# Patient Record
Sex: FEMALE | Race: WHITE | NOT HISPANIC OR LATINO | Employment: OTHER | URBAN - METROPOLITAN AREA
[De-identification: names, ages, dates, MRNs, and addresses within clinical notes are randomized per-mention and may not be internally consistent; named-entity substitution may affect disease eponyms.]

---

## 2017-01-30 DIAGNOSIS — E78.5 HYPERLIPIDEMIA LDL GOAL <130: Primary | ICD-10-CM

## 2017-01-30 RX ORDER — SIMVASTATIN 20 MG
10 TABLET ORAL AT BEDTIME
Qty: 15 TABLET | Refills: 0 | Status: SHIPPED | OUTPATIENT
Start: 2017-01-30 | End: 2017-03-06

## 2017-01-30 NOTE — TELEPHONE ENCOUNTER
Patient calling states she was told that they had sent in request x2 last week but hasn't heard anything. She is out.   Patient hasn't had lipid panel in > 1 yr. Order is in chart. Scheduled lab only appointment for patient.  Medication is being filled for 1 time refill only due to:  Patient needs labs fasting lipid.

## 2017-02-09 DIAGNOSIS — E78.5 HYPERLIPIDEMIA LDL GOAL <130: ICD-10-CM

## 2017-02-09 LAB
CHOLEST SERPL-MCNC: 178 MG/DL
HDLC SERPL-MCNC: 68 MG/DL
LDLC SERPL CALC-MCNC: 97 MG/DL
NONHDLC SERPL-MCNC: 110 MG/DL
TRIGL SERPL-MCNC: 66 MG/DL

## 2017-02-09 PROCEDURE — 80061 LIPID PANEL: CPT | Performed by: INTERNAL MEDICINE

## 2017-02-09 PROCEDURE — 36415 COLL VENOUS BLD VENIPUNCTURE: CPT | Performed by: INTERNAL MEDICINE

## 2017-03-02 ENCOUNTER — APPOINTMENT (OUTPATIENT)
Dept: CT IMAGING | Facility: CLINIC | Age: 82
DRG: 287 | End: 2017-03-02
Attending: INTERNAL MEDICINE
Payer: COMMERCIAL

## 2017-03-02 ENCOUNTER — APPOINTMENT (OUTPATIENT)
Dept: CARDIOLOGY | Facility: CLINIC | Age: 82
DRG: 287 | End: 2017-03-02
Attending: EMERGENCY MEDICINE
Payer: COMMERCIAL

## 2017-03-02 ENCOUNTER — APPOINTMENT (OUTPATIENT)
Dept: GENERAL RADIOLOGY | Facility: CLINIC | Age: 82
End: 2017-03-02
Attending: EMERGENCY MEDICINE
Payer: COMMERCIAL

## 2017-03-02 ENCOUNTER — HOSPITAL ENCOUNTER (INPATIENT)
Facility: CLINIC | Age: 82
LOS: 1 days | Discharge: HOME | DRG: 287 | End: 2017-03-03
Attending: INTERNAL MEDICINE | Admitting: INTERNAL MEDICINE
Payer: COMMERCIAL

## 2017-03-02 ENCOUNTER — APPOINTMENT (OUTPATIENT)
Dept: CARDIOLOGY | Facility: CLINIC | Age: 82
DRG: 287 | End: 2017-03-02
Attending: INTERNAL MEDICINE
Payer: COMMERCIAL

## 2017-03-02 ENCOUNTER — HOSPITAL ENCOUNTER (EMERGENCY)
Facility: CLINIC | Age: 82
Discharge: ANOTHER HOSPITAL | End: 2017-03-02
Attending: EMERGENCY MEDICINE | Admitting: EMERGENCY MEDICINE
Payer: COMMERCIAL

## 2017-03-02 VITALS
DIASTOLIC BLOOD PRESSURE: 68 MMHG | SYSTOLIC BLOOD PRESSURE: 154 MMHG | HEART RATE: 99 BPM | RESPIRATION RATE: 18 BRPM | WEIGHT: 138 LBS | BODY MASS INDEX: 24.45 KG/M2 | HEIGHT: 63 IN | TEMPERATURE: 97.5 F | OXYGEN SATURATION: 96 %

## 2017-03-02 DIAGNOSIS — I21.3 ST ELEVATION MYOCARDIAL INFARCTION (STEMI), UNSPECIFIED ARTERY (H): ICD-10-CM

## 2017-03-02 DIAGNOSIS — I10 ESSENTIAL HYPERTENSION WITH GOAL BLOOD PRESSURE LESS THAN 140/90: Primary | ICD-10-CM

## 2017-03-02 LAB
ALBUMIN SERPL-MCNC: 4.2 G/DL (ref 3.4–5)
ALP SERPL-CCNC: 100 U/L (ref 40–150)
ALT SERPL W P-5'-P-CCNC: 39 U/L (ref 0–50)
ANION GAP SERPL CALCULATED.3IONS-SCNC: 9 MMOL/L (ref 3–14)
AST SERPL W P-5'-P-CCNC: 26 U/L (ref 0–45)
BASOPHILS # BLD AUTO: 0.1 10E9/L (ref 0–0.2)
BASOPHILS NFR BLD AUTO: 0.6 %
BILIRUB SERPL-MCNC: 0.5 MG/DL (ref 0.2–1.3)
BUN SERPL-MCNC: 16 MG/DL (ref 7–30)
CALCIUM SERPL-MCNC: 9.6 MG/DL (ref 8.5–10.1)
CHLORIDE SERPL-SCNC: 99 MMOL/L (ref 94–109)
CHOLEST SERPL-MCNC: 149 MG/DL
CO2 SERPL-SCNC: 29 MMOL/L (ref 20–32)
CREAT SERPL-MCNC: 0.72 MG/DL (ref 0.52–1.04)
DIFFERENTIAL METHOD BLD: NORMAL
EOSINOPHIL # BLD AUTO: 0.1 10E9/L (ref 0–0.7)
EOSINOPHIL NFR BLD AUTO: 1 %
ERYTHROCYTE [DISTWIDTH] IN BLOOD BY AUTOMATED COUNT: 13.4 % (ref 10–15)
GFR SERPL CREATININE-BSD FRML MDRD: 78 ML/MIN/1.7M2
GLUCOSE SERPL-MCNC: 115 MG/DL (ref 70–99)
HCT VFR BLD AUTO: 41.5 % (ref 35–47)
HDLC SERPL-MCNC: 78 MG/DL
HGB BLD-MCNC: 13.5 G/DL (ref 11.7–15.7)
IMM GRANULOCYTES # BLD: 0 10E9/L (ref 0–0.4)
IMM GRANULOCYTES NFR BLD: 0.3 %
INTERPRETATION ECG - MUSE: NORMAL
LDLC SERPL CALC-MCNC: 59 MG/DL
LYMPHOCYTES # BLD AUTO: 2.1 10E9/L (ref 0.8–5.3)
LYMPHOCYTES NFR BLD AUTO: 21.1 %
MAGNESIUM SERPL-MCNC: 2.1 MG/DL (ref 1.6–2.3)
MCH RBC QN AUTO: 29.6 PG (ref 26.5–33)
MCHC RBC AUTO-ENTMCNC: 32.5 G/DL (ref 31.5–36.5)
MCV RBC AUTO: 91 FL (ref 78–100)
MONOCYTES # BLD AUTO: 0.9 10E9/L (ref 0–1.3)
MONOCYTES NFR BLD AUTO: 8.8 %
NEUTROPHILS # BLD AUTO: 6.7 10E9/L (ref 1.6–8.3)
NEUTROPHILS NFR BLD AUTO: 68.2 %
NONHDLC SERPL-MCNC: 71 MG/DL
NRBC # BLD AUTO: 0 10*3/UL
NRBC BLD AUTO-RTO: 0 /100
PLATELET # BLD AUTO: 289 10E9/L (ref 150–450)
POTASSIUM SERPL-SCNC: 3.3 MMOL/L (ref 3.4–5.3)
POTASSIUM SERPL-SCNC: 3.6 MMOL/L (ref 3.4–5.3)
POTASSIUM SERPL-SCNC: 3.8 MMOL/L (ref 3.4–5.3)
PROT SERPL-MCNC: 7.9 G/DL (ref 6.8–8.8)
RBC # BLD AUTO: 4.56 10E12/L (ref 3.8–5.2)
SODIUM SERPL-SCNC: 137 MMOL/L (ref 133–144)
TRIGL SERPL-MCNC: 62 MG/DL
TROPONIN I SERPL-MCNC: 0.29 UG/L (ref 0–0.04)
TROPONIN I SERPL-MCNC: 0.4 UG/L (ref 0–0.04)
TROPONIN I SERPL-MCNC: 0.56 UG/L (ref 0–0.04)
TROPONIN I SERPL-MCNC: NORMAL UG/L (ref 0–0.04)
WBC # BLD AUTO: 9.9 10E9/L (ref 4–11)

## 2017-03-02 PROCEDURE — 84484 ASSAY OF TROPONIN QUANT: CPT | Performed by: INTERNAL MEDICINE

## 2017-03-02 PROCEDURE — 25000132 ZZH RX MED GY IP 250 OP 250 PS 637: Performed by: INTERNAL MEDICINE

## 2017-03-02 PROCEDURE — 25000128 H RX IP 250 OP 636: Performed by: INTERNAL MEDICINE

## 2017-03-02 PROCEDURE — 93454 CORONARY ARTERY ANGIO S&I: CPT | Mod: 26 | Performed by: INTERNAL MEDICINE

## 2017-03-02 PROCEDURE — 25000132 ZZH RX MED GY IP 250 OP 250 PS 637: Performed by: EMERGENCY MEDICINE

## 2017-03-02 PROCEDURE — 25000125 ZZHC RX 250: Performed by: INTERNAL MEDICINE

## 2017-03-02 PROCEDURE — 27211089 ZZH KIT ACIST INJECTOR CR3

## 2017-03-02 PROCEDURE — 99152 MOD SED SAME PHYS/QHP 5/>YRS: CPT | Mod: GC | Performed by: INTERNAL MEDICINE

## 2017-03-02 PROCEDURE — 93005 ELECTROCARDIOGRAM TRACING: CPT

## 2017-03-02 PROCEDURE — 93306 TTE W/DOPPLER COMPLETE: CPT | Mod: 26 | Performed by: INTERNAL MEDICINE

## 2017-03-02 PROCEDURE — 27210856 ZZH ACCESS HEART CATH CR2

## 2017-03-02 PROCEDURE — 71260 CT THORAX DX C+: CPT

## 2017-03-02 PROCEDURE — 85025 COMPLETE CBC W/AUTO DIFF WBC: CPT | Performed by: EMERGENCY MEDICINE

## 2017-03-02 PROCEDURE — 84484 ASSAY OF TROPONIN QUANT: CPT | Performed by: EMERGENCY MEDICINE

## 2017-03-02 PROCEDURE — 83735 ASSAY OF MAGNESIUM: CPT | Performed by: INTERNAL MEDICINE

## 2017-03-02 PROCEDURE — B2111ZZ FLUOROSCOPY OF MULTIPLE CORONARY ARTERIES USING LOW OSMOLAR CONTRAST: ICD-10-PCS | Performed by: INTERNAL MEDICINE

## 2017-03-02 PROCEDURE — 74177 CT ABD & PELVIS W/CONTRAST: CPT

## 2017-03-02 PROCEDURE — 99153 MOD SED SAME PHYS/QHP EA: CPT

## 2017-03-02 PROCEDURE — 99207 ZZC CDG-MDM COMPONENT: MEETS MODERATE - UP CODED: CPT | Performed by: INTERNAL MEDICINE

## 2017-03-02 PROCEDURE — 71010 XR CHEST PORT 1 VW: CPT

## 2017-03-02 PROCEDURE — 96374 THER/PROPH/DIAG INJ IV PUSH: CPT

## 2017-03-02 PROCEDURE — 99152 MOD SED SAME PHYS/QHP 5/>YRS: CPT

## 2017-03-02 PROCEDURE — 99222 1ST HOSP IP/OBS MODERATE 55: CPT | Performed by: INTERNAL MEDICINE

## 2017-03-02 PROCEDURE — 27210787 ZZH MANIFOLD CR2

## 2017-03-02 PROCEDURE — 36415 COLL VENOUS BLD VENIPUNCTURE: CPT | Performed by: INTERNAL MEDICINE

## 2017-03-02 PROCEDURE — 84132 ASSAY OF SERUM POTASSIUM: CPT | Performed by: INTERNAL MEDICINE

## 2017-03-02 PROCEDURE — 99207 ZZC CDG-DX NOT SELECTED BY PROVIDER: CPT | Performed by: INTERNAL MEDICINE

## 2017-03-02 PROCEDURE — 99291 CRITICAL CARE FIRST HOUR: CPT | Mod: 25 | Performed by: INTERNAL MEDICINE

## 2017-03-02 PROCEDURE — 27210759 ZZH DEVICE INFLATION CR6

## 2017-03-02 PROCEDURE — C1769 GUIDE WIRE: HCPCS

## 2017-03-02 PROCEDURE — 99232 SBSQ HOSP IP/OBS MODERATE 35: CPT | Mod: 25 | Performed by: INTERNAL MEDICINE

## 2017-03-02 PROCEDURE — 40000264 ECHO COMPLETE WITH LUMASON

## 2017-03-02 PROCEDURE — 25500064 ZZH RX 255 OP 636: Performed by: INTERNAL MEDICINE

## 2017-03-02 PROCEDURE — 99153 MOD SED SAME PHYS/QHP EA: CPT | Mod: GC | Performed by: INTERNAL MEDICINE

## 2017-03-02 PROCEDURE — 80053 COMPREHEN METABOLIC PANEL: CPT | Performed by: EMERGENCY MEDICINE

## 2017-03-02 PROCEDURE — 27210795 ZZH PAD DEFIB QUICK CR4

## 2017-03-02 PROCEDURE — 80061 LIPID PANEL: CPT | Performed by: INTERNAL MEDICINE

## 2017-03-02 PROCEDURE — 27210946 ZZH KIT HC TOTES DISP CR8

## 2017-03-02 PROCEDURE — 99285 EMERGENCY DEPT VISIT HI MDM: CPT | Mod: 25

## 2017-03-02 PROCEDURE — 93010 ELECTROCARDIOGRAM REPORT: CPT | Performed by: INTERNAL MEDICINE

## 2017-03-02 PROCEDURE — 27210914 ZZH SHEATH CR8

## 2017-03-02 PROCEDURE — 25000128 H RX IP 250 OP 636: Performed by: EMERGENCY MEDICINE

## 2017-03-02 PROCEDURE — 93454 CORONARY ARTERY ANGIO S&I: CPT

## 2017-03-02 PROCEDURE — 21000000 ZZH R&B IMCU HEART CARE

## 2017-03-02 RX ORDER — DOBUTAMINE HYDROCHLORIDE 200 MG/100ML
2-20 INJECTION INTRAVENOUS CONTINUOUS PRN
Status: DISCONTINUED | OUTPATIENT
Start: 2017-03-02 | End: 2017-03-02 | Stop reason: HOSPADM

## 2017-03-02 RX ORDER — NITROGLYCERIN 5 MG/ML
100-500 VIAL (ML) INTRAVENOUS
Status: COMPLETED | OUTPATIENT
Start: 2017-03-02 | End: 2017-03-02

## 2017-03-02 RX ORDER — SODIUM CHLORIDE 9 MG/ML
INJECTION, SOLUTION INTRAVENOUS CONTINUOUS
Status: DISCONTINUED | OUTPATIENT
Start: 2017-03-02 | End: 2017-03-03 | Stop reason: HOSPADM

## 2017-03-02 RX ORDER — FENTANYL CITRATE 50 UG/ML
25-50 INJECTION, SOLUTION INTRAMUSCULAR; INTRAVENOUS
Status: ACTIVE | OUTPATIENT
Start: 2017-03-02 | End: 2017-03-02

## 2017-03-02 RX ORDER — LIDOCAINE HYDROCHLORIDE 10 MG/ML
1-10 INJECTION, SOLUTION EPIDURAL; INFILTRATION; INTRACAUDAL; PERINEURAL
Status: DISCONTINUED | OUTPATIENT
Start: 2017-03-02 | End: 2017-03-02 | Stop reason: HOSPADM

## 2017-03-02 RX ORDER — FUROSEMIDE 10 MG/ML
20-100 INJECTION INTRAMUSCULAR; INTRAVENOUS
Status: DISCONTINUED | OUTPATIENT
Start: 2017-03-02 | End: 2017-03-02 | Stop reason: HOSPADM

## 2017-03-02 RX ORDER — ALBUTEROL SULFATE 90 UG/1
2 AEROSOL, METERED RESPIRATORY (INHALATION) EVERY 6 HOURS PRN
Status: DISCONTINUED | OUTPATIENT
Start: 2017-03-02 | End: 2017-03-03 | Stop reason: HOSPADM

## 2017-03-02 RX ORDER — PRASUGREL 10 MG/1
10-60 TABLET, FILM COATED ORAL
Status: DISCONTINUED | OUTPATIENT
Start: 2017-03-02 | End: 2017-03-02 | Stop reason: HOSPADM

## 2017-03-02 RX ORDER — IOPAMIDOL 755 MG/ML
75 INJECTION, SOLUTION INTRAVASCULAR ONCE
Status: COMPLETED | OUTPATIENT
Start: 2017-03-02 | End: 2017-03-02

## 2017-03-02 RX ORDER — POTASSIUM CHLORIDE 7.45 MG/ML
10 INJECTION INTRAVENOUS
Status: DISCONTINUED | OUTPATIENT
Start: 2017-03-02 | End: 2017-03-03 | Stop reason: HOSPADM

## 2017-03-02 RX ORDER — IOPAMIDOL 755 MG/ML
100 INJECTION, SOLUTION INTRAVASCULAR ONCE
Status: COMPLETED | OUTPATIENT
Start: 2017-03-02 | End: 2017-03-02

## 2017-03-02 RX ORDER — CLOPIDOGREL BISULFATE 75 MG/1
300-600 TABLET ORAL
Status: DISCONTINUED | OUTPATIENT
Start: 2017-03-02 | End: 2017-03-02 | Stop reason: HOSPADM

## 2017-03-02 RX ORDER — ASPIRIN 81 MG/1
81-324 TABLET, CHEWABLE ORAL
Status: DISCONTINUED | OUTPATIENT
Start: 2017-03-02 | End: 2017-03-02 | Stop reason: HOSPADM

## 2017-03-02 RX ORDER — PROTAMINE SULFATE 10 MG/ML
25-100 INJECTION, SOLUTION INTRAVENOUS EVERY 5 MIN PRN
Status: DISCONTINUED | OUTPATIENT
Start: 2017-03-02 | End: 2017-03-02 | Stop reason: HOSPADM

## 2017-03-02 RX ORDER — METHYLPREDNISOLONE SODIUM SUCCINATE 125 MG/2ML
125 INJECTION, POWDER, LYOPHILIZED, FOR SOLUTION INTRAMUSCULAR; INTRAVENOUS
Status: DISCONTINUED | OUTPATIENT
Start: 2017-03-02 | End: 2017-03-02 | Stop reason: HOSPADM

## 2017-03-02 RX ORDER — FLUMAZENIL 0.1 MG/ML
0.2 INJECTION, SOLUTION INTRAVENOUS
Status: ACTIVE | OUTPATIENT
Start: 2017-03-02 | End: 2017-03-02

## 2017-03-02 RX ORDER — EPTIFIBATIDE 2 MG/ML
2 INJECTION, SOLUTION INTRAVENOUS CONTINUOUS PRN
Status: DISCONTINUED | OUTPATIENT
Start: 2017-03-02 | End: 2017-03-02 | Stop reason: HOSPADM

## 2017-03-02 RX ORDER — POTASSIUM CHLORIDE 1.5 G/1.58G
20-40 POWDER, FOR SOLUTION ORAL
Status: DISCONTINUED | OUTPATIENT
Start: 2017-03-02 | End: 2017-03-03 | Stop reason: HOSPADM

## 2017-03-02 RX ORDER — SODIUM NITROPRUSSIDE 25 MG/ML
100-200 INJECTION INTRAVENOUS
Status: DISCONTINUED | OUTPATIENT
Start: 2017-03-02 | End: 2017-03-02 | Stop reason: HOSPADM

## 2017-03-02 RX ORDER — ASPIRIN 325 MG
325 TABLET ORAL
Status: DISCONTINUED | OUTPATIENT
Start: 2017-03-02 | End: 2017-03-02 | Stop reason: HOSPADM

## 2017-03-02 RX ORDER — HEPARIN SODIUM 1000 [USP'U]/ML
1000-10000 INJECTION, SOLUTION INTRAVENOUS; SUBCUTANEOUS EVERY 5 MIN PRN
Status: DISCONTINUED | OUTPATIENT
Start: 2017-03-02 | End: 2017-03-02 | Stop reason: HOSPADM

## 2017-03-02 RX ORDER — ENALAPRILAT 1.25 MG/ML
1.25-2.5 INJECTION INTRAVENOUS
Status: DISCONTINUED | OUTPATIENT
Start: 2017-03-02 | End: 2017-03-02 | Stop reason: HOSPADM

## 2017-03-02 RX ORDER — HYDRALAZINE HYDROCHLORIDE 20 MG/ML
10-20 INJECTION INTRAMUSCULAR; INTRAVENOUS
Status: DISCONTINUED | OUTPATIENT
Start: 2017-03-02 | End: 2017-03-02 | Stop reason: HOSPADM

## 2017-03-02 RX ORDER — NICARDIPINE HYDROCHLORIDE 2.5 MG/ML
100 INJECTION INTRAVENOUS
Status: DISCONTINUED | OUTPATIENT
Start: 2017-03-02 | End: 2017-03-02 | Stop reason: HOSPADM

## 2017-03-02 RX ORDER — NALOXONE HYDROCHLORIDE 0.4 MG/ML
.2-.4 INJECTION, SOLUTION INTRAMUSCULAR; INTRAVENOUS; SUBCUTANEOUS
Status: ACTIVE | OUTPATIENT
Start: 2017-03-02 | End: 2017-03-02

## 2017-03-02 RX ORDER — VERAPAMIL HYDROCHLORIDE 2.5 MG/ML
1-2.5 INJECTION, SOLUTION INTRAVENOUS
Status: COMPLETED | OUTPATIENT
Start: 2017-03-02 | End: 2017-03-02

## 2017-03-02 RX ORDER — POTASSIUM CHLORIDE 29.8 MG/ML
20 INJECTION INTRAVENOUS
Status: DISCONTINUED | OUTPATIENT
Start: 2017-03-02 | End: 2017-03-02 | Stop reason: HOSPADM

## 2017-03-02 RX ORDER — NALOXONE HYDROCHLORIDE 0.4 MG/ML
.1-.4 INJECTION, SOLUTION INTRAMUSCULAR; INTRAVENOUS; SUBCUTANEOUS
Status: DISCONTINUED | OUTPATIENT
Start: 2017-03-02 | End: 2017-03-03 | Stop reason: HOSPADM

## 2017-03-02 RX ORDER — MORPHINE SULFATE 2 MG/ML
1-2 INJECTION, SOLUTION INTRAMUSCULAR; INTRAVENOUS EVERY 5 MIN PRN
Status: DISCONTINUED | OUTPATIENT
Start: 2017-03-02 | End: 2017-03-02 | Stop reason: HOSPADM

## 2017-03-02 RX ORDER — SIMVASTATIN 10 MG
10 TABLET ORAL AT BEDTIME
Status: DISCONTINUED | OUTPATIENT
Start: 2017-03-02 | End: 2017-03-03 | Stop reason: HOSPADM

## 2017-03-02 RX ORDER — DIPHENHYDRAMINE HYDROCHLORIDE 50 MG/ML
25-50 INJECTION INTRAMUSCULAR; INTRAVENOUS
Status: DISCONTINUED | OUTPATIENT
Start: 2017-03-02 | End: 2017-03-02 | Stop reason: HOSPADM

## 2017-03-02 RX ORDER — NIFEDIPINE 10 MG/1
10 CAPSULE ORAL
Status: DISCONTINUED | OUTPATIENT
Start: 2017-03-02 | End: 2017-03-02 | Stop reason: HOSPADM

## 2017-03-02 RX ORDER — ASPIRIN 81 MG/1
324 TABLET, CHEWABLE ORAL ONCE
Status: COMPLETED | OUTPATIENT
Start: 2017-03-02 | End: 2017-03-02

## 2017-03-02 RX ORDER — DEXTROSE MONOHYDRATE 25 G/50ML
12.5-5 INJECTION, SOLUTION INTRAVENOUS EVERY 30 MIN PRN
Status: DISCONTINUED | OUTPATIENT
Start: 2017-03-02 | End: 2017-03-02 | Stop reason: HOSPADM

## 2017-03-02 RX ORDER — NITROGLYCERIN 0.4 MG/1
0.4 TABLET SUBLINGUAL EVERY 5 MIN PRN
Status: DISCONTINUED | OUTPATIENT
Start: 2017-03-02 | End: 2017-03-02 | Stop reason: HOSPADM

## 2017-03-02 RX ORDER — FLUMAZENIL 0.1 MG/ML
0.2 INJECTION, SOLUTION INTRAVENOUS
Status: DISCONTINUED | OUTPATIENT
Start: 2017-03-02 | End: 2017-03-02 | Stop reason: HOSPADM

## 2017-03-02 RX ORDER — ASPIRIN 81 MG/1
81 TABLET ORAL DAILY
Status: DISCONTINUED | OUTPATIENT
Start: 2017-03-02 | End: 2017-03-03 | Stop reason: HOSPADM

## 2017-03-02 RX ORDER — AZELASTINE 1 MG/ML
1-2 SPRAY, METERED NASAL 2 TIMES DAILY
Status: DISCONTINUED | OUTPATIENT
Start: 2017-03-02 | End: 2017-03-03 | Stop reason: HOSPADM

## 2017-03-02 RX ORDER — LIDOCAINE 40 MG/G
CREAM TOPICAL
Status: DISCONTINUED | OUTPATIENT
Start: 2017-03-02 | End: 2017-03-03 | Stop reason: HOSPADM

## 2017-03-02 RX ORDER — NITROGLYCERIN 20 MG/100ML
.07-2 INJECTION INTRAVENOUS CONTINUOUS PRN
Status: DISCONTINUED | OUTPATIENT
Start: 2017-03-02 | End: 2017-03-02 | Stop reason: HOSPADM

## 2017-03-02 RX ORDER — MAGNESIUM SULFATE HEPTAHYDRATE 40 MG/ML
4 INJECTION, SOLUTION INTRAVENOUS EVERY 4 HOURS PRN
Status: DISCONTINUED | OUTPATIENT
Start: 2017-03-02 | End: 2017-03-03 | Stop reason: HOSPADM

## 2017-03-02 RX ORDER — POTASSIUM CHLORIDE 7.45 MG/ML
10 INJECTION INTRAVENOUS
Status: DISCONTINUED | OUTPATIENT
Start: 2017-03-02 | End: 2017-03-02 | Stop reason: HOSPADM

## 2017-03-02 RX ORDER — NALOXONE HYDROCHLORIDE 0.4 MG/ML
0.4 INJECTION, SOLUTION INTRAMUSCULAR; INTRAVENOUS; SUBCUTANEOUS EVERY 5 MIN PRN
Status: DISCONTINUED | OUTPATIENT
Start: 2017-03-02 | End: 2017-03-02 | Stop reason: HOSPADM

## 2017-03-02 RX ORDER — ACETAMINOPHEN 500 MG
500-1000 TABLET ORAL EVERY 8 HOURS PRN
Status: DISCONTINUED | OUTPATIENT
Start: 2017-03-02 | End: 2017-03-03 | Stop reason: HOSPADM

## 2017-03-02 RX ORDER — BUPIVACAINE HYDROCHLORIDE 2.5 MG/ML
1-10 INJECTION, SOLUTION EPIDURAL; INFILTRATION; INTRACAUDAL
Status: DISCONTINUED | OUTPATIENT
Start: 2017-03-02 | End: 2017-03-02 | Stop reason: HOSPADM

## 2017-03-02 RX ORDER — CLOPIDOGREL BISULFATE 75 MG/1
75 TABLET ORAL
Status: DISCONTINUED | OUTPATIENT
Start: 2017-03-02 | End: 2017-03-02 | Stop reason: HOSPADM

## 2017-03-02 RX ORDER — ASPIRIN 81 MG/1
81 TABLET ORAL DAILY
Status: DISCONTINUED | OUTPATIENT
Start: 2017-03-02 | End: 2017-03-02

## 2017-03-02 RX ORDER — INDAPAMIDE 2.5 MG/1
2.5 TABLET ORAL DAILY
Status: DISCONTINUED | OUTPATIENT
Start: 2017-03-02 | End: 2017-03-03

## 2017-03-02 RX ORDER — PROTAMINE SULFATE 10 MG/ML
1-5 INJECTION, SOLUTION INTRAVENOUS
Status: DISCONTINUED | OUTPATIENT
Start: 2017-03-02 | End: 2017-03-02 | Stop reason: HOSPADM

## 2017-03-02 RX ORDER — ADENOSINE 3 MG/ML
12-12000 INJECTION, SOLUTION INTRAVENOUS
Status: DISCONTINUED | OUTPATIENT
Start: 2017-03-02 | End: 2017-03-02 | Stop reason: HOSPADM

## 2017-03-02 RX ORDER — PROMETHAZINE HYDROCHLORIDE 25 MG/ML
6.25-25 INJECTION, SOLUTION INTRAMUSCULAR; INTRAVENOUS EVERY 4 HOURS PRN
Status: DISCONTINUED | OUTPATIENT
Start: 2017-03-02 | End: 2017-03-02 | Stop reason: HOSPADM

## 2017-03-02 RX ORDER — ONDANSETRON 2 MG/ML
4 INJECTION INTRAMUSCULAR; INTRAVENOUS EVERY 4 HOURS PRN
Status: DISCONTINUED | OUTPATIENT
Start: 2017-03-02 | End: 2017-03-02 | Stop reason: HOSPADM

## 2017-03-02 RX ORDER — HYDROCHLOROTHIAZIDE 12.5 MG/1
12.5 CAPSULE ORAL DAILY
Status: DISCONTINUED | OUTPATIENT
Start: 2017-03-02 | End: 2017-03-02

## 2017-03-02 RX ORDER — POTASSIUM CHLORIDE 29.8 MG/ML
20 INJECTION INTRAVENOUS
Status: DISCONTINUED | OUTPATIENT
Start: 2017-03-02 | End: 2017-03-03 | Stop reason: HOSPADM

## 2017-03-02 RX ORDER — EPTIFIBATIDE 2 MG/ML
180 INJECTION, SOLUTION INTRAVENOUS EVERY 10 MIN PRN
Status: DISCONTINUED | OUTPATIENT
Start: 2017-03-02 | End: 2017-03-02 | Stop reason: HOSPADM

## 2017-03-02 RX ORDER — ACETAMINOPHEN 325 MG/1
325-650 TABLET ORAL EVERY 4 HOURS PRN
Status: DISCONTINUED | OUTPATIENT
Start: 2017-03-02 | End: 2017-03-03 | Stop reason: HOSPADM

## 2017-03-02 RX ORDER — LORAZEPAM 2 MG/ML
.5-2 INJECTION INTRAMUSCULAR EVERY 4 HOURS PRN
Status: DISCONTINUED | OUTPATIENT
Start: 2017-03-02 | End: 2017-03-02 | Stop reason: HOSPADM

## 2017-03-02 RX ORDER — NITROGLYCERIN 5 MG/ML
100-200 VIAL (ML) INTRAVENOUS
Status: DISCONTINUED | OUTPATIENT
Start: 2017-03-02 | End: 2017-03-02 | Stop reason: HOSPADM

## 2017-03-02 RX ORDER — DILTIAZEM HYDROCHLORIDE 180 MG/1
180 CAPSULE, EXTENDED RELEASE ORAL DAILY
Status: DISCONTINUED | OUTPATIENT
Start: 2017-03-02 | End: 2017-03-03 | Stop reason: HOSPADM

## 2017-03-02 RX ORDER — LIDOCAINE HYDROCHLORIDE 10 MG/ML
30 INJECTION, SOLUTION EPIDURAL; INFILTRATION; INTRACAUDAL; PERINEURAL
Status: DISCONTINUED | OUTPATIENT
Start: 2017-03-02 | End: 2017-03-02 | Stop reason: HOSPADM

## 2017-03-02 RX ORDER — HYDROCODONE BITARTRATE AND ACETAMINOPHEN 5; 325 MG/1; MG/1
1-2 TABLET ORAL EVERY 4 HOURS PRN
Status: DISCONTINUED | OUTPATIENT
Start: 2017-03-02 | End: 2017-03-03 | Stop reason: HOSPADM

## 2017-03-02 RX ORDER — POTASSIUM CHLORIDE 1500 MG/1
20-40 TABLET, EXTENDED RELEASE ORAL
Status: DISCONTINUED | OUTPATIENT
Start: 2017-03-02 | End: 2017-03-03 | Stop reason: HOSPADM

## 2017-03-02 RX ORDER — FENTANYL CITRATE 50 UG/ML
25-50 INJECTION, SOLUTION INTRAMUSCULAR; INTRAVENOUS
Status: DISCONTINUED | OUTPATIENT
Start: 2017-03-02 | End: 2017-03-02 | Stop reason: HOSPADM

## 2017-03-02 RX ORDER — LOSARTAN POTASSIUM 100 MG/1
100 TABLET ORAL AT BEDTIME
Status: DISCONTINUED | OUTPATIENT
Start: 2017-03-02 | End: 2017-03-03 | Stop reason: HOSPADM

## 2017-03-02 RX ORDER — DOPAMINE HYDROCHLORIDE 160 MG/100ML
2-20 INJECTION, SOLUTION INTRAVENOUS CONTINUOUS PRN
Status: DISCONTINUED | OUTPATIENT
Start: 2017-03-02 | End: 2017-03-02 | Stop reason: HOSPADM

## 2017-03-02 RX ORDER — PHENYLEPHRINE HCL IN 0.9% NACL 1 MG/10 ML
20-100 SYRINGE (ML) INTRAVENOUS
Status: DISCONTINUED | OUTPATIENT
Start: 2017-03-02 | End: 2017-03-02 | Stop reason: HOSPADM

## 2017-03-02 RX ADMIN — LOSARTAN POTASSIUM 100 MG: 100 TABLET, FILM COATED ORAL at 20:27

## 2017-03-02 RX ADMIN — SIMVASTATIN 10 MG: 10 TABLET, FILM COATED ORAL at 20:27

## 2017-03-02 RX ADMIN — POTASSIUM CHLORIDE 20 MEQ: 1500 TABLET, EXTENDED RELEASE ORAL at 15:55

## 2017-03-02 RX ADMIN — IOPAMIDOL 100 ML: 755 INJECTION, SOLUTION INTRAVASCULAR at 05:00

## 2017-03-02 RX ADMIN — MIDAZOLAM HYDROCHLORIDE 1 MG: 1 INJECTION, SOLUTION INTRAMUSCULAR; INTRAVENOUS at 04:41

## 2017-03-02 RX ADMIN — NITROGLYCERIN 200 MCG: 5 INJECTION, SOLUTION INTRAVENOUS at 04:05

## 2017-03-02 RX ADMIN — VERAPAMIL HYDROCHLORIDE 2.5 MG: 2.5 INJECTION, SOLUTION INTRAVENOUS at 04:49

## 2017-03-02 RX ADMIN — FENTANYL CITRATE 50 MCG: 50 INJECTION, SOLUTION INTRAMUSCULAR; INTRAVENOUS at 04:16

## 2017-03-02 RX ADMIN — FENTANYL CITRATE 50 MCG: 50 INJECTION, SOLUTION INTRAMUSCULAR; INTRAVENOUS at 04:01

## 2017-03-02 RX ADMIN — MIDAZOLAM HYDROCHLORIDE 1 MG: 1 INJECTION, SOLUTION INTRAMUSCULAR; INTRAVENOUS at 04:16

## 2017-03-02 RX ADMIN — POTASSIUM CHLORIDE 40 MEQ: 1500 TABLET, EXTENDED RELEASE ORAL at 08:37

## 2017-03-02 RX ADMIN — VERAPAMIL HYDROCHLORIDE 2.5 MG: 2.5 INJECTION, SOLUTION INTRAVENOUS at 04:05

## 2017-03-02 RX ADMIN — TICAGRELOR 180 MG: 90 TABLET ORAL at 03:07

## 2017-03-02 RX ADMIN — VERAPAMIL HYDROCHLORIDE 2.5 MG: 2.5 INJECTION, SOLUTION INTRAVENOUS at 04:43

## 2017-03-02 RX ADMIN — AZELASTINE HYDROCHLORIDE 2 SPRAY: 137 SPRAY, METERED NASAL at 20:25

## 2017-03-02 RX ADMIN — IOPAMIDOL 75 ML: 755 INJECTION, SOLUTION INTRAVENOUS at 05:00

## 2017-03-02 RX ADMIN — OMEPRAZOLE 40 MG: 20 CAPSULE, DELAYED RELEASE ORAL at 08:37

## 2017-03-02 RX ADMIN — NITROGLYCERIN 0.4 MG: 0.4 TABLET SUBLINGUAL at 03:05

## 2017-03-02 RX ADMIN — SULFUR HEXAFLUORIDE 5 ML: KIT at 10:53

## 2017-03-02 RX ADMIN — SODIUM CHLORIDE: 9 INJECTION, SOLUTION INTRAVENOUS at 05:56

## 2017-03-02 RX ADMIN — MIDAZOLAM HYDROCHLORIDE 1 MG: 1 INJECTION, SOLUTION INTRAMUSCULAR; INTRAVENOUS at 04:46

## 2017-03-02 RX ADMIN — Medication 2 TABLET: at 08:37

## 2017-03-02 RX ADMIN — ASPIRIN 81 MG 324 MG: 81 TABLET ORAL at 03:04

## 2017-03-02 RX ADMIN — FLUTICASONE FUROATE AND VILANTEROL TRIFENATATE 1 PUFF: 100; 25 POWDER RESPIRATORY (INHALATION) at 20:25

## 2017-03-02 RX ADMIN — ASPIRIN 81 MG: 81 TABLET, COATED ORAL at 08:38

## 2017-03-02 RX ADMIN — AZELASTINE HYDROCHLORIDE 2 SPRAY: 137 SPRAY, METERED NASAL at 08:39

## 2017-03-02 RX ADMIN — NITROGLYCERIN 200 MCG: 5 INJECTION, SOLUTION INTRAVENOUS at 04:34

## 2017-03-02 RX ADMIN — DILTIAZEM HYDROCHLORIDE 180 MG: 180 CAPSULE, EXTENDED RELEASE ORAL at 08:37

## 2017-03-02 RX ADMIN — MIDAZOLAM HYDROCHLORIDE 1 MG: 1 INJECTION, SOLUTION INTRAMUSCULAR; INTRAVENOUS at 04:02

## 2017-03-02 RX ADMIN — HEPARIN SODIUM 4500 UNITS: 1000 INJECTION, SOLUTION INTRAVENOUS; SUBCUTANEOUS at 03:12

## 2017-03-02 RX ADMIN — NITROGLYCERIN 400 MCG: 5 INJECTION, SOLUTION INTRAVENOUS at 04:43

## 2017-03-02 RX ADMIN — FLUTICASONE FUROATE AND VILANTEROL TRIFENATATE 1 PUFF: 100; 25 POWDER RESPIRATORY (INHALATION) at 08:38

## 2017-03-02 RX ADMIN — POTASSIUM CHLORIDE 20 MEQ: 1500 TABLET, EXTENDED RELEASE ORAL at 10:34

## 2017-03-02 RX ADMIN — Medication 15 MG: at 09:13

## 2017-03-02 RX ADMIN — NITROGLYCERIN 400 MCG: 5 INJECTION, SOLUTION INTRAVENOUS at 04:48

## 2017-03-02 ASSESSMENT — ENCOUNTER SYMPTOMS: SHORTNESS OF BREATH: 1

## 2017-03-02 NOTE — PHARMACY-ADMISSION MEDICATION HISTORY
Admission medication history interview status for the 3/2/2017  admission is complete. See EPIC admission navigator for prior to admission medications     Medication history source reliability:Good    Actions taken by pharmacist (provider contacted, etc):Direct patient interview and calling [facility] in [place] for confirmation of omeprazole dose.     Additional medication history information not noted on PTA med list :None    Medication reconciliation/reorder completed by provider prior to medication history? No    Time spent in this activity: 15 minutes    Prior to Admission medications    Medication Sig Last Dose Taking? Auth Provider   simvastatin (ZOCOR) 20 MG tablet Take 0.5 tablets (10 mg) by mouth At Bedtime 3/1/2017 at PM Yes Layo Sevilla MD   fluticasone-salmeterol (ADVAIR-HFA) 230-21 MCG/ACT inhaler Inhale 2 puffs into the lungs 2 times daily 3/1/2017 at PM Yes Layo Sevilla MD   azelastine (ASTELIN) 0.1 % nasal spray Spray 1-2 sprays into both nostrils 2 times daily 3/1/2017 at PM Yes Layo Sevilla MD   losartan (COZAAR) 100 MG tablet Take 1 tablet (100 mg) by mouth At Bedtime 2/27/2017 at PM Yes Layo Sevilla MD   hydrochlorothiazide 12.5 MG TABS Take 1 tablet (12.5 mg) by mouth daily 3/1/2017 at 0800 Yes Layo Sevilla MD   acetaminophen (TYLENOL) 500 MG tablet Take 500-1,000 mg by mouth every 8 hours as needed for mild pain Past Month at PRN Yes Reported, Patient   albuterol (PROAIR HFA, PROVENTIL HFA, VENTOLIN HFA) 108 (90 BASE) MCG/ACT inhaler Inhale 2 puffs into the lungs every 6 hours as needed  3/1/2017 Yes Reported, Patient   aspirin 81 MG EC tablet Take 1 tablet (81 mg) by mouth daily 2/27/2017 at 0800 Yes Layo Sevilla MD   calcium citrate-vitamin D (CALCIUM CITRATE +) 315-200 MG-UNIT TABS Take 2 tablets by mouth daily. 3/1/2017 at PM Yes Reported, Patient   omeprazole (PRILOSEC) 20 MG capsule Take 20 mg by mouth daily  3/1/2017 at 0800  Layo Sevilla MD   order for DME Equipment being  ordered: compression stockings, 30 mm Hg, 2 pairs.   Layo Sevilla MD

## 2017-03-02 NOTE — IP AVS SNAPSHOT
MRN:[id number]                      After Visit Summary   3/2/2017    Genie Persaud    MRN: [id number]           Thank you!     Thank you for choosing [facility] for your care. Our goal is always to provide you with excellent care. Hearing back from our patients is one way we can continue to improve our services. Please take a few minutes to complete the written survey that you may receive in the mail after you visit with us. Thank you!        Patient Information     Date Of Birth          1935        About your hospital stay     You were admitted on:  March 2, 2017 You last received care in the:  [facility]    You were discharged on:  March 3, 2017        Reason for your hospital stay       Admitted with elevated blood pressures                  Who to Call     For medical emergencies, please call 911.  For non-urgent questions about your medical care, please call your primary care provider or clinic, [phone]          Attending Provider     Provider Specialty    Barak Hernandez MD Cardiology       Primary Care Provider Office Phone # Fax #    Layo Sevilla [phone]       [facility] [address] [place] [address]        Follow-up Appointments     Follow-up and recommended labs and tests        Follow-up with primary care provider in 1-2 weeks  for blood pressure follow-up, also check fasting glucose (arrive at this point with nothing to eat or drink for 12 hours prior to appointment i.e. 8 PM the night before) at that time as was mildly elevated during the hospitalization.  Primary care physician also to follow-up on lung nodule seen on chest CT.  Follow-up with nurse practitioner in cardiology [place] [facility]  In 1-2 weeks.  Diltiazem may cause increased side effects with simvastatin, please call doctor if develop muscle pain or cramps.  Recommend taking imdur in the morning and cozaar in the evening to spread  out medications which could cause hypotension.                  Your next 10 appointments already scheduled     Mar 10, 2017  8:20 AM CST   Office Visit with Layo Sevilla MD   [facility]    [address]  [address]  [facility] [address] [phone]           Bring a current list of meds and any records pertaining to this visit.  For Physicals, please bring immunization records and any forms needing to be filled out.  Please arrive 10 minutes early to complete paperwork.            Mar 17, 2017  3:10 PM CDT   Return Discharge with TOREY Sierra CNP   [facility]    [address]  [facility] [address] [phone]              Additional Services     Follow-Up with Cardiac Advanced Practice Provider                 Further instructions from your care team         Discharge Instructions for Cardiac Catheterization  Cardiac catheterization is a procedure to look for blocked areas in the blood vessels that send blood to the heart. A thin, flexible tube (catheter) is put in a blood vessel in your groin or arm. Contrast fluid is injected into your blood, which then flows to your heart. Finally, X-rays pictures are taken of your heart. Your doctor will review the results with you. Be sure to ask any questions you have before you leave. This sheet will help you take care of yourself at home.  Home care    Only do light and easy activities for the next 2 to 3 days. Ask for help with chores and errands while you recover. Have someone drive you to your appointments.    Avoid heavy lifting for a while. Your doctor will provide a specific time frame for you.    Ask your doctor when you can expect to return to work. Unless your job involves lifting, you may be able to return to your normal activities within a couple of days.    Take your medicines as directed. Do not skip  doses.    Drink 6 to 8 glasses of water a day. This is to help flush the contrast dye out of your body.    Take your temperature each day for 7 days.    Check your incisions daily for signs of infection. These include redness, swelling, and drainage. It is normal to have a small bruise or bump where the catheter was inserted. A bruise that is getting larger is not normal and should be reported to your doctor.    Eat a healthy diet. Make sure it is low in fat, salt, and cholesterol. Ask your doctor for diet information.    Stop smoking. Enroll in a stop-smoking program or ask your doctor for help.    Exercise as advised by your doctor. Depending on your situation, your doctor may recommend you start a cardiac rehabilitation program.    Do not swim or take baths until the doctor says it s OK. You can shower the day after the procedure.    Follow-up care  Make a follow-up appointment as advised by our staff.  When to seek medical care  Call your doctor immediately if you have any of the following:    Chest pain    Constant or increasing pain or numbness in your leg    Fever of 100.4 F (38.0 C) or higher    Symptoms of infection (redness, swelling, drainage, or warmth at the incision site)    Shortness of breath    A leg that feels cold or appears blue    Bleeding, bruising, or a lot of swelling where the catheter was inserted    Blood in your urine    Black or tarry stools    Any unusual bleeding     4539-8822 [employer]. [address] [place] [address]. All rights reserved. This information is not intended as a substitute for professional medical care. Always follow your healthcare professional's instructions.          Pending Results     Date and Time Order Name Status Description    3/2/2017 1030 EKG 12-lead, tracing only Preliminary             Statement of Approval     Ordered          03/03/17 1301  I have reviewed and agree with all the recommendations and orders detailed in this  "document.  EFFECTIVE NOW     Approved and electronically signed by:  Meghana Laura MD             Admission Information     Date & Time Provider Department Dept. Phone    3/2/2017 Barak Hernandez MD [facility] [phone]      Your Vitals Were     Blood Pressure Temperature Respirations Height Weight Pulse Oximetry    115/49 97.8  F (36.6  C) (Oral) 20 1.6 m (5' 3\") 62.4 kg (137 lb 9.1 oz) 97%    BMI (Body Mass Index)                   24.37 kg/m2           MyChart Information     [employer] lets you send messages to your doctor, view your test results, renew your prescriptions, schedule appointments and more. To sign up, go to [email or web address] . Click on \"Log in\" on the left side of the screen, which will take you to the Welcome page. Then click on \"Sign up Now\" on the right side of the page.     You will be asked to enter the access code listed below, as well as some personal information. Please follow the directions to create your username and password.     Your access code is: [email or web address]  Expires: 2017 10:32 AM     Your access code will  in 90 days. If you need help or a new code, please call your [facility] clinic or [phone].        Care EveryWhere ID     This is your Care EveryWhere ID. This could be used by other organizations to access your [facility] medical records  [id number]           Review of your medicines      START taking        Dose / Directions    diltiazem 180 MG 24 hr capsule   Commonly known as:  DILACOR XR   Used for:  Essential hypertension with goal blood pressure less than 140/90        Dose:  180 mg   Take 1 capsule (180 mg) by mouth daily   Quantity:  30 capsule   Refills:  1       indapamide 1.25 MG tablet   Commonly known as:  LOZOL   Used for:  Essential hypertension with goal blood pressure less than 140/90        Dose:  1.25 mg   Take 1 tablet (1.25 mg) by mouth daily   Quantity:  30 tablet   Refills:  1       isosorbide " mononitrate 30 MG 24 hr tablet   Commonly known as:  IMDUR   Used for:  Essential hypertension with goal blood pressure less than 140/90        Dose:  15 mg   Take 0.5 tablets (15 mg) by mouth daily   Quantity:  30 tablet   Refills:  1         CONTINUE these medicines which have NOT CHANGED        Dose / Directions    acetaminophen 500 MG tablet   Commonly known as:  TYLENOL        Dose:  500-1000 mg   Take 500-1,000 mg by mouth every 8 hours as needed for mild pain   Refills:  0       albuterol 108 (90 BASE) MCG/ACT Inhaler   Commonly known as:  PROAIR HFA/PROVENTIL HFA/VENTOLIN HFA        Dose:  2 puff   Inhale 2 puffs into the lungs every 6 hours as needed   Refills:  0       aspirin 81 MG EC tablet   Used for:  Stroke (H)        Dose:  81 mg   Take 1 tablet (81 mg) by mouth daily   Quantity:  90 tablet   Refills:  3       azelastine 0.1 % spray   Commonly known as:  ASTELIN   Used for:  Allergic rhinitis due to pollen        Dose:  1-2 spray   Spray 1-2 sprays into both nostrils 2 times daily   Quantity:  1 Bottle   Refills:  3       CALCIUM CITRATE + 315-200 MG-UNIT Tabs per tablet   Generic drug:  calcium citrate-vitamin D        Dose:  2 tablet   Take 2 tablets by mouth daily.   Refills:  0       fluticasone-salmeterol 230-21 MCG/ACT inhaler   Commonly known as:  ADVAIR-HFA        Dose:  2 puff   Inhale 2 puffs into the lungs 2 times daily   Quantity:  12 g   Refills:  1       losartan 100 MG tablet   Commonly known as:  COZAAR   Used for:  Essential hypertension with goal blood pressure less than 140/90        Dose:  100 mg   Take 1 tablet (100 mg) by mouth At Bedtime   Quantity:  90 tablet   Refills:  1       omeprazole 20 MG CR capsule   Commonly known as:  priLOSEC   Used for:  Candidal esophagitis (H)        Dose:  20 mg   Take 20 mg by mouth daily   Quantity:  90 capsule   Refills:  2       order for DME   Used for:  Venous stasis        Equipment being ordered: compression stockings, 30 mm Hg, 2 pairs.    Quantity:  2 each   Refills:  1       simvastatin 20 MG tablet   Commonly known as:  ZOCOR   Used for:  Hyperlipidemia LDL goal <130        Dose:  10 mg   Take 0.5 tablets (10 mg) by mouth At Bedtime   Quantity:  15 tablet   Refills:  0         STOP taking     hydrochlorothiazide 12.5 MG Tabs tablet                Where to get your medicines      These medications were sent to [id number] - [place], [place] - [address]  [address], [place] [address]     Phone:  [phone]     diltiazem 180 MG 24 hr capsule    indapamide 1.25 MG tablet    isosorbide mononitrate 30 MG 24 hr tablet                Protect others around you: Learn how to safely use, store and throw away your medicines at www.disposemymeds.org.             Medication List: This is a list of all your medications and when to take them. Check marks below indicate your daily home schedule. Keep this list as a reference.      Medications           Morning Afternoon Evening Bedtime As Needed    acetaminophen 500 MG tablet   Commonly known as:  TYLENOL   Take 500-1,000 mg by mouth every 8 hours as needed for mild pain                                   albuterol 108 (90 BASE) MCG/ACT Inhaler   Commonly known as:  PROAIR HFA/PROVENTIL HFA/VENTOLIN HFA   Inhale 2 puffs into the lungs every 6 hours as needed                                   aspirin 81 MG EC tablet   Take 1 tablet (81 mg) by mouth daily   Last time this was given:  81 mg on 3/3/2017  8:39 AM   Next Dose Due:  3-4-2017                                   azelastine 0.1 % spray   Commonly known as:  ASTELIN   Spray 1-2 sprays into both nostrils 2 times daily   Last time this was given:  2 sprays on 3/3/2017  8:38 AM   Next Dose Due:  3-3-2017                                      CALCIUM CITRATE + 315-200 MG-UNIT Tabs per tablet   Take 2 tablets by mouth daily.   Generic drug:  calcium citrate-vitamin D   Next Dose Due:  3-4-2017                                   diltiazem 180 MG 24  hr capsule   Commonly known as:  DILACOR XR   Take 1 capsule (180 mg) by mouth daily   Last time this was given:  180 mg on 3/3/2017  8:39 AM   Next Dose Due:  3-4-2017                                   fluticasone-salmeterol 230-21 MCG/ACT inhaler   Commonly known as:  ADVAIR-HFA   Inhale 2 puffs into the lungs 2 times daily   Next Dose Due:  3-3-2017                                      indapamide 1.25 MG tablet   Commonly known as:  LOZOL   Take 1 tablet (1.25 mg) by mouth daily   Last time this was given:  1.25 mg on 3/3/2017 10:44 AM   Next Dose Due:  3-4-2017                                   isosorbide mononitrate 30 MG 24 hr tablet   Commonly known as:  IMDUR   Take 0.5 tablets (15 mg) by mouth daily   Last time this was given:  15 mg on 3/3/2017  8:39 AM   Next Dose Due:  3-4-2017                                   losartan 100 MG tablet   Commonly known as:  COZAAR   Take 1 tablet (100 mg) by mouth At Bedtime   Last time this was given:  100 mg on 3/2/2017  8:27 PM   Next Dose Due:  3-3-2017                                   omeprazole 20 MG CR capsule   Commonly known as:  priLOSEC   Take 20 mg by mouth daily   Last time this was given:  20 mg on 3/3/2017  6:33 AM   Next Dose Due:  3-4-2017                                   order for DME   Equipment being ordered: compression stockings, 30 mm Hg, 2 pairs.                                simvastatin 20 MG tablet   Commonly known as:  ZOCOR   Take 0.5 tablets (10 mg) by mouth At Bedtime   Last time this was given:  10 mg on 3/2/2017  8:27 PM   Next Dose Due:  3-3-2017

## 2017-03-02 NOTE — IP AVS SNAPSHOT
[facility]    [address]    [facility] [address]    Phone:  [phone]                                       After Visit Summary   3/2/2017    Genie Persaud    MRN: [id number]           After Visit Summary Signature Page     I have received my discharge instructions, and my questions have been answered. I have discussed any challenges I see with this plan with the nurse or doctor.    ..........................................................................................................................................  Patient/Patient Representative Signature      ..........................................................................................................................................  Patient Representative Print Name and Relationship to Patient    ..................................................               ................................................  Date                                            Time    ..........................................................................................................................................  Reviewed by Signature/Title    ...................................................              ..............................................  Date                                                            Time

## 2017-03-02 NOTE — ED NOTES
"Pt complains of midsternal chest pain that started 1 hour ago. ABC intact, appears slightly short of breath, she reports \"my asthma just keeps getting worse\"  "

## 2017-03-02 NOTE — ED PROVIDER NOTES
History     Chief Complaint:  Chest Pain      HPI   Genie Persaud is a 81 year old female with history of asthma, HTN, HLD, and known thoracic aortic aneurysm who presents with waxing and waning chest pain/pressure since waking up around 2 AM this morning.  The patient yesterday (3/1) she felt otherwise normal but woke up from sleep this morning at 2 AM with central to left sided chest pressure which radiates underneath her left armpit and therefore presents to the ED.  She notes Monday night (2/27) she experienced a similar sensation which resolved shortly after onset and she attributed it to her asthma, as she gets a sensation of chest pressure with asthma exacerbations.  The patient states she does not have any pain, just pressure, and reports it has been waxing and waning since onset. She denies feeling any more short of breath than usual.  The patient denies history of MI.  She states nothing exacerbates her pain. She has been taking her blood pressure medications irregularly.    Cardiac Risk Factors:  CAD:    -  Hypertension:   +  Hyperlipidemia:  +  Diabetes:   -  Tobacco use:   -  Gender:   F  Age:    81  Familial Hx of CAD:  + (mother CAD)      Allergies:  Fosamax [Alendronic Acid] - GI disturbance  Augmentin [Amoxicillin-Pot Clavulanate] - Diarrhea, rash  Evista [Raloxifene] - GI upset  Flu Virus Vaccine     Medications:    Zocor  Advair  Prilosec  Astelin  Cozaar  Tylenol  Proair  Baby Aspirin   Calcium Citrate     Past Medical History:    Asthma  Osteopenia  HLD  HTN  Pulmonary Nodule  Hand arthropathy  Vasculitis   Swelling/mass in head/neck  Stroke Retinal Artery  Aneurysm of Thoracic Aorta  Candidal Esophagitis  Anemia   Basal Cell Carcinoma - back     Past Surgical History:    Appendectomy  D&C  Ligate Fallopian Tube  Breast Biopsy Stereotactic  Cholecystectomy, laparoscopic  Upper Endoscopy     Family History:  Mother: HTN, CAD, Osteoporosis   Father: Scleroderma,   Grandfather: Cerebrovascular  "Disease    Social History:  Relationship status: Single  The patient denies smoking.   The patient reports weekly alcohol use.   The patient presents alone.     Review of Systems   Respiratory: Positive for shortness of breath.    Cardiovascular: Positive for chest pain.   All other systems reviewed and are negative.      Physical Exam     Patient Vitals for the past 24 hrs:   BP Temp Temp src Heart Rate SpO2 Height Weight   03/02/17 0305 - - - - - - 62.6 kg (138 lb)   03/02/17 0246 199/73 - - 95 95 % - -   03/02/17 0244 - 97.5  F (36.4  C) Oral - - - -   03/02/17 0243 - - - 99 95 % 1.588 m (5' 2.52\") 63.5 kg (140 lb)       Physical Exam  Gen: Pleasant, appears stated age.    Eye:   Pupils are equal, round, and reactive.     Sclera non-injected.    ENT:   Moist mucus membranes.     Normal tongue.    Oropharynx without lesions.    Cardiac:     Normal rate and regular rhythm.    No murmurs, gallops, or rubs.   2+ radial pulses    Pulmonary:     Slightly decreased breath sounds bilaterally   No wheezes, rales, or rhonchi.    Abdomen:     Normal active bowel sounds.     Abdomen is soft and non-distended, without focal tenderness.    Musculoskeletal:     Normal movement of all extremities without evidence for deficit.    Extremities:    No edema.  Calves non tender    Skin:   Warm and dry.    Neurologic:    Non-focal exam without asymmetric weakness or numbness.    Normal tone    Psychiatric:     Normal affect with appropriate interaction with examiner.      Emergency Department Course     ECG @ 0239  Indication: Chest Pain  Rate 99 bpm.   VA interval 160 ms.   QRS duration 94 ms.   QT/QTc 340/436 ms.   P-R-T axes 22.  Notes: Sinus rhythm with sinus arrhythmia with occasional premature ventricular complexes.  Possible left atrial enlargement.  Nonspecific ST and T wave abnormality.  New lateral ST depression compared to ECG dated 6/13/16.  Time read 0310    ECG @ 0250  Indication: Chest pain  Rate 122 bpm.   VA interval " 180 ms.   QRS duration 96 ms.   QT/QTc 316/450 ms.   P-R-T axes 25.  Notes: Sinus tachycardia.  Possible left atrial enlargement.  ST & T wave abnormality, consider inferolateral ischemia.   Time read 0310    ECG @ 0251  Indication: Chest pain  Rate 122 bpm.   IN interval 184 ms.   QRS duration 86 ms.   QT/QTc 334/475 ms.   P-R-T axes 23.  Notes: Sinus tachycardia.  Possible left atrial enlargement.  Marked ST abnormality, possible inferior subendocardial injury.  Marked ST abnormality, possible anterior subendocardial injury.  STEMI.  Time read 0311    Imaging:  Radiographic findings were communicated with the patient who voiced understanding of the findings.    Portable Chest XR per radiology:   IMPRESSION: Heart size is near the upper limits of normal. No focal  air-space consolidation or other significant change. No pneumothorax.    Laboratory:  CBC: WBC 9.9 (WNL) HGB 13.5 (WNL)  (WNL)   CMP: Cr 0.72 (WNL) Glucose 115 (H) Rest WNL  0241: Troponin I: <0.015 (WNL)     Interventions:  0304 Aspirin, 324 mg, PO  0305 Nitrostat, 0.4 mg, Sublingual  0307 Brilinta, 180 mg, PO  0312 Heparin Loading Dose, 4500 Units, IV injection    ED Course:  0254 ECGs from triage obtained by Dr. Mast who reviewed them with me.    0254 STEMI called, cath lab activated.   0255 Portable chest XR paged  055 Nursing notes and past medical history reviewed.  I performed a physical examination of the patient as documented above.  I explained the plan with the patient who consents to this.   0259 Portable chest XR arrives   0304 Aspirin, 324 mg, PO  0305 Nitrostat, 0.4 mg, Sublingual  0307 Discussed the patient with Dr. Peguero from Interventional Cardiology.  0310 Patient reports her pressure and discomfort have resolved following Nitro.  Blood pressure improved.  I personally reviewed the laboratory and imaging results with the Patient and answered all related questions prior to transfer.  0325 Findings and plan explained to the  Patient. Patient will be transferred to [facility] via EMS. Discussed the case with Dr. Peguero, and the patient will be evaluated by the Cath Lab team upon her arrival.     Impression & Plan      CMS Diagnoses: The patient has a STEMI     The patient was evaluated at 0254   Patient was transferred  to the cath lab which was activated due to ECG changes.   ASA given in the ER  Medical Decision Making:  Genie Persaud is a 81 year old female with history of HTN and HLD who presents to the emergency department today with about an hour of substernal chest pressure radiating to the left axilla.  ECGs demonstrated evolving changes concerning for anterior STEMI.   She also is markedly hypertensive. Her chest radiograph demonstrates a known thoracic aortic aneurysm, but clinically and on exam, there is no evidence of aortic dissection.  Her chest was relieved with a single dose of sublingual nitro.  She was given the medications as above.  Cath lab was activated upon review of the second ECG.  She was transferred emergently to [facility] for cardiac catherization.  The cath lab team will meet her upon arrival.  She was hemodynamically stable prior to transfer.  She is in agreement with this plan.     Diagnosis:    ICD-10-CM   1. ST elevation myocardial infarction (STEMI), unspecified artery (H) I21.3       Disposition:   The patient was transferred to [facility] cath lab.       Dilshad KINSEY, am serving as a scribe on 3/2/2017 at 2:54 AM to personally document services performed by Lali Martinez MD, based on my observations and the provider's statements to me.       Lali Martinez MD  03/02/17 0607

## 2017-03-02 NOTE — CONSULTS
HOSPITALIST CONSULTATION      REQUESTING PHYSICIAN:  None stated.      PRIMARY CARE PHYSICIAN:  None stated.      CHIEF COMPLAINT:  Chest pain.      HISTORY OF PRESENT ILLNESS:  Genie Persaud is a pleasant 81-year-old female with past medical history notable for vasculitis of the thoracic aorta, moderate persistent asthma, hypertension, known thoracic aortic aneurysm who presented to [facility] last night with chest pressure and tightness radiating into her left arm.  The patient notes having similar symptoms several days ago.  There was no association with these symptoms on exertion versus rest.  She does note missing several doses of her evening antihypertensive medication which is losartan as she ran out of medications and had not refilled the prescription yet.  She has been taking her hydrochlorothiazide as prescribed.  Due to the persistent nature of her symptoms last night, she came to [facility] where she was found to have ST depressions and then mild ST elevations in V1 and AVR plus a code STEMI was called and the patient was transferred emergently to [facility] for angiogram.  Upon my examination in CICU after her angiogram and CT of the chest, the patient states feeling 100% better and is near baseline.  She initially thought her symptoms were due to an asthma exacerbation and she has been having worsening symptoms lately.  She denies any palpitations, dizziness, lightheadedness, loss of consciousness, chest pain at this time.  No shortness of breath.  She does admit to having slightly increased leg swelling over the past several weeks.  She does use compression stockings at home.  She denies any difficulty eating or chewing or swallowing.  She denies any fevers, chills nor any significant weight changes.  She denies any nausea, vomiting, diarrhea or abdominal pain.      PAST MEDICAL HISTORY:  Notable for hypertension, vasculitis, anemia, osteopenia, thoracic aortic aneurysm,  remote history of a stroke and moderate persistent asthma.      MEDICATIONS:  Notable for simvastatin, Advair, omeprazole, Astelin, Cozaar, hydrochlorothiazide, Tylenol, albuterol, aspirin, and vitamin D supplement.      ALLERGIES:  Fosamax, Augmentation, duloxetine and the flu vaccine.      FAMILY HISTORY:  Notable for coronary artery disease in her mother and stroke in her paternal grandmother and scleroderma in her father.      SOCIAL HISTORY:  The patient denies tobacco use ever.  Occasional consumption of wine.  She lives independently in her own home with 2 cats.  She has a son nearby who checks in occasionally.      REVIEW OF SYSTEMS:  A 10-point review of systems was performed and negative except as per HPI.  Additionally, the patient states no significant limitations at baseline with her activity level.  She is able to bike stationary equipment for 30 minutes 3 times a week and she actually did this yesterday afternoon without difficulties.      PHYSICAL EXAMINATION:   VITAL SIGNS:  Afebrile, heart rate 70s to 80s, blood pressure 140s to 160s systolic over 50s to 60s diastolic, respirations 10-20, satting 96% on room air.  Weight is 54.5 kilograms.   GENERAL:  No acute distress, seen lying flat on stretcher.  Left wrist pressure dressing intact.   CARDIOVASCULAR:  Regular rate and rhythm.  No additional heart sounds.   LUNGS:  Clear to auscultation bilaterally.  No wheezes, rhonchi or rales.   GASTROINTESTINAL:  Bowel sounds positive.  Soft, nondistended, nontender.   EXTREMITIES:  Trace pretibial edema noted.   NEUROLOGIC:  The patient is alert, awake and oriented x3.  Grossly, no focal neurologic deficits.      LABORATORY RESULTS:  BMP is within normal limits.  Initial troponin was negative.  Subsequently checked at 6:00 a.m., it is 0.295.  Blood glucose 115.  CBC also within normal limits.        The patient had a chest x-ray showing borderline cardiomegaly, otherwise no acute changes.      CT chest,  abdomen and pelvis shows aneurysmal dilation of the ascending aorta without dissection, questionable edema in both lungs, 0.5 cm nodule in the right upper lung, and colonic diverticulosis.       Angiogram was performed showing 2-vessel coronary disease with 50% stenosis in the mid-LAD, D3 stenosis and proximal left circumflex and mid left circumflex.  There is also mention of intermittent SVT during the angiogram.        EKG is reviewed as per HPI above.      ASSESSMENT:  Genie Persaud is a pleasant 81-year-old female with past medical history notable for known thoracic aortic aneurysm, moderate persistent asthma, vasculitis, hypertension who presents with chest pain to [facility], subsequently transferred to [facility] as ST-elevation myocardial infarction protocol was initiated.  She underwent CT chest and angiogram and is now in cardiac intensive care unit for continued monitoring.      PLAN:   1.  Chest pain syndrome, likely due to hypertensive emergency.  The patient was admitted under Cardiology by Dr. Hernandez.  Angiogram revealed coronary artery disease not suspected to be cause of the patient's chest pain.  CT chest did not show any dissection of the known thoracic aortic aneurysm.  With improvement in patient's blood pressure, her symptoms have resolved and she feels near baseline at this point.  Lending credence to this suspicion is that the patient did not take her Cozaar over the past several days as she ran out of medication.   - Appreciate cardiology input.  Per angiogram results, the patient will be maintained on aspirin 81 mg daily.   - Echocardiogram is pending.   - Serial troponins and lipid profile per Cardiology.   - PTA simvastatin and losartan have been ordered.      2.  History of supraventricular tachycardia around time of her angiogram.  This may be due to the angiogram itself versus heparin.   - The patient has been started on diltiazem per Cardiology given her history of asthma.   -  Continue telemetry monitoring in CICU for today.      3.  History of moderate persistent asthma.  We will resume [id number] Advair and albuterol inhaler.   4.  History of hypertension.  Resume [id number] losartan and hydrochlorothiazide.  Diltiazem has been added as well as outlined above.   5.  Deep venous thrombosis prophylaxis with SCDs.   6.  The patient is a full code.     JONO YOST MD      D: 2017 06:48   T: 2017 07:15   MT: [email or web address]      Name:     TERESA HARTMAN   MRN:      [id number]        Account:       [id number]   :      1935           Consult Date:  2017      Document: [id number]

## 2017-03-02 NOTE — PROGRESS NOTES
"[facility]  Cardiology Progress Note         Assessment and Plan:   HTN urgency--pt ran out of her bp med 2 days PTA  Dilt added for bp, any svt or any spasm, i will change hctz to lozol and watch bp  Add imdur,  Not on BB due to ?spasm and asthma  Elevated Tp and ECG c/w subendo ischemia diffuse--likely from HTN crisis\--will tx bp as above and get echo to exclude takotsubo etc.  I reviewed cath: mild RCA, mod Lad, Lcx up to 70% prox--rec med tx and possibly outpt gxt later  SVT- Dr murrell noted runs of brief svt during cath, pt wasn't aware. He added dilt for this  Vasculitis  thoracic aortic aneurysm, CT this admit 44mm and no dissection  remote history of a stroke   moderate persistent asthma. Not on bb for this reason, no wheeze now             Interval History:   States minor cp subxiphoid but no arm pain, no sob  NOTE-rash or scrape on back, she wasn't aware and doesn't know how it got there when i asked              Medications:   Scheduled Meds:     calcium citrate-vitamin D  2 tablet Oral Daily     azelastine  1-2 spray Both Nostrils BID     losartan  100 mg Oral At Bedtime     hydrochlorothiazide  12.5 mg Oral Daily     fluticasone-vilanterol  1 puff Inhalation Daily     omeprazole  40 mg Oral Daily     simvastatin  10 mg Oral At Bedtime     sodium chloride (PF)  3 mL Intracatheter Q8H     aspirin EC  81 mg Oral Daily     diltiazem  180 mg Oral Daily     PRN Meds: albuterol, acetaminophen, lidocaine, lidocaine 4%, sodium chloride (PF), sodium chloride 0.9%, lidocaine, atropine, fentaNYL, midazolam, naloxone, flumazenil, HOLD MEDICATION, acetaminophen, HYDROcodone-acetaminophen, naloxone, traZODone, potassium chloride, potassium chloride, potassium chloride, potassium chloride with lidocaine, potassium chloride, magnesium sulfate, magnesium sulfate         Physical Exam:   Blood pressure 169/61, resp. rate 21, height 1.6 m (5' 3\"), weight 64.5 kg (142 lb 3.2 oz), SpO2 96 %, not currently " breastfeeding.  Vitals:    17 0500   Weight: 64.5 kg (142 lb 3.2 oz)     No intake or output data in the 24 hours ending 17 0806        Vital Sign Ranges  Temperature Temp  Av.5  F (36.4  C)  Min: 97.5  F (36.4  C)  Max: 97.5  F (36.4  C)   Blood pressure Systolic (24hrs), Av , Min:144 , Max:202        Diastolic (24hrs), Av, Min:58, Max:73      Pulse Pulse  Av  Min: 99  Max: 99   Respirations Resp  Av.5  Min: 15  Max: 21   Pulse oximetry SpO2  Av.7 %  Min: 93 %  Max: 96 %             Constitutional: Awake, alert, cooperative, no apparent distress       Skin: Scrape or rash R back, now in a dermatome (not zoster)       Neck: No thyromegaly or adenopathy       Lungs: Few scattered crackles no wheeze       Cardiovasc: rrr       Abdomen: Normal bowel sounds, soft, non-distended, non-tender, no hepatomegaly       Neuro: Alert and oriented x3, non focal exam       Extremities: Mild ankle edema       Other:             Data:     Recent Labs   Lab Test  17   0241  10/14/16   1152   WBC  9.9  8.8   HGB  13.5  13.1   MCV  91  92   PLT  289  269      Recent Labs   Lab Test  17   0241  16   1008   NA  137  135   POTASSIUM  3.6  3.8   CHLORIDE  99  101   BUN  16  13   CR  0.72  0.70       Recent Labs  Lab 17  0241   *     Recent Labs   Lab Test  17   0241  16   1008   ALT  39  28   AST  26  20     No components found for: TROPONINIES    Lab Results   Component Value Date    CHOL 149 2017     Lab Results   Component Value Date    HDL 78 2017     Lab Results   Component Value Date    LDL 59 2017     Lab Results   Component Value Date    TRIG 62 2017     Lab Results   Component Value Date    CHOLHDLRATIO 2.2 2015          TSH   Date Value Ref Range Status   2015 1.29 0.40 - 4.00 mU/L Final     40 min visit including time to discuss pt care with dr murrell and change orders and discuss with pt results and plans

## 2017-03-02 NOTE — PROGRESS NOTES
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 Overall, pt is feeling better and is hoping to go home tomorrow.  She has supportive children and many friends.      Vivian and Mosque work is very important to pt.  She is very active at [facility] [employer] especially in service to others.    Pt has 2 cats at home that are wonderful companions for her.      Pt appears to be doing very well with good support.       listened to pt's story and provided emotional/spiritual support and prayer.    No plans to follow.                                                                                                                                                    Shyanne Hernandez M.Div., [facility]  Staff    Pager [phone]

## 2017-03-02 NOTE — H&P
HISTORY OF PRESENT ILLNESS:  Ms. Genie Persaud is a very nice 81-year-old woman with past medical history significant for severe hypertension, Hypercholesterolemia, and known ascending aortic aneurysm.  [facility] a [facility] of [facility] pressure/chest tightness radiating down into her left arm.  Symptoms started about an hour before she came in to the emergency room at about 2AM. She denies nausea, vomiting, SOB, or diaphoresis. Initial EKG demonstrated a normal sinus rhythm with occasional PVCs and hyperacute T waves.  There was ST segment depression in leads II, III, aVF, and V4 through V6.  There was also mild depression in I.  The patient had continued chest pain.  Second EKG  demonstrated mild ST segment elevation in lead V1 and aVR with continued depressions.  STEMI protocol was activated.  Troponin is less than 0.015.      Upon arrival at [facility], the patient states she is completely pain-free.  She had similar symptoms on Monday of this week.  She has had no previous cardiac history.  She does relate that cardiac problems run on both sides of her family, with her mother and father having heart problems in their late 70s and early 80s.  She does not have diabetes mellitus. She had a cholesterol done earlier this month, with total cholesterol of 178, HDL 68, LDL 97, and triglycerides of 66, on Simvastatin 10mgs.      PAST MEDICAL HISTORY:   1.  Hypertension.   2.  Osteopenia.   3.  Anemia.   4.  Vasculitis.   5.  Known thoracic aortic aneurysm.   6.  Chart mentions a stroke; however, patient states that she had a stroke noted by her ophthalmologist when looking at her eyes.  I do not know the details.   7.  Asthma.   8. Hypercholesterolemia     HOME MEDICATIONS:   1.  Simvastatin 20 mg, one-half tablet daily.   2.  Albuterol inhaler 2 puffs q. 6 hours.   3.  Omeprazole 20 mg 2 tablets daily.   4.  Astelin nose spray, 1-2 sprays both nostrils  b.i.d.   5.  Losartan 100 mg at bedtime.   6.  Hydrochlorothiazide 12.5 mg daily.   7.  Tylenol on a p.r.n. basis.   8.  Aspirin 81 mg daily.   9.  Calcium/vitamin D combination, 2 tablets daily.      ALLERGIES:  She has allergies or drug intolerances to Fosamax, Augmentin, Evista, flu shot.      HABITS:  She does not smoke cigarettes and has never smoked.  She drinks one glass of wine per week.      FAMILY HISTORY:  As outlined above.      COMPLETE REVIEW OF SYSTEMS:  Negative.      PHYSICAL EXAM:   GENERAL:  Demonstrates a spry 81-year-old woman in no acute distress.   HEENT:  Pupils are equal and round.  Sclerae anicteric.  Conjunctivas not injected.  Oral mucosa is pink and moist without lesion or cyanosis.   NECK:  Supple.  There is no jugular venous distention or carotid bruit.   CHEST:  Clear to auscultation.   CARDIAC:  Regular rate and rhythm.  I hear no murmur, rub or gallop.   ABDOMEN:  Soft, nontender, with normoactive bowel sounds.   EXTREMITIES:  Without edema.  She has 2+ pulses.   SKIN:  Warm and dry without clubbing, cyanosis or rash.   NEUROLOGIC:  Exam is nonfocal.      LABORATORY EXAM:  EKG is as described above.      Electrolytes are within normal limits with a creatinine of 0.72.  Fasting lipid profile, as outlined above.  Troponins are less than 0.015.  Hemoglobin is 13.5 with a platelet count of 289,000.  Glucose is mildly elevated at 115.      ASSESSMENT AND PLAN:  A 81-year-old woman with chest pain syndrome, suspicious for acute coronary syndrome.  EKG shows nonspecific changes, possibly indicating ischemia versus strain from her hypertension which is quite high when she hit the emergency room with a systolic blood pressure of over 200 and diastolic over 100.  This patient is now pain-free.  She has received aspirin, Brilinta, and a bolus of heparin.  I quickly discussed risks, benefits and alternatives with the patient.  She appears to understand and desires to proceed.  We will proceed  with coronary angiography and intervention as appropriate.      Further evaluation and treatment will depend upon the above results.      CRITICAL CARE TIME:  Thirty minutes of critical care time was spent in coordinating care with the [facility] Emergency Room, transferring the patient to the [facility] Cath Lab.         FILI ROOT MD, [facility]             D: 2017 04:04   T: 2017 05:37   MT: [id number]      Name:     TERESA HARTMAN   MRN:      [id number]        Account:      [id number]   :      1935           Admitted:     [id number]      Document: [id number]       cc: Layo Sevilla MD

## 2017-03-02 NOTE — PLAN OF CARE
Problem: Goal Outcome Summary  Goal: Goal Outcome Summary  Outcome: Improving  Pt arrived from Cath lab with TR Band on Left radial with 15 cc air. No invention with Heart CAth and they were unable to do a LV gram to check pressures.

## 2017-03-03 ENCOUNTER — APPOINTMENT (OUTPATIENT)
Dept: OCCUPATIONAL THERAPY | Facility: CLINIC | Age: 82
DRG: 287 | End: 2017-03-03
Attending: INTERNAL MEDICINE
Payer: COMMERCIAL

## 2017-03-03 VITALS
HEIGHT: 63 IN | WEIGHT: 137.57 LBS | TEMPERATURE: 97.8 F | BODY MASS INDEX: 24.38 KG/M2 | OXYGEN SATURATION: 97 % | DIASTOLIC BLOOD PRESSURE: 49 MMHG | RESPIRATION RATE: 20 BRPM | SYSTOLIC BLOOD PRESSURE: 115 MMHG

## 2017-03-03 LAB
ANION GAP SERPL CALCULATED.3IONS-SCNC: 6 MMOL/L (ref 3–14)
BUN SERPL-MCNC: 17 MG/DL (ref 7–30)
CALCIUM SERPL-MCNC: 8.8 MG/DL (ref 8.5–10.1)
CHLORIDE SERPL-SCNC: 106 MMOL/L (ref 94–109)
CO2 SERPL-SCNC: 28 MMOL/L (ref 20–32)
CREAT SERPL-MCNC: 0.84 MG/DL (ref 0.52–1.04)
GFR SERPL CREATININE-BSD FRML MDRD: 65 ML/MIN/1.7M2
GLUCOSE SERPL-MCNC: 93 MG/DL (ref 70–99)
POTASSIUM SERPL-SCNC: 4.5 MMOL/L (ref 3.4–5.3)
SODIUM SERPL-SCNC: 140 MMOL/L (ref 133–144)

## 2017-03-03 PROCEDURE — 25000132 ZZH RX MED GY IP 250 OP 250 PS 637: Performed by: INTERNAL MEDICINE

## 2017-03-03 PROCEDURE — 97110 THERAPEUTIC EXERCISES: CPT | Mod: GO | Performed by: OCCUPATIONAL THERAPIST

## 2017-03-03 PROCEDURE — 99231 SBSQ HOSP IP/OBS SF/LOW 25: CPT | Performed by: INTERNAL MEDICINE

## 2017-03-03 PROCEDURE — 97535 SELF CARE MNGMENT TRAINING: CPT | Mod: GO | Performed by: OCCUPATIONAL THERAPIST

## 2017-03-03 PROCEDURE — 99239 HOSP IP/OBS DSCHRG MGMT >30: CPT | Performed by: INTERNAL MEDICINE

## 2017-03-03 PROCEDURE — 36415 COLL VENOUS BLD VENIPUNCTURE: CPT | Performed by: INTERNAL MEDICINE

## 2017-03-03 PROCEDURE — 97165 OT EVAL LOW COMPLEX 30 MIN: CPT | Mod: GO | Performed by: OCCUPATIONAL THERAPIST

## 2017-03-03 PROCEDURE — 99207 ZZC CDG-DX INCORRECT: CPT | Performed by: INTERNAL MEDICINE

## 2017-03-03 PROCEDURE — 40000133 ZZH STATISTIC OT WARD VISIT: Performed by: OCCUPATIONAL THERAPIST

## 2017-03-03 PROCEDURE — 80048 BASIC METABOLIC PNL TOTAL CA: CPT | Performed by: INTERNAL MEDICINE

## 2017-03-03 RX ORDER — DILTIAZEM HYDROCHLORIDE 180 MG/1
180 CAPSULE, EXTENDED RELEASE ORAL DAILY
Qty: 30 CAPSULE | Refills: 1 | Status: SHIPPED | OUTPATIENT
Start: 2017-03-03 | End: 2017-03-09

## 2017-03-03 RX ORDER — INDAPAMIDE 1.25 MG/1
1.25 TABLET ORAL DAILY
Qty: 30 TABLET | Refills: 1 | Status: SHIPPED | OUTPATIENT
Start: 2017-03-03 | End: 2017-05-08

## 2017-03-03 RX ORDER — ISOSORBIDE MONONITRATE 30 MG/1
15 TABLET, EXTENDED RELEASE ORAL DAILY
Qty: 30 TABLET | Refills: 1 | Status: SHIPPED | OUTPATIENT
Start: 2017-03-03 | End: 2017-03-17

## 2017-03-03 RX ORDER — INDAPAMIDE 1.25 MG/1
1.25 TABLET ORAL DAILY
Status: DISCONTINUED | OUTPATIENT
Start: 2017-03-03 | End: 2017-03-03 | Stop reason: HOSPADM

## 2017-03-03 RX ADMIN — ASPIRIN 81 MG: 81 TABLET, COATED ORAL at 08:39

## 2017-03-03 RX ADMIN — OMEPRAZOLE 20 MG: 20 CAPSULE, DELAYED RELEASE ORAL at 06:33

## 2017-03-03 RX ADMIN — INDAPAMIDE 1.25 MG: 1.25 TABLET, FILM COATED ORAL at 10:44

## 2017-03-03 RX ADMIN — Medication 15 MG: at 08:39

## 2017-03-03 RX ADMIN — Medication 2 TABLET: at 08:39

## 2017-03-03 RX ADMIN — FLUTICASONE FUROATE AND VILANTEROL TRIFENATATE 1 PUFF: 100; 25 POWDER RESPIRATORY (INHALATION) at 08:40

## 2017-03-03 RX ADMIN — DILTIAZEM HYDROCHLORIDE 180 MG: 180 CAPSULE, EXTENDED RELEASE ORAL at 08:39

## 2017-03-03 RX ADMIN — AZELASTINE HYDROCHLORIDE 2 SPRAY: 137 SPRAY, METERED NASAL at 08:38

## 2017-03-03 NOTE — DISCHARGE SUMMARY
DISCHARGE SUMMARY      DATE OF ADMISSION:  03/02/2017.      DATE OF DISCHARGE:  03/03/2017.      DISCHARGE DIAGNOSES:   1.  Hypertensive urgency.   2.  Elevated troponin secondary to subendocardial ischemia diffuse likely from hypertension crisis.   3.  SVT.  Brief runs during catheterization.   4.  Vasculitis.   5.  Thoracic aortic aneurysm with a CT this admission of 44 mm and no dissection.   6.  Remote history of stroke.   7.  Moderate persistent asthma.   8.  History of hypokalemia.   9.  Hyperglycemia, mild.  Follow up with PCP in 1 week after discharge.      DISCHARGE MEDICATIONS:   NEW MEDICATIONS:   1.  Diltiazem 180 mg p.o. daily.   2.  Lozol 1.25 mg daily.   3.  Isosorbide mononitrate or Imdur 15 mg or half of a 30 mg tablet daily.      Medications which have not changed:   1.  Acetaminophen 500-1000 mg every 8 hours as needed for mild pain.   2.  Albuterol 2 puffs q.6 h. p.r.n.   3.  Aspirin 81 mg p.o. daily.   4.  Astelin 1-2 sprays both nostrils b.i.d.   5.  Calcium citrate 2 tablets p.o. daily.   6.  Advair or fluticasone, salmeterol, 230/21 two puffs in the lungs b.i.d.   7.  Losartan or Cozaar 100 mg p.o. at bedtime.   8.  Omeprazole 20 mg daily.   9.  Simvastatin 10 mg daily.  Genie Persaud takes half of a 20 mg tablet.        DISCHARGE PLAN:  The patient is to stop taking hydrochlorothiazide.  Patient will follow up with primary care provider for blood pressure followup and also check fasting glucose at that time as it was mildly elevated during hospitalization.  Follow up with nurse practitioner in Cardiology Clinic in 1-2 weeks.  Patient was counseled that diltiazem may cause increased side effect of simvastatin.  Please call doctor if develops muscle pain or cramps recommend taking Imdur in the morning, Cozaar in the evening to spread out medications which could cause hypotension.  The patient to follow up with primary care doctor about new nodule also seen in the right upper lung on abdominal  chest CT.        PENDING TEST RESULTS:  None.      PERTINENT LABS AND IMAGING STUIDES DURING THIS HOSPITALIZATION:  On the day of discharge, sodium 140, potassium 4.5, chloride 106, bicarbonate 28, BUN 17, creatinine 0.84, calcium 8.8, glucose 93.  The patient's troponins during this hospitalization less than 0.015, 0.295, 0.561 and 0.395.  CT of chest, abdomen and pelvis 3/2/2017 showed aneurysmal dilatation of the ascending aorta, normal caliber and abdominal aortic.  No aortic dissection, cardiomegaly and coronary artery calcifications, patchy ground-glass attenuation in both lungs with some areas of interlobular septal thickening which may be due to edema.  New 0.5 cm nodule in the right lung, indeterminate.  Colonic diverticulosis.  Echocardiogram 03/02/2017 showed the following, inferior and lateral hypokinesis.  LV systolic function was mildly reduced, visual EF estimated 45-50%, aortic valve trileaflet with aortic valve sclerosis, moderate to moderately severe 2-3+ aortic regurgitation.  No hemodynamically significant valvular aortic stenosis, ascending aorta was mildly dilated.  The AI is increased since previous echos, wall motion abnormalities mild but new.  Cardiac cath 03/02/2017.  This showed the following:  No evidence to suggest coronary artery disease as the etiology of her acute chest discomfort 2-vessel coronary artery disease with a mid-LAD 50% stenosis, D3 50% stenosis,  circumflex 50% stenosis and mid circ 50% stenosis and intermittent SVT with heart rate of around 130 beats per minute noted before catheters were placed.  Plan per Cardiology that did a catheterization:  Aspirin lifelong, started on diltiazem for the SVT echocardiogram which was done with results as above.  CT of the chest to evaluate for dissection with known thoracic aneurysm.  Again, this was done and did not show any dissection.      CONSULTATIONS DURING THIS HOSPITALIZATION:  Cardiology.      HOSPITAL COURSE:  Please see  dictated history and physical for patient's initial presentation.  Genie Persaud is a very nice 81-year-old female with a past medical history significant for severe hypertension, hyperlipidemia and known ascending aortic aneurysm.  She presented to [facility] Emergency Room on 03/02/2017 with of chest pressure, chest tightness radiating down her left arm.  Her symptoms started about an hour before she came to the emergency room.  She denied nausea, vomiting, shortness of breath or diaphoresis.  Her initial EKG demonstrated normal sinus rhythm with occasional PVCs and hyperacute T-waves.  There was ST segment depression in leads II, III, aVF and V4-V6, also mild depression in one.  The patient had continued chest pain.  A second EKG demonstrated a mild ST elevation in leads V1 and AVR with continued depressions.  A STEMI protocol was activated.  Her troponin was less than 0.015.  The patient was transferred to [facility] at which time she was pain-free.  She had had similar symptoms earlier in the week but no previous cardiac history.  The patient was seen by Cardiology.  Cardiology thought that her cardiac symptoms were due to hypertensive urgency as the patient had run out of her blood pressure meds 2 days prior to admission.   1.  Hypertensive urgency due to medication noncompliance and running out of her medications.  Diltiazem was added for blood pressure.  She had her hydrochlorothiazide changed to Lozol.  Also Imdur was added.  The patient is not on a beta blocker due to spasm and asthma.     2.  Elevated troponin.  An EKG consistent with subendocardial ischemia that was diffuse.  This was thought by Cardiology likely due to hypertensive crisis.  The patient also had a catheterization that showed mild RCA, moderate LAD, left circumflex up to 70% proximal.  Recommended medical treatment, possible outpatient stress test later.  EKG 03/02/2017 was much better and her troponin with a mild peak  and now was down.   3.  Supraventricular tachycardia.  Dr. Hernandez noted some brief runs SVT during her catheterization and the patient was unaware and diltiazem was added for this.   4.  History of vasculitis.   5.  History of thoracic aortic aneurysm.  CT this admission was 44 mm and no dissection.   6.  Remote history of stroke.   7.  Moderate persistent asthma.   8.  At the time of discharge, the patient's blood pressure was well controlled on her new regimen.  She will follow up with her primary care physician in 1-2 weeks and the nurse practitioner from Cardiology at the [place] office in 1-2 weeks.  I would like to have her primary care physician check a fasting glucose and would also like the primary care physician to follow up with the new nodule that was seen on CT of her chest, abdomen and pelvis.      Greater than 30 minutes was spent during this time with discussion with the patient and planning discharge.         KARRIE PATTEN MD             D: 2017 13:23   T: 2017 14:59   MT:       Name:     TERESA HARTMAN   MRN:      [id number]        Account:        [id number]   :      1935           Admit Date:                                       Discharge Date: 2017      Document: [id number]

## 2017-03-03 NOTE — DISCHARGE INSTRUCTIONS
Discharge Instructions for Cardiac Catheterization  Cardiac catheterization is a procedure to look for blocked areas in the blood vessels that send blood to the heart. A thin, flexible tube (catheter) is put in a blood vessel in your groin or arm. Contrast fluid is injected into your blood, which then flows to your heart. Finally, X-rays pictures are taken of your heart. Your doctor will review the results with you. Be sure to ask any questions you have before you leave. This sheet will help you take care of yourself at home.  Home care    Only do light and easy activities for the next 2 to 3 days. Ask for help with chores and errands while you recover. Have someone drive you to your appointments.    Avoid heavy lifting for a while. Your doctor will provide a specific time frame for you.    Ask your doctor when you can expect to return to work. Unless your job involves lifting, you may be able to return to your normal activities within a couple of days.    Take your medicines as directed. Do not skip doses.    Drink 6 to 8 glasses of water a day. This is to help flush the contrast dye out of your body.    Take your temperature each day for 7 days.    Check your incisions daily for signs of infection. These include redness, swelling, and drainage. It is normal to have a small bruise or bump where the catheter was inserted. A bruise that is getting larger is not normal and should be reported to your doctor.    Eat a healthy diet. Make sure it is low in fat, salt, and cholesterol. Ask your doctor for diet information.    Stop smoking. Enroll in a stop-smoking program or ask your doctor for help.    Exercise as advised by your doctor. Depending on your situation, your doctor may recommend you start a cardiac rehabilitation program.    Do not swim or take baths until the doctor says it s OK. You can shower the day after the procedure.    Follow-up care  Make a follow-up appointment as advised by our staff.  When to  seek medical care  Call your doctor immediately if you have any of the following:    Chest pain    Constant or increasing pain or numbness in your leg    Fever of 100.4 F (38.0 C) or higher    Symptoms of infection (redness, swelling, drainage, or warmth at the incision site)    Shortness of breath    A leg that feels cold or appears blue    Bleeding, bruising, or a lot of swelling where the catheter was inserted    Blood in your urine    Black or tarry stools    Any unusual bleeding     [id number] [employer]. [address] [place], [place] [id number]. All rights reserved. This information is not intended as a substitute for professional medical care. Always follow your healthcare professional's instructions.

## 2017-03-03 NOTE — PROGRESS NOTES
[facility]  Cardiology Progress Note         Assessment and Plan:   HTN urgency--pt ran out of her bp med 2 days PTA  Dilt added for bp, any svt or any spasm, i  changed hctz to lozol and watch bp  Added imdur,  Not on BB due to ?spasm and asthma  Elevated Tp and ECG c/w subendo ischemia diffuse--likely from HTN crisis\--will tx bp as above and get echo to exclude takotsubo etc.  I reviewed cath: mild RCA, mod Lad, Lcx up to 70% prox--rec med tx and possibly outpt gxt later, ECG 3/2pm MUCH better, troponin mild peak and now down  SVT- Dr murrell noted runs of brief svt during cath, pt wasn't aware. He added dilt for this  Vasculitis  thoracic aortic aneurysm, CT this admit 44mm and no dissection  remote history of a stroke   moderate persistent asthma. Not on bb for this reason, no wheeze now    OK TO GO HOME IF OK WITH IM, SEE MEDS LIST, WE WILL SEE HER AT [facility] IN 1-2 WEEK AND SHE SHOULD SEE DR JONES SOON.  I INSTRUCTED HER IF BP TOO LOW TO HOLD HER BP MED LIKELY IMDUR (AM) OR COZAAR(PM)              Interval History:   Pt says she feels great and wants to go home--her last 5 bp readings here are normal              Medications:   Scheduled Meds:     indapamide  1.25 mg Oral Daily     calcium citrate-vitamin D  2 tablet Oral Daily     azelastine  1-2 spray Both Nostrils BID     losartan  100 mg Oral At Bedtime     fluticasone-vilanterol  1 puff Inhalation Daily     simvastatin  10 mg Oral At Bedtime     sodium chloride (PF)  3 mL Intracatheter Q8H     aspirin EC  81 mg Oral Daily     diltiazem  180 mg Oral Daily     isosorbide mononitrate  15 mg Oral Daily     omeprazole  20 mg Oral Daily     PRN Meds: albuterol, acetaminophen, lidocaine, lidocaine 4%, sodium chloride (PF), HOLD MEDICATION, acetaminophen, HYDROcodone-acetaminophen, naloxone, traZODone, potassium chloride, potassium chloride, potassium chloride, potassium chloride with lidocaine, potassium chloride, magnesium sulfate, magnesium  "sulfate         Physical Exam:   Blood pressure 112/59, temperature 98.5  F (36.9  C), temperature source Oral, resp. rate 28, height 1.6 m (5' 3\"), weight 62.4 kg (137 lb 9.1 oz), SpO2 95 %, not currently breastfeeding.  Vitals:    17 0500 17 0630   Weight: 64.5 kg (142 lb 3.2 oz) 62.4 kg (137 lb 9.1 oz)     No intake or output data in the 24 hours ending 17 0806        Vital Sign Ranges  Temperature Temp  Av.5  F (36.4  C)  Min: 97.5  F (36.4  C)  Max: 97.5  F (36.4  C)   Blood pressure Systolic (24hrs), Av , Min:144 , Max:202        Diastolic (24hrs), Av, Min:58, Max:73      Pulse Pulse  Av  Min: 99  Max: 99   Respirations Resp  Av.5  Min: 15  Max: 21   Pulse oximetry SpO2  Av.7 %  Min: 93 %  Max: 96 %             Constitutional: Awake, alert, cooperative, no apparent distress       Skin: Scrape or rash R back, now in a dermatome (not zoster)-showing evolving changes, no idea where it came from-looks like she hit something       Neck: No thyromegaly or adenopathy       Lungs: Few scattered crackles no wheeze       Cardiovasc: rrr       Abdomen: Normal bowel sounds, soft, non-distended, non-tender, no hepatomegaly       Neuro: Alert and oriented x3, non focal exam       Extremities: No more edema       Other:             Data:     Recent Labs   Lab Test  17   0241  10/14/16   1152   WBC  9.9  8.8   HGB  13.5  13.1   MCV  91  92   PLT  289  269      Recent Labs   Lab Test  17   0539  17   1233   17   0241   NA  140   --    --   137   POTASSIUM  4.5  3.8   < >  3.6   CHLORIDE  106   --    --   99   BUN  17   --    --   16   CR  0.84   --    --   0.72    < > = values in this interval not displayed.       Recent Labs  Lab 17  0539 17  0241   GLC 93 115*     Recent Labs   Lab Test  17   0241  16   1008   ALT  39  28   AST  26  20     No components found for: TROPONINIES    Lab Results   Component Value Date    CHOL 149 " 03/02/2017     Lab Results   Component Value Date    HDL 78 03/02/2017     Lab Results   Component Value Date    LDL 59 03/02/2017     Lab Results   Component Value Date    TRIG 62 03/02/2017     Lab Results   Component Value Date    CHOLHDLRATIO 2.2 08/13/2015          TSH   Date Value Ref Range Status   08/18/2015 1.29 0.40 - 4.00 mU/L Final     40 min visit including time to discuss pt care with dr murrell and change orders and discuss with pt results and plans

## 2017-03-03 NOTE — PROGRESS NOTES
[facility]    Hospitalist Progress Note    Assessment & Plan   Genie Persaud is a 81 year old female who was admitted on 3/2/2017.     Genie Persaud is a pleasant 81-year-old woman with past medical history of thoracic aortic aneurysm, moderate persistent asthma, vasculitis, and hypertension - who presented with chest pain to [facility] early this a.m., was transferred to [facility] as ST-elevation myocardial infarction protocol was initiated, underwent CT chest and angiogram and is now in cardiac intensive care unit for continued monitoring. Current problems include:      1. Chest pain syndrome, likely due to hypertensive emergency; Sx have resolved. The patient was admitted under Cardiology by Dr. Hernandez. Angiogram revealed finding described below in procedure report; have reviewed follow up by Dr. Britton from this morning. CT chest did not show any dissection of the known thoracic aortic aneurysm. Genie may have stopped taking 1 or more of her blood pressure meds recently because she was having orthostatic symptoms.   - Cardiology has ok'd discharge today  -started on imdur  -HCTZ changed to lozol  Follow-up in [facility] in one to 2 weeks  - continue aspirin 81 mg daily.   - continue PTA simvastatin and losartan.   -troponins trending down     2. Supraventricular tachycardia during angiogram earlier this a.m.; no recurrence.  - continue diltiazem (note previous history of asthma; avoiding BB's).       3. Moderate persistent asthma; Sx improved, stable.  - continue PTA Breo Ellipta and PRN albuterol inhaler  - f/u w/ PCP 1 week      4. Hypertension, suboptimally controlled. HCTZ stopped today.  - continue losartan, Diltiazem, Indapamide (Lozol) and IMDUR begun 3/2; tolerating so far.  - increase walking around the Unit; check Ortho. VS 3/3 a.m.     5. Hypokalemia; corrected after replacement  - recheck BMP 3/3     6. Hyperglycemia; mild.  - further f/u at PCP appt. 1 wk after  d/c     7. Deep venous thrombosis prophylaxis: SCDs.     Code Status: Full Code     Disposition: home today        Meghana Laura MD    Interval History   No new sx  -Data reviewed today: I reviewed all new labs and imaging results over the last 24 hours. I personally reviewed no images or EKG's today.    Physical Exam   Temp: 98.5  F (36.9  C) Temp src: Oral BP: 143/52   Heart Rate: 84 Resp: 28 SpO2: 96 % O2 Device: None (Room air)    Vitals:    03/02/17 0500 03/03/17 0630   Weight: 64.5 kg (142 lb 3.2 oz) 62.4 kg (137 lb 9.1 oz)     Vital Signs with Ranges  Temp:  [97.1  F (36.2  C)-98.7  F (37.1  C)] 98.5  F (36.9  C)  Heart Rate:  [65-86] 84  Resp:  [14-39] 28  BP: ()/(42-96) 143/52  SpO2:  [93 %-98 %] 96 %  I/O last 3 completed shifts:  In: 1785.5 [P.O.:1290; I.V.:495.5]  Out: 500 [Urine:500]    Constitutional: alert   Respiratory: clear to auscultation, no wheezing or rhonchi   Cardiovascular: regular rate and rhythm without murmer or rub   GI:positive bowel sounds, non-tender   Skin/Integumen: no rashes   Other:        Medications     NaCl Stopped (03/02/17 1230)       indapamide  1.25 mg Oral Daily     calcium citrate-vitamin D  2 tablet Oral Daily     azelastine  1-2 spray Both Nostrils BID     losartan  100 mg Oral At Bedtime     fluticasone-vilanterol  1 puff Inhalation Daily     simvastatin  10 mg Oral At Bedtime     sodium chloride (PF)  3 mL Intracatheter Q8H     aspirin EC  81 mg Oral Daily     diltiazem  180 mg Oral Daily     isosorbide mononitrate  15 mg Oral Daily     omeprazole  20 mg Oral Daily       Data     Recent Labs  Lab 03/03/17  0539 03/02/17  2148 03/02/17  1356 03/02/17  1233 03/02/17  0600 03/02/17  0241   WBC  --   --   --   --   --  9.9   HGB  --   --   --   --   --  13.5   MCV  --   --   --   --   --  91   PLT  --   --   --   --   --  289     --   --   --   --  137   POTASSIUM 4.5  --   --  3.8 3.3* 3.6   CHLORIDE 106  --   --   --   --  99   CO2 28  --   --   --    --  29   BUN 17  --   --   --   --  16   CR 0.84  --   --   --   --  0.72   ANIONGAP 6  --   --   --   --  9   ELROY 8.8  --   --   --   --  9.6   GLC 93  --   --   --   --  115*   ALBUMIN  --   --   --   --   --  4.2   PROTTOTAL  --   --   --   --   --  7.9   BILITOTAL  --   --   --   --   --  0.5   ALKPHOS  --   --   --   --   --  100   ALT  --   --   --   --   --  39   AST  --   --   --   --   --  26   TROPI  --  0.395* 0.561*  --  0.295* <0.015The 99th percentile for upper reference range is 0.045 ug/L.  Troponin values in the range of 0.045 - 0.120 ug/L may be associated with risks of adverse clinical events.       No results found for this or any previous visit (from the past 24 hour(s)).

## 2017-03-03 NOTE — PLAN OF CARE
Problem: Goal Outcome Summary  Goal: Goal Outcome Summary  Outcome: Improving  VSS. Tele shows SR. Pt denies any chest pain or SOB. Left wrist remains ecchymotic. Potassium replaced today, recheck due tomorrow morning. Plan is for continued BP control and possible D/C home when stable. Bed alarm on. Will continue to monitor pt.

## 2017-03-03 NOTE — PLAN OF CARE
Problem: Goal Outcome Summary  Goal: Goal Outcome Summary  Outcome: Completed Date Met:  03/03/17  OT/CR: Initial evaluation complete, treatment started.  Pt admitted for a hypertension emergency.  Lives alone in a rambler with basement, has been independent in ADLS and active in the community.  Pt has baseline asthma, talked about some adaptations to her activity while managing asthma.  Pt ambulated 550 feet in bass, with mild SOB and coughing.  Initial /44, HR 81.  Ending /57, HR 84.  Plan is to DC pt home later today agree with plan.  Will  DC from IP CR/OT     Occupational Therapy Discharge Summary     Reason for therapy discharge:    Discharged to home.     Progress towards therapy goal(s). See goals on Care Plan in [facility] electronic health record for goal details.  Goals met     Therapy recommendation(s):    No further therapy is recommended.

## 2017-03-03 NOTE — PROGRESS NOTES
[facility]    Hospitalist Progress Note    Date of Service (when I saw the patient): 03/02/2017    Assessment & Plan     Genie Persaud is a pleasant 81-year-old woman with past medical history of thoracic aortic aneurysm, moderate persistent asthma, vasculitis, and hypertension - who presented with chest pain to [facility] early this a.m., was transferred to [facility] as ST-elevation myocardial infarction protocol was initiated, underwent CT chest and angiogram and is now in cardiac intensive care unit for continued monitoring. Current problems include:      1. Chest pain syndrome, likely due to hypertensive emergency; Sx have resolved. The patient was admitted under Cardiology by Dr. Hernandez. Angiogram revealed finding described below in procedure report; have reviewed follow up by Dr. Britton from this morning. CT chest did not show any dissection of the known thoracic aortic aneurysm.  Genie may have stopped taking 1 or more of her blood pressure meds recently because she was having orthostatic symptoms.   - anticipate Cardiology f/u tomorrow; continue aspirin 81 mg daily.   - continue PTA simvastatin and losartan.       2. Supraventricular tachycardia during angiogram earlier this a.m.; no recurrence.  - continue diltiazem (note previous history of asthma; avoiding BB's).   - Continue telemetry       3. Moderate persistent asthma; Sx improved, stable.  - continue PTA Breo Ellipta and PRN albuterol inhaler  - f/u w/ PCP 1 week     4. Hypertension, suboptimally controlled. HCTZ stopped today.  - continue losartan, Diltiazem, Indapamide (Lozol) and IMDUR begun today; tolerating so far.  - increase walking around the Unit; check Ortho. VS 3/3 a.m.    5. Hypokalemia; corrected after replacement  - recheck BMP 3/3    6. Hyperglycemia; mild.  - further f/u at PCP appt. 1 wk after d/c    7. Deep venous thrombosis prophylaxis: SCDs.    Code Status: Full Code    Disposition: likely to home 3/3 after  Cardiology f/u      PEEWEE. Brenden Babcock MD, FACP  Hospitalist       Interval History   Fatigued, but no further chest pain or pressure.  No F/C or cough since angiography today.  Appetite fair.  Up walking in room without difficulty.    -Data reviewed today: I reviewed all new labs and imaging results over the last 24 hours. I personally reviewed reports of all studies done today.    Physical Exam   Temp: 97.1  F (36.2  C) Temp src: Oral BP: 129/46   Heart Rate: 78 Resp: 14 SpO2: 98 % O2 Device: None (Room air)    Vitals:    03/02/17 0500   Weight: 64.5 kg (142 lb 3.2 oz)     Vital Signs with Ranges  Temp:  [97.1  F (36.2  C)-97.6  F (36.4  C)] 97.1  F (36.2  C)  Pulse:  [99] 99  Heart Rate:  [72-99] 78  Resp:  [14-22] 14  BP: (104-202)/() 129/46  SpO2:  [93 %-98 %] 98 %  I/O last 3 completed shifts:  In: 892.5 [P.O.:400; I.V.:492.5]  Out: 500 [Urine:500]    Constitutional: no acute distress, sitting up in bed  Respiratory: Good air entry bilaterally, no wheezing noted; faint inspir. Rhonchi at bases.  Cardiovascular: RRR; S1, S2 noted; no m/r/g  GI: + bowel sounds, soft, non-tender, non-distended  Skin/Integumen: erythematous markings/abrasion R. Upper back (roughly in shape of Defib. Paddle - placed earlier today for transport from [facility])  Neurologic: awake, conversant/talkative; no focal deficits      Medications     NaCl Stopped (03/02/17 1230)       calcium citrate-vitamin D  2 tablet Oral Daily     azelastine  1-2 spray Both Nostrils BID     losartan  100 mg Oral At Bedtime     fluticasone-vilanterol  1 puff Inhalation Daily     omeprazole  40 mg Oral Daily     simvastatin  10 mg Oral At Bedtime     sodium chloride (PF)  3 mL Intracatheter Q8H     aspirin EC  81 mg Oral Daily     diltiazem  180 mg Oral Daily     indapamide  2.5 mg Oral Daily     isosorbide mononitrate  15 mg Oral Daily       Data     Recent Labs  Lab 03/02/17  1356 03/02/17  1233 03/02/17  0600 03/02/17  0241   WBC  --   --   --  9.9    HGB  --   --   --  13.5   MCV  --   --   --  91   PLT  --   --   --  289   NA  --   --   --  137   POTASSIUM  --  3.8 3.3* 3.6   CHLORIDE  --   --   --  99   CO2  --   --   --  29   BUN  --   --   --  16   CR  --   --   --  0.72   ANIONGAP  --   --   --  9   ELROY  --   --   --  9.6   GLC  --   --   --  115*   ALBUMIN  --   --   --  4.2   PROTTOTAL  --   --   --  7.9   BILITOTAL  --   --   --  0.5   ALKPHOS  --   --   --  100   ALT  --   --   --  39   AST  --   --   --  26   TROPI 0.561*  --  0.295* <0.015The 99th percentile for upper reference range is 0.045 ug/L.  Troponin values in the range of 0.045 - 0.120 ug/L may be associated with risks of adverse clinical events.       Recent Results (from the past 24 hour(s))   Chest  XR, 1 view PORTABLE    Narrative    XR CHEST PORT 1 VW  3/2/2017 3:09 AM     INDICATION: STEMI.    COMPARISON: 4/10/2013.      Impression    IMPRESSION: Heart size is near the upper limits of normal. No focal  air-space consolidation or other significant change. No pneumothorax.    SAJI FRAUSTO MD   CT Chest/Abdomen/Pelvis w Contrast    Narrative    CT CHEST/ABDOMEN/PELVIS W CONTRAST  3/2/2017 5:34 AM     HISTORY: Evaluate for aortic dissection. Chest pain. Known aortic  aneurysm.    TECHNIQUE: Volumetric acquisition through chest, abdomen and pelvis  with IV contrast. 75 mL Isovue-370. Radiation dose for this scan was  reduced using automated exposure control, adjustment of the mA and/or  kV according to patient size, or iterative reconstruction technique.    COMPARISON: 9/1/2008.    FINDINGS:     Chest: Atheromatous changes of the thoracic aorta with aneurysmal  dilatation of the ascending aorta measuring 4.4 cm in diameter,  minimally more prominent than on the previous exam. No thoracic aortic  dissection. Mild cardiomegaly. Coronary artery calcifications. There  is a new 0.5 cm nodule in the right upper lobe (series 5, image 25). A  1 cm right lower lung nodule is stable and  considered benign. Mild  patchy ground-glass opacities in both lungs. Slight interlobular  septal thickening which may be due to interstitial edema. No  consolidative infiltrates, pleural or pericardial effusions.    Abdomen and pelvis: Gallbladder is absent. Liver, spleen, pancreas,  adrenal glands and kidneys demonstrate no worrisome findings. No  hydronephrosis. Mild thickening left adrenal gland is stable. Mild  atheromatous changes of the abdominal aorta without aneurysm or  dissection.    Uterus is present. No suspicious pelvic masses. Sigmoid  diverticulosis. No bowel obstruction, ascites or other acute findings.  Mild degenerative and hypertrophic changes in the visualized spine.  Degenerative disc disease lumbar spine.      Impression    IMPRESSION:  1. Aneurysmal dilatation of the ascending aorta. Normal caliber  abdominal aorta. No aortic dissection.  2. Cardiomegaly and coronary artery calcifications. Patchy  ground-glass attenuation in both lungs and some areas of interlobular  septal thickening which may be due to edema. Correlate clinically for  findings of CHF.  3. New 0.5 cm nodule in the right upper lung, indeterminate. See  below.  4. Colonic diverticulosis.    For an indeterminate lung nodule  >4-6 mm:  Low risk patients: Follow-up CT at 12 months; if unchanged, no further  follow-up.  High risk patients: Initial follow-up CT at 6-12 months then at 18-24  months if no change.    - Low Risk: Minimal or absent history of smoking and of other known  risk factors.  - Nonsolid (ground-glass) or partly solid nodules may require longer  follow-up to exclude indolent adenocarcinoma.  - Fleischner Society Recommendations, Radiology 2005.    SAJI FRAUSTO MD     Left Heart Cath. Done at 04:54 this a.m.:    CORONARY ANGIOGRAM:   1. Both coronary arteries arise from their respective cusps.  2. Right dominant.  3. LM is short and has mild luminal irregularities.   4. LAD is moderately calcified, supplies  the entire apex (type 3) and  gives rise to septal perforators, D1, D2 and D3.  The pLAD has a 30%  stenosis, mLAD has a 50% stenosis, D3 has a 50% stenosis and the  remainder of the vessel has mild luminal irregularities.    5. LCX is moderately calcified and gives rise to OM1 and OM2 vessels.   The pLCx has a 50-70% stenosis, mLCx has a 50% stenosis and the  remainder of the vessel has mild luminal irregularities.    6. RCA gives rise to PL branches and supplies PDA. The pRCA has a 30%  stenosis, mRCA has a 40% stenosis, dRCA has a 25% stenosis and RPDA  and RPLA have mild luminal irregularities.

## 2017-03-03 NOTE — PLAN OF CARE
Problem: Individualization  Goal: Patient Preferences  Outcome: Adequate for Discharge Date Met:  03/03/17  Pt. Discharged to home with son. Discharge instructions reviewed with pt. Understanding Artery Book given to pt.

## 2017-03-03 NOTE — PROGRESS NOTES
03/03/17 1158   Quick Adds   Type of Visit Initial Occupational Therapy Evaluation   Living Environment   Lives With alone   Living Arrangements house  (rambler with basement)   Home Accessibility grab bars present (bathtub);grab bars present (toilet);tub/shower is not walk in   Number of Stairs Within Home 12   Stair Railings at Home inside, present on left side   Transportation Available car   Living Environment Comment pt has been independent at home   Self-Care   Dominant Hand right   Usual Activity Tolerance good   Current Activity Tolerance moderate   Regular Exercise no   Equipment Currently Used at Home grab bar   Activity/Exercise/Self-Care Comment pt very active in [employer], volunteers at [facility]   Functional Level Prior   Ambulation 0-->independent   Transferring 0-->independent   Toileting 0-->independent   Bathing 0-->independent   Dressing 0-->independent   Eating 0-->independent   Communication 0-->understands/communicates without difficulty   Swallowing 0-->swallows foods/liquids without difficulty   Cognition 0 - no cognition issues reported   Fall history within last six months no   Which of the above functional risks had a recent onset or change? none   General Information   Onset of Illness/Injury or Date of Surgery - Date 03/02/17   Referring Physician Dre Britton   Patient/Family Goals Statement to return home   Additional Occupational Profile Info/Pertinent History of Current Problem Pt admitted an HTN emergency, elevated Tp, thoracic aortic aneurysm   Precautions/Limitations no known precautions/limitations   General Observations Pt sitting EOB, willing to participate   Cognitive Status Examination   Orientation orientation to person, place and time   Level of Consciousness alert   Able to Follow Commands WNL/WFL   Personal Safety (Cognitive) WNL/WFL   Memory intact   Attention No deficits were identified   Visual Perception   Visual Perception Wears glasses   Pain Assessment   Patient  "Currently in Pain No   Range of Motion (ROM)   ROM Quick Adds No deficits were identified   Strength   Manual Muscle Testing Quick Adds No deficits were identified   Coordination   Upper Extremity Coordination No deficits were identified   Transfer Skill: Bed to Chair/Chair to Bed   Level of [place]: Bed to Chair independent   Transfer Skill: Sit to Stand   Level of [place]: Sit/Stand independent   Balance   Balance Quick Add No deficits identified   Upper Body Dressing   Level of [place]: Dress Upper Body independent   Lower Body Dressing   Level of [place]: Dress Lower Body independent   Eating/Self Feeding   Level of [place]: Eating independent   Clinical Impression   Criteria for Skilled Therapeutic Interventions Met yes, treatment indicated   OT Diagnosis decreased endurance in ADLS secondary to cardiac condition   Influenced by the following impairments decreased activity endurance   Assessment of Occupational Performance 1-3 Performance Deficits   Identified Performance Deficits decreased endurance for ADLs and IADLs   Clinical Decision Making (Complexity) Low complexity   Therapy Frequency other (see comments)  (eval and one session)   Predicted Duration of Therapy Intervention (days/wks) 2 days   Anticipated Discharge Disposition Home   Risks and Benefits of Treatment have been explained. Yes   Patient, Family & other staff in agreement with plan of care Yes   [facility] [employer] TM \"6 Clicks\"   2016, Trustees of [facility], under license to [employer].  All rights reserved.   6 Clicks Short Forms Daily Activity Inpatient Short Form   [facility] AM-PAC  \"6 Clicks\" Daily Activity Inpatient Short Form   1. Putting on and taking off regular lower body clothing? 4 - None   2. Bathing (including washing, rinsing, drying)? 4 - None   3. Toileting, which includes using toilet, bedpan or urinal? 4 - None   4. Putting on and taking off regular upper body clothing? 4 - " None   5. Taking care of personal grooming such as brushing teeth? 4 - None   6. Eating meals? 4 - None   Daily Activity Raw Score (Score out of 24.Lower scores equate to lower levels of function) 24   Total Evaluation Time   Total Evaluation Time (Minutes) 16

## 2017-03-03 NOTE — PROVIDER NOTIFICATION
MD Notification    Notified Person:  MD    Notified Persons Name:    Notification Date/Time:/-[phone]    Notification Interaction:  Talked with Physician    Purpose of Notification:BP 94/42. Lozol held.     Orders Received:No new orders received.    Comments:

## 2017-03-03 NOTE — PLAN OF CARE
Problem: Goal Outcome Summary  Goal: Goal Outcome Summary  Outcome: Improving  VSS with improved BP control. No CP. Left radial PCI ecchymotic but intact. Reports baseline loose cough from asthma. Independent to ambulate.

## 2017-03-06 ENCOUNTER — CARE COORDINATION (OUTPATIENT)
Dept: CASE MANAGEMENT | Facility: CLINIC | Age: 82
End: 2017-03-06

## 2017-03-06 ENCOUNTER — CARE COORDINATION (OUTPATIENT)
Dept: CARDIOLOGY | Facility: CLINIC | Age: 82
End: 2017-03-06

## 2017-03-06 DIAGNOSIS — J30.1 ALLERGIC RHINITIS DUE TO POLLEN: ICD-10-CM

## 2017-03-06 DIAGNOSIS — E78.5 HYPERLIPIDEMIA LDL GOAL <130: ICD-10-CM

## 2017-03-06 DIAGNOSIS — I10 ESSENTIAL HYPERTENSION WITH GOAL BLOOD PRESSURE LESS THAN 140/90: ICD-10-CM

## 2017-03-06 LAB — INTERPRETATION ECG - MUSE: NORMAL

## 2017-03-06 RX ORDER — AZELASTINE 1 MG/ML
1-2 SPRAY, METERED NASAL 2 TIMES DAILY
Qty: 1 BOTTLE | Refills: 3 | Status: CANCELLED | OUTPATIENT
Start: 2017-03-06

## 2017-03-06 RX ORDER — LOSARTAN POTASSIUM 100 MG/1
100 TABLET ORAL AT BEDTIME
Qty: 90 TABLET | Refills: 1 | Status: SHIPPED | OUTPATIENT
Start: 2017-03-06 | End: 2017-09-07

## 2017-03-06 RX ORDER — AZELASTINE 1 MG/ML
1-2 SPRAY, METERED NASAL 2 TIMES DAILY
Qty: 1 BOTTLE | Refills: 3 | Status: SHIPPED | OUTPATIENT
Start: 2017-03-06 | End: 2017-08-23

## 2017-03-06 NOTE — PROGRESS NOTES
Called patient to discuss any post hospital d/c questions she may have, review medication changes, and confirm f/u appts. Patient denied any questions regarding new medications or changes to some of her current medications that she was taking prior to admission. Patient denied any SOB, chest pain, or light headedness. RN confirmed with patient that she has an apt scheduled on 3/17/17 with LAYA Autumn Underwood. Patient advised to call clinic with any cardiac related questions or concerns prior to her apt on 3/17/17. Patient verbalized understanding and agreed with plan.

## 2017-03-06 NOTE — TELEPHONE ENCOUNTER
Losartan- Prescription approved per [facility] Refill Protocol.    Rita Gonzales, RN, BSN, PHN

## 2017-03-06 NOTE — PROGRESS NOTES
Clinic Care Coordination Contact  OUTREACH    Referral Information:  Referral Source: IP/TCU Report  Reason for Contact: hospital discharge  Care Conference: No     Universal Utilization:   ED Visits in last year: 2  Hospital visits in last year: 1  Last PCP appointment: 10/14/16  Missed Appointments:  (none known)  Concerns: no utilization concerns  Multiple Providers or Specialists: cardiology, [facility]    Clinical Concerns:  Current Medical Concerns: Contacted patient and introduced self.  Patient reported that she has had no continued chest pain or concerns since discharge from hospital.  Patient checks blood pressure at home which was 139/59 this morning.  Patient's dtr from [place] has come to assist patient since discharge from hospital.  Patient has made medication changes and no questions or concerns regarding medications.       Current Behavioral Concerns: No acute concerns    Education Provided to patient: Discussed importance of hydration and nutrition and low salt diet.  Discussed S/S of HTN and any chest pain to report to provider.     Clinical Pathway Name: None  Clinical Pathway: None    Medication Management:  Patient is taking medications as prescribed and medication list is accurate per patient.      Functional Status:  Mobility Status: Independent  Equipment Currently Used at Home: grab bar  Transportation: Drives           Psychosocial:  Current living arrangement:: I live alone, I live in a private home  Financial/Insurance: FS  Son who lives near-by, dtr who lives in [place]     Resources and Interventions:  Current Resources: Other (Comments) (none); Other (see comment) (none)        Advanced Care Plans/Directives on file:: Yes  Referrals Placed: Other  (none at this time)       Patient/Caregiver understanding: Patient has good understanding of condition and has appropriate follow-up scheduled.    Frequency of Care Coordination: as needed  Upcoming appointment:  03/10/17     Plan:   1. PCP with fasting labs on 3/10/17 with follow-up regarding nodule seen on lung which patient reported has been present for 5-6 years with no changes.   2. Cardiology follow-up on 3/17/17.   3. Will send emergency care plan and encouraged patient to call with any questions or concerns.     Faby Conner RN   [facility]   [phone]  March 6, 2017

## 2017-03-06 NOTE — TELEPHONE ENCOUNTER
SIMVASTATIN 20MG     Last Written Prescription Date: 1/30/2017  Last Fill Quantity: 15, # refills: 0  Last Office Visit with [facility], [facility] or  Health prescribing provider: 10/14/2016  Next 5 appointments (look out 90 days)     Mar 10, 2017  8:20 AM CST   Office Visit with Layo Sevilla MD   [facility])    [address] [place] [address]  [address]  [place] [address]   [phone]                   Lab Results   Component Value Date    CHOL 149 03/02/2017     Lab Results   Component Value Date    HDL 78 03/02/2017     Lab Results   Component Value Date    LDL 59 03/02/2017     Lab Results   Component Value Date    TRIG 62 03/02/2017     Lab Results   Component Value Date    CHOLHDLRATIO 2.2 08/13/2015       LOSARTAN 100MG      Last Written Prescription Date: 8/15/2016  Last Fill Quantity: 90, # refills: 1  Last Office Visit with [facility], [facility] or  Health prescribing provider: 10/14/2016  Next 5 appointments (look out 90 days)     Mar 10, 2017  8:20 AM CST   Office Visit with Layo Sevilla MD   [facility] ([facility])    [address] [place] [address]  [address]  [place] [address] [phone]                   Potassium   Date Value Ref Range Status   03/03/2017 4.5 3.4 - 5.3 mmol/L Final     Creatinine   Date Value Ref Range Status   03/03/2017 0.84 0.52 - 1.04 mg/dL Final     BP Readings from Last 3 Encounters:   03/03/17 115/49   03/02/17 154/68   10/14/16 116/60

## 2017-03-06 NOTE — LETTER
Health Care Home - Access Care Plan    About Me  Patient Name:  Genie Persaud    YOB: 1935  Age:                            81 year old   Mio MRN:         [id number] Telephone Information:     Home Phone [phone]   Mobile [phone]       Address:    [address] [place] [address] Email address:  No e-mail address on record      Emergency Contact(s)  Name Relationship Lgl Grd Work Phone Home Phone Mobile Phone   1. DENISHA PERSAUD Son  [phone]   2. NO SECONDARY C*    none              Health Maintenance: Routine Health maintenance Reviewed: Due/Overdue Please discuss with PCP     My Access Plan  Medical Emergency 911   Questions or concerns during clinic hours Primary Clinic Line, I will call the clinic directly: Primary Clinic: [facility] [phone]   24 Hour Appointment Line [phone] or  [phone])  (toll free)   24 Hour Nurse Line [phone] (toll free)   Questions or concerns outside clinic hours 24 Hour Appointment Line, I will call the after-hours on-call line:   [facility] [phone] or [phone]) (toll-free)   Preferred Urgent Care Preferred Urgent Care: [facility] [place], [phone]   Preferred Hospital Preferred Hospital: [facility]  [phone]   Preferred Pharmacy [id number] - [place], [place] - [address] Behavioral Health Crisis Line [facility], [phone] or [phone]     My Care Team Members  Patient Care Team       Relationship Specialty Notifications Start End    Layo Sevilla MD PCP - General   10/10/02     Phone: [phone] Fax: [phone]         [facility] [address] [place] [address] [address] [place] [id number]    Faby Conner, RN Case Manager   3/6/17     Comment:  [facility] Care Coordinator    Phone: [phone] Fax: [phone]            My Medical and Care Information  Problem List    Patient Active Problem List   Diagnosis     Swelling, mass, or lump in head and neck     Pulmonary nodule     Anemia     Vasculitis (H)     HYPERLIPIDEMIA LDL GOAL <130     Impaired fasting glucose     Candidal esophagitis (H)     Aneurysm of thoracic aorta (H)     Health Care Home     Advanced directives, counseling/discussion     Stroke (H)     Moderate persistent asthma without complication     Essential hypertension with goal blood pressure less than 140/90     Hypertension      Current Medications and Allergies:  See printed Medication Report

## 2017-03-07 RX ORDER — SIMVASTATIN 20 MG
10 TABLET ORAL AT BEDTIME
Qty: 45 TABLET | Refills: 3 | Status: SHIPPED | OUTPATIENT
Start: 2017-03-07 | End: 2018-03-01

## 2017-03-07 NOTE — TELEPHONE ENCOUNTER
Azelastine      Last Written Prescription Date: 8/15/2016  Last Fill Quantity: 1 bottle,  # refills: 3   Last Office Visit with [facility] or [facility] prescribing provider: 10/14/2016                                         Next 5 appointments (look out 90 days)     Mar 10, 2017  8:20 AM CST   Office Visit with Layo Sevilla MD   [facility]    [address] [place] [address]   [phone]                  Prescription approved per [facility] Refill Protocol.    Rita Gonzales, RN, BSN, PHN

## 2017-03-07 NOTE — TELEPHONE ENCOUNTER
Zocor    Routing refill request to provider for review/approval because:  Drug interaction warning. Please approve of refill.    Rita Gonzales RN, BSN, PHN

## 2017-03-08 ENCOUNTER — TELEPHONE (OUTPATIENT)
Dept: PEDIATRICS | Facility: CLINIC | Age: 82
End: 2017-03-08

## 2017-03-08 NOTE — TELEPHONE ENCOUNTER
Reason for Call:  Other prescription    Detailed comments: Patient recently in hospital for a heart attack. She was started on Diltiazem 180mg, Imdur 15mg and Lozol 1.25mg. She does not like the way they make her feel and would like to discuss.    Phone Number Patient can be reached at: Home number on file [phone] (home)    Best Time: anytime    Can we leave a detailed message on this number? YES    Shanita Alicea,   [facility]

## 2017-03-08 NOTE — TELEPHONE ENCOUNTER
Call to patient-advised she needs to be seen.    Patient states that she used to always feel dizzy on diuretics.  It was d/c and now she started feeling lightheaded here and there after the hospital visit.  Denies chest pain or SOB and states that she is feeling fine.  Denies HA, weakness, tingling.  Check BP at home: 119/59, HR not checked.  Normal UO, no edema.  Patient concerned about feeling dizzy and does not want to wait until Friday to be seen and does not want to see another provider.  I fit patient in for tomorrow.    Appointment advised and scheduled:  Next 5 appointments (look out 90 days)     Mar 09, 2017  9:40 AM CST   [facility]   [facility]    [address] [place] [address] [phone]                  Aurora Wolf, RAFAEL  Message handled by Nurse Triage.

## 2017-03-09 ENCOUNTER — OFFICE VISIT (OUTPATIENT)
Dept: PEDIATRICS | Facility: CLINIC | Age: 82
End: 2017-03-09
Payer: COMMERCIAL

## 2017-03-09 VITALS
HEART RATE: 86 BPM | SYSTOLIC BLOOD PRESSURE: 148 MMHG | BODY MASS INDEX: 24.93 KG/M2 | TEMPERATURE: 98 F | WEIGHT: 140.7 LBS | HEIGHT: 63 IN | DIASTOLIC BLOOD PRESSURE: 48 MMHG | OXYGEN SATURATION: 97 %

## 2017-03-09 DIAGNOSIS — I71.20 THORACIC AORTIC ANEURYSM WITHOUT RUPTURE (H): ICD-10-CM

## 2017-03-09 DIAGNOSIS — R91.1 PULMONARY NODULE: ICD-10-CM

## 2017-03-09 DIAGNOSIS — I10 ESSENTIAL HYPERTENSION WITH GOAL BLOOD PRESSURE LESS THAN 140/90: Primary | ICD-10-CM

## 2017-03-09 PROBLEM — I25.10 CORONARY ARTERY DISEASE INVOLVING NATIVE CORONARY ARTERY OF NATIVE HEART WITHOUT ANGINA PECTORIS: Status: ACTIVE | Noted: 2017-03-09

## 2017-03-09 PROCEDURE — 80053 COMPREHEN METABOLIC PANEL: CPT | Performed by: INTERNAL MEDICINE

## 2017-03-09 PROCEDURE — 99495 TRANSJ CARE MGMT MOD F2F 14D: CPT | Performed by: INTERNAL MEDICINE

## 2017-03-09 PROCEDURE — 36415 COLL VENOUS BLD VENIPUNCTURE: CPT | Performed by: INTERNAL MEDICINE

## 2017-03-09 RX ORDER — DILTIAZEM HYDROCHLORIDE 120 MG/1
120 CAPSULE, EXTENDED RELEASE ORAL DAILY
Qty: 30 CAPSULE | Refills: 1 | Status: SHIPPED | OUTPATIENT
Start: 2017-03-09 | End: 2017-05-05

## 2017-03-09 NOTE — PROGRESS NOTES
SUBJECTIVE:                                                    Genie Persaud is a 81 year old female who presents to clinic today for the following health issues:          Hospital Follow-up Visit:    Hospital/Nursing Home/IP Rehab Facility: [facility]  Date of Admission: 3/02/17  Date of Discharge: 3/03/17  Reason(s) for Admission: HTN            Problems taking medications regularly:  None       Medication changes since discharge: None       Problems adhering to non-medication therapy:  None    Summary of hospitalization:  [facility] discharge summary reviewed  Diagnostic Tests/Treatments reviewed.  Follow up needed: none  Other Healthcare Providers Involved in Patient s Care:         None  Update since discharge: improved.     Post Discharge Medication Reconciliation: discharge medications reconciled, continue medications without change.  Plan of care communicated with patient     Coding guidelines for this visit:  Type o/f Medical   Decision Making Face-to-Face Visit       within 7 Days of discharge Face-to-Face Visit        within 14 days of discharge   Moderate Complexity [id number]   High Complexity [id number]          Woke up last week with pressure in chest; had pain down left arm, and blood pressure was found to be 202/109.  (Had missed losartan evening dose 2 days in a row). Taken to emergency room by a friend.      Hospital summary:    She presented to [facility] Emergency Room on 03/02/2017 with of chest pressure, chest tightness radiating down her left arm. Her symptoms started about an hour before she came to the emergency room. She denied nausea, vomiting, shortness of breath or diaphoresis. Her initial EKG demonstrated normal sinus rhythm with occasional PVCs and hyperacute T-waves. There was ST segment depression in leads II, III, aVF and V4-V6, also mild depression in one. The patient had continued chest pain. A second EKG demonstrated a mild ST  elevation in leads V1 and AVR with continued depressions. A STEMI protocol was activated. Her troponin was less than 0.015. The patient was transferred to [facility] at which time she was pain-free. She had had similar symptoms earlier in the week but no previous cardiac history. The patient was seen by Cardiology. Cardiology thought that her cardiac symptoms were due to hypertensive urgency as the patient had run out of her blood pressure meds 2 days prior to admission.   1. Hypertensive urgency due to medication noncompliance and running out of her medications. Diltiazem was added for blood pressure. She had her hydrochlorothiazide changed to Lozol. Also Imdur was added. The patient is not on a beta blocker due to spasm and asthma.   2. Elevated troponin. An EKG consistent with subendocardial ischemia that was diffuse. This was thought by Cardiology likely due to hypertensive crisis. The patient had a cardiac echo which did not show ___. The patient also had a catheterization that showed mild RCA, moderate LAD, left circumflex up to 70% proximal. Recommended medical treatment, possible outpatient stress test later. EKG 03/02/2017 was much better and her troponin with a mild peak and now was down.   3. Supraventricular tachycardia. Dr. Hernandez noted some brief runs SVT during her catheterization and the patient was unaware and diltiazem was added for this.   4. History of vasculitis.   5. History of thoracic aortic aneurysm. CT this admission was 44 mm and no dissection.   6. Remote history of stroke.   7. Moderate persistent asthma.   8. At the time of discharge, the patient's blood pressure was well controlled on her new regimen. She will follow up with her primary care physician in 1-2 weeks and the nurse practitioner from Cardiology at the [place] office in 1-2 weeks. I would like to have her primary care physician check a fasting glucose and would also like the primary care physician to  follow up with the new nodule that was seen on CT of her chest, abdomen and pelvis.       Greater than 30 minutes was spent during this time with discussion with the patient and planning discharge.     Echocardiogram:     Interpretation Summary     Inferolateral and lateral hypokinesis.  Left ventricular systolic function is mildly reduced.  The visual ejection fraction is estimated at 45-50%.  The aortic valve is trileaflet with aortic valve sclerosis.  There is moderate to mod-severe (2-3+) aortic regurgitation.  No hemodynamically significant valvular aortic stenosis.  The ascending aorta is Mildly dilated.      Cath:       SUMMARY:   1. No evidence to suggest coronary artery disease as the etiology of  her acute chest discomfort..  2. Two vessel coronary artery disease with mLAD 50% stenosis, D3 50%  stenosis, pLCx 50% stenosis and mLCx 50% stenosis.   3. Intermittent SVT with heart rate around 130 bpm, noted before  catheters were placed  4. Known thoracic aortic aneurysm.  5. Hypertension.    PLAN:   1. Aspirin 81 mg po daily lifelong.  2. Will start diltiazem for SVT (avoiding beta blocker secondary to  history of asthma).  3. Bedrest per protocol.  4. CTA Chest to evaluate for dissection given known thoracic aneurysm.  5. Echocardiogram.  6. Serial troponin levels and lipid profile.  7. Admit to the primary inpatient team for further evaluation and  management.  8. Continued medical management and lifestyle modification for  cardiovascular risk factor optimization.     Reports dizziness again after beginning her 2 new blood pressure mds and her imdur.  Notes dizziness walking down the hallway, mostly when gets up and walks.      blood pressure at home has been running 134/60s.        Problem list and histories reviewed & adjusted, as indicated.  Additional history: as documented    Patient Active Problem List   Diagnosis     Swelling, mass, or lump in head and neck     Pulmonary nodule     Anemia      Vasculitis (H)     HYPERLIPIDEMIA LDL GOAL <130     Impaired fasting glucose     Candidal esophagitis (H)     Aneurysm of thoracic aorta (H)     Health Care Home     Advanced directives, counseling/discussion     Stroke (H)     Moderate persistent asthma without complication     Essential hypertension with goal blood pressure less than 140/90     Hypertension     Past Surgical History   Procedure Laterality Date     C appendectomy  1957     Hc dilation/curettage diag/ther non ob  1967,1971     Hc remove tonsils/adenoids,<11 y/o       C ligate fallopian tube  1971     C stereotactic breast biopsy  2001     Cholecystectomy, laporoscopic  2008     Cholecystectomy, Laparoscopic     Esophagoscopy, gastroscopy, duodenoscopy (egd), combined  5/17/2013     Procedure: COMBINED ESOPHAGOSCOPY, GASTROSCOPY, DUODENOSCOPY (EGD), BIOPSY SINGLE OR MULTIPLE;  ESOPHAGOSCOPY, GASTROSCOPY, DUODENOSCOPY (EGD)  with bx;  Surgeon: Jeffery Yanez MD;  Location:  GI       Social History   Substance Use Topics     Smoking status: Never Smoker     Smokeless tobacco: Never Used     Alcohol use 0.6 - 1.2 oz/week     1 - 2 Standard drinks or equivalent per week     Family History   Problem Relation Age of Onset     Hypertension Mother      OSTEOPOROSIS Mother      C.A.D. Mother      Connective Tissue Disorder Father      scleraderma     CEREBROVASCULAR DISEASE Paternal Grandmother      Prostate Cancer Other      9/05 passed away due to complications     Breast Cancer Child      youngest dtr         Current Outpatient Prescriptions   Medication Sig Dispense Refill     diltiazem (DILACOR XR) 120 MG 24 hr capsule Take 1 capsule (120 mg) by mouth daily 30 capsule 1     simvastatin (ZOCOR) 20 MG tablet Take 0.5 tablets (10 mg) by mouth At Bedtime 45 tablet 3     losartan (COZAAR) 100 MG tablet Take 1 tablet (100 mg) by mouth At Bedtime 90 tablet 1     azelastine (ASTELIN) 0.1 % spray Spray 1-2 sprays into both nostrils 2 times daily 1 Bottle 3      isosorbide mononitrate (IMDUR) 30 MG 24 hr tablet Take 0.5 tablets (15 mg) by mouth daily 30 tablet 1     indapamide (LOZOL) 1.25 MG tablet Take 1 tablet (1.25 mg) by mouth daily 30 tablet 1     fluticasone-salmeterol (ADVAIR-HFA) 230-21 MCG/ACT inhaler Inhale 2 puffs into the lungs 2 times daily 12 g 1     omeprazole (PRILOSEC) 20 MG capsule Take 20 mg by mouth daily  90 capsule 2     order for DME Equipment being ordered: compression stockings, 30 mm Hg, 2 pairs. 2 each 1     acetaminophen (TYLENOL) 500 MG tablet Take 500-1,000 mg by mouth every 8 hours as needed for mild pain       albuterol (PROAIR HFA, PROVENTIL HFA, VENTOLIN HFA) 108 (90 BASE) MCG/ACT inhaler Inhale 2 puffs into the lungs every 6 hours as needed        aspirin 81 MG EC tablet Take 1 tablet (81 mg) by mouth daily 90 tablet 3     calcium citrate-vitamin D (CALCIUM CITRATE +) 315-200 MG-UNIT TABS Take 2 tablets by mouth daily.       [DISCONTINUED] diltiazem (DILACOR XR) 180 MG 24 hr capsule Take 1 capsule (180 mg) by mouth daily 30 capsule 1     Allergies   Allergen Reactions     Fosamax [Alendronic Acid] GI Disturbance     Augmentin [Amoxicillin-Pot Clavulanate] Diarrhea     Diarrhea and rash     Evista [Raloxifene]      abd symptoms.      Flu Virus Vaccine      swollen and red at inj site     BP Readings from Last 3 Encounters:   03/09/17 148/48   03/03/17 115/49   03/02/17 154/68    Wt Readings from Last 3 Encounters:   03/09/17 140 lb 11.2 oz (63.8 kg)   03/03/17 137 lb 9.1 oz (62.4 kg)   03/02/17 138 lb (62.6 kg)                  Labs reviewed in EPIC    Reviewed and updated as needed this visit by clinical staff  Tobacco  Allergies  Meds  Med Hx  Surg Hx  Fam Hx  Soc Hx      Reviewed and updated as needed this visit by Provider         ROS:  C: NEGATIVE for fever, chills, change in weight  E/M: NEGATIVE for ear, mouth and throat problems  R: NEGATIVE for significant cough or SOB  CV: NEGATIVE for chest pain, palpitations or  "peripheral edema    OBJECTIVE:                                                    /48  Pulse 86  Temp 98  F (36.7  C) (Tympanic)  Ht 5' 3\" (1.6 m)  Wt 140 lb 11.2 oz (63.8 kg)  SpO2 97%  BMI 24.92 kg/m2  Body mass index is 24.92 kg/(m^2).   GENERAL: healthy, alert, well nourished, well hydrated, no distress  HENT: ear canals- normal; TMs- normal; Nose- normal; Mouth- no ulcers, no lesions  NECK: no tenderness, no adenopathy, no asymmetry, no masses, no stiffness; thyroid- normal to palpation  RESP: lungs clear to auscultation - no rales, no rhonchi, no wheezes  CV: regular rates and rhythm, normal S1 S2, no S3 or S4 and no murmur, no click or rub -  ABDOMEN: soft, no tenderness, no  hepatosplenomegaly, no masses, normal bowel sounds    Diagnostic test results:  Diagnostic Test Results:  none      ASSESSMENT/PLAN:                                                    1. Essential hypertension with goal blood pressure less than 140/90  blood pressure has been sensitive to meds, and she always develops dizzienss.  Her blood pressure is not dangerously low today, but patient requesting to cut down on dose of \"something\".  Will try diltiazem 120 instead of 180 and have patient follow up with cardiology next week.   - diltiazem (DILACOR XR) 120 MG 24 hr capsule; Take 1 capsule (120 mg) by mouth daily  Dispense: 30 capsule; Refill: 1  - Comprehensive metabolic panel (BMP + Alb, Alk Phos, ALT, AST, Total. Bili, TP)    2. Pulmonary nodule  Follow up in 12 months for recheck CT.    3.  coronary artery disease:  Secondary risk factor modification.  Medical management.     4.  Thoracic aneurysm:  No progression . Stable.  Secondary risk factor modification     See Patient Instructions    Layo Sevilla MD  [facility]    "

## 2017-03-09 NOTE — MR AVS SNAPSHOT
"              After Visit Summary   3/9/2017    Genie Persaud    MRN: [id number]           Patient Information     Date Of Birth          1935        Visit Information        Provider Department      3/9/2017 9:40 AM Layo Sevilla MD [facility]        Today's Diagnoses     Essential hypertension with goal blood pressure less than 140/90    -  1    Pulmonary nodule          Care Instructions    Stop the diltiazem 180 mg.    Begin diltiazem 120 mg.    Follow up with cardiology in 1 week.     Layo Sevilla MD  Internal Medicine and Pediatrics             Follow-ups after your visit        Your next 10 appointments already scheduled     Mar 17, 2017  3:10 PM CDT   Return Discharge with TOREY Sierra CNP   [facility] [place] [facility])    [address]  [place] [address] [phone]              Who to contact     If you have questions or need follow up information about today's clinic visit or your schedule please contact [facility] directly at [phone].  Normal or non-critical lab and imaging results will be communicated to you by S[facility]is[employer], letter or phone within 4 business days after the clinic has received the results. If you do not hear from us within 7 days, please contact the clinic through [employer] or phone. If you have a critical or abnormal lab result, we will notify you by phone as soon as possible.  Submit refill requests through [facility] or call your pharmacy and they will forward the refill request to us. Please allow 3 business days for your refill to be completed.          Additional Information About Your Visit        Sorbisensehart Information     [facility] lets you send messages to your doctor, view your test results, renew your prescriptions, schedule appointments and more. To sign up, go to [email or web address] . Click on \"Log in\" on the left side of the screen, which will take you to the " "Welcome page. Then click on \"Sign up Now\" on the right side of the page.     You will be asked to enter the access code listed below, as well as some personal information. Please follow the directions to create your username and password.     Your access code is: [email or web address]  Expires: 2017 10:32 AM     Your access code will  in 90 days. If you need help or a new code, please call your [place] clinic or [phone].        Care EveryWhere ID     This is your Care EveryWhere ID. This could be used by other organizations to access your [place] medical records  [id number]        Your Vitals Were     Pulse Temperature Height Pulse Oximetry BMI (Body Mass Index)       86 98  F (36.7  C) (Tympanic) 5' 3\" (1.6 m) 97% 24.92 kg/m2        Blood Pressure from Last 3 Encounters:   17 148/48   17 115/49   17 154/68    Weight from Last 3 Encounters:   17 140 lb 11.2 oz (63.8 kg)   17 137 lb 9.1 oz (62.4 kg)   17 138 lb (62.6 kg)              Today, you had the following     No orders found for display         Today's Medication Changes          These changes are accurate as of: 3/9/17 10:16 AM.  If you have any questions, ask your nurse or doctor.               These medicines have changed or have updated prescriptions.        Dose/Directions    diltiazem 120 MG 24 hr capsule   Commonly known as:  DILACOR XR   This may have changed:    - medication strength  - how much to take   Used for:  Essential hypertension with goal blood pressure less than 140/90   Changed by:  Layo Sevilla MD        Dose:  120 mg   Take 1 capsule (120 mg) by mouth daily   Quantity:  30 capsule   Refills:  1            Where to get your medicines      These medications were sent to [id number] - [place], [place] - [address] [facility]  [address] [place] [address]     Phone:  [phone]     diltiazem 120 MG 24 hr capsule                Primary Care Provider Office Phone # Fax #    Layo Sevilla, " [phone]       [facility] [address] [place] [address] [address] [place] [address]        Thank you!     Thank you for choosing [facility]  for your care. Our goal is always to provide you with excellent care. Hearing back from our patients is one way we can continue to improve our services. Please take a few minutes to complete the written survey that you may receive in the mail after your visit with us. Thank you!             Your Updated Medication List - Protect others around you: Learn how to safely use, store and throw away your medicines at www.disposemymeds.org.          This list is accurate as of: 3/9/17 10:16 AM.  Always use your most recent med list.                   Brand Name Dispense Instructions for use    acetaminophen 500 MG tablet    TYLENOL     Take 500-1,000 mg by mouth every 8 hours as needed for mild pain       albuterol 108 (90 BASE) MCG/ACT Inhaler    PROAIR HFA/PROVENTIL HFA/VENTOLIN HFA     Inhale 2 puffs into the lungs every 6 hours as needed       aspirin 81 MG EC tablet     90 tablet    Take 1 tablet (81 mg) by mouth daily       azelastine 0.1 % spray    ASTELIN    1 Bottle    Spray 1-2 sprays into both nostrils 2 times daily       CALCIUM CITRATE + 315-200 MG-UNIT Tabs per tablet   Generic drug:  calcium citrate-vitamin D      Take 2 tablets by mouth daily.       diltiazem 120 MG 24 hr capsule    DILACOR XR    30 capsule    Take 1 capsule (120 mg) by mouth daily       fluticasone-salmeterol 230-21 MCG/ACT inhaler    ADVAIR-HFA    12 g    Inhale 2 puffs into the lungs 2 times daily       indapamide 1.25 MG tablet    LOZOL    30 tablet    Take 1 tablet (1.25 mg) by mouth daily       isosorbide mononitrate 30 MG 24 hr tablet    IMDUR    30 tablet    Take 0.5 tablets (15 mg) by mouth daily       losartan 100 MG tablet    COZAAR    90 tablet    Take 1 tablet (100 mg) by mouth At Bedtime       omeprazole 20 MG CR capsule    priLOSEC    90 capsule    Take 20 mg  by mouth daily       order for DME     2 each    Equipment being ordered: compression stockings, 30 mm Hg, 2 pairs.       simvastatin 20 MG tablet    ZOCOR    45 tablet    Take 0.5 tablets (10 mg) by mouth At Bedtime

## 2017-03-09 NOTE — LETTER
[facility]  [address]  [place] [address]                  [phone]   March 10, 2017    Genie Persaud  [address]   [place] [address]      Dear Genie,    Here is a summary of your recent test results:    All of your blood tests thus far have come back normal.  Your electrolytes, liver, and kidney panels looked great.  Please let me know if you have any further concerns, otherwise we will plan on seeing you back at your next scheduled appointment.    Your test results are enclosed.      Please contact me if you have any questions.           Thank you very much for choosing [facility]    Best regards,    Layo Sevilla MD        Results for orders placed or performed in visit on 03/09/17   Comprehensive metabolic panel (BMP + Alb, Alk Phos, ALT, AST, Total. Bili, TP)   Result Value Ref Range    Sodium 137 133 - 144 mmol/L    Potassium 3.7 3.4 - 5.3 mmol/L    Chloride 100 94 - 109 mmol/L    Carbon Dioxide 31 20 - 32 mmol/L    Anion Gap 6 3 - 14 mmol/L    Glucose 90 70 - 99 mg/dL    Urea Nitrogen 22 7 - 30 mg/dL    Creatinine 0.81 0.52 - 1.04 mg/dL    GFR Estimate 68 >60 mL/min/1.7m2    GFR Estimate If Black 82 >60 mL/min/1.7m2    Calcium 9.6 8.5 - 10.1 mg/dL    Bilirubin Total 0.5 0.2 - 1.3 mg/dL    Albumin 4.0 3.4 - 5.0 g/dL    Protein Total 7.5 6.8 - 8.8 g/dL    Alkaline Phosphatase 79 40 - 150 U/L    ALT 36 0 - 50 U/L    AST 15 0 - 45 U/L

## 2017-03-09 NOTE — PATIENT INSTRUCTIONS
Stop the diltiazem 180 mg.    Begin diltiazem 120 mg.    Follow up with cardiology in 1 week.     Layo Sevilla MD  Internal Medicine and Pediatrics

## 2017-03-09 NOTE — NURSING NOTE
"Chief Complaint   Patient presents with     Hospital F/U       Initial /60 (BP Location: Right arm, Patient Position: Chair, Cuff Size: Adult Regular)  Pulse 86  Temp 98  F (36.7  C) (Tympanic)  Ht 5' 3\" (1.6 m)  Wt 140 lb 11.2 oz (63.8 kg)  SpO2 97%  BMI 24.92 kg/m2 Estimated body mass index is 24.92 kg/(m^2) as calculated from the following:    Height as of this encounter: 5' 3\" (1.6 m).    Weight as of this encounter: 140 lb 11.2 oz (63.8 kg).  Medication Reconciliation: complete   Melissa Gandara LPN      "

## 2017-03-09 NOTE — LETTER
[facility]  [address]  [place] [address]                  [phone]   March 10, 2017    Genie Persaud  [address]   [place] [address]      Dear Genie,    Here is a summary of your recent test results:    All of your blood tests thus far have come back normal.  Your electrolytes, liver, and kidney panels looked great.  Please let me know if you have any further concerns, otherwise we will plan on seeing you back at your next scheduled appointment.     Your test results are enclosed.      Please contact me if you have any questions.           Thank you very much for choosing [facility]    Best regards,    Layo Sevilla MD        Results for orders placed or performed in visit on 03/09/17   Comprehensive metabolic panel (BMP + Alb, Alk Phos, ALT, AST, Total. Bili, TP)   Result Value Ref Range    Sodium 137 133 - 144 mmol/L    Potassium 3.7 3.4 - 5.3 mmol/L    Chloride 100 94 - 109 mmol/L    Carbon Dioxide 31 20 - 32 mmol/L    Anion Gap 6 3 - 14 mmol/L    Glucose 90 70 - 99 mg/dL    Urea Nitrogen 22 7 - 30 mg/dL    Creatinine 0.81 0.52 - 1.04 mg/dL    GFR Estimate 68 >60 mL/min/1.7m2    GFR Estimate If Black 82 >60 mL/min/1.7m2    Calcium 9.6 8.5 - 10.1 mg/dL    Bilirubin Total 0.5 0.2 - 1.3 mg/dL    Albumin 4.0 3.4 - 5.0 g/dL    Protein Total 7.5 6.8 - 8.8 g/dL    Alkaline Phosphatase 79 40 - 150 U/L    ALT 36 0 - 50 U/L    AST 15 0 - 45 U/L

## 2017-03-10 LAB
ALBUMIN SERPL-MCNC: 4 G/DL (ref 3.4–5)
ALP SERPL-CCNC: 79 U/L (ref 40–150)
ALT SERPL W P-5'-P-CCNC: 36 U/L (ref 0–50)
ANION GAP SERPL CALCULATED.3IONS-SCNC: 6 MMOL/L (ref 3–14)
AST SERPL W P-5'-P-CCNC: 15 U/L (ref 0–45)
BILIRUB SERPL-MCNC: 0.5 MG/DL (ref 0.2–1.3)
BUN SERPL-MCNC: 22 MG/DL (ref 7–30)
CALCIUM SERPL-MCNC: 9.6 MG/DL (ref 8.5–10.1)
CHLORIDE SERPL-SCNC: 100 MMOL/L (ref 94–109)
CO2 SERPL-SCNC: 31 MMOL/L (ref 20–32)
CREAT SERPL-MCNC: 0.81 MG/DL (ref 0.52–1.04)
GFR SERPL CREATININE-BSD FRML MDRD: 68 ML/MIN/1.7M2
GLUCOSE SERPL-MCNC: 90 MG/DL (ref 70–99)
POTASSIUM SERPL-SCNC: 3.7 MMOL/L (ref 3.4–5.3)
PROT SERPL-MCNC: 7.5 G/DL (ref 6.8–8.8)
SODIUM SERPL-SCNC: 137 MMOL/L (ref 133–144)

## 2017-03-17 ENCOUNTER — OFFICE VISIT (OUTPATIENT)
Dept: CARDIOLOGY | Facility: CLINIC | Age: 82
End: 2017-03-17
Attending: INTERNAL MEDICINE
Payer: COMMERCIAL

## 2017-03-17 VITALS
SYSTOLIC BLOOD PRESSURE: 160 MMHG | HEART RATE: 84 BPM | DIASTOLIC BLOOD PRESSURE: 54 MMHG | HEIGHT: 63 IN | BODY MASS INDEX: 25.34 KG/M2 | WEIGHT: 143 LBS

## 2017-03-17 DIAGNOSIS — I10 ESSENTIAL HYPERTENSION WITH GOAL BLOOD PRESSURE LESS THAN 140/90: ICD-10-CM

## 2017-03-17 PROCEDURE — 99214 OFFICE O/P EST MOD 30 MIN: CPT | Performed by: NURSE PRACTITIONER

## 2017-03-17 NOTE — LETTER
3/17/2017    Layo Sevilla MD  [facility]   [address] [place] [address]    RE: Genie Persaud       Dear Colleague,    I had the pleasure of meeting Genie Persaud in Cardiology Clinic following her recent hospitalization.  She was admitted recently for chest pressure and tightness radiating down into her arm.  She was subsequently taken to the Cath Lab, where they found no significant flow-limiting lesions.  She did have a 30%-50% stenosis in her LAD, her proximal circ had a 60%-70% stenosis, and she had a less than 40% stenosis in her RCA.  She was found to be severely hypertensive with an initial blood pressure of over 200 systolically.  In addition to this, she was noted in the Cath Lab to have SVT, and she was therefore started on diltiazem.  It was recommended that she not be on a beta blocker due to her history of asthma.  She was started on 3 new blood pressure medications during her hospital stay.  Her hydrochlorothiazide was stopped.  She was started on Lozol 1.25 mg, diltiazem 180 mg and Imdur 15 mg daily and continued on Zocor.      Today Genie comes in for followup.  She has been to see her Primary Care doctor, who decreased her diltiazem to 120 mg after she complained of lightheadedness.  Today she tells me she continues to have lightheadedness.  Initially, after she got home from the hospital, she did well, but about 3-4 days later she started feeling lightheadedness.  She did not have any symptoms of syncope or presyncope.  Her blood pressures at home have generally been in the 130s systolically, though she did have one this morning, a systolic blood pressure of about 90.  She notices that she gets dizzy sometime after eating breakfast, and then by the evening it subsides somewhat.  She takes her diltiazem and Imdur in the evening and her losartan in the morning.  She also has a history of allergies and describes the dizziness as somewhat of a sinus pressure radiating into her  ears as well.  She says the room is not spinning; it is just a sense of dizziness.      PHYSICAL EXAMINATION:     GENERAL:  Essentially negative.   VITAL SIGNS:  Blood pressure 160/54, heart rate 84, weight 143 pounds.  Her BMI is 25.   LUNGS:  Clear throughout to auscultation.   CARDIAC:  Rate is regular.  Normal S1 and S2.   EXTREMITIES:  No lower extremity edema.      Outpatient Encounter Prescriptions as of 3/17/2017   Medication Sig Dispense Refill     diltiazem (DILACOR XR) 120 MG 24 hr capsule Take 1 capsule (120 mg) by mouth daily 30 capsule 1     simvastatin (ZOCOR) 20 MG tablet Take 0.5 tablets (10 mg) by mouth At Bedtime 45 tablet 3     losartan (COZAAR) 100 MG tablet Take 1 tablet (100 mg) by mouth At Bedtime 90 tablet 1     azelastine (ASTELIN) 0.1 % spray Spray 1-2 sprays into both nostrils 2 times daily 1 Bottle 3     indapamide (LOZOL) 1.25 MG tablet Take 1 tablet (1.25 mg) by mouth daily 30 tablet 1     fluticasone-salmeterol (ADVAIR-HFA) 230-21 MCG/ACT inhaler Inhale 2 puffs into the lungs 2 times daily 12 g 1     omeprazole (PRILOSEC) 20 MG capsule Take 20 mg by mouth daily  90 capsule 2     order for DME Equipment being ordered: compression stockings, 30 mm Hg, 2 pairs. 2 each 1     acetaminophen (TYLENOL) 500 MG tablet Take 500-1,000 mg by mouth every 8 hours as needed for mild pain       albuterol (PROAIR HFA, PROVENTIL HFA, VENTOLIN HFA) 108 (90 BASE) MCG/ACT inhaler Inhale 2 puffs into the lungs every 6 hours as needed        aspirin 81 MG EC tablet Take 1 tablet (81 mg) by mouth daily 90 tablet 3     calcium citrate-vitamin D (CALCIUM CITRATE +) 315-200 MG-UNIT TABS Take 2 tablets by mouth daily.       [DISCONTINUED] isosorbide mononitrate (IMDUR) 30 MG 24 hr tablet Take 0.5 tablets (15 mg) by mouth daily 30 tablet 1     No facility-administered encounter medications on file as of 3/17/2017.      ASSESSMENT AND PLAN:   1.  Coronary artery disease.  She is on Zocor and low-dose aspirin.  I  continued her on these medications.  Her LDL was 59.   2.  Hypertension.  Her blood pressures since being discharged from the hospital at home have been in the 130s.  It is a little bit elevated here today, but she tells me that this morning it was systolically in the 90s.  She is having problems with dizziness, but I do not think this is related to hypotension.  I think there are 3 possible causes of her dizziness.  One could be medications, one could be allergies, and the third could be arrhythmia since she had the SVT noted.  I recommended that she stop the Imdur since she is just on 15 mg, and this really is not going to do much for her blood pressure.  It was probably started because she had the chest pressure and arm pain.  I recommended that she monitor her blood pressure between now and when she is seen in followup.  I have also ordered a 24 hour Holter monitor to see if there are any funny rhythms that might be causing her symptoms.  Finally, she has an appointment with her allergist next Thursday.  I would like them to see if there is any cause for her symptoms, since she did not have any symptoms initially on these medications, and they started about a week later.  Finally, unfortunately, I am not able to follow up with her since I do not have any openings until the end of April, and I think she should be seen sooner.  She cannot drive to [place] because she has some vision problems, so she will see one of my colleagues at that time, and hopefully we can have more information for her.            Thank you for allowing me to see Genie today.     Sincerely,    TOREY [facility]

## 2017-03-17 NOTE — PROGRESS NOTES
HPI and Plan:   See dictation    Orders Placed This Encounter   Procedures     Follow-Up with Cardiac Advanced Practice Provider     Holter Monitor 24 hour - Adult       No orders of the defined types were placed in this encounter.      Medications Discontinued During This Encounter   Medication Reason     isosorbide mononitrate (IMDUR) 30 MG 24 hr tablet          Encounter Diagnosis   Name Primary?     Essential hypertension with goal blood pressure less than 140/90        CURRENT MEDICATIONS:  Current Outpatient Prescriptions   Medication Sig Dispense Refill     diltiazem (DILACOR XR) 120 MG 24 hr capsule Take 1 capsule (120 mg) by mouth daily 30 capsule 1     simvastatin (ZOCOR) 20 MG tablet Take 0.5 tablets (10 mg) by mouth At Bedtime 45 tablet 3     losartan (COZAAR) 100 MG tablet Take 1 tablet (100 mg) by mouth At Bedtime 90 tablet 1     azelastine (ASTELIN) 0.1 % spray Spray 1-2 sprays into both nostrils 2 times daily 1 Bottle 3     indapamide (LOZOL) 1.25 MG tablet Take 1 tablet (1.25 mg) by mouth daily 30 tablet 1     fluticasone-salmeterol (ADVAIR-HFA) 230-21 MCG/ACT inhaler Inhale 2 puffs into the lungs 2 times daily 12 g 1     omeprazole (PRILOSEC) 20 MG capsule Take 20 mg by mouth daily  90 capsule 2     order for DME Equipment being ordered: compression stockings, 30 mm Hg, 2 pairs. 2 each 1     acetaminophen (TYLENOL) 500 MG tablet Take 500-1,000 mg by mouth every 8 hours as needed for mild pain       albuterol (PROAIR HFA, PROVENTIL HFA, VENTOLIN HFA) 108 (90 BASE) MCG/ACT inhaler Inhale 2 puffs into the lungs every 6 hours as needed        aspirin 81 MG EC tablet Take 1 tablet (81 mg) by mouth daily 90 tablet 3     calcium citrate-vitamin D (CALCIUM CITRATE +) 315-200 MG-UNIT TABS Take 2 tablets by mouth daily.         ALLERGIES     Allergies   Allergen Reactions     Fosamax [Alendronic Acid] GI Disturbance     Augmentin [Amoxicillin-Pot Clavulanate] Diarrhea     Diarrhea and rash     Evista  [Raloxifene]      abd symptoms.      Flu Virus Vaccine      swollen and red at inj site       PAST MEDICAL HISTORY:  Past Medical History   Diagnosis Date     Basal Cell Carcinoma--back      Impaired fasting glucose 8/26/2010     Moderate persistent asthma      osteopenia      Other and unspecified hyperlipidemia      previous diagnosis hypertension      Pulmonary nodule 3/3/2009     PET scan reportedly normal; biopsy not advised.     Swelling, mass, or lump in head and neck      benign nodule thyroid     Unspecified arthropathy, hand      Vasculitis (H) 4/20/2009     Possible increased activity of thoracic aorta, on PET scan w/u for pulmonary nodule.        PAST SURGICAL HISTORY:  Past Surgical History   Procedure Laterality Date     C appendectomy  1957     Hc dilation/curettage diag/ther non ob  1967,1971     Hc remove tonsils/adenoids,<15 y/o       C ligate fallopian tube  1971     C stereotactic breast biopsy  2001     Cholecystectomy, laporoscopic  2008     Cholecystectomy, Laparoscopic     Esophagoscopy, gastroscopy, duodenoscopy (egd), combined  5/17/2013     Procedure: COMBINED ESOPHAGOSCOPY, GASTROSCOPY, DUODENOSCOPY (EGD), BIOPSY SINGLE OR MULTIPLE;  ESOPHAGOSCOPY, GASTROSCOPY, DUODENOSCOPY (EGD)  with bx;  Surgeon: Jeffery Yanez MD;  Location:  GI       FAMILY HISTORY:  Family History   Problem Relation Age of Onset     Hypertension Mother      OSTEOPOROSIS Mother      C.A.D. Mother      Connective Tissue Disorder Father      scleraderma     CEREBROVASCULAR DISEASE Paternal Grandmother      Prostate Cancer Other      9/05 passed away due to complications     Breast Cancer Child      youngest dtr       SOCIAL HISTORY:  Social History     Social History     Marital status: Single     Spouse name: N/A     Number of children: 3     Years of education: 15     Occupational History     semi-retired      Social History Main Topics     Smoking status: Never Smoker     Smokeless tobacco: Never Used      "Alcohol use 0.6 - 1.2 oz/week     1 - 2 Standard drinks or equivalent per week     Drug use: No     Sexual activity: No     Other Topics Concern     None     Social History Narrative       Review of Systems:  Skin:  Negative for       Eyes:  Positive for glasses    ENT:  Negative for      Respiratory:  Positive for shortness of breath ashma   Cardiovascular:    dizziness;Positive for    Gastroenterology: Negative for      Genitourinary:  not assessed      Musculoskeletal:  Positive for arthritis    Neurologic:  Negative      Psychiatric:  Negative for      Heme/Lymph/Imm:  Positive for allergies    Endocrine:  Positive for   prediabetes    Physical Exam:  Vitals: /54  Pulse 84  Ht 1.6 m (5' 3\")  Wt 64.9 kg (143 lb)  BMI 25.33 kg/m2    Constitutional:  cooperative;alert and oriented        Skin:  warm and dry to the touch        Head:  normocephalic        Eyes:  pupils equal and round, conjunctivae and lids unremarkable, sclera white, no xanthalasma, EOMS intact, no nystagmus        ENT:  no pallor or cyanosis        Neck:           Chest:  normal breath sounds, clear to auscultation, normal A-P diameter, normal symmetry, normal respiratory excursion, no use of accessory muscles          Cardiac: regular rhythm                  Abdomen:           Vascular: pulses full and equal                                        Extremities and Back:  no edema              Neurological:  affect appropriate, oriented to time, person and place              CC  Dre Britton MD   [facility]  [address]  [place] [id number]              "

## 2017-03-17 NOTE — MR AVS SNAPSHOT
After Visit Summary   3/17/2017    Genie Persaud    MRN: [id number]           Patient Information     Date Of Birth          1935        Visit Information        Provider Department      3/17/2017 3:10 PM Alie Underwood APRN CNP [facility]        Today's Diagnoses     Essential hypertension with goal blood pressure less than 140/90          Care Instructions    Stop Imdur (isosorbide mononitrate)    Wear a 24 hour monitor to see if there is a funny rhythm causing your symptoms.    Bring in your blood pressure cuff, and blood reading with time/date on them.     Follow up in two weeks, or after you see your allergist.        Follow-ups after your visit        Additional Services     Follow-Up with Cardiac Advanced Practice Provider                 Future tests that were ordered for you today     Open Future Orders        Priority Expected Expires Ordered    Follow-Up with Cardiac Advanced Practice Provider Routine 3/24/2017 3/30/2018 3/17/2017    Holter Monitor 24 hour - Adult Routine 3/21/2017 3/17/2018 3/17/2017            Who to contact     If you have questions or need follow up information about today's clinic visit or your schedule please contact [facility] directly at [phone].  Normal or non-critical lab and imaging results will be communicated to you by MyChart, letter or phone within 4 business days after the clinic has received the results. If you do not hear from us within 7 days, please contact the clinic through MyChart or phone. If you have a critical or abnormal lab result, we will notify you by phone as soon as possible.  Submit refill requests through [employer] or call your pharmacy and they will forward the refill request to us. Please allow 3 business days for your refill to be completed.          Additional Information About Your Visit        MyChart Information     [employer] lets  "you send messages to your doctor, view your test results, renew your prescriptions, schedule appointments and more. To sign up, go to [email or web address] . Click on \"Log in\" on the left side of the screen, which will take you to the Welcome page. Then click on \"Sign up Now\" on the right side of the page.     You will be asked to enter the access code listed below, as well as some personal information. Please follow the directions to create your username and password.     Your access code is: [email or web address]  Expires: 2017 11:32 AM     Your access code will  in 90 days. If you need help or a new code, please call your [facility] clinic or [phone].        Care EveryWhere ID     This is your Care EveryWhere ID. This could be used by other organizations to access your [facility] medical records  [id number]        Your Vitals Were     Pulse Height BMI (Body Mass Index)             84 1.6 m (5' 3\") 25.33 kg/m2          Blood Pressure from Last 3 Encounters:   17 160/54   17 148/48   17 115/49    Weight from Last 3 Encounters:   17 64.9 kg (143 lb)   17 63.8 kg (140 lb 11.2 oz)   17 62.4 kg (137 lb 9.1 oz)              We Performed the Following     Follow-Up with Cardiac Advanced Practice Provider          Today's Medication Changes          These changes are accurate as of: 3/17/17  4:10 PM.  If you have any questions, ask your nurse or doctor.               Stop taking these medicines if you haven't already. Please contact your care team if you have questions.     isosorbide mononitrate 30 MG 24 hr tablet   Commonly known as:  IMDUR   Stopped by:  Alie Underwood APRN CNP                    Primary Care Provider Office Phone # Fax #    Layo Sevilla [phone]       [address] [place] [address]        Thank you!     Thank you for choosing [facility] [place]  for your " care. Our goal is always to provide you with excellent care. Hearing back from our patients is one way we can continue to improve our services. Please take a few minutes to complete the written survey that you may receive in the mail after your visit with us. Thank you!             Your Updated Medication List - Protect others around you: Learn how to safely use, store and throw away your medicines at www.disposemymeds.org.          This list is accurate as of: 3/17/17  4:10 PM.  Always use your most recent med list.                   Brand Name Dispense Instructions for use    acetaminophen 500 MG tablet    TYLENOL     Take 500-1,000 mg by mouth every 8 hours as needed for mild pain       albuterol 108 (90 BASE) MCG/ACT Inhaler    PROAIR HFA/PROVENTIL HFA/VENTOLIN HFA     Inhale 2 puffs into the lungs every 6 hours as needed       aspirin 81 MG EC tablet     90 tablet    Take 1 tablet (81 mg) by mouth daily       azelastine 0.1 % spray    ASTELIN    1 Bottle    Spray 1-2 sprays into both nostrils 2 times daily       CALCIUM CITRATE + 315-200 MG-UNIT Tabs per tablet   Generic drug:  calcium citrate-vitamin D      Take 2 tablets by mouth daily.       diltiazem 120 MG 24 hr capsule    DILACOR XR    30 capsule    Take 1 capsule (120 mg) by mouth daily       fluticasone-salmeterol 230-21 MCG/ACT inhaler    ADVAIR-HFA    12 g    Inhale 2 puffs into the lungs 2 times daily       indapamide 1.25 MG tablet    LOZOL    30 tablet    Take 1 tablet (1.25 mg) by mouth daily       losartan 100 MG tablet    COZAAR    90 tablet    Take 1 tablet (100 mg) by mouth At Bedtime       omeprazole 20 MG CR capsule    priLOSEC    90 capsule    Take 20 mg by mouth daily       order for DME     2 each    Equipment being ordered: compression stockings, 30 mm Hg, 2 pairs.       simvastatin 20 MG tablet    ZOCOR    45 tablet    Take 0.5 tablets (10 mg) by mouth At Bedtime

## 2017-03-17 NOTE — PATIENT INSTRUCTIONS
Stop Imdur (isosorbide mononitrate)    Wear a 24 hour monitor to see if there is a funny rhythm causing your symptoms.    Bring in your blood pressure cuff, and blood reading with time/date on them.     Follow up in two weeks, or after you see your allergist.

## 2017-03-19 NOTE — PROGRESS NOTES
HISTORY OF PRESENT ILLNESS:  I had the pleasure of meeting Genie Persaud in Cardiology Clinic following her recent hospitalization.  She was admitted recently for chest pressure and tightness radiating down into her arm.  She was subsequently taken to the Cath Lab, where they found no significant flow-limiting lesions.  She did have a 30%-50% stenosis in her LAD, her proximal circ had a 60%-70% stenosis, and she had a less than 40% stenosis in her RCA.  She was found to be severely hypertensive with an initial blood pressure of over 200 systolically.  In addition to this, she was noted in the Cath Lab to have SVT, and she was therefore started on diltiazem.  It was recommended that she not be on a beta blocker due to her history of asthma.  She was started on 3 new blood pressure medications during her hospital stay.  Her hydrochlorothiazide was stopped.  She was started on Lozol 1.25 mg, diltiazem 180 mg and Imdur 15 mg daily and continued on Zocor.      Today Genie comes in for followup.  She has been to see her Primary Care doctor, who decreased her diltiazem to 120 mg after she complained of lightheadedness.  Today she tells me she continues to have lightheadedness.  Initially, after she got home from the hospital, she did well, but about 3-4 days later she started feeling lightheadedness.  She did not have any symptoms of syncope or presyncope.  Her blood pressures at home have generally been in the 130s systolically, though she did have one this morning, a systolic blood pressure of about 90.  She notices that she gets dizzy sometime after eating breakfast, and then by the evening it subsides somewhat.  She takes her diltiazem and Imdur in the evening and her losartan in the morning.  She also has a history of allergies and describes the dizziness as somewhat of a sinus pressure radiating into her ears as well.  She says the room is not spinning; it is just a sense of dizziness.      PHYSICAL EXAMINATION:      GENERAL:  Essentially negative.   VITAL SIGNS:  Blood pressure 160/54, heart rate 84, weight 143 pounds.  Her BMI is 25.   LUNGS:  Clear throughout to auscultation.   CARDIAC:  Rate is regular.  Normal S1 and S2.   EXTREMITIES:  No lower extremity edema.      ASSESSMENT AND PLAN:   1.  Coronary artery disease.  She is on Zocor and low-dose aspirin.  I continued her on these medications.  Her LDL was 59.   2.  Hypertension.  Her blood pressures since being discharged from the hospital at home have been in the 130s.  It is a little bit elevated here today, but she tells me that this morning it was systolically in the 90s.  She is having problems with dizziness, but I do not think this is related to hypotension.  I think there are 3 possible causes of her dizziness.  One could be medications, one could be allergies, and the third could be arrhythmia since she had the SVT noted.  I recommended that she stop the Imdur since she is just on 15 mg, and this really is not going to do much for her blood pressure.  It was probably started because she had the chest pressure and arm pain.  I recommended that she monitor her blood pressure between now and when she is seen in followup.  I have also ordered a 24 hour Holter monitor to see if there are any funny rhythms that might be causing her symptoms.  Finally, she has an appointment with her allergist next Thursday.  I would like them to see if there is any cause for her symptoms, since she did not have any symptoms initially on these medications, and they started about a week later.  Finally, unfortunately, I am not able to follow up with her since I do not have any openings until the end of April, and I think she should be seen sooner.  She cannot drive to [place] because she has some vision problems, so she will see one of my colleagues at that time, and hopefully we can have more information for her.      Thank you for allowing me to see Genie today.         MARINA GARRIDO  TOREY WASHINGTON, CNP             D: 2017 16:19   T: 2017 10:15   MT: jl      Name:     TERESA HARTMAN   MRN:      [id number]        Account:      [id number]   :      1935           Service Date: 2017      Document: [id number]

## 2017-03-20 ENCOUNTER — HOSPITAL ENCOUNTER (OUTPATIENT)
Dept: CARDIOLOGY | Facility: CLINIC | Age: 82
Discharge: HOME | End: 2017-03-20
Attending: NURSE PRACTITIONER | Admitting: NURSE PRACTITIONER
Payer: COMMERCIAL

## 2017-03-20 DIAGNOSIS — I10 ESSENTIAL HYPERTENSION WITH GOAL BLOOD PRESSURE LESS THAN 140/90: ICD-10-CM

## 2017-03-20 PROCEDURE — 93227 XTRNL ECG REC<48 HR R&I: CPT | Performed by: INTERNAL MEDICINE

## 2017-03-20 PROCEDURE — 93225 XTRNL ECG REC<48 HRS REC: CPT

## 2017-03-20 NOTE — LETTER
"               [facility]  [address]  [place] [address]                  [phone]   2017    Genie Persaud  [address]   [place] [address]      Dear Genie,    Here is a summary of your recent test results:    It looks like you had no dangerous rhythm disturbances on your holter monitor (heart rhythm event monitor).  There were some benign things called \"PVCs\" which do not turn into dangerous rhythms.      Your test results are enclosed.      Please contact me if you have any questions.           Thank you very much for choosing [facility]    Best regards,    Layo Sevilla MD        Results for orders placed or performed during the hospital encounter of 17   Holter Monitor 24 hour - [facility]  [address]  [place] [address]  [phone]  3/20/2017      Patient:  Genie Persaud  Chart: [id number]  :  1935  Age:  81 year old  Sex:  female       Procedure:  Holter Monitor Placed: please see scanned document for result once interpretation is completed.        Technician performing hook-up:  Nupur Presley       "

## 2017-03-23 ENCOUNTER — TRANSFERRED RECORDS (OUTPATIENT)
Dept: HEALTH INFORMATION MANAGEMENT | Facility: CLINIC | Age: 82
End: 2017-03-23

## 2017-03-28 PROBLEM — I47.10 SVT (SUPRAVENTRICULAR TACHYCARDIA) (H): Status: ACTIVE | Noted: 2017-03-28

## 2017-03-29 ENCOUNTER — OFFICE VISIT (OUTPATIENT)
Dept: CARDIOLOGY | Facility: CLINIC | Age: 82
End: 2017-03-29
Attending: NURSE PRACTITIONER
Payer: COMMERCIAL

## 2017-03-29 VITALS
BODY MASS INDEX: 25.45 KG/M2 | SYSTOLIC BLOOD PRESSURE: 164 MMHG | WEIGHT: 143.6 LBS | HEIGHT: 63 IN | DIASTOLIC BLOOD PRESSURE: 48 MMHG | HEART RATE: 90 BPM

## 2017-03-29 DIAGNOSIS — I10 ESSENTIAL HYPERTENSION WITH GOAL BLOOD PRESSURE LESS THAN 140/90: ICD-10-CM

## 2017-03-29 PROCEDURE — 99214 OFFICE O/P EST MOD 30 MIN: CPT | Performed by: NURSE PRACTITIONER

## 2017-03-29 NOTE — LETTER
3/29/2017    Layo Sevilla MD  [address] [place] [address]    RE: Genie Persaud       Dear Colleague,    I had the pleasure of seeing Genie Persaud in the [facility].    Genie is a delightful 81-year-old patient who I am meeting for the first time in clinic today.  She was hospitalized recently with chest pressure and hypertension.  Her troponins were mildly elevated and she had some nonspecific changes on her EKG.  STEMI protocol was initiated and she was taken urgently to the Cath Lab by Dr. Hernandez where she was found to have no flow-limiting disease but mild to moderate coronary disease.  She was started on medication regimen to treat her blood pressure.  Additionally, in the Cath Lab she had some runs of SVT and diltiazem was started.  She was eventually discharged on Lozol, diltiazem and Imdur.  She had been on simvastatin prior to admission, which was continued.      When she came in to see Autumn Underwood in followup she was complaining of some lightheadedness.  Dr. Sevilla had already decreased her diltiazem.  Autumn stopped her isosorbide and ordered a Holter monitor.      Holter monitor showed no episodes of SVT with some rare PACs and PVCs.  She was asymptomatic during the monitoring period.      In clinic today, Genie is here telling me that she feels mildly better on the reduction of medicines.  She has longstanding history of asthma and allergies for which she is going to see her allergist.  She is having significant nasal congestion which seems to accompany her lightheadedness.  She was started on a new nasal inhaler.  She is also recording her home numbers of her blood pressure.  These have a wide range with systolics in the low 100s up into the 140s and diastolics dominantly in the 50s.  In regards to her lightheadedness, she tells me it is all day long.  She actually feels better when she is up walking around.  She otherwise has  no symptoms.      In clinic today, her blood pressures was initially 164/48 and upon my retake was 152/50.  Heart rate was 80 and regular and weight was 143 pounds.      PHYSICAL EXAMINATION:   GENERAL:  Reveals a well-appearing elderly female in no acute distress.   HEENT:  Normocephalic, atraumatic.   NECK:  Supple without jugular venous distention or carotid bruits.   LUNGS:  Clear to auscultation bilaterally.   HEART:  Reveals S1 and S2 with a regular rate and rhythm.   ABDOMEN:  Soft, nontender, nondistended.   EXTREMITIES:  Lower extremities are free of any edema.     Outpatient Encounter Prescriptions as of 3/29/2017   Medication Sig Dispense Refill     [DISCONTINUED] diltiazem (DILACOR XR) 120 MG 24 hr capsule Take 1 capsule (120 mg) by mouth daily 30 capsule 1     simvastatin (ZOCOR) 20 MG tablet Take 0.5 tablets (10 mg) by mouth At Bedtime 45 tablet 3     losartan (COZAAR) 100 MG tablet Take 1 tablet (100 mg) by mouth At Bedtime 90 tablet 1     azelastine (ASTELIN) 0.1 % spray Spray 1-2 sprays into both nostrils 2 times daily 1 Bottle 3     [DISCONTINUED] indapamide (LOZOL) 1.25 MG tablet Take 1 tablet (1.25 mg) by mouth daily 30 tablet 1     fluticasone-salmeterol (ADVAIR-HFA) 230-21 MCG/ACT inhaler Inhale 2 puffs into the lungs 2 times daily 12 g 1     omeprazole (PRILOSEC) 20 MG capsule Take 20 mg by mouth daily  90 capsule 2     acetaminophen (TYLENOL) 500 MG tablet Take 500-1,000 mg by mouth every 8 hours as needed for mild pain       albuterol (PROAIR HFA, PROVENTIL HFA, VENTOLIN HFA) 108 (90 BASE) MCG/ACT inhaler Inhale 2 puffs into the lungs every 6 hours as needed        aspirin 81 MG EC tablet Take 1 tablet (81 mg) by mouth daily 90 tablet 3     calcium citrate-vitamin D (CALCIUM CITRATE +) 315-200 MG-UNIT TABS Take 2 tablets by mouth daily.       order for DME Equipment being ordered: compression stockings, 30 mm Hg, 2 pairs. (Patient not taking: Reported on 3/29/2017) 2 each 1     No  facility-administered encounter medications on file as of 3/29/2017.       IMPRESSION AND PLAN:   1.  Mild to moderate coronary disease.  Appropriately on statin and aspirin.  Coronary angiogram done while hospitalized recently showed no significant flow-limiting disease.  Her symptoms were thought to be due to poorly controlled hypertension.   2.  Hypertension.  Her blood pressure on her home cuff has been generally well controlled.  She does have quite a wide pulse pressure.  She is concerned about her diastolic numbers getting too low.  We did confirm that her home cuff is accurate by comparing it to our readings here today and indeed the reading in clinic is higher than she typically runs.  Overall, she is feeling better on the lower dose of medicines.  She does have some lightheadedness, but this appears to be related to her allergies and sinus congestion as it gets better when she is up and around.   3.  SVT.  Noted during her heart catheterization.  She is tolerating a small dose of diltiazem with no SVT and a recent Holter monitor and no symptoms to suggest she has had further SVT.      Overall, Genie appears to be stable from a cardiac standpoint today.  She is going to continue monitoring her blood pressures on her home cuff.  I have asked her to contact me should she get readings greater than 140 on the top.  We will otherwise have her followup with one of the cardiologists she met in the hospital.  It may be that she can go back to Primary Care for the long haul.     Again, thank you for allowing me to participate in the care of your patient.      Sincerely,    Windy Orozco, NATHALY, APRN CNP     [facility]

## 2017-03-29 NOTE — MR AVS SNAPSHOT
After Visit Summary   3/29/2017    Genie Persaud    MRN: [id number]           Patient Information     Date Of Birth          1935        Visit Information        Provider Department      3/29/2017 3:50 PM Windy Orozco APRN CNP [facility] AT [place]        Today's Diagnoses     Essential hypertension with goal blood pressure less than 140/90           Follow-ups after your visit        Additional Services     Follow-Up with Cardiologist                 Your next 10 appointments already scheduled     Jun 29, 2017  8:30 AM CDT   Return Visit with Barak Hernandez MD   [facility])    [address]  [facility] [address] [phone]              Future tests that were ordered for you today     Open Future Orders        Priority Expected Expires Ordered    Follow-Up with Cardiologist Routine 6/29/2017 3/29/2019 3/29/2017            Who to contact     If you have questions or need follow up information about today's clinic visit or your schedule please contact [facility] directly at [phone].  Normal or non-critical lab and imaging results will be communicated to you by POPSUGARhart, letter or phone within 4 business days after the clinic has received the results. If you do not hear from us within 7 days, please contact the clinic through POPSUGARhart or phone. If you have a critical or abnormal lab result, we will notify you by phone as soon as possible.  Submit refill requests through Bulbstorm or call your pharmacy and they will forward the refill request to us. Please allow 3 business days for your refill to be completed.          Additional Information About Your Visit        POPSUGARhart Information     Bulbstorm lets you send messages to your doctor, view your test results, renew your prescriptions, schedule appointments and more. To sign up,  "go to [email or web address] . Click on \"Log in\" on the left side of the screen, which will take you to the Welcome page. Then click on \"Sign up Now\" on the right side of the page.     You will be asked to enter the access code listed below, as well as some personal information. Please follow the directions to create your username and password.     Your access code is: [email or web address]  Expires: 2017 11:32 AM     Your access code will  in 90 days. If you need help or a new code, please call your [place] [facility] clinic or [phone].        Care EveryWhere ID     This is your Care EveryWhere ID. This could be used by other organizations to access your [facility] medical records  [id number]        Your Vitals Were     Pulse Height BMI (Body Mass Index)             90 1.6 m (5' 3\") 25.44 kg/m2          Blood Pressure from Last 3 Encounters:   17 164/48   17 160/54   17 148/48    Weight from Last 3 Encounters:   17 65.1 kg (143 lb 9.6 oz)   17 64.9 kg (143 lb)   17 63.8 kg (140 lb 11.2 oz)              We Performed the Following     Follow-Up with Cardiac Advanced Practice Provider        Primary Care Provider Office Phone # Fax #    Layo Sevilla [phone]       [address] [place] [address]        Thank you!     Thank you for choosing [facility] [place]  for your care. Our goal is always to provide you with excellent care. Hearing back from our patients is one way we can continue to improve our services. Please take a few minutes to complete the written survey that you may receive in the mail after your visit with us. Thank you!             Your Updated Medication List - Protect others around you: Learn how to safely use, store and throw away your medicines at www.disposemymeds.org.          This list is accurate as of: 3/29/17  4:27 PM.  Always use your most recent med list.                "    Brand Name Dispense Instructions for use    acetaminophen 500 MG tablet    TYLENOL     Take 500-1,000 mg by mouth every 8 hours as needed for mild pain       albuterol 108 (90 BASE) MCG/ACT Inhaler    PROAIR HFA/PROVENTIL HFA/VENTOLIN HFA     Inhale 2 puffs into the lungs every 6 hours as needed       aspirin 81 MG EC tablet     90 tablet    Take 1 tablet (81 mg) by mouth daily       azelastine 0.1 % spray    ASTELIN    1 Bottle    Spray 1-2 sprays into both nostrils 2 times daily       CALCIUM CITRATE + 315-200 MG-UNIT Tabs per tablet   Generic drug:  calcium citrate-vitamin D      Take 2 tablets by mouth daily.       diltiazem 120 MG 24 hr capsule    DILACOR XR    30 capsule    Take 1 capsule (120 mg) by mouth daily       fluticasone-salmeterol 230-21 MCG/ACT inhaler    ADVAIR-HFA    12 g    Inhale 2 puffs into the lungs 2 times daily       indapamide 1.25 MG tablet    LOZOL    30 tablet    Take 1 tablet (1.25 mg) by mouth daily       losartan 100 MG tablet    COZAAR    90 tablet    Take 1 tablet (100 mg) by mouth At Bedtime       omeprazole 20 MG CR capsule    priLOSEC    90 capsule    Take 20 mg by mouth daily       order for DME     2 each    Equipment being ordered: compression stockings, 30 mm Hg, 2 pairs.       simvastatin 20 MG tablet    ZOCOR    45 tablet    Take 0.5 tablets (10 mg) by mouth At Bedtime

## 2017-03-29 NOTE — LETTER
[facility] AT [place]  [address]  [facility] [address]  [phone]          March 29, 2017    RE:  Geniemaria esther Persaud                                                                                                                                                             To whom it may concern:    Genie Persaud is under my professional care.  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      Sincerely,        Windy Orozco, CNP

## 2017-03-29 NOTE — PROGRESS NOTES
HPI and Plan:   See dictation    Orders Placed This Encounter   Procedures     Follow-Up with Cardiologist       No orders of the defined types were placed in this encounter.      There are no discontinued medications.      Encounter Diagnosis   Name Primary?     Essential hypertension with goal blood pressure less than 140/90        CURRENT MEDICATIONS:  Current Outpatient Prescriptions   Medication Sig Dispense Refill     diltiazem (DILACOR XR) 120 MG 24 hr capsule Take 1 capsule (120 mg) by mouth daily 30 capsule 1     simvastatin (ZOCOR) 20 MG tablet Take 0.5 tablets (10 mg) by mouth At Bedtime 45 tablet 3     losartan (COZAAR) 100 MG tablet Take 1 tablet (100 mg) by mouth At Bedtime 90 tablet 1     azelastine (ASTELIN) 0.1 % spray Spray 1-2 sprays into both nostrils 2 times daily 1 Bottle 3     indapamide (LOZOL) 1.25 MG tablet Take 1 tablet (1.25 mg) by mouth daily 30 tablet 1     fluticasone-salmeterol (ADVAIR-HFA) 230-21 MCG/ACT inhaler Inhale 2 puffs into the lungs 2 times daily 12 g 1     omeprazole (PRILOSEC) 20 MG capsule Take 20 mg by mouth daily  90 capsule 2     acetaminophen (TYLENOL) 500 MG tablet Take 500-1,000 mg by mouth every 8 hours as needed for mild pain       albuterol (PROAIR HFA, PROVENTIL HFA, VENTOLIN HFA) 108 (90 BASE) MCG/ACT inhaler Inhale 2 puffs into the lungs every 6 hours as needed        aspirin 81 MG EC tablet Take 1 tablet (81 mg) by mouth daily 90 tablet 3     calcium citrate-vitamin D (CALCIUM CITRATE +) 315-200 MG-UNIT TABS Take 2 tablets by mouth daily.       order for DME Equipment being ordered: compression stockings, 30 mm Hg, 2 pairs. (Patient not taking: Reported on 3/29/2017) 2 each 1       ALLERGIES     Allergies   Allergen Reactions     Fosamax [Alendronic Acid] GI Disturbance     Augmentin [Amoxicillin-Pot Clavulanate] Diarrhea     Diarrhea and rash     Evista [Raloxifene]      abd symptoms.      Flu Virus Vaccine      swollen and red at inj site       PAST MEDICAL  HISTORY:  Past Medical History:   Diagnosis Date     Basal Cell Carcinoma--back      Impaired fasting glucose 8/26/2010     Moderate persistent asthma      osteopenia      Other and unspecified hyperlipidemia      previous diagnosis hypertension      Pulmonary nodule 3/3/2009    PET scan reportedly normal; biopsy not advised.     SVT -noted during Cath procedure 3/28/2017     Swelling, mass, or lump in head and neck     benign nodule thyroid     Unspecified arthropathy, hand      Vasculitis (H) 4/20/2009    Possible increased activity of thoracic aorta, on PET scan w/u for pulmonary nodule.        PAST SURGICAL HISTORY:  Past Surgical History:   Procedure Laterality Date     C APPENDECTOMY  1957     C LIGATE FALLOPIAN TUBE  1971     C STEREOTACTIC BREAST BIOPSY  2001     CHOLECYSTECTOMY, LAPOROSCOPIC  2008    Cholecystectomy, Laparoscopic     ESOPHAGOSCOPY, GASTROSCOPY, DUODENOSCOPY (EGD), COMBINED  5/17/2013    Procedure: COMBINED ESOPHAGOSCOPY, GASTROSCOPY, DUODENOSCOPY (EGD), BIOPSY SINGLE OR MULTIPLE;  ESOPHAGOSCOPY, GASTROSCOPY, DUODENOSCOPY (EGD)  with bx;  Surgeon: Jeffery Yanez MD;  Location: [facility]     HC DILATION/CURETTAGE DIAG/THER NON OB  1967,1971     HC REMOVE TONSILS/ADENOIDS,<11 Y/O         FAMILY HISTORY:  Family History   Problem Relation Age of Onset     Hypertension Mother      OSTEOPOROSIS Mother      C.A.D. Mother      Connective Tissue Disorder Father      scleraderma     CEREBROVASCULAR DISEASE Paternal Grandmother      Prostate Cancer Other      9/05 passed away due to complications     Breast Cancer Child      youngest dtr       SOCIAL HISTORY:  Social History     Social History     Marital status: Single     Spouse name: N/A     Number of children: 3     Years of education: 15     Occupational History     semi-retired      Social History Main Topics     Smoking status: Never Smoker     Smokeless tobacco: Never Used     Alcohol use 0.6 - 1.2 oz/week     1 - 2 Standard drinks or  "equivalent per week     Drug use: No     Sexual activity: No     Other Topics Concern     None     Social History Narrative       Review of Systems:  Skin:  Negative       Eyes:  Positive for glasses    ENT:  Positive for sinus trouble environmental sinus issues -  pressure around nose - ears   Respiratory:  Positive for dyspnea on exertion;cough asthma,  both dry and productive cough - always has    Cardiovascular:    Positive for;lightheadedness today increased  lightheadedness   Gastroenterology: Positive for reflux reflux under control with Omeprazole   Genitourinary:  Negative      Musculoskeletal:  Positive for arthritis;joint pain;joint stiffness    Neurologic:  Negative      Psychiatric:  Negative      Heme/Lymph/Imm:  Positive for allergies RX allergies ,  Allergic to almost  everything- per pt  -  environmental allergies , cats - certain trees -  no food allergies   Endocrine:  Negative   prediabetes,     Physical Exam:  Vitals: /48 (BP Location: Right arm, Patient Position: Chair, Cuff Size: Adult Regular)  Pulse 90  Ht 1.6 m (5' 3\")  Wt 65.1 kg (143 lb 9.6 oz)  BMI 25.44 kg/m2    Constitutional:           Skin:           Head:           Eyes:           ENT:           Neck:           Chest:             Cardiac:                    Abdomen:           Vascular:                                          Extremities and Back:                 Neurological:                 GLORIA RoqueOaklawn Psychiatric Centernano, APRN CNP  [facility]  [address]     [place] [address]              "

## 2017-03-30 NOTE — PROGRESS NOTES
HISTORY OF PRESENT ILLNESS:  Genie is a delightful 81-year-old patient who I am meeting for the first time in clinic today.  She was hospitalized recently with chest pressure and hypertension.  Her troponins were mildly elevated and she had some nonspecific changes on her EKG.  STEMI protocol was initiated and she was taken urgently to the Cath Lab by Dr. Hernandez where she was found to have no flow-limiting disease but mild to moderate coronary disease.  She was started on medication regimen to treat her blood pressure.  Additionally, in the Cath Lab she had some runs of SVT and diltiazem was started.  She was eventually discharged on Lozol, diltiazem and Imdur.  She had been on simvastatin prior to admission, which was continued.      When she came in to see Autumn Underwood in followup she was complaining of some lightheadedness.  Dr. Sevilla had already decreased her diltiazem.  Autumn stopped her isosorbide and ordered a Holter monitor.      Holter monitor showed no episodes of SVT with some rare PACs and PVCs.  She was asymptomatic during the monitoring period.      In clinic today, Genie is here telling me that she feels mildly better on the reduction of medicines.  She has longstanding history of asthma and allergies for which she is going to see her allergist.  She is having significant nasal congestion which seems to accompany her lightheadedness.  She was started on a new nasal inhaler.  She is also recording her home numbers of her blood pressure.  These have a wide range with systolics in the low 100s up into the 140s and diastolics dominantly in the 50s.  In regards to her lightheadedness, she tells me it is all day long.  She actually feels better when she is up walking around.  She otherwise has no symptoms.      In clinic today, her blood pressures was initially 164/48 and upon my retake was 152/50.  Heart rate was 80 and regular and weight was 143 pounds.      PHYSICAL EXAMINATION:   GENERAL:  Reveals a  well-appearing elderly female in no acute distress.   HEENT:  Normocephalic, atraumatic.   NECK:  Supple without jugular venous distention or carotid bruits.   LUNGS:  Clear to auscultation bilaterally.   HEART:  Reveals S1 and S2 with a regular rate and rhythm.   ABDOMEN:  Soft, nontender, nondistended.   EXTREMITIES:  Lower extremities are free of any edema.      IMPRESSION AND PLAN:   1.  Mild to moderate coronary disease.  Appropriately on statin and aspirin.  Coronary angiogram done while hospitalized recently showed no significant flow-limiting disease.  Her symptoms were thought to be due to poorly controlled hypertension.   2.  Hypertension.  Her blood pressure on her home cuff has been generally well controlled.  She does have quite a wide pulse pressure.  She is concerned about her diastolic numbers getting too low.  We did confirm that her home cuff is accurate by comparing it to our readings here today and indeed the reading in clinic is higher than she typically runs.  Overall, she is feeling better on the lower dose of medicines.  She does have some lightheadedness, but this appears to be related to her allergies and sinus congestion as it gets better when she is up and around.   3.  SVT.  Noted during her heart catheterization.  She is tolerating a small dose of diltiazem with no SVT and a recent Holter monitor and no symptoms to suggest she has had further SVT.      Overall, Genie appears to be stable from a cardiac standpoint today.  She is going to continue monitoring her blood pressures on her home cuff.  I have asked her to contact me should she get readings greater than 140 on the top.  We will otherwise have her followup with one of the cardiologists she met in the hospital.  It may be that she can go back to Primary Care for the long haul.         TOREY HOWE, TERRELL             D: 03/29/2017 17:10   T: 03/30/2017 11:40   MT: NEERAJ      Name:     GENIE HARTMAN   MRN:      [id number]         Account:      [id number]   :      1935           Service Date: 2017      Document: [id number]

## 2017-04-26 ENCOUNTER — TRANSFERRED RECORDS (OUTPATIENT)
Dept: HEALTH INFORMATION MANAGEMENT | Facility: CLINIC | Age: 82
End: 2017-04-26

## 2017-05-08 DIAGNOSIS — I10 ESSENTIAL HYPERTENSION WITH GOAL BLOOD PRESSURE LESS THAN 140/90: ICD-10-CM

## 2017-05-08 RX ORDER — INDAPAMIDE 1.25 MG/1
1.25 TABLET ORAL DAILY
Qty: 30 TABLET | Refills: 5 | Status: SHIPPED | OUTPATIENT
Start: 2017-05-08 | End: 2017-08-03

## 2017-05-12 ENCOUNTER — ALLIED HEALTH/NURSE VISIT (OUTPATIENT)
Dept: NURSING | Facility: CLINIC | Age: 82
End: 2017-05-12
Payer: COMMERCIAL

## 2017-05-12 VITALS — SYSTOLIC BLOOD PRESSURE: 142 MMHG | DIASTOLIC BLOOD PRESSURE: 48 MMHG

## 2017-05-12 DIAGNOSIS — I10 ESSENTIAL HYPERTENSION WITH GOAL BLOOD PRESSURE LESS THAN 140/90: Primary | ICD-10-CM

## 2017-05-12 PROCEDURE — 99207 ZZC NO CHARGE LOS: CPT

## 2017-05-12 NOTE — MR AVS SNAPSHOT
"              After Visit Summary   5/12/2017    Genie Persaud    MRN: [id number]           Patient Information     Date Of Birth          1935        Visit Information        Provider Department      5/12/2017 11:30 AM ANTONIET[place] NURSE AB [facility]        Today's Diagnoses     Essential hypertension with goal blood pressure less than 140/90    -  1       Follow-ups after your visit        Your next 10 appointments already scheduled     May 12, 2017 11:30 AM CDT   Nurse Only with EA NURSE AB   [facility] ([facility])    [address]  [address]  [place] [address] [phone]            Jun 29, 2017  8:30 AM CDT   Return Visit with Barak Hernandez MD   [facility] [place] ([facility])    [address]  [place] [address] [phone]              Who to contact     If you have questions or need follow up information about today's clinic visit or your schedule please contact [facility] directly at [phone].  Normal or non-critical lab and imaging results will be communicated to you by MyChart, letter or phone within 4 business days after the clinic has received the results. If you do not hear from us within 7 days, please contact the clinic through [employer] or phone. If you have a critical or abnormal lab result, we will notify you by phone as soon as possible.  Submit refill requests through [employer] or call your pharmacy and they will forward the refill request to us. Please allow 3 business days for your refill to be completed.          Additional Information About Your Visit        MyChart Information     [employer] lets you send messages to your doctor, view your test results, renew your prescriptions, schedule appointments and more. To sign up, go to [email or web address] . Click on \"Log in\" on the left side of the screen, which will take you to the Welcome page. Then click on " "\"Sign up Now\" on the right side of the page.     You will be asked to enter the access code listed below, as well as some personal information. Please follow the directions to create your username and password.     Your access code is: [email or web address]  Expires: 2017 11:32 AM     Your access code will  in 90 days. If you need help or a new code, please call your [facility] clinic or [phone].        Care EveryWhere ID     This is your Care EveryWhere ID. This could be used by other organizations to access your [facility] medical records  [id number]         Blood Pressure from Last 3 Encounters:   17 142/48   17 164/48   17 160/54    Weight from Last 3 Encounters:   17 143 lb 9.6 oz (65.1 kg)   17 143 lb (64.9 kg)   17 140 lb 11.2 oz (63.8 kg)              Today, you had the following     No orders found for display       Primary Care Provider Office Phone # Fax #    Layo Sevilla [phone]       [address] [place] [address]        Thank you!     Thank you for choosing [facility]  for your care. Our goal is always to provide you with excellent care. Hearing back from our patients is one way we can continue to improve our services. Please take a few minutes to complete the written survey that you may receive in the mail after your visit with us. Thank you!             Your Updated Medication List - Protect others around you: Learn how to safely use, store and throw away your medicines at www.disposemymeds.org.          This list is accurate as of: 17 11:29 AM.  Always use your most recent med list.                   Brand Name Dispense Instructions for use    acetaminophen 500 MG tablet    TYLENOL     Take 500-1,000 mg by mouth every 8 hours as needed for mild pain       albuterol 108 (90 BASE) MCG/ACT Inhaler    PROAIR HFA/PROVENTIL HFA/VENTOLIN HFA     Inhale 2 puffs into the lungs every 6 hours as " needed       aspirin 81 MG EC tablet     90 tablet    Take 1 tablet (81 mg) by mouth daily       azelastine 0.1 % spray    ASTELIN    1 Bottle    Spray 1-2 sprays into both nostrils 2 times daily       CALCIUM CITRATE + 315-200 MG-UNIT Tabs per tablet   Generic drug:  calcium citrate-vitamin D      Take 2 tablets by mouth daily.       diltiazem 120 MG 24 hr capsule    DILACOR XR    30 capsule    Take 1 capsule (120 mg) by mouth daily       fluticasone-salmeterol 230-21 MCG/ACT inhaler    ADVAIR-HFA    12 g    Inhale 2 puffs into the lungs 2 times daily       indapamide 1.25 MG tablet    LOZOL    30 tablet    Take 1 tablet (1.25 mg) by mouth daily       losartan 100 MG tablet    COZAAR    90 tablet    Take 1 tablet (100 mg) by mouth At Bedtime       omeprazole 20 MG CR capsule    priLOSEC    90 capsule    Take 20 mg by mouth daily       order for DME     2 each    Equipment being ordered: compression stockings, 30 mm Hg, 2 pairs.       simvastatin 20 MG tablet    ZOCOR    45 tablet    Take 0.5 tablets (10 mg) by mouth At Bedtime

## 2017-06-21 DIAGNOSIS — B37.81 CANDIDAL ESOPHAGITIS (H): ICD-10-CM

## 2017-06-21 NOTE — TELEPHONE ENCOUNTER
omeprazole (PRILOSEC) 20 MG capsule      Last Written Prescription Date: 10/14/2016  Last Fill Quantity: 90,  # refills: 2   Last Office Visit with [facility], [facility] or [facility] prescribing provider: 3/9/2017                                         Next 5 appointments (look out 90 days)     Jun 29, 2017  8:30 AM CDT   Return Visit with Barak Hernandez MD   [facility] [place] ([facility])    [address] [address] [address] [phone]

## 2017-06-23 NOTE — TELEPHONE ENCOUNTER
Prescription approved per [facility] Refill Protocol.  Sharyn Ledbetter, RN  Triage Nurse

## 2017-06-27 ENCOUNTER — PRE VISIT (OUTPATIENT)
Dept: CARDIOLOGY | Facility: CLINIC | Age: 82
End: 2017-06-27

## 2017-06-27 ENCOUNTER — TELEPHONE (OUTPATIENT)
Dept: PEDIATRICS | Facility: CLINIC | Age: 82
End: 2017-06-27

## 2017-06-27 NOTE — TELEPHONE ENCOUNTER
Pharmacy calling to follow-up on fax they sent on 06/20 regarding Simvastatin.  Concerns of an interaction between Simvastatin and Diltiazem.  Pharmacist thought patient was taking 20 mg dose.  Verified that RX is for 1/2 tablet or 10 mg dose.  Pharmacist indicated there should be no interaction issues at this dosing.  CAITIE [facility] RN

## 2017-06-29 ENCOUNTER — OFFICE VISIT (OUTPATIENT)
Dept: CARDIOLOGY | Facility: CLINIC | Age: 82
End: 2017-06-29
Attending: NURSE PRACTITIONER
Payer: COMMERCIAL

## 2017-06-29 VITALS
HEIGHT: 63 IN | BODY MASS INDEX: 25.16 KG/M2 | OXYGEN SATURATION: 96 % | DIASTOLIC BLOOD PRESSURE: 50 MMHG | SYSTOLIC BLOOD PRESSURE: 140 MMHG | HEART RATE: 85 BPM | WEIGHT: 142 LBS

## 2017-06-29 DIAGNOSIS — I71.20 THORACIC AORTIC ANEURYSM WITHOUT RUPTURE (H): Primary | ICD-10-CM

## 2017-06-29 DIAGNOSIS — I35.1 NONRHEUMATIC AORTIC VALVE INSUFFICIENCY: Chronic | ICD-10-CM

## 2017-06-29 DIAGNOSIS — E78.5 HYPERLIPIDEMIA LDL GOAL <130: ICD-10-CM

## 2017-06-29 DIAGNOSIS — I25.10 CORONARY ARTERY DISEASE INVOLVING NATIVE CORONARY ARTERY OF NATIVE HEART WITHOUT ANGINA PECTORIS: ICD-10-CM

## 2017-06-29 DIAGNOSIS — I47.10 SVT (SUPRAVENTRICULAR TACHYCARDIA) (H): ICD-10-CM

## 2017-06-29 DIAGNOSIS — I10 ESSENTIAL HYPERTENSION WITH GOAL BLOOD PRESSURE LESS THAN 140/90: ICD-10-CM

## 2017-06-29 PROCEDURE — 99214 OFFICE O/P EST MOD 30 MIN: CPT | Performed by: INTERNAL MEDICINE

## 2017-06-29 RX ORDER — METOPROLOL SUCCINATE 25 MG/1
25 TABLET, EXTENDED RELEASE ORAL DAILY
Qty: 30 TABLET | Refills: 11 | Status: SHIPPED | OUTPATIENT
Start: 2017-06-29 | End: 2017-08-07

## 2017-06-29 NOTE — LETTER
6/29/2017    Layo Sevilla MD  [facility]   [address] [place] [address]    RE: Genie Persaud       Dear Colleague,    I had the pleasure of seeing Genie Persaud in the [facility].    HPI and Plan:   Ms. Genie Persaud is a very nice 81-year-old woman with past medical history significant for severe hypertension, hypercholesterolemia,  known ascending aortic aneurysm, mild to moderate coronary artery disease based on angiogram from March 2017, moderate to moderately severe aortic insufficiency based on echocardiogram from March 2017. Same echocardiogram demonstrated a nonischemic cardiomyopathy with ejection fraction of 45-50%. In the Cath Lab she was also noted to have short bursts of nonsustained  supraventricular tachycardia rates of 130.    Patient returns at this time stating she's doing quite well from a cardiac standpoint. She has no chest R McGeorge shoulder discomfort. She does have dizziness that is present even when sitting down is not orthostatic in nature. In the past she stated she tolerated hydrochlorothiazide 12.5 but cannot tolerate higher doses. She had an ambulatory blood pressure monitor in July 2016 that showed fairly good control. She asks why her diastolic pressures are so low. She checks her blood pressures twice a day at home and states that usually in the 130 range. She also complains of increased bruisability especially on her forearms and lower legs. She does wear compression stockings periodically for venous insufficiency. She occasionally has left ankle edema. She is concerned that one of her two new medicines is causing problems with dry red eyes. She seen her allergist and he suspects it could either be seasonal allergies or one of her two medications that were just started including indapamide or diltiazem.    Assessment and plan. Genie has no symptoms at this time to suggest ischemia. Her congestive heart failure is very well  compensated.She is unaware of any arrhythmias.    Due to her increased bruisability which most likely has sun damage to her forearms and contributed to by her aspirin. I will decrease her baby dose aspirin to Monday, Wednesday and Friday.    Given her ascending aortic aneurysm and aortic insufficiency we need to get good control of her blood pressure. I've explained that the low diastolic pressures due to her aortic insufficiency. As she appears to be having side effects  from her current medical regimen I will change her diltiazem to metoprolol succinate 25 mg once a day. I'll have her follow-up with my AP P in two weeks to see if her symptoms are any better and how good her blood pressure control is. The metoprolol should also help with her SVT.    Her ascending aortic aneurysm was evaluated by CT scan in the emergency room at the [facility] in March 2017. If anything this is better than a CT scan 2015 with aneurysm was measured at 4.6 now 4.4. I will recheck a CT scan next year. Pursue aggressive control of blood pressure.    She has a prominent aortic insufficiency murmur. Her echo in March of this year thought this was worse than previous. Again continue with aggressive control of blood pressure will repeat the echocardiogram in six months.    If she continues to have side effects when she returns in two weeks I will consider changing indapamide two hydrochlorothiazide 12.5 mg daily which she tolerated in the past.    Fasting lipid profile was outstanding in March will repeat this in six months.    She has no peripheral edema at this time. I recommended continue with a low salt diet. Wear compression stockings as necessary. Elevate her legs as necessary.    Orders Placed This Encounter   Procedures     Basic metabolic panel     Lipid Profile     Follow-Up with Cardiologist     Follow-Up with Cardiac Advanced Practice Provider     Echocardiogram       Orders Placed This Encounter   Medications      metoprolol (TOPROL-XL) 25 MG 24 hr tablet     Sig: Take 1 tablet (25 mg) by mouth daily     Dispense:  30 tablet     Refill:  11       Medications Discontinued During This Encounter   Medication Reason     omeprazole (PRILOSEC) 20 MG capsule Medication Reconciliation Clean Up     order for DME Medication Reconciliation Clean Up     diltiazem (DILACOR XR) 120 MG 24 hr capsule          Encounter Diagnoses   Name Primary?     Essential hypertension with goal blood pressure less than 140/90      Thoracic aortic aneurysm without rupture (H) Yes     Hyperlipidemia LDL goal <130      Coronary artery disease involving native coronary artery of native heart without angina pectoris      SVT -noted during Cath procedure      Nonrheumatic aortic valve insufficiency        CURRENT MEDICATIONS:  Current Outpatient Prescriptions   Medication Sig Dispense Refill     metoprolol (TOPROL-XL) 25 MG 24 hr tablet Take 1 tablet (25 mg) by mouth daily 30 tablet 11     omeprazole (PRILOSEC) 20 MG CR capsule TAKE ONE CAPSULE BY MOUTH ONE TIME DAILY  90 capsule 2     indapamide (LOZOL) 1.25 MG tablet Take 1 tablet (1.25 mg) by mouth daily 30 tablet 5     simvastatin (ZOCOR) 20 MG tablet Take 0.5 tablets (10 mg) by mouth At Bedtime 45 tablet 3     losartan (COZAAR) 100 MG tablet Take 1 tablet (100 mg) by mouth At Bedtime 90 tablet 1     azelastine (ASTELIN) 0.1 % spray Spray 1-2 sprays into both nostrils 2 times daily 1 Bottle 3     fluticasone-salmeterol (ADVAIR-HFA) 230-21 MCG/ACT inhaler Inhale 2 puffs into the lungs 2 times daily 12 g 1     acetaminophen (TYLENOL) 500 MG tablet Take 500-1,000 mg by mouth every 8 hours as needed for mild pain       albuterol (PROAIR HFA, PROVENTIL HFA, VENTOLIN HFA) 108 (90 BASE) MCG/ACT inhaler Inhale 2 puffs into the lungs every 6 hours as needed        aspirin 81 MG EC tablet Take 1 tablet (81 mg) by mouth daily 90 tablet 3     calcium citrate-vitamin D (CALCIUM CITRATE +) 315-200 MG-UNIT TABS Take 2  tablets by mouth daily.         ALLERGIES     Allergies   Allergen Reactions     Fosamax [Alendronic Acid] GI Disturbance     Augmentin [Amoxicillin-Pot Clavulanate] Diarrhea     Diarrhea and rash     Evista [Raloxifene]      abd symptoms.      Flu Virus Vaccine      swollen and red at inj site       PAST MEDICAL HISTORY:  Past Medical History:   Diagnosis Date     Basal Cell Carcinoma--back      Impaired fasting glucose 8/26/2010     Moderate persistent asthma      osteopenia      Other and unspecified hyperlipidemia      previous diagnosis hypertension      Pulmonary nodule 3/3/2009    PET scan reportedly normal; biopsy not advised.     SVT -noted during Cath procedure 3/28/2017     Swelling, mass, or lump in head and neck     benign nodule thyroid     Unspecified arthropathy, hand      Vasculitis (H) 4/20/2009    Possible increased activity of thoracic aorta, on PET scan w/u for pulmonary nodule.        PAST SURGICAL HISTORY:  Past Surgical History:   Procedure Laterality Date     C APPENDECTOMY  1957     C LIGATE FALLOPIAN TUBE  1971     C STEREOTACTIC BREAST BIOPSY  2001     CHOLECYSTECTOMY, LAPOROSCOPIC  2008    Cholecystectomy, Laparoscopic     ESOPHAGOSCOPY, GASTROSCOPY, DUODENOSCOPY (EGD), COMBINED  5/17/2013    Procedure: COMBINED ESOPHAGOSCOPY, GASTROSCOPY, DUODENOSCOPY (EGD), BIOPSY SINGLE OR MULTIPLE;  ESOPHAGOSCOPY, GASTROSCOPY, DUODENOSCOPY (EGD)  with bx;  Surgeon: Jeffery Yanez MD;  Location: [facility]     HC DILATION/CURETTAGE DIAG/THER NON OB  1967,1971     HC REMOVE TONSILS/ADENOIDS,<13 Y/O         FAMILY HISTORY:  Family History   Problem Relation Age of Onset     Hypertension Mother      OSTEOPOROSIS Mother      C.A.D. Mother      Connective Tissue Disorder Father      scleraderma     CEREBROVASCULAR DISEASE Paternal Grandmother      Prostate Cancer Other      9/05 passed away due to complications     Breast Cancer Child      youngest dtr       SOCIAL HISTORY:  Social History     Social  "History     Marital status: Single     Spouse name: N/A     Number of children: 3     Years of education: 15     Occupational History     semi-retired      Social History Main Topics     Smoking status: Never Smoker     Smokeless tobacco: Never Used     Alcohol use 0.6 - 1.2 oz/week     1 - 2 Standard drinks or equivalent per week     Drug use: No     Sexual activity: No     Other Topics Concern     None     Social History Narrative       Review of Systems:  Skin:  Positive for bruising     Eyes:  Positive for glasses;redness redness since starting meds   ENT:  Negative      Respiratory:  Positive for shortness of breath;cough;wheezing asthma,  both dry and productive cough - always has    Cardiovascular:    lightheadedness;dizziness;Positive for;edema swelling in left leg/foot   Gastroenterology: Positive for heartburn;reflux reflux under control with Omeprazole   Genitourinary:  Negative      Musculoskeletal:  Positive for arthritis;joint pain shoulder left   Neurologic:  Negative      Psychiatric:  Negative      Heme/Lymph/Imm:  Positive for easy bruising    Endocrine:  Negative        Physical Exam:  Vitals: /50 (BP Location: Right arm, Patient Position: Sitting, Cuff Size: Adult Regular)  Pulse 85  Ht 1.6 m (5' 3\")  Wt 64.4 kg (142 lb)  SpO2 96%  BMI 25.15 kg/m2    Constitutional:  cooperative, alert and oriented, well developed, well nourished, in no acute distress        Skin:  warm and dry to the touch, no apparent skin lesions or masses noted   ecchymosis on forearms    Head:  normocephalic, no masses or lesions        Eyes:  pupils equal and round, conjunctivae and lids unremarkable, sclera white, no xanthalasma, EOMS intact, no nystagmus        ENT:  no pallor or cyanosis, dentition good        Neck:  carotid pulses are full and equal bilaterally;no carotid bruit        Chest:  normal breath sounds, clear to auscultation, normal A-P diameter, normal symmetry, normal respiratory excursion, no " use of accessory muscles          Cardiac: regular rhythm           diastolic murmur;decrescendo;RUSB;grade 2      Abdomen:           Vascular: pulses full and equal                                        Extremities and Back:  no edema;no spinal abnormalities noted;normal muscle strength and tone              Neurological:  affect appropriate, oriented to time, person and place;no gross motor deficits        Thank you for allowing me to participate in the care of your patient.    Sincerely,     Barak Hernandez MD     [facility]

## 2017-06-29 NOTE — MR AVS SNAPSHOT
After Visit Summary   6/29/2017    Genie Persaud    MRN: [id number]           Patient Information     Date Of Birth          1935        Visit Information        Provider Department      6/29/2017 8:30 AM Barak Hernandez MD [facility]        Today's Diagnoses     Thoracic aortic aneurysm without rupture (H)    -  1    Essential hypertension with goal blood pressure less than 140/90        Hyperlipidemia LDL goal <130        Coronary artery disease involving native coronary artery of native heart without angina pectoris        SVT -noted during Cath procedure        Nonrheumatic aortic valve insufficiency           Follow-ups after your visit        Additional Services     Follow-Up with Cardiac Advanced Practice Provider           Follow-Up with Cardiologist                 Your next 10 appointments already scheduled     Jul 19, 2017  3:50 PM CDT   Return Visit with TOREY Motley CNP   [facility] ([facility])    [address] [place] [id number] [phone]              Future tests that were ordered for you today     Open Future Orders        Priority Expected Expires Ordered    Lipid Profile Routine 12/26/2017 6/29/2018 6/29/2017    Echocardiogram Routine 12/26/2017 6/29/2018 6/29/2017    Follow-Up with Cardiologist Routine 12/26/2017 6/29/2018 6/29/2017    Follow-Up with Cardiac Advanced Practice Provider Routine 7/13/2017 6/29/2018 6/29/2017    Basic metabolic panel Routine 12/26/2017 6/29/2018 6/29/2017            Who to contact     If you have questions or need follow up information about today's clinic visit or your schedule please contact [facility] directly at [phone].  Normal or non-critical lab and imaging results will be communicated to you by MyChart, letter or phone within 4 business days after the  "clinic has received the results. If you do not hear from us within 7 days, please contact the clinic through [facility] or phone. If you have a critical or abnormal lab result, we will notify you by phone as soon as possible.  Submit refill requests through [facility] or call your pharmacy and they will forward the refill request to us. Please allow 3 business days for your refill to be completed.          Additional Information About Your Visit        [employer] Information     [facility] lets you send messages to your doctor, view your test results, renew your prescriptions, schedule appointments and more. To sign up, go to [email or web address] . Click on \"Log in\" on the left side of the screen, which will take you to the Welcome page. Then click on \"Sign up Now\" on the right side of the page.     You will be asked to enter the access code listed below, as well as some personal information. Please follow the directions to create your username and password.     Your access code is: [email or web address]  Expires: 2017  9:21 AM     Your access code will  in 90 days. If you need help or a new code, please call your [facility] clinic or [phone].        Care EveryWhere ID     This is your Care EveryWhere ID. This could be used by other organizations to access your [facility] medical records  [id number]        Your Vitals Were     Pulse Height Pulse Oximetry BMI (Body Mass Index)          85 1.6 m (5' 3\") 96% 25.15 kg/m2         Blood Pressure from Last 3 Encounters:   17 140/50   17 142/48   17 164/48    Weight from Last 3 Encounters:   17 64.4 kg (142 lb)   17 65.1 kg (143 lb 9.6 oz)   17 64.9 kg (143 lb)              We Performed the Following     Follow-Up with Cardiologist          Today's Medication Changes          These changes are accurate as of: 17  9:21 AM.  If you have any questions, ask your nurse or doctor.               Start taking these medicines.        " Dose/Directions    metoprolol 25 MG 24 hr tablet   Commonly known as:  TOPROL-XL   Used for:  Essential hypertension with goal blood pressure less than 140/90   Started by:  Barak Hernandez MD        Dose:  25 mg   Take 1 tablet (25 mg) by mouth daily   Quantity:  30 tablet   Refills:  11         Stop taking these medicines if you haven't already. Please contact your care team if you have questions.     diltiazem 120 MG 24 hr capsule   Commonly known as:  DILACOR XR   Stopped by:  Barak Hernandez MD                Where to get your medicines      These medications were sent to [id number] - [place], [place] - [address]  [address] [address]     Phone:  [phone]     metoprolol 25 MG 24 hr tablet                Primary Care Provider Office Phone # Fax #    Layo Sevilla [phone]       [address] [place] [address]        Equal Access to Services     [facility] AH: Hadii william ku hadasho Soomaali, waaxda luqadaha, qaybta kaalmada adeegyada, waxay idiin hayadánn mayco meyer . So [facility] [phone].    ATENCIÓN: Si habla español, tiene a patel disposición servicios gratuitos de asistencia lingüística. Llame al [phone].    We comply with applicable federal civil rights laws and [place] laws. We do not discriminate on the basis of race, color, national origin, age, disability sex, sexual orientation or gender identity.            Thank you!     Thank you for choosing [facility] [place]  for your care. Our goal is always to provide you with excellent care. Hearing back from our patients is one way we can continue to improve our services. Please take a few minutes to complete the written survey that you may receive in the mail after your visit with us. Thank you!             Your Updated Medication List - Protect others around you: Learn how to safely use, store and throw away your  medicines at www.disposemymeds.org.          This list is accurate as of: 6/29/17  9:21 AM.  Always use your most recent med list.                   Brand Name Dispense Instructions for use Diagnosis    acetaminophen 500 MG tablet    TYLENOL     Take 500-1,000 mg by mouth every 8 hours as needed for mild pain        albuterol 108 (90 BASE) MCG/ACT Inhaler    PROAIR HFA/PROVENTIL HFA/VENTOLIN HFA     Inhale 2 puffs into the lungs every 6 hours as needed        aspirin 81 MG EC tablet     90 tablet    Take 1 tablet (81 mg) by mouth daily    Stroke (H)       azelastine 0.1 % spray    ASTELIN    1 Bottle    Spray 1-2 sprays into both nostrils 2 times daily    Allergic rhinitis due to pollen       CALCIUM CITRATE + 315-200 MG-UNIT Tabs per tablet   Generic drug:  calcium citrate-vitamin D      Take 2 tablets by mouth daily.        fluticasone-salmeterol 230-21 MCG/ACT inhaler    ADVAIR-HFA    12 g    Inhale 2 puffs into the lungs 2 times daily        indapamide 1.25 MG tablet    LOZOL    30 tablet    Take 1 tablet (1.25 mg) by mouth daily    Essential hypertension with goal blood pressure less than 140/90       losartan 100 MG tablet    COZAAR    90 tablet    Take 1 tablet (100 mg) by mouth At Bedtime    Essential hypertension with goal blood pressure less than 140/90       metoprolol 25 MG 24 hr tablet    TOPROL-XL    30 tablet    Take 1 tablet (25 mg) by mouth daily    Essential hypertension with goal blood pressure less than 140/90       omeprazole 20 MG CR capsule    priLOSEC    90 capsule    TAKE ONE CAPSULE BY MOUTH ONE TIME DAILY    Candidal esophagitis (H)       simvastatin 20 MG tablet    ZOCOR    45 tablet    Take 0.5 tablets (10 mg) by mouth At Bedtime    Hyperlipidemia LDL goal <130

## 2017-06-29 NOTE — PROGRESS NOTES
HPI and Plan:   Ms. Genie Persaud is a very nice 81-year-old woman with past medical history significant for severe hypertension, hypercholesterolemia,  known ascending aortic aneurysm, mild to moderate coronary artery disease based on angiogram from March 2017, moderate to moderately severe aortic insufficiency based on echocardiogram from March 2017. Same echocardiogram demonstrated a nonischemic cardiomyopathy with ejection fraction of 45-50%. In the Cath Lab she was also noted to have short bursts of nonsustained  supraventricular tachycardia rates of 130.    Patient returns at this time stating she's doing quite well from a cardiac standpoint. She has no chest R McGeorge shoulder discomfort. She does have dizziness that is present even when sitting down is not orthostatic in nature. In the past she stated she tolerated hydrochlorothiazide 12.5 but cannot tolerate higher doses. She had an ambulatory blood pressure monitor in July 2016 that showed fairly good control. She asks why her diastolic pressures are so low. She checks her blood pressures twice a day at home and states that usually in the 130 range. She also complains of increased bruisability especially on her forearms and lower legs. She does wear compression stockings periodically for venous insufficiency. She occasionally has left ankle edema. She is concerned that one of her two new medicines is causing problems with dry red eyes. She seen her allergist and he suspects it could either be seasonal allergies or one of her two medications that were just started including indapamide or diltiazem.    Assessment and plan. Genie has no symptoms at this time to suggest ischemia. Her congestive heart failure is very well compensated.She is unaware of any arrhythmias.    Due to her increased bruisability which most likely has sun damage to her forearms and contributed to by her aspirin. I will decrease her baby dose aspirin to Monday, Wednesday and  Friday.    Given her ascending aortic aneurysm and aortic insufficiency we need to get good control of her blood pressure. I've explained that the low diastolic pressures due to her aortic insufficiency. As she appears to be having side effects  from her current medical regimen I will change her diltiazem to metoprolol succinate 25 mg once a day. I'll have her follow-up with my AP P in two weeks to see if her symptoms are any better and how good her blood pressure control is. The metoprolol should also help with her SVT.    Her ascending aortic aneurysm was evaluated by CT scan in the emergency room at the [facility] in March 2017. If anything this is better than a CT scan 2015 with aneurysm was measured at 4.6 now 4.4. I will recheck a CT scan next year. Pursue aggressive control of blood pressure.    She has a prominent aortic insufficiency murmur. Her echo in March of this year thought this was worse than previous. Again continue with aggressive control of blood pressure will repeat the echocardiogram in six months.    If she continues to have side effects when she returns in two weeks I will consider changing indapamide two hydrochlorothiazide 12.5 mg daily which she tolerated in the past.    Fasting lipid profile was outstanding in March will repeat this in six months.    She has no peripheral edema at this time. I recommended continue with a low salt diet. Wear compression stockings as necessary. Elevate her legs as necessary.    Orders Placed This Encounter   Procedures     Basic metabolic panel     Lipid Profile     Follow-Up with Cardiologist     Follow-Up with Cardiac Advanced Practice Provider     Echocardiogram       Orders Placed This Encounter   Medications     metoprolol (TOPROL-XL) 25 MG 24 hr tablet     Sig: Take 1 tablet (25 mg) by mouth daily     Dispense:  30 tablet     Refill:  11       Medications Discontinued During This Encounter   Medication Reason     omeprazole (PRILOSEC) 20 MG  capsule Medication Reconciliation Clean Up     order for DME Medication Reconciliation Clean Up     diltiazem (DILACOR XR) 120 MG 24 hr capsule          Encounter Diagnoses   Name Primary?     Essential hypertension with goal blood pressure less than 140/90      Thoracic aortic aneurysm without rupture (H) Yes     Hyperlipidemia LDL goal <130      Coronary artery disease involving native coronary artery of native heart without angina pectoris      SVT -noted during Cath procedure      Nonrheumatic aortic valve insufficiency        CURRENT MEDICATIONS:  Current Outpatient Prescriptions   Medication Sig Dispense Refill     metoprolol (TOPROL-XL) 25 MG 24 hr tablet Take 1 tablet (25 mg) by mouth daily 30 tablet 11     omeprazole (PRILOSEC) 20 MG CR capsule TAKE ONE CAPSULE BY MOUTH ONE TIME DAILY  90 capsule 2     indapamide (LOZOL) 1.25 MG tablet Take 1 tablet (1.25 mg) by mouth daily 30 tablet 5     simvastatin (ZOCOR) 20 MG tablet Take 0.5 tablets (10 mg) by mouth At Bedtime 45 tablet 3     losartan (COZAAR) 100 MG tablet Take 1 tablet (100 mg) by mouth At Bedtime 90 tablet 1     azelastine (ASTELIN) 0.1 % spray Spray 1-2 sprays into both nostrils 2 times daily 1 Bottle 3     fluticasone-salmeterol (ADVAIR-HFA) 230-21 MCG/ACT inhaler Inhale 2 puffs into the lungs 2 times daily 12 g 1     acetaminophen (TYLENOL) 500 MG tablet Take 500-1,000 mg by mouth every 8 hours as needed for mild pain       albuterol (PROAIR HFA, PROVENTIL HFA, VENTOLIN HFA) 108 (90 BASE) MCG/ACT inhaler Inhale 2 puffs into the lungs every 6 hours as needed        aspirin 81 MG EC tablet Take 1 tablet (81 mg) by mouth daily 90 tablet 3     calcium citrate-vitamin D (CALCIUM CITRATE +) 315-200 MG-UNIT TABS Take 2 tablets by mouth daily.         ALLERGIES     Allergies   Allergen Reactions     Fosamax [Alendronic Acid] GI Disturbance     Augmentin [Amoxicillin-Pot Clavulanate] Diarrhea     Diarrhea and rash     Evista [Raloxifene]      abd  symptoms.      Flu Virus Vaccine      swollen and red at inj site       PAST MEDICAL HISTORY:  Past Medical History:   Diagnosis Date     Basal Cell Carcinoma--back      Impaired fasting glucose 8/26/2010     Moderate persistent asthma      osteopenia      Other and unspecified hyperlipidemia      previous diagnosis hypertension      Pulmonary nodule 3/3/2009    PET scan reportedly normal; biopsy not advised.     SVT -noted during Cath procedure 3/28/2017     Swelling, mass, or lump in head and neck     benign nodule thyroid     Unspecified arthropathy, hand      Vasculitis (H) 4/20/2009    Possible increased activity of thoracic aorta, on PET scan w/u for pulmonary nodule.        PAST SURGICAL HISTORY:  Past Surgical History:   Procedure Laterality Date     C APPENDECTOMY  1957     C LIGATE FALLOPIAN TUBE  1971     C STEREOTACTIC BREAST BIOPSY  2001     CHOLECYSTECTOMY, LAPOROSCOPIC  2008    Cholecystectomy, Laparoscopic     ESOPHAGOSCOPY, GASTROSCOPY, DUODENOSCOPY (EGD), COMBINED  5/17/2013    Procedure: COMBINED ESOPHAGOSCOPY, GASTROSCOPY, DUODENOSCOPY (EGD), BIOPSY SINGLE OR MULTIPLE;  ESOPHAGOSCOPY, GASTROSCOPY, DUODENOSCOPY (EGD)  with bx;  Surgeon: Jeffery Yanez MD;  Location: [facility]     HC DILATION/CURETTAGE DIAG/THER NON OB  1967,1971     HC REMOVE TONSILS/ADENOIDS,<13 Y/O         FAMILY HISTORY:  Family History   Problem Relation Age of Onset     Hypertension Mother      OSTEOPOROSIS Mother      C.A.D. Mother      Connective Tissue Disorder Father      scleraderma     CEREBROVASCULAR DISEASE Paternal Grandmother      Prostate Cancer Other      9/05 passed away due to complications     Breast Cancer Child      youngest dtr       SOCIAL HISTORY:  Social History     Social History     Marital status: Single     Spouse name: N/A     Number of children: 3     Years of education: 15     Occupational History     semi-retired      Social History Main Topics     Smoking status: Never Smoker     Smokeless  "tobacco: Never Used     Alcohol use 0.6 - 1.2 oz/week     1 - 2 Standard drinks or equivalent per week     Drug use: No     Sexual activity: No     Other Topics Concern     None     Social History Narrative       Review of Systems:  Skin:  Positive for bruising     Eyes:  Positive for glasses;redness redness since starting meds   ENT:  Negative      Respiratory:  Positive for shortness of breath;cough;wheezing asthma,  both dry and productive cough - always has    Cardiovascular:    lightheadedness;dizziness;Positive for;edema swelling in left leg/foot   Gastroenterology: Positive for heartburn;reflux reflux under control with Omeprazole   Genitourinary:  Negative      Musculoskeletal:  Positive for arthritis;joint pain shoulder left   Neurologic:  Negative      Psychiatric:  Negative      Heme/Lymph/Imm:  Positive for easy bruising    Endocrine:  Negative        Physical Exam:  Vitals: /50 (BP Location: Right arm, Patient Position: Sitting, Cuff Size: Adult Regular)  Pulse 85  Ht 1.6 m (5' 3\")  Wt 64.4 kg (142 lb)  SpO2 96%  BMI 25.15 kg/m2    Constitutional:  cooperative, alert and oriented, well developed, well nourished, in no acute distress        Skin:  warm and dry to the touch, no apparent skin lesions or masses noted   ecchymosis on forearms    Head:  normocephalic, no masses or lesions        Eyes:  pupils equal and round, conjunctivae and lids unremarkable, sclera white, no xanthalasma, EOMS intact, no nystagmus        ENT:  no pallor or cyanosis, dentition good        Neck:  carotid pulses are full and equal bilaterally;no carotid bruit        Chest:  normal breath sounds, clear to auscultation, normal A-P diameter, normal symmetry, normal respiratory excursion, no use of accessory muscles          Cardiac: regular rhythm           diastolic murmur;decrescendo;RUSB;grade 2      Abdomen:           Vascular: pulses full and equal                                        Extremities and Back:  no " edema;no spinal abnormalities noted;normal muscle strength and tone              Neurological:  affect appropriate, oriented to time, person and place;no gross motor deficits              CC  Windy Orozco, APRN CNP   [facility]  [address]  [place] [id number]

## 2017-07-13 ENCOUNTER — TRANSFERRED RECORDS (OUTPATIENT)
Dept: HEALTH INFORMATION MANAGEMENT | Facility: CLINIC | Age: 82
End: 2017-07-13

## 2017-07-15 DIAGNOSIS — Z01.818 PREOP GENERAL PHYSICAL EXAM: Primary | ICD-10-CM

## 2017-07-15 DIAGNOSIS — I10 ESSENTIAL HYPERTENSION WITH GOAL BLOOD PRESSURE LESS THAN 140/90: ICD-10-CM

## 2017-07-15 LAB — POTASSIUM SERPL-SCNC: 3.5 MMOL/L (ref 3.4–5.3)

## 2017-07-15 PROCEDURE — 84132 ASSAY OF SERUM POTASSIUM: CPT | Performed by: INTERNAL MEDICINE

## 2017-07-15 PROCEDURE — 36415 COLL VENOUS BLD VENIPUNCTURE: CPT | Performed by: INTERNAL MEDICINE

## 2017-07-17 ENCOUNTER — PRE VISIT (OUTPATIENT)
Dept: CARDIOLOGY | Facility: CLINIC | Age: 82
End: 2017-07-17

## 2017-07-28 ENCOUNTER — TELEPHONE (OUTPATIENT)
Dept: PEDIATRICS | Facility: CLINIC | Age: 82
End: 2017-07-28

## 2017-07-28 DIAGNOSIS — I10 ESSENTIAL HYPERTENSION WITH GOAL BLOOD PRESSURE LESS THAN 140/90: ICD-10-CM

## 2017-07-28 RX ORDER — DILTIAZEM HYDROCHLORIDE 120 MG/1
CAPSULE, EXTENDED RELEASE ORAL
Qty: 30 CAPSULE | Refills: 0 | OUTPATIENT
Start: 2017-07-28

## 2017-07-28 NOTE — TELEPHONE ENCOUNTER
Chart review shows Dr. Hernandez discontinued the Diltiazem at office visit of 6/29/17. Sent back to pharmacy as denied.    Routing to [facility] pool to call patient and confirm she made medication change as instructed by Dr. Hernandez.    Taryn Solorzano, MSN, RN-BC  Care Coordinator

## 2017-07-28 NOTE — TELEPHONE ENCOUNTER
Dilt          Last Written Prescription Date: 5/9/17  Last Fill Quantity: 30, # refills: 1    Last Office Visit with [facility], [facility], [facility] or [facility] prescribing provider:  6/29/17 with cardiology   Future Office Visit:      BP Readings from Last 3 Encounters:   06/29/17 140/50   05/12/17 142/48   03/29/17 164/48     Lab Results   Component Value Date    ALT 36 03/09/2017     Lab Results   Component Value Date    CHOL 149 03/02/2017     Lab Results   Component Value Date    HDL 78 03/02/2017     Lab Results   Component Value Date    LDL 59 03/02/2017     Lab Results   Component Value Date    TRIG 62 03/02/2017     Lab Results   Component Value Date    CHOLHDLRATIO 2.2 08/13/2015

## 2017-07-31 ENCOUNTER — TELEPHONE (OUTPATIENT)
Dept: PEDIATRICS | Facility: CLINIC | Age: 82
End: 2017-07-31

## 2017-07-31 NOTE — TELEPHONE ENCOUNTER
Patient calling that she would like a refill on the prednisone. She is having very light symptoms. Requesting that medication be sent in without an appointment.  [phone]

## 2017-07-31 NOTE — TELEPHONE ENCOUNTER
Patient states that she was unaware that she should discontinue. States she is done with her prescription and will just stop now.   Blanca Aponte RN

## 2017-08-03 ENCOUNTER — OFFICE VISIT (OUTPATIENT)
Dept: PEDIATRICS | Facility: CLINIC | Age: 82
End: 2017-08-03
Payer: COMMERCIAL

## 2017-08-03 VITALS
WEIGHT: 140 LBS | TEMPERATURE: 97.7 F | SYSTOLIC BLOOD PRESSURE: 152 MMHG | DIASTOLIC BLOOD PRESSURE: 58 MMHG | BODY MASS INDEX: 24.8 KG/M2 | OXYGEN SATURATION: 94 % | HEART RATE: 89 BPM | HEIGHT: 63 IN

## 2017-08-03 DIAGNOSIS — R07.9 CHEST PAIN, UNSPECIFIED TYPE: ICD-10-CM

## 2017-08-03 DIAGNOSIS — I10 BENIGN ESSENTIAL HYPERTENSION: Primary | ICD-10-CM

## 2017-08-03 DIAGNOSIS — J45.901 ASTHMA EXACERBATION: ICD-10-CM

## 2017-08-03 PROCEDURE — 93000 ELECTROCARDIOGRAM COMPLETE: CPT | Performed by: NURSE PRACTITIONER

## 2017-08-03 PROCEDURE — 99214 OFFICE O/P EST MOD 30 MIN: CPT | Performed by: NURSE PRACTITIONER

## 2017-08-03 RX ORDER — HYDROCHLOROTHIAZIDE 12.5 MG/1
12.5 TABLET ORAL DAILY
Qty: 90 TABLET | Refills: 0 | COMMUNITY
Start: 2017-08-03 | End: 2017-10-06

## 2017-08-03 RX ORDER — PREDNISONE 20 MG/1
40 TABLET ORAL DAILY
Qty: 10 TABLET | Refills: 0 | Status: SHIPPED | OUTPATIENT
Start: 2017-08-03 | End: 2017-08-08

## 2017-08-03 NOTE — PATIENT INSTRUCTIONS
-Stop the LOZOL and start the HYDROCHLOROTHIAZIDE the next day  -Continue METOPROLOL  -Start PREDNISONE 2 tabs per day for 5 days  -Please follow up Monday.

## 2017-08-03 NOTE — LETTER
My Asthma Action Plan  Name: Genie Persaud   YOB: 1935  Date: 8/3/2017   My doctor: TOREY Chavira CNP   My clinic: [facility]        My Control Medicine: Fluticasone + salmeterol (Advair) -  /21 mcg .  My Rescue Medicine: Albuterol (Proair/Ventolin/Proventil) inhaler .   My Asthma Severity: moderate persistent  Avoid your asthma triggers:                GREEN ZONE   Good Control    I feel good    No cough or wheeze    Can work, sleep and play without asthma symptoms       Take your asthma control medicine every day.     1. If exercise triggers your asthma, take your rescue medication    15 minutes before exercise or sports, and    During exercise if you have asthma symptoms  2. Spacer to use with inhaler: If you have a spacer, make sure to use it with your inhaler             YELLOW ZONE Getting Worse  I have ANY of these:    I do not feel good    Cough or wheeze    Chest feels tight    Wake up at night   1. Keep taking your Green Zone medications  2. Start taking your rescue medicine:    every 20 minutes for up to 1 hour. Then every 4 hours for 24-48 hours.  3. If you stay in the Yellow Zone for more than 12-24 hours, contact your doctor.  4. If you do not return to the Green Zone in 12-24 hours or you get worse, start taking your oral steroid medicine if prescribed by your provider.           RED ZONE Medical Alert - Get Help  I have ANY of these:    I feel awful    Medicine is not helping    Breathing getting harder    Trouble walking or talking    Nose opens wide to breathe       1. Take your rescue medicine NOW  2. If your provider has prescribed an oral steroid medicine, start taking it NOW  3. Call your doctor NOW  4. If you are still in the Red Zone after 20 minutes and you have not reached your doctor:    Take your rescue medicine again and    Call 911 or go to the emergency room right away    See your regular doctor within 2 weeks of an Emergency Room or  Urgent Care visit for follow-up treatment.        Electronically signed by: Madeline Hutchinson, August 3, 2017    Annual Reminders:  Meet with Asthma Educator,  Flu Shot in the Fall, consider Pneumonia Vaccination for patients with asthma (aged 19 and older).    Pharmacy: [id number] - [place], [place]  [address]                    Asthma Triggers  How To Control Things That Make Your Asthma Worse    Triggers are things that make your asthma worse.  Look at the list below to help you find your triggers and what you can do about them.  You can help prevent asthma flare-ups by staying away from your triggers.      Trigger                                                          What you can do   Cigarette Smoke  Tobacco smoke can make asthma worse. Do not allow smoking in your home, car or around you.  Be sure no one smokes at a child s day care or school.  If you smoke, ask your health care provider for ways to help you quit.  Ask family members to quit too.  Ask your health care provider for a referral to Quit Plan to help you quit smoking, or call 3-027-221-PLAN.     Colds, Flu, Bronchitis  These are common triggers of asthma. Wash your hands often.  Don t touch your eyes, nose or mouth.  Get a flu shot every year.     Dust Mites  These are tiny bugs that live in cloth or carpet. They are too small to see. Wash sheets and blankets in hot water every week.   Encase pillows and mattress in dust mite proof covers.  Avoid having carpet if you can. If you have carpet, vacuum weekly.   Use a dust mask and HEPA vacuum.   Pollen and Outdoor Mold  Some people are allergic to trees, grass, or weed pollen, or molds. Try to keep your windows closed.  Limit time out doors when pollen count is high.   Ask you health care provider about taking medicine during allergy season.     Animal Dander  Some people are allergic to skin flakes, urine or saliva from pets with fur or feathers. Keep pets with fur or feathers out of your  home.    If you can t keep the pet outdoors, then keep the pet out of your bedroom.  Keep the bedroom door closed.  Keep pets off cloth furniture and away from stuffed toys.     Mice, Rats, and Cockroaches  Some people are allergic to the waste from these pests.   Cover food and garbage.  Clean up spills and food crumbs.  Store grease in the refrigerator.   Keep food out of the bedroom.   Indoor Mold  This can be a trigger if your home has high moisture. Fix leaking faucets, pipes, or other sources of water.   Clean moldy surfaces.  Dehumidify basement if it is damp and smelly.   Smoke, Strong Odors, and Sprays  These can reduce air quality. Stay away from strong odors and sprays, such as perfume, powder, hair spray, paints, smoke incense, paint, cleaning products, candles and new carpet.   Exercise or Sports  Some people with asthma have this trigger. Be active!  Ask your doctor about taking medicine before sports or exercise to prevent symptoms.    Warm up for 5-10 minutes before and after sports or exercise.     Other Triggers of Asthma  Cold air:  Cover your nose and mouth with a scarf.  Sometimes laughing or crying can be a trigger.  Some medicines and food can trigger asthma.

## 2017-08-03 NOTE — PROGRESS NOTES
"  SUBJECTIVE:                                                    Genie Persaud is a 81 year old female who presents to clinic today for the following health issues:    Patient has concern about BP.  Patient states BP has been varied - states it has been high last few days - takes with cuff at home - 149/59 at home before coming here. States she is having dizziness - intermittent, has been having pain at night under left armpit. Has been on new med since March 2017, also just started metoprolol. Also has asthma & allergies. Had heart attack March 2, 2017.:    Patient with previous history of MI 5/2017. Two vessel coronary artery disease with mLAD 50% stenosis, D3 50% stenosis, pLCx 50% stenosis and mLCx 50% stenosis. She also has aortic regurgitation and an ascending aortic aneurism. She has had intermittent bouts of SVT. Patient frequently has intermittent bouts of chest pain and shortness of breath, which is often not cardiac related.     She was recently switched from diltiazem to Metoprolol due to diltiazem side effects. She didn't make the switch until recently. Stopped the diltiazem 3 days ago and started the Metoprolol this morning. Has noted her BP has been high in the 150s for the past few days.     She also has a history of asthma, requiring prednisone. Typically she takes less than what's prescribed. She usually takes 20mg for 2 days and her asthma settles down. Asthma sx typically include shortness of breath and dizziness.     Last night had some dizziness and mild shortness of wheezing. She had some intermittent pain under the left armpit for the last few nights, which consistently goes away with albuterol after a few minutes.     No new exertional shortness of breath or chest pain.     ROS: const/cv/resp otherwise negative     OBJECTIVE:  /58  Pulse 89  Temp 97.7  F (36.5  C) (Tympanic)  Ht 5' 3\" (1.6 m)  Wt 140 lb (63.5 kg)  SpO2 94%  BMI 24.8 kg/m2  CONSTITUTIONAL: Alert, well-nourished, " well-groomed, NAD  RESP: Lungs with diminished airflow. No audible wheezing, rhonchi, or rales.  CV: HRRR S1 S2 No MRG. No peripheral edema      ASSESSMENT/PLAN:  (I10) Benign essential hypertension  (primary encounter diagnosis)  Comment: Patient wishing to switch from Lozol to HCTZ, which cardiology previously mentioned was ok if need be. Patient having side effects from Lozol (dizziness.)  Plan: hydrochlorothiazide 12.5 MG TABS tablet           (R07.9) Chest pain, unspecified type  Comment: Patient with pain under the left axilla intermittently for the last few days, which has resolved consistently with a dose of her albuterol inhaler. She also notes that, over the past few days she has been wheezing and slightly dizzy, which are common symptoms for her of asthma. Of note, she stopped diltiazem 3 days ago due to side effects and was told to switch to Metoprolol, but didn't start until this morning. Her BP has been slightly elevated in the 140s-150s. She denies the pain getting worse with exertion. No shortness of breath, diaphoresis, nausea, palpitations, etc. Her EKG is unremarkable today.   Plan: predniSONE (DELTASONE) 20 MG tablet        Will start by treating her for possible asthma exacerbation. Prednisone and scheduled albuterol. If sx do not improve within 24 hours I'd like her to go to the ED to be further evaluated for cardiac etiology of the pain. I am reassured today by her normal EKG, although this certainly could be normal in the setting of intermittent angina. However, her pain consistently responds to albuterol, and she has stated she has had chest pain in the past with her asthma exacerbation.         F/U with provider Monday. Discussed reasons to seek care urgently.       (J45.901) Asthma exacerbation        Birdie Davis, SUSI-DUNIA.

## 2017-08-03 NOTE — NURSING NOTE
"Chief Complaint   Patient presents with     Hypertension     blood pressure concern       Initial /58  Pulse 89  Temp 97.7  F (36.5  C) (Tympanic)  Ht 5' 3\" (1.6 m)  Wt 140 lb (63.5 kg)  SpO2 94%  BMI 24.8 kg/m2 Estimated body mass index is 24.8 kg/(m^2) as calculated from the following:    Height as of this encounter: 5' 3\" (1.6 m).    Weight as of this encounter: 140 lb (63.5 kg).  Medication Reconciliation: complete   Madeline Hutchinson CMA    "

## 2017-08-03 NOTE — MR AVS SNAPSHOT
"              After Visit Summary   8/3/2017    Genie Persaud    MRN: [id number]           Patient Information     Date Of Birth          1935        Visit Information        Provider Department      8/3/2017 2:20 PM Birdie Davis [facility]N [facility]        Today's Diagnoses     Benign essential hypertension    -  1    Chest pain, unspecified type          Care Instructions    -Stop the LOZOL and start the HYDROCHLOROTHIAZIDE the next day  -Continue METOPROLOL  -Start PREDNISONE 2 tabs per day for 5 days  -Please follow up Monday.           Follow-ups after your visit        Your next 10 appointments already scheduled     Aug 25, 2017  2:30 PM CDT   Return Visit with TOREY Sierra CNP   [facility]    [address] [address] [phone]              Who to contact     If you have questions or need follow up information about today's clinic visit or your schedule please contact [facility] directly at [phone].  Normal or non-critical lab and imaging results will be communicated to you by MyChart, letter or phone within 4 business days after the clinic has received the results. If you do not hear from us within 7 days, please contact the clinic through [employer] or phone. If you have a critical or abnormal lab result, we will notify you by phone as soon as possible.  Submit refill requests through [employer] or call your pharmacy and they will forward the refill request to us. Please allow 3 business days for your refill to be completed.          Additional Information About Your Visit        MyChart Information     [employer] lets you send messages to your doctor, view your test results, renew your prescriptions, schedule appointments and more. To sign up, go to [email or web address] . Click on \"Log in\" on the left side of the screen, which will take you to the " "Welcome page. Then click on \"Sign up Now\" on the right side of the page.     You will be asked to enter the access code listed below, as well as some personal information. Please follow the directions to create your username and password.     Your access code is: [email or web address]  Expires: 2017  9:21 AM     Your access code will  in 90 days. If you need help or a new code, please call your [facility] clinic or [phone].        Care EveryWhere ID     This is your Care EveryWhere ID. This could be used by other organizations to access your [facility] medical records  [id number]        Your Vitals Were     Pulse Temperature Height Pulse Oximetry BMI (Body Mass Index)       89 97.7  F (36.5  C) (Tympanic) 5' 3\" (1.6 m) 94% 24.8 kg/m2        Blood Pressure from Last 3 Encounters:   17 152/58   17 140/50   17 142/48    Weight from Last 3 Encounters:   17 140 lb (63.5 kg)   17 142 lb (64.4 kg)   17 143 lb 9.6 oz (65.1 kg)              We Performed the Following     Asthma Action Plan (AAP)     EKG 12-lead complete w/read - Clinics          Today's Medication Changes          These changes are accurate as of: 8/3/17  3:09 PM.  If you have any questions, ask your nurse or doctor.               Start taking these medicines.        Dose/Directions    predniSONE 20 MG tablet   Commonly known as:  DELTASONE   Used for:  Chest pain, unspecified type   Started by:  Birdie Davis APRN CNP        Dose:  40 mg   Take 2 tablets (40 mg) by mouth daily for 5 days   Quantity:  10 tablet   Refills:  0         These medicines have changed or have updated prescriptions.        Dose/Directions    aspirin 81 MG EC tablet   This may have changed:  additional instructions   Used for:  Stroke (H)        Dose:  81 mg   Take 1 tablet (81 mg) by mouth daily   Quantity:  90 tablet   Refills:  3         Stop taking these medicines if you haven't already. Please contact your care team if you have " questions.     indapamide 1.25 MG tablet   Commonly known as:  LOZOL   Stopped by:  Birdie Davis APRN CNP                Where to get your medicines      These medications were sent to [id number] - [place], [place] - [address]  [address] [place] [address]     Phone:  [phone]     predniSONE 20 MG tablet                Primary Care Provider Office Phone # Fax #    Layo Sevilla [phone]       [facility] [address] [place] [address]        Equal Access to Services     [facility]: Hadii aad ku hadasho Soomaali, waaxda luqadaha, qaybta kaalmada adeegyada, ora meyer . [facility] [phone].    ATENCIÓN: Si habla español, tiene a patel disposición servicios gratuitos de asistencia lingüística. [facility] [phone].    We comply with applicable federal civil rights laws and [place] laws. We do not discriminate on the basis of race, color, national origin, age, disability sex, sexual orientation or gender identity.            Thank you!     Thank you for choosing [facility]  for your care. Our goal is always to provide you with excellent care. Hearing back from our patients is one way we can continue to improve our services. Please take a few minutes to complete the written survey that you may receive in the mail after your visit with us. Thank you!             Your Updated Medication List - Protect others around you: Learn how to safely use, store and throw away your medicines at www.disposemymeds.org.          This list is accurate as of: 8/3/17  3:09 PM.  Always use your most recent med list.                   Brand Name Dispense Instructions for use Diagnosis    acetaminophen 500 MG tablet    TYLENOL     Take 500-1,000 mg by mouth every 8 hours as needed for mild pain        albuterol 108 (90 BASE) MCG/ACT Inhaler    PROAIR HFA/PROVENTIL HFA/VENTOLIN HFA     Inhale 2 puffs into the lungs every  6 hours as needed        aspirin 81 MG EC tablet     90 tablet    Take 1 tablet (81 mg) by mouth daily    Stroke (H)       azelastine 0.1 % spray    ASTELIN    1 Bottle    Spray 1-2 sprays into both nostrils 2 times daily    Allergic rhinitis due to pollen       CALCIUM CITRATE + 315-200 MG-UNIT Tabs per tablet   Generic drug:  calcium citrate-vitamin D      Take 2 tablets by mouth daily.        fluticasone-salmeterol 230-21 MCG/ACT inhaler    ADVAIR-HFA    12 g    Inhale 2 puffs into the lungs 2 times daily        hydrochlorothiazide 12.5 MG Tabs tablet     90 tablet    Take 1 tablet (12.5 mg) by mouth daily    Benign essential hypertension       losartan 100 MG tablet    COZAAR    90 tablet    Take 1 tablet (100 mg) by mouth At Bedtime    Essential hypertension with goal blood pressure less than 140/90       metoprolol 25 MG 24 hr tablet    TOPROL-XL    30 tablet    Take 1 tablet (25 mg) by mouth daily    Essential hypertension with goal blood pressure less than 140/90       omeprazole 20 MG CR capsule    priLOSEC    90 capsule    TAKE ONE CAPSULE BY MOUTH ONE TIME DAILY    Candidal esophagitis (H)       predniSONE 20 MG tablet    DELTASONE    10 tablet    Take 2 tablets (40 mg) by mouth daily for 5 days    Chest pain, unspecified type       simvastatin 20 MG tablet    ZOCOR    45 tablet    Take 0.5 tablets (10 mg) by mouth At Bedtime    Hyperlipidemia LDL goal <130

## 2017-08-04 ASSESSMENT — ASTHMA QUESTIONNAIRES: ACT_TOTALSCORE: 12

## 2017-08-07 ENCOUNTER — OFFICE VISIT (OUTPATIENT)
Dept: PEDIATRICS | Facility: CLINIC | Age: 82
End: 2017-08-07
Payer: COMMERCIAL

## 2017-08-07 VITALS
HEART RATE: 86 BPM | OXYGEN SATURATION: 95 % | TEMPERATURE: 97.7 F | SYSTOLIC BLOOD PRESSURE: 170 MMHG | BODY MASS INDEX: 24.98 KG/M2 | HEIGHT: 63 IN | DIASTOLIC BLOOD PRESSURE: 48 MMHG | WEIGHT: 141 LBS

## 2017-08-07 DIAGNOSIS — I63.9 CEREBROVASCULAR ACCIDENT (CVA), UNSPECIFIED MECHANISM (H): ICD-10-CM

## 2017-08-07 DIAGNOSIS — I10 ESSENTIAL HYPERTENSION WITH GOAL BLOOD PRESSURE LESS THAN 140/90: ICD-10-CM

## 2017-08-07 DIAGNOSIS — J45.40 MODERATE PERSISTENT ASTHMA WITHOUT COMPLICATION: ICD-10-CM

## 2017-08-07 DIAGNOSIS — I35.1 NONRHEUMATIC AORTIC VALVE INSUFFICIENCY: Primary | Chronic | ICD-10-CM

## 2017-08-07 PROCEDURE — 99214 OFFICE O/P EST MOD 30 MIN: CPT | Performed by: INTERNAL MEDICINE

## 2017-08-07 RX ORDER — METOPROLOL SUCCINATE 25 MG/1
50 TABLET, EXTENDED RELEASE ORAL DAILY
Qty: 60 TABLET | Refills: 11 | Status: SHIPPED | OUTPATIENT
Start: 2017-08-07 | End: 2018-08-01

## 2017-08-07 NOTE — PROGRESS NOTES
SUBJECTIVE:                                                    Genie Persaud is a 81 year old female who presents to clinic today for the following health issues:      Hypertension Follow-up      Outpatient blood pressures are being checked at home.  Results are 120-130's systolic and diastolic 50-60's.    Still with dizziness, but only intermittently    MI in March 2017      Seen here last week for hypertension and for waking up in the night with pain over chest.  Felt to her like asthma; seen here and had electrocardiogram without acute changes.  Felt to be asthma related, due to borderline sats and some wheezing.  Given prednisone and albuterol.  Feels fine now.  Waking up less with symptoms; can sleep until 5AM now.      No significant cough or wheeze currently.Joseph has improved some.  blood pressure sas improved since has now been on the metoprolol more consistently (stopped the diltiazem.) but today is higher.     Problem list and histories reviewed & adjusted, as indicated.  Additional history: as documented    Patient Active Problem List   Diagnosis     Swelling, mass, or lump in head and neck     Pulmonary nodule     Anemia     Vasculitis (H)     HYPERLIPIDEMIA LDL GOAL <130     Impaired fasting glucose     Candidal esophagitis (H)     Thoracic aortic aneurysm without rupture (H)     Health Care Home     Advanced directives, counseling/discussion     Stroke (H)     Moderate persistent asthma without complication     Essential hypertension with goal blood pressure less than 140/90     Hypertension     Coronary artery disease involving native coronary artery of native heart without angina pectoris     SVT -noted during Cath procedure     Nonrheumatic aortic valve insufficiency     Past Surgical History:   Procedure Laterality Date     C APPENDECTOMY  1957     C LIGATE FALLOPIAN TUBE  1971     C STEREOTACTIC BREAST BIOPSY  2001     CHOLECYSTECTOMY, LAPOROSCOPIC  2008    Cholecystectomy, Laparoscopic      ESOPHAGOSCOPY, GASTROSCOPY, DUODENOSCOPY (EGD), COMBINED  5/17/2013    Procedure: COMBINED ESOPHAGOSCOPY, GASTROSCOPY, DUODENOSCOPY (EGD), BIOPSY SINGLE OR MULTIPLE;  ESOPHAGOSCOPY, GASTROSCOPY, DUODENOSCOPY (EGD)  with bx;  Surgeon: Jeffery Yanez MD;  Location: [facility]     HC DILATION/CURETTAGE DIAG/THER NON OB  1967,1971     HC REMOVE TONSILS/ADENOIDS,<11 Y/O         Social History   Substance Use Topics     Smoking status: Never Smoker     Smokeless tobacco: Never Used     Alcohol use 0.6 - 1.2 oz/week     1 - 2 Standard drinks or equivalent per week     Family History   Problem Relation Age of Onset     Hypertension Mother      OSTEOPOROSIS Mother      C.A.D. Mother      Connective Tissue Disorder Father      scleraderma     CEREBROVASCULAR DISEASE Paternal Grandmother      Prostate Cancer Other      9/05 passed away due to complications     Breast Cancer Child      youngest dtr         Current Outpatient Prescriptions   Medication Sig Dispense Refill     metoprolol (TOPROL-XL) 25 MG 24 hr tablet Take 2 tablets (50 mg) by mouth daily 60 tablet 11     hydrochlorothiazide 12.5 MG TABS tablet Take 1 tablet (12.5 mg) by mouth daily 90 tablet 0     predniSONE (DELTASONE) 20 MG tablet Take 2 tablets (40 mg) by mouth daily for 5 days 10 tablet 0     omeprazole (PRILOSEC) 20 MG CR capsule TAKE ONE CAPSULE BY MOUTH ONE TIME DAILY  90 capsule 2     simvastatin (ZOCOR) 20 MG tablet Take 0.5 tablets (10 mg) by mouth At Bedtime 45 tablet 3     losartan (COZAAR) 100 MG tablet Take 1 tablet (100 mg) by mouth At Bedtime 90 tablet 1     azelastine (ASTELIN) 0.1 % spray Spray 1-2 sprays into both nostrils 2 times daily 1 Bottle 3     fluticasone-salmeterol (ADVAIR-HFA) 230-21 MCG/ACT inhaler Inhale 2 puffs into the lungs 2 times daily 12 g 1     acetaminophen (TYLENOL) 500 MG tablet Take 500-1,000 mg by mouth every 8 hours as needed for mild pain       albuterol (PROAIR HFA, PROVENTIL HFA, VENTOLIN HFA) 108 (90 BASE) MCG/ACT  "inhaler Inhale 2 puffs into the lungs every 6 hours as needed        aspirin 81 MG EC tablet Take 1 tablet (81 mg) by mouth daily (Patient taking differently: Take 81 mg by mouth daily Taking 3 times per week) 90 tablet 3     calcium citrate-vitamin D (CALCIUM CITRATE +) 315-200 MG-UNIT TABS Take 2 tablets by mouth daily.       [DISCONTINUED] metoprolol (TOPROL-XL) 25 MG 24 hr tablet Take 1 tablet (25 mg) by mouth daily 30 tablet 11     Allergies   Allergen Reactions     Fosamax [Alendronic Acid] GI Disturbance     Augmentin [Amoxicillin-Pot Clavulanate] Diarrhea     Diarrhea and rash     Evista [Raloxifene]      abd symptoms.      Flu Virus Vaccine      swollen and red at inj site     BP Readings from Last 3 Encounters:   08/07/17 170/48   08/03/17 152/58   06/29/17 140/50    Wt Readings from Last 3 Encounters:   08/07/17 141 lb (64 kg)   08/03/17 140 lb (63.5 kg)   06/29/17 142 lb (64.4 kg)                  Labs reviewed in EPIC        Reviewed and updated as needed this visit by clinical staffTobacco  Allergies  Meds  Med Hx  Surg Hx  Fam Hx  Soc Hx      Reviewed and updated as needed this visit by Provider         ROS:  C: NEGATIVE for fever, chills, change in weight  E/M: NEGATIVE for ear, mouth and throat problems  R: NEGATIVE for significant cough or SOB  CV: NEGATIVE for chest pain, palpitations or peripheral edema    OBJECTIVE:                                                    /48  Pulse 86  Temp 97.7  F (36.5  C) (Oral)  Ht 5' 3\" (1.6 m)  Wt 141 lb (64 kg)  SpO2 95%  BMI 24.98 kg/m2  Body mass index is 24.98 kg/(m^2).   GENERAL: healthy, alert, well nourished, well hydrated, no distress  HENT: ear canals- normal; TMs- normal; Nose- normal; Mouth- no ulcers, no lesions  NECK: no tenderness, no adenopathy, no asymmetry, no masses, no stiffness; thyroid- normal to palpation  RESP: lungs clear to auscultation - no rales, no rhonchi, no wheezes  CV: regular rates and rhythm, normal S1 S2, no " S3 or S4 and no murmur, no click or rub -  ABDOMEN: soft, no tenderness, no  hepatosplenomegaly, no masses, normal bowel sounds    Diagnostic test results:  Diagnostic Test Results:  none        ASSESSMENT/PLAN:                                                    1. Essential hypertension with goal blood pressure less than 140/90  Poor control.  Increase beta blocker and follow up 1 month.   - metoprolol (TOPROL-XL) 25 MG 24 hr tablet; Take 2 tablets (50 mg) by mouth daily  Dispense: 60 tablet; Refill: 11    2.  Dizziness:  May be related her relatively high pulse pressure; however, she needs improved blood pressure control, so will increase her beta blocker as above and follow up with both cardiology this month and with me next month.    3.  shortness of breath and chest discomfort; improved with prednsione and albuterol; follow up for any worsening.   Has multiple possible causes of this (CAD, aneurysm, asthma, aortic insufficiency), but is currently improving.  Close follow up after uptitration of beta blocker.     CVA history:  Secondary risk factor modification. No signs of recurrence.    E4: Spent 25 minutes face to face.     More than 50% of the time was spent in counseling and coordination of care of Dizziness and shortness of breath.      See Patient Instructions    Layo Sevilla MD  [facility]

## 2017-08-07 NOTE — PATIENT INSTRUCTIONS
Increase your metoprolol to 2 tabs (50 mg total) every AM.    Follow up with cardiology later this month.    Follow up with me in 1 month.    Finish your prednisone and continue albuterol inhaler.    Layo Sevilla MD  Internal Medicine and Pediatrics

## 2017-08-07 NOTE — MR AVS SNAPSHOT
After Visit Summary   8/7/2017    Genie Persaud    MRN: [id number]           Patient Information     Date Of Birth          1935        Visit Information        Provider Department      8/7/2017 2:20 PM Layo Sevilla MD [facility]        Today's Diagnoses     Essential hypertension with goal blood pressure less than 140/90          Care Instructions    Increase your metoprolol to 2 tabs (50 mg total) every AM.    Follow up with cardiology later this month.    Follow up with me in 1 month.    Finish your prednisone and continue albuterol inhaler.    Layo Sevilla MD  Internal Medicine and Pediatrics             Follow-ups after your visit        Your next 10 appointments already scheduled     Aug 25, 2017  2:30 PM CDT   Return Visit with TOREY Sierra [facility] [place] ([facility])    [address]  [facility] [address] [phone]              Who to contact     If you have questions or need follow up information about today's clinic visit or your schedule please contact [facility] directly at [phone].  Normal or non-critical lab and imaging results will be communicated to you by Co[employer], letter or phone within 4 business days after the clinic has received the results. If you do not hear from us within 7 days, please contact the clinic through [employer] or phone. If you have a critical or abnormal lab result, we will notify you by phone as soon as possible.  Submit refill requests through [employer] or call your pharmacy and they will forward the refill request to us. Please allow 3 business days for your refill to be completed.          Additional Information About Your Visit        MyChart Information     [employer] lets you send messages to your doctor, view your test results, renew your prescriptions, schedule appointments and more. To sign up, go to [email or web address] . Click on  "\"Log in\" on the left side of the screen, which will take you to the Welcome page. Then click on \"Sign up Now\" on the right side of the page.     You will be asked to enter the access code listed below, as well as some personal information. Please follow the directions to create your username and password.     Your access code is: [email or web address]  Expires: 2017  9:21 AM     Your access code will  in 90 days. If you need help or a new code, please call your [place] clinic or [phone].        Care EveryWhere ID     This is your Care EveryWhere ID. This could be used by other organizations to access your [place] medical records  [id number]        Your Vitals Were     Pulse Temperature Height Pulse Oximetry BMI (Body Mass Index)       86 97.7  F (36.5  C) (Oral) 5' 3\" (1.6 m) 95% 24.98 kg/m2        Blood Pressure from Last 3 Encounters:   17 144/46   17 152/58   17 140/50    Weight from Last 3 Encounters:   17 141 lb (64 kg)   17 140 lb (63.5 kg)   17 142 lb (64.4 kg)              Today, you had the following     No orders found for display         Today's Medication Changes          These changes are accurate as of: 17  2:37 PM.  If you have any questions, ask your nurse or doctor.               These medicines have changed or have updated prescriptions.        Dose/Directions    aspirin 81 MG EC tablet   This may have changed:  additional instructions   Used for:  Stroke (H)        Dose:  81 mg   Take 1 tablet (81 mg) by mouth daily   Quantity:  90 tablet   Refills:  3       metoprolol 25 MG 24 hr tablet   Commonly known as:  TOPROL-XL   This may have changed:  how much to take   Used for:  Essential hypertension with goal blood pressure less than 140/90   Changed by:  Layo Sevilla MD        Dose:  50 mg   Take 2 tablets (50 mg) by mouth daily   Quantity:  60 tablet   Refills:  11            Where to get your medicines      These medications were sent to [id number] - [place], [place] - [address]  [address], [place] [address]     Phone:  [phone]     metoprolol 25 MG 24 hr tablet                Primary Care Provider Office Phone # Fax #    Layo Sevilla [phone]       [facility] [address] [place] [address]        Equal Access to Services     [facility]: Hadii aad ku hadasho Soomaali, waaxda luqadaha, qaybta kaalmada adeegyada, waxay idiin hayaan adeeg kharash la'aan ah. So [facility] [phone].    ATENCIÓN: Si habla espclarissa, tiene a patel disposición servicios gratuitos de asistencia lingüística. [facility] [phone].    We comply with applicable federal civil rights laws and [place] laws. We do not discriminate on the basis of race, color, national origin, age, disability sex, sexual orientation or gender identity.            Thank you!     Thank you for choosing [facility]  for your care. Our goal is always to provide you with excellent care. Hearing back from our patients is one way we can continue to improve our services. Please take a few minutes to complete the written survey that you may receive in the mail after your visit with us. Thank you!             Your Updated Medication List - Protect others around you: Learn how to safely use, store and throw away your medicines at www.disposemymeds.org.          This list is accurate as of: 8/7/17  2:37 PM.  Always use your most recent med list.                   Brand Name Dispense Instructions for use Diagnosis    acetaminophen 500 MG tablet    TYLENOL     Take 500-1,000 mg by mouth every 8 hours as needed for mild pain        albuterol 108 (90 BASE) MCG/ACT Inhaler    PROAIR HFA/PROVENTIL HFA/VENTOLIN HFA     Inhale 2 puffs into the lungs every 6 hours as needed        aspirin 81 MG EC tablet     90 tablet    Take 1 tablet (81 mg) by mouth daily    Stroke (H)       azelastine 0.1 % spray    ASTELIN    1 Bottle    Spray 1-2 sprays into  both nostrils 2 times daily    Allergic rhinitis due to pollen       CALCIUM CITRATE + 315-200 MG-UNIT Tabs per tablet   Generic drug:  calcium citrate-vitamin D      Take 2 tablets by mouth daily.        fluticasone-salmeterol 230-21 MCG/ACT inhaler    ADVAIR-HFA    12 g    Inhale 2 puffs into the lungs 2 times daily        hydrochlorothiazide 12.5 MG Tabs tablet     90 tablet    Take 1 tablet (12.5 mg) by mouth daily    Benign essential hypertension       losartan 100 MG tablet    COZAAR    90 tablet    Take 1 tablet (100 mg) by mouth At Bedtime    Essential hypertension with goal blood pressure less than 140/90       metoprolol 25 MG 24 hr tablet    TOPROL-XL    60 tablet    Take 2 tablets (50 mg) by mouth daily    Essential hypertension with goal blood pressure less than 140/90       omeprazole 20 MG CR capsule    priLOSEC    90 capsule    TAKE ONE CAPSULE BY MOUTH ONE TIME DAILY    Candidal esophagitis (H)       predniSONE 20 MG tablet    DELTASONE    10 tablet    Take 2 tablets (40 mg) by mouth daily for 5 days    Chest pain, unspecified type       simvastatin 20 MG tablet    ZOCOR    45 tablet    Take 0.5 tablets (10 mg) by mouth At Bedtime    Hyperlipidemia LDL goal <130

## 2017-08-07 NOTE — NURSING NOTE
"Chief Complaint   Patient presents with     RECHECK     HTN and Asthma       Initial /46 (BP Location: Right arm, Cuff Size: Adult Regular)  Pulse 86  Temp 97.7  F (36.5  C) (Oral)  Ht 5' 3\" (1.6 m)  Wt 141 lb (64 kg)  SpO2 95%  BMI 24.98 kg/m2 Estimated body mass index is 24.98 kg/(m^2) as calculated from the following:    Height as of this encounter: 5' 3\" (1.6 m).    Weight as of this encounter: 141 lb (64 kg).  Medication Reconciliation: complete     Janina Szymanski MA   August 7, 2017,  2:21 PM    "

## 2017-08-11 ENCOUNTER — TELEPHONE (OUTPATIENT)
Dept: PEDIATRICS | Facility: CLINIC | Age: 82
End: 2017-08-11

## 2017-08-11 NOTE — TELEPHONE ENCOUNTER
predniSONE (DELTASONE) 20 MG tablet      Last Written Prescription Date:  8/3/17  Last Fill Quantity: 10,   # refills: 0  Last Office Visit with [facility], [facility] or [facility] prescribing provider: 8/7/17  Future Office visit:    Next 5 appointments (look out 90 days)     Aug 25, 2017  2:30 PM CDT   Return Visit with TOREY Sierra CNP   [facility] [place] ([facility])    [address]  [facility] [id number] [phone]            Sep 07, 2017 11:20 AM CDT   SHORT with Layo Sevilla MD   [facility]    [address]  [address]  [facility] [address] [phone]                   Routing refill request to provider for review/approval because:  Drug not on the [facility], [facility] or [facility] refill protocol or controlled substance

## 2017-08-11 NOTE — TELEPHONE ENCOUNTER
Routing refill request to provider for review/approval because:  Drug not on the [facility] refill protocol, given for asthma exacerbation.  Sharyn Ledbetter, RN  Triage Nurse

## 2017-08-14 NOTE — TELEPHONE ENCOUNTER
I would have her set up a follow up if her respiratory symptoms are continuing.      Layo Sevilla MD  Internal Medicine and Pediatrics

## 2017-08-15 NOTE — TELEPHONE ENCOUNTER
OK to see someone else for this, as long as they are comfortable assessing her respiratory status and need for prednisone.    Layo Sevilla MD  Internal Medicine and Pediatrics

## 2017-08-23 DIAGNOSIS — J30.1 ALLERGIC RHINITIS DUE TO POLLEN: ICD-10-CM

## 2017-08-23 NOTE — TELEPHONE ENCOUNTER
azelastine (ASTELIN) 0.1 % spray       Last Written Prescription Date: 3/6/2017  Last Fill Quantity: 1, # refills: 3    Last Office Visit with [facility], [facility] or [facility] prescribing provider:  8/7/2017   Future Office Visit:    Next 5 appointments (look out 90 days)     Aug 25, 2017  2:30 PM CDT   Return Visit with TOREY Sierra CNP   [facility] AT [place] ([facility])    [address]  [facility] [address]   [phone]            Aug 30, 2017 10:00 AM CDT   SHORT with Kathie Forde [facility] ([facility])    [address]  [address]  [facility] [id number] [phone]            Sep 07, 2017 11:20 AM CDT   SHORT with Layo Sevilla MD   [facility] ([facility])    [address]  [address]  [facility] [address] [phone]                   Date of Last Asthma Action Plan Letter:   Asthma Action Plan Q1 Year    Topic Date Due     Asthma Action Plan - yearly  08/03/2018      Asthma Control Test:   ACT Total Scores 8/3/2017   ACT TOTAL SCORE -   ASTHMA ER VISITS -   ASTHMA HOSPITALIZATIONS -   ACT TOTAL SCORE (Goal Greater than or Equal to 20) 12   In the past 12 months, how many times did you visit the emergency room for your asthma without being admitted to the hospital? 0   In the past 12 months, how many times were you hospitalized overnight because of your asthma? 0       Date of Last Spirometry Test:   No results found for this or any previous visit.

## 2017-08-24 RX ORDER — AZELASTINE 1 MG/ML
SPRAY, METERED NASAL
Qty: 30 ML | Refills: 5 | Status: SHIPPED | OUTPATIENT
Start: 2017-08-24 | End: 2017-08-29

## 2017-08-25 ENCOUNTER — OFFICE VISIT (OUTPATIENT)
Dept: CARDIOLOGY | Facility: CLINIC | Age: 82
End: 2017-08-25
Attending: INTERNAL MEDICINE
Payer: COMMERCIAL

## 2017-08-25 VITALS
HEIGHT: 63 IN | HEART RATE: 62 BPM | DIASTOLIC BLOOD PRESSURE: 60 MMHG | BODY MASS INDEX: 25.16 KG/M2 | WEIGHT: 142 LBS | SYSTOLIC BLOOD PRESSURE: 169 MMHG

## 2017-08-25 DIAGNOSIS — I10 ESSENTIAL HYPERTENSION WITH GOAL BLOOD PRESSURE LESS THAN 140/90: ICD-10-CM

## 2017-08-25 PROCEDURE — 99214 OFFICE O/P EST MOD 30 MIN: CPT | Performed by: NURSE PRACTITIONER

## 2017-08-25 RX ORDER — AMLODIPINE BESYLATE 5 MG/1
5 TABLET ORAL DAILY
Qty: 30 TABLET | Refills: 1 | Status: SHIPPED | OUTPATIENT
Start: 2017-08-25 | End: 2017-09-07

## 2017-08-25 NOTE — PATIENT INSTRUCTIONS
Please try amlodipine/norvasc 5 mg daily.    See me in a month for follow up.    [place] [phone]

## 2017-08-25 NOTE — PROGRESS NOTES
HPI and Plan:   Ms. Genie Persaud is a plesant 81-year-old woman with past medical history significant for severe hypertension, hypercholesterolemia,  ascending aortic aneurysm, mild to moderate coronary artery disease based on angiogram from March 2017, moderate to moderately severe aortic insufficiency based on echocardiogram from March 2017. Same echocardiogram demonstrated a nonischemic cardiomyopathy with ejection fraction of 45-50%, there was moderate to moderately severe 2-3+ aortic regurgitation without significant valvular aortic stenosis. In the Cath Lab she was also noted to have short bursts of nonsustained  supraventricular tachycardia rates of 130.  Her last CT in March 2017 in the ER showed her Anyursm some measure 4.4, Dr. Peguero plans to check this in a year.    At this time Dr. Peguero in June and was overall doing well but was having symptoms of dizziness that was present even when sitting down and not orthostatic.  She also complained of easy bruisability and Dr. Peguero decrease her aspirin to three times a week.  He was concerned that her blood pressure was not well controlled given her ascending aortic aneurysm and aortic insufficiency.  For that reason he changed her diltiazem to metoprolol succinate 25 mg daily, it was then increased to twice a day by her primary care doctor.  She does have some medication intolerances, she gets quite dizzy on 25 mg of hydrochlorothiazide but is able to tolerate 12-1/2 mg.    Today Genie is still quite hypertensive, her blood pressure is 169/60.  She's currently on Toprol-XL 25 mg twice daily, hydrochlorothiazide 12-1/2 mg daily, losartan 100 mg daily.  Her blood pressure really hasn't improved that much with the change in medications.    Physical exam  General.  81-year-old woman, appears comfortable  Vital signs.  Blood pressure 169/60, heart rate 62, weight 142 pounds, BMI 25  Lungs.  Bilaterally clear to auscultation, no wheezing  Cardiac.  They've  regular normal S1 and S2  Extremities.  She is wearing compression stockings, she has minimal edema.    Assessment and plan  1. Hypertension.  This needs to be better controlled, specially in the setting of the ascending aortic aneurysm.  I continued her on Toprol-XL 25 mg twice daily and hydrochlorothiazide 12.5 mg daily, she does not want him to decrease the hydrochlorothiazide as it has caused quite a lot of dizziness in the past.  She continues on valsartan 100 as well.  I have gone ahead and added amlodipine 5 mg daily to her medications.  I would like her to continue to monitor her blood pressures, she tells me her home machine has been correlated with her clinic machine.  I will see her back in a few weeks.  We are unable to control her blood pressure at that time I would recommend a renal ultrasound to look for renal artery stenosis as a cause of her hypertension.  2. Coronary artery disease.  This is mildly coronary angiogram in March.  She is on statin and low-dose aspirin, she is not having any ischemic symptoms.  3. Aortic insufficiency, 2-3+ aortic regurgitation.  Dr. Hernandez plans to repeat her echocardiogram a year from now.  She currently does not seem to be in heart failure.  She is a good medication management.    TOREY Baumann, CNP          Orders Placed This Encounter   Procedures     Follow-Up with Cardiac Advanced Practice Provider       Orders Placed This Encounter   Medications     PREDNISONE PO     Sig: Take 20 mg by mouth 20 mg two tabs daily     MONTELUKAST SODIUM PO     Sig: Take 10 mg by mouth     amLODIPine (NORVASC) 5 MG tablet     Sig: Take 1 tablet (5 mg) by mouth daily     Dispense:  30 tablet     Refill:  1       There are no discontinued medications.      Encounter Diagnosis   Name Primary?     Essential hypertension with goal blood pressure less than 140/90        CURRENT MEDICATIONS:  Current Outpatient Prescriptions   Medication Sig Dispense Refill     PREDNISONE PO  Take 20 mg by mouth 20 mg two tabs daily       MONTELUKAST SODIUM PO Take 10 mg by mouth       amLODIPine (NORVASC) 5 MG tablet Take 1 tablet (5 mg) by mouth daily 30 tablet 1     azelastine (ASTELIN) 0.1 % spray SPRAY 1-2 SPRAYS INTO BOTH NOSTRILS 2 TIMES DAILY 30 mL 5     metoprolol (TOPROL-XL) 25 MG 24 hr tablet Take 2 tablets (50 mg) by mouth daily 60 tablet 11     hydrochlorothiazide 12.5 MG TABS tablet Take 1 tablet (12.5 mg) by mouth daily 90 tablet 0     omeprazole (PRILOSEC) 20 MG CR capsule TAKE ONE CAPSULE BY MOUTH ONE TIME DAILY  90 capsule 2     simvastatin (ZOCOR) 20 MG tablet Take 0.5 tablets (10 mg) by mouth At Bedtime 45 tablet 3     losartan (COZAAR) 100 MG tablet Take 1 tablet (100 mg) by mouth At Bedtime 90 tablet 1     fluticasone-salmeterol (ADVAIR-HFA) 230-21 MCG/ACT inhaler Inhale 2 puffs into the lungs 2 times daily 12 g 1     acetaminophen (TYLENOL) 500 MG tablet Take 500-1,000 mg by mouth every 8 hours as needed for mild pain       albuterol (PROAIR HFA, PROVENTIL HFA, VENTOLIN HFA) 108 (90 BASE) MCG/ACT inhaler Inhale 2 puffs into the lungs every 6 hours as needed        aspirin 81 MG EC tablet Take 1 tablet (81 mg) by mouth daily (Patient taking differently: Take 81 mg by mouth daily Taking 3 times per week) 90 tablet 3     calcium citrate-vitamin D (CALCIUM CITRATE +) 315-200 MG-UNIT TABS Take 2 tablets by mouth daily.         ALLERGIES     Allergies   Allergen Reactions     Fosamax [Alendronic Acid] GI Disturbance     Augmentin [Amoxicillin-Pot Clavulanate] Diarrhea     Diarrhea and rash     Evista [Raloxifene]      abd symptoms.      Flu Virus Vaccine      swollen and red at inj site       PAST MEDICAL HISTORY:  Past Medical History:   Diagnosis Date     Basal Cell Carcinoma--back      Coronary artery disease involving native coronary artery of native heart without angina pectoris 3/9/2017    3/2/2017 - Two vessel coronary artery disease with mLAD 50% stenosis, D3 50% stenosis, pLCx  50% stenosis and mLCx 50% stenosis     Essential hypertension with goal blood pressure less than 140/90 9/1/2016    White coat hypertension;  24 hour ambulatory monitoring normal. Do not titrate up further.       Hyperlipidemia LDL goal <130 2/10/2010     Impaired fasting glucose 8/26/2010     Moderate persistent asthma      Nonrheumatic aortic valve insufficiency 6/29/2017     osteopenia      Other and unspecified hyperlipidemia      Pulmonary nodule 3/3/2009    PET scan reportedly normal; biopsy not advised.     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  Problem list name updated by automated process. Provider to review     Unspecified arthropathy, hand      Vasculitis (H) 4/20/2009    Possible increased activity of thoracic aorta, on PET scan w/u for pulmonary nodule.        PAST SURGICAL HISTORY:  Past Surgical History:   Procedure Laterality Date     C APPENDECTOMY  1957     C LIGATE FALLOPIAN TUBE  1971     C STEREOTACTIC BREAST BIOPSY  2001     CHOLECYSTECTOMY, LAPOROSCOPIC  2008    Cholecystectomy, Laparoscopic     ESOPHAGOSCOPY, GASTROSCOPY, DUODENOSCOPY (EGD), COMBINED  5/17/2013    Procedure: COMBINED ESOPHAGOSCOPY, GASTROSCOPY, DUODENOSCOPY (EGD), BIOPSY SINGLE OR MULTIPLE;  ESOPHAGOSCOPY, GASTROSCOPY, DUODENOSCOPY (EGD)  with bx;  Surgeon: Jeffery Yanez MD;  Location:  GI     HC DILATION/CURETTAGE DIAG/THER NON OB  1967,1971     HC REMOVE TONSILS/ADENOIDS,<13 Y/O         FAMILY HISTORY:  Family History   Problem Relation Age of Onset     Hypertension Mother      OSTEOPOROSIS Mother      C.A.D. Mother      Connective Tissue Disorder Father      scleraderma     CEREBROVASCULAR DISEASE Paternal Grandmother      Prostate Cancer Other   "    9/05 passed away due to complications     Breast Cancer Child      youngest dtr       SOCIAL HISTORY:  Social History     Social History     Marital status: Single     Spouse name: N/A     Number of children: 3     Years of education: 15     Occupational History     semi-retired      Social History Main Topics     Smoking status: Never Smoker     Smokeless tobacco: Never Used     Alcohol use 0.6 - 1.2 oz/week     1 - 2 Standard drinks or equivalent per week     Drug use: No     Sexual activity: No     Other Topics Concern     Parent/Sibling W/ [place], Mi Or Angioplasty Before 65f 55m? No     Social History Narrative       Review of Systems:  Skin:  Negative       Eyes:  Positive for glasses    ENT:  Negative for      Respiratory:  Positive for shortness of breath ashma   Cardiovascular:  Negative      Gastroenterology: Negative for      Genitourinary:  not assessed      Musculoskeletal:  Positive for arthritis    Neurologic:  Negative for      Psychiatric:         Heme/Lymph/Imm:  Positive for allergies    Endocrine:  Positive for   prediabetes,     Physical Exam:  Vitals: /60  Pulse 62  Ht 1.6 m (5' 3\")  Wt 64.4 kg (142 lb)  BMI 25.15 kg/m2    Constitutional:  cooperative, alert and oriented, well developed, well nourished, in no acute distress        Skin:  warm and dry to the touch, no apparent skin lesions or masses noted   ecchymosis on forearms    Head:  normocephalic, no masses or lesions        Eyes:  pupils equal and round, conjunctivae and lids unremarkable, sclera white, no xanthalasma, EOMS intact, no nystagmus        ENT:  no pallor or cyanosis, dentition good        Neck:  JVP normal        Chest:  normal breath sounds, clear to auscultation, normal A-P diameter, normal symmetry, normal respiratory excursion, no use of accessory muscles          Cardiac: regular rhythm           diastolic murmur;decrescendo;RUSB;grade 2      Abdomen:           Vascular: pulses full and equal                 "                        Extremities and Back:  no edema;no spinal abnormalities noted;normal muscle strength and tone         compression stockings    Neurological:  affect appropriate, oriented to time, person and place;no gross motor deficits              CC  Barak Hernandez MD  [address]  [place] [id number]

## 2017-08-25 NOTE — MR AVS SNAPSHOT
After Visit Summary   8/25/2017    Genie Persaud    MRN: [id number]           Patient Information     Date Of Birth          1935        Visit Information        Provider Department      8/25/2017 2:30 PM Alie Underwood APRN CNP [facility] [place]        Today's Diagnoses     Essential hypertension with goal blood pressure less than 140/90          Care Instructions    Please try amlodipine/norvasc 5 mg daily.    See me in a month for follow up.    [place] [phone]          Follow-ups after your visit        Additional Services     Follow-Up with Cardiac Advanced Practice Provider                 Your next 10 appointments already scheduled     Aug 30, 2017 10:00 AM CDT   SHORT with Kathie Forde TANISHA   [facility] [place] ([facility])    [address]  [address]  [place] [address] [phone]            Sep 07, 2017 11:20 AM CDT   SHORT with Layo Sevilla MD   [facility]    [address]  [address]  [place] [address] [phone]            Sep 15, 2017  1:30 PM CDT   Return Visit with TOREY Sierra CNP   [facility] ([facility])    [address]  [place] [id number] [phone]              Future tests that were ordered for you today     Open Future Orders        Priority Expected Expires Ordered    Follow-Up with Cardiac Advanced Practice Provider Routine 9/15/2017 8/25/2018 8/25/2017            Who to contact     If you have questions or need follow up information about today's clinic visit or your schedule please contact [facility] [place] directly at [phone].  Normal or non-critical lab and imaging results will be communicated to you by MyChart, letter or phone within 4 business days after the clinic has  "received the results. If you do not hear from us within 7 days, please contact the clinic through iogyn or phone. If you have a critical or abnormal lab result, we will notify you by phone as soon as possible.  Submit refill requests through iogyn or call your pharmacy and they will forward the refill request to us. Please allow 3 business days for your refill to be completed.          Additional Information About Your Visit        iogyn Information     iogyn lets you send messages to your doctor, view your test results, renew your prescriptions, schedule appointments and more. To sign up, go to [email or web address] . Click on \"Log in\" on the left side of the screen, which will take you to the Welcome page. Then click on \"Sign up Now\" on the right side of the page.     You will be asked to enter the access code listed below, as well as some personal information. Please follow the directions to create your username and password.     Your access code is: [email or web address]  Expires: 2017  9:21 AM     Your access code will  in 90 days. If you need help or a new code, please call your [place] clinic or [phone].        Care EveryWhere ID     This is your Care EveryWhere ID. This could be used by other organizations to access your [place] medical records  [id number]        Your Vitals Were     Pulse Height BMI (Body Mass Index)             62 1.6 m (5' 3\") 25.15 kg/m2          Blood Pressure from Last 3 Encounters:   17 169/60   17 170/48   17 152/58    Weight from Last 3 Encounters:   17 64.4 kg (142 lb)   17 64 kg (141 lb)   17 63.5 kg (140 lb)              We Performed the Following     Follow-Up with Cardiac Advanced Practice Provider          Today's Medication Changes          These changes are accurate as of: 17  3:18 PM.  If you have any questions, ask your nurse or doctor.               Start taking these medicines.        Dose/Directions    " amLODIPine 5 MG tablet   Commonly known as:  NORVASC   Used for:  Essential hypertension with goal blood pressure less than 140/90   Started by:  Alie Underwood APRN CNP        Dose:  5 mg   Take 1 tablet (5 mg) by mouth daily   Quantity:  30 tablet   Refills:  1         These medicines have changed or have updated prescriptions.        Dose/Directions    aspirin 81 MG EC tablet   This may have changed:  additional instructions   Used for:  Stroke (H)        Dose:  81 mg   Take 1 tablet (81 mg) by mouth daily   Quantity:  90 tablet   Refills:  3            Where to get your medicines      These medications were sent to [id number] - [place], [place] - [address]  [address], [place] [address]     Phone:  [phone]     amLODIPine 5 MG tablet                Primary Care Provider Office Phone # Fax #    Layo Sevilla [phone] [address] [place] [address]        Equal Access to Services     [facility]: Hadii william frausto hadasho Soomaali, waaxda luqadaha, qaybta kaalmada adeegyada, waxfabrice cevallosin haymeli meyer . So [facility] [phone].    ATENCIÓN: Si habla español, tiene a patel disposición servicios gratuitos de asistencia lingüística. [facility] [phone].    We comply with applicable federal civil rights laws and [place] laws. We do not discriminate on the basis of race, color, national origin, age, disability sex, sexual orientation or gender identity.            Thank you!     Thank you for choosing [facility] [place]  for your care. Our goal is always to provide you with excellent care. Hearing back from our patients is one way we can continue to improve our services. Please take a few minutes to complete the written survey that you may receive in the mail after your visit with us. Thank you!             Your Updated Medication List - Protect others around you: Learn how to safely use, store and  throw away your medicines at www.disposemymeds.org.          This list is accurate as of: 8/25/17  3:18 PM.  Always use your most recent med list.                   Brand Name Dispense Instructions for use Diagnosis    acetaminophen 500 MG tablet    TYLENOL     Take 500-1,000 mg by mouth every 8 hours as needed for mild pain        albuterol 108 (90 BASE) MCG/ACT Inhaler    PROAIR HFA/PROVENTIL HFA/VENTOLIN HFA     Inhale 2 puffs into the lungs every 6 hours as needed        amLODIPine 5 MG tablet    NORVASC    30 tablet    Take 1 tablet (5 mg) by mouth daily    Essential hypertension with goal blood pressure less than 140/90       aspirin 81 MG EC tablet     90 tablet    Take 1 tablet (81 mg) by mouth daily    Stroke (H)       azelastine 0.1 % spray    ASTELIN    30 mL    SPRAY 1-2 SPRAYS INTO BOTH NOSTRILS 2 TIMES DAILY    Allergic rhinitis due to pollen       CALCIUM CITRATE + 315-200 MG-UNIT Tabs per tablet   Generic drug:  calcium citrate-vitamin D      Take 2 tablets by mouth daily.        fluticasone-salmeterol 230-21 MCG/ACT inhaler    ADVAIR-HFA    12 g    Inhale 2 puffs into the lungs 2 times daily        hydrochlorothiazide 12.5 MG Tabs tablet     90 tablet    Take 1 tablet (12.5 mg) by mouth daily    Benign essential hypertension       losartan 100 MG tablet    COZAAR    90 tablet    Take 1 tablet (100 mg) by mouth At Bedtime    Essential hypertension with goal blood pressure less than 140/90       metoprolol 25 MG 24 hr tablet    TOPROL-XL    60 tablet    Take 2 tablets (50 mg) by mouth daily    Essential hypertension with goal blood pressure less than 140/90       MONTELUKAST SODIUM PO      Take 10 mg by mouth        omeprazole 20 MG CR capsule    priLOSEC    90 capsule    TAKE ONE CAPSULE BY MOUTH ONE TIME DAILY    Candidal esophagitis (H)       PREDNISONE PO      Take 20 mg by mouth 20 mg two tabs daily        simvastatin 20 MG tablet    ZOCOR    45 tablet    Take 0.5 tablets (10 mg) by mouth At  Bedtime    Hyperlipidemia LDL goal <130

## 2017-08-25 NOTE — LETTER
8/25/2017    Layo Sevilla MD  [address] [place] [address]    RE: Genie Persaud       Dear Colleague,    I had the pleasure of seeing Genie Persaud in the [facility].    HPI and Plan:   Ms. Genie Persaud is a plesant 81-year-old woman with past medical history significant for severe hypertension, hypercholesterolemia,  ascending aortic aneurysm, mild to moderate coronary artery disease based on angiogram from March 2017, moderate to moderately severe aortic insufficiency based on echocardiogram from March 2017. Same echocardiogram demonstrated a nonischemic cardiomyopathy with ejection fraction of 45-50%, there was moderate to moderately severe 2-3+ aortic regurgitation without significant valvular aortic stenosis. In the Cath Lab she was also noted to have short bursts of nonsustained  supraventricular tachycardia rates of 130.  Her last CT in March 2017 in the ER showed her Anyursm some measure 4.4, Dr. Peguero plans to check this in a year.    At this time Dr. Peguero in June and was overall doing well but was having symptoms of dizziness that was present even when sitting down and not orthostatic.  She also complained of easy bruisability and Dr. Peguero decrease her aspirin to three times a week.  He was concerned that her blood pressure was not well controlled given her ascending aortic aneurysm and aortic insufficiency.  For that reason he changed her diltiazem to metoprolol succinate 25 mg daily, it was then increased to twice a day by her primary care doctor.  She does have some medication intolerances, she gets quite dizzy on 25 mg of hydrochlorothiazide but is able to tolerate 12-1/2 mg.    Today Genie is still quite hypertensive, her blood pressure is 169/60.  She's currently on Toprol-XL 25 mg twice daily, hydrochlorothiazide 12-1/2 mg daily, losartan 100 mg daily.  Her blood pressure really hasn't improved that much with the change in  medications.    Physical exam  General.  81-year-old woman, appears comfortable  Vital signs.  Blood pressure 169/60, heart rate 62, weight 142 pounds, BMI 25  Lungs.  Bilaterally clear to auscultation, no wheezing  Cardiac.  They've regular normal S1 and S2  Extremities.  She is wearing compression stockings, she has minimal edema.    Assessment and plan  1. Hypertension.  This needs to be better controlled, specially in the setting of the ascending aortic aneurysm.  I continued her on Toprol-XL 25 mg twice daily and hydrochlorothiazide 12.5 mg daily, she does not want him to decrease the hydrochlorothiazide as it has caused quite a lot of dizziness in the past.  She continues on valsartan 100 as well.  I have gone ahead and added amlodipine 5 mg daily to her medications.  I would like her to continue to monitor her blood pressures, she tells me her home machine has been correlated with her clinic machine.  I will see her back in a few weeks.  We are unable to control her blood pressure at that time I would recommend a renal ultrasound to look for renal artery stenosis as a cause of her hypertension.  2. Coronary artery disease.  This is mildly coronary angiogram in March.  She is on statin and low-dose aspirin, she is not having any ischemic symptoms.  3. Aortic insufficiency, 2-3+ aortic regurgitation.  Dr. Hernandez plans to repeat her echocardiogram a year from now.  She currently does not seem to be in heart failure.  She is a good medication management.    TOREY Baumann, CNP          Orders Placed This Encounter   Procedures     Follow-Up with Cardiac Advanced Practice Provider       Orders Placed This Encounter   Medications     PREDNISONE PO     Sig: Take 20 mg by mouth 20 mg two tabs daily     MONTELUKAST SODIUM PO     Sig: Take 10 mg by mouth     amLODIPine (NORVASC) 5 MG tablet     Sig: Take 1 tablet (5 mg) by mouth daily     Dispense:  30 tablet     Refill:  1       There are no discontinued  medications.      Encounter Diagnosis   Name Primary?     Essential hypertension with goal blood pressure less than 140/90        CURRENT MEDICATIONS:  Current Outpatient Prescriptions   Medication Sig Dispense Refill     PREDNISONE PO Take 20 mg by mouth 20 mg two tabs daily       MONTELUKAST SODIUM PO Take 10 mg by mouth       amLODIPine (NORVASC) 5 MG tablet Take 1 tablet (5 mg) by mouth daily 30 tablet 1     azelastine (ASTELIN) 0.1 % spray SPRAY 1-2 SPRAYS INTO BOTH NOSTRILS 2 TIMES DAILY 30 mL 5     metoprolol (TOPROL-XL) 25 MG 24 hr tablet Take 2 tablets (50 mg) by mouth daily 60 tablet 11     hydrochlorothiazide 12.5 MG TABS tablet Take 1 tablet (12.5 mg) by mouth daily 90 tablet 0     omeprazole (PRILOSEC) 20 MG CR capsule TAKE ONE CAPSULE BY MOUTH ONE TIME DAILY  90 capsule 2     simvastatin (ZOCOR) 20 MG tablet Take 0.5 tablets (10 mg) by mouth At Bedtime 45 tablet 3     losartan (COZAAR) 100 MG tablet Take 1 tablet (100 mg) by mouth At Bedtime 90 tablet 1     fluticasone-salmeterol (ADVAIR-HFA) 230-21 MCG/ACT inhaler Inhale 2 puffs into the lungs 2 times daily 12 g 1     acetaminophen (TYLENOL) 500 MG tablet Take 500-1,000 mg by mouth every 8 hours as needed for mild pain       albuterol (PROAIR HFA, PROVENTIL HFA, VENTOLIN HFA) 108 (90 BASE) MCG/ACT inhaler Inhale 2 puffs into the lungs every 6 hours as needed        aspirin 81 MG EC tablet Take 1 tablet (81 mg) by mouth daily (Patient taking differently: Take 81 mg by mouth daily Taking 3 times per week) 90 tablet 3     calcium citrate-vitamin D (CALCIUM CITRATE +) 315-200 MG-UNIT TABS Take 2 tablets by mouth daily.         ALLERGIES     Allergies   Allergen Reactions     Fosamax [Alendronic Acid] GI Disturbance     Augmentin [Amoxicillin-Pot Clavulanate] Diarrhea     Diarrhea and rash     Evista [Raloxifene]      abd symptoms.      Flu Virus Vaccine      swollen and red at inj site       PAST MEDICAL HISTORY:  Past Medical History:   Diagnosis Date      Basal Cell Carcinoma--back      Coronary artery disease involving native coronary artery of native heart without angina pectoris 3/9/2017    3/2/2017 - Two vessel coronary artery disease with mLAD 50% stenosis, D3 50% stenosis, pLCx 50% stenosis and mLCx 50% stenosis     Essential hypertension with goal blood pressure less than 140/90 9/1/2016    White coat hypertension;  24 hour ambulatory monitoring normal. Do not titrate up further.       Hyperlipidemia LDL goal <130 2/10/2010     Impaired fasting glucose 8/26/2010     Moderate persistent asthma      Nonrheumatic aortic valve insufficiency 6/29/2017     osteopenia      Other and unspecified hyperlipidemia      Pulmonary nodule 3/3/2009    PET scan reportedly normal; biopsy not advised.     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  Problem list name updated by automated process. Provider to review     Unspecified arthropathy, hand      Vasculitis (H) 4/20/2009    Possible increased activity of thoracic aorta, on PET scan w/u for pulmonary nodule.        PAST SURGICAL HISTORY:  Past Surgical History:   Procedure Laterality Date     C APPENDECTOMY  1957     C LIGATE FALLOPIAN TUBE  1971     C STEREOTACTIC BREAST BIOPSY  2001     CHOLECYSTECTOMY, LAPOROSCOPIC  2008    Cholecystectomy, Laparoscopic     ESOPHAGOSCOPY, GASTROSCOPY, DUODENOSCOPY (EGD), COMBINED  5/17/2013    Procedure: COMBINED ESOPHAGOSCOPY, GASTROSCOPY, DUODENOSCOPY (EGD), BIOPSY SINGLE OR MULTIPLE;  ESOPHAGOSCOPY, GASTROSCOPY, DUODENOSCOPY (EGD)  with bx;  Surgeon: Jeffery Yanez MD;  Location:  GI     HC DILATION/CURETTAGE DIAG/THER NON OB  1967,1971     HC REMOVE TONSILS/ADENOIDS,<11 Y/O         FAMILY HISTORY:  Family  "History   Problem Relation Age of Onset     Hypertension Mother      OSTEOPOROSIS Mother      C.A.D. Mother      Connective Tissue Disorder Father      scleraderma     CEREBROVASCULAR DISEASE Paternal Grandmother      Prostate Cancer Other      9/05 passed away due to complications     Breast Cancer Child      youngest dtr       SOCIAL HISTORY:  Social History     Social History     Marital status: Single     Spouse name: N/A     Number of children: 3     Years of education: 15     Occupational History     semi-retired      Social History Main Topics     Smoking status: Never Smoker     Smokeless tobacco: Never Used     Alcohol use 0.6 - 1.2 oz/week     1 - 2 Standard drinks or equivalent per week     Drug use: No     Sexual activity: No     Other Topics Concern     Parent/Sibling W/ [place], Mi Or Angioplasty Before 65f 55m? No     Social History Narrative       Review of Systems:  Skin:  Negative       Eyes:  Positive for glasses    ENT:  Negative for      Respiratory:  Positive for shortness of breath ashma   Cardiovascular:  Negative      Gastroenterology: Negative for      Genitourinary:  not assessed      Musculoskeletal:  Positive for arthritis    Neurologic:  Negative for      Psychiatric:         Heme/Lymph/Imm:  Positive for allergies    Endocrine:  Positive for   prediabetes,     Physical Exam:  Vitals: /60  Pulse 62  Ht 1.6 m (5' 3\")  Wt 64.4 kg (142 lb)  BMI 25.15 kg/m2    Constitutional:  cooperative, alert and oriented, well developed, well nourished, in no acute distress        Skin:  warm and dry to the touch, no apparent skin lesions or masses noted   ecchymosis on forearms    Head:  normocephalic, no masses or lesions        Eyes:  pupils equal and round, conjunctivae and lids unremarkable, sclera white, no xanthalasma, EOMS intact, no nystagmus        ENT:  no pallor or cyanosis, dentition good        Neck:  JVP normal        Chest:  normal breath sounds, clear to auscultation, normal A-P " diameter, normal symmetry, normal respiratory excursion, no use of accessory muscles          Cardiac: regular rhythm           diastolic murmur;decrescendo;RUSB;grade 2      Abdomen:           Vascular: pulses full and equal                                        Extremities and Back:  no edema;no spinal abnormalities noted;normal muscle strength and tone         compression stockings    Neurological:  affect appropriate, oriented to time, person and place;no gross motor deficits        Thank you for allowing me to participate in the care of your patient.    Sincerely,     TOREY Mcqueen [facility]

## 2017-08-29 RX ORDER — AZELASTINE 1 MG/ML
SPRAY, METERED NASAL
Qty: 30 ML | Refills: 5 | Status: SHIPPED | OUTPATIENT
Start: 2017-08-29 | End: 2017-11-15

## 2017-09-07 ENCOUNTER — OFFICE VISIT (OUTPATIENT)
Dept: PEDIATRICS | Facility: CLINIC | Age: 82
End: 2017-09-07
Payer: COMMERCIAL

## 2017-09-07 VITALS
HEART RATE: 73 BPM | HEIGHT: 63 IN | OXYGEN SATURATION: 95 % | TEMPERATURE: 97.9 F | WEIGHT: 141.5 LBS | SYSTOLIC BLOOD PRESSURE: 170 MMHG | BODY MASS INDEX: 25.07 KG/M2 | DIASTOLIC BLOOD PRESSURE: 58 MMHG

## 2017-09-07 DIAGNOSIS — I10 ESSENTIAL HYPERTENSION WITH GOAL BLOOD PRESSURE LESS THAN 140/90: ICD-10-CM

## 2017-09-07 PROCEDURE — 99214 OFFICE O/P EST MOD 30 MIN: CPT | Performed by: INTERNAL MEDICINE

## 2017-09-07 RX ORDER — SPIRONOLACTONE 25 MG/1
12.5 TABLET ORAL DAILY
Qty: 15 TABLET | Refills: 0 | Status: SHIPPED | OUTPATIENT
Start: 2017-09-07 | End: 2017-10-06

## 2017-09-07 RX ORDER — LOSARTAN POTASSIUM 100 MG/1
100 TABLET ORAL AT BEDTIME
Qty: 90 TABLET | Refills: 1 | Status: SHIPPED | OUTPATIENT
Start: 2017-09-07 | End: 2018-03-19

## 2017-09-07 NOTE — PATIENT INSTRUCTIONS
Keep all blood pressure the same.      Add spironolactone 12.5 mg (half of a 25 mg tab) every AM.    Follow up in 1 month for blood work.    Layo Sevilla MD  Internal Medicine and Pediatrics

## 2017-09-07 NOTE — PROGRESS NOTES
SUBJECTIVE:   Genie Persaud is a 81 year old female who presents to clinic today for the following health issues:      Hypertension Follow-up      Outpatient blood pressures are being checked at home.  Results are 176/68.    Low Salt Diet: no added salt        Amount of exercise or physical activity: None    Problems taking medications regularly: No    Medication side effects: none  Diet: regular (no restrictions)      Took a calcium channel blocker for 3 days, (amlodipine) but got very dizzy, so stopped after 3 days.  Currently feels dizzy.  Says home blood pressure was 124-134 / 60s.     Feels like asthma is bad.  Given prednisone recently and felt better.  Thinks allergies are making it worse.      Problem list and histories reviewed & adjusted, as indicated.  Additional history: as documented    Patient Active Problem List   Diagnosis     Swelling, mass, or lump in head and neck     Pulmonary nodule     Anemia     Vasculitis (H)     HYPERLIPIDEMIA LDL GOAL <130     Impaired fasting glucose     Candidal esophagitis (H)     Thoracic aortic aneurysm without rupture (H)     Health Care Home     Advanced directives, counseling/discussion     Stroke (H)     Moderate persistent asthma without complication     Essential hypertension with goal blood pressure less than 140/90     Coronary artery disease involving native coronary artery of native heart without angina pectoris     SVT -noted during Cath procedure     Nonrheumatic aortic valve insufficiency     Past Surgical History:   Procedure Laterality Date     C APPENDECTOMY  1957     C LIGATE FALLOPIAN TUBE  1971     C STEREOTACTIC BREAST BIOPSY  2001     CHOLECYSTECTOMY, LAPOROSCOPIC  2008    Cholecystectomy, Laparoscopic     ESOPHAGOSCOPY, GASTROSCOPY, DUODENOSCOPY (EGD), COMBINED  5/17/2013    Procedure: COMBINED ESOPHAGOSCOPY, GASTROSCOPY, DUODENOSCOPY (EGD), BIOPSY SINGLE OR MULTIPLE;  ESOPHAGOSCOPY, GASTROSCOPY, DUODENOSCOPY (EGD)  with bx;  Surgeon: Beau  Jeffery UBRT MD;  Location:  GI     HC DILATION/CURETTAGE DIAG/THER NON OB  1967,1971     HC REMOVE TONSILS/ADENOIDS,<11 Y/O         Social History   Substance Use Topics     Smoking status: Never Smoker     Smokeless tobacco: Never Used     Alcohol use 0.6 - 1.2 oz/week     1 - 2 Standard drinks or equivalent per week     Family History   Problem Relation Age of Onset     Hypertension Mother      OSTEOPOROSIS Mother      C.A.D. Mother      Connective Tissue Disorder Father      scleraderma     CEREBROVASCULAR DISEASE Paternal Grandmother      Prostate Cancer Other      9/05 passed away due to complications     Breast Cancer Child      youngest dtr         Current Outpatient Prescriptions   Medication Sig Dispense Refill     losartan (COZAAR) 100 MG tablet Take 1 tablet (100 mg) by mouth At Bedtime 90 tablet 1     spironolactone (ALDACTONE) 25 MG tablet Take 0.5 tablets (12.5 mg) by mouth daily 15 tablet 0     azelastine (ASTELIN) 0.1 % spray SPRAY 1-2 SPRAYS INTO BOTH NOSTRILS 2 TIMES DAILY 30 mL 5     PREDNISONE PO Take 20 mg by mouth 20 mg two tabs daily       metoprolol (TOPROL-XL) 25 MG 24 hr tablet Take 2 tablets (50 mg) by mouth daily 60 tablet 11     hydrochlorothiazide 12.5 MG TABS tablet Take 1 tablet (12.5 mg) by mouth daily 90 tablet 0     omeprazole (PRILOSEC) 20 MG CR capsule TAKE ONE CAPSULE BY MOUTH ONE TIME DAILY  90 capsule 2     simvastatin (ZOCOR) 20 MG tablet Take 0.5 tablets (10 mg) by mouth At Bedtime 45 tablet 3     acetaminophen (TYLENOL) 500 MG tablet Take 500-1,000 mg by mouth every 8 hours as needed for mild pain       albuterol (PROAIR HFA, PROVENTIL HFA, VENTOLIN HFA) 108 (90 BASE) MCG/ACT inhaler Inhale 2 puffs into the lungs every 6 hours as needed        aspirin 81 MG EC tablet Take 1 tablet (81 mg) by mouth daily (Patient taking differently: Take 81 mg by mouth daily Taking 3 times per week) 90 tablet 3     calcium citrate-vitamin D (CALCIUM CITRATE +) 315-200 MG-UNIT TABS Take 2  "tablets by mouth daily.       MONTELUKAST SODIUM PO Take 10 mg by mouth       [DISCONTINUED] losartan (COZAAR) 100 MG tablet Take 1 tablet (100 mg) by mouth At Bedtime 90 tablet 1     fluticasone-salmeterol (ADVAIR-HFA) 230-21 MCG/ACT inhaler Inhale 2 puffs into the lungs 2 times daily 12 g 1     Allergies   Allergen Reactions     Fosamax [Alendronic Acid] GI Disturbance     Augmentin [Amoxicillin-Pot Clavulanate] Diarrhea     Diarrhea and rash     Evista [Raloxifene]      abd symptoms.      Flu Virus Vaccine      swollen and red at inj site     BP Readings from Last 3 Encounters:   09/07/17 170/58   08/25/17 169/60   08/07/17 170/48    Wt Readings from Last 3 Encounters:   09/07/17 141 lb 8 oz (64.2 kg)   08/25/17 142 lb (64.4 kg)   08/07/17 141 lb (64 kg)                  Labs reviewed in EPIC        Reviewed and updated as needed this visit by clinical staff     Reviewed and updated as needed this visit by Provider         ROS:  C: NEGATIVE for fever, chills, change in weight  E/M: NEGATIVE for ear, mouth and throat problems  R: NEGATIVE for significant cough or SOB  CV: NEGATIVE for chest pain, palpitations or peripheral edema    OBJECTIVE:                                                    /58  Pulse 73  Temp 97.9  F (36.6  C) (Oral)  Ht 5' 3\" (1.6 m)  Wt 141 lb 8 oz (64.2 kg)  SpO2 95%  BMI 25.07 kg/m2  Body mass index is 25.07 kg/(m^2).   GENERAL: healthy, alert, well nourished, well hydrated, no distress  HENT: ear canals- normal; TMs- normal; Nose- normal; Mouth- no ulcers, no lesions  NECK: no tenderness, no adenopathy, no asymmetry, no masses, no stiffness; thyroid- normal to palpation  RESP: lungs clear to auscultation - no rales, no rhonchi, no wheezes  CV: regular rates and rhythm, normal S1 S2, no S3 or S4 and no murmur, no click or rub -  ABDOMEN: soft, no tenderness, no  hepatosplenomegaly, no masses, normal bowel sounds    Diagnostic test results:  Diagnostic Test Results:  none    "     ASSESSMENT/PLAN:                                                    1. Essential hypertension with goal blood pressure less than 140/90  hypertension with wide pulse pressure and aortic insufficiency; advised to add low dose aldactone, and follow up 1 month for labs.  Did not tolerate amlodipine 5 mg. May need to go even slower (2.5 mg?) at next follow up.   - losartan (COZAAR) 100 MG tablet; Take 1 tablet (100 mg) by mouth At Bedtime  Dispense: 90 tablet; Refill: 1  - spironolactone (ALDACTONE) 25 MG tablet; Take 0.5 tablets (12.5 mg) by mouth daily  Dispense: 15 tablet; Refill: 0    2.  Asthma:  Flaring due to recent allergies; advised to continue current meds, s/p recent prednisone.     See Patient Instructions    Layo Sevilla MD  [facility]

## 2017-09-07 NOTE — NURSING NOTE
"Chief Complaint   Patient presents with     Medicare Visit     Hypertension       Initial /58  Pulse 73  Temp 97.9  F (36.6  C) (Oral)  Ht 5' 3\" (1.6 m)  Wt 141 lb 8 oz (64.2 kg)  SpO2 95%  BMI 25.07 kg/m2 Estimated body mass index is 25.07 kg/(m^2) as calculated from the following:    Height as of this encounter: 5' 3\" (1.6 m).    Weight as of this encounter: 141 lb 8 oz (64.2 kg).  Medication Reconciliation: complete   Pema Lay MA// September 7, 2017 11:33 AM          "

## 2017-09-07 NOTE — MR AVS SNAPSHOT
"              After Visit Summary   9/7/2017    Genie Persaud    MRN: [id number]           Patient Information     Date Of Birth          1935        Visit Information        Provider Department      9/7/2017 11:20 AM Layo Sevilla MD [facility]        Today's Diagnoses     Essential hypertension with goal blood pressure less than 140/90          Care Instructions    Keep all blood pressure the same.      Add spironolactone 12.5 mg (half of a 25 mg tab) every AM.    Follow up in 1 month for blood work.    Layo Sevilla MD  Internal Medicine and Pediatrics           Follow-ups after your visit        Your next 10 appointments already scheduled     Sep 15, 2017  1:30 PM CDT   Return Visit with TOREY Sierra CNP   [facility] [place] ([facility])    [address] [address] [address] [phone]              Who to contact     If you have questions or need follow up information about today's clinic visit or your schedule please contact [facility] directly at [phone].  Normal or non-critical lab and imaging results will be communicated to you by Petcubehart, letter or phone within 4 business days after the clinic has received the results. If you do not hear from us within 7 days, please contact the clinic through StartupMojot or phone. If you have a critical or abnormal lab result, we will notify you by phone as soon as possible.  Submit refill requests through SuVolta or call your pharmacy and they will forward the refill request to us. Please allow 3 business days for your refill to be completed.          Additional Information About Your Visit        MyChart Information     SuVolta lets you send messages to your doctor, view your test results, renew your prescriptions, schedule appointments and more. To sign up, go to [email or web address] . Click on \"Log in\" on the left side of the screen, which will take " "you to the Welcome page. Then click on \"Sign up Now\" on the right side of the page.     You will be asked to enter the access code listed below, as well as some personal information. Please follow the directions to create your username and password.     Your access code is: [email or web address]  Expires: 2017  9:21 AM     Your access code will  in 90 days. If you need help or a new code, please call your [place] clinic or [phone].        Care EveryWhere ID     This is your Care EveryWhere ID. This could be used by other organizations to access your [place] medical records  [id number]        Your Vitals Were     Pulse Temperature Height Pulse Oximetry BMI (Body Mass Index)       73 97.9  F (36.6  C) (Oral) 5' 3\" (1.6 m) 95% 25.07 kg/m2        Blood Pressure from Last 3 Encounters:   17 170/58   17 169/60   17 170/48    Weight from Last 3 Encounters:   17 141 lb 8 oz (64.2 kg)   17 142 lb (64.4 kg)   17 141 lb (64 kg)              Today, you had the following     No orders found for display         Today's Medication Changes          These changes are accurate as of: 17 12:05 PM.  If you have any questions, ask your nurse or doctor.               Start taking these medicines.        Dose/Directions    spironolactone 25 MG tablet   Commonly known as:  ALDACTONE   Used for:  Essential hypertension with goal blood pressure less than 140/90   Started by:  Layo Sevilla MD        Dose:  12.5 mg   Take 0.5 tablets (12.5 mg) by mouth daily   Quantity:  15 tablet   Refills:  0         These medicines have changed or have updated prescriptions.        Dose/Directions    aspirin 81 MG EC tablet   This may have changed:  additional instructions   Used for:  Stroke (H)        Dose:  81 mg   Take 1 tablet (81 mg) by mouth daily   Quantity:  90 tablet   Refills:  3         Stop taking these medicines if you haven't already. Please contact your care team if you have questions.     " amLODIPine 5 MG tablet   Commonly known as:  NORVASC   Stopped by:  Layo Sevilla MD                Where to get your medicines      These medications were sent to [id number] - [place], [place] - [address]  [address] [address]     Phone:  [phone]     losartan 100 MG tablet    spironolactone 25 MG tablet                Primary Care Provider Office Phone # Fax #    Layo Sevilla [phone]       [facility] [address] [place] [address]        Equal Access to Services     [facility]: Hadii aad ku hadasho Soomaali, waaxda luqadaha, qaybta kaalmada adeegyada, waxay idiin hayaan mayco meyer . So [facility] [phone].    ATENCIÓN: Si habla español, tiene a patel disposición servicios gratuitos de asistencia lingüística. [facility] [phone].    We comply with applicable federal civil rights laws and [place] laws. We do not discriminate on the basis of race, color, national origin, age, disability sex, sexual orientation or gender identity.            Thank you!     Thank you for choosing [facility]  for your care. Our goal is always to provide you with excellent care. Hearing back from our patients is one way we can continue to improve our services. Please take a few minutes to complete the written survey that you may receive in the mail after your visit with us. Thank you!             Your Updated Medication List - Protect others around you: Learn how to safely use, store and throw away your medicines at www.disposemymeds.org.          This list is accurate as of: 9/7/17 12:05 PM.  Always use your most recent med list.                   Brand Name Dispense Instructions for use Diagnosis    acetaminophen 500 MG tablet    TYLENOL     Take 500-1,000 mg by mouth every 8 hours as needed for mild pain        albuterol 108 (90 BASE) MCG/ACT Inhaler    PROAIR HFA/PROVENTIL HFA/VENTOLIN HFA     Inhale 2 puffs into the lungs every 6 hours as needed         aspirin 81 MG EC tablet     90 tablet    Take 1 tablet (81 mg) by mouth daily    Stroke (H)       azelastine 0.1 % spray    ASTELIN    30 mL    SPRAY 1-2 SPRAYS INTO BOTH NOSTRILS 2 TIMES DAILY    Allergic rhinitis due to pollen       CALCIUM CITRATE + 315-200 MG-UNIT Tabs per tablet   Generic drug:  calcium citrate-vitamin D      Take 2 tablets by mouth daily.        fluticasone-salmeterol 230-21 MCG/ACT inhaler    ADVAIR-HFA    12 g    Inhale 2 puffs into the lungs 2 times daily        hydrochlorothiazide 12.5 MG Tabs tablet     90 tablet    Take 1 tablet (12.5 mg) by mouth daily    Benign essential hypertension       losartan 100 MG tablet    COZAAR    90 tablet    Take 1 tablet (100 mg) by mouth At Bedtime    Essential hypertension with goal blood pressure less than 140/90       metoprolol 25 MG 24 hr tablet    TOPROL-XL    60 tablet    Take 2 tablets (50 mg) by mouth daily    Essential hypertension with goal blood pressure less than 140/90       MONTELUKAST SODIUM PO      Take 10 mg by mouth        omeprazole 20 MG CR capsule    priLOSEC    90 capsule    TAKE ONE CAPSULE BY MOUTH ONE TIME DAILY    Candidal esophagitis (H)       PREDNISONE PO      Take 20 mg by mouth 20 mg two tabs daily        simvastatin 20 MG tablet    ZOCOR    45 tablet    Take 0.5 tablets (10 mg) by mouth At Bedtime    Hyperlipidemia LDL goal <130       spironolactone 25 MG tablet    ALDACTONE    15 tablet    Take 0.5 tablets (12.5 mg) by mouth daily    Essential hypertension with goal blood pressure less than 140/90

## 2017-09-15 ENCOUNTER — OFFICE VISIT (OUTPATIENT)
Dept: CARDIOLOGY | Facility: CLINIC | Age: 82
End: 2017-09-15
Attending: NURSE PRACTITIONER
Payer: COMMERCIAL

## 2017-09-15 VITALS
SYSTOLIC BLOOD PRESSURE: 149 MMHG | HEIGHT: 63 IN | WEIGHT: 140.3 LBS | HEART RATE: 70 BPM | BODY MASS INDEX: 24.86 KG/M2 | DIASTOLIC BLOOD PRESSURE: 54 MMHG

## 2017-09-15 DIAGNOSIS — I10 ESSENTIAL HYPERTENSION WITH GOAL BLOOD PRESSURE LESS THAN 140/90: ICD-10-CM

## 2017-09-15 PROCEDURE — 99214 OFFICE O/P EST MOD 30 MIN: CPT | Performed by: NURSE PRACTITIONER

## 2017-09-15 NOTE — LETTER
"9/15/2017    Layo Sevilla MD  [address] [place] [address]    RE: Genie Persaud       Dear Colleague,    I had the pleasure of seeing Genie Persaud in the [facility].    HPI and Plan:   I had the pleasure of seeing Ms. Genie Persaud in cardiology clinic follow-up for hypertension today.  She is a very pleasant almost 82-year-old patient of Dr. Hernandez.  He has a past medical history of severe hypertension, hypercholesterolemia, ascending aortic aneurysm, mild to moderate coronary artery disease based on angiogram from March 2017, she was noted to have nonsustained SVT during this angiogram..  She also has moderate to moderately severe aortic insufficiency seen on her echo from March 2017, she also had a nonischemic cardiomyopathy with EF of 45-50%.  She has aortic insufficiency, moderate to severe 2-3+ aortic regurgitation without significant aortic stenosis.  She had a CT in March 2017 showing her aneurysm measuring 4.4 cm, Dr. Peguero plans to recheck this in a year.  She struggles with medication intolerances, in the past she's got very dizzy and hydrochlorothiazide 25 mg, but she is able to tolerate 12.5 mg.    I saw Ms. Persaud in August after her diltiazem had been switched to metoprolol and subsequently increased.  In our visit her blood pressure was 169/60, I subsequently added amlodipine 5 mg daily and asked her to see me for follow-up.  In the interim she is met with her primary care doctor, she reports that she took amlodipine for three days but felt very lightheaded and couldn't tolerate it and stopped it.  For that reason her primary initiated her on spironolactone 12.5 mg.  She has a follow-up appointment with him with labs in a couple of weeks.    Today Ms. Persaud tells me that overall her blood pressure is \"a lot better\" on this new medication of spironolactone.  She is not having any symptoms of dizziness or lightheadedness.  Her main concern today is her " allergies are bad.  By my check her blood pressure today is 149/54, so it is better than 169/60.  She is not having any chest pain, shortness of breath, PND or orthopnea.    Labs reviewed.  Cholesterol 149, H 78, LDL 59, triglycerides 62, potassium 3.5.    Physical exam  General.  81-year-old woman, appears comfortable on room air.  Vital signs.  Blood pressure 149/54 heart rate 58 weight 140 pounds, BMI 25  Lungs.  Bilaterally clear to auscultation, prolonged expiration  Cardiac.  Rate regular, soft systolic murmur  Extremities.  No lower extremity edema, wearing compression stockings.    Assessment and plan  1. Resistant hypertension.  She did not do well on amlodipine 5 mg, perhaps if we use this medication in the future we should start at a lower dose.  Fortunately she is doing well on spironolactone 12.5 mg.  I considered increasing her dose today as her blood pressure is still borderline high.  However we opted to give it a little bit more time, she is having a birthday party for herself this weekend with all of her children and she is afraid of getting lightheaded or dizzy during the festivities.  She has close follow-up with her primary, including labs.  2. Ascending aortic aneurysm.  CT in March 2017 measured 4.4 cm, in 2015 and measured 4.6 cm.  I recommend continued aggressive blood pressure control, Dr. Peguero plans to repeat the CT in 2018.  3. Aortic insufficiency.  She does have a systolic murmur, her echo from March of this year showed 2-3+ aortic insufficiency, Dr. Peguero plans to repeat the echo and see her in December this year.  Clinically she does not appear to be in heart failure.        No orders of the defined types were placed in this encounter.      No orders of the defined types were placed in this encounter.      There are no discontinued medications.      Encounter Diagnosis   Name Primary?     Essential hypertension with goal blood pressure less than 140/90        CURRENT  MEDICATIONS:  Current Outpatient Prescriptions   Medication Sig Dispense Refill     losartan (COZAAR) 100 MG tablet Take 1 tablet (100 mg) by mouth At Bedtime 90 tablet 1     spironolactone (ALDACTONE) 25 MG tablet Take 0.5 tablets (12.5 mg) by mouth daily 15 tablet 0     azelastine (ASTELIN) 0.1 % spray SPRAY 1-2 SPRAYS INTO BOTH NOSTRILS 2 TIMES DAILY 30 mL 5     PREDNISONE PO Take 20 mg by mouth 20 mg two tabs daily       MONTELUKAST SODIUM PO Take 10 mg by mouth       metoprolol (TOPROL-XL) 25 MG 24 hr tablet Take 2 tablets (50 mg) by mouth daily 60 tablet 11     hydrochlorothiazide 12.5 MG TABS tablet Take 1 tablet (12.5 mg) by mouth daily 90 tablet 0     omeprazole (PRILOSEC) 20 MG CR capsule TAKE ONE CAPSULE BY MOUTH ONE TIME DAILY  90 capsule 2     simvastatin (ZOCOR) 20 MG tablet Take 0.5 tablets (10 mg) by mouth At Bedtime 45 tablet 3     fluticasone-salmeterol (ADVAIR-HFA) 230-21 MCG/ACT inhaler Inhale 2 puffs into the lungs 2 times daily 12 g 1     acetaminophen (TYLENOL) 500 MG tablet Take 500-1,000 mg by mouth every 8 hours as needed for mild pain       albuterol (PROAIR HFA, PROVENTIL HFA, VENTOLIN HFA) 108 (90 BASE) MCG/ACT inhaler Inhale 2 puffs into the lungs every 6 hours as needed        aspirin 81 MG EC tablet Take 1 tablet (81 mg) by mouth daily (Patient taking differently: Take 81 mg by mouth daily Taking 3 times per week) 90 tablet 3     calcium citrate-vitamin D (CALCIUM CITRATE +) 315-200 MG-UNIT TABS Take 2 tablets by mouth daily.         ALLERGIES     Allergies   Allergen Reactions     Fosamax [Alendronic Acid] GI Disturbance     Augmentin [Amoxicillin-Pot Clavulanate] Diarrhea     Diarrhea and rash     Evista [Raloxifene]      abd symptoms.      Flu Virus Vaccine      swollen and red at inj site       PAST MEDICAL HISTORY:  Past Medical History:   Diagnosis Date     Basal Cell Carcinoma--back      Coronary artery disease involving native coronary artery of native heart without angina  pectoris 3/9/2017    3/2/2017 - Two vessel coronary artery disease with mLAD 50% stenosis, D3 50% stenosis, pLCx 50% stenosis and mLCx 50% stenosis     Essential hypertension with goal blood pressure less than 140/90 9/1/2016    White coat hypertension;  24 hour ambulatory monitoring normal. Do not titrate up further.       Hyperlipidemia LDL goal <130 2/10/2010     Impaired fasting glucose 8/26/2010     Moderate persistent asthma      Nonrheumatic aortic valve insufficiency 6/29/2017     osteopenia      Other and unspecified hyperlipidemia      Pulmonary nodule 3/3/2009    PET scan reportedly normal; biopsy not advised.     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  Problem list name updated by automated process. Provider to review     Unspecified arthropathy, hand      Vasculitis (H) 4/20/2009    Possible increased activity of thoracic aorta, on PET scan w/u for pulmonary nodule.        PAST SURGICAL HISTORY:  Past Surgical History:   Procedure Laterality Date     C APPENDECTOMY  1957     C LIGATE FALLOPIAN TUBE  1971     C STEREOTACTIC BREAST BIOPSY  2001     CHOLECYSTECTOMY, LAPOROSCOPIC  2008    Cholecystectomy, Laparoscopic     ESOPHAGOSCOPY, GASTROSCOPY, DUODENOSCOPY (EGD), COMBINED  5/17/2013    Procedure: COMBINED ESOPHAGOSCOPY, GASTROSCOPY, DUODENOSCOPY (EGD), BIOPSY SINGLE OR MULTIPLE;  ESOPHAGOSCOPY, GASTROSCOPY, DUODENOSCOPY (EGD)  with bx;  Surgeon: Jeffery Yanez MD;  Location:  GI     HC DILATION/CURETTAGE DIAG/THER NON OB  1967,1971     HC REMOVE TONSILS/ADENOIDS,<11 Y/O         FAMILY HISTORY:  Family History   Problem Relation Age of Onset     Hypertension Mother      OSTEOPOROSIS Mother      C.A.D. Mother      Connective  "Tissue Disorder Father      scleraderma     CEREBROVASCULAR DISEASE Paternal Grandmother      Prostate Cancer Other      9/05 passed away due to complications     Breast Cancer Child      youngest dtr       SOCIAL HISTORY:  Social History     Social History     Marital status: Single     Spouse name: N/A     Number of children: 3     Years of education: 15     Occupational History     semi-retired      Social History Main Topics     Smoking status: Never Smoker     Smokeless tobacco: Never Used     Alcohol use 0.6 - 1.2 oz/week     1 - 2 Standard drinks or equivalent per week     Drug use: No     Sexual activity: No     Other Topics Concern     Parent/Sibling W/ Cabg, Mi Or Angioplasty Before 65f 55m? No     Social History Narrative       Review of Systems:  Skin:  Negative       Eyes:  Positive for glasses    ENT:  Positive for postnasal drainage;sinus trouble environmental sinus issues -  pressure around nose - ears - allergies per pt  Respiratory:  Positive for shortness of breath asthma   Cardiovascular:    Positive for;lightheadedness;dizziness;edema swelling in left leg/foot - wears compression stocking occ  Gastroenterology: Negative      Genitourinary:  Negative      Musculoskeletal:  Positive for arthritis;joint pain shoulder left, right knee  Neurologic:  Negative      Psychiatric:  Negative      Heme/Lymph/Imm:  Positive for allergies RX allergies ,  Allergic to almost  everything- per pt  -  environmental allergies , cats - certain trees -  no food allergies   Endocrine:  Positive for   prediabetes    Physical Exam:  Vitals: /54 (BP Location: Right arm, Patient Position: Chair, Cuff Size: Adult Regular)  Pulse 70  Ht 1.6 m (5' 3\")  Wt 63.6 kg (140 lb 4.8 oz)  BMI 24.85 kg/m2    Constitutional:  cooperative, alert and oriented, well developed, well nourished, in no acute distress        Skin:  warm and dry to the touch, no apparent skin lesions or masses noted   ecchymosis on forearms    Head:  " normocephalic, no masses or lesions        Eyes:  pupils equal and round, conjunctivae and lids unremarkable, sclera white, no xanthalasma, EOMS intact, no nystagmus        ENT:  no pallor or cyanosis, dentition good        Neck:  JVP normal        Chest:  normal breath sounds, clear to auscultation, normal A-P diameter, normal symmetry, normal respiratory excursion, no use of accessory muscles          Cardiac: regular rhythm           diastolic murmur;decrescendo;RUSB;grade 2      Abdomen:           Vascular: pulses full and equal                                        Extremities and Back:  no edema;no spinal abnormalities noted;normal muscle strength and tone         compression stockings    Neurological:  affect appropriate, oriented to time, person and place;no gross motor deficits        Thank you for allowing me to participate in the care of your patient.    Sincerely,     TOREY Mcqueen [facility]

## 2017-09-15 NOTE — PROGRESS NOTES
"HPI and Plan:   I had the pleasure of seeing Ms. Genie Pesraud in cardiology clinic follow-up for hypertension today.  She is a very pleasant almost 82-year-old patient of Dr. Hernandez.  He has a past medical history of severe hypertension, hypercholesterolemia, ascending aortic aneurysm, mild to moderate coronary artery disease based on angiogram from March 2017, she was noted to have nonsustained SVT during this angiogram..  She also has moderate to moderately severe aortic insufficiency seen on her echo from March 2017, she also had a nonischemic cardiomyopathy with EF of 45-50%.  She has aortic insufficiency, moderate to severe 2-3+ aortic regurgitation without significant aortic stenosis.  She had a CT in March 2017 showing her aneurysm measuring 4.4 cm, Dr. Peguero plans to recheck this in a year.  She struggles with medication intolerances, in the past she's got very dizzy and hydrochlorothiazide 25 mg, but she is able to tolerate 12.5 mg.    I saw Ms. Persaud in August after her diltiazem had been switched to metoprolol and subsequently increased.  In our visit her blood pressure was 169/60, I subsequently added amlodipine 5 mg daily and asked her to see me for follow-up.  In the interim she is met with her primary care doctor, she reports that she took amlodipine for three days but felt very lightheaded and couldn't tolerate it and stopped it.  For that reason her primary initiated her on spironolactone 12.5 mg.  She has a follow-up appointment with him with labs in a couple of weeks.    Today Ms. Persaud tells me that overall her blood pressure is \"a lot better\" on this new medication of spironolactone.  She is not having any symptoms of dizziness or lightheadedness.  Her main concern today is her allergies are bad.  By my check her blood pressure today is 149/54, so it is better than 169/60.  She is not having any chest pain, shortness of breath, PND or orthopnea.    Labs reviewed.  Cholesterol 149, H 78, LDL " 59, triglycerides 62, potassium 3.5.    Physical exam  General.  81-year-old woman, appears comfortable on room air.  Vital signs.  Blood pressure 149/54 heart rate 58 weight 140 pounds, BMI 25  Lungs.  Bilaterally clear to auscultation, prolonged expiration  Cardiac.  Rate regular, soft systolic murmur  Extremities.  No lower extremity edema, wearing compression stockings.    Assessment and plan  1. Resistant hypertension.  She did not do well on amlodipine 5 mg, perhaps if we use this medication in the future we should start at a lower dose.  Fortunately she is doing well on spironolactone 12.5 mg.  I considered increasing her dose today as her blood pressure is still borderline high.  However we opted to give it a little bit more time, she is having a birthday party for herself this weekend with all of her children and she is afraid of getting lightheaded or dizzy during the festivities.  She has close follow-up with her primary, including labs.  2. Ascending aortic aneurysm.  CT in March 2017 measured 4.4 cm, in 2015 and measured 4.6 cm.  I recommend continued aggressive blood pressure control, Dr. Peguero plans to repeat the CT in 2018.  3. Aortic insufficiency.  She does have a systolic murmur, her echo from March of this year showed 2-3+ aortic insufficiency, Dr. Peguero plans to repeat the echo and see her in December this year.  Clinically she does not appear to be in heart failure.        No orders of the defined types were placed in this encounter.      No orders of the defined types were placed in this encounter.      There are no discontinued medications.      Encounter Diagnosis   Name Primary?     Essential hypertension with goal blood pressure less than 140/90        CURRENT MEDICATIONS:  Current Outpatient Prescriptions   Medication Sig Dispense Refill     losartan (COZAAR) 100 MG tablet Take 1 tablet (100 mg) by mouth At Bedtime 90 tablet 1     spironolactone (ALDACTONE) 25 MG tablet Take 0.5  tablets (12.5 mg) by mouth daily 15 tablet 0     azelastine (ASTELIN) 0.1 % spray SPRAY 1-2 SPRAYS INTO BOTH NOSTRILS 2 TIMES DAILY 30 mL 5     PREDNISONE PO Take 20 mg by mouth 20 mg two tabs daily       MONTELUKAST SODIUM PO Take 10 mg by mouth       metoprolol (TOPROL-XL) 25 MG 24 hr tablet Take 2 tablets (50 mg) by mouth daily 60 tablet 11     hydrochlorothiazide 12.5 MG TABS tablet Take 1 tablet (12.5 mg) by mouth daily 90 tablet 0     omeprazole (PRILOSEC) 20 MG CR capsule TAKE ONE CAPSULE BY MOUTH ONE TIME DAILY  90 capsule 2     simvastatin (ZOCOR) 20 MG tablet Take 0.5 tablets (10 mg) by mouth At Bedtime 45 tablet 3     fluticasone-salmeterol (ADVAIR-HFA) 230-21 MCG/ACT inhaler Inhale 2 puffs into the lungs 2 times daily 12 g 1     acetaminophen (TYLENOL) 500 MG tablet Take 500-1,000 mg by mouth every 8 hours as needed for mild pain       albuterol (PROAIR HFA, PROVENTIL HFA, VENTOLIN HFA) 108 (90 BASE) MCG/ACT inhaler Inhale 2 puffs into the lungs every 6 hours as needed        aspirin 81 MG EC tablet Take 1 tablet (81 mg) by mouth daily (Patient taking differently: Take 81 mg by mouth daily Taking 3 times per week) 90 tablet 3     calcium citrate-vitamin D (CALCIUM CITRATE +) 315-200 MG-UNIT TABS Take 2 tablets by mouth daily.         ALLERGIES     Allergies   Allergen Reactions     Fosamax [Alendronic Acid] GI Disturbance     Augmentin [Amoxicillin-Pot Clavulanate] Diarrhea     Diarrhea and rash     Evista [Raloxifene]      abd symptoms.      Flu Virus Vaccine      swollen and red at inj site       PAST MEDICAL HISTORY:  Past Medical History:   Diagnosis Date     Basal Cell Carcinoma--back      Coronary artery disease involving native coronary artery of native heart without angina pectoris 3/9/2017    3/2/2017 - Two vessel coronary artery disease with mLAD 50% stenosis, D3 50% stenosis, pLCx 50% stenosis and mLCx 50% stenosis     Essential hypertension with goal blood pressure less than 140/90 9/1/2016     White coat hypertension;  24 hour ambulatory monitoring normal. Do not titrate up further.       Hyperlipidemia LDL goal <130 2/10/2010     Impaired fasting glucose 8/26/2010     Moderate persistent asthma      Nonrheumatic aortic valve insufficiency 6/29/2017     osteopenia      Other and unspecified hyperlipidemia      Pulmonary nodule 3/3/2009    PET scan reportedly normal; biopsy not advised.     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  Problem list name updated by automated process. Provider to review     Unspecified arthropathy, hand      Vasculitis (H) 4/20/2009    Possible increased activity of thoracic aorta, on PET scan w/u for pulmonary nodule.        PAST SURGICAL HISTORY:  Past Surgical History:   Procedure Laterality Date     C APPENDECTOMY  1957     C LIGATE FALLOPIAN TUBE  1971     C STEREOTACTIC BREAST BIOPSY  2001     CHOLECYSTECTOMY, LAPOROSCOPIC  2008    Cholecystectomy, Laparoscopic     ESOPHAGOSCOPY, GASTROSCOPY, DUODENOSCOPY (EGD), COMBINED  5/17/2013    Procedure: COMBINED ESOPHAGOSCOPY, GASTROSCOPY, DUODENOSCOPY (EGD), BIOPSY SINGLE OR MULTIPLE;  ESOPHAGOSCOPY, GASTROSCOPY, DUODENOSCOPY (EGD)  with bx;  Surgeon: Jeffery Yanez MD;  Location: [facility]     HC DILATION/CURETTAGE DIAG/THER NON OB  1967,1971     HC REMOVE TONSILS/ADENOIDS,<11 Y/O         FAMILY HISTORY:  Family History   Problem Relation Age of Onset     Hypertension Mother      OSTEOPOROSIS Mother      C.A.D. Mother      Connective Tissue Disorder Father      scleraderma     CEREBROVASCULAR DISEASE Paternal Grandmother      Prostate Cancer Other      9/05 passed away due to complications     Breast Cancer Child      youngest dtr       SOCIAL HISTORY:  Social  "History     Social History     Marital status: Single     Spouse name: N/A     Number of children: 3     Years of education: 15     Occupational History     semi-retired      Social History Main Topics     Smoking status: Never Smoker     Smokeless tobacco: Never Used     Alcohol use 0.6 - 1.2 oz/week     1 - 2 Standard drinks or equivalent per week     Drug use: No     Sexual activity: No     Other Topics Concern     Parent/Sibling W/ Cabg, Mi Or Angioplasty Before 65f 55m? No     Social History Narrative       Review of Systems:  Skin:  Negative       Eyes:  Positive for glasses    ENT:  Positive for postnasal drainage;sinus trouble environmental sinus issues -  pressure around nose - ears - allergies per pt  Respiratory:  Positive for shortness of breath asthma   Cardiovascular:    Positive for;lightheadedness;dizziness;edema swelling in left leg/foot - wears compression stocking occ  Gastroenterology: Negative      Genitourinary:  Negative      Musculoskeletal:  Positive for arthritis;joint pain shoulder left, right knee  Neurologic:  Negative      Psychiatric:  Negative      Heme/Lymph/Imm:  Positive for allergies RX allergies ,  Allergic to almost  everything- per pt  -  environmental allergies , cats - certain trees -  no food allergies   Endocrine:  Positive for   prediabetes    Physical Exam:  Vitals: /54 (BP Location: Right arm, Patient Position: Chair, Cuff Size: Adult Regular)  Pulse 70  Ht 1.6 m (5' 3\")  Wt 63.6 kg (140 lb 4.8 oz)  BMI 24.85 kg/m2    Constitutional:  cooperative, alert and oriented, well developed, well nourished, in no acute distress        Skin:  warm and dry to the touch, no apparent skin lesions or masses noted   ecchymosis on forearms    Head:  normocephalic, no masses or lesions        Eyes:  pupils equal and round, conjunctivae and lids unremarkable, sclera white, no xanthalasma, EOMS intact, no nystagmus        ENT:  no pallor or cyanosis, dentition good        Neck:  " JVP normal        Chest:  normal breath sounds, clear to auscultation, normal A-P diameter, normal symmetry, normal respiratory excursion, no use of accessory muscles          Cardiac: regular rhythm           diastolic murmur;decrescendo;RUSB;grade 2      Abdomen:           Vascular: pulses full and equal                                        Extremities and Back:  no edema;no spinal abnormalities noted;normal muscle strength and tone         compression stockings    Neurological:  affect appropriate, oriented to time, person and place;no gross motor deficits                TOREY Sierra CNP  [address]  [place] [address]

## 2017-09-15 NOTE — MR AVS SNAPSHOT
"              After Visit Summary   9/15/2017    Genie Persaud    MRN: [id number]           Patient Information     Date Of Birth          1935        Visit Information        Provider Department      9/15/2017 1:30 PM Alie Underwood APRN CNP [facility]ART [facility]        Today's Diagnoses     Essential hypertension with goal blood pressure less than 140/90           Follow-ups after your visit        Your next 10 appointments already scheduled     Oct 06, 2017 12:40 PM CDT   SHORT with Layo Sevilla MD   [facility] ([facility])    [address] [place] [id number] [phone]              Who to contact     If you have questions or need follow up information about today's clinic visit or your schedule please contact [facility] directly at [phone].  Normal or non-critical lab and imaging results will be communicated to you by MyChart, letter or phone within 4 business days after the clinic has received the results. If you do not hear from us within 7 days, please contact the clinic through MyChart or phone. If you have a critical or abnormal lab result, we will notify you by phone as soon as possible.  Submit refill requests through BandApp or call your pharmacy and they will forward the refill request to us. Please allow 3 business days for your refill to be completed.          Additional Information About Your Visit        MyChart Information     BandApp lets you send messages to your doctor, view your test results, renew your prescriptions, schedule appointments and more. To sign up, go to [email or web address] . Click on \"Log in\" on the left side of the screen, which will take you to the Welcome page. Then click on \"Sign up Now\" on the right side of the page.     You will be asked to enter the access code listed below, as well as some personal information. " "Please follow the directions to create your username and password.     Your access code is: [email or web address]  Expires: 2017  9:21 AM     Your access code will  in 90 days. If you need help or a new code, please call your [place] clinic or [phone].        Care EveryWhere ID     This is your Care EveryWhere ID. This could be used by other organizations to access your [place] medical records  [id number]        Your Vitals Were     Pulse Height BMI (Body Mass Index)             70 1.6 m (5' 3\") 24.85 kg/m2          Blood Pressure from Last 3 Encounters:   09/15/17 149/54   17 170/58   17 169/60    Weight from Last 3 Encounters:   09/15/17 63.6 kg (140 lb 4.8 oz)   17 64.2 kg (141 lb 8 oz)   17 64.4 kg (142 lb)              We Performed the Following     Follow-Up with Cardiac Advanced Practice Provider          Today's Medication Changes          These changes are accurate as of: 9/15/17  2:36 PM.  If you have any questions, ask your nurse or doctor.               These medicines have changed or have updated prescriptions.        Dose/Directions    aspirin 81 MG EC tablet   This may have changed:  additional instructions   Used for:  Stroke (H)        Dose:  81 mg   Take 1 tablet (81 mg) by mouth daily   Quantity:  90 tablet   Refills:  3                Primary Care Provider Office Phone # Fax #    Layo Sevilla [phone]       [facility] [facility] [address] [address] [facility] [address] [place] [address]        Equal Access to Services     [facility]: Hadii william frausto hadasho Sochaseali, waaxda luqadaha, qaybta kaalmada wolfgang, waxay wendy lange. So [facility] [phone].    ATENCIÓN: Si habla español, tiene a patel disposición servicios gratuitos de asistencia lingüística. Llame al [phone].    We comply with applicable federal civil rights laws and [place] laws. We do not discriminate on the basis of race, color, national origin, age, disability sex, sexual orientation " or gender identity.            Thank you!     Thank you for choosing [facility] [place]  for your care. Our goal is always to provide you with excellent care. Hearing back from our patients is one way we can continue to improve our services. Please take a few minutes to complete the written survey that you may receive in the mail after your visit with us. Thank you!             Your Updated Medication List - Protect others around you: Learn how to safely use, store and throw away your medicines at www.disposemymeds.org.          This list is accurate as of: 9/15/17  2:36 PM.  Always use your most recent med list.                   Brand Name Dispense Instructions for use Diagnosis    acetaminophen 500 MG tablet    TYLENOL     Take 500-1,000 mg by mouth every 8 hours as needed for mild pain        albuterol 108 (90 BASE) MCG/ACT Inhaler    PROAIR HFA/PROVENTIL HFA/VENTOLIN HFA     Inhale 2 puffs into the lungs every 6 hours as needed        aspirin 81 MG EC tablet     90 tablet    Take 1 tablet (81 mg) by mouth daily    Stroke (H)       azelastine 0.1 % spray    ASTELIN    30 mL    SPRAY 1-2 SPRAYS INTO BOTH NOSTRILS 2 TIMES DAILY    Allergic rhinitis due to pollen       CALCIUM CITRATE + 315-200 MG-UNIT Tabs per tablet   Generic drug:  calcium citrate-vitamin D      Take 2 tablets by mouth daily.        fluticasone-salmeterol 230-21 MCG/ACT inhaler    ADVAIR-HFA    12 g    Inhale 2 puffs into the lungs 2 times daily        hydrochlorothiazide 12.5 MG Tabs tablet     90 tablet    Take 1 tablet (12.5 mg) by mouth daily    Benign essential hypertension       losartan 100 MG tablet    COZAAR    90 tablet    Take 1 tablet (100 mg) by mouth At Bedtime    Essential hypertension with goal blood pressure less than 140/90       metoprolol 25 MG 24 hr tablet    TOPROL-XL    60 tablet    Take 2 tablets (50 mg) by mouth daily    Essential hypertension with goal blood pressure less than 140/90        MONTELUKAST SODIUM PO      Take 10 mg by mouth        omeprazole 20 MG CR capsule    priLOSEC    90 capsule    TAKE ONE CAPSULE BY MOUTH ONE TIME DAILY    Candidal esophagitis (H)       PREDNISONE PO      Take 20 mg by mouth 20 mg two tabs daily        simvastatin 20 MG tablet    ZOCOR    45 tablet    Take 0.5 tablets (10 mg) by mouth At Bedtime    Hyperlipidemia LDL goal <130       spironolactone 25 MG tablet    ALDACTONE    15 tablet    Take 0.5 tablets (12.5 mg) by mouth daily    Essential hypertension with goal blood pressure less than 140/90

## 2017-09-20 ENCOUNTER — TELEPHONE (OUTPATIENT)
Dept: PHARMACY | Facility: CLINIC | Age: 82
End: 2017-09-20

## 2017-09-20 NOTE — TELEPHONE ENCOUNTER
This patient is due for MT follow-up. I called the patient to schedule an appointment.     Left message.    Kathie Forde, PharmD [facility]  Medication Therapy Management Practitioner   #[phone]

## 2017-10-02 ENCOUNTER — OFFICE VISIT (OUTPATIENT)
Dept: PHARMACY | Facility: CLINIC | Age: 82
End: 2017-10-02
Payer: COMMERCIAL

## 2017-10-02 VITALS — DIASTOLIC BLOOD PRESSURE: 45 MMHG | HEART RATE: 79 BPM | SYSTOLIC BLOOD PRESSURE: 126 MMHG

## 2017-10-02 DIAGNOSIS — Z71.85 VACCINE COUNSELING: ICD-10-CM

## 2017-10-02 DIAGNOSIS — J30.1 CHRONIC ALLERGIC RHINITIS DUE TO POLLEN, UNSPECIFIED SEASONALITY: ICD-10-CM

## 2017-10-02 DIAGNOSIS — J45.40 MODERATE PERSISTENT ASTHMA WITHOUT COMPLICATION: ICD-10-CM

## 2017-10-02 DIAGNOSIS — E78.5 HYPERLIPIDEMIA LDL GOAL <130: ICD-10-CM

## 2017-10-02 DIAGNOSIS — I10 ESSENTIAL HYPERTENSION WITH GOAL BLOOD PRESSURE LESS THAN 140/90: ICD-10-CM

## 2017-10-02 DIAGNOSIS — M85.80 OSTEOPENIA, UNSPECIFIED LOCATION: ICD-10-CM

## 2017-10-02 DIAGNOSIS — I47.10 SVT (SUPRAVENTRICULAR TACHYCARDIA) (H): ICD-10-CM

## 2017-10-02 DIAGNOSIS — I10 ESSENTIAL HYPERTENSION WITH GOAL BLOOD PRESSURE LESS THAN 140/90: Primary | ICD-10-CM

## 2017-10-02 PROCEDURE — 99607 MTMS BY PHARM ADDL 15 MIN: CPT | Performed by: PHARMACIST

## 2017-10-02 PROCEDURE — 99605 MTMS BY PHARM NP 15 MIN: CPT | Performed by: PHARMACIST

## 2017-10-02 PROCEDURE — 80048 BASIC METABOLIC PNL TOTAL CA: CPT | Performed by: INTERNAL MEDICINE

## 2017-10-02 PROCEDURE — 36415 COLL VENOUS BLD VENIPUNCTURE: CPT | Performed by: INTERNAL MEDICINE

## 2017-10-02 RX ORDER — IPRATROPIUM BROMIDE 21 UG/1
2 SPRAY, METERED NASAL DAILY PRN
COMMUNITY
Start: 2017-08-04 | End: 2022-03-21

## 2017-10-02 NOTE — PROGRESS NOTES
SUBJECTIVE/OBJECTIVE:                           Genie Persaud is a 82 year old female coming in for an initial visit for Medication Therapy Management for 2017.  She was referred to me from Dr. Sevilla.     Chief Complaint: Blood pressure, asthma, allergies.    Allergies/ADRs: Reviewed in Epic  Tobacco: No tobacco use  Alcohol: Less than 1 beverages / week (drinks wine)  Caffeine: no caffeine  Activity: Volunteers at [employer] and at [employer] [employer] [facility]. Has a stationary bike - rides for 30 minutes occassionally.  PMH: Per patient had mole surgery and plastic surgery    Medication Adherence: issues found and discussed below    Hypertension: 148/61 mmHg this morning. Monitors BP at home.    Allergic rhinitis: Current medications include azelastine 0.1% spray 2 sprays twice daily and ipratropium 2 sprays each nostril BID. She is not sure she is taking montelukast Primary triggers are dust mites, pollens, animal dander and mold. Pt feels that current therapy is somewhat effective.  Have allergy symptoms from ragweed.  See Dr. Monzon, Allergy specialist.    Asthma:  Current medications Fluticasone+Salmeterol (Advair) 2 puffs twice a day (has generic inhaler but does not know the name) and albuterol PRN (albuterol 1-2 times a day), not currently taking prednisone but has been taking prednisone 5mg once daily as needed.  Improved symptoms with prednisone. Felt good last week and not feeling good today.  Saw Dr. Pacehco 4/26/2017 and August 22nd. Dr. Metz [place] [facility] in April who recommended nasal rinses, continue Advair, consider Spiriva.  No note in EMR for 8/22, pt reports she was to be on montelukast but she does no know is she is taking this currently.   Rinses mouth, brushes teeth then uses mouthwash after Advair.    AAP on file: YES  ACT Total Scores 4/22/2016 10/14/2016 8/3/2017   ACT TOTAL SCORE - - -   ASTHMA ER VISITS - - -   ASTHMA HOSPITALIZATIONS - - -   ACT TOTAL SCORE (Goal Greater than or Equal to 20) 20 18 12    In the past 12 months, how many times did you visit the emergency room for your asthma without being admitted to the hospital? 0 0 0   In the past 12 months, how many times were you hospitalized overnight because of your asthma? 0 0 0     History MI:  Currently taking aspirin 81mg three times a week, metolprolol,simvastatin.  Less bruising with decrease in aspirin  Saw cardiology 9/15/17.    Hypertension: Current medications include spironolactone 12.5mg QD, hydrochlorothiazide 12.5mg QD, metoprolol 100mg (2 of the tablets) ER after dinner, and losartan 100mg HS. . Heart attack in March.  Home BP monitoring : 130-155/40 Side effects:  Dizziness, she is not sure this is related to medications as the symptom comes and goes    Bone Health:  Pt has been diagnosed with osteopenia. Pt is current taking calcium + D (Citrucal) 2 tablets per day.  Last vitamin D level:   Component      Latest Ref Rng 8/26/2011   25 OH Vit D2       <5   25 OH Vit D3       35   25 OH Vit D total      30 - 75 ug/L <40 . . .     Last DEXA: 9/2/2015 Osteopenia    Hyperlipidemia: Current medications Simvastatin 10mg at HS . Patient reports no side effects.     Current labs include:BP Readings from Last 3 Encounters:   09/15/17 149/54   09/07/17 170/58   08/25/17 169/60     Today's Vitals: /45  Pulse 79  No results found for: A1C.  Lab Results   Component Value Date    CHOL 149 03/02/2017     Lab Results   Component Value Date    TRIG 62 03/02/2017     Lab Results   Component Value Date    HDL 78 03/02/2017     Lab Results   Component Value Date    LDL 59 03/02/2017       Liver Function Studies -   Recent Labs   Lab Test  03/09/17   1048   PROTTOTAL  7.5   ALBUMIN  4.0   BILITOTAL  0.5   ALKPHOS  79   AST  15   ALT  36     Last Basic Metabolic Panel:  Lab Results   Component Value Date     03/09/2017      Lab Results   Component Value Date    POTASSIUM 3.5 07/15/2017     Lab Results   Component Value Date    CHLORIDE 100 03/09/2017      Lab Results   Component Value Date    BUN 22 03/09/2017     Lab Results   Component Value Date    CR 0.81 03/09/2017     GFR Estimate   Date Value Ref Range Status   03/09/2017 68 >60 mL/min/1.7m2 Final     Comment:     Non  GFR Calc   03/03/2017 65 >60 mL/min/1.7m2 Final     Comment:     Non  GFR Calc   03/02/2017 78 >60 mL/min/1.7m2 Final     Comment:     Non  GFR Calc     GFR Estimate If Black   Date Value Ref Range Status   03/09/2017 82 >60 mL/min/1.7m2 Final     Comment:      GFR Calc   03/03/2017 79 >60 mL/min/1.7m2 Final     Comment:      GFR Calc   03/02/2017 >90   GFR Calc   >60 mL/min/1.7m2 Final     TSH   Date Value Ref Range Status   08/18/2015 1.29 0.40 - 4.00 mU/L Final   ]    Most Recent Immunizations   Administered Date(s) Administered     Mantoux 02/27/2009     Pneumococcal 23 valent 10/20/2004     TDAP Vaccine (Adacel) 11/08/2006       ASSESSMENT:                             Current medications were reviewed today.     Medication Adherence: needs improvement - see below    Allergic rhinitis/Allergies: Needs improvement.  Pt would benefit from restarting montelukast if she stopped taking this.  She also may benefit from starting Spiriva as Dr. Metz recommended.  She would benefit from getting influenza and pneumonia vaccines in clinic with her history of side effects.    History MI:  Stable.  Cardiology decreased aspirin dosing due to bruising    Hypertension: Needs Improvement. Patient is meeting BP goal of < 140/90mmHg.   Pt would benefit from the following changes - checking BMP after starting spironolactone 1 month ago    Bone Health:  stable    Hyperlipidemia: stable    Vaccine:  Needs improvement.  Pt would benefit from getting influenza and Prenvar-13 vaccinations.  She is resistant to doing this due to history of side effects    PLAN:                            Asthma/Allergies:  Pt to check  to see if she taking montelukast 10mg once daily     Asthma:  Pt to call Dr. Metz about starting the Spiriva Respimat 2 puffs once daily.    High Blood Pressure:  Check potassium and kidney function today.     Pt to ask Dr. Sevilla about getting vaccinations:  Flu vaccine and pneumonia vaccines-Prevnar-13--can get in clinic when see Dr. Sevilla    Next MTM/pharmacist visit: 6 months    I spent 50 minutes with this patient today. All changes were made via collaborative practice agreement with Layo Sevilla. A copy of the visit note was provided to the patient's primary care provider.    The patient was given a summary of these recommendations as an after visit summary.     Kathie Forde, PharmD [facility]  Medication Therapy Management Practitioner  VM #[phone]

## 2017-10-02 NOTE — PATIENT INSTRUCTIONS
Recommendations from today's MTM visit:                                                      Asthma:  Call Dr. Metz about starting the Spiriva Respimat 2 puffs once daily.    Asthma/Allergies:  Check to see if you picked up montelukast 10mg once daily are taking this one.    High Blood Pressure:  Check potassium and kidney function today.     Ask Dr. Sevilla about getting vaccinations:  Flu vaccine and pneumonia vaccines-Prevnar-13--can get in clinic when you see Dr. Sevilla    Next MTM/pharmacist visit: 6 months    To schedule another MTM appointment, please call the clinic directly or you may call the MTM scheduling line at [phone] or toll-free at [phone].     My Clinical Pharmacist's contact information:                                                      It was a pleasure seeing you today!  Please feel free to contact me with any questions or concerns you have.      Kathie Forde, PharmD [facility]  Medication Therapy Management Practitioner   #[phone]    You may receive a survey about the MTM services you received.  I would appreciate your feedback to help me serve you better in the future. Please fill it out and return it when you can. Your comments will be anonymous.      My healthcare goals:

## 2017-10-02 NOTE — MR AVS SNAPSHOT
After Visit Summary   10/2/2017    Genie Persaud    MRN: [id number]           Patient Information     Date Of Birth          1935        Visit Information        Provider Department      10/2/2017 2:30 PM Kathie Forde, [facility]        Today's Diagnoses     Essential hypertension with goal blood pressure less than 140/90    -  1      Care Instructions    Recommendations from today's MTM visit:                                                      Asthma:  Call Dr. Metz about starting the Spiriva Respimat 2 puffs once daily.    Asthma/Allergies:  Check to see if you picked up montelukast 10mg once daily are taking this one.    High Blood Pressure:  Check potassium and kidney function today.     Ask Dr. Sevilla about getting vaccinations:  Flu vaccine and pneumonia vaccines-Prevnar-13--can get in clinic when you see Dr. Sevilla    Next MTM/pharmacist visit: 6 months    To schedule another MTM appointment, please call the clinic directly or you may call the MTM scheduling line at [phone] or toll-free at [phone].     My Clinical Pharmacist's contact information:                                                      It was a pleasure seeing you today!  Please feel free to contact me with any questions or concerns you have.      Kathie Forde, PharmD [facility]  Medication Therapy Management Practitioner   #[phone]    You may receive a survey about the MTM services you received.  I would appreciate your feedback to help me serve you better in the future. Please fill it out and return it when you can. Your comments will be anonymous.      My healthcare goals:                                                                      Follow-ups after your visit        Your next 10 appointments already scheduled     Oct 06, 2017 12:40 PM CDT   SHORT with Layo Sevilla MD   [facility]    [address] [place] [address]  "[place] [id number] [phone]              Who to contact     If you have questions or need follow up information about today's clinic visit or your schedule please contact [facility] [place] directly at [phone].  Normal or non-critical lab and imaging results will be communicated to you by MyChart, letter or phone within 4 business days after the clinic has received the results. If you do not hear from us within 7 days, please contact the clinic through Outsparkhart or phone. If you have a critical or abnormal lab result, we will notify you by phone as soon as possible.  Submit refill requests through Kudo or call your pharmacy and they will forward the refill request to us. Please allow 3 business days for your refill to be completed.          Additional Information About Your Visit        OutsparkharYushino Information     Kudo lets you send messages to your doctor, view your test results, renew your prescriptions, schedule appointments and more. To sign up, go to [email or web address] . Click on \"Log in\" on the left side of the screen, which will take you to the Welcome page. Then click on \"Sign up Now\" on the right side of the page.     You will be asked to enter the access code listed below, as well as some personal information. Please follow the directions to create your username and password.     Your access code is: [email or web address]  Expires: 2017  3:32 PM     Your access code will  in 90 days. If you need help or a new code, please call your [place] clinic or [phone].        Care EveryWhere ID     This is your Care EveryWhere ID. This could be used by other organizations to access your [place] medical records  [id number]        Your Vitals Were     Pulse                   79            Blood Pressure from Last 3 Encounters:   10/02/17 126/45   09/15/17 149/54   17 170/58    Weight from Last 3 Encounters:   09/15/17 140 lb 4.8 oz (63.6 kg)   17 141 lb 8 oz (64.2 kg) "   08/25/17 142 lb (64.4 kg)                 Today's Medication Changes          These changes are accurate as of: 10/2/17  3:32 PM.  If you have any questions, ask your nurse or doctor.               These medicines have changed or have updated prescriptions.        Dose/Directions    aspirin 81 MG EC tablet   This may have changed:  additional instructions   Used for:  Stroke (H)        Dose:  81 mg   Take 1 tablet (81 mg) by mouth daily   Quantity:  90 tablet   Refills:  3                Primary Care Provider Office Phone # Fax #    Layo Sevilla [phone] [address] [place] [address]        Equal Access to Services     [facility]: Hadii aad jett hadasho Soomaali, waaxda luqadaha, qaybta kaalmada wolfgang, ora meyer . So [facility] [phone].    ATENCIÓN: Si habla español, tiene a patel disposición servicios gratuitos de asistencia lingüística. Llame al [phone].    We comply with applicable federal civil rights laws and [place] laws. We do not discriminate on the basis of race, color, national origin, age, disability, sex, sexual orientation, or gender identity.            Thank you!     [facility]  for your care. Our goal is always to provide you with excellent care. Hearing back from our patients is one way we can continue to improve our services. Please take a few minutes to complete the written survey that you may receive in the mail after your visit with us. Thank you!             Your Updated Medication List - Protect others around you: Learn how to safely use, store and throw away your medicines at www.disposemymeds.org.          This list is accurate as of: 10/2/17  3:32 PM.  Always use your most recent med list.                   Brand Name Dispense Instructions for use Diagnosis    acetaminophen 500 MG tablet    TYLENOL     Take 500-1,000 mg by mouth every 8 hours as needed for mild pain         albuterol 108 (90 BASE) MCG/ACT Inhaler    PROAIR HFA/PROVENTIL HFA/VENTOLIN HFA     Inhale 2 puffs into the lungs every 6 hours as needed        aspirin 81 MG EC tablet     90 tablet    Take 1 tablet (81 mg) by mouth daily    Stroke (H)       azelastine 0.1 % spray    ASTELIN    30 mL    SPRAY 1-2 SPRAYS INTO BOTH NOSTRILS 2 TIMES DAILY    Allergic rhinitis due to pollen       CALCIUM CITRATE + 315-200 MG-UNIT Tabs per tablet   Generic drug:  calcium citrate-vitamin D      Take 2 tablets by mouth daily.        fluticasone-salmeterol 230-21 MCG/ACT inhaler    ADVAIR-HFA    12 g    Inhale 2 puffs into the lungs 2 times daily        hydrochlorothiazide 12.5 MG Tabs tablet     90 tablet    Take 1 tablet (12.5 mg) by mouth daily    Benign essential hypertension       ipratropium 0.03 % spray    ATROVENT     Spray 2 sprays in nostril 2 times daily        losartan 100 MG tablet    COZAAR    90 tablet    Take 1 tablet (100 mg) by mouth At Bedtime    Essential hypertension with goal blood pressure less than 140/90       metoprolol 25 MG 24 hr tablet    TOPROL-XL    60 tablet    Take 2 tablets (50 mg) by mouth daily    Essential hypertension with goal blood pressure less than 140/90       MONTELUKAST SODIUM PO      Take 10 mg by mouth        omeprazole 20 MG CR capsule    priLOSEC    90 capsule    TAKE ONE CAPSULE BY MOUTH ONE TIME DAILY    Candidal esophagitis (H)       PREDNISONE PO      Take 10 mg by mouth daily as needed Per Dr. Metz/[place] [facility]        simvastatin 20 MG tablet    ZOCOR    45 tablet    Take 0.5 tablets (10 mg) by mouth At Bedtime    Hyperlipidemia LDL goal <130       spironolactone 25 MG tablet    ALDACTONE    15 tablet    Take 0.5 tablets (12.5 mg) by mouth daily    Essential hypertension with goal blood pressure less than 140/90

## 2017-10-03 LAB
ANION GAP SERPL CALCULATED.3IONS-SCNC: 10 MMOL/L (ref 3–14)
BUN SERPL-MCNC: 16 MG/DL (ref 7–30)
CALCIUM SERPL-MCNC: 9.3 MG/DL (ref 8.5–10.1)
CHLORIDE SERPL-SCNC: 100 MMOL/L (ref 94–109)
CO2 SERPL-SCNC: 24 MMOL/L (ref 20–32)
CREAT SERPL-MCNC: 0.84 MG/DL (ref 0.52–1.04)
GFR SERPL CREATININE-BSD FRML MDRD: 65 ML/MIN/1.7M2
GLUCOSE SERPL-MCNC: 112 MG/DL (ref 70–99)
POTASSIUM SERPL-SCNC: 4.3 MMOL/L (ref 3.4–5.3)
SODIUM SERPL-SCNC: 134 MMOL/L (ref 133–144)

## 2017-10-06 ENCOUNTER — OFFICE VISIT (OUTPATIENT)
Dept: PEDIATRICS | Facility: CLINIC | Age: 82
End: 2017-10-06
Payer: COMMERCIAL

## 2017-10-06 VITALS
SYSTOLIC BLOOD PRESSURE: 138 MMHG | HEIGHT: 63 IN | OXYGEN SATURATION: 95 % | HEART RATE: 82 BPM | WEIGHT: 141 LBS | BODY MASS INDEX: 24.98 KG/M2 | TEMPERATURE: 98.7 F | DIASTOLIC BLOOD PRESSURE: 82 MMHG

## 2017-10-06 DIAGNOSIS — Z23 NEED FOR STREPTOCOCCUS PNEUMONIAE VACCINATION: Primary | ICD-10-CM

## 2017-10-06 DIAGNOSIS — I10 ESSENTIAL HYPERTENSION WITH GOAL BLOOD PRESSURE LESS THAN 140/90: ICD-10-CM

## 2017-10-06 DIAGNOSIS — Z23 NEED FOR TETANUS BOOSTER: ICD-10-CM

## 2017-10-06 DIAGNOSIS — I25.10 CORONARY ARTERY DISEASE INVOLVING NATIVE CORONARY ARTERY OF NATIVE HEART WITHOUT ANGINA PECTORIS: ICD-10-CM

## 2017-10-06 DIAGNOSIS — J45.40 MODERATE PERSISTENT ASTHMA WITHOUT COMPLICATION: ICD-10-CM

## 2017-10-06 DIAGNOSIS — I10 BENIGN ESSENTIAL HYPERTENSION: ICD-10-CM

## 2017-10-06 PROCEDURE — 90715 TDAP VACCINE 7 YRS/> IM: CPT | Performed by: INTERNAL MEDICINE

## 2017-10-06 PROCEDURE — 99214 OFFICE O/P EST MOD 30 MIN: CPT | Mod: 25 | Performed by: INTERNAL MEDICINE

## 2017-10-06 PROCEDURE — 90471 IMMUNIZATION ADMIN: CPT | Performed by: INTERNAL MEDICINE

## 2017-10-06 PROCEDURE — 90670 PCV13 VACCINE IM: CPT | Performed by: INTERNAL MEDICINE

## 2017-10-06 PROCEDURE — G0009 ADMIN PNEUMOCOCCAL VACCINE: HCPCS | Mod: 59 | Performed by: INTERNAL MEDICINE

## 2017-10-06 RX ORDER — SPIRONOLACTONE 25 MG/1
12.5 TABLET ORAL DAILY
Qty: 45 TABLET | Refills: 3 | Status: ON HOLD | OUTPATIENT
Start: 2017-10-06 | End: 2017-11-25

## 2017-10-06 RX ORDER — HYDROCHLOROTHIAZIDE 12.5 MG/1
12.5 TABLET ORAL DAILY
Qty: 90 TABLET | Refills: 3 | Status: ON HOLD | OUTPATIENT
Start: 2017-10-06 | End: 2017-11-25

## 2017-10-06 NOTE — MR AVS SNAPSHOT
"              After Visit Summary   10/6/2017    Genie Persaud    MRN: [id number]           Patient Information     Date Of Birth          1935        Visit Information        Provider Department      10/6/2017 12:40 PM Layo Sevilla MD [facility]        Today's Diagnoses     Need for Streptococcus pneumoniae vaccination    -  1    Essential hypertension with goal blood pressure less than 140/90        Benign essential hypertension        Need for tetanus booster          Care Instructions    No change in blood pressure meds.    Prevnar and tetanus shot today.    Follow up in 6 months.      Layo Sevilla MD  Internal Medicine and Pediatrics           Follow-ups after your visit        Who to contact     If you have questions or need follow up information about today's clinic visit or your schedule please contact [facility] directly at [phone].  Normal or non-critical lab and imaging results will be communicated to you by MyChart, letter or phone within 4 business days after the clinic has received the results. If you do not hear from us within 7 days, please contact the clinic through Goodie Goodie Apphart or phone. If you have a critical or abnormal lab result, we will notify you by phone as soon as possible.  Submit refill requests through Newser or call your pharmacy and they will forward the refill request to us. Please allow 3 business days for your refill to be completed.          Additional Information About Your Visit        Goodie Goodie AppharIncreaseCard Information     Newser lets you send messages to your doctor, view your test results, renew your prescriptions, schedule appointments and more. To sign up, go to [email or web address] . Click on \"Log in\" on the left side of the screen, which will take you to the Welcome page. Then click on \"Sign up Now\" on the right side of the page.     You will be asked to enter the access code listed below, as well as some personal information. Please follow the " "directions to create your username and password.     Your access code is: [email or web address]  Expires: 2017  3:32 PM     Your access code will  in 90 days. If you need help or a new code, please call your [place] clinic or [phone].        Care EveryWhere ID     This is your Care EveryWhere ID. This could be used by other organizations to access your [place] medical records  [id number]        Your Vitals Were     Pulse Temperature Height Pulse Oximetry BMI (Body Mass Index)       82 98.7  F (37.1  C) (Tympanic) 5' 3\" (1.6 m) 95% 24.98 kg/m2        Blood Pressure from Last 3 Encounters:   10/06/17 138/82   10/02/17 126/45   09/15/17 149/54    Weight from Last 3 Encounters:   10/06/17 141 lb (64 kg)   09/15/17 140 lb 4.8 oz (63.6 kg)   17 141 lb 8 oz (64.2 kg)              We Performed the Following     ADMIN: Vaccine, Initial (37781)     Pneumococcal vaccine 13 valent PCV13 IM (Prevnar) [74142]     TDAP VACCINE (ADACEL)          Today's Medication Changes          These changes are accurate as of: 10/6/17  1:02 PM.  If you have any questions, ask your nurse or doctor.               These medicines have changed or have updated prescriptions.        Dose/Directions    aspirin 81 MG EC tablet   This may have changed:  additional instructions   Used for:  Stroke (H)        Dose:  81 mg   Take 1 tablet (81 mg) by mouth daily   Quantity:  90 tablet   Refills:  3            Where to get your medicines      These medications were sent to [id number] - [place], [place] - [address]  [address] [address]     Phone:  [phone]     hydrochlorothiazide 12.5 MG Tabs tablet    spironolactone 25 MG tablet                Primary Care Provider Office Phone # Fax #    Layo Sevilla [phone] [address] [place] [address]        Equal Access to Services     [facility] AH: Hadii william Mcgill, coty lyon, qaybta payton goss, " ora nguyen mireya kulkarni'aan ah. So [facility] [phone].    ATENCIÓN: Si teddyla andre, tiene a patel disposición servicios gratuitos de asistencia lingüística. Sylvie aguilar [phone].    We comply with applicable federal civil rights laws and [place] laws. We do not discriminate on the basis of race, color, national origin, age, disability, sex, sexual orientation, or gender identity.            Thank you!     Thank you for choosing [facility]  for your care. Our goal is always to provide you with excellent care. Hearing back from our patients is one way we can continue to improve our services. Please take a few minutes to complete the written survey that you may receive in the mail after your visit with us. Thank you!             Your Updated Medication List - Protect others around you: Learn how to safely use, store and throw away your medicines at www.disposemymeds.org.          This list is accurate as of: 10/6/17  1:02 PM.  Always use your most recent med list.                   Brand Name Dispense Instructions for use Diagnosis    acetaminophen 500 MG tablet    TYLENOL     Take 500-1,000 mg by mouth every 8 hours as needed for mild pain        albuterol 108 (90 BASE) MCG/ACT Inhaler    PROAIR HFA/PROVENTIL HFA/VENTOLIN HFA     Inhale 2 puffs into the lungs every 6 hours as needed        aspirin 81 MG EC tablet     90 tablet    Take 1 tablet (81 mg) by mouth daily    Stroke (H)       azelastine 0.1 % spray    ASTELIN    30 mL    SPRAY 1-2 SPRAYS INTO BOTH NOSTRILS 2 TIMES DAILY    Allergic rhinitis due to pollen       CALCIUM CITRATE + 315-200 MG-UNIT Tabs per tablet   Generic drug:  calcium citrate-vitamin D      Take 2 tablets by mouth daily.        fluticasone-salmeterol 230-21 MCG/ACT inhaler    ADVAIR-HFA    12 g    Inhale 2 puffs into the lungs 2 times daily        hydrochlorothiazide 12.5 MG Tabs tablet     90 tablet    Take 1 tablet (12.5 mg) by mouth daily    Benign essential  hypertension       ipratropium 0.03 % spray    ATROVENT     Spray 2 sprays in nostril 2 times daily        losartan 100 MG tablet    COZAAR    90 tablet    Take 1 tablet (100 mg) by mouth At Bedtime    Essential hypertension with goal blood pressure less than 140/90       metoprolol 25 MG 24 hr tablet    TOPROL-XL    60 tablet    Take 2 tablets (50 mg) by mouth daily    Essential hypertension with goal blood pressure less than 140/90       MONTELUKAST SODIUM PO      Take 10 mg by mouth        omeprazole 20 MG CR capsule    priLOSEC    90 capsule    TAKE ONE CAPSULE BY MOUTH ONE TIME DAILY    Candidal esophagitis (H)       PREDNISONE PO      Take 10 mg by mouth daily as needed Per Dr. Metz/[place] [facility]        simvastatin 20 MG tablet    ZOCOR    45 tablet    Take 0.5 tablets (10 mg) by mouth At Bedtime    Hyperlipidemia LDL goal <130       spironolactone 25 MG tablet    ALDACTONE    45 tablet    Take 0.5 tablets (12.5 mg) by mouth daily    Essential hypertension with goal blood pressure less than 140/90

## 2017-10-06 NOTE — NURSING NOTE
"Chief Complaint   Patient presents with     Hypertension       Initial /82 (BP Location: Right arm, Patient Position: Right side, Cuff Size: Adult Regular)  Pulse 82  Temp 98.7  F (37.1  C) (Tympanic)  Ht 5' 3\" (1.6 m)  Wt 141 lb (64 kg)  SpO2 95%  BMI 24.98 kg/m2 Estimated body mass index is 24.98 kg/(m^2) as calculated from the following:    Height as of this encounter: 5' 3\" (1.6 m).    Weight as of this encounter: 141 lb (64 kg).  Medication Reconciliation: complete  "

## 2017-10-06 NOTE — PATIENT INSTRUCTIONS
No change in blood pressure meds.    Prevnar and tetanus shot today.    Follow up in 6 months.      Layo Sevilla MD  Internal Medicine and Pediatrics

## 2017-10-06 NOTE — PROGRESS NOTES
"  SUBJECTIVE:   Genie Persaud is a 82 year old female who presents to clinic today for the following health issues:      Hypertension Follow-up      Outpatient blood pressures are being checked at home.  Results are 140/80\"s.    Low Salt Diet: low salt          Problems taking medications regularly: No    Medication side effects: none  Diet: low salt    Seen here last month for a blood pressure recheck; added aldactone last visit; still on hydrochlorothiazide and losartan.    Breathing is okay; needed a steroid burst yesterday, for 3 days.  Usually needs this every month or so.        Problem list and histories reviewed & adjusted, as indicated.  Additional history: as documented    Patient Active Problem List   Diagnosis     Swelling, mass, or lump in head and neck     Pulmonary nodule     Anemia     Vasculitis (H)     HYPERLIPIDEMIA LDL GOAL <130     Impaired fasting glucose     Candidal esophagitis (H)     Thoracic aortic aneurysm without rupture (H)     Health Care Home     Advanced directives, counseling/discussion     Stroke (H)     Moderate persistent asthma without complication     Essential hypertension with goal blood pressure less than 140/90     Coronary artery disease involving native coronary artery of native heart without angina pectoris     SVT -noted during Cath procedure     Nonrheumatic aortic valve insufficiency     Past Surgical History:   Procedure Laterality Date     C APPENDECTOMY  1957     C LIGATE FALLOPIAN TUBE  1971     C STEREOTACTIC BREAST BIOPSY  2001     CHOLECYSTECTOMY, LAPOROSCOPIC  2008    Cholecystectomy, Laparoscopic     ESOPHAGOSCOPY, GASTROSCOPY, DUODENOSCOPY (EGD), COMBINED  5/17/2013    Procedure: COMBINED ESOPHAGOSCOPY, GASTROSCOPY, DUODENOSCOPY (EGD), BIOPSY SINGLE OR MULTIPLE;  ESOPHAGOSCOPY, GASTROSCOPY, DUODENOSCOPY (EGD)  with bx;  Surgeon: Jeffery Yanez MD;  Location:  GI     HC DILATION/CURETTAGE DIAG/THER NON OB  1967,1971     HC REMOVE TONSILS/ADENOIDS,<12 " Y/O         Social History   Substance Use Topics     Smoking status: Never Smoker     Smokeless tobacco: Never Used     Alcohol use 0.6 - 1.2 oz/week     1 - 2 Standard drinks or equivalent per week     Family History   Problem Relation Age of Onset     Hypertension Mother      OSTEOPOROSIS Mother      C.A.D. Mother      Connective Tissue Disorder Father      scleraderma     CEREBROVASCULAR DISEASE Paternal Grandmother      Prostate Cancer Other      9/05 passed away due to complications     Breast Cancer Child      youngest dtr         Current Outpatient Prescriptions   Medication Sig Dispense Refill     spironolactone (ALDACTONE) 25 MG tablet Take 0.5 tablets (12.5 mg) by mouth daily 45 tablet 3     hydrochlorothiazide 12.5 MG TABS tablet Take 1 tablet (12.5 mg) by mouth daily 90 tablet 3     ipratropium (ATROVENT) 0.03 % spray Spray 2 sprays in nostril 2 times daily       losartan (COZAAR) 100 MG tablet Take 1 tablet (100 mg) by mouth At Bedtime 90 tablet 1     azelastine (ASTELIN) 0.1 % spray SPRAY 1-2 SPRAYS INTO BOTH NOSTRILS 2 TIMES DAILY 30 mL 5     PREDNISONE PO Take 10 mg by mouth daily as needed Per Dr. Metz/[place] [facility]       MONTELUKAST SODIUM PO Take 10 mg by mouth       metoprolol (TOPROL-XL) 25 MG 24 hr tablet Take 2 tablets (50 mg) by mouth daily 60 tablet 11     omeprazole (PRILOSEC) 20 MG CR capsule TAKE ONE CAPSULE BY MOUTH ONE TIME DAILY  90 capsule 2     simvastatin (ZOCOR) 20 MG tablet Take 0.5 tablets (10 mg) by mouth At Bedtime 45 tablet 3     fluticasone-salmeterol (ADVAIR-HFA) 230-21 MCG/ACT inhaler Inhale 2 puffs into the lungs 2 times daily 12 g 1     acetaminophen (TYLENOL) 500 MG tablet Take 500-1,000 mg by mouth every 8 hours as needed for mild pain       albuterol (PROAIR HFA, PROVENTIL HFA, VENTOLIN HFA) 108 (90 BASE) MCG/ACT inhaler Inhale 2 puffs into the lungs every 6 hours as needed        aspirin 81 MG EC tablet Take 1 tablet (81 mg) by mouth daily (Patient taking differently:  "Take 81 mg by mouth daily Taking 3 times per week) 90 tablet 3     calcium citrate-vitamin D (CALCIUM CITRATE +) 315-200 MG-UNIT TABS Take 2 tablets by mouth daily.       [DISCONTINUED] spironolactone (ALDACTONE) 25 MG tablet Take 0.5 tablets (12.5 mg) by mouth daily 15 tablet 0     [DISCONTINUED] hydrochlorothiazide 12.5 MG TABS tablet Take 1 tablet (12.5 mg) by mouth daily 90 tablet 0     Allergies   Allergen Reactions     Fosamax [Alendronic Acid] GI Disturbance     Augmentin [Amoxicillin-Pot Clavulanate] Diarrhea     Diarrhea and rash     Evista [Raloxifene]      abd symptoms.      Flu Virus Vaccine      swollen and red at inj site     BP Readings from Last 3 Encounters:   10/06/17 138/82   10/02/17 126/45   09/15/17 149/54    Wt Readings from Last 3 Encounters:   10/06/17 141 lb (64 kg)   09/15/17 140 lb 4.8 oz (63.6 kg)   09/07/17 141 lb 8 oz (64.2 kg)                  Labs reviewed in EPIC        Reviewed and updated as needed this visit by clinical staffTobacco  Allergies  Med Hx  Surg Hx  Fam Hx  Soc Hx      Reviewed and updated as needed this visit by Provider         ROS:  C: NEGATIVE for fever, chills, change in weight  E/M: NEGATIVE for ear, mouth and throat problems  R: NEGATIVE for significant cough or SOB  CV: NEGATIVE for chest pain, palpitations or peripheral edema    OBJECTIVE:                                                    /82 (BP Location: Right arm, Patient Position: Right side, Cuff Size: Adult Regular)  Pulse 82  Temp 98.7  F (37.1  C) (Tympanic)  Ht 5' 3\" (1.6 m)  Wt 141 lb (64 kg)  SpO2 95%  BMI 24.98 kg/m2  Body mass index is 24.98 kg/(m^2).   GENERAL: healthy, alert, well nourished, well hydrated, no distress  HENT: ear canals- normal; TMs- normal; Nose- normal; Mouth- no ulcers, no lesions  NECK: no tenderness, no adenopathy, no asymmetry, no masses, no stiffness; thyroid- normal to palpation  RESP: lungs clear to auscultation - no rales, no rhonchi, no wheezes  CV: " regular rates and rhythm, normal S1 S2, no S3 or S4 and no murmur, no click or rub -  ABDOMEN: soft, no tenderness, no  hepatosplenomegaly, no masses, normal bowel sounds    Diagnostic test results:  Diagnostic Test Results:  none        ASSESSMENT/PLAN:                                                    1. Essential hypertension with goal blood pressure less than 140/90  Finally well controlled, with no significant dizziness.  No change in meds.    - spironolactone (ALDACTONE) 25 MG tablet; Take 0.5 tablets (12.5 mg) by mouth daily  Dispense: 45 tablet; Refill: 3    2. Benign essential hypertension  Follow up in 6 months.    - hydrochlorothiazide 12.5 MG TABS tablet; Take 1 tablet (12.5 mg) by mouth daily  Dispense: 90 tablet; Refill: 3    3. Need for Streptococcus pneumoniae vaccination    - Pneumococcal vaccine 13 valent PCV13 IM (Prevnar) [41038]  - ADMIN: Vaccine, Initial (54700)    4. Need for tetanus booster    - TDAP VACCINE (ADACEL)    5.  asthma:  Currently began prednisone burst.  Follow up for worsening symptoms.   Continue daily inhalers.    6 . coronary artery disease; secondary risk factor modification.  Has echocardiogram through cardiology in December.     See Patient Instructions    Layo Sevilla MD  [facility]

## 2017-10-20 ENCOUNTER — ALLIED HEALTH/NURSE VISIT (OUTPATIENT)
Dept: NURSING | Facility: CLINIC | Age: 82
End: 2017-10-20
Payer: COMMERCIAL

## 2017-10-20 DIAGNOSIS — H61.23 BILATERAL IMPACTED CERUMEN: Primary | ICD-10-CM

## 2017-10-20 PROCEDURE — 99207 ZZC NO CHARGE NURSE ONLY: CPT

## 2017-10-20 NOTE — MR AVS SNAPSHOT
"              After Visit Summary   10/20/2017    Genie Persaud    MRN: [id number]           Patient Information     Date Of Birth          1935        Visit Information        Provider Department      10/20/2017 2:00 PM ANTONIETTA NURSE AB [facility]        Today's Diagnoses     Bilateral impacted cerumen    -  1       Follow-ups after your visit        Who to contact     If you have questions or need follow up information about today's clinic visit or your schedule please [facility] directly at [phone].  Normal or non-critical lab and imaging results will be communicated to you by HeadCase Humanufacturinghart, letter or phone within 4 business days after the clinic has received the results. If you do not hear from us within 7 days, please contact the clinic through Head[employer] Humanufacturinghart or phone. If you have a critical or abnormal lab result, we will notify you by phone as soon as possible.  Submit refill requests through SRE [place] - 2 or call your pharmacy and they will forward the refill request to us. Please allow 3 business days for your refill to be completed.          Additional Information About Your Visit        MyChart Information     S[employer] [place] - 2 lets you send messages to your doctor, view your test results, renew your prescriptions, schedule appointments and more. To sign up, go to [email or web address] - 2 . Click on \"Log in\" on the left side of the screen, which will take you to the Welcome page. Then click on \"Sign up Now\" on the right side of the page.     You will be asked to enter the access code listed below, as well as some personal information. Please follow the directions to create your username and password.     Your access code is: [email or web address]  Expires: 2017  3:32 PM     Your access code will  in 90 days. If you need help or a new code, please call your [facility] or [phone].        Care EveryWhere ID     This is your Care EveryWhere ID. This could be used by other organizations to " access your [place] medical records  [id number]         Blood Pressure from Last 3 Encounters:   10/06/17 138/82   10/02/17 126/45   09/15/17 149/54    Weight from Last 3 Encounters:   10/06/17 141 lb (64 kg)   09/15/17 140 lb 4.8 oz (63.6 kg)   09/07/17 141 lb 8 oz (64.2 kg)              We Performed the Following     REMOVE IMPACTED CERUMEN          Today's Medication Changes          These changes are accurate as of: 10/20/17  4:55 PM.  If you have any questions, ask your nurse or doctor.               These medicines have changed or have updated prescriptions.        Dose/Directions    aspirin 81 MG EC tablet   This may have changed:  additional instructions   Used for:  Stroke (H)        Dose:  81 mg   Take 1 tablet (81 mg) by mouth daily   Quantity:  90 tablet   Refills:  3                Primary Care Provider Office Phone # Fax #    Layo Sevilla [phone] [address] [place] [address]        Equal Access to Services     : Hadii william ku hadasho Soomaali, waaxda luqadaha, qaybta kaalmada adeegyada, waxay wendy meyer . So [facility] [phone].    ATENCIÓN: Si habla español, tiene a patel disposición servicios gratuitos de asistencia lingüística. [facility] [phone].    We comply with applicable federal civil rights laws and [place] laws. We do not discriminate on the basis of race, color, national origin, age, disability, sex, sexual orientation, or gender identity.            Thank you!     Thank you for choosing [facility]  for your care. Our goal is always to provide you with excellent care. Hearing back from our patients is one way we can continue to improve our services. Please take a few minutes to complete the written survey that you may receive in the mail after your visit with us. Thank you!             Your Updated Medication List - Protect others around you: Learn how to safely use, store and throw away your  medicines at www.disposemymeds.org.          This list is accurate as of: 10/20/17  4:55 PM.  Always use your most recent med list.                   Brand Name Dispense Instructions for use Diagnosis    acetaminophen 500 MG tablet    TYLENOL     Take 500-1,000 mg by mouth every 8 hours as needed for mild pain        albuterol 108 (90 BASE) MCG/ACT Inhaler    PROAIR HFA/PROVENTIL HFA/VENTOLIN HFA     Inhale 2 puffs into the lungs every 6 hours as needed        aspirin 81 MG EC tablet     90 tablet    Take 1 tablet (81 mg) by mouth daily    Stroke (H)       azelastine 0.1 % spray    ASTELIN    30 mL    SPRAY 1-2 SPRAYS INTO BOTH NOSTRILS 2 TIMES DAILY    Allergic rhinitis due to pollen       CALCIUM CITRATE + 315-200 MG-UNIT Tabs per tablet   Generic drug:  calcium citrate-vitamin D      Take 2 tablets by mouth daily.        fluticasone-salmeterol 230-21 MCG/ACT inhaler    ADVAIR-HFA    12 g    Inhale 2 puffs into the lungs 2 times daily        hydrochlorothiazide 12.5 MG Tabs tablet     90 tablet    Take 1 tablet (12.5 mg) by mouth daily    Benign essential hypertension       ipratropium 0.03 % spray    ATROVENT     Spray 2 sprays in nostril 2 times daily        losartan 100 MG tablet    COZAAR    90 tablet    Take 1 tablet (100 mg) by mouth At Bedtime    Essential hypertension with goal blood pressure less than 140/90       metoprolol 25 MG 24 hr tablet    TOPROL-XL    60 tablet    Take 2 tablets (50 mg) by mouth daily    Essential hypertension with goal blood pressure less than 140/90       MONTELUKAST SODIUM PO      Take 10 mg by mouth        omeprazole 20 MG CR capsule    priLOSEC    90 capsule    TAKE ONE CAPSULE BY MOUTH ONE TIME DAILY    Candidal esophagitis (H)       PREDNISONE PO      Take 10 mg by mouth daily as needed Per Dr. Metz/[place] [facility]        simvastatin 20 MG tablet    ZOCOR    45 tablet    Take 0.5 tablets (10 mg) by mouth At Bedtime    Hyperlipidemia LDL goal <130       spironolactone 25 MG tablet     ALDACTONE    45 tablet    Take 0.5 tablets (12.5 mg) by mouth daily    Essential hypertension with goal blood pressure less than 140/90

## 2017-11-14 ENCOUNTER — TELEPHONE (OUTPATIENT)
Dept: PEDIATRICS | Facility: CLINIC | Age: 82
End: 2017-11-14

## 2017-11-14 NOTE — TELEPHONE ENCOUNTER
Genie Persaud is a 82 year old female  who calls with abdominal pain.    NURSING ASSESSMENT:  The pain began months ago.    Pain scale (0-10): 1/10    The pain is described as dull and cramping and is located generalized, which is without radiation.  Symptom associated with the abdominal pain: bloating.  Usually after meals.  Patient has not had previous abdominal surgery, including n/a.  Pain is aggravated by eating, and relieved by rest.  Allergies:   Allergies   Allergen Reactions     Fosamax [Alendronic Acid] GI Disturbance     Augmentin [Amoxicillin-Pot Clavulanate] Diarrhea     Diarrhea and rash     Evista [Raloxifene]      abd symptoms.      Flu Virus Vaccine      swollen and red at inj site       MEDICATIONS:     NURSING PLAN: Nursing advice to patient appt advised and scheduled    RECOMMENDED DISPOSITION:  See within 2 weeks - appointment scheduled:  Next 5 appointments (look out 90 days)     Nov 17, 2017  9:20 AM CST   SHORT with Layo Sevilla MD   [facility]    [address]  [address]  [place] [address] [phone]            Jan 18, 2018 10:00 AM CST   Return Visit with Barak Hernandez MD   [facility]    [address]  [place] [address] [phone]                  Will comply with recommendation: Yes  If further questions/concerns or if symptoms do not improve, worsen or new symptoms develop, call your PCP or [facility] as soon as possible.    Guideline used:  Nursing experience    Aurora Wolf RN

## 2017-11-15 ENCOUNTER — APPOINTMENT (OUTPATIENT)
Dept: CARDIOLOGY | Facility: CLINIC | Age: 82
DRG: 871 | End: 2017-11-15
Attending: INTERNAL MEDICINE
Payer: COMMERCIAL

## 2017-11-15 ENCOUNTER — HOSPITAL ENCOUNTER (INPATIENT)
Facility: CLINIC | Age: 82
LOS: 10 days | Discharge: HOME HEALTH | DRG: 871 | End: 2017-11-25
Attending: EMERGENCY MEDICINE | Admitting: INTERNAL MEDICINE
Payer: COMMERCIAL

## 2017-11-15 ENCOUNTER — APPOINTMENT (OUTPATIENT)
Dept: GENERAL RADIOLOGY | Facility: CLINIC | Age: 82
DRG: 871 | End: 2017-11-15
Attending: EMERGENCY MEDICINE
Payer: COMMERCIAL

## 2017-11-15 DIAGNOSIS — I48.91 ATRIAL FIBRILLATION, UNSPECIFIED TYPE (H): Primary | ICD-10-CM

## 2017-11-15 DIAGNOSIS — A41.9 SEPSIS, DUE TO UNSPECIFIED ORGANISM: ICD-10-CM

## 2017-11-15 DIAGNOSIS — J45.901 MODERATE ASTHMA WITH EXACERBATION, UNSPECIFIED WHETHER PERSISTENT: ICD-10-CM

## 2017-11-15 DIAGNOSIS — R60.9 EDEMA, UNSPECIFIED TYPE: ICD-10-CM

## 2017-11-15 DIAGNOSIS — J18.9 PNEUMONIA OF RIGHT LOWER LOBE DUE TO INFECTIOUS ORGANISM: ICD-10-CM

## 2017-11-15 PROBLEM — R65.20 SEVERE SEPSIS (H): Status: ACTIVE | Noted: 2017-11-15

## 2017-11-15 PROBLEM — J96.01 ACUTE RESPIRATORY FAILURE WITH HYPOXIA (H): Status: ACTIVE | Noted: 2017-11-15

## 2017-11-15 LAB
ALBUMIN SERPL-MCNC: 3.4 G/DL (ref 3.4–5)
ALP SERPL-CCNC: 121 U/L (ref 40–150)
ALT SERPL W P-5'-P-CCNC: 55 U/L (ref 0–50)
ANION GAP SERPL CALCULATED.3IONS-SCNC: 7 MMOL/L (ref 3–14)
AST SERPL W P-5'-P-CCNC: 67 U/L (ref 0–45)
BASE DEFICIT BLDA-SCNC: 2.8 MMOL/L
BASE EXCESS BLDV CALC-SCNC: 0.3 MMOL/L
BASOPHILS # BLD AUTO: 0 10E9/L (ref 0–0.2)
BASOPHILS NFR BLD AUTO: 0.1 %
BILIRUB SERPL-MCNC: 0.6 MG/DL (ref 0.2–1.3)
BUN SERPL-MCNC: 29 MG/DL (ref 7–30)
CALCIUM SERPL-MCNC: 8.9 MG/DL (ref 8.5–10.1)
CHLORIDE SERPL-SCNC: 100 MMOL/L (ref 94–109)
CO2 SERPL-SCNC: 28 MMOL/L (ref 20–32)
CREAT SERPL-MCNC: 0.84 MG/DL (ref 0.52–1.04)
DIFFERENTIAL METHOD BLD: ABNORMAL
EOSINOPHIL # BLD AUTO: 0 10E9/L (ref 0–0.7)
EOSINOPHIL NFR BLD AUTO: 0.2 %
ERYTHROCYTE [DISTWIDTH] IN BLOOD BY AUTOMATED COUNT: 13.7 % (ref 10–15)
FLUAV+FLUBV AG SPEC QL: NEGATIVE
FLUAV+FLUBV AG SPEC QL: NEGATIVE
GFR SERPL CREATININE-BSD FRML MDRD: 65 ML/MIN/1.7M2
GLUCOSE BLDC GLUCOMTR-MCNC: 165 MG/DL (ref 70–99)
GLUCOSE BLDC GLUCOMTR-MCNC: 173 MG/DL (ref 70–99)
GLUCOSE BLDC GLUCOMTR-MCNC: 176 MG/DL (ref 70–99)
GLUCOSE SERPL-MCNC: 185 MG/DL (ref 70–99)
HCO3 BLD-SCNC: 23 MMOL/L (ref 21–28)
HCO3 BLDV-SCNC: 29 MMOL/L (ref 21–28)
HCT VFR BLD AUTO: 42.2 % (ref 35–47)
HGB BLD-MCNC: 14.1 G/DL (ref 11.7–15.7)
IMM GRANULOCYTES # BLD: 0.1 10E9/L (ref 0–0.4)
IMM GRANULOCYTES NFR BLD: 0.4 %
INTERPRETATION ECG - MUSE: NORMAL
LACTATE BLD-SCNC: 2.2 MMOL/L (ref 0.7–2)
LACTATE BLD-SCNC: 2.4 MMOL/L (ref 0.7–2)
LACTATE BLD-SCNC: 2.4 MMOL/L (ref 0.7–2)
LYMPHOCYTES # BLD AUTO: 2.4 10E9/L (ref 0.8–5.3)
LYMPHOCYTES NFR BLD AUTO: 12.3 %
MAGNESIUM SERPL-MCNC: 1.8 MG/DL (ref 1.6–2.3)
MCH RBC QN AUTO: 30.5 PG (ref 26.5–33)
MCHC RBC AUTO-ENTMCNC: 33.4 G/DL (ref 31.5–36.5)
MCV RBC AUTO: 91 FL (ref 78–100)
MONOCYTES # BLD AUTO: 0.3 10E9/L (ref 0–1.3)
MONOCYTES NFR BLD AUTO: 1.5 %
NEUTROPHILS # BLD AUTO: 17 10E9/L (ref 1.6–8.3)
NEUTROPHILS NFR BLD AUTO: 85.5 %
NRBC # BLD AUTO: 0 10*3/UL
NRBC BLD AUTO-RTO: 0 /100
OXYHGB MFR BLD: 95 % (ref 92–100)
OXYHGB MFR BLDV: 55 %
PCO2 BLD: 40 MM HG (ref 35–45)
PCO2 BLDV: 61 MM HG (ref 40–50)
PH BLD: 7.36 PH (ref 7.35–7.45)
PH BLDV: 7.28 PH (ref 7.32–7.43)
PLATELET # BLD AUTO: 306 10E9/L (ref 150–450)
PO2 BLD: 80 MM HG (ref 80–105)
PO2 BLDV: 34 MM HG (ref 25–47)
POTASSIUM SERPL-SCNC: 3.9 MMOL/L (ref 3.4–5.3)
PROT SERPL-MCNC: 7.1 G/DL (ref 6.8–8.8)
RBC # BLD AUTO: 4.63 10E12/L (ref 3.8–5.2)
SODIUM SERPL-SCNC: 135 MMOL/L (ref 133–144)
SPECIMEN SOURCE: NORMAL
TROPONIN I SERPL-MCNC: 0.26 UG/L (ref 0–0.04)
TROPONIN I SERPL-MCNC: 3.77 UG/L (ref 0–0.04)
TROPONIN I SERPL-MCNC: 4.14 UG/L (ref 0–0.04)
WBC # BLD AUTO: 19.8 10E9/L (ref 4–11)

## 2017-11-15 PROCEDURE — 25000125 ZZHC RX 250: Performed by: INTERNAL MEDICINE

## 2017-11-15 PROCEDURE — 40000275 ZZH STATISTIC RCP TIME EA 10 MIN

## 2017-11-15 PROCEDURE — 71010 XR CHEST PORT 1 VW: CPT

## 2017-11-15 PROCEDURE — 83735 ASSAY OF MAGNESIUM: CPT | Performed by: EMERGENCY MEDICINE

## 2017-11-15 PROCEDURE — 84484 ASSAY OF TROPONIN QUANT: CPT | Performed by: EMERGENCY MEDICINE

## 2017-11-15 PROCEDURE — 99223 1ST HOSP IP/OBS HIGH 75: CPT | Mod: 25 | Performed by: INTERNAL MEDICINE

## 2017-11-15 PROCEDURE — 99223 1ST HOSP IP/OBS HIGH 75: CPT | Mod: AI | Performed by: INTERNAL MEDICINE

## 2017-11-15 PROCEDURE — 96367 TX/PROPH/DG ADDL SEQ IV INF: CPT

## 2017-11-15 PROCEDURE — 25000128 H RX IP 250 OP 636: Performed by: EMERGENCY MEDICINE

## 2017-11-15 PROCEDURE — 25000125 ZZHC RX 250

## 2017-11-15 PROCEDURE — 94640 AIRWAY INHALATION TREATMENT: CPT

## 2017-11-15 PROCEDURE — 25000132 ZZH RX MED GY IP 250 OP 250 PS 637: Performed by: INTERNAL MEDICINE

## 2017-11-15 PROCEDURE — 94640 AIRWAY INHALATION TREATMENT: CPT | Mod: 76

## 2017-11-15 PROCEDURE — 80053 COMPREHEN METABOLIC PANEL: CPT | Performed by: EMERGENCY MEDICINE

## 2017-11-15 PROCEDURE — 25000131 ZZH RX MED GY IP 250 OP 636 PS 637: Performed by: INTERNAL MEDICINE

## 2017-11-15 PROCEDURE — 84484 ASSAY OF TROPONIN QUANT: CPT | Performed by: INTERNAL MEDICINE

## 2017-11-15 PROCEDURE — 25000125 ZZHC RX 250: Performed by: EMERGENCY MEDICINE

## 2017-11-15 PROCEDURE — 94660 CPAP INITIATION&MGMT: CPT

## 2017-11-15 PROCEDURE — 96368 THER/DIAG CONCURRENT INF: CPT

## 2017-11-15 PROCEDURE — 99285 EMERGENCY DEPT VISIT HI MDM: CPT | Mod: 25

## 2017-11-15 PROCEDURE — 87804 INFLUENZA ASSAY W/OPTIC: CPT | Performed by: EMERGENCY MEDICINE

## 2017-11-15 PROCEDURE — 40000264 ECHO COMPLETE WITH LUMASON

## 2017-11-15 PROCEDURE — 83605 ASSAY OF LACTIC ACID: CPT | Performed by: INTERNAL MEDICINE

## 2017-11-15 PROCEDURE — 21400002 ZZH R&B CCU CICU CRITICAL

## 2017-11-15 PROCEDURE — 40000885 ZZH STATISTIC STEP DOWN HRS EVENING

## 2017-11-15 PROCEDURE — 82805 BLOOD GASES W/O2 SATURATION: CPT | Performed by: INTERNAL MEDICINE

## 2017-11-15 PROCEDURE — 93306 TTE W/DOPPLER COMPLETE: CPT | Mod: 26 | Performed by: INTERNAL MEDICINE

## 2017-11-15 PROCEDURE — 85025 COMPLETE CBC W/AUTO DIFF WBC: CPT | Performed by: EMERGENCY MEDICINE

## 2017-11-15 PROCEDURE — 25000128 H RX IP 250 OP 636: Performed by: INTERNAL MEDICINE

## 2017-11-15 PROCEDURE — 00000146 ZZHCL STATISTIC GLUCOSE BY METER IP

## 2017-11-15 PROCEDURE — 36415 COLL VENOUS BLD VENIPUNCTURE: CPT | Performed by: INTERNAL MEDICINE

## 2017-11-15 PROCEDURE — 36600 WITHDRAWAL OF ARTERIAL BLOOD: CPT

## 2017-11-15 PROCEDURE — 83605 ASSAY OF LACTIC ACID: CPT | Performed by: EMERGENCY MEDICINE

## 2017-11-15 PROCEDURE — 25500064 ZZH RX 255 OP 636: Performed by: INTERNAL MEDICINE

## 2017-11-15 PROCEDURE — 87040 BLOOD CULTURE FOR BACTERIA: CPT | Performed by: EMERGENCY MEDICINE

## 2017-11-15 PROCEDURE — 93005 ELECTROCARDIOGRAM TRACING: CPT

## 2017-11-15 PROCEDURE — 82805 BLOOD GASES W/O2 SATURATION: CPT | Performed by: EMERGENCY MEDICINE

## 2017-11-15 PROCEDURE — 96365 THER/PROPH/DIAG IV INF INIT: CPT

## 2017-11-15 RX ORDER — METOPROLOL TARTRATE 1 MG/ML
2.5 INJECTION, SOLUTION INTRAVENOUS EVERY 4 HOURS PRN
Status: DISCONTINUED | OUTPATIENT
Start: 2017-11-15 | End: 2017-11-15

## 2017-11-15 RX ORDER — ALBUTEROL SULFATE 0.83 MG/ML
SOLUTION RESPIRATORY (INHALATION)
Status: COMPLETED
Start: 2017-11-15 | End: 2017-11-15

## 2017-11-15 RX ORDER — IPRATROPIUM BROMIDE AND ALBUTEROL SULFATE 2.5; .5 MG/3ML; MG/3ML
3 SOLUTION RESPIRATORY (INHALATION) 4 TIMES DAILY
Status: DISCONTINUED | OUTPATIENT
Start: 2017-11-15 | End: 2017-11-25 | Stop reason: HOSPADM

## 2017-11-15 RX ORDER — NALOXONE HYDROCHLORIDE 0.4 MG/ML
.1-.4 INJECTION, SOLUTION INTRAMUSCULAR; INTRAVENOUS; SUBCUTANEOUS
Status: DISCONTINUED | OUTPATIENT
Start: 2017-11-15 | End: 2017-11-25 | Stop reason: HOSPADM

## 2017-11-15 RX ORDER — IPRATROPIUM BROMIDE AND ALBUTEROL SULFATE 2.5; .5 MG/3ML; MG/3ML
SOLUTION RESPIRATORY (INHALATION)
Status: COMPLETED
Start: 2017-11-15 | End: 2017-11-15

## 2017-11-15 RX ORDER — POLYETHYLENE GLYCOL 3350 17 G/17G
17 POWDER, FOR SOLUTION ORAL DAILY PRN
Status: DISCONTINUED | OUTPATIENT
Start: 2017-11-15 | End: 2017-11-25 | Stop reason: HOSPADM

## 2017-11-15 RX ORDER — ONDANSETRON 2 MG/ML
4 INJECTION INTRAMUSCULAR; INTRAVENOUS EVERY 6 HOURS PRN
Status: DISCONTINUED | OUTPATIENT
Start: 2017-11-15 | End: 2017-11-25 | Stop reason: HOSPADM

## 2017-11-15 RX ORDER — NITROGLYCERIN 0.4 MG/1
0.4 TABLET SUBLINGUAL EVERY 5 MIN PRN
Status: DISCONTINUED | OUTPATIENT
Start: 2017-11-15 | End: 2017-11-25 | Stop reason: HOSPADM

## 2017-11-15 RX ORDER — LIDOCAINE 40 MG/G
CREAM TOPICAL
Status: DISCONTINUED | OUTPATIENT
Start: 2017-11-15 | End: 2017-11-25 | Stop reason: HOSPADM

## 2017-11-15 RX ORDER — DOCUSATE SODIUM 100 MG/1
100 CAPSULE, LIQUID FILLED ORAL 2 TIMES DAILY
Status: DISCONTINUED | OUTPATIENT
Start: 2017-11-15 | End: 2017-11-25 | Stop reason: HOSPADM

## 2017-11-15 RX ORDER — METHYLPREDNISOLONE SODIUM SUCCINATE 40 MG/ML
40 INJECTION, POWDER, LYOPHILIZED, FOR SOLUTION INTRAMUSCULAR; INTRAVENOUS EVERY 8 HOURS
Status: DISCONTINUED | OUTPATIENT
Start: 2017-11-15 | End: 2017-11-18

## 2017-11-15 RX ORDER — ALBUTEROL SULFATE 0.83 MG/ML
3 SOLUTION RESPIRATORY (INHALATION)
Status: DISCONTINUED | OUTPATIENT
Start: 2017-11-15 | End: 2017-11-25 | Stop reason: HOSPADM

## 2017-11-15 RX ORDER — AMOXICILLIN 250 MG
2 CAPSULE ORAL 2 TIMES DAILY PRN
Status: DISCONTINUED | OUTPATIENT
Start: 2017-11-15 | End: 2017-11-25 | Stop reason: HOSPADM

## 2017-11-15 RX ORDER — AZELASTINE 1 MG/ML
1 SPRAY, METERED NASAL 2 TIMES DAILY
Status: DISCONTINUED | OUTPATIENT
Start: 2017-11-15 | End: 2017-11-25 | Stop reason: HOSPADM

## 2017-11-15 RX ORDER — MAGNESIUM SULFATE HEPTAHYDRATE 40 MG/ML
4 INJECTION, SOLUTION INTRAVENOUS EVERY 4 HOURS PRN
Status: DISCONTINUED | OUTPATIENT
Start: 2017-11-15 | End: 2017-11-25 | Stop reason: HOSPADM

## 2017-11-15 RX ORDER — AZELASTINE 1 MG/ML
2 SPRAY, METERED NASAL 2 TIMES DAILY
COMMUNITY
End: 2019-05-21

## 2017-11-15 RX ORDER — BISACODYL 10 MG
10 SUPPOSITORY, RECTAL RECTAL DAILY PRN
Status: DISCONTINUED | OUTPATIENT
Start: 2017-11-15 | End: 2017-11-25 | Stop reason: HOSPADM

## 2017-11-15 RX ORDER — MONTELUKAST SODIUM 10 MG/1
10 TABLET ORAL AT BEDTIME
Status: DISCONTINUED | OUTPATIENT
Start: 2017-11-15 | End: 2017-11-25 | Stop reason: HOSPADM

## 2017-11-15 RX ORDER — NICOTINE POLACRILEX 4 MG
15-30 LOZENGE BUCCAL
Status: DISCONTINUED | OUTPATIENT
Start: 2017-11-15 | End: 2017-11-25 | Stop reason: HOSPADM

## 2017-11-15 RX ORDER — ASPIRIN 81 MG/1
81 TABLET ORAL DAILY
Status: DISCONTINUED | OUTPATIENT
Start: 2017-11-15 | End: 2017-11-19

## 2017-11-15 RX ORDER — CEFTRIAXONE 2 G/1
2 INJECTION, POWDER, FOR SOLUTION INTRAMUSCULAR; INTRAVENOUS EVERY 24 HOURS
Status: DISCONTINUED | OUTPATIENT
Start: 2017-11-15 | End: 2017-11-16

## 2017-11-15 RX ORDER — SODIUM CHLORIDE 9 MG/ML
INJECTION, SOLUTION INTRAVENOUS CONTINUOUS
Status: DISCONTINUED | OUTPATIENT
Start: 2017-11-15 | End: 2017-11-16

## 2017-11-15 RX ORDER — DEXTROSE MONOHYDRATE 25 G/50ML
25-50 INJECTION, SOLUTION INTRAVENOUS
Status: DISCONTINUED | OUTPATIENT
Start: 2017-11-15 | End: 2017-11-25 | Stop reason: HOSPADM

## 2017-11-15 RX ORDER — METOPROLOL TARTRATE 1 MG/ML
2.5 INJECTION, SOLUTION INTRAVENOUS EVERY 4 HOURS PRN
Status: DISCONTINUED | OUTPATIENT
Start: 2017-11-15 | End: 2017-11-18

## 2017-11-15 RX ORDER — OXYCODONE HYDROCHLORIDE 5 MG/1
5 TABLET ORAL EVERY 4 HOURS PRN
Status: DISCONTINUED | OUTPATIENT
Start: 2017-11-15 | End: 2017-11-25 | Stop reason: HOSPADM

## 2017-11-15 RX ORDER — ACETAMINOPHEN 500 MG
500-1000 TABLET ORAL EVERY 8 HOURS PRN
Status: DISCONTINUED | OUTPATIENT
Start: 2017-11-15 | End: 2017-11-25 | Stop reason: HOSPADM

## 2017-11-15 RX ORDER — ONDANSETRON 4 MG/1
4 TABLET, ORALLY DISINTEGRATING ORAL EVERY 6 HOURS PRN
Status: DISCONTINUED | OUTPATIENT
Start: 2017-11-15 | End: 2017-11-25 | Stop reason: HOSPADM

## 2017-11-15 RX ORDER — AMOXICILLIN 250 MG
1 CAPSULE ORAL 2 TIMES DAILY PRN
Status: DISCONTINUED | OUTPATIENT
Start: 2017-11-15 | End: 2017-11-25 | Stop reason: HOSPADM

## 2017-11-15 RX ORDER — SIMVASTATIN 10 MG
10 TABLET ORAL AT BEDTIME
Status: DISCONTINUED | OUTPATIENT
Start: 2017-11-15 | End: 2017-11-25 | Stop reason: HOSPADM

## 2017-11-15 RX ORDER — VANCOMYCIN HYDROCHLORIDE 1 G/200ML
1000 INJECTION, SOLUTION INTRAVENOUS ONCE
Status: COMPLETED | OUTPATIENT
Start: 2017-11-15 | End: 2017-11-15

## 2017-11-15 RX ADMIN — AZITHROMYCIN MONOHYDRATE 500 MG: 500 INJECTION, POWDER, LYOPHILIZED, FOR SOLUTION INTRAVENOUS at 12:09

## 2017-11-15 RX ADMIN — DOCUSATE SODIUM 100 MG: 100 CAPSULE, LIQUID FILLED ORAL at 21:14

## 2017-11-15 RX ADMIN — IPRATROPIUM BROMIDE AND ALBUTEROL SULFATE 3 ML: .5; 3 SOLUTION RESPIRATORY (INHALATION) at 19:57

## 2017-11-15 RX ADMIN — IPRATROPIUM BROMIDE AND ALBUTEROL SULFATE 3 ML: .5; 3 SOLUTION RESPIRATORY (INHALATION) at 11:27

## 2017-11-15 RX ADMIN — AZELASTINE HYDROCHLORIDE 1 SPRAY: 137 SPRAY, METERED NASAL at 21:00

## 2017-11-15 RX ADMIN — INSULIN ASPART 1 UNITS: 100 INJECTION, SOLUTION INTRAVENOUS; SUBCUTANEOUS at 12:01

## 2017-11-15 RX ADMIN — MONTELUKAST SODIUM 10 MG: 10 TABLET, FILM COATED ORAL at 21:14

## 2017-11-15 RX ADMIN — SODIUM CHLORIDE 1000 ML: 9 INJECTION, SOLUTION INTRAVENOUS at 07:23

## 2017-11-15 RX ADMIN — TAZOBACTAM SODIUM AND PIPERACILLIN SODIUM 3.38 G: 375; 3 INJECTION, SOLUTION INTRAVENOUS at 07:23

## 2017-11-15 RX ADMIN — SODIUM CHLORIDE 1000 ML: 9 INJECTION, SOLUTION INTRAVENOUS at 07:04

## 2017-11-15 RX ADMIN — OMEPRAZOLE 20 MG: 20 CAPSULE, DELAYED RELEASE ORAL at 11:49

## 2017-11-15 RX ADMIN — METHYLPREDNISOLONE SODIUM SUCCINATE 40 MG: 40 INJECTION, POWDER, FOR SOLUTION INTRAMUSCULAR; INTRAVENOUS at 11:50

## 2017-11-15 RX ADMIN — IPRATROPIUM BROMIDE AND ALBUTEROL SULFATE: .5; 3 SOLUTION RESPIRATORY (INHALATION) at 07:26

## 2017-11-15 RX ADMIN — METHYLPREDNISOLONE SODIUM SUCCINATE 40 MG: 40 INJECTION, POWDER, FOR SOLUTION INTRAMUSCULAR; INTRAVENOUS at 21:14

## 2017-11-15 RX ADMIN — ALBUTEROL SULFATE: 2.5 SOLUTION RESPIRATORY (INHALATION) at 07:25

## 2017-11-15 RX ADMIN — ALBUTEROL SULFATE: 2.5 SOLUTION RESPIRATORY (INHALATION) at 07:03

## 2017-11-15 RX ADMIN — IPRATROPIUM BROMIDE AND ALBUTEROL SULFATE: .5; 3 SOLUTION RESPIRATORY (INHALATION) at 07:02

## 2017-11-15 RX ADMIN — FLUTICASONE FUROATE AND VILANTEROL TRIFENATATE 1 PUFF: 100; 25 POWDER RESPIRATORY (INHALATION) at 11:58

## 2017-11-15 RX ADMIN — VANCOMYCIN HYDROCHLORIDE 1000 MG: 1 INJECTION, SOLUTION INTRAVENOUS at 07:35

## 2017-11-15 RX ADMIN — SODIUM CHLORIDE 1000 ML: 9 INJECTION, SOLUTION INTRAVENOUS at 09:49

## 2017-11-15 RX ADMIN — Medication 2 G: at 06:49

## 2017-11-15 RX ADMIN — ASPIRIN 81 MG: 81 TABLET, COATED ORAL at 11:49

## 2017-11-15 RX ADMIN — Medication 12.5 MG: at 18:14

## 2017-11-15 RX ADMIN — IPRATROPIUM BROMIDE AND ALBUTEROL SULFATE: .5; 3 SOLUTION RESPIRATORY (INHALATION) at 08:43

## 2017-11-15 RX ADMIN — INSULIN ASPART 1 UNITS: 100 INJECTION, SOLUTION INTRAVENOUS; SUBCUTANEOUS at 18:14

## 2017-11-15 RX ADMIN — AZELASTINE HYDROCHLORIDE 1 SPRAY: 137 SPRAY, METERED NASAL at 11:58

## 2017-11-15 RX ADMIN — ALBUTEROL SULFATE: 2.5 SOLUTION RESPIRATORY (INHALATION) at 07:51

## 2017-11-15 RX ADMIN — SIMVASTATIN 10 MG: 10 TABLET, FILM COATED ORAL at 21:14

## 2017-11-15 RX ADMIN — SODIUM CHLORIDE: 9 INJECTION, SOLUTION INTRAVENOUS at 20:33

## 2017-11-15 RX ADMIN — ENOXAPARIN SODIUM 40 MG: 40 INJECTION SUBCUTANEOUS at 11:51

## 2017-11-15 RX ADMIN — CEFTRIAXONE SODIUM 2 G: 2 INJECTION, POWDER, FOR SOLUTION INTRAMUSCULAR; INTRAVENOUS at 11:47

## 2017-11-15 RX ADMIN — IPRATROPIUM BROMIDE AND ALBUTEROL SULFATE: .5; 3 SOLUTION RESPIRATORY (INHALATION) at 07:50

## 2017-11-15 RX ADMIN — SULFUR HEXAFLUORIDE 5 ML: KIT at 15:45

## 2017-11-15 RX ADMIN — IPRATROPIUM BROMIDE AND ALBUTEROL SULFATE 3 ML: .5; 3 SOLUTION RESPIRATORY (INHALATION) at 15:43

## 2017-11-15 ASSESSMENT — ENCOUNTER SYMPTOMS
SHORTNESS OF BREATH: 1
CHILLS: 1
NAUSEA: 1
FEVER: 0

## 2017-11-15 NOTE — H&P
[facility]    History and Physical  Hospitalist       Date of Admission:  11/15/2017    Cumulative Summary:  Genie Persaud is a 82 year old female with past medical history significant for moderate asthma, essential hypertension and coronary artery disease who presented with severe dyspnea at rest  And was found to be in acute hypoxic respiratory failure with right lower lobe consolidative pneumonia.  Patient was started on BiPAP due to being in respiratory distress, respiratory acidosis and decrease air entry and movement in bilateral lower lobes.  Patient was also noticed to be in severe sepsis with hypotension and was given fluid bolus, patient responded wellto the fluid resuscitation and was admitted to intermediate care for further evaluation and management.    Assessment & Plan :    Principal Problem:    Acute respiratory failure with hypoxia (H) (11/15/2017)    Right lower lobe pneumonia (H) (11/15/2017) due to infectious organism    Moderate asthma with exacerbation (11/15/2017)    Severe sepsis (H) (11/15/2017) with hypotension:     -- Admit patient with telemetry in IMC for close monitoring   -- DIET and Activity orders.  -- Oxygen to keep sats above 92 %  -- Aerosol treatments in the form of Duo neb Q 4 hours. And q 2 hours as needed  -- ABGs PRN   -- CPAP/BIPAP assessment, will benefit from treatments with BIPAP  -- Continuous pulse Oximetry    -- 12 lead EKG.  --  A she does not have any history of recent hospitalization, lives at home and at this time can be started on ceftriaxone and azithromycin, she has received broad spectrum antibiotics including Zosyn and vancomycin in the emergency room as per sepsis protocol as patient is improving,will start patient on ceftriaxone and azithromycin.  -- start patient on ISS while she is on IV steroids   -- repeat chest x-ray in 48 hours  -- Blood cultures re drawn, will follow-up on the results  -- IV Solumedrol 40 mg IV every 8 hours for acute  hypoxic resp failure.    -- continue on IV fluids  -- hold all home antihypertensives except metoprolol, she takes 50 mg of Toprol-XL at home, but only ordered 25 mg at this time due to patient being hypotensive initially.      Essential hypertension with goal blood pressure less than 140/90 (9/1/2016)    Coronary artery disease involving native coronary artery of native heart without angina pectoris (3/9/2017)    Nonrheumatic aortic valve insufficiency (6/29/2017)   elevated troponin: most likely secondary to acute hypoxic respiratory failure with sepsis, patient does not have any chest pain or discomfort  -- hold home antihypertensive other than his starting patient on a small dose of beta blocker to help with her sinus tachycardia which might be exacerbated at this point secondary to DuoNeb treatment.  -- we'll check serial cardiac enzymes  -- last echocardiogram results were reviewed which was in March 2017 showed ejection fraction around 45-50% with moderate to severe aortic regurgitation.  We'll repeat 2-D echo       DVT Prophylaxis: Enoxaparin (Lovenox) SQ  Code Status: DNR , discussed with patient.    Disposition: Expected discharge in next 2-3 days as patient starts to make clinical improvement    Ida Gray MD,FACP    Primary Care Physician   Layo Sevilla    Chief Complaint   shortness of breath with wheezing and cough    History is obtained from the patient    Patient Active Problem List   Diagnosis     Swelling, mass, or lump in head and neck     Pulmonary nodule     Anemia     Vasculitis (H)     HYPERLIPIDEMIA LDL GOAL <130     Impaired fasting glucose     Candidal esophagitis (H)     Thoracic aortic aneurysm without rupture (H)     Health Care Home     Advanced directives, counseling/discussion     Stroke (H)     Moderate persistent asthma without complication     Essential hypertension with goal blood pressure less than 140/90     Coronary artery disease involving native coronary artery of native  heart without angina pectoris     SVT -noted during Cath procedure     Nonrheumatic aortic valve insufficiency     Acute respiratory failure with hypoxia (H)     Right lower lobe pneumonia (H)     Moderate asthma with exacerbation     Severe sepsis (H)     Pneumonia of right lower lobe due to infectious organism (H)     Sepsis, due to unspecified organism (H)       History of Present Illness   Genie Persaud is a 82 year old female with past medical history significant for coronary artery disease, essential hypertension, moderate persistent asthma, last hospitalization from asthma was 12 years ago, never required intubation, hyperlipidemia, pulmonary nodule, history of stroke and thoracic aortic aneurysm without rupture with history of vasculitis who was admitted from the emergency room this morning when she presented there significantly short of breath and dyspneic.    According to patient she has not been feeling well for the past few days, she has productive cough bringing up thick greenish phlegm,  She is also feeling tired, fatigued and significant chest tightness along with wheezing.  She continued to take her routine Advair inhaler along with her rescue inhaler which he has been using 4-6 times a day as compared to none  To few times a day when she is not sick.  She is denying any chest discomfort but was feeling  Palpitations this morning.  She has not been in contact with somebody sick.  She is denying any recent travel.  She is denying any orthopnea, swelling in lower extremities or weight gain.  She told me that she does not have history of congestive heart failure she is nonsmoker and lives alone and usually independent at baseline.    On evaluation in the emergency room patient was severely dyspneic and hypoxic, was hardly able to talk in 2-3 words, was using accessory muscles for breathing, she was started on BiPAP was given DuoNeb and magnesium and started to feel slightly better she was also noticed  to be hypotensive with blood pressure of 76/52, respiratory rate of 35 heart rate of 123 with fever off 100 degree Fahrenheit and was started on fluid resuscitation for severe sepsis associated with pneumonia.  She responded well to fluid bolus  And her systolic blood pressure was 92/40 on admission, initially she also had lactic acidosis  And her repeat lactic acid levels are in process.she also had leukocytosis of 19.8.  Her pH was 7.28 on initial VBG.  She also have troponin elevation of 0.262 which was felt to be secondary to sepsis.on evaluation of the EKG, patient did not have any ischemic changes but was in sinus tachycardia with nonspecific T-wave changes.  Patient is denying any chest pain or chest discomfort at this point.    Past Medical History    I have reviewed this patient's medical history and updated it with pertinent information if needed.   Past Medical History:   Diagnosis Date     Basal Cell Carcinoma--back      Coronary artery disease involving native coronary artery of native heart without angina pectoris 3/9/2017    3/2/2017 - Two vessel coronary artery disease with mLAD 50% stenosis, D3 50% stenosis, pLCx 50% stenosis and mLCx 50% stenosis     Essential hypertension with goal blood pressure less than 140/90 9/1/2016    White coat hypertension;  24 hour ambulatory monitoring normal. Do not titrate up further.       Hyperlipidemia LDL goal <130 2/10/2010     Impaired fasting glucose 8/26/2010     Moderate persistent asthma      Nonrheumatic aortic valve insufficiency 6/29/2017     osteopenia      Other and unspecified hyperlipidemia      Pulmonary nodule 3/3/2009    PET scan reportedly normal; biopsy not advised.     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  Problem list name updated by automated process. Provider to review     Unspecified arthropathy, hand      Vasculitis (H) 4/20/2009    Possible increased activity of thoracic aorta, on PET scan w/u for pulmonary nodule.        Past Surgical History   I have reviewed this patient's surgical history and updated it with pertinent information if needed.  Past Surgical History:   Procedure Laterality Date     C APPENDECTOMY  1957     C LIGATE FALLOPIAN TUBE  1971     C STEREOTACTIC BREAST BIOPSY  2001     CHOLECYSTECTOMY, LAPOROSCOPIC  2008    Cholecystectomy, Laparoscopic     ESOPHAGOSCOPY, GASTROSCOPY, DUODENOSCOPY (EGD), COMBINED  5/17/2013    Procedure: COMBINED ESOPHAGOSCOPY, GASTROSCOPY, DUODENOSCOPY (EGD), BIOPSY SINGLE OR MULTIPLE;  ESOPHAGOSCOPY, GASTROSCOPY, DUODENOSCOPY (EGD)  with bx;  Surgeon: Jeffery Yanez MD;  Location:  GI     HC DILATION/CURETTAGE DIAG/THER NON OB  1967,1971     HC REMOVE TONSILS/ADENOIDS,<13 Y/O         Prior to Admission Medications   Prior to Admission Medications   Prescriptions Last Dose Informant Patient Reported? Taking?   MONTELUKAST SODIUM PO   Yes No   Sig: Take 10 mg by mouth   PREDNISONE PO   Yes No   Sig: Take 10 mg by mouth daily as needed Per Dr. Metz/[facility]   acetaminophen (TYLENOL) 500 MG tablet  Self Yes No   Sig: Take 500-1,000 mg by mouth every 8 hours as needed for mild pain   albuterol (PROAIR HFA, PROVENTIL HFA, VENTOLIN HFA) 108 (90 BASE) MCG/ACT inhaler  Self Yes No   Sig: Inhale 2 puffs into the lungs every 6 hours as needed    aspirin 81 MG EC tablet  Self No No   Sig: Take 1 tablet (81 mg) by mouth daily   Patient taking differently: Take 81 mg by mouth daily Taking 3 times per week   azelastine (ASTELIN) 0.1 % spray   No No   Sig: SPRAY 1-2 SPRAYS INTO BOTH NOSTRILS 2 TIMES DAILY   calcium citrate-vitamin D (CALCIUM CITRATE +) 315-200 MG-UNIT TABS  Self Yes No   Sig: Take 2 tablets by mouth daily.    fluticasone-salmeterol (ADVAIR-HFA) 230-21 MCG/ACT inhaler  Self Yes No   Sig: Inhale 2 puffs into the lungs 2 times daily   hydrochlorothiazide 12.5 MG TABS tablet   No No   Sig: Take 1 tablet (12.5 mg) by mouth daily   ipratropium (ATROVENT) 0.03 % spray   Yes No   Sig: Spray 2 sprays in nostril 2 times daily   losartan (COZAAR) 100 MG tablet   No No   Sig: Take 1 tablet (100 mg) by mouth At Bedtime   metoprolol (TOPROL-XL) 25 MG 24 hr tablet   No No   Sig: Take 2 tablets (50 mg) by mouth daily   omeprazole (PRILOSEC) 20 MG CR capsule   No No   Sig: TAKE ONE CAPSULE BY MOUTH ONE TIME DAILY    simvastatin (ZOCOR) 20 MG tablet   No No   Sig: Take 0.5 tablets (10 mg) by mouth At Bedtime   spironolactone (ALDACTONE) 25 MG tablet   No No   Sig: Take 0.5 tablets (12.5 mg) by mouth daily      Facility-Administered Medications: None     Allergies   Allergies   Allergen Reactions     Fosamax [Alendronic Acid] GI Disturbance     Augmentin [Amoxicillin-Pot Clavulanate] Diarrhea     Diarrhea and rash     Evista [Raloxifene]      abd symptoms.      Flu Virus Vaccine      swollen and red at inj site       Social History   I have reviewed this patient's social history and updated it with pertinent information if needed. Genie Persaud  reports that she has never smoked. She has never used smokeless tobacco. She reports that she drinks about 0.6 - 1.2 oz of alcohol per week  She reports that she does not use illicit drugs.    Family History   I have reviewed this patient's family history and updated it with pertinent information if needed.   Family History   Problem Relation Age of Onset     Hypertension Mother      OSTEOPOROSIS Mother      C.A.D. Mother      Connective Tissue Disorder Father      scleraderma     CEREBROVASCULAR DISEASE Paternal Grandmother      Prostate Cancer Other      9/05 passed away due to complications     Breast Cancer Child      youngest dtr       Review of Systems     CONSTITUTIONAL:  positive for   fatigue and generalized weakness.  EYES:  negative for blurred vision and visual disturbance  HEENT:  negative for hoarseness and voice change  RESPIRATORY: positive for cough shortness of breath, negative for any chest pain or pressure, she did feel some palpitations this morning as above.  CARDIOVASCULAR:  negative for  chest pain, palpitations, orthopnea, exertional chest pressure/discomfort  GASTROINTESTINAL: Negative for abd pain, nausea , vomiting ,constipation and abdominal pain  GENITOURINARY: Negative for burning /urgency and frequency.  HEMATOLOGIC/LYMPHATIC:  negative  ALLERGIC/IMMUNOLOGIC:  negative for drug reactions  ENDOCRINE:  negative for diabetic symptoms including polyuria, polydipsia and weight loss  MUSCULOSKELETAL:  positive for arthralgias  NEUROLOGICAL:  negative  BEHAVIOR/PSYCH: negative    Physical Exam   Temp: 100  F (37.8  C) Temp src: Temporal BP: 104/40 Pulse: 138 Heart Rate: 110 Resp: 27 SpO2: 100 % O2 Device: Aerosol mask Oxygen Delivery: 10 LPM  Vital Signs with Ranges  Temp:  [100  F (37.8  C)] 100  F (37.8  C)  Pulse:  [138] 138  Heart Rate:  [107-137] 110  Resp:  [22-44] 27  BP: ()/(39-90) 104/40  SpO2:  [91 %-100 %] 100 %  0 lbs 0 oz    Constitutional: Awake, alert,oriented to time, place and person , cooperative, in mild distress ,using the accessory muscles , Pleasent and cooperative , Rt is changing her to nasal cannula from BIPAP  Eyes: Conjunctiva and pupils examined and normal.  HEENT: Moist mucous membranes, normal dentition.  Respiratory: decreased air entry in the bases , insp and exp wheezing , no crackles .  Cardiovascular: tachy but reg rhythm, normal S1 and S2, and no murmur noted.  GI: Soft, non-distended, non-tender, normal bowel sounds.  Lymph/Hematologic: No anterior cervical or supraclavicular adenopathy.  Skin: No rashes, no cyanosis, no edema.  Musculoskeletal: No joint swelling, erythema or tenderness.  Neurologic: Cranial nerves 2-12 intact, normal  strength and sensation.  Psychiatric: Alert, oriented to person, place and time, no obvious anxiety or depression.    Data   Data reviewed today:  I personally reviewed the EKG tracing showing sinus tachycardia with non specific T wave changes in the lateral leads..    Recent Labs  Lab 11/15/17  0645   WBC 19.8*   HGB 14.1   MCV 91         POTASSIUM 3.9   CHLORIDE 100   CO2 28   BUN 29   CR 0.84   ANIONGAP 7   ELROY 8.9   *   ALBUMIN 3.4   PROTTOTAL 7.1   BILITOTAL 0.6   ALKPHOS 121   ALT 55*   AST 67*   TROPI 0.262*       Imaging:  Recent Results (from the past 24 hour(s))   XR Chest Port 1 View    Narrative    CHEST, ONE VIEW 11/15/2017 7:15 AM    HISTORY: Short of breath.    COMPARISON: 3/2/2017    FINDINGS: Dense consolidation in the right lower lung. Left lung is  relatively clear. No pneumothorax. Stable heart size.      Impression    IMPRESSION: Dense consolidation in the right lower lung suggests  pneumonia.    ANGELINE CRUZ MD

## 2017-11-15 NOTE — PROVIDER NOTIFICATION
Dr. Gray paged re: Trop 3.072. Awaiting call back.   Spoke w/ Dr. Gray, orders received. Will recheck Trop at 1800 and PRN IV metoprolol ordered.

## 2017-11-15 NOTE — PHARMACY-ADMISSION MEDICATION HISTORY
Admission medication history interview status for the 11/15/2017  admission is complete. See EPIC admission navigator for prior to admission medications     Medication history source reliability:Good    Actions taken by pharmacist (provider contacted, etc):Verified all Rx meds with patient's retail pharmacy. [facility]     Additional medication history information not noted on PTA med list :None    Medication reconciliation/reorder completed by provider prior to medication history? Yes    Time spent in this activity: 30 minutes    Prior to Admission medications    Medication Sig Last Dose Taking? Auth Provider   azelastine (ASTELIN) 0.1 % spray Spray 2 sprays into both nostrils 2 times daily 11/14/2017 at Unknown time Yes Unknown, Entered By History   spironolactone (ALDACTONE) 25 MG tablet Take 0.5 tablets (12.5 mg) by mouth daily 11/14/2017 at am Yes Layo Sevilla MD   hydrochlorothiazide 12.5 MG TABS tablet Take 1 tablet (12.5 mg) by mouth daily 11/14/2017 at am Yes Layo Sevilla MD   ipratropium (ATROVENT) 0.03 % spray Spray 2 sprays in nostril 2 times daily 11/14/2017 at Unknown time Yes Reported, Patient   losartan (COZAAR) 100 MG tablet Take 1 tablet (100 mg) by mouth At Bedtime 11/14/2017 at pm Yes Layo Sevilla MD   PREDNISONE PO Take 10 mg by mouth daily as needed Per Dr. Metz/[facility] Past Month at Unknown time Yes Reported, Patient   MONTELUKAST SODIUM PO Take 10 mg by mouth every evening  11/14/2017 at pm Yes Reported, Patient   metoprolol (TOPROL-XL) 25 MG 24 hr tablet Take 2 tablets (50 mg) by mouth daily 11/14/2017 at am Yes Layo Sevilla MD   omeprazole (PRILOSEC) 20 MG CR capsule TAKE ONE CAPSULE BY MOUTH ONE TIME DAILY  11/14/2017 at am Yes Layo Sevilla MD   simvastatin (ZOCOR) 20 MG tablet Take 0.5 tablets (10 mg) by mouth At Bedtime 11/14/2017 at pm Yes Layo Sevilla MD   fluticasone-salmeterol (ADVAIR-HFA) 230-21 MCG/ACT inhaler Inhale 2 puffs into the lungs 2 times daily 11/14/2017 at  Unknown time Yes Layo Sevilla MD   acetaminophen (TYLENOL) 500 MG tablet Take 500-1,000 mg by mouth every 8 hours as needed for mild pain prn med Yes Reported, Patient   albuterol (PROAIR HFA, PROVENTIL HFA, VENTOLIN HFA) 108 (90 BASE) MCG/ACT inhaler Inhale 2 puffs into the lungs every 6 hours as needed  prn med Yes Reported, Patient   aspirin 81 MG EC tablet Take 1 tablet (81 mg) by mouth daily  Patient taking differently: Take 81 mg by mouth three times a week Mon, Wed, Fri Past Week at Unknown time Yes Layo Sevilla MD   calcium citrate-vitamin D (CALCIUM CITRATE +) 315-200 MG-UNIT TABS Take 2 tablets by mouth every evening  11/14/2017 at pm Yes Reported, Patient

## 2017-11-15 NOTE — CONSULTS
[facility]    Inpatient Cardiology Consultation   Inpatient cardiology consultation note has been dictated. Dictation number [id number].    Della Tobin MD        REVIEW OF SYSTEMS:  A comprehensive 10 point review of systems was completed and the pertinent positives are documented in history of present illness.    MEDICATIONS:  Prior to Admission Medications   Prescriptions Last Dose Informant Patient Reported? Taking?   MONTELUKAST SODIUM PO 11/14/2017 at pm  Yes Yes   Sig: Take 10 mg by mouth every evening    PREDNISONE PO Past Month at Unknown time  Yes Yes   Sig: Take 10 mg by mouth daily as needed Per Dr. Metz/[facility]   acetaminophen (TYLENOL) 500 MG tablet prn med Self Yes Yes   Sig: Take 500-1,000 mg by mouth every 8 hours as needed for mild pain   albuterol (PROAIR HFA, PROVENTIL HFA, VENTOLIN HFA) 108 (90 BASE) MCG/ACT inhaler prn med Self Yes Yes   Sig: Inhale 2 puffs into the lungs every 6 hours as needed    aspirin 81 MG EC tablet Past Week at Unknown time Self No Yes   Sig: Take 1 tablet (81 mg) by mouth daily   Patient taking differently: Take 81 mg by mouth three times a week Mon, Wed, Fri   azelastine (ASTELIN) 0.1 % spray 11/14/2017 at Unknown time  Yes Yes   Sig: Spray 2 sprays into both nostrils 2 times daily   calcium citrate-vitamin D (CALCIUM CITRATE +) 315-200 MG-UNIT TABS 11/14/2017 at pm Self Yes Yes   Sig: Take 2 tablets by mouth every evening    fluticasone-salmeterol (ADVAIR-HFA) 230-21 MCG/ACT inhaler 11/14/2017 at Unknown time Self Yes Yes   Sig: Inhale 2 puffs into the lungs 2 times daily   hydrochlorothiazide 12.5 MG TABS tablet 11/14/2017 at am  No Yes   Sig: Take 1 tablet (12.5 mg) by mouth daily   ipratropium (ATROVENT) 0.03 % spray 11/14/2017 at Unknown time  Yes Yes   Sig: Spray 2 sprays in nostril 2 times daily   losartan (COZAAR) 100 MG tablet 11/14/2017 at pm  No Yes   Sig: Take 1 tablet (100 mg) by mouth At Bedtime   metoprolol (TOPROL-XL) 25 MG 24 hr  tablet 11/14/2017 at am  No Yes   Sig: Take 2 tablets (50 mg) by mouth daily   omeprazole (PRILOSEC) 20 MG CR capsule 11/14/2017 at am  No Yes   Sig: TAKE ONE CAPSULE BY MOUTH ONE TIME DAILY    simvastatin (ZOCOR) 20 MG tablet 11/14/2017 at pm  No Yes   Sig: Take 0.5 tablets (10 mg) by mouth At Bedtime   spironolactone (ALDACTONE) 25 MG tablet 11/14/2017 at am  No Yes   Sig: Take 0.5 tablets (12.5 mg) by mouth daily      Facility-Administered Medications: None       ALLERGIES:  Allergies   Allergen Reactions     Fosamax [Alendronic Acid] GI Disturbance     Augmentin [Amoxicillin-Pot Clavulanate] Diarrhea     Diarrhea and rash     Evista [Raloxifene]      abd symptoms.      Flu Virus Vaccine      swollen and red at inj site       PAST MEDICAL HISTORY:  Past Medical History:   Diagnosis Date     Basal Cell Carcinoma--back      Coronary artery disease involving native coronary artery of native heart without angina pectoris 3/9/2017    3/2/2017 - Two vessel coronary artery disease with mLAD 50% stenosis, D3 50% stenosis, pLCx 50% stenosis and mLCx 50% stenosis     Essential hypertension with goal blood pressure less than 140/90 9/1/2016    White coat hypertension;  24 hour ambulatory monitoring normal. Do not titrate up further.       Hyperlipidemia LDL goal <130 2/10/2010     Impaired fasting glucose 8/26/2010     Moderate persistent asthma      Nonrheumatic aortic valve insufficiency 6/29/2017     osteopenia      Other and unspecified hyperlipidemia      Pulmonary nodule 3/3/2009    PET scan reportedly normal; biopsy not advised.     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  Problem list name updated by automated process. Provider to review      Unspecified arthropathy, hand      Vasculitis (H) 4/20/2009    Possible increased activity of thoracic aorta, on PET scan w/u for pulmonary nodule.        PAST SURGICAL HISTORY:  Past Surgical History:   Procedure Laterality Date     C APPENDECTOMY  1957     C LIGATE FALLOPIAN TUBE  1971     C STEREOTACTIC BREAST BIOPSY  2001     CHOLECYSTECTOMY, LAPOROSCOPIC  2008    Cholecystectomy, Laparoscopic     ESOPHAGOSCOPY, GASTROSCOPY, DUODENOSCOPY (EGD), COMBINED  5/17/2013    Procedure: COMBINED ESOPHAGOSCOPY, GASTROSCOPY, DUODENOSCOPY (EGD), BIOPSY SINGLE OR MULTIPLE;  ESOPHAGOSCOPY, GASTROSCOPY, DUODENOSCOPY (EGD)  with bx;  Surgeon: Jeffery Yanez MD;  Location: [facility]     HC DILATION/CURETTAGE DIAG/THER NON OB  1967,1971     HC REMOVE TONSILS/ADENOIDS,<15 Y/O         SOCIAL HISTORY:   Genie Persaud  reports that she has never smoked. She has never used smokeless tobacco. She reports that she drinks about 0.6 - 1.2 oz of alcohol per week  She reports that she does not use illicit drugs.    FAMILY HISTORY:  Family History   Problem Relation Age of Onset     Hypertension Mother      OSTEOPOROSIS Mother      C.A.D. Mother      Connective Tissue Disorder Father      scleraderma     CEREBROVASCULAR DISEASE Paternal Grandmother      Prostate Cancer Other      9/05 passed away due to complications     Breast Cancer Child      youngest dtr

## 2017-11-15 NOTE — PLAN OF CARE
Problem: Patient Care Overview  Goal: Plan of Care/Patient Progress Review  PT: PT order received, chart reviewed, eval initiated. Evaluation was interrupted by arrival of ECHO.

## 2017-11-15 NOTE — IP AVS SNAPSHOT
[address], [address]    [facility] [address]    Phone:  [phone]                                       After Visit Summary   11/15/2017    Genie Persaud    MRN: [id number]           After Visit Summary Signature Page     I have received my discharge instructions, and my questions have been answered. I have discussed any challenges I see with this plan with the nurse or doctor.    ..........................................................................................................................................  Patient/Patient Representative Signature      ..........................................................................................................................................  Patient Representative Print Name and Relationship to Patient    ..................................................               ................................................  Date                                            Time    ..........................................................................................................................................  Reviewed by Signature/Title    ...................................................              ..............................................  Date                                                            Time

## 2017-11-15 NOTE — IP AVS SNAPSHOT
MRN:[id number]                      After Visit Summary   11/15/2017    Genie Persaud    MRN: [id number]           Thank you!     Thank you for choosing [facility] for your care. Our goal is always to provide you with excellent care. Hearing back from our patients is one way we can continue to improve our services. Please take a few minutes to complete the written survey that you may receive in the mail after you visit with us. Thank you!        Patient Information     Date Of Birth          1935        Designated Caregiver       Most Recent Value    Caregiver    Will someone help with your care after discharge? yes    Name of designated caregiver Brandon Persaud    Phone number of caregiver [phone]    Caregiver address [place]      About your hospital stay     You were admitted on:  November 15, 2017 You last received care in the:  [address]    You were discharged on:  November 25, 2017        Reason for your hospital stay       Severe pneumonia with sepsis  Atrial fibrillation  Lower extremity edema                  Who to Call     For medical emergencies, please call 911.  For non-urgent questions about your medical care, please call your primary care provider or clinic, [phone]          Attending Provider     Provider Specialty    Jason Costa MD Emergency Medicine    [facility]       Primary Care Provider Office Phone # Fax #    Layo Sevilla [phone]       When to contact your care team       Call your primary doctor if you have any of the following: temperature greater than 101,  increased shortness of breath, increased drainage, increased swelling or increased pain.                  After Care Instructions     Activity       Your activity upon discharge: activity as tolerated.  Elevate legs as able.            Diet       Follow this diet upon discharge: Orders Placed This Encounter      Snacks/Supplements  Adult: Ensure Plus (Adult); Between Meals      Advance Diet as Tolerated: Regular Diet Adult, low sodium            Wound care and dressings       Instructions to care for your wound at home: daily dressing changes for lower extremities.                  Follow-up Appointments     Follow-up and recommended labs and tests        Follow up with primary care provider, Layo Sevilla, within one week, to evaluate medication change, to evaluate treatment change, for hospital follow- up and regarding new diagnosis. The following labs/tests are recommended: BMP.                  Your next 10 appointments already scheduled     Nov 28, 2017 11:00 AM CST   [facility]    [address]  [address]  [facility] [address] [phone]           Bring a current list of meds and any records pertaining to this visit. For Physicals, please bring immunization records and any forms needing to be filled out. Please arrive 10 minutes early to complete paperwork.            Jan 12, 2018 10:00 AM CST   Ech Complete with [address] ([facility])    [address]  [facility] [id number] [phone]           1. Please bring or wear a comfortable two-piece outfit. 2. You may eat, drink and take your normal medicines. 3. For any questions that cannot be answered, please contact the ordering physician ***Please check-in at the [place] Registration Office located in [address] in the [facility] building. When you are finished registering, please go to [address] and have a seat. The technician will call your name for the test.            Jan 18, 2018 10:00 AM CST   Return Visit with Barak Hernandez MD   [facility] ([facility])    [address]  [facility] [id number] [phone]               Additional Services     Home Care OT Referral for Hospital Discharge       OT to eval and treat    Your provider has ordered home care - occupational therapy. If you have not been contacted within 2 days of your discharge please call the department phone number listed on the top of this document.            Home Care PT Referral for Hospital Discharge       PT to eval and treat    Your provider has ordered home care - physical therapy. If you have not been contacted within 2 days of your discharge please call the department phone number listed on the top of this document.            Home care nursing referral       RN skilled nursing visit. RN to assess vital signs and weight, respiratory and cardiac status and pain level and activity tolerance.  RN to teach wound care/lymphedema wraps.    Your provider has ordered home care nursing services. If you have not been contacted within 2 days of your discharge please call the inpatient department phone number at [phone] .                  Future tests that were ordered for you     Cardiac event monitor                 Further instructions from your care team       A follow-up appointment was scheduled for you [see above].  It is very important to go to it--bring these papers and your insurance card with you.  At the visit, talk about your hospital stay.  Tell your doctor how you feel.  Show your new list of medications.  Your doctor will update records, make sure you are still doing OK, and decide if any tests or medication changes are needed.      Your doctor has ordered home care to help you after your hospital stay.  They will contact you regarding your first visit.  This service will be provided by [facility].  If you have not received a call within 48 hours of discharge, please call them at [phone].      Plan for bilateral feet:    Frequency: daily when wraps are off, until no longer weepin.  Cleanse gently with mild soap and water or wound  "cleanser, pat dry.   2.  If any areas of open moist tissue, cover these with pieces of Adaptic.  3.  Cover dorsal (top of) feet, posterior (back of) right ankle, and any other weepy areas with gauze or ABD pads, and secure with Kerlix.  Label with time/date/initials.  4.  Compression per MD or Lymphedema.   5.  Encourage elevation of legs, and float heels when in bed.      Pending Results     No orders found from 2017 to 2017.            Statement of Approval     Ordered          17 1201  I have reviewed and agree with all the recommendations and orders detailed in this document.  EFFECTIVE NOW     Approved and electronically signed by:  Phillip Orozco MD             Admission Information     Date & Time Provider Department Dept. Phone    11/15/2017 Ida Gray MD [place] [address] Oncology [phone]      Your Vitals Were     Blood Pressure Pulse Temperature Respirations Height Weight    123/41 (BP Location: Left arm) 73 95.5  F (35.3  C) (Oral) 16 1.6 m (5' 3\") 68.8 kg (151 lb 9.6 oz)    Pulse Oximetry BMI (Body Mass Index)                95% 26.85 kg/m2          MyChart Information     [employer] lets you send messages to your doctor, view your test results, renew your prescriptions, schedule appointments and more. To sign up, go to [email or web address] . Click on \"Log in\" on the left side of the screen, which will take you to the Welcome page. Then click on \"Sign up Now\" on the right side of the page.     You will be asked to enter the access code listed below, as well as some personal information. Please follow the directions to create your username and password.     Your access code is: [email or web address]  Expires: 2017  2:32 PM     Your access code will  in 90 days. If you need help or a new code, please call your [facility] clinic or [phone].        Care EveryWhere ID     This is your Care EveryWhere ID. This could be used by other organizations to access your [facility] " medical records  [id number]        Equal Access to Services     [facility] JUSTICE : Hadii william frausto riotgloria Valdoali, waaxda luqadaha, qaybta suryperezramos goss, ora brownjoneljuliana lange. So [facility] [phone].    ATENCIÓN: Si habla español, tiene a patel disposición servicios gratuitos de asistencia lingüística. Llame al [phone].    We comply with applicable federal civil rights laws and [place] laws. We do not discriminate on the basis of race, color, national origin, age, disability, sex, sexual orientation, or gender identity.               Review of your medicines      START taking        Dose / Directions    amiodarone 100 MG Tabs tablet   Commonly known as:  PACERONE/CODARONE   Used for:  Atrial fibrillation, unspecified type (H)        Dose:  200 mg   Take 2 tablets (200 mg) by mouth 2 times daily   Quantity:  60 tablet   Refills:  0       apixaban ANTICOAGULANT 2.5 MG tablet   Commonly known as:  ELIQUIS   Used for:  Atrial fibrillation, unspecified type (H)        Dose:  2.5 mg   Take 1 tablet (2.5 mg) by mouth 2 times daily   Quantity:  60 tablet   Refills:  0       cefdinir 300 MG capsule   Commonly known as:  OMNICEF   Indication:  Community Acquired Pneumonia        Dose:  300 mg   Take 1 capsule (300 mg) by mouth 2 times daily   Quantity:  6 capsule   Refills:  0       furosemide 40 MG tablet   Commonly known as:  LASIX   Used for:  Edema, unspecified type        Dose:  40 mg   Take 1 tablet (40 mg) by mouth daily   Quantity:  30 tablet   Refills:  0       order for DME        Equipment being ordered: Walker Wheels () and Walker () Treatment Diagnosis: Impaired gait   Quantity:  1 each   Refills:  0         CONTINUE these medicines which may have CHANGED, or have new prescriptions. If we are uncertain of the size of tablets/capsules you have at home, strength may be listed as something that might have changed.        Dose / Directions    aspirin 81 MG EC tablet   This may have  changed:    - when to take this  - additional instructions   Used for:  Stroke (H)        Dose:  81 mg   Take 1 tablet (81 mg) by mouth daily   Quantity:  90 tablet   Refills:  3       * PREDNISONE PO   This may have changed:  Another medication with the same name was added. Make sure you understand how and when to take each.        Dose:  10 mg   Take 10 mg by mouth daily as needed Per Dr. Metz/[facility]   Refills:  0       * predniSONE 5 MG tablet   Commonly known as:  DELTASONE   This may have changed:  You were already taking a medication with the same name, and this prescription was added. Make sure you understand how and when to take each.        Dose:  5 mg   Take 1 tablet (5 mg) by mouth See Admin Instructions Take 15 mg for three days, then 10 mg for 3 days, then stop   Quantity:  21 tablet   Refills:  0       * Notice:  This list has 2 medication(s) that are the same as other medications prescribed for you. Read the directions carefully, and ask your doctor or other care provider to review them with you.      CONTINUE these medicines which have NOT CHANGED        Dose / Directions    acetaminophen 500 MG tablet   Commonly known as:  TYLENOL        Dose:  500-1000 mg   Take 500-1,000 mg by mouth every 8 hours as needed for mild pain   Refills:  0       albuterol 108 (90 BASE) MCG/ACT Inhaler   Commonly known as:  PROAIR HFA/PROVENTIL HFA/VENTOLIN HFA        Dose:  2 puff   Inhale 2 puffs into the lungs every 6 hours as needed   Refills:  0       azelastine 0.1 % spray   Commonly known as:  ASTELIN        Dose:  2 spray   Spray 2 sprays into both nostrils 2 times daily   Refills:  0       CALCIUM CITRATE + 315-200 MG-UNIT Tabs per tablet   Generic drug:  calcium citrate-vitamin D        Dose:  2 tablet   Take 2 tablets by mouth every evening   Refills:  0       fluticasone-salmeterol 230-21 MCG/ACT inhaler   Commonly known as:  ADVAIR-HFA        Dose:  2 puff   Inhale 2 puffs into the lungs 2 times daily    Quantity:  12 g   Refills:  1       ipratropium 0.03 % spray   Commonly known as:  ATROVENT        Dose:  2 spray   Spray 2 sprays in nostril 2 times daily   Refills:  0       losartan 100 MG tablet   Commonly known as:  COZAAR   Used for:  Essential hypertension with goal blood pressure less than 140/90        Dose:  100 mg   Take 1 tablet (100 mg) by mouth At Bedtime   Quantity:  90 tablet   Refills:  1       metoprolol 25 MG 24 hr tablet   Commonly known as:  TOPROL-XL   Used for:  Essential hypertension with goal blood pressure less than 140/90        Dose:  50 mg   Take 2 tablets (50 mg) by mouth daily   Quantity:  60 tablet   Refills:  11       MONTELUKAST SODIUM PO        Dose:  10 mg   Take 10 mg by mouth every evening   Refills:  0       omeprazole 20 MG CR capsule   Commonly known as:  priLOSEC   Used for:  Candidal esophagitis (H)        TAKE ONE CAPSULE BY MOUTH ONE TIME DAILY   Quantity:  90 capsule   Refills:  2       simvastatin 20 MG tablet   Commonly known as:  ZOCOR   Used for:  Hyperlipidemia LDL goal <130        Dose:  10 mg   Take 0.5 tablets (10 mg) by mouth At Bedtime   Quantity:  45 tablet   Refills:  3         STOP taking     hydrochlorothiazide 12.5 MG Tabs tablet           spironolactone 25 MG tablet   Commonly known as:  ALDACTONE                Where to get your medicines      These medications were sent to [facility] - [address]  [address], [place] [address]     Phone:  [phone]     amiodarone 100 MG Tabs tablet    apixaban ANTICOAGULANT 2.5 MG tablet    cefdinir 300 MG capsule    furosemide 40 MG tablet    predniSONE 5 MG tablet         Some of these will need a paper prescription and others can be bought over the counter. Ask your nurse if you have questions.     Bring a paper prescription for each of these medications     order for DME               ANTIBIOTIC INSTRUCTION     You've Been Prescribed an Antibiotic - Now  What?  Your healthcare team thinks that you or your loved one might have an infection. Some infections can be treated with antibiotics, which are powerful, life-saving drugs. Like all medications, antibiotics have side effects and should only be used when necessary. There are some important things you should know about your antibiotic treatment.      Your healthcare team may run tests before you start taking an antibiotic.    Your team may take samples (e.g., from your blood, urine or other areas) to run tests to look for bacteria. These test can be important to determine if you need an antibiotic at all and, if you do, which antibiotic will work best.      Within a few days, your healthcare team might change or even stop your antibiotic.    Your team may start you on an antibiotic while they are working to find out what is making you sick.    Your team might change your antibiotic because test results show that a different antibiotic would be better to treat your infection.    In some cases, once your team has more information, they learn that you do not need an antibiotic at all. They may find out that you don't have an infection, or that the antibiotic you're taking won't work against your infection. For example, an infection caused by a virus can't be treated with antibiotics. Staying on an antibiotic when you don't need it is more likely to be harmful than helpful.      You may experience side effects from your antibiotic.    Like all medications, antibiotics have side effects. Some of these can be serious.    Let you healthcare team know if you have any known allergies when you are admitted to the hospital.    One significant side effect of nearly all antibiotics is the risk of severe and sometimes deadly diarrhea caused by Clostridium difficile (C. Difficile). This occurs when a person takes antibiotics because some good germs are destroyed. Antibiotic use allows C. diificile to take over, putting patients at  high risk for this serious infection.    As a patient or caregiver, it is important to understand your or your loved one's antibiotic treatment. It is especially important for caregivers to speak up when patients can't speak for themselves. Here are some important questions to ask your healthcare team.    What infection is this antibiotic treating and how do you know I have that infection?    What side effects might occur from this antibiotic?    How long will I need to take this antibiotic?    Is it safe to take this antibiotic with other medications or supplements (e.g., vitamins) that I am taking?     Are there any special directions I need to know about taking this antibiotic? For example, should I take it with food?    How will I be monitored to know whether my infection is responding to the antibiotic?    What tests may help to make sure the right antibiotic is prescribed for me?      Information provided by:  www.cdc.gov/getsmart  U.S. Department of Health and Human Services  Centers for disease Control and Prevention  National Center for Emerging and Zoonotic Infectious Diseases  Division of Healthcare Quality Promotion         Protect others around you: Learn how to safely use, store and throw away your medicines at www.disposemymeds.org.             Medication List: This is a list of all your medications and when to take them. Check marks below indicate your daily home schedule. Keep this list as a reference.      Medications           Morning Afternoon Evening Bedtime As Needed    acetaminophen 500 MG tablet   Commonly known as:  TYLENOL   Take 500-1,000 mg by mouth every 8 hours as needed for mild pain   Last time this was given:  1,000 mg on 11/17/2017  4:49 PM   Next Dose Due:  Available once every 8 hours if needed for pain                            Available once every 8 hours if needed for pain       albuterol 108 (90 BASE) MCG/ACT Inhaler   Commonly known as:  PROAIR HFA/PROVENTIL HFA/VENTOLIN HFA    Inhale 2 puffs into the lungs every 6 hours as needed   Last time this was given:  Not given in hospital   Next Dose Due:  Available once every 6 hours if needed for shortness of breath                            Available once every 6 hours if needed for shortness of breath       amiodarone 100 MG Tabs tablet   Commonly known as:  PACERONE/CODARONE   Take 2 tablets (200 mg) by mouth 2 times daily   Last time this was given:  100 mg on 11/25/2017  8:07 AM   Next Dose Due:  11/25/17 9:00PM                    11/25/17 9:00PM               apixaban ANTICOAGULANT 2.5 MG tablet   Commonly known as:  ELIQUIS   Take 1 tablet (2.5 mg) by mouth 2 times daily   Last time this was given:  2.5 mg on 11/25/2017  8:07 AM   Next Dose Due:  11/25/17 9:00PM                    11/25/17 9:00PM               aspirin 81 MG EC tablet   Take 1 tablet (81 mg) by mouth daily   Last time this was given:  81 mg on 11/18/2017  9:44 AM   Next Dose Due:  11/26/17 9:00AM            11/26/17 9:00AM                       azelastine 0.1 % spray   Commonly known as:  ASTELIN   Spray 2 sprays into both nostrils 2 times daily   Last time this was given:  1 spray on 11/25/2017  8:08 AM   Next Dose Due:  11/25/27 9:00PM                    11/25/27 9:00PM               CALCIUM CITRATE + 315-200 MG-UNIT Tabs per tablet   Take 2 tablets by mouth every evening   Generic drug:  calcium citrate-vitamin D   Last time this was given:  Not given in hospital   Next Dose Due:  Resume prior regimen                                cefdinir 300 MG capsule   Commonly known as:  OMNICEF   Take 1 capsule (300 mg) by mouth 2 times daily   Last time this was given:  300 mg on 11/25/2017  8:07 AM   Next Dose Due:  11/25/17 9:00PM                    11/25/17 9:00PM               fluticasone-salmeterol 230-21 MCG/ACT inhaler   Commonly known as:  ADVAIR-HFA   Inhale 2 puffs into the lungs 2 times daily   Last time this was given:  Not given in hospital   Next Dose Due:   Resume prior regimen                                furosemide 40 MG tablet   Commonly known as:  LASIX   Take 1 tablet (40 mg) by mouth daily   Last time this was given:  11/25/17 8:00AM   Next Dose Due:  11/26/17 8:00AM            11/26/17 8:00AM                       ipratropium 0.03 % spray   Commonly known as:  ATROVENT   Spray 2 sprays in nostril 2 times daily   Last time this was given:  Not given in hospital   Next Dose Due:  Resume prior regimen                                losartan 100 MG tablet   Commonly known as:  COZAAR   Take 1 tablet (100 mg) by mouth At Bedtime   Last time this was given:  Not given in hospital     Next Dose Due:  11/25/17 Bedtime                        11/25/17 Bedtime           metoprolol 25 MG 24 hr tablet   Commonly known as:  TOPROL-XL   Take 2 tablets (50 mg) by mouth daily   Last time this was given:  75 mg on 11/24/2017 10:01 AM   Next Dose Due:  11/26/17 8:00AM            11/26/17 8:00AM                       MONTELUKAST SODIUM PO   Take 10 mg by mouth every evening   Last time this was given:  10 mg on 11/24/2017  9:44 PM   Next Dose Due:  11/25/17 9:45PM                    11/25/17 9:45PM               omeprazole 20 MG CR capsule   Commonly known as:  priLOSEC   TAKE ONE CAPSULE BY MOUTH ONE TIME DAILY   Last time this was given:  20 mg on 11/25/2017  8:07 AM   Next Dose Due:  11/26/17 8:00AM            11/26/17 8:00AM                       order for DME   Equipment being ordered: Walker Wheels () and Walker () Treatment Diagnosis: Impaired gait                                * PREDNISONE PO   Take 10 mg by mouth daily as needed Per Dr. Metz/[facility]   Last time this was given:  20 mg on 11/25/2017  8:07 AM   Next Dose Due:  Take per instructions if needed                            Take per instructions if needed       * predniSONE 5 MG tablet   Commonly known as:  DELTASONE   Take 1 tablet (5 mg) by mouth See Admin Instructions Take 15 mg for three days,  then 10 mg for 3 days, then stop   Last time this was given:  20 mg on 11/25/2017  8:07 AM   Next Dose Due:  11/26/17 9:00 AM (see above for taper schedule)            11/26/17 9:00 AM; follow taper instructions                       simvastatin 20 MG tablet   Commonly known as:  ZOCOR   Take 0.5 tablets (10 mg) by mouth At Bedtime   Last time this was given:  10 mg on 11/24/2017  9:45 PM   Next Dose Due:  11/25/17 9:00PM                    11/25/17 9:00PM               * Notice:  This list has 2 medication(s) that are the same as other medications prescribed for you. Read the directions carefully, and ask your doctor or other care provider to review them with you.              More Information        Pneumonia (Adult)  Pneumonia is an infection deep within the lungs. It is in the small air sacs (alveoli). Pneumonia may be caused by a virus or bacteria. Pneumonia caused by bacteria is usually treated with an antibiotic. Severe cases may need to be treated in the hospital. Milder cases can be treated at home. Symptoms usually start to get better during the first 2 days of treatment.    Home care  Follow these guidelines when caring for yourself at home:    Rest at home for the first 2 to 3 days, or until you feel stronger. Don t let yourself get overly tired when you go back to your activities.    Stay away from cigarette smoke - yours or other people s.    You may use acetaminophen or ibuprofen to control fever or pain, unless another medicine was prescribed. If you have chronic liver or kidney disease, talk with your healthcare provider before using these medicines. Also talk with your provider if you ve had a stomach ulcer or gastrointestinal bleeding. Don t give aspirin to anyone younger than 18 years of age who is ill with a fever. It may cause severe liver damage.    Your appetite may be poor, so a light diet is fine.    Drink 6 to 8 glasses of fluids every day to make sure you are getting enough fluids.  Beverages can include water, sport drinks, sodas without caffeine, juices, tea, or soup. Fluids will help loosen secretions in the lung. This will make it easier for you to cough up the phlegm (sputum). If you also have heart or kidney disease, check with your healthcare provider before you drink extra fluids.    Take antibiotic medicine prescribed until it is all gone, even if you are feeling better after a few days.  Follow-up care  Follow up with your healthcare provider in the next 2 to 3 days, or as advised. This is to be sure the medicine is helping you get better.  If you are 65 or older, you should get a pneumococcal vaccine and a yearly flu (influenza) shot. You should also get these vaccines if you have chronic lung disease like asthma, emphysema, or COPD. Recently, a second type of pneumonia vaccine has become available for everyone over 65 years old. This is in addition to the previous vaccine. Ask your provider about this.  When to seek medical advice  Call your healthcare provider right away if any of these occur:    You don t get better within the first 48 hours of treatment    Shortness of breath gets worse    Rapid breathing (more than 25 breaths per minute)    Coughing up blood    Chest pain gets worse with breathing    Fever of 100.4 F (38 C) or higher that doesn t get better with fever medicine    Weakness, dizziness, or fainting that gets worse    Thirst or dry mouth that gets worse    Sinus pain, headache, or a stiff neck    Chest pain not caused by coughing  Date Last Reviewed: 1/1/2017 2000-2017 [employer]. [address] [place] [address]. All rights reserved. This information is not intended as a substitute for professional medical care. Always follow your healthcare professional's instructions.

## 2017-11-15 NOTE — ED NOTES
[facility]  ED Nurse Handoff Report    ED Chief complaint: Shortness of Breath (pt. BIBA for SOB with hypertension.  EMS reports that the pt was 86% on RA on arrival.  )      ED Diagnosis:   Final diagnoses:   None       Code Status: Full Code    Allergies:   Allergies   Allergen Reactions     Fosamax [Alendronic Acid] GI Disturbance     Augmentin [Amoxicillin-Pot Clavulanate] Diarrhea     Diarrhea and rash     Evista [Raloxifene]      abd symptoms.      Flu Virus Vaccine      swollen and red at inj site       Activity level - Baseline/Home:  Independent    Activity Level - Current:   Independent     Needed?: No    Isolation: No  Infection: Not Applicable    Bariatric?: No    Vital Signs:   Vitals:    11/15/17 0720 11/15/17 0730 11/15/17 0740 11/15/17 0744   BP: (!) 80/62 (!) 87/45 (!) 82/40 (!) 85/41   Pulse:       Resp: (!) 36 29 22 23   Temp:       TempSrc:       SpO2: 96% 96% 96% 97%       Cardiac Rhythm: ,        Pain level:      Is this patient confused?: No    Patient Report: Initial Complaint: sob  Focused Assessment: pt at home, called ems for sob, blood pressure initially high for EMS, sbp 70-80s here, on 2nd liter NS, septic, pneumonia. On bipap 70% 14/5. RR25-30. Pt states she feels better from when coming in. Tolerating bipap well. Trop elevated, no c/o CP. Some swelling to Le's, fine crackles to rt lobes ant/post.on continuous nebs. HR MT330v. sbp has been in the 100s since fluid resuscitation.  Tests Performed: labs, CXRAY  Abnormal Results:   Results for orders placed or performed during the hospital encounter of 11/15/17   CBC with platelets differential   Result Value Ref Range    WBC 19.8 (H) 4.0 - 11.0 10e9/L    RBC Count 4.63 3.8 - 5.2 10e12/L    Hemoglobin 14.1 11.7 - 15.7 g/dL    Hematocrit 42.2 35.0 - 47.0 %    MCV 91 78 - 100 fl    MCH 30.5 26.5 - 33.0 pg    MCHC 33.4 31.5 - 36.5 g/dL    RDW 13.7 10.0 - 15.0 %    Platelet Count 306 150 - 450 10e9/L    Diff Method  Automated Method     % Neutrophils 85.5 %    % Lymphocytes 12.3 %    % Monocytes 1.5 %    % Eosinophils 0.2 %    % Basophils 0.1 %    % Immature Granulocytes 0.4 %    Nucleated RBCs 0 0 /100    Absolute Neutrophil 17.0 (H) 1.6 - 8.3 10e9/L    Absolute Lymphocytes 2.4 0.8 - 5.3 10e9/L    Absolute Monocytes 0.3 0.0 - 1.3 10e9/L    Absolute Eosinophils 0.0 0.0 - 0.7 10e9/L    Absolute Basophils 0.0 0.0 - 0.2 10e9/L    Abs Immature Granulocytes 0.1 0 - 0.4 10e9/L    Absolute Nucleated RBC 0.0    Comprehensive metabolic panel   Result Value Ref Range    Sodium 135 133 - 144 mmol/L    Potassium 3.9 3.4 - 5.3 mmol/L    Chloride 100 94 - 109 mmol/L    Carbon Dioxide 28 20 - 32 mmol/L    Anion Gap 7 3 - 14 mmol/L    Glucose 185 (H) 70 - 99 mg/dL    Urea Nitrogen 29 7 - 30 mg/dL    Creatinine 0.84 0.52 - 1.04 mg/dL    GFR Estimate 65 >60 mL/min/1.7m2    GFR Estimate If Black 79 >60 mL/min/1.7m2    Calcium 8.9 8.5 - 10.1 mg/dL    Bilirubin Total 0.6 0.2 - 1.3 mg/dL    Albumin 3.4 3.4 - 5.0 g/dL    Protein Total 7.1 6.8 - 8.8 g/dL    Alkaline Phosphatase 121 40 - 150 U/L    ALT 55 (H) 0 - 50 U/L    AST 67 (H) 0 - 45 U/L   Blood gas venous and oxyhgb   Result Value Ref Range    Ph Venous 7.28 (L) 7.32 - 7.43 pH    PCO2 Venous 61 (H) 40 - 50 mm Hg    PO2 Venous 34 25 - 47 mm Hg    Bicarbonate Venous 29 (H) 21 - 28 mmol/L    Oxyhemoglobin Venous 55 %    Base Excess Venous 0.3 mmol/L   Troponin I (now)   Result Value Ref Range    Troponin I ES 0.262 (HH) 0.000 - 0.045 ug/L   Lactic acid whole blood   Result Value Ref Range    Lactic Acid 2.2 (H) 0.7 - 2.0 mmol/L   EKG 12 lead   Result Value Ref Range    Interpretation ECG Click View Image link to view waveform and result    Influenza A/B antigen   Result Value Ref Range    Influenza A/B Agn Specimen Nasal     Influenza A Negative NEG^Negative    Influenza B Negative NEG^Negative     Treatments provided: nebs, fluids and meds, solumedrol per EMS    Family Comments:lives alone, none  here    OBS brochure/video discussed/provided to patient: N/A    ED Medications:   Medications   vancomycin (VANCOCIN) 1000 mg in dextrose 5% 200 mL PREMIX (1,000 mg Intravenous New Bag 11/15/17 0735)   ipratropium - albuterol 0.5 mg/2.5 mg/3 mL (DUONEB) 0.5-2.5 (3) MG/3ML neb solution (not administered)   albuterol (2.5 MG/3ML) 0.083% neb solution (not administered)   magnesium sulfate 2 g in NS intermittent infusion (PharMEDium or FV Cmpd) (0 g Intravenous Stopped 11/15/17 0728)   ipratropium - albuterol 0.5 mg/2.5 mg/3 mL (DUONEB) 0.5-2.5 (3) MG/3ML neb solution (  Given 11/15/17 0726)   albuterol (2.5 MG/3ML) 0.083% neb solution (  Given 11/15/17 0725)   piperacillin-tazobactam (ZOSYN) infusion 3.375 g (0 g Intravenous Stopped 11/15/17 0749)   0.9% sodium chloride BOLUS (0 mLs Intravenous Stopped 11/15/17 0719)   0.9% sodium chloride BOLUS (1,000 mLs Intravenous New Bag 11/15/17 0723)       Drips infusing?:  Yes      ED NURSE PHONE NUMBER: [phone]

## 2017-11-15 NOTE — ED PROVIDER NOTES
History     Chief Complaint:  Shortness of Breath     HPI   Genie Persaud is a 82 year old female with a complex history, including asthma, hypertension, and CAD, who presents to the emergency department today by EMS for evaluation of shortness of breath.  For the past couple of days, the patient has been experiencing abdominal pain and loose stools. EMS was called to the patient's home this morning secondary to shortness of breath and chills. Upon EMS arrival, the patient was found to be at 86% on RA. She was subsequently transported to the emergency department. While in the emergency department, the patient states that she feels better with medical intervention, but endorses nausea. She has been feeling ill for the past few days, but denies fever. She states that her symptoms feel similar to asthma exacerbation. She does not have a history of emphysema or blood clots. She has been prescribed Prednisone, but only takes it as needed. She did not take any Prednisone this morning. She lives alone at home. She was last seen in the emergency department in March for MI.     Allergies:  Fosamax [Alendronic Acid]  Augmentin [Amoxicillin-Pot Clavulanate]  Evista [Raloxifene]  Flu Virus Vaccine    Medications:    spironolactone (ALDACTONE) 25 MG tablet  hydrochlorothiazide 12.5 MG TABS tablet  ipratropium (ATROVENT) 0.03 % spray  losartan (COZAAR) 100 MG tablet  azelastine (ASTELIN) 0.1 % spray  PREDNISONE PO  MONTELUKAST SODIUM PO  metoprolol (TOPROL-XL) 25 MG 24 hr tablet  omeprazole (PRILOSEC) 20 MG CR capsule  simvastatin (ZOCOR) 20 MG tablet  fluticasone-salmeterol (ADVAIR-HFA) 230-21 MCG/ACT inhaler  acetaminophen (TYLENOL) 500 MG tablet  albuterol (PROAIR HFA, PROVENTIL HFA, VENTOLIN HFA) 108 (90 BASE) MCG/ACT inhaler  aspirin 81 MG EC tablet    Past Medical History:    Basal Cell Carcinoma--back   Coronary artery disease involving native coronary artery of native heart without angina pectoris   Essential  hypertension with goal blood pressure less than 140/90   Hyperlipidemia LDL goal <130   Impaired fasting glucose   Moderate persistent asthma   Nonrheumatic aortic valve insufficiency   osteopenia   Other and unspecified hyperlipidemia   Pulmonary nodule   Stroke (H)   SVT -noted during Cath procedure   Swelling, mass, or lump in head and neck   Thoracic aortic aneurysm without rupture (H)   Unspecified arthropathy, hand   Vasculitis (H)      Past Surgical History:    C APPENDECTOMY   C LIGATE FALLOPIAN TUBE   C STEREOTACTIC BREAST BIOPSY   CHOLECYSTECTOMY, LAPOROSCOPIC   ESOPHAGOSCOPY, GASTROSCOPY, DUODENOSCOPY (EGD), COMBINED   HC DILATION/CURETTAGE DIAG/THER NON OB   HC REMOVE TONSILS/ADENOIDS,<11 Y/O    Family History:    Hypertension    Mother   OSTEOPOROSIS   Mother   C.A.D.     Mother   Scleraderma    Father   CEREBROVASCULAR DISEASE Paternal Grandmother   Prostate Cancer   Other   Breast Cancer    Daughter     Social History:  The patient was accompanied to the ED by EMS.  Smoking Status: Never Smoker  Smokeless Tobacco: Never Used  Alcohol Use: Positive; 1-2 standard drinks or equivalent per week  Marital Status:  Single     Review of Systems   Constitutional: Positive for chills. Negative for fever.   Respiratory: Positive for shortness of breath.    Gastrointestinal: Positive for nausea.   All other systems reviewed and are negative.    Physical Exam     Patient Vitals for the past 24 hrs:   BP Temp Temp src Pulse Heart Rate Resp SpO2   11/15/17 0945 92/40 - - 108 - 26 95 %   11/15/17 0930 (!) 86/42 - - - 120 23 94 %   11/15/17 0915 97/47 - - - 122 11 94 %   11/15/17 0850 104/85 - - - 116 25 98 %   11/15/17 0840 118/52 - - - 115 (!) 38 96 %   11/15/17 0830 (!) 73/61 - - - 110 26 100 %   11/15/17 0820 104/40 - - - 110 27 100 %   11/15/17 0810 101/43 - - - 110 26 99 %   11/15/17 0800 (!) 89/39 - - - 107 25 98 %   11/15/17 0750 92/48 - - - 111 26 97 %   11/15/17 0744 (!) 85/41 - - - 116 23 97 %   11/15/17  0740 (!) 82/40 - - - 110 22 96 %   11/15/17 0730 (!) 87/45 - - - 114 29 96 %   11/15/17 0720 (!) 80/62 - - - 115 (!) 36 96 %   11/15/17 0715 (!) 84/45 - - - 115 (!) 38 96 %   11/15/17 0710 (!) 83/55 - - - 115 30 97 %   11/15/17 0705 (!) 78/45 - - - 117 30 96 %   11/15/17 0700 (!) 76/52 - - - 123 (!) 35 91 %   11/15/17 0647 - 100  F (37.8  C) Temporal - - - -   11/15/17 0645 - - - - 137 (!) 41 93 %   11/15/17 0640 126/90 - - 138 - (!) 44 92 %     Physical Exam  Constitutional: Elderly white female sitting. Tachypnic. Able to speak only in 2-3 word sentences.  HENT: No signs of trauma.   Eyes: EOM are normal. Pupils are equal, round, and reactive to light.   Neck: Normal range of motion. No JVD present. No cervical adenopathy.  Cardiovascular: Regular rhythm.  Exam reveals no gallop and no friction rub.    No murmur heard.  Pulmonary/Chest: No wheezes, rhonchi or rales.  Markedly diminished breath sounds bilaterally.  Abdominal: Soft. No tenderness. No rebound or guarding. 2+ femoral pulses bilaterally.  Musculoskeletal: No edema. No tenderness.   Lymphadenopathy: No lymphadenopathy.   Neurological: Alert and oriented to person, place, and time. Normal strength. Coordination normal.   Skin: Skin is warm and dry. No rash noted. No erythema.     Emergency Department Course     ECG:  Sinus tachycardia  Possible left atrial enlargement  Rate 137 bpm. ME interval 134 ms. QRS duration 88 ms. QT/QTc 290/437 ms. P-R-T axes 64 15 55.    Imaging:  Radiology findings were communicated with the patient who voiced understanding of the findings.    XR Chest Port 1 View  IMPRESSION: Dense consolidation in the right lower lung suggests  Pneumonia.  Reading per radiology     Laboratory:  Laboratory findings were communicated with the patient who voiced understanding of the findings.    Influenza A/B antigen: Negative   Blood culture: Pending   CBC: WBC 19.8, HGB 14.1,   CMP: Glucose (Collected 0645) 185, ALT 55, AST 67 o/w WNL  (Creatinine 0.84)  Troponin I (Collected 0645) 0.262  Blood gas venous and oxyhbg: Ph 7.28, PCO2 61, Bicarbonate 29   Lactic acid whole blood (Collected 0640): 2.2  Lactic acid (Collected 0850): 2.4    Interventions:  0649 PharMEDium 2 g IV  0702 Duoneb 0.5-2.5 (3) mg/3mL neb solution  0703 Albuterol 0.083% 2.5 mg/3 mL neb solution  0704 NS 1,000 mLs IV  0723 Zosyn 3.375 g IV  0723 NS 1000 mLs IV   0725 Albuterol 0.083% 2.5 mg/3 mL neb solution  0726 Duoneb 0.5-2.5 (3) mg/3mL neb solution  0735 Vancocin 1,000 mg IV   0750 Duoneb 0.5-2.5 (3) mg/3mL neb solution  0751 Albuterol 0.083% 2.5 mg/3 mL neb solution  0843 Duoneb 0.5-2.5 (3) mg/3mL neb solution  0949 NS bolus 1000 mLs IV     Emergency Department Course:    Nursing notes and vitals reviewed.    0644 I performed an exam of the patient as documented above.     IV was inserted and blood was drawn for laboratory testing, results above.     The patient was sent for a chest x-ray while in the emergency department, results above.      0820 I discussed the patient's case with Dr. Gray, hospitalist, who agreed to admit the patient.     I personally reviewed the lab, imaging, and EKG results with the patient and answered all related questions prior to admission.    I discussed the treatment plan with the patient. They expressed understanding of this plan and consented to admission. I discussed the patient with Dr. Gray, who will admit the patient to a monitored bed for further evaluation and treatment.     Impression & Plan      Medical Decision Making:  Genie Persaud is a 82 year old female who presents to the emergency department today by ambulance in acute respiratory distress. Genie has a history of asthma. She admits that over the last couple of days, her breathing has been worse. Today, it was particularly bad and she called paramedics who noticed hypoxia and diminished breath sounds. She was started on nebulization, she could not a cpap and she was given Zofran  and brought here. On initial exam, she is quite tachypnic. She could only speak in 2 or 3 words. She has markedly diminished breath sounds with some wheezing. Patient was placed in the stabilization room. She was placed on continuous nebs and BiPAP. Because of the wheezing, she was given IV magnesium as well as IV fluids. Blood cultures were obtained. IV antibiotics were started. Her chest x-ray done portably did show right lower lobe pneumonia fairly significant. Her sepsis panel was positive with an elevated lactate. Patient did drop pressures. Once all our treatments were begun, she was started on IV fluids and between 1500 and 2000 mLs of fluids, her blood pressure is normalized. Her blood pressure systolic is now over 100. Patient is feeling much better. She is breathing more. She can talk in more expanded sentences. Patient has been discussed with hospitalist, Dr. Gray. We will admit her to [facility] for further evaluation and treatment.     Diagnosis:    ICD-10-CM    1. Pneumonia of right lower lobe due to infectious organism (H) J18.1 Blood culture     Lactic acid     CANCELED: Lactic acid   2. Sepsis, due to unspecified organism (H) A41.9      Disposition:   The patient is admitted into the care of Dr. Gray.     Critical Care time was 40  minutes for this patient excluding procedures.     CMS Diagnoses:   The patient has signs of Severe Sepsis as evidenced by:    1. 2 SIRS criteria, AND  2. Suspected infection, AND   3. Organ dysfunction: Lactic Acid >2    Time severe sepsis diagnosis confirmed = 0659 as this was the time when Lactate resulted, and the level was >2      3 Hour Severe Sepsis Bundle Completion:  1. Initial Lactic Acid Result:   Recent Labs   Lab Test  11/15/17   0850  11/15/17   0640   LACT  2.4*  2.2*     2. Blood Cultures before Antibiotics: Yes  3. Broad Spectrum Antibiotics Administered: Yes     Anti-infectives     None        4. 3000 ml of IV fluids.    the patient was transferred out of the  ED prior to the 6 hour david.       Scribe Disclosure:  I, Marie Ortiz, am serving as a scribe at 7:05 AM on 11/15/2017 to document services personally performed by Jason Costa MD, based on my observations and the provider's statements to me.       EMERGENCY DEPARTMENT       Jason Costa MD  11/15/17 1117

## 2017-11-15 NOTE — PLAN OF CARE
Problem: Patient Care Overview  Goal: Plan of Care/Patient Progress Review  Outcome: Improving  Arrived to floor approx 1030. A&O. RR in 20s, HR up to 110s, SBP , asymptomatic. O2 sats stable, O2 weaned to 2 L n/c. Tele ST. Frequent, productive cough. LS w/ coarse crackles on R. Tolerating clear liquids. Incontinent of urine. IVFs, IV abx, IV steroids, scheduled nebs.

## 2017-11-16 ENCOUNTER — APPOINTMENT (OUTPATIENT)
Dept: PHYSICAL THERAPY | Facility: CLINIC | Age: 82
DRG: 871 | End: 2017-11-16
Payer: COMMERCIAL

## 2017-11-16 PROBLEM — I51.81 STRESS-INDUCED CARDIOMYOPATHY: Status: ACTIVE | Noted: 2017-11-16

## 2017-11-16 LAB
ALBUMIN SERPL-MCNC: 3 G/DL (ref 3.4–5)
ALP SERPL-CCNC: 72 U/L (ref 40–150)
ALT SERPL W P-5'-P-CCNC: 106 U/L (ref 0–50)
ANION GAP SERPL CALCULATED.3IONS-SCNC: 10 MMOL/L (ref 3–14)
AST SERPL W P-5'-P-CCNC: 92 U/L (ref 0–45)
BILIRUB SERPL-MCNC: 0.6 MG/DL (ref 0.2–1.3)
BUN SERPL-MCNC: 26 MG/DL (ref 7–30)
CALCIUM SERPL-MCNC: 8 MG/DL (ref 8.5–10.1)
CHLORIDE SERPL-SCNC: 104 MMOL/L (ref 94–109)
CO2 SERPL-SCNC: 23 MMOL/L (ref 20–32)
CREAT SERPL-MCNC: 0.86 MG/DL (ref 0.52–1.04)
ERYTHROCYTE [DISTWIDTH] IN BLOOD BY AUTOMATED COUNT: 13.8 % (ref 10–15)
GFR SERPL CREATININE-BSD FRML MDRD: 63 ML/MIN/1.7M2
GLUCOSE BLDC GLUCOMTR-MCNC: 119 MG/DL (ref 70–99)
GLUCOSE BLDC GLUCOMTR-MCNC: 122 MG/DL (ref 70–99)
GLUCOSE BLDC GLUCOMTR-MCNC: 132 MG/DL (ref 70–99)
GLUCOSE SERPL-MCNC: 148 MG/DL (ref 70–99)
HCT VFR BLD AUTO: 34.3 % (ref 35–47)
HGB BLD-MCNC: 11.4 G/DL (ref 11.7–15.7)
LACTATE BLD-SCNC: 1.7 MMOL/L (ref 0.7–2)
MAGNESIUM SERPL-MCNC: 2.4 MG/DL (ref 1.6–2.3)
MCH RBC QN AUTO: 30.6 PG (ref 26.5–33)
MCHC RBC AUTO-ENTMCNC: 33.2 G/DL (ref 31.5–36.5)
MCV RBC AUTO: 92 FL (ref 78–100)
PLATELET # BLD AUTO: 211 10E9/L (ref 150–450)
POTASSIUM SERPL-SCNC: 4.2 MMOL/L (ref 3.4–5.3)
PROT SERPL-MCNC: 6.4 G/DL (ref 6.8–8.8)
RBC # BLD AUTO: 3.73 10E12/L (ref 3.8–5.2)
SODIUM SERPL-SCNC: 137 MMOL/L (ref 133–144)
WBC # BLD AUTO: 22.8 10E9/L (ref 4–11)

## 2017-11-16 PROCEDURE — 25000125 ZZHC RX 250: Performed by: INTERNAL MEDICINE

## 2017-11-16 PROCEDURE — 27210995 ZZH RX 272: Performed by: INTERNAL MEDICINE

## 2017-11-16 PROCEDURE — 40000193 ZZH STATISTIC PT WARD VISIT

## 2017-11-16 PROCEDURE — 12000000 ZZH R&B MED SURG/OB

## 2017-11-16 PROCEDURE — 99232 SBSQ HOSP IP/OBS MODERATE 35: CPT | Performed by: INTERNAL MEDICINE

## 2017-11-16 PROCEDURE — 94640 AIRWAY INHALATION TREATMENT: CPT | Mod: 76

## 2017-11-16 PROCEDURE — 00000146 ZZHCL STATISTIC GLUCOSE BY METER IP

## 2017-11-16 PROCEDURE — 36415 COLL VENOUS BLD VENIPUNCTURE: CPT | Performed by: INTERNAL MEDICINE

## 2017-11-16 PROCEDURE — 94640 AIRWAY INHALATION TREATMENT: CPT

## 2017-11-16 PROCEDURE — 97161 PT EVAL LOW COMPLEX 20 MIN: CPT | Mod: GP

## 2017-11-16 PROCEDURE — 25000132 ZZH RX MED GY IP 250 OP 250 PS 637: Performed by: INTERNAL MEDICINE

## 2017-11-16 PROCEDURE — 85027 COMPLETE CBC AUTOMATED: CPT | Performed by: INTERNAL MEDICINE

## 2017-11-16 PROCEDURE — 99233 SBSQ HOSP IP/OBS HIGH 50: CPT | Performed by: INTERNAL MEDICINE

## 2017-11-16 PROCEDURE — 93005 ELECTROCARDIOGRAM TRACING: CPT

## 2017-11-16 PROCEDURE — 80053 COMPREHEN METABOLIC PANEL: CPT | Performed by: INTERNAL MEDICINE

## 2017-11-16 PROCEDURE — 93010 ELECTROCARDIOGRAM REPORT: CPT | Performed by: INTERNAL MEDICINE

## 2017-11-16 PROCEDURE — 83735 ASSAY OF MAGNESIUM: CPT | Performed by: INTERNAL MEDICINE

## 2017-11-16 PROCEDURE — 83605 ASSAY OF LACTIC ACID: CPT | Performed by: INTERNAL MEDICINE

## 2017-11-16 PROCEDURE — 25000128 H RX IP 250 OP 636: Performed by: INTERNAL MEDICINE

## 2017-11-16 PROCEDURE — 40000884 ZZH STATISTIC STEP DOWN HRS NIGHT

## 2017-11-16 PROCEDURE — 40000275 ZZH STATISTIC RCP TIME EA 10 MIN

## 2017-11-16 RX ORDER — AZELASTINE 1 MG/ML
2 SPRAY, METERED NASAL 2 TIMES DAILY
Status: DISCONTINUED | OUTPATIENT
Start: 2017-11-16 | End: 2017-11-16

## 2017-11-16 RX ADMIN — FLUTICASONE FUROATE AND VILANTEROL TRIFENATATE 1 PUFF: 100; 25 POWDER RESPIRATORY (INHALATION) at 10:53

## 2017-11-16 RX ADMIN — OMEPRAZOLE 20 MG: 20 CAPSULE, DELAYED RELEASE ORAL at 10:39

## 2017-11-16 RX ADMIN — ALBUTEROL SULFATE 2.5 MG: 2.5 SOLUTION RESPIRATORY (INHALATION) at 04:28

## 2017-11-16 RX ADMIN — AZELASTINE HYDROCHLORIDE 1 SPRAY: 137 SPRAY, METERED NASAL at 10:53

## 2017-11-16 RX ADMIN — IPRATROPIUM BROMIDE AND ALBUTEROL SULFATE 3 ML: .5; 3 SOLUTION RESPIRATORY (INHALATION) at 07:10

## 2017-11-16 RX ADMIN — ACETAMINOPHEN 1000 MG: 500 TABLET, FILM COATED ORAL at 14:58

## 2017-11-16 RX ADMIN — DOCUSATE SODIUM 100 MG: 100 CAPSULE, LIQUID FILLED ORAL at 21:41

## 2017-11-16 RX ADMIN — ASPIRIN 81 MG: 81 TABLET, COATED ORAL at 10:39

## 2017-11-16 RX ADMIN — ALBUTEROL SULFATE 2.5 MG: 2.5 SOLUTION RESPIRATORY (INHALATION) at 14:50

## 2017-11-16 RX ADMIN — IPRATROPIUM BROMIDE AND ALBUTEROL SULFATE 3 ML: .5; 3 SOLUTION RESPIRATORY (INHALATION) at 11:42

## 2017-11-16 RX ADMIN — METHYLPREDNISOLONE SODIUM SUCCINATE 40 MG: 40 INJECTION, POWDER, FOR SOLUTION INTRAMUSCULAR; INTRAVENOUS at 13:58

## 2017-11-16 RX ADMIN — ACETAMINOPHEN 1000 MG: 500 TABLET, FILM COATED ORAL at 01:42

## 2017-11-16 RX ADMIN — SODIUM CHLORIDE: 9 INJECTION, SOLUTION INTRAVENOUS at 05:12

## 2017-11-16 RX ADMIN — IPRATROPIUM BROMIDE AND ALBUTEROL SULFATE 3 ML: .5; 3 SOLUTION RESPIRATORY (INHALATION) at 19:34

## 2017-11-16 RX ADMIN — METHYLPREDNISOLONE SODIUM SUCCINATE 40 MG: 40 INJECTION, POWDER, FOR SOLUTION INTRAMUSCULAR; INTRAVENOUS at 21:39

## 2017-11-16 RX ADMIN — AZITHROMYCIN MONOHYDRATE 250 MG: 500 INJECTION, POWDER, LYOPHILIZED, FOR SOLUTION INTRAVENOUS at 10:49

## 2017-11-16 RX ADMIN — CEFTRIAXONE SODIUM 2 G: 10 INJECTION, POWDER, FOR SOLUTION INTRAVENOUS at 14:50

## 2017-11-16 RX ADMIN — Medication 12.5 MG: at 21:41

## 2017-11-16 RX ADMIN — SIMVASTATIN 10 MG: 10 TABLET, FILM COATED ORAL at 21:40

## 2017-11-16 RX ADMIN — MONTELUKAST SODIUM 10 MG: 10 TABLET, FILM COATED ORAL at 21:40

## 2017-11-16 RX ADMIN — ENOXAPARIN SODIUM 40 MG: 40 INJECTION SUBCUTANEOUS at 14:00

## 2017-11-16 RX ADMIN — Medication 12.5 MG: at 16:42

## 2017-11-16 RX ADMIN — METHYLPREDNISOLONE SODIUM SUCCINATE 40 MG: 40 INJECTION, POWDER, FOR SOLUTION INTRAMUSCULAR; INTRAVENOUS at 05:05

## 2017-11-16 NOTE — PROGRESS NOTES
A&O. 2L NC. Exp wheezes with exertion, relieved with nebs. Tele SR. Pain managed with tylenol. Off IMC at 1100. Incontinent at times. Up with Ax1. Report given to 88 at 1430, pt transferred to  at 1500.

## 2017-11-16 NOTE — PLAN OF CARE
Problem: Patient Care Overview  Goal: Plan of Care/Patient Progress Review  PT: PT attempted. Pt getting ready to be moved to 8th floor.

## 2017-11-16 NOTE — CONSULTS
CARDIOLOGY CONSULTATION      DATE OF SERVICE:  11/15/2017      LOCATION:  [facility] inpatient cardiology consultation      DATE OF ADMISSION:  11/15/2017      REQUESTING PHYSICIAN: Ida Gray MD, Hospitalist Service      REASON FOR CONSULTATION:    Elevated troponin and reduced left ventricular systolic function in an 82-year-old lady admitted with sepsis and acute pneumonia.      HISTORY OF PRESENT ILLNESS:  Genie Persaud is known to Cardiology.  She is an established patient of my cardiologist associate, Dr. Barak Hernandez, who sees her at [facility].  She was hospitalized earlier today for significant dyspnea and found to be in acute hypoxic respiratory failure with right lower lobe consolidative pneumonia.  She initially required BiPAP therapy, but when I went to see her, she has been de-escalated to nasal oxygen.  She was alert, cooperative and did not appear in acute distress.  The patient was also found to be tachycardic on admission and ECG showed sinus tachycardia.  Serum troponin I was elevated at 0.262 and subsequently 3.771.  Transthoracic echocardiogram showed a new drop in an LVEF from 45% to 50% in 03/2017 to 35% with preserved basal and mid ventricular function and hypokinetic apical function in a noncoronary artery distribution.  Note, the right ventricle was normal in size and function.  The aortic valve was sclerotic and had at least moderate regurgitation, but it was not well assessed on echocardiogram.      The patient's cardiac history is significant for hypertension, moderate to severe aortic regurgitation (trileaflet aortic valve) and hospitalization with chest pain and hypertension earlier this year in 03/2017, when she underwent a coronary angiogram and was found to have a normal right dominant system with a short left main without significant disease, 50% stenosis of the LAD, 50% disease in the circumflex, and 30% to 40% disease in the RCA.  She was thought to  have non-severe 2-vessel coronary artery disease and it was treated medically.  During the cardiac cath, she was also noted to have a brief supraventricular tachycardia of 130 BPM.  Her blood pressure management has been challenging due to the presence of wide pulse pressure and low diastolic BP from her aortic regurgitation and due to the side effect of dizziness with many antihypertensive agents.  She is typically used to a blood pressure of 120-140 over 40-60 mmHg.  At home she was established on spironolactone 25 mg, hydrochlorothiazide 12.5 mg, metoprolol XL 50 mg daily.      When I talked to her, she denied any chest pain, palpitations.  Her primary symptom is that of dyspnea, no lower extremity edema.  Her blood pressure is 105/54 and she was tachycardic at 120 BPM today.      Please see my attached note in Epic for current medications, allergies, past medical, surgical, social, and family history.      PHYSICAL EXAMINATION:   VITAL SIGNS:  Temperature 97.8 Fahrenheit, pulse is 124 BPM, regular, respiratory rate 19 per minute, saturations 94% on 2 liters nasal oxygen, weight 64 kg, height 1.6 meters, BMI 25 kg/m2.   CONSTITUTIONAL:  Slim body habitus, lying comfortably in bed, has mild conversational dyspnea but otherwise comfortable with nasal oxygen.  Alert, oriented, cooperative.   EYES:  No pallor, icterus, xanthelasma.   ENT:  No cyanosis or pallor.   NECK:  No thyromegaly.   RESPIRATORY:  Normal respiratory effort.  No use of accessory muscles.  She has coarse crackles in the right mid to lower base.  No rales or wheeze.   CARDIOVASCULAR:  Her jugular venous pressure is not elevated.  Normal apical impulse.  No parasternal heave or thrill.  Heart sounds are regular and tachycardic at 120 BPM.  I could not hear the murmur of aortic regurgitation.  No S3 or S4.    GASTROINTESTINAL:  Soft, nontender.  No hepatosplenomegaly or masses.  Active bowel sounds.   SKIN:  No rash, erythema or ecchymosis.    MUSCULOSKELETAL:  Normal muscle tone and strength.  Gait not assessed.   NEUROPSYCHIATRIC:  Oriented to time, place and person.  Normal affect.  No gross motor deficits.   EXTREMITIES:  No edema, clubbing, or deformities.      DIAGNOSTIC DATA:     Labs:  Sodium 135, potassium 3.9, BUN 29, creatinine 0.8 with an estimated GFR of 65.   Troponin I 0.262 to 3.771.  Hemoglobin 14.1, leukocytosis of 19.8, platelets 306.      ECG:  Sinus tachycardia of 137 BPM with nonspecific ST-T changes.      Transthoracic echocardiogram:  As documented in HPI.      Coronary angiogram 03/2017:  As documented in HPI.      DIAGNOSES:   1.  Acute hypoxic respiratory failure.   2.  Acute right lower lobe pneumonia.   3.  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   4.  Known diagnosis of moderate to severe aortic regurgitation.   5.  Moderate 2-vessel coronary artery disease on coronary angiogram 03/2017, medically managed.   6.  Asthma.   7.  Hypertension.      ASSESSMENT:  This is an 82-year-old elderly lady who was admitted with acute pneumonia and hypoxemia with sepsis and hypotension.  Her troponin is significantly elevated at 3.7.  No chest pain or ischemic ECG changes.  Echocardiogram has the classic appearance of stress-induced or Takotsubo cardiomyopathy with preserved basal (and in her case also mid) ventricular function and hypokinetic apex.  Her right ventricle has normal systolic function.  She had non-severe coronary artery disease on a recent coronary angiogram a few months ago.      RECOMMENDATIONS:   1.  I do not think a repeat coronary angiogram is necessary.  Typically Takotsubo cardiomyopathy has a favorable outcome if the precipitating illness is adequately managed.   2.  Her hypotension can be multifactorial and a combination of the sepsis, aortic regurgitation and the cardiomyopathy.  Also, since the basal function is preserved, these patients can have dynamic  outflow obstruction which is made worse by increasing cardiac inotrope (such as dobutamine), hypovolemia, or tachycardia.   3.  Therefore, although she is slightly hypotensive, I would introduce a very low dose of metoprolol tartrate 12.5 mg b.i.d. and up titrate as needed to avoid the risk of dynamic obstruction causing low cardiac output and hypotension.   4.  If she does need inotropic support, please avoid epinephrine or dobutamine.  Phenylephrine is preferred because it has purely alpha adrenergic agonist activity and therefore with minimal cardiac inotrope or chronotrope.   5.  Cautious IV fluids to balance hypovolemia and cardiac failure.   6.  Cardiology will follow.  She does not require the typical treatment for acute coronary syndrome, such as dual antiplatelet therapy and IV heparin, as the etiology is not typically coronary.      It was my pleasure to be involved in the care of this patient.  Thank you for consulting us.         RADHA MARTINEZ MD             D: 11/15/2017 17:28   T: 11/15/2017 19:05   MT: LETY      Name:     TERESA HARTMAN   MRN:      [id number]        Account:       [id number]   :      1935           Consult Date:  11/15/2017      Document: [id number]       cc: Ida Gray MD

## 2017-11-16 NOTE — PROGRESS NOTES
Patient is on a 2L NC with SpO2 in the low to mid 90's. BS clear and diminished. All nebs were given as ordered.  Will cont to follow.  11/15/2017  Nupur Bajwa RRT

## 2017-11-16 NOTE — PLAN OF CARE
Problem: Pneumonia (Adult)  Goal: Signs and Symptoms of Listed Potential Problems Will be Absent, Minimized or Managed (Pneumonia)  Signs and symptoms of listed potential problems will be absent, minimized or managed by discharge/transition of care (reference Pneumonia (Adult) CPG).   Outcome: Improving  A&O. Tele ST in 100's. Soft BP's. 2L NC. Tolerating full liquids. Denies pain. On-call hospitlalist paged regarding troponin of 4.139. Incontinent of urine.

## 2017-11-16 NOTE — PLAN OF CARE
Problem: Patient Care Overview  Goal: Plan of Care/Patient Progress Review  PT: PT orders received, chart reviewed, evaluation complete. Pt admit 11/15 after presenting to ED with severe dyspnea. Found to be in acute hypoxic respiratory failure with R lower lobe consolidation pneumonioa. Also found to have severe sepisis and hypotension. Prior to admit pt was living independently without use of AD or supplemental oxygen. Pt does not demonstrate need for continued skilled therapy. She is appropriate to continue with walking program with nursing and do not anticipate any issues returning home once medically appropriate.     Discharge Planner PT   Patient plan for discharge: Home.  Current status: Pt amb 50 ft with FWW, continued another 100 ft without AD, SBA, on 2L O2. She was able to manage 3 stairs without issue. She was limited by SOB secondary to pneumonia.   Barriers to return to prior living situation: None.  Recommendations for discharge: Home.   Rationale for recommendations: Pt is strong, has very good awareness for personal safety, has good support system. Do not anticipate any safety issues once medically appropriate for discharge.       Entered by: Jaqueline La 11/16/2017 4:50 PM

## 2017-11-16 NOTE — PROGRESS NOTES
[facility]    Sepsis Evaluation Progress Note    Date of Service: 11/15/2017    I was called to see Genie Persaud due to abnormal vital signs triggering the Sepsis SIRS screening alert. She is known to have an infection.     Physical Exam    Vital Signs:  Temp: 97.8  F (36.6  C) Temp src: Oral BP: 104/65 Pulse: 108 Heart Rate: 109 Resp: 20 SpO2: 94 % O2 Device: Nasal cannula Oxygen Delivery: 2 LPM    Lab:  Lactic Acid   Date Value Ref Range Status   11/15/2017 2.4 (H) 0.7 - 2.0 mmol/L Final       The patient is at baseline mental status.      Assessment and Plan    The SIRS and exam findings are likely due to severe  sepsis.     ID: The patient is currently on the following antibiotics:  Anti-infectives (Future)    Start     Dose/Rate Route Frequency Ordered Stop    11/16/17 1100  azithromycin (ZITHROMAX) 250 mg in NaCl 0.9 % 250 mL intermittent infusion      250 mg  over 1 Hours Intravenous EVERY 24 HOURS 11/15/17 1026 11/20/17 1059    11/15/17 1030  cefTRIAXone (ROCEPHIN) 2 g vial to attach to  ml bag for ADULTS or NS 50 ml bag for PEDS      2 g  over 30 Minutes Intravenous EVERY 24 HOURS 11/15/17 1026          Current antibiotic coverage is appropriate for source of infection.    Fluid: Fluid bolus not indicated due to acute stress cardiomyopathy and CHF exacerbation.    Lab: Repeat lactic acid is not indicated.      Disposition: The patient will remain on the current unit. We will continue to monitor this patient closely.  Beau Argueta MD

## 2017-11-16 NOTE — PLAN OF CARE
Problem: Pneumonia (Adult)  Goal: Signs and Symptoms of Listed Potential Problems Will be Absent, Minimized or Managed (Pneumonia)  Signs and symptoms of listed potential problems will be absent, minimized or managed by discharge/transition of care (reference Pneumonia (Adult) CPG).   Outcome: No Change  Patient is alert, up with assist of one with walker, vital signs stable, utilizing 2 L of oxygen via nasal cannula with SpO2 in the 90's, lung sound with expiratory wheezing, received scheduled nebulization, pain managed with Tylenol.

## 2017-11-16 NOTE — PROGRESS NOTES
[place] Progress Note     Josue Ku MD  11/16/2017         Interval History:      Notes breathing is much better today compared to yesterday, EKG from this am reviewed. Patient denies any chest discomfort       Assessment and Plan:      82 year old female with known moderate CAD on the basis of coronary angiogram earlier this yr admitted with right lower lobe pneumonia, acute hypoxic respiratory failure and sepsis. Cardiology consulted due to decline in LVEF and echo suggestive of stress CM. Clinically patient is feeling better.    1. Acute systolic CHF- by RWMA and clinically appear likely stress CM, no chest pain, EKG changes - timeline as well as features can be seen in stress CM.  2. Known moderate CAD on coronary angiogram a few months ago- LAD/LCX  3. Moderate AR.  4. Admitted with right lower lobe pneumonia, acute hypoxic respiratory failure and sepsis    Recommendations  -low dose BB as BP allows  - subsequently can add ace-I if BP allows- possible in a day or so  - repeat limited echo tomorrow to re assess any interval change  - continue asa, statin    Cardiology will continue to follow       Physical Exam:      Heart Rate: 89, Blood pressure 110/50, pulse 108, temperature 97.6  F (36.4  C), temperature source Oral, resp. rate 18, weight 66.8 kg (147 lb 4.3 oz), SpO2 95 %, not currently breastfeeding.  Vitals:    11/16/17 0423   Weight: 66.8 kg (147 lb 4.3 oz)     Vital Signs with Ranges  Temp:  [97  F (36.1  C)-98.6  F (37  C)] 97.6  F (36.4  C)  Heart Rate:  [] 89  Resp:  [14-29] 18  BP: ()/(50-74) 110/50  SpO2:  [93 %-97 %] 95 %  I/O's Last 24 hours  I/O last 3 completed shifts:  In: 4899 [P.O.:540; I.V.:2359; IV Piggyback:2000]  Out: 100 [Urine:100]  Gen-- appears comfortable  Neck- JVP appears elevated 12 cm  CVS- s1s2 normal, no m/r/g  Resp- decreased a/e b/l at bases, no crackles/rhonchi  Abd- s/nt  Ext- no pitting pedal edema  Neuro- alert, oriented             Medications:           cefTRIAXone  2 g Intravenous Q24H     metoprolol  12.5 mg Oral TID     aspirin  81 mg Oral Daily     azelastine  1 spray Both Nostrils BID     fluticasone-vilanterol  1 puff Inhalation Daily     montelukast (SINGULAIR) tablet 10 mg  10 mg Oral At Bedtime     omeprazole  20 mg Oral Daily     simvastatin  10 mg Oral At Bedtime     azithromycin  250 mg Intravenous Q24H     ipratropium - albuterol 0.5 mg/2.5 mg/3 mL  3 mL Nebulization 4x Daily     insulin aspart  1-3 Units Subcutaneous TID AC     insulin aspart  1-3 Units Subcutaneous At Bedtime     enoxaparin  40 mg Subcutaneous Q24H     docusate sodium  100 mg Oral BID     methylPREDNISolone  40 mg Intravenous Q8H     sodium chloride (PF)  3 mL Intracatheter Q8H     sodium chloride (PF)  10 mL Intravenous Once     PRN Meds: acetaminophen, glucose **OR** dextrose **OR** glucagon, naloxone, - MEDICATION INSTRUCTIONS -, hypromellose-dextran, albuterol, guaiFENesin, magnesium sulfate, magnesium sulfate, oxyCODONE IR, melatonin, senna-docusate **OR** senna-docusate, polyethylene glycol, bisacodyl, ondansetron **OR** ondansetron, - MEDICATION INSTRUCTIONS -, lidocaine (buffered or not buffered), lidocaine 4%, sodium chloride (PF), nitroGLYcerin, metoprolol         Data:      All new lab and imaging data was reviewed.   Recent Labs   Lab Test  11/16/17   0455  11/15/17   0645  03/02/17   0241   WBC  22.8*  19.8*  9.9   HGB  11.4*  14.1  13.5   MCV  92  91  91   PLT  211  306  289      Recent Labs   Lab Test  11/16/17   0455  11/15/17   0645  10/02/17   1528   NA  137  135  134   POTASSIUM  4.2  3.9  4.3   CHLORIDE  104  100  100   CO2  23  28  24   BUN  26  29  16   CR  0.86  0.84  0.84   ANIONGAP  10  7  10   ELROY  8.0*  8.9  9.3   GLC  148*  185*  112*     Recent Labs   Lab Test  11/15/17   1800  11/15/17   1420  11/15/17   0645   04/10/13   1250   TROPI  4.139*  3.771*  0.262*   < >   --    TROPONIN   --    --    --    --   0.00    < > = values in this interval not  displayed.        Josue Ku MD  11/16/2017  Pager:  [phone]

## 2017-11-16 NOTE — PROGRESS NOTES
[facility]    Hospitalist Progress Note :     Cumulative Summary: Genie Persaud is a 82 year old female with past medical history significant for moderate asthma, essential hypertension and coronary artery disease who presented with severe dyspnea at rest  And was found to be in acute hypoxic respiratory failure with right lower lobe consolidative pneumonia.  Patient was started on BiPAP due to being in respiratory distress, respiratory acidosis and decrease air entry and movement in bilateral lower lobes.  Patient was also noticed to be in severe sepsis with hypotension and was given fluid bolus, patient responded well to the fluid resuscitation and was admitted to intermediate care for further evaluation and management.      Assessment & Plan     Acute respiratory failure with hypoxia (H) (11/15/2017)    Right lower lobe pneumonia (H) (11/15/2017) due to infectious organism    Moderate asthma with exacerbation (11/15/2017)    Severe sepsis (H) (11/15/2017) with hypotension:      --continue to monitor patient with telemetry, will move her out of the [facility] status    -- DIET and Activity orders.  -- Oxygen to keep sats above 92 %  -- Aerosol treatments in the form of Duo neb Q 4 hours. And q 2 hours as needed  -- ABGs PRN   -- CPAP/BIPAP assessment, will benefit from treatments with BIPAP  -- Continuous pulse Oximetry    -- 12 lead EKG.  -- continue on Ceftriaxone and Azithromycin, she has received broad spectrum antibiotics including Zosyn and Vancomycin in the emergency room as per sepsis protocol as patient is improving,will start patient on Ceftriaxone and Azithromycin.  -- on ISS while she is on IV steroids   -- repeat chest x-ray tomorrow morning   -- Blood cultures are drawn, will follow-up on the results, so far no growth  -- IV Solumedrol 40 mg IV every 8 hours for acute hypoxic resp failure.    -- will discontinue IV fluids  -- was Holding all home antihypertensives except metoprolol, she  takes 50 mg of Toprol-XL at home, but only ordered 25 mg at this time due to patient being hypotensive initially.    Stress Cardiomyopathy: She follows up with Dr.Stephen Hernandez as an out patient.Serum troponin I was elevated at 0.262 and subsequently 3.771.  Transthoracic echocardiogram showed a new drop in an LVEF from 45% to 50% in 03/2017 to 35% with preserved basal and mid ventricular function and hypokinetic apical function in a noncoronary artery distribution, cardiology was consulted yesterday and were in agreement that patient's symptoms are combination of hypotension, sepsis and moderate to severe aortic regurgitation and her symptoms are most likely persistent with Takotsubo cardiomyopathy, and patient does not need coronary angiogram at this point.    Essential hypertension with goal blood pressure less than 140/90 (9/1/2016)    Coronary artery disease involving native coronary artery of native heart without angina pectoris (3/9/2017)    Nonrheumatic aortic valve insufficiency (6/29/2017)   elevated troponin: most likely secondary to acute hypoxic respiratory failure with sepsis, and stress cardiomyopathy.    -- Patient was started on metoprolol 12.5 mg p.o. b.i.d., she takes Toprol XL 50 mg p.o. at home, we'll go ahead and increase metoprolol to 12.5 mg p.o. three times a day.  -- As patient blood pressure is improving, will stop IV fluids and if she continues to maintain her blood pressure will probably start her back on lisinopril from tomorrow morning, patient was taking 100 mg of losartan before admission, probably can be started on a small dose tomorrow.  -- Appreciate cardiology help, plan for limited echocardiogram tomorrow morning  -- At this time will continue to hold her hydrochlorothiazide and Aldactone once patient is started back on small dose of Losartan then will start patient on 12.5 mg of Aldactone which is her home dose.        DVT Prophylaxis: Enoxaparin (Lovenox) SQ  Code Status:  DNR , discussed with patient.    Disposition: Patient will be here today and tomorrow, tentative discharge over the weekend if she continues to make clinical improvement.  We'll transfer patient out of intermediate care to regular medical floor with telemetry.    Ida Gray MD, FACP  Text Page (7am - 6pm)      Interval History   Patient was seen and examined this morning, sitting in the chair, looks significantly clinically improved as compared to yesterday at admission.  She is able to talk in longer sentences, she is wearing nasal cannula on 2 L of oxygen.  She is denying any chest discomfort or palpitations.    -Data reviewed today: I reviewed all new labs and imaging results over the last 24 hours.    I personally reviewed the EKG tracing showing sinus tachycardia .    Physical Exam   Temp: 97.6  F (36.4  C) Temp src: Oral BP: 110/50 Pulse: 108 Heart Rate: 89 Resp: 18 SpO2: 95 % O2 Device: Nasal cannula Oxygen Delivery: 2 LPM  Vitals:    11/16/17 0423   Weight: 66.8 kg (147 lb 4.3 oz)     Vital Signs with Ranges  Temp:  [97  F (36.1  C)-98.6  F (37  C)] 97.6  F (36.4  C)  Pulse:  [108] 108  Heart Rate:  [] 89  Resp:  [14-33] 18  BP: ()/(40-74) 110/50  SpO2:  [93 %-97 %] 95 %  I/O last 3 completed shifts:  In: 4899 [P.O.:540; I.V.:2359; IV Piggyback:2000]  Out: 100 [Urine:100]    GENERAL: Alert , awake and oriented. NAD. Conversational, appropriate. Wearing the nasal cannula , sitting in chair , looks clinically better  HEENT: Normocephalic. EOMI. No icterus or injection. Nares normal.   LUNGS: decreased air entry in bases , exp wheezing ,no crackles   HEART: Regular rate. Extremities perfused.   ABDOMEN: Soft, nontender, and nondistended. Positive bowel sounds.   EXTREMITIES: No LE edema noted.   NEUROLOGIC: Moves extremities x4 on command. No acute focal neurologic abnormalities noted.     Medications     - MEDICATION INSTRUCTIONS -       - MEDICATION INSTRUCTIONS -         cefTRIAXone  2 g  Intravenous Q24H     metoprolol  12.5 mg Oral TID     aspirin  81 mg Oral Daily     azelastine  1 spray Both Nostrils BID     fluticasone-vilanterol  1 puff Inhalation Daily     montelukast (SINGULAIR) tablet 10 mg  10 mg Oral At Bedtime     omeprazole  20 mg Oral Daily     simvastatin  10 mg Oral At Bedtime     azithromycin  250 mg Intravenous Q24H     ipratropium - albuterol 0.5 mg/2.5 mg/3 mL  3 mL Nebulization 4x Daily     insulin aspart  1-3 Units Subcutaneous TID AC     insulin aspart  1-3 Units Subcutaneous At Bedtime     enoxaparin  40 mg Subcutaneous Q24H     docusate sodium  100 mg Oral BID     methylPREDNISolone  40 mg Intravenous Q8H     sodium chloride (PF)  3 mL Intracatheter Q8H     sodium chloride (PF)  10 mL Intravenous Once       Data     Recent Labs  Lab 11/16/17  0455 11/15/17  1800 11/15/17  1420 11/15/17  0645   WBC 22.8*  --   --  19.8*   HGB 11.4*  --   --  14.1   MCV 92  --   --  91     --   --  306     --   --  135   POTASSIUM 4.2  --   --  3.9   CHLORIDE 104  --   --  100   CO2 23  --   --  28   BUN 26  --   --  29   CR 0.86  --   --  0.84   ANIONGAP 10  --   --  7   ELROY 8.0*  --   --  8.9   *  --   --  185*   ALBUMIN 3.0*  --   --  3.4   PROTTOTAL 6.4*  --   --  7.1   BILITOTAL 0.6  --   --  0.6   ALKPHOS 72  --   --  121   *  --   --  55*   AST 92*  --   --  67*   TROPI  --  4.139* 3.771* 0.262*       Imaging:   No results found for this or any previous visit (from the past 24 hour(s)).

## 2017-11-16 NOTE — PLAN OF CARE
Problem: Revascularization/PAD, Lower Extremity (Adult)  Goal: Signs and Symptoms of Listed Potential Problems Will be Absent, Minimized or Managed (Revascularization/PAD, Lower Extremity)  Signs and symptoms of listed potential problems will be absent, minimized or managed by discharge/transition of care (reference Revascularization/PAD, Lower Extremity (Adult) CPG).  Outcome: No Change  A&O. VSS. Pulses present with doppler. 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 Incontinent of urine but can call at times with urge to void. Denied pain throughout shift

## 2017-11-16 NOTE — PROGRESS NOTES
SPIRITUAL HEALTH SERVICES  Spiritual Assessment Progress Note  [facility] 33  Pt was on a long distance phone call with her daughter when I arrived.  She topped briefly to inform me that she was doing fine and being visited by her Pep .  No additional needs.      No plans to follow.                                                                                                                                             Shyanne Hernandez M.Div., [facility]  Staff    Pager [phone]

## 2017-11-16 NOTE — PROGRESS NOTES
11/15/17 1400   Quick Adds   Quick Adds Student Supervision-Eval Only   Type of Visit Initial PT Evaluation   Student Supervision-Eval Only    Student Supervision On-site supervision provided  (by Meghana Cline DPT)   Living Environment   Lives With alone   Living Arrangements house   Home Accessibility stairs to enter home;stairs within home   Number of Stairs to Enter Home 2   Number of Stairs Within Home 10   Stair Railings at Home inside, present on right side;outside, present on right side   Living Environment Comment Must use stairs - laundry and cat care necessities in basement'   Self-Care   Usual Activity Tolerance good   Current Activity Tolerance moderate   Regular Exercise no   Activity/Exercise/Self-Care Comment 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 Enjoys going on outings/walking on weekends.   Functional Level Prior   Ambulation 0-->independent   Transferring 0-->independent   Fall history within last six months no   General Information   Onset of Illness/Injury or Date of Surgery - Date 11/15/17   Referring Physician Ida Gray MD   Patient/Family Goals Statement Return home.   Pertinent History of Current Problem (include personal factors and/or comorbidities that impact the POC) Pt admit 11/15 after presenting to ED with severe dyspnea. Found to be in acute hypoxic respiratory failure with R lower lobe consolidation pneumonioa. Also found to have severe sepisis and hypotension. PMH includes basal cell carcinoma, CAD, essential HTN, asthma, osteopenia   Precautions/Limitations fall precautions   Weight-Bearing Status - LLE full weight-bearing   Weight-Bearing Status - RLE full weight-bearing   Cognitive Status Examination   Orientation orientation to person, place and time   Level of Consciousness alert   Follows Commands and Answers Questions 100% of the time;able to follow multistep instructions   Personal Safety and Judgment intact   Memory intact   Pain  "Assessment   Patient Currently in Pain No   Integumentary/Edema   Integumentary/Edema no deficits were identifed   Posture    Posture Forward head position;Protracted shoulders   Range of Motion (ROM)   ROM Comment B LE ROM WFL   Strength   Strength Comments BLE grossly 5/5   Bed Mobility   Bed Mobility Comments NT - pt in chair on arrival.   Transfer Skills   Transfer Comments Sit<>stand SBA   Gait   Gait Comments Pt amb 50 ft with FWW, SBA, on 2L O2. Does not use walker at baseline. Continued another 100 ft with no AD, SBA. Walks with fluid gait pattern at appropriate pace. She was able to manage 3 stairs using railing on R without issue. She was limited by SOB secondary to asthma and pneumonia. She will be approriate to continue walking program with nursing.   Balance   Balance Comments Steady walking without AD. Reports feeling stable throughout gait.   General Therapy Interventions   Intervention Comments none needed   Clinical Impression   Criteria for Skilled Therapeutic Intervention evaluation only   Clinical Presentation Stable/Uncomplicated   Clinical Presentation Rationale Stable   Clinical Decision Making (Complexity) Low complexity   Predicted Duration of Therapy Intervention (days/wks) eval only   Anticipated Discharge Disposition Home   Risk & Benefits of therapy have been explained Yes   Patient, Family & other staff in agreement with plan of care Yes   [facility] AM-PAC  \"6 Clicks\" V.2 Basic Mobility Inpatient Short Form   1. Turning from your back to your side while in a flat bed without using bedrails? 4 - None   2. Moving from lying on your back to sitting on the side of a flat bed without using bedrails? 4 - None   3. Moving to and from a bed to a chair (including a wheelchair)? 4 - None   4. Standing up from a chair using your arms (e.g., wheelchair, or bedside chair)? 4 - None   5. To walk in hospital room? 4 - None   6. Climbing 3-5 steps with a railing? 4 - None   Basic Mobility Raw " Score (Score out of 24.Lower scores equate to lower levels of function) 24   Total Evaluation Time   Total Evaluation Time (Minutes) 15        11/15/17 1400   Quick Adds   Quick Adds Student Supervision-Eval Only   Type of Visit Initial PT Evaluation   Student Supervision-Eval Only    Student Supervision On-site supervision provided  (by Meghana Cline DPT)   Living Environment   Lives With alone   Living Arrangements house   Home Accessibility stairs to enter home;stairs within home   Number of Stairs to Enter Home 2   Number of Stairs Within Home 10   Stair Railings at Home inside, present on right side;outside, present on right side   Living Environment Comment Must use stairs - laundry and cat care necessities in basement'   Self-Care   Usual Activity Tolerance good   Current Activity Tolerance moderate   Regular Exercise no   Activity/Exercise/Self-Care Comment 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 Enjoys going on outings/walking on weekends.   Functional Level Prior   Ambulation 0-->independent   Transferring 0-->independent   Fall history within last six months no   General Information   Onset of Illness/Injury or Date of Surgery - Date 11/15/17   Referring Physician Ida Gray MD   Patient/Family Goals Statement Return home.   Pertinent History of Current Problem (include personal factors and/or comorbidities that impact the POC) Pt admit 11/15 after presenting to ED with severe dyspnea. Found to be in acute hypoxic respiratory failure with R lower lobe consolidation pneumonioa. Also found to have severe sepisis and hypotension. PMH includes basal cell carcinoma, CAD, essential HTN, asthma, osteopenia   Precautions/Limitations fall precautions   Weight-Bearing Status - LLE full weight-bearing   Weight-Bearing Status - RLE full weight-bearing   Cognitive Status Examination   Orientation orientation to person, place and time   Level of Consciousness alert   Follows Commands  "and Answers Questions 100% of the time;able to follow multistep instructions   Personal Safety and Judgment intact   Memory intact   Pain Assessment   Patient Currently in Pain No   Integumentary/Edema   Integumentary/Edema no deficits were identifed   Posture    Posture Forward head position;Protracted shoulders   Range of Motion (ROM)   ROM Comment B LE ROM WFL   Strength   Strength Comments BLE grossly 5/5   Bed Mobility   Bed Mobility Comments NT - pt in chair on arrival.   Transfer Skills   Transfer Comments Sit<>stand SBA   Gait   Gait Comments Pt amb 50 ft with FWW, SBA, on 2L O2. Does not use walker at baseline. Continued another 100 ft with no AD, SBA. Walks with fluid gait pattern at appropriate pace. She was able to manage 3 stairs using railing on R without issue. She was limited by SOB secondary to asthma and pneumonia. She will be approriate to continue walking program with nursing.   Balance   Balance Comments Steady walking without AD. Reports feeling stable throughout gait.   General Therapy Interventions   Intervention Comments none needed   Clinical Impression   Criteria for Skilled Therapeutic Intervention evaluation only   Clinical Presentation Stable/Uncomplicated   Clinical Presentation Rationale Stable   Clinical Decision Making (Complexity) Low complexity   Predicted Duration of Therapy Intervention (days/wks) eval only   Anticipated Discharge Disposition Home   Risk & Benefits of therapy have been explained Yes   Patient, Family & other staff in agreement with plan of care Yes   [facility] AM-PAC  \"6 Clicks\" V.2 Basic Mobility Inpatient Short Form   1. Turning from your back to your side while in a flat bed without using bedrails? 4 - None   2. Moving from lying on your back to sitting on the side of a flat bed without using bedrails? 4 - None   3. Moving to and from a bed to a chair (including a wheelchair)? 4 - None   4. Standing up from a chair using your arms (e.g., wheelchair, " or bedside chair)? 4 - None   5. To walk in hospital room? 4 - None   6. Climbing 3-5 steps with a railing? 4 - None   Basic Mobility Raw Score (Score out of 24.Lower scores equate to lower levels of function) 24   Total Evaluation Time   Total Evaluation Time (Minutes) 15

## 2017-11-17 ENCOUNTER — APPOINTMENT (OUTPATIENT)
Dept: CARDIOLOGY | Facility: CLINIC | Age: 82
DRG: 871 | End: 2017-11-17
Attending: INTERNAL MEDICINE
Payer: COMMERCIAL

## 2017-11-17 ENCOUNTER — APPOINTMENT (OUTPATIENT)
Dept: GENERAL RADIOLOGY | Facility: CLINIC | Age: 82
DRG: 871 | End: 2017-11-17
Attending: INTERNAL MEDICINE
Payer: COMMERCIAL

## 2017-11-17 LAB
ALBUMIN SERPL-MCNC: 3 G/DL (ref 3.4–5)
ALP SERPL-CCNC: 70 U/L (ref 40–150)
ALT SERPL W P-5'-P-CCNC: 111 U/L (ref 0–50)
ANION GAP SERPL CALCULATED.3IONS-SCNC: 6 MMOL/L (ref 3–14)
AST SERPL W P-5'-P-CCNC: 68 U/L (ref 0–45)
BASOPHILS # BLD AUTO: 0 10E9/L (ref 0–0.2)
BASOPHILS NFR BLD AUTO: 0 %
BILIRUB SERPL-MCNC: 0.3 MG/DL (ref 0.2–1.3)
BUN SERPL-MCNC: 43 MG/DL (ref 7–30)
CALCIUM SERPL-MCNC: 8.6 MG/DL (ref 8.5–10.1)
CHLORIDE SERPL-SCNC: 105 MMOL/L (ref 94–109)
CO2 SERPL-SCNC: 22 MMOL/L (ref 20–32)
CREAT SERPL-MCNC: 0.98 MG/DL (ref 0.52–1.04)
DIFFERENTIAL METHOD BLD: ABNORMAL
EOSINOPHIL # BLD AUTO: 0 10E9/L (ref 0–0.7)
EOSINOPHIL NFR BLD AUTO: 0 %
ERYTHROCYTE [DISTWIDTH] IN BLOOD BY AUTOMATED COUNT: 14 % (ref 10–15)
GFR SERPL CREATININE-BSD FRML MDRD: 54 ML/MIN/1.7M2
GLUCOSE BLDC GLUCOMTR-MCNC: 148 MG/DL (ref 70–99)
GLUCOSE BLDC GLUCOMTR-MCNC: 164 MG/DL (ref 70–99)
GLUCOSE BLDC GLUCOMTR-MCNC: 166 MG/DL (ref 70–99)
GLUCOSE BLDC GLUCOMTR-MCNC: 202 MG/DL (ref 70–99)
GLUCOSE SERPL-MCNC: 138 MG/DL (ref 70–99)
HCT VFR BLD AUTO: 35.3 % (ref 35–47)
HGB BLD-MCNC: 12 G/DL (ref 11.7–15.7)
IMM GRANULOCYTES # BLD: 0.1 10E9/L (ref 0–0.4)
IMM GRANULOCYTES NFR BLD: 0.5 %
INTERPRETATION ECG - MUSE: NORMAL
LACTATE BLD-SCNC: 1.3 MMOL/L (ref 0.7–2)
LYMPHOCYTES # BLD AUTO: 0.5 10E9/L (ref 0.8–5.3)
LYMPHOCYTES NFR BLD AUTO: 1.9 %
MCH RBC QN AUTO: 31.1 PG (ref 26.5–33)
MCHC RBC AUTO-ENTMCNC: 34 G/DL (ref 31.5–36.5)
MCV RBC AUTO: 92 FL (ref 78–100)
MONOCYTES # BLD AUTO: 1.5 10E9/L (ref 0–1.3)
MONOCYTES NFR BLD AUTO: 6.5 %
NEUTROPHILS # BLD AUTO: 21.6 10E9/L (ref 1.6–8.3)
NEUTROPHILS NFR BLD AUTO: 91.1 %
NRBC # BLD AUTO: 0 10*3/UL
NRBC BLD AUTO-RTO: 0 /100
PLATELET # BLD AUTO: 256 10E9/L (ref 150–450)
POTASSIUM SERPL-SCNC: 4.9 MMOL/L (ref 3.4–5.3)
PROT SERPL-MCNC: 6.9 G/DL (ref 6.8–8.8)
RBC # BLD AUTO: 3.86 10E12/L (ref 3.8–5.2)
SODIUM SERPL-SCNC: 133 MMOL/L (ref 133–144)
WBC # BLD AUTO: 23.8 10E9/L (ref 4–11)

## 2017-11-17 PROCEDURE — 85025 COMPLETE CBC W/AUTO DIFF WBC: CPT | Performed by: INTERNAL MEDICINE

## 2017-11-17 PROCEDURE — 12000000 ZZH R&B MED SURG/OB

## 2017-11-17 PROCEDURE — 99233 SBSQ HOSP IP/OBS HIGH 50: CPT | Performed by: INTERNAL MEDICINE

## 2017-11-17 PROCEDURE — 25000125 ZZHC RX 250: Performed by: INTERNAL MEDICINE

## 2017-11-17 PROCEDURE — 93321 DOPPLER ECHO F-UP/LMTD STD: CPT | Mod: 26 | Performed by: INTERNAL MEDICINE

## 2017-11-17 PROCEDURE — 80053 COMPREHEN METABOLIC PANEL: CPT | Performed by: INTERNAL MEDICINE

## 2017-11-17 PROCEDURE — 71010 XR CHEST PORT 1 VW: CPT

## 2017-11-17 PROCEDURE — 36415 COLL VENOUS BLD VENIPUNCTURE: CPT | Performed by: INTERNAL MEDICINE

## 2017-11-17 PROCEDURE — 83605 ASSAY OF LACTIC ACID: CPT | Performed by: INTERNAL MEDICINE

## 2017-11-17 PROCEDURE — 94640 AIRWAY INHALATION TREATMENT: CPT

## 2017-11-17 PROCEDURE — 93325 DOPPLER ECHO COLOR FLOW MAPG: CPT | Mod: 26 | Performed by: INTERNAL MEDICINE

## 2017-11-17 PROCEDURE — 94640 AIRWAY INHALATION TREATMENT: CPT | Mod: 76

## 2017-11-17 PROCEDURE — 99233 SBSQ HOSP IP/OBS HIGH 50: CPT | Mod: 25 | Performed by: INTERNAL MEDICINE

## 2017-11-17 PROCEDURE — 25000128 H RX IP 250 OP 636: Performed by: INTERNAL MEDICINE

## 2017-11-17 PROCEDURE — 25000132 ZZH RX MED GY IP 250 OP 250 PS 637: Performed by: INTERNAL MEDICINE

## 2017-11-17 PROCEDURE — 93005 ELECTROCARDIOGRAM TRACING: CPT

## 2017-11-17 PROCEDURE — 27210995 ZZH RX 272: Performed by: INTERNAL MEDICINE

## 2017-11-17 PROCEDURE — 93010 ELECTROCARDIOGRAM REPORT: CPT | Performed by: INTERNAL MEDICINE

## 2017-11-17 PROCEDURE — 00000146 ZZHCL STATISTIC GLUCOSE BY METER IP

## 2017-11-17 PROCEDURE — 40000275 ZZH STATISTIC RCP TIME EA 10 MIN

## 2017-11-17 PROCEDURE — 93308 TTE F-UP OR LMTD: CPT

## 2017-11-17 PROCEDURE — 93308 TTE F-UP OR LMTD: CPT | Mod: 26 | Performed by: INTERNAL MEDICINE

## 2017-11-17 RX ORDER — METOPROLOL TARTRATE 25 MG/1
25 TABLET, FILM COATED ORAL 2 TIMES DAILY
Status: DISCONTINUED | OUTPATIENT
Start: 2017-11-17 | End: 2017-11-18

## 2017-11-17 RX ORDER — ONDANSETRON 4 MG/1
4 TABLET, ORALLY DISINTEGRATING ORAL EVERY 6 HOURS PRN
Status: DISCONTINUED | OUTPATIENT
Start: 2017-11-17 | End: 2017-11-17

## 2017-11-17 RX ORDER — METOPROLOL TARTRATE 1 MG/ML
5 INJECTION, SOLUTION INTRAVENOUS ONCE
Status: COMPLETED | OUTPATIENT
Start: 2017-11-17 | End: 2017-11-17

## 2017-11-17 RX ORDER — METOPROLOL TARTRATE 25 MG/1
25 TABLET, FILM COATED ORAL 3 TIMES DAILY
Status: DISCONTINUED | OUTPATIENT
Start: 2017-11-17 | End: 2017-11-17

## 2017-11-17 RX ORDER — FUROSEMIDE 10 MG/ML
20 INJECTION INTRAMUSCULAR; INTRAVENOUS ONCE
Status: COMPLETED | OUTPATIENT
Start: 2017-11-17 | End: 2017-11-17

## 2017-11-17 RX ORDER — ONDANSETRON 2 MG/ML
4 INJECTION INTRAMUSCULAR; INTRAVENOUS EVERY 6 HOURS PRN
Status: DISCONTINUED | OUTPATIENT
Start: 2017-11-17 | End: 2017-11-17

## 2017-11-17 RX ADMIN — ONDANSETRON 4 MG: 4 TABLET, ORALLY DISINTEGRATING ORAL at 01:51

## 2017-11-17 RX ADMIN — FLUTICASONE FUROATE AND VILANTEROL TRIFENATATE 1 PUFF: 100; 25 POWDER RESPIRATORY (INHALATION) at 11:22

## 2017-11-17 RX ADMIN — ALBUTEROL SULFATE 2.5 MG: 2.5 SOLUTION RESPIRATORY (INHALATION) at 00:58

## 2017-11-17 RX ADMIN — OMEPRAZOLE 20 MG: 20 CAPSULE, DELAYED RELEASE ORAL at 09:28

## 2017-11-17 RX ADMIN — CEFTRIAXONE SODIUM 2 G: 10 INJECTION, POWDER, FOR SOLUTION INTRAVENOUS at 13:08

## 2017-11-17 RX ADMIN — FUROSEMIDE 20 MG: 10 INJECTION, SOLUTION INTRAVENOUS at 09:23

## 2017-11-17 RX ADMIN — ASPIRIN 81 MG: 81 TABLET, COATED ORAL at 09:28

## 2017-11-17 RX ADMIN — IPRATROPIUM BROMIDE AND ALBUTEROL SULFATE 3 ML: .5; 3 SOLUTION RESPIRATORY (INHALATION) at 19:27

## 2017-11-17 RX ADMIN — AZELASTINE HYDROCHLORIDE 1 SPRAY: 137 SPRAY, METERED NASAL at 11:21

## 2017-11-17 RX ADMIN — IPRATROPIUM BROMIDE AND ALBUTEROL SULFATE 3 ML: .5; 3 SOLUTION RESPIRATORY (INHALATION) at 14:25

## 2017-11-17 RX ADMIN — AZITHROMYCIN MONOHYDRATE 250 MG: 500 INJECTION, POWDER, LYOPHILIZED, FOR SOLUTION INTRAVENOUS at 11:21

## 2017-11-17 RX ADMIN — LIDOCAINE: 40 CREAM TOPICAL at 11:46

## 2017-11-17 RX ADMIN — AZELASTINE HYDROCHLORIDE 1 SPRAY: 137 SPRAY, METERED NASAL at 20:52

## 2017-11-17 RX ADMIN — ALBUTEROL SULFATE 2.5 MG: 2.5 SOLUTION RESPIRATORY (INHALATION) at 05:25

## 2017-11-17 RX ADMIN — ACETAMINOPHEN 1000 MG: 500 TABLET, FILM COATED ORAL at 00:08

## 2017-11-17 RX ADMIN — METHYLPREDNISOLONE SODIUM SUCCINATE 40 MG: 40 INJECTION, POWDER, FOR SOLUTION INTRAMUSCULAR; INTRAVENOUS at 12:49

## 2017-11-17 RX ADMIN — MONTELUKAST SODIUM 10 MG: 10 TABLET, FILM COATED ORAL at 20:53

## 2017-11-17 RX ADMIN — METHYLPREDNISOLONE SODIUM SUCCINATE 40 MG: 40 INJECTION, POWDER, FOR SOLUTION INTRAMUSCULAR; INTRAVENOUS at 20:54

## 2017-11-17 RX ADMIN — ONDANSETRON 4 MG: 2 INJECTION INTRAMUSCULAR; INTRAVENOUS at 22:42

## 2017-11-17 RX ADMIN — IPRATROPIUM BROMIDE AND ALBUTEROL SULFATE 3 ML: .5; 3 SOLUTION RESPIRATORY (INHALATION) at 07:52

## 2017-11-17 RX ADMIN — METOPROLOL TARTRATE 25 MG: 25 TABLET ORAL at 20:54

## 2017-11-17 RX ADMIN — Medication 1 MG: at 03:03

## 2017-11-17 RX ADMIN — ACETAMINOPHEN 1000 MG: 500 TABLET, FILM COATED ORAL at 16:49

## 2017-11-17 RX ADMIN — IPRATROPIUM BROMIDE AND ALBUTEROL SULFATE 3 ML: .5; 3 SOLUTION RESPIRATORY (INHALATION) at 11:29

## 2017-11-17 RX ADMIN — Medication 12.5 MG: at 09:28

## 2017-11-17 RX ADMIN — METOROPROLOL TARTRATE 5 MG: 5 INJECTION, SOLUTION INTRAVENOUS at 17:44

## 2017-11-17 RX ADMIN — SIMVASTATIN 10 MG: 10 TABLET, FILM COATED ORAL at 20:54

## 2017-11-17 RX ADMIN — ONDANSETRON 4 MG: 2 INJECTION INTRAMUSCULAR; INTRAVENOUS at 13:21

## 2017-11-17 RX ADMIN — METOROPROLOL TARTRATE 2.5 MG: 5 INJECTION, SOLUTION INTRAVENOUS at 16:49

## 2017-11-17 RX ADMIN — METOPROLOL TARTRATE 25 MG: 25 TABLET ORAL at 17:14

## 2017-11-17 RX ADMIN — ENOXAPARIN SODIUM 40 MG: 40 INJECTION SUBCUTANEOUS at 11:24

## 2017-11-17 NOTE — PROGRESS NOTES
RT was called for PRN neb treatment on assessment pt was working hard to breath and BBS expiratory wheezes. Convinced pt to wear the BiPAP and given PRN neb treatment. RT will continue to follow.  11/17/2017  Pete Beckford

## 2017-11-17 NOTE — PLAN OF CARE
Problem: Patient Care Overview  Goal: Plan of Care/Patient Progress Review  Outcome: No Change  A&Ox4. VSS on 2L NC. Up A1 +GB and walker. Regular diet. Frequent, Nonproductive cough. Pt had increasing SOB after getting up to the bathroom. Pt refused Bipap earlier in the evening. C/o nausea this shift so Bipap could not be started at this time. RT gave PRN albuterol neb x2, effective. O2 saturation stable in the mid 90's. Pt sitting up in recliner d/t SOB. RT came back around 0500 this morning for second PRN neb and started pt back on Bipap. Small BM this shift. BG checks. C/o pain on underside of L arm this shift d/t arthritis, PRN tylenol given. Plans for discharge pending, will continue to monitor.

## 2017-11-17 NOTE — PLAN OF CARE
Problem: Patient Care Overview  Goal: Plan of Care/Patient Progress Review  Outcome: Improving  A/Ox4. VSS on 2LPM via NC. Tele: sinus tachycardia. BARONE. Frequent, nonproductive cough. Tolerating regular diet. Incontinent at times. No BM this shift. BG monitored d/t steroids (WNL). Up w/assist x1 + walker. No other complaints. Will continue to monitor.

## 2017-11-17 NOTE — PROGRESS NOTES
[facility]  Cardiology Progress Note    Outpatient cardiologist: Dr. Hernandez    Date of Service (when I saw the patient): 11/17/2017    Summary: Genie Persaud is a 82 year old female with history of asthma, HTN, CAD, aortic regurgitation who was admitted on 11/15/2017 with acute hypoxic respiratory failure, hypotension, RLL pneumonia and found to have a new stress induced cardiomyopathy with a drop in her LVEF from 50% to 30%.    Interval History   Unfortunately, Genie reports she is feeling worse today. She was feeling pretty good yesterday but is feeling more short of breath today. She has had to move to the chair this afternoon instead of being in bed due to her dyspnea. She slept very poorly last night and feels quite fatigued and weak. She denies any chest pain. She continues to have a nonproductive cough.  Assessment & Plan   RLL pneumonia with severe sepsis. Initially treated with zosyn/vancomycin. Now on ceftriaxone and azithromycin. WBC continues to rise today.  -Continue antibiotics, steroids, nebulizers per hospitalist.  -Repeat CXR today shows increasing bilateral infiltrates  -Continue BiPAP prn.    Suspected stress induced cardiomyopathy. Echo on admission showed LVEF of 35% which has dropped from 45 - 50% in 3/2017. WMA on echocardiogram suggests stress cardiomyopathy. Trop elevated up to 4. No indication for repeat coronary angiogram.  -Continue metoprolol 12.5 TID as BP allows. Would eventually switch back to Toprolol-XL.  -Consider addition of ACE if BP allows.  -Received 20 mg IV lasix 11/17. Will reassess needed for lasix daily.  -Daily EKG to assess QTc  -Monitor I&Os. Cuatious with IVF if necessary.     Hx CAD. Cath in March showed two vessel coronary disease with with mLAD 50% stenosis, D3 50% stenosis, pLCx 50% stenosis and mLCx 50% stenosis.   -Continue aspirin, statin.    Moderate to severe AR. Noted on previous echocardiogram in 3/2017. Reported as mild on echo on  11/15.    HTN. HCTZ, losartan, and aldactone on hold.     Hx asthma.     Roro CAbby Patiño PA-C      Patient Active Problem List   Diagnosis     Swelling, mass, or lump in head and neck     Pulmonary nodule     Anemia     Vasculitis (H)     HYPERLIPIDEMIA LDL GOAL <130     Impaired fasting glucose     Candidal esophagitis (H)     Thoracic aortic aneurysm without rupture (H)     Health Care Home     Advanced directives, counseling/discussion     Stroke (H)     Moderate persistent asthma without complication     Essential hypertension with goal blood pressure less than 140/90     Coronary artery disease involving native coronary artery of native heart without angina pectoris     SVT -noted during Cath procedure     Nonrheumatic aortic valve insufficiency     Acute respiratory failure with hypoxia (H)     Right lower lobe pneumonia (H)     Moderate asthma with exacerbation     Severe sepsis (H)     Pneumonia of right lower lobe due to infectious organism (H)     Sepsis, due to unspecified organism (H)     Pneumonia     Stress-induced cardiomyopathy       Physical Exam   Temp: 96  F (35.6  C) Temp src: Oral BP: 118/44 Pulse: 91 Heart Rate: 95 Resp: 19 SpO2: 96 % O2 Device: Nasal cannula Oxygen Delivery: 2 LPM  Vitals:    11/16/17 0423 11/17/17 0658   Weight: 66.8 kg (147 lb 4.3 oz) 68.9 kg (152 lb)     Vital Signs with Ranges  Temp:  [95.3  F (35.2  C)-96  F (35.6  C)] 96  F (35.6  C)  Pulse:  [] 91  Heart Rate:  [] 95  Resp:  [16-19] 19  BP: (118-176)/(44-66) 118/44  SpO2:  [91 %-97 %] 96 %  I/O last 3 completed shifts:  In: 480 [P.O.:480]  Out: 100 [Urine:100]    Constitutional: sitting up in the chair. On bipap.   Respiratory: breath sounds decreased throughout. Few crackles at base. No wheezes.   Cardiovascular: RRR, s1s2, no murmur  GI: soft, BS+  Skin: warm, no rashes  Musculoskeletal: Moving all extremities. No significant LE edema.   Neurologic: Alert, oriented x 3.   Neuropsychiatric: Normal affect      Data     Recent Labs  Lab 11/17/17  0730 11/16/17  0455 11/15/17  1800 11/15/17  1420 11/15/17  0645   WBC 23.8* 22.8*  --   --  19.8*   HGB 12.0 11.4*  --   --  14.1   MCV 92 92  --   --  91    211  --   --  306    137  --   --  135   POTASSIUM 4.9 4.2  --   --  3.9   CHLORIDE 105 104  --   --  100   CO2 22 23  --   --  28   BUN 43* 26  --   --  29   CR 0.98 0.86  --   --  0.84   ANIONGAP 6 10  --   --  7   ELROY 8.6 8.0*  --   --  8.9   * 148*  --   --  185*   ALBUMIN 3.0* 3.0*  --   --  3.4   PROTTOTAL 6.9 6.4*  --   --  7.1   BILITOTAL 0.3 0.6  --   --  0.6   ALKPHOS 70 72  --   --  121   * 106*  --   --  55*   AST 68* 92*  --   --  67*   TROPI  --   --  4.139* 3.771* 0.262*     Echocardiogram 11/17:  Interpretation Summary  Very poor image quality. Left ventricular systolic function is severely reduced. Severe hypokinesis of the distal 2/3rds of LV with good contractility at the base. Pattern suggests Takotsubo CMP Thrombus can not be excluded. Right ventricular systolic pressure is elevated, consistent with moderate pulmonary hypertension.    Echocardiogram 11/15:  Interpretation Summary  1. The left ventricle is normal in size. The visual ejection fraction is estimated at 35%. Distal anteroseptal, apical, and distal inferolateral severe hypokinesis. Base contracts best. This WMA pattern could respresent stress cardiomyopathy, but could be coronary territories also.  2. The right ventricle is normal in structure, function and size.  3. There is mild (1+) aortic regurgitation.  4. The IVC is dilated and fails to change with respiration, suggesting elevated central venous pressure.    Tele SR, ST    CXR 11/17:  pending    CXR 11/15:  IMPRESSION: Dense consolidation in the right lower lung suggests pneumonia.    Medications     - MEDICATION INSTRUCTIONS -         cefTRIAXone  2 g Intravenous Q24H     metoprolol  12.5 mg Oral TID     aspirin  81 mg Oral Daily     azelastine  1 spray  Both Nostrils BID     fluticasone-vilanterol  1 puff Inhalation Daily     montelukast (SINGULAIR) tablet 10 mg  10 mg Oral At Bedtime     omeprazole  20 mg Oral Daily     simvastatin  10 mg Oral At Bedtime     azithromycin  250 mg Intravenous Q24H     ipratropium - albuterol 0.5 mg/2.5 mg/3 mL  3 mL Nebulization 4x Daily     insulin aspart  1-3 Units Subcutaneous TID AC     insulin aspart  1-3 Units Subcutaneous At Bedtime     enoxaparin  40 mg Subcutaneous Q24H     docusate sodium  100 mg Oral BID     methylPREDNISolone  40 mg Intravenous Q8H     sodium chloride (PF)  3 mL Intracatheter Q8H     sodium chloride (PF)  10 mL Intravenous Once

## 2017-11-17 NOTE — PROGRESS NOTES
[facility]    Hospitalist Progress Note :     Cumulative Summary: Genie Persaud is a 82 year old female with past medical history significant for moderate asthma, essential hypertension and coronary artery disease who presented with severe dyspnea at rest  And was found to be in acute hypoxic respiratory failure with right lower lobe consolidative pneumonia.  Patient was started on BiPAP due to being in respiratory distress, respiratory acidosis and decrease air entry and movement in bilateral lower lobes.  Patient was also noticed to be in severe sepsis with hypotension and was given fluid bolus, patient responded well to the fluid resuscitation and was admitted to intermediate care for further evaluation and management.      Assessment & Plan     Acute respiratory failure with hypoxia (H) (11/15/2017)    Right lower lobe pneumonia (H) (11/15/2017) due to infectious organism    Moderate asthma with exacerbation (11/15/2017)    Severe sepsis (H) (11/15/2017) with hypotension:      --continue to monitor patient with telemetry  -- Patient refused her last couple of dosages of Solu-Medrol and seems to be more in respiratory distress this afternoon, I discussed about continuing on IV Solu-Medrol at this time along with using BiPAP, patient is refusing Solu-Medrol as it was keeping her awake we discussed about decreasing the dose as soon as possible but at this point it seems like patient is not ready to go down on the dose today.  She understood and is in agreement to take the IV Solu-Medrol.  --Discussed with bedside RN regarding using BiPAP on and off and probably using at the nighttime  -- DIET and Activity orders.  -- Oxygen to keep sats above 92 %  -- Aerosol treatments in the form of Duo neb Q 4 hours. And q 2 hours as needed  -- ABGs PRN   -- CPAP/BIPAP assessment, will benefit from treatments with BIPAP  -- Continuous pulse Oximetry    -- 12 lead EKG.  -- continue on Ceftriaxone and  Azithromycin, she has received broad spectrum antibiotics including Zosyn and Vancomycin in the emergency room as per sepsis protocol as patient is improving,will start patient on Ceftriaxone and Azithromycin.  -- on ISS while she is on IV steroids   -- repeat chest x-ray tomorrow morning   -- Blood cultures are drawn, will follow-up on the results, so far no growth  -- IV Solumedrol 40 mg IV every 8 hours for acute hypoxic resp failure.    -- off IV fluids  -- was Holding all home antihypertensives except metoprolol, she takes 50 mg of Toprol-XL at home, but only ordered 25 mg at this time due to patient being hypotensive initially.  -- will order sonata for night time to try     Stress Cardiomyopathy: She follows up with Dr.Stephen Hernandez as an out patient.Serum troponin I was elevated at 0.262 and subsequently 3.771.  Transthoracic echocardiogram showed a new drop in an LVEF from 45% to 50% in 03/2017 to 35% with preserved basal and mid ventricular function and hypokinetic apical function in a noncoronary artery distribution, cardiology was consulted yesterday and were in agreement that patient's symptoms are combination of hypotension, sepsis and moderate to severe aortic regurgitation and her symptoms are most likely persistent with Takotsubo cardiomyopathy, and patient does not need coronary angiogram at this point.    Essential hypertension with goal blood pressure less than 140/90 (9/1/2016)    Coronary artery disease involving native coronary artery of native heart without angina pectoris (3/9/2017)    Nonrheumatic aortic valve insufficiency (6/29/2017)   elevated troponin: most likely secondary to acute hypoxic respiratory failure with sepsis, and stress cardiomyopathy.    -- Patient was started on metoprolol 12.5 mg p.o. b.i.d., she takes Toprol XL 50 mg p.o. at home, we'll go ahead and increase metoprolol to 12.5 mg p.o. three times a day.  -- As patient blood pressure is improving, will stop IV fluids  and if she continues to maintain her blood pressure will probably start her back on lisinopril from tomorrow morning, patient was taking 100 mg of losartan before admission, probably can be started on a small dose tomorrow.  -- Appreciate cardiology help, plan for limited echocardiogram tomorrow morning  -- At this time will continue to hold her hydrochlorothiazide and Aldactone once patient is started back on small dose of Losartan then will start patient on 12.5 mg of Aldactone which is her home dose.      DVT Prophylaxis: Enoxaparin (Lovenox) SQ  Code Status: DNR , discussed with patient.    Disposition: Patient will be here over the weekend , will ask PT to evaluate if she needs TCU before going home , although I doubt if she will agree to it .    Ida Gray MD, FACP  Text Page (7am - 6pm)      Interval History   Patient was seen and examined this morning, sitting in the chair, patient is breathing harder, looks more short of breath and told me that she is feeling worse today told me that she refused Solu-Medrol last night and this morning as she was not able to sleep with steroids.  We discussed about decreasing her steroid dose as soon as possible but at this time it seems like that she cannot be tapered off of it very quickly as she significantly seems worse as compared to yesterday.  Patient is in agreement to take IV Solu-Medrol and we discussed about trying Sonata this night to help her with the sleep are also encouraged her to use BiPAP during the daytime and then also using it nocturnally tonight.  She is in agreement with the plan.  I reviewed the plan of care with the RN also     -Data reviewed today: I reviewed all new labs and imaging results over the last 24 hours.    I personally reviewed the EKG tracing showing sinus tachycardia .    Physical Exam   Temp: 95.6  F (35.3  C) Temp src: Oral BP: 176/66 Pulse: 91 Heart Rate: 87 Resp: 19 SpO2: 94 % O2 Device: Nasal cannula with humidification Oxygen  Delivery: 2 LPM  Vitals:    11/16/17 0423 11/17/17 0658   Weight: 66.8 kg (147 lb 4.3 oz) 68.9 kg (152 lb)     Vital Signs with Ranges  Temp:  [95.3  F (35.2  C)-95.9  F (35.5  C)] 95.6  F (35.3  C)  Pulse:  [] 91  Heart Rate:  [] 87  Resp:  [16-19] 19  BP: (127-176)/(50-66) 176/66  SpO2:  [91 %-97 %] 94 %  I/O last 3 completed shifts:  In: 480 [P.O.:480]  Out: 100 [Urine:100]    GENERAL: Alert , awake and oriented. NAD. Conversational, appropriate. Wearing the nasal cannula , sitting in chair , looks more SOB today and using accessory muscles   HEENT: Normocephalic. EOMI. No icterus or injection. Nares normal.   LUNGS: decreased air entry in bases , exp wheezing ,no crackles , no improvement since yesterday   HEART: Regular rate. Extremities perfused.   ABDOMEN: Soft, nontender, and nondistended. Positive bowel sounds.   EXTREMITIES: No LE edema noted.   NEUROLOGIC: Moves extremities x4 on command. No acute focal neurologic abnormalities noted.     Medications     - MEDICATION INSTRUCTIONS -         cefTRIAXone  2 g Intravenous Q24H     metoprolol  12.5 mg Oral TID     aspirin  81 mg Oral Daily     azelastine  1 spray Both Nostrils BID     fluticasone-vilanterol  1 puff Inhalation Daily     montelukast (SINGULAIR) tablet 10 mg  10 mg Oral At Bedtime     omeprazole  20 mg Oral Daily     simvastatin  10 mg Oral At Bedtime     azithromycin  250 mg Intravenous Q24H     ipratropium - albuterol 0.5 mg/2.5 mg/3 mL  3 mL Nebulization 4x Daily     insulin aspart  1-3 Units Subcutaneous TID AC     insulin aspart  1-3 Units Subcutaneous At Bedtime     enoxaparin  40 mg Subcutaneous Q24H     docusate sodium  100 mg Oral BID     methylPREDNISolone  40 mg Intravenous Q8H     sodium chloride (PF)  3 mL Intracatheter Q8H     sodium chloride (PF)  10 mL Intravenous Once       Data     Recent Labs  Lab 11/17/17  0730 11/16/17  0455 11/15/17  1800 11/15/17  1420 11/15/17  0645   WBC 23.8* 22.8*  --   --  19.8*   HGB 12.0  11.4*  --   --  14.1   MCV 92 92  --   --  91    211  --   --  306    137  --   --  135   POTASSIUM 4.9 4.2  --   --  3.9   CHLORIDE 105 104  --   --  100   CO2 22 23  --   --  28   BUN 43* 26  --   --  29   CR 0.98 0.86  --   --  0.84   ANIONGAP 6 10  --   --  7   ELROY 8.6 8.0*  --   --  8.9   * 148*  --   --  185*   ALBUMIN 3.0* 3.0*  --   --  3.4   PROTTOTAL 6.9 6.4*  --   --  7.1   BILITOTAL 0.3 0.6  --   --  0.6   ALKPHOS 70 72  --   --  121   * 106*  --   --  55*   AST 68* 92*  --   --  67*   TROPI  --   --  4.139* 3.771* 0.262*       Imaging:   No results found for this or any previous visit (from the past 24 hour(s)).

## 2017-11-17 NOTE — PLAN OF CARE
Problem: Patient Care Overview  Goal: Plan of Care/Patient Progress Review  Outcome: No Change  A&O, calls appropriately. Up with A1. Hypertensive and tachycardic at times. Other VSS. Denies pain. C/o nausea, gave zofran x 1 with moderate relief. Poor appetite. More SOB this shift, MD aware. Encouraged to use BiPap PRN (have to wait until nausea subsides). Increased O2 from 2L to 4L due to SOB. Respiratory following. Plan pending improvement. Continue to monitor.

## 2017-11-17 NOTE — PROGRESS NOTES
RT was called to put pt on BiPAP for SOB, pt stated that she is feeling nausea we will hold  BiPAP for risk of aspiration. PRN neb treatment given BBS diminished, SpO2 mid 90's on 2LNC. RT will continue to follow.  11/17/2017  Pete Beckford

## 2017-11-17 NOTE — PROVIDER NOTIFICATION
MD Notification    Notified Person:  MD    Notified Persons Name: Dr Gray    Notification Date/Time: November 17, 2017 4:56 PM     Notification Interaction:  Talked with Physician    Purpose of Notification: Pt has new onset a-fib and tachy in 140s    Orders Received: give PRN lopressor now, STAT EKG, and MD will change PO metorpolol dose.    Comments: Repaged physician at 5:30, EKG showed A fib RVR, pt is feeling symptomatic and was placed back on bipap without relief. HR still in 150s. TORB 5mg IV lopressor once now, and tx to higher level of care on cardizem drip- MD will place orders.    6:05pm- spoke with hospitalist, pt is now in NSR and HR is 84. Hospitalist stated to keep pt on station 88, and to give patient scheduled metoprolol this evening as long as she meets the parameters.

## 2017-11-18 ENCOUNTER — APPOINTMENT (OUTPATIENT)
Dept: GENERAL RADIOLOGY | Facility: CLINIC | Age: 82
DRG: 871 | End: 2017-11-18
Attending: INTERNAL MEDICINE
Payer: COMMERCIAL

## 2017-11-18 LAB
ANION GAP SERPL CALCULATED.3IONS-SCNC: 10 MMOL/L (ref 3–14)
BACTERIA SPEC CULT: ABNORMAL
BUN SERPL-MCNC: 65 MG/DL (ref 7–30)
CALCIUM SERPL-MCNC: 8.3 MG/DL (ref 8.5–10.1)
CHLORIDE SERPL-SCNC: 103 MMOL/L (ref 94–109)
CO2 SERPL-SCNC: 22 MMOL/L (ref 20–32)
CREAT SERPL-MCNC: 1.24 MG/DL (ref 0.52–1.04)
CREAT SERPL-MCNC: 1.37 MG/DL (ref 0.52–1.04)
ERYTHROCYTE [DISTWIDTH] IN BLOOD BY AUTOMATED COUNT: 13.7 % (ref 10–15)
GFR SERPL CREATININE-BSD FRML MDRD: 37 ML/MIN/1.7M2
GFR SERPL CREATININE-BSD FRML MDRD: 42 ML/MIN/1.7M2
GLUCOSE BLDC GLUCOMTR-MCNC: 110 MG/DL (ref 70–99)
GLUCOSE BLDC GLUCOMTR-MCNC: 120 MG/DL (ref 70–99)
GLUCOSE BLDC GLUCOMTR-MCNC: 133 MG/DL (ref 70–99)
GLUCOSE BLDC GLUCOMTR-MCNC: 134 MG/DL (ref 70–99)
GLUCOSE BLDC GLUCOMTR-MCNC: 162 MG/DL (ref 70–99)
GLUCOSE BLDC GLUCOMTR-MCNC: 262 MG/DL (ref 70–99)
GLUCOSE SERPL-MCNC: 124 MG/DL (ref 70–99)
GRAM STN SPEC: ABNORMAL
HCT VFR BLD AUTO: 34 % (ref 35–47)
HGB BLD-MCNC: 11.3 G/DL (ref 11.7–15.7)
LACTATE BLD-SCNC: 2.9 MMOL/L (ref 0.7–2)
Lab: ABNORMAL
MCH RBC QN AUTO: 30 PG (ref 26.5–33)
MCHC RBC AUTO-ENTMCNC: 33.2 G/DL (ref 31.5–36.5)
MCV RBC AUTO: 90 FL (ref 78–100)
PLATELET # BLD AUTO: 229 10E9/L (ref 150–450)
PLATELET # BLD AUTO: 258 10E9/L (ref 150–450)
POTASSIUM SERPL-SCNC: 5 MMOL/L (ref 3.4–5.3)
RBC # BLD AUTO: 3.77 10E12/L (ref 3.8–5.2)
SODIUM SERPL-SCNC: 135 MMOL/L (ref 133–144)
SPECIMEN SOURCE: ABNORMAL
SPECIMEN SOURCE: ABNORMAL
WBC # BLD AUTO: 17.6 10E9/L (ref 4–11)

## 2017-11-18 PROCEDURE — 85027 COMPLETE CBC AUTOMATED: CPT | Performed by: INTERNAL MEDICINE

## 2017-11-18 PROCEDURE — 94660 CPAP INITIATION&MGMT: CPT

## 2017-11-18 PROCEDURE — 12000000 ZZH R&B MED SURG/OB

## 2017-11-18 PROCEDURE — 27210995 ZZH RX 272: Performed by: INTERNAL MEDICINE

## 2017-11-18 PROCEDURE — 80048 BASIC METABOLIC PNL TOTAL CA: CPT | Performed by: INTERNAL MEDICINE

## 2017-11-18 PROCEDURE — 25000132 ZZH RX MED GY IP 250 OP 250 PS 637: Performed by: INTERNAL MEDICINE

## 2017-11-18 PROCEDURE — 40000275 ZZH STATISTIC RCP TIME EA 10 MIN

## 2017-11-18 PROCEDURE — 82565 ASSAY OF CREATININE: CPT | Performed by: INTERNAL MEDICINE

## 2017-11-18 PROCEDURE — 94640 AIRWAY INHALATION TREATMENT: CPT | Mod: 76

## 2017-11-18 PROCEDURE — 87205 SMEAR GRAM STAIN: CPT | Performed by: INTERNAL MEDICINE

## 2017-11-18 PROCEDURE — 71010 XR CHEST PORT 1 VW: CPT

## 2017-11-18 PROCEDURE — 36416 COLLJ CAPILLARY BLOOD SPEC: CPT | Performed by: INTERNAL MEDICINE

## 2017-11-18 PROCEDURE — 36415 COLL VENOUS BLD VENIPUNCTURE: CPT | Performed by: INTERNAL MEDICINE

## 2017-11-18 PROCEDURE — 25000125 ZZHC RX 250: Performed by: INTERNAL MEDICINE

## 2017-11-18 PROCEDURE — 00000146 ZZHCL STATISTIC GLUCOSE BY METER IP

## 2017-11-18 PROCEDURE — 85049 AUTOMATED PLATELET COUNT: CPT | Performed by: INTERNAL MEDICINE

## 2017-11-18 PROCEDURE — 94640 AIRWAY INHALATION TREATMENT: CPT

## 2017-11-18 PROCEDURE — 99233 SBSQ HOSP IP/OBS HIGH 50: CPT | Performed by: INTERNAL MEDICINE

## 2017-11-18 PROCEDURE — 93005 ELECTROCARDIOGRAM TRACING: CPT

## 2017-11-18 PROCEDURE — 83605 ASSAY OF LACTIC ACID: CPT | Performed by: INTERNAL MEDICINE

## 2017-11-18 PROCEDURE — 25000128 H RX IP 250 OP 636: Performed by: INTERNAL MEDICINE

## 2017-11-18 PROCEDURE — 93010 ELECTROCARDIOGRAM REPORT: CPT | Mod: 76 | Performed by: INTERNAL MEDICINE

## 2017-11-18 RX ORDER — METHYLPREDNISOLONE SODIUM SUCCINATE 40 MG/ML
40 INJECTION, POWDER, LYOPHILIZED, FOR SOLUTION INTRAMUSCULAR; INTRAVENOUS EVERY 12 HOURS
Status: DISCONTINUED | OUTPATIENT
Start: 2017-11-18 | End: 2017-11-19

## 2017-11-18 RX ORDER — METOPROLOL TARTRATE 1 MG/ML
5 INJECTION, SOLUTION INTRAVENOUS EVERY 4 HOURS PRN
Status: DISCONTINUED | OUTPATIENT
Start: 2017-11-18 | End: 2017-11-25 | Stop reason: HOSPADM

## 2017-11-18 RX ORDER — FUROSEMIDE 10 MG/ML
40 INJECTION INTRAMUSCULAR; INTRAVENOUS ONCE
Status: COMPLETED | OUTPATIENT
Start: 2017-11-18 | End: 2017-11-18

## 2017-11-18 RX ORDER — FUROSEMIDE 10 MG/ML
40 INJECTION INTRAMUSCULAR; INTRAVENOUS
Status: DISCONTINUED | OUTPATIENT
Start: 2017-11-18 | End: 2017-11-18

## 2017-11-18 RX ORDER — METOPROLOL TARTRATE 25 MG/1
25 TABLET, FILM COATED ORAL 3 TIMES DAILY
Status: DISCONTINUED | OUTPATIENT
Start: 2017-11-18 | End: 2017-11-23

## 2017-11-18 RX ORDER — METOPROLOL TARTRATE 1 MG/ML
5 INJECTION, SOLUTION INTRAVENOUS ONCE
Status: COMPLETED | OUTPATIENT
Start: 2017-11-18 | End: 2017-11-18

## 2017-11-18 RX ADMIN — Medication 100 MG: at 20:44

## 2017-11-18 RX ADMIN — IPRATROPIUM BROMIDE AND ALBUTEROL SULFATE 3 ML: .5; 3 SOLUTION RESPIRATORY (INHALATION) at 15:31

## 2017-11-18 RX ADMIN — METOPROLOL TARTRATE 25 MG: 25 TABLET, FILM COATED ORAL at 16:29

## 2017-11-18 RX ADMIN — FLUTICASONE FUROATE AND VILANTEROL TRIFENATATE 1 PUFF: 100; 25 POWDER RESPIRATORY (INHALATION) at 13:43

## 2017-11-18 RX ADMIN — NITROGLYCERIN 0.4 MG: 0.4 TABLET SUBLINGUAL at 12:36

## 2017-11-18 RX ADMIN — IPRATROPIUM BROMIDE AND ALBUTEROL SULFATE 3 ML: .5; 3 SOLUTION RESPIRATORY (INHALATION) at 19:48

## 2017-11-18 RX ADMIN — CEFTRIAXONE SODIUM 2 G: 10 INJECTION, POWDER, FOR SOLUTION INTRAVENOUS at 14:25

## 2017-11-18 RX ADMIN — METOROPROLOL TARTRATE 2.5 MG: 5 INJECTION, SOLUTION INTRAVENOUS at 10:31

## 2017-11-18 RX ADMIN — ENOXAPARIN SODIUM 40 MG: 40 INJECTION SUBCUTANEOUS at 13:46

## 2017-11-18 RX ADMIN — METHYLPREDNISOLONE SODIUM SUCCINATE 40 MG: 40 INJECTION, POWDER, FOR SOLUTION INTRAMUSCULAR; INTRAVENOUS at 05:29

## 2017-11-18 RX ADMIN — ASPIRIN 81 MG: 81 TABLET, COATED ORAL at 09:44

## 2017-11-18 RX ADMIN — METOROPROLOL TARTRATE 5 MG: 5 INJECTION, SOLUTION INTRAVENOUS at 12:41

## 2017-11-18 RX ADMIN — MONTELUKAST SODIUM 10 MG: 10 TABLET, FILM COATED ORAL at 20:43

## 2017-11-18 RX ADMIN — METOPROLOL TARTRATE 25 MG: 25 TABLET ORAL at 09:44

## 2017-11-18 RX ADMIN — IPRATROPIUM BROMIDE AND ALBUTEROL SULFATE 3 ML: .5; 3 SOLUTION RESPIRATORY (INHALATION) at 11:31

## 2017-11-18 RX ADMIN — IPRATROPIUM BROMIDE AND ALBUTEROL SULFATE 3 ML: .5; 3 SOLUTION RESPIRATORY (INHALATION) at 08:03

## 2017-11-18 RX ADMIN — METOROPROLOL TARTRATE 5 MG: 5 INJECTION, SOLUTION INTRAVENOUS at 11:34

## 2017-11-18 RX ADMIN — AZELASTINE HYDROCHLORIDE 1 SPRAY: 137 SPRAY, METERED NASAL at 13:45

## 2017-11-18 RX ADMIN — METOPROLOL TARTRATE 25 MG: 25 TABLET, FILM COATED ORAL at 20:44

## 2017-11-18 RX ADMIN — Medication 2.5 MG: at 00:45

## 2017-11-18 RX ADMIN — AZELASTINE HYDROCHLORIDE 1 SPRAY: 137 SPRAY, METERED NASAL at 20:42

## 2017-11-18 RX ADMIN — DOCUSATE SODIUM 100 MG: 100 CAPSULE, LIQUID FILLED ORAL at 09:44

## 2017-11-18 RX ADMIN — AZITHROMYCIN MONOHYDRATE 250 MG: 500 INJECTION, POWDER, LYOPHILIZED, FOR SOLUTION INTRAVENOUS at 11:32

## 2017-11-18 RX ADMIN — Medication 2.5 MG: at 22:58

## 2017-11-18 RX ADMIN — METHYLPREDNISOLONE SODIUM SUCCINATE 40 MG: 40 INJECTION, POWDER, FOR SOLUTION INTRAMUSCULAR; INTRAVENOUS at 16:31

## 2017-11-18 RX ADMIN — SIMVASTATIN 10 MG: 10 TABLET, FILM COATED ORAL at 20:44

## 2017-11-18 RX ADMIN — OMEPRAZOLE 20 MG: 20 CAPSULE, DELAYED RELEASE ORAL at 09:47

## 2017-11-18 RX ADMIN — INSULIN ASPART 2 UNITS: 100 INJECTION, SOLUTION INTRAVENOUS; SUBCUTANEOUS at 14:24

## 2017-11-18 RX ADMIN — DOCUSATE SODIUM 100 MG: 100 CAPSULE, LIQUID FILLED ORAL at 20:44

## 2017-11-18 RX ADMIN — FUROSEMIDE 40 MG: 10 INJECTION, SOLUTION INTRAVENOUS at 08:52

## 2017-11-18 NOTE — PROGRESS NOTES
"Cardiology Progress Note   Date of service: 17       Assessment and Plan:     1. Demand ischemia with known moderate CAD on cath 3/2017    Start amiodarone 100mg po bid cautiously given her QTC 490ms and antibiotics.    PAF RVR is contributing to demand ischemia.    Continue to follow.      2. PAF    May need to upgrade to full anticoagulation                 Interval History:   Patient symptomatic with her PAF RVR. QTC improving. Pneumonia being treated              Review of Systems:   As per subjective, otherwise 5 systems reviewed and negative.           Physical Exam:     Vitals were reviewed  Blood pressure 100/45, pulse 122, temperature 97.7  F (36.5  C), temperature source Oral, resp. rate 20, height 1.6 m (5' 3\"), weight 68.9 kg (152 lb), SpO2 94 %, not currently breastfeeding.  Temperatures:  Current - Temp: 97.7  F (36.5  C); Max - Temp  Av.5  F (35.8  C)  Min: 95.8  F (35.4  C)  Max: 97.7  F (36.5  C)  Respiration range: Resp  Av  Min: 19  Max: 24  Pulse range: Pulse  Av.8  Min: 83  Max: 160  Blood pressure range: Systolic (24hrs), Av , Min:100 , Max:141   ; Diastolic (24hrs), Av, Min:44, Max:66    Pulse oximetry range: SpO2  Av.3 %  Min: 94 %  Max: 100 %    Intake/Output Summary (Last 24 hours) at 17 1343  Last data filed at 17 0930   Gross per 24 hour   Intake              240 ml   Output              600 ml   Net             -360 ml       Constitutional:   awake, alert, cooperative, no apparent distress, and appears stated age     Eyes:   Lids and lashes normal, pupils equal, round and reactive to light, extra ocular muscles intact, sclera clear, conjunctiva normal     Neck:   supple, symmetrical, trachea midline     Back:   symmetric     Lungs:   Coarse breath sounds. Dull R base     Cardiovascular:   Regular, distant heart sounds     Abdomen:   benign     Musculoskeletal:   motor strength is 5 out of 5 all extremities bilaterally     Neurologic:   " Grossly nonfocal     Skin:   normal skin color, texture, turgor     Additional findings:   Edema 1+, ACE wraps bilat              Medications:     Current Facility-Administered Medications Ordered in Epic   Medication Dose Route Frequency Last Rate Last Dose     methylPREDNISolone sodium succinate (solu-MEDROL) injection 40 mg  40 mg Intravenous Q12H         metoprolol (LOPRESSOR) injection 5 mg  5 mg Intravenous Q4H PRN   5 mg at 11/18/17 1134     metoprolol (LOPRESSOR) tablet 25 mg  25 mg Oral TID         amiodarone (PACERONE/CODARONE) half-tab 100 mg  100 mg Oral BID         zolpidem (AMBIEN) half-tab 2.5 mg  2.5 mg Oral At Bedtime PRN   2.5 mg at 11/18/17 0045     cefTRIAXone (ROCEPHIN) 2 g in 20 mL SWFI Premix Syringe  2 g Intravenous Q24H   2 g at 11/17/17 1308     acetaminophen (TYLENOL) tablet 500-1,000 mg  500-1,000 mg Oral Q8H PRN   1,000 mg at 11/17/17 1649     aspirin EC EC tablet 81 mg  81 mg Oral Daily   81 mg at 11/18/17 0944     azelastine (ASTELIN) 0.1 % spray 1 spray  1 spray Both Nostrils BID   1 spray at 11/17/17 2052     fluticasone-vilanterol (BREO ELLIPTA) 100-25 MCG/INH oral inhaler 1 puff  1 puff Inhalation Daily   1 puff at 11/17/17 1122     montelukast (SINGULAIR) tablet 10 mg  10 mg Oral At Bedtime   10 mg at 11/17/17 2053     omeprazole (priLOSEC) CR capsule 20 mg  20 mg Oral Daily   20 mg at 11/18/17 0947     simvastatin (ZOCOR) tablet 10 mg  10 mg Oral At Bedtime   10 mg at 11/17/17 2054     glucose 40 % gel 15-30 g  15-30 g Oral Q15 Min PRN        Or     dextrose 50 % injection 25-50 mL  25-50 mL Intravenous Q15 Min PRN        Or     glucagon injection 1 mg  1 mg Subcutaneous Q15 Min PRN         naloxone (NARCAN) injection 0.1-0.4 mg  0.1-0.4 mg Intravenous Q2 Min PRN         hypromellose-dextran (ARTIFICAL TEARS) ophthalmic solution 1 drop  1 drop Both Eyes Q1H PRN         azithromycin (ZITHROMAX) 250 mg in NaCl 0.9 % 250 mL intermittent infusion  250 mg Intravenous Q24H 250 mL/hr at  11/16/17 1049 250 mg at 11/18/17 1132     albuterol neb solution 2.5 mg  3 mL Nebulization Q2H PRN   2.5 mg at 11/17/17 0525     ipratropium - albuterol 0.5 mg/2.5 mg/3 mL (DUONEB) neb solution 3 mL  3 mL Nebulization 4x Daily   3 mL at 11/18/17 1131     guaiFENesin (ROBITUSSIN) 20 mg/mL solution 10 mL  10 mL Oral Q4H PRN         insulin aspart (NovoLOG) inj (RAPID ACTING)  1-3 Units Subcutaneous TID AC   1 Units at 11/15/17 1814     insulin aspart (NovoLOG) inj (RAPID ACTING)  1-3 Units Subcutaneous At Bedtime         enoxaparin (LOVENOX) injection 40 mg  40 mg Subcutaneous Q24H   40 mg at 11/17/17 1124     magnesium sulfate 2 g in NS intermittent infusion (PharMEDium or [facility] Cmpd)  2 g Intravenous Daily PRN         magnesium sulfate 4 g in 100 mL sterile water (premade)  4 g Intravenous Q4H PRN         oxyCODONE IR (ROXICODONE) tablet 5 mg  5 mg Oral Q4H PRN         melatonin tablet 1 mg  1 mg Oral At Bedtime PRN   1 mg at 11/17/17 0303     docusate sodium (COLACE) capsule 100 mg  100 mg Oral BID   100 mg at 11/18/17 0944     senna-docusate (SENOKOT-S;PERICOLACE) 8.6-50 MG per tablet 1 tablet  1 tablet Oral BID PRN        Or     senna-docusate (SENOKOT-S;PERICOLACE) 8.6-50 MG per tablet 2 tablet  2 tablet Oral BID PRN         polyethylene glycol (MIRALAX/GLYCOLAX) Packet 17 g  17 g Oral Daily PRN         bisacodyl (DULCOLAX) Suppository 10 mg  10 mg Rectal Daily PRN         ondansetron (ZOFRAN-ODT) ODT tab 4 mg  4 mg Oral Q6H PRN   4 mg at 11/17/17 0151    Or     ondansetron (ZOFRAN) injection 4 mg  4 mg Intravenous Q6H PRN   4 mg at 11/17/17 2242     Patient may continue current oral medications   Does not apply Continuous PRN         lidocaine 1 % 1 mL  1 mL Other Q1H PRN         lidocaine (LMX4) cream   Topical Q1H PRN         sodium chloride (PF) 0.9% PF flush 3 mL  3 mL Intracatheter Q1H PRN         sodium chloride (PF) 0.9% PF flush 3 mL  3 mL Intracatheter Q8H   3 mL at 11/18/17 1036     nitroGLYcerin  (NITROSTAT) sublingual tablet 0.4 mg  0.4 mg Sublingual Q5 Min PRN   0.4 mg at 11/18/17 1236     sodium chloride (PF) 0.9% PF flush 10 mL  10 mL Intravenous Once         No current [facility]-ordered outpatient prescriptions on file.                Data:   All laboratory data reviewed  Results for orders placed or performed during the hospital encounter of 11/15/17 (from the past 24 hour(s))   EKG 12-lead, tracing only   Result Value Ref Range    Interpretation ECG Click View Image link to view waveform and result    Glucose by meter   Result Value Ref Range    Glucose 162 (H) 70 - 99 mg/dL   Glucose by meter   Result Value Ref Range    Glucose 134 (H) 70 - 99 mg/dL   Glucose by meter   Result Value Ref Range    Glucose 120 (H) 70 - 99 mg/dL   Creatinine   Result Value Ref Range    Creatinine 1.24 (H) 0.52 - 1.04 mg/dL    GFR Estimate 42 (L) >60 mL/min/1.7m2    GFR Estimate If Black 50 (L) >60 mL/min/1.7m2   Platelet count   Result Value Ref Range    Platelet Count 229 150 - 450 10e9/L   Glucose by meter   Result Value Ref Range    Glucose 133 (H) 70 - 99 mg/dL   EKG 12-lead, tracing only   Result Value Ref Range    Interpretation ECG Click View Image link to view waveform and result    Gram stain   Result Value Ref Range    Specimen Description Sputum     Special Requests Screen     Gram Stain PENDING    Lactic acid level STAT   Result Value Ref Range    Lactic Acid 2.9 (H) 0.7 - 2.0 mmol/L       All laboratory data reviewed  Lab Results   Component Value Date    CHOL 149 03/02/2017     Lab Results   Component Value Date    HDL 78 03/02/2017     Lab Results   Component Value Date    LDL 59 03/02/2017     Lab Results   Component Value Date    TRIG 62 03/02/2017     Lab Results   Component Value Date    CHOLHDLRATIO 2.2 08/13/2015     TSH   Date Value Ref Range Status   08/18/2015 1.29 0.40 - 4.00 mU/L Final     Last Basic Metabolic Panel:  Lab Results   Component Value Date     11/17/2017      Lab Results    Component Value Date    POTASSIUM 4.9 11/17/2017     Lab Results   Component Value Date    CHLORIDE 105 11/17/2017     Lab Results   Component Value Date    ELROY 8.6 11/17/2017     Lab Results   Component Value Date    CO2 22 11/17/2017     Lab Results   Component Value Date    BUN 43 11/17/2017     Lab Results   Component Value Date    CR 1.24 11/18/2017     Lab Results   Component Value Date     11/17/2017     Lab Results   Component Value Date    WBC 23.8 11/17/2017     Lab Results   Component Value Date    RBC 3.86 11/17/2017     Lab Results   Component Value Date    HGB 12.0 11/17/2017     Lab Results   Component Value Date    HCT 35.3 11/17/2017     Lab Results   Component Value Date    MCV 92 11/17/2017     Lab Results   Component Value Date    MCH 31.1 11/17/2017     Lab Results   Component Value Date    MCHC 34.0 11/17/2017     Lab Results   Component Value Date    RDW 14.0 11/17/2017     Lab Results   Component Value Date     11/18/2017

## 2017-11-18 NOTE — PLAN OF CARE
Problem: Patient Care Overview  Goal: Plan of Care/Patient Progress Review  Outcome: No Change  A&O. VSS on 2L O2 via NC. Denied pain. Tele- SR. Denied nausea and pain. Complained of SOB and BARONE. Frequent, nonproductive cough. LS coarse to bases. CPAP/BiPAP overnight.Occasional incontinence. Up with 1A, walker, and GB. . Will continue to monitor.

## 2017-11-18 NOTE — PROGRESS NOTES
[facility]    Sepsis Evaluation Progress Note    Date of Service: 11/18/2017    I was called to see Genie Persaud due to abnormal vital signs triggering the Sepsis SIRS screening alert. She is known to have an infection.     Physical Exam    Vital Signs:  Temp: 95.8  F (35.4  C) Temp src: Oral BP: 113/44 Pulse: 128 Heart Rate: 154 Resp: 20 SpO2: 99 % O2 Device: BiPAP/CPAP Oxygen Delivery: 2 LPM    Lab:  Lactic Acid   Date Value Ref Range Status   11/18/2017 2.9 (H) 0.7 - 2.0 mmol/L Final       The patient is at baseline mental status.    The rest of their physical exam is significant for tachycardia and tachypnea, she is currently on BIPAP.    Assessment and Plan    The SIRS and exam findings are likely due to   sepsis.     ID: The patient is currently on the following antibiotics:  Anti-infectives (Future)    Start     Dose/Rate Route Frequency Ordered Stop    11/16/17 1200  cefTRIAXone (ROCEPHIN) 2 g in 20 mL SWFI Premix Syringe      2 g  over 3 Minutes Intravenous EVERY 24 HOURS 11/16/17 0933      11/16/17 1100  azithromycin (ZITHROMAX) 250 mg in NaCl 0.9 % 250 mL intermittent infusion      250 mg  over 1 Hours Intravenous EVERY 24 HOURS 11/15/17 1026 11/20/17 1059        Current antibiotic coverage is appropriate for source of infection.    Fluid: Fluid bolus not indicated due to CHF and atrial fibrillation, lactic acidosis i sec to combination of Afib with RVR rather than infection.also discussing the case with cardiology regarding Afib, discussed with bedside RN and will give her IV Lopressor     Lab: Repeat lactic acid is not indicated.      Disposition: The patient will remain on the current unit. We will continue to monitor this patient closely.  Ida Gray MD

## 2017-11-18 NOTE — PLAN OF CARE
Problem: Patient Care Overview  Goal: Plan of Care/Patient Progress Review  Outcome: No Change  A&O. Pt intermittently on BiPap due to SOB and increased work of breathing. Breathing improved with BiPap. Pt went from NSR to new onset A-fib with RVR, see MD note. Tele now NSR, HR in 80s. Did not give insulin this evening- pt not eating this evening. . Family at bedside. Will continue to monitor.

## 2017-11-18 NOTE — PROVIDER NOTIFICATION
MD Notification    Notified Person:  MD    Notified Persons Name: Dr. Gray    Notification Date/Time: 11/18/17    Notification Interaction:  Talked with Physician    Purpose of Notification: Pt SOB, HR sustaining in 150's. Rhythm a-fib vs SVT.     Orders Received: Give PRN nitro, One time dose of IV PRN 5mg metoprolol    Comments:

## 2017-11-18 NOTE — PLAN OF CARE
Problem: Patient Care Overview  Goal: Plan of Care/Patient Progress Review  Outcome: No Change    Shift Update: Patient is A&Ox4. Tele: A-fib earlier tonight. Other VSS. Can be hypertensive and tachycardic at times. No complaints of nausea or pain this shift. Poor appetite. More SOB on day shift, MD aware. Encouraged to use BiPap PRN (have to wait until nausea subsides). Increased O2 from 2L to 4L due to SOB. Respiratory following. Tolerating regular diet. Incontinent at times. R PIV x 2 SL. Up w/assist x 1 + walker. Plan pending improvement.

## 2017-11-18 NOTE — PROGRESS NOTES
Patient is on a 2L NC with SpO2 in the mid 90's. BS clear and diminished. Pt was on BiPAP 14/5 30% for a few hrs throughout the day due to pt feeling SOB. All nebs were given as ordered.  Will cont to follow.  11/18/2017  Nupur Bajwa RRT

## 2017-11-18 NOTE — PLAN OF CARE
Pt alert and oriented x4. Pt vital signs were unstable after using the bathroom this morning, she reported SOB and acid reflux kind of pain . Metoprolol administered for BP and nitroglycerin for chest pain. Pt reported relief. BP last taken was  100/45 and HR 90  . Pt denies pain. Pt ate 75 %  breakfast this morning, she said to hold on lunch for some time until she feels better. Pt is currently resting, continue to monitor.

## 2017-11-18 NOTE — PLAN OF CARE
Problem: Patient Care Overview  Goal: Plan of Care/Patient Progress Review  Outcome: No Change  A/O. Denied pain. Pt went into rhythm of a-fib with RVR, with pt HR in the 150's. Gave prn Iv metoprolol 2.5m-pt sustained in the 150's. MD notified. Order for 5mg IV metoprolol given. Pt later c/o of SOB/nausea/BL shoulder pain. MD notified and they ordered one time additional dose of IV metoprolol 5mg. Nitro given x1, effective. Pt stabilized to NSR with HR in the 80's. Pt verbalized that nausea and shoulder pain went away and breathing was easier. Pt stable on 2L O2-sating at 97%. Cardiology consulted-view note. LS dim with BLL fine crackles. Ambulated to bathroom with AO1 with walker, BARONE. Regular diet-encourage adequate PO intake. Educated on IS use and pt demonstrated understanding. Sputum culture sent down-results pending. LA on day shift 2.9-MD aware. Currently on IV abx. Continue to monitor.

## 2017-11-18 NOTE — PROGRESS NOTES
[facility]    Hospitalist Progress Note :     Cumulative Summary: Genie Persaud is a 82 year old female with past medical history significant for moderate asthma, essential hypertension and coronary artery disease who presented with severe dyspnea at rest  And was found to be in acute hypoxic respiratory failure with right lower lobe consolidative pneumonia.  Patient was started on BiPAP due to being in respiratory distress, respiratory acidosis and decrease air entry and movement in bilateral lower lobes.  Patient was also noticed to be in severe sepsis with hypotension and was given fluid bolus, patient responded well to the fluid resuscitation and was admitted to intermediate care for further evaluation and management.patient responded well to antibiotics, fluids and IV solumedrol but was noticed to be in and out of Afib with RVR ( gets symptomatic with it ) . She was also noticed to have up ferrer trend in troponin and cardiology was consulted who recommended her symptoms are mostly from stress induced cardiomyopathy .Patient is [facility] the [facility] with telemetry but still dealing with resp distress especially exacerbated by Afib with RVR episodes.      Assessment & Plan     Acute respiratory failure with hypoxia (H) (11/15/2017): most likely sec to combination of asthma exacerbation with RLL consolidative PNA, Afib with RVR is making her symptoms worse    Right lower lobe pneumonia (H) (11/15/2017) due to infectious organism: most likely bacterial   Moderate asthma with exacerbation (11/15/2017)   Severe sepsis (H) (11/15/2017) with hypotension:    AFIB WITH RVR: Patient was noticed to be in atrial fibrillation going into RPR on multiple episodes now during this hospitalization, she had an episode last night when she was given oral metoprolol and IV Lopressor twice and converted back to normal sinus rhythm, this late morning she again went into atrial fibrillation with RVR with  heart rate mostly in the 140s to 150s, patient is started to feel short of breath, dizzy and was placed on BiPAP and was administered again almost 2 dosages of IV Lopressor, her metoprolol was increased to 25 mg three times a day and as I was planning to transfer patient to cardiac floor for possible Cardizem infusion, patient converted back to normal sinus rhythm.  She also complained of chest discomfort while she was in rapid A. fib and required a dose of nitroglycerin but as she converted back to normal sinus rhythm her chest pain resolved.    -- Continue to monitor patient closely with telemetry  -- Increased her metoprolol to 25 mg p.o. three times a day  -- Continue IV Lopressor 5 mg every four hours as needed for heart rate more than 120  -- Chest x-ray from yesterday was reviewed, which showed increased congestion, gave her one dose of Lasix 40 mg this morning, avoiding aggressive diuresis considering her creatinine is up slightly today which might be secondary to hypoperfusion in the setting of A. fib with RVR.  -- Continue to monitor strict intake and output  -- Nitroglycerin for chest pain, chest pain is resolved now, also discussed the case with cardiology, appreciate their help cardiology has started patient on amiodarone  --Agree with restarting patient on full anticoagulation, I also discussed the anticoagulation plan with patient and her daughter , We'll change her Lovenox to 1 mg per KG subcutaneous b.i.d. till the second out further plan for anticoagulation starting her on warfarin versus NOAC agents.  -- ABG stat  -- In the meantime continue patient on IV Solu-Medrol, IV antibiotics and duonebs  -- Sepsis BP was fired, patient was evaluated again at this time her symptoms are mostly secondary to A. fib with RVR, patient is already on appropriate antibiotics and considering her congestive heart failure symptoms on the chest x-ray she does not need more fluids.  -- We will repeat lactic acid  --  Sliding scale insulin while patient is on IV Solu-Medrol  -- Chest x-ray repeated known to rule out/pulmonary edema  -- We'll follow-up on the results of blood cultures so far are negative    Stress Cardiomyopathy: She follows up with Dr.Stephen Hernandez as an out patient.Serum troponin I was elevated at 0.262 and subsequently 3.771.  Transthoracic echocardiogram showed a new drop in an LVEF from 45% to 50% in 03/2017 to 35% with preserved basal and mid ventricular function and hypokinetic apical function in a noncoronary artery distribution, cardiology was consulted yesterday and were in agreement that patient's symptoms are combination of hypotension, sepsis and moderate to severe aortic regurgitation and her symptoms are most likely persistent with Takotsubo cardiomyopathy, and patient does not need coronary angiogram at this point.    Essential hypertension with goal blood pressure less than 140/90 (9/1/2016)    Coronary artery disease involving native coronary artery of native heart without angina pectoris (3/9/2017)    Nonrheumatic aortic valve insufficiency (6/29/2017)   elevated troponin: most likely secondary to acute hypoxic respiratory failure with sepsis, and stress cardiomyopathy and atrial fibrillation with RVR  AFIB WITH RVR: Patient was noticed to be in atrial fibrillation going into RPR on multiple episodes now during this hospitalization, she had an episode last night when she was given oral metoprolol and IV Lopressor twice and converted back to normal sinus rhythm, this late morning she again went into atrial fibrillation with RVR with heart rate mostly in the 140s to 150s, patient is started to feel short of breath, dizzy and was placed on BiPAP and was administered again almost 2 dosages of IV Lopressor, her metoprolol was increased to 25 mg three times a day and as I was planning to transfer patient to cardiac floor for possible Cardizem infusion, patient converted back to normal sinus rhythm.   She also complained of chest discomfort while she was in rapid A. fib and required a dose of nitroglycerin but as she converted back to normal sinus rhythm her chest pain resolved.    --Plan as above, increase her beta blocker, IV Lopressor as needed, patient is started on amiodarone, we'll change her Lovenox to therapeutic dose at this point.    --Patient was given one dose of Lasix 40 mg in the morning  --  Check BMP in the morning appreciate cardiology help  -- At this time continuing to hold her hydrochlorothiazide and Aldactone her home losartan is also on hold.      DVT Prophylaxis: Enoxaparin (Lovenox) SQ    Code Status: DNR , discussed with patient.    Disposition: Patient will be here over the weekend , will ask PT to evaluate if she needs TCU before going home , although I doubt if she will agree to it .    Ida Gray MD, FACP  Text Page (7am - 6pm)      Interval History   Patient was seen and examined this morning twice,  Daughter was also present in the room.  Patient was initially feeling better as compared to yesterday    next time when she was seen she was back in A. fib with RVR she was wearing the BiPAP mask and was looking anxious and was using accessory muscles of breathing.  Patient breathing was improved initially but when seen the next time she was complaining of shortness of breath and chest pain going towards both shoulders.  We also discussed about possibly needing transitional care unit before she can go home as she lives alone.  Patient and daughter, patient is in agreement with the plan if she needs the transitional care unit.    -Data reviewed today: I reviewed all new labs and imaging results over the last 24 hours.    I personally reviewed the EKG tracing showing sinus tachycardia .    Physical Exam   Temp: 96.1  F (35.6  C) Temp src: Oral BP: 119/46 Pulse: 83 Heart Rate: 93 Resp: 22 SpO2: 96 % O2 Device: Nasal cannula Oxygen Delivery: 2 LPM  Vitals:    11/16/17 0423 11/17/17 0658    Weight: 66.8 kg (147 lb 4.3 oz) 68.9 kg (152 lb)     Vital Signs with Ranges  Temp:  [95.8  F (35.4  C)-97.3  F (36.3  C)] 96.1  F (35.6  C)  Pulse:  [] 83  Heart Rate:  [93-95] 93  Resp:  [19-22] 22  BP: (114-141)/(44-66) 119/46  FiO2 (%):  [50 %] 50 %  SpO2:  [94 %-100 %] 96 %  I/O last 3 completed shifts:  In: 480 [P.O.:480]  Out: 550 [Urine:550]    GENERAL: alert, awake, oriented, sitting in the chair, wearing the BiPAP last the second time when seen, daughter present in the room, looks slightly anxious using accessory muscles.  HEENT: Normocephalic. EOMI. No icterus or injection. Nares normal.   LUNGS: decreased air entry in bases , exp wheezing ,no crackles , no improvement since yesterday   HEART: Regular rate. Extremities perfused.   ABDOMEN: Soft, nontender, and nondistended. Positive bowel sounds.   EXTREMITIES: No LE edema noted.   NEUROLOGIC: Moves extremities x4 on command. No acute focal neurologic abnormalities noted.     Medications     - MEDICATION INSTRUCTIONS -         methylPREDNISolone  40 mg Intravenous Q12H     furosemide  40 mg Intravenous Once     metoprolol  25 mg Oral BID     cefTRIAXone  2 g Intravenous Q24H     aspirin  81 mg Oral Daily     azelastine  1 spray Both Nostrils BID     fluticasone-vilanterol  1 puff Inhalation Daily     montelukast (SINGULAIR) tablet 10 mg  10 mg Oral At Bedtime     omeprazole  20 mg Oral Daily     simvastatin  10 mg Oral At Bedtime     azithromycin  250 mg Intravenous Q24H     ipratropium - albuterol 0.5 mg/2.5 mg/3 mL  3 mL Nebulization 4x Daily     insulin aspart  1-3 Units Subcutaneous TID AC     insulin aspart  1-3 Units Subcutaneous At Bedtime     enoxaparin  40 mg Subcutaneous Q24H     docusate sodium  100 mg Oral BID     sodium chloride (PF)  3 mL Intracatheter Q8H     sodium chloride (PF)  10 mL Intravenous Once       Data     Recent Labs  Lab 11/18/17  0652 11/17/17  0730 11/16/17  0455 11/15/17  1800 11/15/17  1420 11/15/17  0645   WBC  --   23.8* 22.8*  --   --  19.8*   HGB  --  12.0 11.4*  --   --  14.1   MCV  --  92 92  --   --  91    256 211  --   --  306   NA  --  133 137  --   --  135   POTASSIUM  --  4.9 4.2  --   --  3.9   CHLORIDE  --  105 104  --   --  100   CO2  --  22 23  --   --  28   BUN  --  43* 26  --   --  29   CR 1.24* 0.98 0.86  --   --  0.84   ANIONGAP  --  6 10  --   --  7   ELROY  --  8.6 8.0*  --   --  8.9   GLC  --  138* 148*  --   --  185*   ALBUMIN  --  3.0* 3.0*  --   --  3.4   PROTTOTAL  --  6.9 6.4*  --   --  7.1   BILITOTAL  --  0.3 0.6  --   --  0.6   ALKPHOS  --  70 72  --   --  121   ALT  --  111* 106*  --   --  55*   AST  --  68* 92*  --   --  67*   TROPI  --   --   --  4.139* 3.771* 0.262*       Imaging:   Recent Results (from the past 24 hour(s))   XR Chest Port 1 View    Narrative    CHEST ONE VIEW PORTABLE   11/17/2017 8:46 AM     HISTORY:  Follow up on pneumonia.     COMPARISON: 11/15/2017.      Impression    IMPRESSION: Increase in bilateral pulmonary alveolar infiltrates.  Increase in cardiomegaly. Question right pleural effusion.    ANT WHITTAKER MD

## 2017-11-18 NOTE — PROVIDER NOTIFICATION
MD Notification    Notified Person:  MD    Notified Persons Name: Dr. Gray    Notification Date/Time: 11/18/17 11:00am    Notification Interaction:  Web-paged     Purpose of Notification: Tele rhythm a-fib with RVR    Orders Received: PRN IV metoprolol 5mg    Comments:

## 2017-11-19 ENCOUNTER — APPOINTMENT (OUTPATIENT)
Dept: PHYSICAL THERAPY | Facility: CLINIC | Age: 82
DRG: 871 | End: 2017-11-19
Attending: INTERNAL MEDICINE
Payer: COMMERCIAL

## 2017-11-19 ENCOUNTER — APPOINTMENT (OUTPATIENT)
Dept: OCCUPATIONAL THERAPY | Facility: CLINIC | Age: 82
DRG: 871 | End: 2017-11-19
Attending: INTERNAL MEDICINE
Payer: COMMERCIAL

## 2017-11-19 LAB
ANION GAP SERPL CALCULATED.3IONS-SCNC: 11 MMOL/L (ref 3–14)
BUN SERPL-MCNC: 75 MG/DL (ref 7–30)
CALCIUM SERPL-MCNC: 8.9 MG/DL (ref 8.5–10.1)
CHLORIDE SERPL-SCNC: 100 MMOL/L (ref 94–109)
CO2 SERPL-SCNC: 22 MMOL/L (ref 20–32)
CREAT SERPL-MCNC: 1.4 MG/DL (ref 0.52–1.04)
GFR SERPL CREATININE-BSD FRML MDRD: 36 ML/MIN/1.7M2
GLUCOSE BLDC GLUCOMTR-MCNC: 121 MG/DL (ref 70–99)
GLUCOSE BLDC GLUCOMTR-MCNC: 121 MG/DL (ref 70–99)
GLUCOSE BLDC GLUCOMTR-MCNC: 133 MG/DL (ref 70–99)
GLUCOSE BLDC GLUCOMTR-MCNC: 152 MG/DL (ref 70–99)
GLUCOSE BLDC GLUCOMTR-MCNC: 213 MG/DL (ref 70–99)
GLUCOSE SERPL-MCNC: 129 MG/DL (ref 70–99)
LACTATE BLD-SCNC: 2.5 MMOL/L (ref 0.7–2)
POTASSIUM SERPL-SCNC: 4.9 MMOL/L (ref 3.4–5.3)
SODIUM SERPL-SCNC: 133 MMOL/L (ref 133–144)

## 2017-11-19 PROCEDURE — 94660 CPAP INITIATION&MGMT: CPT

## 2017-11-19 PROCEDURE — 25000128 H RX IP 250 OP 636: Performed by: INTERNAL MEDICINE

## 2017-11-19 PROCEDURE — 97110 THERAPEUTIC EXERCISES: CPT | Mod: GP

## 2017-11-19 PROCEDURE — 25000132 ZZH RX MED GY IP 250 OP 250 PS 637: Performed by: INTERNAL MEDICINE

## 2017-11-19 PROCEDURE — 97116 GAIT TRAINING THERAPY: CPT | Mod: GP

## 2017-11-19 PROCEDURE — 83605 ASSAY OF LACTIC ACID: CPT | Performed by: INTERNAL MEDICINE

## 2017-11-19 PROCEDURE — 40000133 ZZH STATISTIC OT WARD VISIT: Performed by: OCCUPATIONAL THERAPIST

## 2017-11-19 PROCEDURE — 12000000 ZZH R&B MED SURG/OB

## 2017-11-19 PROCEDURE — 27210995 ZZH RX 272: Performed by: INTERNAL MEDICINE

## 2017-11-19 PROCEDURE — 80048 BASIC METABOLIC PNL TOTAL CA: CPT | Performed by: INTERNAL MEDICINE

## 2017-11-19 PROCEDURE — 93010 ELECTROCARDIOGRAM REPORT: CPT | Performed by: INTERNAL MEDICINE

## 2017-11-19 PROCEDURE — 94640 AIRWAY INHALATION TREATMENT: CPT

## 2017-11-19 PROCEDURE — 99233 SBSQ HOSP IP/OBS HIGH 50: CPT | Performed by: INTERNAL MEDICINE

## 2017-11-19 PROCEDURE — 00000146 ZZHCL STATISTIC GLUCOSE BY METER IP

## 2017-11-19 PROCEDURE — 97535 SELF CARE MNGMENT TRAINING: CPT | Mod: GO | Performed by: OCCUPATIONAL THERAPIST

## 2017-11-19 PROCEDURE — 93005 ELECTROCARDIOGRAM TRACING: CPT

## 2017-11-19 PROCEDURE — 97161 PT EVAL LOW COMPLEX 20 MIN: CPT | Mod: GP

## 2017-11-19 PROCEDURE — 40000193 ZZH STATISTIC PT WARD VISIT

## 2017-11-19 PROCEDURE — 97166 OT EVAL MOD COMPLEX 45 MIN: CPT | Mod: GO | Performed by: OCCUPATIONAL THERAPIST

## 2017-11-19 PROCEDURE — 36415 COLL VENOUS BLD VENIPUNCTURE: CPT | Performed by: INTERNAL MEDICINE

## 2017-11-19 PROCEDURE — 40000275 ZZH STATISTIC RCP TIME EA 10 MIN

## 2017-11-19 PROCEDURE — 25000125 ZZHC RX 250: Performed by: INTERNAL MEDICINE

## 2017-11-19 PROCEDURE — 94640 AIRWAY INHALATION TREATMENT: CPT | Mod: 76

## 2017-11-19 RX ORDER — PREDNISONE 20 MG/1
40 TABLET ORAL DAILY
Status: COMPLETED | OUTPATIENT
Start: 2017-11-20 | End: 2017-11-22

## 2017-11-19 RX ADMIN — IPRATROPIUM BROMIDE AND ALBUTEROL SULFATE 3 ML: .5; 3 SOLUTION RESPIRATORY (INHALATION) at 15:02

## 2017-11-19 RX ADMIN — Medication 2.5 MG: at 22:40

## 2017-11-19 RX ADMIN — AZELASTINE HYDROCHLORIDE 1 SPRAY: 137 SPRAY, METERED NASAL at 09:02

## 2017-11-19 RX ADMIN — Medication 100 MG: at 21:17

## 2017-11-19 RX ADMIN — AZELASTINE HYDROCHLORIDE 1 SPRAY: 137 SPRAY, METERED NASAL at 21:21

## 2017-11-19 RX ADMIN — IPRATROPIUM BROMIDE AND ALBUTEROL SULFATE 3 ML: .5; 3 SOLUTION RESPIRATORY (INHALATION) at 11:16

## 2017-11-19 RX ADMIN — METOPROLOL TARTRATE 25 MG: 25 TABLET, FILM COATED ORAL at 17:10

## 2017-11-19 RX ADMIN — INSULIN ASPART 1 UNITS: 100 INJECTION, SOLUTION INTRAVENOUS; SUBCUTANEOUS at 13:07

## 2017-11-19 RX ADMIN — SIMVASTATIN 10 MG: 10 TABLET, FILM COATED ORAL at 21:17

## 2017-11-19 RX ADMIN — METOPROLOL TARTRATE 25 MG: 25 TABLET, FILM COATED ORAL at 09:01

## 2017-11-19 RX ADMIN — DOCUSATE SODIUM 100 MG: 100 CAPSULE, LIQUID FILLED ORAL at 21:17

## 2017-11-19 RX ADMIN — MONTELUKAST SODIUM 10 MG: 10 TABLET, FILM COATED ORAL at 21:17

## 2017-11-19 RX ADMIN — AZITHROMYCIN MONOHYDRATE 250 MG: 500 INJECTION, POWDER, LYOPHILIZED, FOR SOLUTION INTRAVENOUS at 10:37

## 2017-11-19 RX ADMIN — Medication 100 MG: at 09:01

## 2017-11-19 RX ADMIN — APIXABAN 2.5 MG: 2.5 TABLET, FILM COATED ORAL at 21:17

## 2017-11-19 RX ADMIN — FLUTICASONE FUROATE AND VILANTEROL TRIFENATATE 1 PUFF: 100; 25 POWDER RESPIRATORY (INHALATION) at 09:02

## 2017-11-19 RX ADMIN — OMEPRAZOLE 20 MG: 20 CAPSULE, DELAYED RELEASE ORAL at 09:01

## 2017-11-19 RX ADMIN — IPRATROPIUM BROMIDE AND ALBUTEROL SULFATE 3 ML: .5; 3 SOLUTION RESPIRATORY (INHALATION) at 19:42

## 2017-11-19 RX ADMIN — CEFTRIAXONE SODIUM 2 G: 10 INJECTION, POWDER, FOR SOLUTION INTRAVENOUS at 11:57

## 2017-11-19 RX ADMIN — METOPROLOL TARTRATE 25 MG: 25 TABLET, FILM COATED ORAL at 21:17

## 2017-11-19 RX ADMIN — ONDANSETRON 4 MG: 4 TABLET, ORALLY DISINTEGRATING ORAL at 12:02

## 2017-11-19 RX ADMIN — APIXABAN 2.5 MG: 2.5 TABLET, FILM COATED ORAL at 10:37

## 2017-11-19 RX ADMIN — METHYLPREDNISOLONE SODIUM SUCCINATE 40 MG: 40 INJECTION, POWDER, FOR SOLUTION INTRAMUSCULAR; INTRAVENOUS at 05:51

## 2017-11-19 RX ADMIN — IPRATROPIUM BROMIDE AND ALBUTEROL SULFATE 3 ML: .5; 3 SOLUTION RESPIRATORY (INHALATION) at 07:56

## 2017-11-19 NOTE — PLAN OF CARE
Problem: Patient Care Overview  Goal: Plan of Care/Patient Progress Review  Outcome: No Change  A&Ox4. VSS on cpap overnight. No complaints of pain or nausea. Tele: NSR. No issues with increased heart rate on this shift. Frequent, nonproductive cough. Need sputum culture. Tolerating regular diet. Incontinent at times. R PIV SL. Up w/assist x 1 + walker.  @ 0200. Plan to continue with current antibiotics and steroids.

## 2017-11-19 NOTE — PROGRESS NOTES
Patient is on a 1L NC with SpO2 96%. BS clear and diminished. All nebs were given as ordered.  Will cont to follow.  11/19/2017  Nupur Bajwa RRT

## 2017-11-19 NOTE — PLAN OF CARE
Pt is alert and oriented x4. VSS , no increase in HR this shift.O2 sats 95% on 1LPM. Pt reported nausea in the afternoon, Zofran administered and pt verbalized relief. Pt denies pain, denies SOB. Dyspnea on exertion .Pt ambulated in hallway with PT . The  discontinued Lovenox and ordered apixaban anticoagulant. Continues on IV antibiotics. Pt is on regular diet. Pt is currently visiting with family, will continue to monitor.

## 2017-11-19 NOTE — PLAN OF CARE
Problem: Patient Care Overview  Goal: Plan of Care/Patient Progress Review  Outcome: Therapy, progress towards functional goals is fair  OT: Pt is an 89 year old female admitted with dyspnea, acute hypoxic respiratory failure, right LL pneumonia, and sepsis.  PMH includes asthma, essential HTN, CAD.  Lives alone in a rambler home with basement.  Has family in town, has been independent in ADLs.       Discharge Planner OT   Patient plan for discharge: Home with assist of family  Current status: Initial evaluation complete, treatment started.  Pt up in chair, on 2L O2.  Willing to participate.  Pt able to demonstrate bending to get to feet for dressing but became SOB quickly.  Educated pt on EC technques, left handouts.  Pt receptive but was slightly resistive, likes to do things her own way.  Is very concerned about being able to do things outside the house with her friends and volunteering, not as concerned a  bout taking care of the house.  Had some ideas about managing things but generally said she was still able to do things.  Would benefit from skilled OT to reinforce EC techniques and increasing endurance for ADLS and self cares.    Barriers to return to prior living situation: Pt lives alone, medical condition.  Recommendations for discharge: Home with assist of family if continues to progress.  Rationale for recommendations: Pt has a plan for support at home starting on Thanksgiving.  Will have family in home 24/7 at that time.  Has neighbor and family in town that can look in on her between times.  May need to consider TCU if progress is slow with her medical issues.       Entered by: Pantera Malagon 11/19/2017 2:24 PM

## 2017-11-19 NOTE — PLAN OF CARE
Problem: Patient Care Overview  Goal: Plan of Care/Patient Progress Review  PT: PT orders received, initial eval completed and treatment initiated. Patient lives alone in a house with 2 stairs to enter and basement laundry, litter box. Pt was previously independent with all ADLs and mobility without a device, very active in the community, drives. Pt admitted with RLL pneumonia, acute respiratory failure with hypoxia and sepsis with hypotension.    Discharge Planner PT   Patient plan for discharge: home with family assist  Current status: Pt is independent with bed mobility; requires SBA for transfers and gait 75' x 2 with a FWW> Resting O2 sats 97% on 2L O2 NC. During gait, O2 sats did drop to 84% on 3L O2 NC but recovered to 91% with 10 seconds rest and cues for PLB. Pt does fatigue quickly.  Barriers to return to prior living situation: needs supplemental O2, decreased activity tolerance  Recommendations for discharge: home with family assist and OPPT  Rationale for recommendations: Pt reports that her son can assist with all household tasks and that she will have family staying with her as long as needed starting Thanksgiving day. Pt would benefit from OPPT to progress back to community mobility/activity tolerance. Will determine need for/benefit of FWW at hospital disch.       Entered by: Merari Mosqueda 11/19/2017 9:25 AM

## 2017-11-19 NOTE — PLAN OF CARE
Problem: Patient Care Overview  Goal: Plan of Care/Patient Progress Review  Outcome: No Change    Shift Update: Patient is A&Ox4. VSS on 2L O2 via NC. No complaints of pain or nausea. Tele: A-alysa on day shift today and received IV Metoprolol. No issues with increased heart rate on this shift. Frequent, nonproductive cough. Need sputum culture. Tolerating regular diet. Incontinent at times. R PIV SL. Up w/assist x 1 + walker. CPAP/BiPAP overnight. Blood sugars 110 and 121. Discharge pending.

## 2017-11-19 NOTE — PROGRESS NOTES
[facility]    Hospitalist Progress Note :     Cumulative Summary: Genie Persaud is a 82 year old female with past medical history significant for moderate asthma, essential hypertension and coronary artery disease who presented with severe dyspnea at rest  And was found to be in acute hypoxic respiratory failure with right lower lobe consolidative pneumonia.  Patient was started on BiPAP due to being in respiratory distress, respiratory acidosis and decrease air entry and movement in bilateral lower lobes.  Patient was also noticed to be in severe sepsis with hypotension and was given fluid bolus, patient responded well to the fluid resuscitation and was admitted to intermediate care for further evaluation and management.patient responded well to antibiotics, fluids and IV solumedrol but was noticed to be in and out of Afib with RVR ( gets symptomatic with it ) . She was also noticed to have up ferrer trend in troponin and cardiology was consulted who recommended her symptoms are mostly from stress induced cardiomyopathy .Patient is transferred [facility] of the [facility] to [facility] with telemetry but had multiple episodes of atrial fibrillation with RVR.  She was also evaluated by cardiology, she is started on amiodarone and at this time patient is in agreement to be started on anticoagulation.  Her steroids are slowly getting weaned down.  Will    Assessment & Plan     Acute respiratory failure with hypoxia (H) (11/15/2017): most likely sec to combination of asthma exacerbation with RLL consolidative PNA, Afib with RVR makes her symptoms worse, she usually requires BiPAP when she is in rapid A. Fib.  Pneumonia (H) (11/15/2017) due to infectious organism: most likely bacterial: Chest x-ray was repeated yesterday morning which is still showing a right lower lung opacity with pneumonia with small right pleural effusion.  There is also opacity in the mid left lung which may represent atelectasis or pneumonia.      Moderate asthma with exacerbation (11/15/2017): On presentation, the patient was not moving any air in bilateral lower lungs but her breathing is significantly improved.     Severe sepsis (H) (11/15/2017) with hypotension:  on presentation, patient was given fluid resuscitation and responded well to it, patient did not require any IV pressors.      -- Continue to monitor patient closely with telemetry  -- The patient is receiving currently IV Solu-Medrol, was receiving every eight hours and was weaned down to every 12 hours yesterday, will change her to oral p.o. prednisone now and will monitor  -- ABG as needed  -- BiPAP nocturnally which significantly helps patient with her respiratory distress.  --Slowly weaned down from oxygen, she might need some oxygen on discharge if she is still getting weaned off.  -- Continue patient on current IV antibiotics  -- She does have elevated lactic acid, at this time patient does not have signs and symptoms of severe sepsis lactic acid is mainly elevated secondary to episodes of atrial fibrillation with RVR which results in 20 perfusion overall her leukocytosis and respiratory status is improving  -- We will repeat lactic acid in am, she does not need any fluid resuscitation at this time as she is also dealing with slight fluid overload secondary to A. fib, she has not received any IV fluids for the last few days  -- She has received two dosages of diuretics in the past two days secondary to chest x-ray concerning for congestive heart failure symptoms, as her creatinine is going up I will not give her further diuretic and will just watch her hopefully her creatinine will start to improve as her blood pressure and heart rate improves.  -- Sliding scale insulin while patient is on steroid, as she is getting changed to oral prednisone if blood sugars are staying in normal range probably sliding scale insulin can be discontinued tomorrow  -- We'll follow-up on the results of blood  cultures so far are negative    Stress Cardiomyopathy: She follows up with Dr.Stephen Hernandez as an out patient.Serum troponin I was elevated at 0.262 and subsequently 3.771.  Transthoracic echocardiogram showed a new drop in an LVEF from 45% to 50% in 03/2017 to 35% with preserved basal and mid ventricular function and hypokinetic apical function in a noncoronary artery distribution, cardiology was consulted yesterday and were in agreement that patient's symptoms are combination of hypotension, sepsis and moderate to severe aortic regurgitation and her symptoms are most likely persistent with Takotsubo cardiomyopathy, and patient does not need coronary angiogram at this point.    Essential hypertension with goal blood pressure less than 140/90 (9/1/2016)    Coronary artery disease involving native coronary artery of native heart without angina pectoris (3/9/2017)   Nonrheumatic aortic valve insufficiency (6/29/2017)   elevated troponin: most likely secondary to acute hypoxic respiratory failure with sepsis, and stress cardiomyopathy and atrial fibrillation with RVR  AFIB WITH RVR: Patient was noticed to be in atrial fibrillation going into RPR on multiple episodes now during this hospitalization, she usually requires multiple dosages of IV Lopressor and her oral metoprolol has been in chest and she is up to 25 mg p.o. t.i.d. she is on 50 mg of Toprol-XL at home.   She usually develops significant short of breath, hypotension and chest pain when she is in rapid A. fib.  Patient was evaluated by cardiology and the started patient on amiodarone, patient did not have further episode of atrial fibrillation since yesterday afternoon.  We also discussed about anticoagulation,her [id number] score is 6 , which places her at 9.8% yearly risk for stroke.  After long discussion with patient, at this time patient is interested in NOAC agents rather than warfarin.     -- Continue to monitor patient on telemetry  -- Continue  metoprolol 25 mg p.o. three times a day, she should be changed to Toprol XL 75 mg p.o. from tomorrow considering her cardiomyopathy history.  --After discussion with patient Will start patient on Apixaban 2.5 mg p.o. b.i.d. considering her renal functions and creatinine.  -- discontinue ASA   --Lopressor 5 mg IV every four hours as needed for heart rate more than 120  -- Continue patient on amiodarone as per cardiology recommendation, patient does not have any episode of A. fib with RVR since yesterday.  Continue to monitor her creatinine, at this time will not diurese her she has got two dosages of diuresis in the last two days, I think her increased creatinine is more secondary to hypotension and hypoperfusion from her rapid A. fib rather than fluid status as her chest x-ray was more concerning for congestive heart failure rather than hypovolemia.  -- Strict intake and output  -- Appreciate cardiology help  -- We'll check BMP in the morning  -- We'll continue to hold her home hydrochlorothiazide, Aldactone and losartan at this point.        DVT Prophylaxis: Enoxaparin (Lovenox) SQ    Code Status: DNR , discussed with patient.    Disposition: Patient will be here over the weekend , physical and occupational therapy is posted, patient will probably benefit from going to TCU before home, she lives alone, and I did review this plan with her daughter yesterday also patient is in agreement probably might be ready on Tuesday if she continues to make clinical improvement    Ida Gray MD, FACP  Text Page (7am - 6pm)      Interval History   Patient was seen and examined this morning, sitting in chair, wearing nasal cannula, used BiPAP last night and was able to Sleep.   We discussed about decreasing her prednisone and watching that she continues to do well from her respiratory point a few.  We also discussed about anticoagulation considering her high risk for stroke from atrial fibrillation, patient would like to see how  she does with Apixaban.  She told me that she feels better that she did not have any further A. fib episodes last night, she is denying any chest pain, she is breathing comfortably on the current oxygen.    -Data reviewed today: I reviewed all new labs and imaging results over the last 24 hours.    I personally reviewed the EKG tracing showing sinus tachycardia .    Physical Exam   Temp: 97.7  F (36.5  C) Temp src: Oral BP: 148/45 Pulse: 95 Heart Rate: 86 Resp: 23 SpO2: (!) 84 % (on 3L O2 NC w/ extertion, recovered to 91% in 10 sec) O2 Device: Nasal cannula Oxygen Delivery: 2 LPM  Vitals:    11/16/17 0423 11/17/17 0658   Weight: 66.8 kg (147 lb 4.3 oz) 68.9 kg (152 lb)     Vital Signs with Ranges  Temp:  [96.3  F (35.7  C)-97.7  F (36.5  C)] 97.7  F (36.5  C)  Pulse:  [] 95  Heart Rate:  [] 86  Resp:  [16-24] 23  BP: (100-148)/(38-62) 148/45  FiO2 (%):  [30 %] 30 %  SpO2:  [84 %-98 %] 84 %  I/O last 3 completed shifts:  In: -   Out: 150 [Urine:150]    GENERAL: alert, awake, oriented, sitting in the chair, wearing nasal cannula  HEENT: Normocephalic. EOMI. No icterus or injection. Nares normal.   LUNGS: good air entry in bases today, no significant wheezing , few rhonchi no crackles , improvemed since yesterday   HEART: Regular rate. Extremities perfused.   ABDOMEN: Soft, nontender, and nondistended. Positive bowel sounds.   EXTREMITIES: No LE edema noted.   NEUROLOGIC: Moves extremities x4 on command. No acute focal neurologic abnormalities noted.     Medications     - MEDICATION INSTRUCTIONS -         apixaban ANTICOAGULANT  2.5 mg Oral BID     [START ON 11/20/2017] predniSONE  40 mg Oral Daily     metoprolol  25 mg Oral TID     amiodarone  100 mg Oral BID     cefTRIAXone  2 g Intravenous Q24H     azelastine  1 spray Both Nostrils BID     fluticasone-vilanterol  1 puff Inhalation Daily     montelukast (SINGULAIR) tablet 10 mg  10 mg Oral At Bedtime     omeprazole  20 mg Oral Daily     simvastatin  10 mg  Oral At Bedtime     azithromycin  250 mg Intravenous Q24H     ipratropium - albuterol 0.5 mg/2.5 mg/3 mL  3 mL Nebulization 4x Daily     insulin aspart  1-3 Units Subcutaneous TID AC     insulin aspart  1-3 Units Subcutaneous At Bedtime     docusate sodium  100 mg Oral BID     sodium chloride (PF)  3 mL Intracatheter Q8H     sodium chloride (PF)  10 mL Intravenous Once       Data     Recent Labs  Lab 11/19/17  0944 11/18/17  1605 11/18/17  0652 11/17/17  0730 11/16/17  0455 11/15/17  1800 11/15/17  1420 11/15/17  0645   WBC  --  17.6*  --  23.8* 22.8*  --   --  19.8*   HGB  --  11.3*  --  12.0 11.4*  --   --  14.1   MCV  --  90  --  92 92  --   --  91   PLT  --  258 229 256 211  --   --  306    135  --  133 137  --   --  135   POTASSIUM 4.9 5.0  --  4.9 4.2  --   --  3.9   CHLORIDE 100 103  --  105 104  --   --  100   CO2 22 22  --  22 23  --   --  28   BUN 75* 65*  --  43* 26  --   --  29   CR 1.40* 1.37* 1.24* 0.98 0.86  --   --  0.84   ANIONGAP 11 10  --  6 10  --   --  7   ELROY 8.9 8.3*  --  8.6 8.0*  --   --  8.9   * 124*  --  138* 148*  --   --  185*   ALBUMIN  --   --   --  3.0* 3.0*  --   --  3.4   PROTTOTAL  --   --   --  6.9 6.4*  --   --  7.1   BILITOTAL  --   --   --  0.3 0.6  --   --  0.6   ALKPHOS  --   --   --  70 72  --   --  121   ALT  --   --   --  111* 106*  --   --  55*   AST  --   --   --  68* 92*  --   --  67*   TROPI  --   --   --   --   --  4.139* 3.771* 0.262*       Imaging:   Recent Results (from the past 24 hour(s))   XR Chest Port 1 View    Narrative    CHEST PORTABLE ONE VIEW 11/18/2017 2:04 PM     COMPARISON: Frontal chest x-ray 11/17/2017.    HISTORY: Follow up on pneumonia and congestive heart failure.       Impression    IMPRESSION: There is patchy airspace opacity in the right lower lung  that may represent pneumonia. There is also a probable small right  pleural effusion. There is minimal patchy airspace opacity in the mid  left lung that may represent atelectasis or  pneumonia. The left lung  is otherwise clear. There is no pleural effusion on the left. There is  no pneumothorax on either side. Heart size appears within normal  limits.    KEV BAEZA MD

## 2017-11-19 NOTE — PROGRESS NOTES
" 11/19/17 0800   Quick Adds   Type of Visit Initial PT Evaluation   Living Environment   Lives With alone   Living Arrangements house   Home Accessibility stairs to enter home;stairs within home   Number of Stairs to Enter Home 2   Number of Stairs Within Home 10   Stair Railings at Home outside, present on right side;inside, present on right side   Transportation Available car;family or friend will provide   Living Environment Comment main floor living, basement laundry and litter box   Self-Care   Usual Activity Tolerance good   Current Activity Tolerance poor   Regular Exercise yes   Activity/Exercise Type walking   Exercise Amount/Frequency daily   Activity/Exercise/Self-Care Comment Pt is very active with volunteering and day trips with the [facility]   Functional Level Prior   Ambulation 0-->independent   Transferring 0-->independent   Toileting 0-->independent   Bathing 0-->independent   Dressing 0-->independent   Eating 0-->independent   Communication 0-->understands/communicates without difficulty   Swallowing 0-->swallows foods/liquids without difficulty   Cognition 0 - no cognition issues reported   Fall history within last six months no   Prior Functional Level Comment Pt does not use home O2   General Information   Onset of Illness/Injury or Date of Surgery - Date 11/15/17   Referring Physician Ida Gray MD   Patient/Family Goals Statement \"Go home\"   Pertinent History of Current Problem (include personal factors and/or comorbidities that impact the POC) 82 YOF presented with wheezing and productive cough. Dx'd with RLL pneumonia, acute respiratory failure with hypoxia, severe sepsis with hypotension. Also developed a-fib with RVR. PMH: HTN, CAD, asthma   Precautions/Limitations fall precautions;oxygen therapy device and L/min   Weight-Bearing Status - LUE full weight-bearing   Weight-Bearing Status - RUE full weight-bearing   Weight-Bearing Status - LLE full weight-bearing   Weight-Bearing " Status - RLE full weight-bearing   General Observations Pleasant and cooperative   General Info Comments Activity: up ad tab   Cognitive Status Examination   Orientation orientation to person, place and time   Level of Consciousness alert   Follows Commands and Answers Questions 100% of the time;able to follow multistep instructions   Personal Safety and Judgment intact   Memory intact   Cognitive Comment no concerns   Pain Assessment   Patient Currently in Pain No   Posture    Posture Forward head position;Protracted shoulders   Range of Motion (ROM)   ROM Comment BLEs WFL   Strength   Strength Comments BLEs grossly 4+/5 throughout   Bed Mobility   Bed Mobility Comments Supine<>sit independent   Transfer Skills   Transfer Comments Sit<>stand from bed to FWW SBA   Gait   Gait Comments Gait with FWW x 10' with SBA, reciprocal pattern, good foot clearance B   Balance   Balance Comments Good sitting and static standing, requires UE support for dynamic standing tasks   Sensory Examination   Sensory Perception no deficits were identified   Coordination   Coordination no deficits were identified   General Therapy Interventions   Planned Therapy Interventions balance training;bed mobility training;gait training;strengthening;transfer training   Clinical Impression   Criteria for Skilled Therapeutic Intervention yes, treatment indicated   PT Diagnosis Impaired gait   Influenced by the following impairments fatigue, SOB, weakness   Functional limitations due to impairments bed mobility, transfers, gait   Clinical Presentation Stable/Uncomplicated   Clinical Presentation Rationale see above   Clinical Decision Making (Complexity) Low complexity   Therapy Frequency` daily   Predicted Duration of Therapy Intervention (days/wks) 3 days   Anticipated Equipment Needs at Discharge walker   Anticipated Discharge Disposition Home with Assist;Home with Outpatient Therapy   Risk & Benefits of therapy have been explained Yes   Patient,  "Family & other staff in agreement with plan of care Yes   Clinical Impression Comments Pt has family that is local to assist with household tasks and then family will begin staying with her as long as needed beginning on Thanksgiving.   [facility] AM-PAC  \"6 Clicks\" V.2 Basic Mobility Inpatient Short Form   1. Turning from your back to your side while in a flat bed without using bedrails? 4 - None   2. Moving from lying on your back to sitting on the side of a flat bed without using bedrails? 4 - None   3. Moving to and from a bed to a chair (including a wheelchair)? 3 - A Little   4. Standing up from a chair using your arms (e.g., wheelchair, or bedside chair)? 3 - A Little   5. To walk in hospital room? 3 - A Little   6. Climbing 3-5 steps with a railing? 3 - A Little   Basic Mobility Raw Score (Score out of 24.Lower scores equate to lower levels of function) 20   Total Evaluation Time   Total Evaluation Time (Minutes) 15     "

## 2017-11-19 NOTE — PROGRESS NOTES
11/19/17 1407   Quick Adds   Type of Visit Initial Occupational Therapy Evaluation   Living Environment   Lives With alone   Living Arrangements house  (rambler with basement)   Home Accessibility grab bars present (bathtub);stairs to enter home;stairs within home   Number of Stairs to Enter Home 3   Number of Stairs Within Home 12  (to basement)   Stair Railings at Home inside, present on right side   Transportation Available car;family or friend will provide   Living Environment Comment pt has been independent in ADLs prior to admission   Self-Care   Dominant Hand right   Usual Activity Tolerance moderate   Current Activity Tolerance poor   Regular Exercise no   Equipment Currently Used at Home grab bar   Activity/Exercise/Self-Care Comment pt states she is very active socially   Functional Level Prior   Ambulation 0-->independent   Transferring 0-->independent   Toileting 0-->independent   Bathing 0-->independent   Dressing 0-->independent   Eating 0-->independent   Communication 0-->understands/communicates without difficulty   Swallowing 0-->swallows foods/liquids without difficulty   Cognition 0 - no cognition issues reported   Fall history within last six months no   Which of the above functional risks had a recent onset or change? ambulation;transferring;bathing;dressing   Prior Functional Level Comment pt is limited by her asthma   General Information   Onset of Illness/Injury or Date of Surgery - Date 11/18/17   Referring Physician Selene Brumfield   Patient/Family Goals Statement return home   Additional Occupational Profile Info/Pertinent History of Current Problem Pt admitted wt dyspnea, acute hypoxic respiratory failure, right LL pneumonia, and sepsis.  PMH includes asthma, essential HTN, CAD   Precautions/Limitations oxygen therapy device and L/min  (2L O2)   General Observations pt up in chair, willing to participate   Cognitive Status Examination   Orientation orientation to person, place and time    Level of Consciousness alert   Able to Follow Commands WNL/WFL   Personal Safety (Cognitive) WNL/WFL  (pt likes to do things her way, may be resistant to change)   Memory intact   Attention No deficits were identified   Visual Perception   Visual Perception Wears glasses   Pain Assessment   Patient Currently in Pain No   Range of Motion (ROM)   ROM Quick Adds No deficits were identified   ROM Comment WFL ROM in B UEs   Strength   Manual Muscle Testing Quick Adds No deficits were identified   Strength Comments pt has some mild deconditioning   Hand Strength   Hand Strength Comments gross grasp fair and equal   Coordination   Upper Extremity Coordination No deficits were identified   Transfer Skill: Sit to Stand   Level of [place]: Sit/Stand stand-by assist   Physical Assist/Nonphysical Assist: Sit/Stand 1 person assist   Transfer Skill: Sit to Stand full weight-bearing   Lower Body Dressing   Level of [place]: Dress Lower Body stand-by assist   Physical Assist/Nonphysical Assist: Dress Lower Body supervision;1 person assist   Activities of Daily Living Analysis   Impairments Contributing to Impaired Activities of Daily Living strength decreased   ADL Comments decreased endurance for ADLS   General Therapy Interventions   Planned Therapy Interventions ADL retraining;transfer training;home program guidelines;progressive activity/exercise   Clinical Impression   Criteria for Skilled Therapeutic Interventions Met yes, treatment indicated   OT Diagnosis decreased strength and endurance for ADLS   Influenced by the following impairments weakness, SOB, fatigue   Assessment of Occupational Performance 3-5 Performance Deficits   Identified Performance Deficits decreased endurance for dresing, bathing, functional mobility and IADLs   Clinical Decision Making (Complexity) Moderate complexity   Therapy Frequency daily   Predicted Duration of Therapy Intervention (days/wks) 5 days   Anticipated Discharge Disposition  "Home with Assist;Home with Home Therapy;Transitional Care Facility   Risks and Benefits of Treatment have been explained. Yes   Patient, Family & other staff in agreement with plan of care Yes   [facility] TM \"6 Clicks\"   2016, Trustees of [facility], under license to [employer].  All rights reserved.   6 Clicks Short Forms Daily Activity Inpatient Short Form   [facility]-PAC  \"6 Clicks\" Daily Activity Inpatient Short Form   1. Putting on and taking off regular lower body clothing? 3 - A Little   2. Bathing (including washing, rinsing, drying)? 3 - A Little   3. Toileting, which includes using toilet, bedpan or urinal? 3 - A Little   4. Putting on and taking off regular upper body clothing? 4 - None   5. Taking care of personal grooming such as brushing teeth? 4 - None   6. Eating meals? 4 - None   Daily Activity Raw Score (Score out of 24.Lower scores equate to lower levels of function) 21   Total Evaluation Time   Total Evaluation Time (Minutes) 15     "

## 2017-11-19 NOTE — PROGRESS NOTES
"Cardiology Progress Note   Date of service: 17       Assessment and Plan:     1. Chest pressure and stress cardiomyopathy vs. demand ischemia and stunning from PAF RVR    If continued chest pressure and discomfort, may consider repeating coronary angiography in case the LAD lesion has progressed from last angio 3/2017.      2. PAF    Controlled on amiodarone 100mg po bid.    Agree that anticoagulation is reasonable.                   Interval History:   No more PAF since adding amiodarone. Feels very poorly currently with chest pressure and nausea. Had a good morning, though. Agreed to start anticoagulation.              Review of Systems:   As per subjective, otherwise 5 systems reviewed and negative.           Physical Exam:     Vitals were reviewed  Blood pressure 148/45, pulse 95, temperature 97.7  F (36.5  C), temperature source Oral, resp. rate 23, height 1.6 m (5' 3\"), weight 68.9 kg (152 lb), SpO2 98 %, not currently breastfeeding.  Temperatures:  Current - Temp: 97.7  F (36.5  C); Max - Temp  Av  F (36.1  C)  Min: 96.3  F (35.7  C)  Max: 97.7  F (36.5  C)  Respiration range: Resp  Av.3  Min: 16  Max: 23  Pulse range: Pulse  Av.6  Min: 76  Max: 95  Blood pressure range: Systolic (24hrs), Av , Min:100 , Max:148   ; Diastolic (24hrs), Av, Min:38, Max:45    Pulse oximetry range: SpO2  Av.3 %  Min: 84 %  Max: 98 %    Intake/Output Summary (Last 24 hours) at 17 1214  Last data filed at 17 1021   Gross per 24 hour   Intake              240 ml   Output                0 ml   Net              240 ml       Constitutional:   awake, alert, cooperative, no apparent distress, and appears stated age     Eyes:   Lids and lashes normal, pupils equal, round and reactive to light, extra ocular muscles intact, sclera clear, conjunctiva normal     Neck:   supple, symmetrical, trachea midline     Back:   symmetric     Lungs:   Coarse breath sounds and decreased air movement "     Cardiovascular:   Regular     Abdomen:   benign     Musculoskeletal:   motor strength is 5 out of 5 all extremities bilaterally     Neurologic:   Grossly nonfocal     Skin:   normal skin color, texture, turgor                Medications:     Current Facility-Administered Medications Ordered in Epic   Medication Dose Route Frequency Last Rate Last Dose     apixaban ANTICOAGULANT (ELIQUIS) tablet 2.5 mg  2.5 mg Oral BID   2.5 mg at 11/19/17 1037     [START ON 11/20/2017] predniSONE (DELTASONE) tablet 40 mg  40 mg Oral Daily         metoprolol (LOPRESSOR) injection 5 mg  5 mg Intravenous Q4H PRN   5 mg at 11/18/17 1134     metoprolol (LOPRESSOR) tablet 25 mg  25 mg Oral TID   25 mg at 11/19/17 0901     amiodarone (PACERONE/CODARONE) half-tab 100 mg  100 mg Oral BID   100 mg at 11/19/17 0901     zolpidem (AMBIEN) half-tab 2.5 mg  2.5 mg Oral At Bedtime PRN   2.5 mg at 11/18/17 2258     cefTRIAXone (ROCEPHIN) 2 g in 20 mL SWFI Premix Syringe  2 g Intravenous Q24H   2 g at 11/19/17 1157     acetaminophen (TYLENOL) tablet 500-1,000 mg  500-1,000 mg Oral Q8H PRN   1,000 mg at 11/17/17 1649     azelastine (ASTELIN) 0.1 % spray 1 spray  1 spray Both Nostrils BID   1 spray at 11/19/17 0902     fluticasone-vilanterol (BREO ELLIPTA) 100-25 MCG/INH oral inhaler 1 puff  1 puff Inhalation Daily   1 puff at 11/19/17 0902     montelukast (SINGULAIR) tablet 10 mg  10 mg Oral At Bedtime   10 mg at 11/18/17 2043     omeprazole (priLOSEC) CR capsule 20 mg  20 mg Oral Daily   20 mg at 11/19/17 0901     simvastatin (ZOCOR) tablet 10 mg  10 mg Oral At Bedtime   10 mg at 11/18/17 2044     glucose 40 % gel 15-30 g  15-30 g Oral Q15 Min PRN        Or     dextrose 50 % injection 25-50 mL  25-50 mL Intravenous Q15 Min PRN        Or     glucagon injection 1 mg  1 mg Subcutaneous Q15 Min PRN         naloxone (NARCAN) injection 0.1-0.4 mg  0.1-0.4 mg Intravenous Q2 Min PRN         hypromellose-dextran (ARTIFICAL TEARS) ophthalmic solution 1  drop  1 drop Both Eyes Q1H PRN         albuterol neb solution 2.5 mg  3 mL Nebulization Q2H PRN   2.5 mg at 11/17/17 0525     ipratropium - albuterol 0.5 mg/2.5 mg/3 mL (DUONEB) neb solution 3 mL  3 mL Nebulization 4x Daily   3 mL at 11/19/17 1116     guaiFENesin (ROBITUSSIN) 20 mg/mL solution 10 mL  10 mL Oral Q4H PRN         insulin aspart (NovoLOG) inj (RAPID ACTING)  1-3 Units Subcutaneous TID AC   2 Units at 11/18/17 1424     insulin aspart (NovoLOG) inj (RAPID ACTING)  1-3 Units Subcutaneous At Bedtime         magnesium sulfate 2 g in NS intermittent infusion (PharMEDium or [facility] Cmpd)  2 g Intravenous Daily PRN         magnesium sulfate 4 g in 100 mL sterile water (premade)  4 g Intravenous Q4H PRN         oxyCODONE IR (ROXICODONE) tablet 5 mg  5 mg Oral Q4H PRN         melatonin tablet 1 mg  1 mg Oral At Bedtime PRN   1 mg at 11/17/17 0303     docusate sodium (COLACE) capsule 100 mg  100 mg Oral BID   100 mg at 11/18/17 2044     senna-docusate (SENOKOT-S;PERICOLACE) 8.6-50 MG per tablet 1 tablet  1 tablet Oral BID PRN        Or     senna-docusate (SENOKOT-S;PERICOLACE) 8.6-50 MG per tablet 2 tablet  2 tablet Oral BID PRN         polyethylene glycol (MIRALAX/GLYCOLAX) Packet 17 g  17 g Oral Daily PRN         bisacodyl (DULCOLAX) Suppository 10 mg  10 mg Rectal Daily PRN         ondansetron (ZOFRAN-ODT) ODT tab 4 mg  4 mg Oral Q6H PRN   4 mg at 11/19/17 1202    Or     ondansetron (ZOFRAN) injection 4 mg  4 mg Intravenous Q6H PRN   4 mg at 11/17/17 2242     Patient may continue current oral medications   Does not apply Continuous PRN         lidocaine 1 % 1 mL  1 mL Other Q1H PRN         lidocaine (LMX4) cream   Topical Q1H PRN         sodium chloride (PF) 0.9% PF flush 3 mL  3 mL Intracatheter Q1H PRN         sodium chloride (PF) 0.9% PF flush 3 mL  3 mL Intracatheter Q8H   3 mL at 11/19/17 1038     nitroGLYcerin (NITROSTAT) sublingual tablet 0.4 mg  0.4 mg Sublingual Q5 Min PRN   0.4 mg at 11/18/17 2406      sodium chloride (PF) 0.9% PF flush 10 mL  10 mL Intravenous Once         No current [facility]-ordered outpatient prescriptions on file.                Data:   All laboratory data reviewed  Results for orders placed or performed during the hospital encounter of 11/15/17 (from the past 24 hour(s))   Glucose by meter   Result Value Ref Range    Glucose 262 (H) 70 - 99 mg/dL   XR Chest Port 1 View    Narrative    CHEST PORTABLE ONE VIEW 11/18/2017 2:04 PM     COMPARISON: Frontal chest x-ray 11/17/2017.    HISTORY: Follow up on pneumonia and congestive heart failure.       Impression    IMPRESSION: There is patchy airspace opacity in the right lower lung  that may represent pneumonia. There is also a probable small right  pleural effusion. There is minimal patchy airspace opacity in the mid  left lung that may represent atelectasis or pneumonia. The left lung  is otherwise clear. There is no pleural effusion on the left. There is  no pneumothorax on either side. Heart size appears within normal  limits.    KEV BAEZA MD   EKG 12-lead, tracing only   Result Value Ref Range    Interpretation ECG Click View Image link to view waveform and result    Basic metabolic panel   Result Value Ref Range    Sodium 135 133 - 144 mmol/L    Potassium 5.0 3.4 - 5.3 mmol/L    Chloride 103 94 - 109 mmol/L    Carbon Dioxide 22 20 - 32 mmol/L    Anion Gap 10 3 - 14 mmol/L    Glucose 124 (H) 70 - 99 mg/dL    Urea Nitrogen 65 (H) 7 - 30 mg/dL    Creatinine 1.37 (H) 0.52 - 1.04 mg/dL    GFR Estimate 37 (L) >60 mL/min/1.7m2    GFR Estimate If Black 45 (L) >60 mL/min/1.7m2    Calcium 8.3 (L) 8.5 - 10.1 mg/dL   CBC with platelets   Result Value Ref Range    WBC 17.6 (H) 4.0 - 11.0 10e9/L    RBC Count 3.77 (L) 3.8 - 5.2 10e12/L    Hemoglobin 11.3 (L) 11.7 - 15.7 g/dL    Hematocrit 34.0 (L) 35.0 - 47.0 %    MCV 90 78 - 100 fl    MCH 30.0 26.5 - 33.0 pg    MCHC 33.2 31.5 - 36.5 g/dL    RDW 13.7 10.0 - 15.0 %    Platelet Count 258 150 - 450 10e9/L    Glucose by meter   Result Value Ref Range    Glucose 110 (H) 70 - 99 mg/dL   Glucose by meter   Result Value Ref Range    Glucose 121 (H) 70 - 99 mg/dL   Glucose by meter   Result Value Ref Range    Glucose 121 (H) 70 - 99 mg/dL   EKG 12-lead, tracing only   Result Value Ref Range    Interpretation ECG Click View Image link to view waveform and result    Basic metabolic panel   Result Value Ref Range    Sodium 133 133 - 144 mmol/L    Potassium 4.9 3.4 - 5.3 mmol/L    Chloride 100 94 - 109 mmol/L    Carbon Dioxide 22 20 - 32 mmol/L    Anion Gap 11 3 - 14 mmol/L    Glucose 129 (H) 70 - 99 mg/dL    Urea Nitrogen 75 (H) 7 - 30 mg/dL    Creatinine 1.40 (H) 0.52 - 1.04 mg/dL    GFR Estimate 36 (L) >60 mL/min/1.7m2    GFR Estimate If Black 44 (L) >60 mL/min/1.7m2    Calcium 8.9 8.5 - 10.1 mg/dL   Lactic acid whole blood   Result Value Ref Range    Lactic Acid 2.5 (H) 0.7 - 2.0 mmol/L       All laboratory data reviewed  Lab Results   Component Value Date    CHOL 149 03/02/2017     Lab Results   Component Value Date    HDL 78 03/02/2017     Lab Results   Component Value Date    LDL 59 03/02/2017     Lab Results   Component Value Date    TRIG 62 03/02/2017     Lab Results   Component Value Date    CHOLHDLRATIO 2.2 08/13/2015     TSH   Date Value Ref Range Status   08/18/2015 1.29 0.40 - 4.00 mU/L Final     Last Basic Metabolic Panel:  Lab Results   Component Value Date     11/19/2017      Lab Results   Component Value Date    POTASSIUM 4.9 11/19/2017     Lab Results   Component Value Date    CHLORIDE 100 11/19/2017     Lab Results   Component Value Date    ELROY 8.9 11/19/2017     Lab Results   Component Value Date    CO2 22 11/19/2017     Lab Results   Component Value Date    BUN 75 11/19/2017     Lab Results   Component Value Date    CR 1.40 11/19/2017     Lab Results   Component Value Date     11/19/2017     Lab Results   Component Value Date    WBC 17.6 11/18/2017     Lab Results   Component Value Date    RBC  3.77 11/18/2017     Lab Results   Component Value Date    HGB 11.3 11/18/2017     Lab Results   Component Value Date    HCT 34.0 11/18/2017     Lab Results   Component Value Date    MCV 90 11/18/2017     Lab Results   Component Value Date    MCH 30.0 11/18/2017     Lab Results   Component Value Date    MCHC 33.2 11/18/2017     Lab Results   Component Value Date    RDW 13.7 11/18/2017     Lab Results   Component Value Date     11/18/2017

## 2017-11-19 NOTE — PROGRESS NOTES
[facility]    Sepsis Evaluation Progress Note    Date of Service: 11/19/2017    I was called to see Genie Persaud due to abnormal vital signs triggering the Sepsis SIRS screening alert. She is known to have an infection.     Physical Exam    Vital Signs:  Temp: 97.7  F (36.5  C) Temp src: Oral BP: 148/45 Pulse: 95 Heart Rate: 86 Resp: 23 SpO2: (!) 84 % (on 3L O2 NC w/ extertion, recovered to 91% in 10 sec) O2 Device: Nasal cannula Oxygen Delivery: 2 LPM    Lab:  Lactic Acid   Date Value Ref Range Status   11/19/2017 2.5 (H) 0.7 - 2.0 mmol/L Final       The patient is at baseline mental status.    The rest of their physical exam is significant for improved tachycardia and tachypnea this am .    Assessment and Plan    The SIRS and exam findings are likely due to atrial fibrillaion causing hypoperfusion and lactuc acidosis , she is already getting treated for her PNA and is afebrile and improving , there is no sign of sepsis at this time.     Disposition: The patient will remain on the current unit. We will continue to monitor this patient closely.    Ida Gray MD

## 2017-11-20 ENCOUNTER — APPOINTMENT (OUTPATIENT)
Dept: PHYSICAL THERAPY | Facility: CLINIC | Age: 82
DRG: 871 | End: 2017-11-20
Payer: COMMERCIAL

## 2017-11-20 LAB
ANION GAP SERPL CALCULATED.3IONS-SCNC: 8 MMOL/L (ref 3–14)
BASOPHILS # BLD AUTO: 0 10E9/L (ref 0–0.2)
BASOPHILS NFR BLD AUTO: 0.1 %
BUN SERPL-MCNC: 82 MG/DL (ref 7–30)
CALCIUM SERPL-MCNC: 8.7 MG/DL (ref 8.5–10.1)
CHLORIDE SERPL-SCNC: 102 MMOL/L (ref 94–109)
CO2 SERPL-SCNC: 23 MMOL/L (ref 20–32)
CREAT SERPL-MCNC: 1.27 MG/DL (ref 0.52–1.04)
DIFFERENTIAL METHOD BLD: ABNORMAL
EOSINOPHIL # BLD AUTO: 0 10E9/L (ref 0–0.7)
EOSINOPHIL NFR BLD AUTO: 0 %
ERYTHROCYTE [DISTWIDTH] IN BLOOD BY AUTOMATED COUNT: 13.5 % (ref 10–15)
GFR SERPL CREATININE-BSD FRML MDRD: 40 ML/MIN/1.7M2
GLUCOSE BLDC GLUCOMTR-MCNC: 101 MG/DL (ref 70–99)
GLUCOSE BLDC GLUCOMTR-MCNC: 134 MG/DL (ref 70–99)
GLUCOSE BLDC GLUCOMTR-MCNC: 142 MG/DL (ref 70–99)
GLUCOSE BLDC GLUCOMTR-MCNC: 170 MG/DL (ref 70–99)
GLUCOSE BLDC GLUCOMTR-MCNC: 190 MG/DL (ref 70–99)
GLUCOSE SERPL-MCNC: 121 MG/DL (ref 70–99)
HCT VFR BLD AUTO: 33.9 % (ref 35–47)
HGB BLD-MCNC: 11.7 G/DL (ref 11.7–15.7)
IMM GRANULOCYTES # BLD: 0.2 10E9/L (ref 0–0.4)
IMM GRANULOCYTES NFR BLD: 1.4 %
LACTATE BLD-SCNC: 1.2 MMOL/L (ref 0.7–2)
LYMPHOCYTES # BLD AUTO: 0.8 10E9/L (ref 0.8–5.3)
LYMPHOCYTES NFR BLD AUTO: 5.2 %
MCH RBC QN AUTO: 30.5 PG (ref 26.5–33)
MCHC RBC AUTO-ENTMCNC: 34.5 G/DL (ref 31.5–36.5)
MCV RBC AUTO: 89 FL (ref 78–100)
MONOCYTES # BLD AUTO: 1.2 10E9/L (ref 0–1.3)
MONOCYTES NFR BLD AUTO: 8 %
NEUTROPHILS # BLD AUTO: 13 10E9/L (ref 1.6–8.3)
NEUTROPHILS NFR BLD AUTO: 85.3 %
NRBC # BLD AUTO: 0.1 10*3/UL
NRBC BLD AUTO-RTO: 1 /100
PLATELET # BLD AUTO: 229 10E9/L (ref 150–450)
POTASSIUM SERPL-SCNC: 5.5 MMOL/L (ref 3.4–5.3)
POTASSIUM SERPL-SCNC: 5.6 MMOL/L (ref 3.4–5.3)
RBC # BLD AUTO: 3.83 10E12/L (ref 3.8–5.2)
SODIUM SERPL-SCNC: 133 MMOL/L (ref 133–144)
WBC # BLD AUTO: 15.3 10E9/L (ref 4–11)

## 2017-11-20 PROCEDURE — 83605 ASSAY OF LACTIC ACID: CPT | Performed by: INTERNAL MEDICINE

## 2017-11-20 PROCEDURE — 94640 AIRWAY INHALATION TREATMENT: CPT | Mod: 76

## 2017-11-20 PROCEDURE — 97116 GAIT TRAINING THERAPY: CPT | Mod: GP

## 2017-11-20 PROCEDURE — 00000146 ZZHCL STATISTIC GLUCOSE BY METER IP

## 2017-11-20 PROCEDURE — 94640 AIRWAY INHALATION TREATMENT: CPT

## 2017-11-20 PROCEDURE — 40000275 ZZH STATISTIC RCP TIME EA 10 MIN

## 2017-11-20 PROCEDURE — 97110 THERAPEUTIC EXERCISES: CPT | Mod: GP

## 2017-11-20 PROCEDURE — 25000132 ZZH RX MED GY IP 250 OP 250 PS 637: Performed by: INTERNAL MEDICINE

## 2017-11-20 PROCEDURE — 80048 BASIC METABOLIC PNL TOTAL CA: CPT | Performed by: INTERNAL MEDICINE

## 2017-11-20 PROCEDURE — 27210995 ZZH RX 272: Performed by: INTERNAL MEDICINE

## 2017-11-20 PROCEDURE — 99207 ZZC CDG-MDM COMPONENT: MEETS MODERATE - UP CODED: CPT | Performed by: HOSPITALIST

## 2017-11-20 PROCEDURE — 85025 COMPLETE CBC W/AUTO DIFF WBC: CPT | Performed by: INTERNAL MEDICINE

## 2017-11-20 PROCEDURE — 93010 ELECTROCARDIOGRAM REPORT: CPT | Performed by: INTERNAL MEDICINE

## 2017-11-20 PROCEDURE — 40000193 ZZH STATISTIC PT WARD VISIT

## 2017-11-20 PROCEDURE — 93005 ELECTROCARDIOGRAM TRACING: CPT

## 2017-11-20 PROCEDURE — 84132 ASSAY OF SERUM POTASSIUM: CPT | Performed by: HOSPITALIST

## 2017-11-20 PROCEDURE — 25000125 ZZHC RX 250: Performed by: INTERNAL MEDICINE

## 2017-11-20 PROCEDURE — 99233 SBSQ HOSP IP/OBS HIGH 50: CPT | Performed by: HOSPITALIST

## 2017-11-20 PROCEDURE — 25000128 H RX IP 250 OP 636: Performed by: INTERNAL MEDICINE

## 2017-11-20 PROCEDURE — 99232 SBSQ HOSP IP/OBS MODERATE 35: CPT | Performed by: INTERNAL MEDICINE

## 2017-11-20 PROCEDURE — 12000000 ZZH R&B MED SURG/OB

## 2017-11-20 PROCEDURE — 36415 COLL VENOUS BLD VENIPUNCTURE: CPT | Performed by: HOSPITALIST

## 2017-11-20 PROCEDURE — 36415 COLL VENOUS BLD VENIPUNCTURE: CPT | Performed by: INTERNAL MEDICINE

## 2017-11-20 RX ADMIN — APIXABAN 2.5 MG: 2.5 TABLET, FILM COATED ORAL at 20:27

## 2017-11-20 RX ADMIN — MONTELUKAST SODIUM 10 MG: 10 TABLET, FILM COATED ORAL at 21:47

## 2017-11-20 RX ADMIN — IPRATROPIUM BROMIDE AND ALBUTEROL SULFATE 3 ML: .5; 3 SOLUTION RESPIRATORY (INHALATION) at 19:27

## 2017-11-20 RX ADMIN — FLUTICASONE FUROATE AND VILANTEROL TRIFENATATE 1 PUFF: 100; 25 POWDER RESPIRATORY (INHALATION) at 09:11

## 2017-11-20 RX ADMIN — Medication 100 MG: at 09:08

## 2017-11-20 RX ADMIN — IPRATROPIUM BROMIDE AND ALBUTEROL SULFATE 3 ML: .5; 3 SOLUTION RESPIRATORY (INHALATION) at 11:08

## 2017-11-20 RX ADMIN — AZELASTINE HYDROCHLORIDE 1 SPRAY: 137 SPRAY, METERED NASAL at 20:29

## 2017-11-20 RX ADMIN — APIXABAN 2.5 MG: 2.5 TABLET, FILM COATED ORAL at 09:09

## 2017-11-20 RX ADMIN — AZELASTINE HYDROCHLORIDE 1 SPRAY: 137 SPRAY, METERED NASAL at 09:11

## 2017-11-20 RX ADMIN — OMEPRAZOLE 20 MG: 20 CAPSULE, DELAYED RELEASE ORAL at 09:10

## 2017-11-20 RX ADMIN — INSULIN ASPART 1 UNITS: 100 INJECTION, SOLUTION INTRAVENOUS; SUBCUTANEOUS at 18:07

## 2017-11-20 RX ADMIN — SIMVASTATIN 10 MG: 10 TABLET, FILM COATED ORAL at 21:47

## 2017-11-20 RX ADMIN — METOPROLOL TARTRATE 25 MG: 25 TABLET, FILM COATED ORAL at 09:10

## 2017-11-20 RX ADMIN — ONDANSETRON 4 MG: 4 TABLET, ORALLY DISINTEGRATING ORAL at 11:54

## 2017-11-20 RX ADMIN — Medication 100 MG: at 20:27

## 2017-11-20 RX ADMIN — CEFTRIAXONE SODIUM 2 G: 10 INJECTION, POWDER, FOR SOLUTION INTRAVENOUS at 11:54

## 2017-11-20 RX ADMIN — METOPROLOL TARTRATE 25 MG: 25 TABLET, FILM COATED ORAL at 16:12

## 2017-11-20 RX ADMIN — IPRATROPIUM BROMIDE AND ALBUTEROL SULFATE 3 ML: .5; 3 SOLUTION RESPIRATORY (INHALATION) at 07:54

## 2017-11-20 RX ADMIN — PREDNISONE 40 MG: 20 TABLET ORAL at 09:11

## 2017-11-20 RX ADMIN — METOPROLOL TARTRATE 25 MG: 25 TABLET, FILM COATED ORAL at 21:47

## 2017-11-20 RX ADMIN — IPRATROPIUM BROMIDE AND ALBUTEROL SULFATE 3 ML: .5; 3 SOLUTION RESPIRATORY (INHALATION) at 15:27

## 2017-11-20 NOTE — PROGRESS NOTES
[facility]    Cardiology Progress Note  Date of Service (when I saw the patient): 11/20/2017     Assessment & Plan   Genie Persaud is a 82 year old female who was admitted on 11/15/2017 with acute hypoxic respiratory failure and RLL pneumonia. She was in symptomatic paroxysmal atrial fibrillation with RVR.     1) Chest pressure and stress vs. demand cardiomyopathy    Troponin peak at 4.1    Nuclear study as outpatient as long as symptom free    2) Paroxysmal atrial fibrillation QTc 464 ms yesterday    [id number] score is 6 , which places her at 9.8% yearly risk for stroke    Started on Eliquis 2.5 mg BID    Continue Amiodarone 100 mg BID    Continue to monitor QTc with antibiotics    MITCH PICHARDO, APRN, CNP    Interval History   No further chest pressure. Continues on O2 with cough. If recurrent chest pressure and nausea, consider nuclear stress test vs angiogram as inpatient.    Physical Exam   Temp: 95.4  F (35.2  C) Temp src: Axillary BP: 130/47 Pulse: 86 Heart Rate: 84 Resp: 20 SpO2: 99 % O2 Device: Nasal cannula Oxygen Delivery: 2 LPM  Vitals:    11/16/17 0423 11/17/17 0658   Weight: 66.8 kg (147 lb 4.3 oz) 68.9 kg (152 lb)     Vital Signs with Ranges  Temp:  [95.4  F (35.2  C)-96.8  F (36  C)] 95.4  F (35.2  C)  Pulse:  [86] 86  Heart Rate:  [84-89] 84  Resp:  [18-20] 20  BP: (111-134)/(37-51) 130/47  SpO2:  [95 %-99 %] 99 %  I/O last 3 completed shifts:  In: 240 [P.O.:240]  Out: 350 [Urine:350]    Constitutional:   NAD   Skin:   Warm and dry   Head:   Nontraumatic   Lungs:   Coarse with dullness at right base   Cardiovascular:   regular rate and rhythm   Abdomen:   Benign   Extremities and Back:   Edema 1+ with ACE wraps in place   Neurological:   Grossly nonfocal     TELE: SR

## 2017-11-20 NOTE — PLAN OF CARE
Problem: Patient Care Overview  Goal: Plan of Care/Patient Progress Review  OT: attempted x 2 to see pt for OT session, first attempt pt was busy with nursing then RT for neb tx. Upon second attempt pt had a visitor there that just arrived and pt wanting to visit declined OT at this time.

## 2017-11-20 NOTE — PLAN OF CARE
Problem: Patient Care Overview  Goal: Plan of Care/Patient Progress Review  Outcome: No Change  A&Ox4. VSS. Oxygen weaned today; sats currently 95 on room air. Tele NSR. Reports some SOB, does appear BARONE. LS coarse with crackles in lower lobes. Cough nonproductive. Teds applied to BLE for 2+edema. 1 loose BM today. Incontinent of urine at times. Some mild nausea but states this is unchanged. Potassium elevated this AM, plan to recheck at 1600. Up to recliner and BSC with assist of 1 and walker. Plan pending respiratory status and ability to keep off oxygen. Continue to monitor.

## 2017-11-20 NOTE — PROGRESS NOTES
Patient declined to placed on BIPAP unit and want to fly without throughout the night. Will continue to follow as ordered.    Kathleen Carmona RT  11/20/2017

## 2017-11-20 NOTE — PLAN OF CARE
Problem: Patient Care Overview  Goal: Plan of Care/Patient Progress Review  Outcome: No Change    Shift Update: Pt is alert and oriented x 4. VSS on 2 L of oxygen. No complaints of pain or nausea on this shift. Tele: NSR. No increase in HR on this shift. Dyspnea on exertion. Pt ambulated in hallway with PT. O2 stable on 2LPM via NC. Tolerating regular diet. Incontinent at times. R PIV x 2 SL. MD discontinued Lovenox and ordered apixaban anticoagulant. Continues on IV antibiotics. Blood sugars 133 and 153. Up w/assist x 1 + walker. Plan: Discharge pending.

## 2017-11-20 NOTE — PLAN OF CARE
Problem: Patient Care Overview  Goal: Plan of Care/Patient Progress Review  Discharge Planner PT   Patient plan for discharge: home with family assist  Current status: Pt was up in a chair, resting O2 sats 94% on RA. Pt requires SBA for transfers with a FWW, gait 40' x 2 with FWW and O2 sats 92% on RA. Gait distance limited by fatigue.  Barriers to return to prior living situation: stairs, decreased activity tolerance  Recommendations for discharge: home with family assisst for household tasks, use of FWW, OPPT  Rationale for recommendations: Pt would benefit from OPPT to progress activity tolerance back to baseline. Will issue FWW at discharge.       Entered by: Merari Mosqueda 11/20/2017 3:36 PM

## 2017-11-20 NOTE — PLAN OF CARE
Problem: Patient Care Overview  Goal: Plan of Care/Patient Progress Review  Outcome: No Change   A&O x4, pleasant and quiet. VSS. On 2 L of oxygen. No complaints of pain or nausea on this shift. Tele: NSR. Low diastolic first check at 37. Rechecked in other arm= 45 diastolic. SYS BP WDL. BARONE. Frequent, nonproductive cough. Patient was on 2 liters O2 t/o the night as she wanted to see how she would do w/o Cpap on. Applied Cpap around 0445 per pt request d/t feeling short of breath. O2 sats in the high 90's. PIV x 2 SL. Blood sugar check at 0200= 134. Sleeping in recliner on shift. Up with 1A and walker. Continue to monitor.

## 2017-11-20 NOTE — PROGRESS NOTES
Pt on 1L nasal canula, breath sound diminished neb. Tx is given as scheduled will continue to follow.                                   Nicki Birmingham

## 2017-11-20 NOTE — PROGRESS NOTES
[facility]    Hospitalist Progress Note      Assessment & Plan   Genie Persaud is a 82 year old female who was admitted on 11/15/2017.  Past history of asthma, HTN, CAD, A-fib, aortic regurgitation who presents with shortness of breath, found to have sepsis due to RLL pneumonia.    Severe sepsis and acute hypoxic respiratory due to right lower lobe pneumonia: Presents with tachycardia (130's), hypotension (80's systolic), tachypnea (40's), hypoxia with CXR showing right lower lobe consolidation. Initially requiring Bipap support, sepsis resolved with IVF and antibiotics.  Completed azithromycin 11/19.  - continue ceftriaxone (started 11/15)  - leukocytosis improving, afebrile  - oxygen requirements improving, try to wean from Bipap at night  - she remains volume overloaded, but use of diuretics limited due to JOSLYN    Acute stress cardiomyopathy, CAD, HTN:  Serial trop at admission with peak of 4.1.  TTE with EF 35% (prior 40-45%) with WMA consistent with stress cardiomyopathy.    - continue metoprolol 25 mg tid  - Cardiology recs appreciated    Moderate persistent asthma:  Continue PTA montelukast, Advair (Breo substituted).    A-fib with RVR:  Known history.  Developed intermittent A-fib with RVR in setting of sepsis.  CHADS2-VASC of 6.  - remains in NSR  - continue amiodarone (started 11/18) and apixaban (started 11/19)    JOSLYN:  Due to diuretics.  Cr peak of 1.40 on 11/19.  - Cr improved to 1.27, continue to monitor    Hyperkalemia:  K 5.6 on 11/20, etiology unclear.  - repeat at 1600 and in AM    DVT Prophylaxis: Pneumatic Compression Devices  Code Status: DNR    Disposition: Expected discharge in 2 days once weaned from oxygen.    Phillip Bonner    Interval History   Reports ongoing dyspnea with exertion, endorses orthopnea as well.  No palpitations or chest pressure.  Mild lightheadedness with activity.  No nausea or diarrhea on antibiotics.  Reports chronic cough at baseline.  Lower extremity  edema also at baseline.    -Data reviewed today: I reviewed all new labs and imaging results over the last 24 hours. I personally reviewed no images or EKG's today.    Physical Exam   Temp: 95.4  F (35.2  C) Temp src: Axillary BP: 130/47 Pulse: 86 Heart Rate: 84 Resp: 20 SpO2: 99 % O2 Device: Nasal cannula Oxygen Delivery: 2 LPM  Vitals:    11/16/17 0423 11/17/17 0658   Weight: 66.8 kg (147 lb 4.3 oz) 68.9 kg (152 lb)     Vital Signs with Ranges  Temp:  [95.4  F (35.2  C)-96.8  F (36  C)] 95.4  F (35.2  C)  Pulse:  [86] 86  Heart Rate:  [84-89] 84  Resp:  [18-20] 20  BP: (111-134)/(37-51) 130/47  SpO2:  [95 %-99 %] 99 %  I/O last 3 completed shifts:  In: 240 [P.O.:240]  Out: 350 [Urine:350]    Constitutional: Well developed, well nourished female in no acute distress  Respiratory: Lungs clear to ausculation bilaterally without crackles or wheezes, no tachypnea, small inspiratory volumes  Cardiovascular: regular rate and rhythm, normal S1/S2 without murmur, rubs or gallops  GI: abdomen soft, non-tender, non-distended, normal bowel sounds  Lymph:  2+ lower extremity edema to knee  Other:  alert and appropriate, cranial nerves grossly intact    Medications     - MEDICATION INSTRUCTIONS -         apixaban ANTICOAGULANT  2.5 mg Oral BID     predniSONE  40 mg Oral Daily     metoprolol  25 mg Oral TID     amiodarone  100 mg Oral BID     cefTRIAXone  2 g Intravenous Q24H     azelastine  1 spray Both Nostrils BID     fluticasone-vilanterol  1 puff Inhalation Daily     montelukast (SINGULAIR) tablet 10 mg  10 mg Oral At Bedtime     omeprazole  20 mg Oral Daily     simvastatin  10 mg Oral At Bedtime     ipratropium - albuterol 0.5 mg/2.5 mg/3 mL  3 mL Nebulization 4x Daily     insulin aspart  1-3 Units Subcutaneous TID AC     insulin aspart  1-3 Units Subcutaneous At Bedtime     docusate sodium  100 mg Oral BID     sodium chloride (PF)  3 mL Intracatheter Q8H     sodium chloride (PF)  10 mL Intravenous Once       Data      Recent Labs  Lab 11/20/17  0705 11/19/17  0944 11/18/17  1605 11/18/17  0652 11/17/17  0730 11/16/17  0455 11/15/17  1800 11/15/17  1420 11/15/17  0645   WBC 15.3*  --  17.6*  --  23.8* 22.8*  --   --  19.8*   HGB 11.7  --  11.3*  --  12.0 11.4*  --   --  14.1   MCV 89  --  90  --  92 92  --   --  91     --  258 229 256 211  --   --  306    133 135  --  133 137  --   --  135   POTASSIUM 5.6* 4.9 5.0  --  4.9 4.2  --   --  3.9   CHLORIDE 102 100 103  --  105 104  --   --  100   CO2 23 22 22  --  22 23  --   --  28   BUN 82* 75* 65*  --  43* 26  --   --  29   CR 1.27* 1.40* 1.37* 1.24* 0.98 0.86  --   --  0.84   ANIONGAP 8 11 10  --  6 10  --   --  7   ELROY 8.7 8.9 8.3*  --  8.6 8.0*  --   --  8.9   * 129* 124*  --  138* 148*  --   --  185*   ALBUMIN  --   --   --   --  3.0* 3.0*  --   --  3.4   PROTTOTAL  --   --   --   --  6.9 6.4*  --   --  7.1   BILITOTAL  --   --   --   --  0.3 0.6  --   --  0.6   ALKPHOS  --   --   --   --  70 72  --   --  121   ALT  --   --   --   --  111* 106*  --   --  55*   AST  --   --   --   --  68* 92*  --   --  67*   TROPI  --   --   --   --   --   --  4.139* 3.771* 0.262*       No results found for this or any previous visit (from the past 24 hour(s)).

## 2017-11-21 ENCOUNTER — APPOINTMENT (OUTPATIENT)
Dept: PHYSICAL THERAPY | Facility: CLINIC | Age: 82
DRG: 871 | End: 2017-11-21
Payer: COMMERCIAL

## 2017-11-21 LAB
ANION GAP SERPL CALCULATED.3IONS-SCNC: 7 MMOL/L (ref 3–14)
BACTERIA SPEC CULT: NO GROWTH
BACTERIA SPEC CULT: NO GROWTH
BUN SERPL-MCNC: 71 MG/DL (ref 7–30)
CALCIUM SERPL-MCNC: 9 MG/DL (ref 8.5–10.1)
CHLORIDE SERPL-SCNC: 102 MMOL/L (ref 94–109)
CO2 SERPL-SCNC: 23 MMOL/L (ref 20–32)
CREAT SERPL-MCNC: 1.15 MG/DL (ref 0.52–1.04)
ERYTHROCYTE [DISTWIDTH] IN BLOOD BY AUTOMATED COUNT: 13.5 % (ref 10–15)
GFR SERPL CREATININE-BSD FRML MDRD: 45 ML/MIN/1.7M2
GLUCOSE BLDC GLUCOMTR-MCNC: 131 MG/DL (ref 70–99)
GLUCOSE BLDC GLUCOMTR-MCNC: 164 MG/DL (ref 70–99)
GLUCOSE BLDC GLUCOMTR-MCNC: 183 MG/DL (ref 70–99)
GLUCOSE BLDC GLUCOMTR-MCNC: 205 MG/DL (ref 70–99)
GLUCOSE BLDC GLUCOMTR-MCNC: 217 MG/DL (ref 70–99)
GLUCOSE SERPL-MCNC: 124 MG/DL (ref 70–99)
HCT VFR BLD AUTO: 33.8 % (ref 35–47)
HGB BLD-MCNC: 11.8 G/DL (ref 11.7–15.7)
INTERPRETATION ECG - MUSE: NORMAL
Lab: NORMAL
Lab: NORMAL
MAGNESIUM SERPL-MCNC: 3.1 MG/DL (ref 1.6–2.3)
MCH RBC QN AUTO: 30.5 PG (ref 26.5–33)
MCHC RBC AUTO-ENTMCNC: 34.9 G/DL (ref 31.5–36.5)
MCV RBC AUTO: 87 FL (ref 78–100)
PLATELET # BLD AUTO: 212 10E9/L (ref 150–450)
POTASSIUM SERPL-SCNC: 5.1 MMOL/L (ref 3.4–5.3)
RBC # BLD AUTO: 3.87 10E12/L (ref 3.8–5.2)
SODIUM SERPL-SCNC: 132 MMOL/L (ref 133–144)
SPECIMEN SOURCE: NORMAL
SPECIMEN SOURCE: NORMAL
WBC # BLD AUTO: 17.1 10E9/L (ref 4–11)

## 2017-11-21 PROCEDURE — 97110 THERAPEUTIC EXERCISES: CPT | Mod: GP

## 2017-11-21 PROCEDURE — 93005 ELECTROCARDIOGRAM TRACING: CPT

## 2017-11-21 PROCEDURE — 40000193 ZZH STATISTIC PT WARD VISIT

## 2017-11-21 PROCEDURE — 25000125 ZZHC RX 250: Performed by: INTERNAL MEDICINE

## 2017-11-21 PROCEDURE — 25000132 ZZH RX MED GY IP 250 OP 250 PS 637: Performed by: HOSPITALIST

## 2017-11-21 PROCEDURE — 25000132 ZZH RX MED GY IP 250 OP 250 PS 637: Performed by: INTERNAL MEDICINE

## 2017-11-21 PROCEDURE — 99233 SBSQ HOSP IP/OBS HIGH 50: CPT | Performed by: HOSPITALIST

## 2017-11-21 PROCEDURE — 80048 BASIC METABOLIC PNL TOTAL CA: CPT | Performed by: HOSPITALIST

## 2017-11-21 PROCEDURE — 25000128 H RX IP 250 OP 636: Performed by: INTERNAL MEDICINE

## 2017-11-21 PROCEDURE — 85027 COMPLETE CBC AUTOMATED: CPT | Performed by: HOSPITALIST

## 2017-11-21 PROCEDURE — 36415 COLL VENOUS BLD VENIPUNCTURE: CPT | Performed by: HOSPITALIST

## 2017-11-21 PROCEDURE — 00000146 ZZHCL STATISTIC GLUCOSE BY METER IP

## 2017-11-21 PROCEDURE — 40000275 ZZH STATISTIC RCP TIME EA 10 MIN

## 2017-11-21 PROCEDURE — 27210995 ZZH RX 272: Performed by: INTERNAL MEDICINE

## 2017-11-21 PROCEDURE — 94640 AIRWAY INHALATION TREATMENT: CPT

## 2017-11-21 PROCEDURE — 93010 ELECTROCARDIOGRAM REPORT: CPT | Performed by: INTERNAL MEDICINE

## 2017-11-21 PROCEDURE — 12000000 ZZH R&B MED SURG/OB

## 2017-11-21 PROCEDURE — 94640 AIRWAY INHALATION TREATMENT: CPT | Mod: 76

## 2017-11-21 PROCEDURE — 25000128 H RX IP 250 OP 636: Performed by: HOSPITALIST

## 2017-11-21 PROCEDURE — 83735 ASSAY OF MAGNESIUM: CPT | Performed by: HOSPITALIST

## 2017-11-21 RX ORDER — FUROSEMIDE 10 MG/ML
20 INJECTION INTRAMUSCULAR; INTRAVENOUS ONCE
Status: COMPLETED | OUTPATIENT
Start: 2017-11-21 | End: 2017-11-21

## 2017-11-21 RX ADMIN — CEFTRIAXONE SODIUM 2 G: 10 INJECTION, POWDER, FOR SOLUTION INTRAVENOUS at 12:11

## 2017-11-21 RX ADMIN — Medication 2.5 MG: at 00:00

## 2017-11-21 RX ADMIN — Medication 2 LOZENGE: at 10:11

## 2017-11-21 RX ADMIN — IPRATROPIUM BROMIDE AND ALBUTEROL SULFATE 3 ML: .5; 3 SOLUTION RESPIRATORY (INHALATION) at 10:39

## 2017-11-21 RX ADMIN — AZELASTINE HYDROCHLORIDE 1 SPRAY: 137 SPRAY, METERED NASAL at 07:47

## 2017-11-21 RX ADMIN — Medication 2.5 MG: at 23:53

## 2017-11-21 RX ADMIN — PREDNISONE 40 MG: 20 TABLET ORAL at 07:46

## 2017-11-21 RX ADMIN — IPRATROPIUM BROMIDE AND ALBUTEROL SULFATE 3 ML: .5; 3 SOLUTION RESPIRATORY (INHALATION) at 07:38

## 2017-11-21 RX ADMIN — METOPROLOL TARTRATE 25 MG: 25 TABLET, FILM COATED ORAL at 22:12

## 2017-11-21 RX ADMIN — AZELASTINE HYDROCHLORIDE 1 SPRAY: 137 SPRAY, METERED NASAL at 22:17

## 2017-11-21 RX ADMIN — SIMVASTATIN 10 MG: 10 TABLET, FILM COATED ORAL at 22:12

## 2017-11-21 RX ADMIN — Medication 100 MG: at 20:41

## 2017-11-21 RX ADMIN — METOPROLOL TARTRATE 25 MG: 25 TABLET, FILM COATED ORAL at 07:46

## 2017-11-21 RX ADMIN — APIXABAN 2.5 MG: 2.5 TABLET, FILM COATED ORAL at 07:46

## 2017-11-21 RX ADMIN — Medication 100 MG: at 07:46

## 2017-11-21 RX ADMIN — FUROSEMIDE 20 MG: 10 INJECTION, SOLUTION INTRAVENOUS at 12:11

## 2017-11-21 RX ADMIN — OMEPRAZOLE 20 MG: 20 CAPSULE, DELAYED RELEASE ORAL at 07:46

## 2017-11-21 RX ADMIN — METOPROLOL TARTRATE 25 MG: 25 TABLET, FILM COATED ORAL at 16:29

## 2017-11-21 RX ADMIN — APIXABAN 2.5 MG: 2.5 TABLET, FILM COATED ORAL at 20:41

## 2017-11-21 RX ADMIN — FLUTICASONE FUROATE AND VILANTEROL TRIFENATATE 1 PUFF: 100; 25 POWDER RESPIRATORY (INHALATION) at 07:47

## 2017-11-21 RX ADMIN — DOCUSATE SODIUM 100 MG: 100 CAPSULE, LIQUID FILLED ORAL at 20:41

## 2017-11-21 RX ADMIN — IPRATROPIUM BROMIDE AND ALBUTEROL SULFATE 3 ML: .5; 3 SOLUTION RESPIRATORY (INHALATION) at 15:25

## 2017-11-21 RX ADMIN — MONTELUKAST SODIUM 10 MG: 10 TABLET, FILM COATED ORAL at 22:11

## 2017-11-21 RX ADMIN — INSULIN ASPART 1 UNITS: 100 INJECTION, SOLUTION INTRAVENOUS; SUBCUTANEOUS at 18:38

## 2017-11-21 RX ADMIN — IPRATROPIUM BROMIDE AND ALBUTEROL SULFATE 3 ML: .5; 3 SOLUTION RESPIRATORY (INHALATION) at 19:17

## 2017-11-21 NOTE — PLAN OF CARE
"A&Ox4, VSS. Pt Genie \"Christina\", had a good evening. Up to commode x1, urinary incontinence, having loose stools. Ambulated in room x2 with walker, gait belt, assist x1. Appetite fair, nauseous in afternoon but resolved in evening, no complaints of pain. O2 sats in low 90s but dips to high 80s when sleeping, put on 1 L nasal canula for night. Weeping fluid from LLE noted after ambulation, no pain, mepilex dressing applied. Pt wearing bilateral MATTHEW stocking for LE 2+ edema. Pt prefers sleeping in chair and is currently resting with feet elevated.   "

## 2017-11-21 NOTE — PROGRESS NOTES
Patient is on room air and SpO2 low 90`s. Neb tx given as ordered. BBS diminished/crackles. Has strong productive cough. Will continue to follow.

## 2017-11-21 NOTE — PLAN OF CARE
Problem: Patient Care Overview  Goal: Plan of Care/Patient Progress Review  Outcome: No Change  Oxygen needs fluctuate between RA and 1LNC. Currently sats 95 on 1LNC. All other VSS. Tele NSR. Reports general malaise today. Reports some SOB. LS dim; also has loose cough. Lozenge given x1 for sore throat. Started on low dose lasix today. Teds on BLE for the morning; removed this afternoon d/t legs weeping and saturating socks. Up with assist of 1 and walker to [facility]. Poor to fair appetite. D/c likely in a couple days. Continue to monitor.

## 2017-11-21 NOTE — PLAN OF CARE
Problem: Patient Care Overview  Goal: Plan of Care/Patient Progress Review  PT: Discharge Planner PT   Patient plan for discharge: home with family assist  Current status: Pt ambulated 15' with SBA and FWW, 1L 02 NC. Declined in hallway ambulation. Performed seated LE exercises. Educated pt on performing seated exercises throughout day.   Barriers to return to prior living situation: dec activity tolerance, weakness, balance deficits, stairs, falls risk  Recommendations for discharge: home with family assist, use of FWW, with OPPT  Rationale for recommendations: Pt would benefit from continued skilled OP PT services to inc strength and activity tolerance. Will issue FWW at discharge.        Entered by: Lacy Pwoell 11/21/2017 9:50 AM

## 2017-11-21 NOTE — PLAN OF CARE
Problem: Patient Care Overview  Goal: Plan of Care/Patient Progress Review  Outcome: No Change    Shift Update: Patient is A&Ox4. VSS. Weaned to room air; sats 95%. Patient able to ambulate twice on this shift and continued to sat in the mid 90's. O2 sat dropped to 88% tonight and was put on 1L O2. Mild ongoing nausea. Tele: NSR. Frequent, nonproductive cough. Crackles in lower lobes. Tolerating regular diet. Incontinent at times. PIV x 2 SL. K 5.5, Blood sugars 170 and 190. PIV x 2 SL. Up w/assist x 1 + walker to [facility]; likes to sit in recliner; educated on shifting weight regularly and elevating legs. Plan: Discharge pending ability to stay off oxygen.

## 2017-11-21 NOTE — PROGRESS NOTES
[facility]    Hospitalist Progress Note      Assessment & Plan   Genie Persaud is a 82 year old female who was admitted on 11/15/2017.  Past history of asthma, HTN, CAD, A-fib, aortic regurgitation who presents with shortness of breath, found to have sepsis due to RLL pneumonia.    Severe sepsis and acute hypoxic respiratory due to right lower lobe pneumonia: Presents with tachycardia (130's), hypotension (80's systolic), tachypnea (40's), hypoxia with CXR showing right lower lobe consolidation. Initially requiring Bipap support, sepsis resolved with IVF and antibiotics.  Completed azithromycin 11/19.  - continue ceftriaxone (started 11/15)  - continues to require intermittent oxygen, suspect partially due to volume overload    Acute stress cardiomyopathy, CAD, HTN:  Serial trop at admission with peak of 4.1.  TTE with EF 35% (prior 40-45%) with WMA consistent with stress cardiomyopathy.    - continue metoprolol 25 mg tid  - Cardiology recs appreciated  - Cr improved, will re-attempt low dose lasix 20 mg IV x1    JOSLYN:  Baseline Cr 0.8-0.9.  Cr peak of 1.40 on 11/19 due to diuretics.  - Cr improved to 1.15 11/21, repeat BMP in AM with resumption of diuretic    Hyperkalemia:  K 5.6 on 11/20, etiology unclear.  - wnl on 11/21    Moderate persistent asthma:  Continue PTA montelukast, Advair (Breo substituted).    A-fib with RVR:  Known history.  Developed intermittent A-fib with RVR in setting of sepsis.  CHADS2-VASC of 6.  - remains in NSR  - continue amiodarone (started 11/18) and apixaban (started 11/19)  - QTc stable 11/21    DVT Prophylaxis: Pneumatic Compression Devices  Code Status: DNR    Disposition: Expected discharge in 2 days once weaned from oxygen.    Phillip Bonner    Interval History   Reports increased generalized weakness.  Ongoing cough.  No fever/chills, diarrhea.  No improvement in edema.  Continues to report orthopnea.    -Data reviewed today: I reviewed all new labs and imaging  results over the last 24 hours. I personally reviewed no images or EKG's today.    Physical Exam   Temp: 97  F (36.1  C) Temp src: Oral BP: 123/41 Pulse: 88 Heart Rate: 85 Resp: 18 SpO2: 95 % O2 Device: Nasal cannula Oxygen Delivery: 1 LPM  Vitals:    11/16/17 0423 11/17/17 0658 11/21/17 0429   Weight: 66.8 kg (147 lb 4.3 oz) 68.9 kg (152 lb) 70 kg (154 lb 6.4 oz)     Vital Signs with Ranges  Temp:  [95.2  F (35.1  C)-97  F (36.1  C)] 97  F (36.1  C)  Pulse:  [88] 88  Heart Rate:  [78-87] 85  Resp:  [18-24] 18  BP: (113-145)/(41-52) 123/41  SpO2:  [92 %-97 %] 95 %  I/O last 3 completed shifts:  In: 486 [P.O.:480; I.V.:6]  Out: 200 [Urine:200]    Constitutional: Well developed, well nourished female in no acute distress, appearing fatigued  Respiratory: Slightly diminished right basilar lung sounds, no wheezing, no crackles, no tachypnea  Cardiovascular: regular rate and rhythm, normal S1/S2 without murmur, rubs or gallops  GI: abdomen soft, non-tender, non-distended, normal bowel sounds  Lymph:  2+ lower extremity edema to knee  Other:  alert and appropriate, cranial nerves grossly intact    Medications     - MEDICATION INSTRUCTIONS -         apixaban ANTICOAGULANT  2.5 mg Oral BID     predniSONE  40 mg Oral Daily     metoprolol  25 mg Oral TID     amiodarone  100 mg Oral BID     cefTRIAXone  2 g Intravenous Q24H     azelastine  1 spray Both Nostrils BID     fluticasone-vilanterol  1 puff Inhalation Daily     montelukast (SINGULAIR) tablet 10 mg  10 mg Oral At Bedtime     omeprazole  20 mg Oral Daily     simvastatin  10 mg Oral At Bedtime     ipratropium - albuterol 0.5 mg/2.5 mg/3 mL  3 mL Nebulization 4x Daily     insulin aspart  1-3 Units Subcutaneous TID AC     insulin aspart  1-3 Units Subcutaneous At Bedtime     docusate sodium  100 mg Oral BID     sodium chloride (PF)  3 mL Intracatheter Q8H     sodium chloride (PF)  10 mL Intravenous Once       Data     Recent Labs  Lab 11/21/17  0706 11/20/17  0343  11/20/17  0705 11/19/17  0944 11/18/17  1605  11/17/17  0730 11/16/17  0455 11/15/17  1800 11/15/17  1420 11/15/17  0645   WBC 17.1*  --  15.3*  --  17.6*  --  23.8* 22.8*  --   --  19.8*   HGB 11.8  --  11.7  --  11.3*  --  12.0 11.4*  --   --  14.1   MCV 87  --  89  --  90  --  92 92  --   --  91     --  229  --  258  < > 256 211  --   --  306   *  --  133 133 135  --  133 137  --   --  135   POTASSIUM 5.1 5.5* 5.6* 4.9 5.0  --  4.9 4.2  --   --  3.9   CHLORIDE 102  --  102 100 103  --  105 104  --   --  100   CO2 23  --  23 22 22  --  22 23  --   --  28   BUN 71*  --  82* 75* 65*  --  43* 26  --   --  29   CR 1.15*  --  1.27* 1.40* 1.37*  < > 0.98 0.86  --   --  0.84   ANIONGAP 7  --  8 11 10  --  6 10  --   --  7   ELROY 9.0  --  8.7 8.9 8.3*  --  8.6 8.0*  --   --  8.9   *  --  121* 129* 124*  --  138* 148*  --   --  185*   ALBUMIN  --   --   --   --   --   --  3.0* 3.0*  --   --  3.4   PROTTOTAL  --   --   --   --   --   --  6.9 6.4*  --   --  7.1   BILITOTAL  --   --   --   --   --   --  0.3 0.6  --   --  0.6   ALKPHOS  --   --   --   --   --   --  70 72  --   --  121   ALT  --   --   --   --   --   --  111* 106*  --   --  55*   AST  --   --   --   --   --   --  68* 92*  --   --  67*   TROPI  --   --   --   --   --   --   --   --  4.139* 3.771* 0.262*   < > = values in this interval not displayed.    No results found for this or any previous visit (from the past 24 hour(s)).

## 2017-11-21 NOTE — PLAN OF CARE
Problem: Patient Care Overview  Goal: Plan of Care/Patient Progress Review  Outcome: No Change  Patient is A&Ox4. VSS on 1L O2 NC. Denied pain. Telemetry read NSR. Tolerating regular diet. Can be incontinent at times. L PIV SL. Potassium was 5.5 and blood sugars 183 overnight. Up with assist of 1 and walker to commode. Slept in recliner and encouraged weight shifting and elevating legs. BLE edema +1/+2. Will continue to monitor.

## 2017-11-21 NOTE — PROGRESS NOTES
Pt. On 1L nasal canula, breath sound diminished, neb. Treatment give as scheduled, will continue to follow.                         Nicki Birmingham

## 2017-11-22 ENCOUNTER — APPOINTMENT (OUTPATIENT)
Dept: OCCUPATIONAL THERAPY | Facility: CLINIC | Age: 82
DRG: 871 | End: 2017-11-22
Payer: COMMERCIAL

## 2017-11-22 ENCOUNTER — APPOINTMENT (OUTPATIENT)
Dept: PHYSICAL THERAPY | Facility: CLINIC | Age: 82
DRG: 871 | End: 2017-11-22
Payer: COMMERCIAL

## 2017-11-22 LAB
ANION GAP SERPL CALCULATED.3IONS-SCNC: 8 MMOL/L (ref 3–14)
BUN SERPL-MCNC: 64 MG/DL (ref 7–30)
CALCIUM SERPL-MCNC: 8.8 MG/DL (ref 8.5–10.1)
CHLORIDE SERPL-SCNC: 101 MMOL/L (ref 94–109)
CO2 SERPL-SCNC: 24 MMOL/L (ref 20–32)
CREAT SERPL-MCNC: 1.03 MG/DL (ref 0.52–1.04)
GFR SERPL CREATININE-BSD FRML MDRD: 52 ML/MIN/1.7M2
GLUCOSE BLDC GLUCOMTR-MCNC: 110 MG/DL (ref 70–99)
GLUCOSE BLDC GLUCOMTR-MCNC: 123 MG/DL (ref 70–99)
GLUCOSE BLDC GLUCOMTR-MCNC: 154 MG/DL (ref 70–99)
GLUCOSE BLDC GLUCOMTR-MCNC: 197 MG/DL (ref 70–99)
GLUCOSE SERPL-MCNC: 111 MG/DL (ref 70–99)
INTERPRETATION ECG - MUSE: NORMAL
POTASSIUM SERPL-SCNC: 4.8 MMOL/L (ref 3.4–5.3)
SODIUM SERPL-SCNC: 133 MMOL/L (ref 133–144)

## 2017-11-22 PROCEDURE — 94640 AIRWAY INHALATION TREATMENT: CPT | Mod: 76

## 2017-11-22 PROCEDURE — 97116 GAIT TRAINING THERAPY: CPT | Mod: GP

## 2017-11-22 PROCEDURE — 25000125 ZZHC RX 250: Performed by: HOSPITALIST

## 2017-11-22 PROCEDURE — 40000193 ZZH STATISTIC PT WARD VISIT

## 2017-11-22 PROCEDURE — 25000128 H RX IP 250 OP 636: Performed by: HOSPITALIST

## 2017-11-22 PROCEDURE — 25000128 H RX IP 250 OP 636: Performed by: INTERNAL MEDICINE

## 2017-11-22 PROCEDURE — 25000132 ZZH RX MED GY IP 250 OP 250 PS 637: Performed by: INTERNAL MEDICINE

## 2017-11-22 PROCEDURE — 80048 BASIC METABOLIC PNL TOTAL CA: CPT | Performed by: HOSPITALIST

## 2017-11-22 PROCEDURE — 00000146 ZZHCL STATISTIC GLUCOSE BY METER IP

## 2017-11-22 PROCEDURE — 97535 SELF CARE MNGMENT TRAINING: CPT | Mod: GO

## 2017-11-22 PROCEDURE — 36415 COLL VENOUS BLD VENIPUNCTURE: CPT | Performed by: HOSPITALIST

## 2017-11-22 PROCEDURE — 99207 ZZC CDG-MDM COMPONENT: MEETS MODERATE - UP CODED: CPT | Performed by: HOSPITALIST

## 2017-11-22 PROCEDURE — 27210995 ZZH RX 272: Performed by: INTERNAL MEDICINE

## 2017-11-22 PROCEDURE — 97140 MANUAL THERAPY 1/> REGIONS: CPT | Mod: GO

## 2017-11-22 PROCEDURE — 97530 THERAPEUTIC ACTIVITIES: CPT | Mod: GP

## 2017-11-22 PROCEDURE — 40000275 ZZH STATISTIC RCP TIME EA 10 MIN

## 2017-11-22 PROCEDURE — 94640 AIRWAY INHALATION TREATMENT: CPT

## 2017-11-22 PROCEDURE — 99212 OFFICE O/P EST SF 10 MIN: CPT

## 2017-11-22 PROCEDURE — 99233 SBSQ HOSP IP/OBS HIGH 50: CPT | Performed by: HOSPITALIST

## 2017-11-22 PROCEDURE — 25000125 ZZHC RX 250: Performed by: INTERNAL MEDICINE

## 2017-11-22 PROCEDURE — 12000000 ZZH R&B MED SURG/OB

## 2017-11-22 PROCEDURE — 40000133 ZZH STATISTIC OT WARD VISIT

## 2017-11-22 RX ORDER — PREDNISONE 20 MG/1
20 TABLET ORAL DAILY
Status: DISCONTINUED | OUTPATIENT
Start: 2017-11-23 | End: 2017-11-25 | Stop reason: HOSPADM

## 2017-11-22 RX ORDER — FUROSEMIDE 10 MG/ML
20 INJECTION INTRAMUSCULAR; INTRAVENOUS DAILY
Status: DISCONTINUED | OUTPATIENT
Start: 2017-11-22 | End: 2017-11-23

## 2017-11-22 RX ADMIN — PREDNISONE 40 MG: 20 TABLET ORAL at 09:03

## 2017-11-22 RX ADMIN — IPRATROPIUM BROMIDE AND ALBUTEROL SULFATE 3 ML: .5; 3 SOLUTION RESPIRATORY (INHALATION) at 15:09

## 2017-11-22 RX ADMIN — FLUTICASONE FUROATE AND VILANTEROL TRIFENATATE 1 PUFF: 100; 25 POWDER RESPIRATORY (INHALATION) at 09:05

## 2017-11-22 RX ADMIN — Medication 2.5 MG: at 22:53

## 2017-11-22 RX ADMIN — IPRATROPIUM BROMIDE AND ALBUTEROL SULFATE 3 ML: .5; 3 SOLUTION RESPIRATORY (INHALATION) at 11:14

## 2017-11-22 RX ADMIN — INSULIN ASPART 1 UNITS: 100 INJECTION, SOLUTION INTRAVENOUS; SUBCUTANEOUS at 18:37

## 2017-11-22 RX ADMIN — AZELASTINE HYDROCHLORIDE 1 SPRAY: 137 SPRAY, METERED NASAL at 09:04

## 2017-11-22 RX ADMIN — MONTELUKAST SODIUM 10 MG: 10 TABLET, FILM COATED ORAL at 21:25

## 2017-11-22 RX ADMIN — METOPROLOL TARTRATE 25 MG: 25 TABLET, FILM COATED ORAL at 09:08

## 2017-11-22 RX ADMIN — FUROSEMIDE 20 MG: 10 INJECTION, SOLUTION INTRAVENOUS at 09:05

## 2017-11-22 RX ADMIN — METOPROLOL TARTRATE 25 MG: 25 TABLET, FILM COATED ORAL at 21:25

## 2017-11-22 RX ADMIN — ALBUTEROL SULFATE 2.5 MG: 2.5 SOLUTION RESPIRATORY (INHALATION) at 01:06

## 2017-11-22 RX ADMIN — SIMVASTATIN 10 MG: 10 TABLET, FILM COATED ORAL at 21:25

## 2017-11-22 RX ADMIN — Medication 100 MG: at 21:24

## 2017-11-22 RX ADMIN — CEFTRIAXONE SODIUM 2 G: 10 INJECTION, POWDER, FOR SOLUTION INTRAVENOUS at 11:51

## 2017-11-22 RX ADMIN — OMEPRAZOLE 20 MG: 20 CAPSULE, DELAYED RELEASE ORAL at 09:05

## 2017-11-22 RX ADMIN — METOPROLOL TARTRATE 25 MG: 25 TABLET, FILM COATED ORAL at 15:52

## 2017-11-22 RX ADMIN — APIXABAN 2.5 MG: 2.5 TABLET, FILM COATED ORAL at 21:24

## 2017-11-22 RX ADMIN — Medication 100 MG: at 09:25

## 2017-11-22 RX ADMIN — AZELASTINE HYDROCHLORIDE 1 SPRAY: 137 SPRAY, METERED NASAL at 21:27

## 2017-11-22 RX ADMIN — IPRATROPIUM BROMIDE AND ALBUTEROL SULFATE 3 ML: .5; 3 SOLUTION RESPIRATORY (INHALATION) at 07:03

## 2017-11-22 RX ADMIN — IPRATROPIUM BROMIDE AND ALBUTEROL SULFATE 3 ML: .5; 3 SOLUTION RESPIRATORY (INHALATION) at 19:19

## 2017-11-22 RX ADMIN — APIXABAN 2.5 MG: 2.5 TABLET, FILM COATED ORAL at 09:05

## 2017-11-22 NOTE — PLAN OF CARE
Problem: Patient Care Overview  Goal: Plan of Care/Patient Progress Review  OT: Initiated lymphedema intervention today. Pt presents with 2+ B LE lymphedema. Wound cares completed prior to session.     Discharge Planner OT   Patient plan for discharge: Home with A from daughter    Current status: Completed lymphedema wrapping using compression gradient bandages. Pt and nursing in agreement with POC for B LE wraps to remain on for 23 hrs unless negative signs/symptoms arise and/or unable to tolerate. Provided pt with AE/DME resources as pt and daughter asking several questions.    Barriers to return to prior living situation: Lives alone; Bathtub; Stairs to enter and within home    Recommendations for discharge: Home with increased A from family for ADL/IADLs as needed and home OT    Rationale for recommendations: Pt limited by B LE edema, impaired safety, and decreased activity tolerance; thus, pt would continue to benefit from daily intervention. Pt's daughter on phone during session and plans to A pt upon return home.         Entered by: Kwame Porter 11/22/2017 1:51 PM

## 2017-11-22 NOTE — PLAN OF CARE
"Problem: Patient Care Overview  Goal: Plan of Care/Patient Progress Review  A&Ox4, pleasant/cooperative. On RA all evening shift, only desated to 89 when using BSC but immediately went back to mid 90's. Reports some BARONE at times. LS dim; also has loose cough. Started on low dose lasix today. 2+ BLE edema, socks saturated, noted to have large blister to each top of foot, L blister popped and is weeping, R is intact. Wrapped both feet in gauze kerlix roles. Encouraged pt to ambulate and elevate feet when sitting in chair- refuses to sleep in bed, states it \"easier to breathe with my asthma\". Up with assist of 1 and walker to BSC, incontinent at times. D/c likely in a couple days. Continue to monitor.       "

## 2017-11-22 NOTE — PROGRESS NOTES
WOC consult for BLE blisters:     Pt hx: (per MD notes): Genie Persaud is a 82 year old female who was admitted on 11/15/2017.  Past history of asthma, HTN, CAD, A-fib, aortic regurgitation who presents with shortness of breath, found to have sepsis due to RLL pneumonia.    D:  Pt with recent increased edema related to above issues.  BLE swollen and cool to the touch.  Right foot with very large clear blister approx 10 x 10 x +4cm, starting to weep and sag.  Additional clear blisters of various sizes noted to posterior right ankle, left dorsal foot, dorsal left toes.  Most blisters already with tiny openings and starting to deflate.  Large pool of clear fluid already all around pt's feet at assessment.  Unclear if plantar feet also weeping.  No noticeable weeping from rest of legs.  No erythema, no real tenderness noted.  Per report, OT coming this afternoon to hopefully wrap BLE.      I:  Able to easily assist blisters to deflate and release large amounts clear fluid.  The old wrinkly epidermal tissue was all kept in place so there are currently no notable wounds or open tissue.  Feet cleansed and covered with ABD pads and Kerlix.  Discussed POC with pt and nursing.     A:  Large BLE blisters likely related to the acute edema, were all very superficial and with clear fluid.  The old epidermal layers remain in place to act as natural barriers against infection and should be left alone to dry out and slough off in time.  Should these layers rub off, can cover any open areas with non-adherent gauze (Adaptic), but likely the tissue underneath is nearly or already intact.  Mild compression, if not contraindicated, should be helpful in preventing further blistering.      P:  Plan for bilateral feet:  Daily when wraps are off, until no longer weepin.  Cleanse gently with mild soap and water or wound cleanser, pat dry.   2.  If any areas of open moist tissue, cover these with pieces of Adaptic.  3.  Cover dorsal  feet, posterior right ankle, and any other weepy areas with gauze or ABD pads, and secure with Kerlix.  Label with time/date/initials.  4.  Compression per MD or Lymphedema.   5.  Encourage elevation of legs, and float heels when in bed.      WOC will return:  Weekly and prn  Face to face: 20 min

## 2017-11-22 NOTE — PLAN OF CARE
Problem: Patient Care Overview  Goal: Plan of Care/Patient Progress Review  Outcome: No Change   A&Ox4. Up with A1 and walker. Using BSC. Weaned to RA yesterday evening.  Needed 1L of O2 for 1 hr at about 1000 this am. Scheduled neb given for SOB. Able to wean back off O2 and sating in mid 90's after neb given. BARONE. All other VSS. Tele- NSR. Loose cough, nonproductive. Lasix given today per MD order for  weeping BLE +2, feet wrapped in abd dressing, and kerilix. Lymphedema wraps in place bilaterally.  Pt refuses to keep feet elevated due to feeling SOB when doing so. Pt refused stating it makes it hard to breathe. Spent most of shift on recliner. Will continue to monitor.

## 2017-11-22 NOTE — PLAN OF CARE
Problem: Patient Care Overview  Goal: Plan of Care/Patient Progress Review  PT: Discharge Planner PT   Patient plan for discharge: Return home.   Current status: Pt amb 40ftx2 with FWW, CGA. Required prolonged rest break due to SOB. She was just taken off supplemental oxygen a few hours ago, seems to be tolerating that fairly. SaO2 93% on return to room.  Barriers to return to prior living situation: Dec activity tolerance, weakness, falls risk.   Recommendations for discharge: TCU.  Rationale for recommendations: Pt lives alone with stairs. She would benefit from continued therapy to improve activity tolerance and safety prior to returning home.       Entered by: Jaqueline La 11/22/2017 3:15 PM

## 2017-11-22 NOTE — PROGRESS NOTES
[facility]    Hospitalist Progress Note      Assessment & Plan   Genie Persaud is a 82 year old female who was admitted on 11/15/2017.  Past history of asthma, HTN, CAD, A-fib, aortic regurgitation who presents with shortness of breath, found to have sepsis due to RLL pneumonia.    Severe sepsis and acute hypoxic respiratory failure due to right lower lobe pneumonia: Presents with tachycardia (130's), hypotension (80's systolic), tachypnea (40's), hypoxia with CXR showing right lower lobe consolidation. Initially requiring Bipap support, sepsis resolved with IVF and antibiotics.  Completed azithromycin 11/19.  - continue ceftriaxone (started 11/15)    Acute stress cardiomyopathy, CAD, HTN:  Serial trop at admission with peak of 4.1.  TTE with EF 35% (prior 40-45%) with WMA consistent with stress cardiomyopathy.    - continue metoprolol 25 mg tid  - tolerated initiation of lasix 20 mg IV, will continue daily  - monitor I/O's, daily weights (weight down today, edema slightly improved)  - Cardiology recs appreciated    JOSLYN:  Baseline Cr 0.8-0.9.  Cr peak of 1.40 on 11/19 due to diuretics.  - Cr improved to 1.03 on 11/22, monitor daily with diuretic    Moderate persistent asthma:  Continue PTA montelukast, Advair (Breo substituted).  Placed on steroids due to concern for flare related to pneumonia.  - continue prednisone 40 mg daily, will start taper with decrease to 20 mg daily tomorrow    A-fib with RVR:  Known history.  Developed intermittent A-fib with RVR in setting of sepsis.  CHADS2-VASC of 6.  - remains in NSR since 11/19  - continue amiodarone (started 11/18) and apixaban (started 11/19)  - QTc stable 11/21    DVT Prophylaxis: Pneumatic Compression Devices  Code Status: DNR    Disposition: Expected discharge in 2 days once adequately diuresed.    Phillip Bonner    Interval History   Continues with orthopnea and dyspnea.  No chest pressure.  Reports episode of racing palpitations overnight (no  A-fib on tele).  Frustrated over slow improvement.  Reports increased urine output with lasix.  No other complaints.    -Data reviewed today: I reviewed all new labs and imaging results over the last 24 hours. I personally reviewed no images or EKG's today.    Physical Exam   Temp: 96.2  F (35.7  C) Temp src: Oral BP: 136/47 Pulse: 85 Heart Rate: 86 Resp: 16 SpO2: 94 % O2 Device: None (Room air) Oxygen Delivery: 1 LPM  Vitals:    11/17/17 0658 11/21/17 0429 11/22/17 0506   Weight: 68.9 kg (152 lb) 70 kg (154 lb 6.4 oz) 67.2 kg (148 lb 3.2 oz)     Vital Signs with Ranges  Temp:  [95.5  F (35.3  C)-97  F (36.1  C)] 96.2  F (35.7  C)  Pulse:  [85] 85  Heart Rate:  [76-89] 86  Resp:  [16-18] 16  BP: (111-141)/(41-48) 136/47  SpO2:  [92 %-97 %] 94 %  I/O last 3 completed shifts:  In: 520 [P.O.:520]  Out: 600 [Urine:600]    Constitutional: Well developed, well nourished female in no acute distress, appearing fatigued  Respiratory: Slightly diminished right basilar lung sounds, somewhat poor air movement, no wheezing, no crackles, no tachypnea  Cardiovascular: regular rate and rhythm, normal S1/S2 without murmur, rubs or gallops  GI: abdomen soft, non-tender, non-distended, normal bowel sounds  Lymph:  2+ lower extremity edema to knee slightly improved, large blister to dorsal right foot, small blister of left dorsal foot  Other:  alert and appropriate, cranial nerves grossly intact    Medications     - MEDICATION INSTRUCTIONS -         furosemide  20 mg Intravenous Daily     [START ON 11/23/2017] predniSONE  20 mg Oral Daily     apixaban ANTICOAGULANT  2.5 mg Oral BID     predniSONE  40 mg Oral Daily     metoprolol  25 mg Oral TID     amiodarone  100 mg Oral BID     cefTRIAXone  2 g Intravenous Q24H     azelastine  1 spray Both Nostrils BID     fluticasone-vilanterol  1 puff Inhalation Daily     montelukast (SINGULAIR) tablet 10 mg  10 mg Oral At Bedtime     omeprazole  20 mg Oral Daily     simvastatin  10 mg Oral At  Bedtime     ipratropium - albuterol 0.5 mg/2.5 mg/3 mL  3 mL Nebulization 4x Daily     insulin aspart  1-3 Units Subcutaneous TID AC     insulin aspart  1-3 Units Subcutaneous At Bedtime     docusate sodium  100 mg Oral BID     sodium chloride (PF)  3 mL Intracatheter Q8H     sodium chloride (PF)  10 mL Intravenous Once       Data     Recent Labs  Lab 11/22/17  0713 11/21/17  0706 11/20/17  1615 11/20/17  0705  11/18/17  1605  11/17/17  0730 11/16/17  0455  11/15/17  1800 11/15/17  1420   WBC  --  17.1*  --  15.3*  --  17.6*  --  23.8* 22.8*  < >  --   --    HGB  --  11.8  --  11.7  --  11.3*  --  12.0 11.4*  < >  --   --    MCV  --  87  --  89  --  90  --  92 92  < >  --   --    PLT  --  212  --  229  --  258  < > 256 211  < >  --   --     132*  --  133  < > 135  --  133 137  < >  --   --    POTASSIUM 4.8 5.1 5.5* 5.6*  < > 5.0  --  4.9 4.2  < >  --   --    CHLORIDE 101 102  --  102  < > 103  --  105 104  < >  --   --    CO2 24 23  --  23  < > 22  --  22 23  < >  --   --    BUN 64* 71*  --  82*  < > 65*  --  43* 26  < >  --   --    CR 1.03 1.15*  --  1.27*  < > 1.37*  < > 0.98 0.86  < >  --   --    ANIONGAP 8 7  --  8  < > 10  --  6 10  < >  --   --    ELROY 8.8 9.0  --  8.7  < > 8.3*  --  8.6 8.0*  < >  --   --    * 124*  --  121*  < > 124*  --  138* 148*  < >  --   --    ALBUMIN  --   --   --   --   --   --   --  3.0* 3.0*  --   --   --    PROTTOTAL  --   --   --   --   --   --   --  6.9 6.4*  --   --   --    BILITOTAL  --   --   --   --   --   --   --  0.3 0.6  --   --   --    ALKPHOS  --   --   --   --   --   --   --  70 72  --   --   --    ALT  --   --   --   --   --   --   --  111* 106*  --   --   --    AST  --   --   --   --   --   --   --  68* 92*  --   --   --    TROPI  --   --   --   --   --   --   --   --   --   --  4.139* 3.771*   < > = values in this interval not displayed.    No results found for this or any previous visit (from the past 24 hour(s)).

## 2017-11-22 NOTE — PROGRESS NOTES
Care Transition Initial Assessment - SW  Reason For Consult: discharge planning  Met with: PATIENT    Principal Problem:    Acute respiratory failure with hypoxia (H)  Active Problems:    Essential hypertension with goal blood pressure less than 140/90    Coronary artery disease involving native coronary artery of native heart without angina pectoris    Nonrheumatic aortic valve insufficiency    Right lower lobe pneumonia (H)    Moderate asthma with exacerbation    Severe sepsis (H)    Pneumonia of right lower lobe due to infectious organism (H)    Sepsis, due to unspecified organism (H)    Pneumonia    Stress-induced cardiomyopathy         DATA  Lives With: alone  Living Arrangements: house  Description of Support System: Supportive, Involved  Who is your support system?: Children  Support Assessment: Adequate family and caregiver support.  Identified issues/concerns regarding health management: SW following for d/c needs.   Quality Of Family Relationships: supportive, involved  Transportation Available: car, family or friend will provide    ASSESSMENT  Cognitive Status: Alert and oriented X4  Concerns to be addressed: Patient is a 82 year old female who was admitted to the hospital for acute respiratory failure with hypoxia. Physical Therapy is recommending TCU at d/c and Occupational therapy is recommending home with increased assist from family for ADL/IADLs and home OT. SW went to discuss recommendations and was asked to leave due to being on a long distance phone call. SW will return at a later time. MD anticipates patient will d/c in 2 days once adequately diuresed.      PLAN  Financial costs for the patient includes   Patient given options and choices for discharge   Patient/family is agreeable to the plan?    Patient Goals and Preferences: TBD- Home care vs TCU  Patient anticipates discharging to: TBD    BING Mccormick   *[phone]

## 2017-11-22 NOTE — PLAN OF CARE
Problem: Patient Care Overview  Goal: Plan of Care/Patient Progress Review  PT: Attempted to see pt. Pt refused because she stated nursing was working on her foot blisters.

## 2017-11-22 NOTE — PLAN OF CARE
Problem: Patient Care Overview  Goal: Plan of Care/Patient Progress Review  Outcome: No Change  A&Ox4. Up with A1 and walker. Using BSC. Weaned to RA yesterday evening, did need to apply 1L of O2 at beginning of this shift. PRN neb given for SOB. Able to wean back off O2 and sating in mid 90's after neb given. BARONE. All other VSS. Tele- NSR. Loose cough. Started on Lasix yesterday, weeping BLE +2, feet wrapped in gauze and kerilix. Reminded pt to try and keep feet elevated, but pt refused stating it makes it hard to breathe. Wanted to sleep in recliner overnight. Will continue to monitor.

## 2017-11-22 NOTE — DISCHARGE INSTRUCTIONS
A follow-up appointment was scheduled for you [see above].  It is very important to go to it--bring these papers and your insurance card with you.  At the visit, talk about your hospital stay.  Tell your doctor how you feel.  Show your new list of medications.  Your doctor will update records, make sure you are still doing OK, and decide if any tests or medication changes are needed.      Your doctor has ordered home care to help you after your hospital stay.  They will contact you regarding your first visit.  This service will be provided by [facility].  If you have not received a call within 48 hours of discharge, please call them at [phone].      Plan for bilateral feet:    Frequency: daily when wraps are off, until no longer weepin.  Cleanse gently with mild soap and water or wound cleanser, pat dry.   2.  If any areas of open moist tissue, cover these with pieces of Adaptic.  3.  Cover dorsal (top of) feet, posterior (back of) right ankle, and any other weepy areas with gauze or ABD pads, and secure with Kerlix.  Label with time/date/initials.  4.  Compression per MD or Lymphedema.   5.  Encourage elevation of legs, and float heels when in bed.

## 2017-11-23 ENCOUNTER — APPOINTMENT (OUTPATIENT)
Dept: OCCUPATIONAL THERAPY | Facility: CLINIC | Age: 82
DRG: 871 | End: 2017-11-23
Payer: COMMERCIAL

## 2017-11-23 LAB
ANION GAP SERPL CALCULATED.3IONS-SCNC: 6 MMOL/L (ref 3–14)
BUN SERPL-MCNC: 57 MG/DL (ref 7–30)
CALCIUM SERPL-MCNC: 8.9 MG/DL (ref 8.5–10.1)
CHLORIDE SERPL-SCNC: 100 MMOL/L (ref 94–109)
CO2 SERPL-SCNC: 27 MMOL/L (ref 20–32)
CREAT SERPL-MCNC: 1 MG/DL (ref 0.52–1.04)
ERYTHROCYTE [DISTWIDTH] IN BLOOD BY AUTOMATED COUNT: 13.5 % (ref 10–15)
GFR SERPL CREATININE-BSD FRML MDRD: 53 ML/MIN/1.7M2
GLUCOSE BLDC GLUCOMTR-MCNC: 115 MG/DL (ref 70–99)
GLUCOSE BLDC GLUCOMTR-MCNC: 123 MG/DL (ref 70–99)
GLUCOSE BLDC GLUCOMTR-MCNC: 166 MG/DL (ref 70–99)
GLUCOSE BLDC GLUCOMTR-MCNC: 193 MG/DL (ref 70–99)
GLUCOSE SERPL-MCNC: 108 MG/DL (ref 70–99)
HCT VFR BLD AUTO: 33.8 % (ref 35–47)
HGB BLD-MCNC: 11.6 G/DL (ref 11.7–15.7)
INTERPRETATION ECG - MUSE: NORMAL
MCH RBC QN AUTO: 30.4 PG (ref 26.5–33)
MCHC RBC AUTO-ENTMCNC: 34.3 G/DL (ref 31.5–36.5)
MCV RBC AUTO: 89 FL (ref 78–100)
PLATELET # BLD AUTO: 238 10E9/L (ref 150–450)
POTASSIUM SERPL-SCNC: 4.5 MMOL/L (ref 3.4–5.3)
RBC # BLD AUTO: 3.82 10E12/L (ref 3.8–5.2)
SODIUM SERPL-SCNC: 133 MMOL/L (ref 133–144)
WBC # BLD AUTO: 16.6 10E9/L (ref 4–11)

## 2017-11-23 PROCEDURE — 40000133 ZZH STATISTIC OT WARD VISIT

## 2017-11-23 PROCEDURE — 00000146 ZZHCL STATISTIC GLUCOSE BY METER IP

## 2017-11-23 PROCEDURE — 94640 AIRWAY INHALATION TREATMENT: CPT | Mod: 76

## 2017-11-23 PROCEDURE — 36415 COLL VENOUS BLD VENIPUNCTURE: CPT | Performed by: HOSPITALIST

## 2017-11-23 PROCEDURE — 94640 AIRWAY INHALATION TREATMENT: CPT

## 2017-11-23 PROCEDURE — 99233 SBSQ HOSP IP/OBS HIGH 50: CPT | Performed by: HOSPITALIST

## 2017-11-23 PROCEDURE — 25000125 ZZHC RX 250: Performed by: INTERNAL MEDICINE

## 2017-11-23 PROCEDURE — 40000275 ZZH STATISTIC RCP TIME EA 10 MIN

## 2017-11-23 PROCEDURE — 80048 BASIC METABOLIC PNL TOTAL CA: CPT | Performed by: HOSPITALIST

## 2017-11-23 PROCEDURE — 25000132 ZZH RX MED GY IP 250 OP 250 PS 637: Performed by: INTERNAL MEDICINE

## 2017-11-23 PROCEDURE — 12000000 ZZH R&B MED SURG/OB

## 2017-11-23 PROCEDURE — 25000128 H RX IP 250 OP 636: Performed by: INTERNAL MEDICINE

## 2017-11-23 PROCEDURE — 25000128 H RX IP 250 OP 636: Performed by: HOSPITALIST

## 2017-11-23 PROCEDURE — 97140 MANUAL THERAPY 1/> REGIONS: CPT | Mod: GO

## 2017-11-23 PROCEDURE — 27210995 ZZH RX 272: Performed by: INTERNAL MEDICINE

## 2017-11-23 PROCEDURE — 85027 COMPLETE CBC AUTOMATED: CPT | Performed by: HOSPITALIST

## 2017-11-23 PROCEDURE — 25000132 ZZH RX MED GY IP 250 OP 250 PS 637: Performed by: HOSPITALIST

## 2017-11-23 PROCEDURE — 25000125 ZZHC RX 250: Performed by: HOSPITALIST

## 2017-11-23 RX ORDER — FUROSEMIDE 10 MG/ML
40 INJECTION INTRAMUSCULAR; INTRAVENOUS DAILY
Status: DISCONTINUED | OUTPATIENT
Start: 2017-11-23 | End: 2017-11-25 | Stop reason: HOSPADM

## 2017-11-23 RX ORDER — LORAZEPAM 0.5 MG/1
0.25 TABLET ORAL
Status: DISCONTINUED | OUTPATIENT
Start: 2017-11-23 | End: 2017-11-25 | Stop reason: HOSPADM

## 2017-11-23 RX ADMIN — PREDNISONE 20 MG: 20 TABLET ORAL at 10:17

## 2017-11-23 RX ADMIN — OMEPRAZOLE 20 MG: 20 CAPSULE, DELAYED RELEASE ORAL at 10:17

## 2017-11-23 RX ADMIN — APIXABAN 2.5 MG: 2.5 TABLET, FILM COATED ORAL at 10:17

## 2017-11-23 RX ADMIN — SIMVASTATIN 10 MG: 10 TABLET, FILM COATED ORAL at 21:34

## 2017-11-23 RX ADMIN — FUROSEMIDE 40 MG: 10 INJECTION, SOLUTION INTRAVENOUS at 10:16

## 2017-11-23 RX ADMIN — MONTELUKAST SODIUM 10 MG: 10 TABLET, FILM COATED ORAL at 21:35

## 2017-11-23 RX ADMIN — IPRATROPIUM BROMIDE AND ALBUTEROL SULFATE 3 ML: .5; 3 SOLUTION RESPIRATORY (INHALATION) at 15:39

## 2017-11-23 RX ADMIN — IPRATROPIUM BROMIDE AND ALBUTEROL SULFATE 3 ML: .5; 3 SOLUTION RESPIRATORY (INHALATION) at 11:49

## 2017-11-23 RX ADMIN — Medication 100 MG: at 10:16

## 2017-11-23 RX ADMIN — APIXABAN 2.5 MG: 2.5 TABLET, FILM COATED ORAL at 21:34

## 2017-11-23 RX ADMIN — METOPROLOL SUCCINATE 75 MG: 50 TABLET, EXTENDED RELEASE ORAL at 10:16

## 2017-11-23 RX ADMIN — ALBUTEROL SULFATE 2.5 MG: 2.5 SOLUTION RESPIRATORY (INHALATION) at 02:23

## 2017-11-23 RX ADMIN — FLUTICASONE FUROATE AND VILANTEROL TRIFENATATE 1 PUFF: 100; 25 POWDER RESPIRATORY (INHALATION) at 10:17

## 2017-11-23 RX ADMIN — IPRATROPIUM BROMIDE AND ALBUTEROL SULFATE 3 ML: .5; 3 SOLUTION RESPIRATORY (INHALATION) at 07:30

## 2017-11-23 RX ADMIN — IPRATROPIUM BROMIDE AND ALBUTEROL SULFATE 3 ML: .5; 3 SOLUTION RESPIRATORY (INHALATION) at 19:02

## 2017-11-23 RX ADMIN — INSULIN ASPART 1 UNITS: 100 INJECTION, SOLUTION INTRAVENOUS; SUBCUTANEOUS at 16:42

## 2017-11-23 RX ADMIN — AZELASTINE HYDROCHLORIDE 1 SPRAY: 137 SPRAY, METERED NASAL at 21:34

## 2017-11-23 RX ADMIN — Medication 100 MG: at 21:35

## 2017-11-23 RX ADMIN — CEFTRIAXONE SODIUM 2 G: 10 INJECTION, POWDER, FOR SOLUTION INTRAVENOUS at 14:28

## 2017-11-23 NOTE — PLAN OF CARE
Problem: Patient Care Overview  Goal: Plan of Care/Patient Progress Review  Outcome: No Change  A&Ox4. VSS. Pt was complaining of SOB overnight, tried PRN neb as that has helped in the past, little relief from neb treatment. Did have to apply 1L O2 overnight to keep O2 saturation up. BARONE. Pt has a dry non-productive cough. Tele- NSR. Wanted to sleep in  next to bed, stated she gets too short of breath when sleeping in the bed. Lymphedema wraps on BLE. 0200 blood glucose check was 123. Plan is to discharge 1-2 days pending ability to keep off oxygen. Will continue to monitor.

## 2017-11-23 NOTE — PLAN OF CARE
Problem: Patient Care Overview  Goal: Plan of Care/Patient Progress Review  Outcome: No Change  VSS. Sats 93 on RA. Tele SR with PAC. Frequent, dry cough. LS dim. Reports BARONE. Lymph wraps in place to BLE. Pt unable to tolerate legs being elevated. Stress incontinence at times with cough. Education on repositioning routinely when up in chair. Up with assist of 1 and walker. Therapy recommending TCU at d/c; likely 1-2 days. Continue to monitor.

## 2017-11-23 NOTE — PLAN OF CARE
Problem: Pneumonia (Adult)  Goal: Signs and Symptoms of Listed Potential Problems Will be Absent, Minimized or Managed (Pneumonia)  Signs and symptoms of listed potential problems will be absent, minimized or managed by discharge/transition of care (reference Pneumonia (Adult) CPG).   Outcome: Improving  A&Ox4. VSS. Pt on 1L nasal canula.  Up in chair all shift, encouraged to shift weight.  Lymph wraps changed at 1400 by OT, wound care done to feet, weeping both feet, blister L great toe and heel bilaterally.  Pt received Lasix, upto commode.  Plan to discharge 1-2 days.

## 2017-11-23 NOTE — PLAN OF CARE
Problem: Patient Care Overview  Goal: Plan of Care/Patient Progress Review  Discharge Planner OT   Patient plan for discharge: Home with A from daughter    Current status: Improvements noted in B LE lymphedema; however, still present. Nursing finishing up wound and skin cares at start of session. Provided new materials and applied lymphedema wraps. Pt and nursing in agreement with POC for nursing to carryover lymphedema wrapping starting tomorrow, 11.24.17.     Barriers to return to prior living situation: Fall risk; Lives alone; Stairs to enter and within home; Bathtub    Recommendations for discharge: TCU; however, pt hopeful to return home. If discharges home, recommend increased A from family for all IADLs and home OT for ongoing intervention.    Rationale for recommendations: Pt limited by impaired safety, decreased balance and activity tolerance, and B LE edema; thus, will continue with daily intervention to progress I and safety with ADLs. Pt's daughter coming to stay with pt to provide A as needed.        Entered by: Kwame Porter 11/23/2017 2:03 PM

## 2017-11-23 NOTE — PROGRESS NOTES
[facility]    Hospitalist Progress Note      Assessment & Plan   Genie Persaud is a 82 year old female who was admitted on 11/15/2017.  Past history of asthma, HTN, CAD, A-fib, aortic regurgitation who presents with shortness of breath, found to have sepsis due to RLL pneumonia.    Severe sepsis and acute hypoxic respiratory failure due to right lower lobe pneumonia: Presents with tachycardia (130's), hypotension (80's systolic), tachypnea (40's), hypoxia with CXR showing right lower lobe consolidation. Initially requiring Bipap support, sepsis resolved with IVF and antibiotics.  Completed azithromycin 11/19.  - continue ceftriaxone (started 11/15), plan for 2-week course    Acute stress cardiomyopathy, CAD, HTN:  Serial trop at admission with peak of 4.1.  TTE with EF 35% (prior 40-45%) with WMA consistent with stress cardiomyopathy.  Cardiology signed off, follow up with Dr. Hernandez as scheduled in January 2018 with TTE one week prior to appt.  - stop short-acting metoprolol 25 mg tid, start Toprol XL 75 mg daily 11/23  - no significant diuresis, increase lasix to 40 mg IV daily  - I/O's not accurate due to some incontinence, weights have been fluctuating (have requested standing weights only)    JOSLYN:  Baseline Cr 0.8-0.9.  Cr peak of 1.40 on 11/19 due to diuretics.  - Cr stable at 1.0 11/23, monitor daily with diuretic    Moderate persistent asthma:  Continue PTA montelukast, Advair (Breo substituted).  Placed on steroids due to concern for flare related to pneumonia.  - decrease prednisone to 20 mg daily 11/23    A-fib with RVR:  Known history.  Developed intermittent A-fib with RVR in setting of sepsis.  CHADS2-VASC of 6.  Cardiology recommends 30-day event monitor at discharge.  - remains in NSR since 11/19  - continue amiodarone (started 11/18) and apixaban (started 11/19)    DVT Prophylaxis: Pneumatic Compression Devices  Code Status: DNR    Disposition: Expected discharge once adequately  diuresed.    Phillip Kailey    Interval History   Reports difficult night with orthopnea.  Cough remains unchanged, no fever/chills.  Ongoing diarrhea, unchanged and without abdominal pain.  Questions as to whether anxiety about returning home is causing some panicky feelings at night and contributing to poor sleep.  Has tolerated ambien well, so discussed trial of low dose lorazepam to which she is open.    -Data reviewed today: I reviewed all new labs and imaging results over the last 24 hours. I personally reviewed no images or EKG's today.    Physical Exam   Temp: 96.3  F (35.7  C) Temp src: Oral BP: 129/44   Heart Rate: 80 Resp: 16 SpO2: 96 % O2 Device: Nasal cannula Oxygen Delivery: 1 LPM  Vitals:    11/21/17 0429 11/22/17 0506 11/23/17 0700   Weight: 70 kg (154 lb 6.4 oz) 67.2 kg (148 lb 3.2 oz) 69.9 kg (154 lb)     Vital Signs with Ranges  Temp:  [95.9  F (35.5  C)-97.4  F (36.3  C)] 96.3  F (35.7  C)  Heart Rate:  [72-84] 80  Resp:  [15-18] 16  BP: (103-138)/(40-47) 129/44  SpO2:  [93 %-96 %] 96 %  I/O last 3 completed shifts:  In: 1020 [P.O.:1020]  Out: 1225 [Urine:1225]    Constitutional: Well developed, well nourished female in no acute distress, appearing fatigued  Respiratory: Lungs clear bilaterally, somewhat poor air movement, no wheezing, no crackles, no tachypnea  Cardiovascular: regular rate and rhythm, normal S1/S2 without murmur, rubs or gallops  GI: abdomen soft, non-tender, non-distended, normal bowel sounds  Lymph:  2+ lower extremity edema to knee   Other:  alert and appropriate, cranial nerves grossly intact    Medications     - MEDICATION INSTRUCTIONS -         furosemide  40 mg Intravenous Daily     predniSONE  20 mg Oral Daily     apixaban ANTICOAGULANT  2.5 mg Oral BID     metoprolol  25 mg Oral TID     amiodarone  100 mg Oral BID     cefTRIAXone  2 g Intravenous Q24H     azelastine  1 spray Both Nostrils BID     fluticasone-vilanterol  1 puff Inhalation Daily     montelukast (SINGULAIR)  tablet 10 mg  10 mg Oral At Bedtime     omeprazole  20 mg Oral Daily     simvastatin  10 mg Oral At Bedtime     ipratropium - albuterol 0.5 mg/2.5 mg/3 mL  3 mL Nebulization 4x Daily     insulin aspart  1-3 Units Subcutaneous TID AC     insulin aspart  1-3 Units Subcutaneous At Bedtime     docusate sodium  100 mg Oral BID     sodium chloride (PF)  3 mL Intracatheter Q8H     sodium chloride (PF)  10 mL Intravenous Once       Data     Recent Labs  Lab 11/23/17  0655 11/22/17  0713 11/21/17  0706  11/20/17  0705  11/17/17  0730   WBC 16.6*  --  17.1*  --  15.3*  < > 23.8*   HGB 11.6*  --  11.8  --  11.7  < > 12.0   MCV 89  --  87  --  89  < > 92     --  212  --  229  < > 256    133 132*  --  133  < > 133   POTASSIUM 4.5 4.8 5.1  < > 5.6*  < > 4.9   CHLORIDE 100 101 102  --  102  < > 105   CO2 27 24 23  --  23  < > 22   BUN 57* 64* 71*  --  82*  < > 43*   CR 1.00 1.03 1.15*  --  1.27*  < > 0.98   ANIONGAP 6 8 7  --  8  < > 6   ELROY 8.9 8.8 9.0  --  8.7  < > 8.6   * 111* 124*  --  121*  < > 138*   ALBUMIN  --   --   --   --   --   --  3.0*   PROTTOTAL  --   --   --   --   --   --  6.9   BILITOTAL  --   --   --   --   --   --  0.3   ALKPHOS  --   --   --   --   --   --  70   ALT  --   --   --   --   --   --  111*   AST  --   --   --   --   --   --  68*   < > = values in this interval not displayed.    No results found for this or any previous visit (from the past 24 hour(s)).

## 2017-11-23 NOTE — PLAN OF CARE
Problem: Patient Care Overview  Goal: Plan of Care/Patient Progress Review  Outcome: No Change  A&Ox4. VSS on RA. Tele NSR. Up with A1 and walker. Walked in bass x1 this evening. SOB on exertion, good dry nonproductive cough. LS dim. Scheduled nebs. Up in chair this evening.  Lymphedema wraps on. +2 edema BLE. Glu 154 at HS, no insulin given. Plans for discharge home vs tcu 1-2 days.

## 2017-11-24 ENCOUNTER — APPOINTMENT (OUTPATIENT)
Dept: OCCUPATIONAL THERAPY | Facility: CLINIC | Age: 82
DRG: 871 | End: 2017-11-24
Payer: COMMERCIAL

## 2017-11-24 ENCOUNTER — APPOINTMENT (OUTPATIENT)
Dept: PHYSICAL THERAPY | Facility: CLINIC | Age: 82
DRG: 871 | End: 2017-11-24
Payer: COMMERCIAL

## 2017-11-24 LAB
ANION GAP SERPL CALCULATED.3IONS-SCNC: 7 MMOL/L (ref 3–14)
BUN SERPL-MCNC: 45 MG/DL (ref 7–30)
CALCIUM SERPL-MCNC: 9 MG/DL (ref 8.5–10.1)
CHLORIDE SERPL-SCNC: 98 MMOL/L (ref 94–109)
CO2 SERPL-SCNC: 28 MMOL/L (ref 20–32)
CREAT SERPL-MCNC: 0.98 MG/DL (ref 0.52–1.04)
GFR SERPL CREATININE-BSD FRML MDRD: 55 ML/MIN/1.7M2
GLUCOSE BLDC GLUCOMTR-MCNC: 110 MG/DL (ref 70–99)
GLUCOSE BLDC GLUCOMTR-MCNC: 129 MG/DL (ref 70–99)
GLUCOSE BLDC GLUCOMTR-MCNC: 146 MG/DL (ref 70–99)
GLUCOSE BLDC GLUCOMTR-MCNC: 188 MG/DL (ref 70–99)
GLUCOSE SERPL-MCNC: 98 MG/DL (ref 70–99)
PLATELET # BLD AUTO: 232 10E9/L (ref 150–450)
POTASSIUM SERPL-SCNC: 4 MMOL/L (ref 3.4–5.3)
SODIUM SERPL-SCNC: 133 MMOL/L (ref 133–144)

## 2017-11-24 PROCEDURE — 00000146 ZZHCL STATISTIC GLUCOSE BY METER IP

## 2017-11-24 PROCEDURE — 36415 COLL VENOUS BLD VENIPUNCTURE: CPT | Performed by: INTERNAL MEDICINE

## 2017-11-24 PROCEDURE — 94640 AIRWAY INHALATION TREATMENT: CPT | Mod: 76

## 2017-11-24 PROCEDURE — 94640 AIRWAY INHALATION TREATMENT: CPT

## 2017-11-24 PROCEDURE — 80048 BASIC METABOLIC PNL TOTAL CA: CPT | Performed by: INTERNAL MEDICINE

## 2017-11-24 PROCEDURE — 25000132 ZZH RX MED GY IP 250 OP 250 PS 637: Performed by: INTERNAL MEDICINE

## 2017-11-24 PROCEDURE — 27210995 ZZH RX 272: Performed by: INTERNAL MEDICINE

## 2017-11-24 PROCEDURE — 97116 GAIT TRAINING THERAPY: CPT | Mod: GP

## 2017-11-24 PROCEDURE — 40000193 ZZH STATISTIC PT WARD VISIT

## 2017-11-24 PROCEDURE — 12000000 ZZH R&B MED SURG/OB

## 2017-11-24 PROCEDURE — 97140 MANUAL THERAPY 1/> REGIONS: CPT | Mod: GO

## 2017-11-24 PROCEDURE — 85049 AUTOMATED PLATELET COUNT: CPT | Performed by: INTERNAL MEDICINE

## 2017-11-24 PROCEDURE — 99233 SBSQ HOSP IP/OBS HIGH 50: CPT | Performed by: INTERNAL MEDICINE

## 2017-11-24 PROCEDURE — 25000128 H RX IP 250 OP 636: Performed by: INTERNAL MEDICINE

## 2017-11-24 PROCEDURE — 40000275 ZZH STATISTIC RCP TIME EA 10 MIN

## 2017-11-24 PROCEDURE — 25000132 ZZH RX MED GY IP 250 OP 250 PS 637: Performed by: HOSPITALIST

## 2017-11-24 PROCEDURE — 40000133 ZZH STATISTIC OT WARD VISIT

## 2017-11-24 PROCEDURE — 25000125 ZZHC RX 250: Performed by: INTERNAL MEDICINE

## 2017-11-24 PROCEDURE — 25000128 H RX IP 250 OP 636: Performed by: HOSPITALIST

## 2017-11-24 PROCEDURE — 25000125 ZZHC RX 250: Performed by: HOSPITALIST

## 2017-11-24 RX ORDER — CEFDINIR 300 MG/1
300 CAPSULE ORAL 2 TIMES DAILY
Status: DISCONTINUED | OUTPATIENT
Start: 2017-11-25 | End: 2017-11-25 | Stop reason: HOSPADM

## 2017-11-24 RX ADMIN — Medication 100 MG: at 21:45

## 2017-11-24 RX ADMIN — AZELASTINE HYDROCHLORIDE 1 SPRAY: 137 SPRAY, METERED NASAL at 21:49

## 2017-11-24 RX ADMIN — INSULIN ASPART 1 UNITS: 100 INJECTION, SOLUTION INTRAVENOUS; SUBCUTANEOUS at 13:33

## 2017-11-24 RX ADMIN — OMEPRAZOLE 20 MG: 20 CAPSULE, DELAYED RELEASE ORAL at 08:40

## 2017-11-24 RX ADMIN — FLUTICASONE FUROATE AND VILANTEROL TRIFENATATE 1 PUFF: 100; 25 POWDER RESPIRATORY (INHALATION) at 10:03

## 2017-11-24 RX ADMIN — APIXABAN 2.5 MG: 2.5 TABLET, FILM COATED ORAL at 21:45

## 2017-11-24 RX ADMIN — IPRATROPIUM BROMIDE AND ALBUTEROL SULFATE 3 ML: .5; 3 SOLUTION RESPIRATORY (INHALATION) at 07:22

## 2017-11-24 RX ADMIN — PREDNISONE 20 MG: 20 TABLET ORAL at 10:01

## 2017-11-24 RX ADMIN — IPRATROPIUM BROMIDE AND ALBUTEROL SULFATE 3 ML: .5; 3 SOLUTION RESPIRATORY (INHALATION) at 11:56

## 2017-11-24 RX ADMIN — APIXABAN 2.5 MG: 2.5 TABLET, FILM COATED ORAL at 10:01

## 2017-11-24 RX ADMIN — METOPROLOL SUCCINATE 75 MG: 50 TABLET, EXTENDED RELEASE ORAL at 10:01

## 2017-11-24 RX ADMIN — IPRATROPIUM BROMIDE AND ALBUTEROL SULFATE 3 ML: .5; 3 SOLUTION RESPIRATORY (INHALATION) at 19:20

## 2017-11-24 RX ADMIN — Medication 0.25 MG: at 01:04

## 2017-11-24 RX ADMIN — MONTELUKAST SODIUM 10 MG: 10 TABLET, FILM COATED ORAL at 21:44

## 2017-11-24 RX ADMIN — SIMVASTATIN 10 MG: 10 TABLET, FILM COATED ORAL at 21:45

## 2017-11-24 RX ADMIN — Medication 100 MG: at 10:01

## 2017-11-24 RX ADMIN — Medication 0.25 MG: at 21:49

## 2017-11-24 RX ADMIN — AZELASTINE HYDROCHLORIDE 1 SPRAY: 137 SPRAY, METERED NASAL at 10:03

## 2017-11-24 RX ADMIN — CEFTRIAXONE SODIUM 2 G: 10 INJECTION, POWDER, FOR SOLUTION INTRAVENOUS at 12:04

## 2017-11-24 RX ADMIN — FUROSEMIDE 40 MG: 10 INJECTION, SOLUTION INTRAVENOUS at 10:05

## 2017-11-24 NOTE — PROGRESS NOTES
[facility]    Hospitalist Progress Note      Assessment & Plan   Genie Persaud is a 82 year old female who was admitted on 11/15/2017.  Past history of asthma, HTN, CAD, A-fib, aortic regurgitation who presents with shortness of breath, found to have sepsis due to RLL pneumonia.    Severe sepsis and acute hypoxic respiratory failure due to right lower lobe pneumonia: Presents with tachycardia (130's), hypotension (80's systolic), tachypnea (40's), hypoxia with CXR showing right lower lobe consolidation. Initially requiring Bipap support, sepsis resolved with IVF and antibiotics.  Completed azithromycin 11/19.  - continue ceftriaxone (started 11/15), plan for 2-week course  -change to oral antibiotics in am    Acute stress cardiomyopathy, CAD, HTN:  Serial trop at admission with peak of 4.1.  TTE with EF 35% (prior 40-45%) with WMA consistent with stress cardiomyopathy.  Cardiology signed off, follow up with Dr. Hernandez as scheduled in January 2018 with TTE one week prior to appt.  - stop short-acting metoprolol 25 mg tid, start Toprol XL 75 mg daily 11/23  - no significant diuresis, increase lasix to 40 mg IV daily until tomorrow, then change to oral 40 mg daily starting Sunday.  - I/O's not accurate due to some incontinence, weights have been fluctuating (have requested standing weights only)    JOSLYN:  Baseline Cr 0.8-0.9.  Cr peak of 1.40 on 11/19 due to diuretics.  - Cr stable at 1.0 11/23, monitor daily with diuretic    Moderate persistent asthma:  Continue PTA montelukast, Advair (Breo substituted).  Placed on steroids due to concern for flare related to pneumonia.  - decrease prednisone to 20 mg daily 11/23  -taper prednisone over next 5 days     A-fib with RVR:  Known history.  Developed intermittent A-fib with RVR in setting of sepsis.  CHADS2-VASC of 6.  Cardiology recommends 30-day event monitor at discharge.  - remains in NSR since 11/19  - continue amiodarone (started 11/18) and apixaban  (started 11/19)    Lower extremity edema  -still with some weeping, better  -continue ace wraps and dressing changes  -will need home care  -change to oral Lasix in am.    DVT Prophylaxis: Pneumatic Compression Devices  Code Status: DNR    Disposition: Expected discharge once adequately diuresed.    Phillip Orozco    Interval History   Breathing is better and not needing CPAP/BIPAP  Cough remains unchanged, no fever/chills.  Will go home with home care, family here to help.      -Data reviewed today: I reviewed all new labs and imaging results over the last 24 hours. I personally reviewed no images or EKG's today.    Physical Exam   Temp: 96.3  F (35.7  C) Temp src: Oral BP: (!) 131/37   Heart Rate: 79 Resp: 16 SpO2: 94 % O2 Device: None (Room air) Oxygen Delivery: 1 LPM  Vitals:    11/22/17 0506 11/23/17 0700 11/23/17 1227   Weight: 67.2 kg (148 lb 3.2 oz) 69.9 kg (154 lb) 69.9 kg (154 lb)     Vital Signs with Ranges  Temp:  [95.6  F (35.3  C)-96.4  F (35.8  C)] 96.3  F (35.7  C)  Heart Rate:  [72-84] 79  Resp:  [16-18] 16  BP: (120-131)/(35-45) 131/37  SpO2:  [92 %-96 %] 94 %  I/O last 3 completed shifts:  In: 200 [P.O.:200]  Out: 1400 [Urine:1400]    Constitutional: Well developed, well nourished female in no acute distress, appearing fatigued  Respiratory: Lungs clear bilaterally, quiet with poor air movement, no wheezing, no crackles, no tachypnea  Cardiovascular: regular rate and rhythm, normal S1/S2 without murmur, rubs or gallops  GI: abdomen soft, non-tender, non-distended, normal bowel sounds  Lymph:  2+ lower extremity edema to knee, wraps in place  Other:  alert and appropriate, cranial nerves grossly intact    Medications     - MEDICATION INSTRUCTIONS -         furosemide  40 mg Intravenous Daily     metoprolol  75 mg Oral Daily     predniSONE  20 mg Oral Daily     apixaban ANTICOAGULANT  2.5 mg Oral BID     amiodarone  100 mg Oral BID     cefTRIAXone  2 g Intravenous Q24H     azelastine  1 spray Both  Nostrils BID     fluticasone-vilanterol  1 puff Inhalation Daily     montelukast (SINGULAIR) tablet 10 mg  10 mg Oral At Bedtime     omeprazole  20 mg Oral Daily     simvastatin  10 mg Oral At Bedtime     ipratropium - albuterol 0.5 mg/2.5 mg/3 mL  3 mL Nebulization 4x Daily     insulin aspart  1-3 Units Subcutaneous TID AC     insulin aspart  1-3 Units Subcutaneous At Bedtime     docusate sodium  100 mg Oral BID     sodium chloride (PF)  3 mL Intracatheter Q8H     sodium chloride (PF)  10 mL Intravenous Once       Data     Recent Labs  Lab 11/24/17  0735 11/23/17  0655 11/22/17  0713 11/21/17  0706  11/20/17  0705   WBC  --  16.6*  --  17.1*  --  15.3*   HGB  --  11.6*  --  11.8  --  11.7   MCV  --  89  --  87  --  89    238  --  212  --  229    133 133 132*  --  133   POTASSIUM 4.0 4.5 4.8 5.1  < > 5.6*   CHLORIDE 98 100 101 102  --  102   CO2 28 27 24 23  --  23   BUN 45* 57* 64* 71*  --  82*   CR 0.98 1.00 1.03 1.15*  --  1.27*   ANIONGAP 7 6 8 7  --  8   ELROY 9.0 8.9 8.8 9.0  --  8.7   GLC 98 108* 111* 124*  --  121*   < > = values in this interval not displayed.    No results found for this or any previous visit (from the past 24 hour(s)).

## 2017-11-24 NOTE — PLAN OF CARE
Problem: Patient Care Overview  Goal: Plan of Care/Patient Progress Review  Outcome: No Change  Alert and Oriented x4. VSS on RA. No O2 needed overnight. Tele- NSR. Up A1 +walker and GB to bedside commode. Incontinent at times. Regular diet. Lymph wraps rewrapped this evening because wraps applied by OT were soaked through. PRN Ativan given this shift to help sleep and decrease nighttime anxiety. Sleeping in recliner at bedside per pt request. Plans for discharge pending ability to keep off oxygen. Will continue to monitor.

## 2017-11-24 NOTE — PROGRESS NOTES
Care Transition Initial Assessment - SW  Reason For Consult: discharge planning  Met with: Patient    Principal Problem:    Acute respiratory failure with hypoxia (H)  Active Problems:    Right lower lobe pneumonia (H)    Moderate asthma with exacerbation    Severe sepsis (H)    Stress-induced cardiomyopathy    Essential hypertension with goal blood pressure less than 140/90    Coronary artery disease involving native coronary artery of native heart without angina pectoris    Nonrheumatic aortic valve insufficiency    Pneumonia of right lower lobe due to infectious organism (H)    Sepsis, due to unspecified organism (H)    Pneumonia         DATA  Lives With: alone  Living Arrangements: house  Description of Support System: Supportive, Involved  Who is your support system?: Children, Other (specify) ([employer] support)  Support Assessment: Adequate social supports.   Identified issues/concerns regarding health management: Both PT and OT recommend TCU for pt.            Quality Of Family Relationships: supportive, involved  Transportation Available: car, family or friend will provide      ASSESSMENT  Cognitive Status:  alert and oriented  Concerns to be addressed: TCU placement vs home care services .       PLAN  Financial costs for the patient includes none indicated .  Patient given options and choices for discharge Yes .  Patient/family is agreeable to the plan?  No.  Pt indicates preference to go home with daughter support and HC services.  Patient Goals and Preferences: home .  Patient anticipates discharging to:  home .    SW met with pt to discuss TCU recommendation.  Pt lives alone in a rambler home in [place].  Daughter Wilda is arriving from [place] today via plane to stay with pt at her home until next Wednesday.  Pt is .  Pt's son Brandon lives nearby in [place] and can support pt at home as well as needed.  Pt also keeps in contact with her daughter Elizabeth by phone.    Pt feels she has enough support at  "home, so TCU stay is not necessary.  Pt agrees with referral being made at discharge to [facility] for RN,PT,OT, lymphedema services.  Pt was informed by therapy that they will issue a walker at d/c for home use.  Pt has grab bars already installed in her home bathroom.  Pt is still very active in the community and still drives.  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  Pt goes by \"Christina\".  SWS will need to make referral to [facility] on day of d/c.    BING Li [phone]          "

## 2017-11-24 NOTE — PLAN OF CARE
Problem: Patient Care Overview  Goal: Plan of Care/Patient Progress Review  Patient plan for discharge: Return home.   Current status: Sit to/from stand with FWW and SBA. Amb 60 ft x 1 with FWW and SBA on RA. Up/down 3 steps x 1 with B rails and min assist. Stairs require much effort for pt to ascend and pt reports minor SOB. Seated rest break ~4 minutes post stair training. Sats pre-gait 93%, post-gait 94%. Pt returned to sitting in chair at end of session.  Barriers to return to prior living situation: Lives alone, stairs to enter and within the home, decreased activity tolerance, SOB with minimal activity, weakness, falls risk, only has help at home for the first few days.  Recommendations for discharge: TCU per the plan established by the Physical Therapist; however, pt adamant about returning home with family assist. Would benefit from 24 hr supervision and HHPT services.  Rationale for recommendations: Pt lives alone with stairs, will only have assist for the first few days. She would benefit from continued therapy to improve activity tolerance and safety prior to returning home.

## 2017-11-24 NOTE — PLAN OF CARE
Problem: Patient Care Overview  Goal: Plan of Care/Patient Progress Review  Outcome: Improving  A/O. VSS. On RA. Tele NSR. Denies pain. C/o infrequent dry cough, NP. BARONE. Continues on IV Lasix with good diuresis, incontinent at times and up to BSC. Lymph wraps soaked through. 1 large intact blister on right ankle which WOCN does not think needs to be drained. Wound care done and wraps applied at 1000. Pt had 2-3 soft stools; colace held. Good appetite today. IV ABX changed to PO. Up with A1W GB. Encouraged patient to shift in chair and to elevate legs; however, patient reports that her asthma limits her ability to elevate her legs because it affects her breathing. DC tomorrow home with home care and assistance from daughter visiting from [place].

## 2017-11-24 NOTE — PLAN OF CARE
Problem: Patient Care Overview  Goal: Plan of Care/Patient Progress Review  Outcome: No Change  A&Ox4. VSS on RA.  Up with SBA, walker and gait belt.  Nonproductive cough. Up in chair majority of shift, encouraged to shift weight.  Using bedside commode, incontinent at times.  Lymph wraps CDI, placed this afternoon.  Will continue to monitor.

## 2017-11-24 NOTE — PLAN OF CARE
Problem: Patient Care Overview  Goal: Plan of Care/Patient Progress Review  Discharge Planner OT   Patient plan for discharge: Home with A from daughter    Current status: Nursing provided lymphedema cares this morning due to significant weeping. Provided additional materials and discussed change in POC for wraps to remain on for 23 hrs and off for 1hr daily.     Barriers to return to prior living situation: Lives alone; Only has family A from family until next Wednesday; Stairs to enter home and within home; Bathtub; Fall risk; Safety concern    Recommendations for discharge: TCU; however, pt adamant about returning home. If discharging home, recommend 24 hr supervision for increased A with ADL/IADLs from family and home OT.     Rationale for recommendations: Pt limited by B LE edema, impaired safety, and decreased balance and activity tolerance; thus, will continue with OT intervention to ensure safety with ADLs.        Entered by: Kwame Porter 11/24/2017 1:04 PM

## 2017-11-24 NOTE — PHARMACY
Patient in donut hole.    Eliquis #60 copay = $156.  We can offer patient a free trial voucher for the first fill. This pt is not eligible for the  copay assistance card for refills as she/he has Part D coverage.    Thank you,  Isac Cruz [facility] Discharge Pharmacy Liaison  [phone]

## 2017-11-25 ENCOUNTER — APPOINTMENT (OUTPATIENT)
Dept: OCCUPATIONAL THERAPY | Facility: CLINIC | Age: 82
DRG: 871 | End: 2017-11-25
Payer: COMMERCIAL

## 2017-11-25 ENCOUNTER — APPOINTMENT (OUTPATIENT)
Dept: PHYSICAL THERAPY | Facility: CLINIC | Age: 82
DRG: 871 | End: 2017-11-25
Payer: COMMERCIAL

## 2017-11-25 VITALS
SYSTOLIC BLOOD PRESSURE: 123 MMHG | RESPIRATION RATE: 16 BRPM | DIASTOLIC BLOOD PRESSURE: 41 MMHG | WEIGHT: 151.6 LBS | HEIGHT: 63 IN | OXYGEN SATURATION: 95 % | HEART RATE: 73 BPM | BODY MASS INDEX: 26.86 KG/M2 | TEMPERATURE: 95.5 F

## 2017-11-25 LAB
ANION GAP SERPL CALCULATED.3IONS-SCNC: 8 MMOL/L (ref 3–14)
BUN SERPL-MCNC: 45 MG/DL (ref 7–30)
CALCIUM SERPL-MCNC: 8.8 MG/DL (ref 8.5–10.1)
CHLORIDE SERPL-SCNC: 95 MMOL/L (ref 94–109)
CO2 SERPL-SCNC: 30 MMOL/L (ref 20–32)
CREAT SERPL-MCNC: 0.93 MG/DL (ref 0.52–1.04)
GFR SERPL CREATININE-BSD FRML MDRD: 58 ML/MIN/1.7M2
GLUCOSE BLDC GLUCOMTR-MCNC: 144 MG/DL (ref 70–99)
GLUCOSE BLDC GLUCOMTR-MCNC: 82 MG/DL (ref 70–99)
GLUCOSE BLDC GLUCOMTR-MCNC: 97 MG/DL (ref 70–99)
GLUCOSE SERPL-MCNC: 89 MG/DL (ref 70–99)
POTASSIUM SERPL-SCNC: 3.8 MMOL/L (ref 3.4–5.3)
SODIUM SERPL-SCNC: 133 MMOL/L (ref 133–144)

## 2017-11-25 PROCEDURE — 94640 AIRWAY INHALATION TREATMENT: CPT

## 2017-11-25 PROCEDURE — 25000128 H RX IP 250 OP 636: Performed by: HOSPITALIST

## 2017-11-25 PROCEDURE — 25000132 ZZH RX MED GY IP 250 OP 250 PS 637: Performed by: INTERNAL MEDICINE

## 2017-11-25 PROCEDURE — 97116 GAIT TRAINING THERAPY: CPT | Mod: GP

## 2017-11-25 PROCEDURE — 25000125 ZZHC RX 250: Performed by: HOSPITALIST

## 2017-11-25 PROCEDURE — 40000133 ZZH STATISTIC OT WARD VISIT

## 2017-11-25 PROCEDURE — 97530 THERAPEUTIC ACTIVITIES: CPT | Mod: GP

## 2017-11-25 PROCEDURE — 80048 BASIC METABOLIC PNL TOTAL CA: CPT | Performed by: INTERNAL MEDICINE

## 2017-11-25 PROCEDURE — 94640 AIRWAY INHALATION TREATMENT: CPT | Mod: 76

## 2017-11-25 PROCEDURE — 97535 SELF CARE MNGMENT TRAINING: CPT | Mod: GO

## 2017-11-25 PROCEDURE — 36415 COLL VENOUS BLD VENIPUNCTURE: CPT | Performed by: INTERNAL MEDICINE

## 2017-11-25 PROCEDURE — 40000275 ZZH STATISTIC RCP TIME EA 10 MIN

## 2017-11-25 PROCEDURE — 25000125 ZZHC RX 250: Performed by: INTERNAL MEDICINE

## 2017-11-25 PROCEDURE — 99239 HOSP IP/OBS DSCHRG MGMT >30: CPT | Performed by: INTERNAL MEDICINE

## 2017-11-25 PROCEDURE — 00000146 ZZHCL STATISTIC GLUCOSE BY METER IP

## 2017-11-25 PROCEDURE — 40000193 ZZH STATISTIC PT WARD VISIT

## 2017-11-25 RX ORDER — AMIODARONE HYDROCHLORIDE 100 MG/1
200 TABLET ORAL 2 TIMES DAILY
Qty: 60 TABLET | Refills: 0 | Status: SHIPPED | OUTPATIENT
Start: 2017-11-25 | End: 2017-12-08

## 2017-11-25 RX ORDER — FUROSEMIDE 40 MG
40 TABLET ORAL DAILY
Qty: 30 TABLET | Refills: 0 | Status: SHIPPED | OUTPATIENT
Start: 2017-11-25 | End: 2018-01-18

## 2017-11-25 RX ORDER — CEFDINIR 300 MG/1
300 CAPSULE ORAL 2 TIMES DAILY
Qty: 6 CAPSULE | Refills: 0 | Status: SHIPPED | OUTPATIENT
Start: 2017-11-25 | End: 2017-12-21

## 2017-11-25 RX ORDER — PREDNISONE 5 MG/1
5 TABLET ORAL SEE ADMIN INSTRUCTIONS
Qty: 21 TABLET | Refills: 0 | Status: SHIPPED | OUTPATIENT
Start: 2017-11-25 | End: 2017-12-21

## 2017-11-25 RX ADMIN — PREDNISONE 20 MG: 20 TABLET ORAL at 08:07

## 2017-11-25 RX ADMIN — IPRATROPIUM BROMIDE AND ALBUTEROL SULFATE 3 ML: .5; 3 SOLUTION RESPIRATORY (INHALATION) at 07:09

## 2017-11-25 RX ADMIN — OMEPRAZOLE 20 MG: 20 CAPSULE, DELAYED RELEASE ORAL at 08:07

## 2017-11-25 RX ADMIN — APIXABAN 2.5 MG: 2.5 TABLET, FILM COATED ORAL at 08:07

## 2017-11-25 RX ADMIN — FUROSEMIDE 40 MG: 10 INJECTION, SOLUTION INTRAVENOUS at 08:07

## 2017-11-25 RX ADMIN — Medication 100 MG: at 08:07

## 2017-11-25 RX ADMIN — IPRATROPIUM BROMIDE AND ALBUTEROL SULFATE 3 ML: .5; 3 SOLUTION RESPIRATORY (INHALATION) at 11:00

## 2017-11-25 RX ADMIN — INSULIN ASPART 1 UNITS: 100 INJECTION, SOLUTION INTRAVENOUS; SUBCUTANEOUS at 12:59

## 2017-11-25 RX ADMIN — AZELASTINE HYDROCHLORIDE 1 SPRAY: 137 SPRAY, METERED NASAL at 08:08

## 2017-11-25 RX ADMIN — FLUTICASONE FUROATE AND VILANTEROL TRIFENATATE 1 PUFF: 100; 25 POWDER RESPIRATORY (INHALATION) at 08:08

## 2017-11-25 RX ADMIN — CEFDINIR 300 MG: 300 CAPSULE ORAL at 08:07

## 2017-11-25 NOTE — PLAN OF CARE
Problem: Patient Care Overview  Goal: Plan of Care/Patient Progress Review  Outcome: Improving  A/O x4. VSS. On RA. Tele NSR. Denies pain. Continues on IV Lasix with good diuresis, incontinent at times and up to BSC. Lymph wraps soaked through, wound cares done and wraps reapplied at 2200. Compression stocking off for 1 hour and reapplied at 2200. BS active, no BM this shift, colace held because pt had frequent soft stools this AM. Good appetite today. IV ABX changed to PO. Up with A1W GB, ambulated halls this evening. Encouraged patient to shift in chair and to elevate legs; however, patient reports that her asthma limits her ability to elevate her legs because it affects her breathing, able to encourage to elevate legs while sitting up in chair. PRN bedtime ativan given for insomnia. DC tomorrow home with home care and assistance from daughter. Daughter would like SW to call her before d/c tomorrow regarding what time d/c will be.

## 2017-11-25 NOTE — DISCHARGE SUMMARY
[facility]    Discharge Summary  Hospitalist    Date of Admission:  11/15/2017  Date of Discharge:  11/25/2017  Discharging Provider: Phillip Orozco MD  Date of Service (when I saw the patient): 11/25/17    Discharge Diagnoses   Severe se[sos and acute respiratory failure due to right lower lobe pneumonia.    History of Present Illness and Hospital Course  Genie Persaud is an 82 year old female who presented with shortness of breath and was found to have severe sepsis and RLL pneumonia.  Patient was initially hypotensive, tachycardic and hypoxic requiring BIPAP support.  She was treated with IV fluids and antibiotics and has improved (she will complete a 14 day course of antibiotics).  Patient was also noted to have an elevated troponin of 4.1 and had a TTE with an ejection fraction of 35% (prior 40-45%) with WMA consistent with stress induced cardiomyopathy.  She also developed atrial fibrillation with rapid ventricular response and was seen in consultation by cardiology.  She has a CHADS2-VASC of 6 and was started on Eliquis and amiodarone.  An 30 day event monitor is recommended at discharge.  As part of the sepsis syndrome she also developed acute kidney injury with a peak creatinine of 1.4, now normal.  She had some mild wheezing and was placed on a steroid taper.  She had an increase in lower extremity edema/lymphedema and responded well to Lasix.  She has some weeping blisters and is currently getting lower extremity wraps and wound care.       Phillip Orozco MD    Significant Results and Procedures   See below     Pending Results   These results will be followed up by Layo Sevilla   Unresulted Labs Ordered in the Past 30 Days of this Admission     No orders found from 9/16/2017 to 11/16/2017.          Code Status   DNR       Primary Care Physician   Layo Sevilla    Physical Exam   Temp: 95.5  F (35.3  C) Temp src: Oral BP: 123/41 Pulse: 73 Heart Rate: 82 Resp: 16 SpO2: 95 % O2 Device:  None (Room air)    Vitals:    11/23/17 0700 11/23/17 1227 11/25/17 0459   Weight: 69.9 kg (154 lb) 69.9 kg (154 lb) 68.8 kg (151 lb 9.6 oz)     Vital Signs with Ranges  Temp:  [95.5  F (35.3  C)-96.5  F (35.8  C)] 95.5  F (35.3  C)  Pulse:  [73] 73  Heart Rate:  [77-82] 82  Resp:  [16-18] 16  BP: (116-133)/(36-41) 123/41  SpO2:  [92 %-98 %] 95 %  I/O last 3 completed shifts:  In: 240 [P.O.:240]  Out: 900 [Urine:900]    Constitutional: Awake, alert, cooperative, no apparent distress.  Eyes: Conjunctiva and pupils examined and normal.  HEENT: Moist mucous membranes, normal dentition.  Respiratory: Clear to auscultation bilaterally, no crackles or wheezing.  Cardiovascular: Regular rate and rhythm, normal S1 and S2, and no murmur noted.  GI: Soft, non-distended, non-tender, normal bowel sounds.  Skin: No rashes, no cyanosis, 2+ lower extremity edema with some blistering around left great toe, right lateral malleolus and heels  Musculoskeletal: No joint swelling, erythema or tenderness.  Psychiatric: Alert, oriented to person, place and time, no obvious anxiety or depression.    Discharge Disposition   Admited to home care:  Recommended TRANISITIONAL CARE UNIT, but patient wants home with home cares and family help.   Discharged to home  Condition at discharge: Stable    Consultations This Hospital Stay   PHYSICAL THERAPY ADULT IP CONSULT  CARDIOLOGY IP CONSULT  PHYSICAL THERAPY ADULT IP CONSULT  OCCUPATIONAL THERAPY ADULT IP CONSULT  SOCIAL WORK IP CONSULT  WOUND OSTOMY CONTINENCE NURSE  IP CONSULT  SOCIAL WORK IP CONSULT    Time Spent on this Encounter   I, Phillip Orozco, personally saw the patient today and spent greater than 30 minutes discharging this patient.    Discharge Orders     Home care nursing referral     Home Care PT Referral for Hospital Discharge     Home Care OT Referral for Hospital Discharge     Cardiac event monitor     Reason for your hospital stay   Severe pneumonia with sepsis  Atrial  fibrillation  Lower extremity edema     Follow-up and recommended labs and tests    Follow up with primary care provider, Layo Sevilla, within one week, to evaluate medication change, to evaluate treatment change, for hospital follow- up and regarding new diagnosis. The following labs/tests are recommended: BMP.     Activity   Your activity upon discharge: activity as tolerated.  Elevate legs as able.     Wound care and dressings   Instructions to care for your wound at home: daily dressing changes for lower extremities.     When to contact your care team   Call your primary doctor if you have any of the following: temperature greater than 101,  increased shortness of breath, increased drainage, increased swelling or increased pain.     MD face to face encounter   Documentation of Face to Face and Certification for Home Health Services    I certify that patient: Genie Persaud is under my care and that I, or a nurse practitioner or physician's assistant working with me, had a face-to-face encounter that meets the physician face-to-face encounter requirements with this patient on: 11/25/2017.    This encounter with the patient was in whole, or in part, for the following medical condition, which is the primary reason for home health care: lymphedema care, physical therapy and occupational therapy.    I certify that, based on my findings, the following services are medically necessary home health services: Nursing, Occupational Therapy and Physical Therapy.    My clinical findings support the need for the above services because: Nurse is needed: To provide assessment and oversight required in the home to assure adherence to the medical plan due to: lower extremity edema and pneumonia, weakness. and To provide caregiver training to assist with: dressing changes.., Occupational Therapy Services are needed to assess and treat cognitive ability and address ADL safety due to impairment in functional capacity. and Physical  Therapy Services are needed to assess and treat the following functional impairments: weakness, lymphedema treatment.    Further, I certify that my clinical findings support that this patient is homebound (i.e. absences from home require considerable and taxing effort and are for medical reasons or Religion services or infrequently or of short duration when for other reasons) because: Requires assistance of another person or specialized equipment to access medical services because patient: Is unable to exit home safely on own due to: weakness and debilatation...    Based on the above findings. I certify that this patient is confined to the home and needs intermittent skilled nursing care, physical therapy and/or speech therapy.  The patient is under my care, and I have initiated the establishment of the plan of care.  This patient will be followed by a physician who will periodically review the plan of care.  Physician/Provider to provide follow up care: Layo Sevilla    Attending hospital physician (the Medicare certified [place] provider): Phillip Orozco  Physician Signature: See electronic signature associated with these discharge orders.  Date: 11/25/2017     DNR (Do Not Resuscitate)     Diet   Follow this diet upon discharge: Orders Placed This Encounter     Snacks/Supplements Adult: Ensure Plus (Adult); Between Meals     Advance Diet as Tolerated: Regular Diet Adult, low sodium       Discharge Medications   Current Discharge Medication List      START taking these medications    Details   amiodarone (PACERONE/CODARONE) 100 MG TABS tablet Take 2 tablets (200 mg) by mouth 2 times daily  Qty: 60 tablet, Refills: 0    Associated Diagnoses: Atrial fibrillation, unspecified type (H)      apixaban ANTICOAGULANT (ELIQUIS) 2.5 MG tablet Take 1 tablet (2.5 mg) by mouth 2 times daily  Qty: 60 tablet, Refills: 0    Associated Diagnoses: Atrial fibrillation, unspecified type (H)      cefdinir (OMNICEF) 300 MG capsule Take  1 capsule (300 mg) by mouth 2 times daily  Qty: 6 capsule, Refills: 0    Associated Diagnoses: Pneumonia of right lower lobe due to infectious organism (H)      furosemide (LASIX) 40 MG tablet Take 1 tablet (40 mg) by mouth daily  Qty: 30 tablet, Refills: 0    Associated Diagnoses: Edema, unspecified type      !! predniSONE (DELTASONE) 5 MG tablet Take 1 tablet (5 mg) by mouth See Admin Instructions Take 15 mg for three days, then 10 mg for 3 days, then stop  Qty: 21 tablet, Refills: 0    Associated Diagnoses: Moderate asthma with exacerbation, unspecified whether persistent      order for DME Equipment being ordered: Walker Wheels () and Walker ()  Treatment Diagnosis: Impaired gait  Qty: 1 each, Refills: 0    Associated Diagnoses: Pneumonia of right lower lobe due to infectious organism (H)       !! - Potential duplicate medications found. Please discuss with provider.      CONTINUE these medications which have NOT CHANGED    Details   azelastine (ASTELIN) 0.1 % spray Spray 2 sprays into both nostrils 2 times daily      ipratropium (ATROVENT) 0.03 % spray Spray 2 sprays in nostril 2 times daily      losartan (COZAAR) 100 MG tablet Take 1 tablet (100 mg) by mouth At Bedtime  Qty: 90 tablet, Refills: 1    Associated Diagnoses: Essential hypertension with goal blood pressure less than 140/90      !! PREDNISONE PO Take 10 mg by mouth daily as needed Per Dr. Metz/[facility]      MONTELUKAST SODIUM PO Take 10 mg by mouth every evening       metoprolol (TOPROL-XL) 25 MG 24 hr tablet Take 2 tablets (50 mg) by mouth daily  Qty: 60 tablet, Refills: 11    Associated Diagnoses: Essential hypertension with goal blood pressure less than 140/90      omeprazole (PRILOSEC) 20 MG CR capsule TAKE ONE CAPSULE BY MOUTH ONE TIME DAILY   Qty: 90 capsule, Refills: 2    Associated Diagnoses: Candidal esophagitis (H)      simvastatin (ZOCOR) 20 MG tablet Take 0.5 tablets (10 mg) by mouth At Bedtime  Qty: 45 tablet, Refills: 3     Associated Diagnoses: Hyperlipidemia LDL goal <130      fluticasone-salmeterol (ADVAIR-HFA) 230-21 MCG/ACT inhaler Inhale 2 puffs into the lungs 2 times daily  Qty: 12 g, Refills: 1      acetaminophen (TYLENOL) 500 MG tablet Take 500-1,000 mg by mouth every 8 hours as needed for mild pain      albuterol (PROAIR HFA, PROVENTIL HFA, VENTOLIN HFA) 108 (90 BASE) MCG/ACT inhaler Inhale 2 puffs into the lungs every 6 hours as needed       aspirin 81 MG EC tablet Take 1 tablet (81 mg) by mouth daily  Qty: 90 tablet, Refills: 3    Associated Diagnoses: Stroke (H)      calcium citrate-vitamin D (CALCIUM CITRATE +) 315-200 MG-UNIT TABS Take 2 tablets by mouth every evening        !! - Potential duplicate medications found. Please discuss with provider.      STOP taking these medications       spironolactone (ALDACTONE) 25 MG tablet Comments:   Reason for Stopping:         hydrochlorothiazide 12.5 MG TABS tablet Comments:   Reason for Stopping:             Allergies   Allergies   Allergen Reactions     Fosamax [Alendronic Acid] GI Disturbance     Augmentin [Amoxicillin-Pot Clavulanate] Diarrhea     Diarrhea and rash     Evista [Raloxifene]      abd symptoms.      Flu Virus Vaccine      swollen and red at inj site     Data   Most Recent 3 CBC's:  Recent Labs   Lab Test  11/24/17   0735  11/23/17   0655  11/21/17   0706  11/20/17   0705   WBC   --   16.6*  17.1*  15.3*   HGB   --   11.6*  11.8  11.7   MCV   --   89  87  89   PLT  232  238  212  229      Most Recent 3 BMP's:  Recent Labs   Lab Test  11/25/17   0706  11/24/17   0735  11/23/17   0655   NA  133  133  133   POTASSIUM  3.8  4.0  4.5   CHLORIDE  95  98  100   CO2  30  28  27   BUN  45*  45*  57*   CR  0.93  0.98  1.00   ANIONGAP  8  7  6   ELROY  8.8  9.0  8.9   GLC  89  98  108*     Most Recent 2 LFT's:  Recent Labs   Lab Test  11/17/17   0730  11/16/17   0455   AST  68*  92*   ALT  111*  106*   ALKPHOS  70  72   BILITOTAL  0.3  0.6     Most Recent INR's and  Anticoagulation Dosing History:  Anticoagulation Dose History     There is no flowsheet data to display.        Most Recent 3 Troponin's:  Recent Labs   Lab Test  11/15/17   1800  11/15/17   1420  11/15/17   0645   04/10/13   1250   TROPI  4.139*  3.771*  0.262*   < >   --    TROPONIN   --    --    --    --   0.00    < > = values in this interval not displayed.     Most Recent Cholesterol Panel:  Recent Labs   Lab Test  03/02/17   0600   CHOL  149   LDL  59   HDL  78   TRIG  62     Most Recent 6 Bacteria Isolates From Any Culture (See EPIC Reports for Culture Details):  Recent Labs   Lab Test  11/18/17   0800  11/15/17   0717  11/15/17   0640   CULT  >10 Squamous epithelial cells/low power field indicates oral contamination. Please   recollect.  *  Canceled, Test credited  Notification of test cancellation was given to  Jessika Dinh RN @16:40 on 11/18/17,KO    No growth  No growth     Most Recent TSH, T4 and A1c Labs:  Recent Labs   Lab Test  08/18/15   1413   TSH  1.29     Results for orders placed or performed during the hospital encounter of 11/15/17   XR Chest Port 1 View    Narrative    CHEST, ONE VIEW 11/15/2017 7:15 AM    HISTORY: Short of breath.    COMPARISON: 3/2/2017    FINDINGS: Dense consolidation in the right lower lung. Left lung is  relatively clear. No pneumothorax. Stable heart size.      Impression    IMPRESSION: Dense consolidation in the right lower lung suggests  pneumonia.    ANGELINE CRUZ MD   XR Chest Port 1 View    Narrative    CHEST ONE VIEW PORTABLE   11/17/2017 8:46 AM     HISTORY:  Follow up on pneumonia.     COMPARISON: 11/15/2017.      Impression    IMPRESSION: Increase in bilateral pulmonary alveolar infiltrates.  Increase in cardiomegaly. Question right pleural effusion.    ANT WHITTAKER MD   XR Chest Port 1 View    Narrative    CHEST PORTABLE ONE VIEW 11/18/2017 2:04 PM     COMPARISON: Frontal chest x-ray 11/17/2017.    HISTORY: Follow up on pneumonia and congestive heart  failure.       Impression    IMPRESSION: There is patchy airspace opacity in the right lower lung  that may represent pneumonia. There is also a probable small right  pleural effusion. There is minimal patchy airspace opacity in the mid  left lung that may represent atelectasis or pneumonia. The left lung  is otherwise clear. There is no pleural effusion on the left. There is  no pneumothorax on either side. Heart size appears within normal  limits.    KEV BAEZA MD     Echocardiogram   Very poor image quality.  Left ventricular systolic function is severely reduced.  Severe hypokinesis of the distal 2/3rds of LV with good contractility at the  base. Pattern suggests Takotsubo CMP  Thrombus can not be excluded.  Right ventricular systolic pressure is elevated, consistent with moderate  pulmonary hypertension.

## 2017-11-25 NOTE — PLAN OF CARE
Problem: Patient Care Overview  Goal: Plan of Care/Patient Progress Review  Discharge Planner OT   Patient plan for discharge: Home with A from daughter  Current status: Pt sitting up in chair, wraps doffed and feet dangling. Educated on recommendations for elevating, pt declined due to asthma. Reviewed decongestive exercises to complete with wraps donned. OT and pt reviewed steps for wrapping LE as pts daughter was not present. Updated pts handout for increased follow through and accuracy. Pt able to verbalize 1 sign/symptom of intolerance, reviewed all. Pt able to verbalize how to manage and care for wraps and skin. Educated on recommended footwear and LE clothing- pt declined to trial. Noted pt used BSC with PT with SBA and able to complete cares- reports daughter to purchase RTS for home. Educated on tub/shower transfer- OT provided visual demo and handout depicting the technique- pt verbalized understanding. Pt reports daughter may be able to stay up to 2.5 weeks.   Barriers to return to prior living situation: Lives alone; Only has family A from family until next Wednesday+; Stairs to enter home and within home; Bathtub; Fall risk; Safety concern  Recommendations for discharge: TCU; however, pt adamant about returning home. If discharging home, recommend 24 hr supervision for increased A with ADL/IADLs from family and home OT and lymphedema services.   Rationale for recommendations: Pt limited by B LE edema, impaired safety, and decreased balance and activity tolerance; thus, will continue with OT intervention to ensure safety with ADLs.        Entered by: Jie Bartlett 11/25/2017 11:40 AM       Occupational Therapy Discharge Summary    Reason for therapy discharge:    Discharged to home with home therapy.    Progress towards therapy goal(s). See goals on [facility] Plan [facility] electronic health record for goal details.  Goals not met.  Barriers to achieving goals:   discharge from facility.    Therapy  recommendation(s):    Continued therapy is recommended.  Rationale/Recommendations:  Continued skilled OT and lymphedema therapy recommended at home due to pts refusal to d/c to TCU..

## 2017-11-25 NOTE — PLAN OF CARE
Problem: Patient Care Overview  Goal: Plan of Care/Patient Progress Review  Outcome: No Change  A&O x4. VSS on RA. SBA, walker/gb. No complaints. Tele NSR. BG 97. Up to BSC, 2 BM; formed, soft. Legs cleansed and rewrapped 0150 due to them both being soaked through. Pt slept in recliner all night, encouraged elevating legs but pt refused, states she can t because of her asthma. Daughter would like SW to call her before d/c tomorrow regarding what time d/c will be.

## 2017-11-25 NOTE — PROGRESS NOTES
Care Coordination:    Noted d/c orders with safe discharge plan initiated by CHANCE.     Sent referral for Home Care to [facility] per plan.  + Ensured contact information for [facility] on AVS.    Scheduled PCP f/u apt:       Jaqueline Reeder RN, BSN  [facility] Care Coordinator   Mobile Phone: [phone]

## 2017-11-25 NOTE — PROGRESS NOTES
Discharge to home with home care, home PT and OT, lymphedema and assist of daughter. VSS. Sats 95 on room air. Tele NSR. No complaints. 2+BLE with weeping legs and blisters on feet. Wound care and legs rewrapped x2. Went over all d/c instructions with patient and daughter. All questions answered. PIV's removed. Meds sent with patient. Patient has f/u with PCP this week and will f/u with cards in January. Pt's daughter informed to call [facility] on Monday to get patient set up on cardiac event monitor (our facility is out of them at this time). All belongings sent with patient. Pt left unit via wheelchair with NA and daughter at 1605 to d/c home.

## 2017-11-25 NOTE — PLAN OF CARE
Problem: Patient Care Overview  Goal: Plan of Care/Patient Progress Review  Patient plan for discharge: Return home.   Current status: Sit to/from stand with FWW and SBA. On/off BSC with SBA; pt able to manage clothing and pericares. Amb 100 ft x 1 with FWW and SBA. Up/down 3 steps x 1 with B rails and min assist for balance and support to ascend the stairs. Pt returned to sitting in chair at end of session with all needs in reach. VSS on RA throughout session.  Barriers to return to prior living situation: Lives alone, needs assist with stairs to enter the home, decreased activity tolerance, SOB with minimal activity, weakness, falls risk, only has help at home for the first few days.  Recommendations for discharge: TCU per the plan established by the Physical Therapist; however, pt adamant about returning home with family assist. Would benefit from 24 hr supervision and HHPT services.  Rationale for recommendations: Pt lives alone with stairs, will only have assist for the first few days. She would benefit from continued therapy to improve activity tolerance and safety prior to returning home.    Pt discharging home today with assist.    PT goals not met.

## 2017-11-26 ENCOUNTER — DOCUMENTATION ONLY (OUTPATIENT)
Dept: CARE COORDINATION | Facility: CLINIC | Age: 82
End: 2017-11-26

## 2017-11-27 ENCOUNTER — CARE COORDINATION (OUTPATIENT)
Dept: CARDIOLOGY | Facility: CLINIC | Age: 82
End: 2017-11-27

## 2017-11-27 ENCOUNTER — TELEPHONE (OUTPATIENT)
Dept: PEDIATRICS | Facility: CLINIC | Age: 82
End: 2017-11-27

## 2017-11-27 NOTE — TELEPHONE ENCOUNTER
"Hospital/TCU/ED for chronic condition Discharge Protocol    \"Hi, my name is Lucretiawilly Pace, a registered nurse, and I am calling from [facility].  I am calling to follow up and see how things are going for you after your recent emergency visit/hospital/TCU stay.\"    Tell me how you are doing now that you are home?\" Patient states that she is feeling pretty good.  Has improved a lot   Patient is using socks to help with Lymphedema.  Home care nurse, aide and Physical therapy visited her today.  Has one more day of antibiotic left.  No increased shortness of breath.      Discharge Instructions    \"Let's review your discharge instructions.  What is/are the follow-up recommendations?  Pt. Response: Follow up with Dr. Sevilla    \"Has an appointment with your primary care provider been scheduled?\"   Yes. (confirm)-has an appointment on Friday    \"When you see the provider, I would recommend that you bring your medications with you.\"    Medications    \"Tell me what changed about your medicines when you discharged?\"    Changes to chronic meds?    2 or more - Epic MTM referral needed-Amiodarone and Eliquis    \"What questions do you have about your medications?\"    None     New diagnoses of heart failure, COPD, diabetes, or MI?    No     Medication reconciliation completed? Yes  Was MTM referral placed (*Make sure to put transitions as reason for referral)?   No-Patient declined    Call Summary    \"What questions or concerns do you have about your recent visit and your follow-up care?\"     none    \"If you have questions or things don't continue to improve, we encourage you contact us through the main clinic number (give number).  Even if the clinic is not open, triage nurses are available 24/7 to help you.     We would like you to know that our clinic has extended hours (provide information).  We also have urgent care (provide details on closest location and hours/contact info)\"      \"Thank you for your time and take " "care!\"   CAITIE Pace RN      "

## 2017-11-27 NOTE — PROGRESS NOTES
Patient was evaluated by cardiology while inpatient for afib with RVR and chest pressure with troponin of 4.1. Called patient's daughter Wilda to discuss any post hospital d/c questions she may have, review medication changes, and confirm f/u appts.Patient's daughter denied any questions regarding new medications or changes to some of patient's current medications that patient was taking prior to admission. Patient's daughter denied any SOB, chest pain, or light headedness. RN confirmed with patient's daughter that patient has an apt scheduled on 1/12/17 for an ECHO and on 1/18/17 with Dr. Hernandez. RN also advised patient's daughter to call to arrange application of 30 day event monitor. Patient's daughter confirmed she had the phone number to schedule. Patient's daughter advised to call clinic with any cardiac related questions or concerns prior to patient's apt on 1/18/17. Patient verbalized understanding and agreed with plan.

## 2017-11-27 NOTE — PROGRESS NOTES
[facility] now requests orders and shares plan of care/discharge summaries for some patients through [facility].  Please REPLY TO THIS MESSAGE in order to give authorization for orders when needed.  This is considered a verbal order, you will still receive a faxed copy of orders for signature.  Thank you for your assistance in improving collaboration for our patients.    ORDER Sn 3w1, 2w2, 1w3, 4 PRN. PT 1d1 evaluate and treat. OT 1d1 evalaute and treat. OT lymphaedema 1d1 evaluate and treat.SW 1d1 to assist with community resources. HHA 3w6 for personal cares and bathing assistance.    MD SUMMARY  SITUATION  Genie is home from hospital after becoming septic, developing pneumonia, and atrial fibrillation exacerbation. She has bilateral lower extremity 4 plus edema. Lymphaedema therapy initiated during hospitalization. Her toes to ankles on both feet have scattered intact and open blisters. The skin is very thin and weeping large amounts of serous fluid. She is having to use a walker for steady gait which is new this hospital admission. She was started on Furosemide, use of walker, omnicef, apixaban, amiodarone, and prednisone. Her aspirin dosage changed during hospitalization. She is to have a heart monitor placed for 30 days, not sure, will plan on getting set up and care from cardiology. Cardio event monitor ordered  VS...Temp 97.7 HR 70 RR 16 SpO2 93 percent on Room Air /60   WOUND DESCRIPTION AND MEASUREMENTS  Wound 1 right midfoot 3cm x 4cm x 0.1cm open blister. Wound 2 right top of foot between 2nd and 3rd toes 3.5cm x 4.5cm x 0.1 cm. Wound 3 right outer ankle 4.0cm x 7.5cm x 0.1cm. Wound 4 right inner ankle  1cm x 2cm x 0.1 cm. Wound 4 left great toe 2cm x 3cm x 0.1cm. Wound 5 left inner ankle 2cm x 2cm x 0.1cm. Wounds 1 to 5 are opened blisters, each have a thin layer of skin still attached to the sides of wounds. The drainage is weeping serous moderate to large amounts, dripping during  assessment. WOund beds are granulated tissue, pink, and scant amount of bleeding noted. WOund 6 is an intact blister measuring 2.5cm x 5cm. Wound care from hospital instructions were to cleanse with soap and water, apply adaptic to open areas, abd/gauze pad, then secured with kerlix/gauze wrap. Bilateral legs are wrapped with compression wraps per instructions given by Lymphaedema therapy in hospital.   PHYSICAL PSYCHOSOCIAL IMPAIRMENT OR LIMITATIONS  She is having loose watery diarrhea. She has frequent urination, clear yellow urine. Prior to hospitalization client did not use a walker for support and was able to complete activity as tolerated without restrictions. She now becomes short of breath with ambulation. She needs assistance to get in and out of bed. She has a congested cough, with scattered rales breath sounds. She reports no secretions. Bilateral breath sounds diminished in   NUTRITION CONCERNS... Her appetite is good. She needs education on low sodium diet and choices to assit with knowledge of diet choices.  HOME ENVIRONMENT AFFECTING CARE...She lives alone in a home with a basement. All cares can be provided on the ground level.  CAREGIVER SUPPORT...She lives alone. Currently her daughter from [place] is staying with her to assist with cares for an undetermined amount of time based on client progress in recovery. She has a son who lives in [place] who is available on an as needed basis and very involved in client cares. She has a daughter in [place] that is available by phone and able to come assist with cares if need. Client has multiple amount of friends and strong [employer] support to assist with cares and transportation if needed.  BACKGROUND She feels like she is at her baseline for respiratory status. She reports that she feels really good, she wants to get a little stronger. Prior to hospitalization she did not need a walker, was very active, did not need grab bars for assistance and  driving. She would like to return to prior hospitalization function.  ANALYSIS She is in need of education on lymphaedema management, assessment and evaluation of disease management for prevention of exacerbation, strengthening and mobility home exercise program to meet her own personal goals for ability to participate in nallely active lifestyle she had prior to most recetn hospitalization. She is dependent on others for assistance for all ADL/IADL cares.  RECOMMENDATION SN to evalaute and assess disease management, education patient and caregivers on disease management, lab draws as needed, general assessments and vital signs, skin cares and lymphaedema cares per OT lymphedema therapy recommendations, medication reconciliation and management. OT lymphaedema therapy for compression wrapping and  recommendations for lymphaedema management. OT/PT recommended  for ADL/IADL safety, bathing safety, adaptive equipment, balance, strengthening and mobility, and home exercise program. HHA to assit with personal cares and bathing. SW to assist with community resources, transportation needs, long term planning, and meals.

## 2017-11-27 NOTE — TELEPHONE ENCOUNTER
Please contact patient for In-patient follow up.  [phone] (home) none (work)    Visit date: 11/25/2017  Diagnosis listed:Pneumonia Of Right Lower Lobe Due To Infectious Organism (H), Atrial Fibrillation, Unspecified Type (H): 11/25/2017: 2  Number of visits in past 12 months:3

## 2017-11-27 NOTE — TELEPHONE ENCOUNTER
"ED / Discharge Outreach Protocol    Patient Contact    Attempt # 1    Was call answered?  Yes.  \"May I please speak with <patient name>\"  Is patient available?   No. Left message with daughter for patient to call me back.    Blanca Aponte RN            "

## 2017-11-28 LAB — GLUCOSE BLDC GLUCOMTR-MCNC: 107 MG/DL (ref 70–99)

## 2017-12-01 ENCOUNTER — OFFICE VISIT (OUTPATIENT)
Dept: PEDIATRICS | Facility: CLINIC | Age: 82
End: 2017-12-01
Payer: COMMERCIAL

## 2017-12-01 VITALS
DIASTOLIC BLOOD PRESSURE: 36 MMHG | OXYGEN SATURATION: 98 % | BODY MASS INDEX: 25.69 KG/M2 | SYSTOLIC BLOOD PRESSURE: 120 MMHG | TEMPERATURE: 97.5 F | HEART RATE: 77 BPM | WEIGHT: 145 LBS | HEIGHT: 63 IN

## 2017-12-01 DIAGNOSIS — J45.901 MODERATE ASTHMA WITH EXACERBATION, UNSPECIFIED WHETHER PERSISTENT: ICD-10-CM

## 2017-12-01 DIAGNOSIS — I51.81 STRESS-INDUCED CARDIOMYOPATHY: ICD-10-CM

## 2017-12-01 DIAGNOSIS — R60.9 EDEMA, UNSPECIFIED TYPE: ICD-10-CM

## 2017-12-01 DIAGNOSIS — R65.20 SEVERE SEPSIS (H): ICD-10-CM

## 2017-12-01 DIAGNOSIS — J96.01 ACUTE RESPIRATORY FAILURE WITH HYPOXIA (H): ICD-10-CM

## 2017-12-01 DIAGNOSIS — J18.9 PNEUMONIA OF RIGHT LOWER LOBE DUE TO INFECTIOUS ORGANISM: Primary | ICD-10-CM

## 2017-12-01 DIAGNOSIS — N17.9 ACUTE RENAL FAILURE, UNSPECIFIED ACUTE RENAL FAILURE TYPE (H): ICD-10-CM

## 2017-12-01 DIAGNOSIS — A41.9 SEVERE SEPSIS (H): ICD-10-CM

## 2017-12-01 LAB
ERYTHROCYTE [DISTWIDTH] IN BLOOD BY AUTOMATED COUNT: 14.6 % (ref 10–15)
HCT VFR BLD AUTO: 34.2 % (ref 35–47)
HGB BLD-MCNC: 10.9 G/DL (ref 11.7–15.7)
MCH RBC QN AUTO: 30.7 PG (ref 26.5–33)
MCHC RBC AUTO-ENTMCNC: 31.9 G/DL (ref 31.5–36.5)
MCV RBC AUTO: 96 FL (ref 78–100)
PLATELET # BLD AUTO: 214 10E9/L (ref 150–450)
RBC # BLD AUTO: 3.55 10E12/L (ref 3.8–5.2)
WBC # BLD AUTO: 15.3 10E9/L (ref 4–11)

## 2017-12-01 PROCEDURE — 99214 OFFICE O/P EST MOD 30 MIN: CPT | Performed by: INTERNAL MEDICINE

## 2017-12-01 PROCEDURE — 80053 COMPREHEN METABOLIC PANEL: CPT | Performed by: INTERNAL MEDICINE

## 2017-12-01 PROCEDURE — 36415 COLL VENOUS BLD VENIPUNCTURE: CPT | Performed by: INTERNAL MEDICINE

## 2017-12-01 PROCEDURE — 85027 COMPLETE CBC AUTOMATED: CPT | Performed by: INTERNAL MEDICINE

## 2017-12-01 NOTE — MR AVS SNAPSHOT
"              After Visit Summary   12/1/2017    Genie Persaud    MRN: [id number]           Patient Information     Date Of Birth          1935        Visit Information        Provider Department      12/1/2017 10:40 AM Layo Sevilla MD [facility]        Today's Diagnoses     Acute renal failure, unspecified acute renal failure type (H)    -  1      Care Instructions    Lab work downstairs today.  Directions:  As you walk through the first floor, you'll see (on the right) first the pharmacy, then some bathrooms, then the \"Lab and Imaging\" area. Give them your name at the window there and wait for them to call you.     Finish up and then stop your cefdinir and prednisone.    Remain on the Eliquis and the amiodarone until seeing Mary in January.    Remain on the 40 mg lasix (furosemide) .    Follow up with me in 2-4 weeks.     Keep wrapping as needed.    Layo Sevilla MD  Internal Medicine and Pediatrics             Follow-ups after your visit        Your next 10 appointments already scheduled     Dec 04, 2017 10:00 AM CST   Event Monitor with [facility])    [address]  [facility] [place] [id number] [phone]           LOCATION - [address], [address] [place] [address] **Please check-in at the [facility] Registration Office, [address], in the [facility] building. When you are finished registering, please go to [address] and have a seat.            Jan 12, 2018 10:00 AM CST   Ech Complete with [facility])    [address]  [facility] [place] [id number] [phone]           1. Please bring or wear a comfortable two-piece outfit. 2. You may eat, drink and take your normal medicines. 3. For any questions that cannot be answered, please contact the ordering physician ***Please check-in at the [facility] " "Registration Office located in [address] in the [facility] building. When you are finished registering, please go to [address] and have a seat. The technician will call your name for the test.            2018 10:00 AM CST   Return Visit with Barak Hernandez MD   [facility])    [address]  [facility] [address] [phone]              Who to contact     If you have questions or need follow up information about today's clinic visit or your schedule please contact [facility] MICHAELLE directly at [phone].  Normal or non-critical lab and imaging results will be communicated to you by MyChart, letter or phone within 4 business days after the clinic has received the results. If you do not hear from us within 7 days, please contact the clinic through [employer] or phone. If you have a critical or abnormal lab result, we will notify you by phone as soon as possible.  Submit refill requests through "[employer] or call your pharmacy and they will forward the refill request to us. Please allow 3 business days for your refill to be completed.          Additional Information About Your Visit        "[employer] Information     "[employer] lets you send messages to your doctor, view your test results, renew your prescriptions, schedule appointments and more. To sign up, go to [email or web address] . Click on \"Log in\" on the left side of the screen, which will take you to the Welcome page. Then click on \"Sign up Now\" on the right side of the page.     You will be asked to enter the access code listed below, as well as some personal information. Please follow the directions to create your username and password.     Your access code is: [email or web address]  Expires: 2017  2:32 PM     Your access code will  in 90 days. If you need help or a new code, please call your [facility] or [phone].        Care EveryWhere ID  " "   This is your Care EveryWhere ID. This could be used by other organizations to access your [facility] medical records  [id number]        Your Vitals Were     Pulse Temperature Height Pulse Oximetry BMI (Body Mass Index)       77 97.5  F (36.4  C) (Oral) 5' 3\" (1.6 m) 98% 25.69 kg/m2        Blood Pressure from Last 3 Encounters:   12/01/17 (!) 120/36   11/25/17 123/41   10/06/17 138/82    Weight from Last 3 Encounters:   12/01/17 145 lb (65.8 kg)   11/25/17 151 lb 9.6 oz (68.8 kg)   10/06/17 141 lb (64 kg)              We Performed the Following     CBC with platelets     Comprehensive metabolic panel        Primary Care Provider Office Phone # Fax #    Layo Sevilla [phone] [address] [place] [address]        Equal Access to Services     [facility]: Hadii aad ku hadasho Soawais, waaxda luqadaha, qaybta kaalmada adekimberliyada, ora meyer . So [facility] [phone].    ATENCIÓN: Si habla español, tiene a patel disposición servicios gratuitos de asistencia lingüística. Llame al [phone].    We comply with applicable federal civil rights laws and [place] laws. We do not discriminate on the basis of race, color, national origin, age, disability, sex, sexual orientation, or gender identity.            Thank you!     Thank you for choosing [facility]  for your care. Our goal is always to provide you with excellent care. Hearing back from our patients is one way we can continue to improve our services. Please take a few minutes to complete the written survey that you may receive in the mail after your visit with us. Thank you!             Your Updated Medication List - Protect others around you: Learn how to safely use, store and throw away your medicines at www.disposemymeds.org.          This list is accurate as of: 12/1/17 11:21 AM.  Always use your most recent med list.                   Brand Name Dispense Instructions for use " Diagnosis    acetaminophen 500 MG tablet    TYLENOL     Take 500-1,000 mg by mouth every 8 hours as needed for mild pain        albuterol 108 (90 BASE) MCG/ACT Inhaler    PROAIR HFA/PROVENTIL HFA/VENTOLIN HFA     Inhale 2 puffs into the lungs every 6 hours as needed        amiodarone 100 MG Tabs tablet    PACERONE/CODARONE    60 tablet    Take 2 tablets (200 mg) by mouth 2 times daily    Atrial fibrillation, unspecified type (H)       apixaban ANTICOAGULANT 2.5 MG tablet    ELIQUIS    60 tablet    Take 1 tablet (2.5 mg) by mouth 2 times daily    Atrial fibrillation, unspecified type (H)       aspirin 81 MG EC tablet     90 tablet    Take 1 tablet (81 mg) by mouth daily    Stroke (H)       azelastine 0.1 % spray    ASTELIN     Spray 2 sprays into both nostrils 2 times daily        CALCIUM CITRATE + 315-200 MG-UNIT Tabs per tablet   Generic drug:  calcium citrate-vitamin D      Take 2 tablets by mouth every evening        cefdinir 300 MG capsule    OMNICEF    6 capsule    Take 1 capsule (300 mg) by mouth 2 times daily    Pneumonia of right lower lobe due to infectious organism (H)       fluticasone-salmeterol 230-21 MCG/ACT inhaler    ADVAIR-HFA    12 g    Inhale 2 puffs into the lungs 2 times daily        furosemide 40 MG tablet    LASIX    30 tablet    Take 1 tablet (40 mg) by mouth daily    Edema, unspecified type       ipratropium 0.03 % spray    ATROVENT     Spray 2 sprays in nostril 2 times daily        losartan 100 MG tablet    COZAAR    90 tablet    Take 1 tablet (100 mg) by mouth At Bedtime    Essential hypertension with goal blood pressure less than 140/90       metoprolol 25 MG 24 hr tablet    TOPROL-XL    60 tablet    Take 2 tablets (50 mg) by mouth daily    Essential hypertension with goal blood pressure less than 140/90       MONTELUKAST SODIUM PO      Take 10 mg by mouth every evening        omeprazole 20 MG CR capsule    priLOSEC    90 capsule    TAKE ONE CAPSULE BY MOUTH ONE TIME DAILY    Candidal  esophagitis (H)       order for DME     1 each    Equipment being ordered: Walker Wheels () and Walker () Treatment Diagnosis: Impaired gait    Pneumonia of right lower lobe due to infectious organism (H)       * PREDNISONE PO      Take 10 mg by mouth daily as needed Per Dr. Metz/[place] [facility]        * predniSONE 5 MG tablet    DELTASONE    21 tablet    Take 1 tablet (5 mg) by mouth See Admin Instructions Take 15 mg for three days, then 10 mg for 3 days, then stop    Moderate asthma with exacerbation, unspecified whether persistent       simvastatin 20 MG tablet    ZOCOR    45 tablet    Take 0.5 tablets (10 mg) by mouth At Bedtime    Hyperlipidemia LDL goal <130       * Notice:  This list has 2 medication(s) that are the same as other medications prescribed for you. Read the directions carefully, and ask your doctor or other care provider to review them with you.

## 2017-12-01 NOTE — PROGRESS NOTES
SUBJECTIVE:   Genie Persaud is a 82 year old female who presents to clinic today for the following health issues:          Hospital Follow-up Visit:    Hospital/Nursing Home/IP Rehab Facility: [facility]  Date of Admission: 11/15/17  Date of Discharge: 11/25/17  Reason(s) for Admission: SOB, severe sepsis, RLL pneumonia. Afib, and acute kidney injury secondary to sepsis            Problems taking medications regularly:  None       Medication changes since discharge: cefdinir, prednisone, amiodarone, Eliquis, and Lasix        Problems adhering to non-medication therapy:  None    Summary of hospitalization:  [facility] discharge summary reviewed  Diagnostic Tests/Treatments reviewed.  Follow up needed: none  Other Healthcare Providers Involved in Patient s Care:         None  Update since discharge: improved.     Post Discharge Medication Reconciliation: discharge medications reconciled, continue medications without change.  Plan of care communicated with patient     Coding guidelines for this visit:  Type of Medical   Decision Making Face-to-Face Visit       within 7 Days of discharge Face-to-Face Visit        within 14 days of discharge   Moderate Complexity [id number]   High Complexity [id number]            Went to bed one night, and when woke up at night, had coughing and shortness of breath: had ashtma and pneumonia; developed Afib and placed on eliquis and amiodarone.    Now has significant lower extremity edema on her legs.      Discharge Diagnoses      Severe se[sos and acute respiratory failure due to right lower lobe pneumonia.        History of Present Illness   and Hospital Course  Genie Persaud is an 82 year old female who presented with shortness of breath and was found to have severe sepsis and RLL pneumonia.  Patient was initially hypotensive, tachycardic and hypoxic requiring BIPAP support.  She was treated with IV fluids and antibiotics and has improved (she will complete  a 14 day course of antibiotics).  Patient was also noted to have an elevated troponin of 4.1 and had a TTE with an ejection fraction of 35% (prior 40-45%) with WMA consistent with stress induced cardiomyopathy.  She also developed atrial fibrillation with rapid ventricular response and was seen in consultation by cardiology.  She has a CHADS2-VASC of 6 and was started on Eliquis and amiodarone.  An 30 day event monitor is recommended at discharge.  As part of the sepsis syndrome she also developed acute kidney injury with a peak creatinine of 1.4, now normal.  She had some mild wheezing and was placed on a steroid taper.  She had an increase in lower extremity edema/lymphedema and responded well to Lasix.  She has some weeping blisters and is currently getting lower extremity wraps and wound care.      Was placed on omnicef and finishes that this week.  Today is last day of prednisone.  Feels like breathing is now back to normal, and even better.      Her main concern today is her lower extremity edema; had been on compression stockings, and today fluid is improving since discharge; still wrapping them 3-4 times per day.  Gets a significant amount of weeping. Is on lasix (furosemide) once daily.       Also, she has additional complaints of looser stools; going once -twice daily.     Has a cardiac follow up by Dr. Hernandez.      Problem list and histories reviewed & adjusted, as indicated.  Additional history: as documented    Patient Active Problem List   Diagnosis     Swelling, mass, or lump in head and neck     Pulmonary nodule     Anemia     Vasculitis (H)     HYPERLIPIDEMIA LDL GOAL <130     Impaired fasting glucose     Candidal esophagitis (H)     Thoracic aortic aneurysm without rupture (H)     Health Care Home     Advanced directives, counseling/discussion     Stroke (H)     Moderate persistent asthma without complication     Essential hypertension with goal blood pressure less than 140/90     Coronary artery  disease involving native coronary artery of native heart without angina pectoris     SVT -noted during Cath procedure     Nonrheumatic aortic valve insufficiency     Acute respiratory failure with hypoxia (H)     Right lower lobe pneumonia (H)     Moderate asthma with exacerbation     Severe sepsis (H)     Stress-induced cardiomyopathy     Past Surgical History:   Procedure Laterality Date     C APPENDECTOMY  1957     C LIGATE FALLOPIAN TUBE  1971     C STEREOTACTIC BREAST BIOPSY  2001     CHOLECYSTECTOMY, LAPOROSCOPIC  2008    Cholecystectomy, Laparoscopic     ESOPHAGOSCOPY, GASTROSCOPY, DUODENOSCOPY (EGD), COMBINED  5/17/2013    Procedure: COMBINED ESOPHAGOSCOPY, GASTROSCOPY, DUODENOSCOPY (EGD), BIOPSY SINGLE OR MULTIPLE;  ESOPHAGOSCOPY, GASTROSCOPY, DUODENOSCOPY (EGD)  with bx;  Surgeon: Jeffery Yanez MD;  Location: [facility]     HC DILATION/CURETTAGE DIAG/THER NON OB  1967,1971     HC REMOVE TONSILS/ADENOIDS,<11 Y/O         Social History   Substance Use Topics     Smoking status: Never Smoker     Smokeless tobacco: Never Used     Alcohol use 0.6 - 1.2 oz/week     1 - 2 Standard drinks or equivalent per week     Family History   Problem Relation Age of Onset     Hypertension Mother      OSTEOPOROSIS Mother      C.A.D. Mother      Connective Tissue Disorder Father      scleraderma     CEREBROVASCULAR DISEASE Paternal Grandmother      Prostate Cancer Other      9/05 passed away due to complications     Breast Cancer Child      youngest dtr         Current Outpatient Prescriptions   Medication Sig Dispense Refill     amiodarone (PACERONE/CODARONE) 100 MG TABS tablet Take 2 tablets (200 mg) by mouth 2 times daily 60 tablet 0     apixaban ANTICOAGULANT (ELIQUIS) 2.5 MG tablet Take 1 tablet (2.5 mg) by mouth 2 times daily 60 tablet 0     cefdinir (OMNICEF) 300 MG capsule Take 1 capsule (300 mg) by mouth 2 times daily 6 capsule 0     furosemide (LASIX) 40 MG tablet Take 1 tablet (40 mg) by mouth daily 30 tablet 0      predniSONE (DELTASONE) 5 MG tablet Take 1 tablet (5 mg) by mouth See Admin Instructions Take 15 mg for three days, then 10 mg for 3 days, then stop 21 tablet 0     order for DME Equipment being ordered: Walker Wheels () and Walker ()  Treatment Diagnosis: Impaired gait 1 each 0     azelastine (ASTELIN) 0.1 % spray Spray 2 sprays into both nostrils 2 times daily       ipratropium (ATROVENT) 0.03 % spray Spray 2 sprays in nostril 2 times daily       losartan (COZAAR) 100 MG tablet Take 1 tablet (100 mg) by mouth At Bedtime 90 tablet 1     PREDNISONE PO Take 10 mg by mouth daily as needed Per Dr. Metz/[place] [facility]       MONTELUKAST SODIUM PO Take 10 mg by mouth every evening        metoprolol (TOPROL-XL) 25 MG 24 hr tablet Take 2 tablets (50 mg) by mouth daily 60 tablet 11     omeprazole (PRILOSEC) 20 MG CR capsule TAKE ONE CAPSULE BY MOUTH ONE TIME DAILY  90 capsule 2     simvastatin (ZOCOR) 20 MG tablet Take 0.5 tablets (10 mg) by mouth At Bedtime 45 tablet 3     fluticasone-salmeterol (ADVAIR-HFA) 230-21 MCG/ACT inhaler Inhale 2 puffs into the lungs 2 times daily 12 g 1     acetaminophen (TYLENOL) 500 MG tablet Take 500-1,000 mg by mouth every 8 hours as needed for mild pain       albuterol (PROAIR HFA, PROVENTIL HFA, VENTOLIN HFA) 108 (90 BASE) MCG/ACT inhaler Inhale 2 puffs into the lungs every 6 hours as needed        calcium citrate-vitamin D (CALCIUM CITRATE +) 315-200 MG-UNIT TABS Take 2 tablets by mouth every evening        aspirin 81 MG EC tablet Take 1 tablet (81 mg) by mouth daily (Patient not taking: Reported on 12/1/2017) 90 tablet 3     Allergies   Allergen Reactions     Fosamax [Alendronic Acid] GI Disturbance     Augmentin [Amoxicillin-Pot Clavulanate] Diarrhea     Diarrhea and rash     Evista [Raloxifene]      abd symptoms.      Flu Virus Vaccine      swollen and red at inj site     BP Readings from Last 3 Encounters:   12/01/17 (!) 120/36   11/25/17 123/41   10/06/17 138/82    Wt Readings from  "Last 3 Encounters:   12/01/17 145 lb (65.8 kg)   11/25/17 151 lb 9.6 oz (68.8 kg)   10/06/17 141 lb (64 kg)                  Labs reviewed in EPIC        Reviewed and updated as needed this visit by clinical staff       Reviewed and updated as needed this visit by Provider         ROS:  C: NEGATIVE for fever, chills, change in weight  E/M: NEGATIVE for ear, mouth and throat problems  R: NEGATIVE for significant cough or SOB  CV: NEGATIVE for chest pain, palpitations or peripheral edema    OBJECTIVE:                                                    BP (!) 120/36 (BP Location: Right arm, Cuff Size: Adult Regular)  Pulse 77  Temp 97.5  F (36.4  C) (Oral)  Ht 5' 3\" (1.6 m)  Wt 145 lb (65.8 kg)  SpO2 98%  BMI 25.69 kg/m2  Body mass index is 25.69 kg/(m^2).   GENERAL: healthy, alert, well nourished, well hydrated, no distress  HENT: ear canals- normal; TMs- normal; Nose- normal; Mouth- no ulcers, no lesions  NECK: no tenderness, no adenopathy, no asymmetry, no masses, no stiffness; thyroid- normal to palpation  RESP: lungs clear to auscultation - no rales, no rhonchi, no wheezes  CV: regular rates and rhythm, normal S1 S2, no S3 or S4 and no murmur, no click or rub -  ABDOMEN: soft, no tenderness, no  hepatosplenomegaly, no masses, normal bowel sounds  SKIN: edematous legs, but not severe.  Weeping open skin, but no infection or ulceration.     Diagnostic test results:  Diagnostic Test Results:  none        ASSESSMENT/PLAN:                                                    (N17.9) Acute renal failure, unspecified acute renal failure type (H)  (primary encounter diagnosis)  Comment:   Plan: CBC with platelets, Comprehensive metabolic         panel        Acute worsening while inpt.  Will advise repeat labs today to ensure her low blood pressure is not contributing to any worsneing of her acute renal failure.     (J96.01) Acute respiratory failure with hypoxia (H)  Comment:   Plan: Resolved.  Currently back on her " baseline inhalers.     (J45.470) Moderate asthma with exacerbation, unspecified whether persistent  Comment:   Plan: No evidence of wheezing.  Continue with her prednisone.     (J18.1) Pneumonia of right lower lobe due to infectious organism (H)  Comment:   Plan: Finish omnicef.     (A41.9,  R65.20) Severe sepsis (H)  Comment:   Plan: Resolved.     (I51.81) Stress-induced cardiomyopathy  Comment:   Plan: Repeating echocardiogram at cardiology in Jan.    A fib:  Remains on eliquis and amiodarone; out of Afib for now, but decision to stop her eliquis and amio will be based on the 30 day Holter.      Weeping lower extremity edema:    Wrapping continues.  Would NOT add additional diuretics, given no signs of fluid overload.  Elevation of legs.     E5: Spent 40 minutes face to face.     More than 50% of the time was spent in counseling and coordination of care of these issues.      See Patient Instructions    Layo Sevilla MD  [facility]

## 2017-12-01 NOTE — LETTER
[facility]  [address]  [place] [address]                  [phone]   December 4, 2017    Genie Persaud  [address] [place] [address]          Christina,    Your electrolytes, liver, and kidney panels and your complete blood count look stable.    It was good seeing you in the office.  Hope you have a great rest of the day!    Layo Sevilla MD  Internal Medicine and Pediatrics    Results for orders placed or performed in visit on 12/01/17   CBC with platelets   Result Value Ref Range    WBC 15.3 (H) 4.0 - 11.0 10e9/L    RBC Count 3.55 (L) 3.8 - 5.2 10e12/L    Hemoglobin 10.9 (L) 11.7 - 15.7 g/dL    Hematocrit 34.2 (L) 35.0 - 47.0 %    MCV 96 78 - 100 fl    MCH 30.7 26.5 - 33.0 pg    MCHC 31.9 31.5 - 36.5 g/dL    RDW 14.6 10.0 - 15.0 %    Platelet Count 214 150 - 450 10e9/L   Comprehensive metabolic panel   Result Value Ref Range    Sodium 141 133 - 144 mmol/L    Potassium 3.8 3.4 - 5.3 mmol/L    Chloride 104 94 - 109 mmol/L    Carbon Dioxide 28 20 - 32 mmol/L    Anion Gap 9 3 - 14 mmol/L    Glucose 92 70 - 99 mg/dL    Urea Nitrogen 22 7 - 30 mg/dL    Creatinine 0.90 0.52 - 1.04 mg/dL    GFR Estimate 60 (L) >60 mL/min/1.7m2    GFR Estimate If Black 73 >60 mL/min/1.7m2    Calcium 8.8 8.5 - 10.1 mg/dL    Bilirubin Total 0.7 0.2 - 1.3 mg/dL    Albumin 3.1 (L) 3.4 - 5.0 g/dL    Protein Total 5.8 (L) 6.8 - 8.8 g/dL    Alkaline Phosphatase 69 40 - 150 U/L    ALT 37 0 - 50 U/L    AST 16 0 - 45 U/L

## 2017-12-01 NOTE — PATIENT INSTRUCTIONS
"Lab work downstairs today.  Directions:  As you walk through the first floor, you'll see (on the right) first the pharmacy, then some bathrooms, then the \"Lab and Imaging\" area. Give them your name at the window there and wait for them to call you.     Finish up and then stop your cefdinir and prednisone.    Remain on the Eliquis and the amiodarone until seeing Mary in January.    Remain on the 40 mg lasix (furosemide) .    Follow up with me in 2-4 weeks.     Keep wrapping as needed.    Layo Sevilla MD  Internal Medicine and Pediatrics     "

## 2017-12-01 NOTE — LETTER
December 1, 2017      Genie Persaud  [address] [place] [address]        To Whom It May Concern:    Genie Persaud was seen in our clinic.  Her daughter Wilda Goodson has been caring for her since her hosptialization starting 11/15/17 , and she needs significant amount of care.       Sincerely,        Layo Sevilla MD

## 2017-12-02 LAB
ALBUMIN SERPL-MCNC: 3.1 G/DL (ref 3.4–5)
ALP SERPL-CCNC: 69 U/L (ref 40–150)
ALT SERPL W P-5'-P-CCNC: 37 U/L (ref 0–50)
ANION GAP SERPL CALCULATED.3IONS-SCNC: 9 MMOL/L (ref 3–14)
AST SERPL W P-5'-P-CCNC: 16 U/L (ref 0–45)
BILIRUB SERPL-MCNC: 0.7 MG/DL (ref 0.2–1.3)
BUN SERPL-MCNC: 22 MG/DL (ref 7–30)
CALCIUM SERPL-MCNC: 8.8 MG/DL (ref 8.5–10.1)
CHLORIDE SERPL-SCNC: 104 MMOL/L (ref 94–109)
CO2 SERPL-SCNC: 28 MMOL/L (ref 20–32)
CREAT SERPL-MCNC: 0.9 MG/DL (ref 0.52–1.04)
GFR SERPL CREATININE-BSD FRML MDRD: 60 ML/MIN/1.7M2
GLUCOSE SERPL-MCNC: 92 MG/DL (ref 70–99)
POTASSIUM SERPL-SCNC: 3.8 MMOL/L (ref 3.4–5.3)
PROT SERPL-MCNC: 5.8 G/DL (ref 6.8–8.8)
SODIUM SERPL-SCNC: 141 MMOL/L (ref 133–144)

## 2017-12-08 DIAGNOSIS — I48.91 ATRIAL FIBRILLATION, UNSPECIFIED TYPE (H): ICD-10-CM

## 2017-12-08 RX ORDER — AMIODARONE HYDROCHLORIDE 100 MG/1
200 TABLET ORAL 2 TIMES DAILY
Qty: 120 TABLET | Refills: 1 | Status: SHIPPED | OUTPATIENT
Start: 2017-12-08 | End: 2018-01-18

## 2017-12-08 NOTE — TELEPHONE ENCOUNTER
Patient calls.  Asking if she need to still be taking amiodarone.  Chart reviewed and patient was advised to do a Holter for 30 days and then a decision will be made about continuing this medication and Eliquis. Patient advised of that.  She has not scheduled Holter placement yet-advised patient to call and scheduled and she agreed to do that today.  She wants to schedule for the first week of January and will not get it sooner, she said.  Per plan at the last office visit, she needs to remain on it until after she is done with Holter.  Please refill.  I pended Eliquis to be filled as well since she is to continue both.    Routing refill request to provider for review/approval because:  Drug not on the [facility] [place] protocol     Aurora Wolf RN  Message handled by Nurse Triage.

## 2017-12-11 ENCOUNTER — DOCUMENTATION ONLY (OUTPATIENT)
Dept: PEDIATRICS | Facility: CLINIC | Age: 82
End: 2017-12-11

## 2017-12-11 NOTE — PROGRESS NOTES
Home care RN calling to request orders for lymphedema 2 times a week  for 2 wks 1 time a week for 1 wk and  2 times a week for 2 wks. Verbal given.   Blanca Aponte, RN

## 2017-12-14 ENCOUNTER — DOCUMENTATION ONLY (OUTPATIENT)
Dept: CARE COORDINATION | Facility: CLINIC | Age: 82
End: 2017-12-14

## 2017-12-14 NOTE — PROGRESS NOTES
[facility] now requests orders and shares plan of care/discharge summaries for some patients through [email or web address].  Please REPLY TO THIS MESSAGE in order to give authorization for orders when needed.  This is considered a verbal order, you will still receive a faxed copy of orders for signature.  Thank you for your assistance in improving collaboration for our patients.    ORDER  Per pt request. She wants to decrease HHA services to twice a week. Need the following orders: HHA 1w1, 2w2, 1w1.

## 2017-12-18 ENCOUNTER — HOSPITAL ENCOUNTER (OUTPATIENT)
Dept: CARDIOLOGY | Facility: CLINIC | Age: 82
Discharge: HOME | End: 2017-12-18
Attending: INTERNAL MEDICINE | Admitting: INTERNAL MEDICINE
Payer: COMMERCIAL

## 2017-12-18 DIAGNOSIS — I48.91 ATRIAL FIBRILLATION, UNSPECIFIED TYPE (H): ICD-10-CM

## 2017-12-18 PROCEDURE — 93270 REMOTE 30 DAY ECG REV/REPORT: CPT | Performed by: INTERNAL MEDICINE

## 2017-12-18 PROCEDURE — 93272 ECG/REVIEW INTERPRET ONLY: CPT | Performed by: INTERNAL MEDICINE

## 2017-12-18 NOTE — LETTER
[facility]  [address]  [place] [address]                  [phone]   January 10, 2018    Genie MAURILIO Persaud  [address] [place] [address]      Genie,    Your holter monitor (heart rhythm event monitor) looked within normal limits.    Please let me know if you have any further concerns, otherwise we will plan on seeing you back at your next scheduled appointment.    Layo Sevilla MD  Internal Medicine and Pediatrics      Results for orders placed or performed during the hospital encounter of 17   Cardiac event monitor    [facility]  [address]  [place] [address]  [phone]  2017      Patient:  Genie THORPE Persaud  Chart: [id number]  :  1935  Age:  82 year old  Sex:  female       Procedure:  Event Monitor Placed: Please see scanned document for result once interpretation is completed.        Technician performing hook-up:  Nupur Presley

## 2017-12-21 ENCOUNTER — OFFICE VISIT (OUTPATIENT)
Dept: PEDIATRICS | Facility: CLINIC | Age: 82
End: 2017-12-21
Payer: COMMERCIAL

## 2017-12-21 VITALS
BODY MASS INDEX: 24.27 KG/M2 | OXYGEN SATURATION: 97 % | SYSTOLIC BLOOD PRESSURE: 124 MMHG | DIASTOLIC BLOOD PRESSURE: 40 MMHG | WEIGHT: 137 LBS | HEART RATE: 64 BPM | TEMPERATURE: 97.6 F | HEIGHT: 63 IN

## 2017-12-21 DIAGNOSIS — I25.10 CORONARY ARTERY DISEASE INVOLVING NATIVE CORONARY ARTERY OF NATIVE HEART WITHOUT ANGINA PECTORIS: ICD-10-CM

## 2017-12-21 DIAGNOSIS — J45.40 MODERATE PERSISTENT ASTHMA WITHOUT COMPLICATION: ICD-10-CM

## 2017-12-21 DIAGNOSIS — I10 ESSENTIAL HYPERTENSION WITH GOAL BLOOD PRESSURE LESS THAN 140/90: Primary | ICD-10-CM

## 2017-12-21 DIAGNOSIS — I48.0 PAROXYSMAL ATRIAL FIBRILLATION (H): ICD-10-CM

## 2017-12-21 DIAGNOSIS — Z53.9 DIAGNOSIS NOT YET DEFINED: Primary | ICD-10-CM

## 2017-12-21 PROCEDURE — G0180 MD CERTIFICATION HHA PATIENT: HCPCS | Performed by: INTERNAL MEDICINE

## 2017-12-21 PROCEDURE — 99214 OFFICE O/P EST MOD 30 MIN: CPT | Performed by: INTERNAL MEDICINE

## 2017-12-21 PROCEDURE — 80048 BASIC METABOLIC PNL TOTAL CA: CPT | Performed by: INTERNAL MEDICINE

## 2017-12-21 PROCEDURE — 36415 COLL VENOUS BLD VENIPUNCTURE: CPT | Performed by: INTERNAL MEDICINE

## 2017-12-21 RX ORDER — FUROSEMIDE 20 MG
20 TABLET ORAL DAILY
Qty: 30 TABLET | Refills: 2 | Status: SHIPPED | OUTPATIENT
Start: 2017-12-21 | End: 2018-02-22

## 2017-12-21 RX ORDER — PREDNISONE 10 MG/1
10 TABLET ORAL PRN
Refills: 0 | COMMUNITY
Start: 2017-10-05 | End: 2017-12-21

## 2017-12-21 NOTE — NURSING NOTE
"Chief Complaint   Patient presents with     RECHECK     fluid retention in legs       Initial /40 (BP Location: Right arm, Cuff Size: Adult Regular)  Pulse 64  Temp 97.6  F (36.4  C) (Oral)  Ht 5' 3\" (1.6 m)  Wt 137 lb (62.1 kg)  SpO2 97%  BMI 24.27 kg/m2 Estimated body mass index is 24.27 kg/(m^2) as calculated from the following:    Height as of this encounter: 5' 3\" (1.6 m).    Weight as of this encounter: 137 lb (62.1 kg).  Medication Reconciliation: complete     Janina Szymanski MA   December 21, 2017,  11:43 AM    "

## 2017-12-21 NOTE — PROGRESS NOTES
SUBJECTIVE:   Genie Persaud is a 82 year old female who presents to clinic today for the following health issues:      Follow up   Finished Omnicef and Prednisone. Swelling in both legs has improved greatly.  Left foot wound healed, but right foot still with a small area that is not healed.       lower extremity edema is better; still doing wrapping, but moving toward compression stockings tomorrow.    Starting to walk with a cane eventually; still using walker.      Pneumonia is improved.  Off antibiotics.  No steroids.     Still wearing monitor to determine A fib load.  Has follow up echocardiogram and follow up with Dr. Hernandez in January    Problem list and histories reviewed & adjusted, as indicated.  Additional history: as documented    Patient Active Problem List   Diagnosis     Swelling, mass, or lump in head and neck     Pulmonary nodule     Anemia     Vasculitis (H)     HYPERLIPIDEMIA LDL GOAL <130     Impaired fasting glucose     Candidal esophagitis (H)     Thoracic aortic aneurysm without rupture (H)     Health Care Home     Advanced directives, counseling/discussion     Stroke (H)     Moderate persistent asthma without complication     Essential hypertension with goal blood pressure less than 140/90     Coronary artery disease involving native coronary artery of native heart without angina pectoris     SVT -noted during Cath procedure     Nonrheumatic aortic valve insufficiency     Stress-induced cardiomyopathy     Past Surgical History:   Procedure Laterality Date     C APPENDECTOMY  1957     C LIGATE FALLOPIAN TUBE  1971     C STEREOTACTIC BREAST BIOPSY  2001     CHOLECYSTECTOMY, LAPOROSCOPIC  2008    Cholecystectomy, Laparoscopic     ESOPHAGOSCOPY, GASTROSCOPY, DUODENOSCOPY (EGD), COMBINED  5/17/2013    Procedure: COMBINED ESOPHAGOSCOPY, GASTROSCOPY, DUODENOSCOPY (EGD), BIOPSY SINGLE OR MULTIPLE;  ESOPHAGOSCOPY, GASTROSCOPY, DUODENOSCOPY (EGD)  with bx;  Surgeon: Jeffery Yanez MD;  Location:  [facility]     HC DILATION/CURETTAGE DIAG/THER NON OB  1967,1971     HC REMOVE TONSILS/ADENOIDS,<11 Y/O         Social History   Substance Use Topics     Smoking status: Never Smoker     Smokeless tobacco: Never Used     Alcohol use 0.6 - 1.2 oz/week     1 - 2 Standard drinks or equivalent per week     Family History   Problem Relation Age of Onset     Hypertension Mother      OSTEOPOROSIS Mother      C.A.D. Mother      Connective Tissue Disorder Father      scleraderma     CEREBROVASCULAR DISEASE Paternal Grandmother      Prostate Cancer Other      9/05 passed away due to complications     Breast Cancer Child      youngest dtr         Current Outpatient Prescriptions   Medication Sig Dispense Refill     furosemide (LASIX) 20 MG tablet Take 1 tablet (20 mg) by mouth daily 30 tablet 2     amiodarone (PACERONE/CODARONE) 100 MG TABS tablet Take 2 tablets (200 mg) by mouth 2 times daily 120 tablet 1     apixaban ANTICOAGULANT (ELIQUIS) 2.5 MG tablet Take 1 tablet (2.5 mg) by mouth 2 times daily 60 tablet 1     furosemide (LASIX) 40 MG tablet Take 1 tablet (40 mg) by mouth daily 30 tablet 0     order for DME Equipment being ordered: Walker Wheels () and Walker ()  Treatment Diagnosis: Impaired gait 1 each 0     azelastine (ASTELIN) 0.1 % spray Spray 2 sprays into both nostrils 2 times daily       ipratropium (ATROVENT) 0.03 % spray Spray 2 sprays in nostril 2 times daily       losartan (COZAAR) 100 MG tablet Take 1 tablet (100 mg) by mouth At Bedtime 90 tablet 1     MONTELUKAST SODIUM PO Take 10 mg by mouth every evening        metoprolol (TOPROL-XL) 25 MG 24 hr tablet Take 2 tablets (50 mg) by mouth daily 60 tablet 11     omeprazole (PRILOSEC) 20 MG CR capsule TAKE ONE CAPSULE BY MOUTH ONE TIME DAILY  90 capsule 2     simvastatin (ZOCOR) 20 MG tablet Take 0.5 tablets (10 mg) by mouth At Bedtime 45 tablet 3     fluticasone-salmeterol (ADVAIR-HFA) 230-21 MCG/ACT inhaler Inhale 2 puffs into the lungs 2 times daily 12  "g 1     acetaminophen (TYLENOL) 500 MG tablet Take 500-1,000 mg by mouth every 8 hours as needed for mild pain       albuterol (PROAIR HFA, PROVENTIL HFA, VENTOLIN HFA) 108 (90 BASE) MCG/ACT inhaler Inhale 2 puffs into the lungs every 6 hours as needed        aspirin 81 MG EC tablet Take 1 tablet (81 mg) by mouth daily 90 tablet 3     calcium citrate-vitamin D (CALCIUM CITRATE +) 315-200 MG-UNIT TABS Take 2 tablets by mouth every evening        Allergies   Allergen Reactions     Fosamax [Alendronic Acid] GI Disturbance     Augmentin [Amoxicillin-Pot Clavulanate] Diarrhea     Diarrhea and rash     Evista [Raloxifene]      abd symptoms.      Flu Virus Vaccine      swollen and red at inj site     BP Readings from Last 3 Encounters:   12/21/17 124/40   12/01/17 (!) 120/36   11/25/17 123/41    Wt Readings from Last 3 Encounters:   12/21/17 137 lb (62.1 kg)   12/01/17 145 lb (65.8 kg)   11/25/17 151 lb 9.6 oz (68.8 kg)                  Labs reviewed in EPIC        Reviewed and updated as needed this visit by clinical staffTobacco  Allergies  Meds  Med Hx  Surg Hx  Fam Hx  Soc Hx      Reviewed and updated as needed this visit by Provider         ROS:  C: NEGATIVE for fever, chills, change in weight  E/M: NEGATIVE for ear, mouth and throat problems  R: NEGATIVE for significant cough or SOB  CV: NEGATIVE for chest pain, palpitations or peripheral edema    OBJECTIVE:                                                    /40 (BP Location: Right arm, Cuff Size: Adult Regular)  Pulse 64  Temp 97.6  F (36.4  C) (Oral)  Ht 5' 3\" (1.6 m)  Wt 137 lb (62.1 kg)  SpO2 97%  BMI 24.27 kg/m2  Body mass index is 24.27 kg/(m^2).   GENERAL: healthy, alert, well nourished, well hydrated, no distress  HENT: ear canals- normal; TMs- normal; Nose- normal; Mouth- no ulcers, no lesions  NECK: no tenderness, no adenopathy, no asymmetry, no masses, no stiffness; thyroid- normal to palpation  RESP: lungs clear to auscultation - no " rales, no rhonchi, no wheezes  CV: regular rates and rhythm, normal S1 S2, no S3 or S4 and no murmur, no click or rub -  ABDOMEN: soft, no tenderness, no  hepatosplenomegaly, no masses, normal bowel sounds    Diagnostic test results:  Diagnostic Test Results:  none        ASSESSMENT/PLAN:                                                    1. Essential hypertension with goal blood pressure less than 140/90  blood pressure is low; edema mostly gone.  bmp  - furosemide (LASIX) 20 MG tablet; Take 1 tablet (20 mg) by mouth daily  Dispense: 30 tablet; Refill: 2      2. PNeumonia: resolved.     (I25.10) Coronary artery disease involving native coronary artery of native heart without angina pectoris  Comment:   Plan: secondary risk factor modification.     (J45.40) Moderate persistent asthma without complication  Comment:   Plan: Continue current inhaler treatment.    A fib:  awaitng evaluation by cardiology to determine length of therapy for amio and eliquis.        Layo Sevilla MD  [facility]

## 2017-12-21 NOTE — PATIENT INSTRUCTIONS
"Lab work downstairs today.  Directions:  As you walk through the first floor, you'll see (on the right) first the pharmacy, then some bathrooms, then the \"Lab and Imaging\" area. Give them your name at the window there and wait for them to call you.    If your kidneys look within normal limits, we should decrease your lasix (furosemide) to 20 mg daily.    If your kidneys are worse, we'll stop the lasix (furosemide) completely.    Follow up in Dr. Hernandez in January.    Follow up with me in 6 months.    Layo Sevilla MD  Internal Medicine and Pediatrics   "

## 2017-12-21 NOTE — LETTER
[facility]  [address] [facility]  [place] [address]                  [phone]   December 22, 2017    Genie Persaud  [address] [place] [address]      Genie,    Good news.  Your electrolytes, liver, and kidney panels looked within normal limits.    It was good seeing you in the office.  Hope you have a great rest of the day!    Layo Sevilla MD  Internal Medicine and Pediatrics        Results for orders placed or performed in visit on 12/21/17   Basic metabolic panel  (Ca, Cl, CO2, Creat, Gluc, K, Na, BUN)   Result Value Ref Range    Sodium 137 133 - 144 mmol/L    Potassium 5.0 3.4 - 5.3 mmol/L    Chloride 103 94 - 109 mmol/L    Carbon Dioxide 25 20 - 32 mmol/L    Anion Gap 9 3 - 14 mmol/L    Glucose 95 70 - 99 mg/dL    Urea Nitrogen 22 7 - 30 mg/dL    Creatinine 1.00 0.52 - 1.04 mg/dL    GFR Estimate 53 (L) >60 mL/min/1.7m2    GFR Estimate If Black 65 >60 mL/min/1.7m2    Calcium 9.0 8.5 - 10.1 mg/dL

## 2017-12-21 NOTE — MR AVS SNAPSHOT
"              After Visit Summary   12/21/2017    Genie Persaud    MRN: [id number]           Patient Information     Date Of Birth          1935        Visit Information        Provider Department      12/21/2017 11:40 AM Layo Sevilla MD [facility] [place]        Today's Diagnoses     Essential hypertension with goal blood pressure less than 140/90    -  1      Care Instructions    Lab work downstairs today.  Directions:  As you walk through the first floor, you'll see (on the right) first the pharmacy, then some bathrooms, then the \"Lab and Imaging\" area. Give them your name at the window there and wait for them to call you.    If your kidneys look within normal limits, we should decrease your lasix (furosemide) to 20 mg daily.    If your kidneys are worse, we'll stop the lasix (furosemide) completely.    Follow up in Dr. Hernandez in January.    Follow up with me in 6 months.    Layo Sevilla MD  Internal Medicine and Pediatrics           Follow-ups after your visit        Follow-up notes from your care team     Return in about 6 months (around 6/21/2018).      Your next 10 appointments already scheduled     Jan 12, 2018 10:00 AM CST   Ech Complete with [address] ([facility])    [address]  [facility] [address] [phone]           1. Please bring or wear a comfortable two-piece outfit. 2. You may eat, drink and take your normal medicines. 3. For any questions that cannot be answered, please contact the ordering physician ***Please check-in at the [facility] Registration Office located in [address] in the [facility] building. When you are finished registering, please go to [address] and have a seat. The technician will call your name for the test.            Jan 18, 2018 10:00 AM CST   Return Visit with Barak Hernandez MD   [facility] ([facility])    [address]  [facility] [address] [phone]              Who to contact     If you have questions or need follow up information about today's clinic visit or your schedule please contact [facility] directly at [phone].  Normal or non-critical lab and imaging results will be communicated to you by MyChart, letter or phone within 4 business days after the clinic has received the results. If you do not hear from us within 7 days, please contact the clinic through MyChart or phone. If you have a critical or abnormal lab result, we will notify you by phone as soon as possible.  Submit refill requests through [facility] or call your pharmacy and they will forward the refill request to us. Please allow 3 business days for your refill to be completed.          Additional Information About Your Visit        [facility] Information     [facility] lets you send messages to your doctor, view your test results, renew your prescriptions, schedule appointments and more. To sign up, go to [email or web address] . Click on \"Log in\" on the left side of the screen, which will take you to the Welcome page. Then click on \"Sign up Now\" on the right side of the page.     You will be asked to enter the access code listed below, as well as some personal information. Please follow the directions to create your username and password.     Your access code is: [email or web address]  Expires: 2017  2:32 PM     Your access code will  in 90 days. If you need help or a new code, please call your [facility] clinic or [phone].        Care EveryWhere ID     This is your Care EveryWhere ID. This could be used by other organizations to access your [facility] medical records  [id number]        Your Vitals Were     Pulse Temperature Height Pulse Oximetry BMI (Body Mass Index)       64 97.6  F (36.4  C) (Oral) 5' 3\" (1.6 m) 97% 24.27 kg/m2        Blood Pressure from Last 3 Encounters:   17 124/40   17 (!) " 120/36   11/25/17 123/41    Weight from Last 3 Encounters:   12/21/17 137 lb (62.1 kg)   12/01/17 145 lb (65.8 kg)   11/25/17 151 lb 9.6 oz (68.8 kg)              Today, you had the following     No orders found for display         Today's Medication Changes          These changes are accurate as of: 12/21/17 12:02 PM.  If you have any questions, ask your nurse or doctor.               These medicines have changed or have updated prescriptions.        Dose/Directions    * furosemide 40 MG tablet   Commonly known as:  LASIX   This may have changed:  Another medication with the same name was added. Make sure you understand how and when to take each.   Used for:  Edema, unspecified type        Dose:  40 mg   Take 1 tablet (40 mg) by mouth daily   Quantity:  30 tablet   Refills:  0       * furosemide 20 MG tablet   Commonly known as:  LASIX   This may have changed:  You were already taking a medication with the same name, and this prescription was added. Make sure you understand how and when to take each.   Used for:  Essential hypertension with goal blood pressure less than 140/90   Changed by:  Layo Sevilla MD        Dose:  20 mg   Take 1 tablet (20 mg) by mouth daily   Quantity:  30 tablet   Refills:  2       * Notice:  This list has 2 medication(s) that are the same as other medications prescribed for you. Read the directions carefully, and ask your doctor or other care provider to review them with you.      Stop taking these medicines if you haven't already. Please contact your care team if you have questions.     predniSONE 10 MG tablet   Commonly known as:  DELTASONE   Stopped by:  Layo Sevilla MD                Where to get your medicines      Some of these will need a paper prescription and others can be bought over the counter.  Ask your nurse if you have questions.     Bring a paper prescription for each of these medications     furosemide 20 MG tablet                Primary Care Provider Office Phone #  Fax #    Layo Sevilla [phone] [address] [place] [address]        Equal Access to Services     ELHAM RENDON : Hadsandra william frausto otto Mcgill, waclarenceda luqbrigette, qalianta kaperezda wolfgang, ora panmartell nazario. So [facility] [phone].    ATENCIÓN: Si habla español, tiene a patel disposición servicios gratuitos de asistencia lingüística. Llame al [phone].    We comply with applicable federal civil rights laws and [place] laws. We do not discriminate on the basis of race, color, national origin, age, disability, sex, sexual orientation, or gender identity.            Thank you!     Thank you for choosing [facility]  for your care. Our goal is always to provide you with excellent care. Hearing back from our patients is one way we can continue to improve our services. Please take a few minutes to complete the written survey that you may receive in the mail after your visit with us. Thank you!             Your Updated Medication List - Protect others around you: Learn how to safely use, store and throw away your medicines at www.disposemymeds.org.          This list is accurate as of: 12/21/17 12:02 PM.  Always use your most recent med list.                   Brand Name Dispense Instructions for use Diagnosis    acetaminophen 500 MG tablet    TYLENOL     Take 500-1,000 mg by mouth every 8 hours as needed for mild pain        albuterol 108 (90 BASE) MCG/ACT Inhaler    PROAIR HFA/PROVENTIL HFA/VENTOLIN HFA     Inhale 2 puffs into the lungs every 6 hours as needed        amiodarone 100 MG Tabs tablet    PACERONE/CODARONE    120 tablet    Take 2 tablets (200 mg) by mouth 2 times daily    Atrial fibrillation, unspecified type (H)       apixaban ANTICOAGULANT 2.5 MG tablet    ELIQUIS    60 tablet    Take 1 tablet (2.5 mg) by mouth 2 times daily    Atrial fibrillation, unspecified type (H)       aspirin 81 MG EC tablet     90 tablet    Take 1 tablet (81  mg) by mouth daily    Stroke (H)       azelastine 0.1 % spray    ASTELIN     Spray 2 sprays into both nostrils 2 times daily        CALCIUM CITRATE + 315-200 MG-UNIT Tabs per tablet   Generic drug:  calcium citrate-vitamin D      Take 2 tablets by mouth every evening        fluticasone-salmeterol 230-21 MCG/ACT inhaler    ADVAIR-HFA    12 g    Inhale 2 puffs into the lungs 2 times daily        * furosemide 40 MG tablet    LASIX    30 tablet    Take 1 tablet (40 mg) by mouth daily    Edema, unspecified type       * furosemide 20 MG tablet    LASIX    30 tablet    Take 1 tablet (20 mg) by mouth daily    Essential hypertension with goal blood pressure less than 140/90       ipratropium 0.03 % spray    ATROVENT     Spray 2 sprays in nostril 2 times daily        losartan 100 MG tablet    COZAAR    90 tablet    Take 1 tablet (100 mg) by mouth At Bedtime    Essential hypertension with goal blood pressure less than 140/90       metoprolol 25 MG 24 hr tablet    TOPROL-XL    60 tablet    Take 2 tablets (50 mg) by mouth daily    Essential hypertension with goal blood pressure less than 140/90       MONTELUKAST SODIUM PO      Take 10 mg by mouth every evening        omeprazole 20 MG CR capsule    priLOSEC    90 capsule    TAKE ONE CAPSULE BY MOUTH ONE TIME DAILY    Candidal esophagitis (H)       order for DME     1 each    Equipment being ordered: Walker Wheels () and Walker () Treatment Diagnosis: Impaired gait    Pneumonia of right lower lobe due to infectious organism (H)       simvastatin 20 MG tablet    ZOCOR    45 tablet    Take 0.5 tablets (10 mg) by mouth At Bedtime    Hyperlipidemia LDL goal <130       * Notice:  This list has 2 medication(s) that are the same as other medications prescribed for you. Read the directions carefully, and ask your doctor or other care provider to review them with you.

## 2017-12-22 LAB
ANION GAP SERPL CALCULATED.3IONS-SCNC: 9 MMOL/L (ref 3–14)
BUN SERPL-MCNC: 22 MG/DL (ref 7–30)
CALCIUM SERPL-MCNC: 9 MG/DL (ref 8.5–10.1)
CHLORIDE SERPL-SCNC: 103 MMOL/L (ref 94–109)
CO2 SERPL-SCNC: 25 MMOL/L (ref 20–32)
CREAT SERPL-MCNC: 1 MG/DL (ref 0.52–1.04)
GFR SERPL CREATININE-BSD FRML MDRD: 53 ML/MIN/1.7M2
GLUCOSE SERPL-MCNC: 95 MG/DL (ref 70–99)
POTASSIUM SERPL-SCNC: 5 MMOL/L (ref 3.4–5.3)
SODIUM SERPL-SCNC: 137 MMOL/L (ref 133–144)

## 2018-01-04 ENCOUNTER — TELEPHONE (OUTPATIENT)
Dept: PEDIATRICS | Facility: CLINIC | Age: 83
End: 2018-01-04

## 2018-01-04 NOTE — TELEPHONE ENCOUNTER
Josseline, lymphedema OT for [facility] calls.  Asking for a verbal order for 2 x week next week with the same interventions.  HHA one visit next week for evaluation.  She can be reached at [phone].  A verbal ok was given.  Aurora Wolf RN  Message handled by Nurse Triage.

## 2018-01-12 ENCOUNTER — HOSPITAL ENCOUNTER (OUTPATIENT)
Dept: CARDIOLOGY | Facility: CLINIC | Age: 83
Discharge: HOME | End: 2018-01-12
Attending: INTERNAL MEDICINE | Admitting: INTERNAL MEDICINE
Payer: COMMERCIAL

## 2018-01-12 DIAGNOSIS — I10 ESSENTIAL HYPERTENSION WITH GOAL BLOOD PRESSURE LESS THAN 140/90: ICD-10-CM

## 2018-01-12 PROCEDURE — 93325 DOPPLER ECHO COLOR FLOW MAPG: CPT | Mod: 26 | Performed by: INTERNAL MEDICINE

## 2018-01-12 PROCEDURE — 93321 DOPPLER ECHO F-UP/LMTD STD: CPT | Mod: 26 | Performed by: INTERNAL MEDICINE

## 2018-01-12 PROCEDURE — 25500064 ZZH RX 255 OP 636: Performed by: INTERNAL MEDICINE

## 2018-01-12 PROCEDURE — 93321 DOPPLER ECHO F-UP/LMTD STD: CPT

## 2018-01-12 PROCEDURE — 93308 TTE F-UP OR LMTD: CPT | Mod: 26 | Performed by: INTERNAL MEDICINE

## 2018-01-12 RX ADMIN — HUMAN ALBUMIN MICROSPHERES AND PERFLUTREN 3 ML: 10; .22 INJECTION, SOLUTION INTRAVENOUS at 11:48

## 2018-01-16 ENCOUNTER — PRE VISIT (OUTPATIENT)
Dept: CARDIOLOGY | Facility: CLINIC | Age: 83
End: 2018-01-16

## 2018-01-18 ENCOUNTER — OFFICE VISIT (OUTPATIENT)
Dept: CARDIOLOGY | Facility: CLINIC | Age: 83
End: 2018-01-18
Attending: INTERNAL MEDICINE
Payer: COMMERCIAL

## 2018-01-18 ENCOUNTER — TELEPHONE (OUTPATIENT)
Dept: CARDIOLOGY | Facility: CLINIC | Age: 83
End: 2018-01-18

## 2018-01-18 VITALS
SYSTOLIC BLOOD PRESSURE: 143 MMHG | DIASTOLIC BLOOD PRESSURE: 70 MMHG | HEIGHT: 63 IN | BODY MASS INDEX: 24.47 KG/M2 | WEIGHT: 138.1 LBS | HEART RATE: 64 BPM

## 2018-01-18 DIAGNOSIS — R60.0 PERIPHERAL EDEMA: Chronic | ICD-10-CM

## 2018-01-18 DIAGNOSIS — I10 ESSENTIAL HYPERTENSION WITH GOAL BLOOD PRESSURE LESS THAN 140/90: ICD-10-CM

## 2018-01-18 DIAGNOSIS — I25.10 CORONARY ARTERY DISEASE INVOLVING NATIVE CORONARY ARTERY OF NATIVE HEART WITHOUT ANGINA PECTORIS: ICD-10-CM

## 2018-01-18 DIAGNOSIS — I47.10 SVT (SUPRAVENTRICULAR TACHYCARDIA) (H): ICD-10-CM

## 2018-01-18 DIAGNOSIS — I51.81 STRESS-INDUCED CARDIOMYOPATHY: ICD-10-CM

## 2018-01-18 DIAGNOSIS — I71.20 THORACIC AORTIC ANEURYSM WITHOUT RUPTURE (H): ICD-10-CM

## 2018-01-18 DIAGNOSIS — E78.5 HYPERLIPIDEMIA LDL GOAL <130: ICD-10-CM

## 2018-01-18 DIAGNOSIS — I35.1 NONRHEUMATIC AORTIC VALVE INSUFFICIENCY: Chronic | ICD-10-CM

## 2018-01-18 DIAGNOSIS — I48.0 PAROXYSMAL ATRIAL FIBRILLATION (H): Primary | ICD-10-CM

## 2018-01-18 PROCEDURE — 99215 OFFICE O/P EST HI 40 MIN: CPT | Performed by: INTERNAL MEDICINE

## 2018-01-18 NOTE — MR AVS SNAPSHOT
After Visit Summary   1/18/2018    Genie Persaud    MRN: [id number]           Patient Information     Date Of Birth          1935        Visit Information        Provider Department      1/18/2018 10:00 AM Barak Hernandez MD [facility]        Today's Diagnoses     Paroxysmal atrial fibrillation (H)    -  1    Essential hypertension with goal blood pressure less than 140/90        Hyperlipidemia LDL goal <130        Stress-induced cardiomyopathy        Nonrheumatic aortic valve insufficiency        Thoracic aortic aneurysm without rupture (H)        Coronary artery disease involving native coronary artery of native heart without angina pectoris        SVT -noted during Cath procedure        Peripheral edema           Follow-ups after your visit        Additional Services     Follow-Up with Cardiac Advanced Practice Provider           Follow-Up with Cardiologist                 Your next 10 appointments already scheduled     Feb 22, 2018  9:45 AM CST   LAB with RU LAB   [facility] [place] ([facility])    [id number] [place] [id number] [phone]           Please do not eat 10-12 hours before your appointment if you are coming in fasting for labs on lipids, cholesterol, or glucose (sugar). This does not apply to pregnant women. Water, hot tea and black coffee (with nothing added) are okay. Do not drink other fluids, diet soda or chew gum.            Feb 22, 2018 10:40 AM CST   Return Visit with ANA MARIA Coe   [facility] ([facility])    [address] [place] [id number] [phone]              Future tests that were ordered for you today     Open Future Orders        Priority Expected Expires Ordered    Basic metabolic panel Routine 7/17/2018 1/18/2019 1/18/2018    Basic metabolic panel Routine 2/17/2018  "2019    Follow-Up with Cardiologist Routine 2018    Lipid Profile Routine 2018    ALT Routine 2018    Follow-Up with Cardiac Advanced Practice Provider Routine 2018            Who to contact     If you have questions or need follow up information about today's clinic visit or your schedule please contact [facility] directly at [phone].  Normal or non-critical lab and imaging results will be communicated to you by Perklehart, letter or phone within 4 business days after the clinic has received the results. If you do not hear from us within 7 days, please contact the clinic through Perklehart or phone. If you have a critical or abnormal lab result, we will notify you by phone as soon as possible.  Submit refill requests through [employer] or call your pharmacy and they will forward the refill request to us. Please allow 3 business days for your refill to be completed.          Additional Information About Your Visit        Perklehart Information     [employer] lets you send messages to your doctor, view your test results, renew your prescriptions, schedule appointments and more. To sign up, go to [email or web address] . Click on \"Log in\" on the left side of the screen, which will take you to the Welcome page. Then click on \"Sign up Now\" on the right side of the page.     You will be asked to enter the access code listed below, as well as some personal information. Please follow the directions to create your username and password.     Your access code is: [email or web address]  Expires: 2018 11:04 AM     Your access code will  in 90 days. If you need help or a new code, please call your [facility] clinic or [phone].        Care EveryWhere ID     This is your Care EveryWhere ID. This could be used by other organizations to access your [facility] medical " "records  [id number]        Your Vitals Were     Pulse Height BMI (Body Mass Index)             64 1.6 m (5' 3\") 24.46 kg/m2          Blood Pressure from Last 3 Encounters:   01/18/18 143/70   12/21/17 124/40   12/01/17 (!) 120/36    Weight from Last 3 Encounters:   01/18/18 62.6 kg (138 lb 1.6 oz)   12/21/17 62.1 kg (137 lb)   12/01/17 65.8 kg (145 lb)              We Performed the Following     Follow-Up with Cardiologist          Today's Medication Changes          These changes are accurate as of: 1/18/18 11:04 AM.  If you have any questions, ask your nurse or doctor.               These medicines have changed or have updated prescriptions.        Dose/Directions    furosemide 20 MG tablet   Commonly known as:  LASIX   This may have changed:  Another medication with the same name was removed. Continue taking this medication, and follow the directions you see here.   Used for:  Essential hypertension with goal blood pressure less than 140/90   Changed by:  Layo Sevilla MD        Dose:  20 mg   Take 1 tablet (20 mg) by mouth daily   Quantity:  30 tablet   Refills:  2         Stop taking these medicines if you haven't already. Please contact your care team if you have questions.     amiodarone 100 MG Tabs tablet   Commonly known as:  PACERONE/CODARONE   Stopped by:  Barak Hernandez MD           aspirin 81 MG EC tablet   Stopped by:  Barak Hernandez MD                    Primary Care Provider Office Phone # Fax #    Layo Sevilla [phone] [address] [place] [address]        Equal Access to Services     [facility] AH: Hadii aad ku hadasho Soomaali, waaxda luqadaha, qaybta kaalmada adeegesthelada, ora lange. So [facility] [phone].    ATENCIÓN: Si habla español, tiene a patel disposición servicios gratuitos de asistencia lingüística. Llame al [phone].    We comply with applicable federal civil rights laws and [place] laws. We do not " discriminate on the basis of race, color, national origin, age, disability, sex, sexual orientation, or gender identity.            Thank you!     Thank you for choosing [facility]  for your care. Our goal is always to provide you with excellent care. Hearing back from our patients is one way we can continue to improve our services. Please take a few minutes to complete the written survey that you may receive in the mail after your visit with us. Thank you!             Your Updated Medication List - Protect others around you: Learn how to safely use, store and throw away your medicines at www.disposemymeds.org.          This list is accurate as of: 1/18/18 11:04 AM.  Always use your most recent med list.                   Brand Name Dispense Instructions for use Diagnosis    acetaminophen 500 MG tablet    TYLENOL     Take 500-1,000 mg by mouth every 8 hours as needed for mild pain        albuterol 108 (90 BASE) MCG/ACT Inhaler    PROAIR HFA/PROVENTIL HFA/VENTOLIN HFA     Inhale 2 puffs into the lungs every 6 hours as needed        apixaban ANTICOAGULANT 2.5 MG tablet    ELIQUIS    60 tablet    Take 1 tablet (2.5 mg) by mouth 2 times daily    Atrial fibrillation, unspecified type (H)       azelastine 0.1 % spray    ASTELIN     Spray 2 sprays into both nostrils 2 times daily        CALCIUM CITRATE + 315-200 MG-UNIT Tabs per tablet   Generic drug:  calcium citrate-vitamin D      Take 2 tablets by mouth every evening        fluticasone-salmeterol 230-21 MCG/ACT inhaler    ADVAIR-HFA    12 g    Inhale 2 puffs into the lungs 2 times daily        furosemide 20 MG tablet    LASIX    30 tablet    Take 1 tablet (20 mg) by mouth daily    Essential hypertension with goal blood pressure less than 140/90       ipratropium 0.03 % spray    ATROVENT     Spray 2 sprays in nostril 2 times daily        losartan 100 MG tablet    COZAAR    90 tablet    Take 1 tablet (100 mg) by mouth At Bedtime     Essential hypertension with goal blood pressure less than 140/90       metoprolol succinate 25 MG 24 hr tablet    TOPROL-XL    60 tablet    Take 2 tablets (50 mg) by mouth daily    Essential hypertension with goal blood pressure less than 140/90       MONTELUKAST SODIUM PO      Take 10 mg by mouth every evening        omeprazole 20 MG CR capsule    priLOSEC    90 capsule    TAKE ONE CAPSULE BY MOUTH ONE TIME DAILY    Candidal esophagitis (H)       order for DME     1 each    Equipment being ordered: Walker Wheels () and Walker () Treatment Diagnosis: Impaired gait    Pneumonia of right lower lobe due to infectious organism (H)       simvastatin 20 MG tablet    ZOCOR    45 tablet    Take 0.5 tablets (10 mg) by mouth At Bedtime    Hyperlipidemia LDL goal <130

## 2018-01-18 NOTE — PROGRESS NOTES
HISTORY OF PRESENT ILLNESS:  Genie is a very nice 81-year-old woman with past medical history significant for chronic peripheral edema, severe labile hypertension, hypercholesterolemia, known ascending aortic aneurysm, mild-to-moderate coronary artery disease based on angiogram from 03/2017, moderately to moderately severe aortic insufficiency based on echocardiogram also from 03/2017.  Same echocardiogram demonstrated a nonischemic cardiomyopathy with ejection fraction of 45%-50%.  In the Cath Lab, she was noted to have short bursts of nonsustained supraventricular tachycardia with rates of up to 130.      Since I have seen Genie last, she was hospitalized in November.  Reading through the chart, she appeared to be presenting with pneumonia and overwhelming sepsis, developed a takotsubo stress cardiomyopathy with troponins rising to 4 with severe drop in ejection fraction.  Hospital course was further complicated by asymptomatic rapid atrial fibrillation.  She had severe peripheral edema with biventricular heart failure.  She now returns to clinic.  Since discharge, Genie has had a followup event monitor for 3 weeks demonstrating no recurrent atrial fibrillation.      Genie returns to clinic stating she is much improved.  She has no chest, arm, neck, jaw or shoulder discomfort.  No dyspnea on exertion, orthopnea or PND.  No palpitations, lightheadedness, dizziness, syncope or near syncope.  She has had no bleeding problems on her anticoagulation therapy.  No symptoms to suggest a TIA or CVA.  She thinks her ankle edema is down to baseline.  She is using her support stockings.  Her main complaint is the expense and the number of medications that she is now on.       ASSESSMENT AND PLAN:  Genie has no symptoms to suggest ischemia and this is supported by her angiogram from last March.      Heart failure appears to be very well compensated.  We did a repeat echocardiogram and her ejection fraction is now reportedly  back in normal range at 60%-65%.      Aortic insufficiency is now estimated to only be mild.      Echocardiogram again notes her ascending aorta mildly to moderately dilated at 4.4.  This appears to be better than her CT scan of 2015 when it was estimated to be 4.7 x 4.6.  I will repeat the CT scan when she returns to see me in 6 months.      As stated, she does not appear to have any recurrent atrial fibrillation.  We talked about her expensive medications which are primarily the Xarelto and the amiodarone.  I told her at this time I would like to stop her amiodarone and I told her to take 1 month to clear it out of her system.  We will continue the Xarelto at this time and I will follow up with my LAYA in 1 month.  We will see what her blood pressure and heart rate are doing and if okay at that time, then will put a 10-day to 14-day ZIO Patch on (she did not like the monitor with the leads) to see if she has any recurrent atrial fibrillation.  If she has no recurrent atrial fibrillation, then will consider getting rid of Xarelto.      Looking at her medications, she is on both aspirin and Xarelto.  I would stop her aspirin and continue the Xarelto only.  If we do discontinue Xarelto down the road, I would go back on her aspirin 81 mg daily.      In 6 months, I will also repeat her echocardiogram to follow up on her aortic insufficiency.  At this time, surprisingly, it is much better.  I will make sure it stays better.  Obviously, her blood pressure is very well controlled at this time at 143/70.      Weight is 138 pounds which is down from previous.  Her last weight was her hospital weight and she probably was still fairly edematous.  She thinks she is back to her baseline.      Looking at her medications, there is some confusion as to whether she is on 40 or 20 mg of Lasix.  I told her we will go with 20 mg of Lasix and discontinue the 40.  Prior to her recent hospitalization, she was just on Lozol as her only  diuretic and it was for blood pressure.      Her labile blood pressure, as stated, is well controlled and will continue to monitor this.      We reviewed the importance of a low-salt diet, given her peripheral edema.      She had little recollection of her hospital stay and I explained how sick she apparently was.  Her liver function tests were quite elevated and we talked about all these issues.      Thank you for allowing me to participate in her care.      Barak Root MD, FACC         BARAK ROOT MD, FACC             D: 2018 11:09   T: 2018 12:01   MT: DARLENE      Name:     TERESA HARTMAN   MRN:      [id number]        Account:      [id number]   :      1935           Service Date: 2018      Document: [id number]

## 2018-01-18 NOTE — LETTER
1/18/2018      Layo Sevilla MD  [address] [place] [address]      RE: Genie Persaud       Dear Colleague,    I had the pleasure of seeing Genie Persaud in the [facility].    HISTORY OF PRESENT ILLNESS:  Genie is a very nice 81-year-old woman with past medical history significant for chronic peripheral edema, severe labile hypertension, hypercholesterolemia, known ascending aortic aneurysm, mild-to-moderate coronary artery disease based on angiogram from 03/2017, moderately to moderately severe aortic insufficiency based on echocardiogram also from 03/2017.  Same echocardiogram demonstrated a nonischemic cardiomyopathy with ejection fraction of 45%-50%.  In the Cath Lab, she was noted to have short bursts of nonsustained supraventricular tachycardia with rates of up to 130.      Since I have seen Genie last, she was hospitalized in November.  Reading through the chart, she appeared to be presenting with pneumonia and overwhelming sepsis, developed a takotsubo stress cardiomyopathy with troponins rising to 4 with severe drop in ejection fraction.  Hospital course was further complicated by asymptomatic rapid atrial fibrillation.  She had severe peripheral edema with biventricular heart failure.  She now returns to clinic.  Since discharge, Genie has had a followup event monitor for 3 weeks demonstrating no recurrent atrial fibrillation.      Genie returns to clinic stating she is much improved.  She has no chest, arm, neck, jaw or shoulder discomfort.  No dyspnea on exertion, orthopnea or PND.  No palpitations, lightheadedness, dizziness, syncope or near syncope.  She has had no bleeding problems on her anticoagulation therapy.  No symptoms to suggest a TIA or CVA.  She thinks her ankle edema is down to baseline.  She is using her support stockings.  Her main complaint is the expense and the number of medications that she is now on.       ASSESSMENT AND PLAN:  Genie has  no symptoms to suggest ischemia and this is supported by her angiogram from last March.      Heart failure appears to be very well compensated.  We did a repeat echocardiogram and her ejection fraction is now reportedly back in normal range at 60%-65%.      Aortic insufficiency is now estimated to only be mild.      Echocardiogram again notes her ascending aorta mildly to moderately dilated at 4.4.  This appears to be better than her CT scan of 2015 when it was estimated to be 4.7 x 4.6.  I will repeat the CT scan when she returns to see me in 6 months.      As stated, she does not appear to have any recurrent atrial fibrillation.  We talked about her expensive medications which are primarily the Xarelto and the amiodarone.  I told her at this time I would like to stop her amiodarone and I told her to take 1 month to clear it out of her system.  We will continue the Xarelto at this time and I will follow up with my LAYA in 1 month.  We will see what her blood pressure and heart rate are doing and if okay at that time, then will put a 10-day to 14-day ZIO Patch on (she did not like the monitor with the leads) to see if she has any recurrent atrial fibrillation.  If she has no recurrent atrial fibrillation, then will consider getting rid of Xarelto.      Looking at her medications, she is on both aspirin and Xarelto.  I would stop her aspirin and continue the Xarelto only.  If we do discontinue Xarelto down the road, I would go back on her aspirin 81 mg daily.      In 6 months, I will also repeat her echocardiogram to follow up on her aortic insufficiency.  At this time, surprisingly, it is much better.  I will make sure it stays better.  Obviously, her blood pressure is very well controlled at this time at 143/70.      Weight is 138 pounds which is down from previous.  Her last weight was her hospital weight and she probably was still fairly edematous.  She thinks she is back to her baseline.      Looking at her  medications, there is some confusion as to whether she is on 40 or 20 mg of Lasix.  I told her we will go with 20 mg of Lasix and discontinue the 40.  Prior to her recent hospitalization, she was just on Lozol as her only diuretic and it was for blood pressure.      Her labile blood pressure, as stated, is well controlled and will continue to monitor this.      We reviewed the importance of a low-salt diet, given her peripheral edema.      She had little recollection of her hospital stay and I explained how sick she apparently was.  Her liver function tests were quite elevated and we talked about all these issues.      Thank you for allowing me to participate in her care.     Outpatient Encounter Prescriptions as of 1/18/2018   Medication Sig Dispense Refill     furosemide (LASIX) 20 MG tablet Take 1 tablet (20 mg) by mouth daily 30 tablet 2     apixaban ANTICOAGULANT (ELIQUIS) 2.5 MG tablet Take 1 tablet (2.5 mg) by mouth 2 times daily 60 tablet 1     order for DME Equipment being ordered: Walker Wheels () and Walker ()  Treatment Diagnosis: Impaired gait 1 each 0     azelastine (ASTELIN) 0.1 % spray Spray 2 sprays into both nostrils 2 times daily       ipratropium (ATROVENT) 0.03 % spray Spray 2 sprays in nostril 2 times daily       losartan (COZAAR) 100 MG tablet Take 1 tablet (100 mg) by mouth At Bedtime 90 tablet 1     MONTELUKAST SODIUM PO Take 10 mg by mouth every evening        metoprolol (TOPROL-XL) 25 MG 24 hr tablet Take 2 tablets (50 mg) by mouth daily 60 tablet 11     omeprazole (PRILOSEC) 20 MG CR capsule TAKE ONE CAPSULE BY MOUTH ONE TIME DAILY  90 capsule 2     simvastatin (ZOCOR) 20 MG tablet Take 0.5 tablets (10 mg) by mouth At Bedtime 45 tablet 3     fluticasone-salmeterol (ADVAIR-HFA) 230-21 MCG/ACT inhaler Inhale 2 puffs into the lungs 2 times daily 12 g 1     acetaminophen (TYLENOL) 500 MG tablet Take 500-1,000 mg by mouth every 8 hours as needed for mild pain       albuterol (PROAIR  HFA, PROVENTIL HFA, VENTOLIN HFA) 108 (90 BASE) MCG/ACT inhaler Inhale 2 puffs into the lungs every 6 hours as needed        calcium citrate-vitamin D (CALCIUM CITRATE +) 315-200 MG-UNIT TABS Take 2 tablets by mouth every evening        [DISCONTINUED] amiodarone (PACERONE/CODARONE) 100 MG TABS tablet Take 2 tablets (200 mg) by mouth 2 times daily 120 tablet 1     [DISCONTINUED] furosemide (LASIX) 40 MG tablet Take 1 tablet (40 mg) by mouth daily 30 tablet 0     [DISCONTINUED] aspirin 81 MG EC tablet Take 1 tablet (81 mg) by mouth daily 90 tablet 3     No facility-administered encounter medications on file as of 1/18/2018.        Again, thank you for allowing me to participate in the care of your patient.      Sincerely,    Barak Hernandez MD     [facility]

## 2018-01-18 NOTE — PROGRESS NOTES
HPI and Plan:   See dictation    Orders Placed This Encounter   Procedures     Basic metabolic panel     Basic metabolic panel     Lipid Profile     ALT     Follow-Up with Cardiologist     Follow-Up with Cardiac Advanced Practice Provider       No orders of the defined types were placed in this encounter.      Medications Discontinued During This Encounter   Medication Reason     amiodarone (PACERONE/CODARONE) 100 MG TABS tablet      furosemide (LASIX) 40 MG tablet      aspirin 81 MG EC tablet          Encounter Diagnoses   Name Primary?     Essential hypertension with goal blood pressure less than 140/90      Paroxysmal atrial fibrillation (H) Yes     Hyperlipidemia LDL goal <130      Stress-induced cardiomyopathy      Nonrheumatic aortic valve insufficiency      Thoracic aortic aneurysm without rupture (H)      Coronary artery disease involving native coronary artery of native heart without angina pectoris      SVT -noted during Cath procedure      Peripheral edema        CURRENT MEDICATIONS:  Current Outpatient Prescriptions   Medication Sig Dispense Refill     furosemide (LASIX) 20 MG tablet Take 1 tablet (20 mg) by mouth daily 30 tablet 2     apixaban ANTICOAGULANT (ELIQUIS) 2.5 MG tablet Take 1 tablet (2.5 mg) by mouth 2 times daily 60 tablet 1     order for DME Equipment being ordered: Walker Wheels () and Walker ()  Treatment Diagnosis: Impaired gait 1 each 0     azelastine (ASTELIN) 0.1 % spray Spray 2 sprays into both nostrils 2 times daily       ipratropium (ATROVENT) 0.03 % spray Spray 2 sprays in nostril 2 times daily       losartan (COZAAR) 100 MG tablet Take 1 tablet (100 mg) by mouth At Bedtime 90 tablet 1     MONTELUKAST SODIUM PO Take 10 mg by mouth every evening        metoprolol (TOPROL-XL) 25 MG 24 hr tablet Take 2 tablets (50 mg) by mouth daily 60 tablet 11     omeprazole (PRILOSEC) 20 MG CR capsule TAKE ONE CAPSULE BY MOUTH ONE TIME DAILY  90 capsule 2     simvastatin (ZOCOR) 20 MG  tablet Take 0.5 tablets (10 mg) by mouth At Bedtime 45 tablet 3     fluticasone-salmeterol (ADVAIR-HFA) 230-21 MCG/ACT inhaler Inhale 2 puffs into the lungs 2 times daily 12 g 1     acetaminophen (TYLENOL) 500 MG tablet Take 500-1,000 mg by mouth every 8 hours as needed for mild pain       albuterol (PROAIR HFA, PROVENTIL HFA, VENTOLIN HFA) 108 (90 BASE) MCG/ACT inhaler Inhale 2 puffs into the lungs every 6 hours as needed        calcium citrate-vitamin D (CALCIUM CITRATE +) 315-200 MG-UNIT TABS Take 2 tablets by mouth every evening        [DISCONTINUED] furosemide (LASIX) 40 MG tablet Take 1 tablet (40 mg) by mouth daily 30 tablet 0       ALLERGIES     Allergies   Allergen Reactions     Fosamax [Alendronic Acid] GI Disturbance     Augmentin [Amoxicillin-Pot Clavulanate] Diarrhea     Diarrhea and rash     Evista [Raloxifene]      abd symptoms.      Flu Virus Vaccine      swollen and red at inj site       PAST MEDICAL HISTORY:  Past Medical History:   Diagnosis Date     Anemia 3/10/2009    Chronic disease, by Fe studies; awaiting full GI w/u and repeat colonoscopy, ERCP.     Basal Cell Carcinoma--back      Coronary artery disease involving native coronary artery of native heart without angina pectoris 3/9/2017    3/2/2017 - Two vessel coronary artery disease with mLAD 50% stenosis, D3 50% stenosis, pLCx 50% stenosis and mLCx 50% stenosis     Essential hypertension with goal blood pressure less than 140/90 9/1/2016    White coat hypertension;  24 hour ambulatory monitoring normal. Do not titrate up further.       Hyperlipidemia LDL goal <130 2/10/2010     Impaired fasting glucose 8/26/2010     Moderate persistent asthma      Nonrheumatic aortic valve insufficiency 6/29/2017     osteopenia      Paroxysmal atrial fibrillation (H) 12/26/2017     Pulmonary nodule 3/3/2009    PET scan reportedly normal; biopsy not advised.     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  Problem list name updated by automated process. Provider to review     Unspecified arthropathy, hand      Vasculitis (H) 4/20/2009    Possible increased activity of thoracic aorta, on PET scan w/u for pulmonary nodule.        PAST SURGICAL HISTORY:  Past Surgical History:   Procedure Laterality Date     C APPENDECTOMY  1957     C LIGATE FALLOPIAN TUBE  1971     C STEREOTACTIC BREAST BIOPSY  2001     CHOLECYSTECTOMY, LAPOROSCOPIC  2008    Cholecystectomy, Laparoscopic     ESOPHAGOSCOPY, GASTROSCOPY, DUODENOSCOPY (EGD), COMBINED  5/17/2013    Procedure: COMBINED ESOPHAGOSCOPY, GASTROSCOPY, DUODENOSCOPY (EGD), BIOPSY SINGLE OR MULTIPLE;  ESOPHAGOSCOPY, GASTROSCOPY, DUODENOSCOPY (EGD)  with bx;  Surgeon: Jeffery Yanez MD;  Location: [facility]     HC DILATION/CURETTAGE DIAG/THER NON OB  1967,1971     HC REMOVE TONSILS/ADENOIDS,<13 Y/O         FAMILY HISTORY:  Family History   Problem Relation Age of Onset     Hypertension Mother      OSTEOPOROSIS Mother      C.A.D. Mother      Connective Tissue Disorder Father      scleraderma     CEREBROVASCULAR DISEASE Paternal Grandmother      Prostate Cancer Other      9/05 passed away due to complications     Breast Cancer Child      youngest dtr       SOCIAL HISTORY:  Social History     Social History     Marital status: Single     Spouse name: N/A     Number of children: 3     Years of education: 15     Occupational History     semi-retired      Social History Main Topics     Smoking status: Never Smoker     Smokeless tobacco: Never Used     Alcohol use 0.6 - 1.2 oz/week     1 - 2 Standard drinks or equivalent per week      Comment: 1 glass of wine 1-2 days per week     Drug use: No     Sexual activity: No     Other Topics  "Concern     Parent/Sibling W/ Cabg, Mi Or Angioplasty Before 65f 55m? No     Social History Narrative       Review of Systems:  Skin:  Negative       Eyes:  Positive for glasses cataract extraction of both eyes  ENT:  Positive for postnasal drainage    Respiratory:  Positive for shortness of breath asthma   Cardiovascular:  Negative      Gastroenterology: Positive for heartburn;reflux reflux under control with Omeprazole  Genitourinary:  Positive for nocturia    Musculoskeletal:  Positive for arthritis;joint pain left shoulder, right knee  Neurologic:  Negative      Psychiatric:  Negative      Heme/Lymph/Imm:  Positive for allergies RX allergies ,  Allergic to almost  everything- per pt  -  environmental allergies , cats - certain trees -  no food allergies  Endocrine:  Negative        Physical Exam:  Vitals: /70 (BP Location: Right arm, Patient Position: Chair, Cuff Size: Adult Regular)  Pulse 64  Ht 1.6 m (5' 3\")  Wt 62.6 kg (138 lb 1.6 oz)  BMI 24.46 kg/m2    Constitutional:  cooperative, alert and oriented, well developed, well nourished, in no acute distress   uses walker    Skin:  warm and dry to the touch, no apparent skin lesions or masses noted          Head:  normocephalic, no masses or lesions        Eyes:  pupils equal and round, conjunctivae and lids unremarkable, sclera white, no xanthalasma, EOMS intact, no nystagmus        Lymph:      ENT:  no pallor or cyanosis, dentition good        Neck:  carotid pulses are full and equal bilaterally;no carotid bruit        Respiratory:  normal breath sounds, clear to auscultation, normal A-P diameter, normal symmetry, normal respiratory excursion, no use of accessory muscles         Cardiac: regular rhythm       systolic murmur;grade 2;RUSB   diastolic murmur;decrescendo;RUSB;grade 1    pulses full and equal                                        GI:           Extremities and Muscular Skeletal:  no spinal abnormalities noted;normal muscle strength and " tone   bilateral LE edema;trace     uses compression stockings    Neurological:  no gross motor deficits        Psych:  affect appropriate, oriented to time, person and place        CC  Barak Hernandez MD  [address]  [place] [id number]

## 2018-01-18 NOTE — TELEPHONE ENCOUNTER
Patient called to confirm her furosemide dose. - currently taking 20 mg daily.   OV 1-18-18 with Dr. Wheeler  Will update Dr. Hernandez

## 2018-01-23 ENCOUNTER — DOCUMENTATION ONLY (OUTPATIENT)
Dept: CARDIOLOGY | Facility: CLINIC | Age: 83
End: 2018-01-23

## 2018-01-23 NOTE — PROGRESS NOTES
VM from patient stating that she has restarted her amiodarone on her own as of Sunday. Patient states that her amiodarone was discontinued on 1/18/18 at her OV with Dr. Hernandez, but her BP iris to 190/80 on Sunday, so she started taking her amiodarone again on her own and her BP came back down. Spoke with patient. Reviewed with patient that amiodarone is not a blood pressure medication, and that it is used for rhythm, not blood pressure. Reviewed with patient that Dr. Hernandez discontinued it because her heart didn't show an afib recently. Instructed patient to discontinue her amiodarone as Dr. Hernandez had said. Reviewed with patient that if she is having high BP, she should call in to the clinic to receive recommendations about medication adjustments. Asked patient about her BP today. Patient states it was 126/80 an hour ago after she had taken all of her medications. Reviewed with patient to take her BP 1-2 hours after taking her medications. Instructed patient to call us if she continues to have high BP readings after her medications, as we can make adjustments appropriately for her. Patient verbalized understanding and agreed with plan of care.

## 2018-02-22 ENCOUNTER — CARE COORDINATION (OUTPATIENT)
Dept: CARDIOLOGY | Facility: CLINIC | Age: 83
End: 2018-02-22

## 2018-02-22 ENCOUNTER — OFFICE VISIT (OUTPATIENT)
Dept: CARDIOLOGY | Facility: CLINIC | Age: 83
End: 2018-02-22
Attending: INTERNAL MEDICINE
Payer: COMMERCIAL

## 2018-02-22 VITALS
DIASTOLIC BLOOD PRESSURE: 50 MMHG | HEART RATE: 72 BPM | BODY MASS INDEX: 24.8 KG/M2 | HEIGHT: 63 IN | SYSTOLIC BLOOD PRESSURE: 118 MMHG | WEIGHT: 140 LBS

## 2018-02-22 DIAGNOSIS — I48.91 ATRIAL FIBRILLATION, UNSPECIFIED TYPE (H): ICD-10-CM

## 2018-02-22 DIAGNOSIS — I10 BENIGN ESSENTIAL HYPERTENSION: ICD-10-CM

## 2018-02-22 DIAGNOSIS — E78.5 HYPERLIPIDEMIA LDL GOAL <130: ICD-10-CM

## 2018-02-22 DIAGNOSIS — I51.81 STRESS-INDUCED CARDIOMYOPATHY: ICD-10-CM

## 2018-02-22 DIAGNOSIS — I51.81 STRESS-INDUCED CARDIOMYOPATHY: Primary | ICD-10-CM

## 2018-02-22 LAB
ALT SERPL W P-5'-P-CCNC: 30 U/L (ref 0–50)
ANION GAP SERPL CALCULATED.3IONS-SCNC: 3 MMOL/L (ref 3–14)
BUN SERPL-MCNC: 31 MG/DL (ref 7–30)
CALCIUM SERPL-MCNC: 8.9 MG/DL (ref 8.5–10.1)
CHLORIDE SERPL-SCNC: 103 MMOL/L (ref 94–109)
CHOLEST SERPL-MCNC: 176 MG/DL
CO2 SERPL-SCNC: 31 MMOL/L (ref 20–32)
CREAT SERPL-MCNC: 0.95 MG/DL (ref 0.52–1.04)
ERYTHROCYTE [DISTWIDTH] IN BLOOD BY AUTOMATED COUNT: 14.9 % (ref 10–15)
GFR SERPL CREATININE-BSD FRML MDRD: 56 ML/MIN/1.7M2
GLUCOSE SERPL-MCNC: 105 MG/DL (ref 70–99)
HCT VFR BLD AUTO: 39.6 % (ref 35–47)
HDLC SERPL-MCNC: 93 MG/DL
HGB BLD-MCNC: 12.5 G/DL (ref 11.7–15.7)
LDLC SERPL CALC-MCNC: 65 MG/DL
MCH RBC QN AUTO: 29.9 PG (ref 26.5–33)
MCHC RBC AUTO-ENTMCNC: 31.6 G/DL (ref 31.5–36.5)
MCV RBC AUTO: 95 FL (ref 78–100)
NONHDLC SERPL-MCNC: 83 MG/DL
PLATELET # BLD AUTO: 235 10E9/L (ref 150–450)
POTASSIUM SERPL-SCNC: 4.4 MMOL/L (ref 3.4–5.3)
RBC # BLD AUTO: 4.18 10E12/L (ref 3.8–5.2)
SODIUM SERPL-SCNC: 137 MMOL/L (ref 133–144)
TRIGL SERPL-MCNC: 92 MG/DL
TSH SERPL DL<=0.005 MIU/L-ACNC: 1.72 MU/L (ref 0.4–4)
WBC # BLD AUTO: 14.1 10E9/L (ref 4–11)

## 2018-02-22 PROCEDURE — 84460 ALANINE AMINO (ALT) (SGPT): CPT | Performed by: INTERNAL MEDICINE

## 2018-02-22 PROCEDURE — 84443 ASSAY THYROID STIM HORMONE: CPT | Performed by: INTERNAL MEDICINE

## 2018-02-22 PROCEDURE — 85027 COMPLETE CBC AUTOMATED: CPT | Performed by: INTERNAL MEDICINE

## 2018-02-22 PROCEDURE — 99215 OFFICE O/P EST HI 40 MIN: CPT | Performed by: PHYSICIAN ASSISTANT

## 2018-02-22 PROCEDURE — 36415 COLL VENOUS BLD VENIPUNCTURE: CPT | Performed by: INTERNAL MEDICINE

## 2018-02-22 PROCEDURE — 80048 BASIC METABOLIC PNL TOTAL CA: CPT | Performed by: INTERNAL MEDICINE

## 2018-02-22 PROCEDURE — 80061 LIPID PANEL: CPT | Performed by: INTERNAL MEDICINE

## 2018-02-22 NOTE — PATIENT INSTRUCTIONS
Visit Summary:    Today we discussed:   STOP the amiodarone.    We will check your blood count and thyroid today.  I will call you with results.       Please keep checking your BP at home. If it routinely stays >140/90 for the next several days please call the office back.    Follow up:   I will arrange for a Ziopatch the end of March. Follow up with Diana 2 weeks after.

## 2018-02-22 NOTE — MR AVS SNAPSHOT
After Visit Summary   2/22/2018    Genie Persaud    MRN: [id number]           Patient Information     Date Of Birth          1935        Visit Information        Provider Department      2/22/2018 10:40 AM Diana Nelson PA [facility]        Today's Diagnoses     Stress-induced cardiomyopathy        Atrial fibrillation, unspecified type (H)          Care Instructions    Visit Summary:    Today we discussed:   STOP the amiodarone.    We will check your blood count and thyroid today.  I will call you with results.       Please keep checking your BP at home. If it routinely stays >140/90 for the next several days please call the office back.    Follow up:   I will arrange for a Ziopatch the end of March. Follow up with Diana 2 weeks after.                    Follow-ups after your visit        Additional Services     Follow-Up with Cardiac Advanced Practice Provider                 Your next 10 appointments already scheduled     Mar 22, 2018 10:30 AM CDT   ZIOPATCH MONITOR with [facility] ([facility])    [address]  [facility] [id number] [phone]           LOCATION - [address], [address] [place] [address] **Please check-in at the [place] Registration Office, [address], in the [facility] building. When you are finished registering, please go to [address] and have a seat.            Apr 19, 2018 12:30 PM CDT   Return Visit with ANA MARIA Coe   [facility] ([facility])    [address]  [facility] [address]   [phone]              Future tests that were ordered for you today     Open Future Orders        Priority Expected Expires Ordered    TSH with free T4 reflex Routine 2/22/2018 2/22/2019 2/22/2018    Follow-Up with Cardiac Advanced Practice Provider Routine 4/16/2018  "2019    Zio Patch Monitor Routine 2018    Echocardiogram Routine 2018    CBC with platelets Routine 2018            Who to contact     If you have questions or need follow up information about today's clinic visit or your schedule please contact [facility] directly at [phone].  Normal or non-critical lab and imaging results will be communicated to you by [employer], letter or phone within 4 business days after the clinic has received the results. If you do not hear from us within 7 days, please contact the clinic through [employer] or phone. If you have a critical or abnormal lab result, we will notify you by phone as soon as possible.  Submit refill requests through [employer] or call your pharmacy and they will forward the refill request to us. Please allow 3 business days for your refill to be completed.          Additional Information About Your Visit        [employer] Information     [employer] lets you send messages to your doctor, view your test results, renew your prescriptions, schedule appointments and more. To sign up, go to [email or web address] . Click on \"Log in\" on the left side of the screen, which will take you to the Welcome page. Then click on \"Sign up Now\" on the right side of the page.     You will be asked to enter the access code listed below, as well as some personal information. Please follow the directions to create your username and password.     Your access code is: [email or web address]  Expires: 2018 11:04 AM     Your access code will  in 90 days. If you need help or a new code, please call your [facility] clinic or [phone].        Care EveryWhere ID     This is your Care EveryWhere ID. This could be used by other organizations to access your [facility] medical records  [id number]        Your Vitals Were     Pulse Height BMI (Body Mass Index)             72 " "1.6 m (5' 3\") 24.8 kg/m2          Blood Pressure from Last 3 Encounters:   02/22/18 118/50   01/18/18 143/70   12/21/17 124/40    Weight from Last 3 Encounters:   02/22/18 63.5 kg (140 lb)   01/18/18 62.6 kg (138 lb 1.6 oz)   12/21/17 62.1 kg (137 lb)              We Performed the Following     Follow-Up with Cardiac Advanced Practice Provider          Where to get your medicines      These medications were sent to [id number] - [place], [place] - [address]  [address], [place] [address]     Phone:  [phone]     apixaban ANTICOAGULANT 2.5 MG tablet          Primary Care Provider Office Phone # Fax #    Layo Sevilla [phone] [address] [place] [address]        Equal Access to Services     [facility]: Hadii aad jett hadasho Soomaali, waaxda luqadaha, qaybta kaalmada adeegyada, waxay banin haymeli meyer . So [facility] [phone].    ATENCIÓN: Si habla español, tiene a patel disposición servicios gratuitos de asistencia lingüística. Sylvie al [phone].    We comply with applicable federal civil rights laws and [place] laws. We do not discriminate on the basis of race, color, national origin, age, disability, sex, sexual orientation, or gender identity.            Thank you!     Thank you for choosing [facility]  for your care. Our goal is always to provide you with excellent care. Hearing back from our patients is one way we can continue to improve our services. Please take a few minutes to complete the written survey that you may receive in the mail after your visit with us. Thank you!             Your Updated Medication List - Protect others around you: Learn how to safely use, store and throw away your medicines at www.disposemymeds.org.          This list is accurate as of 2/22/18 11:22 AM.  Always use your most recent med list.                   Brand Name Dispense Instructions for use " Diagnosis    acetaminophen 500 MG tablet    TYLENOL     Take 500-1,000 mg by mouth every 8 hours as needed for mild pain        albuterol 108 (90 BASE) MCG/ACT Inhaler    PROAIR HFA/PROVENTIL HFA/VENTOLIN HFA     Inhale 2 puffs into the lungs every 6 hours as needed        apixaban ANTICOAGULANT 2.5 MG tablet    ELIQUIS    60 tablet    Take 1 tablet (2.5 mg) by mouth 2 times daily    Atrial fibrillation, unspecified type (H)       azelastine 0.1 % spray    ASTELIN     Spray 2 sprays into both nostrils 2 times daily        CALCIUM CITRATE + 315-200 MG-UNIT Tabs per tablet   Generic drug:  calcium citrate-vitamin D      Take 2 tablets by mouth every evening        fluticasone-salmeterol 230-21 MCG/ACT inhaler    ADVAIR-HFA    12 g    Inhale 2 puffs into the lungs 2 times daily        ipratropium 0.03 % spray    ATROVENT     Spray 2 sprays in nostril 2 times daily        losartan 100 MG tablet    COZAAR    90 tablet    Take 1 tablet (100 mg) by mouth At Bedtime    Essential hypertension with goal blood pressure less than 140/90       metoprolol succinate 25 MG 24 hr tablet    TOPROL-XL    60 tablet    Take 2 tablets (50 mg) by mouth daily    Essential hypertension with goal blood pressure less than 140/90       MONTELUKAST SODIUM PO      Take 10 mg by mouth every evening        omeprazole 20 MG CR capsule    priLOSEC    90 capsule    TAKE ONE CAPSULE BY MOUTH ONE TIME DAILY    Candidal esophagitis (H)       simvastatin 20 MG tablet    ZOCOR    45 tablet    Take 0.5 tablets (10 mg) by mouth At Bedtime    Hyperlipidemia LDL goal <130

## 2018-02-22 NOTE — LETTER
2/22/2018    Layo Sevilla MD  [address] [place] [address]    RE: Genie THORPE Hung       Dear Colleague,    I had the pleasure of seeing Genie Persaud in the [facility].      Cardiology Progress Note    Date of Service: 02/22/2018      Reason for visit: Follow up hypertension, stress cardiomyopathy, atrial fibrillation.    Primary cardiologist: Dr. Barak Hernandez      HPI:  Ms. Persaud is a pleasant 81-year-old woman with PMHx including chronic peripheral edema, severe labile hypertension, hypercholesterolemia, known ascending aortic aneurysm, mild-to-moderate coronary artery disease based on angiogram from 03/2017, moderate aortic valve disease, and nonischemic cardiomyopathy with ejection fraction of 45%-50% with last echo 03/2017.  Angiography showed two vessel nonobstructive coronary disease, but she was also noted to have short bursts of nonsustained supraventricular tachycardia with rates of up to 130 during evaluation. She was then hospitalized again in 11/2017 with pneumonia and sepsis. Her stay was complicated by a takotsubo stress cardiomyopathy with troponin rise and severe hypokinesis of the LV.  In addition, she developed rapid atrial fibrillation during her stay, with severe peripheral edema and biventricular heart failure.     She then returned to see Dr. Hernandez in 01/2018 and was doing rather well at that time. Repeat echo showed improvement with normalization of her LVEF to 60-65%. She did have some mild pulmonary hypertension, and her AR was reported as mild. Her ascending aorta remained dilated, measured at 4.4cm. In addition, she had a 3 week event monitor performed which showed no recurrence of atrial fibrillation. Her heart failure symptoms had resolved. At that time, Dr. Hernandez opted to discontinue her amiodarone, but plan was to continue on Eliquis for now, with plans to perform a Ziopatch after being off Amio for a month, and then reconsider  "discontinuation of anticoagulation.      Of note, Ms. Persaud had called our office on 1/23/18 (approximately 5 days after her visit) to inform us that she resumed the amiodarone on her own, as she noted her BP was elevated at home, reportedly as high as 190/80. She said the next day it went back down after starting it. Our staff reviewed with her that this medication was aimed at heart rhythm and was not an antihypertensive, and she was advised to discontinue it. She is here today for follow up.    Unfortunately, Ms. Persaud misunderstood the instructions and states she has continued the amiodarone. In addition, she discontinued the lasix on her own as she felt she no longer need it. She has a few concerns today. First, she is sometimes waking up at night \"feeling like I have a tight constricting bra on.\" She is not short of breath when this occurs, and usually if she changes position and/or avoids lying on her side it helps. She denies heartburn symptoms, but is on prilosec for reflux. She feels her symptoms are mostly at night when lying down. She denies significant BARONE, orthopnea, or worsening LE edema despite being off the lasix. She also is concerned that her BP is \"all over the place.\" She says when she takes first thing in the morning it can be as high as 170s systolic, but this is before he pills, and she doesn't always check after her medications. She takes the Toprol in the AM, but the losartan is an evening medication, and her BP checks are random throughout the day. In addition, she told me that she has been on prednisone recently for an asthma flare, and she just took her last dose yesterday. She notes that her cuff is somewhat old, but she did apparently have it checked about a year ago at her primary provider's office. Today, her BP is excellent at 118/50.     Her largest complaint today actually revolves around fatigue. She states she is very tired the last few weeks and \"doesn't have the energy to do " "anything.\" She is asking whether she may be anemic, as she had similar symptoms with this in the past. She has not noted any blood in her urine or stool. She had a BMP and FLP done prior to her visit today, which are essentially unremarkable. Her renal function is normal. Her lipids are acceptable with an LDL of 65, and HDL 93. Her TG were 92.       ASSESSMENT/PLAN:    1. Nonischemic cardiomyopathy.   --Echo 3/2017 with EF 45-50%, with nonobstructive coronary disease on angiogram.    --Had evidence of stress cardiomyopathy with hospitalization in Nov 2017 while hospitalized for pneumonia and sepsis, with severe hypokinesis of the distal 2/3 of the LV though EF not reported. Follow up echo Jan 2018 shows pseudo normalization with EF 60-65%. She does have mildly elevated RV pressures but size and function are normal.   --Continue losartan, and metoprolol without change.    --On exam today she appears relatively euvolemic, though up 2 lbs from last visit. She does not feel she is having any increase in her ankle edema. She prefers to stay off furosemide, so will continue to monitor.     2. Hypertension.   --BP today is excellent, but her home BPs reportedly fluctuate quite a bit. As she is just finishing up prednisone, asked her to continue to monitor at home for now. I also asked her to try to be a bit more consistent in when she takes it, and if it's elevated, to take again 1-2 hours after taking her medications.    --For now, will continue the losartan and Toprol XL as above.    -- We reviewed the importance of a low-salt diet, given her peripheral edema.     3. Coronary artery disease, nonobstructive.    --Angiogram 3/2017 showed 2V disease with mLAD 50%, D3 50%, pLCx 50%, and mLCx 50% stenosis. Medical management purused.   --She is currently off ASA while on Eliuquis; but we will plan to resume if/when NOAC discontinued.   --Continue Toprol XL 50mg daily.    --Continue simvastatin 10mg daily. LD controlled at 65, " with HDL 93 on FLP today.    4. Atrial fibrillation.   --Appears to remain in sinus rhythm on exam today. Unfortunately, she misunderstood instructions and continues to take the amiodarone. I discussed the rationale why we do not want to continue this if not needed. She will stop it today.   --Plan will then be to perform the 14 day Ziopatch after having been off for 1 month. I will see her back in follow up to review results. Per Dr. Wheeler, if If she has no recurrent atrial fibrillation, then will consider stopping Eliquis.    --Given her complaints of fatigue, I added a TSH and CBC to her labs today as well, which were unremarkable.      5. Ascending aortic aneurysm.   --Most recent echo again noted her ascending aorta mildly to moderately dilated at 4.4.  This appears to be better than her CT scan of 2015 when it was estimated to be 4.7 x 4.6.    --Will repeat CT scan when she returns to see Dr. Hernandez in July.    6. Aortic valve disease.    --Report of moderate to severe AR in 03/2017 but with most recent echo reported as mild AI in 01/2018. Plan is for repeat echo in 6 months,  which we will arrange.                 Follow up plan:  In 2 months after Ziopatch to see myself, then again in July with a chest CT and echocardiogram, to see Dr. Barak Hernandez as previously planned.       Orders this Visit:  Orders Placed This Encounter   Procedures     CBC with platelets     TSH with free T4 reflex     Follow-Up with Cardiac Advanced Practice Provider     Zio Patch Monitor     Echocardiogram     Orders Placed This Encounter   Medications     apixaban ANTICOAGULANT (ELIQUIS) 2.5 MG tablet     Sig: Take 1 tablet (2.5 mg) by mouth 2 times daily     Dispense:  60 tablet     Refill:  3     Medications Discontinued During This Encounter   Medication Reason     order for DME Medication Reconciliation Clean Up     furosemide (LASIX) 20 MG tablet Stopped by Patient     apixaban ANTICOAGULANT (ELIQUIS) 2.5 MG tablet  Reorder           CURRENT MEDICATIONS:  Current Outpatient Prescriptions   Medication Sig Dispense Refill     apixaban ANTICOAGULANT (ELIQUIS) 2.5 MG tablet Take 1 tablet (2.5 mg) by mouth 2 times daily 60 tablet 3     azelastine (ASTELIN) 0.1 % spray Spray 2 sprays into both nostrils 2 times daily       ipratropium (ATROVENT) 0.03 % spray Spray 2 sprays in nostril 2 times daily       losartan (COZAAR) 100 MG tablet Take 1 tablet (100 mg) by mouth At Bedtime 90 tablet 1     MONTELUKAST SODIUM PO Take 10 mg by mouth every evening        metoprolol (TOPROL-XL) 25 MG 24 hr tablet Take 2 tablets (50 mg) by mouth daily 60 tablet 11     omeprazole (PRILOSEC) 20 MG CR capsule TAKE ONE CAPSULE BY MOUTH ONE TIME DAILY  90 capsule 2     simvastatin (ZOCOR) 20 MG tablet Take 0.5 tablets (10 mg) by mouth At Bedtime 45 tablet 3     fluticasone-salmeterol (ADVAIR-HFA) 230-21 MCG/ACT inhaler Inhale 2 puffs into the lungs 2 times daily 12 g 1     acetaminophen (TYLENOL) 500 MG tablet Take 500-1,000 mg by mouth every 8 hours as needed for mild pain       albuterol (PROAIR HFA, PROVENTIL HFA, VENTOLIN HFA) 108 (90 BASE) MCG/ACT inhaler Inhale 2 puffs into the lungs every 6 hours as needed        calcium citrate-vitamin D (CALCIUM CITRATE +) 315-200 MG-UNIT TABS Take 2 tablets by mouth every evening          ALLERGIES     Allergies   Allergen Reactions     Fosamax [Alendronic Acid] GI Disturbance     Augmentin [Amoxicillin-Pot Clavulanate] Diarrhea     Diarrhea and rash     Evista [Raloxifene]      abd symptoms.      Flu Virus Vaccine      swollen and red at inj site       PAST MEDICAL HISTORY:  Past Medical History:   Diagnosis Date     Anemia 3/10/2009    Chronic disease, by Fe studies; awaiting full GI w/u and repeat colonoscopy, ERCP.     Basal Cell Carcinoma--back      Coronary artery disease involving native coronary artery of native heart without angina pectoris 3/9/2017    3/2/2017 - Two vessel coronary artery disease with  mLAD 50% stenosis, D3 50% stenosis, pLCx 50% stenosis and mLCx 50% stenosis     Essential hypertension with goal blood pressure less than 140/90 9/1/2016    White coat hypertension;  24 hour ambulatory monitoring normal. Do not titrate up further.       Hyperlipidemia LDL goal <130 2/10/2010     Impaired fasting glucose 8/26/2010     Moderate persistent asthma      Nonrheumatic aortic valve insufficiency 6/29/2017     osteopenia      Paroxysmal atrial fibrillation (H) 12/26/2017     Pulmonary nodule 3/3/2009    PET scan reportedly normal; biopsy not advised.     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  Problem list name updated by automated process. Provider to review     Unspecified arthropathy, hand      Vasculitis (H) 4/20/2009    Possible increased activity of thoracic aorta, on PET scan w/u for pulmonary nodule.        PAST SURGICAL HISTORY:  Past Surgical History:   Procedure Laterality Date     C APPENDECTOMY  1957     C LIGATE FALLOPIAN TUBE  1971     C STEREOTACTIC BREAST BIOPSY  2001     CHOLECYSTECTOMY, LAPOROSCOPIC  2008    Cholecystectomy, Laparoscopic     ESOPHAGOSCOPY, GASTROSCOPY, DUODENOSCOPY (EGD), COMBINED  5/17/2013    Procedure: COMBINED ESOPHAGOSCOPY, GASTROSCOPY, DUODENOSCOPY (EGD), BIOPSY SINGLE OR MULTIPLE;  ESOPHAGOSCOPY, GASTROSCOPY, DUODENOSCOPY (EGD)  with bx;  Surgeon: Jeffery Yanez MD;  Location:  GI     HC DILATION/CURETTAGE DIAG/THER NON OB  1967,1971     HC REMOVE TONSILS/ADENOIDS,<13 Y/O         FAMILY HISTORY:  Family History   Problem Relation Age of Onset     Hypertension Mother      OSTEOPOROSIS Mother      C.A.D. Mother      Connective Tissue Disorder Father   "    scleraderma     CEREBROVASCULAR DISEASE Paternal Grandmother      Prostate Cancer Other      9/05 passed away due to complications     Breast Cancer Child      youngest dtr       SOCIAL HISTORY:  Social History     Social History     Marital status: Single     Spouse name: N/A     Number of children: 3     Years of education: 15     Occupational History     semi-retired      Social History Main Topics     Smoking status: Never Smoker     Smokeless tobacco: Never Used     Alcohol use 0.6 - 1.2 oz/week     1 - 2 Standard drinks or equivalent per week      Comment: 1 glass of wine 1-2 days per week     Drug use: No     Sexual activity: No     Other Topics Concern     Parent/Sibling W/ Cabg, Mi Or Angioplasty Before 65f 55m? No     Social History Narrative       Review of Systems:  Cardiovascular: negative for chest pain, palpitations, orthopnea, LE edema. Pos for chest tightness usually while flat at night.   Constitutional: negative for chills, sweats, fevers. Pos fatigue.  Resp: Negative for dyspnea at rest, dyspnea on exertion, pos cough, wheezing, neg hemoptysis, pos known chronic lung disease  HEENT: Negative for new visual changes, frequent headaches  Gastrointestinal: negative for abdominal pain, diarrhea, blood in stool, nausea, vomiting  Hematologic/lymphatic: pos for current systemic anticoagulation, neg hx of blood clots  Musculoskeletal: negative for new back pain, joint pain  Neurological: negative for focal weakness, LOC, seizures, syncope. Neg dizziness/presyncope.      Physical Exam:  Vitals: /50 (BP Location: Right arm, Patient Position: Sitting, Cuff Size: Adult Regular)  Pulse 72  Ht 1.6 m (5' 3\")  Wt 63.5 kg (140 lb)  BMI 24.8 kg/m2   Wt Readings from Last 4 Encounters:   02/22/18 63.5 kg (140 lb)   01/18/18 62.6 kg (138 lb 1.6 oz)   12/21/17 62.1 kg (137 lb)   12/01/17 65.8 kg (145 lb)       GEN:  In general, this is a well nourished  female in no acute distress on room air.  " Patient ambulatory.  HEENT:  Pupils equal, round. Sclerae nonicteric. Clear oropharynx.   NECK: Supple, no masses appreciated. Trachea midline. No JVD while upright.  C/V:  Regular rate and rhythm, 1/6 BRAULIO heard RSB. No rub or gallop. No S3 or RV heave.   RESP: Respirations are unlabored. No use of accessory muscles. Clear to auscultation bilaterally without wheezing, rales, or rhonchi.  GI: Abdomen soft, nontender, nondistended.   EXTREM: Trace bilateral LE edema. No cyanosis or clubbing.  NEURO: Alert and oriented, cooperative. Gait not formally assessed. No obvious focal deficits.   PSYCH: Normal affect.  SKIN: Warm and dry. No rashes or petechiae appreciated.       Recent Lab Results:  LIPID RESULTS:  Lab Results   Component Value Date    CHOL 176 02/22/2018    HDL 93 02/22/2018    LDL 65 02/22/2018    TRIG 92 02/22/2018       CBC RESULTS:  Lab Results   Component Value Date    WBC 14.1 (H) 02/22/2018    RBC 4.18 02/22/2018    HGB 12.5 02/22/2018    HCT 39.6 02/22/2018    MCV 95 02/22/2018    MCH 29.9 02/22/2018    MCHC 31.6 02/22/2018    RDW 14.9 02/22/2018     02/22/2018       BMP RESULTS:  Lab Results   Component Value Date     02/22/2018    POTASSIUM 4.4 02/22/2018    CHLORIDE 103 02/22/2018    CO2 31 02/22/2018    ANIONGAP 3 02/22/2018     (H) 02/22/2018    BUN 31 (H) 02/22/2018    CR 0.95 02/22/2018    GFRESTIMATED 56 (L) 02/22/2018    GFRESTBLACK 68 02/22/2018    ELROY 8.9 02/22/2018        TSH 1.72    New/Pertinent imaging results since last visit:  Echo 1/12/18  Interpretation Summary     The ascending aorta is Moderately dilated, 4.4 cm  There is mild concentric left ventricular hypertrophy.  The visual ejection fraction is estimated at 60-65%.  The transmitral spectral Doppler flow pattern is suggestive of  pseudonormalization.  There is mild (1+) tricuspid regurgitation.  Right ventricular systolic pressure is elevated, consistent with moderate  pulmonary hypertension.  There is  mild (1+) aortic regurgitation.  Singificant recovery in LV function with resolution of Takotsubo WMA when  compared with most recent study. The study was technically difficult. Contrast  was used without apparent complications.    Greater than 40 minutes was spent with this patient, >50% of which was spent in counseling and coordination of care.       Diana Nelson PA-C  [facility]  Pager [phone]      Thank you for allowing me to participate in the care of your patient.    Sincerely,     ANA MARIA Coe     [facility]

## 2018-02-22 NOTE — PROGRESS NOTES
Cardiology Progress Note    Date of Service: 02/22/2018      Reason for visit: Follow up hypertension, stress cardiomyopathy, atrial fibrillation.    Primary cardiologist: Dr. Barak Hernandez      HPI:  Ms. Persaud is a pleasant 81-year-old woman with PMHx including chronic peripheral edema, severe labile hypertension, hypercholesterolemia, known ascending aortic aneurysm, mild-to-moderate coronary artery disease based on angiogram from 03/2017, moderate aortic valve disease, and nonischemic cardiomyopathy with ejection fraction of 45%-50% with last echo 03/2017.  Angiography showed two vessel nonobstructive coronary disease, but she was also noted to have short bursts of nonsustained supraventricular tachycardia with rates of up to 130 during evaluation. She was then hospitalized again in 11/2017 with pneumonia and sepsis. Her stay was complicated by a takotsubo stress cardiomyopathy with troponin rise and severe hypokinesis of the LV.  In addition, she developed rapid atrial fibrillation during her stay, with severe peripheral edema and biventricular heart failure.     She then returned to see Dr. Hernandez in 01/2018 and was doing rather well at that time. Repeat echo showed improvement with normalization of her LVEF to 60-65%. She did have some mild pulmonary hypertension, and her AR was reported as mild. Her ascending aorta remained dilated, measured at 4.4cm. In addition, she had a 3 week event monitor performed which showed no recurrence of atrial fibrillation. Her heart failure symptoms had resolved. At that time, Dr. Hernandez opted to discontinue her amiodarone, but plan was to continue on Eliquis for now, with plans to perform a Ziopatch after being off Amio for a month, and then reconsider discontinuation of anticoagulation.      Of note, Ms. Persaud had called our office on 1/23/18 (approximately 5 days after her visit) to inform us that she resumed the amiodarone on her own, as she noted her BP was  "elevated at home, reportedly as high as 190/80. She said the next day it went back down after starting it. Our staff reviewed with her that this medication was aimed at heart rhythm and was not an antihypertensive, and she was advised to discontinue it. She is here today for follow up.    Unfortunately, Ms. Persaud misunderstood the instructions and states she has continued the amiodarone. In addition, she discontinued the lasix on her own as she felt she no longer need it. She has a few concerns today. First, she is sometimes waking up at night \"feeling like I have a tight constricting bra on.\" She is not short of breath when this occurs, and usually if she changes position and/or avoids lying on her side it helps. She denies heartburn symptoms, but is on prilosec for reflux. She feels her symptoms are mostly at night when lying down. She denies significant BARONE, orthopnea, or worsening LE edema despite being off the lasix. She also is concerned that her BP is \"all over the place.\" She says when she takes first thing in the morning it can be as high as 170s systolic, but this is before he pills, and she doesn't always check after her medications. She takes the Toprol in the AM, but the losartan is an evening medication, and her BP checks are random throughout the day. In addition, she told me that she has been on prednisone recently for an asthma flare, and she just took her last dose yesterday. She notes that her cuff is somewhat old, but she did apparently have it checked about a year ago at her primary provider's office. Today, her BP is excellent at 118/50.     Her largest complaint today actually revolves around fatigue. She states she is very tired the last few weeks and \"doesn't have the energy to do anything.\" She is asking whether she may be anemic, as she had similar symptoms with this in the past. She has not noted any blood in her urine or stool. She had a BMP and FLP done prior to her visit today, which " are essentially unremarkable. Her renal function is normal. Her lipids are acceptable with an LDL of 65, and HDL 93. Her TG were 92.       ASSESSMENT/PLAN:    1. Nonischemic cardiomyopathy.   --Echo 3/2017 with EF 45-50%, with nonobstructive coronary disease on angiogram.    --Had evidence of stress cardiomyopathy with hospitalization in Nov 2017 while hospitalized for pneumonia and sepsis, with severe hypokinesis of the distal 2/3 of the LV though EF not reported. Follow up echo Jan 2018 shows pseudo normalization with EF 60-65%. She does have mildly elevated RV pressures but size and function are normal.   --Continue losartan, and metoprolol without change.    --On exam today she appears relatively euvolemic, though up 2 lbs from last visit. She does not feel she is having any increase in her ankle edema. She prefers to stay off furosemide, so will continue to monitor.     2. Hypertension.   --BP today is excellent, but her home BPs reportedly fluctuate quite a bit. As she is just finishing up prednisone, asked her to continue to monitor at home for now. I also asked her to try to be a bit more consistent in when she takes it, and if it's elevated, to take again 1-2 hours after taking her medications.    --For now, will continue the losartan and Toprol XL as above.    -- We reviewed the importance of a low-salt diet, given her peripheral edema.     3. Coronary artery disease, nonobstructive.    --Angiogram 3/2017 showed 2V disease with mLAD 50%, D3 50%, pLCx 50%, and mLCx 50% stenosis. Medical management purused.   --She is currently off ASA while on Eliuquis; but we will plan to resume if/when NOAC discontinued.   --Continue Toprol XL 50mg daily.    --Continue simvastatin 10mg daily. LD controlled at 65, with HDL 93 on FLP today.    4. Atrial fibrillation.   --Appears to remain in sinus rhythm on exam today. Unfortunately, she misunderstood instructions and continues to take the amiodarone. I discussed the  rationale why we do not want to continue this if not needed. She will stop it today.   --Plan will then be to perform the 14 day Ziopatch after having been off for 1 month. I will see her back in follow up to review results. Per Dr. Wheeler, if If she has no recurrent atrial fibrillation, then will consider stopping Eliquis.    --Given her complaints of fatigue, I added a TSH and CBC to her labs today as well, which were unremarkable.      5. Ascending aortic aneurysm.   --Most recent echo again noted her ascending aorta mildly to moderately dilated at 4.4.  This appears to be better than her CT scan of 2015 when it was estimated to be 4.7 x 4.6.    --Will repeat CT scan when she returns to see Dr. Hernandez in July.    6. Aortic valve disease.    --Report of moderate to severe AR in 03/2017 but with most recent echo reported as mild AI in 01/2018. Plan is for repeat echo in 6 months,  which we will arrange.                 Follow up plan:  In 2 months after Ziopatch to see myself, then again in July with a chest CT and echocardiogram, to see Dr. Barak Hernandez as previously planned.       Orders this Visit:  Orders Placed This Encounter   Procedures     CBC with platelets     TSH with free T4 reflex     Follow-Up with Cardiac Advanced Practice Provider     Lavelleo Patch Monitor     Echocardiogram     Orders Placed This Encounter   Medications     apixaban ANTICOAGULANT (ELIQUIS) 2.5 MG tablet     Sig: Take 1 tablet (2.5 mg) by mouth 2 times daily     Dispense:  60 tablet     Refill:  3     Medications Discontinued During This Encounter   Medication Reason     order for DME Medication Reconciliation Clean Up     furosemide (LASIX) 20 MG tablet Stopped by Patient     apixaban ANTICOAGULANT (ELIQUIS) 2.5 MG tablet Reorder           CURRENT MEDICATIONS:  Current Outpatient Prescriptions   Medication Sig Dispense Refill     apixaban ANTICOAGULANT (ELIQUIS) 2.5 MG tablet Take 1 tablet (2.5 mg) by mouth 2 times daily 60  tablet 3     azelastine (ASTELIN) 0.1 % spray Spray 2 sprays into both nostrils 2 times daily       ipratropium (ATROVENT) 0.03 % spray Spray 2 sprays in nostril 2 times daily       losartan (COZAAR) 100 MG tablet Take 1 tablet (100 mg) by mouth At Bedtime 90 tablet 1     MONTELUKAST SODIUM PO Take 10 mg by mouth every evening        metoprolol (TOPROL-XL) 25 MG 24 hr tablet Take 2 tablets (50 mg) by mouth daily 60 tablet 11     omeprazole (PRILOSEC) 20 MG CR capsule TAKE ONE CAPSULE BY MOUTH ONE TIME DAILY  90 capsule 2     simvastatin (ZOCOR) 20 MG tablet Take 0.5 tablets (10 mg) by mouth At Bedtime 45 tablet 3     fluticasone-salmeterol (ADVAIR-HFA) 230-21 MCG/ACT inhaler Inhale 2 puffs into the lungs 2 times daily 12 g 1     acetaminophen (TYLENOL) 500 MG tablet Take 500-1,000 mg by mouth every 8 hours as needed for mild pain       albuterol (PROAIR HFA, PROVENTIL HFA, VENTOLIN HFA) 108 (90 BASE) MCG/ACT inhaler Inhale 2 puffs into the lungs every 6 hours as needed        calcium citrate-vitamin D (CALCIUM CITRATE +) 315-200 MG-UNIT TABS Take 2 tablets by mouth every evening          ALLERGIES     Allergies   Allergen Reactions     Fosamax [Alendronic Acid] GI Disturbance     Augmentin [Amoxicillin-Pot Clavulanate] Diarrhea     Diarrhea and rash     Evista [Raloxifene]      abd symptoms.      Flu Virus Vaccine      swollen and red at inj site       PAST MEDICAL HISTORY:  Past Medical History:   Diagnosis Date     Anemia 3/10/2009    Chronic disease, by Fe studies; awaiting full GI w/u and repeat colonoscopy, ERCP.     Basal Cell Carcinoma--back      Coronary artery disease involving native coronary artery of native heart without angina pectoris 3/9/2017    3/2/2017 - Two vessel coronary artery disease with mLAD 50% stenosis, D3 50% stenosis, pLCx 50% stenosis and mLCx 50% stenosis     Essential hypertension with goal blood pressure less than 140/90 9/1/2016    White coat hypertension;  24 hour ambulatory  monitoring normal. Do not titrate up further.       Hyperlipidemia LDL goal <130 2/10/2010     Impaired fasting glucose 8/26/2010     Moderate persistent asthma      Nonrheumatic aortic valve insufficiency 6/29/2017     osteopenia      Paroxysmal atrial fibrillation (H) 12/26/2017     Pulmonary nodule 3/3/2009    PET scan reportedly normal; biopsy not advised.     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  Problem list name updated by automated process. Provider to review     Unspecified arthropathy, hand      Vasculitis (H) 4/20/2009    Possible increased activity of thoracic aorta, on PET scan w/u for pulmonary nodule.        PAST SURGICAL HISTORY:  Past Surgical History:   Procedure Laterality Date     C APPENDECTOMY  1957     C LIGATE FALLOPIAN TUBE  1971     C STEREOTACTIC BREAST BIOPSY  2001     CHOLECYSTECTOMY, LAPOROSCOPIC  2008    Cholecystectomy, Laparoscopic     ESOPHAGOSCOPY, GASTROSCOPY, DUODENOSCOPY (EGD), COMBINED  5/17/2013    Procedure: COMBINED ESOPHAGOSCOPY, GASTROSCOPY, DUODENOSCOPY (EGD), BIOPSY SINGLE OR MULTIPLE;  ESOPHAGOSCOPY, GASTROSCOPY, DUODENOSCOPY (EGD)  with bx;  Surgeon: Jeffery Yanez MD;  Location: [facility]     HC DILATION/CURETTAGE DIAG/THER NON OB  1967,1971     HC REMOVE TONSILS/ADENOIDS,<11 Y/O         FAMILY HISTORY:  Family History   Problem Relation Age of Onset     Hypertension Mother      OSTEOPOROSIS Mother      C.A.D. Mother      Connective Tissue Disorder Father      scleraderma     CEREBROVASCULAR DISEASE Paternal Grandmother      Prostate Cancer Other      9/05 passed away due to complications     Breast Cancer Child      youngest dtr       SOCIAL  "HISTORY:  Social History     Social History     Marital status: Single     Spouse name: N/A     Number of children: 3     Years of education: 15     Occupational History     semi-retired      Social History Main Topics     Smoking status: Never Smoker     Smokeless tobacco: Never Used     Alcohol use 0.6 - 1.2 oz/week     1 - 2 Standard drinks or equivalent per week      Comment: 1 glass of wine 1-2 days per week     Drug use: No     Sexual activity: No     Other Topics Concern     Parent/Sibling W/ Cabg, Mi Or Angioplasty Before 65f 55m? No     Social History Narrative       Review of Systems:  Cardiovascular: negative for chest pain, palpitations, orthopnea, LE edema. Pos for chest tightness usually while flat at night.   Constitutional: negative for chills, sweats, fevers. Pos fatigue.  Resp: Negative for dyspnea at rest, dyspnea on exertion, pos cough, wheezing, neg hemoptysis, pos known chronic lung disease  HEENT: Negative for new visual changes, frequent headaches  Gastrointestinal: negative for abdominal pain, diarrhea, blood in stool, nausea, vomiting  Hematologic/lymphatic: pos for current systemic anticoagulation, neg hx of blood clots  Musculoskeletal: negative for new back pain, joint pain  Neurological: negative for focal weakness, LOC, seizures, syncope. Neg dizziness/presyncope.      Physical Exam:  Vitals: /50 (BP Location: Right arm, Patient Position: Sitting, Cuff Size: Adult Regular)  Pulse 72  Ht 1.6 m (5' 3\")  Wt 63.5 kg (140 lb)  BMI 24.8 kg/m2   Wt Readings from Last 4 Encounters:   02/22/18 63.5 kg (140 lb)   01/18/18 62.6 kg (138 lb 1.6 oz)   12/21/17 62.1 kg (137 lb)   12/01/17 65.8 kg (145 lb)       GEN:  In general, this is a well nourished  female in no acute distress on room air.  Patient ambulatory.  HEENT:  Pupils equal, round. Sclerae nonicteric. Clear oropharynx.   NECK: Supple, no masses appreciated. Trachea midline. No JVD while upright.  C/V:  Regular rate and " rhythm, 1/6 BRAULIO heard RSB. No rub or gallop. No S3 or RV heave.   RESP: Respirations are unlabored. No use of accessory muscles. Clear to auscultation bilaterally without wheezing, rales, or rhonchi.  GI: Abdomen soft, nontender, nondistended.   EXTREM: Trace bilateral LE edema. No cyanosis or clubbing.  NEURO: Alert and oriented, cooperative. Gait not formally assessed. No obvious focal deficits.   PSYCH: Normal affect.  SKIN: Warm and dry. No rashes or petechiae appreciated.       Recent Lab Results:  LIPID RESULTS:  Lab Results   Component Value Date    CHOL 176 02/22/2018    HDL 93 02/22/2018    LDL 65 02/22/2018    TRIG 92 02/22/2018       CBC RESULTS:  Lab Results   Component Value Date    WBC 14.1 (H) 02/22/2018    RBC 4.18 02/22/2018    HGB 12.5 02/22/2018    HCT 39.6 02/22/2018    MCV 95 02/22/2018    MCH 29.9 02/22/2018    MCHC 31.6 02/22/2018    RDW 14.9 02/22/2018     02/22/2018       BMP RESULTS:  Lab Results   Component Value Date     02/22/2018    POTASSIUM 4.4 02/22/2018    CHLORIDE 103 02/22/2018    CO2 31 02/22/2018    ANIONGAP 3 02/22/2018     (H) 02/22/2018    BUN 31 (H) 02/22/2018    CR 0.95 02/22/2018    GFRESTIMATED 56 (L) 02/22/2018    GFRESTBLACK 68 02/22/2018    ELROY 8.9 02/22/2018        TSH 1.72    New/Pertinent imaging results since last visit:  Echo 1/12/18  Interpretation Summary     The ascending aorta is Moderately dilated, 4.4 cm  There is mild concentric left ventricular hypertrophy.  The visual ejection fraction is estimated at 60-65%.  The transmitral spectral Doppler flow pattern is suggestive of  pseudonormalization.  There is mild (1+) tricuspid regurgitation.  Right ventricular systolic pressure is elevated, consistent with moderate  pulmonary hypertension.  There is mild (1+) aortic regurgitation.  Singificant recovery in LV function with resolution of Takotsubo WMA when  compared with most recent study. The study was technically difficult. Contrast  was  used without apparent complications.    Greater than 40 minutes was spent with this patient, >50% of which was spent in counseling and coordination of care.       Diana Nelson PA-C  [facility]  Pager [phone]

## 2018-02-22 NOTE — PROGRESS NOTES
Orders Only on 02/22/2018   Component Date Value Ref Range Status     WBC 02/22/2018 14.1* 4.0 - 11.0 10e9/L Final     RBC Count 02/22/2018 4.18  3.8 - 5.2 10e12/L Final     Hemoglobin 02/22/2018 12.5  11.7 - 15.7 g/dL Final     Hematocrit 02/22/2018 39.6  35.0 - 47.0 % Final     MCV 02/22/2018 95  78 - 100 fl Final     MCH 02/22/2018 29.9  26.5 - 33.0 pg Final     MCHC 02/22/2018 31.6  31.5 - 36.5 g/dL Final     RDW 02/22/2018 14.9  10.0 - 15.0 % Final     Platelet Count 02/22/2018 235  150 - 450 10e9/L Final     TSH 02/22/2018 1.72  0.40 - 4.00 mU/L Final     Called to patient with results at request of Diana RODGERS - patient states understanding.  WBC trending down from previous elevations, patient states recent pneumonia.  Olivia Guy RN 02/22/18 1:05 PM     Called back to patient to clarify medications ie she had left message @ [place] location that there was confusion.  Reviewed current medication list with patient in detail which she confirms is accurate - Patient states she wanted LAYA Diana to know she is taking metoprolol and is no longer on amiodarone since DR Hernandez visit.  Update to LAYA.  Olivia Guy RN 02/22/18 3:11 PM    2/23/2018 Call back to patient at request of Diana RODGERS - patient states she stopped amiodarone right after seeing Dr Hernandez on 1/18/2018 as directed and did not restart since - patient states she remembers this because amiodarone cost for her for the refill right before this was $200 and she states it upset her to think she would not be taking them anymore. Confirmed on medication list amiodarone was removed from the medication list on 1/18/2018. Reviewed with patient confirming information for PA so as to look at timing of placing ziopatch as recommended by Dr Hernandez 1 month post stopping amiodarone.  Messaged update to LAYA - Patient will await LAYA finalization of plan and a call from scheduling on when to set up ziopatch.  Olivia Guy RN  02/23/18 9:51 AM

## 2018-02-23 NOTE — PROGRESS NOTES
Cardiology  10:22 AM    Received message from staff that patient called back to inform us that she actually DID NOT restart the amiodarone as she originally discussed during our office visit.   Thus, we may proceed with Ziopatch sooner if pt wishes, as she has now been off >30 days.  Will ask staff to arrange.     Diana Nelson PA-C  [facility]  Pager [phone]

## 2018-03-01 DIAGNOSIS — E78.5 HYPERLIPIDEMIA LDL GOAL <130: ICD-10-CM

## 2018-03-01 RX ORDER — SIMVASTATIN 20 MG
TABLET ORAL
Qty: 45 TABLET | Refills: 2 | Status: SHIPPED | OUTPATIENT
Start: 2018-03-01 | End: 2018-03-01

## 2018-03-01 RX ORDER — SIMVASTATIN 20 MG
TABLET ORAL
Qty: 45 TABLET | Refills: 2 | Status: SHIPPED | OUTPATIENT
Start: 2018-03-01 | End: 2018-11-23

## 2018-03-01 NOTE — PROGRESS NOTES
Called to Patient with LAYA recommendation that she can schedule ziopatch at anytime.  Patient states understanding and will call scheduling to arrange.   Olivia Guy RN 03/01/18 4:23 PM

## 2018-03-01 NOTE — TELEPHONE ENCOUNTER
"Requested Prescriptions   Pending Prescriptions Disp Refills     simvastatin (ZOCOR) 20 MG tablet [Pharmacy Med Name: Simvastatin Oral Tablet 20 MG]    Last Written Prescription Date:  3/7/2017  Last Fill Quantity: 45,  # refills: 3   Last office visit: 12/21/2017 with prescribing provider:  Layo Sevilla     Future Office Visit:   Next 5 appointments (look out 90 days)     Apr 19, 2018 12:30 PM CDT   Return Visit with ANA MARIA Coe   [facility] ([facility]    [address]  [place] [id number] [phone]                  45 tablet 2     Sig: take 1/2 tablet by mouth daily at bedtime    Statins Protocol Passed    3/1/2018  7:01 AM       Passed - LDL on file in past 12 months    Recent Labs   Lab Test  02/22/18   0948   LDL  65            Passed - No abnormal creatine kinase in past 12 months    No lab results found.         Passed - Recent or future visit with authorizing provider    Patient had office visit in the last year or has a visit in the next 30 days with authorizing provider.  See \"Patient Info\" tab in inbasket, or \"Choose Columns\" in Meds & Orders section of the refill encounter.            Passed - Patient is age 18 or older       Passed - No active pregnancy on record       Passed - No positive pregnancy test in past 12 months          "

## 2018-03-07 ENCOUNTER — TRANSFERRED RECORDS (OUTPATIENT)
Dept: HEALTH INFORMATION MANAGEMENT | Facility: CLINIC | Age: 83
End: 2018-03-07

## 2018-03-09 ENCOUNTER — DOCUMENTATION ONLY (OUTPATIENT)
Dept: CARDIOLOGY | Facility: CLINIC | Age: 83
End: 2018-03-09

## 2018-03-13 ENCOUNTER — HOSPITAL ENCOUNTER (OUTPATIENT)
Dept: CARDIOLOGY | Facility: CLINIC | Age: 83
Discharge: HOME | End: 2018-03-13
Attending: INTERNAL MEDICINE | Admitting: INTERNAL MEDICINE
Payer: COMMERCIAL

## 2018-03-13 DIAGNOSIS — I51.81 STRESS-INDUCED CARDIOMYOPATHY: ICD-10-CM

## 2018-03-13 DIAGNOSIS — I48.91 ATRIAL FIBRILLATION, UNSPECIFIED TYPE (H): ICD-10-CM

## 2018-03-13 PROCEDURE — 0296T ZIO PATCH HOLTER: CPT

## 2018-03-13 PROCEDURE — 0298T ZZC EXT ECG > 48HR TO 21 DAY REVIEW AND INTERPRETATN: CPT | Performed by: INTERNAL MEDICINE

## 2018-03-13 NOTE — LETTER
[facility]  [address]  [place] [address]                  [phone]   2018    Genie Persaud  [address] [place] [address]      Genie,     Your heart monitor looked like there were a few episodes of fast heart rate.  It looks like Dr. Hernandez will be reviewing this with you on  at your appointment.     Hope you're well.     Layo Sevilla MD   Internal Medicine and Pediatrics         Results for orders placed or performed during the hospital encounter of 18   Zio Patch Monitor    [facility]  [address]  [place] [address]  [phone]  3/13/2018      Patient:  Genie Persaud  Chart: [id number]  :  1935  Age:  82 year old  Sex:  female       Procedure:  ZioPatch Monitor.        Technician performing hook-up:  Sherry Wray

## 2018-03-14 ENCOUNTER — TRANSFERRED RECORDS (OUTPATIENT)
Dept: HEALTH INFORMATION MANAGEMENT | Facility: CLINIC | Age: 83
End: 2018-03-14

## 2018-03-19 DIAGNOSIS — I10 ESSENTIAL HYPERTENSION WITH GOAL BLOOD PRESSURE LESS THAN 140/90: ICD-10-CM

## 2018-03-19 NOTE — TELEPHONE ENCOUNTER
"Requested Prescriptions   Pending Prescriptions Disp Refills     losartan (COZAAR) 100 MG tablet [Pharmacy Med Name: Losartan Potassium Oral Tablet 100 MG]  Last Written Prescription Date:  09/07/2017  Last Fill Quantity: 90 tablet,  # refills: 1   Last office visit: 12/21/2017 with prescribing provider:  Layo Sevilla MD    Future Office Visit:   Next 5 appointments (look out 90 days)     Apr 19, 2018 12:30 PM CDT   Return Visit with ANA MARIA Coe   [facility] ([facility])    [address]  [place] [address] [phone]                  90 tablet 0     Sig: Take 1 tablet (100 mg) by mouth At Bedtime    Angiotensin-II Receptors Passed    3/19/2018  1:12 PM       Passed - Blood pressure under 140/90 in past 12 months    BP Readings from Last 3 Encounters:   02/22/18 118/50   01/18/18 143/70   12/21/17 124/40                Passed - Recent (12 mo) or future (30 days) visit within the authorizing provider's specialty    Patient had office visit in the last 12 months or has a visit in the next 30 days with authorizing provider or within the authorizing provider's specialty.  See \"Patient Info\" tab in inbasket, or \"Choose Columns\" in Meds & Orders section of the refill encounter.           Passed - Patient is age 18 or older       Passed - No active pregnancy on record       Passed - Normal serum creatinine on file in past 12 months    Recent Labs   Lab Test  02/22/18   0948   CR  0.95            Passed - Normal serum potassium on file in past 12 months    Recent Labs   Lab Test  02/22/18   0948   POTASSIUM  4.4                   Passed - No positive pregnancy test in past 12 months          "

## 2018-03-21 RX ORDER — LOSARTAN POTASSIUM 100 MG/1
TABLET ORAL
Qty: 90 TABLET | Refills: 0 | Status: SHIPPED | OUTPATIENT
Start: 2018-03-21 | End: 2018-06-22

## 2018-03-21 NOTE — TELEPHONE ENCOUNTER
Prescription approved per [facility] Refill Protocol.    Due for follow up in June, 2018    Rita STARR RN, BSN, PHN  [place] [facility] RN

## 2018-03-25 ENCOUNTER — RADIANT APPOINTMENT (OUTPATIENT)
Dept: GENERAL RADIOLOGY | Facility: CLINIC | Age: 83
End: 2018-03-25
Attending: PHYSICIAN ASSISTANT
Payer: COMMERCIAL

## 2018-03-25 ENCOUNTER — OFFICE VISIT (OUTPATIENT)
Dept: URGENT CARE | Facility: URGENT CARE | Age: 83
End: 2018-03-25
Payer: COMMERCIAL

## 2018-03-25 VITALS
OXYGEN SATURATION: 95 % | SYSTOLIC BLOOD PRESSURE: 118 MMHG | TEMPERATURE: 98.3 F | BODY MASS INDEX: 24.8 KG/M2 | WEIGHT: 140 LBS | HEART RATE: 78 BPM | DIASTOLIC BLOOD PRESSURE: 60 MMHG

## 2018-03-25 DIAGNOSIS — M25.551 HIP PAIN, RIGHT: Primary | ICD-10-CM

## 2018-03-25 DIAGNOSIS — M25.551 HIP PAIN, RIGHT: ICD-10-CM

## 2018-03-25 PROCEDURE — 99213 OFFICE O/P EST LOW 20 MIN: CPT | Performed by: PHYSICIAN ASSISTANT

## 2018-03-25 PROCEDURE — 73502 X-RAY EXAM HIP UNI 2-3 VIEWS: CPT

## 2018-03-25 RX ORDER — PREDNISONE 10 MG/1
10 TABLET ORAL DAILY
Qty: 6 TABLET | Refills: 0 | Status: SHIPPED | OUTPATIENT
Start: 2018-03-25 | End: 2018-03-31

## 2018-03-25 NOTE — MR AVS SNAPSHOT
After Visit Summary   3/25/2018    Genie Persaud    MRN: [id number]           Patient Information     Date Of Birth          1935        Visit Information        Provider Department      3/25/2018 1:50 PM Cole Perrin PA-C [facility]        Today's Diagnoses     Hip pain, right    -  1      Care Instructions      Muscle Strain in the Extremities  A muscle strain is a stretching and tearing of muscle fibers. This causes pain, especially when you move that muscle. There may also be some swelling and bruising.  Home care    Keep the hurt area raised to reduce pain and swelling. This is especially important during the first 48 hours.    Apply an ice pack over the injured area for 15 to 20 minutes every 3 to 6 hours. You should do this for the first 24 to 48 hours. You can make an ice pack by filling a plastic bag that seals at the top with ice cubes and then wrapping it with a thin towel. Be careful not to injure your skin with the ice treatments. Ice should never be applied directly to skin. Continue the use of ice packs for relief of pain and swelling as needed. After 48 hours, apply heat (warm shower or warm bath) for 15 to 20 minutes several times a day, or alternate ice and heat.    You may use over-the-counter pain medicine to control pain, unless another medicine was prescribed. If you have chronic liver or kidney disease or ever had a stomach ulcer or GI bleeding, talk with your healthcare provider before using these medicines.    For leg strains: If crutches have been recommended, don t put full weight on the hurt leg until you can do so without pain. You can return to sports when you are able to hop and run on the injured leg without pain.  Follow-up care  Follow up with your healthcare provider, or as advised.  When to seek medical advice  Call your healthcare provider right away if any of these occur:    The toes of the injured leg become swollen, cold, blue,  "numb, or tingly    Pain or swelling increases  Date Last Reviewed: 11/19/2015 2000-2017 [employer]. [address] [place] [address], [place] [address]. All rights reserved. This information is not intended as a substitute for professional medical care. Always follow your healthcare professional's instructions.                Follow-ups after your visit        Your next 10 appointments already scheduled     Apr 19, 2018 12:30 PM CDT   Return Visit with ANA MARIA Coe   [facility]    [address]  [facility] [address] [phone]              Who to contact     If you have questions or need follow up information about today's clinic visit or your schedule please contact [facility] directly at [phone].  Normal or non-critical lab and imaging results will be communicated to you by [employer], letter or phone within 4 business days after the clinic has received the results. If you do not hear from us within 7 days, please contact the clinic through MyChart or phone. If you have a critical or abnormal lab result, we will notify you by phone as soon as possible.  Submit refill requests through [employer] or call your pharmacy and they will forward the refill request to us. Please allow 3 business days for your refill to be completed.          Additional Information About Your Visit        [employer] Information     [employer] lets you send messages to your doctor, view your test results, renew your prescriptions, schedule appointments and more. To sign up, go to [email or web address] . Click on \"Log in\" on the left side of the screen, which will take you to the Welcome page. Then click on \"Sign up Now\" on the right side of the page.     You will be asked to enter the access code listed below, as well as some personal information. Please follow the directions to create your username and password.     Your access " code is: [email or web address]  Expires: 2018 12:04 PM     Your access code will  in 90 days. If you need help or a new code, please call your [facility] clinic or [phone].        Care EveryWhere ID     This is your Care EveryWhere ID. This could be used by other organizations to access your [facility] medical records  [id number]        Your Vitals Were     Pulse Temperature Pulse Oximetry BMI (Body Mass Index)          78 98.3  F (36.8  C) (Tympanic) 95% 24.8 kg/m2         Blood Pressure from Last 3 Encounters:   18 118/60   18 118/50   18 143/70    Weight from Last 3 Encounters:   18 140 lb (63.5 kg)   18 140 lb (63.5 kg)   18 138 lb 1.6 oz (62.6 kg)                 Today's Medication Changes          These changes are accurate as of 3/25/18  3:03 PM.  If you have any questions, ask your nurse or doctor.               Start taking these medicines.        Dose/Directions    predniSONE 10 MG tablet   Commonly known as:  DELTASONE   Used for:  Hip pain, right   Started by:  Cole Perrin PA-C        Dose:  10 mg   Take 1 tablet (10 mg) by mouth daily for 6 days   Quantity:  6 tablet   Refills:  0            Where to get your medicines      These medications were sent to [id number] - [place], [place] - [address]  [address] [place] [address]     Phone:  [phone]     predniSONE 10 MG tablet                Primary Care Provider Office Phone # Fax #    Layo Sevilla [phone]       [facility] [place] [address]        Equal Access to Services     [facility] JUSTICE AH: Hadsandra Mcgill, coty lyon, qaybta kaalmada wolfgang, ora lange. So [facility] [phone].    ATENCIÓN: Si habla español, tiene a patel disposición servicios gratuitos de asistencia lingüística. Llame al [phone].    We comply with applicable federal civil rights laws and [place] laws. We do not  discriminate on the basis of race, color, national origin, age, disability, sex, sexual orientation, or gender identity.            Thank you!     Thank you for choosing [facility]  for your care. Our goal is always to provide you with excellent care. Hearing back from our patients is one way we can continue to improve our services. Please take a few minutes to complete the written survey that you may receive in the mail after your visit with us. Thank you!             Your Updated Medication List - Protect others around you: Learn how to safely use, store and throw away your medicines at www.disposemymeds.org.          This list is accurate as of 3/25/18  3:03 PM.  Always use your most recent med list.                   Brand Name Dispense Instructions for use Diagnosis    acetaminophen 500 MG tablet    TYLENOL     Take 500-1,000 mg by mouth every 8 hours as needed for mild pain        albuterol 108 (90 BASE) MCG/ACT Inhaler    PROAIR HFA/PROVENTIL HFA/VENTOLIN HFA     Inhale 2 puffs into the lungs every 6 hours as needed        apixaban ANTICOAGULANT 2.5 MG tablet    ELIQUIS    60 tablet    Take 1 tablet (2.5 mg) by mouth 2 times daily    Atrial fibrillation, unspecified type (H)       azelastine 0.1 % spray    ASTELIN     Spray 2 sprays into both nostrils 2 times daily        CALCIUM CITRATE + 315-200 MG-UNIT Tabs per tablet   Generic drug:  calcium citrate-vitamin D      Take 2 tablets by mouth every evening        fluticasone-salmeterol 230-21 MCG/ACT inhaler    ADVAIR-HFA    12 g    Inhale 2 puffs into the lungs 2 times daily        ipratropium 0.03 % spray    ATROVENT     Spray 2 sprays in nostril 2 times daily        losartan 100 MG tablet    COZAAR    90 tablet    Take 1 tablet (100 mg) by mouth At Bedtime    Essential hypertension with goal blood pressure less than 140/90       metoprolol succinate 25 MG 24 hr tablet    TOPROL-XL    60 tablet    Take 2 tablets (50 mg) by mouth daily     Essential hypertension with goal blood pressure less than 140/90       MONTELUKAST SODIUM PO      Take 10 mg by mouth every evening        omeprazole 20 MG CR capsule    priLOSEC    90 capsule    TAKE ONE CAPSULE BY MOUTH ONE TIME DAILY    Candidal esophagitis (H)       predniSONE 10 MG tablet    DELTASONE    6 tablet    Take 1 tablet (10 mg) by mouth daily for 6 days    Hip pain, right       simvastatin 20 MG tablet    ZOCOR    45 tablet    take 1/2 tablet by mouth daily at bedtime    Hyperlipidemia LDL goal <130

## 2018-03-25 NOTE — PROGRESS NOTES
SUBJECTIVE:  Chief Complaint   Patient presents with     Urgent Care     Flank Pain      x 1 week      Genie Persaud is a 82 year old female who presents with a chief complaint of right thigh, hip and gluteal pain.  Symptoms began 1 week(s) ago, are mild and still present  Context:  Injury:Yes: extra moving around.    Pain exacerbated by weight-bearing, movement and pressure Relieved by rest.  She treated it initially with no therapy. This is the first time this type of injury has occurred to this patient.     Past Medical History:   Diagnosis Date     Anemia 3/10/2009    Chronic disease, by Fe studies; awaiting full GI w/u and repeat colonoscopy, ERCP.     Basal Cell Carcinoma--back      Coronary artery disease involving native coronary artery of native heart without angina pectoris 3/9/2017    3/2/2017 - Two vessel coronary artery disease with mLAD 50% stenosis, D3 50% stenosis, pLCx 50% stenosis and mLCx 50% stenosis     Essential hypertension with goal blood pressure less than 140/90 9/1/2016    White coat hypertension;  24 hour ambulatory monitoring normal. Do not titrate up further.       Hyperlipidemia LDL goal <130 2/10/2010     Impaired fasting glucose 8/26/2010     Moderate persistent asthma      Nonrheumatic aortic valve insufficiency 6/29/2017     osteopenia      Paroxysmal atrial fibrillation (H) 12/26/2017     Pulmonary nodule 3/3/2009    PET scan reportedly normal; biopsy not advised.     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  Problem list name updated by automated process. Provider to review     Unspecified arthropathy, hand      Vasculitis (H) 4/20/2009    Possible increased activity of  thoracic aorta, on PET scan w/u for pulmonary nodule.      Current Outpatient Prescriptions   Medication Sig Dispense Refill     losartan (COZAAR) 100 MG tablet Take 1 tablet (100 mg) by mouth At Bedtime 90 tablet 0     simvastatin (ZOCOR) 20 MG tablet take 1/2 tablet by mouth daily at bedtime 45 tablet 2     apixaban ANTICOAGULANT (ELIQUIS) 2.5 MG tablet Take 1 tablet (2.5 mg) by mouth 2 times daily 60 tablet 3     azelastine (ASTELIN) 0.1 % spray Spray 2 sprays into both nostrils 2 times daily       ipratropium (ATROVENT) 0.03 % spray Spray 2 sprays in nostril 2 times daily       MONTELUKAST SODIUM PO Take 10 mg by mouth every evening        metoprolol (TOPROL-XL) 25 MG 24 hr tablet Take 2 tablets (50 mg) by mouth daily 60 tablet 11     omeprazole (PRILOSEC) 20 MG CR capsule TAKE ONE CAPSULE BY MOUTH ONE TIME DAILY  90 capsule 2     fluticasone-salmeterol (ADVAIR-HFA) 230-21 MCG/ACT inhaler Inhale 2 puffs into the lungs 2 times daily 12 g 1     acetaminophen (TYLENOL) 500 MG tablet Take 500-1,000 mg by mouth every 8 hours as needed for mild pain       albuterol (PROAIR HFA, PROVENTIL HFA, VENTOLIN HFA) 108 (90 BASE) MCG/ACT inhaler Inhale 2 puffs into the lungs every 6 hours as needed        calcium citrate-vitamin D (CALCIUM CITRATE +) 315-200 MG-UNIT TABS Take 2 tablets by mouth every evening        Social History   Substance Use Topics     Smoking status: Never Smoker     Smokeless tobacco: Never Used     Alcohol use 0.6 - 1.2 oz/week     1 - 2 Standard drinks or equivalent per week      Comment: 1 glass of wine 1-2 days per week       ROS:  Review of systems negative except as stated below    EXAM:   /60 (Cuff Size: Adult Regular)  Pulse 78  Temp 98.3  F (36.8  C) (Tympanic)  Wt 140 lb (63.5 kg)  SpO2 95%  BMI 24.8 kg/m2  M/S Exam:  Point tenderness at quadriceps insertion  tenderness to palpation and FROM  GENERAL APPEARANCE: healthy, alert and no distress  CHEST: clear to auscultation  CV: regular  rate and rhythm  NEURO: Normal strength and tone, sensory exam grossly normal, mentation intact and speech normal  BACK: No CVA tenderness    X-RAY WNL on initial read    ASSESSMENT:  (M25.551) Hip pain, right  (primary encounter diagnosis)  Comment: Likely strain of quardiceps muscles  Plan: XR Hip Right 2-3 Views, predniSONE (DELTASONE)         10 MG tablet     Follow up with PCP if symptoms worsen or fail to improve in 3-4 days    Patient Instructions     Muscle Strain in the Extremities  A muscle strain is a stretching and tearing of muscle fibers. This causes pain, especially when you move that muscle. There may also be some swelling and bruising.  Home care    Keep the hurt area raised to reduce pain and swelling. This is especially important during the first 48 hours.    Apply an ice pack over the injured area for 15 to 20 minutes every 3 to 6 hours. You should do this for the first 24 to 48 hours. You can make an ice pack by filling a plastic bag that seals at the top with ice cubes and then wrapping it with a thin towel. Be careful not to injure your skin with the ice treatments. Ice should never be applied directly to skin. Continue the use of ice packs for relief of pain and swelling as needed. After 48 hours, apply heat (warm shower or warm bath) for 15 to 20 minutes several times a day, or alternate ice and heat.    You may use over-the-counter pain medicine to control pain, unless another medicine was prescribed. If you have chronic liver or kidney disease or ever had a stomach ulcer or GI bleeding, talk with your healthcare provider before using these medicines.    For leg strains: If crutches have been recommended, don t put full weight on the hurt leg until you can do so without pain. You can return to sports when you are able to hop and run on the injured leg without pain.  Follow-up care  Follow up with your healthcare provider, or as advised.  When to seek medical advice  Call your healthcare  provider right away if any of these occur:    The toes of the injured leg become swollen, cold, blue, numb, or tingly    Pain or swelling increases  Date Last Reviewed: 11/19/2015 2000-2017 [employer]. [address] [place] [address], [place] [address]. All rights reserved. This information is not intended as a substitute for professional medical care. Always follow your healthcare professional's instructions.

## 2018-03-30 ENCOUNTER — OFFICE VISIT (OUTPATIENT)
Dept: PEDIATRICS | Facility: CLINIC | Age: 83
End: 2018-03-30
Payer: COMMERCIAL

## 2018-03-30 VITALS
OXYGEN SATURATION: 97 % | BODY MASS INDEX: 25.88 KG/M2 | WEIGHT: 140.6 LBS | HEIGHT: 62 IN | SYSTOLIC BLOOD PRESSURE: 152 MMHG | DIASTOLIC BLOOD PRESSURE: 60 MMHG | TEMPERATURE: 97.8 F | HEART RATE: 72 BPM

## 2018-03-30 DIAGNOSIS — D72.829 LEUKOCYTOSIS, UNSPECIFIED TYPE: ICD-10-CM

## 2018-03-30 DIAGNOSIS — M25.551 HIP PAIN, RIGHT: Primary | ICD-10-CM

## 2018-03-30 LAB
BASOPHILS # BLD AUTO: 0.1 10E9/L (ref 0–0.2)
BASOPHILS NFR BLD AUTO: 0.4 %
DIFFERENTIAL METHOD BLD: ABNORMAL
EOSINOPHIL # BLD AUTO: 0.1 10E9/L (ref 0–0.7)
EOSINOPHIL NFR BLD AUTO: 0.6 %
ERYTHROCYTE [DISTWIDTH] IN BLOOD BY AUTOMATED COUNT: 14.3 % (ref 10–15)
HCT VFR BLD AUTO: 37.9 % (ref 35–47)
HGB BLD-MCNC: 12 G/DL (ref 11.7–15.7)
LYMPHOCYTES # BLD AUTO: 1.3 10E9/L (ref 0.8–5.3)
LYMPHOCYTES NFR BLD AUTO: 9.5 %
MCH RBC QN AUTO: 30 PG (ref 26.5–33)
MCHC RBC AUTO-ENTMCNC: 31.7 G/DL (ref 31.5–36.5)
MCV RBC AUTO: 95 FL (ref 78–100)
MONOCYTES # BLD AUTO: 1.3 10E9/L (ref 0–1.3)
MONOCYTES NFR BLD AUTO: 9.4 %
NEUTROPHILS # BLD AUTO: 11 10E9/L (ref 1.6–8.3)
NEUTROPHILS NFR BLD AUTO: 80.1 %
PLATELET # BLD AUTO: 245 10E9/L (ref 150–450)
RBC # BLD AUTO: 4 10E12/L (ref 3.8–5.2)
WBC # BLD AUTO: ABNORMAL 10E9/L (ref 4–11)

## 2018-03-30 PROCEDURE — 36415 COLL VENOUS BLD VENIPUNCTURE: CPT | Performed by: INTERNAL MEDICINE

## 2018-03-30 PROCEDURE — 85025 COMPLETE CBC W/AUTO DIFF WBC: CPT | Performed by: INTERNAL MEDICINE

## 2018-03-30 PROCEDURE — 85060 BLOOD SMEAR INTERPRETATION: CPT | Performed by: INTERNAL MEDICINE

## 2018-03-30 PROCEDURE — 99214 OFFICE O/P EST MOD 30 MIN: CPT | Performed by: INTERNAL MEDICINE

## 2018-03-30 RX ORDER — TRAMADOL HYDROCHLORIDE 50 MG/1
50 TABLET ORAL EVERY 6 HOURS PRN
Qty: 20 TABLET | Refills: 0 | Status: SHIPPED | OUTPATIENT
Start: 2018-03-30 | End: 2018-08-01

## 2018-03-30 NOTE — LETTER
[facility]  [address]  [place] [address]                  [phone]   April 2, 2018    Genie Persaud  [address] [place] [address]      Dear Genie,    Here is a summary of your recent test results:    Your cell count looks a bit better, and is now down to 13.7.  No abnormal cells were seen.     Please let me know if you have any further concerns, otherwise we will plan on seeing you back at your next scheduled appointment    Your test results are enclosed.      Please contact me if you have any questions.           Thank you very much for choosing [facility]    Best regards,    Henny Sevilla MD        Results for orders placed or performed in visit on 03/30/18   CBC with platelets and differential   Result Value Ref Range    WBC Reviewed: OK with previous 4.0 - 11.0 10e9/L    RBC Count 4.00 3.8 - 5.2 10e12/L    Hemoglobin 12.0 11.7 - 15.7 g/dL    Hematocrit 37.9 35.0 - 47.0 %    MCV 95 78 - 100 fl    MCH 30.0 26.5 - 33.0 pg    MCHC 31.7 31.5 - 36.5 g/dL    RDW 14.3 10.0 - 15.0 %    Platelet Count 245 150 - 450 10e9/L    Diff Method Automated Method     % Neutrophils 80.1 %    % Lymphocytes 9.5 %    % Monocytes 9.4 %    % Eosinophils 0.6 %    % Basophils 0.4 %    Absolute Neutrophil 11.0 (H) 1.6 - 8.3 10e9/L    Absolute Lymphocytes 1.3 0.8 - 5.3 10e9/L    Absolute Monocytes 1.3 0.0 - 1.3 10e9/L    Absolute Eosinophils 0.1 0.0 - 0.7 10e9/L    Absolute Basophils 0.1 0.0 - 0.2 10e9/L   Blood Morphology Pathologist Review   Result Value Ref Range    Copath Report       Patient Name: GENIE PERSAUD  MR#: [id number]  Specimen #: [id number]  Collected: 3/30/2018  Received: 4/2/2018  Reported: 4/2/2018 13:05  Ordering Phy(s): HENNY SEVILLA    For improved result formatting, select 'View Enhanced Report Format' under   Linked Documents section.    TEST(S):  Peripheral Smear Morphology    FINAL DIAGNOSIS:  Peripheral Blood Morphology-  - Mild leukocytosis with  mild absolute neutrophilia.  - See comment.    COMMENT:  The patient's neutrophilia may be related to recent history of pneumonia   with sepsis or possibly steroid  therapy for asthma.  The WBC count is currently 13.7 x 10e9/L.  The   duration of neutrophilia is not provided.  Potential causes of neutrophilia include cigarette smoking,   infection/inflammation, stress, steroid  medications, hemolysis, bone marrow infiltration, solid organ malignancy,   and asplenism.  The morphologic  findings favor reactive etiology; however, repeat CBC with differential   count is suggested once patient's  clinical co ndition improves to document resolution of this abnormal CBC   finding.    Electronically signed out by:    Beth Oneill M.D.    CLINICAL HISTORY:  Persistently elevated WBC count.    PERIPHERAL BLOOD DATA:  See data below.    PERIPHERAL BLOOD MORPHOLOGY:    ERYTHROCYTES: The red cells appear normal in number, normocytic, and   normochromic.  There is minimal  anisopoikilocytosis.  Polychromasia is within normal limits.  Rouleaux   formation is mildly increased.    PLATELETS: The platelets appear normal in number and normal in morphology.    Occasional large platelet forms  are noted.    LEUKOCYTES: The leukocytes appear increased in number.  No distinct left   shift is noted within the granulocyte  series.  The neutrophils demonstrate essentially normal morphology with   occasional hypersegmented forms.  Eosinophils and monocytes appear mature.  The lymphocyte population is   polymorphous in nature with a few  reactive forms.    CLINICAL LAB RESULTS:  Battery Order No. Lab Test Co de Clinical Result Ref. Range Units Result   Date  Hemogram/Diff/PLT [id number] E WBC Count SEE TEXT 4.0-11.0 10e9/L 3/30/2018   12:07       Text/Comments:  Reviewed: OK with previous       RBC Count 4.00 3.8-5.2 10e12/L 3/30/2018 12:07       Hemoglobin 12.0 11.7-15.7 g/dL 3/30/2018 12:07       Hematocrit 37.9 35.0-47.0 % 3/30/2018  12:07       MCV 95  fl 3/30/2018 12:07       MCH 30.0 26.5-33.0 pg 3/30/2018 12:07       MCHC 31.7 31.5-36.5 g/dL 3/30/2018 12:07       RDW 14.3 10.0-15.0 % 3/30/2018 12:07       Platelet Count 245 150-450 10e9/L 3/30/2018 12:07       % Neutrophils 80.1  % 3/30/2018 12:07       % Lymphocytes 9.5  % 3/30/2018 12:07       % Monocytes 9.4  % 3/30/2018 12:07       % Eosinophils 0.6  % 3/30/2018 12:07       % Basophils 0.4  % 3/30/2018 12:07       abs Neutrophils H 11.0 1.6-8.3 10e9/L 3/30/2018 12:07       abs Lymphocytes 1.3 0.8-5.3 10e9/L 3/30/2018 12:07       abs Monocytes 1.3 0.0-1.3 10e9/L 3/30/2018 12:07       abs Eosinophils 0.1 0.0-0.7 10e9/L 3/30/2018  12:07       abs Basophils 0.1 0.0-0.2 10e9/L 3/30/2018 12:07    CPT Codes:  A: 77699-[id number]    TESTING LAB LOCATION:  [facility] [address] [place]  [id number] [phone]    COLLECTION SITE:  Client:  [facility]  Location:  [facility] (R)

## 2018-03-30 NOTE — MR AVS SNAPSHOT
"              After Visit Summary   3/30/2018    Genie Persaud    MRN: [id number]           Patient Information     Date Of Birth          1935        Visit Information        Provider Department      3/30/2018 11:00 AM Layo Sevilla MD [facility]        Today's Diagnoses     Hip pain, right    -  1    Leukocytosis, unspecified type          Care Instructions    For your hip, may take tramadol as needed; watch for dizziness and drowsiness.    Begin physical therapy; they will call you. They may be able to schedule today downstairs.    Lab work downstairs today.  Directions:  As you walk through the first floor, you'll see (on the right) first the pharmacy, then some bathrooms, then the \"Lab and Imaging\" area. Give them your name at the window there and wait for them to call you.     Layo Sevilla MD  Internal Medicine and Pediatrics           Follow-ups after your visit        Additional Services     [facility] PT, HAND, AND CHIROPRACTIC REFERRAL       **This order will print in the [facility] Scheduling Office**    Physical Therapy, Hand Therapy and Chiropractic Care are available through:    *[facility]  *[facility]  *[facility]    Call one number to schedule at any of the above locations: [phone].    Your provider has referred you to: Physical Therapy at [facility] or [facility]    Indication/Reason for Referral: Hip Pain and Low Back Pain  Onset of Illness: 2 weeks.   Therapy Orders: Evaluate and Treat  Special Programs: None  Special Request: None    Erica Maldonado      Additional Comments for the Therapist or Chiropractor:     Please be aware that coverage of these services is subject to the terms and limitations of your health insurance plan.  Call member services at your health plan with any benefit or coverage questions.      Please bring the following to your appointment:    *Your personal calendar for scheduling future appointments  *Comfortable clothing " "                 Follow-up notes from your care team     Return in about 6 months (around 2018) for Physical Exam.      Your next 10 appointments already scheduled     2018 12:30 PM CDT   Return Visit with ANA MARIA Coe   [facility]    [address]  [place] [address] [phone]              Who to contact     If you have questions or need follow up information about today's clinic visit or your schedule please contact [facility] directly at [phone].  Normal or non-critical lab and imaging results will be communicated to you by MyChart, letter or phone within 4 business days after the clinic has received the results. If you do not hear from us within 7 days, please contact the clinic through [employer](TM) by [employer] or phone. If you have a critical or abnormal lab result, we will notify you by phone as soon as possible.  Submit refill requests through [employer] or call your pharmacy and they will forward the refill request to us. Please allow 3 business days for your refill to be completed.          Additional Information About Your Visit        MyChart Information     [employer] lets you send messages to your doctor, view your test results, renew your prescriptions, schedule appointments and more. To sign up, go to [email or web address] . Click on \"Log in\" on the left side of the screen, which will take you to the Welcome page. Then click on \"Sign up Now\" on the right side of the page.     You will be asked to enter the access code listed below, as well as some personal information. Please follow the directions to create your username and password.     Your access code is: [email or web address]  Expires: 2018 12:04 PM     Your access code will  in 90 days. If you need help or a new code, please call your [facility] or [phone].        Care EveryWhere ID     This is your Care EveryWhere ID. This could be " "used by other organizations to access your [facility] medical records  [id number]        Your Vitals Were     Pulse Temperature Height Pulse Oximetry BMI (Body Mass Index)       72 97.8  F (36.6  C) (Tympanic) 5' 2\" (1.575 m) 97% 25.72 kg/m2        Blood Pressure from Last 3 Encounters:   03/30/18 152/60   03/25/18 118/60   02/22/18 118/50    Weight from Last 3 Encounters:   03/30/18 140 lb 9.6 oz (63.8 kg)   03/25/18 140 lb (63.5 kg)   02/22/18 140 lb (63.5 kg)              We Performed the Following     CBC with platelets and differential     [facility] PT, HAND, AND CHIROPRACTIC REFERRAL          Today's Medication Changes          These changes are accurate as of 3/30/18 11:26 AM.  If you have any questions, ask your nurse or doctor.               Start taking these medicines.        Dose/Directions    traMADol 50 MG tablet   Commonly known as:  ULTRAM   Used for:  Hip pain, right   Started by:  Layo Sevilla MD        Dose:  50 mg   Take 1 tablet (50 mg) by mouth every 6 hours as needed for severe pain   Quantity:  20 tablet   Refills:  0            Where to get your medicines      Some of these will need a paper prescription and others can be bought over the counter.  Ask your nurse if you have questions.     Bring a paper prescription for each of these medications     traMADol 50 MG tablet                Primary Care Provider Office Phone # Fax #    Layo Sevilla [phone] [address] [place] [address]        Equal Access to Services     [facility]: Hadii william ku hadasho Soomaali, waaxda luqadaha, qaybta kaalmada adeegyada, ora lange. So [facility] [phone].    ATENCIÓN: Si habla español, tiene a patel disposición servicios gratuitos de asistencia lingüística. Llame al [phone].    We comply with applicable federal civil rights laws and [place] laws. We do not discriminate on the basis of race, color, national origin, age, disability, sex, " sexual orientation, or gender identity.            Thank you!     Thank you for choosing [facility]  for your care. Our goal is always to provide you with excellent care. Hearing back from our patients is one way we can continue to improve our services. Please take a few minutes to complete the written survey that you may receive in the mail after your visit with us. Thank you!             Your Updated Medication List - Protect others around you: Learn how to safely use, store and throw away your medicines at www.disposemymeds.org.          This list is accurate as of 3/30/18 11:26 AM.  Always use your most recent med list.                   Brand Name Dispense Instructions for use Diagnosis    acetaminophen 500 MG tablet    TYLENOL     Take 500-1,000 mg by mouth every 8 hours as needed for mild pain        albuterol 108 (90 BASE) MCG/ACT Inhaler    PROAIR HFA/PROVENTIL HFA/VENTOLIN HFA     Inhale 2 puffs into the lungs every 6 hours as needed        apixaban ANTICOAGULANT 2.5 MG tablet    ELIQUIS    60 tablet    Take 1 tablet (2.5 mg) by mouth 2 times daily    Atrial fibrillation, unspecified type (H)       azelastine 0.1 % spray    ASTELIN     Spray 2 sprays into both nostrils 2 times daily        CALCIUM CITRATE + 315-200 MG-UNIT Tabs per tablet   Generic drug:  calcium citrate-vitamin D      Take 2 tablets by mouth every evening        fluticasone-salmeterol 230-21 MCG/ACT inhaler    ADVAIR-HFA    12 g    Inhale 2 puffs into the lungs 2 times daily        ipratropium 0.03 % spray    ATROVENT     Spray 2 sprays in nostril 2 times daily        losartan 100 MG tablet    COZAAR    90 tablet    Take 1 tablet (100 mg) by mouth At Bedtime    Essential hypertension with goal blood pressure less than 140/90       metoprolol succinate 25 MG 24 hr tablet    TOPROL-XL    60 tablet    Take 2 tablets (50 mg) by mouth daily    Essential hypertension with goal blood pressure less than 140/90       MONTELUKAST SODIUM  PO      Take 10 mg by mouth every evening        omeprazole 20 MG CR capsule    priLOSEC    90 capsule    TAKE ONE CAPSULE BY MOUTH ONE TIME DAILY    Candidal esophagitis (H)       predniSONE 10 MG tablet    DELTASONE    6 tablet    Take 1 tablet (10 mg) by mouth daily for 6 days    Hip pain, right       simvastatin 20 MG tablet    ZOCOR    45 tablet    take 1/2 tablet by mouth daily at bedtime    Hyperlipidemia LDL goal <130       traMADol 50 MG tablet    ULTRAM    20 tablet    Take 1 tablet (50 mg) by mouth every 6 hours as needed for severe pain    Hip pain, right

## 2018-03-30 NOTE — PROGRESS NOTES
SUBJECTIVE:   Genie Persaud is a 82 year old female who presents to clinic today for the following health issues:      ED/UC Followup:    Facility:  [facility]  Date of visit: 3/25/18  Reason for visit: hip pain  Current Status: was feeling better yesterday am but much worse in evening and still hurting today     Began about 2 weeks ago with pain in right buttock/gluteus, and came into urgent care about 1 week ago.  Believes it all started when she was slouching on a couch.  Usually will worsen throughout the day and extends to lower back, and sometimes to back of thigh.      In urgent care, had negative hip xray.  Using heat and ice; using prednisone was given in urgent care.  Was getting bettr, and last night it worsened again.        Problem list and histories reviewed & adjusted, as indicated.  Additional history: as documented    Patient Active Problem List   Diagnosis     Swelling, mass, or lump in head and neck     Pulmonary nodule     Anemia     Vasculitis (H)     HYPERLIPIDEMIA LDL GOAL <130     Impaired fasting glucose     Candidal esophagitis (H)     Thoracic aortic aneurysm without rupture (H)     Health Care Home     Advanced directives, counseling/discussion     Stroke (H)     Moderate persistent asthma without complication     Essential hypertension with goal blood pressure less than 140/90     Coronary artery disease involving native coronary artery of native heart without angina pectoris     SVT -noted during Cath procedure     Nonrheumatic aortic valve insufficiency     Stress-induced cardiomyopathy     Paroxysmal atrial fibrillation (H)     Peripheral edema     Past Surgical History:   Procedure Laterality Date     C APPENDECTOMY  1957     C LIGATE FALLOPIAN TUBE  1971     C STEREOTACTIC BREAST BIOPSY  2001     CHOLECYSTECTOMY, LAPOROSCOPIC  2008    Cholecystectomy, Laparoscopic     ESOPHAGOSCOPY, GASTROSCOPY, DUODENOSCOPY (EGD), COMBINED  5/17/2013    Procedure: COMBINED ESOPHAGOSCOPY, GASTROSCOPY,  DUODENOSCOPY (EGD), BIOPSY SINGLE OR MULTIPLE;  ESOPHAGOSCOPY, GASTROSCOPY, DUODENOSCOPY (EGD)  with bx;  Surgeon: Jeffery Yanez MD;  Location: [facility]     HC DILATION/CURETTAGE DIAG/THER NON OB  1967,1971     HC REMOVE TONSILS/ADENOIDS,<11 Y/O         Social History   Substance Use Topics     Smoking status: Never Smoker     Smokeless tobacco: Never Used     Alcohol use 0.6 - 1.2 oz/week     1 - 2 Standard drinks or equivalent per week      Comment: 1 glass of wine 1-2 days per week     Family History   Problem Relation Age of Onset     Hypertension Mother      OSTEOPOROSIS Mother      C.A.D. Mother      Connective Tissue Disorder Father      scleraderma     CEREBROVASCULAR DISEASE Paternal Grandmother      Prostate Cancer Other      9/05 passed away due to complications     Breast Cancer Child      youngest dtr         Current Outpatient Prescriptions   Medication Sig Dispense Refill     traMADol (ULTRAM) 50 MG tablet Take 1 tablet (50 mg) by mouth every 6 hours as needed for severe pain 20 tablet 0     predniSONE (DELTASONE) 10 MG tablet Take 1 tablet (10 mg) by mouth daily for 6 days 6 tablet 0     losartan (COZAAR) 100 MG tablet Take 1 tablet (100 mg) by mouth At Bedtime 90 tablet 0     simvastatin (ZOCOR) 20 MG tablet take 1/2 tablet by mouth daily at bedtime 45 tablet 2     apixaban ANTICOAGULANT (ELIQUIS) 2.5 MG tablet Take 1 tablet (2.5 mg) by mouth 2 times daily 60 tablet 3     azelastine (ASTELIN) 0.1 % spray Spray 2 sprays into both nostrils 2 times daily       ipratropium (ATROVENT) 0.03 % spray Spray 2 sprays in nostril 2 times daily       MONTELUKAST SODIUM PO Take 10 mg by mouth every evening        metoprolol (TOPROL-XL) 25 MG 24 hr tablet Take 2 tablets (50 mg) by mouth daily 60 tablet 11     omeprazole (PRILOSEC) 20 MG CR capsule TAKE ONE CAPSULE BY MOUTH ONE TIME DAILY  90 capsule 2     fluticasone-salmeterol (ADVAIR-HFA) 230-21 MCG/ACT inhaler Inhale 2 puffs into the lungs 2 times daily 12 g  "1     acetaminophen (TYLENOL) 500 MG tablet Take 500-1,000 mg by mouth every 8 hours as needed for mild pain       albuterol (PROAIR HFA, PROVENTIL HFA, VENTOLIN HFA) 108 (90 BASE) MCG/ACT inhaler Inhale 2 puffs into the lungs every 6 hours as needed        calcium citrate-vitamin D (CALCIUM CITRATE +) 315-200 MG-UNIT TABS Take 2 tablets by mouth every evening        Allergies   Allergen Reactions     Fosamax [Alendronic Acid] GI Disturbance     Augmentin [Amoxicillin-Pot Clavulanate] Diarrhea     Diarrhea and rash     Evista [Raloxifene]      abd symptoms.      Flu Virus Vaccine      swollen and red at inj site     BP Readings from Last 3 Encounters:   03/30/18 152/60   03/25/18 118/60   02/22/18 118/50    Wt Readings from Last 3 Encounters:   03/30/18 140 lb 9.6 oz (63.8 kg)   03/25/18 140 lb (63.5 kg)   02/22/18 140 lb (63.5 kg)                  Labs reviewed in EPIC    Reviewed and updated as needed this visit by clinical staff       Reviewed and updated as needed this visit by Provider         ROS:  CONSTITUTIONAL: NEGATIVE for fever, chills, change in weight  ENT/MOUTH: NEGATIVE for ear, mouth and throat problems  RESP: NEGATIVE for significant cough or SOB  CV: NEGATIVE for chest pain, palpitations or peripheral edema    OBJECTIVE:                                                    /60 (BP Location: Right arm, Patient Position: Sitting, Cuff Size: Adult Regular)  Pulse 72  Temp 97.8  F (36.6  C) (Tympanic)  Ht 5' 2\" (1.575 m)  Wt 140 lb 9.6 oz (63.8 kg)  SpO2 97%  BMI 25.72 kg/m2  Body mass index is 25.72 kg/(m^2).   GENERAL: healthy, alert, well nourished, well hydrated, no distress  HENT: ear canals- normal; TMs- normal; Nose- normal; Mouth- no ulcers, no lesions  NECK: no tenderness, no adenopathy, no asymmetry, no masses, no stiffness; thyroid- normal to palpation  RESP: lungs clear to auscultation - no rales, no rhonchi, no wheezes  CV: regular rates and rhythm, normal S1 S2, no S3 or S4 and " no murmur, no click or rub -  ABDOMEN: soft, no tenderness, no  hepatosplenomegaly, no masses, normal bowel sounds    Diagnostic test results:  Diagnostic Test Results:  none      ASSESSMENT/PLAN:                                                    1. Hip pain, right  Xray reviewed and within normal limits. Will begin with tramadol (due to significant pain and inability to take nonsteroidals (like ibuprofen or aspirin or naproxen)), and begin physical therapy.   - [facility] PT, HAND, AND CHIROPRACTIC REFERRAL  - traMADol (ULTRAM) 50 MG tablet; Take 1 tablet (50 mg) by mouth every 6 hours as needed for severe pain  Dispense: 20 tablet; Refill: 0    2. Leukocytosis, unspecified type  Will send complete blood count and have morphology review, due to persistence of leukocytosis.   - CBC with platelets and differential  - Blood Morphology Pathologist Review      See Patient Instructions    Layo Sevilla MD  [facility]

## 2018-03-30 NOTE — PATIENT INSTRUCTIONS
"For your hip, may take tramadol as needed; watch for dizziness and drowsiness.    Begin physical therapy; they will call you. They may be able to schedule today downstairs.    Lab work downstairs today.  Directions:  As you walk through the first floor, you'll see (on the right) first the pharmacy, then some bathrooms, then the \"Lab and Imaging\" area. Give them your name at the window there and wait for them to call you.       Layo Sevilla MD  Internal Medicine and Pediatrics   "

## 2018-04-02 DIAGNOSIS — B37.81 CANDIDAL ESOPHAGITIS (H): ICD-10-CM

## 2018-04-02 LAB — COPATH REPORT: NORMAL

## 2018-04-03 NOTE — TELEPHONE ENCOUNTER
Prescription approved per [facility] Refill Protocol.    Rita STARR RN, BSN, PHN  [facility] RN

## 2018-04-03 NOTE — TELEPHONE ENCOUNTER
"Requested Prescriptions   Pending Prescriptions Disp Refills     omeprazole (PRILOSEC) 20 MG CR capsule    Last Written Prescription Date:  6/23/2017  Last Fill Quantity: 90,  # refills: 2   Last office visit: 3/30/2018 with prescribing provider:  Layo Sevilla     Future Office Visit:   Next 5 appointments (look out 90 days)     Apr 19, 2018 12:30 PM CDT   Return Visit with ANA MARIA Coe   [facility]    [address]  [place] [address] [phone]                  90 capsule 2     Sig: Take 1 capsule (20 mg) by mouth daily    PPI Protocol Passed    4/2/2018 12:47 PM       Passed - Not on Clopidogrel (unless Pantoprazole ordered)       Passed - No diagnosis of osteoporosis on record       Passed - Recent (12 mo) or future (30 days) visit within the authorizing provider's specialty    Patient had office visit in the last 12 months or has a visit in the next 30 days with authorizing provider or within the authorizing provider's specialty.  See \"Patient Info\" tab in inbasket, or \"Choose Columns\" in Meds & Orders section of the refill encounter.           Passed - Patient is age 18 or older       Passed - No active pregnacy on record       Passed - No positive pregnancy test in past 12 months          "

## 2018-04-04 ENCOUNTER — THERAPY VISIT (OUTPATIENT)
Dept: PHYSICAL THERAPY | Facility: CLINIC | Age: 83
End: 2018-04-04
Payer: COMMERCIAL

## 2018-04-04 DIAGNOSIS — M54.50 LUMBAGO: ICD-10-CM

## 2018-04-04 DIAGNOSIS — M25.551 HIP PAIN, RIGHT: Primary | ICD-10-CM

## 2018-04-04 PROCEDURE — 97161 PT EVAL LOW COMPLEX 20 MIN: CPT | Mod: GP | Performed by: PHYSICAL THERAPIST

## 2018-04-04 PROCEDURE — 97110 THERAPEUTIC EXERCISES: CPT | Mod: GP | Performed by: PHYSICAL THERAPIST

## 2018-04-04 PROCEDURE — G8979 MOBILITY GOAL STATUS: HCPCS | Mod: GP | Performed by: PHYSICAL THERAPIST

## 2018-04-04 PROCEDURE — G8978 MOBILITY CURRENT STATUS: HCPCS | Mod: GP | Performed by: PHYSICAL THERAPIST

## 2018-04-04 ASSESSMENT — ACTIVITIES OF DAILY LIVING (ADL)
PUTTING_ON_SOCKS_AND_SHOES: MODERATE DIFFICULTY
ROLLING_OVER_IN_BED: SLIGHT DIFFICULTY
SITTING_FOR_15_MINUTES: SLIGHT DIFFICULTY
TWISTING/PIVOTING_ON_INVOLVED_LEG: SLIGHT DIFFICULTY
HOS_ADL_ITEM_SCORE_TOTAL: 54
GOING_UP_1_FLIGHT_OF_STAIRS: NO DIFFICULTY AT ALL
WALKING_DOWN_STEEP_HILLS: SLIGHT DIFFICULTY
HEAVY_WORK: SLIGHT DIFFICULTY
STANDING_FOR_15_MINUTES: EXTREME DIFFICULTY
DEEP_SQUATTING: SLIGHT DIFFICULTY
GETTING_INTO_AND_OUT_OF_AN_AVERAGE_CAR: SLIGHT DIFFICULTY
HOS_ADL_COUNT: 17
WALKING_15_MINUTES_OR_GREATER: SLIGHT DIFFICULTY
HOS_ADL_SCORE(%): 79.41
HOS_ADL_HIGHEST_POTENTIAL_SCORE: 68
WALKING_APPROXIMATELY_10_MINUTES: SLIGHT DIFFICULTY
WALKING_UP_STEEP_HILLS: NO DIFFICULTY AT ALL
LIGHT_TO_MODERATE_WORK: SLIGHT DIFFICULTY
WALKING_INITIALLY: SLIGHT DIFFICULTY
RECREATIONAL_ACTIVITIES: NO DIFFICULTY AT ALL
GOING_DOWN_1_FLIGHT_OF_STAIRS: SLIGHT DIFFICULTY
GETTING_INTO_AND_OUT_OF_A_BATHTUB: NO DIFFICULTY AT ALL
STEPPING_UP_AND_DOWN_CURBS: NO DIFFICULTY AT ALL

## 2018-04-04 NOTE — PROGRESS NOTES
[facility] Initial Evaluation  Subjective:  Patient is a 82 year old female presenting with rehab right hip hpi. The history is provided by the patient. No  was used.   Genie Persaud is a 82 year old female with a right hip (and low back pain) condition.  Condition occurred with:  Insidious onset.  Condition occurred: at home.  This is a new condition  Patient awoke with severe pain in right buttock 2-3 weeks ago.  Patient attributes onset of symptoms to watching TV the night before in a very slouched position. Patient c/o impaired walking on levels and stairs.  She has not returned to driving.  She avoids lifting.  Physical therapy was ordered on 3/30/2018.    Site of Pain: right buttock.  Radiates to: posterior right thigh, sometimes into the back by evening.  Pain is described as aching and sharp and is intermittent and reported as 7/10.  Associated with: patient denies radicular symptoms. Worse during: better in the A.M.  Symptoms are exacerbated by walking, descending stairs and ascending stairs and relieved by ice, heat and rest (sitting, supine).  Since onset symptoms are gradually improving.  Special tests:  X-ray (of right hip - normal).  Previous treatment: none.    General health as reported by patient is good.  Pertinent medical history includes:  High blood pressure, heart problems and asthma.  Medical allergies: yes.  Other surgeries include:  Other.  Current medications:  Pain medication, steroids and high blood pressure medication.  Current occupation is Retired, Volunteer.        Barriers include:  Stairs and lives alone.    Red flags:  None as reported by the patient.                        Objective:    Gait:    Assistive Devices:  None  Deviations:  General Deviations:  Jolynn decr               Lumbar/SI Evaluation  ROM:    AROM Lumbar:   Flexion:        Min Loss (+) - Right posterior thigh pain  Ext:                    Max Loss (-)   Side Bend:         Left:     Right:   Rotation:           Left:     Right:   Side Glide:        Left:     Right:                   Neural Tension/Mobility:        Right side:   SLR  negative.                                             Hip Evaluation  HIP AROM:  : WNL.                  Hip PROM:  : WNL for age (-)                          Hip Strength:      Extension:  Right: 4+/5    +/-  Pain:    Abduction:  Right: 4/5   +   Pain:        Knee Flexion:  Right: 5-/5   -  Pain:  Knee Extension:  Right: 5-/5    -  Pain:          Hip Palpation:        Right hip tenderness not present at:  Greater Trachanter; Piriformis; Gluteus Medius or Bursa             General     ROS    Assessment/Plan:    Patient is a 82 year old female with lumbar and right side hip complaints.    Patient has the following significant findings with corresponding treatment plan.                Diagnosis 1:  Buttock pain  Pain -  self management, education and home program  Decreased ROM/flexibility - therapeutic exercise, therapeutic activity and home program  Decreased strength - therapeutic exercise, therapeutic activities and home program  Impaired gait - gait training and home program  Impaired muscle performance - neuro re-education and home program  Decreased function - therapeutic activities and home program    Therapy Evaluation Codes:   1) History comprised of:   Personal factors that impact the plan of care:      None.    Comorbidity factors that impact the plan of care are:      Asthma, Heart problems and High blood pressure.     Medications impacting care: High blood pressure, Pain and Steroids.  2) Examination of Body Systems comprised of:   Body structures and functions that impact the plan of care:      Hip and Lumbar spine.   Activity limitations that impact the plan of care are:      Driving, Stairs and Walking.  3) Clinical presentation characteristics are:   Stable/Uncomplicated.  4) Decision-Making    Low complexity using standardized patient  assessment instrument and/or measureable assessment of functional outcome.  Cumulative Therapy Evaluation is: Low complexity.    Previous and current functional limitations:  (See Goal Flow Sheet for this information)    Short term and Long term goals: (See Goal Flow Sheet for this information)     Communication ability:  Patient appears to be able to clearly communicate and understand verbal and written communication and follow directions correctly.  Treatment Explanation - The following has been discussed with the patient:   RX ordered/plan of care  Anticipated outcomes  Possible risks and side effects  This patient would benefit from PT intervention to resume normal activities.   Rehab potential is good.    Frequency:  1 X week, once daily  Duration:  for 8 weeks  Discharge Plan:  Achieve all LTG.  Independent in home treatment program.  Reach maximal therapeutic benefit.    Please refer to the daily flowsheet for treatment today, total treatment time and time spent performing 1:1 timed codes.

## 2018-04-05 ENCOUNTER — TELEPHONE (OUTPATIENT)
Dept: PHARMACY | Facility: CLINIC | Age: 83
End: 2018-04-05

## 2018-04-05 ENCOUNTER — TELEPHONE (OUTPATIENT)
Dept: CARDIOLOGY | Facility: CLINIC | Age: 83
End: 2018-04-05

## 2018-04-05 NOTE — TELEPHONE ENCOUNTER
ZIO Patch monitor 3-13-18 worn for 13 days and 18 hrs.  Predominant rhythm SR average 68 bpm range 51 to 121 bpm.  SVT runs. Fastest was 5 beats with a max rate of 121 nad the longest run was 12 beats with an average of 95 bpm.   Some of the atrial tachycardia may possible ne atrial tachycardia with variable block.     Last OV with LAYA was 2-22-18 and next OV with LAYA 4-19-18. To review results.

## 2018-04-05 NOTE — TELEPHONE ENCOUNTER
This patient is due for MTM follow-up. I called the patient to schedule an appointment.     Patient is not interested in scheduling MTM follow-up at this time because things are going well, she would like an outreach again in 6 months.     Kathie Forde, PharmD [facility]  Medication Therapy Management Practitioner   #[phone]

## 2018-04-10 ENCOUNTER — THERAPY VISIT (OUTPATIENT)
Dept: PHYSICAL THERAPY | Facility: CLINIC | Age: 83
End: 2018-04-10
Payer: COMMERCIAL

## 2018-04-10 DIAGNOSIS — M25.551 HIP PAIN, RIGHT: ICD-10-CM

## 2018-04-10 DIAGNOSIS — M54.50 LUMBAGO: ICD-10-CM

## 2018-04-10 PROCEDURE — 97110 THERAPEUTIC EXERCISES: CPT | Mod: GP | Performed by: PHYSICAL THERAPIST

## 2018-04-10 PROCEDURE — 97112 NEUROMUSCULAR REEDUCATION: CPT | Mod: GP | Performed by: PHYSICAL THERAPIST

## 2018-04-13 ENCOUNTER — OFFICE VISIT (OUTPATIENT)
Dept: CARDIOLOGY | Facility: CLINIC | Age: 83
End: 2018-04-13
Payer: COMMERCIAL

## 2018-04-13 VITALS
SYSTOLIC BLOOD PRESSURE: 136 MMHG | HEIGHT: 62 IN | BODY MASS INDEX: 25.95 KG/M2 | WEIGHT: 141 LBS | DIASTOLIC BLOOD PRESSURE: 62 MMHG | HEART RATE: 68 BPM

## 2018-04-13 DIAGNOSIS — I10 ESSENTIAL HYPERTENSION WITH GOAL BLOOD PRESSURE LESS THAN 140/90: ICD-10-CM

## 2018-04-13 DIAGNOSIS — I51.81 STRESS-INDUCED CARDIOMYOPATHY: Primary | ICD-10-CM

## 2018-04-13 DIAGNOSIS — I48.91 ATRIAL FIBRILLATION, UNSPECIFIED TYPE (H): ICD-10-CM

## 2018-04-13 PROCEDURE — 99214 OFFICE O/P EST MOD 30 MIN: CPT | Performed by: PHYSICIAN ASSISTANT

## 2018-04-13 RX ORDER — METOPROLOL SUCCINATE 50 MG/1
75 TABLET, EXTENDED RELEASE ORAL DAILY
Qty: 45 TABLET | Refills: 3 | Status: ON HOLD
Start: 2018-04-13 | End: 2018-08-01

## 2018-04-13 NOTE — PATIENT INSTRUCTIONS
Visit Summary:    Today we discussed:   Your blood pressure has been running a bit higher lately. We will INCREASE your Toprol XL to 75mg once a day. If you get dizzy or your BP goes low at home, go back to 50mg and call to let our office know please.    I will talk with Dr. Hernandez about the possibility of discontinuing your Eliquis. For now, please continue.     Follow up:   With Dr. Hernandez in July with an echo and chest CT before that visit.    Please call my nurse Olivia at [phone] with any questions or concerns. Scheduling phone number: [phone] if needed.

## 2018-04-13 NOTE — LETTER
4/13/2018    Layo Sevilla MD  [address] [place] [address]    RE: Genie THORPE Hung       Dear Colleague,    I had the pleasure of seeing Genie Persaud in the [facility].      Cardiology Progress Note    Date of Service: 04/13/2018      Reason for visit: Follow up hypertension, stress cardiomyopathy, atrial fibrillation.    Primary cardiologist: Dr. Barak Hernandez      HPI:  Ms. Persaud is a pleasant 81-year-old woman with PMHx including chronic peripheral edema, severe labile hypertension, hypercholesterolemia, known ascending aortic aneurysm, CAD, moderate aortic valve disease, and nonischemic cardiomyopathy with ejection fraction of 45%-50% with last echo 03/2017.  After presenting with chest pressure in March 2017, she had an angiogram which showed two vessel nonobstructive coronary disease, but she was also noted to have short bursts of nonsustained supraventricular tachycardia with rates of up to 130 during evaluation. She was then hospitalized again in 11/2017 with pneumonia and sepsis. Her stay was complicated by a takotsubo stress cardiomyopathy with troponin rise and severe hypokinesis of the LV.  In addition, she developed rapid atrial fibrillation during her stay, with severe peripheral edema and biventricular heart failure.     She then returned to see Dr. Hernandez in 01/2018 and was doing rather well at that time. Repeat echo showed improvement with normalization of her LVEF to 60-65%. She did have some mild pulmonary hypertension, and her AR was reported as mild. Her ascending aorta remained dilated, measured at 4.4cm. In addition, she had a 3 week event monitor performed which showed no recurrence of atrial fibrillation. Her heart failure symptoms had resolved. At that time, Dr. Hernandez opted to discontinue her amiodarone, but plan was to continue on Eliquis for now, with plans to perform a Ziopatch after being off Amio for a month, and then reconsider  discontinuation of anticoagulation.      She returned to see me in follow up Feb 2018 and continued to feel well overall. She had discontinued her lasix on her own as she felt she no longer needed it. Unfortunately she had misunderstood instructions about her amiodarone and kept taking that, thus we discontinued at that time. Her BP had been fluctuating quite a bit at home on her random checks which was concerning for her, though it was excellent on our check during her visit. In addition, she had been on prednisone recently for an asthma flare, and thus we opted to continue to monitor. Her largest complaint revolved around fatigue, stating she has little energy and her sleep feels interrupted. We checked a TSH and a CBC at that time, which were unremarkable. She is here today again for follow up.    Since last visit, overall she is feeling relatively well except she recently pulled a muscle in her leg. She is still healing from this. She denies any new chest pain, worsening BARONE, or dizziness. Her BP continues to fluctuate at home, anywhere from 105 to 165 systolic. She doesn't feel poor when it's low, but instead feels tired when it goes higher. Her mild ankle edema is unchanged and she denies orthopnea. She has not had any palpitations, and her Ziopatch performed after being of amiodarone for 1 month, showed no recurrence of afib, though there were a few short runs of SVT. Again, her main concerns today revolve around fatigue.  We discussed a sleep evaluation, though she is not interested in pursuing this for now.       ASSESSMENT/PLAN:    1. Nonischemic cardiomyopathy.   --Echo March 2017 with EF 45-50%, presenting with chest pressure. Angiogram showed nonobstructive coronary disease.   --Had evidence of stress cardiomyopathy with hospitalization in Nov 2017 for pneumonia and sepsis, with severe hypokinesis of the distal 2/3 of the LV though EF not reported. Follow up echo Jan 2018 shows pseudo normalization with  EF 60-65%. She does have mildly elevated RV pressures but size and function are normal.   --Continue losartan, and metoprolol.    --On exam today she appears relatively euvolemic, with stable mild ankle edema. She prefers to stay off furosemide, so will continue to monitor.     2. Hypertension.   --BP continues to fluctuate at home, and she feels poorly when it's higher. I asked her to try to be a bit more consistent in when she takes it, and if it's elevated, to take again 1-2 hours after taking her medications. We will trial an increase in her Toprol XL to 75mg daily; if she becomes lightheaded or her BP drops lower, she will call the office and return to the 50mg dosing.    --Continue the losartan 100mg qHS without change.     --I suggested she speak with her pulmonary team about a possible sleep evaluation given her fatigue and cardiac issues; however, she declines this for now. She will contact them if she changes her mind.     3. Coronary artery disease, nonobstructive.    --Angiogram 3/2017 showed 2V disease with mLAD 50%, D3 50%, pLCx 50%, and mLCx 50% stenosis. Medical management purused.   --She is currently off ASA while on Eliuquis; but we will plan to resume if NOAC discontinued.   --Continue Toprol XL as above.    --Continue simvastatin 10mg daily. LD controlled at 65, with HDL 93, Feb 2018.    4. Atrial fibrillation.   --This was identified during her hospitalization in the setting of pneumonia and sepsis 11/2017.    --She remains in sinus rhythm on exam today, and no recurrence on Ziopatch after being off amiodarone x 1 month. I will review with Dr. Wheeler, whether he would like to discontinue Eliquis.      5. Ascending aortic aneurysm.   --Most recent echo again noted her ascending aorta mildly to moderately dilated at 4.4.  This appears to be better than her CT scan of 2015 when it was estimated to be 4.7 x 4.6.    --Will repeat CT scan when she returns to see Dr. Hernandez in July as planned.      6. Aortic valve disease.    --Report of moderate to severe AR in 03/2017 but with most recent echo reported as mild AI in 01/2018. Repeat echo is also planned for her return in July.                 Follow up plan:  With Dr. Barak Hernandez this summer with chest CT and echo as previously planned.      Orders this Visit:  No orders of the defined types were placed in this encounter.    Orders Placed This Encounter   Medications     metoprolol succinate (TOPROL XL) 50 MG 24 hr tablet     Sig: Take 1.5 tablets (75 mg) by mouth daily     Dispense:  45 tablet     Refill:  3     There are no discontinued medications.        CURRENT MEDICATIONS:  Current Outpatient Prescriptions   Medication Sig Dispense Refill     metoprolol succinate (TOPROL XL) 50 MG 24 hr tablet Take 1.5 tablets (75 mg) by mouth daily 45 tablet 3     omeprazole (PRILOSEC) 20 MG CR capsule Take 1 capsule (20 mg) by mouth daily 90 capsule 1     traMADol (ULTRAM) 50 MG tablet Take 1 tablet (50 mg) by mouth every 6 hours as needed for severe pain 20 tablet 0     losartan (COZAAR) 100 MG tablet Take 1 tablet (100 mg) by mouth At Bedtime 90 tablet 0     simvastatin (ZOCOR) 20 MG tablet take 1/2 tablet by mouth daily at bedtime 45 tablet 2     apixaban ANTICOAGULANT (ELIQUIS) 2.5 MG tablet Take 1 tablet (2.5 mg) by mouth 2 times daily 60 tablet 3     azelastine (ASTELIN) 0.1 % spray Spray 2 sprays into both nostrils 2 times daily       ipratropium (ATROVENT) 0.03 % spray Spray 2 sprays in nostril 2 times daily       MONTELUKAST SODIUM PO Take 10 mg by mouth every evening        metoprolol (TOPROL-XL) 25 MG 24 hr tablet Take 2 tablets (50 mg) by mouth daily 60 tablet 11     fluticasone-salmeterol (ADVAIR-HFA) 230-21 MCG/ACT inhaler Inhale 2 puffs into the lungs 2 times daily 12 g 1     acetaminophen (TYLENOL) 500 MG tablet Take 500-1,000 mg by mouth every 8 hours as needed for mild pain       albuterol (PROAIR HFA, PROVENTIL HFA, VENTOLIN HFA) 108 (90  BASE) MCG/ACT inhaler Inhale 2 puffs into the lungs every 6 hours as needed        calcium citrate-vitamin D (CALCIUM CITRATE +) 315-200 MG-UNIT TABS Take 2 tablets by mouth every evening          ALLERGIES     Allergies   Allergen Reactions     Fosamax [Alendronic Acid] GI Disturbance     Augmentin [Amoxicillin-Pot Clavulanate] Diarrhea     Diarrhea and rash     Evista [Raloxifene]      abd symptoms.      Flu Virus Vaccine      swollen and red at inj site       PAST MEDICAL HISTORY:  Past Medical History:   Diagnosis Date     Anemia 3/10/2009    Chronic disease, by Fe studies; awaiting full GI w/u and repeat colonoscopy, ERCP.     Basal Cell Carcinoma--back      Coronary artery disease involving native coronary artery of native heart without angina pectoris 3/9/2017    3/2/2017 - Two vessel coronary artery disease with mLAD 50% stenosis, D3 50% stenosis, pLCx 50% stenosis and mLCx 50% stenosis     Essential hypertension with goal blood pressure less than 140/90 9/1/2016    White coat hypertension;  24 hour ambulatory monitoring normal. Do not titrate up further.       Hyperlipidemia LDL goal <130 2/10/2010     Impaired fasting glucose 8/26/2010     Moderate persistent asthma      Nonrheumatic aortic valve insufficiency 6/29/2017     osteopenia      Paroxysmal atrial fibrillation (H) 12/26/2017     Pulmonary nodule 3/3/2009    PET scan reportedly normal; biopsy not advised.     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  Problem list name updated by automated process. Provider to review     Unspecified arthropathy, hand      Vasculitis (H) 4/20/2009    Possible increased activity of thoracic aorta, on PET  scan w/u for pulmonary nodule.        PAST SURGICAL HISTORY:  Past Surgical History:   Procedure Laterality Date     C APPENDECTOMY  1957     C LIGATE FALLOPIAN TUBE  1971     C STEREOTACTIC BREAST BIOPSY  2001     CHOLECYSTECTOMY, LAPOROSCOPIC  2008    Cholecystectomy, Laparoscopic     ESOPHAGOSCOPY, GASTROSCOPY, DUODENOSCOPY (EGD), COMBINED  5/17/2013    Procedure: COMBINED ESOPHAGOSCOPY, GASTROSCOPY, DUODENOSCOPY (EGD), BIOPSY SINGLE OR MULTIPLE;  ESOPHAGOSCOPY, GASTROSCOPY, DUODENOSCOPY (EGD)  with bx;  Surgeon: Jeffery Yanez MD;  Location: [facility]     HC DILATION/CURETTAGE DIAG/THER NON OB  1967,1971     HC REMOVE TONSILS/ADENOIDS,<11 Y/O         FAMILY HISTORY:  Family History   Problem Relation Age of Onset     Hypertension Mother      OSTEOPOROSIS Mother      C.A.D. Mother      Connective Tissue Disorder Father      scleraderma     CEREBROVASCULAR DISEASE Paternal Grandmother      Prostate Cancer Other      9/05 passed away due to complications     Breast Cancer Child      youngest dtr       SOCIAL HISTORY:  Social History     Social History     Marital status: Single     Spouse name: N/A     Number of children: 3     Years of education: 15     Occupational History     semi-retired      Social History Main Topics     Smoking status: Never Smoker     Smokeless tobacco: Never Used     Alcohol use 0.6 - 1.2 oz/week     1 - 2 Standard drinks or equivalent per week      Comment: 1 glass of wine 1-2 days per week     Drug use: No     Sexual activity: No     Other Topics Concern     Parent/Sibling W/ Cabg, Mi Or Angioplasty Before 65f 55m? No     Social History Narrative       Review of Systems:  Cardiovascular: negative for chest pain, palpitations, orthopnea, pos mild LE edema.    Constitutional: negative for chills, sweats, fevers. Pos fatigue.  Resp: Negative for dyspnea at rest, dyspnea on exertion, neg recent cough, neg hemoptysis, pos known chronic lung disease  HEENT: Negative for new visual changes,  "frequent headaches  Gastrointestinal: negative for abdominal pain, diarrhea, blood in stool, nausea, vomiting  Hematologic/lymphatic: pos for current systemic anticoagulation, neg hx of blood clots  Musculoskeletal: negative for new back pain, joint pain, pos recent leg/muscle pain  Neurological: negative for focal weakness, LOC, seizures, syncope. Neg dizziness/presyncope.      Physical Exam:  Vitals: /62 (BP Location: Right arm, Patient Position: Chair, Cuff Size: Adult Regular)  Pulse 68  Ht 1.575 m (5' 2\")  Wt 64 kg (141 lb)  BMI 25.79 kg/m2   Wt Readings from Last 4 Encounters:   04/13/18 64 kg (141 lb)   03/30/18 63.8 kg (140 lb 9.6 oz)   03/25/18 63.5 kg (140 lb)   02/22/18 63.5 kg (140 lb)       GEN:  In general, this is a well nourished  female in no acute distress on room air.  Patient ambulatory.  HEENT:  Pupils equal, round. Sclerae nonicteric. Clear oropharynx.   NECK: Supple, no masses appreciated. Trachea midline. No JVD while upright.  C/V:  Regular rate and rhythm, 1/6 BRAULIO heard RSB. No rub or gallop.   RESP: Respirations are unlabored. No use of accessory muscles. Clear to auscultation bilaterally without wheezing, rales, or rhonchi.  GI: Abdomen soft, nontender, nondistended.   EXTREM: Trace bilateral LE edema. Has compression socks on. No cyanosis or clubbing.  NEURO: Alert and oriented, cooperative. Gait not formally assessed. No obvious focal deficits.   PSYCH: Normal affect.  SKIN: Warm and dry. No rashes or petechiae appreciated.       Recent Lab Results:  LIPID RESULTS:  Lab Results   Component Value Date    CHOL 176 02/22/2018    HDL 93 02/22/2018    LDL 65 02/22/2018    TRIG 92 02/22/2018       New/Pertinent imaging results since last visit:  Echo 1/12/18  Interpretation Summary     The ascending aorta is Moderately dilated, 4.4 cm  There is mild concentric left ventricular hypertrophy.  The visual ejection fraction is estimated at 60-65%.  The transmitral spectral " Doppler flow pattern is suggestive of  pseudonormalization.  There is mild (1+) tricuspid regurgitation.  Right ventricular systolic pressure is elevated, consistent with moderate  pulmonary hypertension.  There is mild (1+) aortic regurgitation.  Singificant recovery in LV function with resolution of Takotsubo WMA when  compared with most recent study. The study was technically difficult. Contrast  was used without apparent complications.    Diana Nelson PA-C  [facility]  Pager [phone]      Thank you for allowing me to participate in the care of your patient.    Sincerely,     ANA MARIA oCe     [facility]

## 2018-04-13 NOTE — PROGRESS NOTES
Cardiology Progress Note    Date of Service: 04/13/2018      Reason for visit: Follow up hypertension, stress cardiomyopathy, atrial fibrillation.    Primary cardiologist: Dr. Barak Hernandez      HPI:  Ms. Persaud is a pleasant 81-year-old woman with PMHx including chronic peripheral edema, severe labile hypertension, hypercholesterolemia, known ascending aortic aneurysm, CAD, moderate aortic valve disease, and nonischemic cardiomyopathy with ejection fraction of 45%-50% with last echo 03/2017.  After presenting with chest pressure in March 2017, she had an angiogram which showed two vessel nonobstructive coronary disease, but she was also noted to have short bursts of nonsustained supraventricular tachycardia with rates of up to 130 during evaluation. She was then hospitalized again in 11/2017 with pneumonia and sepsis. Her stay was complicated by a takotsubo stress cardiomyopathy with troponin rise and severe hypokinesis of the LV.  In addition, she developed rapid atrial fibrillation during her stay, with severe peripheral edema and biventricular heart failure.     She then returned to see Dr. Hernandez in 01/2018 and was doing rather well at that time. Repeat echo showed improvement with normalization of her LVEF to 60-65%. She did have some mild pulmonary hypertension, and her AR was reported as mild. Her ascending aorta remained dilated, measured at 4.4cm. In addition, she had a 3 week event monitor performed which showed no recurrence of atrial fibrillation. Her heart failure symptoms had resolved. At that time, Dr. Hernandez opted to discontinue her amiodarone, but plan was to continue on Eliquis for now, with plans to perform a Ziopatch after being off Amio for a month, and then reconsider discontinuation of anticoagulation.      She returned to see me in follow up Feb 2018 and continued to feel well overall. She had discontinued her lasix on her own as she felt she no longer needed it. Unfortunately she  had misunderstood instructions about her amiodarone and kept taking that, thus we discontinued at that time. Her BP had been fluctuating quite a bit at home on her random checks which was concerning for her, though it was excellent on our check during her visit. In addition, she had been on prednisone recently for an asthma flare, and thus we opted to continue to monitor. Her largest complaint revolved around fatigue, stating she has little energy and her sleep feels interrupted. We checked a TSH and a CBC at that time, which were unremarkable. She is here today again for follow up.    Since last visit, overall she is feeling relatively well except she recently pulled a muscle in her leg. She is still healing from this. She denies any new chest pain, worsening BARONE, or dizziness. Her BP continues to fluctuate at home, anywhere from 105 to 165 systolic. She doesn't feel poor when it's low, but instead feels tired when it goes higher. Her mild ankle edema is unchanged and she denies orthopnea. She has not had any palpitations, and her Ziopatch performed after being of amiodarone for 1 month, showed no recurrence of afib, though there were a few short runs of SVT. Again, her main concerns today revolve around fatigue.  We discussed a sleep evaluation, though she is not interested in pursuing this for now.       ASSESSMENT/PLAN:    1. Nonischemic cardiomyopathy.   --Echo March 2017 with EF 45-50%, presenting with chest pressure. Angiogram showed nonobstructive coronary disease.   --Had evidence of stress cardiomyopathy with hospitalization in Nov 2017 for pneumonia and sepsis, with severe hypokinesis of the distal 2/3 of the LV though EF not reported. Follow up echo Jan 2018 shows pseudo normalization with EF 60-65%. She does have mildly elevated RV pressures but size and function are normal.   --Continue losartan, and metoprolol.    --On exam today she appears relatively euvolemic, with stable mild ankle edema. She  prefers to stay off furosemide, so will continue to monitor.     2. Hypertension.   --BP continues to fluctuate at home, and she feels poorly when it's higher. I asked her to try to be a bit more consistent in when she takes it, and if it's elevated, to take again 1-2 hours after taking her medications. We will trial an increase in her Toprol XL to 75mg daily; if she becomes lightheaded or her BP drops lower, she will call the office and return to the 50mg dosing.    --Continue the losartan 100mg qHS without change.     --I suggested she speak with her pulmonary team about a possible sleep evaluation given her fatigue and cardiac issues; however, she declines this for now. She will contact them if she changes her mind.     3. Coronary artery disease, nonobstructive.    --Angiogram 3/2017 showed 2V disease with mLAD 50%, D3 50%, pLCx 50%, and mLCx 50% stenosis. Medical management purused.   --She is currently off ASA while on Eliuquis; but we will plan to resume if NOAC discontinued.   --Continue Toprol XL as above.    --Continue simvastatin 10mg daily. LD controlled at 65, with HDL 93, Feb 2018.    4. Atrial fibrillation.   --This was identified during her hospitalization in the setting of pneumonia and sepsis 11/2017.    --She remains in sinus rhythm on exam today, and no recurrence on Ziopatch after being off amiodarone x 1 month. I will review with Dr. Wheeler, whether he would like to discontinue Eliquis.      5. Ascending aortic aneurysm.   --Most recent echo again noted her ascending aorta mildly to moderately dilated at 4.4.  This appears to be better than her CT scan of 2015 when it was estimated to be 4.7 x 4.6.    --Will repeat CT scan when she returns to see Dr. Hernandez in July as planned.     6. Aortic valve disease.    --Report of moderate to severe AR in 03/2017 but with most recent echo reported as mild AI in 01/2018. Repeat echo is also planned for her return in July.                 Follow up plan:   With Dr. Barak Hernandez this summer with chest CT and echo as previously planned.      Orders this Visit:  No orders of the defined types were placed in this encounter.    Orders Placed This Encounter   Medications     metoprolol succinate (TOPROL XL) 50 MG 24 hr tablet     Sig: Take 1.5 tablets (75 mg) by mouth daily     Dispense:  45 tablet     Refill:  3     There are no discontinued medications.        CURRENT MEDICATIONS:  Current Outpatient Prescriptions   Medication Sig Dispense Refill     metoprolol succinate (TOPROL XL) 50 MG 24 hr tablet Take 1.5 tablets (75 mg) by mouth daily 45 tablet 3     omeprazole (PRILOSEC) 20 MG CR capsule Take 1 capsule (20 mg) by mouth daily 90 capsule 1     traMADol (ULTRAM) 50 MG tablet Take 1 tablet (50 mg) by mouth every 6 hours as needed for severe pain 20 tablet 0     losartan (COZAAR) 100 MG tablet Take 1 tablet (100 mg) by mouth At Bedtime 90 tablet 0     simvastatin (ZOCOR) 20 MG tablet take 1/2 tablet by mouth daily at bedtime 45 tablet 2     apixaban ANTICOAGULANT (ELIQUIS) 2.5 MG tablet Take 1 tablet (2.5 mg) by mouth 2 times daily 60 tablet 3     azelastine (ASTELIN) 0.1 % spray Spray 2 sprays into both nostrils 2 times daily       ipratropium (ATROVENT) 0.03 % spray Spray 2 sprays in nostril 2 times daily       MONTELUKAST SODIUM PO Take 10 mg by mouth every evening        metoprolol (TOPROL-XL) 25 MG 24 hr tablet Take 2 tablets (50 mg) by mouth daily 60 tablet 11     fluticasone-salmeterol (ADVAIR-HFA) 230-21 MCG/ACT inhaler Inhale 2 puffs into the lungs 2 times daily 12 g 1     acetaminophen (TYLENOL) 500 MG tablet Take 500-1,000 mg by mouth every 8 hours as needed for mild pain       albuterol (PROAIR HFA, PROVENTIL HFA, VENTOLIN HFA) 108 (90 BASE) MCG/ACT inhaler Inhale 2 puffs into the lungs every 6 hours as needed        calcium citrate-vitamin D (CALCIUM CITRATE +) 315-200 MG-UNIT TABS Take 2 tablets by mouth every evening          ALLERGIES     Allergies    Allergen Reactions     Fosamax [Alendronic Acid] GI Disturbance     Augmentin [Amoxicillin-Pot Clavulanate] Diarrhea     Diarrhea and rash     Evista [Raloxifene]      abd symptoms.      Flu Virus Vaccine      swollen and red at inj site       PAST MEDICAL HISTORY:  Past Medical History:   Diagnosis Date     Anemia 3/10/2009    Chronic disease, by Fe studies; awaiting full GI w/u and repeat colonoscopy, ERCP.     Basal Cell Carcinoma--back      Coronary artery disease involving native coronary artery of native heart without angina pectoris 3/9/2017    3/2/2017 - Two vessel coronary artery disease with mLAD 50% stenosis, D3 50% stenosis, pLCx 50% stenosis and mLCx 50% stenosis     Essential hypertension with goal blood pressure less than 140/90 9/1/2016    White coat hypertension;  24 hour ambulatory monitoring normal. Do not titrate up further.       Hyperlipidemia LDL goal <130 2/10/2010     Impaired fasting glucose 8/26/2010     Moderate persistent asthma      Nonrheumatic aortic valve insufficiency 6/29/2017     osteopenia      Paroxysmal atrial fibrillation (H) 12/26/2017     Pulmonary nodule 3/3/2009    PET scan reportedly normal; biopsy not advised.     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  Problem list name updated by automated process. Provider to review     Unspecified arthropathy, hand      Vasculitis (H) 4/20/2009    Possible increased activity of thoracic aorta, on PET scan w/u for pulmonary nodule.        PAST SURGICAL HISTORY:  Past Surgical History:   Procedure Laterality Date     C APPENDECTOMY  1957     C LIGATE FALLOPIAN TUBE  1971     C STEREOTACTIC BREAST BIOPSY  2001      CHOLECYSTECTOMY, LAPOROSCOPIC  2008    Cholecystectomy, Laparoscopic     ESOPHAGOSCOPY, GASTROSCOPY, DUODENOSCOPY (EGD), COMBINED  5/17/2013    Procedure: COMBINED ESOPHAGOSCOPY, GASTROSCOPY, DUODENOSCOPY (EGD), BIOPSY SINGLE OR MULTIPLE;  ESOPHAGOSCOPY, GASTROSCOPY, DUODENOSCOPY (EGD)  with bx;  Surgeon: Jeffery Yanez MD;  Location: [facility]     HC DILATION/CURETTAGE DIAG/THER NON OB  1967,1971     HC REMOVE TONSILS/ADENOIDS,<11 Y/O         FAMILY HISTORY:  Family History   Problem Relation Age of Onset     Hypertension Mother      OSTEOPOROSIS Mother      C.A.D. Mother      Connective Tissue Disorder Father      scleraderma     CEREBROVASCULAR DISEASE Paternal Grandmother      Prostate Cancer Other      9/05 passed away due to complications     Breast Cancer Child      youngest dtr       SOCIAL HISTORY:  Social History     Social History     Marital status: Single     Spouse name: N/A     Number of children: 3     Years of education: 15     Occupational History     semi-retired      Social History Main Topics     Smoking status: Never Smoker     Smokeless tobacco: Never Used     Alcohol use 0.6 - 1.2 oz/week     1 - 2 Standard drinks or equivalent per week      Comment: 1 glass of wine 1-2 days per week     Drug use: No     Sexual activity: No     Other Topics Concern     Parent/Sibling W/ Cabg, Mi Or Angioplasty Before 65f 55m? No     Social History Narrative       Review of Systems:  Cardiovascular: negative for chest pain, palpitations, orthopnea, pos mild LE edema.    Constitutional: negative for chills, sweats, fevers. Pos fatigue.  Resp: Negative for dyspnea at rest, dyspnea on exertion, neg recent cough, neg hemoptysis, pos known chronic lung disease  HEENT: Negative for new visual changes, frequent headaches  Gastrointestinal: negative for abdominal pain, diarrhea, blood in stool, nausea, vomiting  Hematologic/lymphatic: pos for current systemic anticoagulation, neg hx of blood clots  Musculoskeletal:  "negative for new back pain, joint pain, pos recent leg/muscle pain  Neurological: negative for focal weakness, LOC, seizures, syncope. Neg dizziness/presyncope.      Physical Exam:  Vitals: /62 (BP Location: Right arm, Patient Position: Chair, Cuff Size: Adult Regular)  Pulse 68  Ht 1.575 m (5' 2\")  Wt 64 kg (141 lb)  BMI 25.79 kg/m2   Wt Readings from Last 4 Encounters:   04/13/18 64 kg (141 lb)   03/30/18 63.8 kg (140 lb 9.6 oz)   03/25/18 63.5 kg (140 lb)   02/22/18 63.5 kg (140 lb)       GEN:  In general, this is a well nourished  female in no acute distress on room air.  Patient ambulatory.  HEENT:  Pupils equal, round. Sclerae nonicteric. Clear oropharynx.   NECK: Supple, no masses appreciated. Trachea midline. No JVD while upright.  C/V:  Regular rate and rhythm, 1/6 BRAULIO heard RSB. No rub or gallop.   RESP: Respirations are unlabored. No use of accessory muscles. Clear to auscultation bilaterally without wheezing, rales, or rhonchi.  GI: Abdomen soft, nontender, nondistended.   EXTREM: Trace bilateral LE edema. Has compression socks on. No cyanosis or clubbing.  NEURO: Alert and oriented, cooperative. Gait not formally assessed. No obvious focal deficits.   PSYCH: Normal affect.  SKIN: Warm and dry. No rashes or petechiae appreciated.       Recent Lab Results:  LIPID RESULTS:  Lab Results   Component Value Date    CHOL 176 02/22/2018    HDL 93 02/22/2018    LDL 65 02/22/2018    TRIG 92 02/22/2018       New/Pertinent imaging results since last visit:  Echo 1/12/18  Interpretation Summary     The ascending aorta is Moderately dilated, 4.4 cm  There is mild concentric left ventricular hypertrophy.  The visual ejection fraction is estimated at 60-65%.  The transmitral spectral Doppler flow pattern is suggestive of  pseudonormalization.  There is mild (1+) tricuspid regurgitation.  Right ventricular systolic pressure is elevated, consistent with moderate  pulmonary hypertension.  There is mild " (1+) aortic regurgitation.  Singificant recovery in LV function with resolution of Takotsubo WMA when  compared with most recent study. The study was technically difficult. Contrast  was used without apparent complications.        Diana Nelson PA-C  [facility]  Pager [phone]

## 2018-04-13 NOTE — MR AVS SNAPSHOT
After Visit Summary   4/13/2018    Genie Persaud    MRN: [id number]           Patient Information     Date Of Birth          1935        Visit Information        Provider Department      4/13/2018 3:10 PM Diana Nelson PA [facility]        Today's Diagnoses     Stress-induced cardiomyopathy        Atrial fibrillation, unspecified type (H)        Essential hypertension with goal blood pressure less than 140/90          Care Instructions    Visit Summary:    Today we discussed:   Your blood pressure has been running a bit higher lately. We will INCREASE your Toprol XL to 75mg once a day. If you get dizzy or your BP goes low at home, go back to 50mg and call to let our office know please.    I will talk with Dr. Hernandez about the possibility of discontinuing your Eliquis. For now, please continue.     Follow up:   With Dr. Hernandez in July with an echo and chest CT before that visit.    Please call my nurse Oliiva at [phone] with any questions or concerns. Scheduling phone number: [phone] if needed.                 Follow-ups after your visit        Your next 10 appointments already scheduled     Apr 18, 2018  2:10 PM CDT   RHONDA Spine with Esteban Kemp PT   [facility] [place] ([facility]  )    [address] [place] [address]  [address]  [place] [address]   [phone]            Apr 26, 2018  1:30 PM CDT   RHONDA Spine with Esteban Kemp PT   [facility] ([facility] [place]  )    [address] [place] [address]  [address]  [place] [address]   [phone]            May 02, 2018  2:10 PM CDT   RHONDA Spine with Esteban Kemp PT   [facility] [place] ([facility] [place]  )    [address] [place] [address]  [address]  [place] [address]   [phone]            May 07, 2018  2:10 PM CDT   RHONDA Spine with Esteban Kemp PT   [facility] [place] ([facility] [place]  )    [address] [place] [address]  [facility]  [address]  [place] [address]   [phone]            Jun 25, 2018 11:00 AM CDT   (Arrive by 10:45 AM)   CT CHEST ANGIO W/O & W CONTRAST with RSCCCT1   [facility] ([facility])    [address]  [facility] [id number] [phone]           Please bring any scans or X-rays taken at other hospitals, if similar tests were done. Also bring a list of your medicines, including vitamins, minerals and over-the-counter drugs. It is safest to leave personal items at home.  Be sure to tell your doctor:   If you have any allergies.   If there s any chance you are pregnant.   If you are breastfeeding.    If you have diabetes as your medication may need to be adjusted for this exam.  You will have contrast for this exam. To prepare:   Do not eat or drink for 2 hours before your exam. If you need to take medicine, you may take it with small sips of water. (We may ask you to take liquid medicine as well.)   The day before your exam, drink extra fluids at least six 8-ounce glasses (unless your doctor tells you to restrict your fluids).  Patients over 70 or patients with diabetes or kidney problems:   If you haven t had a blood test (creatinine test) within the last 30 days, the Cardiologist/Radiologist may require you to get this test prior to your exam.  Please wear loose clothing, such as a sweat suit or jogging clothes. Avoid snaps, zippers and other metal. We may ask you to undress and put on a hospital gown.  If you have any questions, please call the Imaging Department where you will have your exam.            Jul 25, 2018 12:30 PM CDT   Ech Complete with RSCCECHO1   [facility] ([facility])    [address]  [facility] [id number] [phone]           1. Please bring or wear a comfortable two-piece outfit. 2. You may eat, drink and take your normal medicines. 3. For any questions  that cannot be answered, please contact the ordering physician ***Please check-in at the [facility] located in [address] in the [facility] building. When you are finished registering, please go to [address] and have a seat. The technician will call your name for the test.            Jul 25, 2018  1:30 PM CDT   LAB with RU LAB   [facility] ([facility])    [address]  [facility] [address] [phone]           Please do not eat 10-12 hours before your appointment if you are coming in fasting for labs on lipids, cholesterol, or glucose (sugar). This does not apply to pregnant women. Water, hot tea and black coffee (with nothing added) are okay. Do not drink other fluids, diet soda or chew gum.            Aug 16, 2018  9:30 AM CDT   Return Visit with Barak Hernandez MD   [facility] ([facility])    [address]  [facility] [address] [phone]              Who to contact     If you have questions or need follow up information about today's clinic visit or your schedule please contact [facility] directly at [phone].  Normal or non-critical lab and imaging results will be communicated to you by achvrhart, letter or phone within 4 business days after the clinic has received the results. If you do not hear from us within 7 days, please contact the clinic through achvrhart or phone. If you have a critical or abnormal lab result, we will notify you by phone as soon as possible.  Submit refill requests through [employer] or call your pharmacy and they will forward the refill request to us. Please allow 3 business days for your refill to be completed.          Additional Information About Your Visit        [employer] Information     [employer] lets you send messages to your doctor, view your test results, renew  "your prescriptions, schedule appointments and more. To sign up, go to [email or web address] . Click on \"Log in\" on the left side of the screen, which will take you to the Welcome page. Then click on \"Sign up Now\" on the right side of the page.     You will be asked to enter the access code listed below, as well as some personal information. Please follow the directions to create your username and password.     Your access code is: [email or web address]  Expires: 2018 12:04 PM     Your access code will  in 90 days. If you need help or a new code, please call your [facility] clinic or [phone].        Care EveryWhere ID     This is your Care EveryWhere ID. This could be used by other organizations to access your [facility] medical records  [id number]        Your Vitals Were     Pulse Height BMI (Body Mass Index)             68 1.575 m (5' 2\") 25.79 kg/m2          Blood Pressure from Last 3 Encounters:   18 136/62   18 152/60   18 118/60    Weight from Last 3 Encounters:   18 64 kg (141 lb)   18 63.8 kg (140 lb 9.6 oz)   18 63.5 kg (140 lb)              We Performed the Following     Follow-Up with Cardiac Advanced Practice Provider          Today's Medication Changes          These changes are accurate as of 18  3:58 PM.  If you have any questions, ask your nurse or doctor.               These medicines have changed or have updated prescriptions.        Dose/Directions    * metoprolol succinate 25 MG 24 hr tablet   Commonly known as:  TOPROL-XL   This may have changed:  Another medication with the same name was added. Make sure you understand how and when to take each.   Used for:  Essential hypertension with goal blood pressure less than 140/90   Changed by:  Diana Nelson PA        Dose:  50 mg   Take 2 tablets (50 mg) by mouth daily   Quantity:  60 tablet   Refills:  11       * metoprolol succinate 50 MG 24 hr tablet   Commonly known as:  TOPROL XL   This may have " changed:  You were already taking a medication with the same name, and this prescription was added. Make sure you understand how and when to take each.   Used for:  Stress-induced cardiomyopathy, Atrial fibrillation, unspecified type (H), Essential hypertension with goal blood pressure less than 140/90   Changed by:  Diana Nelson PA        Dose:  75 mg   Take 1.5 tablets (75 mg) by mouth daily   Quantity:  45 tablet   Refills:  3       * Notice:  This list has 2 medication(s) that are the same as other medications prescribed for you. Read the directions carefully, and ask your doctor or other care provider to review them with you.         Where to get your medicines      Some of these will need a paper prescription and others can be bought over the counter.  Ask your nurse if you have questions.     You don't need a prescription for these medications     metoprolol succinate 50 MG 24 hr tablet                Primary Care Provider Office Phone # Fax #    Layo Sevilla [phone] [address] [place] [address]        Equal Access to Services     [facility]: Hadii aad ku hadasho Soomaali, waaxda luqadaha, qaybta kaalmada adeegyada, waxay idiin haymeli meyer . So [facility] [phone].    ATENCIÓN: Si habla español, tiene a patel disposición servicios gratuitos de asistencia lingüística. Llame al [phone].    We comply with applicable federal civil rights laws and [place] laws. We do not discriminate on the basis of race, color, national origin, age, disability, sex, sexual orientation, or gender identity.            Thank you!     Thank you for choosing [facility]  for your care. Our goal is always to provide you with excellent care. Hearing back from our patients is one way we can continue to improve our services. Please take a few minutes to complete the written survey that you may receive in the mail after your visit  with us. Thank you!             Your Updated Medication List - Protect others around you: Learn how to safely use, store and throw away your medicines at www.disposemymeds.org.          This list is accurate as of 4/13/18  3:58 PM.  Always use your most recent med list.                   Brand Name Dispense Instructions for use Diagnosis    acetaminophen 500 MG tablet    TYLENOL     Take 500-1,000 mg by mouth every 8 hours as needed for mild pain        albuterol 108 (90 Base) MCG/ACT Inhaler    PROAIR HFA/PROVENTIL HFA/VENTOLIN HFA     Inhale 2 puffs into the lungs every 6 hours as needed        apixaban ANTICOAGULANT 2.5 MG tablet    ELIQUIS    60 tablet    Take 1 tablet (2.5 mg) by mouth 2 times daily    Atrial fibrillation, unspecified type (H)       azelastine 0.1 % spray    ASTELIN     Spray 2 sprays into both nostrils 2 times daily        CALCIUM CITRATE + 315-200 MG-UNIT Tabs per tablet   Generic drug:  calcium citrate-vitamin D      Take 2 tablets by mouth every evening        fluticasone-salmeterol 230-21 MCG/ACT inhaler    ADVAIR-HFA    12 g    Inhale 2 puffs into the lungs 2 times daily        ipratropium 0.03 % spray    ATROVENT     Spray 2 sprays in nostril 2 times daily        losartan 100 MG tablet    COZAAR    90 tablet    Take 1 tablet (100 mg) by mouth At Bedtime    Essential hypertension with goal blood pressure less than 140/90       * metoprolol succinate 25 MG 24 hr tablet    TOPROL-XL    60 tablet    Take 2 tablets (50 mg) by mouth daily    Essential hypertension with goal blood pressure less than 140/90       * metoprolol succinate 50 MG 24 hr tablet    TOPROL XL    45 tablet    Take 1.5 tablets (75 mg) by mouth daily    Stress-induced cardiomyopathy, Atrial fibrillation, unspecified type (H), Essential hypertension with goal blood pressure less than 140/90       MONTELUKAST SODIUM PO      Take 10 mg by mouth every evening        omeprazole 20 MG CR capsule    priLOSEC    90 capsule    Take  1 capsule (20 mg) by mouth daily    Candidal esophagitis (H)       simvastatin 20 MG tablet    ZOCOR    45 tablet    take 1/2 tablet by mouth daily at bedtime    Hyperlipidemia LDL goal <130       traMADol 50 MG tablet    ULTRAM    20 tablet    Take 1 tablet (50 mg) by mouth every 6 hours as needed for severe pain    Hip pain, right       * Notice:  This list has 2 medication(s) that are the same as other medications prescribed for you. Read the directions carefully, and ask your doctor or other care provider to review them with you.

## 2018-04-18 ENCOUNTER — THERAPY VISIT (OUTPATIENT)
Dept: PHYSICAL THERAPY | Facility: CLINIC | Age: 83
End: 2018-04-18
Payer: COMMERCIAL

## 2018-04-18 DIAGNOSIS — M54.50 LUMBAGO: ICD-10-CM

## 2018-04-18 DIAGNOSIS — M25.551 HIP PAIN, RIGHT: ICD-10-CM

## 2018-04-18 PROCEDURE — 97110 THERAPEUTIC EXERCISES: CPT | Mod: GP | Performed by: PHYSICAL THERAPIST

## 2018-04-18 NOTE — PROGRESS NOTES
Subjective:  HPI                    Objective:  System    Physical Exam    General     ROS    Assessment/Plan:    DISCHARGE REPORT    Progress reporting period is from 4/4/2018 to 4/18/2018.       SUBJECTIVE  Subjective changes noted by patient:  Patient reports buttock pain has resolved.  She has returned to her previous functional level.       Current pain level is 0/10.     Previous pain level was  7/10.   Changes in function:  Yes (See Goal flowsheet attached for changes in current functional level)  Adverse reaction to treatment or activity: None    OBJECTIVE  AROM Trunk:   Flexion: WNL (-)  Ext: Max Loss (-)     Hip ROM: WNL for age    Hip Strength: 4+/5 Ext, 4/5 Abd    Single Leg Balance: 4 seconds        ASSESSMENT/PLAN  Updated problem list and treatment plan: Diagnosis 1:  Buttock pain    STG/LTGs have been met or progress has been made towards goals:  Yes (See Goal flow sheet completed today.)  Assessment of Progress: The patient's condition is improving.  The patient has met all of their long term goals.  Self Management Plans:  Patient has been instructed in a home treatment program.  Patient is independent in a home treatment program.  I have re-evaluated this patient and find that the nature, scope, duration and intensity of the therapy is appropriate for the medical condition of the patient.  Genie continues to require the following intervention to meet STG and LTG's:  PT intervention is no longer required to meet STG/LTG.    Recommendations:  This patient is ready to be discharged from therapy and continue their home treatment program.    Please refer to the daily flowsheet for treatment today, total treatment time and time spent performing 1:1 timed codes.

## 2018-04-26 ENCOUNTER — TELEPHONE (OUTPATIENT)
Dept: DERMATOLOGY | Facility: CLINIC | Age: 83
End: 2018-04-26

## 2018-04-26 ENCOUNTER — TELEPHONE (OUTPATIENT)
Dept: PEDIATRICS | Facility: CLINIC | Age: 83
End: 2018-04-26

## 2018-04-26 DIAGNOSIS — M81.0 OSTEOPOROSIS, UNSPECIFIED OSTEOPOROSIS TYPE, UNSPECIFIED PATHOLOGICAL FRACTURE PRESENCE: ICD-10-CM

## 2018-04-26 DIAGNOSIS — Z12.31 VISIT FOR SCREENING MAMMOGRAM: Primary | ICD-10-CM

## 2018-04-26 NOTE — TELEPHONE ENCOUNTER
Patient left message at 1:43 p.m.     Patient seen by chiropractic for right hip pain and low back pain. They suggested to do an x-ray to r/o compression vs arthritis.     Per chart review, patient was discharged from PT as her hip pain was resolved.     Ok for order?    Merari METZGER, Triage RN

## 2018-04-27 NOTE — TELEPHONE ENCOUNTER
Hold for PCP to see if OV needed or if ok with order.    PCP back on Monday.    Birdie Hawkins MD  Internal Medicine/Pediatrics  [facility]

## 2018-04-29 ENCOUNTER — NURSE TRIAGE (OUTPATIENT)
Dept: NURSING | Facility: CLINIC | Age: 83
End: 2018-04-29

## 2018-04-29 ENCOUNTER — OFFICE VISIT (OUTPATIENT)
Dept: URGENT CARE | Facility: URGENT CARE | Age: 83
End: 2018-04-29
Payer: COMMERCIAL

## 2018-04-29 ENCOUNTER — RADIANT APPOINTMENT (OUTPATIENT)
Dept: GENERAL RADIOLOGY | Facility: CLINIC | Age: 83
End: 2018-04-29
Attending: FAMILY MEDICINE
Payer: COMMERCIAL

## 2018-04-29 VITALS
TEMPERATURE: 97.8 F | SYSTOLIC BLOOD PRESSURE: 150 MMHG | WEIGHT: 138 LBS | HEART RATE: 79 BPM | OXYGEN SATURATION: 96 % | DIASTOLIC BLOOD PRESSURE: 68 MMHG | BODY MASS INDEX: 25.24 KG/M2

## 2018-04-29 DIAGNOSIS — M25.552 HIP PAIN, LEFT: ICD-10-CM

## 2018-04-29 DIAGNOSIS — M25.552 HIP PAIN, LEFT: Primary | ICD-10-CM

## 2018-04-29 PROCEDURE — 73502 X-RAY EXAM HIP UNI 2-3 VIEWS: CPT | Mod: LT

## 2018-04-29 PROCEDURE — 99214 OFFICE O/P EST MOD 30 MIN: CPT | Performed by: FAMILY MEDICINE

## 2018-04-29 RX ORDER — CYCLOBENZAPRINE HCL 5 MG
5 TABLET ORAL 3 TIMES DAILY PRN
Qty: 30 TABLET | Refills: 1 | Status: SHIPPED | OUTPATIENT
Start: 2018-04-29 | End: 2018-08-01

## 2018-04-29 NOTE — PATIENT INSTRUCTIONS
Muscle Strain in the Extremities  A muscle strain is a stretching and tearing of muscle fibers. This causes pain, especially when you move that muscle. There may also be some swelling and bruising.  Home care    Keep the hurt area raised to reduce pain and swelling. This is especially important during the first 48 hours.    Apply an ice pack over the injured area for 15 to 20 minutes every 3 to 6 hours. You should do this for the first 24 to 48 hours. You can make an ice pack by filling a plastic bag that seals at the top with ice cubes and then wrapping it with a thin towel. Be careful not to injure your skin with the ice treatments. Ice should never be applied directly to skin. Continue the use of ice packs for relief of pain and swelling as needed. After 48 hours, apply heat (warm shower or warm bath) for 15 to 20 minutes several times a day, or alternate ice and heat.    You may use over-the-counter pain medicine to control pain, unless another medicine was prescribed. If you have chronic liver or kidney disease or ever had a stomach ulcer or GI bleeding, talk with your healthcare provider before using these medicines.    For leg strains: If crutches have been recommended, don t put full weight on the hurt leg until you can do so without pain. You can return to sports when you are able to hop and run on the injured leg without pain.  Follow-up care  Follow up with your healthcare provider, or as advised.  When to seek medical advice  Call your healthcare provider right away if any of these occur:    The toes of the injured leg become swollen, cold, blue, numb, or tingly    Pain or swelling increases  Date Last Reviewed: 11/19/2015 2000-2017 [employer]. [address] [place] [address], [place] [address]. All rights reserved. This information is not intended as a substitute for professional medical care. Always follow your healthcare professional's instructions.        Hamstring Stretch  (Flexibility)    1. Sit on the floor with your right leg straight in front of you. Bend your left leg so the sole of your foot rests against the inside of your right thigh.  2. Reach toward your ankle. Keep your knee, neck, and back straight. Feel the stretch in the back of your thigh.  3. Hold for 30 to 60 seconds. Repeat 2 times, or as instructed.  4. Switch legs and repeat.  5. Repeat this exercise 3 times per day, or as instructed.     Tip: Don t bounce while you re stretching.   Date Last Reviewed: 3/10/2016    4117-2457 [employer]. [address] [place] [address], [place] [address]. All rights reserved. This information is not intended as a substitute for professional medical care. Always follow your healthcare professional's instructions.

## 2018-04-29 NOTE — MR AVS SNAPSHOT
After Visit Summary   4/29/2018    Genie Persaud    MRN: [id number]           Patient Information     Date Of Birth          1935        Visit Information        Provider Department      4/29/2018 1:35 PM Mel Aguilar MD [facility]        Today's Diagnoses     Hip pain, left    -  1      Care Instructions      Muscle Strain in the Extremities  A muscle strain is a stretching and tearing of muscle fibers. This causes pain, especially when you move that muscle. There may also be some swelling and bruising.  Home care    Keep the hurt area raised to reduce pain and swelling. This is especially important during the first 48 hours.    Apply an ice pack over the injured area for 15 to 20 minutes every 3 to 6 hours. You should do this for the first 24 to 48 hours. You can make an ice pack by filling a plastic bag that seals at the top with ice cubes and then wrapping it with a thin towel. Be careful not to injure your skin with the ice treatments. Ice should never be applied directly to skin. Continue the use of ice packs for relief of pain and swelling as needed. After 48 hours, apply heat (warm shower or warm bath) for 15 to 20 minutes several times a day, or alternate ice and heat.    You may use over-the-counter pain medicine to control pain, unless another medicine was prescribed. If you have chronic liver or kidney disease or ever had a stomach ulcer or GI bleeding, talk with your healthcare provider before using these medicines.    For leg strains: If crutches have been recommended, don t put full weight on the hurt leg until you can do so without pain. You can return to sports when you are able to hop and run on the injured leg without pain.  Follow-up care  Follow up with your healthcare provider, or as advised.  When to seek medical advice  Call your healthcare provider right away if any of these occur:    The toes of the injured leg become swollen, cold, blue,  numb, or tingly    Pain or swelling increases  Date Last Reviewed: 11/19/2015 2000-2017 [employer]. [address] [place] [address]. All rights reserved. This information is not intended as a substitute for professional medical care. Always follow your healthcare professional's instructions.        Hamstring Stretch (Flexibility)    1. Sit on the floor with your right leg straight in front of you. Bend your left leg so the sole of your foot rests against the inside of your right thigh.  2. Reach toward your ankle. Keep your knee, neck, and back straight. Feel the stretch in the back of your thigh.  3. Hold for 30 to 60 seconds. Repeat 2 times, or as instructed.  4. Switch legs and repeat.  5. Repeat this exercise 3 times per day, or as instructed.     Tip: Don t bounce while you re stretching.   Date Last Reviewed: 3/10/2016    3699-0067 [employer] [address] [place] [address]. All rights reserved. This information is not intended as a substitute for professional medical care. Always follow your healthcare professional's instructions.                Follow-ups after your visit        Follow-up notes from your care team     Return in about 10 days (around 5/9/2018).      Your next 10 appointments already scheduled     Jul 25, 2018 10:00 AM CDT   (Arrive by 9:45 AM)   CT CHEST ANGIO W/O & W CONTRAST with [address] [facility] ([facility])    [address] [address] [address] [phone]           Please bring any scans or X-rays taken at other hospitals, if similar tests were done. Also bring a list of your medicines, including vitamins, minerals and over-the-counter drugs. It is safest to leave personal items at home.  Be sure to tell your doctor:   If you have any allergies.   If there s any chance you are pregnant.   If you are breastfeeding.    If you have diabetes as your medication may  need to be adjusted for this exam.  You will have contrast for this exam. To prepare:   Do not eat or drink for 2 hours before your exam. If you need to take medicine, you may take it with small sips of water. (We may ask you to take liquid medicine as well.)   The day before your exam, drink extra fluids at least six 8-ounce glasses (unless your doctor tells you to restrict your fluids).  Patients over 70 or patients with diabetes or kidney problems:   If you haven t had a blood test (creatinine test) within the last 30 days, the Cardiologist/Radiologist may require you to get this test prior to your exam.  Please wear loose clothing, such as a sweat suit or jogging clothes. Avoid snaps, zippers and other metal. We may ask you to undress and put on a hospital gown.  If you have any questions, please call the Imaging Department where you will have your exam.            Jul 25, 2018 12:30 PM CDT   Ech Complete with [address] ([facility])    [address]  [facility] [address] [phone]           1. Please bring or wear a comfortable two-piece outfit. 2. You may eat, drink and take your normal medicines. 3. For any questions that cannot be answered, please contact the ordering physician ***Please check-in at the [facility] located in [address] in the [facility] building. When you are finished registering, please go to [address] and have a seat. The technician will call your name for the test.            Jul 25, 2018  1:30 PM CDT   LAB with RU LAB   [facility] ([facility])    [address]  [facility] [id number] [phone]           Please do not eat 10-12 hours before your appointment if you are coming in fasting for labs on lipids, cholesterol, or glucose (sugar). This does not apply to pregnant women. Water, hot tea and black coffee  "(with nothing added) are okay. Do not drink other fluids, diet soda or chew gum.            Aug 16, 2018  9:30 AM CDT   Return Visit with Barak Hernandez MD   [facility] ([facility])    [address]  [facility] [address] [phone]              Who to contact     If you have questions or need follow up information about today's clinic visit or your schedule please contact [facility] directly at [phone].  Normal or non-critical lab and imaging results will be communicated to you by [employer], letter or phone within 4 business days after the clinic has received the results. If you do not hear from us within 7 days, please contact the clinic through [employer] or phone. If you have a critical or abnormal lab result, we will notify you by phone as soon as possible.  Submit refill requests through [employer]kea or call your pharmacy and they will forward the refill request to us. Please allow 3 business days for your refill to be completed.          Additional Information About Your Visit        [facility] Information     [employer]a lets you send messages to your doctor, view your test results, renew your prescriptions, schedule appointments and more. To sign up, go to [email or web address] . Click on \"Log in\" on the left side of the screen, which will take you to the Welcome page. Then click on \"Sign up Now\" on the right side of the page.     You will be asked to enter the access code listed below, as well as some personal information. Please follow the directions to create your username and password.     Your access code is: [email or web address]  Expires: 2018  3:24 PM     Your access code will  in 90 days. If you need help or a new code, please call your [facility] or [phone].        Care EveryWhere ID     This is your Care EveryWhere ID. This could be used by other organizations to access your [facility] medical " records  [id number]        Your Vitals Were     Pulse Temperature Pulse Oximetry BMI (Body Mass Index)          79 97.8  F (36.6  C) (Oral) 96% 25.24 kg/m2         Blood Pressure from Last 3 Encounters:   04/29/18 150/68   04/13/18 136/62   03/30/18 152/60    Weight from Last 3 Encounters:   04/29/18 138 lb (62.6 kg)   04/13/18 141 lb (64 kg)   03/30/18 140 lb 9.6 oz (63.8 kg)                 Today's Medication Changes          These changes are accurate as of 4/29/18  3:24 PM.  If you have any questions, ask your nurse or doctor.               Start taking these medicines.        Dose/Directions    cyclobenzaprine 5 MG tablet   Commonly known as:  FLEXERIL   Used for:  Hip pain, left   Started by:  Mel Aguilar MD        Dose:  5 mg   Take 1 tablet (5 mg) by mouth 3 times daily as needed for muscle spasms   Quantity:  30 tablet   Refills:  1            Where to get your medicines      Call your pharmacy to confirm that your medication is ready for pickup. It may take up to 24 hours for them to receive the prescription. If the prescription is not ready within 3 business days, please contact your clinic or your provider.     We will let you know when these medications are ready. If you don't hear back within 3 business days, please contact us.     cyclobenzaprine 5 MG tablet                Primary Care Provider Office Phone # Fax #    Layo Sevilla [phone]       [facility] [place] [address]        Equal Access to Services     [facility] AH: Hadii william frausto hadasho Soomaali, waaxda luqadaha, qaybta kaalmada adeegyada, waxay idichristy lange. So [facility] [phone].    ATENCIÓN: Si habla español, tiene a patel disposición servicios gratuitos de asistencia lingüística. Llame al [phone].    We comply with applicable federal civil rights laws and [place] laws. We do not discriminate on the basis of race, color, national origin, age, disability, sex,  sexual orientation, or gender identity.            Thank you!     Thank you for choosing [facility]  for your care. Our goal is always to provide you with excellent care. Hearing back from our patients is one way we can continue to improve our services. Please take a few minutes to complete the written survey that you may receive in the mail after your visit with us. Thank you!             Your Updated Medication List - Protect others around you: Learn how to safely use, store and throw away your medicines at www.disposemymeds.org.          This list is accurate as of 4/29/18  3:24 PM.  Always use your most recent med list.                   Brand Name Dispense Instructions for use Diagnosis    acetaminophen 500 MG tablet    TYLENOL     Take 500-1,000 mg by mouth every 8 hours as needed for mild pain        albuterol 108 (90 Base) MCG/ACT Inhaler    PROAIR HFA/PROVENTIL HFA/VENTOLIN HFA     Inhale 2 puffs into the lungs every 6 hours as needed        apixaban ANTICOAGULANT 2.5 MG tablet    ELIQUIS    60 tablet    Take 1 tablet (2.5 mg) by mouth 2 times daily    Atrial fibrillation, unspecified type (H)       azelastine 0.1 % spray    ASTELIN     Spray 2 sprays into both nostrils 2 times daily        CALCIUM CITRATE + 315-200 MG-UNIT Tabs per tablet   Generic drug:  calcium citrate-vitamin D      Take 2 tablets by mouth every evening        cyclobenzaprine 5 MG tablet    FLEXERIL    30 tablet    Take 1 tablet (5 mg) by mouth 3 times daily as needed for muscle spasms    Hip pain, left       fluticasone-salmeterol 230-21 MCG/ACT inhaler    ADVAIR-HFA    12 g    Inhale 2 puffs into the lungs 2 times daily        ipratropium 0.03 % spray    ATROVENT     Spray 2 sprays in nostril 2 times daily        losartan 100 MG tablet    COZAAR    90 tablet    Take 1 tablet (100 mg) by mouth At Bedtime    Essential hypertension with goal blood pressure less than 140/90       * metoprolol succinate 25 MG 24 hr tablet     TOPROL-XL    60 tablet    Take 2 tablets (50 mg) by mouth daily    Essential hypertension with goal blood pressure less than 140/90       * metoprolol succinate 50 MG 24 hr tablet    TOPROL XL    45 tablet    Take 1.5 tablets (75 mg) by mouth daily    Stress-induced cardiomyopathy, Atrial fibrillation, unspecified type (H), Essential hypertension with goal blood pressure less than 140/90       MONTELUKAST SODIUM PO      Take 10 mg by mouth every evening        omeprazole 20 MG CR capsule    priLOSEC    90 capsule    Take 1 capsule (20 mg) by mouth daily    Candidal esophagitis (H)       simvastatin 20 MG tablet    ZOCOR    45 tablet    take 1/2 tablet by mouth daily at bedtime    Hyperlipidemia LDL goal <130       traMADol 50 MG tablet    ULTRAM    20 tablet    Take 1 tablet (50 mg) by mouth every 6 hours as needed for severe pain    Hip pain, right       * Notice:  This list has 2 medication(s) that are the same as other medications prescribed for you. Read the directions carefully, and ask your doctor or other care provider to review them with you.

## 2018-04-29 NOTE — PROGRESS NOTES
S: Genie Persaud is a 82 year old female with c/o left hip pain for the past 1.5 weeks.  Denies any falls or injuries.  Does have a h/o right hip pain that improved/resolved with PT.  Has tried Ultram and Tylenol and not better.  Cannot take NSAIDs secondary to asthma.  Has also tried heat and ice with minimal benefit.  The pain is worse with walking.  No incontinence.  Saw a chiropractor recently for the left hip pain and they wanted us to check for spine compression fracture or arthritis.  She denies midline spine pain.     O: /68 (BP Location: Right arm, Patient Position: Chair, Cuff Size: Adult Regular)  Pulse 79  Temp 97.8  F (36.6  C) (Oral)  Wt 138 lb (62.6 kg)  SpO2 96%  BMI 25.24 kg/m2   Gen: NAD  Back: normal flexion and extension, no lumbrosacral spine tenderness, no SI joint tenderness, there is left sciatic notch and gluteus muscle spasm and tenderness, negative SLR bilaterally.  Left hip/leg: mildly limited internal and external rotation secondary to left buttock pain. No I-T band tenderness. No tenderness over the trochanter.  Does have some pain in left hamstring region against resistance.    Neuro: normal patellar and achilles reflexes.  Normal heel/toe walk.      Left hip and pelvis X-rays: mild DJD of both hips    A/P: Left hip pain  Suspect muscle spasm.  Continue icing and heat packs prn. Flexeril as per orders.  Continue Tylenol prn.  Recommend continue PT stretches  Epsom salt baths or topical saves prn.  See PCP for further cares or if not improving as anticipated.     Mel Hoang MD

## 2018-04-30 NOTE — TELEPHONE ENCOUNTER
Pt was actually seen in UC and given medication to help, does not need referral at this time.     However, pt inquiring about mammogram and dexa scan. Will route to Dr. Sevilla to enter orders - pt will call back to schedule.     Denies any breast pain or lumps so can have done in [place].    Janina Szymanski MA   April 30, 2018,  10:22 AM

## 2018-05-01 ENCOUNTER — OFFICE VISIT (OUTPATIENT)
Dept: PEDIATRICS | Facility: CLINIC | Age: 83
End: 2018-05-01
Payer: COMMERCIAL

## 2018-05-01 VITALS
BODY MASS INDEX: 25.21 KG/M2 | HEIGHT: 62 IN | SYSTOLIC BLOOD PRESSURE: 122 MMHG | WEIGHT: 137 LBS | TEMPERATURE: 97.6 F | DIASTOLIC BLOOD PRESSURE: 56 MMHG | HEART RATE: 80 BPM | OXYGEN SATURATION: 93 %

## 2018-05-01 DIAGNOSIS — G57.02 PYRIFORMIS SYNDROME, LEFT: Primary | ICD-10-CM

## 2018-05-01 DIAGNOSIS — M54.42 ACUTE LEFT-SIDED LOW BACK PAIN WITH LEFT-SIDED SCIATICA: ICD-10-CM

## 2018-05-01 PROCEDURE — 99214 OFFICE O/P EST MOD 30 MIN: CPT | Performed by: INTERNAL MEDICINE

## 2018-05-01 RX ORDER — HYDROCODONE BITARTRATE AND ACETAMINOPHEN 5; 325 MG/1; MG/1
1 TABLET ORAL EVERY 6 HOURS PRN
Qty: 12 TABLET | Refills: 0 | Status: SHIPPED | OUTPATIENT
Start: 2018-05-01 | End: 2018-08-01

## 2018-05-01 NOTE — PROGRESS NOTES
"  SUBJECTIVE:   Genie Persaud is a 82 year old female who presents to clinic today for the following health issues:      ED/UC Followup:    Facility:  [facility]  Date of visit: 4/29/18  Reason for visit: Left hip pain. Recommended Flexeril, Tylenol, PT stretches, continue ice and heat packs.   Current Status: Pt notices no improvement in sx.          Seen in march for right sided hip pain.  Seen by physical therapy, and right sided improved.  (Tramadol did not help)    Now about 2 weeks ago, noted left side of hip is painful.  Left buttock and posterior thigh.  Can sleep well, but notes that while standing is worse. Is \"tired of the pain\".      Went to a chiropracter, and they did not help much.  Seen at urgent care, and given a muscle relaxer. Did not work.  Tried some ice and heat.   Xray:     MPRESSION: Mild degenerative changes in both hips. No fractures are seen.    Problem list and histories reviewed & adjusted, as indicated.  Additional history: as documented    Patient Active Problem List   Diagnosis     Swelling, mass, or lump in head and neck     Pulmonary nodule     Anemia     Vasculitis (H)     HYPERLIPIDEMIA LDL GOAL <130     Impaired fasting glucose     Candidal esophagitis (H)     Thoracic aortic aneurysm without rupture (H)     Health Care Home     Advanced directives, counseling/discussion     Stroke (H)     Moderate persistent asthma without complication     Essential hypertension with goal blood pressure less than 140/90     Coronary artery disease involving native coronary artery of native heart without angina pectoris     SVT -noted during Cath procedure     Nonrheumatic aortic valve insufficiency     Stress-induced cardiomyopathy     Paroxysmal atrial fibrillation (H)     Peripheral edema     Hip pain, right     Lumbago     Past Surgical History:   Procedure Laterality Date     C APPENDECTOMY  1957     C LIGATE FALLOPIAN TUBE  1971     C STEREOTACTIC BREAST BIOPSY  2001     " CHOLECYSTECTOMY, LAPOROSCOPIC  2008    Cholecystectomy, Laparoscopic     ESOPHAGOSCOPY, GASTROSCOPY, DUODENOSCOPY (EGD), COMBINED  5/17/2013    Procedure: COMBINED ESOPHAGOSCOPY, GASTROSCOPY, DUODENOSCOPY (EGD), BIOPSY SINGLE OR MULTIPLE;  ESOPHAGOSCOPY, GASTROSCOPY, DUODENOSCOPY (EGD)  with bx;  Surgeon: Jeffery Yanez MD;  Location: [facility]     HC DILATION/CURETTAGE DIAG/THER NON OB  1967,1971     HC REMOVE TONSILS/ADENOIDS,<13 Y/O         Social History   Substance Use Topics     Smoking status: Never Smoker     Smokeless tobacco: Never Used     Alcohol use 0.6 - 1.2 oz/week     1 - 2 Standard drinks or equivalent per week      Comment: 1 glass of wine 1-2 days per week     Family History   Problem Relation Age of Onset     Hypertension Mother      OSTEOPOROSIS Mother      C.A.D. Mother      Connective Tissue Disorder Father      scleraderma     CEREBROVASCULAR DISEASE Paternal Grandmother      Prostate Cancer Other      9/05 passed away due to complications     Breast Cancer Child      youngest dtr         Current Outpatient Prescriptions   Medication Sig Dispense Refill     acetaminophen (TYLENOL) 500 MG tablet Take 500-1,000 mg by mouth every 8 hours as needed for mild pain       albuterol (PROAIR HFA, PROVENTIL HFA, VENTOLIN HFA) 108 (90 BASE) MCG/ACT inhaler Inhale 2 puffs into the lungs every 6 hours as needed        apixaban ANTICOAGULANT (ELIQUIS) 2.5 MG tablet Take 1 tablet (2.5 mg) by mouth 2 times daily 60 tablet 3     azelastine (ASTELIN) 0.1 % spray Spray 2 sprays into both nostrils 2 times daily       calcium citrate-vitamin D (CALCIUM CITRATE +) 315-200 MG-UNIT TABS Take 2 tablets by mouth every evening        cyclobenzaprine (FLEXERIL) 5 MG tablet Take 1 tablet (5 mg) by mouth 3 times daily as needed for muscle spasms 30 tablet 1     fluticasone-salmeterol (ADVAIR-HFA) 230-21 MCG/ACT inhaler Inhale 2 puffs into the lungs 2 times daily 12 g 1     HYDROcodone-acetaminophen (NORCO) 5-325 MG per  "tablet Take 1 tablet by mouth every 6 hours as needed for severe pain maximum 6 tablet(s) per day 12 tablet 0     ipratropium (ATROVENT) 0.03 % spray Spray 2 sprays in nostril 2 times daily       losartan (COZAAR) 100 MG tablet Take 1 tablet (100 mg) by mouth At Bedtime 90 tablet 0     metoprolol (TOPROL-XL) 25 MG 24 hr tablet Take 2 tablets (50 mg) by mouth daily 60 tablet 11     metoprolol succinate (TOPROL XL) 50 MG 24 hr tablet Take 1.5 tablets (75 mg) by mouth daily 45 tablet 3     MONTELUKAST SODIUM PO Take 10 mg by mouth every evening        omeprazole (PRILOSEC) 20 MG CR capsule Take 1 capsule (20 mg) by mouth daily 90 capsule 1     simvastatin (ZOCOR) 20 MG tablet take 1/2 tablet by mouth daily at bedtime 45 tablet 2     traMADol (ULTRAM) 50 MG tablet Take 1 tablet (50 mg) by mouth every 6 hours as needed for severe pain 20 tablet 0     Allergies   Allergen Reactions     Fosamax [Alendronic Acid] GI Disturbance     Augmentin [Amoxicillin-Pot Clavulanate] Diarrhea     Diarrhea and rash     Evista [Raloxifene]      abd symptoms.      Flu Virus Vaccine      swollen and red at inj site     BP Readings from Last 3 Encounters:   05/01/18 122/56   04/29/18 150/68   04/13/18 136/62    Wt Readings from Last 3 Encounters:   05/01/18 137 lb (62.1 kg)   04/29/18 138 lb (62.6 kg)   04/13/18 141 lb (64 kg)                  Labs reviewed in EPIC    Reviewed and updated as needed this visit by clinical staff       Reviewed and updated as needed this visit by Provider         ROS:  CONSTITUTIONAL: NEGATIVE for fever, chills, change in weight  ENT/MOUTH: NEGATIVE for ear, mouth and throat problems  RESP: NEGATIVE for significant cough or SOB  CV: NEGATIVE for chest pain, palpitations or peripheral edema    OBJECTIVE:                                                    /56 (BP Location: Right arm, Cuff Size: Adult Regular)  Pulse 80  Temp 97.6  F (36.4  C) (Oral)  Ht 5' 2\" (1.575 m)  Wt 137 lb (62.1 kg)  SpO2 93%  " BMI 25.06 kg/m2  Body mass index is 25.06 kg/(m^2).   GENERAL: healthy, alert, well nourished, well hydrated, no distress  HENT: ear canals- normal; TMs- normal; Nose- normal; Mouth- no ulcers, no lesions  NECK: no tenderness, no adenopathy, no asymmetry, no masses, no stiffness; thyroid- normal to palpation  RESP: lungs clear to auscultation - no rales, no rhonchi, no wheezes  CV: regular rates and rhythm, normal S1 S2, no S3 or S4 and no murmur, no click or rub -  ABDOMEN: soft, no tenderness, no  hepatosplenomegaly, no masses, normal bowel sounds  MS: pain in left mid buttock, with radiaiton to mid upper posterior thigh.      Diagnostic test results:  Diagnostic Test Results:  none      ASSESSMENT/PLAN:                                                    1. Pyriformis syndrome, left  I am uncertain if she has pyriformis syndrome or sciatica with high extension.  Failed tramadol last month for similar symptoms on right; begin as needed hydrocodone/acetaminophen; Informed consent given regarding risks and benefits.  Counselled regarding safe use of machinery, vehicles, and work while taking medicine, as it may alter concentration.   Follow up with orthopedics for additional treatment options re: possible treatment of pyriformis versus need for MRI and exploration of sciatica question.     - ORTHO  REFERRAL  - HYDROcodone-acetaminophen (NORCO) 5-325 MG per tablet; Take 1 tablet by mouth every 6 hours as needed for severe pain maximum 6 tablet(s) per day  Dispense: 12 tablet; Refill: 0    2. Acute left-sided low back pain with left-sided sciatica  As above      See Patient Instructions    Layo Sevilla MD  [facility]

## 2018-05-01 NOTE — PATIENT INSTRUCTIONS
May stop the flexeril (cyclobenzaprine).    May try hydrocodone/acetaminophen as needed; watch out for dizziness and drowsiness. No driving.    Follow up with orthopedics ; they will call you.    Layo Sevilla MD  Internal Medicine and Pediatrics

## 2018-05-01 NOTE — MR AVS SNAPSHOT
After Visit Summary   5/1/2018    Genie Persaud    MRN: [id number]           Patient Information     Date Of Birth          1935        Visit Information        Provider Department      5/1/2018 3:00 PM Layo Sevilla MD [facility]        Today's Diagnoses     Pyriformis syndrome, left    -  1    Acute left-sided low back pain with left-sided sciatica          Care Instructions    May stop the flexeril (cyclobenzaprine).    May try hydrocodone/acetaminophen as needed; watch out for dizziness and drowsiness. No driving.    Follow up with orthopedics ; they will call you.    Layo Sevilla MD  Internal Medicine and Pediatrics             Follow-ups after your visit        Additional Services     ORTHO  REFERRAL       [facility] is referring you to the Orthopedic  Services at [facility].       The  Representative will assist you in the coordination of your Orthopedic and Musculoskeletal Care as prescribed by your physician.    The  Representative will call you within 1 business day to help schedule your appointment, or you may contact the  Representative at:    All areas ~ [phone]     Type of Referral : Surgical / Specialist  For left buttock and back pain; either pyriformis or sciatica.       Timeframe requested: 1 - 2 days    Coverage of these services is subject to the terms and limitations of your health insurance plan.  Please call member services at your health plan with any benefit or coverage questions.      If X-rays, CT or MRI's have been performed, please contact the facility where they were done to arrange for , prior to your scheduled appointment.  Please bring this referral request to your appointment and present it to your specialist.                  Follow-up notes from your care team     Return in about 4 weeks (around 5/29/2018) for Medical Check Up.      Your next 10  appointments already scheduled     Jul 25, 2018 10:00 AM CDT   CT CHEST ANGIO W/O & W CONTRAST with RSCCCT1   [facility] ([facility])    [address]  [place] [id number] [phone]           Please bring any scans or X-rays taken at other hospitals, if similar tests were done. Also bring a list of your medicines, including vitamins, minerals and over-the-counter drugs. It is safest to leave personal items at home.  Be sure to tell your doctor:   If you have any allergies.   If there s any chance you are pregnant.   If you are breastfeeding.    If you have diabetes as your medication may need to be adjusted for this exam.  You will have contrast for this exam. To prepare:   Do not eat or drink for 2 hours before your exam. If you need to take medicine, you may take it with small sips of water. (We may ask you to take liquid medicine as well.)   The day before your exam, drink extra fluids at least six 8-ounce glasses (unless your doctor tells you to restrict your fluids).  Patients over 70 or patients with diabetes or kidney problems:   If you haven t had a blood test (creatinine test) within the last 30 days, the Cardiologist/Radiologist may require you to get this test prior to your exam.  Please wear loose clothing, such as a sweat suit or jogging clothes. Avoid snaps, zippers and other metal. We may ask you to undress and put on a hospital gown.  If you have any questions, please call the Imaging Department where you will have your exam.            Jul 25, 2018 12:30 PM CDT   Ech Complete with RSCCECHO1   [facility] ([facility])    [address]  [place] [id number] [phone]           1. Please bring or wear a comfortable two-piece outfit. 2. You may eat, drink and take your normal medicines. 3. For any questions that cannot be answered, please contact the ordering  physician ***Please check-in at the [facility] Registration Office located in [address] in the [facility] building. When you are finished registering, please go to [address] and have a seat. The technician will call your name for the test.            Jul 25, 2018  1:30 PM CDT   LAB with RU LAB   [facility]    [address]  [place] [address]   [phone]           Please do not eat 10-12 hours before your appointment if you are coming in fasting for labs on lipids, cholesterol, or glucose (sugar). This does not apply to pregnant women. Water, hot tea and black coffee (with nothing added) are okay. Do not drink other fluids, diet soda or chew gum.            Aug 16, 2018  9:30 AM CDT   Return Visit with Barak Hernandez MD   [facility] ([facility])    [address]  [place] [address] [phone]              Who to contact     If you have questions or need follow up information about today's clinic visit or your schedule please contact [facility] MIC[facility]EL[facility] directly at [phone].  Normal or non-critical lab and imaging results will be communicated to you by MyChart, letter or phone within 4 business days after the clinic has received the results. If you do not hear from us within 7 days, please contact the clinic through MyChart or phone. If you have a critical or abnormal lab result, we will notify you by phone as soon as possible.  Submit refill requests through [employer] or call your pharmacy and they will forward the refill request to us. Please allow 3 business days for your refill to be completed.          Additional Information About Your Visit        [employer] Information     [employer] lets you send messages to your doctor, view your test results, renew your prescriptions, schedule appointments and more. To sign up, go to  [email or web address] . Click on \"Log in\" on the left side of the screen, which will take you to the Welcome page. Then click on \"Sign up Now\" on the right side of the page.     You will be asked to enter the access code listed below, as well as some personal information. Please follow the directions to create your username and password.     Your access code is: [email or web address]  Expires: 2018  3:24 PM     Your access code will  in 90 days. If you need help or a new code, please call your [place] clinic or [phone].        Care EveryWhere ID     This is your Care EveryWhere ID. This could be used by other organizations to access your [place] medical records  [id number]        Your Vitals Were     Pulse Temperature Height Pulse Oximetry BMI (Body Mass Index)       80 97.6  F (36.4  C) (Oral) 5' 2\" (1.575 m) 93% 25.06 kg/m2        Blood Pressure from Last 3 Encounters:   18 122/56   18 150/68   18 136/62    Weight from Last 3 Encounters:   18 137 lb (62.1 kg)   18 138 lb (62.6 kg)   18 141 lb (64 kg)              We Performed the Following     ORTHO  REFERRAL          Today's Medication Changes          These changes are accurate as of 18  3:12 PM.  If you have any questions, ask your nurse or doctor.               Start taking these medicines.        Dose/Directions    HYDROcodone-acetaminophen 5-325 MG per tablet   Commonly known as:  NORCO   Used for:  Pyriformis syndrome, left   Started by:  Layo Sevilla MD        Dose:  1 tablet   Take 1 tablet by mouth every 6 hours as needed for severe pain maximum 6 tablet(s) per day   Quantity:  12 tablet   Refills:  0            Where to get your medicines      Some of these will need a paper prescription and others can be bought over the counter.  Ask your nurse if you have questions.     Bring a paper prescription for each of these medications     HYDROcodone-acetaminophen 5-325 MG per tablet             "   Information about OPIOIDS     PRESCRIPTION OPIOIDS: WHAT YOU NEED TO KNOW   You have a prescription for an opioid (narcotic) pain medicine. Opioids can cause addiction. If you have a history of chemical dependency of any type, you are at a higher risk of becoming addicted to opioids. Only take this medicine after all other options have been tried. Take it for as short a time and as few doses as possible.     Do not:    Drive. If you drive while taking these medicines, you could be arrested for driving under the influence (DUI).    Operate heavy machinery    Do any other dangerous activities while taking these medicines.     Drink any alcohol while taking these medicines.      Take with any other medicines that contain acetaminophen. Read all labels carefully. Look for the word  acetaminophen  or  Tylenol.  Ask your pharmacist if you have questions or are unsure.    Store your pills in a secure place, locked if possible. We will not replace any lost or stolen medicine. If you don t finish your medicine, please throw away (dispose) as directed by your pharmacist. The [place] Pollution Control Agency has more information about safe disposal: https://www.pca.[email or web address].[place].[email or web address]/living-green/managing-unwanted-medications    All opioids tend to cause constipation. Drink plenty of water and eat foods that have a lot of fiber, such as fruits, vegetables, prune juice, apple juice and high-fiber cereal. Take a laxative (Miralax, milk of magnesia, Colace, Senna) if you don t move your bowels at least every other day.          Primary Care Provider Office Phone # Fax #    Layo Sevilla [phone]       [facility] [facility] [place] [address]        Equal Access to Services     [facility] AH: Arelis menjivar Soawais, waaxda luqadaha, qaybta kaalmada wolfgang, ora lange. [facility] [phone].    ATENCIÓN: Si habla español, tiene a patel disposición servicios gratuitos de  blancawilly lingüísticwilly. Sylvie al [phone].    We comply with applicable federal civil rights laws and [place] laws. We do not discriminate on the basis of race, color, national origin, age, disability, sex, sexual orientation, or gender identity.            Thank you!     Thank you for choosing [facility]  for your care. Our goal is always to provide you with excellent care. Hearing back from our patients is one way we can continue to improve our services. Please take a few minutes to complete the written survey that you may receive in the mail after your visit with us. Thank you!             Your Updated Medication List - Protect others around you: Learn how to safely use, store and throw away your medicines at www.disposemymeds.org.          This list is accurate as of 5/1/18  3:12 PM.  Always use your most recent med list.                   Brand Name Dispense Instructions for use Diagnosis    acetaminophen 500 MG tablet    TYLENOL     Take 500-1,000 mg by mouth every 8 hours as needed for mild pain        albuterol 108 (90 Base) MCG/ACT Inhaler    PROAIR HFA/PROVENTIL HFA/VENTOLIN HFA     Inhale 2 puffs into the lungs every 6 hours as needed        apixaban ANTICOAGULANT 2.5 MG tablet    ELIQUIS    60 tablet    Take 1 tablet (2.5 mg) by mouth 2 times daily    Atrial fibrillation, unspecified type (H)       azelastine 0.1 % spray    ASTELIN     Spray 2 sprays into both nostrils 2 times daily        CALCIUM CITRATE + 315-200 MG-UNIT Tabs per tablet   Generic drug:  calcium citrate-vitamin D      Take 2 tablets by mouth every evening        cyclobenzaprine 5 MG tablet    FLEXERIL    30 tablet    Take 1 tablet (5 mg) by mouth 3 times daily as needed for muscle spasms    Hip pain, left       fluticasone-salmeterol 230-21 MCG/ACT inhaler    ADVAIR-HFA    12 g    Inhale 2 puffs into the lungs 2 times daily        HYDROcodone-acetaminophen 5-325 MG per tablet    NORCO    12 tablet    Take 1 tablet by  mouth every 6 hours as needed for severe pain maximum 6 tablet(s) per day    Pyriformis syndrome, left       ipratropium 0.03 % spray    ATROVENT     Spray 2 sprays in nostril 2 times daily        losartan 100 MG tablet    COZAAR    90 tablet    Take 1 tablet (100 mg) by mouth At Bedtime    Essential hypertension with goal blood pressure less than 140/90       * metoprolol succinate 25 MG 24 hr tablet    TOPROL-XL    60 tablet    Take 2 tablets (50 mg) by mouth daily    Essential hypertension with goal blood pressure less than 140/90       * metoprolol succinate 50 MG 24 hr tablet    TOPROL XL    45 tablet    Take 1.5 tablets (75 mg) by mouth daily    Stress-induced cardiomyopathy, Atrial fibrillation, unspecified type (H), Essential hypertension with goal blood pressure less than 140/90       MONTELUKAST SODIUM PO      Take 10 mg by mouth every evening        omeprazole 20 MG CR capsule    priLOSEC    90 capsule    Take 1 capsule (20 mg) by mouth daily    Candidal esophagitis (H)       simvastatin 20 MG tablet    ZOCOR    45 tablet    take 1/2 tablet by mouth daily at bedtime    Hyperlipidemia LDL goal <130       traMADol 50 MG tablet    ULTRAM    20 tablet    Take 1 tablet (50 mg) by mouth every 6 hours as needed for severe pain    Hip pain, right       * Notice:  This list has 2 medication(s) that are the same as other medications prescribed for you. Read the directions carefully, and ask your doctor or other care provider to review them with you.

## 2018-05-02 ASSESSMENT — ASTHMA QUESTIONNAIRES: ACT_TOTALSCORE: 20

## 2018-05-03 ENCOUNTER — RADIANT APPOINTMENT (OUTPATIENT)
Dept: GENERAL RADIOLOGY | Facility: CLINIC | Age: 83
End: 2018-05-03
Attending: ANESTHESIOLOGY
Payer: COMMERCIAL

## 2018-05-03 ENCOUNTER — TELEPHONE (OUTPATIENT)
Dept: PALLIATIVE MEDICINE | Facility: CLINIC | Age: 83
End: 2018-05-03

## 2018-05-03 ENCOUNTER — OFFICE VISIT (OUTPATIENT)
Dept: NEUROSURGERY | Facility: CLINIC | Age: 83
End: 2018-05-03
Attending: NURSE PRACTITIONER
Payer: COMMERCIAL

## 2018-05-03 ENCOUNTER — RADIOLOGY INJECTION OFFICE VISIT (OUTPATIENT)
Dept: PALLIATIVE MEDICINE | Facility: CLINIC | Age: 83
End: 2018-05-03
Payer: COMMERCIAL

## 2018-05-03 VITALS — DIASTOLIC BLOOD PRESSURE: 47 MMHG | HEART RATE: 70 BPM | OXYGEN SATURATION: 94 % | SYSTOLIC BLOOD PRESSURE: 164 MMHG

## 2018-05-03 VITALS
HEART RATE: 70 BPM | WEIGHT: 137 LBS | DIASTOLIC BLOOD PRESSURE: 48 MMHG | BODY MASS INDEX: 25.21 KG/M2 | HEIGHT: 62 IN | OXYGEN SATURATION: 94 % | SYSTOLIC BLOOD PRESSURE: 148 MMHG

## 2018-05-03 DIAGNOSIS — M79.18 MYOFASCIAL PAIN: Primary | ICD-10-CM

## 2018-05-03 DIAGNOSIS — G57.02 PIRIFORMIS SYNDROME OF LEFT SIDE: Primary | ICD-10-CM

## 2018-05-03 DIAGNOSIS — G57.02 PIRIFORMIS SYNDROME OF LEFT SIDE: ICD-10-CM

## 2018-05-03 PROCEDURE — 20552 NJX 1/MLT TRIGGER POINT 1/2: CPT | Performed by: ANESTHESIOLOGY

## 2018-05-03 PROCEDURE — G0463 HOSPITAL OUTPT CLINIC VISIT: HCPCS | Performed by: NURSE PRACTITIONER

## 2018-05-03 PROCEDURE — 99204 OFFICE O/P NEW MOD 45 MIN: CPT | Performed by: NURSE PRACTITIONER

## 2018-05-03 RX ORDER — METHYLPREDNISOLONE 4 MG
TABLET, DOSE PACK ORAL
Qty: 21 TABLET | Refills: 0 | Status: SHIPPED | OUTPATIENT
Start: 2018-05-03 | End: 2018-08-01

## 2018-05-03 ASSESSMENT — PAIN SCALES - GENERAL: PAINLEVEL: EXTREME PAIN (8)

## 2018-05-03 NOTE — MR AVS SNAPSHOT
After Visit Summary   5/3/2018    Genie Persaud    MRN: [id number]           Patient Information     Date Of Birth          1935        Visit Information        Provider Department      5/3/2018 10:20 AM Genie Alicea APRN CNP [facility]        Today's Diagnoses     Piriformis syndrome of left side    -  1      Care Instructions    1.  Please schedule your injection. Someone will contact you from the pain clinic within 24 hours to schedule.        2.  Medrol dose pack for inflammation     3. Please contact the clinic if pain persists at [phone].   Would recommend chiropractic at [facility] with Dr. Rosa Butts            Follow-ups after your visit        Additional Services     PAIN MANAGEMENT REFERRAL       Your provider has referred you to[place] [facility] [place] [facility] -    Reason for Referral: Procedure Order left piriformis injection     What is your diagnosis for the patient's pain? Left piriformis pain       For any questions, contact the [facility] at [phone].     **ANY DIAGNOSTIC TESTS THAT ARE NOT IN EPIC SHOULD BE SENT TO THE PAIN CENTER**    REGARDING OPIOID MEDICATIONS:  The discussion of opioids management, appropriateness of therapy, and dosing will be discussed in patients being seen for evaluation.  The pain management clinics are not long-term prescribing clinics, with transition of prescribing of medications ultimately going back to the referring provider/PCP.  If prescribing is taken over at the pain clinic, it is in actively involved patients whom are appropriate for opioids, urine drug screening is completed, and long-term prescribing plan has been determined.  Therefore, we will not be automatically taking over prescribing at the patient's first visit.  Is this agreeable to you? agrees.     Please be aware that coverage of these services is subject to the terms and limitations of your health insurance plan.   Call member services at your health plan with any benefit or coverage questions.      Please bring the following with you to your appointment:    (1) Any X-Rays, CTs or MRIs which have been performed.  Contact the facility where they were done to arrange for  prior to your scheduled appointment.    (2) List of current medications   (3) This referral request   (4) Any documents/labs given to you for this referral                  Your next 10 appointments already scheduled     Jul 25, 2018 10:00 AM CDT   CT CHEST ANGIO W/O & W CONTRAST with [address] [facility] [place] ([facility])    [address]  [facility] [address] [phone]           Please bring any scans or X-rays taken at other hospitals, if similar tests were done. Also bring a list of your medicines, including vitamins, minerals and over-the-counter drugs. It is safest to leave personal items at home.  Be sure to tell your doctor:   If you have any allergies.   If there s any chance you are pregnant.   If you are breastfeeding.    If you have diabetes as your medication may need to be adjusted for this exam.  You will have contrast for this exam. To prepare:   Do not eat or drink for 2 hours before your exam. If you need to take medicine, you may take it with small sips of water. (We may ask you to take liquid medicine as well.)   The day before your exam, drink extra fluids at least six 8-ounce glasses (unless your doctor tells you to restrict your fluids).  Patients over 70 or patients with diabetes or kidney problems:   If you haven t had a blood test (creatinine test) within the last 30 days, the Cardiologist/Radiologist may require you to get this test prior to your exam.  Please wear loose clothing, such as a sweat suit or jogging clothes. Avoid snaps, zippers and other metal. We may ask you to undress and put on a hospital gown.  If you have any questions, please call the Imaging  Department where you will have your exam.            Jul 25, 2018 12:30 PM CDT   Ech Complete with [id number]   [facility] ([facility])    [address]  [facility] [address] [phone]           1. Please bring or wear a comfortable two-piece outfit. 2. You may eat, drink and take your normal medicines. 3. For any questions that cannot be answered, please contact the ordering physician ***Please check-in at the [facility] located in [address] in the [facility] building. When you are finished registering, please go to [address] and have a seat. The technician will call your name for the test.            Jul 25, 2018  1:30 PM CDT   LAB with RU LAB   [facility] ([facility])    [address]  [facility] [address] [phone]           Please do not eat 10-12 hours before your appointment if you are coming in fasting for labs on lipids, cholesterol, or glucose (sugar). This does not apply to pregnant women. Water, hot tea and black coffee (with nothing added) are okay. Do not drink other fluids, diet soda or chew gum.            Aug 16, 2018  9:30 AM CDT   Return Visit with Barak Hernandez MD   [facility] ([facility])    [address]  [facility] [id number] [phone]              Who to contact     If you have questions or need follow up information about today's clinic visit or your schedule please contact [facility] directly at [phone].  Normal or non-critical lab and imaging results will be communicated to you by MyChart, letter or phone within 4 business days after the clinic has received the results. If you do not hear from us within 7 days, please contact the clinic through MyChart or phone. If you have a critical or abnormal lab result, we  "will notify you by phone as soon as possible.  Submit refill requests through [employer] or call your pharmacy and they will forward the refill request to us. Please allow 3 business days for your refill to be completed.          Additional Information About Your Visit        [facility] Information     [employer] lets you send messages to your doctor, view your test results, renew your prescriptions, schedule appointments and more. To sign up, go to [email or web address] . Click on \"Log in\" on the left side of the screen, which will take you to the Welcome page. Then click on \"Sign up Now\" on the right side of the page.     You will be asked to enter the access code listed below, as well as some personal information. Please follow the directions to create your username and password.     Your access code is: [email or web address]  Expires: 2018  3:24 PM     Your access code will  in 90 days. If you need help or a new code, please call your [facility] clinic or [phone].        Care EveryWhere ID     This is your Care EveryWhere ID. This could be used by other organizations to access your [facility] medical records  [id number]        Your Vitals Were     Pulse Height Pulse Oximetry BMI (Body Mass Index)          70 5' 2\" (1.575 m) 94% 25.06 kg/m2         Blood Pressure from Last 3 Encounters:   18 148/48   18 122/56   18 150/68    Weight from Last 3 Encounters:   18 137 lb (62.1 kg)   18 137 lb (62.1 kg)   18 138 lb (62.6 kg)              We Performed the Following     PAIN MANAGEMENT REFERRAL          Today's Medication Changes          These changes are accurate as of 5/3/18 10:29 AM.  If you have any questions, ask your nurse or doctor.               Start taking these medicines.        Dose/Directions    methylPREDNISolone 4 MG tablet   Commonly known as:  MEDROL DOSEPAK   Used for:  Piriformis syndrome of left side   Started by:  Genie Alicea APRN CNP        Follow package " instructions   Quantity:  21 tablet   Refills:  0            Where to get your medicines      These medications were sent to [id number] - [place], [place] - [address]  [address], [place] [address]     Phone:  [phone]     methylPREDNISolone 4 MG tablet                Primary Care Provider Office Phone # Fax #    Layo Sevilla [phone]       [facility] [place] [address]        Equal Access to Services     [facility]: Hadii aad ku hadasho Soomaali, waaxda luqadaha, qaybta kaalmada adeegyada, waxay idiin hayaan adeeg stephaniejoneljuliana meyer . So [facility] [phone].    ATENCIÓN: Si adonay powell, tiene a patel disposición servicios gratuitos de asistencia lingüística. [facility] [phone].    We comply with applicable federal civil rights laws and [place] laws. We do not discriminate on the basis of race, color, national origin, age, disability, sex, sexual orientation, or gender identity.            Thank you!     Thank you for choosing [facility]  for your care. Our goal is always to provide you with excellent care. Hearing back from our patients is one way we can continue to improve our services. Please take a few minutes to complete the written survey that you may receive in the mail after your visit with us. Thank you!             Your Updated Medication List - Protect others around you: Learn how to safely use, store and throw away your medicines at www.disposemymeds.org.          This list is accurate as of 5/3/18 10:29 AM.  Always use your most recent med list.                   Brand Name Dispense Instructions for use Diagnosis    acetaminophen 500 MG tablet    TYLENOL     Take 500-1,000 mg by mouth every 8 hours as needed for mild pain        albuterol 108 (90 Base) MCG/ACT Inhaler    PROAIR HFA/PROVENTIL HFA/VENTOLIN HFA     Inhale 2 puffs into the lungs every 6 hours as needed        apixaban ANTICOAGULANT 2.5 MG tablet    ELIQUIS     60 tablet    Take 1 tablet (2.5 mg) by mouth 2 times daily    Atrial fibrillation, unspecified type (H)       azelastine 0.1 % spray    ASTELIN     Spray 2 sprays into both nostrils 2 times daily        CALCIUM CITRATE + 315-200 MG-UNIT Tabs per tablet   Generic drug:  calcium citrate-vitamin D      Take 2 tablets by mouth every evening        cyclobenzaprine 5 MG tablet    FLEXERIL    30 tablet    Take 1 tablet (5 mg) by mouth 3 times daily as needed for muscle spasms    Hip pain, left       fluticasone-salmeterol 230-21 MCG/ACT inhaler    ADVAIR-HFA    12 g    Inhale 2 puffs into the lungs 2 times daily        HYDROcodone-acetaminophen 5-325 MG per tablet    NORCO    12 tablet    Take 1 tablet by mouth every 6 hours as needed for severe pain maximum 6 tablet(s) per day    Pyriformis syndrome, left       ipratropium 0.03 % spray    ATROVENT     Spray 2 sprays in nostril 2 times daily        losartan 100 MG tablet    COZAAR    90 tablet    Take 1 tablet (100 mg) by mouth At Bedtime    Essential hypertension with goal blood pressure less than 140/90       methylPREDNISolone 4 MG tablet    MEDROL DOSEPAK    21 tablet    Follow package instructions    Piriformis syndrome of left side       * metoprolol succinate 25 MG 24 hr tablet    TOPROL-XL    60 tablet    Take 2 tablets (50 mg) by mouth daily    Essential hypertension with goal blood pressure less than 140/90       * metoprolol succinate 50 MG 24 hr tablet    TOPROL XL    45 tablet    Take 1.5 tablets (75 mg) by mouth daily    Stress-induced cardiomyopathy, Atrial fibrillation, unspecified type (H), Essential hypertension with goal blood pressure less than 140/90       MONTELUKAST SODIUM PO      Take 10 mg by mouth every evening        omeprazole 20 MG CR capsule    priLOSEC    90 capsule    Take 1 capsule (20 mg) by mouth daily    Candidal esophagitis (H)       simvastatin 20 MG tablet    ZOCOR    45 tablet    take 1/2 tablet by mouth daily at bedtime     Hyperlipidemia LDL goal <130       traMADol 50 MG tablet    ULTRAM    20 tablet    Take 1 tablet (50 mg) by mouth every 6 hours as needed for severe pain    Hip pain, right       * Notice:  This list has 2 medication(s) that are the same as other medications prescribed for you. Read the directions carefully, and ask your doctor or other care provider to review them with you.

## 2018-05-03 NOTE — TELEPHONE ENCOUNTER
Genie Alicea, NATHALY, walked to the Pain Clinic after seeing this patient and asked if a piriformis injection could be completed this morning. She had already entered the order:    Your provider has referred you [place]: [facility] [place] -    Reason for Referral: Procedure Order left piriformis injection     What is your diagnosis for the patient's pain? Left piriformis pain   -----------  Dr. Swenson was informed of this request and that the patient is on a blood thinner.  There was an opening on the schedule and it was agreed that the injection could be completed.     Pt was scheduled for 11:15 am with Dr. Swenson by a  staff member. The  was notified that they did not need to process the order in the scheduling que since it had already been done.     SONDRA RamanN, RN-BC  Patient Care Supervisor/Care Coordinator  [facility]

## 2018-05-03 NOTE — NURSING NOTE
Discharge Information    IV Discontiued Time:  NA    Amount of Fluid Infused:  NA    Discharge Criteria = When patient returns to baseline or as per MD order    Consciousness:  Pt is fully awake    Circulation:  BP +/- 20% of pre-procedure level    Respiration:  Patient is able to breathe deeply    O2 Sat:  Patient is able to maintain O2 Sat >92% on room air    Activity:  Moves 4 extremities on command    Ambulation:  Patient is able to stand and walk or stand and pivot into wheelchair    Dressing:  Clean/dry or No Dressing    Notes:   Discharge instructions and AVS given to patient    Patient meets criteria for discharge?  YES    Admitted to PCU?  No    Responsible adult present to accompany patient home?  Yes    Signature/Title:    Magda Jones  RN Care Coordinator  [facility]

## 2018-05-03 NOTE — MR AVS SNAPSHOT
After Visit Summary   5/3/2018    Genie Persaud    MRN: [id number]           Patient Information     Date Of Birth          1935        Visit Information        Provider Department      5/3/2018 11:15 AM Odette Swenson MD [facility]        Care Instructions    [facility] Procedure Discharge Instructions      Today you saw:   Dr. Odette Swenson        Your procedure: Piriformis injection     Medications used:  Lidocaine (anesthetic)  Bupivacaine (anesthetic)  Kenalog (steroid), Omnipaque (contrast)          Be cautious when walking as numbness and/or weakness in the legs may occur up to 6-8 hours after the procedure due to effect of the local anesthetic    Do not drive for 6 hours. The effect of the local anesthetic could slow your reflexes.     Avoid strenuous activity for the first 24 hours. You may resume your regular activities after that.     You may shower, however avoid swimming, tub baths or hot tubs for 24 hours following your procedure    You may have a mild to moderate increase in pain for several days following the injection.      You may use ice packs for 10-15 minutes, 3 to 4 times a day at the injection site for comfort    Do not use heat to painful areas for 6 to 8 hours. This will give the local anesthetic time to wear off and prevent you from accidentally burning your skin.    You may use anti-inflammatory medications (such as Ibuprofen/Advil or Aleve) or Tylenol for pain control if necessary    With diabetes, check your blood sugar more frequently than usual as your blood sugar may be higher than normal for 10-14 days following a steroid injection. Contact your doctor who manages your diabetes if your blood sugar is higher than usual    It may take up to 14 days for the steroid medication to start working although you may feel the effect as early as a few days after the procedure.     Follow up with your referring provider in 2-3 weeks      If you  experience any of the following, call the pain center line during work hours at [phone] or on-call physician after hours at [phone]:  -Fever over 100 degree F  -Swelling, bleeding, redness, drainage, warmth at the injection site  -Progressive weakness or numbness in your legs or arms  -Loss of bowel or bladder function  -Unusual headache that is not relieved by Tylenol or your regular headache medication  -Unusual new onset of pain that is not improving    Phone #s:  Nurse triage line for general questions: [phone]            Follow-ups after your visit        Your next 10 appointments already scheduled     Jul 25, 2018 10:00 AM CDT   CT CHEST ANGIO W/O & W CONTRAST with [address] ([facility])    [address]  [facility] [address] [phone]           Please bring any scans or X-rays taken at other hospitals, if similar tests were done. Also bring a list of your medicines, including vitamins, minerals and over-the-counter drugs. It is safest to leave personal items at home.  Be sure to tell your doctor:   If you have any allergies.   If there s any chance you are pregnant.   If you are breastfeeding.    If you have diabetes as your medication may need to be adjusted for this exam.  You will have contrast for this exam. To prepare:   Do not eat or drink for 2 hours before your exam. If you need to take medicine, you may take it with small sips of water. (We may ask you to take liquid medicine as well.)   The day before your exam, drink extra fluids at least six 8-ounce glasses (unless your doctor tells you to restrict your fluids).  Patients over 70 or patients with diabetes or kidney problems:   If you haven t had a blood test (creatinine test) within the last 30 days, the Cardiologist/Radiologist may require you to get this test prior to your exam.  Please wear loose clothing, such as a sweat suit or jogging clothes.  Avoid snaps, zippers and other metal. We may ask you to undress and put on a hospital gown.  If you have any questions, please call the Imaging Department where you will have your exam.            Jul 25, 2018 12:30 PM CDT   Ech Complete with [address] ([facility])    [address]  [facility] [address]   [phone]           1. Please bring or wear a comfortable two-piece outfit. 2. You may eat, drink and take your normal medicines. 3. For any questions that cannot be answered, please contact the ordering physician ***Please check-in at the [place] Registration Office located in [address] in the [facility] building. When you are finished registering, please go to [address] and have a seat. The technician will call your name for the test.            Jul 25, 2018  1:30 PM CDT   LAB with RU LAB   [facility] ([facility])    [address]  [facility] [place] [id number] [phone]           Please do not eat 10-12 hours before your appointment if you are coming in fasting for labs on lipids, cholesterol, or glucose (sugar). This does not apply to pregnant women. Water, hot tea and black coffee (with nothing added) are okay. Do not drink other fluids, diet soda or chew gum.            Aug 16, 2018  9:30 AM CDT   Return Visit with Barak Hernandez MD   [facility] ([facility])    [address]  [facility] [id number] [phone]              Who to contact     If you have questions or need follow up information about today's clinic visit or your schedule please contact [facility] directly at [phone].  Normal or non-critical lab and imaging results will be communicated to you by MyChart, letter or phone within 4 business days after the clinic has received the results. If you  "do not hear from us within 7 days, please contact the clinic through [employer] or phone. If you have a critical or abnormal lab result, we will notify you by phone as soon as possible.  Submit refill requests through [employer] or call your pharmacy and they will forward the refill request to us. Please allow 3 business days for your refill to be completed.          Additional Information About Your Visit        [employer] Information     [employer] lets you send messages to your doctor, view your test results, renew your prescriptions, schedule appointments and more. To sign up, go to [email or web address] . Click on \"Log in\" on the left side of the screen, which will take you to the Welcome page. Then click on \"Sign up Now\" on the right side of the page.     You will be asked to enter the access code listed below, as well as some personal information. Please follow the directions to create your username and password.     Your access code is: [email or web address]  Expires: 2018  3:24 PM     Your access code will  in 90 days. If you need help or a new code, please call your [facility] clinic or [phone].        Care EveryWhere ID     This is your Care EveryWhere ID. This could be used by other organizations to access your [facility] medical records  [id number]        Your Vitals Were     Pulse Pulse Oximetry                73 98%           Blood Pressure from Last 3 Encounters:   18 173/65   18 148/48   18 122/56    Weight from Last 3 Encounters:   18 62.1 kg (137 lb)   18 62.1 kg (137 lb)   18 62.6 kg (138 lb)              Today, you had the following     No orders found for display         Today's Medication Changes          These changes are accurate as of 5/3/18 11:52 AM.  If you have any questions, ask your nurse or doctor.               Start taking these medicines.        Dose/Directions    methylPREDNISolone 4 MG tablet   Commonly known as:  MEDROL DOSEPAK   Used for:  " Piriformis syndrome of left side   Started by:  Genie Alicea APRN CNP        Follow package instructions   Quantity:  21 tablet   Refills:  0            Where to get your medicines      These medications were sent to [id number] - [place], [place] - [address]  [address] [address]     Phone:  [phone]     methylPREDNISolone 4 MG tablet                Primary Care Provider Office Phone # Fax #    Layo Sevilla [phone]       [facility] [place] [address]        Equal Access to Services     [facility]: Hadii aad ku hadasho Soomaali, waaxda luqadaha, qaybta kaalmada adeegyada, waxay banin haymeli meyer . So [facility] [phone].    ATENCIÓN: Si habla español, tiene a patel disposición servicios gratuitos de asistencia lingüística. [facility] [phone].    We comply with applicable federal civil rights laws and [place] laws. We do not discriminate on the basis of race, color, national origin, age, disability, sex, sexual orientation, or gender identity.            Thank you!     Thank you for choosing [facility]  for your care. Our goal is always to provide you with excellent care. Hearing back from our patients is one way we can continue to improve our services. Please take a few minutes to complete the written survey that you may receive in the mail after your visit with us. Thank you!             Your Updated Medication List - Protect others around you: Learn how to safely use, store and throw away your medicines at www.disposemymeds.org.          This list is accurate as of 5/3/18 11:52 AM.  Always use your most recent med list.                   Brand Name Dispense Instructions for use Diagnosis    acetaminophen 500 MG tablet    TYLENOL     Take 500-1,000 mg by mouth every 8 hours as needed for mild pain        albuterol 108 (90 Base) MCG/ACT Inhaler    PROAIR HFA/PROVENTIL HFA/VENTOLIN HFA     Inhale 2 puffs  into the lungs every 6 hours as needed        apixaban ANTICOAGULANT 2.5 MG tablet    ELIQUIS    60 tablet    Take 1 tablet (2.5 mg) by mouth 2 times daily    Atrial fibrillation, unspecified type (H)       azelastine 0.1 % spray    ASTELIN     Spray 2 sprays into both nostrils 2 times daily        CALCIUM CITRATE + 315-200 MG-UNIT Tabs per tablet   Generic drug:  calcium citrate-vitamin D      Take 2 tablets by mouth every evening        cyclobenzaprine 5 MG tablet    FLEXERIL    30 tablet    Take 1 tablet (5 mg) by mouth 3 times daily as needed for muscle spasms    Hip pain, left       fluticasone-salmeterol 230-21 MCG/ACT inhaler    ADVAIR-HFA    12 g    Inhale 2 puffs into the lungs 2 times daily        HYDROcodone-acetaminophen 5-325 MG per tablet    NORCO    12 tablet    Take 1 tablet by mouth every 6 hours as needed for severe pain maximum 6 tablet(s) per day    Pyriformis syndrome, left       ipratropium 0.03 % spray    ATROVENT     Spray 2 sprays in nostril 2 times daily        losartan 100 MG tablet    COZAAR    90 tablet    Take 1 tablet (100 mg) by mouth At Bedtime    Essential hypertension with goal blood pressure less than 140/90       methylPREDNISolone 4 MG tablet    MEDROL DOSEPAK    21 tablet    Follow package instructions    Piriformis syndrome of left side       * metoprolol succinate 25 MG 24 hr tablet    TOPROL-XL    60 tablet    Take 2 tablets (50 mg) by mouth daily    Essential hypertension with goal blood pressure less than 140/90       * metoprolol succinate 50 MG 24 hr tablet    TOPROL XL    45 tablet    Take 1.5 tablets (75 mg) by mouth daily    Stress-induced cardiomyopathy, Atrial fibrillation, unspecified type (H), Essential hypertension with goal blood pressure less than 140/90       MONTELUKAST SODIUM PO      Take 10 mg by mouth every evening        omeprazole 20 MG CR capsule    priLOSEC    90 capsule    Take 1 capsule (20 mg) by mouth daily    Candidal esophagitis (H)        simvastatin 20 MG tablet    ZOCOR    45 tablet    take 1/2 tablet by mouth daily at bedtime    Hyperlipidemia LDL goal <130       traMADol 50 MG tablet    ULTRAM    20 tablet    Take 1 tablet (50 mg) by mouth every 6 hours as needed for severe pain    Hip pain, right       * Notice:  This list has 2 medication(s) that are the same as other medications prescribed for you. Read the directions carefully, and ask your doctor or other care provider to review them with you.

## 2018-05-03 NOTE — PATIENT INSTRUCTIONS
1.  Please schedule your injection. Someone will contact you from the pain clinic within 24 hours to schedule.        2.  Medrol dose pack for inflammation     3. Please contact the clinic if pain persists at [phone].   Would recommend chiropractic at [place] with Dr. Rosa Butts

## 2018-05-03 NOTE — PROGRESS NOTES
Dr. Ladarius Berger  [facility]  Neurosurgery Clinic Visit      CC: low back and left buttock pain     Primary care Provider: Layo Sevilla      Reason For Visit:   I was asked by Dr. Sevilla to consult on the patient for lumbar radicular pain .      HPI: Genie Persaud is a 82 year old female with lumbar radicular pain. She notes that it is mostly left buttock pain. She states that this began about 2 weeks ago. She states that this started without incident or injury. She states that standing and walking make it worse and sitting and lying makes it better. She denies any leg weakness, foot drop or drag.  She states that she had the same thing on the right side in March and she did PT.  She states that doing the exercises everyday and it went away. She has not injections for her pain.  She has a significant cardiac history and is on Eliquis.  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       Pain at its worst 10  Pain right now:  8    Past Medical History:   Diagnosis Date     Anemia 3/10/2009    Chronic disease, by Fe studies; awaiting full GI w/u and repeat colonoscopy, ERCP.     Basal Cell Carcinoma--back      Coronary artery disease involving native coronary artery of native heart without angina pectoris 3/9/2017    3/2/2017 - Two vessel coronary artery disease with mLAD 50% stenosis, D3 50% stenosis, pLCx 50% stenosis and mLCx 50% stenosis     Essential hypertension with goal blood pressure less than 140/90 9/1/2016    White coat hypertension;  24 hour ambulatory monitoring normal. Do not titrate up further.       Hyperlipidemia LDL goal <130 2/10/2010     Impaired fasting glucose 8/26/2010     Moderate persistent asthma      Nonrheumatic aortic valve insufficiency 6/29/2017     osteopenia      Paroxysmal atrial fibrillation (H) 12/26/2017     Pulmonary nodule 3/3/2009    PET scan reportedly normal; biopsy not advised.     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  Problem list name updated by automated process. Provider to review     Unspecified arthropathy, hand      Vasculitis (H) 4/20/2009    Possible increased activity of thoracic aorta, on PET scan w/u for pulmonary nodule.        Past Medical History reviewed with patient during visit.    Past Surgical History:   Procedure Laterality Date     C APPENDECTOMY  1957     C LIGATE FALLOPIAN TUBE  1971     C STEREOTACTIC BREAST BIOPSY  2001     CHOLECYSTECTOMY, LAPOROSCOPIC  2008    Cholecystectomy, Laparoscopic     ESOPHAGOSCOPY, GASTROSCOPY, DUODENOSCOPY (EGD), COMBINED  5/17/2013    Procedure: COMBINED ESOPHAGOSCOPY, GASTROSCOPY, DUODENOSCOPY (EGD), BIOPSY SINGLE OR MULTIPLE;  ESOPHAGOSCOPY, GASTROSCOPY, DUODENOSCOPY (EGD)  with bx;  Surgeon: Jeffery Yanez MD;  Location:  GI     HC DILATION/CURETTAGE DIAG/THER NON OB  1967,1971     HC REMOVE TONSILS/ADENOIDS,<11 Y/O       Past Surgical History reviewed with patient during visit.    Current Outpatient Prescriptions   Medication     acetaminophen (TYLENOL) 500 MG tablet     albuterol (PROAIR HFA, PROVENTIL HFA, VENTOLIN HFA) 108 (90 BASE) MCG/ACT inhaler     apixaban ANTICOAGULANT (ELIQUIS) 2.5 MG tablet     azelastine (ASTELIN) 0.1 % spray     calcium citrate-vitamin D (CALCIUM CITRATE +) 315-200 MG-UNIT TABS     cyclobenzaprine (FLEXERIL) 5 MG tablet     fluticasone-salmeterol (ADVAIR-HFA) 230-21 MCG/ACT inhaler     HYDROcodone-acetaminophen (NORCO) 5-325 MG per tablet     ipratropium (ATROVENT) 0.03 % spray     losartan (COZAAR) 100 MG tablet     metoprolol (TOPROL-XL) 25 MG 24 hr tablet     metoprolol succinate (TOPROL XL) 50 MG 24 hr tablet     MONTELUKAST SODIUM PO  "    omeprazole (PRILOSEC) 20 MG CR capsule     simvastatin (ZOCOR) 20 MG tablet     traMADol (ULTRAM) 50 MG tablet     No current facility-administered medications for this visit.        Allergies   Allergen Reactions     Fosamax [Alendronic Acid] GI Disturbance     Augmentin [Amoxicillin-Pot Clavulanate] Diarrhea     Diarrhea and rash     Evista [Raloxifene]      abd symptoms.      Flu Virus Vaccine      swollen and red at inj site       Social History     Social History     Marital status: Single     Spouse name: N/A     Number of children: 3     Years of education: 15     Occupational History     semi-retired      Social History Main Topics     Smoking status: Never Smoker     Smokeless tobacco: Never Used     Alcohol use 0.6 - 1.2 oz/week     1 - 2 Standard drinks or equivalent per week      Comment: 1 glass of wine 1-2 days per week     Drug use: No     Sexual activity: No     Other Topics Concern     Parent/Sibling W/ Cabg, Mi Or Angioplasty Before 65f 55m? No     Social History Narrative       Family History   Problem Relation Age of Onset     Hypertension Mother      OSTEOPOROSIS Mother      C.A.D. Mother      Connective Tissue Disorder Father      scleraderma     CEREBROVASCULAR DISEASE Paternal Grandmother      Prostate Cancer Other      9/05 passed away due to complications     Breast Cancer Child      youngest dtr         Review Of Systems  Skin: negative  Eyes: negative  Ears/Nose/Throat: negative  Respiratory: No shortness of breath, dyspnea on exertion, cough, or hemoptysis  Cardiovascular: CVA, HLD, HTN, Aortic valve insufficiency, cardiomyopathy, CAD, AFib  Gastrointestinal: negative  Genitourinary: negative  Musculoskeletal: back pain  Neurologic: left thigh pain   Psychiatric: negative  Hematologic/Lymphatic/Immunologic: negative  Endocrine: negative     ROS: 10 point ROS neg other than the symptoms noted above in the HPI.      Vital Signs: Ht 5' 2\" (1.575 m)  Wt 137 lb (62.1 kg)  BMI 25.06 " kg/m2    Examination:  Constitutional:  Alert, well nourished, NAD.  Memory: recent and remote memory intact   HEENT: Normocephalic, atraumatic.   Pulm:  Without shortness of breath   CV:  No pitting edema of BLE.    Neurological:  Awake  Alert  Oriented x 3  Speech clear  Cranial nerves II - XII intact  PERRL  EOMI  Face symmetric  Tongue midline  Motor exam   Shoulder Abduction:  Right:  5/5   Left:  5/5  Biceps:                      Right:  5/5   Left:  5/5  Triceps:                     Right:  5/5   Left:  5/5  Wrist Extensors:       Right:  5/5   Left:  5/5  Wrist Flexors:           Right:  5/5   Left:  5/5  Intrinsics:                   Right:  5/5   Left:  5/5   Hip Flexor:                Right: 5/5  Left:  5/5  Hip Adductor:             Right:  5/5  Left:  5/5  Hip Abductor:             Right:  5/5  Left:  5/5  Gastroc Soleus:        Right:  5/5  Left:  5/5  Tib/Ant:                      Right:  5/5  Left:  5/5  EHL:                          Right:  5/5  Left:  5/5   Sensation normal to bilateral upper and lower extremities  Muscle tone to bilateral upper and lower extremities normal   Gait: Able to stand from a seated position. Normal non-antalgic, non-myelopathic gait.    Lumbar examination reveals no tenderness of the spine or paraspinous muscles.  Hip height is symmetrical. Negative SI joint, sciatic notch or greater trochanteric tenderness to palpation bilaterally.  Straight leg raise is negative bilaterally.      Severe pain with palpation of the left piriformis muscle     Imaging:     Bilateral hip xray normal    Assessment/Plan:   Genie Persaud is a 82 year old female with lumbar radicular pain. She notes that it is mostly left buttock pain. She states that this began about 2 weeks ago. She states that this started without incident or injury. She states that standing and walking make it worse and sitting and lying makes it better. She denies any leg weakness, foot drop or drag.  She states that she  had the same thing on the right side in March and she did PT.  She states that doing the exercises everyday and it went away. She has not injections for her pain.  She has a significant cardiac history and is on Eliquis.  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   The pt has severe pain with palpation to the left piriformis muscle.  It was recommended she try oral steroids and then schedule for an injection. She is open to this.     Patient Instructions   1.  Please schedule your injection. Someone will contact you from the pain clinic within 24 hours to schedule.        2.  Medrol dose pack for inflammation     3. Please contact the clinic if pain persists at [phone].   Would recommend chiropractic at [place] with Dr. Rosa Alicea [facility]  [facility]  [address] [address]    Tel [phone]  Pager [phone]

## 2018-05-03 NOTE — NURSING NOTE
Discharge Information    IV Discontiued Time:  NA    Amount of Fluid Infused:  NA    Discharge Criteria = When patient returns to baseline or as per MD order    Consciousness:  Pt is fully awake    Circulation:  BP +/- 20% of pre-procedure level    Respiration:  Patient is able to breathe deeply    O2 Sat:  Patient is able to maintain O2 Sat >92% on room air    Activity:  Moves 4 extremities on command    Ambulation:  Patient is able to stand and walk or stand and pivot into wheelchair    Dressing:  Clean/dry or No Dressing    Notes:   Discharge instructions and AVS given to patient    Patient meets criteria for discharge?  YES    Admitted to PCU?  No    Responsible adult present to accompany patient home?  Yes    Signature/Title:    Magda Jones  RN Care Coordinator  [facility] [place]

## 2018-05-03 NOTE — LETTER
5/3/2018         RE: Genie Persaud  [address] [place] [address]        Dear Colleague,    Thank you for referring your patient, Genie Persaud, to the [facility]. Please see a copy of my visit note below.    Dr. Ladarius Berger  [facility]  Neurosurgery Clinic Visit      CC: low back and left buttock pain     Primary care Provider: Layo Sevilla      Reason For Visit:   I was asked by Dr. Sevilla to consult on the patient for lumbar radicular pain .      HPI: Genie Persaud is a 82 year old female with lumbar radicular pain. She notes that it is mostly left buttock pain. She states that this began about 2 weeks ago. She states that this started without incident or injury. She states that standing and walking make it worse and sitting and lying makes it better. She denies any leg weakness, foot drop or drag.  She states that she had the same thing on the right side in March and she did PT.  She states that doing the exercises everyday and it went away. She has not injections for her pain.  She has a significant cardiac history and is on Eliquis.  She states that she does volunteer at [employer] as well.       Pain at its worst 10  Pain right now:  8    Past Medical History:   Diagnosis Date     Anemia 3/10/2009    Chronic disease, by Fe studies; awaiting full GI w/u and repeat colonoscopy, ERCP.     Basal Cell Carcinoma--back      Coronary artery disease involving native coronary artery of native heart without angina pectoris 3/9/2017    3/2/2017 - Two vessel coronary artery disease with mLAD 50% stenosis, D3 50% stenosis, pLCx 50% stenosis and mLCx 50% stenosis     Essential hypertension with goal blood pressure less than 140/90 9/1/2016    White coat hypertension;  24 hour ambulatory monitoring normal. Do not titrate up further.       Hyperlipidemia LDL goal <130 2/10/2010     Impaired fasting glucose 8/26/2010     Moderate persistent asthma      Nonrheumatic aortic valve  insufficiency 6/29/2017     osteopenia      Paroxysmal atrial fibrillation (H) 12/26/2017     Pulmonary nodule 3/3/2009    PET scan reportedly normal; biopsy not advised.     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  Problem list name updated by automated process. Provider to review     Unspecified arthropathy, hand      Vasculitis (H) 4/20/2009    Possible increased activity of thoracic aorta, on PET scan w/u for pulmonary nodule.        Past Medical History reviewed with patient during visit.    Past Surgical History:   Procedure Laterality Date     C APPENDECTOMY  1957     C LIGATE FALLOPIAN TUBE  1971     C STEREOTACTIC BREAST BIOPSY  2001     CHOLECYSTECTOMY, LAPOROSCOPIC  2008    Cholecystectomy, Laparoscopic     ESOPHAGOSCOPY, GASTROSCOPY, DUODENOSCOPY (EGD), COMBINED  5/17/2013    Procedure: COMBINED ESOPHAGOSCOPY, GASTROSCOPY, DUODENOSCOPY (EGD), BIOPSY SINGLE OR MULTIPLE;  ESOPHAGOSCOPY, GASTROSCOPY, DUODENOSCOPY (EGD)  with bx;  Surgeon: Jeffery Yanez MD;  Location:  GI     HC DILATION/CURETTAGE DIAG/THER NON OB  1967,1971     HC REMOVE TONSILS/ADENOIDS,<11 Y/O       Past Surgical History reviewed with patient during visit.    Current Outpatient Prescriptions   Medication     acetaminophen (TYLENOL) 500 MG tablet     albuterol (PROAIR HFA, PROVENTIL HFA, VENTOLIN HFA) 108 (90 BASE) MCG/ACT inhaler     apixaban ANTICOAGULANT (ELIQUIS) 2.5 MG tablet     azelastine (ASTELIN) 0.1 % spray     calcium citrate-vitamin D (CALCIUM CITRATE +) 315-200 MG-UNIT TABS     cyclobenzaprine (FLEXERIL) 5 MG tablet     fluticasone-salmeterol (ADVAIR-HFA) 230-21 MCG/ACT inhaler     HYDROcodone-acetaminophen  (NORCO) 5-325 MG per tablet     ipratropium (ATROVENT) 0.03 % spray     losartan (COZAAR) 100 MG tablet     metoprolol (TOPROL-XL) 25 MG 24 hr tablet     metoprolol succinate (TOPROL XL) 50 MG 24 hr tablet     MONTELUKAST SODIUM PO     omeprazole (PRILOSEC) 20 MG CR capsule     simvastatin (ZOCOR) 20 MG tablet     traMADol (ULTRAM) 50 MG tablet     No current facility-administered medications for this visit.        Allergies   Allergen Reactions     Fosamax [Alendronic Acid] GI Disturbance     Augmentin [Amoxicillin-Pot Clavulanate] Diarrhea     Diarrhea and rash     Evista [Raloxifene]      abd symptoms.      Flu Virus Vaccine      swollen and red at inj site       Social History     Social History     Marital status: Single     Spouse name: N/A     Number of children: 3     Years of education: 15     Occupational History     semi-retired      Social History Main Topics     Smoking status: Never Smoker     Smokeless tobacco: Never Used     Alcohol use 0.6 - 1.2 oz/week     1 - 2 Standard drinks or equivalent per week      Comment: 1 glass of wine 1-2 days per week     Drug use: No     Sexual activity: No     Other Topics Concern     Parent/Sibling W/ Cabg, Mi Or Angioplasty Before 65f 55m? No     Social History Narrative       Family History   Problem Relation Age of Onset     Hypertension Mother      OSTEOPOROSIS Mother      C.A.D. Mother      Connective Tissue Disorder Father      scleraderma     CEREBROVASCULAR DISEASE Paternal Grandmother      Prostate Cancer Other      9/05 passed away due to complications     Breast Cancer Child      youngest dtr         Review Of Systems  Skin: negative  Eyes: negative  Ears/Nose/Throat: negative  Respiratory: No shortness of breath, dyspnea on exertion, cough, or hemoptysis  Cardiovascular: CVA, HLD, HTN, Aortic valve insufficiency, cardiomyopathy, CAD, AFib  Gastrointestinal: negative  Genitourinary: negative  Musculoskeletal: back pain  Neurologic: left thigh pain  "  Psychiatric: negative  Hematologic/Lymphatic/Immunologic: negative  Endocrine: negative     ROS: 10 point ROS neg other than the symptoms noted above in the HPI.      Vital Signs: Ht 5' 2\" (1.575 m)  Wt 137 lb (62.1 kg)  BMI 25.06 kg/m2    Examination:  Constitutional:  Alert, well nourished, NAD.  Memory: recent and remote memory intact   HEENT: Normocephalic, atraumatic.   Pulm:  Without shortness of breath   CV:  No pitting edema of BLE.    Neurological:  Awake  Alert  Oriented x 3  Speech clear  Cranial nerves II - XII intact  PERRL  EOMI  Face symmetric  Tongue midline  Motor exam   Shoulder Abduction:  Right:  5/5   Left:  5/5  Biceps:                      Right:  5/5   Left:  5/5  Triceps:                     Right:  5/5   Left:  5/5  Wrist Extensors:       Right:  5/5   Left:  5/5  Wrist Flexors:           Right:  5/5   Left:  5/5  Intrinsics:                   Right:  5/5   Left:  5/5   Hip Flexor:                Right: 5/5  Left:  5/5  Hip Adductor:             Right:  5/5  Left:  5/5  Hip Abductor:             Right:  5/5  Left:  5/5  Gastroc Soleus:        Right:  5/5  Left:  5/5  Tib/Ant:                      Right:  5/5  Left:  5/5  EHL:                          Right:  5/5  Left:  5/5   Sensation normal to bilateral upper and lower extremities  Muscle tone to bilateral upper and lower extremities normal   Gait: Able to stand from a seated position. Normal non-antalgic, non-myelopathic gait.    Lumbar examination reveals no tenderness of the spine or paraspinous muscles.  Hip height is symmetrical. Negative SI joint, sciatic notch or greater trochanteric tenderness to palpation bilaterally.  Straight leg raise is negative bilaterally.      Severe pain with palpation of the left piriformis muscle     Imaging:     Bilateral hip xray normal    Assessment/Plan:   Genie Persaud is a 82 year old female with lumbar radicular pain. She notes that it is mostly left buttock pain. She states that this began " about 2 weeks ago. She states that this started without incident or injury. She states that standing and walking make it worse and sitting and lying makes it better. She denies any leg weakness, foot drop or drag.  She states that she had the same thing on the right side in March and she did PT.  She states that doing the exercises everyday and it went away. She has not injections for her pain.  She has a significant cardiac history and is on Eliquis.  She states that she does volunteer at [employer] as well.   The pt has severe pain with palpation to the left piriformis muscle.  It was recommended she try oral steroids and then schedule for an injection. She is open to this.     Patient Instructions   1.  Please schedule your injection. Someone will contact you from the pain clinic within 24 hours to schedule.        2.  Medrol dose pack for inflammation     3. Please contact the clinic if pain persists at [phone].   Would recommend chiropractic at [place] with Dr. Rosa Alicea CNP  [facility]  [address] [address]    Tel [phone]  Pager [phone]      Again, thank you for allowing me to participate in the care of your patient.        Sincerely,        TOREY Parsons CNP

## 2018-05-03 NOTE — NURSING NOTE
Pre-procedure Intake    Have you been fasting? NA    If yes, for how long?     Are you taking a prescribed blood thinner such as coumadin, Plavix, Xarelto?    Yes -   Other blood thinners    If yes, when did you take your last dose? 3 days ago    Do you take aspirin?  No    If cervical procedure, have you held aspirin for 6 days?   NA    Do you have any allergies to contrast dye, iodine, steroid and/or numbing medications?  NO    Are you currently taking antibiotics or have an active infection?  NO    Have you had a fever/elevated temperature within the past week? NO    Are you currently taking oral steroids? NO    Do you have a ? Yes       Are you pregnant or breastfeeding?  Not Applicable    Are the vital signs normal?  NO

## 2018-05-03 NOTE — PATIENT INSTRUCTIONS
[facility] Procedure Discharge Instructions      Today you saw:   Dr. Odette Swenson        Your procedure: Piriformis injection     Medications used:  Lidocaine (anesthetic)  Bupivacaine (anesthetic)  Kenalog (steroid), Omnipaque (contrast)          Be cautious when walking as numbness and/or weakness in the legs may occur up to 6-8 hours after the procedure due to effect of the local anesthetic    Do not drive for 6 hours. The effect of the local anesthetic could slow your reflexes.     Avoid strenuous activity for the first 24 hours. You may resume your regular activities after that.     You may shower, however avoid swimming, tub baths or hot tubs for 24 hours following your procedure    You may have a mild to moderate increase in pain for several days following the injection.      You may use ice packs for 10-15 minutes, 3 to 4 times a day at the injection site for comfort    Do not use heat to painful areas for 6 to 8 hours. This will give the local anesthetic time to wear off and prevent you from accidentally burning your skin.    You may use anti-inflammatory medications (such as Ibuprofen/Advil or Aleve) or Tylenol for pain control if necessary    With diabetes, check your blood sugar more frequently than usual as your blood sugar may be higher than normal for 10-14 days following a steroid injection. Contact your doctor who manages your diabetes if your blood sugar is higher than usual    It may take up to 14 days for the steroid medication to start working although you may feel the effect as early as a few days after the procedure.     Follow up with your referring provider in 2-3 weeks      If you experience any of the following, call the pain center line during work hours at [phone] or on-call physician after hours at [phone]:  -Fever over 100 degree F  -Swelling, bleeding, redness, drainage, warmth at the injection site  -Progressive weakness or numbness in your legs or arms  -Loss of  bowel or bladder function  -Unusual headache that is not relieved by Tylenol or your regular headache medication  -Unusual new onset of pain that is not improving    Phone #s:  Nurse triage line for general questions: [phone]

## 2018-05-03 NOTE — PROGRESS NOTES
[facility] - Procedure Note    Date of Service: 5/3/2018    Procedure performed: Left piriformis injection  Diagnosis: myositis/piriformis syndrome  : Odette Swenson MD     Indications: Genie Persaud is a 82 year old female who is seen at the request of Genie Alicea CNP for a piriformis injection. The patient describes left buttock pain that began 2 weeks ago. The patient reports minimal improvement with conservative treatment, including PT and medications.      Allergies:      Allergies   Allergen Reactions     Fosamax [Alendronic Acid] GI Disturbance     Augmentin [Amoxicillin-Pot Clavulanate] Diarrhea     Diarrhea and rash     Evista [Raloxifene]      abd symptoms.      Flu Virus Vaccine      swollen and red at inj site        Vitals:  /47  Pulse 70  SpO2 94%    Review of Systems: The patient denies recent fever, chills, illness, use of antibiotics or anticoagulants. All other 10-point review of systems negative.     Procedure:   A fluoroscopic view including the SI joint and the superiolateral border of the left acetabulum was obtained.  A location 1/3 of the distance medial from the acetabulum to the SI joint, overlying the ileum, was marked.  2 ml of Lidocaine 1% was used to anesthetize the skin. The needle was advance under intermittent fluroscopy using the two-pop method through the gluteal muscle to the deeper piriformis muscle. There were no paresthesias or contractions noted in the calf or foot.  At this point, 1 mL of Omnipaque was injected and 9mL wasted, with a flow consistent with piriformis muscle spread.  A solution containing 4 ml of 0.5% bupivacaine and 40 mg of kenalog was injected.  The needle was removed.     Assessment/Plan: Genie Persaud is a 82 year old female s/p Left piriformis injection today for myositis/piriformis syndrome.     Pre procecedure pain score: 5/10   Post procedure pain score: 2/10.     1. The patient was advised to contact the [facility] for any of the following:   Fever, chills, or night sweats   New onset of pain, numbness, or weakness   Any questions/concerns regarding the procedure  If unable to contact the Pain Center, the patient was instructed to go to a local Emergency Room for any complications.   2. The patient will receive a follow-up call in 1 week.      Odette Swenson MD   [facility]

## 2018-06-01 ENCOUNTER — TELEPHONE (OUTPATIENT)
Dept: PEDIATRICS | Facility: CLINIC | Age: 83
End: 2018-06-01

## 2018-06-01 NOTE — TELEPHONE ENCOUNTER
PCP:    The Pt called in requesting a letter stating she is medically able to return to volunteering over at [facility] [employer]. The Pt feels she is ready to return to volunteering however she needs your approval first. Thanks.     Letter is pended. She would like the letter mailed to her once printed and signed, thank you.     Bella Leon RN -- [facility] [employer]

## 2018-06-01 NOTE — LETTER
[address]  [address]  [place] [address]  Phone: [phone]  Fax: [phone]    06/01/18    Genie Persaud  [address] [place] [address]      To whom it may concern:     Genie Hung is medically cleared to return to volunteering at [facility].     Sincerely,      Layo Sevilla MD

## 2018-06-22 DIAGNOSIS — I10 ESSENTIAL HYPERTENSION WITH GOAL BLOOD PRESSURE LESS THAN 140/90: ICD-10-CM

## 2018-06-22 NOTE — TELEPHONE ENCOUNTER
"Requested Prescriptions   Pending Prescriptions Disp Refills     losartan (COZAAR) 100 MG tablet  Last Written Prescription Date:  03/21/2018  Last Fill Quantity: 90 tablet,  # refills: 0   Last office visit: 5/1/2018 with prescribing provider:  Layo Sevilla MD    Future Office Visit:   Next 5 appointments (look out 90 days)     Aug 16, 2018  9:30 AM CDT   Return Visit with Barak Hernandez MD   [facility]    [address] [place] [address] [phone]                  90 tablet 0    Angiotensin-II Receptors Failed    6/22/2018  1:07 PM       Failed - Blood pressure under 140/90 in past 12 months    BP Readings from Last 3 Encounters:   05/03/18 164/47   05/03/18 148/48   05/01/18 122/56                Passed - Recent (12 mo) or future (30 days) visit within the authorizing provider's specialty    Patient had office visit in the last 12 months or has a visit in the next 30 days with authorizing provider or within the authorizing provider's specialty.  See \"Patient Info\" tab in inbasket, or \"Choose Columns\" in Meds & Orders section of the refill encounter.           Passed - Patient is age 18 or older       Passed - No active pregnancy on record       Passed - Normal serum creatinine on file in past 12 months    Recent Labs   Lab Test  02/22/18   0948   CR  0.95            Passed - Normal serum potassium on file in past 12 months    Recent Labs   Lab Test  02/22/18   0948   POTASSIUM  4.4                   Passed - No positive pregnancy test in past 12 months          "

## 2018-06-26 RX ORDER — LOSARTAN POTASSIUM 100 MG/1
100 TABLET ORAL DAILY
Qty: 90 TABLET | Refills: 3 | Status: SHIPPED | OUTPATIENT
Start: 2018-06-26 | End: 2019-07-20

## 2018-06-26 NOTE — TELEPHONE ENCOUNTER
Next 5 appointments (look out 90 days)     Aug 16, 2018  9:30 AM CDT   Return Visit with Barak Hernandez MD   [facility] ([facility])    [address] [address] [address] [phone]                Patient is due for an appointment with Dr Sevilla for a physical-not yet scheduled.  Routing refill request to provider for review/approval because:  High BP    Aurora Wolf RN  Message handled by Nurse Triage.

## 2018-07-06 ENCOUNTER — RADIANT APPOINTMENT (OUTPATIENT)
Dept: MAMMOGRAPHY | Facility: CLINIC | Age: 83
End: 2018-07-06
Payer: COMMERCIAL

## 2018-07-06 DIAGNOSIS — Z12.31 VISIT FOR SCREENING MAMMOGRAM: ICD-10-CM

## 2018-07-06 PROCEDURE — 77067 SCR MAMMO BI INCL CAD: CPT | Mod: TC

## 2018-07-13 ENCOUNTER — RADIANT APPOINTMENT (OUTPATIENT)
Dept: BONE DENSITY | Facility: CLINIC | Age: 83
End: 2018-07-13
Attending: INTERNAL MEDICINE
Payer: COMMERCIAL

## 2018-07-13 DIAGNOSIS — M81.0 OSTEOPOROSIS, UNSPECIFIED OSTEOPOROSIS TYPE, UNSPECIFIED PATHOLOGICAL FRACTURE PRESENCE: ICD-10-CM

## 2018-07-13 PROCEDURE — 77085 DXA BONE DENSITY AXL VRT FX: CPT | Performed by: FAMILY MEDICINE

## 2018-07-30 ENCOUNTER — HOSPITAL ENCOUNTER (OUTPATIENT)
Dept: CT IMAGING | Facility: CLINIC | Age: 83
Discharge: HOME | End: 2018-07-30
Attending: INTERNAL MEDICINE | Admitting: INTERNAL MEDICINE
Payer: COMMERCIAL

## 2018-07-30 DIAGNOSIS — I71.20 THORACIC AORTIC ANEURYSM WITHOUT RUPTURE (H): ICD-10-CM

## 2018-07-30 LAB
CREAT BLD-MCNC: 1 MG/DL (ref 0.52–1.04)
GFR SERPL CREATININE-BSD FRML MDRD: 53 ML/MIN/1.7M2

## 2018-07-30 PROCEDURE — 25000128 H RX IP 250 OP 636: Performed by: RADIOLOGY

## 2018-07-30 PROCEDURE — 82565 ASSAY OF CREATININE: CPT

## 2018-07-30 PROCEDURE — 71275 CT ANGIOGRAPHY CHEST: CPT

## 2018-07-30 RX ORDER — IOPAMIDOL 755 MG/ML
500 INJECTION, SOLUTION INTRAVASCULAR ONCE
Status: COMPLETED | OUTPATIENT
Start: 2018-07-30 | End: 2018-07-30

## 2018-07-30 RX ADMIN — IOPAMIDOL 80 ML: 755 INJECTION, SOLUTION INTRAVENOUS at 15:28

## 2018-07-30 RX ADMIN — SODIUM CHLORIDE 80 ML: 900 INJECTION, SOLUTION INTRAVENOUS at 15:28

## 2018-08-01 ENCOUNTER — HOSPITAL ENCOUNTER (OUTPATIENT)
Facility: CLINIC | Age: 83
Setting detail: OBSERVATION
Discharge: HOME | End: 2018-08-02
Attending: EMERGENCY MEDICINE | Admitting: INTERNAL MEDICINE
Payer: COMMERCIAL

## 2018-08-01 ENCOUNTER — APPOINTMENT (OUTPATIENT)
Dept: ULTRASOUND IMAGING | Facility: CLINIC | Age: 83
End: 2018-08-01
Attending: EMERGENCY MEDICINE
Payer: COMMERCIAL

## 2018-08-01 ENCOUNTER — APPOINTMENT (OUTPATIENT)
Dept: GENERAL RADIOLOGY | Facility: CLINIC | Age: 83
End: 2018-08-01
Attending: EMERGENCY MEDICINE
Payer: COMMERCIAL

## 2018-08-01 ENCOUNTER — TELEPHONE (OUTPATIENT)
Dept: CARDIOLOGY | Facility: CLINIC | Age: 83
End: 2018-08-01

## 2018-08-01 DIAGNOSIS — L03.113 CELLULITIS OF RIGHT UPPER EXTREMITY: ICD-10-CM

## 2018-08-01 DIAGNOSIS — I48.91 ATRIAL FIBRILLATION, UNSPECIFIED TYPE (H): ICD-10-CM

## 2018-08-01 PROBLEM — L08.9 SKIN INFECTION: Status: ACTIVE | Noted: 2018-08-01

## 2018-08-01 LAB
ANION GAP SERPL CALCULATED.3IONS-SCNC: 6 MMOL/L (ref 3–14)
BACTERIA SPEC CULT: NORMAL
BASOPHILS # BLD AUTO: 0 10E9/L (ref 0–0.2)
BASOPHILS NFR BLD AUTO: 0.3 %
BUN SERPL-MCNC: 18 MG/DL (ref 7–30)
CALCIUM SERPL-MCNC: 8.2 MG/DL (ref 8.5–10.1)
CHLORIDE SERPL-SCNC: 102 MMOL/L (ref 94–109)
CO2 SERPL-SCNC: 25 MMOL/L (ref 20–32)
CREAT SERPL-MCNC: 0.8 MG/DL (ref 0.52–1.04)
CRP SERPL-MCNC: 39.9 MG/L (ref 0–8)
DIFFERENTIAL METHOD BLD: ABNORMAL
EOSINOPHIL # BLD AUTO: 0.1 10E9/L (ref 0–0.7)
EOSINOPHIL NFR BLD AUTO: 0.6 %
ERYTHROCYTE [DISTWIDTH] IN BLOOD BY AUTOMATED COUNT: 13.8 % (ref 10–15)
GFR SERPL CREATININE-BSD FRML MDRD: 68 ML/MIN/1.7M2
GLUCOSE SERPL-MCNC: 121 MG/DL (ref 70–99)
HCT VFR BLD AUTO: 33.1 % (ref 35–47)
HGB BLD-MCNC: 10.6 G/DL (ref 11.7–15.7)
IMM GRANULOCYTES # BLD: 0.1 10E9/L (ref 0–0.4)
IMM GRANULOCYTES NFR BLD: 0.5 %
LYMPHOCYTES # BLD AUTO: 0.8 10E9/L (ref 0.8–5.3)
LYMPHOCYTES NFR BLD AUTO: 5.1 %
MCH RBC QN AUTO: 29.8 PG (ref 26.5–33)
MCHC RBC AUTO-ENTMCNC: 32 G/DL (ref 31.5–36.5)
MCV RBC AUTO: 93 FL (ref 78–100)
MONOCYTES # BLD AUTO: 1.5 10E9/L (ref 0–1.3)
MONOCYTES NFR BLD AUTO: 9.8 %
NEUTROPHILS # BLD AUTO: 12.7 10E9/L (ref 1.6–8.3)
NEUTROPHILS NFR BLD AUTO: 83.7 %
NRBC # BLD AUTO: 0 10*3/UL
NRBC BLD AUTO-RTO: 0 /100
PLATELET # BLD AUTO: 213 10E9/L (ref 150–450)
POTASSIUM SERPL-SCNC: 4.3 MMOL/L (ref 3.4–5.3)
RBC # BLD AUTO: 3.56 10E12/L (ref 3.8–5.2)
SODIUM SERPL-SCNC: 133 MMOL/L (ref 133–144)
SPECIMEN SOURCE: NORMAL
WBC # BLD AUTO: 15.2 10E9/L (ref 4–11)

## 2018-08-01 PROCEDURE — 25000132 ZZH RX MED GY IP 250 OP 250 PS 637: Performed by: INTERNAL MEDICINE

## 2018-08-01 PROCEDURE — 87040 BLOOD CULTURE FOR BACTERIA: CPT | Performed by: EMERGENCY MEDICINE

## 2018-08-01 PROCEDURE — 87040 BLOOD CULTURE FOR BACTERIA: CPT | Performed by: INTERNAL MEDICINE

## 2018-08-01 PROCEDURE — 99207 ZZC APP CREDIT; MD BILLING SHARED VISIT: CPT | Performed by: HOSPITALIST

## 2018-08-01 PROCEDURE — 25000132 ZZH RX MED GY IP 250 OP 250 PS 637: Performed by: HOSPITALIST

## 2018-08-01 PROCEDURE — 80048 BASIC METABOLIC PNL TOTAL CA: CPT | Performed by: EMERGENCY MEDICINE

## 2018-08-01 PROCEDURE — 25000132 ZZH RX MED GY IP 250 OP 250 PS 637: Performed by: PHYSICIAN ASSISTANT

## 2018-08-01 PROCEDURE — 73110 X-RAY EXAM OF WRIST: CPT | Mod: RT

## 2018-08-01 PROCEDURE — 96366 THER/PROPH/DIAG IV INF ADDON: CPT | Mod: 59

## 2018-08-01 PROCEDURE — G0378 HOSPITAL OBSERVATION PER HR: HCPCS

## 2018-08-01 PROCEDURE — 36415 COLL VENOUS BLD VENIPUNCTURE: CPT | Performed by: INTERNAL MEDICINE

## 2018-08-01 PROCEDURE — 99219 ZZC INITIAL OBSERVATION CARE,LEVL II: CPT | Performed by: INTERNAL MEDICINE

## 2018-08-01 PROCEDURE — 25000125 ZZHC RX 250: Performed by: INTERNAL MEDICINE

## 2018-08-01 PROCEDURE — 99285 EMERGENCY DEPT VISIT HI MDM: CPT | Mod: 25

## 2018-08-01 PROCEDURE — 93971 EXTREMITY STUDY: CPT | Mod: RT

## 2018-08-01 PROCEDURE — 85025 COMPLETE CBC W/AUTO DIFF WBC: CPT | Performed by: EMERGENCY MEDICINE

## 2018-08-01 PROCEDURE — 25000128 H RX IP 250 OP 636: Performed by: INTERNAL MEDICINE

## 2018-08-01 PROCEDURE — 25000128 H RX IP 250 OP 636: Performed by: EMERGENCY MEDICINE

## 2018-08-01 PROCEDURE — 96365 THER/PROPH/DIAG IV INF INIT: CPT

## 2018-08-01 PROCEDURE — 86140 C-REACTIVE PROTEIN: CPT | Performed by: EMERGENCY MEDICINE

## 2018-08-01 RX ORDER — CEFAZOLIN SODIUM 1 G/50ML
1 INJECTION, SOLUTION INTRAVENOUS ONCE
Status: COMPLETED | OUTPATIENT
Start: 2018-08-01 | End: 2018-08-01

## 2018-08-01 RX ORDER — ACETAMINOPHEN 325 MG/1
650 TABLET ORAL EVERY 4 HOURS PRN
Status: DISCONTINUED | OUTPATIENT
Start: 2018-08-01 | End: 2018-08-02 | Stop reason: HOSPADM

## 2018-08-01 RX ORDER — NALOXONE HYDROCHLORIDE 0.4 MG/ML
.1-.4 INJECTION, SOLUTION INTRAMUSCULAR; INTRAVENOUS; SUBCUTANEOUS
Status: DISCONTINUED | OUTPATIENT
Start: 2018-08-01 | End: 2018-08-02 | Stop reason: HOSPADM

## 2018-08-01 RX ORDER — ACETAMINOPHEN 650 MG/1
650 SUPPOSITORY RECTAL EVERY 4 HOURS PRN
Status: DISCONTINUED | OUTPATIENT
Start: 2018-08-01 | End: 2018-08-02 | Stop reason: HOSPADM

## 2018-08-01 RX ORDER — FLUTICASONE PROPIONATE AND SALMETEROL XINAFOATE 230; 21 UG/1; UG/1
2 AEROSOL, METERED RESPIRATORY (INHALATION) 2 TIMES DAILY
Status: DISCONTINUED | OUTPATIENT
Start: 2018-08-01 | End: 2018-08-02 | Stop reason: HOSPADM

## 2018-08-01 RX ORDER — ONDANSETRON 4 MG/1
4 TABLET, ORALLY DISINTEGRATING ORAL EVERY 6 HOURS PRN
Status: DISCONTINUED | OUTPATIENT
Start: 2018-08-01 | End: 2018-08-02 | Stop reason: HOSPADM

## 2018-08-01 RX ORDER — METOPROLOL SUCCINATE 50 MG/1
100 TABLET, EXTENDED RELEASE ORAL DAILY
COMMUNITY
End: 2018-09-14

## 2018-08-01 RX ORDER — LOSARTAN POTASSIUM 100 MG/1
100 TABLET ORAL DAILY
Status: DISCONTINUED | OUTPATIENT
Start: 2018-08-01 | End: 2018-08-02 | Stop reason: HOSPADM

## 2018-08-01 RX ORDER — CYCLOBENZAPRINE HCL 5 MG
5 TABLET ORAL 3 TIMES DAILY PRN
Status: DISCONTINUED | OUTPATIENT
Start: 2018-08-01 | End: 2018-08-02 | Stop reason: HOSPADM

## 2018-08-01 RX ORDER — LATANOPROST 50 UG/ML
1 SOLUTION/ DROPS OPHTHALMIC AT BEDTIME
COMMUNITY
End: 2022-08-30

## 2018-08-01 RX ORDER — SIMVASTATIN 10 MG
10 TABLET ORAL AT BEDTIME
Status: DISCONTINUED | OUTPATIENT
Start: 2018-08-01 | End: 2018-08-02 | Stop reason: HOSPADM

## 2018-08-01 RX ORDER — ONDANSETRON 2 MG/ML
4 INJECTION INTRAMUSCULAR; INTRAVENOUS EVERY 6 HOURS PRN
Status: DISCONTINUED | OUTPATIENT
Start: 2018-08-01 | End: 2018-08-02 | Stop reason: HOSPADM

## 2018-08-01 RX ORDER — CEPHALEXIN 500 MG/1
500 CAPSULE ORAL 2 TIMES DAILY
Qty: 14 CAPSULE | Refills: 0 | Status: SHIPPED | OUTPATIENT
Start: 2018-08-01 | End: 2018-08-08

## 2018-08-01 RX ORDER — IBUPROFEN 200 MG
200-400 TABLET ORAL EVERY 6 HOURS PRN
Status: DISCONTINUED | OUTPATIENT
Start: 2018-08-01 | End: 2018-08-02 | Stop reason: HOSPADM

## 2018-08-01 RX ORDER — CEFAZOLIN SODIUM 1 G/50ML
1 INJECTION, SOLUTION INTRAVENOUS EVERY 8 HOURS
Status: DISCONTINUED | OUTPATIENT
Start: 2018-08-01 | End: 2018-08-02 | Stop reason: HOSPADM

## 2018-08-01 RX ORDER — LIDOCAINE 40 MG/G
CREAM TOPICAL
Status: DISCONTINUED | OUTPATIENT
Start: 2018-08-01 | End: 2018-08-02 | Stop reason: HOSPADM

## 2018-08-01 RX ADMIN — ACETAMINOPHEN 650 MG: 325 TABLET, FILM COATED ORAL at 20:47

## 2018-08-01 RX ADMIN — CYCLOBENZAPRINE HYDROCHLORIDE 5 MG: 5 TABLET, FILM COATED ORAL at 16:06

## 2018-08-01 RX ADMIN — CEFAZOLIN SODIUM 1 G: 1 INJECTION, SOLUTION INTRAVENOUS at 02:03

## 2018-08-01 RX ADMIN — ACETAMINOPHEN 650 MG: 325 TABLET, FILM COATED ORAL at 09:57

## 2018-08-01 RX ADMIN — IBUPROFEN 400 MG: 200 TABLET, FILM COATED ORAL at 21:07

## 2018-08-01 RX ADMIN — CEFAZOLIN SODIUM 1 G: 1 INJECTION, SOLUTION INTRAVENOUS at 17:10

## 2018-08-01 RX ADMIN — CEFAZOLIN SODIUM 1 G: 1 INJECTION, SOLUTION INTRAVENOUS at 09:25

## 2018-08-01 RX ADMIN — OMEPRAZOLE 20 MG: 20 CAPSULE, DELAYED RELEASE ORAL at 09:17

## 2018-08-01 RX ADMIN — APIXABAN 2.5 MG: 2.5 TABLET, FILM COATED ORAL at 20:48

## 2018-08-01 RX ADMIN — ONDANSETRON 4 MG: 4 TABLET, ORALLY DISINTEGRATING ORAL at 06:08

## 2018-08-01 RX ADMIN — SIMVASTATIN 10 MG: 10 TABLET, FILM COATED ORAL at 21:07

## 2018-08-01 RX ADMIN — APIXABAN 2.5 MG: 2.5 TABLET, FILM COATED ORAL at 09:49

## 2018-08-01 ASSESSMENT — PAIN DESCRIPTION - DESCRIPTORS
DESCRIPTORS: ACHING
DESCRIPTORS: ACHING

## 2018-08-01 ASSESSMENT — ENCOUNTER SYMPTOMS
APPETITE CHANGE: 1
NAUSEA: 1

## 2018-08-01 NOTE — PROGRESS NOTES
[facility]    Hospitalist Progress Note    Date of Service (when I saw the patient): 08/01/2018    Assessment & Plan   Genie Persaud is a 82 year old female with asthma, HTN, HLP, GERD, thoracic aortic aneurysm, paroxysmal A. fib, CVA, piriformis syndrome and nonobstructive CAD who was admitted on 8/1/2018 with a right wrist cellulitis    - right wrist cellulitis- has improved on Ancef- cont. Still has some swelling and erythema. Will continue today    - neck and knee pain today- likely musculoskeletal. Improved when OOB/walking    - Parox a fib- cont meds    - chronic leukocytosis- stable    - CVA- stable. Cont meds    - HTN- cont meds    - patient hesitant to DC home today given her wrist is still swollen.      DVT Prophylaxis: on eliquis  Code Status: Full Code    Disposition: Expected discharge in 1 days   Cristhian Perry    Interval History   Patient doing better today. Hand less swollen/red. Does have some neck pain and right knee pain (behind the knee). No chest pain, sob, abdo pain, n/v/d    -Data reviewed today: I reviewed all new labs and imaging results over the last 24 hours. I personally reviewed no images or EKG's today.    Physical Exam   Temp: 98.4  F (36.9  C) Temp src: Oral BP: (!) 106/32   Heart Rate: 78 Resp: 18 SpO2: 92 % O2 Device: None (Room air) Oxygen Delivery: 1 LPM  Vitals:    08/01/18 0006 08/01/18 0241   Weight: 62.6 kg (138 lb) 65.4 kg (144 lb 3.2 oz)     Vital Signs with Ranges  Temp:  [97.2  F (36.2  C)-99.2  F (37.3  C)] 98.4  F (36.9  C)  Heart Rate:  [71-81] 78  Resp:  [16-18] 18  BP: (106-163)/(32-77) 106/32  SpO2:  [90 %-99 %] 92 %       Constitutional: Awake, alert, cooperative, no apparent distress   Respiratory: Clear to auscultation bilaterally, no crackles or wheezing   Cardiovascular: Regular rate and rhythm, normal S1 and S2, and no murmur noted   Abdomen: Normal bowel sounds, soft, non-distended, non-tender   Skin: No rashes, no cyanosis, dry to touch    Neuro: Alert and oriented x3, no weakness, numbness, memory loss   Extremities: Right wrist swelling/erythema- improved from outlined area. Right leg- no swelling or erythema. Has some tenderness behind knee, no calf tenderness   Other(s):        All other systems: Negative     Medications       apixaban ANTICOAGULANT  2.5 mg Oral BID     ceFAZolin  1 g Intravenous Q8H     fluticasone-salmeterol  2 puff Inhalation BID     losartan  100 mg Oral Daily     metoprolol succinate  75 mg Oral Daily     omeprazole  20 mg Oral Daily     simvastatin  10 mg Oral At Bedtime     sodium chloride (PF)  3 mL Intracatheter Q8H       Data     Recent Labs  Lab 08/01/18  0021   WBC 15.2*   HGB 10.6*   MCV 93         POTASSIUM 4.3   CHLORIDE 102   CO2 25   BUN 18   CR 0.80   ANIONGAP 6   ELROY 8.2*   *       Recent Results (from the past 24 hour(s))   US Upper Extremity Venous Duplex Right    Narrative    US UPPER EXTREMITY VENOUS DUPLEX RIGHT  8/1/2018 1:21 AM     HISTORY: Right arm swelling.    COMPARISON: None.    FINDINGS: Gray-scale, color and Doppler spectral analysis ultrasound  was performed of the right arm. Compression and augmentation imaging  was performed.    There is no evidence for deep venous thrombosis. The veins compress  and augment normally.      Impression    IMPRESSION: No DVT.   XR Wrist Right G/E 3 Views    Narrative    XR WRIST RT G/E 3 VW  8/1/2018 1:24 AM     HISTORY: Swelling.    COMPARISON: None.       Impression    IMPRESSION: No acute fracture or dislocation. 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

## 2018-08-01 NOTE — PROGRESS NOTES
"Problem: Patient Care Overview  Goal: Plan of Care/Patient Progress Review  Outcome: Improving  PRIMARY DIAGNOSIS: SOFT TISSUE INFECTIONS  OUTPATIENT/OBSERVATION GOALS TO BE MET BEFORE DISCHARGE:  1. Vitals sign stable or return to baseline: Yes     2. Tolerating oral antibiotics or has home infusion set up if applicable: Yes     3. Pain status: Improved-controlled with oral pain medications.     4. Return to near baseline physical activity: No     Discharge Planner Nurse   Safe discharge environment identified: Yes  Barriers to discharge: Yes       Entered by: Johnna Dhaliwal 08/01/2018 1:04 PM  Please review provider order for any additional goals.      Nurse to notify provider when observation goals have been met and patient is ready for discharge.     Patient is alert and oriented x4. VS WNL and documented. Lung sounds clear in all lobes. Patient is on RA. (R) hand cellulitis is marked. Erythema/inflammation inside of the marked area.  Patient on Abx's for infection. Patient also complaining of (R) knee pain and neck pain. Pain is being treated with Tylenol. Patient is a SBA with walker. Tolerating fluids and food. Patient able to make needs known by using the call light. Patient starting on Flexeril (Dr. Perry will place order). Heat also being used for neck pain. Plan: Patient will spend one more night. Patient expressed concern that she's not ready to go home. Patient lives alone with her cats.   BP (!) 106/32 (BP Location: Right arm)  Temp 98.4  F (36.9  C) (Oral)  Resp 18  Ht 1.575 m (5' 2\")  Wt 65.4 kg (144 lb 3.2 oz)  SpO2 92%  BMI 26.37 kg/m2    "

## 2018-08-01 NOTE — ED PROVIDER NOTES
History     Chief Complaint:  Right wrist pain     HPI   Genie Persaud is a 82 year old female with history of arthritis and atrial fibrillation who presents to the emergency department via EMS today for evaluation of right wrist pain. The patient reports that she was seen for a CT scan with contrast on 7/30, during which time she had an IV placed in her right forearm and had to hold her arm over her head. The patient states that she then went to bed and woke with swelling and pain in her right lateral rist, palm, and fingers. In addition to her pain, the patient endorses nausea, dry heaving, and decreased appetite. This prompted her to call EMS. En route EMS states that they provided 100 Fentanyl and 4 mg Zofran with relief. The patient denies fever, numbness, tingling, or known trauma that could have caused her pain. Of note, the patient has not taken her Eliquis for one to two weeks due to a cardiac evaluation in process and belief of her cardiologist that she will be able to stop taking this.     Allergies:  Fosamax  Augmentin  Evista  Flu Virus Vaccine     Medications:    Albuterol  Astelin  Advair  Atrovent  Cozaar  Toprol  Prilosec  Zocor    Past Medical History:    Anemia  Basal cell carcinoma  CAD  Hypertension  Hyperlipidemia  Impaired fasting glucose  Asthma  Nonrheumatic aortic valve insufficiency  Osteopenia  Atrial fibrillation  Pulmonary nodule  Stress induced cardiomyopathy  Stroke  SVT  Swelling, mass, or lump in head and neck  Thoracic aortic aneurysm without rupture  Arthropathy  Vasculitis    Past Surgical History:    Appendectomy  Ligate fallopian tube  Stereotactic breast biopsy  Cholecystectomy  Dilation and Curettage   Tonsillectomy and Adenoidectomy    Family History:    Mother: hypertension, osteoporosis, CAD  Father: scleroderma  Paternal Grandmother: cerebrovascular disease  Child: breast cancer    Social History:  Smoking Status: Never Smoker  Smokeless Tobacco: Never Used  Alcohol  Use: Positive  Marital Status:  Single      Review of Systems   Constitutional: Positive for appetite change (decrease).   Gastrointestinal: Positive for nausea.   Musculoskeletal:        Right hand pain   All other systems reviewed and are negative.    Physical Exam     Patient Vitals for the past 24 hrs:   BP Temp Heart Rate Resp SpO2 Weight   08/01/18 0006 163/47 97.5  F (36.4  C) 76 18 90 % 62.6 kg (138 lb)     Physical Exam  General: Patient is alert and interactive when I enter the room  Head:  The scalp, face, and head appear normal  Eyes:  Conjunctivae are normal  ENT:    The nose is normal    Pinnae are normal    External acoustic canals are normal  Neck:  Trachea midline  CV:  Pulses are aidan  Resp:  No respiratory distress   Abdomen:      Soft, non-tender, non-distended  Musc:  Normal muscular tone    Erythema to right wrist and edema to dorsum and volar aspect of wrist, painless ROM of wrist, warm to touch, no fluctuance  Skin:  No rash or lesions noted  Neuro:  Speech is normal and fluent. Face is symmetric.     Moving all extremities well.   Psych: Awake. Alert.  Normal affect.  Appropriate interactions.    Emergency Department Course     Imaging:  Radiology findings were communicated with the patient who voiced understanding of the findings.    XR Wrist Right G/E 3 Views  No acute fracture or dislocation. 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  Reading per radiology    US Upper Extremity Venous Duplex Right  No DVT.  Reading per radiology    Laboratory:  Laboratory findings were communicated with the patient who voiced understanding of the findings.    Blood Culture: pending  CBC: WBC 15.2 (H), HGB 10.6 (L),   BMP: Glucose 121 (H), Calcium 8.2 (L) o/w WNL (Creatinine 0.80)  CRP inflammation: 39.9 (H)    Emergency Department Course:    0000 Nursing notes and vitals reviewed.    0020 I performed an exam of the patient as documented above.     0021 IV was inserted and blood was drawn for laboratory  testing, results above.    0058 The patient was sent for a ultrasound while in the emergency department, results above.     0118 The patient was sent for a x-ray while in the emergency department, results above.     0127 Recheck and update.    0159 I personally reviewed the laboratory and imaging results with the patient and answered all related questions prior to admission.    Impression & Plan      Medical Decision Making:  Genie Persaud is a 82 year old female with a history of atrial fibillation who presents to the emergency department today for evaluation of right wrist swelling. She clearly on exam has an erythematous and swollen right wrist. She has very good range of motion to her wrist. It is warm to touch. She has no trauma to suggest this was a fracture. She did have a recent CT, but her IV was in her AC so it seems like unlikely to be contrast extravasation. It is warm and started to spread proximally so it seems likely cellulitis. She is afebrile and has a white count of 15 however her white count is always elevated. I think is less likely septic arthritis as she clearly has cellulitic changes overlying the skin and has pretty painless range of motion for her wrist. She has no history of MRSA. I will give her Ancef. I did get her one blood culture. She is afebrile and has no sign of severe sepsis. Patient was admitted to medicine for IV antibiotics, continued resolution of her symptoms, and possible orthopedics consultation if worsening symptoms.    Diagnosis:    ICD-10-CM    1. Cellulitis of right upper extremity L03.113 Blood culture ONE site     Disposition:   The patient is admitted into the care of Dr. Cho.    Scribe Disclosure:  I, Jim Guillory, am serving as a scribe at 12:11 AM on 8/1/2018 to document services personally performed by Tatyana Rojas MD based on my observations and the provider's statements to me.    [facility] EMERGENCY DEPARTMENT       Tatyana Rojas,  MD  08/01/18 1019

## 2018-08-01 NOTE — IP AVS SNAPSHOT
MRN:[id number]                      After Visit Summary   8/1/2018    Genie Persaud    MRN: [id number]           Thank you!     Thank you for choosing [facility] for your care. Our goal is always to provide you with excellent care. Hearing back from our patients is one way we can continue to improve our services. Please take a few minutes to complete the written survey that you may receive in the mail after you visit. If you would like to speak to someone directly about your visit please contact Patient Relations at [phone]. Thank you!          Patient Information     Date Of Birth          1935        About your hospital stay     You were admitted on:  August 1, 2018 You last received care in the:  [facility] Observation Department    You were discharged on:  August 2, 2018        Reason for your hospital stay       Hand cellulitis                  Who to Call     For medical emergencies, please call 911.  For non-urgent questions about your medical care, please call your primary care provider or clinic, [phone]          Attending Provider     Provider Specialty    Tatyana Rojas MD Emergency Medicine    [facility], Eduardo Jj III, MD Internal Medicine       Primary Care Provider Office Phone # Fax #    Layo Sevilla [phone]      After Care Instructions     Activity       Your activity upon discharge: activity as tolerated            Diet       Follow this diet upon discharge: Regular                  Follow-up Appointments     Follow-up and recommended labs and tests        Follow up with primary care provider, Layo Sevilla, within 5 days for hospital follow- up.  No follow up labs or test are needed.                  Your next 10 appointments already scheduled     Aug 16, 2018  9:30 AM CDT   Return Visit with Barak Hernandez MD   [facility] ([facility])    [address]  "[address]  [facility] [id number] [phone]            Sep 05, 2018  2:45 PM CDT   Ech Complete with [id number] ([facility])    [address]  [facility] [address] [phone]           1.  Please bring or wear a comfortable two-piece outfit. 2.  You may eat, drink and take your normal medicines. 3.  For any questions that cannot be answered, please contact the ordering physician 4.  Please do not wear perfumes or scented lotions on the day of your exam. ***Please check-in at the [place] Registration Office located in [address] in the [facility] building. When you are finished registering, please go to [address] and have a seat. The technician will call your name for the test.                         Pending Results     Date and Time Order Name Status Description    8/1/2018 0245 Blood culture Preliminary     8/1/2018 0129 Blood culture ONE site Preliminary             Statement of Approval     Ordered          08/02/18 1023  I have reviewed and agree with all the recommendations and orders detailed in this document.  EFFECTIVE NOW     Approved and electronically signed by:  Cristhian Perry MD           08/02/18 1021  I have reviewed and agree with all the recommendations and orders detailed in this document.  EFFECTIVE NOW     Approved and electronically signed by:  Cristhian Perry MD             Admission Information     Date & Time Provider Department Dept. Phone    8/1/2018 Eduardo Cho III, MD [facility] Observation Department [phone]      Your Vitals Were     Blood Pressure Temperature Respirations Height Weight Pulse Oximetry    144/44 (BP Location: Right arm) 98.2  F (36.8  C) (Oral) 16 1.575 m (5' 2\") 65.4 kg (144 lb 3.2 oz) 91%    BMI (Body Mass Index)                   26.37 kg/m2           Care EveryWhere ID     This is your Care EveryWhere ID. This could be used by " other organizations to access your [facility] medical records  [id number]        Equal Access to Services     BRENDEN RENDON : Arelis Mcgill, coty lyon, david de souzamaramos goss, ora brownjoneljuliana lange. So [facility] [phone].    ATENCIÓN: Si habla español, tiene a patel disposición servicios gratuitos de asistencia lingüística. Llame al [phone].    We comply with applicable federal civil rights laws and [place] laws. We do not discriminate on the basis of race, color, national origin, age, disability, sex, sexual orientation, or gender identity.               Review of your medicines      START taking        Dose / Directions    cephALEXin 500 MG capsule   Commonly known as:  KEFLEX   Used for:  Cellulitis of right upper extremity        Dose:  500 mg   Take 1 capsule (500 mg) by mouth 2 times daily for 7 days   Quantity:  14 capsule   Refills:  0         CONTINUE these medicines which have NOT CHANGED        Dose / Directions    acetaminophen 500 MG tablet   Commonly known as:  TYLENOL        Dose:  500-1000 mg   Take 500-1,000 mg by mouth every 8 hours as needed for mild pain   Refills:  0       albuterol 108 (90 Base) MCG/ACT Inhaler   Commonly known as:  PROAIR HFA/PROVENTIL HFA/VENTOLIN HFA        Dose:  2 puff   Inhale 2 puffs into the lungs every 6 hours as needed   Refills:  0       * ELIQUIS 2.5 MG tablet   Generic drug:  apixaban ANTICOAGULANT        Dose:  2.5 mg   Take 2.5 mg by mouth 2 times daily   Refills:  0       * apixaban ANTICOAGULANT 2.5 MG tablet   Commonly known as:  ELIQUIS   Used for:  Atrial fibrillation, unspecified type (H)        Dose:  2.5 mg   Take 1 tablet (2.5 mg) by mouth 2 times daily   Quantity:  30 tablet   Refills:  0       azelastine 0.1 % spray   Commonly known as:  ASTELIN        Dose:  2 spray   Spray 2 sprays into both nostrils 2 times daily   Refills:  0       CALCIUM CITRATE + 315-200 MG-UNIT Tabs per tablet   Generic drug:   calcium citrate-vitamin D        Dose:  2 tablet   Take 2 tablets by mouth every evening   Refills:  0       fluticasone-salmeterol 230-21 MCG/ACT inhaler   Commonly known as:  ADVAIR-HFA        Dose:  2 puff   Inhale 2 puffs into the lungs 2 times daily   Quantity:  12 g   Refills:  1       ipratropium 0.03 % spray   Commonly known as:  ATROVENT        Dose:  2 spray   Spray 2 sprays in nostril 2 times daily   Refills:  0       latanoprost 0.005 % ophthalmic solution   Commonly known as:  XALATAN        Dose:  1 drop   Place 1 drop into both eyes At Bedtime   Refills:  0       losartan 100 MG tablet   Commonly known as:  COZAAR   Used for:  Essential hypertension with goal blood pressure less than 140/90        Dose:  100 mg   Take 1 tablet (100 mg) by mouth daily   Quantity:  90 tablet   Refills:  3       metoprolol succinate 50 MG 24 hr tablet   Commonly known as:  TOPROL-XL        Dose:  100 mg   Take 100 mg by mouth daily   Refills:  0       omeprazole 20 MG CR capsule   Commonly known as:  priLOSEC   Used for:  Candidal esophagitis (H)        Dose:  20 mg   Take 1 capsule (20 mg) by mouth daily   Quantity:  90 capsule   Refills:  1       simvastatin 20 MG tablet   Commonly known as:  ZOCOR   Used for:  Hyperlipidemia LDL goal <130        take 1/2 tablet by mouth daily at bedtime   Quantity:  45 tablet   Refills:  2       * Notice:  This list has 2 medication(s) that are the same as other medications prescribed for you. Read the directions carefully, and ask your doctor or other care provider to review them with you.         Where to get your medicines      These medications were sent to [facility] [id number] [facility] [address]     Phone:  [phone]     apixaban ANTICOAGULANT 2.5 MG tablet    cephALEXin 500 MG capsule                Protect others around you: Learn how to safely use, store and throw away your medicines at  www.disposemymeds.org.        ANTIBIOTIC INSTRUCTION     You've Been Prescribed an Antibiotic - Now What?  Your healthcare team thinks that you or your loved one might have an infection. Some infections can be treated with antibiotics, which are powerful, life-saving drugs. Like all medications, antibiotics have side effects and should only be used when necessary. There are some important things you should know about your antibiotic treatment.      Your healthcare team may run tests before you start taking an antibiotic.    Your team may take samples (e.g., from your blood, urine or other areas) to run tests to look for bacteria. These test can be important to determine if you need an antibiotic at all and, if you do, which antibiotic will work best.      Within a few days, your healthcare team might change or even stop your antibiotic.    Your team may start you on an antibiotic while they are working to find out what is making you sick.    Your team might change your antibiotic because test results show that a different antibiotic would be better to treat your infection.    In some cases, once your team has more information, they learn that you do not need an antibiotic at all. They may find out that you don't have an infection, or that the antibiotic you're taking won't work against your infection. For example, an infection caused by a virus can't be treated with antibiotics. Staying on an antibiotic when you don't need it is more likely to be harmful than helpful.      You may experience side effects from your antibiotic.    Like all medications, antibiotics have side effects. Some of these can be serious.    Let you healthcare team know if you have any known allergies when you are admitted to the hospital.    One significant side effect of nearly all antibiotics is the risk of severe and sometimes deadly diarrhea caused by Clostridium difficile (C. Difficile). This occurs when a person takes antibiotics because  some good germs are destroyed. Antibiotic use allows C. diificile to take over, putting patients at high risk for this serious infection.    As a patient or caregiver, it is important to understand your or your loved one's antibiotic treatment. It is especially important for caregivers to speak up when patients can't speak for themselves. Here are some important questions to ask your healthcare team.    What infection is this antibiotic treating and how do you know I have that infection?    What side effects might occur from this antibiotic?    How long will I need to take this antibiotic?    Is it safe to take this antibiotic with other medications or supplements (e.g., vitamins) that I am taking?     Are there any special directions I need to know about taking this antibiotic? For example, should I take it with food?    How will I be monitored to know whether my infection is responding to the antibiotic?    What tests may help to make sure the right antibiotic is prescribed for me?      Information provided by:  www.cdc.gov/getsmart  U.S. Department of Health and Human Services  Centers for disease Control and Prevention  National Center for Emerging and Zoonotic Infectious Diseases  Division of Healthcare Quality Promotion             Medication List: This is a list of all your medications and when to take them. Check marks below indicate your daily home schedule. Keep this list as a reference.      Medications           Morning Afternoon Evening Bedtime As Needed    acetaminophen 500 MG tablet   Commonly known as:  TYLENOL   Take 500-1,000 mg by mouth every 8 hours as needed for mild pain   Last time this was given:  650 mg on 8/1/2018  8:47 PM                                albuterol 108 (90 Base) MCG/ACT Inhaler   Commonly known as:  PROAIR HFA/PROVENTIL HFA/VENTOLIN HFA   Inhale 2 puffs into the lungs every 6 hours as needed                                * ELIQUIS 2.5 MG tablet   Take 2.5 mg by mouth 2 times  daily   Last time this was given:  2.5 mg on 8/2/2018  8:28 AM   Generic drug:  apixaban ANTICOAGULANT                                * apixaban ANTICOAGULANT 2.5 MG tablet   Commonly known as:  ELIQUIS   Take 1 tablet (2.5 mg) by mouth 2 times daily   Last time this was given:  2.5 mg on 8/2/2018  8:28 AM                                azelastine 0.1 % spray   Commonly known as:  ASTELIN   Spray 2 sprays into both nostrils 2 times daily                                CALCIUM CITRATE + 315-200 MG-UNIT Tabs per tablet   Take 2 tablets by mouth every evening   Generic drug:  calcium citrate-vitamin D                                cephALEXin 500 MG capsule   Commonly known as:  KEFLEX   Take 1 capsule (500 mg) by mouth 2 times daily for 7 days                                fluticasone-salmeterol 230-21 MCG/ACT inhaler   Commonly known as:  ADVAIR-HFA   Inhale 2 puffs into the lungs 2 times daily                                ipratropium 0.03 % spray   Commonly known as:  ATROVENT   Spray 2 sprays in nostril 2 times daily                                latanoprost 0.005 % ophthalmic solution   Commonly known as:  XALATAN   Place 1 drop into both eyes At Bedtime                                losartan 100 MG tablet   Commonly known as:  COZAAR   Take 1 tablet (100 mg) by mouth daily   Last time this was given:  100 mg on 8/2/2018  8:27 AM                                metoprolol succinate 50 MG 24 hr tablet   Commonly known as:  TOPROL-XL   Take 100 mg by mouth daily   Last time this was given:  75 mg on 8/2/2018  8:28 AM                                omeprazole 20 MG CR capsule   Commonly known as:  priLOSEC   Take 1 capsule (20 mg) by mouth daily   Last time this was given:  20 mg on 8/2/2018  8:28 AM                                simvastatin 20 MG tablet   Commonly known as:  ZOCOR   take 1/2 tablet by mouth daily at bedtime   Last time this was given:  10 mg on 8/1/2018  9:07 PM                                *  Notice:  This list has 2 medication(s) that are the same as other medications prescribed for you. Read the directions carefully, and ask your doctor or other care provider to review them with you.

## 2018-08-01 NOTE — TELEPHONE ENCOUNTER
OV with Dr. Hernandez 8-16-18.   CT Aorta 8-1-18  Ascending thoracic aortic aneurysm with a maximum  diameter of 4.4 cm.

## 2018-08-01 NOTE — ED TRIAGE NOTES
Alert and oriented x 3 airway,breathing and circulation intact, rt wrist pain since yesterday, rt wrist is red and swollen

## 2018-08-01 NOTE — PHARMACY-ADMISSION MEDICATION HISTORY
Admission medication history interview status for this patient is complete. See [facility] admission navigator for allergy information, prior to admission medications and immunization status.     Medication history interview source(s):Patient  Medication history resources (including written lists, pill bottles, clinic record):[facility] List  Primary pharmacy:[facility]    Changes made to [facility] medication list:  Added: Xalatan  Deleted: None  Changed: Metoprolol from 75mg to 100mg  - Patient had not been taking her Eliquis for the past 2 weeks in anticipation of her Cardiologist possibly discontinuing it.    Actions taken by pharmacist (provider contacted, etc):None     Additional medication history information:None    Medication reconciliation/reorder completed by provider prior to medication history? No    Do you take OTC medications (eg tylenol, ibuprofen, fish oil, eye/ear drops, etc)? Y(    Prior to Admission medications    Medication Sig Last Dose Taking? Auth Provider   apixaban ANTICOAGULANT (ELIQUIS) 2.5 MG tablet Take 1 tablet (2.5 mg) by mouth 2 times daily  Yes Cristhian Perry MD   apixaban ANTICOAGULANT (ELIQUIS) 2.5 MG tablet Take 2.5 mg by mouth 2 times daily 2 weeks at Unknown time Yes Unknown, Entered By History   azelastine (ASTELIN) 0.1 % spray Spray 2 sprays into both nostrils 2 times daily 7/31/2018 at am Yes Unknown, Entered By History   calcium citrate-vitamin D (CALCIUM CITRATE +) 315-200 MG-UNIT TABS Take 2 tablets by mouth every evening  7/30/2018 at pm Yes Reported, Patient   cephALEXin (KEFLEX) 500 MG capsule Take 1 capsule (500 mg) by mouth 2 times daily for 7 days  Yes Cristhian Perry MD   fluticasone-salmeterol (ADVAIR-HFA) 230-21 MCG/ACT inhaler Inhale 2 puffs into the lungs 2 times daily 7/31/2018 at am Yes Layo Sevilla MD   ipratropium (ATROVENT) 0.03 % spray Spray 2 sprays in nostril 2 times daily 7/31/2018 at am Yes Reported, Patient   latanoprost (XALATAN) 0.005 % ophthalmic solution  Place 1 drop into both eyes At Bedtime 7/30/2018 at pm Yes Unknown, Entered By History   losartan (COZAAR) 100 MG tablet Take 1 tablet (100 mg) by mouth daily 7/30/2018 at pm Yes Layo Sevilla MD   metoprolol succinate (TOPROL-XL) 50 MG 24 hr tablet Take 100 mg by mouth daily 7/31/2018 at am Yes Unknown, Entered By History   omeprazole (PRILOSEC) 20 MG CR capsule Take 1 capsule (20 mg) by mouth daily 7/31/2018 at am Yes Layo Sevilla MD   simvastatin (ZOCOR) 20 MG tablet take 1/2 tablet by mouth daily at bedtime 7/30/2018 at pm Yes Layo Sevilla MD   acetaminophen (TYLENOL) 500 MG tablet Take 500-1,000 mg by mouth every 8 hours as needed for mild pain Unknown at Unknown time  Reported, Patient   albuterol (PROAIR HFA, PROVENTIL HFA, VENTOLIN HFA) 108 (90 BASE) MCG/ACT inhaler Inhale 2 puffs into the lungs every 6 hours as needed  Unknown at Unknown time  Reported, Patient

## 2018-08-01 NOTE — H&P
[facility]    History and Physical  Hospitalist       Date of Admission:  8/1/2018    Assessment & Plan   Genie Persaud is a 82 year old female with a past medical history of asthma, hypertension, dyslipidemia, GERD, thoracic aortic aneurysm, paroxysmal A. fib, CVA, piriformis syndrome and nonobstructive CAD who presents to the emergency department for right hand swelling and pain for 1 day.    Right Wrist Cellulitis  -Unremarkable Ultrasound/XRay  -No visible or palpable collection or focus  -Certainly possible this is related to contrast CT from yesterday, though atypical  -Continue ancef, if she is improving I suspect could go home tomorrow on oral antibiotic    Chronically elevated WBC Count  -Noted for >1 year on review of labs  -Continue to follow with PCP    Hx of pAF and CVA  -Toprol, Apixaban  -Statin    Hypertension  -Losartan, Toprol      # Pain Assessment:  Current Pain Score 8/1/2018   Patient currently in pain? -   Pain score (0-10) 5   Genie harrison pain level was assessed and she currently denies pain.       DVT Prophylaxis: Low Risk/Ambulatory with no VTE prophylaxis indicated  Code Status: Full Code    Disposition: Expected discharge in 1-2 days    Eduardo Cho III, MD    Primary Care Physician   Layo Sevilla    Chief Complaint   Right hand swelling and pain    History is obtained from the patient, ED physician and review of records.    History of Present Illness   Genie Persaud is a 82 year old female with a past medical history of asthma, hypertension, dyslipidemia, GERD, thoracic aortic aneurysm, paroxysmal A. fib, CVA, piriformis syndrome and nonobstructive CAD who presents to the emergency department for right hand swelling and pain for 1 day.    Yesterday the patient had an outpatient CTA of her chest for monitoring of thoracic aortic aneurysm.  She had a peripheral catheter placed in her right AC for IV dye and had no issues immediately post scan.    Today however the patient  noticed redness and swelling at her distal right wrist and over the course of the day the redness and swelling began to spread up her forearm.  It was very uncomfortable and she had increasingly impaired use of the hands so she sought evaluation in the emergency department.    In the emergency department she was found to have a CRP elevated at 39, leukocytosis at 15 (though it is notable that her WBC count has been consistently elevated for the last year or more) and unremarkable x-ray/ultrasound of the extremity.  She was started on Ancef and placed in observation.    At the time of my interview she is generally comfortable.  She had a little nausea earlier which improved with medication in the emergency department.      Past Medical History    I have reviewed this patient's medical history and updated it with pertinent information if needed.   Past Medical History:   Diagnosis Date     Anemia 3/10/2009    Chronic disease, by Fe studies; awaiting full GI w/u and repeat colonoscopy, ERCP.     Basal Cell Carcinoma--back      Coronary artery disease involving native coronary artery of native heart without angina pectoris 3/9/2017    3/2/2017 - Two vessel coronary artery disease with mLAD 50% stenosis, D3 50% stenosis, pLCx 50% stenosis and mLCx 50% stenosis     Essential hypertension with goal blood pressure less than 140/90 9/1/2016    White coat hypertension;  24 hour ambulatory monitoring normal. Do not titrate up further.       Hyperlipidemia LDL goal <130 2/10/2010     Impaired fasting glucose 8/26/2010     Moderate persistent asthma      Nonrheumatic aortic valve insufficiency 6/29/2017     osteopenia      Paroxysmal atrial fibrillation (H) 12/26/2017     Pulmonary nodule 3/3/2009    PET scan reportedly normal; biopsy not advised.     Stress-induced cardiomyopathy 11/16/2017     Stroke (H) 10/18/2013    Retinal artery, discovered by ophthlamology      SVT -noted during Cath procedure 3/28/2017     Swelling,  mass, or lump in head and neck     benign nodule thyroid     Thoracic aortic aneurysm without rupture (H) 5/10/2011    CT next due 7/13; refer if > 5 cm, or if > 1 cm/year growth.  Problem list name updated by automated process. Provider to review     Unspecified arthropathy, hand      Vasculitis (H) 4/20/2009    Possible increased activity of thoracic aorta, on PET scan w/u for pulmonary nodule.        Past Surgical History   I have reviewed this patient's surgical history and updated it with pertinent information if needed.  Past Surgical History:   Procedure Laterality Date     C APPENDECTOMY  1957     C LIGATE FALLOPIAN TUBE  1971     C STEREOTACTIC BREAST BIOPSY  2001     CHOLECYSTECTOMY, LAPOROSCOPIC  2008    Cholecystectomy, Laparoscopic     ESOPHAGOSCOPY, GASTROSCOPY, DUODENOSCOPY (EGD), COMBINED  5/17/2013    Procedure: COMBINED ESOPHAGOSCOPY, GASTROSCOPY, DUODENOSCOPY (EGD), BIOPSY SINGLE OR MULTIPLE;  ESOPHAGOSCOPY, GASTROSCOPY, DUODENOSCOPY (EGD)  with bx;  Surgeon: Jeffery Yanez MD;  Location:  GI     HC DILATION/CURETTAGE DIAG/THER NON OB  1967,1971     HC REMOVE TONSILS/ADENOIDS,<11 Y/O         Prior to Admission Medications   Prior to Admission Medications   Prescriptions Last Dose Informant Patient Reported? Taking?   acetaminophen (TYLENOL) 500 MG tablet  Self Yes Yes   Sig: Take 500-1,000 mg by mouth every 8 hours as needed for mild pain   albuterol (PROAIR HFA, PROVENTIL HFA, VENTOLIN HFA) 108 (90 BASE) MCG/ACT inhaler  Self Yes Yes   Sig: Inhale 2 puffs into the lungs every 6 hours as needed    apixaban ANTICOAGULANT (ELIQUIS) 2.5 MG tablet   No Yes   Sig: Take 1 tablet (2.5 mg) by mouth 2 times daily   azelastine (ASTELIN) 0.1 % spray   Yes Yes   Sig: Spray 2 sprays into both nostrils 2 times daily   calcium citrate-vitamin D (CALCIUM CITRATE +) 315-200 MG-UNIT TABS  Self Yes Yes   Sig: Take 2 tablets by mouth every evening    fluticasone-salmeterol (ADVAIR-HFA) 230-21 MCG/ACT inhaler   Self Yes Yes   Sig: Inhale 2 puffs into the lungs 2 times daily   ipratropium (ATROVENT) 0.03 % spray   Yes Yes   Sig: Spray 2 sprays in nostril 2 times daily   losartan (COZAAR) 100 MG tablet   No Yes   Sig: Take 1 tablet (100 mg) by mouth daily   metoprolol succinate (TOPROL XL) 50 MG 24 hr tablet   No Yes   Sig: Take 1.5 tablets (75 mg) by mouth daily   omeprazole (PRILOSEC) 20 MG CR capsule   No Yes   Sig: Take 1 capsule (20 mg) by mouth daily   simvastatin (ZOCOR) 20 MG tablet   No Yes   Sig: take 1/2 tablet by mouth daily at bedtime      Facility-Administered Medications: None     Allergies   Allergies   Allergen Reactions     Fosamax [Alendronic Acid] GI Disturbance     Augmentin [Amoxicillin-Pot Clavulanate] Diarrhea     Diarrhea and rash     Evista [Raloxifene]      abd symptoms.      Flu Virus Vaccine      swollen and red at inj site       Social History   I have reviewed this patient's social history and updated it with pertinent information if needed. Genie Persaud  reports that she has never smoked. She has never used smokeless tobacco. She reports that she drinks about 0.6 - 1.2 oz of alcohol per week  She reports that she does not use illicit drugs.    Family History   I have reviewed this patient's family history and updated it with pertinent information if needed.   Family History   Problem Relation Age of Onset     Hypertension Mother      Osteoperosis Mother      C.A.D. Mother      Connective Tissue Disorder Father      scleraderma     Cerebrovascular Disease Paternal Grandmother      Prostate Cancer Other      9/05 passed away due to complications     Breast Cancer Child      youngest dtr       Review of Systems   The 10 point Review of Systems is negative other than noted in the HPI or here.     Physical Exam   Temp: 97.5  F (36.4  C)   BP: 163/47   Heart Rate: 76 Resp: 18 SpO2: 90 %      Vital Signs with Ranges  Temp:  [97.5  F (36.4  C)] 97.5  F (36.4  C)  Heart Rate:  [76] 76  Resp:  [18]  18  BP: (163)/(47) 163/47  SpO2:  [90 %] 90 %  138 lbs 0 oz    Constitutional: NAD. Comfortable, well-developed   Eyes: Anicteric, no conjunctival injection  ENT: Atraumatic, membranes moist   Neck: Supple, trachea midline  Respiratory: No wheeze or crackles. Breathing comfortably on room air  Cardiovascular: S1S2, no edema  GI: Abdomen soft, non-tender  Skin: Warm, pink, dry.  Erythematous and slightly edematous area extending from her right wrist proximally about one third of the way up her forearm without specific focus or lesions  Neurologic: Alert and oriented. CN II - XII grossly intact  Psychiatric: Pleasant, cooperative    Data   Data reviewed today:  I personally reviewed no images or EKG's today.    Recent Labs  Lab 08/01/18  0021   WBC 15.2*   HGB 10.6*   MCV 93         POTASSIUM 4.3   CHLORIDE 102   CO2 25   BUN 18   CR 0.80   ANIONGAP 6   ELROY 8.2*   *       Imaging:  Recent Results (from the past 24 hour(s))   US Upper Extremity Venous Duplex Right    Narrative    US UPPER EXTREMITY VENOUS DUPLEX RIGHT  8/1/2018 1:21 AM     HISTORY: Right arm swelling.    COMPARISON: None.    FINDINGS: Gray-scale, color and Doppler spectral analysis ultrasound  was performed of the right arm. Compression and augmentation imaging  was performed.    There is no evidence for deep venous thrombosis. The veins compress  and augment normally.      Impression    IMPRESSION: No DVT.   XR Wrist Right G/E 3 Views    Narrative    XR WRIST RT G/E 3 VW  8/1/2018 1:24 AM     HISTORY: Swelling.    COMPARISON: None.       Impression    IMPRESSION: No acute fracture or dislocation. 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

## 2018-08-01 NOTE — IP AVS SNAPSHOT
[facility] Observation Department    [address]    [facility] [address]    Phone:  [phone]                                       After Visit Summary   8/1/2018    Genie Persaud    MRN: [id number]           After Visit Summary Signature Page     I have received my discharge instructions, and my questions have been answered. I have discussed any challenges I see with this plan with the nurse or doctor.    ..........................................................................................................................................  Patient/Patient Representative Signature      ..........................................................................................................................................  Patient Representative Print Name and Relationship to Patient    ..................................................               ................................................  Date                                            Time    ..........................................................................................................................................  Reviewed by Signature/Title    ...................................................              ..............................................  Date                                                            Time

## 2018-08-02 VITALS
OXYGEN SATURATION: 95 % | TEMPERATURE: 96.7 F | RESPIRATION RATE: 16 BRPM | HEIGHT: 62 IN | WEIGHT: 144.2 LBS | SYSTOLIC BLOOD PRESSURE: 120 MMHG | BODY MASS INDEX: 26.54 KG/M2 | DIASTOLIC BLOOD PRESSURE: 40 MMHG

## 2018-08-02 LAB
BASOPHILS # BLD AUTO: 0 10E9/L (ref 0–0.2)
BASOPHILS NFR BLD AUTO: 0.3 %
DIFFERENTIAL METHOD BLD: ABNORMAL
EOSINOPHIL # BLD AUTO: 0.1 10E9/L (ref 0–0.7)
EOSINOPHIL NFR BLD AUTO: 1 %
ERYTHROCYTE [DISTWIDTH] IN BLOOD BY AUTOMATED COUNT: 14 % (ref 10–15)
HCT VFR BLD AUTO: 30.5 % (ref 35–47)
HGB BLD-MCNC: 9.7 G/DL (ref 11.7–15.7)
IMM GRANULOCYTES # BLD: 0 10E9/L (ref 0–0.4)
IMM GRANULOCYTES NFR BLD: 0.3 %
LYMPHOCYTES # BLD AUTO: 1.3 10E9/L (ref 0.8–5.3)
LYMPHOCYTES NFR BLD AUTO: 14.4 %
MCH RBC QN AUTO: 29.8 PG (ref 26.5–33)
MCHC RBC AUTO-ENTMCNC: 31.8 G/DL (ref 31.5–36.5)
MCV RBC AUTO: 94 FL (ref 78–100)
MONOCYTES # BLD AUTO: 1.3 10E9/L (ref 0–1.3)
MONOCYTES NFR BLD AUTO: 14.5 %
NEUTROPHILS # BLD AUTO: 6.3 10E9/L (ref 1.6–8.3)
NEUTROPHILS NFR BLD AUTO: 69.5 %
NRBC # BLD AUTO: 0 10*3/UL
NRBC BLD AUTO-RTO: 0 /100
PLATELET # BLD AUTO: 208 10E9/L (ref 150–450)
RBC # BLD AUTO: 3.26 10E12/L (ref 3.8–5.2)
WBC # BLD AUTO: 9.1 10E9/L (ref 4–11)

## 2018-08-02 PROCEDURE — 85025 COMPLETE CBC W/AUTO DIFF WBC: CPT | Performed by: HOSPITALIST

## 2018-08-02 PROCEDURE — 25000128 H RX IP 250 OP 636: Performed by: INTERNAL MEDICINE

## 2018-08-02 PROCEDURE — 25000132 ZZH RX MED GY IP 250 OP 250 PS 637: Performed by: PHYSICIAN ASSISTANT

## 2018-08-02 PROCEDURE — G0378 HOSPITAL OBSERVATION PER HR: HCPCS

## 2018-08-02 PROCEDURE — 96366 THER/PROPH/DIAG IV INF ADDON: CPT | Mod: 59

## 2018-08-02 PROCEDURE — 25000132 ZZH RX MED GY IP 250 OP 250 PS 637: Performed by: INTERNAL MEDICINE

## 2018-08-02 PROCEDURE — 36415 COLL VENOUS BLD VENIPUNCTURE: CPT | Performed by: HOSPITALIST

## 2018-08-02 PROCEDURE — 99217 ZZC OBSERVATION CARE DISCHARGE: CPT | Performed by: HOSPITALIST

## 2018-08-02 RX ADMIN — APIXABAN 2.5 MG: 2.5 TABLET, FILM COATED ORAL at 08:28

## 2018-08-02 RX ADMIN — CEFAZOLIN SODIUM 1 G: 1 INJECTION, SOLUTION INTRAVENOUS at 02:11

## 2018-08-02 RX ADMIN — METOPROLOL SUCCINATE 75 MG: 25 TABLET, EXTENDED RELEASE ORAL at 08:28

## 2018-08-02 RX ADMIN — LOSARTAN POTASSIUM 100 MG: 100 TABLET ORAL at 08:27

## 2018-08-02 RX ADMIN — OMEPRAZOLE 20 MG: 20 CAPSULE, DELAYED RELEASE ORAL at 08:28

## 2018-08-02 RX ADMIN — IBUPROFEN 400 MG: 200 TABLET, FILM COATED ORAL at 08:28

## 2018-08-02 ASSESSMENT — PAIN DESCRIPTION - DESCRIPTORS
DESCRIPTORS: SORE

## 2018-08-02 NOTE — DISCHARGE SUMMARY
[facility]  Discharge Summary  Name: Genie Persaud    MRN: [id number]  YOB: 1935    Age: 82 year old  Date of Discharge:  8/2/2018  Date of Admission: 8/1/2018  Primary Care Provider: Layo Sevilla  Discharge Physician:  Cristhian Perry MD  Discharging Service:  Hospitalist  Date of Service (when I saw the patient): 08/02/18    Discharge Diagnosis:  Hand cellulitis     Other Diagnosis:  asthma, hypertension, dyslipidemia, GERD, thoracic aortic aneurysm, paroxysmal A. fib, CVA, piriformis syndrome and nonobstructive CAD      Discharge Disposition:  Discharged to home     Allergies:  Allergies   Allergen Reactions     Fosamax [Alendronic Acid] GI Disturbance     Augmentin [Amoxicillin-Pot Clavulanate] Diarrhea     Diarrhea and rash     Evista [Raloxifene]      abd symptoms.      Flu Virus Vaccine      swollen and red at inj site        Discharge Medications:   Current Discharge Medication List      START taking these medications    Details   cephALEXin (KEFLEX) 500 MG capsule Take 1 capsule (500 mg) by mouth 2 times daily for 7 days  Qty: 14 capsule, Refills: 0    Associated Diagnoses: Cellulitis of right upper extremity         CONTINUE these medications which have CHANGED    Details   !! apixaban ANTICOAGULANT (ELIQUIS) 2.5 MG tablet Take 1 tablet (2.5 mg) by mouth 2 times daily  Qty: 30 tablet, Refills: 0    Associated Diagnoses: Atrial fibrillation, unspecified type (H)       !! - Potential duplicate medications found. Please discuss with provider.      CONTINUE these medications which have NOT CHANGED    Details   !! apixaban ANTICOAGULANT (ELIQUIS) 2.5 MG tablet Take 2.5 mg by mouth 2 times daily      azelastine (ASTELIN) 0.1 % spray Spray 2 sprays into both nostrils 2 times daily      calcium citrate-vitamin D (CALCIUM CITRATE +) 315-200 MG-UNIT TABS Take 2 tablets by mouth every evening       fluticasone-salmeterol (ADVAIR-HFA) 230-21 MCG/ACT inhaler Inhale 2 puffs into the lungs 2  "times daily  Qty: 12 g, Refills: 1      ipratropium (ATROVENT) 0.03 % spray Spray 2 sprays in nostril 2 times daily      latanoprost (XALATAN) 0.005 % ophthalmic solution Place 1 drop into both eyes At Bedtime      losartan (COZAAR) 100 MG tablet Take 1 tablet (100 mg) by mouth daily  Qty: 90 tablet, Refills: 3    Associated Diagnoses: Essential hypertension with goal blood pressure less than 140/90      metoprolol succinate (TOPROL-XL) 50 MG 24 hr tablet Take 100 mg by mouth daily      omeprazole (PRILOSEC) 20 MG CR capsule Take 1 capsule (20 mg) by mouth daily  Qty: 90 capsule, Refills: 1    Associated Diagnoses: Candidal esophagitis (H)      simvastatin (ZOCOR) 20 MG tablet take 1/2 tablet by mouth daily at bedtime  Qty: 45 tablet, Refills: 2    Associated Diagnoses: Hyperlipidemia LDL goal <130      acetaminophen (TYLENOL) 500 MG tablet Take 500-1,000 mg by mouth every 8 hours as needed for mild pain      albuterol (PROAIR HFA, PROVENTIL HFA, VENTOLIN HFA) 108 (90 BASE) MCG/ACT inhaler Inhale 2 puffs into the lungs every 6 hours as needed        !! - Potential duplicate medications found. Please discuss with provider.           Condition on Discharge:  Discharge condition: Stable   Discharge vitals: Blood pressure 144/44, temperature 98.2  F (36.8  C), temperature source Oral, resp. rate 16, height 1.575 m (5' 2\"), weight 65.4 kg (144 lb 3.2 oz), SpO2 91 %, not currently breastfeeding.   Code status on discharge: Full Code     History of Illness:  See detailed admission note for full details.    82 year old female with a past medical history of asthma, hypertension, dyslipidemia, GERD, thoracic aortic aneurysm, paroxysmal A. fib, CVA, piriformis syndrome and nonobstructive CAD who presents to the emergency department for right hand swelling and pain for 1 day.     Yesterday the patient had an outpatient CTA of her chest for monitoring of thoracic aortic aneurysm.  She had a peripheral catheter placed in her " right AC for IV dye and had no issues immediately post scan.     Today however the patient noticed redness and swelling at her distal right wrist and over the course of the day the redness and swelling began to spread up her forearm.  It was very uncomfortable and she had increasingly impaired use of the hands so she sought evaluation in the emergency department.     In the emergency department she was found to have a CRP elevated at 39, leukocytosis at 15 (though it is notable that her WBC count has been consistently elevated for the last year or more) and unremarkable x-ray/ultrasound of the extremity.  She was started on Ancef and placed in observation.     At the time of my interview she is generally comfortable.  She had a little nausea earlier which improved with medication in the emergency department.    Significant Physical Exam Findings:  Patient up an edge of bed eating breakfast. States she is doing much better. Neck and knee pain gone Hand much better  s1s2 rrr, lungs clear. Hand still has some swelling and erythema, but is much improved    Procedures:  none     Imaging:  Results for orders placed or performed during the hospital encounter of 08/01/18   XR Wrist Right G/E 3 Views    Narrative    XR WRIST RT G/E 3 VW  8/1/2018 1:24 AM     HISTORY: Swelling.    COMPARISON: None.       Impression    IMPRESSION: No acute fracture or dislocation. 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    KRISTEL COLLIER MD   US Upper Extremity Venous Duplex Right    Narrative    US UPPER EXTREMITY VENOUS DUPLEX RIGHT  8/1/2018 1:21 AM     HISTORY: Right arm swelling.    COMPARISON: None.    FINDINGS: Gray-scale, color and Doppler spectral analysis ultrasound  was performed of the right arm. Compression and augmentation imaging  was performed.    There is no evidence for deep venous thrombosis. The veins compress  and augment normally.      Impression    IMPRESSION: No DVT.    KRISTEL COLLIER MD        Consultations:  No consultations  were requested during this admission.     Significant Lab Results:    Recent Labs  Lab 08/02/18  0612 08/01/18  0021   WBC 9.1 15.2*   HGB 9.7* 10.6*   HCT 30.5* 33.1*   MCV 94 93    213          Lab Results   Component Value Date     08/01/2018     02/22/2018     12/21/2017    Lab Results   Component Value Date    CHLORIDE 102 08/01/2018    CHLORIDE 103 02/22/2018    CHLORIDE 103 12/21/2017    Lab Results   Component Value Date    BUN 18 08/01/2018    BUN 31 02/22/2018    BUN 22 12/21/2017      Lab Results   Component Value Date    POTASSIUM 4.3 08/01/2018    POTASSIUM 4.4 02/22/2018    POTASSIUM 5.0 12/21/2017    Lab Results   Component Value Date    CO2 25 08/01/2018    CO2 31 02/22/2018    CO2 25 12/21/2017    Lab Results   Component Value Date    CR 0.80 08/01/2018    CR 0.95 02/22/2018    CR 1.00 12/21/2017           Pending Results:    Unresulted Labs Ordered in the Past 30 Days of this Admission     Date and Time Order Name Status Description    8/1/2018 0245 Blood culture Preliminary     8/1/2018 0129 Blood culture ONE site Preliminary            Discharge Instructions and Follow-Up:   Discharge diet:   Active Diet Order      Regular Diet Adult      Diet   Discharge activity: Activity as tolerated   Discharge follow-up: Follow up with primary care provider in 7 days   Outpatient therapy: None    Home Care agency: None    Other instructions: None      Hospital Course:  Patient was admitted to the Obs Unit for IV antibiotics for her hand cellulitis. She improved. While she was here she had some musculoskeletal neck and knee pain which have now resolved. She will DC home with a course of keflex.     Total time spent in face to face contact with the patient and coordinating discharge was:  40 Minutes.    Cristhian Perry MD  Pager: [phone]

## 2018-08-03 ENCOUNTER — TELEPHONE (OUTPATIENT)
Dept: PEDIATRICS | Facility: CLINIC | Age: 83
End: 2018-08-03

## 2018-08-03 NOTE — TELEPHONE ENCOUNTER
"ED / Discharge Outreach Protocol    Patient Contact    Attempt # 1    Was call answered?  Yes.  \"May I please speak with <patient name>\"  Is patient available?   Yes    ED/Discharge Protocol    \"Hi, my name is Mariah Dudley, a registered nurse, and I am calling on behalf of Dr. Sevilla's office at [facility].  I am calling to follow up and see how things are going for you after your recent visit.\"    \"I see that you were in the (ER/UC/IP) on 8/1/18.    How are you doing now that you are home?\" \"Doing pretty good\"    Is patient experiencing symptoms that may require a hospital visit?  No      Discharge Instructions    \"Let's review your discharge instructions.  What is/are the follow-up recommendations?  Pt. Response: Reviewed discharge instructions.    \"Were you instructed to make a follow-up appointment?\"  Pt. Response: Yes.  Has appointment been made?   No.  \"Can I help you schedule that appointment?\" no. \"I just got out of bed, I'll call back later.\"      \"When you see the provider, I would recommend that you bring your discharge instructions with you.    Medications    \"How many new medications are you on since your hospitalization/ED visit?\"    0-1  \"How many of your current medicines changed (dose, timing, name, etc.) while you were in the hospital/ED visit?\"   0-1  \"Do you have questions about your medications?\"   No  \"Were you newly diagnosed with heart failure, COPD, diabetes or did you have a heart attack?\"   No  For patients on insulin: \"Did you start on insulin in the hospital or did you have your insulin dose changed?\"   No    Medication reconciliation completed? Yes    Was MTM referral placed (*Make sure to put transitions as reason for referral)?   No    Call Summary    \"Do you have any questions or concerns about your condition or care plan at the moment?\"    No      Patient was in ER 2 in the past year (assess appropriateness of ER visits.)      \"If you have questions or things don't continue to " "improve, we encourage you contact us through the main clinic number,  [phone].  Even if the clinic is not open, triage nurses are available 24/7 to help you.     We would like you to know that our clinic has extended hours (provide information).  We also have urgent care (provide details on closest location and hours/contact info)\"      \"Thank you for your time and take care!\"          "

## 2018-08-03 NOTE — TELEPHONE ENCOUNTER
Please contact patient for In-patient follow up.  [phone] (home) none (work)    Visit date: 08/01/18- D/C'd 08/02/18  Diagnosis listed:Cellulitis Of Right Upper Extremity  Number of visits in past 12 months:ED 0/ IP 0

## 2018-08-07 LAB
BACTERIA SPEC CULT: NO GROWTH
BACTERIA SPEC CULT: NO GROWTH
Lab: NORMAL
Lab: NORMAL
SPECIMEN SOURCE: NORMAL
SPECIMEN SOURCE: NORMAL

## 2018-08-09 ENCOUNTER — OFFICE VISIT (OUTPATIENT)
Dept: PEDIATRICS | Facility: CLINIC | Age: 83
End: 2018-08-09
Payer: COMMERCIAL

## 2018-08-09 VITALS
DIASTOLIC BLOOD PRESSURE: 60 MMHG | TEMPERATURE: 97.3 F | SYSTOLIC BLOOD PRESSURE: 144 MMHG | BODY MASS INDEX: 25.4 KG/M2 | OXYGEN SATURATION: 98 % | HEART RATE: 92 BPM | HEIGHT: 62 IN | WEIGHT: 138 LBS

## 2018-08-09 DIAGNOSIS — L03.119 CELLULITIS OF HAND: Primary | ICD-10-CM

## 2018-08-09 DIAGNOSIS — I71.20 THORACIC AORTIC ANEURYSM WITHOUT RUPTURE (H): ICD-10-CM

## 2018-08-09 DIAGNOSIS — I10 ESSENTIAL HYPERTENSION WITH GOAL BLOOD PRESSURE LESS THAN 140/90: ICD-10-CM

## 2018-08-09 DIAGNOSIS — I48.0 PAROXYSMAL ATRIAL FIBRILLATION (H): ICD-10-CM

## 2018-08-09 PROCEDURE — 99495 TRANSJ CARE MGMT MOD F2F 14D: CPT | Performed by: INTERNAL MEDICINE

## 2018-08-09 NOTE — LETTER
My Asthma Action Plan  Name: Genie Persaud   YOB: 1935  Date: 8/9/2018   My doctor: Layo Sevilla MD   My clinic: [facility]        My Control Medicine: Fluticasone + salmeterol (Advair) -  /21 mcg    My Rescue Medicine: Albuterol (Proair/Ventolin/Proventil) inhaler     My Asthma Severity: moderate persistent  Avoid your asthma triggers: humidity and                 GREEN ZONE   Good Control    I feel good    No cough or wheeze    Can work, sleep and play without asthma symptoms       Take your asthma control medicine every day.     1. If exercise triggers your asthma, take your rescue medication    15 minutes before exercise or sports, and    During exercise if you have asthma symptoms  2. Spacer to use with inhaler: If you have a spacer, make sure to use it with your inhaler             YELLOW ZONE Getting Worse  I have ANY of these:    I do not feel good    Cough or wheeze    Chest feels tight    Wake up at night   1. Keep taking your Green Zone medications  2. Start taking your rescue medicine:    every 20 minutes for up to 1 hour. Then every 4 hours for 24-48 hours.  3. If you stay in the Yellow Zone for more than 12-24 hours, contact your doctor.  4. If you do not return to the Green Zone in 12-24 hours or you get worse, start taking your oral steroid medicine if prescribed by your provider.           RED ZONE Medical Alert - Get Help  I have ANY of these:    I feel awful    Medicine is not helping    Breathing getting harder    Trouble walking or talking    Nose opens wide to breathe       1. Take your rescue medicine NOW  2. If your provider has prescribed an oral steroid medicine, start taking it NOW  3. Call your doctor NOW  4. If you are still in the Red Zone after 20 minutes and you have not reached your doctor:    Take your rescue medicine again and    Call 911 or go to the emergency room right away    See your regular doctor within 2 weeks of an Emergency Room or  Urgent Care visit for follow-up treatment.          Annual Reminders:  Meet with Asthma Educator,  Flu Shot in the Fall, consider Pneumonia Vaccination for patients with asthma (aged 19 and older).    Pharmacy: [id number] [place], [place] - [address]                      Asthma Triggers  How To Control Things That Make Your Asthma Worse    Triggers are things that make your asthma worse.  Look at the list below to help you find your triggers and what you can do about them.  You can help prevent asthma flare-ups by staying away from your triggers.      Trigger                                                          What you can do   Cigarette Smoke  Tobacco smoke can make asthma worse. Do not allow smoking in your home, car or around you.  Be sure no one smokes at a child s day care or school.  If you smoke, ask your health care provider for ways to help you quit.  Ask family members to quit too.  Ask your health care provider for a referral to Quit Plan to help you quit smoking, or call 9-074-412-PLAN.     Colds, Flu, Bronchitis  These are common triggers of asthma. Wash your hands often.  Don t touch your eyes, nose or mouth.  Get a flu shot every year.     Dust Mites  These are tiny bugs that live in cloth or carpet. They are too small to see. Wash sheets and blankets in hot water every week.   Encase pillows and mattress in dust mite proof covers.  Avoid having carpet if you can. If you have carpet, vacuum weekly.   Use a dust mask and HEPA vacuum.   Pollen and Outdoor Mold  Some people are allergic to trees, grass, or weed pollen, or molds. Try to keep your windows closed.  Limit time out doors when pollen count is high.   Ask you health care provider about taking medicine during allergy season.     Animal Dander  Some people are allergic to skin flakes, urine or saliva from pets with fur or feathers. Keep pets with fur or feathers out of your home.    If you can t keep the pet outdoors, then keep  the pet out of your bedroom.  Keep the bedroom door closed.  Keep pets off cloth furniture and away from stuffed toys.     Mice, Rats, and Cockroaches  Some people are allergic to the waste from these pests.   Cover food and garbage.  Clean up spills and food crumbs.  Store grease in the refrigerator.   Keep food out of the bedroom.   Indoor Mold  This can be a trigger if your home has high moisture. Fix leaking faucets, pipes, or other sources of water.   Clean moldy surfaces.  Dehumidify basement if it is damp and smelly.   Smoke, Strong Odors, and Sprays  These can reduce air quality. Stay away from strong odors and sprays, such as perfume, powder, hair spray, paints, smoke incense, paint, cleaning products, candles and new carpet.   Exercise or Sports  Some people with asthma have this trigger. Be active!  Ask your doctor about taking medicine before sports or exercise to prevent symptoms.    Warm up for 5-10 minutes before and after sports or exercise.     Other Triggers of Asthma  Cold air:  Cover your nose and mouth with a scarf.  Sometimes laughing or crying can be a trigger.  Some medicines and food can trigger asthma.

## 2018-08-09 NOTE — PATIENT INSTRUCTIONS
No change in meds for now.  Follow-up with Dr. Hernandez to continue to discuss aneurysm size; it looks stable.      With repect to a fib, we'll continue the Eliquis for now, unless there is any sign of bleeding.    No change for now in inhalers or in your blood pressure meds.    Layo Sevilla MD  Internal Medicine and Pediatrics

## 2018-08-09 NOTE — PROGRESS NOTES
SUBJECTIVE:   Genie Persaud is a 82 year old female who presents to clinic today for the following health issues:          Hospital Follow-up Visit:    Hospital/Nursing Home/IP Rehab Facility: [facility]  Date of Admission: 8/1/18  Date of Discharge: 8/2/18  Reason(s) for Admission: Right hand swelling and pain, negative for DVT. IV abx and outpatient Keflex.             Problems taking medications regularly:  None       Medication changes since discharge: start Keflex (stopped a few days early due to constipation which is now better)       Problems adhering to non-medication therapy:  None    Summary of hospitalization:  [facility] discharge summary reviewed  Diagnostic Tests/Treatments reviewed.  Follow up needed: none  Other Healthcare Providers Involved in Patient s Care:         None  Update since discharge: improved.     Post Discharge Medication Reconciliation: discharge medications reconciled, continue medications without change.  Plan of care communicated with patient     Coding guidelines for this visit:  Type of Medical   Decision Making Face-to-Face Visit       within 7 Days of discharge Face-to-Face Visit        within 14 days of discharge   Moderate Complexity [id number]   High Complexity [id number]          Had right hand soreness and swelling the day after an antecubital IV was placed for a CT scan.  Continued to hurt all day, then got significant redness and heat on right extensor hand suface; traveled up her right forearm, and then noted some elevation in blood pressure.  Admitted and given IV antibiotic.  Ten the next day left knee was painful and neck was stiff, so stayed 2 nights.  Finished oral antibiotic course (cephalexin) about 2 days ago.       Discharge summary:    History of Illness:  See detailed admission note for full details.    82 year old female with a past medical history of asthma, hypertension, dyslipidemia, GERD, thoracic aortic aneurysm, paroxysmal A.  fib, CVA, piriformis syndrome and nonobstructive CAD who presents to the emergency department for right hand swelling and pain for 1 day.      Yesterday the patient had an outpatient CTA of her chest for monitoring of thoracic aortic aneurysm.  She had a peripheral catheter placed in her right AC for IV dye and had no issues immediately post scan.      Today however the patient noticed redness and swelling at her distal right wrist and over the course of the day the redness and swelling began to spread up her forearm.  It was very uncomfortable and she had increasingly impaired use of the hands so she sought evaluation in the emergency department.      In the emergency department she was found to have a CRP elevated at 39, leukocytosis at 15 (though it is notable that her WBC count has been consistently elevated for the last year or more) and unremarkable x-ray/ultrasound of the extremity.  She was started on Ancef and placed in observation.      At the time of my interview she is generally comfortable.  She had a little nausea earlier which improved with medication in the emergency department.     Significant Physical Exam Findings:  Patient up an edge of bed eating breakfast. States she is doing much better. Neck and knee pain gone Hand much better  s1s2 rrr, lungs clear. Hand still has some swelling and erythema, but is much improved     Procedures:  none      Imaging:      Results for orders placed or performed during the hospital encounter of 08/01/18   XR Wrist Right G/E 3 Views     Narrative     XR WRIST RT G/E 3 VW  8/1/2018 1:24 AM      HISTORY: Swelling.     COMPARISON: None.         Impression     IMPRESSION: No acute fracture or dislocation. 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     KRISTEL COLLIER MD   US Upper Extremity Venous Duplex Right     Narrative     US UPPER EXTREMITY VENOUS DUPLEX RIGHT  8/1/2018 1:21 AM      HISTORY: Right arm swelling.     COMPARISON: None.     FINDINGS: Gray-scale, color and  Doppler spectral analysis ultrasound  was performed of the right arm. Compression and augmentation imaging  was performed.     There is no evidence for deep venous thrombosis. The veins compress  and augment normally.     Impression     IMPRESSION: No DVT.     KRISTEL COLLIER MD             Problem list and histories reviewed & adjusted, as indicated.  Additional history: as documented    Patient Active Problem List   Diagnosis     Swelling, mass, or lump in head and neck     Pulmonary nodule     Anemia     Vasculitis (H)     HYPERLIPIDEMIA LDL GOAL <130     Impaired fasting glucose     Candidal esophagitis (H)     Thoracic aortic aneurysm without rupture (H)     Health Care Home     Advanced directives, counseling/discussion     Stroke (H)     Moderate persistent asthma without complication     Essential hypertension with goal blood pressure less than 140/90     Coronary artery disease involving native coronary artery of native heart without angina pectoris     SVT -noted during Cath procedure     Nonrheumatic aortic valve insufficiency     Stress-induced cardiomyopathy     Paroxysmal atrial fibrillation (H)     Peripheral edema     Hip pain, right     Lumbago     Osteopenia of multiple sites     Skin infection     Past Surgical History:   Procedure Laterality Date     C APPENDECTOMY  1957     C LIGATE FALLOPIAN TUBE  1971     C STEREOTACTIC BREAST BIOPSY  2001     CHOLECYSTECTOMY, LAPOROSCOPIC  2008    Cholecystectomy, Laparoscopic     ESOPHAGOSCOPY, GASTROSCOPY, DUODENOSCOPY (EGD), COMBINED  5/17/2013    Procedure: COMBINED ESOPHAGOSCOPY, GASTROSCOPY, DUODENOSCOPY (EGD), BIOPSY SINGLE OR MULTIPLE;  ESOPHAGOSCOPY, GASTROSCOPY, DUODENOSCOPY (EGD)  with bx;  Surgeon: Jeffery Yanez MD;  Location:  GI     HC DILATION/CURETTAGE DIAG/THER NON OB  1967,1971     HC REMOVE TONSILS/ADENOIDS,<13 Y/O         Social History   Substance Use Topics     Smoking status: Never Smoker     Smokeless tobacco: Never Used      Alcohol use 0.6 - 1.2 oz/week     1 - 2 Standard drinks or equivalent per week      Comment: 1 glass of wine 1-2 days per week     Family History   Problem Relation Age of Onset     Hypertension Mother      Osteoperosis Mother      C.A.D. Mother      Connective Tissue Disorder Father      scleraderma     Cerebrovascular Disease Paternal Grandmother      Prostate Cancer Other      9/05 passed away due to complications     Breast Cancer Child      youngest dtr         Current Outpatient Prescriptions   Medication Sig Dispense Refill     acetaminophen (TYLENOL) 500 MG tablet Take 500-1,000 mg by mouth every 8 hours as needed for mild pain       albuterol (PROAIR HFA, PROVENTIL HFA, VENTOLIN HFA) 108 (90 BASE) MCG/ACT inhaler Inhale 2 puffs into the lungs every 6 hours as needed        apixaban ANTICOAGULANT (ELIQUIS) 2.5 MG tablet Take 1 tablet (2.5 mg) by mouth 2 times daily 30 tablet 0     azelastine (ASTELIN) 0.1 % spray Spray 2 sprays into both nostrils 2 times daily       calcium citrate-vitamin D (CALCIUM CITRATE +) 315-200 MG-UNIT TABS Take 2 tablets by mouth every evening        fluticasone-salmeterol (ADVAIR-HFA) 230-21 MCG/ACT inhaler Inhale 2 puffs into the lungs 2 times daily 12 g 1     ipratropium (ATROVENT) 0.03 % spray Spray 2 sprays in nostril 2 times daily       latanoprost (XALATAN) 0.005 % ophthalmic solution Place 1 drop into both eyes At Bedtime       losartan (COZAAR) 100 MG tablet Take 1 tablet (100 mg) by mouth daily 90 tablet 3     metoprolol succinate (TOPROL-XL) 50 MG 24 hr tablet Take 100 mg by mouth daily       omeprazole (PRILOSEC) 20 MG CR capsule Take 1 capsule (20 mg) by mouth daily 90 capsule 1     simvastatin (ZOCOR) 20 MG tablet take 1/2 tablet by mouth daily at bedtime 45 tablet 2     Allergies   Allergen Reactions     Fosamax [Alendronic Acid] GI Disturbance     Augmentin [Amoxicillin-Pot Clavulanate] Diarrhea     Diarrhea and rash     Evista [Raloxifene]      abd symptoms.       "Flu Virus Vaccine      swollen and red at inj site     BP Readings from Last 3 Encounters:   08/09/18 144/60   08/02/18 120/40   05/03/18 164/47    Wt Readings from Last 3 Encounters:   08/09/18 138 lb (62.6 kg)   08/01/18 144 lb 3.2 oz (65.4 kg)   05/03/18 137 lb (62.1 kg)                  Labs reviewed in EPIC    Reviewed and updated as needed this visit by clinical staff       Reviewed and updated as needed this visit by Provider         ROS:  CONSTITUTIONAL: NEGATIVE for fever, chills, change in weight  ENT/MOUTH: NEGATIVE for ear, mouth and throat problems  RESP: NEGATIVE for significant cough or SOB  CV: NEGATIVE for chest pain, palpitations or peripheral edema    OBJECTIVE:                                                    /60 (BP Location: Right arm, Cuff Size: Adult Regular)  Pulse 92  Temp 97.3  F (36.3  C) (Oral)  Ht 5' 2\" (1.575 m)  Wt 138 lb (62.6 kg)  SpO2 98%  BMI 25.24 kg/m2  Body mass index is 25.24 kg/(m^2).   GENERAL: healthy, alert, well nourished, well hydrated, no distress  HENT: ear canals- normal; TMs- normal; Nose- normal; Mouth- no ulcers, no lesions  NECK: no tenderness, no adenopathy, no asymmetry, no masses, no stiffness; thyroid- normal to palpation  RESP: lungs clear to auscultation - no rales, no rhonchi, no wheezes  CV: regular rates and rhythm, normal S1 S2, no S3 or S4 and no murmur, no click or rub -  ABDOMEN: soft, no tenderness, no  hepatosplenomegaly, no masses, normal bowel sounds    Diagnostic test results:  Diagnostic Test Results:  none      ASSESSMENT/PLAN:                                                      Cellulitis:  Improved.  Off antibiotic.  Monitor for recurrence.    Aneursym:  Stable in size.  Following with cardiology for this, as well as for A Fib.  She remains on Eliquis and I will defer to her cardiologist as to continuation.    Asthma:  Continue with current inhaled corticosteroid and spiriva.     See Patient Instructions    Layo Sevilla, " MD  [facility]

## 2018-08-09 NOTE — MR AVS SNAPSHOT
After Visit Summary   8/9/2018    Genie Persaud    MRN: [id number]           Patient Information     Date Of Birth          1935        Visit Information        Provider Department      8/9/2018 11:20 AM Layo Sevilla MD [facility]        Care Instructions    No change in meds for now.  Follow-up with Dr. Hernandez to continue to discuss aneurysm size; it looks stable.      With repect to a fib, we'll continue the Eliquis for now, unless there is any sign of bleeding.    No change for now in inhalers or in your blood pressure meds.    Layo Sevilla MD  Internal Medicine and Pediatrics             Follow-ups after your visit        Follow-up notes from your care team     Return in about 6 months (around 2/9/2019) for Medical Check Up.      Your next 10 appointments already scheduled     Aug 16, 2018  9:30 AM CDT   Return Visit with Barak Hernandez MD   [facility] ([facility])    [address]  [place] [facility] [place] [address]   [phone]            Sep 05, 2018  2:45 PM CDT   Ech Complete with [address] ([facility])    [address]  [place] [address]   [phone]           1.  Please bring or wear a comfortable two-piece outfit. 2.  You may eat, drink and take your normal medicines. 3.  For any questions that cannot be answered, please contact the ordering physician 4.  Please do not wear perfumes or scented lotions on the day of your exam. ***Please check-in at the [place] Registration Office located in [address] in the [facility] building. When you are finished registering, please go to [address] and have a seat. The technician will call your name for the test.              Who to contact     If you have questions or need follow up information about today's clinic visit or your schedule please contact [facility]  "directly at [phone].  Normal or non-critical lab and imaging results will be communicated to you by MyChart, letter or phone within 4 business days after the clinic has received the results. If you do not hear from us within 7 days, please contact the clinic through MyChart or phone. If you have a critical or abnormal lab result, we will notify you by phone as soon as possible.  Submit refill requests through [employer] or call your pharmacy and they will forward the refill request to us. Please allow 3 business days for your refill to be completed.          Additional Information About Your Visit        Care EveryWhere ID     This is your Care EveryWhere ID. This could be used by other organizations to access your [place] medical records  [id number]        Your Vitals Were     Pulse Temperature Height Pulse Oximetry BMI (Body Mass Index)       92 97.3  F (36.3  C) (Oral) 5' 2\" (1.575 m) 98% 25.24 kg/m2        Blood Pressure from Last 3 Encounters:   08/09/18 144/60   08/02/18 120/40   05/03/18 164/47    Weight from Last 3 Encounters:   08/09/18 138 lb (62.6 kg)   08/01/18 144 lb 3.2 oz (65.4 kg)   05/03/18 137 lb (62.1 kg)              Today, you had the following     No orders found for display       Primary Care Provider Office Phone # Fax #    Layo Sevilla [phone] [address] [place] [address]        Equal Access to Services     [facility]: Hadii aad ku hadasho Soomaali, waaxda luqadaha, qaybta kaalmada adeegyada, ora meyer . So [facility] [phone].    ATENCIÓN: Si habla español, tiene a patel disposición servicios gratuitos de asistencia lingüística. Llame al [phone].    We comply with applicable federal civil rights laws and [place] laws. We do not discriminate on the basis of race, color, national origin, age, disability, sex, sexual orientation, or gender identity.            Thank you!     Thank you for choosing [facility]  for your care. Our goal is always to provide you with excellent care. Hearing back from our patients is one way we can continue to improve our services. Please take a few minutes to complete the written survey that you may receive in the mail after your visit with us. Thank you!             Your Updated Medication List - Protect others around you: Learn how to safely use, store and throw away your medicines at www.disposemymeds.org.          This list is accurate as of 8/9/18 11:47 AM.  Always use your most recent med list.                   Brand Name Dispense Instructions for use Diagnosis    acetaminophen 500 MG tablet    TYLENOL     Take 500-1,000 mg by mouth every 8 hours as needed for mild pain        albuterol 108 (90 Base) MCG/ACT Inhaler    PROAIR HFA/PROVENTIL HFA/VENTOLIN HFA     Inhale 2 puffs into the lungs every 6 hours as needed        apixaban ANTICOAGULANT 2.5 MG tablet    ELIQUIS    30 tablet    Take 1 tablet (2.5 mg) by mouth 2 times daily    Atrial fibrillation, unspecified type (H)       azelastine 0.1 % spray    ASTELIN     Spray 2 sprays into both nostrils 2 times daily        CALCIUM CITRATE + 315-200 MG-UNIT Tabs per tablet   Generic drug:  calcium citrate-vitamin D      Take 2 tablets by mouth every evening        fluticasone-salmeterol 230-21 MCG/ACT inhaler    ADVAIR-HFA    12 g    Inhale 2 puffs into the lungs 2 times daily        ipratropium 0.03 % spray    ATROVENT     Spray 2 sprays in nostril 2 times daily        latanoprost 0.005 % ophthalmic solution    XALATAN     Place 1 drop into both eyes At Bedtime        losartan 100 MG tablet    COZAAR    90 tablet    Take 1 tablet (100 mg) by mouth daily    Essential hypertension with goal blood pressure less than 140/90       metoprolol succinate 50 MG 24 hr tablet    TOPROL-XL     Take 100 mg by mouth daily        omeprazole 20 MG CR capsule    priLOSEC    90 capsule    Take 1 capsule (20 mg) by mouth daily    Candidal  esophagitis (H)       simvastatin 20 MG tablet    ZOCOR    45 tablet    take 1/2 tablet by mouth daily at bedtime    Hyperlipidemia LDL goal <130

## 2018-08-09 NOTE — LETTER
August 9, 2018      Genie Persaud  [address] [place] [address]        To Whom It May Concern:    Genie Persaud was seen in our clinic. She may return to work without restrictions.      Sincerely,        Layo Sevilla MD

## 2018-08-13 ENCOUNTER — TELEPHONE (OUTPATIENT)
Dept: LAB | Facility: CLINIC | Age: 83
End: 2018-08-13

## 2018-08-13 NOTE — TELEPHONE ENCOUNTER
Patient called, she has not done the echocardiogram and it is scheduled in 1-2 weeks. Patient states she still wants to keep the Dr. Hernandez OV 8-16-18.

## 2018-08-15 ENCOUNTER — PRE VISIT (OUTPATIENT)
Dept: CARDIOLOGY | Facility: CLINIC | Age: 83
End: 2018-08-15

## 2018-08-16 ENCOUNTER — DOCUMENTATION ONLY (OUTPATIENT)
Dept: CARDIOLOGY | Facility: CLINIC | Age: 83
End: 2018-08-16

## 2018-08-16 ENCOUNTER — OFFICE VISIT (OUTPATIENT)
Dept: CARDIOLOGY | Facility: CLINIC | Age: 83
End: 2018-08-16
Attending: INTERNAL MEDICINE
Payer: COMMERCIAL

## 2018-08-16 VITALS
HEIGHT: 62 IN | DIASTOLIC BLOOD PRESSURE: 50 MMHG | BODY MASS INDEX: 25.23 KG/M2 | WEIGHT: 137.1 LBS | HEART RATE: 80 BPM | SYSTOLIC BLOOD PRESSURE: 160 MMHG

## 2018-08-16 DIAGNOSIS — E78.5 HYPERLIPIDEMIA LDL GOAL <130: ICD-10-CM

## 2018-08-16 DIAGNOSIS — R60.0 PERIPHERAL EDEMA: Chronic | ICD-10-CM

## 2018-08-16 DIAGNOSIS — I63.9 CEREBROVASCULAR ACCIDENT (CVA), UNSPECIFIED MECHANISM (H): ICD-10-CM

## 2018-08-16 DIAGNOSIS — D64.9 ANEMIA, UNSPECIFIED TYPE: ICD-10-CM

## 2018-08-16 DIAGNOSIS — I10 ESSENTIAL HYPERTENSION WITH GOAL BLOOD PRESSURE LESS THAN 140/90: ICD-10-CM

## 2018-08-16 DIAGNOSIS — I71.20 THORACIC AORTIC ANEURYSM WITHOUT RUPTURE (H): ICD-10-CM

## 2018-08-16 DIAGNOSIS — I47.10 SVT (SUPRAVENTRICULAR TACHYCARDIA) (H): ICD-10-CM

## 2018-08-16 DIAGNOSIS — I51.81 STRESS-INDUCED CARDIOMYOPATHY: ICD-10-CM

## 2018-08-16 DIAGNOSIS — I48.0 PAROXYSMAL ATRIAL FIBRILLATION (H): ICD-10-CM

## 2018-08-16 DIAGNOSIS — I25.10 CORONARY ARTERY DISEASE INVOLVING NATIVE CORONARY ARTERY OF NATIVE HEART WITHOUT ANGINA PECTORIS: Primary | ICD-10-CM

## 2018-08-16 DIAGNOSIS — I35.1 NONRHEUMATIC AORTIC VALVE INSUFFICIENCY: Chronic | ICD-10-CM

## 2018-08-16 LAB — HGB BLD-MCNC: 11.7 G/DL (ref 11.7–15.7)

## 2018-08-16 PROCEDURE — 85018 HEMOGLOBIN: CPT | Performed by: INTERNAL MEDICINE

## 2018-08-16 PROCEDURE — 99214 OFFICE O/P EST MOD 30 MIN: CPT | Performed by: INTERNAL MEDICINE

## 2018-08-16 PROCEDURE — 36415 COLL VENOUS BLD VENIPUNCTURE: CPT | Performed by: INTERNAL MEDICINE

## 2018-08-16 RX ORDER — ASPIRIN 81 MG/1
81 TABLET ORAL DAILY
COMMUNITY
Start: 2018-08-16 | End: 2020-08-06

## 2018-08-16 RX ORDER — HYDROCHLOROTHIAZIDE 12.5 MG/1
12.5 CAPSULE ORAL DAILY
Qty: 90 CAPSULE | Refills: 3 | Status: SHIPPED | OUTPATIENT
Start: 2018-08-16 | End: 2018-08-17

## 2018-08-16 NOTE — MR AVS SNAPSHOT
After Visit Summary   8/16/2018    Genie Persaud    MRN: [id number]           Patient Information     Date Of Birth          1935        Visit Information        Provider Department      8/16/2018 9:30 AM Barak Hernandez MD [facility]        Today's Diagnoses     Coronary artery disease involving native coronary artery of native heart without angina pectoris    -  1    Stress-induced cardiomyopathy        Anemia, unspecified type        Essential hypertension with goal blood pressure less than 140/90        Nonrheumatic aortic valve insufficiency        Hyperlipidemia LDL goal <130        Thoracic aortic aneurysm without rupture (H)        Cerebrovascular accident (CVA), unspecified mechanism (H)        SVT -noted during Cath procedure        Paroxysmal atrial fibrillation (H)        Peripheral edema           Follow-ups after your visit        Additional Services     Follow-Up with Cardiac Advanced Practice Provider           Follow-Up with Cardiac Advanced Practice Provider           Follow-Up with Cardiologist                 Your next 10 appointments already scheduled     Sep 05, 2018  2:45 PM CDT   Ech Complete with [address] ([facility])    [address]  [facility] [address] [phone]           1.  Please bring or wear a comfortable two-piece outfit. 2.  You may eat, drink and take your normal medicines. 3.  For any questions that cannot be answered, please contact the ordering physician 4.  Please do not wear perfumes or scented lotions on the day of your exam. ***Please check-in at the [place] Registration Office located in [address] in the [facility] building. When you are finished registering, please go to [address] and have a seat. The technician will call your name for the test.            Sep 14, 2018 11:30 AM CDT   LAB with  LAB    [facility] AT [place] ([facility])    [address]  [facility] [address]   [phone]           Please do not eat 10-12 hours before your appointment if you are coming in fasting for labs on lipids, cholesterol, or glucose (sugar). This does not apply to pregnant women. Water, hot tea and black coffee (with nothing added) are okay. Do not drink other fluids, diet soda or chew gum.            Sep 14, 2018 12:30 PM CDT   Return Visit with ANA MARIA Coe   [facility] ([facility])    [address]  [facility] [address]   [phone]              Future tests that were ordered for you today     Open Future Orders        Priority Expected Expires Ordered    Basic metabolic panel Routine 2/12/2019 8/16/2019 8/16/2018    Follow-Up with Cardiac Advanced Practice Provider Routine 2/12/2019 8/16/2019 8/16/2018    Basic metabolic panel Routine 8/16/2019 8/17/2019 8/16/2018    Lipid Profile Routine 8/16/2019 8/17/2019 8/16/2018    Follow-Up with Cardiologist Routine 8/16/2019 8/17/2019 8/16/2018    Basic metabolic panel Routine 9/15/2018 8/16/2019 8/16/2018    Follow-Up with Cardiac Advanced Practice Provider Routine 9/15/2018 8/16/2019 8/16/2018            Who to contact     If you have questions or need follow up information about today's clinic visit or your schedule please contact [facility] directly at [phone].  Normal or non-critical lab and imaging results will be communicated to you by MyChart, letter or phone within 4 business days after the clinic has received the results. If you do not hear from us within 7 days, please contact the clinic through MyChart or phone. If you have a critical or abnormal lab result, we will notify you by phone as soon as possible.  Submit refill requests through [employer] or call your pharmacy and they will  "forward the refill request to us. Please allow 3 business days for your refill to be completed.          Additional Information About Your Visit        Care EveryWhere ID     This is your Care EveryWhere ID. This could be used by other organizations to access your [place] medical records  [id number]        Your Vitals Were     Pulse Height Breastfeeding? BMI (Body Mass Index)          80 1.575 m (5' 2\") No 25.08 kg/m2         Blood Pressure from Last 3 Encounters:   08/16/18 160/50   08/09/18 144/60   08/02/18 120/40    Weight from Last 3 Encounters:   08/16/18 62.2 kg (137 lb 1.6 oz)   08/09/18 62.6 kg (138 lb)   08/01/18 65.4 kg (144 lb 3.2 oz)              We Performed the Following     Follow-Up with Cardiologist          Today's Medication Changes          These changes are accurate as of 8/16/18 10:31 AM.  If you have any questions, ask your nurse or doctor.               Start taking these medicines.        Dose/Directions    aspirin 81 MG EC tablet   Used for:  Coronary artery disease involving native coronary artery of native heart without angina pectoris   Started by:  Barak Hernandez MD        Dose:  81 mg   Take 1 tablet (81 mg) by mouth daily   Refills:  0       hydrochlorothiazide 12.5 MG capsule   Commonly known as:  MICROZIDE   Used for:  Essential hypertension with goal blood pressure less than 140/90   Started by:  Barak Hernandez MD        Dose:  12.5 mg   Take 1 capsule (12.5 mg) by mouth daily   Quantity:  90 capsule   Refills:  3         Stop taking these medicines if you haven't already. Please contact your care team if you have questions.     apixaban ANTICOAGULANT 2.5 MG tablet   Commonly known as:  ELIQUIS   Stopped by:  Barak Hernandez MD                Where to get your medicines      These medications were sent to [id number] - [place], [place] - [address]  [address] [address]     Phone:  [phone]     hydrochlorothiazide 12.5 MG capsule "         Some of these will need a paper prescription and others can be bought over the counter.  Ask your nurse if you have questions.     You don't need a prescription for these medications     aspirin 81 MG EC tablet                Primary Care Provider Office Phone # Fax #    Layo Sevilla [phone] [address] [place] [address]        Equal Access to Services     [facility]: Hadii aad ku hadasho Soomaali, waaxda luqadaha, qaybta kaalmada adeegyada, waxay idiin hayaan adeeg kharash la'aan . So [facility] [phone].    ATENCIÓN: Si habla español, tiene a patel disposición servicios gratuitos de asistencia lingüística. Llame al [phone].    We comply with applicable federal civil rights laws and [place] laws. We do not discriminate on the basis of race, color, national origin, age, disability, sex, sexual orientation, or gender identity.            Thank you!     Thank you for choosing [facility]  for your care. Our goal is always to provide you with excellent care. Hearing back from our patients is one way we can continue to improve our services. Please take a few minutes to complete the written survey that you may receive in the mail after your visit with us. Thank you!             Your Updated Medication List - Protect others around you: Learn how to safely use, store and throw away your medicines at www.disposemymeds.org.          This list is accurate as of 8/16/18 10:31 AM.  Always use your most recent med list.                   Brand Name Dispense Instructions for use Diagnosis    acetaminophen 500 MG tablet    TYLENOL     Take 500-1,000 mg by mouth every 8 hours as needed for mild pain        albuterol 108 (90 Base) MCG/ACT inhaler    PROAIR HFA/PROVENTIL HFA/VENTOLIN HFA     Inhale 2 puffs into the lungs every 6 hours as needed        aspirin 81 MG EC tablet      Take 1 tablet (81 mg) by mouth daily    Coronary artery  disease involving native coronary artery of native heart without angina pectoris       azelastine 0.1 % nasal spray    ASTELIN     Spray 2 sprays into both nostrils 2 times daily        CALCIUM CITRATE + 315-200 MG-UNIT Tabs per tablet   Generic drug:  calcium citrate-vitamin D      Take 2 tablets by mouth every evening        fluticasone-salmeterol 230-21 MCG/ACT inhaler    ADVAIR-HFA    12 g    Inhale 2 puffs into the lungs 2 times daily        hydrochlorothiazide 12.5 MG capsule    MICROZIDE    90 capsule    Take 1 capsule (12.5 mg) by mouth daily    Essential hypertension with goal blood pressure less than 140/90       ipratropium 0.03 % spray    ATROVENT     Spray 2 sprays in nostril 2 times daily        latanoprost 0.005 % ophthalmic solution    XALATAN     Place 1 drop into both eyes At Bedtime        losartan 100 MG tablet    COZAAR    90 tablet    Take 1 tablet (100 mg) by mouth daily    Essential hypertension with goal blood pressure less than 140/90       metoprolol succinate 50 MG 24 hr tablet    TOPROL-XL     Take 100 mg by mouth daily        omeprazole 20 MG CR capsule    priLOSEC    90 capsule    Take 1 capsule (20 mg) by mouth daily    Candidal esophagitis (H)       simvastatin 20 MG tablet    ZOCOR    45 tablet    take 1/2 tablet by mouth daily at bedtime    Hyperlipidemia LDL goal <130

## 2018-08-16 NOTE — PROGRESS NOTES
HPI and Plan:   See dictation    Orders Placed This Encounter   Procedures     Hemoglobin     Basic metabolic panel     Basic metabolic panel     Basic metabolic panel     Lipid Profile     Follow-Up with Cardiac Advanced Practice Provider     Follow-Up with Cardiac Advanced Practice Provider     Follow-Up with Cardiologist       Orders Placed This Encounter   Medications     aspirin 81 MG EC tablet     Sig: Take 1 tablet (81 mg) by mouth daily     hydrochlorothiazide (MICROZIDE) 12.5 MG capsule     Sig: Take 1 capsule (12.5 mg) by mouth daily     Dispense:  90 capsule     Refill:  3       Medications Discontinued During This Encounter   Medication Reason     apixaban ANTICOAGULANT (ELIQUIS) 2.5 MG tablet          Encounter Diagnoses   Name Primary?     Stress-induced cardiomyopathy      Coronary artery disease involving native coronary artery of native heart without angina pectoris Yes     Anemia, unspecified type      Essential hypertension with goal blood pressure less than 140/90      Nonrheumatic aortic valve insufficiency      Hyperlipidemia LDL goal <130      Thoracic aortic aneurysm without rupture (H)      Cerebrovascular accident (CVA), unspecified mechanism (H)      SVT -noted during Cath procedure      Paroxysmal atrial fibrillation (H)      Peripheral edema        CURRENT MEDICATIONS:  Current Outpatient Prescriptions   Medication Sig Dispense Refill     acetaminophen (TYLENOL) 500 MG tablet Take 500-1,000 mg by mouth every 8 hours as needed for mild pain       albuterol (PROAIR HFA, PROVENTIL HFA, VENTOLIN HFA) 108 (90 BASE) MCG/ACT inhaler Inhale 2 puffs into the lungs every 6 hours as needed        aspirin 81 MG EC tablet Take 1 tablet (81 mg) by mouth daily       azelastine (ASTELIN) 0.1 % spray Spray 2 sprays into both nostrils 2 times daily       calcium citrate-vitamin D (CALCIUM CITRATE +) 315-200 MG-UNIT TABS Take 2 tablets by mouth every evening        fluticasone-salmeterol (ADVAIR-HFA)  230-21 MCG/ACT inhaler Inhale 2 puffs into the lungs 2 times daily 12 g 1     hydrochlorothiazide (MICROZIDE) 12.5 MG capsule Take 1 capsule (12.5 mg) by mouth daily 90 capsule 3     ipratropium (ATROVENT) 0.03 % spray Spray 2 sprays in nostril 2 times daily       latanoprost (XALATAN) 0.005 % ophthalmic solution Place 1 drop into both eyes At Bedtime       losartan (COZAAR) 100 MG tablet Take 1 tablet (100 mg) by mouth daily 90 tablet 3     metoprolol succinate (TOPROL-XL) 50 MG 24 hr tablet Take 100 mg by mouth daily       omeprazole (PRILOSEC) 20 MG CR capsule Take 1 capsule (20 mg) by mouth daily 90 capsule 1     simvastatin (ZOCOR) 20 MG tablet take 1/2 tablet by mouth daily at bedtime 45 tablet 2       ALLERGIES     Allergies   Allergen Reactions     Fosamax [Alendronic Acid] GI Disturbance     Augmentin [Amoxicillin-Pot Clavulanate] Diarrhea     Diarrhea and rash     Evista [Raloxifene]      abd symptoms.      Flu Virus Vaccine      swollen and red at inj site       PAST MEDICAL HISTORY:  Past Medical History:   Diagnosis Date     Anemia 3/10/2009    Chronic disease, by Fe studies; awaiting full GI w/u and repeat colonoscopy, ERCP.     Basal Cell Carcinoma--back      Coronary artery disease involving native coronary artery of native heart without angina pectoris 3/9/2017    3/2/2017 - Two vessel coronary artery disease with mLAD 50% stenosis, D3 50% stenosis, pLCx 50% stenosis and mLCx 50% stenosis     Essential hypertension with goal blood pressure less than 140/90 9/1/2016    White coat hypertension;  24 hour ambulatory monitoring normal. Do not titrate up further.       Hyperlipidemia LDL goal <130 2/10/2010     Impaired fasting glucose 8/26/2010     Moderate persistent asthma      Nonrheumatic aortic valve insufficiency 6/29/2017     osteopenia      Paroxysmal atrial fibrillation (H) 12/26/2017     Pulmonary nodule 3/3/2009    PET scan reportedly normal; biopsy not advised.     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  Problem list name updated by automated process. Provider to review     Unspecified arthropathy, hand      Vasculitis (H) 4/20/2009    Possible increased activity of thoracic aorta, on PET scan w/u for pulmonary nodule.        PAST SURGICAL HISTORY:  Past Surgical History:   Procedure Laterality Date     C APPENDECTOMY  1957     C LIGATE FALLOPIAN TUBE  1971     C STEREOTACTIC BREAST BIOPSY  2001     CHOLECYSTECTOMY, LAPOROSCOPIC  2008    Cholecystectomy, Laparoscopic     ESOPHAGOSCOPY, GASTROSCOPY, DUODENOSCOPY (EGD), COMBINED  5/17/2013    Procedure: COMBINED ESOPHAGOSCOPY, GASTROSCOPY, DUODENOSCOPY (EGD), BIOPSY SINGLE OR MULTIPLE;  ESOPHAGOSCOPY, GASTROSCOPY, DUODENOSCOPY (EGD)  with bx;  Surgeon: Jeffery Yanez MD;  Location: [facility]     HC DILATION/CURETTAGE DIAG/THER NON OB  1967,1971     HC REMOVE TONSILS/ADENOIDS,<13 Y/O         FAMILY HISTORY:  Family History   Problem Relation Age of Onset     Hypertension Mother      Osteoperosis Mother      C.A.D. Mother      Connective Tissue Disorder Father      scleraderma     Cerebrovascular Disease Paternal Grandmother      Prostate Cancer Other      9/05 passed away due to complications     Breast Cancer Child      youngest dtr       SOCIAL HISTORY:  Social History     Social History     Marital status: Single     Spouse name: N/A     Number of children: 3     Years of education: 15     Occupational History     semi-retired      Social History Main Topics     Smoking status: Never Smoker     Smokeless tobacco: Never Used     Alcohol use 0.6 - 1.2 oz/week     1 - 2 Standard drinks or equivalent per week      Comment: 1 glass of  "wine 1-2 days per week     Drug use: No     Sexual activity: No     Other Topics Concern     Parent/Sibling W/ Cabg, Mi Or Angioplasty Before 65f 55m? No     Social History Narrative       Review of Systems:  Skin:  Negative       Eyes:  Positive for glasses    ENT:  Positive for nasal congestion;postnasal drainage;sinus trouble    Respiratory:  Positive for shortness of breath;dyspnea on exertion asthma, dyspnea on exertion - climbing stairs   Cardiovascular:  Negative      Gastroenterology: Positive for heartburn;reflux reflux under control with Omeprazole  Genitourinary:  not assessed      Musculoskeletal:  Positive for arthritis    Neurologic:  Negative      Psychiatric:  Negative      Heme/Lymph/Imm:  Positive for allergies RX allergies ,  Allergic to almost  everything- per pt  -  environmental allergies , cats - certain trees -  no food allergies  Endocrine:  Negative        Physical Exam:  Vitals: /50 (BP Location: Right arm, Patient Position: Sitting, Cuff Size: Adult Regular)  Pulse 80  Ht 1.575 m (5' 2\")  Wt 62.2 kg (137 lb 1.6 oz)  Breastfeeding? No  BMI 25.08 kg/m2    Constitutional:  cooperative, alert and oriented, well developed, well nourished, in no acute distress        Skin:  warm and dry to the touch, no apparent skin lesions or masses noted          Head:  normocephalic, no masses or lesions        Eyes:  pupils equal and round, conjunctivae and lids unremarkable, sclera white, no xanthalasma, EOMS intact, no nystagmus        Lymph:      ENT:  no pallor or cyanosis, dentition good        Neck:  carotid pulses are full and equal bilaterally;no carotid bruit        Respiratory:  normal breath sounds, clear to auscultation, normal A-P diameter, normal symmetry, normal respiratory excursion, no use of accessory muscles         Cardiac: regular rhythm       systolic murmur;grade 2;RUSB   diastolic murmur;decrescendo;RUSB;grade 1    pulses full and equal                                    "     GI:           Extremities and Muscular Skeletal:  no spinal abnormalities noted;normal muscle strength and tone   bilateral LE edema;trace;1+     uses compression stockings    Neurological:  no gross motor deficits        Psych:  affect appropriate, oriented to time, person and place        CC  Barak Hernandez MD  [address]  [place] [id number]

## 2018-08-16 NOTE — PROGRESS NOTES
Service Date: 08/16/2018      HISTORY OF PRESENT ILLNESS:  Genie a very nice 82-year-old woman with a past medical history significant for chronic peripheral edema, severe labile hypertension, hypercholesterolemia, a known ascending aortic aneurysm, mild to moderate coronary artery disease based on angiogram from 03/2017, aortic insufficiency that was thought to be moderately severe, again, based on echocardiogram from 03/2017 with an ejection fraction of 45%-50%, consistent with a nonischemic cardiomyopathy.  In the Cath Lab, she was noted to have short bursts of nonsustained supraventricular tachycardia up to 130 beats per minute.      In 11/2017, she was hospitalized with pneumonia and overwhelming sepsis and appeared to have a takotsubo stress cardiomyopathy with troponins rising to IV, with a severe drop in her ejection fraction.  Hospital course was further complicated by asymptomatic rapid atrial fibrillation, severe peripheral edema and biventricular heart failure.      Followup echocardiogram in 01/2018 showed her ejection fraction had completely normalized and was estimated to be 60%-65% and her aortic regurgitation was now only mild.  It appeared that she had no further recurrences of her atrial fibrillation, so I stopped her amiodarone but continued Eliquis at that time.      More recently, Genie was seen in the emergency room for a right hand cellulitis which time wise was 2 days after we performed a followup CT scan, although the CT scan access was the right brachial and the cellulitis was in the right hand.  Nonetheless, she responded to antibiotics.      Genie returns to clinic stating that she thinks she is doing fairly well.  She is not having any chest, arm, neck, jaw or shoulder discomfort.  She has no lightheadedness, dizziness, syncope or near-syncope.  She notes no problems or side effects with her current medical regimen.      Her blood pressure still appears to be labile.  It is 160/50 today  in the office.  She states last night at home she thinks it was in the 120s.      ASSESSMENT AND PLAN:  Genie has no symptoms to suggest ischemia.  We will continue with aggressive risk factor modification and medical management.      Blood pressure is not well controlled at 160/50.  She does have her ascending aortic aneurysm, so I think we need to be aggressive with this.  We talked about going hydrochlorothiazide.  She states she was on this in the past and could tolerate the 12.5 but could not tolerate 25.  I will try reintroducing it, although I told her to hold until I get further lab work back.      Review of lab work shows that when she was seen in the emergency room 2 weeks ago, her hemoglobin was down to 9.7 from her baseline of 12.  As she is on Eliquis, I will repeat her hemoglobin today.      It appears that she has not had any recurrence of her atrial fibrillation since her hospitalization in 03/2017.  We did do a followup ZIO Patch on her which showed no evidence of atrial fibrillation.  She is unaware of any symptoms.  At this time, she would like to come off her Eliquis and I think it is not unreasonable as her only episode of atrial fibrillation was 1 precipitated when she was ill in the hospital.  I have told her she should go back on aspirin, but I want to see what her hemoglobin is first.  At this time we will stop the Eliquis and I have told we will call her if her hemoglobin is stable whether to restart on aspirin 81 mg daily.      We did do a followup CT scan of her aorta and her ascending aorta again measures 4.4 cm, unchanged from 03/2017.      Fasting lipid profile was checked earlier this year in is excellent with a total cholesterol of 176 and HDL of 93 and LDL of 65 and triglycerides of 92.  We will continue her simvastatin as is.      Electrolytes show while she was in the emergency room showed a sodium of 133, creatinine of 0.8, BUN of 18 and potassium of 4.3.      We will followup  her hemoglobin.  If stable, then I will ask her to start her hydrochlorothiazide and her aspirin.  If it is low, I will hold on both these medications as she did have problems with significant orthostasis with higher dose of HCTZ in the past.  If she is intervascular volume deplete or anemic, I do not want to introduce it and also will not start her on aspirin if she is anemic.  If she is anemic, I will refer her back to Dr. Sevilla for further evaluation and treatment.        I will plan on seeing her back in 1 year.  At this time, I will move her CT scans of her aorta to every other year.      She is due to get her echocardiogram to follow up on her cardiomyopathy and her aortic insufficiency.  She was ill on the day she was supposed to have it done previously.  I will follow up on this as well.         FILI ROOT MD, [facility]             D: 2018   T: 2018   MT: DENICE      Name:     TERESA HARTMAN   MRN:      [id number]        Account:      [id number]   :      1935           Service Date: 2018      Document: [id number]

## 2018-08-16 NOTE — LETTER
8/16/2018      Layo Sevilla MD  [address] [place] [address]      RE: Genie Persaud       Dear Colleague,    I had the pleasure of seeing Genie Persaud in the [facility].    Service Date: 08/16/2018      HISTORY OF PRESENT ILLNESS:  Genie a very nice 82-year-old woman with a past medical history significant for chronic peripheral edema, severe labile hypertension, hypercholesterolemia, a known ascending aortic aneurysm, mild to moderate coronary artery disease based on angiogram from 03/2017, aortic insufficiency that was thought to be moderately severe, again, based on echocardiogram from 03/2017 with an ejection fraction of 45%-50%, consistent with a nonischemic cardiomyopathy.  In the Cath Lab, she was noted to have short bursts of nonsustained supraventricular tachycardia up to 130 beats per minute.      In 11/2017, she was hospitalized with pneumonia and overwhelming sepsis and appeared to have a takotsubo stress cardiomyopathy with troponins rising to IV, with a severe drop in her ejection fraction.  Hospital course was further complicated by asymptomatic rapid atrial fibrillation, severe peripheral edema and biventricular heart failure.      Followup echocardiogram in 01/2018 showed her ejection fraction had completely normalized and was estimated to be 60%-65% and her aortic regurgitation was now only mild.  It appeared that she had no further recurrences of her atrial fibrillation, so I stopped her amiodarone but continued Eliquis at that time.      More recently, Genie was seen in the emergency room for a right hand cellulitis which time wise was 2 days after we performed a followup CT scan, although the CT scan access was the right brachial and the cellulitis was in the right hand.  Nonetheless, she responded to antibiotics.      Genie returns to clinic stating that she thinks she is doing fairly well.  She is not having any chest, arm, neck, jaw or shoulder  discomfort.  She has no lightheadedness, dizziness, syncope or near-syncope.  She notes no problems or side effects with her current medical regimen.      Her blood pressure still appears to be labile.  It is 160/50 today in the office.  She states last night at home she thinks it was in the 120s.      ASSESSMENT AND PLAN:  Genie has no symptoms to suggest ischemia.  We will continue with aggressive risk factor modification and medical management.      Blood pressure is not well controlled at 160/50.  She does have her ascending aortic aneurysm, so I think we need to be aggressive with this.  We talked about going hydrochlorothiazide.  She states she was on this in the past and could tolerate the 12.5 but could not tolerate 25.  I will try reintroducing it, although I told her to hold until I get further lab work back.      Review of lab work shows that when she was seen in the emergency room 2 weeks ago, her hemoglobin was down to 9.7 from her baseline of 12.  As she is on Eliquis, I will repeat her hemoglobin today.      It appears that she has not had any recurrence of her atrial fibrillation since her hospitalization in 03/2017.  We did do a followup ZIO Patch on her which showed no evidence of atrial fibrillation.  She is unaware of any symptoms.  At this time, she would like to come off her Eliquis and I think it is not unreasonable as her only episode of atrial fibrillation was 1 precipitated when she was ill in the hospital.  I have told her she should go back on aspirin, but I want to see what her hemoglobin is first.  At this time we will stop the Eliquis and I have told we will call her if her hemoglobin is stable whether to restart on aspirin 81 mg daily.      We did do a followup CT scan of her aorta and her ascending aorta again measures 4.4 cm, unchanged from 03/2017.      Fasting lipid profile was checked earlier this year in is excellent with a total cholesterol of 176 and HDL of 93 and LDL of 65  and triglycerides of 92.  We will continue her simvastatin as is.      Electrolytes show while she was in the emergency room showed a sodium of 133, creatinine of 0.8, BUN of 18 and potassium of 4.3.      We will followup her hemoglobin.  If stable, then I will ask her to start her hydrochlorothiazide and her aspirin.  If it is low, I will hold on both these medications as she did have problems with significant orthostasis with higher dose of HCTZ in the past.  If she is intervascular volume deplete or anemic, I do not want to introduce it and also will not start her on aspirin if she is anemic.  If she is anemic, I will refer her back to Dr. Sevilla for further evaluation and treatment.        I will plan on seeing her back in 1 year.  At this time, I will move her CT scans of her aorta to every other year.      She is due to get her echocardiogram to follow up on her cardiomyopathy and her aortic insufficiency.  She was ill on the day she was supposed to have it done previously.  I will follow up on this as well.         FILI ROOT MD, [facility]             D: 2018   T: 2018   MT: DENICE      Name:     TERESA HARTMAN   MRN:      [id number]        Account:      [id number]   :      1935           Service Date: 2018      Document: [id number]         Outpatient Encounter Prescriptions as of 2018   Medication Sig Dispense Refill     acetaminophen (TYLENOL) 500 MG tablet Take 500-1,000 mg by mouth every 8 hours as needed for mild pain       albuterol (PROAIR HFA, PROVENTIL HFA, VENTOLIN HFA) 108 (90 BASE) MCG/ACT inhaler Inhale 2 puffs into the lungs every 6 hours as needed        aspirin 81 MG EC tablet Take 1 tablet (81 mg) by mouth daily       azelastine (ASTELIN) 0.1 % spray Spray 2 sprays into both nostrils 2 times daily       calcium citrate-vitamin D (CALCIUM CITRATE +) 315-200 MG-UNIT TABS Take 2 tablets by mouth every evening        fluticasone-salmeterol (ADVAIR-HFA) 230-21  MCG/ACT inhaler Inhale 2 puffs into the lungs 2 times daily 12 g 1     ipratropium (ATROVENT) 0.03 % spray Spray 2 sprays in nostril 2 times daily       latanoprost (XALATAN) 0.005 % ophthalmic solution Place 1 drop into both eyes At Bedtime       losartan (COZAAR) 100 MG tablet Take 1 tablet (100 mg) by mouth daily 90 tablet 3     metoprolol succinate (TOPROL-XL) 50 MG 24 hr tablet Take 100 mg by mouth daily       omeprazole (PRILOSEC) 20 MG CR capsule Take 1 capsule (20 mg) by mouth daily 90 capsule 1     simvastatin (ZOCOR) 20 MG tablet take 1/2 tablet by mouth daily at bedtime 45 tablet 2     [DISCONTINUED] hydrochlorothiazide (MICROZIDE) 12.5 MG capsule Take 1 capsule (12.5 mg) by mouth daily 90 capsule 3     [DISCONTINUED] apixaban ANTICOAGULANT (ELIQUIS) 2.5 MG tablet Take 1 tablet (2.5 mg) by mouth 2 times daily 30 tablet 0     No facility-administered encounter medications on file as of 8/16/2018.              Again, thank you for allowing me to participate in the care of your patient.      Sincerely,    Barak Hernandez MD     [facility]

## 2018-08-16 NOTE — PROGRESS NOTES
"Post-OV Hgb=11.7  Will message Dr. Wheeler to review, per dictation today:\" We will followup her hemoglobin.  If stable, then I will ask her to start her hydrochlorothiazide and her aspirin\"  Echo is scheduled for 9/5/18 and LAYA visit with bmp 9/14/18.    Per Dr. Hernandez's recommendation: Okay to start both    Spoke with patient to review Hgb 11.7 and Dr. Hernandez's recommendation to start aspirin 81mg and hydrochlorothiazide 12.5mg daily. Per patient request, Rx escripted for the hydrochlorothiazide. Reminded patient of echo on 9/5/18. Patient verbalized understanding and agreed with plan.      "

## 2018-08-16 NOTE — LETTER
8/16/2018    Layo Sevilla MD  [address] [place] [address]    RE: Genie Persaud       Dear Colleague,    I had the pleasure of seeing Genie Persaud in the [facility].    HPI and Plan:   See dictation    Orders Placed This Encounter   Procedures     Hemoglobin     Basic metabolic panel     Basic metabolic panel     Basic metabolic panel     Lipid Profile     Follow-Up with Cardiac Advanced Practice Provider     Follow-Up with Cardiac Advanced Practice Provider     Follow-Up with Cardiologist       Orders Placed This Encounter   Medications     aspirin 81 MG EC tablet     Sig: Take 1 tablet (81 mg) by mouth daily     hydrochlorothiazide (MICROZIDE) 12.5 MG capsule     Sig: Take 1 capsule (12.5 mg) by mouth daily     Dispense:  90 capsule     Refill:  3       Medications Discontinued During This Encounter   Medication Reason     apixaban ANTICOAGULANT (ELIQUIS) 2.5 MG tablet          Encounter Diagnoses   Name Primary?     Stress-induced cardiomyopathy      Coronary artery disease involving native coronary artery of native heart without angina pectoris Yes     Anemia, unspecified type      Essential hypertension with goal blood pressure less than 140/90      Nonrheumatic aortic valve insufficiency      Hyperlipidemia LDL goal <130      Thoracic aortic aneurysm without rupture (H)      Cerebrovascular accident (CVA), unspecified mechanism (H)      SVT -noted during Cath procedure      Paroxysmal atrial fibrillation (H)      Peripheral edema        CURRENT MEDICATIONS:  Current Outpatient Prescriptions   Medication Sig Dispense Refill     acetaminophen (TYLENOL) 500 MG tablet Take 500-1,000 mg by mouth every 8 hours as needed for mild pain       albuterol (PROAIR HFA, PROVENTIL HFA, VENTOLIN HFA) 108 (90 BASE) MCG/ACT inhaler Inhale 2 puffs into the lungs every 6 hours as needed        aspirin 81 MG EC tablet Take 1 tablet (81 mg) by mouth daily       azelastine (ASTELIN)  0.1 % spray Spray 2 sprays into both nostrils 2 times daily       calcium citrate-vitamin D (CALCIUM CITRATE +) 315-200 MG-UNIT TABS Take 2 tablets by mouth every evening        fluticasone-salmeterol (ADVAIR-HFA) 230-21 MCG/ACT inhaler Inhale 2 puffs into the lungs 2 times daily 12 g 1     hydrochlorothiazide (MICROZIDE) 12.5 MG capsule Take 1 capsule (12.5 mg) by mouth daily 90 capsule 3     ipratropium (ATROVENT) 0.03 % spray Spray 2 sprays in nostril 2 times daily       latanoprost (XALATAN) 0.005 % ophthalmic solution Place 1 drop into both eyes At Bedtime       losartan (COZAAR) 100 MG tablet Take 1 tablet (100 mg) by mouth daily 90 tablet 3     metoprolol succinate (TOPROL-XL) 50 MG 24 hr tablet Take 100 mg by mouth daily       omeprazole (PRILOSEC) 20 MG CR capsule Take 1 capsule (20 mg) by mouth daily 90 capsule 1     simvastatin (ZOCOR) 20 MG tablet take 1/2 tablet by mouth daily at bedtime 45 tablet 2       ALLERGIES     Allergies   Allergen Reactions     Fosamax [Alendronic Acid] GI Disturbance     Augmentin [Amoxicillin-Pot Clavulanate] Diarrhea     Diarrhea and rash     Evista [Raloxifene]      abd symptoms.      Flu Virus Vaccine      swollen and red at inj site       PAST MEDICAL HISTORY:  Past Medical History:   Diagnosis Date     Anemia 3/10/2009    Chronic disease, by Fe studies; awaiting full GI w/u and repeat colonoscopy, ERCP.     Basal Cell Carcinoma--back      Coronary artery disease involving native coronary artery of native heart without angina pectoris 3/9/2017    3/2/2017 - Two vessel coronary artery disease with mLAD 50% stenosis, D3 50% stenosis, pLCx 50% stenosis and mLCx 50% stenosis     Essential hypertension with goal blood pressure less than 140/90 9/1/2016    White coat hypertension;  24 hour ambulatory monitoring normal. Do not titrate up further.       Hyperlipidemia LDL goal <130 2/10/2010     Impaired fasting glucose 8/26/2010     Moderate persistent asthma      Nonrheumatic  aortic valve insufficiency 6/29/2017     osteopenia      Paroxysmal atrial fibrillation (H) 12/26/2017     Pulmonary nodule 3/3/2009    PET scan reportedly normal; biopsy not advised.     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  Problem list name updated by automated process. Provider to review     Unspecified arthropathy, hand      Vasculitis (H) 4/20/2009    Possible increased activity of thoracic aorta, on PET scan w/u for pulmonary nodule.        PAST SURGICAL HISTORY:  Past Surgical History:   Procedure Laterality Date     C APPENDECTOMY  1957     C LIGATE FALLOPIAN TUBE  1971     C STEREOTACTIC BREAST BIOPSY  2001     CHOLECYSTECTOMY, LAPOROSCOPIC  2008    Cholecystectomy, Laparoscopic     ESOPHAGOSCOPY, GASTROSCOPY, DUODENOSCOPY (EGD), COMBINED  5/17/2013    Procedure: COMBINED ESOPHAGOSCOPY, GASTROSCOPY, DUODENOSCOPY (EGD), BIOPSY SINGLE OR MULTIPLE;  ESOPHAGOSCOPY, GASTROSCOPY, DUODENOSCOPY (EGD)  with bx;  Surgeon: Jeffery Yanez MD;  Location: [facility]     HC DILATION/CURETTAGE DIAG/THER NON OB  1967,1971     HC REMOVE TONSILS/ADENOIDS,<13 Y/O         FAMILY HISTORY:  Family History   Problem Relation Age of Onset     Hypertension Mother      Osteoperosis Mother      C.A.D. Mother      Connective Tissue Disorder Father      scleraderma     Cerebrovascular Disease Paternal Grandmother      Prostate Cancer Other      9/05 passed away due to complications     Breast Cancer Child      youngest dtr       SOCIAL HISTORY:  Social History     Social History     Marital status: Single     Spouse name: N/A     Number of children: 3     Years of education: 15     Occupational History     semi-retired   "    Social History Main Topics     Smoking status: Never Smoker     Smokeless tobacco: Never Used     Alcohol use 0.6 - 1.2 oz/week     1 - 2 Standard drinks or equivalent per week      Comment: 1 glass of wine 1-2 days per week     Drug use: No     Sexual activity: No     Other Topics Concern     Parent/Sibling W/ Cabg, Mi Or Angioplasty Before 65f 55m? No     Social History Narrative       Review of Systems:  Skin:  Negative       Eyes:  Positive for glasses    ENT:  Positive for nasal congestion;postnasal drainage;sinus trouble    Respiratory:  Positive for shortness of breath;dyspnea on exertion asthma, dyspnea on exertion - climbing stairs   Cardiovascular:  Negative      Gastroenterology: Positive for heartburn;reflux reflux under control with Omeprazole  Genitourinary:  not assessed      Musculoskeletal:  Positive for arthritis    Neurologic:  Negative      Psychiatric:  Negative      Heme/Lymph/Imm:  Positive for allergies RX allergies ,  Allergic to almost  everything- per pt  -  environmental allergies , cats - certain trees -  no food allergies  Endocrine:  Negative        Physical Exam:  Vitals: /50 (BP Location: Right arm, Patient Position: Sitting, Cuff Size: Adult Regular)  Pulse 80  Ht 1.575 m (5' 2\")  Wt 62.2 kg (137 lb 1.6 oz)  Breastfeeding? No  BMI 25.08 kg/m2    Constitutional:  cooperative, alert and oriented, well developed, well nourished, in no acute distress        Skin:  warm and dry to the touch, no apparent skin lesions or masses noted          Head:  normocephalic, no masses or lesions        Eyes:  pupils equal and round, conjunctivae and lids unremarkable, sclera white, no xanthalasma, EOMS intact, no nystagmus        Lymph:      ENT:  no pallor or cyanosis, dentition good        Neck:  carotid pulses are full and equal bilaterally;no carotid bruit        Respiratory:  normal breath sounds, clear to auscultation, normal A-P diameter, normal symmetry, normal respiratory " excursion, no use of accessory muscles         Cardiac: regular rhythm       systolic murmur;grade 2;RUSB   diastolic murmur;decrescendo;RUSB;grade 1    pulses full and equal                                        GI:           Extremities and Muscular Skeletal:  no spinal abnormalities noted;normal muscle strength and tone   bilateral LE edema;trace;1+     uses compression stockings    Neurological:  no gross motor deficits        Psych:  affect appropriate, oriented to time, person and place        CC  Barak Hernandez MD  [address]  [place] [id number]                Thank you for allowing me to participate in the care of your patient.      Sincerely,     Barak Hernandez MD     [facility]    cc:   Barak Hernandez MD  [address]  [place] [id number]

## 2018-08-17 RX ORDER — HYDROCHLOROTHIAZIDE 12.5 MG/1
12.5 CAPSULE ORAL DAILY
Qty: 90 CAPSULE | Refills: 3 | Status: SHIPPED | OUTPATIENT
Start: 2018-08-17 | End: 2019-09-24

## 2018-08-21 DIAGNOSIS — I10 ESSENTIAL HYPERTENSION WITH GOAL BLOOD PRESSURE LESS THAN 140/90: ICD-10-CM

## 2018-08-21 NOTE — TELEPHONE ENCOUNTER
"Requested Prescriptions   Pending Prescriptions Disp Refills     metoprolol succinate (TOPROL-XL) 25 MG 24 hr tablet [Pharmacy Med Name: Metoprolol Succinate ER Oral Tablet Extended Release 24 Hour 25 MG]        Last Written Prescription Date:  8/7/2017  Last Fill Quantity: 60,   # refills: 11  Last Office Visit: 8/9/2018  Future Office visit:    Next 5 appointments (look out 90 days)     Sep 14, 2018 12:30 PM CDT   Return Visit with ANA MARIA Coe   [facility] ([facility])    [id number] [place] [address] [address]  [facility] [address] [phone]                   Routing refill request to provider for review/approval because:  Drug not on the [facility] or [facility] refill protocol or controlled substance  Drug not active on patient's medication list  Medication is reported/historical   60 tablet 10     Sig: TAKE TWO TABLETS BY MOUTH DAILY    Beta-Blockers Protocol Failed    8/21/2018  7:00 AM       Failed - Blood pressure under 140/90 in past 12 months    BP Readings from Last 3 Encounters:   08/16/18 160/50   08/09/18 144/60   08/02/18 120/40                Passed - Patient is age 6 or older       Passed - Recent (12 mo) or future (30 days) visit within the authorizing provider's specialty    Patient had office visit in the last 12 months or has a visit in the next 30 days with authorizing provider or within the authorizing provider's specialty.  See \"Patient Info\" tab in inbasket, or \"Choose Columns\" in Meds & Orders section of the refill encounter.              "

## 2018-08-22 RX ORDER — METOPROLOL SUCCINATE 25 MG/1
TABLET, EXTENDED RELEASE ORAL
Qty: 60 TABLET | Refills: 10 | Status: SHIPPED | OUTPATIENT
Start: 2018-08-22 | End: 2018-09-14 | Stop reason: DRUGHIGH

## 2018-08-22 NOTE — TELEPHONE ENCOUNTER
"Requested Prescriptions   Pending Prescriptions Disp Refills     metoprolol succinate (TOPROL-XL) 25 MG 24 hr tablet [Pharmacy Med Name: Metoprolol Succinate ER Oral Tablet Extended Release 24 Hour 25 MG] 60 tablet 10     Sig: TAKE TWO TABLETS BY MOUTH DAILY    Beta-Blockers Protocol Failed    8/21/2018  8:00 AM       Failed - Blood pressure under 140/90 in past 12 months    BP Readings from Last 3 Encounters:   08/16/18 160/50   08/09/18 144/60   08/02/18 120/40                Passed - Patient is age 6 or older       Passed - Recent (12 mo) or future (30 days) visit within the authorizing provider's specialty    Patient had office visit in the last 12 months or has a visit in the next 30 days with authorizing provider or within the authorizing provider's specialty.  See \"Patient Info\" tab in inbasket, or \"Choose Columns\" in Meds & Orders section of the refill encounter.            Routing refill request to provider for review/approval because:  bp out of protocol range- discontinued by provider off med list    Camryn Valadez RN BSN  [facility]  [phone]          "

## 2018-09-05 ENCOUNTER — HOSPITAL ENCOUNTER (OUTPATIENT)
Dept: CARDIOLOGY | Facility: CLINIC | Age: 83
Discharge: HOME | End: 2018-09-05
Attending: PHYSICIAN ASSISTANT | Admitting: PHYSICIAN ASSISTANT
Payer: COMMERCIAL

## 2018-09-05 DIAGNOSIS — I48.91 ATRIAL FIBRILLATION, UNSPECIFIED TYPE (H): ICD-10-CM

## 2018-09-05 DIAGNOSIS — I51.81 STRESS-INDUCED CARDIOMYOPATHY: ICD-10-CM

## 2018-09-05 PROCEDURE — 93306 TTE W/DOPPLER COMPLETE: CPT

## 2018-09-05 PROCEDURE — 93306 TTE W/DOPPLER COMPLETE: CPT | Mod: 26 | Performed by: INTERNAL MEDICINE

## 2018-09-14 ENCOUNTER — OFFICE VISIT (OUTPATIENT)
Dept: CARDIOLOGY | Facility: CLINIC | Age: 83
End: 2018-09-14
Attending: INTERNAL MEDICINE
Payer: COMMERCIAL

## 2018-09-14 ENCOUNTER — CARE COORDINATION (OUTPATIENT)
Dept: CARDIOLOGY | Facility: CLINIC | Age: 83
End: 2018-09-14

## 2018-09-14 VITALS
WEIGHT: 133.8 LBS | DIASTOLIC BLOOD PRESSURE: 48 MMHG | BODY MASS INDEX: 24.62 KG/M2 | HEART RATE: 76 BPM | HEIGHT: 62 IN | SYSTOLIC BLOOD PRESSURE: 144 MMHG

## 2018-09-14 DIAGNOSIS — I71.20 THORACIC AORTIC ANEURYSM WITHOUT RUPTURE (H): ICD-10-CM

## 2018-09-14 DIAGNOSIS — I51.81 STRESS-INDUCED CARDIOMYOPATHY: ICD-10-CM

## 2018-09-14 DIAGNOSIS — I10 ESSENTIAL HYPERTENSION WITH GOAL BLOOD PRESSURE LESS THAN 140/90: Primary | ICD-10-CM

## 2018-09-14 DIAGNOSIS — I10 HTN (HYPERTENSION): Primary | ICD-10-CM

## 2018-09-14 DIAGNOSIS — I10 ESSENTIAL HYPERTENSION WITH GOAL BLOOD PRESSURE LESS THAN 140/90: ICD-10-CM

## 2018-09-14 LAB
ANION GAP SERPL CALCULATED.3IONS-SCNC: 7 MMOL/L (ref 3–14)
BUN SERPL-MCNC: 27 MG/DL (ref 7–30)
CALCIUM SERPL-MCNC: 9.1 MG/DL (ref 8.5–10.1)
CHLORIDE SERPL-SCNC: 103 MMOL/L (ref 94–109)
CO2 SERPL-SCNC: 28 MMOL/L (ref 20–32)
CREAT SERPL-MCNC: 1.12 MG/DL (ref 0.52–1.04)
GFR SERPL CREATININE-BSD FRML MDRD: 47 ML/MIN/1.7M2
GLUCOSE SERPL-MCNC: 83 MG/DL (ref 70–99)
POTASSIUM SERPL-SCNC: 4.6 MMOL/L (ref 3.4–5.3)
SODIUM SERPL-SCNC: 138 MMOL/L (ref 133–144)

## 2018-09-14 PROCEDURE — 80048 BASIC METABOLIC PNL TOTAL CA: CPT | Performed by: INTERNAL MEDICINE

## 2018-09-14 PROCEDURE — 36415 COLL VENOUS BLD VENIPUNCTURE: CPT | Performed by: INTERNAL MEDICINE

## 2018-09-14 PROCEDURE — 99214 OFFICE O/P EST MOD 30 MIN: CPT | Performed by: PHYSICIAN ASSISTANT

## 2018-09-14 RX ORDER — METOPROLOL SUCCINATE 50 MG/1
100 TABLET, EXTENDED RELEASE ORAL DAILY
Qty: 60 TABLET | Refills: 0 | Status: SHIPPED | OUTPATIENT
Start: 2018-09-14 | End: 2018-10-26 | Stop reason: DRUGHIGH

## 2018-09-14 NOTE — PROGRESS NOTES
Cardiology Progress Note    Date of Service: 09/14/2018      Reason for visit: Follow up hypertension, cardiomyopathy.    Primary cardiologist: Dr. Barak Hernandez      HPI:  Ms. Persaud is a pleasant 82-year-old woman with PMHx including asthma, chronic peripheral edema, severe labile hypertension, hypercholesterolemia, known ascending aortic aneurysm, CAD, moderate aortic valve disease, and nonischemic cardiomyopathy with ejection fraction of 45%-50% based on Echo 03/2017.  After presenting with chest pressure in March 2017, she had an angiogram which showed two vessel nonobstructive coronary disease, but she was also noted to have short bursts of nonsustained supraventricular tachycardia with rates of up to 130 during evaluation. She was then hospitalized again in 11/2017 with pneumonia and sepsis. Her stay was complicated by a takotsubo stress cardiomyopathy with troponin rise and severe hypokinesis of the LV.  In addition, she developed rapid atrial fibrillation during her stay, with severe peripheral edema and biventricular heart failure.     Upon return to see Dr. Hernandez in Jan 2018 she was doing well . Repeat echo showed improvement with normalization of her LVEF to 60-65%. She did have some mild pulmonary hypertension, and her AR was reported as mild. Her ascending aorta remained dilated, measured at 4.4cm. In addition, she had a 3 week event monitor performed which showed no recurrence of atrial fibrillation. Her heart failure symptoms had resolved. At that time, Dr. Hernandez opted to discontinue her amiodarone, but plan was to continue on Eliquis, and perform a Ziopatch after being off Amio for a month, and then reconsider discontinuation of anticoagulation.      She returned to see me in follow up Feb 2018 and continued to feel well overall. She had discontinued her lasix on her own as she felt she no longer needed it. Unfortunately she had misunderstood instructions about her amiodarone and kept  taking that, thus we discontinued at that time. Her BP continued to fluctuate at home, but on our check was very good. Her largest complaint revolved around fatigue, stating she had little energy. TSH and CBC were unremarkable. When I saw her back in April 2018, her Ziopatch showed no recurrence of afib off the amio, though there were short runs of SVT. She remained fatigued but declined a sleep evaluation. No changes were made.    She has since returned to see Dr. Hernandez in early August. Her BP remained labile, but had no new symptoms concerning for ischemia. Her Eliquis was discontinued as she had no recurrence of afib, and aspirin was resumed. Follow up CT of the aorta showed no significant change in her aneurysm from 2017, measuring 4.4cm. She had an echocardiogram planned but she had to reschedule as she was ill the day of the appt. In regard to her BP, she was placed on hydrochlorothiazide and she is back today for repeat labs and short term follow up.    Today, she tells me she is feeling fine. Thus far she is tolerating the hydrochlorothiazide without dizziness.  Her BP is still modestly elevated today in the 140s systolically, and she says it continues to run in the 140-150 range at home as well. BMP shows minimal decline in GFR with SCr 1.12, but her electrolytes are stable. Her echo was performed last week and shows ongoing preserved EF 55%, though suggested elevated filling pressures. The RV pressure is elevated at 40-50mmHg + RAP, but is of normal size and function. She has mild to moderate AR with no hemodynamically significant aortic stenosis. She denies any new exertional chest discomfort, palpitations, or orthopnea. Notably, she has a bit less leg edema today with the addition of hydrochlorothiazide and her weight is down 4 lbs since last visit.      ASSESSMENT/PLAN:    1. Hypertension.   --BP continues to fluctuate at home, and still a bit above goal. Unfortunately her current dose of Toprol XL  is unclear as there are two listed. I've asked her to call our office to confirm with us. If she is currently taking 50mg, will increase to 100mg. If she is already on higher dose, plan will be to convert to carvedilol. [Addendum: Verified after visit she was taking Toprol XL 50mg daily, thus we will increase to 100mg daily.]   --Continue hydrochlorothiazide 12.5mg daily. She has dizziness with higher doses. In addition, it appears she also had dizziness with amlodipine in the past. Will recheck BMP again next visit.    --Continue the losartan 100mg at bedtime, though if we continue to have issues can consider a change to irbesartan.      2. Nonischemic cardiomyopathy.   --Echo March 2017 with EF 45-50%. Presented with chest pressure, angio without obstructive disease. Also had evidence of stress cardiomyopathy with hospitalization in Nov 2017 for pneumonia and sepsis, with severe hypokinesis of the distal 2/3 of the LV though EF not reported. Echo Sept 2018 shows normalization of EF 55%. She continues to have elevated RV pressures but size and function are normal.   --Continue losartan and Toprol XL as above.     --On exam today she again appears relatively euvolemic, with mild ankle edema improved from last visit. She prefers to stay off furosemide, so will continue to monitor.    3. Coronary artery disease, nonobstructive.    --Angiogram 3/2017 showed nonobstructive 2V disease with mLAD 50%, D3 50%, pLCx 50%, and mLCx 50% stenosis. Medical management purused. She remains free of angina.   --Now on ASA 81mg daily, after discontinuation of systemic AC. Hemoglobin stable at 11.7 at last visit.   --Continue simvastatin 10mg daily. LD controlled at 65, with HDL 93, Feb 2018.    4. Atrial fibrillation.   --This was identified during her hospitalization in the setting of pneumonia and sepsis 11/2017. Appears she had one single episode during an acute illness. She remains in sinus rhythm on exam today, and no recurrence  on Ziopatch after being off amiodarone x 1 month. Now off systemic AC as well.      5. Ascending aortic aneurysm.   --Most recent echo again noted her ascending aorta to be mildly to moderately dilated, and repeat CT of chest done 7/30/18 showed this was stable at 4.4cm which was unchanged from 2017. Continue yearly surveillance.                 Follow up plan:   With Diana Nelson PA-C in 1 month with repeat labs and BP check, and with Dr. Barak Hernandez in 1 year.       Orders this Visit:  Orders Placed This Encounter   Procedures     Basic metabolic panel     Follow-Up with Cardiac Advanced Practice Provider     No orders of the defined types were placed in this encounter.    There are no discontinued medications.        CURRENT MEDICATIONS:  Current Outpatient Prescriptions   Medication Sig Dispense Refill     acetaminophen (TYLENOL) 500 MG tablet Take 500-1,000 mg by mouth every 8 hours as needed for mild pain       albuterol (PROAIR HFA, PROVENTIL HFA, VENTOLIN HFA) 108 (90 BASE) MCG/ACT inhaler Inhale 2 puffs into the lungs every 6 hours as needed        aspirin 81 MG EC tablet Take 1 tablet (81 mg) by mouth daily       azelastine (ASTELIN) 0.1 % spray Spray 2 sprays into both nostrils 2 times daily       calcium citrate-vitamin D (CALCIUM CITRATE +) 315-200 MG-UNIT TABS Take 2 tablets by mouth every evening        fluticasone-salmeterol (ADVAIR-HFA) 230-21 MCG/ACT inhaler Inhale 2 puffs into the lungs 2 times daily 12 g 1     hydrochlorothiazide (MICROZIDE) 12.5 MG capsule Take 1 capsule (12.5 mg) by mouth daily 90 capsule 3     ipratropium (ATROVENT) 0.03 % spray Spray 2 sprays in nostril 2 times daily       latanoprost (XALATAN) 0.005 % ophthalmic solution Place 1 drop into both eyes At Bedtime       losartan (COZAAR) 100 MG tablet Take 1 tablet (100 mg) by mouth daily 90 tablet 3     omeprazole (PRILOSEC) 20 MG CR capsule Take 1 capsule (20 mg) by mouth daily 90 capsule 1     simvastatin (ZOCOR) 20 MG  tablet take 1/2 tablet by mouth daily at bedtime 45 tablet 2     metoprolol succinate (TOPROL-XL) 25 MG 24 hr tablet TAKE TWO TABLETS BY MOUTH DAILY 60 tablet 10     metoprolol succinate (TOPROL-XL) 50 MG 24 hr tablet Take 100 mg by mouth daily         ALLERGIES     Allergies   Allergen Reactions     Fosamax [Alendronic Acid] GI Disturbance     Augmentin [Amoxicillin-Pot Clavulanate] Diarrhea     Diarrhea and rash     Evista [Raloxifene]      abd symptoms.      Flu Virus Vaccine      swollen and red at inj site       PAST MEDICAL HISTORY:  Past Medical History:   Diagnosis Date     Anemia 3/10/2009    Chronic disease, by Fe studies; awaiting full GI w/u and repeat colonoscopy, ERCP.     Basal Cell Carcinoma--back      Coronary artery disease involving native coronary artery of native heart without angina pectoris 3/9/2017    3/2/2017 - Two vessel coronary artery disease with mLAD 50% stenosis, D3 50% stenosis, pLCx 50% stenosis and mLCx 50% stenosis     Essential hypertension with goal blood pressure less than 140/90 9/1/2016    White coat hypertension;  24 hour ambulatory monitoring normal. Do not titrate up further.       Hyperlipidemia LDL goal <130 2/10/2010     Impaired fasting glucose 8/26/2010     Moderate persistent asthma      Nonrheumatic aortic valve insufficiency 6/29/2017     osteopenia      Paroxysmal atrial fibrillation (H) 12/26/2017     Pulmonary nodule 3/3/2009    PET scan reportedly normal; biopsy not advised.     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  Problem list name updated by automated process. Provider to review     Unspecified arthropathy, hand      Vasculitis (H) 4/20/2009     Possible increased activity of thoracic aorta, on PET scan w/u for pulmonary nodule.        PAST SURGICAL HISTORY:  Past Surgical History:   Procedure Laterality Date     C APPENDECTOMY  1957     C LIGATE FALLOPIAN TUBE  1971     C STEREOTACTIC BREAST BIOPSY  2001     CHOLECYSTECTOMY, LAPOROSCOPIC  2008    Cholecystectomy, Laparoscopic     ESOPHAGOSCOPY, GASTROSCOPY, DUODENOSCOPY (EGD), COMBINED  5/17/2013    Procedure: COMBINED ESOPHAGOSCOPY, GASTROSCOPY, DUODENOSCOPY (EGD), BIOPSY SINGLE OR MULTIPLE;  ESOPHAGOSCOPY, GASTROSCOPY, DUODENOSCOPY (EGD)  with bx;  Surgeon: Jeffery Yanez MD;  Location: [facility]     HC DILATION/CURETTAGE DIAG/THER NON OB  1967,1971     HC REMOVE TONSILS/ADENOIDS,<11 Y/O         FAMILY HISTORY:  Family History   Problem Relation Age of Onset     Hypertension Mother      Osteoporosis Mother      C.A.D. Mother      Connective Tissue Disorder Father      scleraderma     Cerebrovascular Disease Paternal Grandmother      Prostate Cancer Other      9/05 passed away due to complications     Breast Cancer Child      youngest dtr       SOCIAL HISTORY:  Social History     Social History     Marital status: Single     Spouse name: N/A     Number of children: 3     Years of education: 15     Occupational History     semi-retired      Social History Main Topics     Smoking status: Never Smoker     Smokeless tobacco: Never Used     Alcohol use 0.6 - 1.2 oz/week     1 - 2 Standard drinks or equivalent per week      Comment: 1 glass of wine 1-2 days per week     Drug use: No     Sexual activity: No     Other Topics Concern     Parent/Sibling W/ Cabg, Mi Or Angioplasty Before 65f 55m? No     Social History Narrative       Review of Systems:  Cardiovascular: negative for chest pain, palpitations, orthopnea, pos mild LE edema, improved.  Constitutional: negative for chills, sweats, fevers.   Resp: Negative for dyspnea at rest, dyspnea on exertion, neg recent cough, neg hemoptysis, pos known chronic lung  "disease/asthma.  HEENT: Negative for new visual changes, frequent headaches  Gastrointestinal: negative for abdominal pain, diarrhea, blood in stool, nausea, vomiting  Hematologic/lymphatic: Neg systemic anticoagulation, neg hx of blood clots  Musculoskeletal: negative for new back pain, joint pain.  Neurological: negative for focal weakness, LOC, seizures, syncope. Neg dizziness/presyncope.      Physical Exam:  Vitals: /48 (BP Location: Right arm, Patient Position: Sitting, Cuff Size: Adult Regular)  Pulse 76  Ht 1.575 m (5' 2\")  Wt 60.7 kg (133 lb 12.8 oz)  Breastfeeding? No  BMI 24.47 kg/m2   Wt Readings from Last 4 Encounters:   09/14/18 60.7 kg (133 lb 12.8 oz)   08/16/18 62.2 kg (137 lb 1.6 oz)   08/09/18 62.6 kg (138 lb)   08/01/18 65.4 kg (144 lb 3.2 oz)       GEN:  In general, this is a well nourished  female in no acute distress on room air.  Patient ambulatory.  HEENT:  Pupils equal, round. Sclerae nonicteric. Clear oropharynx.   NECK: Supple, no masses appreciated. Trachea midline. No JVD while upright.  C/V:  Regular rate and rhythm, 1/6 BRAULIO heard RSB. No rub or gallop.   RESP: Respirations are unlabored. No use of accessory muscles. Clear to auscultation bilaterally without wheezing, rales, or rhonchi.  GI: Abdomen soft, nontender, nondistended.   EXTREM: Trace bilateral  LE edema. Has compression socks on. No cyanosis or clubbing.  NEURO: Alert and oriented, cooperative. Gait not formally assessed. No obvious focal deficits.   PSYCH: Normal affect.  SKIN: Warm and dry. No rashes or petechiae appreciated.       Recent Lab Results:  LIPID RESULTS:  Lab Results   Component Value Date    CHOL 176 02/22/2018    HDL 93 02/22/2018    LDL 65 02/22/2018    TRIG 92 02/22/2018       New/Pertinent imaging results since last visit:  Echo 9/5/18  Interpretation Summary     The visual ejection fraction is estimated at 55%.  Diastolic Doppler findings (E/E' ratio and/or other parameters) suggest " left  ventricular filling pressures are increased.  There is borderline inferolateral wall hypokinesis.  The left atrium is moderately dilated.  The right ventricular systolic pressure is approximated at 40-50mmHg plus the  right atrial pressure.  Right ventricular systolic pressure is elevated, consistent with moderate  pulmonary hypertension.  Normal IVC (1.5-2.5cm) with >50% respiratory collapse; right atrial pressure  is estimated at 5-10mmHg.  There is mild to moderate (1-2+) aortic regurgitation.  No hemodynamically significant valvular aortic stenosis.  The ascending aorta is Moderately dilated.  Sinus Valsalva 32mm, Asc aorta 43-44 mm      Diana Nelson PA-C  [facility]  Pager [phone]

## 2018-09-14 NOTE — LETTER
9/14/2018      Layo Sevilla MD  [address] [place] [address]      RE: Genie Persaud       Dear Colleague,    I had the pleasure of seeing Genie Persaud in the [facility].    No notes on file    Outpatient Encounter Prescriptions as of 9/14/2018   Medication Sig Dispense Refill     acetaminophen (TYLENOL) 500 MG tablet Take 500-1,000 mg by mouth every 8 hours as needed for mild pain       albuterol (PROAIR HFA, PROVENTIL HFA, VENTOLIN HFA) 108 (90 BASE) MCG/ACT inhaler Inhale 2 puffs into the lungs every 6 hours as needed        aspirin 81 MG EC tablet Take 1 tablet (81 mg) by mouth daily       azelastine (ASTELIN) 0.1 % spray Spray 2 sprays into both nostrils 2 times daily       calcium citrate-vitamin D (CALCIUM CITRATE +) 315-200 MG-UNIT TABS Take 2 tablets by mouth every evening        fluticasone-salmeterol (ADVAIR-HFA) 230-21 MCG/ACT inhaler Inhale 2 puffs into the lungs 2 times daily 12 g 1     hydrochlorothiazide (MICROZIDE) 12.5 MG capsule Take 1 capsule (12.5 mg) by mouth daily 90 capsule 3     ipratropium (ATROVENT) 0.03 % spray Spray 2 sprays in nostril 2 times daily       latanoprost (XALATAN) 0.005 % ophthalmic solution Place 1 drop into both eyes At Bedtime       losartan (COZAAR) 100 MG tablet Take 1 tablet (100 mg) by mouth daily 90 tablet 3     omeprazole (PRILOSEC) 20 MG CR capsule Take 1 capsule (20 mg) by mouth daily 90 capsule 1     simvastatin (ZOCOR) 20 MG tablet take 1/2 tablet by mouth daily at bedtime 45 tablet 2     [DISCONTINUED] metoprolol succinate (TOPROL-XL) 25 MG 24 hr tablet TAKE TWO TABLETS BY MOUTH DAILY 60 tablet 10     [DISCONTINUED] metoprolol succinate (TOPROL-XL) 50 MG 24 hr tablet Take 100 mg by mouth daily       No facility-administered encounter medications on file as of 9/14/2018.        Again, thank you for allowing me to participate in the care of your patient.      Sincerely,    ANA MARIA Coe     [facility]  [facility]

## 2018-09-14 NOTE — PROGRESS NOTES
Antonio Baker:   Saw this lady today and her BP is high. I need to make adjustments but we weren't sure what dose of metoprolol she was on. There are two different doses listed in the chart. She is going to check her dose, then call you to tell you what she's on.     If she is taking Toprol XL 50mg: Increase to 100mg daily.   If she's already taking Toprol XL 100mg daily, then CHANGE to carvedilol 25mg BID.     I'm not seeing her back for about 5 weeks (my first avail) but remind her to keep taking it at home and if BP >150s routinely after a week or so she should call back and I will adjust further before she sees me.     Thanks   Diana Gaspar from patient with update she is on metoprolol succinate 25 mg 2 tablets daily.   Reviewed LAYA Diana RODGERS plan as above with patient. Patient states understanding and agreement to plan - she will be increasing her medication to 100 mg daily. Patient states she has 25 mg tablets and would like to use those up before using new Rx - Rx escripted with plan for full refill at return visit with LAYA.  Olivia Guy RN 09/14/18 1:42 PM

## 2018-09-14 NOTE — LETTER
9/14/2018    Layo Sevilla MD  [address] [place] [address]    RE: Genie THORPE Hung       Dear Colleague,    I had the pleasure of seeing Genie Persaud in the [facility].      Cardiology Progress Note    Date of Service: 09/14/2018      Reason for visit: Follow up hypertension, cardiomyopathy.    Primary cardiologist: Dr. Barak Hernandez      HPI:  Ms. Persaud is a pleasant 82-year-old woman with PMHx including asthma, chronic peripheral edema, severe labile hypertension, hypercholesterolemia, known ascending aortic aneurysm, CAD, moderate aortic valve disease, and nonischemic cardiomyopathy with ejection fraction of 45%-50% based on Echo 03/2017.  After presenting with chest pressure in March 2017, she had an angiogram which showed two vessel nonobstructive coronary disease, but she was also noted to have short bursts of nonsustained supraventricular tachycardia with rates of up to 130 during evaluation. She was then hospitalized again in 11/2017 with pneumonia and sepsis. Her stay was complicated by a takotsubo stress cardiomyopathy with troponin rise and severe hypokinesis of the LV.  In addition, she developed rapid atrial fibrillation during her stay, with severe peripheral edema and biventricular heart failure.     Upon return to see Dr. Hernandez in Jan 2018 she was doing well . Repeat echo showed improvement with normalization of her LVEF to 60-65%. She did have some mild pulmonary hypertension, and her AR was reported as mild. Her ascending aorta remained dilated, measured at 4.4cm. In addition, she had a 3 week event monitor performed which showed no recurrence of atrial fibrillation. Her heart failure symptoms had resolved. At that time, Dr. Hernandez opted to discontinue her amiodarone, but plan was to continue on Eliquis, and perform a Ziopatch after being off Amio for a month, and then reconsider discontinuation of anticoagulation.      She returned to see me in  follow up Feb 2018 and continued to feel well overall. She had discontinued her lasix on her own as she felt she no longer needed it. Unfortunately she had misunderstood instructions about her amiodarone and kept taking that, thus we discontinued at that time. Her BP continued to fluctuate at home, but on our check was very good. Her largest complaint revolved around fatigue, stating she had little energy. TSH and CBC were unremarkable. When I saw her back in April 2018, her Ziopatch showed no recurrence of afib off the amio, though there were short runs of SVT. She remained fatigued but declined a sleep evaluation. No changes were made.    She has since returned to see Dr. Hernandez in early August. Her BP remained labile, but had no new symptoms concerning for ischemia. Her Eliquis was discontinued as she had no recurrence of afib, and aspirin was resumed. Follow up CT of the aorta showed no significant change in her aneurysm from 2017, measuring 4.4cm. She had an echocardiogram planned but she had to reschedule as she was ill the day of the appt. In regard to her BP, she was placed on hydrochlorothiazide and she is back today for repeat labs and short term follow up.    Today, she tells me she is feeling fine. Thus far she is tolerating the hydrochlorothiazide without dizziness.  Her BP is still modestly elevated today in the 140s systolically, and she says it continues to run in the 140-150 range at home as well. BMP shows minimal decline in GFR with SCr 1.12, but her electrolytes are stable. Her echo was performed last week and shows ongoing preserved EF 55%, though suggested elevated filling pressures. The RV pressure is elevated at 40-50mmHg + RAP, but is of normal size and function. She has mild to moderate AR with no hemodynamically significant aortic stenosis. She denies any new exertional chest discomfort, palpitations, or orthopnea. Notably, she has a bit less leg edema today with the addition of  hydrochlorothiazide and her weight is down 4 lbs since last visit.      ASSESSMENT/PLAN:    1. Hypertension.   --BP continues to fluctuate at home, and still a bit above goal. Unfortunately her current dose of Toprol XL is unclear as there are two listed. I've asked her to call our office to confirm with us. If she is currently taking 50mg, will increase to 100mg. If she is already on higher dose, plan will be to convert to carvedilol. [Addendum: Verified after visit she was taking Toprol XL 50mg daily, thus we will increase to 100mg daily.]   --Continue hydrochlorothiazide 12.5mg daily. She has dizziness with higher doses. In addition, it appears she also had dizziness with amlodipine in the past. Will recheck BMP again next visit.    --Continue the losartan 100mg at bedtime, though if we continue to have issues can consider a change to irbesartan.      2. Nonischemic cardiomyopathy.   --Echo March 2017 with EF 45-50%. Presented with chest pressure, angio without obstructive disease. Also had evidence of stress cardiomyopathy with hospitalization in Nov 2017 for pneumonia and sepsis, with severe hypokinesis of the distal 2/3 of the LV though EF not reported. Echo Sept 2018 shows normalization of EF 55%. She continues to have elevated RV pressures but size and function are normal.   --Continue losartan and Toprol XL as above.     --On exam today she again appears relatively euvolemic, with mild ankle edema improved from last visit. She prefers to stay off furosemide, so will continue to monitor.    3. Coronary artery disease, nonobstructive.    --Angiogram 3/2017 showed nonobstructive 2V disease with mLAD 50%, D3 50%, pLCx 50%, and mLCx 50% stenosis. Medical management purused. She remains free of angina.   --Now on ASA 81mg daily, after discontinuation of systemic AC. Hemoglobin stable at 11.7 at last visit.   --Continue simvastatin 10mg daily. LD controlled at 65, with HDL 93, Feb 2018.    4. Atrial  fibrillation.   --This was identified during her hospitalization in the setting of pneumonia and sepsis 11/2017. Appears she had one single episode during an acute illness. She remains in sinus rhythm on exam today, and no recurrence on Ziopatch after being off amiodarone x 1 month. Now off systemic AC as well.      5. Ascending aortic aneurysm.   --Most recent echo again noted her ascending aorta to be mildly to moderately dilated, and repeat CT of chest done 7/30/18 showed this was stable at 4.4cm which was unchanged from 2017. Continue yearly surveillance.                 Follow up plan:   With Diana Nelson PA-C in 1 month with repeat labs and BP check, and with Dr. Barak Hernandez in 1 year.       Orders this Visit:  Orders Placed This Encounter   Procedures     Basic metabolic panel     Follow-Up with Cardiac Advanced Practice Provider     No orders of the defined types were placed in this encounter.    There are no discontinued medications.        CURRENT MEDICATIONS:  Current Outpatient Prescriptions   Medication Sig Dispense Refill     acetaminophen (TYLENOL) 500 MG tablet Take 500-1,000 mg by mouth every 8 hours as needed for mild pain       albuterol (PROAIR HFA, PROVENTIL HFA, VENTOLIN HFA) 108 (90 BASE) MCG/ACT inhaler Inhale 2 puffs into the lungs every 6 hours as needed        aspirin 81 MG EC tablet Take 1 tablet (81 mg) by mouth daily       azelastine (ASTELIN) 0.1 % spray Spray 2 sprays into both nostrils 2 times daily       calcium citrate-vitamin D (CALCIUM CITRATE +) 315-200 MG-UNIT TABS Take 2 tablets by mouth every evening        fluticasone-salmeterol (ADVAIR-HFA) 230-21 MCG/ACT inhaler Inhale 2 puffs into the lungs 2 times daily 12 g 1     hydrochlorothiazide (MICROZIDE) 12.5 MG capsule Take 1 capsule (12.5 mg) by mouth daily 90 capsule 3     ipratropium (ATROVENT) 0.03 % spray Spray 2 sprays in nostril 2 times daily       latanoprost (XALATAN) 0.005 % ophthalmic solution Place 1 drop into  both eyes At Bedtime       losartan (COZAAR) 100 MG tablet Take 1 tablet (100 mg) by mouth daily 90 tablet 3     omeprazole (PRILOSEC) 20 MG CR capsule Take 1 capsule (20 mg) by mouth daily 90 capsule 1     simvastatin (ZOCOR) 20 MG tablet take 1/2 tablet by mouth daily at bedtime 45 tablet 2     metoprolol succinate (TOPROL-XL) 25 MG 24 hr tablet TAKE TWO TABLETS BY MOUTH DAILY 60 tablet 10     metoprolol succinate (TOPROL-XL) 50 MG 24 hr tablet Take 100 mg by mouth daily         ALLERGIES     Allergies   Allergen Reactions     Fosamax [Alendronic Acid] GI Disturbance     Augmentin [Amoxicillin-Pot Clavulanate] Diarrhea     Diarrhea and rash     Evista [Raloxifene]      abd symptoms.      Flu Virus Vaccine      swollen and red at inj site       PAST MEDICAL HISTORY:  Past Medical History:   Diagnosis Date     Anemia 3/10/2009    Chronic disease, by Fe studies; awaiting full GI w/u and repeat colonoscopy, ERCP.     Basal Cell Carcinoma--back      Coronary artery disease involving native coronary artery of native heart without angina pectoris 3/9/2017    3/2/2017 - Two vessel coronary artery disease with mLAD 50% stenosis, D3 50% stenosis, pLCx 50% stenosis and mLCx 50% stenosis     Essential hypertension with goal blood pressure less than 140/90 9/1/2016    White coat hypertension;  24 hour ambulatory monitoring normal. Do not titrate up further.       Hyperlipidemia LDL goal <130 2/10/2010     Impaired fasting glucose 8/26/2010     Moderate persistent asthma      Nonrheumatic aortic valve insufficiency 6/29/2017     osteopenia      Paroxysmal atrial fibrillation (H) 12/26/2017     Pulmonary nodule 3/3/2009    PET scan reportedly normal; biopsy not advised.     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  Problem list name updated by automated process. Provider to review     Unspecified arthropathy, hand      Vasculitis (H) 4/20/2009    Possible increased activity of thoracic aorta, on PET scan w/u for pulmonary nodule.        PAST SURGICAL HISTORY:  Past Surgical History:   Procedure Laterality Date     C APPENDECTOMY  1957     C LIGATE FALLOPIAN TUBE  1971     C STEREOTACTIC BREAST BIOPSY  2001     CHOLECYSTECTOMY, LAPOROSCOPIC  2008    Cholecystectomy, Laparoscopic     ESOPHAGOSCOPY, GASTROSCOPY, DUODENOSCOPY (EGD), COMBINED  5/17/2013    Procedure: COMBINED ESOPHAGOSCOPY, GASTROSCOPY, DUODENOSCOPY (EGD), BIOPSY SINGLE OR MULTIPLE;  ESOPHAGOSCOPY, GASTROSCOPY, DUODENOSCOPY (EGD)  with bx;  Surgeon: Jeffery Yanez MD;  Location: [facility]     HC DILATION/CURETTAGE DIAG/THER NON OB  1967,1971     HC REMOVE TONSILS/ADENOIDS,<13 Y/O         FAMILY HISTORY:  Family History   Problem Relation Age of Onset     Hypertension Mother      Osteoporosis Mother      C.A.D. Mother      Connective Tissue Disorder Father      scleraderma     Cerebrovascular Disease Paternal Grandmother      Prostate Cancer Other      9/05 passed away due to complications     Breast Cancer Child      youngest dtr       SOCIAL HISTORY:  Social History     Social History     Marital status: Single     Spouse name: N/A     Number of children: 3     Years of education: 15     Occupational History     semi-retired      Social History Main Topics     Smoking status: Never Smoker     Smokeless tobacco: Never Used     Alcohol use 0.6 - 1.2 oz/week     1 - 2 Standard drinks or equivalent per week      Comment: 1 glass of wine 1-2 days per week     Drug use: No     Sexual activity: No     Other Topics Concern     Parent/Sibling W/ Cabg, Mi Or Angioplasty Before 65f 55m? No     Social History Narrative       Review of Systems:  Cardiovascular: negative for  "chest pain, palpitations, orthopnea, pos mild LE edema, improved.  Constitutional: negative for chills, sweats, fevers.   Resp: Negative for dyspnea at rest, dyspnea on exertion, neg recent cough, neg hemoptysis, pos known chronic lung disease/asthma.  HEENT: Negative for new visual changes, frequent headaches  Gastrointestinal: negative for abdominal pain, diarrhea, blood in stool, nausea, vomiting  Hematologic/lymphatic: Neg systemic anticoagulation, neg hx of blood clots  Musculoskeletal: negative for new back pain, joint pain.  Neurological: negative for focal weakness, LOC, seizures, syncope. Neg dizziness/presyncope.      Physical Exam:  Vitals: /48 (BP Location: Right arm, Patient Position: Sitting, Cuff Size: Adult Regular)  Pulse 76  Ht 1.575 m (5' 2\")  Wt 60.7 kg (133 lb 12.8 oz)  Breastfeeding? No  BMI 24.47 kg/m2   Wt Readings from Last 4 Encounters:   09/14/18 60.7 kg (133 lb 12.8 oz)   08/16/18 62.2 kg (137 lb 1.6 oz)   08/09/18 62.6 kg (138 lb)   08/01/18 65.4 kg (144 lb 3.2 oz)       GEN:  In general, this is a well nourished  female in no acute distress on room air.  Patient ambulatory.  HEENT:  Pupils equal, round. Sclerae nonicteric. Clear oropharynx.   NECK: Supple, no masses appreciated. Trachea midline. No JVD while upright.  C/V:  Regular rate and rhythm, 1/6 BRAULIO heard RSB. No rub or gallop.   RESP: Respirations are unlabored. No use of accessory muscles. Clear to auscultation bilaterally without wheezing, rales, or rhonchi.  GI: Abdomen soft, nontender, nondistended.   EXTREM: Trace bilateral  LE edema. Has compression socks on. No cyanosis or clubbing.  NEURO: Alert and oriented, cooperative. Gait not formally assessed. No obvious focal deficits.   PSYCH: Normal affect.  SKIN: Warm and dry. No rashes or petechiae appreciated.       Recent Lab Results:  LIPID RESULTS:  Lab Results   Component Value Date    CHOL 176 02/22/2018    HDL 93 02/22/2018    LDL 65 02/22/2018    " TRIG 92 02/22/2018       New/Pertinent imaging results since last visit:  Echo 9/5/18  Interpretation Summary     The visual ejection fraction is estimated at 55%.  Diastolic Doppler findings (E/E' ratio and/or other parameters) suggest left  ventricular filling pressures are increased.  There is borderline inferolateral wall hypokinesis.  The left atrium is moderately dilated.  The right ventricular systolic pressure is approximated at 40-50mmHg plus the  right atrial pressure.  Right ventricular systolic pressure is elevated, consistent with moderate  pulmonary hypertension.  Normal IVC (1.5-2.5cm) with >50% respiratory collapse; right atrial pressure  is estimated at 5-10mmHg.  There is mild to moderate (1-2+) aortic regurgitation.  No hemodynamically significant valvular aortic stenosis.  The ascending aorta is Moderately dilated.  Sinus Valsalva 32mm, Asc aorta 43-44 mm      Diana Nelson PA-C  [facility]  Pager [phone]      Thank you for allowing me to participate in the care of your patient.      Sincerely,     ANA MARIA Coe     [facility]    cc:   Barak Hernandez MD  [address]  [place] [address]

## 2018-09-14 NOTE — PATIENT INSTRUCTIONS
Visit Summary:    Today we discussed:   Your BP is still a bit high.  Please call the office and let us know what dose of Toprol XL you are taking. call my nurse Olivia at [phone] and leave a message.    Test results:   Results for TERESA HARTMAN (MRN [id number]) as of 9/14/2018 12:42   Ref. Range 9/14/2018 11:32   Sodium Latest Ref Range: 133 - 144 mmol/L 138   Potassium Latest Ref Range: 3.4 - 5.3 mmol/L 4.6   Chloride Latest Ref Range: 94 - 109 mmol/L 103   Carbon Dioxide Latest Ref Range: 20 - 32 mmol/L 28   Urea Nitrogen Latest Ref Range: 7 - 30 mg/dL 27   Creatinine Latest Ref Range: 0.52 - 1.04 mg/dL 1.12    GFR Estimate Latest Ref Range: >60 mL/min/1.7m2 47         Calcium Latest Ref Range: 8.5 - 10.1 mg/dL 9.1   Anion Gap Latest Ref Range: 3 - 14 mmol/L 7     Follow up:   With Diana in 1 month.

## 2018-09-14 NOTE — MR AVS SNAPSHOT
After Visit Summary   9/14/2018    Genie Persaud    MRN: [id number]           Patient Information     Date Of Birth          1935        Visit Information        Provider Department      9/14/2018 12:30 PM Diana Nelson PA [facility]        Today's Diagnoses     Essential hypertension with goal blood pressure less than 140/90    -  1    Thoracic aortic aneurysm without rupture (H)        Stress-induced cardiomyopathy          Care Instructions    Visit Summary:    Today we discussed:   Your BP is still a bit high.  Please call the office and let us know what dose of Toprol XL you are taking. call my nurse Olivia at [phone] and leave a message.    Test results:   Results for GENIE PERSAUD (MRN [id number]) as of 9/14/2018 12:42   Ref. Range 9/14/2018 11:32   Sodium Latest Ref Range: 133 - 144 mmol/L 138   Potassium Latest Ref Range: 3.4 - 5.3 mmol/L 4.6   Chloride Latest Ref Range: 94 - 109 mmol/L 103   Carbon Dioxide Latest Ref Range: 20 - 32 mmol/L 28   Urea Nitrogen Latest Ref Range: 7 - 30 mg/dL 27   Creatinine Latest Ref Range: 0.52 - 1.04 mg/dL 1.12    GFR Estimate Latest Ref Range: >60 mL/min/1.7m2 47         Calcium Latest Ref Range: 8.5 - 10.1 mg/dL 9.1   Anion Gap Latest Ref Range: 3 - 14 mmol/L 7     Follow up:   With Diana in 1 month.                     Follow-ups after your visit        Additional Services     Follow-Up with Cardiac Advanced Practice Provider                 Your next 10 appointments already scheduled     Oct 26, 2018  3:10 PM CDT   Return Visit with ANA MARIA Coe   [facility] ([facility] [[email or web address])    [address] [address] [address] [phone]              Future tests that were ordered for you today     Open Future Orders        Priority Expected Expires Ordered    Follow-Up with Cardiac Advanced Practice Provider Routine 10/14/2018 9/14/2019  "9/14/2018            Who to contact     If you have questions or need follow up information about today's clinic visit or your schedule please contact [facility] directly at [phone].  Normal or non-critical lab and imaging results will be communicated to you by MyChart, letter or phone within 4 business days after the clinic has received the results. If you do not hear from us within 7 days, please contact the clinic through MyChart or phone. If you have a critical or abnormal lab result, we will notify you by phone as soon as possible.  Submit refill requests through [employer] or call your pharmacy and they will forward the refill request to us. Please allow 3 business days for your refill to be completed.          Additional Information About Your Visit        Care EveryWhere ID     This is your Care EveryWhere ID. This could be used by other organizations to access your [facility] medical records  [id number]        Your Vitals Were     Pulse Height Breastfeeding? BMI (Body Mass Index)          76 1.575 m (5' 2\") No 24.47 kg/m2         Blood Pressure from Last 3 Encounters:   09/14/18 144/48   08/16/18 160/50   08/09/18 144/60    Weight from Last 3 Encounters:   09/14/18 60.7 kg (133 lb 12.8 oz)   08/16/18 62.2 kg (137 lb 1.6 oz)   08/09/18 62.6 kg (138 lb)              We Performed the Following     Follow-Up with Cardiac Advanced Practice Provider        Primary Care Provider Office Phone # Fax #    Layo Sevilla [phone] [address] [place] [address]        Equal Access to Services     [facility]: Hadii aad jett hadasho Soawais, waaxda luqadaha, qaybta kaalmaora guillermo . So [facility] [phone].    ATENCIÓN: Si habla español, tiene a patel disposición servicios gratuitos de asistencia lingüística. Llame al [phone].    We comply with applicable federal civil rights laws and " [place] laws. We do not discriminate on the basis of race, color, national origin, age, disability, sex, sexual orientation, or gender identity.            Thank you!     Thank you for choosing [facility]  for your care. Our goal is always to provide you with excellent care. Hearing back from our patients is one way we can continue to improve our services. Please take a few minutes to complete the written survey that you may receive in the mail after your visit with us. Thank you!             Your Updated Medication List - Protect others around you: Learn how to safely use, store and throw away your medicines at www.disposemymeds.org.          This list is accurate as of 9/14/18 12:59 PM.  Always use your most recent med list.                   Brand Name Dispense Instructions for use Diagnosis    acetaminophen 500 MG tablet    TYLENOL     Take 500-1,000 mg by mouth every 8 hours as needed for mild pain        albuterol 108 (90 Base) MCG/ACT inhaler    PROAIR HFA/PROVENTIL HFA/VENTOLIN HFA     Inhale 2 puffs into the lungs every 6 hours as needed        aspirin 81 MG EC tablet      Take 1 tablet (81 mg) by mouth daily    Coronary artery disease involving native coronary artery of native heart without angina pectoris       azelastine 0.1 % nasal spray    ASTELIN     Spray 2 sprays into both nostrils 2 times daily        CALCIUM CITRATE + 315-200 MG-UNIT Tabs per tablet   Generic drug:  calcium citrate-vitamin D      Take 2 tablets by mouth every evening        fluticasone-salmeterol 230-21 MCG/ACT inhaler    ADVAIR-HFA    12 g    Inhale 2 puffs into the lungs 2 times daily        hydrochlorothiazide 12.5 MG capsule    MICROZIDE    90 capsule    Take 1 capsule (12.5 mg) by mouth daily    Essential hypertension with goal blood pressure less than 140/90       ipratropium 0.03 % spray    ATROVENT     Spray 2 sprays in nostril 2 times daily        latanoprost 0.005 %  ophthalmic solution    XALATAN     Place 1 drop into both eyes At Bedtime        losartan 100 MG tablet    COZAAR    90 tablet    Take 1 tablet (100 mg) by mouth daily    Essential hypertension with goal blood pressure less than 140/90       * metoprolol succinate 50 MG 24 hr tablet    TOPROL-XL     Take 100 mg by mouth daily        * metoprolol succinate 25 MG 24 hr tablet    TOPROL-XL    60 tablet    TAKE TWO TABLETS BY MOUTH DAILY    Essential hypertension with goal blood pressure less than 140/90       omeprazole 20 MG CR capsule    priLOSEC    90 capsule    Take 1 capsule (20 mg) by mouth daily    Candidal esophagitis (H)       simvastatin 20 MG tablet    ZOCOR    45 tablet    take 1/2 tablet by mouth daily at bedtime    Hyperlipidemia LDL goal <130       * Notice:  This list has 2 medication(s) that are the same as other medications prescribed for you. Read the directions carefully, and ask your doctor or other care provider to review them with you.

## 2018-09-19 ENCOUNTER — TRANSFERRED RECORDS (OUTPATIENT)
Dept: HEALTH INFORMATION MANAGEMENT | Facility: CLINIC | Age: 83
End: 2018-09-19

## 2018-09-20 ENCOUNTER — DOCUMENTATION ONLY (OUTPATIENT)
Dept: CARDIOLOGY | Facility: CLINIC | Age: 83
End: 2018-09-20

## 2018-09-29 DIAGNOSIS — B37.81 CANDIDAL ESOPHAGITIS (H): ICD-10-CM

## 2018-09-29 NOTE — TELEPHONE ENCOUNTER
"Requested Prescriptions   Pending Prescriptions Disp Refills     omeprazole (PRILOSEC) 20 MG CR capsule [Pharmacy Med Name: Omeprazole Oral Capsule Delayed Release 20 MG]  Last Written Prescription Date:  4/3/18  Last Fill Quantity: 90,  # refills: 1   Last office visit: 8/9/2018 with prescribing provider:  8/9/18   Future Office Visit:   Next 5 appointments (look out 90 days)     Oct 26, 2018  3:10 PM CDT   Return Visit with ANA MARIA Coe   [facility] ([facility])    [address] [place] [address] [address]  [facility] [address] [phone]                  90 capsule 0     Sig: Take 1 capsule (20 mg) by mouth daily    PPI Protocol Passed    9/29/2018  7:01 AM       Passed - Not on Clopidogrel (unless Pantoprazole ordered)       Passed - No diagnosis of osteoporosis on record       Passed - Recent (12 mo) or future (30 days) visit within the authorizing provider's specialty    Patient had office visit in the last 12 months or has a visit in the next 30 days with authorizing provider or within the authorizing provider's specialty.  See \"Patient Info\" tab in inbasket, or \"Choose Columns\" in Meds & Orders section of the refill encounter.           Passed - Patient is age 18 or older       Passed - No active pregnacy on record       Passed - No positive pregnancy test in past 12 months          "

## 2018-10-26 ENCOUNTER — OFFICE VISIT (OUTPATIENT)
Dept: CARDIOLOGY | Facility: CLINIC | Age: 83
End: 2018-10-26
Attending: PHYSICIAN ASSISTANT
Payer: COMMERCIAL

## 2018-10-26 VITALS
HEIGHT: 62 IN | OXYGEN SATURATION: 94 % | SYSTOLIC BLOOD PRESSURE: 118 MMHG | HEART RATE: 74 BPM | BODY MASS INDEX: 25.21 KG/M2 | WEIGHT: 137 LBS | DIASTOLIC BLOOD PRESSURE: 62 MMHG

## 2018-10-26 DIAGNOSIS — I71.20 THORACIC AORTIC ANEURYSM WITHOUT RUPTURE (H): ICD-10-CM

## 2018-10-26 DIAGNOSIS — I10 ESSENTIAL HYPERTENSION WITH GOAL BLOOD PRESSURE LESS THAN 140/90: Primary | ICD-10-CM

## 2018-10-26 DIAGNOSIS — I51.81 STRESS-INDUCED CARDIOMYOPATHY: ICD-10-CM

## 2018-10-26 DIAGNOSIS — I10 ESSENTIAL HYPERTENSION WITH GOAL BLOOD PRESSURE LESS THAN 140/90: ICD-10-CM

## 2018-10-26 LAB
ANION GAP SERPL CALCULATED.3IONS-SCNC: 5 MMOL/L (ref 3–14)
BUN SERPL-MCNC: 31 MG/DL (ref 7–30)
CALCIUM SERPL-MCNC: 8.9 MG/DL (ref 8.5–10.1)
CHLORIDE SERPL-SCNC: 102 MMOL/L (ref 94–109)
CO2 SERPL-SCNC: 29 MMOL/L (ref 20–32)
CREAT SERPL-MCNC: 1.17 MG/DL (ref 0.52–1.04)
GFR SERPL CREATININE-BSD FRML MDRD: 44 ML/MIN/1.7M2
GLUCOSE SERPL-MCNC: 110 MG/DL (ref 70–99)
POTASSIUM SERPL-SCNC: 4.5 MMOL/L (ref 3.4–5.3)
SODIUM SERPL-SCNC: 136 MMOL/L (ref 133–144)

## 2018-10-26 PROCEDURE — 99214 OFFICE O/P EST MOD 30 MIN: CPT | Performed by: PHYSICIAN ASSISTANT

## 2018-10-26 PROCEDURE — 80048 BASIC METABOLIC PNL TOTAL CA: CPT | Performed by: INTERNAL MEDICINE

## 2018-10-26 PROCEDURE — 36415 COLL VENOUS BLD VENIPUNCTURE: CPT | Performed by: INTERNAL MEDICINE

## 2018-10-26 RX ORDER — METOPROLOL SUCCINATE 100 MG/1
100 TABLET, EXTENDED RELEASE ORAL DAILY
Qty: 90 TABLET | Refills: 3 | Status: SHIPPED | OUTPATIENT
Start: 2018-10-26 | End: 2019-10-09

## 2018-10-26 NOTE — LETTER
10/26/2018    Layo Sevilla MD  [address] [place] [address]    RE: Genie THORPE Hung       Dear Colleague,    I had the pleasure of seeing Genie Persaud in the [facility].      Cardiology Progress Note    Date of Service: 10/26/2018      Reason for visit: Follow up hypertension, cardiomyopathy.    Primary cardiologist: Dr. Barak Hernandez      HPI:  Ms. Persaud is a pleasant 82-year-old woman with PMHx including asthma, chronic peripheral edema, severe labile hypertension, hypercholesterolemia, known ascending aortic aneurysm, CAD, moderate aortic valve disease, and nonischemic cardiomyopathy with ejection fraction of 45%-50% based on Echo in March 2017.  After presenting with chest pressure in March she had an angiogram which showed two vessel nonobstructive coronary disease, but she was also noted to have short bursts of nonsustained supraventricular tachycardia with rates of up to 130 during evaluation. She was then hospitalized again in Nov 2017 with pneumonia and sepsis. Her stay was complicated by a takotsubo stress cardiomyopathy with troponin rise and severe hypokinesis of the LV.  In addition, she developed rapid atrial fibrillation during her stay, with severe peripheral edema and biventricular heart failure.     Upon return to see Dr. Hernandez in Jan 2018 she was doing well . Repeat echo showed improvement with normalization of her LVEF to 60-65%. She did have some mild pulmonary hypertension, and her AR was reported as mild. Her ascending aorta remained dilated, measured at 4.4cm. Over the next few visits her amiodarone and anticoagulation were discontinued as she had no recurrence of afib. In addition, she discontinued her lasix on her own as she felt she no longer required it.     She returned to see Dr. Hernandez in early August. Follow up CT of the aorta showed no significant change in her aneurysm from 2017, measuring 4.4cm. Due to ongoing labile BP, she was  placed on hydrochlorothiazide. I saw her shortly after and her GFR was down just slightly with stable electrolytes. She had a repeat echo showing preserved EF 55%, though suggested elevated filling pressures. The RV pressure are elevated at 40-50mmHg + RAP, but is of normal size and function. She had mild to moderate AR with no hemodynamically significant aortic stenosis. Her weight had come down slightly with the hydrochlorothiazide though BP control still slightly suboptimal. Thus, I increased her Toprol to 100mg daily. She's back today for follow up.    Since last visit, she says she's been feeling well. Her BP is much better today at 118/62. She says at home they still fluctuate but overall appear to be under better control with SBP running in the 120-140mmHg range. She denies any new exertional chest discomfort, palpitations, or dizziness. She has no orthopnea and thinks her mild leg edema is stable, despite a slight increase in her weight. Her labs are not significantly changed from prior, though BUN/SCr again up slightly to 31/1.17 respectively. The remainder of her electrolytes are unremarkable.       ASSESSMENT/PLAN:    1. Hypertension.   --BP improved on current regimen. Will continue her Toprol XL at 100mg daily along with hydrochlorothiazide 12.5mg daily. She has had dizziness with higher doses (as well as with amlodipine in the past). She remains on losartan 100mg at bedtime as well. Slight bump in BUN/SCr but overall stable from last visit.     --She admits that she likes salty foods and we discussed how this overall impacts her BP and chronic leg edema. She acknowledges she is unlikely to change her dietary habits at this point unless things become more difficult to control.      2. Nonischemic cardiomyopathy.   --Echo March 2017 with EF 45-50%. Presented with chest pressure, angio without obstructive disease as below. Also had evidence of stress cardiomyopathy with hospitalization in Nov 2017 for  pneumonia and sepsis, with severe hypokinesis of the distal 2/3 of the LV though EF not reported. Echo Sept 2018 shows normalization of EF 55%. She continues to have elevated RV pressures but size and function are normal.   --Continue losartan and Toprol XL as above.     --On exam today she again appears relatively euvolemic, with mild ankle edema unchanged from last visit. No change in regard to diuretics.     3. Coronary artery disease, nonobstructive.    --Angiogram 3/2017 showed nonobstructive 2V disease with mLAD 50%, D3 50%, pLCx 50%, and mLCx 50% stenosis. Medical management purused. She remains free of angina.   --Now on ASA 81mg daily, after recent discontinuation of systemic AC.    --Continue simvastatin 10mg daily. LD controlled at 65, with HDL 93, Feb 2018.    4. Atrial fibrillation.   --This was identified during her hospitalization in the setting of pneumonia and sepsis Nov 2017. Appears she had one single episode during an acute illness. She remains in sinus rhythm on exam today, and no recurrence on Ziopatch after being off amiodarone x 1 month. Now off systemic AC as well.      5. Ascending aortic aneurysm.   --Most recent echo again noted her ascending aorta to be mildly to moderately dilated, and repeat CT of chest done July 2018 showed this was stable at 4.4cm which was unchanged from 2017. Continue yearly surveillance as directed by Dr. Hernandez.                 Follow up plan:   With Diana Nelson PA-C in 3 months with repeat labs and BP check, and with Dr. Barak Hernandez for annual visit.       Orders this Visit:  Orders Placed This Encounter   Procedures     Basic metabolic panel     Follow-Up with Cardiac Advanced Practice Provider     Orders Placed This Encounter   Medications     metoprolol succinate (TOPROL XL) 100 MG 24 hr tablet     Sig: Take 1 tablet (100 mg) by mouth daily     Dispense:  90 tablet     Refill:  3     Medications Discontinued During This Encounter   Medication Reason      metoprolol succinate (TOPROL-XL) 50 MG 24 hr tablet Dose adjustment           CURRENT MEDICATIONS:  Current Outpatient Prescriptions   Medication Sig Dispense Refill     acetaminophen (TYLENOL) 500 MG tablet Take 500-1,000 mg by mouth every 8 hours as needed for mild pain       albuterol (PROAIR HFA, PROVENTIL HFA, VENTOLIN HFA) 108 (90 BASE) MCG/ACT inhaler Inhale 2 puffs into the lungs every 6 hours as needed        aspirin 81 MG EC tablet Take 1 tablet (81 mg) by mouth daily       azelastine (ASTELIN) 0.1 % spray Spray 2 sprays into both nostrils 2 times daily       calcium citrate-vitamin D (CALCIUM CITRATE +) 315-200 MG-UNIT TABS Take 2 tablets by mouth every evening        fluticasone-salmeterol (ADVAIR-HFA) 230-21 MCG/ACT inhaler Inhale 2 puffs into the lungs 2 times daily 12 g 1     hydrochlorothiazide (MICROZIDE) 12.5 MG capsule Take 1 capsule (12.5 mg) by mouth daily 90 capsule 3     ipratropium (ATROVENT) 0.03 % spray Spray 2 sprays in nostril 2 times daily       latanoprost (XALATAN) 0.005 % ophthalmic solution Place 1 drop into both eyes At Bedtime       losartan (COZAAR) 100 MG tablet Take 1 tablet (100 mg) by mouth daily 90 tablet 3     metoprolol succinate (TOPROL XL) 100 MG 24 hr tablet Take 1 tablet (100 mg) by mouth daily 90 tablet 3     omeprazole (PRILOSEC) 20 MG CR capsule Take 1 capsule (20 mg) by mouth daily 90 capsule 1     simvastatin (ZOCOR) 20 MG tablet take 1/2 tablet by mouth daily at bedtime 45 tablet 2     [DISCONTINUED] metoprolol succinate (TOPROL-XL) 50 MG 24 hr tablet Take 2 tablets (100 mg) by mouth daily 60 tablet 0       ALLERGIES     Allergies   Allergen Reactions     Fosamax [Alendronic Acid] GI Disturbance     Augmentin [Amoxicillin-Pot Clavulanate] Diarrhea     Diarrhea and rash     Evista [Raloxifene]      abd symptoms.      Flu Virus Vaccine      swollen and red at inj site       PAST MEDICAL HISTORY:  Past Medical History:   Diagnosis Date     Anemia 3/10/2009     Chronic disease, by Fe studies; awaiting full GI w/u and repeat colonoscopy, ERCP.     Basal Cell Carcinoma--back      Coronary artery disease involving native coronary artery of native heart without angina pectoris 3/9/2017    3/2/2017 - Two vessel coronary artery disease with mLAD 50% stenosis, D3 50% stenosis, pLCx 50% stenosis and mLCx 50% stenosis     Essential hypertension with goal blood pressure less than 140/90 9/1/2016    White coat hypertension;  24 hour ambulatory monitoring normal. Do not titrate up further.       Hyperlipidemia LDL goal <130 2/10/2010     Impaired fasting glucose 8/26/2010     Moderate persistent asthma      Nonrheumatic aortic valve insufficiency 6/29/2017     osteopenia      Paroxysmal atrial fibrillation (H) 12/26/2017     Pulmonary nodule 3/3/2009    PET scan reportedly normal; biopsy not advised.     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  Problem list name updated by automated process. Provider to review     Unspecified arthropathy, hand      Vasculitis (H) 4/20/2009    Possible increased activity of thoracic aorta, on PET scan w/u for pulmonary nodule.        PAST SURGICAL HISTORY:  Past Surgical History:   Procedure Laterality Date     C APPENDECTOMY  1957     C LIGATE FALLOPIAN TUBE  1971     C STEREOTACTIC BREAST BIOPSY  2001     CHOLECYSTECTOMY, LAPOROSCOPIC  2008    Cholecystectomy, Laparoscopic     ESOPHAGOSCOPY, GASTROSCOPY, DUODENOSCOPY (EGD), COMBINED  5/17/2013    Procedure: COMBINED ESOPHAGOSCOPY, GASTROSCOPY, DUODENOSCOPY (EGD), BIOPSY SINGLE OR MULTIPLE;  ESOPHAGOSCOPY, GASTROSCOPY, DUODENOSCOPY (EGD)  with bx;  Surgeon: Jeffery Yanez MD;  Location:  "[facility]     HC DILATION/CURETTAGE DIAG/THER NON OB  1967,1971     HC REMOVE TONSILS/ADENOIDS,<11 Y/O         FAMILY HISTORY:  Family History   Problem Relation Age of Onset     Hypertension Mother      Osteoporosis Mother      C.A.D. Mother      Connective Tissue Disorder Father      scleraderma     Cerebrovascular Disease Paternal Grandmother      Prostate Cancer Other      9/05 passed away due to complications     Breast Cancer Child      youngest dtr       SOCIAL HISTORY:  Social History     Social History     Marital status: Single     Spouse name: N/A     Number of children: 3     Years of education: 15     Occupational History     semi-retired      Social History Main Topics     Smoking status: Never Smoker     Smokeless tobacco: Never Used     Alcohol use 0.6 - 1.2 oz/week     1 - 2 Standard drinks or equivalent per week      Comment: 1 glass of wine 1-2 days per week     Drug use: No     Sexual activity: No     Other Topics Concern     Parent/Sibling W/ Cabg, Mi Or Angioplasty Before 65f 55m? No     Social History Narrative       Review of Systems:  Cardiovascular: negative for chest pain, palpitations, orthopnea, pos mild LE edema, unchanged.  Constitutional: negative for chills, sweats, fevers.   Resp: Negative for dyspnea at rest, dyspnea on exertion, neg recent cough, neg hemoptysis, pos known chronic lung disease/asthma.  HEENT: Negative for new visual changes, frequent headaches  Gastrointestinal: negative for abdominal pain, diarrhea, blood in stool, nausea, vomiting  Hematologic/lymphatic: Neg systemic anticoagulation, neg hx of blood clots  Musculoskeletal: negative for new back pain, joint pain.  Neurological: negative for focal weakness, LOC, seizures, syncope. Neg dizziness/presyncope.      Physical Exam:  Vitals: /62 (BP Location: Right arm, Patient Position: Sitting, Cuff Size: Adult Regular)  Pulse 74  Ht 1.575 m (5' 2\")  Wt 62.1 kg (137 lb)  SpO2 94%  BMI 25.06 kg/m2   Wt Readings " from Last 4 Encounters:   10/26/18 62.1 kg (137 lb)   09/14/18 60.7 kg (133 lb 12.8 oz)   08/16/18 62.2 kg (137 lb 1.6 oz)   08/09/18 62.6 kg (138 lb)       GEN:  In general, this is a well nourished  female in no acute distress on room air.  Patient ambulatory.  HEENT:  Pupils equal, round. Sclerae nonicteric. Clear oropharynx.   NECK: Supple, no masses appreciated. Trachea midline. No JVD while upright.  C/V:  Regular rate and rhythm, 1/6 BRAULIO heard RSB. No rub or gallop.   RESP: Respirations are unlabored. No use of accessory muscles. Clear to auscultation bilaterally without wheezing, rales, or rhonchi.  GI: Abdomen soft, nontender, nondistended.   EXTREM: Trace bilateral  LE edema. Has compression socks on. No cyanosis or clubbing.  NEURO: Alert and oriented, cooperative. Gait not formally assessed. No obvious focal deficits.   PSYCH: Normal affect.  SKIN: Warm and dry. No rashes or petechiae appreciated.       Recent Lab Results:  LIPID RESULTS:  Lab Results   Component Value Date    CHOL 176 02/22/2018    HDL 93 02/22/2018    LDL 65 02/22/2018    TRIG 92 02/22/2018       Results for TERESA HARTMAN (MRN [id number]) as of 10/26/2018 16:17   Ref. Range 9/14/2018 11:32 10/26/2018 14:06   Sodium Latest Ref Range: 133 - 144 mmol/L 138 136   Potassium Latest Ref Range: 3.4 - 5.3 mmol/L 4.6 4.5   Chloride Latest Ref Range: 94 - 109 mmol/L 103 102   Carbon Dioxide Latest Ref Range: 20 - 32 mmol/L 28 29   Urea Nitrogen Latest Ref Range: 7 - 30 mg/dL 27 31 (H)   Creatinine Latest Ref Range: 0.52 - 1.04 mg/dL 1.12 (H) 1.17 (H)   GFR Estimate Latest Ref Range: >60 mL/min/1.7m2 47 (L) 44 (L)   GFR Estimate If Black Latest Ref Range: >60 mL/min/1.7m2 57 (L) 54 (L)   Calcium Latest Ref Range: 8.5 - 10.1 mg/dL 9.1 8.9   Anion Gap Latest Ref Range: 3 - 14 mmol/L 7 5   Glucose Latest Ref Range: 70 - 99 mg/dL 83 110 (H)           New/Pertinent imaging results since last visit:  Echo 9/5/18  Interpretation Summary     The  visual ejection fraction is estimated at 55%.  Diastolic Doppler findings (E/E' ratio and/or other parameters) suggest left  ventricular filling pressures are increased.  There is borderline inferolateral wall hypokinesis.  The left atrium is moderately dilated.  The right ventricular systolic pressure is approximated at 40-50mmHg plus the  right atrial pressure.  Right ventricular systolic pressure is elevated, consistent with moderate  pulmonary hypertension.  Normal IVC (1.5-2.5cm) with >50% respiratory collapse; right atrial pressure  is estimated at 5-10mmHg.  There is mild to moderate (1-2+) aortic regurgitation.  No hemodynamically significant valvular aortic stenosis.  The ascending aorta is Moderately dilated.  Sinus Valsalva 32mm, Asc aorta 43-44 mm      ANA MARIA Coe-C  [facility]  Pager [phone]      Thank you for allowing me to participate in the care of your patient.      Sincerely,     ANA MARIA Coe     [facility]    cc:   ANA MARIA Coe  [facility]  [address] [place] [id number]

## 2018-10-26 NOTE — PROGRESS NOTES
Cardiology Progress Note    Date of Service: 10/26/2018      Reason for visit: Follow up hypertension, cardiomyopathy.    Primary cardiologist: Dr. Barak Hernandez      HPI:  Ms. Persaud is a pleasant 82-year-old woman with PMHx including asthma, chronic peripheral edema, severe labile hypertension, hypercholesterolemia, known ascending aortic aneurysm, CAD, moderate aortic valve disease, and nonischemic cardiomyopathy with ejection fraction of 45%-50% based on Echo in March 2017.  After presenting with chest pressure in March she had an angiogram which showed two vessel nonobstructive coronary disease, but she was also noted to have short bursts of nonsustained supraventricular tachycardia with rates of up to 130 during evaluation. She was then hospitalized again in Nov 2017 with pneumonia and sepsis. Her stay was complicated by a takotsubo stress cardiomyopathy with troponin rise and severe hypokinesis of the LV.  In addition, she developed rapid atrial fibrillation during her stay, with severe peripheral edema and biventricular heart failure.     Upon return to see Dr. Hernandez in Jan 2018 she was doing well . Repeat echo showed improvement with normalization of her LVEF to 60-65%. She did have some mild pulmonary hypertension, and her AR was reported as mild. Her ascending aorta remained dilated, measured at 4.4cm. Over the next few visits her amiodarone and anticoagulation were discontinued as she had no recurrence of afib. In addition, she discontinued her lasix on her own as she felt she no longer required it.     She returned to see Dr. Hernandez in early August. Follow up CT of the aorta showed no significant change in her aneurysm from 2017, measuring 4.4cm. Due to ongoing labile BP, she was placed on hydrochlorothiazide. I saw her shortly after and her GFR was down just slightly with stable electrolytes. She had a repeat echo showing preserved EF 55%, though suggested elevated filling pressures. The RV  pressure are elevated at 40-50mmHg + RAP, but is of normal size and function. She had mild to moderate AR with no hemodynamically significant aortic stenosis. Her weight had come down slightly with the hydrochlorothiazide though BP control still slightly suboptimal. Thus, I increased her Toprol to 100mg daily. She's back today for follow up.    Since last visit, she says she's been feeling well. Her BP is much better today at 118/62. She says at home they still fluctuate but overall appear to be under better control with SBP running in the 120-140mmHg range. She denies any new exertional chest discomfort, palpitations, or dizziness. She has no orthopnea and thinks her mild leg edema is stable, despite a slight increase in her weight. Her labs are not significantly changed from prior, though BUN/SCr again up slightly to 31/1.17 respectively. The remainder of her electrolytes are unremarkable.       ASSESSMENT/PLAN:    1. Hypertension.   --BP improved on current regimen. Will continue her Toprol XL at 100mg daily along with hydrochlorothiazide 12.5mg daily. She has had dizziness with higher doses (as well as with amlodipine in the past). She remains on losartan 100mg at bedtime as well. Slight bump in BUN/SCr but overall stable from last visit.     --She admits that she likes salty foods and we discussed how this overall impacts her BP and chronic leg edema. She acknowledges she is unlikely to change her dietary habits at this point unless things become more difficult to control.      2. Nonischemic cardiomyopathy.   --Echo March 2017 with EF 45-50%. Presented with chest pressure, angio without obstructive disease as below. Also had evidence of stress cardiomyopathy with hospitalization in Nov 2017 for pneumonia and sepsis, with severe hypokinesis of the distal 2/3 of the LV though EF not reported. Echo Sept 2018 shows normalization of EF 55%. She continues to have elevated RV pressures but size and function are  normal.   --Continue losartan and Toprol XL as above.     --On exam today she again appears relatively euvolemic, with mild ankle edema unchanged from last visit. No change in regard to diuretics.     3. Coronary artery disease, nonobstructive.    --Angiogram 3/2017 showed nonobstructive 2V disease with mLAD 50%, D3 50%, pLCx 50%, and mLCx 50% stenosis. Medical management purused. She remains free of angina.   --Now on ASA 81mg daily, after recent discontinuation of systemic AC.    --Continue simvastatin 10mg daily. LD controlled at 65, with HDL 93, Feb 2018.    4. Atrial fibrillation.   --This was identified during her hospitalization in the setting of pneumonia and sepsis Nov 2017. Appears she had one single episode during an acute illness. She remains in sinus rhythm on exam today, and no recurrence on Ziopatch after being off amiodarone x 1 month. Now off systemic AC as well.      5. Ascending aortic aneurysm.   --Most recent echo again noted her ascending aorta to be mildly to moderately dilated, and repeat CT of chest done July 2018 showed this was stable at 4.4cm which was unchanged from 2017. Continue yearly surveillance as directed by Dr. Hernandez.                 Follow up plan:   With Diana Nelson PA-C in 3 months with repeat labs and BP check, and with Dr. Barak Hernandez for annual visit.       Orders this Visit:  Orders Placed This Encounter   Procedures     Basic metabolic panel     Follow-Up with Cardiac Advanced Practice Provider     Orders Placed This Encounter   Medications     metoprolol succinate (TOPROL XL) 100 MG 24 hr tablet     Sig: Take 1 tablet (100 mg) by mouth daily     Dispense:  90 tablet     Refill:  3     Medications Discontinued During This Encounter   Medication Reason     metoprolol succinate (TOPROL-XL) 50 MG 24 hr tablet Dose adjustment           CURRENT MEDICATIONS:  Current Outpatient Prescriptions   Medication Sig Dispense Refill     acetaminophen (TYLENOL) 500 MG tablet  Take 500-1,000 mg by mouth every 8 hours as needed for mild pain       albuterol (PROAIR HFA, PROVENTIL HFA, VENTOLIN HFA) 108 (90 BASE) MCG/ACT inhaler Inhale 2 puffs into the lungs every 6 hours as needed        aspirin 81 MG EC tablet Take 1 tablet (81 mg) by mouth daily       azelastine (ASTELIN) 0.1 % spray Spray 2 sprays into both nostrils 2 times daily       calcium citrate-vitamin D (CALCIUM CITRATE +) 315-200 MG-UNIT TABS Take 2 tablets by mouth every evening        fluticasone-salmeterol (ADVAIR-HFA) 230-21 MCG/ACT inhaler Inhale 2 puffs into the lungs 2 times daily 12 g 1     hydrochlorothiazide (MICROZIDE) 12.5 MG capsule Take 1 capsule (12.5 mg) by mouth daily 90 capsule 3     ipratropium (ATROVENT) 0.03 % spray Spray 2 sprays in nostril 2 times daily       latanoprost (XALATAN) 0.005 % ophthalmic solution Place 1 drop into both eyes At Bedtime       losartan (COZAAR) 100 MG tablet Take 1 tablet (100 mg) by mouth daily 90 tablet 3     metoprolol succinate (TOPROL XL) 100 MG 24 hr tablet Take 1 tablet (100 mg) by mouth daily 90 tablet 3     omeprazole (PRILOSEC) 20 MG CR capsule Take 1 capsule (20 mg) by mouth daily 90 capsule 1     simvastatin (ZOCOR) 20 MG tablet take 1/2 tablet by mouth daily at bedtime 45 tablet 2     [DISCONTINUED] metoprolol succinate (TOPROL-XL) 50 MG 24 hr tablet Take 2 tablets (100 mg) by mouth daily 60 tablet 0       ALLERGIES     Allergies   Allergen Reactions     Fosamax [Alendronic Acid] GI Disturbance     Augmentin [Amoxicillin-Pot Clavulanate] Diarrhea     Diarrhea and rash     Evista [Raloxifene]      abd symptoms.      Flu Virus Vaccine      swollen and red at inj site       PAST MEDICAL HISTORY:  Past Medical History:   Diagnosis Date     Anemia 3/10/2009    Chronic disease, by Fe studies; awaiting full GI w/u and repeat colonoscopy, ERCP.     Basal Cell Carcinoma--back      Coronary artery disease involving native coronary artery of native heart without angina pectoris  3/9/2017    3/2/2017 - Two vessel coronary artery disease with mLAD 50% stenosis, D3 50% stenosis, pLCx 50% stenosis and mLCx 50% stenosis     Essential hypertension with goal blood pressure less than 140/90 9/1/2016    White coat hypertension;  24 hour ambulatory monitoring normal. Do not titrate up further.       Hyperlipidemia LDL goal <130 2/10/2010     Impaired fasting glucose 8/26/2010     Moderate persistent asthma      Nonrheumatic aortic valve insufficiency 6/29/2017     osteopenia      Paroxysmal atrial fibrillation (H) 12/26/2017     Pulmonary nodule 3/3/2009    PET scan reportedly normal; biopsy not advised.     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  Problem list name updated by automated process. Provider to review     Unspecified arthropathy, hand      Vasculitis (H) 4/20/2009    Possible increased activity of thoracic aorta, on PET scan w/u for pulmonary nodule.        PAST SURGICAL HISTORY:  Past Surgical History:   Procedure Laterality Date     C APPENDECTOMY  1957     C LIGATE FALLOPIAN TUBE  1971     C STEREOTACTIC BREAST BIOPSY  2001     CHOLECYSTECTOMY, LAPOROSCOPIC  2008    Cholecystectomy, Laparoscopic     ESOPHAGOSCOPY, GASTROSCOPY, DUODENOSCOPY (EGD), COMBINED  5/17/2013    Procedure: COMBINED ESOPHAGOSCOPY, GASTROSCOPY, DUODENOSCOPY (EGD), BIOPSY SINGLE OR MULTIPLE;  ESOPHAGOSCOPY, GASTROSCOPY, DUODENOSCOPY (EGD)  with bx;  Surgeon: Jeffery Yanez MD;  Location:  GI     HC DILATION/CURETTAGE DIAG/THER NON OB  1967,1971     HC REMOVE TONSILS/ADENOIDS,<13 Y/O         FAMILY HISTORY:  Family History   Problem Relation Age of Onset     Hypertension Mother      Osteoporosis  "Mother      RENNY.A.D. Mother      Connective Tissue Disorder Father      scleraderma     Cerebrovascular Disease Paternal Grandmother      Prostate Cancer Other      9/05 passed away due to complications     Breast Cancer Child      youngest dtr       SOCIAL HISTORY:  Social History     Social History     Marital status: Single     Spouse name: N/A     Number of children: 3     Years of education: 15     Occupational History     semi-retired      Social History Main Topics     Smoking status: Never Smoker     Smokeless tobacco: Never Used     Alcohol use 0.6 - 1.2 oz/week     1 - 2 Standard drinks or equivalent per week      Comment: 1 glass of wine 1-2 days per week     Drug use: No     Sexual activity: No     Other Topics Concern     Parent/Sibling W/ Cabg, Mi Or Angioplasty Before 65f 55m? No     Social History Narrative       Review of Systems:  Cardiovascular: negative for chest pain, palpitations, orthopnea, pos mild LE edema, unchanged.  Constitutional: negative for chills, sweats, fevers.   Resp: Negative for dyspnea at rest, dyspnea on exertion, neg recent cough, neg hemoptysis, pos known chronic lung disease/asthma.  HEENT: Negative for new visual changes, frequent headaches  Gastrointestinal: negative for abdominal pain, diarrhea, blood in stool, nausea, vomiting  Hematologic/lymphatic: Neg systemic anticoagulation, neg hx of blood clots  Musculoskeletal: negative for new back pain, joint pain.  Neurological: negative for focal weakness, LOC, seizures, syncope. Neg dizziness/presyncope.      Physical Exam:  Vitals: /62 (BP Location: Right arm, Patient Position: Sitting, Cuff Size: Adult Regular)  Pulse 74  Ht 1.575 m (5' 2\")  Wt 62.1 kg (137 lb)  SpO2 94%  BMI 25.06 kg/m2   Wt Readings from Last 4 Encounters:   10/26/18 62.1 kg (137 lb)   09/14/18 60.7 kg (133 lb 12.8 oz)   08/16/18 62.2 kg (137 lb 1.6 oz)   08/09/18 62.6 kg (138 lb)       GEN:  In general, this is a well nourished  " female in no acute distress on room air.  Patient ambulatory.  HEENT:  Pupils equal, round. Sclerae nonicteric. Clear oropharynx.   NECK: Supple, no masses appreciated. Trachea midline. No JVD while upright.  C/V:  Regular rate and rhythm, 1/6 BRAULIO heard RSB. No rub or gallop.   RESP: Respirations are unlabored. No use of accessory muscles. Clear to auscultation bilaterally without wheezing, rales, or rhonchi.  GI: Abdomen soft, nontender, nondistended.   EXTREM: Trace bilateral  LE edema. Has compression socks on. No cyanosis or clubbing.  NEURO: Alert and oriented, cooperative. Gait not formally assessed. No obvious focal deficits.   PSYCH: Normal affect.  SKIN: Warm and dry. No rashes or petechiae appreciated.       Recent Lab Results:  LIPID RESULTS:  Lab Results   Component Value Date    CHOL 176 02/22/2018    HDL 93 02/22/2018    LDL 65 02/22/2018    TRIG 92 02/22/2018       Results for TERESA HARTMAN (MRN [id number]) as of 10/26/2018 16:17   Ref. Range 9/14/2018 11:32 10/26/2018 14:06   Sodium Latest Ref Range: 133 - 144 mmol/L 138 136   Potassium Latest Ref Range: 3.4 - 5.3 mmol/L 4.6 4.5   Chloride Latest Ref Range: 94 - 109 mmol/L 103 102   Carbon Dioxide Latest Ref Range: 20 - 32 mmol/L 28 29   Urea Nitrogen Latest Ref Range: 7 - 30 mg/dL 27 31 (H)   Creatinine Latest Ref Range: 0.52 - 1.04 mg/dL 1.12 (H) 1.17 (H)   GFR Estimate Latest Ref Range: >60 mL/min/1.7m2 47 (L) 44 (L)   GFR Estimate If Black Latest Ref Range: >60 mL/min/1.7m2 57 (L) 54 (L)   Calcium Latest Ref Range: 8.5 - 10.1 mg/dL 9.1 8.9   Anion Gap Latest Ref Range: 3 - 14 mmol/L 7 5   Glucose Latest Ref Range: 70 - 99 mg/dL 83 110 (H)           New/Pertinent imaging results since last visit:  Echo 9/5/18  Interpretation Summary     The visual ejection fraction is estimated at 55%.  Diastolic Doppler findings (E/E' ratio and/or other parameters) suggest left  ventricular filling pressures are increased.  There is borderline inferolateral wall  hypokinesis.  The left atrium is moderately dilated.  The right ventricular systolic pressure is approximated at 40-50mmHg plus the  right atrial pressure.  Right ventricular systolic pressure is elevated, consistent with moderate  pulmonary hypertension.  Normal IVC (1.5-2.5cm) with >50% respiratory collapse; right atrial pressure  is estimated at 5-10mmHg.  There is mild to moderate (1-2+) aortic regurgitation.  No hemodynamically significant valvular aortic stenosis.  The ascending aorta is Moderately dilated.  Sinus Valsalva 32mm, Asc aorta 43-44 mm      Diana Nelson PA-C  [facility]  Pager [phone]

## 2018-10-26 NOTE — LETTER
10/26/2018    Layo Sevilla MD  [address] [place] [address]    RE: Genie THORPE Hung       Dear Colleague,    I had the pleasure of seeing Genie Persaud in the [facility].      Cardiology Progress Note    Date of Service: 10/26/2018      Reason for visit: Follow up hypertension, cardiomyopathy.    Primary cardiologist: Dr. Barak Hernandez      HPI:  Ms. Persaud is a pleasant 82-year-old woman with PMHx including asthma, chronic peripheral edema, severe labile hypertension, hypercholesterolemia, known ascending aortic aneurysm, CAD, moderate aortic valve disease, and nonischemic cardiomyopathy with ejection fraction of 45%-50% based on Echo in March 2017.  After presenting with chest pressure in March she had an angiogram which showed two vessel nonobstructive coronary disease, but she was also noted to have short bursts of nonsustained supraventricular tachycardia with rates of up to 130 during evaluation. She was then hospitalized again in Nov 2017 with pneumonia and sepsis. Her stay was complicated by a takotsubo stress cardiomyopathy with troponin rise and severe hypokinesis of the LV.  In addition, she developed rapid atrial fibrillation during her stay, with severe peripheral edema and biventricular heart failure.     Upon return to see Dr. Hernandez in Jan 2018 she was doing well . Repeat echo showed improvement with normalization of her LVEF to 60-65%. She did have some mild pulmonary hypertension, and her AR was reported as mild. Her ascending aorta remained dilated, measured at 4.4cm. Over the next few visits her amiodarone and anticoagulation were discontinued as she had no recurrence of afib. In addition, she discontinued her lasix on her own as she felt she no longer required it.     She returned to see Dr. Hernandez in early August. Follow up CT of the aorta showed no significant change in her aneurysm from 2017, measuring 4.4cm. Due to ongoing labile BP, she was  placed on hydrochlorothiazide. I saw her shortly after and her GFR was down just slightly with stable electrolytes. She had a repeat echo showing preserved EF 55%, though suggested elevated filling pressures. The RV pressure are elevated at 40-50mmHg + RAP, but is of normal size and function. She had mild to moderate AR with no hemodynamically significant aortic stenosis. Her weight had come down slightly with the hydrochlorothiazide though BP control still slightly suboptimal. Thus, I increased her Toprol to 100mg daily. She's back today for follow up.    Since last visit, she says she's been feeling well. Her BP is much better today at 118/62. She says at home they still fluctuate but overall appear to be under better control with SBP running in the 120-140mmHg range. She denies any new exertional chest discomfort, palpitations, or dizziness. She has no orthopnea and thinks her mild leg edema is stable, despite a slight increase in her weight. Her labs are not significantly changed from prior, though BUN/SCr again up slightly to 31/1.17 respectively. The remainder of her electrolytes are unremarkable.       ASSESSMENT/PLAN:    1. Hypertension.   --BP improved on current regimen. Will continue her Toprol XL at 100mg daily along with hydrochlorothiazide 12.5mg daily. She has had dizziness with higher doses (as well as with amlodipine in the past). She remains on losartan 100mg at bedtime as well. Slight bump in BUN/SCr but overall stable from last visit.     --She admits that she likes salty foods and we discussed how this overall impacts her BP and chronic leg edema. She acknowledges she is unlikely to change her dietary habits at this point unless things become more difficult to control.      2. Nonischemic cardiomyopathy.   --Echo March 2017 with EF 45-50%. Presented with chest pressure, angio without obstructive disease as below. Also had evidence of stress cardiomyopathy with hospitalization in Nov 2017 for  pneumonia and sepsis, with severe hypokinesis of the distal 2/3 of the LV though EF not reported. Echo Sept 2018 shows normalization of EF 55%. She continues to have elevated RV pressures but size and function are normal.   --Continue losartan and Toprol XL as above.     --On exam today she again appears relatively euvolemic, with mild ankle edema unchanged from last visit. No change in regard to diuretics.     3. Coronary artery disease, nonobstructive.    --Angiogram 3/2017 showed nonobstructive 2V disease with mLAD 50%, D3 50%, pLCx 50%, and mLCx 50% stenosis. Medical management purused. She remains free of angina.   --Now on ASA 81mg daily, after recent discontinuation of systemic AC.    --Continue simvastatin 10mg daily. LD controlled at 65, with HDL 93, Feb 2018.    4. Atrial fibrillation.   --This was identified during her hospitalization in the setting of pneumonia and sepsis Nov 2017. Appears she had one single episode during an acute illness. She remains in sinus rhythm on exam today, and no recurrence on Ziopatch after being off amiodarone x 1 month. Now off systemic AC as well.      5. Ascending aortic aneurysm.   --Most recent echo again noted her ascending aorta to be mildly to moderately dilated, and repeat CT of chest done July 2018 showed this was stable at 4.4cm which was unchanged from 2017. Continue yearly surveillance as directed by Dr. Hernandez.                 Follow up plan:   With Diana Nelson PA-C in 3 months with repeat labs and BP check, and with Dr. Barak Hernandez for annual visit.       Orders this Visit:  Orders Placed This Encounter   Procedures     Basic metabolic panel     Follow-Up with Cardiac Advanced Practice Provider     Orders Placed This Encounter   Medications     metoprolol succinate (TOPROL XL) 100 MG 24 hr tablet     Sig: Take 1 tablet (100 mg) by mouth daily     Dispense:  90 tablet     Refill:  3     Medications Discontinued During This Encounter   Medication Reason      metoprolol succinate (TOPROL-XL) 50 MG 24 hr tablet Dose adjustment           CURRENT MEDICATIONS:  Current Outpatient Prescriptions   Medication Sig Dispense Refill     acetaminophen (TYLENOL) 500 MG tablet Take 500-1,000 mg by mouth every 8 hours as needed for mild pain       albuterol (PROAIR HFA, PROVENTIL HFA, VENTOLIN HFA) 108 (90 BASE) MCG/ACT inhaler Inhale 2 puffs into the lungs every 6 hours as needed        aspirin 81 MG EC tablet Take 1 tablet (81 mg) by mouth daily       azelastine (ASTELIN) 0.1 % spray Spray 2 sprays into both nostrils 2 times daily       calcium citrate-vitamin D (CALCIUM CITRATE +) 315-200 MG-UNIT TABS Take 2 tablets by mouth every evening        fluticasone-salmeterol (ADVAIR-HFA) 230-21 MCG/ACT inhaler Inhale 2 puffs into the lungs 2 times daily 12 g 1     hydrochlorothiazide (MICROZIDE) 12.5 MG capsule Take 1 capsule (12.5 mg) by mouth daily 90 capsule 3     ipratropium (ATROVENT) 0.03 % spray Spray 2 sprays in nostril 2 times daily       latanoprost (XALATAN) 0.005 % ophthalmic solution Place 1 drop into both eyes At Bedtime       losartan (COZAAR) 100 MG tablet Take 1 tablet (100 mg) by mouth daily 90 tablet 3     metoprolol succinate (TOPROL XL) 100 MG 24 hr tablet Take 1 tablet (100 mg) by mouth daily 90 tablet 3     omeprazole (PRILOSEC) 20 MG CR capsule Take 1 capsule (20 mg) by mouth daily 90 capsule 1     simvastatin (ZOCOR) 20 MG tablet take 1/2 tablet by mouth daily at bedtime 45 tablet 2     [DISCONTINUED] metoprolol succinate (TOPROL-XL) 50 MG 24 hr tablet Take 2 tablets (100 mg) by mouth daily 60 tablet 0       ALLERGIES     Allergies   Allergen Reactions     Fosamax [Alendronic Acid] GI Disturbance     Augmentin [Amoxicillin-Pot Clavulanate] Diarrhea     Diarrhea and rash     Evista [Raloxifene]      abd symptoms.      Flu Virus Vaccine      swollen and red at inj site       PAST MEDICAL HISTORY:  Past Medical History:   Diagnosis Date     Anemia 3/10/2009     Chronic disease, by Fe studies; awaiting full GI w/u and repeat colonoscopy, ERCP.     Basal Cell Carcinoma--back      Coronary artery disease involving native coronary artery of native heart without angina pectoris 3/9/2017    3/2/2017 - Two vessel coronary artery disease with mLAD 50% stenosis, D3 50% stenosis, pLCx 50% stenosis and mLCx 50% stenosis     Essential hypertension with goal blood pressure less than 140/90 9/1/2016    White coat hypertension;  24 hour ambulatory monitoring normal. Do not titrate up further.       Hyperlipidemia LDL goal <130 2/10/2010     Impaired fasting glucose 8/26/2010     Moderate persistent asthma      Nonrheumatic aortic valve insufficiency 6/29/2017     osteopenia      Paroxysmal atrial fibrillation (H) 12/26/2017     Pulmonary nodule 3/3/2009    PET scan reportedly normal; biopsy not advised.     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  Problem list name updated by automated process. Provider to review     Unspecified arthropathy, hand      Vasculitis (H) 4/20/2009    Possible increased activity of thoracic aorta, on PET scan w/u for pulmonary nodule.        PAST SURGICAL HISTORY:  Past Surgical History:   Procedure Laterality Date     C APPENDECTOMY  1957     C LIGATE FALLOPIAN TUBE  1971     C STEREOTACTIC BREAST BIOPSY  2001     CHOLECYSTECTOMY, LAPOROSCOPIC  2008    Cholecystectomy, Laparoscopic     ESOPHAGOSCOPY, GASTROSCOPY, DUODENOSCOPY (EGD), COMBINED  5/17/2013    Procedure: COMBINED ESOPHAGOSCOPY, GASTROSCOPY, DUODENOSCOPY (EGD), BIOPSY SINGLE OR MULTIPLE;  ESOPHAGOSCOPY, GASTROSCOPY, DUODENOSCOPY (EGD)  with bx;  Surgeon: Jeffery Yanez MD;  Location:  "[facility]     HC DILATION/CURETTAGE DIAG/THER NON OB  1967,1971     HC REMOVE TONSILS/ADENOIDS,<11 Y/O         FAMILY HISTORY:  Family History   Problem Relation Age of Onset     Hypertension Mother      Osteoporosis Mother      C.A.D. Mother      Connective Tissue Disorder Father      scleraderma     Cerebrovascular Disease Paternal Grandmother      Prostate Cancer Other      9/05 passed away due to complications     Breast Cancer Child      youngest dtr       SOCIAL HISTORY:  Social History     Social History     Marital status: Single     Spouse name: N/A     Number of children: 3     Years of education: 15     Occupational History     semi-retired      Social History Main Topics     Smoking status: Never Smoker     Smokeless tobacco: Never Used     Alcohol use 0.6 - 1.2 oz/week     1 - 2 Standard drinks or equivalent per week      Comment: 1 glass of wine 1-2 days per week     Drug use: No     Sexual activity: No     Other Topics Concern     Parent/Sibling W/ Cabg, Mi Or Angioplasty Before 65f 55m? No     Social History Narrative       Review of Systems:  Cardiovascular: negative for chest pain, palpitations, orthopnea, pos mild LE edema, unchanged.  Constitutional: negative for chills, sweats, fevers.   Resp: Negative for dyspnea at rest, dyspnea on exertion, neg recent cough, neg hemoptysis, pos known chronic lung disease/asthma.  HEENT: Negative for new visual changes, frequent headaches  Gastrointestinal: negative for abdominal pain, diarrhea, blood in stool, nausea, vomiting  Hematologic/lymphatic: Neg systemic anticoagulation, neg hx of blood clots  Musculoskeletal: negative for new back pain, joint pain.  Neurological: negative for focal weakness, LOC, seizures, syncope. Neg dizziness/presyncope.      Physical Exam:  Vitals: /62 (BP Location: Right arm, Patient Position: Sitting, Cuff Size: Adult Regular)  Pulse 74  Ht 1.575 m (5' 2\")  Wt 62.1 kg (137 lb)  SpO2 94%  BMI 25.06 kg/m2   Wt Readings " from Last 4 Encounters:   10/26/18 62.1 kg (137 lb)   09/14/18 60.7 kg (133 lb 12.8 oz)   08/16/18 62.2 kg (137 lb 1.6 oz)   08/09/18 62.6 kg (138 lb)       GEN:  In general, this is a well nourished  female in no acute distress on room air.  Patient ambulatory.  HEENT:  Pupils equal, round. Sclerae nonicteric. Clear oropharynx.   NECK: Supple, no masses appreciated. Trachea midline. No JVD while upright.  C/V:  Regular rate and rhythm, 1/6 BRAULIO heard RSB. No rub or gallop.   RESP: Respirations are unlabored. No use of accessory muscles. Clear to auscultation bilaterally without wheezing, rales, or rhonchi.  GI: Abdomen soft, nontender, nondistended.   EXTREM: Trace bilateral  LE edema. Has compression socks on. No cyanosis or clubbing.  NEURO: Alert and oriented, cooperative. Gait not formally assessed. No obvious focal deficits.   PSYCH: Normal affect.  SKIN: Warm and dry. No rashes or petechiae appreciated.       Recent Lab Results:  LIPID RESULTS:  Lab Results   Component Value Date    CHOL 176 02/22/2018    HDL 93 02/22/2018    LDL 65 02/22/2018    TRIG 92 02/22/2018       Results for TERESA HARTMAN (MRN [id number]) as of 10/26/2018 16:17   Ref. Range 9/14/2018 11:32 10/26/2018 14:06   Sodium Latest Ref Range: 133 - 144 mmol/L 138 136   Potassium Latest Ref Range: 3.4 - 5.3 mmol/L 4.6 4.5   Chloride Latest Ref Range: 94 - 109 mmol/L 103 102   Carbon Dioxide Latest Ref Range: 20 - 32 mmol/L 28 29   Urea Nitrogen Latest Ref Range: 7 - 30 mg/dL 27 31 (H)   Creatinine Latest Ref Range: 0.52 - 1.04 mg/dL 1.12 (H) 1.17 (H)   GFR Estimate Latest Ref Range: >60 mL/min/1.7m2 47 (L) 44 (L)   GFR Estimate If Black Latest Ref Range: >60 mL/min/1.7m2 57 (L) 54 (L)   Calcium Latest Ref Range: 8.5 - 10.1 mg/dL 9.1 8.9   Anion Gap Latest Ref Range: 3 - 14 mmol/L 7 5   Glucose Latest Ref Range: 70 - 99 mg/dL 83 110 (H)           New/Pertinent imaging results since last visit:  Echo 9/5/18  Interpretation Summary     The  visual ejection fraction is estimated at 55%.  Diastolic Doppler findings (E/E' ratio and/or other parameters) suggest left  ventricular filling pressures are increased.  There is borderline inferolateral wall hypokinesis.  The left atrium is moderately dilated.  The right ventricular systolic pressure is approximated at 40-50mmHg plus the  right atrial pressure.  Right ventricular systolic pressure is elevated, consistent with moderate  pulmonary hypertension.  Normal IVC (1.5-2.5cm) with >50% respiratory collapse; right atrial pressure  is estimated at 5-10mmHg.  There is mild to moderate (1-2+) aortic regurgitation.  No hemodynamically significant valvular aortic stenosis.  The ascending aorta is Moderately dilated.  Sinus Valsalva 32mm, Asc aorta 43-44 mm      Diana Nelson PA-C  [facility]  Pager [phone]      Thank you for allowing me to participate in the care of your patient.    Sincerely,     ANA MARIA Coe     [facility]

## 2018-10-26 NOTE — PATIENT INSTRUCTIONS
Visit Summary:    Today we discussed:   No changes today.     Test results:   Results for TERESA HARTMAN (MRN [id number]) as of 10/26/2018 15:35   Ref. Range 10/26/2018 14:06   Sodium Latest Ref Range: 133 - 144 mmol/L 136   Potassium Latest Ref Range: 3.4 - 5.3 mmol/L 4.5   Chloride Latest Ref Range: 94 - 109 mmol/L 102   Carbon Dioxide Latest Ref Range: 20 - 32 mmol/L 29   Urea Nitrogen Latest Ref Range: 7 - 30 mg/dL 31 (H)   Creatinine Latest Ref Range: 0.52 - 1.04 mg/dL 1.17 (H)   GFR Estimate Latest Ref Range: >60 mL/min/1.7m2 44    GFR Estimate If Black Latest Ref Range: >60 mL/min/1.7m2 54    Calcium Latest Ref Range: 8.5 - 10.1 mg/dL 8.9   Anion Gap Latest Ref Range: 3 - 14 mmol/L 5       Follow up:   With Diana in 3 months and with Dr. Hernandez next year as planned.     Please call my nurse Olivia at [phone] with any questions or concerns.

## 2018-10-26 NOTE — MR AVS SNAPSHOT
After Visit Summary   10/26/2018    Genie Persaud    MRN: [id number]           Patient Information     Date Of Birth          1935        Visit Information        Provider Department      10/26/2018 3:10 PM Diana Nelson PA [facility]        Today's Diagnoses     Essential hypertension with goal blood pressure less than 140/90        Thoracic aortic aneurysm without rupture (H)        Stress-induced cardiomyopathy          Care Instructions    Visit Summary:    Today we discussed:   No changes today.     Test results:   Results for GENIE PERSAUD (MRN [id number]) as of 10/26/2018 15:35   Ref. Range 10/26/2018 14:06   Sodium Latest Ref Range: 133 - 144 mmol/L 136   Potassium Latest Ref Range: 3.4 - 5.3 mmol/L 4.5   Chloride Latest Ref Range: 94 - 109 mmol/L 102   Carbon Dioxide Latest Ref Range: 20 - 32 mmol/L 29   Urea Nitrogen Latest Ref Range: 7 - 30 mg/dL 31 (H)   Creatinine Latest Ref Range: 0.52 - 1.04 mg/dL 1.17 (H)   GFR Estimate Latest Ref Range: >60 mL/min/1.7m2 44    GFR Estimate If Black Latest Ref Range: >60 mL/min/1.7m2 54    Calcium Latest Ref Range: 8.5 - 10.1 mg/dL 8.9   Anion Gap Latest Ref Range: 3 - 14 mmol/L 5       Follow up:   With Diana in 3 months and with Dr. Hernandez next year as planned.     Please call my nurse Olivia at [phone] with any questions or concerns.                 Follow-ups after your visit        Additional Services     Follow-Up with Cardiac Advanced Practice Provider                 Your next 10 appointments already scheduled     Nov 01, 2018  1:30 PM CDT   SHORT with Kathie Forde [facility] ([facility])    [address]  [address]  [place] [address] [phone]            Jan 28, 2019  1:30 PM CST   LAB with [facility]   [facility] [place] ([facility])    [address]  [place]  [id number] [phone]           Please do not eat 10-12 hours before your appointment if you are coming in fasting for labs on lipids, cholesterol, or glucose (sugar). This does not apply to pregnant women. Water, hot tea and black coffee (with nothing added) are okay. Do not drink other fluids, diet soda or chew gum.            Jan 28, 2019  2:30 PM CST   Return Visit with ANA MARIA Coe   [facility] ([facility])    [address]  [place] [id number] [phone]              Future tests that were ordered for you today     Open Future Orders        Priority Expected Expires Ordered    Basic metabolic panel Routine 1/26/2019 10/26/2019 10/26/2018    Follow-Up with Cardiac Advanced Practice Provider Routine 1/26/2019 10/26/2019 10/26/2018            Who to contact     If you have questions or need follow up information about today's clinic visit or your schedule please contact [facility] directly at [phone].  Normal or non-critical lab and imaging results will be communicated to you by MyChart, letter or phone within 4 business days after the clinic has received the results. If you do not hear from us within 7 days, please contact the clinic through MyChart or phone. If you have a critical or abnormal lab result, we will notify you by phone as soon as possible.  Submit refill requests through [facility] or call your pharmacy and they will forward the refill request to us. Please allow 3 business days for your refill to be completed.          Additional Information About Your Visit        Care EveryWhere ID     This is your Care EveryWhere ID. This could be used by other organizations to access your [facility] medical records  [id number]        Your Vitals Were     Pulse Height Pulse Oximetry BMI (Body Mass Index)          74 1.575 m (5' 2\") 94% 25.06 kg/m2         Blood Pressure from Last 3 " Encounters:   10/26/18 118/62   09/14/18 144/48   08/16/18 160/50    Weight from Last 3 Encounters:   10/26/18 62.1 kg (137 lb)   09/14/18 60.7 kg (133 lb 12.8 oz)   08/16/18 62.2 kg (137 lb 1.6 oz)              We Performed the Following     Follow-Up with Cardiac Advanced Practice Provider          Today's Medication Changes          These changes are accurate as of 10/26/18  3:45 PM.  If you have any questions, ask your nurse or doctor.               These medicines have changed or have updated prescriptions.        Dose/Directions    metoprolol succinate 100 MG 24 hr tablet   Commonly known as:  TOPROL XL   This may have changed:  medication strength   Used for:  Stress-induced cardiomyopathy, Essential hypertension with goal blood pressure less than 140/90   Changed by:  Diana Nelson PA        Dose:  100 mg   Take 1 tablet (100 mg) by mouth daily   Quantity:  90 tablet   Refills:  3            Where to get your medicines      These medications were sent to [id number] - [place], [place] - [address]  [address] [place] [address]     Phone:  [phone]     metoprolol succinate 100 MG 24 hr tablet                Primary Care Provider Office Phone # Fax #    Layo Sevilla [phone]       [facility] [place] [address]        Equal Access to Services     [facility] AH: Hadii william frausto hadasho Soomaali, waaxda luqadaha, qaybta kaalmada adeegyada, ora meyer . So [facility] [phone].    ATENCIÓN: Si habla español, tiene a patel disposición servicios gratuitos de asistencia lingüística. Llame al [phone].    We comply with applicable federal civil rights laws and [place] laws. We do not discriminate on the basis of race, color, national origin, age, disability, sex, sexual orientation, or gender identity.            Thank you!     Thank you for choosing [facility]  for your care. Our goal is  always to provide you with excellent care. Hearing back from our patients is one way we can continue to improve our services. Please take a few minutes to complete the written survey that you may receive in the mail after your visit with us. Thank you!             Your Updated Medication List - Protect others around you: Learn how to safely use, store and throw away your medicines at www.disposemymeds.org.          This list is accurate as of 10/26/18  3:45 PM.  Always use your most recent med list.                   Brand Name Dispense Instructions for use Diagnosis    acetaminophen 500 MG tablet    TYLENOL     Take 500-1,000 mg by mouth every 8 hours as needed for mild pain        albuterol 108 (90 Base) MCG/ACT inhaler    PROAIR HFA/PROVENTIL HFA/VENTOLIN HFA     Inhale 2 puffs into the lungs every 6 hours as needed        aspirin 81 MG EC tablet      Take 1 tablet (81 mg) by mouth daily    Coronary artery disease involving native coronary artery of native heart without angina pectoris       azelastine 0.1 % nasal spray    ASTELIN     Spray 2 sprays into both nostrils 2 times daily        CALCIUM CITRATE + 315-200 MG-UNIT Tabs per tablet   Generic drug:  calcium citrate-vitamin D      Take 2 tablets by mouth every evening        fluticasone-salmeterol 230-21 MCG/ACT inhaler    ADVAIR-HFA    12 g    Inhale 2 puffs into the lungs 2 times daily        hydrochlorothiazide 12.5 MG capsule    MICROZIDE    90 capsule    Take 1 capsule (12.5 mg) by mouth daily    Essential hypertension with goal blood pressure less than 140/90       ipratropium 0.03 % spray    ATROVENT     Spray 2 sprays in nostril 2 times daily        latanoprost 0.005 % ophthalmic solution    XALATAN     Place 1 drop into both eyes At Bedtime        losartan 100 MG tablet    COZAAR    90 tablet    Take 1 tablet (100 mg) by mouth daily    Essential hypertension with goal blood pressure less than 140/90       metoprolol succinate 100 MG 24 hr tablet     TOPROL XL    90 tablet    Take 1 tablet (100 mg) by mouth daily    Stress-induced cardiomyopathy, Essential hypertension with goal blood pressure less than 140/90       omeprazole 20 MG CR capsule    priLOSEC    90 capsule    Take 1 capsule (20 mg) by mouth daily    Candidal esophagitis (H)       simvastatin 20 MG tablet    ZOCOR    45 tablet    take 1/2 tablet by mouth daily at bedtime    Hyperlipidemia LDL goal <130

## 2018-11-01 ENCOUNTER — OFFICE VISIT (OUTPATIENT)
Dept: PHARMACY | Facility: CLINIC | Age: 83
End: 2018-11-01
Payer: COMMERCIAL

## 2018-11-01 VITALS
SYSTOLIC BLOOD PRESSURE: 137 MMHG | DIASTOLIC BLOOD PRESSURE: 49 MMHG | BODY MASS INDEX: 25.37 KG/M2 | HEART RATE: 68 BPM | WEIGHT: 138.7 LBS

## 2018-11-01 DIAGNOSIS — J45.40 MODERATE PERSISTENT ASTHMA WITHOUT COMPLICATION: ICD-10-CM

## 2018-11-01 DIAGNOSIS — I10 ESSENTIAL HYPERTENSION WITH GOAL BLOOD PRESSURE LESS THAN 140/90: ICD-10-CM

## 2018-11-01 DIAGNOSIS — I25.2 OLD MYOCARDIAL INFARCTION: ICD-10-CM

## 2018-11-01 DIAGNOSIS — J30.2 SEASONAL ALLERGIC RHINITIS, UNSPECIFIED TRIGGER: ICD-10-CM

## 2018-11-01 DIAGNOSIS — M81.0 OSTEOPOROSIS WITHOUT CURRENT PATHOLOGICAL FRACTURE, UNSPECIFIED OSTEOPOROSIS TYPE: ICD-10-CM

## 2018-11-01 DIAGNOSIS — E78.5 HYPERLIPIDEMIA LDL GOAL <130: ICD-10-CM

## 2018-11-01 DIAGNOSIS — M81.0 OSTEOPOROSIS: ICD-10-CM

## 2018-11-01 DIAGNOSIS — B37.81 CANDIDAL ESOPHAGITIS (H): ICD-10-CM

## 2018-11-01 DIAGNOSIS — J30.2 SEASONAL ALLERGIC RHINITIS: Primary | ICD-10-CM

## 2018-11-01 DIAGNOSIS — I51.81 STRESS-INDUCED CARDIOMYOPATHY: ICD-10-CM

## 2018-11-01 DIAGNOSIS — H40.003 GLAUCOMA SUSPECT, BILATERAL: ICD-10-CM

## 2018-11-01 PROCEDURE — 99607 MTMS BY PHARM ADDL 15 MIN: CPT | Performed by: PHARMACIST

## 2018-11-01 PROCEDURE — 99605 MTMS BY PHARM NP 15 MIN: CPT | Performed by: PHARMACIST

## 2018-11-01 RX ORDER — LEVOCETIRIZINE DIHYDROCHLORIDE 5 MG/1
5 TABLET, FILM COATED ORAL DAILY
COMMUNITY
Start: 2018-10-31 | End: 2020-10-09

## 2018-11-01 RX ORDER — PREDNISONE 10 MG/1
10 TABLET ORAL
COMMUNITY
End: 2022-05-27

## 2018-11-01 NOTE — MR AVS SNAPSHOT
After Visit Summary   11/1/2018    Genie Persaud    MRN: [id number]           Patient Information     Date Of Birth          1935        Visit Information        Provider Department      11/1/2018 1:30 PM Kathie Forde, [facility]        Today's Diagnoses     Candidal esophagitis (H)          Care Instructions    Recommendations from today's MTM visit:                                                    MTM (medication therapy management) is a service provided by a clinical pharmacist designed to help you get the most of out of your medicines.     Good job on getting your medications every day.    1. Osteoporosis:  Prolia every 6 months subcutaneous or Reclast once yearly.  Great job on getting calcium in your diet and taking    2.  Decrease omeprazole to every other day and see if you notice any change in heartburn, can consider weaning off in the future if continue to do well.    3.  See Prescription Drop off information    Next MTM/pharmacist visit: 1 year    To schedule another MTM appointment, please call the clinic directly or you may call the MTM scheduling line at [phone] or toll-free at [phone].     My Clinical Pharmacist's contact information:                                                      It was a pleasure seeing you today!  Please feel free to contact me with any questions or concerns you have.      Kathie Forde, PharmD [facility]  Medication Therapy Management Practitioner   #[phone]    You may receive a survey about the MTM services you received.  I would appreciate your feedback to help me serve you better in the future. Please fill it out and return it when you can. Your comments will be anonymous.                    Follow-ups after your visit        Your next 10 appointments already scheduled     Jan 28, 2019  1:30 PM CST   LAB with RU LAB   [facility] [place] ([facility])    [address]  [facility] [address] [address]  [facility] [id number] [phone]           Please do not eat 10-12 hours before your appointment if you are coming in fasting for labs on lipids, cholesterol, or glucose (sugar). This does not apply to pregnant women. Water, hot tea and black coffee (with nothing added) are okay. Do not drink other fluids, diet soda or chew gum.            Jan 28, 2019  2:30 PM CST   Return Visit with ANA MARIA Coe   [facility] ([facility])    [address]  [facility] [id number] [phone]              Who to contact     If you have questions or need follow up information about today's clinic visit or your schedule please contact [facility] MICHAELLE LINDA directly at [phone].  Normal or non-critical lab and imaging results will be communicated to you by MyChart, letter or phone within 4 business days after the clinic has received the results. If you do not hear from us within 7 days, please contact the clinic through MyChart or phone. If you have a critical or abnormal lab result, we will notify you by phone as soon as possible.  Submit refill requests through LikeWhere or call your pharmacy and they will forward the refill request to us. Please allow 3 business days for your refill to be completed.          Additional Information About Your Visit        Care EveryWhere ID     This is your Care EveryWhere ID. This could be used by other organizations to access your [facility] medical records  [id number]        Your Vitals Were     Pulse BMI (Body Mass Index)                68 25.37 kg/m2           Blood Pressure from Last 3 Encounters:   11/01/18 137/49   10/26/18 118/62   09/14/18 144/48    Weight from Last 3 Encounters:   11/01/18 138 lb 11.2 oz (62.9 kg)   10/26/18 137 lb (62.1 kg)   09/14/18 133 lb 12.8 oz (60.7 kg)              Today, you had the following     No orders found for display         Today's Medication Changes           These changes are accurate as of 11/1/18  2:15 PM.  If you have any questions, ask your nurse or doctor.               These medicines have changed or have updated prescriptions.        Dose/Directions    omeprazole 20 MG CR capsule   Commonly known as:  priLOSEC   This may have changed:  See the new instructions.   Used for:  Candidal esophagitis (H)        Dose:  20 mg   Take 1 capsule (20 mg) by mouth every other day   Quantity:  90 capsule   Refills:  1            Where to get your medicines      Some of these will need a paper prescription and others can be bought over the counter.  Ask your nurse if you have questions.     You don't need a prescription for these medications     omeprazole 20 MG CR capsule                Primary Care Provider Office Phone # Fax #    Layo Sevilla [phone] [address] [place] [address]        Equal Access to Services     [facility]: Hadii aad ku hadasho Soawais, waaxda luqadaha, qaybta kaalmada adenaresh, ora meyer . So [facility] [place] [phone].    ATENCIÓN: Si habla español, tiene a patel disposición servicios gratuitos de asistencia lingüística. [facility] [phone].    We comply with applicable federal civil rights laws and [place] laws. We do not discriminate on the basis of race, color, national origin, age, disability, sex, sexual orientation, or gender identity.            Thank you!     Thank you for choosing [facility]  for your care. Our goal is always to provide you with excellent care. Hearing back from our patients is one way we can continue to improve our services. Please take a few minutes to complete the written survey that you may receive in the mail after your visit with us. Thank you!             Your Updated Medication List - Protect others around you: Learn how to safely use, store and throw away your medicines at www.disposemymeds.org.          This list is accurate  as of 11/1/18  2:15 PM.  Always use your most recent med list.                   Brand Name Dispense Instructions for use Diagnosis    acetaminophen 500 MG tablet    TYLENOL     Take 500-1,000 mg by mouth every 8 hours as needed for mild pain        albuterol 108 (90 Base) MCG/ACT inhaler    PROAIR HFA/PROVENTIL HFA/VENTOLIN HFA     Inhale 2 puffs into the lungs every 6 hours as needed        aspirin 81 MG EC tablet      Take 1 tablet (81 mg) by mouth daily    Coronary artery disease involving native coronary artery of native heart without angina pectoris       azelastine 0.1 % nasal spray    ASTELIN     Spray 2 sprays into both nostrils 2 times daily        CALCIUM CITRATE + 315-200 MG-UNIT Tabs per tablet   Generic drug:  calcium citrate-vitamin D      Take 2 tablets by mouth every evening        fluticasone-salmeterol 230-21 MCG/ACT inhaler    ADVAIR-HFA    12 g    Inhale 2 puffs into the lungs 2 times daily        hydrochlorothiazide 12.5 MG capsule    MICROZIDE    90 capsule    Take 1 capsule (12.5 mg) by mouth daily    Essential hypertension with goal blood pressure less than 140/90       ipratropium 0.03 % spray    ATROVENT     Spray 2 sprays in nostril 2 times daily        latanoprost 0.005 % ophthalmic solution    XALATAN     Place 1 drop into both eyes At Bedtime        levocetirizine 5 MG tablet    XYZAL     Take 5 mg by mouth daily        losartan 100 MG tablet    COZAAR    90 tablet    Take 1 tablet (100 mg) by mouth daily    Essential hypertension with goal blood pressure less than 140/90       metoprolol succinate 100 MG 24 hr tablet    TOPROL XL    90 tablet    Take 1 tablet (100 mg) by mouth daily    Stress-induced cardiomyopathy, Essential hypertension with goal blood pressure less than 140/90       omeprazole 20 MG CR capsule    priLOSEC    90 capsule    Take 1 capsule (20 mg) by mouth every other day    Candidal esophagitis (H)       PREDNISONE PO      As needed for asthma flares         simvastatin 20 MG tablet    ZOCOR    45 tablet    take 1/2 tablet by mouth daily at bedtime    Hyperlipidemia LDL goal <130

## 2018-11-01 NOTE — PATIENT INSTRUCTIONS
Recommendations from today's MTM visit:                                                    MTM (medication therapy management) is a service provided by a clinical pharmacist designed to help you get the most of out of your medicines.     Good job on getting your medications every day.    1. Osteoporosis:  Prolia every 6 months subcutaneous or Reclast once yearly.  Great job on getting calcium in your diet and taking    2.  Decrease omeprazole to every other day and see if you notice any change in heartburn, can consider weaning off in the future if continue to do well.    3.  See Prescription Drop off information    Next MTM/pharmacist visit: 1 year    To schedule another MTM appointment, please call the clinic directly or you may call the MTM scheduling line at [phone] or toll-free at [phone].     My Clinical Pharmacist's contact information:                                                      It was a pleasure seeing you today!  Please feel free to contact me with any questions or concerns you have.      Kathie Forde, PharmD [facility]  Medication Therapy Management Practitioner   #[phone]    You may receive a survey about the MTM services you received.  I would appreciate your feedback to help me serve you better in the future. Please fill it out and return it when you can. Your comments will be anonymous.

## 2018-11-01 NOTE — Clinical Note
With her recent DEXA and ongoing prednisone use for exacerbations I think it would be worth discussing Prolia or Reclast again when you see her next (she does not want alendronate and did not want changes today).  Thanks!

## 2018-11-01 NOTE — PROGRESS NOTES
SUBJECTIVE/OBJECTIVE:                Genie Persaud is a 83 year old female coming in for a new visit for Medication Therapy Management.  She was referred to me from Dr. Sevilla.     Chief Complaint:  No questions or concerns, general medication review.   Currently renting out basement.  Bit of an adjustment for her.       Tobacco: No tobacco use  Alcohol: 1-3 beverages / week  Personal Healthcare Goals (what do you think you are doing well?): Feels really good getting back to normal life the last couple months.   Activity: Volunteers at [employer] and at [employer].  No stationary bike.  PMH:  She will not get vaccinations due to her reactions to these.      Medication Adherence/Access:  Patient uses pill box(es).  Patient takes medications 3 time(s) per day.   Per patient, misses medication 0 times per week.   Medication barriers: none.   The patient fills medications at [place]: NO, fills medications at [facility].    GERD: Current medications include: Prilosec (omeprazole) 20 once daily. Pt c/o no current symptoms.  Patient feels that current regimen is effective.  She has not tried reducing this medication.    Allergic rhinitis: Current medications include azelastine 0.1% spray 2 sprays twice daily, ipratropium 2 sprays each nostril BID, and Zyzal 5 mg once daily.  Primary triggers are humidity, dust mites, pollens, animal dander and mold. Pt feels that current therapy is effective.See Dr. Monzon, Allergy specialist who recommended stopping azelastine and ipratropium nasal spray last March, she does not want to do this since her allergies are better.     Asthma:  Current medications Fluticasone+Salmeterol (Advair) 2 puffs twice a day (she does not like generic inhaler) and albuterol PRN (not currently using), not currently taking prednisone but has been taking prednisone 5mg once daily as needed, uses about #30 tablets each year.  Pt states that she will use prednisone for 3 days then stop.  Pt states that she  cannot sleep when taking prednisone. Pt rinses her mouth, brushes teeth then uses mouthwash after Advair.   Asthma and allergies prevented her from working in her garden this last summer due to increased humidity.  Pt saw Dr. Metz in August who recommended trying generic Advair inhaler and using nasal saline rinses as needed.      AAP on file: YES  ACT Total Scores 8/3/2017 5/1/2018 11/1/2018   ACT TOTAL SCORE - - -   ASTHMA ER VISITS - - -   ASTHMA HOSPITALIZATIONS - - -   ACT TOTAL SCORE (Goal Greater than or Equal to 20) 12 20 23   In the past 12 months, how many times did you visit the emergency room for your asthma without being admitted to the hospital? 0 0 0   In the past 12 months, how many times were you hospitalized overnight because of your asthma? 0 0 0     History MI:  Currently taking aspirin 81mg every day, metoprolol 100 mg daily,and simvastatin 20 mg every other day.  No current side effects and the patient states no bruising form aspirin.  Pt was seen by cardiology Dr. Hernandez in August who recommened stopping Eliquis, resolved Afib.  Pt is no longer taking Eliquis.     Hypertension/Non ischemic Cardiomyopathy: Current medications include hydrochlorothiazide 12.5mg QD, metoprolol 100mg ER QD, and losartan 100mg HS.  Heart attack in March.  Pt monitors blood pressures at home and states that it fluctuates specifically at the clinic, but is happy about current therapy.  Previus treatment options have caused dizziness, but pt is not experiencing with current therapy.   Echo Sept 2018 shows normalization of EF 55%. Last cardiology visit was 10/26/2018 with Diana Nelson PA-C.     Osteoporosis:  Pt has been diagnosed with osteoporosis per Dr. Sevilla's letter. Pt is current taking calcium + D (Citracal) 2 tablets per day.  She reports she had problems with her stomach when on alendronate in the past and does not want to restart as his letter suggested. Pt currently drinks milk every day along with eating  cheese and mixed salads.    Last vitamin D level:   Component      Latest Ref Rng 8/26/2011   25 OH Vit D2       <5   25 OH Vit D3       35   25 OH Vit D total      30 - 75 ug/L <40 . . .      Last DEXA: 07/13/2018 Osteoporosis of left and right hip.  No signs of verterbral fractures.      Hyperlipidemia: Current medications Simvastatin 10mg at HS . Patient reports no side effects.     Glaucoma: Current medication is latanoprost 0.005% ou every day.  Reports good eye pressure. Pt is happy with current therapy.     Current labs include:  BP Readings from Last 3 Encounters:   10/26/18 118/62   09/14/18 144/48   08/16/18 160/50     Today's Vitals: /49  Pulse 68  Wt 138 lb 11.2 oz (62.9 kg)  BMI 25.37 kg/m2  No results found for: A1C.  Lab Results   Component Value Date    CHOL 176 02/22/2018     Lab Results   Component Value Date    TRIG 92 02/22/2018     Lab Results   Component Value Date    HDL 93 02/22/2018     Lab Results   Component Value Date    LDL 65 02/22/2018       Liver Function Studies -   Recent Labs   Lab Test  02/22/18   0948  12/01/17   1153   PROTTOTAL   --   5.8*   ALBUMIN   --   3.1*   BILITOTAL   --   0.7   ALKPHOS   --   69   AST   --   16   ALT  30  37     Last Basic Metabolic Panel:  Lab Results   Component Value Date     10/26/2018      Lab Results   Component Value Date    POTASSIUM 4.5 10/26/2018     Lab Results   Component Value Date    CHLORIDE 102 10/26/2018     Lab Results   Component Value Date    BUN 31 10/26/2018     Lab Results   Component Value Date    CR 1.17 10/26/2018     GFR Estimate   Date Value Ref Range Status   10/26/2018 44 (L) >60 mL/min/1.7m2 Final     Comment:     Non  GFR Calc   09/14/2018 47 (L) >60 mL/min/1.7m2 Final     Comment:     Non  GFR Calc   08/01/2018 68 >60 mL/min/1.7m2 Final     Comment:     Non  GFR Calc     GFR Estimate If Black   Date Value Ref Range Status   10/26/2018 54 (L) >60 mL/min/1.7m2  Final     Comment:      GFR Calc   09/14/2018 57 (L) >60 mL/min/1.7m2 Final     Comment:      GFR Calc   08/01/2018 83 >60 mL/min/1.7m2 Final     Comment:      GFR Calc       Most Recent Immunizations   Administered Date(s) Administered     Mantoux Tuberculin Skin Test 02/27/2009     Pneumo Conj 13-V (2010&after) 10/06/2017     Pneumococcal 23 valent 10/20/2004     TDAP Vaccine (Adacel) 10/06/2017       ASSESSMENT:              Current medications were reviewed today as discussed above.      Medication Adherence: good, no issues identified.  Information provided on how to dispose of her unused medications.    GERD: Needs improvement.  Pt has been on current therapy for a few years now and would benefit from decreasing to every other day dosing to identify if therapy is still required.  Decrease use of omeprazole may also help prevent further bone loss.     Allergic rhinitis:  Unimproved.  Pt would benefit form continued therapy.     Asthma: Stable.  ACT>19 at at goal.  Pt would benefit from continued therapy.     History MI:  Stable.  Pt would benefit from continued therapy.     Hypertension/Cardiomyopathy: Stable.  Pt would benefit from continued therapy and follow-up with cardiology.     Osteoporosis:  Needs improvement.  Pt would benefit from initiation of therapy.  Pt has previously not tolerated alendronate therapy but would benefit from either twice a year Prolia or yearly Reclast infusions for treatment and with her ongoing prednisone use.  Pt was unwilling to make a decision at this time but would discuss at a future date.     Hyperlipidemia: Stable. Pt would benefit from continued therapy. Meeting LDL goal of <70.     Glaucoma: Stable. Pt would benefit from continued therapy.     PLAN:                Osteoporosis:    1. Consider Prolia every 6 months subcutaneous or Reclast once yearly (pt declined today).     GERD:  1. Decrease omeprazole to every other day,  consider weaning off in the future if pt continues to do well.    I spent 50 minutes with this patient today. All changes were made via collaborative practice agreement with Layo Sevilla. A copy of the visit note was provided to the patient's primary care provider.     Will follow up in 1 year.    The patient was given a summary of these recommendations as an after visit summary.    Will Jake Pharmacy Student  [employer]    Kathie Forde PharmD BCPS  Medication Therapy Management Practitioner   #[phone]

## 2018-11-02 ASSESSMENT — ASTHMA QUESTIONNAIRES: ACT_TOTALSCORE: 23

## 2018-11-12 DIAGNOSIS — J30.2 SEASONAL ALLERGIC RHINITIS, UNSPECIFIED TRIGGER: Primary | ICD-10-CM

## 2018-11-12 NOTE — TELEPHONE ENCOUNTER
"Requested Prescriptions   Pending Prescriptions Disp Refills     azelastine (ASTELIN) 0.1 % nasal spray [Pharmacy Med Name: Azelastine HCl Nasal Solution 0.1 %]  Last Written Prescription Date:  na  Last Fill Quantity: na,  # refills: na   Last office visit: 8/9/2018 with prescribing provider:  Layo Sevilla MD    Future Office Visit:   Next 5 appointments (look out 90 days)     Jan 28, 2019  2:30 PM CST   Return Visit with ANA MARIA Coe   [facility]    [id number] [place] [address] [address]  [facility] [address] [phone]                  30 mL 4     Sig: SPRAY 1 TO 2 SPRAYS INTO BOTH NOSTRILS 2 TIMES DAILY    Antihistamines Protocol Failed    11/12/2018  3:10 PM       Failed - Patient is 3-64 years of age    Apply weight-based dosing for peds patients age 3 - 12 years of age.    Forward request to provider for patients under the age of 3 or over the age of 64.         Passed - Recent (12 mo) or future (30 days) visit within the authorizing provider's specialty    Patient had office visit in the last 12 months or has a visit in the next 30 days with authorizing provider or within the authorizing provider's specialty.  See \"Patient Info\" tab in inbasket, or \"Choose Columns\" in Meds & Orders section of the refill encounter.           Routing refill request to provider for review/approval because:  Medication is reported/historical'         "

## 2018-11-14 RX ORDER — AZELASTINE 1 MG/ML
SPRAY, METERED NASAL
Qty: 30 ML | Refills: 4 | Status: SHIPPED | OUTPATIENT
Start: 2018-11-14 | End: 2019-12-15

## 2018-11-14 NOTE — TELEPHONE ENCOUNTER
Disp Refills Start End ALONSO   azelastine (ASTELIN) 0.1 % spray (Discontinued) 30 mL 5 8/29/2017 11/15/2017 No   Sig: SPRAY 1-2 SPRAYS INTO BOTH NOSTRILS 2 TIMES DAILY     Antonette Finn RN

## 2018-11-23 DIAGNOSIS — E78.5 HYPERLIPIDEMIA LDL GOAL <130: ICD-10-CM

## 2018-11-23 RX ORDER — SIMVASTATIN 20 MG
TABLET ORAL
Qty: 45 TABLET | Refills: 0 | Status: SHIPPED | OUTPATIENT
Start: 2018-11-23 | End: 2019-02-28

## 2018-11-23 NOTE — TELEPHONE ENCOUNTER
"Requested Prescriptions   Pending Prescriptions Disp Refills     simvastatin (ZOCOR) 20 MG tablet  Last Written Prescription Date:  03/01/2018  Last Fill Quantity: 45 tablet,  # refills: 2   Last office visit: 8/9/2018 with prescribing provider:  Layo Sevilla MD    Future Office Visit:   Next 5 appointments (look out 90 days)     Jan 28, 2019  2:30 PM CST   Return Visit with ANA MARIA Coe   [facility]    [address]  [facility] [address] [phone]                  45 tablet 2     Sig: take 1/2 tablet by mouth daily at bedtime    Statins Protocol Passed    11/23/2018 12:32 PM       Passed - LDL on file in past 12 months    Recent Labs   Lab Test  02/22/18   0948   LDL  65            Passed - No abnormal creatine kinase in past 12 months    No lab results found.            Passed - Recent (12 mo) or future (30 days) visit within the authorizing provider's specialty    Patient had office visit in the last 12 months or has a visit in the next 30 days with authorizing provider or within the authorizing provider's specialty.  See \"Patient Info\" tab in inbasket, or \"Choose Columns\" in Meds & Orders section of the refill encounter.             Passed - Patient is age 18 or older       Passed - No active pregnancy on record       Passed - No positive pregnancy test in past 12 months          "

## 2018-11-23 NOTE — TELEPHONE ENCOUNTER
Prescription approved per [facility], [facility] or [facility] refill protocol.  Merari Mosley RN - Triage  [facility]

## 2019-02-21 ENCOUNTER — OFFICE VISIT (OUTPATIENT)
Dept: CARDIOLOGY | Facility: CLINIC | Age: 84
End: 2019-02-21
Attending: PHYSICIAN ASSISTANT
Payer: COMMERCIAL

## 2019-02-21 VITALS
SYSTOLIC BLOOD PRESSURE: 144 MMHG | BODY MASS INDEX: 26.68 KG/M2 | HEIGHT: 62 IN | HEART RATE: 70 BPM | DIASTOLIC BLOOD PRESSURE: 52 MMHG | WEIGHT: 145 LBS

## 2019-02-21 DIAGNOSIS — I10 ESSENTIAL HYPERTENSION WITH GOAL BLOOD PRESSURE LESS THAN 140/90: ICD-10-CM

## 2019-02-21 DIAGNOSIS — I51.81 STRESS-INDUCED CARDIOMYOPATHY: ICD-10-CM

## 2019-02-21 DIAGNOSIS — I71.20 THORACIC AORTIC ANEURYSM WITHOUT RUPTURE (H): ICD-10-CM

## 2019-02-21 DIAGNOSIS — E78.5 HYPERLIPIDEMIA LDL GOAL <130: ICD-10-CM

## 2019-02-21 LAB
ANION GAP SERPL CALCULATED.3IONS-SCNC: 5 MMOL/L (ref 3–14)
BUN SERPL-MCNC: 27 MG/DL (ref 7–30)
CALCIUM SERPL-MCNC: 8.7 MG/DL (ref 8.5–10.1)
CHLORIDE SERPL-SCNC: 106 MMOL/L (ref 94–109)
CO2 SERPL-SCNC: 27 MMOL/L (ref 20–32)
CREAT SERPL-MCNC: 1.02 MG/DL (ref 0.52–1.04)
GFR SERPL CREATININE-BSD FRML MDRD: 51 ML/MIN/{1.73_M2}
GLUCOSE SERPL-MCNC: 107 MG/DL (ref 70–99)
POTASSIUM SERPL-SCNC: 4.1 MMOL/L (ref 3.4–5.3)
SODIUM SERPL-SCNC: 138 MMOL/L (ref 133–144)

## 2019-02-21 PROCEDURE — 36415 COLL VENOUS BLD VENIPUNCTURE: CPT | Performed by: INTERNAL MEDICINE

## 2019-02-21 PROCEDURE — 99214 OFFICE O/P EST MOD 30 MIN: CPT | Performed by: PHYSICIAN ASSISTANT

## 2019-02-21 PROCEDURE — 80048 BASIC METABOLIC PNL TOTAL CA: CPT | Performed by: INTERNAL MEDICINE

## 2019-02-21 RX ORDER — SPIRONOLACTONE 25 MG/1
12.5 TABLET ORAL DAILY
Qty: 45 TABLET | Refills: 3 | Status: SHIPPED | OUTPATIENT
Start: 2019-02-21 | End: 2019-03-12 | Stop reason: SINTOL

## 2019-02-21 ASSESSMENT — MIFFLIN-ST. JEOR: SCORE: 1075.97

## 2019-02-21 NOTE — PATIENT INSTRUCTIONS
Visit Summary:    Today we discussed:   We will add spironolactone at 12.5mg once a day.  Please call with any new dizziness or other concerns.     Test results:   Results for DEVEN HARTMAN (MRN [id number]) as of 2/21/2019 14:58   Ref. Range 2/21/2019 13:35   Sodium Latest Ref Range: 133 - 144 mmol/L 138   Potassium Latest Ref Range: 3.4 - 5.3 mmol/L 4.1   Chloride Latest Ref Range: 94 - 109 mmol/L 106   Carbon Dioxide Latest Ref Range: 20 - 32 mmol/L 27   Urea Nitrogen Latest Ref Range: 7 - 30 mg/dL 27   Creatinine Latest Ref Range: 0.52 - 1.04 mg/dL 1.02   GFR Estimate Latest Ref Range: >60 mL/min/1.73_m2 51         Calcium Latest Ref Range: 8.5 - 10.1 mg/dL 8.7   Anion Gap Latest Ref Range: 3 - 14 mmol/L 5       Follow up:   Check labs in 1 week (fasting). We will check your kidney function and cholesterol.    Return to see Diana in ~2 weeks.     Please call my nurse Olivia at [phone] with any questions or concerns.

## 2019-02-21 NOTE — LETTER
2/21/2019    Layo Sevilla MD  [address] [place] [address]    RE: Genie Persaud       Dear Colleague,    I had the pleasure of seeing Genie Persaud in the [facility].      Cardiology Progress Note    Date of Service: 02/21/2019      Reason for visit: Follow up hypertension, cardiomyopathy.    Primary cardiologist: Dr. Barak Hernandez      HPI:  Ms. Persaud is a very pleasant 83-year-old woman with PMHx including asthma, chronic peripheral edema,  labile hypertension, hypercholesterolemia, known ascending aortic aneurysm, CAD, moderate aortic valve disease, and nonischemic cardiomyopathy.  In March 2017 she had an angiogram which showed two vessel nonobstructive coronary disease, but she was also noted to have short bursts of nonsustained supraventricular tachycardia with rates of up to 130 during evaluation. She was then hospitalized again in Nov 2017 with pneumonia and sepsis. Her stay was complicated by a takotsubo stress cardiomyopathy with troponin rise and severe hypokinesis of the LV.  In addition, she developed rapid atrial fibrillation during her stay, with severe peripheral edema and biventricular heart failure. Repeat echo in Jan 2018 showed normalization of her LVEF to 60-65%. Over the next few visits in our clinic her amiodarone and anticoagulation were discontinued as she had no recurrence of afib. In addition, she discontinued her lasix on her own as she felt she no longer required it.     Follow up CT of the aorta in July 2018 showed no significant change in her aneurysm from 2017, measuring 4.4cm. Due to ongoing labile BP, she was placed on hydrochlorothiazide. Repeat echo showed EF 55%, though suggested elevated filling pressures. The RV pressure are elevated at 40-50mmHg + RAP, but RV was of normal size and function. She had mild to moderate AR with no hemodynamically significant aortic stenosis. When I saw her back last fall, her weight had come down  slightly with the hydrochlorothiazide though BP control was still suboptimal. Thus, we increased her Toprol to 100mg daily. Upon follow up, BP was much improved and no changes were made. She's back today for planned 3 month follow up.    Since last visit she tells me she's been feeling well, but notes her home BP has been trending upward again, mostly in the 140-150mmHg range systolically. However, her diastolics remain the 40-50s rather routinely.   Her BP here today is similar at 144/52. Labs today show stable renal function on her current regimen. Symptomatically she has no change. She denies worsening BARONE, or orthopnea. She had chronic ankle edema, which despite some weight gain has not changed. She attributes this to the cold winter and lack of exercise, though she does try to ride her stationary bike a few times per week. She is not had any dizziness or presyncope, and denies exertional chest, arm, or jaw pain.     ASSESSMENT/PLAN:    1. Labile hypertension.   --BP today is somewhat elevated and also running higher at home over the last few months. Upon chart review, she was on spironolactone in the past and tolerated it well; it was discontinued during her hospital stay in 2017 with sepsis. Thus, will try to resume at 12.5mg daily. Her BP control has been challenging given her wide pulse pressure. I asked her to call with any new dizziness. Recheck BMP in 1 week.    --Continue Toprol XL at 100mg daily, hydrochlorothiazide 12.5mg daily, and losartan 100mg daily without change. She reportedly had dizziness with amlodipine and higher doses of hydrochlorothiazide in the past.    --She again admits that she likes salty foods and we discussed how this overall impacts her BP and chronic leg edema. She acknowledges she is unlikely to change her dietary habits.     2. Nonischemic cardiomyopathy.   --Most recent Echo Sept 2018 with EF 55%. She has elevated RV pressures but size and function remain normal. In March 2017  her EF was 45-50%, and in Nov 2017 she had evidence of stress cardiomyopathy with hospitalization for pneumonia and sepsis.   --Continue losartan and Toprol XL as above.    --On exam today she again appears relatively euvolemic, with mild chronic ankle edema unchanged. Despite weight gain she does not feel she is retaining fluid.     3. Coronary artery disease, nonobstructive.    --Angiogram 3/2017 showed nonobstructive 2V disease with mLAD 50%, D3 50%, pLCx 50%, and mLCx 50% stenosis. Medical management purused. She remains free of angina.   --Continue ASA 81mg daily.     --Continue simvastatin 10mg daily. LDL Feb 2018 controlled at 65, with HDL 93. She is due for her annual fastin labs which we will check with her labs next week.     4. Ascending aortic aneurysm.   --Most recent echo Sept 2018 again noted her ascending aorta to be moderately dilated, and repeat CT of chest done July 2018 showed this was stable at 4.4cm which was unchanged from 2017. Continue yearly surveillance as directed by Dr. Hernandez.                 Follow up plan:   Check labs next week and return to see myself in 2 weeks.  Will touch base via phone with results; if home BP improves and feeling well with no adjustments needed will cancel ths appt and she can return with Dr. Hernandez in Aug 2019 for planned annual visit.       Orders this Visit:  Orders Placed This Encounter   Procedures     Basic metabolic panel     Lipid Profile     ALT     Follow-Up with Cardiac Advanced Practice Provider     Orders Placed This Encounter   Medications     spironolactone (ALDACTONE) 25 MG tablet     Sig: Take 0.5 tablets (12.5 mg) by mouth daily     Dispense:  45 tablet     Refill:  3     There are no discontinued medications.        CURRENT MEDICATIONS:  Current Outpatient Medications   Medication Sig Dispense Refill     acetaminophen (TYLENOL) 500 MG tablet Take 500-1,000 mg by mouth every 8 hours as needed for mild pain       albuterol (PROAIR HFA,  PROVENTIL HFA, VENTOLIN HFA) 108 (90 BASE) MCG/ACT inhaler Inhale 2 puffs into the lungs every 6 hours as needed        aspirin 81 MG EC tablet Take 1 tablet (81 mg) by mouth daily       azelastine (ASTELIN) 0.1 % nasal spray SPRAY 1 TO 2 SPRAYS INTO BOTH NOSTRILS 2 TIMES DAILY 30 mL 4     azelastine (ASTELIN) 0.1 % spray Spray 2 sprays into both nostrils 2 times daily       calcium citrate-vitamin D (CALCIUM CITRATE +) 315-200 MG-UNIT TABS Take 2 tablets by mouth every evening        fluticasone-salmeterol (ADVAIR-HFA) 230-21 MCG/ACT inhaler Inhale 2 puffs into the lungs 2 times daily 12 g 1     hydrochlorothiazide (MICROZIDE) 12.5 MG capsule Take 1 capsule (12.5 mg) by mouth daily 90 capsule 3     ipratropium (ATROVENT) 0.03 % spray Spray 2 sprays in nostril 2 times daily       latanoprost (XALATAN) 0.005 % ophthalmic solution Place 1 drop into both eyes At Bedtime       losartan (COZAAR) 100 MG tablet Take 1 tablet (100 mg) by mouth daily 90 tablet 3     metoprolol succinate (TOPROL XL) 100 MG 24 hr tablet Take 1 tablet (100 mg) by mouth daily 90 tablet 3     omeprazole (PRILOSEC) 20 MG CR capsule Take 1 capsule (20 mg) by mouth every other day 90 capsule 1     PREDNISONE PO As needed for asthma flares       simvastatin (ZOCOR) 20 MG tablet take 1/2 tablet by mouth daily at bedtime 45 tablet 0     spironolactone (ALDACTONE) 25 MG tablet Take 0.5 tablets (12.5 mg) by mouth daily 45 tablet 3     levocetirizine (XYZAL) 5 MG tablet Take 5 mg by mouth daily         ALLERGIES     Allergies   Allergen Reactions     Fosamax [Alendronic Acid] GI Disturbance     Augmentin [Amoxicillin-Pot Clavulanate] Diarrhea     Diarrhea and rash     Evista [Raloxifene]      abd symptoms.      Flu Virus Vaccine      swollen and red at inj site       PAST MEDICAL HISTORY:  Past Medical History:   Diagnosis Date     Anemia 3/10/2009    Chronic disease, by Fe studies; awaiting full GI w/u and repeat colonoscopy, ERCP.     Basal Cell  Carcinoma--back      Coronary artery disease involving native coronary artery of native heart without angina pectoris 3/9/2017    3/2/2017 - Two vessel coronary artery disease with mLAD 50% stenosis, D3 50% stenosis, pLCx 50% stenosis and mLCx 50% stenosis     Essential hypertension with goal blood pressure less than 140/90 9/1/2016    White coat hypertension;  24 hour ambulatory monitoring normal. Do not titrate up further.       Hyperlipidemia LDL goal <130 2/10/2010     Impaired fasting glucose 8/26/2010     Moderate persistent asthma      Nonrheumatic aortic valve insufficiency 6/29/2017     osteopenia      Paroxysmal atrial fibrillation (H) 12/26/2017     Pulmonary nodule 3/3/2009    PET scan reportedly normal; biopsy not advised.     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  Problem list name updated by automated process. Provider to review     Unspecified arthropathy, hand      Vasculitis (H) 4/20/2009    Possible increased activity of thoracic aorta, on PET scan w/u for pulmonary nodule.        PAST SURGICAL HISTORY:  Past Surgical History:   Procedure Laterality Date     C APPENDECTOMY  1957     C LIGATE FALLOPIAN TUBE  1971     C STEREOTACTIC BREAST BIOPSY  2001     CHOLECYSTECTOMY, LAPOROSCOPIC  2008    Cholecystectomy, Laparoscopic     ESOPHAGOSCOPY, GASTROSCOPY, DUODENOSCOPY (EGD), COMBINED  5/17/2013    Procedure: COMBINED ESOPHAGOSCOPY, GASTROSCOPY, DUODENOSCOPY (EGD), BIOPSY SINGLE OR MULTIPLE;  ESOPHAGOSCOPY, GASTROSCOPY, DUODENOSCOPY (EGD)  with bx;  Surgeon: Jeffery Yanez MD;  Location:  GI     HC DILATION/CURETTAGE DIAG/THER NON OB  1967,1971     HC REMOVE TONSILS/ADENOIDS,<13 Y/O          FAMILY HISTORY:  Family History   Problem Relation Age of Onset     Hypertension Mother      Osteoporosis Mother      C.A.D. Mother      Connective Tissue Disorder Father         scleraderma     Cerebrovascular Disease Paternal Grandmother      Prostate Cancer Other         9/05 passed away due to complications     Breast Cancer Child         youngest dtr       SOCIAL HISTORY:  Social History     Socioeconomic History     Marital status: Single     Spouse name: None     Number of children: 3     Years of education: 15     Highest education level: None   Occupational History     Occupation: semi-retired   Social Needs     Financial resource strain: None     Food insecurity:     Worry: None     Inability: None     Transportation needs:     Medical: None     Non-medical: None   Tobacco Use     Smoking status: Never Smoker     Smokeless tobacco: Never Used   Substance and Sexual Activity     Alcohol use: Yes     Alcohol/week: 0.6 - 1.2 oz     Types: 1 - 2 Standard drinks or equivalent per week     Comment: 1 glass of wine 1-2 days per week     Drug use: No     Sexual activity: No   Lifestyle     Physical activity:     Days per week: None     Minutes per session: None     Stress: None   Relationships     Social connections:     Talks on phone: None     Gets together: None     Attends Restoration service: None     Active member of club or organization: None     Attends meetings of clubs or organizations: None     Relationship status: None     Intimate partner violence:     Fear of current or ex partner: None     Emotionally abused: None     Physically abused: None     Forced sexual activity: None   Other Topics Concern      Service Not Asked     Blood Transfusions Not Asked     Caffeine Concern Not Asked     Occupational Exposure Not Asked     Hobby Hazards Not Asked     Sleep Concern Not Asked     Stress Concern Not Asked     Weight Concern Not Asked     Special Diet Not Asked     Back Care Not Asked      "Exercise Not Asked     Bike Helmet Not Asked     Seat Belt Not Asked     Self-Exams Not Asked     Parent/sibling w/ CABG, MI or angioplasty before 65F 55M? No   Social History Narrative     None       Review of Systems:  Cardiovascular: negative for chest pain, palpitations, orthopnea, pos chronic LE edema, unchanged.  Constitutional: negative for chills, sweats, fevers. Pos occasional fatigue.   Resp: Negative for dyspnea at rest, dyspnea on exertion, neg recent cough, neg hemoptysis, pos known chronic lung disease/asthma.  HEENT: Negative for new visual changes, frequent headaches  Gastrointestinal: negative for abdominal pain, diarrhea, nausea, vomiting  Hematologic/lymphatic: Neg systemic anticoagulation, neg hx of blood clots  Musculoskeletal: negative for new back pain, joint pain.  Neurological: negative for focal weakness, LOC, seizures, syncope. Neg dizziness/presyncope.      Physical Exam:  Vitals: /52   Pulse 70   Ht 1.575 m (5' 2\")   Wt 65.8 kg (145 lb)   BMI 26.52 kg/m      Wt Readings from Last 4 Encounters:   02/21/19 65.8 kg (145 lb)   11/01/18 62.9 kg (138 lb 11.2 oz)   10/26/18 62.1 kg (137 lb)   09/14/18 60.7 kg (133 lb 12.8 oz)       GEN:  In general, this is a well nourished  female in no acute distress on room air.  Patient ambulatory, unaccompanied today.  HEENT:  Pupils equal, round. Sclerae nonicteric. Clear oropharynx.   NECK: Supple, no masses appreciated. Trachea midline. No JVD.  C/V:  Regular rate and rhythm, 1/6 BRAULIO heard RSB. No rub or gallop.   RESP: Respirations are unlabored. No use of accessory muscles. Clear to auscultation bilaterally without wheezing, rales, or rhonchi.  GI: Abdomen soft, nontender, nondistended.   EXTREM: Trace to 1+ bilateral  LE edema, has compression socks on. No cyanosis or clubbing.  NEURO: Alert and oriented, cooperative. Gait not formally assessed. No obvious focal deficits.   PSYCH: Normal affect.  SKIN: Warm and dry. No rashes or " petechiae appreciated.       Recent Lab Results:  LIPID RESULTS:  Lab Results   Component Value Date    CHOL 176 02/22/2018    HDL 93 02/22/2018    LDL 65 02/22/2018    TRIG 92 02/22/2018     Last Comprehensive Metabolic Panel:  Sodium   Date Value Ref Range Status   02/21/2019 138 133 - 144 mmol/L Final     Potassium   Date Value Ref Range Status   02/21/2019 4.1 3.4 - 5.3 mmol/L Final     Chloride   Date Value Ref Range Status   02/21/2019 106 94 - 109 mmol/L Final     Carbon Dioxide   Date Value Ref Range Status   02/21/2019 27 20 - 32 mmol/L Final     Anion Gap   Date Value Ref Range Status   02/21/2019 5 3 - 14 mmol/L Final     Glucose   Date Value Ref Range Status   02/21/2019 107 (H) 70 - 99 mg/dL Final     Urea Nitrogen   Date Value Ref Range Status   02/21/2019 27 7 - 30 mg/dL Final     Creatinine   Date Value Ref Range Status   02/21/2019 1.02 0.52 - 1.04 mg/dL Final     GFR Estimate   Date Value Ref Range Status   02/21/2019 51 (L) >60 mL/min/[1.73_m2] Final     Comment:     Non  GFR Calc  Starting 12/18/2018, serum creatinine based estimated GFR (eGFR) will be   calculated using the Chronic Kidney Disease Epidemiology Collaboration   (CKD-EPI) equation.       Calcium   Date Value Ref Range Status   02/21/2019 8.7 8.5 - 10.1 mg/dL Final           New/Pertinent imaging results since last visit:  Echo 9/5/18  Interpretation Summary     The visual ejection fraction is estimated at 55%.  Diastolic Doppler findings (E/E' ratio and/or other parameters) suggest left  ventricular filling pressures are increased.  There is borderline inferolateral wall hypokinesis.  The left atrium is moderately dilated.  The right ventricular systolic pressure is approximated at 40-50mmHg plus the  right atrial pressure.  Right ventricular systolic pressure is elevated, consistent with moderate  pulmonary hypertension.  Normal IVC (1.5-2.5cm) with >50% respiratory collapse; right atrial pressure  is estimated at  5-10mmHg.  There is mild to moderate (1-2+) aortic regurgitation.  No hemodynamically significant valvular aortic stenosis.  The ascending aorta is Moderately dilated.  Sinus Valsalva 32mm, Asc aorta 43-44 mm      Diana Nelson PA-C  [facility]  Pager [phone]      Thank you for allowing me to participate in the care of your patient.    Sincerely,     ANA MARIA Coe     [facility]

## 2019-02-21 NOTE — LETTER
2/21/2019    Layo Sevilla MD  [address] [place] [address]    RE: Genie Persaud       Dear Colleague,    I had the pleasure of seeing Genie Persaud in the [facility].      Cardiology Progress Note    Date of Service: 02/21/2019      Reason for visit: Follow up hypertension, cardiomyopathy.    Primary cardiologist: Dr. Barak Hernandez      HPI:  Ms. Persaud is a very pleasant 83-year-old woman with PMHx including asthma, chronic peripheral edema,  labile hypertension, hypercholesterolemia, known ascending aortic aneurysm, CAD, moderate aortic valve disease, and nonischemic cardiomyopathy.  In March 2017 she had an angiogram which showed two vessel nonobstructive coronary disease, but she was also noted to have short bursts of nonsustained supraventricular tachycardia with rates of up to 130 during evaluation. She was then hospitalized again in Nov 2017 with pneumonia and sepsis. Her stay was complicated by a takotsubo stress cardiomyopathy with troponin rise and severe hypokinesis of the LV.  In addition, she developed rapid atrial fibrillation during her stay, with severe peripheral edema and biventricular heart failure. Repeat echo in Jan 2018 showed normalization of her LVEF to 60-65%. Over the next few visits in our clinic her amiodarone and anticoagulation were discontinued as she had no recurrence of afib. In addition, she discontinued her lasix on her own as she felt she no longer required it.     Follow up CT of the aorta in July 2018 showed no significant change in her aneurysm from 2017, measuring 4.4cm. Due to ongoing labile BP, she was placed on hydrochlorothiazide. Repeat echo showed EF 55%, though suggested elevated filling pressures. The RV pressure are elevated at 40-50mmHg + RAP, but RV was of normal size and function. She had mild to moderate AR with no hemodynamically significant aortic stenosis. When I saw her back last fall, her weight had come down  slightly with the hydrochlorothiazide though BP control was still suboptimal. Thus, we increased her Toprol to 100mg daily. Upon follow up, BP was much improved and no changes were made. She's back today for planned 3 month follow up.    Since last visit she tells me she's been feeling well, but notes her home BP has been trending upward again, mostly in the 140-150mmHg range systolically. However, her diastolics remain the 40-50s rather routinely.   Her BP here today is similar at 144/52. Labs today show stable renal function on her current regimen. Symptomatically she has no change. She denies worsening BARONE, or orthopnea. She had chronic ankle edema, which despite some weight gain has not changed. She attributes this to the cold winter and lack of exercise, though she does try to ride her stationary bike a few times per week. She is not had any dizziness or presyncope, and denies exertional chest, arm, or jaw pain.     ASSESSMENT/PLAN:    1. Labile hypertension.   --BP today is somewhat elevated and also running higher at home over the last few months. Upon chart review, she was on spironolactone in the past and tolerated it well; it was discontinued during her hospital stay in 2017 with sepsis. Thus, will try to resume at 12.5mg daily. Her BP control has been challenging given her wide pulse pressure. I asked her to call with any new dizziness. Recheck BMP in 1 week.    --Continue Toprol XL at 100mg daily, hydrochlorothiazide 12.5mg daily, and losartan 100mg daily without change. She reportedly had dizziness with amlodipine and higher doses of hydrochlorothiazide in the past.    --She again admits that she likes salty foods and we discussed how this overall impacts her BP and chronic leg edema. She acknowledges she is unlikely to change her dietary habits.     2. Nonischemic cardiomyopathy.   --Most recent Echo Sept 2018 with EF 55%. She has elevated RV pressures but size and function remain normal. In March 2017  her EF was 45-50%, and in Nov 2017 she had evidence of stress cardiomyopathy with hospitalization for pneumonia and sepsis.   --Continue losartan and Toprol XL as above.    --On exam today she again appears relatively euvolemic, with mild chronic ankle edema unchanged. Despite weight gain she does not feel she is retaining fluid.     3. Coronary artery disease, nonobstructive.    --Angiogram 3/2017 showed nonobstructive 2V disease with mLAD 50%, D3 50%, pLCx 50%, and mLCx 50% stenosis. Medical management purused. She remains free of angina.   --Continue ASA 81mg daily.     --Continue simvastatin 10mg daily. LDL Feb 2018 controlled at 65, with HDL 93. She is due for her annual fastin labs which we will check with her labs next week.     4. Ascending aortic aneurysm.   --Most recent echo Sept 2018 again noted her ascending aorta to be moderately dilated, and repeat CT of chest done July 2018 showed this was stable at 4.4cm which was unchanged from 2017. Continue yearly surveillance as directed by Dr. Hernandez.                 Follow up plan:   Check labs next week and return to see myself in 2 weeks.  Will touch base via phone with results; if home BP improves and feeling well with no adjustments needed will cancel ths appt and she can return with Dr. Hernandez in Aug 2019 for planned annual visit.       Orders this Visit:  Orders Placed This Encounter   Procedures     Basic metabolic panel     Lipid Profile     ALT     Follow-Up with Cardiac Advanced Practice Provider     Orders Placed This Encounter   Medications     spironolactone (ALDACTONE) 25 MG tablet     Sig: Take 0.5 tablets (12.5 mg) by mouth daily     Dispense:  45 tablet     Refill:  3     There are no discontinued medications.        CURRENT MEDICATIONS:  Current Outpatient Medications   Medication Sig Dispense Refill     acetaminophen (TYLENOL) 500 MG tablet Take 500-1,000 mg by mouth every 8 hours as needed for mild pain       albuterol (PROAIR HFA,  PROVENTIL HFA, VENTOLIN HFA) 108 (90 BASE) MCG/ACT inhaler Inhale 2 puffs into the lungs every 6 hours as needed        aspirin 81 MG EC tablet Take 1 tablet (81 mg) by mouth daily       azelastine (ASTELIN) 0.1 % nasal spray SPRAY 1 TO 2 SPRAYS INTO BOTH NOSTRILS 2 TIMES DAILY 30 mL 4     azelastine (ASTELIN) 0.1 % spray Spray 2 sprays into both nostrils 2 times daily       calcium citrate-vitamin D (CALCIUM CITRATE +) 315-200 MG-UNIT TABS Take 2 tablets by mouth every evening        fluticasone-salmeterol (ADVAIR-HFA) 230-21 MCG/ACT inhaler Inhale 2 puffs into the lungs 2 times daily 12 g 1     hydrochlorothiazide (MICROZIDE) 12.5 MG capsule Take 1 capsule (12.5 mg) by mouth daily 90 capsule 3     ipratropium (ATROVENT) 0.03 % spray Spray 2 sprays in nostril 2 times daily       latanoprost (XALATAN) 0.005 % ophthalmic solution Place 1 drop into both eyes At Bedtime       losartan (COZAAR) 100 MG tablet Take 1 tablet (100 mg) by mouth daily 90 tablet 3     metoprolol succinate (TOPROL XL) 100 MG 24 hr tablet Take 1 tablet (100 mg) by mouth daily 90 tablet 3     omeprazole (PRILOSEC) 20 MG CR capsule Take 1 capsule (20 mg) by mouth every other day 90 capsule 1     PREDNISONE PO As needed for asthma flares       simvastatin (ZOCOR) 20 MG tablet take 1/2 tablet by mouth daily at bedtime 45 tablet 0     spironolactone (ALDACTONE) 25 MG tablet Take 0.5 tablets (12.5 mg) by mouth daily 45 tablet 3     levocetirizine (XYZAL) 5 MG tablet Take 5 mg by mouth daily         ALLERGIES     Allergies   Allergen Reactions     Fosamax [Alendronic Acid] GI Disturbance     Augmentin [Amoxicillin-Pot Clavulanate] Diarrhea     Diarrhea and rash     Evista [Raloxifene]      abd symptoms.      Flu Virus Vaccine      swollen and red at inj site       PAST MEDICAL HISTORY:  Past Medical History:   Diagnosis Date     Anemia 3/10/2009    Chronic disease, by Fe studies; awaiting full GI w/u and repeat colonoscopy, ERCP.     Basal Cell  Carcinoma--back      Coronary artery disease involving native coronary artery of native heart without angina pectoris 3/9/2017    3/2/2017 - Two vessel coronary artery disease with mLAD 50% stenosis, D3 50% stenosis, pLCx 50% stenosis and mLCx 50% stenosis     Essential hypertension with goal blood pressure less than 140/90 9/1/2016    White coat hypertension;  24 hour ambulatory monitoring normal. Do not titrate up further.       Hyperlipidemia LDL goal <130 2/10/2010     Impaired fasting glucose 8/26/2010     Moderate persistent asthma      Nonrheumatic aortic valve insufficiency 6/29/2017     osteopenia      Paroxysmal atrial fibrillation (H) 12/26/2017     Pulmonary nodule 3/3/2009    PET scan reportedly normal; biopsy not advised.     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  Problem list name updated by automated process. Provider to review     Unspecified arthropathy, hand      Vasculitis (H) 4/20/2009    Possible increased activity of thoracic aorta, on PET scan w/u for pulmonary nodule.        PAST SURGICAL HISTORY:  Past Surgical History:   Procedure Laterality Date     C APPENDECTOMY  1957     C LIGATE FALLOPIAN TUBE  1971     C STEREOTACTIC BREAST BIOPSY  2001     CHOLECYSTECTOMY, LAPOROSCOPIC  2008    Cholecystectomy, Laparoscopic     ESOPHAGOSCOPY, GASTROSCOPY, DUODENOSCOPY (EGD), COMBINED  5/17/2013    Procedure: COMBINED ESOPHAGOSCOPY, GASTROSCOPY, DUODENOSCOPY (EGD), BIOPSY SINGLE OR MULTIPLE;  ESOPHAGOSCOPY, GASTROSCOPY, DUODENOSCOPY (EGD)  with bx;  Surgeon: Jeffery Yanez MD;  Location:  GI     HC DILATION/CURETTAGE DIAG/THER NON OB  1967,1971     HC REMOVE TONSILS/ADENOIDS,<13 Y/O          FAMILY HISTORY:  Family History   Problem Relation Age of Onset     Hypertension Mother      Osteoporosis Mother      C.A.D. Mother      Connective Tissue Disorder Father         scleraderma     Cerebrovascular Disease Paternal Grandmother      Prostate Cancer Other         9/05 passed away due to complications     Breast Cancer Child         youngest dtr       SOCIAL HISTORY:  Social History     Socioeconomic History     Marital status: Single     Spouse name: None     Number of children: 3     Years of education: 15     Highest education level: None   Occupational History     Occupation: semi-retired   Social Needs     Financial resource strain: None     Food insecurity:     Worry: None     Inability: None     Transportation needs:     Medical: None     Non-medical: None   Tobacco Use     Smoking status: Never Smoker     Smokeless tobacco: Never Used   Substance and Sexual Activity     Alcohol use: Yes     Alcohol/week: 0.6 - 1.2 oz     Types: 1 - 2 Standard drinks or equivalent per week     Comment: 1 glass of wine 1-2 days per week     Drug use: No     Sexual activity: No   Lifestyle     Physical activity:     Days per week: None     Minutes per session: None     Stress: None   Relationships     Social connections:     Talks on phone: None     Gets together: None     Attends [employer] service: None     Active member of club or organization: None     Attends meetings of clubs or organizations: None     Relationship status: None     Intimate partner violence:     Fear of current or ex partner: None     Emotionally abused: None     Physically abused: None     Forced sexual activity: None   Other Topics Concern      Service Not Asked     Blood Transfusions Not Asked     Caffeine Concern Not Asked     Occupational Exposure Not Asked     Hobby Hazards Not Asked     Sleep Concern Not Asked     Stress Concern Not Asked     Weight Concern Not Asked     Special Diet Not Asked     Back Care Not Asked      "Exercise Not Asked     Bike Helmet Not Asked     Seat Belt Not Asked     Self-Exams Not Asked     Parent/sibling w/ CABG, MI or angioplasty before 65F 55M? No   Social History Narrative     None       Review of Systems:  Cardiovascular: negative for chest pain, palpitations, orthopnea, pos chronic LE edema, unchanged.  Constitutional: negative for chills, sweats, fevers. Pos occasional fatigue.   Resp: Negative for dyspnea at rest, dyspnea on exertion, neg recent cough, neg hemoptysis, pos known chronic lung disease/asthma.  HEENT: Negative for new visual changes, frequent headaches  Gastrointestinal: negative for abdominal pain, diarrhea, nausea, vomiting  Hematologic/lymphatic: Neg systemic anticoagulation, neg hx of blood clots  Musculoskeletal: negative for new back pain, joint pain.  Neurological: negative for focal weakness, LOC, seizures, syncope. Neg dizziness/presyncope.      Physical Exam:  Vitals: /52   Pulse 70   Ht 1.575 m (5' 2\")   Wt 65.8 kg (145 lb)   BMI 26.52 kg/m      Wt Readings from Last 4 Encounters:   02/21/19 65.8 kg (145 lb)   11/01/18 62.9 kg (138 lb 11.2 oz)   10/26/18 62.1 kg (137 lb)   09/14/18 60.7 kg (133 lb 12.8 oz)       GEN:  In general, this is a well nourished  female in no acute distress on room air.  Patient ambulatory, unaccompanied today.  HEENT:  Pupils equal, round. Sclerae nonicteric. Clear oropharynx.   NECK: Supple, no masses appreciated. Trachea midline. No JVD.  C/V:  Regular rate and rhythm, 1/6 BRAULIO heard RSB. No rub or gallop.   RESP: Respirations are unlabored. No use of accessory muscles. Clear to auscultation bilaterally without wheezing, rales, or rhonchi.  GI: Abdomen soft, nontender, nondistended.   EXTREM: Trace to 1+ bilateral  LE edema, has compression socks on. No cyanosis or clubbing.  NEURO: Alert and oriented, cooperative. Gait not formally assessed. No obvious focal deficits.   PSYCH: Normal affect.  SKIN: Warm and dry. No rashes or " petechiae appreciated.       Recent Lab Results:  LIPID RESULTS:  Lab Results   Component Value Date    CHOL 176 02/22/2018    HDL 93 02/22/2018    LDL 65 02/22/2018    TRIG 92 02/22/2018     Last Comprehensive Metabolic Panel:  Sodium   Date Value Ref Range Status   02/21/2019 138 133 - 144 mmol/L Final     Potassium   Date Value Ref Range Status   02/21/2019 4.1 3.4 - 5.3 mmol/L Final     Chloride   Date Value Ref Range Status   02/21/2019 106 94 - 109 mmol/L Final     Carbon Dioxide   Date Value Ref Range Status   02/21/2019 27 20 - 32 mmol/L Final     Anion Gap   Date Value Ref Range Status   02/21/2019 5 3 - 14 mmol/L Final     Glucose   Date Value Ref Range Status   02/21/2019 107 (H) 70 - 99 mg/dL Final     Urea Nitrogen   Date Value Ref Range Status   02/21/2019 27 7 - 30 mg/dL Final     Creatinine   Date Value Ref Range Status   02/21/2019 1.02 0.52 - 1.04 mg/dL Final     GFR Estimate   Date Value Ref Range Status   02/21/2019 51 (L) >60 mL/min/[1.73_m2] Final     Comment:     Non  GFR Calc  Starting 12/18/2018, serum creatinine based estimated GFR (eGFR) will be   calculated using the Chronic Kidney Disease Epidemiology Collaboration   (CKD-EPI) equation.       Calcium   Date Value Ref Range Status   02/21/2019 8.7 8.5 - 10.1 mg/dL Final           New/Pertinent imaging results since last visit:  Echo 9/5/18  Interpretation Summary     The visual ejection fraction is estimated at 55%.  Diastolic Doppler findings (E/E' ratio and/or other parameters) suggest left  ventricular filling pressures are increased.  There is borderline inferolateral wall hypokinesis.  The left atrium is moderately dilated.  The right ventricular systolic pressure is approximated at 40-50mmHg plus the  right atrial pressure.  Right ventricular systolic pressure is elevated, consistent with moderate  pulmonary hypertension.  Normal IVC (1.5-2.5cm) with >50% respiratory collapse; right atrial pressure  is estimated at  5-10mmHg.  There is mild to moderate (1-2+) aortic regurgitation.  No hemodynamically significant valvular aortic stenosis.  The ascending aorta is Moderately dilated.  Sinus Valsalva 32mm, Asc aorta 43-44 mm      Diana Nelson PA-C  [facility]  Pager [phone]      Thank you for allowing me to participate in the care of your patient.      Sincerely,     ANA MARIA Coe     [facility]    cc:   ANA MARIA Coe  [facility]  [address] [place] [id number]

## 2019-02-21 NOTE — PROGRESS NOTES
Cardiology Progress Note    Date of Service: 02/21/2019      Reason for visit: Follow up hypertension, cardiomyopathy.    Primary cardiologist: Dr. Barak Hernandez      HPI:  Ms. Persaud is a very pleasant 83-year-old woman with PMHx including asthma, chronic peripheral edema,  labile hypertension, hypercholesterolemia, known ascending aortic aneurysm, CAD, moderate aortic valve disease, and nonischemic cardiomyopathy.  In March 2017 she had an angiogram which showed two vessel nonobstructive coronary disease, but she was also noted to have short bursts of nonsustained supraventricular tachycardia with rates of up to 130 during evaluation. She was then hospitalized again in Nov 2017 with pneumonia and sepsis. Her stay was complicated by a takotsubo stress cardiomyopathy with troponin rise and severe hypokinesis of the LV.  In addition, she developed rapid atrial fibrillation during her stay, with severe peripheral edema and biventricular heart failure. Repeat echo in Jan 2018 showed normalization of her LVEF to 60-65%. Over the next few visits in our clinic her amiodarone and anticoagulation were discontinued as she had no recurrence of afib. In addition, she discontinued her lasix on her own as she felt she no longer required it.     Follow up CT of the aorta in July 2018 showed no significant change in her aneurysm from 2017, measuring 4.4cm. Due to ongoing labile BP, she was placed on hydrochlorothiazide. Repeat echo showed EF 55%, though suggested elevated filling pressures. The RV pressure are elevated at 40-50mmHg + RAP, but RV was of normal size and function. She had mild to moderate AR with no hemodynamically significant aortic stenosis. When I saw her back last fall, her weight had come down slightly with the hydrochlorothiazide though BP control was still suboptimal. Thus, we increased her Toprol to 100mg daily. Upon follow up, BP was much improved and no changes were made. She's back today for planned 3  month follow up.    Since last visit she tells me she's been feeling well, but notes her home BP has been trending upward again, mostly in the 140-150mmHg range systolically. However, her diastolics remain the 40-50s rather routinely.   Her BP here today is similar at 144/52. Labs today show stable renal function on her current regimen. Symptomatically she has no change. She denies worsening BARONE, or orthopnea. She had chronic ankle edema, which despite some weight gain has not changed. She attributes this to the cold winter and lack of exercise, though she does try to ride her stationary bike a few times per week. She is not had any dizziness or presyncope, and denies exertional chest, arm, or jaw pain.     ASSESSMENT/PLAN:    1. Labile hypertension.   --BP today is somewhat elevated and also running higher at home over the last few months. Upon chart review, she was on spironolactone in the past and tolerated it well; it was discontinued during her hospital stay in 2017 with sepsis. Thus, will try to resume at 12.5mg daily. Her BP control has been challenging given her wide pulse pressure. I asked her to call with any new dizziness. Recheck BMP in 1 week.    --Continue Toprol XL at 100mg daily, hydrochlorothiazide 12.5mg daily, and losartan 100mg daily without change. She reportedly had dizziness with amlodipine and higher doses of hydrochlorothiazide in the past.    --She again admits that she likes salty foods and we discussed how this overall impacts her BP and chronic leg edema. She acknowledges she is unlikely to change her dietary habits.     2. Nonischemic cardiomyopathy.   --Most recent Echo Sept 2018 with EF 55%. She has elevated RV pressures but size and function remain normal. In March 2017 her EF was 45-50%, and in Nov 2017 she had evidence of stress cardiomyopathy with hospitalization for pneumonia and sepsis.   --Continue losartan and Toprol XL as above.    --On exam today she again appears relatively  euvolemic, with mild chronic ankle edema unchanged. Despite weight gain she does not feel she is retaining fluid.     3. Coronary artery disease, nonobstructive.    --Angiogram 3/2017 showed nonobstructive 2V disease with mLAD 50%, D3 50%, pLCx 50%, and mLCx 50% stenosis. Medical management purused. She remains free of angina.   --Continue ASA 81mg daily.     --Continue simvastatin 10mg daily. LDL Feb 2018 controlled at 65, with HDL 93. She is due for her annual fastin labs which we will check with her labs next week.     4. Ascending aortic aneurysm.   --Most recent echo Sept 2018 again noted her ascending aorta to be moderately dilated, and repeat CT of chest done July 2018 showed this was stable at 4.4cm which was unchanged from 2017. Continue yearly surveillance as directed by Dr. Hernandez.                 Follow up plan:   Check labs next week and return to see myself in 2 weeks.  Will touch base via phone with results; if home BP improves and feeling well with no adjustments needed will cancel ths appt and she can return with Dr. Hernandez in Aug 2019 for planned annual visit.     ADDENDUM: 2/28/18. Labs reviewed. Renal function stable with restart of low dose aldactone. Spoke with pt via phone. Says BP slightly lower, now mid 130s systolic. No dizziness and diastolic pressures have not come down any.   Cholesterol shows increase in LDL to 83 from 65 last year. HDL down from 93 to 63. Denies muscle aches on low dose statin. Will increase her simvastatin to 20mg once daily. Already has fasting labs planned prior to visit with Dr. Hernandez in Aug 2019.      Orders this Visit:  Orders Placed This Encounter   Procedures     Basic metabolic panel     Lipid Profile     ALT     Follow-Up with Cardiac Advanced Practice Provider     Orders Placed This Encounter   Medications     spironolactone (ALDACTONE) 25 MG tablet     Sig: Take 0.5 tablets (12.5 mg) by mouth daily     Dispense:  45 tablet     Refill:  3     There  are no discontinued medications.        CURRENT MEDICATIONS:  Current Outpatient Medications   Medication Sig Dispense Refill     acetaminophen (TYLENOL) 500 MG tablet Take 500-1,000 mg by mouth every 8 hours as needed for mild pain       albuterol (PROAIR HFA, PROVENTIL HFA, VENTOLIN HFA) 108 (90 BASE) MCG/ACT inhaler Inhale 2 puffs into the lungs every 6 hours as needed        aspirin 81 MG EC tablet Take 1 tablet (81 mg) by mouth daily       azelastine (ASTELIN) 0.1 % nasal spray SPRAY 1 TO 2 SPRAYS INTO BOTH NOSTRILS 2 TIMES DAILY 30 mL 4     azelastine (ASTELIN) 0.1 % spray Spray 2 sprays into both nostrils 2 times daily       calcium citrate-vitamin D (CALCIUM CITRATE +) 315-200 MG-UNIT TABS Take 2 tablets by mouth every evening        fluticasone-salmeterol (ADVAIR-HFA) 230-21 MCG/ACT inhaler Inhale 2 puffs into the lungs 2 times daily 12 g 1     hydrochlorothiazide (MICROZIDE) 12.5 MG capsule Take 1 capsule (12.5 mg) by mouth daily 90 capsule 3     ipratropium (ATROVENT) 0.03 % spray Spray 2 sprays in nostril 2 times daily       latanoprost (XALATAN) 0.005 % ophthalmic solution Place 1 drop into both eyes At Bedtime       losartan (COZAAR) 100 MG tablet Take 1 tablet (100 mg) by mouth daily 90 tablet 3     metoprolol succinate (TOPROL XL) 100 MG 24 hr tablet Take 1 tablet (100 mg) by mouth daily 90 tablet 3     omeprazole (PRILOSEC) 20 MG CR capsule Take 1 capsule (20 mg) by mouth every other day 90 capsule 1     PREDNISONE PO As needed for asthma flares       simvastatin (ZOCOR) 20 MG tablet take 1/2 tablet by mouth daily at bedtime 45 tablet 0     spironolactone (ALDACTONE) 25 MG tablet Take 0.5 tablets (12.5 mg) by mouth daily 45 tablet 3     levocetirizine (XYZAL) 5 MG tablet Take 5 mg by mouth daily         ALLERGIES     Allergies   Allergen Reactions     Fosamax [Alendronic Acid] GI Disturbance     Augmentin [Amoxicillin-Pot Clavulanate] Diarrhea     Diarrhea and rash     Evista [Raloxifene]      abd  symptoms.      Flu Virus Vaccine      swollen and red at inj site       PAST MEDICAL HISTORY:  Past Medical History:   Diagnosis Date     Anemia 3/10/2009    Chronic disease, by Fe studies; awaiting full GI w/u and repeat colonoscopy, ERCP.     Basal Cell Carcinoma--back      Coronary artery disease involving native coronary artery of native heart without angina pectoris 3/9/2017    3/2/2017 - Two vessel coronary artery disease with mLAD 50% stenosis, D3 50% stenosis, pLCx 50% stenosis and mLCx 50% stenosis     Essential hypertension with goal blood pressure less than 140/90 9/1/2016    White coat hypertension;  24 hour ambulatory monitoring normal. Do not titrate up further.       Hyperlipidemia LDL goal <130 2/10/2010     Impaired fasting glucose 8/26/2010     Moderate persistent asthma      Nonrheumatic aortic valve insufficiency 6/29/2017     osteopenia      Paroxysmal atrial fibrillation (H) 12/26/2017     Pulmonary nodule 3/3/2009    PET scan reportedly normal; biopsy not advised.     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  Problem list name updated by automated process. Provider to review     Unspecified arthropathy, hand      Vasculitis (H) 4/20/2009    Possible increased activity of thoracic aorta, on PET scan w/u for pulmonary nodule.        PAST SURGICAL HISTORY:  Past Surgical History:   Procedure Laterality Date     C APPENDECTOMY  1957     C LIGATE FALLOPIAN TUBE  1971     C STEREOTACTIC BREAST BIOPSY  2001     CHOLECYSTECTOMY, LAPOROSCOPIC  2008    Cholecystectomy, Laparoscopic     ESOPHAGOSCOPY, GASTROSCOPY, DUODENOSCOPY (EGD), COMBINED  5/17/2013    Procedure: COMBINED ESOPHAGOSCOPY,  GASTROSCOPY, DUODENOSCOPY (EGD), BIOPSY SINGLE OR MULTIPLE;  ESOPHAGOSCOPY, GASTROSCOPY, DUODENOSCOPY (EGD)  with bx;  Surgeon: Jeffery Yanez MD;  Location: [facility]     HC DILATION/CURETTAGE DIAG/THER NON OB  1967,1971     HC REMOVE TONSILS/ADENOIDS,<11 Y/O         FAMILY HISTORY:  Family History   Problem Relation Age of Onset     Hypertension Mother      Osteoporosis Mother      C.A.D. Mother      Connective Tissue Disorder Father         scleraderma     Cerebrovascular Disease Paternal Grandmother      Prostate Cancer Other         9/05 passed away due to complications     Breast Cancer Child         youngest dtr       SOCIAL HISTORY:  Social History     Socioeconomic History     Marital status: Single     Spouse name: None     Number of children: 3     Years of education: 15     Highest education level: None   Occupational History     Occupation: semi-retired   Social Needs     Financial resource strain: None     Food insecurity:     Worry: None     Inability: None     Transportation needs:     Medical: None     Non-medical: None   Tobacco Use     Smoking status: Never Smoker     Smokeless tobacco: Never Used   Substance and Sexual Activity     Alcohol use: Yes     Alcohol/week: 0.6 - 1.2 oz     Types: 1 - 2 Standard drinks or equivalent per week     Comment: 1 glass of wine 1-2 days per week     Drug use: No     Sexual activity: No   Lifestyle     Physical activity:     Days per week: None     Minutes per session: None     Stress: None   Relationships     Social connections:     Talks on phone: None     Gets together: None     Attends Church service: None     Active member of club or organization: None     Attends meetings of clubs or organizations: None     Relationship status: None     Intimate partner violence:     Fear of current or ex partner: None     Emotionally abused: None     Physically abused: None     Forced sexual activity: None   Other Topics Concern      Service Not Asked     Blood  "Transfusions Not Asked     Caffeine Concern Not Asked     Occupational Exposure Not Asked     Hobby Hazards Not Asked     Sleep Concern Not Asked     Stress Concern Not Asked     Weight Concern Not Asked     Special Diet Not Asked     Back Care Not Asked     Exercise Not Asked     Bike Helmet Not Asked     Seat Belt Not Asked     Self-Exams Not Asked     Parent/sibling w/ CABG, MI or angioplasty before 65F 55M? No   Social History Narrative     None       Review of Systems:  Cardiovascular: negative for chest pain, palpitations, orthopnea, pos chronic LE edema, unchanged.  Constitutional: negative for chills, sweats, fevers. Pos occasional fatigue.   Resp: Negative for dyspnea at rest, dyspnea on exertion, neg recent cough, neg hemoptysis, pos known chronic lung disease/asthma.  HEENT: Negative for new visual changes, frequent headaches  Gastrointestinal: negative for abdominal pain, diarrhea, nausea, vomiting  Hematologic/lymphatic: Neg systemic anticoagulation, neg hx of blood clots  Musculoskeletal: negative for new back pain, joint pain.  Neurological: negative for focal weakness, LOC, seizures, syncope. Neg dizziness/presyncope.      Physical Exam:  Vitals: /52   Pulse 70   Ht 1.575 m (5' 2\")   Wt 65.8 kg (145 lb)   BMI 26.52 kg/m     Wt Readings from Last 4 Encounters:   02/21/19 65.8 kg (145 lb)   11/01/18 62.9 kg (138 lb 11.2 oz)   10/26/18 62.1 kg (137 lb)   09/14/18 60.7 kg (133 lb 12.8 oz)       GEN:  In general, this is a well nourished  female in no acute distress on room air.  Patient ambulatory, unaccompanied today.  HEENT:  Pupils equal, round. Sclerae nonicteric. Clear oropharynx.   NECK: Supple, no masses appreciated. Trachea midline. No JVD.  C/V:  Regular rate and rhythm, 1/6 BRAULIO heard RSB. No rub or gallop.   RESP: Respirations are unlabored. No use of accessory muscles. Clear to auscultation bilaterally without wheezing, rales, or rhonchi.  GI: Abdomen soft, nontender, " nondistended.   EXTREM: Trace to 1+ bilateral  LE edema, has compression socks on. No cyanosis or clubbing.  NEURO: Alert and oriented, cooperative. Gait not formally assessed. No obvious focal deficits.   PSYCH: Normal affect.  SKIN: Warm and dry. No rashes or petechiae appreciated.       Recent Lab Results:  LIPID RESULTS:  Lab Results   Component Value Date    CHOL 176 02/22/2018    HDL 93 02/22/2018    LDL 65 02/22/2018    TRIG 92 02/22/2018     Last Comprehensive Metabolic Panel:  Sodium   Date Value Ref Range Status   02/21/2019 138 133 - 144 mmol/L Final     Potassium   Date Value Ref Range Status   02/21/2019 4.1 3.4 - 5.3 mmol/L Final     Chloride   Date Value Ref Range Status   02/21/2019 106 94 - 109 mmol/L Final     Carbon Dioxide   Date Value Ref Range Status   02/21/2019 27 20 - 32 mmol/L Final     Anion Gap   Date Value Ref Range Status   02/21/2019 5 3 - 14 mmol/L Final     Glucose   Date Value Ref Range Status   02/21/2019 107 (H) 70 - 99 mg/dL Final     Urea Nitrogen   Date Value Ref Range Status   02/21/2019 27 7 - 30 mg/dL Final     Creatinine   Date Value Ref Range Status   02/21/2019 1.02 0.52 - 1.04 mg/dL Final     GFR Estimate   Date Value Ref Range Status   02/21/2019 51 (L) >60 mL/min/[1.73_m2] Final     Comment:     Non  GFR Calc  Starting 12/18/2018, serum creatinine based estimated GFR (eGFR) will be   calculated using the Chronic Kidney Disease Epidemiology Collaboration   (CKD-EPI) equation.       Calcium   Date Value Ref Range Status   02/21/2019 8.7 8.5 - 10.1 mg/dL Final           New/Pertinent imaging results since last visit:  Echo 9/5/18  Interpretation Summary     The visual ejection fraction is estimated at 55%.  Diastolic Doppler findings (E/E' ratio and/or other parameters) suggest left  ventricular filling pressures are increased.  There is borderline inferolateral wall hypokinesis.  The left atrium is moderately dilated.  The right ventricular systolic  pressure is approximated at 40-50mmHg plus the  right atrial pressure.  Right ventricular systolic pressure is elevated, consistent with moderate  pulmonary hypertension.  Normal IVC (1.5-2.5cm) with >50% respiratory collapse; right atrial pressure  is estimated at 5-10mmHg.  There is mild to moderate (1-2+) aortic regurgitation.  No hemodynamically significant valvular aortic stenosis.  The ascending aorta is Moderately dilated.  Sinus Valsalva 32mm, Asc aorta 43-44 mm      Diana Nelson PA-C  [facility]  Pager [phone]

## 2019-02-28 DIAGNOSIS — I10 ESSENTIAL HYPERTENSION WITH GOAL BLOOD PRESSURE LESS THAN 140/90: ICD-10-CM

## 2019-02-28 DIAGNOSIS — I71.20 THORACIC AORTIC ANEURYSM WITHOUT RUPTURE (H): ICD-10-CM

## 2019-02-28 LAB
ALT SERPL W P-5'-P-CCNC: 27 U/L (ref 0–50)
ANION GAP SERPL CALCULATED.3IONS-SCNC: 4 MMOL/L (ref 3–14)
BUN SERPL-MCNC: 26 MG/DL (ref 7–30)
CALCIUM SERPL-MCNC: 9.3 MG/DL (ref 8.5–10.1)
CHLORIDE SERPL-SCNC: 104 MMOL/L (ref 94–109)
CHOLEST SERPL-MCNC: 162 MG/DL
CO2 SERPL-SCNC: 28 MMOL/L (ref 20–32)
CREAT SERPL-MCNC: 1.03 MG/DL (ref 0.52–1.04)
GFR SERPL CREATININE-BSD FRML MDRD: 51 ML/MIN/{1.73_M2}
GLUCOSE SERPL-MCNC: 96 MG/DL (ref 70–99)
HDLC SERPL-MCNC: 63 MG/DL
LDLC SERPL CALC-MCNC: 83 MG/DL
NONHDLC SERPL-MCNC: 99 MG/DL
POTASSIUM SERPL-SCNC: 4.7 MMOL/L (ref 3.4–5.3)
SODIUM SERPL-SCNC: 136 MMOL/L (ref 133–144)
TRIGL SERPL-MCNC: 79 MG/DL

## 2019-02-28 PROCEDURE — 36415 COLL VENOUS BLD VENIPUNCTURE: CPT | Performed by: INTERNAL MEDICINE

## 2019-02-28 PROCEDURE — 80048 BASIC METABOLIC PNL TOTAL CA: CPT | Performed by: INTERNAL MEDICINE

## 2019-02-28 PROCEDURE — 84460 ALANINE AMINO (ALT) (SGPT): CPT | Performed by: INTERNAL MEDICINE

## 2019-02-28 PROCEDURE — 80061 LIPID PANEL: CPT | Performed by: INTERNAL MEDICINE

## 2019-02-28 RX ORDER — SIMVASTATIN 20 MG
20 TABLET ORAL AT BEDTIME
Qty: 45 TABLET | Refills: 0
Start: 2019-02-28 | End: 2019-03-05

## 2019-03-11 ENCOUNTER — TELEPHONE (OUTPATIENT)
Dept: CARDIOLOGY | Facility: CLINIC | Age: 84
End: 2019-03-11

## 2019-03-11 NOTE — TELEPHONE ENCOUNTER
"Voicemail received from patient stating that her losartan potassium 100mg was not originally part of the recall, but then her last refill was. Since then she has gotten a different losartan medication brand but that she has started to feel very dizzy and is wondering what to do.     Last OV 2/21/19 w/ Diana Nelson: \"1. Labile hypertension.              --BP today is somewhat elevated and also running higher at home over the last few months. Upon chart review, she was on spironolactone in the past and tolerated it well; it was discontinued during her hospital stay in 2017 with sepsis. Thus, will try to resume at 12.5mg daily. Her BP control has been challenging given her wide pulse pressure. I asked her to call with any new dizziness. Recheck BMP in 1 week.               --Continue Toprol XL at 100mg daily, hydrochlorothiazide 12.5mg daily, and losartan 100mg daily without change. She reportedly had dizziness with amlodipine and higher doses of hydrochlorothiazide in the past.\"     Returned patient's call, patient states she was told by the pharmacy that her losartan was recalled because it \"didn't have the right stuff in it to be effective.\" Patient states that her blood pressure today was 125/57 before her medications. Pt states that when she was feeling really dizzy her blood pressure was around 90/50's before and after her medications. She states the highest her blood pressure has been since seeing Diana was around 150/60s. Diana on vacation this week, will route to Dr Hernandez for recommendations.               "

## 2019-03-12 NOTE — TELEPHONE ENCOUNTER
Contacted patient with Dr Hernandez's recommendation to stop spironolactone for the patient's dizziness. Pt verbalized understanding. Instructed patient to call back should her dizziness persist after stopping spironolactone or should she have any concerns regarding her blood pressure. Patient verbalized understanding.

## 2019-03-30 DIAGNOSIS — B37.81 CANDIDAL ESOPHAGITIS (H): ICD-10-CM

## 2019-03-30 NOTE — TELEPHONE ENCOUNTER
"Requested Prescriptions   Pending Prescriptions Disp Refills     omeprazole (PRILOSEC) 20 MG DR capsule [Pharmacy Med Name: Omeprazole Oral Capsule Delayed Release 20 MG]  Last Written Prescription Date:  11/01/2018  Last Fill Quantity: 90 capsule,  # refills: 1   Last office visit: 8/9/2018 with prescribing provider:  Layo Sevilla MD   Future Office Visit:     90 capsule 0     Sig: Take 1 capsule (20 mg) by mouth daily    PPI Protocol Passed - 3/30/2019  7:01 AM       Passed - Not on Clopidogrel (unless Pantoprazole ordered)       Passed - No diagnosis of osteoporosis on record       Passed - Recent (12 mo) or future (30 days) visit within the authorizing provider's specialty    Patient had office visit in the last 12 months or has a visit in the next 30 days with authorizing provider or within the authorizing provider's specialty.  See \"Patient Info\" tab in inbasket, or \"Choose Columns\" in Meds & Orders section of the refill encounter.             Passed - Medication is active on med list       Passed - Patient is age 18 or older       Passed - No active pregnacy on record       Passed - No positive pregnancy test in past 12 months          "

## 2019-03-30 NOTE — LETTER
April 1, 2019      Genie Persaud  [address] [place] [address]        Dear Genie,     We sent a one month refill of  Omeprazole to your pharmacy today. This is a reminder to schedule an office visit with your provider before the next refill.    Instructions last office visit  8/9/18  Return in about 6 months (around 2/9/2019) for Medical Check Up.     Taking care of your health is important to us and ongoing visits with a provider are vital to your care. Please let us know if you have any questions about this reminder.     Sincerely,     RAFAEL Zamora - Care team of Dr. Sevilla

## 2019-04-01 NOTE — TELEPHONE ENCOUNTER
Medication is being filled for 1 time refill only due to:  Overdue for office visit.     Instructions LOV 8/9/18  Return in about 6 months (around 2/9/2019) for Medical Check Up.     Letter sent.     Elizabeth Morales RN Flex

## 2019-04-18 DIAGNOSIS — E78.5 HYPERLIPIDEMIA LDL GOAL <130: ICD-10-CM

## 2019-04-18 RX ORDER — SIMVASTATIN 20 MG
20 TABLET ORAL AT BEDTIME
Qty: 90 TABLET | Refills: 1 | Status: SHIPPED | OUTPATIENT
Start: 2019-04-18 | End: 2019-10-08

## 2019-04-18 NOTE — TELEPHONE ENCOUNTER
"Prescription approved per [facility], [facility] or [facility] refill protocol.  Merari BLOOD RN - [facility]          Requested Prescriptions   Pending Prescriptions Disp Refills     simvastatin (ZOCOR) 20 MG tablet [Pharmacy Med Name: Simvastatin Oral Tablet 20 MG] 45 tablet 0     Sig: Take 1 tablet (20 mg) by mouth At Bedtime       Statins Protocol Passed - 4/18/2019  7:01 AM        Passed - LDL on file in past 12 months     Recent Labs   Lab Test 02/28/19  1014   LDL 83             Passed - No abnormal creatine kinase in past 12 months     No lab results found.             Passed - Recent (12 mo) or future (30 days) visit within the authorizing provider's specialty     Patient had office visit in the last 12 months or has a visit in the next 30 days with authorizing provider or within the authorizing provider's specialty.  See \"Patient Info\" tab in inbasket, or \"Choose Columns\" in Meds & Orders section of the refill encounter.              Passed - Medication is active on med list        Passed - Patient is age 18 or older        Passed - No active pregnancy on record        Passed - No positive pregnancy test in past 12 months          "

## 2019-04-23 NOTE — PROGRESS NOTES
Genie Persaud is a 82 year old female who presents today for Ear Wash. with the complaint of blockage and wax.    Ear exam showing wax occlusion in the bilateral ear.  The patient used ear wax removal drops at home  The patients ear(s) were irrigated using a elephant spray bottle ( hydrogen peroxide/warm water mixture ) with moderate amount of wax extracted.  Patient tolerated procedure well.    Patient instructed to irrigate ears with warm water daily.    Outcome:complete removed wax, patient started she's able to hear clearly.     //Pema Lay MA// October 20, 2017 4:53 PM               23

## 2019-04-28 ENCOUNTER — OFFICE VISIT (OUTPATIENT)
Dept: URGENT CARE | Facility: URGENT CARE | Age: 84
End: 2019-04-28
Payer: COMMERCIAL

## 2019-04-28 VITALS
SYSTOLIC BLOOD PRESSURE: 144 MMHG | OXYGEN SATURATION: 97 % | RESPIRATION RATE: 16 BRPM | DIASTOLIC BLOOD PRESSURE: 54 MMHG | HEIGHT: 62 IN | TEMPERATURE: 97.7 F | WEIGHT: 142.8 LBS | BODY MASS INDEX: 26.28 KG/M2 | HEART RATE: 67 BPM

## 2019-04-28 DIAGNOSIS — J20.9 ACUTE BRONCHITIS, UNSPECIFIED ORGANISM: ICD-10-CM

## 2019-04-28 DIAGNOSIS — A08.4 VIRAL GASTROENTERITIS: Primary | ICD-10-CM

## 2019-04-28 DIAGNOSIS — R05.9 COUGH: ICD-10-CM

## 2019-04-28 PROCEDURE — 99213 OFFICE O/P EST LOW 20 MIN: CPT | Performed by: FAMILY MEDICINE

## 2019-04-28 RX ORDER — BENZONATATE 100 MG/1
200 CAPSULE ORAL 3 TIMES DAILY PRN
Qty: 42 CAPSULE | Refills: 3 | Status: SHIPPED | OUTPATIENT
Start: 2019-04-28 | End: 2019-05-21

## 2019-04-28 ASSESSMENT — MIFFLIN-ST. JEOR: SCORE: 1065.99

## 2019-04-28 ASSESSMENT — PAIN SCALES - GENERAL: PAINLEVEL: NO PAIN (0)

## 2019-04-28 NOTE — PROGRESS NOTES
SUBJECTIVE:   Genie Persaud is a 83 year old female presenting with two complaints:      Complaint #1:  Diarrhea (frequent episodes of explosive non-bloody, watery diarrhea) for the past four days.  She would like something to control the diarrhea, since she has a lot of upcoming social events that involve eating.  There was diarrhea yesterday morning, but no diarrhea later that day.  Patient had a little diarrhea this morning.    No relief with Pepto-Bismol. No abdominal cramping. No fevers.  No problems producing urine.  No high fevers.  No vomiting.  No sick contacts.  No lightheadedness.        Complaint #2:  Frequent cough, productive of clear to white sputum and also shortness of breath.  She had a little wheezing earlier this morning only.  .  Onset of symptoms was two days ago.  Course of illness is worsening..    Severity moderate coughing attacks.    Treatment measures tried include cough drops.  Predisposing factors include Patient has a history of moderate persistent asthma. .    Past Medical History:   Diagnosis Date     Anemia 3/10/2009    Chronic disease, by Fe studies; awaiting full GI w/u and repeat colonoscopy, ERCP.     Basal Cell Carcinoma--back      Coronary artery disease involving native coronary artery of native heart without angina pectoris 3/9/2017    3/2/2017 - Two vessel coronary artery disease with mLAD 50% stenosis, D3 50% stenosis, pLCx 50% stenosis and mLCx 50% stenosis     Essential hypertension with goal blood pressure less than 140/90 9/1/2016    White coat hypertension;  24 hour ambulatory monitoring normal. Do not titrate up further.       Hyperlipidemia LDL goal <130 2/10/2010     Impaired fasting glucose 8/26/2010     Moderate persistent asthma      Nonrheumatic aortic valve insufficiency 6/29/2017     osteopenia      Paroxysmal atrial fibrillation (H) 12/26/2017     Pulmonary nodule 3/3/2009    PET scan reportedly normal; biopsy not advised.     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  Problem list name updated by automated process. Provider to review     Unspecified arthropathy, hand      Vasculitis (H) 4/20/2009    Possible increased activity of thoracic aorta, on PET scan w/u for pulmonary nodule.      Current Outpatient Medications   Medication Sig Dispense Refill     albuterol (PROAIR HFA, PROVENTIL HFA, VENTOLIN HFA) 108 (90 BASE) MCG/ACT inhaler Inhale 2 puffs into the lungs every 6 hours as needed        aspirin 81 MG EC tablet Take 1 tablet (81 mg) by mouth daily       azelastine (ASTELIN) 0.1 % nasal spray SPRAY 1 TO 2 SPRAYS INTO BOTH NOSTRILS 2 TIMES DAILY 30 mL 4     azelastine (ASTELIN) 0.1 % spray Spray 2 sprays into both nostrils 2 times daily       calcium citrate-vitamin D (CALCIUM CITRATE +) 315-200 MG-UNIT TABS Take 2 tablets by mouth every evening        fluticasone-salmeterol (ADVAIR-HFA) 230-21 MCG/ACT inhaler Inhale 2 puffs into the lungs 2 times daily 12 g 1     hydrochlorothiazide (MICROZIDE) 12.5 MG capsule Take 1 capsule (12.5 mg) by mouth daily 90 capsule 3     ipratropium (ATROVENT) 0.03 % spray Spray 2 sprays in nostril 2 times daily       latanoprost (XALATAN) 0.005 % ophthalmic solution Place 1 drop into both eyes At Bedtime       levocetirizine (XYZAL) 5 MG tablet Take 5 mg by mouth daily       losartan (COZAAR) 100 MG tablet Take 1 tablet (100 mg) by mouth daily 90 tablet 3     metoprolol succinate (TOPROL XL) 100 MG 24 hr tablet Take 1 tablet (100 mg) by mouth daily 90 tablet 3     omeprazole (PRILOSEC) 20 MG CR capsule Take 1 capsule (20 mg) by mouth every other day 90 capsule 1     omeprazole (PRILOSEC) 20 MG DR capsule Take 1 capsule (20  "mg) by mouth daily *due for office visit before next refill. 30 capsule 0     PREDNISONE PO As needed for asthma flares       acetaminophen (TYLENOL) 500 MG tablet Take 500-1,000 mg by mouth every 8 hours as needed for mild pain       simvastatin (ZOCOR) 20 MG tablet Take 1 tablet (20 mg) by mouth At Bedtime (Patient not taking: Reported on 4/28/2019) 90 tablet 1     Social History     Tobacco Use     Smoking status: Never Smoker     Smokeless tobacco: Never Used   Substance Use Topics     Alcohol use: Yes     Alcohol/week: 0.6 - 1.2 oz     Types: 1 - 2 Standard drinks or equivalent per week     Comment: 1 glass of wine 1-2 days per week       ROS:  CONSTITUTIONAL:NEGATIVE  for fevers.    INTEGUMENTARY/SKIN: NEGATIVE for rashes.    EYES: negative for eye problems.   ENT/MOUTH: POSITIVE for some runny nose problems.   RESP: positive for cough.   GI: diarrhea has been present.     OBJECTIVE:  /54 (BP Location: Right arm, Patient Position: Sitting, Cuff Size: Adult Regular)   Pulse 67   Temp 97.7  F (36.5  C) (Oral)   Resp 16   Ht 1.575 m (5' 2\")   Wt 64.8 kg (142 lb 12.8 oz)   LMP  (LMP Unknown)   SpO2 97%   Breastfeeding? No   BMI 26.12 kg/m    GENERAL APPEARANCE: healthy, alert and no distress  EYES: Eyes grossly normal to inspection.  No scleral icterus.    HENT: Mouth is moist.    RESP: lungs clear to auscultation - no rales, rhonchi or wheezes  CV: regular rates and rhythm, normal S1 S2, no murmur noted  ABDOMEN:  soft, nontender, no HSM or masses and bowel sounds are hyperactive.    SKIN: no suspicious lesions or rashes    ASSESSMENT:  Cough, most likely secondary to acute bronchitis    Viral Gastroenteritis    PLAN:    For the viral gastroenteritis:    Eat a BRAT diet and ADAT  Drink plenty of water  Imodium occasionally to help with the diarrhea.    follow up with the diarrhea fails to improve in 3-4 days.   Go to the emergency room if you feel like you're going to pass out or if there are " bloody stools.      For the cough:    over the counter cough drops  Rx:  Tessalon Perles  Humidifier  follow up with the primary care provider if not better in 10-14 days.   Continue the current asthma medications.     See orders in Epic    Adalid Wang MD

## 2019-04-28 NOTE — PATIENT INSTRUCTIONS
Christina to follow up with Primary Care provider regarding elevated blood pressure.    Eat a soft, mushy, bland diet.  Eat more substantial foods once the diarrhea starts to improve    You can take Imodium occasionally to help decrease some of the diarrhea.      Take over the counter cough drops for the cough    Humidifier for the airways of the lungs.     follow up with your primary care provider if the cough fails to improve in 10 days.   follow up with your primary care provider if the diarrhea fails to improve in 3-4 days.     Go to the emergency room if you feel like you're going to pass out or if there are bloody stools.

## 2019-05-13 ENCOUNTER — TELEPHONE (OUTPATIENT)
Dept: PEDIATRICS | Facility: CLINIC | Age: 84
End: 2019-05-13

## 2019-05-13 NOTE — TELEPHONE ENCOUNTER
Patient was called advised OV needed for a provider's assessment of the ears. She will call back tomorrow morning or Wednesday for an OV.

## 2019-05-13 NOTE — TELEPHONE ENCOUNTER
Reason for call:  Symptom   Symptom or request: requesting a ear wash one of her ears is very plugged.    Have you been treated for this before? Yes    Additional comments: Patient has come in before and just saw the nurse for an earwash. I explained the new policy that she would have to be seen for the ear wash. There are no openings with Dr Sevilla. Can she be added on to his schedule? Please call her back.    Phone number to reach patient:  Home number on file [phone] (home)    Best Time:  today    Can we leave a detailed message on this number?  YES

## 2019-05-15 ENCOUNTER — OFFICE VISIT (OUTPATIENT)
Dept: URGENT CARE | Facility: URGENT CARE | Age: 84
End: 2019-05-15
Payer: COMMERCIAL

## 2019-05-15 VITALS
HEART RATE: 72 BPM | OXYGEN SATURATION: 97 % | DIASTOLIC BLOOD PRESSURE: 69 MMHG | SYSTOLIC BLOOD PRESSURE: 159 MMHG | WEIGHT: 141.4 LBS | TEMPERATURE: 97 F | BODY MASS INDEX: 25.86 KG/M2

## 2019-05-15 DIAGNOSIS — H61.23 BILATERAL IMPACTED CERUMEN: Primary | ICD-10-CM

## 2019-05-15 PROCEDURE — 99213 OFFICE O/P EST LOW 20 MIN: CPT | Performed by: FAMILY MEDICINE

## 2019-05-15 NOTE — PROGRESS NOTES
SUBJECTIVE:   Genie Persaud is a 83 year old female presenting with a chief complaint of ears plugged and dizziness.  More pressure and feeling more dizzy/off balance.  Left ear is more plugged.  Onset of symptoms was 5 day(s) ago.  Course of illness is worsening.    Severity moderate  Current and Associated symptoms: left ear plugged, dizziness  Treatment measures tried include Fluids and Rest, OTC ear drops  Predisposing factors include HTN, seasonal allergies.    Past Medical History:   Diagnosis Date     Anemia 3/10/2009    Chronic disease, by Fe studies; awaiting full GI w/u and repeat colonoscopy, ERCP.     Basal Cell Carcinoma--back      Coronary artery disease involving native coronary artery of native heart without angina pectoris 3/9/2017    3/2/2017 - Two vessel coronary artery disease with mLAD 50% stenosis, D3 50% stenosis, pLCx 50% stenosis and mLCx 50% stenosis     Essential hypertension with goal blood pressure less than 140/90 9/1/2016    White coat hypertension;  24 hour ambulatory monitoring normal. Do not titrate up further.       Hyperlipidemia LDL goal <130 2/10/2010     Impaired fasting glucose 8/26/2010     Moderate persistent asthma      Nonrheumatic aortic valve insufficiency 6/29/2017     osteopenia      Paroxysmal atrial fibrillation (H) 12/26/2017     Pulmonary nodule 3/3/2009    PET scan reportedly normal; biopsy not advised.     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  Problem list name updated by automated process. Provider to review     Unspecified arthropathy, hand      Vasculitis (H) 4/20/2009    Possible increased activity of thoracic aorta, on PET scan w/u for pulmonary  nodule.      Current Outpatient Medications   Medication Sig Dispense Refill     acetaminophen (TYLENOL) 500 MG tablet Take 500-1,000 mg by mouth every 8 hours as needed for mild pain       albuterol (PROAIR HFA, PROVENTIL HFA, VENTOLIN HFA) 108 (90 BASE) MCG/ACT inhaler Inhale 2 puffs into the lungs every 6 hours as needed        aspirin 81 MG EC tablet Take 1 tablet (81 mg) by mouth daily       azelastine (ASTELIN) 0.1 % nasal spray SPRAY 1 TO 2 SPRAYS INTO BOTH NOSTRILS 2 TIMES DAILY 30 mL 4     azelastine (ASTELIN) 0.1 % spray Spray 2 sprays into both nostrils 2 times daily       benzonatate (TESSALON) 100 MG capsule Take 2 capsules (200 mg) by mouth 3 times daily as needed for cough 42 capsule 3     calcium citrate-vitamin D (CALCIUM CITRATE +) 315-200 MG-UNIT TABS Take 2 tablets by mouth every evening        fluticasone-salmeterol (ADVAIR-HFA) 230-21 MCG/ACT inhaler Inhale 2 puffs into the lungs 2 times daily 12 g 1     hydrochlorothiazide (MICROZIDE) 12.5 MG capsule Take 1 capsule (12.5 mg) by mouth daily 90 capsule 3     ipratropium (ATROVENT) 0.03 % spray Spray 2 sprays in nostril 2 times daily       latanoprost (XALATAN) 0.005 % ophthalmic solution Place 1 drop into both eyes At Bedtime       levocetirizine (XYZAL) 5 MG tablet Take 5 mg by mouth daily       losartan (COZAAR) 100 MG tablet Take 1 tablet (100 mg) by mouth daily 90 tablet 3     metoprolol succinate (TOPROL XL) 100 MG 24 hr tablet Take 1 tablet (100 mg) by mouth daily 90 tablet 3     omeprazole (PRILOSEC) 20 MG CR capsule Take 1 capsule (20 mg) by mouth every other day 90 capsule 1     omeprazole (PRILOSEC) 20 MG DR capsule Take 1 capsule (20 mg) by mouth daily *due for office visit before next refill. 30 capsule 0     PREDNISONE PO As needed for asthma flares       simvastatin (ZOCOR) 20 MG tablet Take 1 tablet (20 mg) by mouth At Bedtime (Patient not taking: Reported on 4/28/2019) 90 tablet 1     Social History     Tobacco Use     Smoking  status: Never Smoker     Smokeless tobacco: Never Used   Substance Use Topics     Alcohol use: Yes     Alcohol/week: 0.6 - 1.2 oz     Types: 1 - 2 Standard drinks or equivalent per week     Comment: 1 glass of wine 1-2 days per week       ROS:  Review of systems negative except as stated above.    OBJECTIVE:  /69   Pulse 72   Temp 97  F (36.1  C)   Wt 64.1 kg (141 lb 6.4 oz)   LMP  (LMP Unknown)   SpO2 97%   BMI 25.86 kg/m    GENERAL APPEARANCE: healthy, alert and no distress  EYES: EOMI,  PERRL, conjunctiva clear  HENT: ear canals with cerumen impaction, TM normal and cerumen with slight irritation after irrigation.  Nose and mouth without ulcers, erythema or lesions.  No sinus tenderness  PSYCH: mentation appears normal and affect normal/bright    ASSESSMENT/PLAN:  (H61.23) Bilateral impacted cerumen  (primary encounter diagnosis)  Plan: successful irrigation and currette      Patient endorsed improvement of symptoms after irrigation of both ears.  Residual cerumen curette out.    Discussed off balance/dizziness symptoms, declined lab evaluation today, feels that is due to cerumen problems.  Patient noted to have elevated BP today, trying to obtain appointment with primary provider next week but was unable to do this when she called clinic.    Follow up with primary provider within 1 week (nursing staff made appointment for patient)    Anthony French MD  May 15, 2019 7:26 PM

## 2019-05-20 ENCOUNTER — DOCUMENTATION ONLY (OUTPATIENT)
Dept: PEDIATRICS | Facility: CLINIC | Age: 84
End: 2019-05-20

## 2019-05-21 ENCOUNTER — OFFICE VISIT (OUTPATIENT)
Dept: PEDIATRICS | Facility: CLINIC | Age: 84
End: 2019-05-21
Payer: COMMERCIAL

## 2019-05-21 VITALS
HEART RATE: 80 BPM | TEMPERATURE: 97.6 F | WEIGHT: 141 LBS | RESPIRATION RATE: 20 BRPM | DIASTOLIC BLOOD PRESSURE: 44 MMHG | SYSTOLIC BLOOD PRESSURE: 148 MMHG | BODY MASS INDEX: 25.79 KG/M2

## 2019-05-21 DIAGNOSIS — B37.81 CANDIDAL ESOPHAGITIS (H): ICD-10-CM

## 2019-05-21 DIAGNOSIS — R60.0 PERIPHERAL EDEMA: Chronic | ICD-10-CM

## 2019-05-21 DIAGNOSIS — K58.0 IRRITABLE BOWEL SYNDROME WITH DIARRHEA: Primary | ICD-10-CM

## 2019-05-21 DIAGNOSIS — D64.9 ANEMIA, UNSPECIFIED TYPE: ICD-10-CM

## 2019-05-21 DIAGNOSIS — I10 ESSENTIAL HYPERTENSION WITH GOAL BLOOD PRESSURE LESS THAN 140/90: ICD-10-CM

## 2019-05-21 DIAGNOSIS — M85.89 OSTEOPENIA OF MULTIPLE SITES: ICD-10-CM

## 2019-05-21 DIAGNOSIS — I25.10 CORONARY ARTERY DISEASE INVOLVING NATIVE CORONARY ARTERY OF NATIVE HEART WITHOUT ANGINA PECTORIS: ICD-10-CM

## 2019-05-21 LAB
BASOPHILS # BLD AUTO: 0 10E9/L (ref 0–0.2)
BASOPHILS NFR BLD AUTO: 0.3 %
DIFFERENTIAL METHOD BLD: ABNORMAL
EOSINOPHIL # BLD AUTO: 0.1 10E9/L (ref 0–0.7)
EOSINOPHIL NFR BLD AUTO: 1.4 %
ERYTHROCYTE [DISTWIDTH] IN BLOOD BY AUTOMATED COUNT: 13.2 % (ref 10–15)
HCT VFR BLD AUTO: 35.2 % (ref 35–47)
HGB BLD-MCNC: 11.4 G/DL (ref 11.7–15.7)
LYMPHOCYTES # BLD AUTO: 1.2 10E9/L (ref 0.8–5.3)
LYMPHOCYTES NFR BLD AUTO: 12.1 %
MCH RBC QN AUTO: 30.5 PG (ref 26.5–33)
MCHC RBC AUTO-ENTMCNC: 32.4 G/DL (ref 31.5–36.5)
MCV RBC AUTO: 94 FL (ref 78–100)
MONOCYTES # BLD AUTO: 1.2 10E9/L (ref 0–1.3)
MONOCYTES NFR BLD AUTO: 12.5 %
NEUTROPHILS # BLD AUTO: 7.2 10E9/L (ref 1.6–8.3)
NEUTROPHILS NFR BLD AUTO: 73.7 %
PLATELET # BLD AUTO: 258 10E9/L (ref 150–450)
RBC # BLD AUTO: 3.74 10E12/L (ref 3.8–5.2)
WBC # BLD AUTO: 9.8 10E9/L (ref 4–11)

## 2019-05-21 PROCEDURE — 80053 COMPREHEN METABOLIC PANEL: CPT | Performed by: INTERNAL MEDICINE

## 2019-05-21 PROCEDURE — 85025 COMPLETE CBC W/AUTO DIFF WBC: CPT | Performed by: INTERNAL MEDICINE

## 2019-05-21 PROCEDURE — 36415 COLL VENOUS BLD VENIPUNCTURE: CPT | Performed by: INTERNAL MEDICINE

## 2019-05-21 PROCEDURE — 99215 OFFICE O/P EST HI 40 MIN: CPT | Performed by: INTERNAL MEDICINE

## 2019-05-21 RX ORDER — DICYCLOMINE HYDROCHLORIDE 10 MG/1
10 CAPSULE ORAL
Qty: 30 CAPSULE | Refills: 0 | Status: SHIPPED | OUTPATIENT
Start: 2019-05-21 | End: 2019-08-12

## 2019-05-21 NOTE — PROGRESS NOTES
Subjective     Genie Persaud is a 83 year old female who presents to clinic today for the following health issues:    HPI   ED/UC Followup:    Facility:  [facility]  Date of visit: 05/15/2019  Reason for visit: Impacted wax and Hypertension  Current Status: sinus pressure     blood pressure was elevated in urgent care during a cerumen impaction.  In past, upward titration has resulted in dizziness.     Also has been having issues with colon; can have normal stools for a few days, then will have looser ones. Was seen in urgent care also for this and diagnosed with intestinal virus.  Still alternates back and forth with diarrhea and normal stools.    Also has some stomach reflux.  On proton pump inhibitor, which is the only thing that works for ner.     Notes that in 20s had been diagnosed with irritable bowel syndrome.  No colonoscopy in 11 years.     Also feels dizzy; has pressure of sinuses, maxillary.  Ears feel full.  Feels dizzy.      {  Patient Active Problem List   Diagnosis     Swelling, mass, or lump in head and neck     Pulmonary nodule     Anemia     Vasculitis (H)     HYPERLIPIDEMIA LDL GOAL <130     Impaired fasting glucose     Candidal esophagitis (H)     Thoracic aortic aneurysm without rupture (H)     Stroke (H)     Moderate persistent asthma without complication     Essential hypertension with goal blood pressure less than 140/90     Coronary artery disease involving native coronary artery of native heart without angina pectoris     SVT -noted during Cath procedure     Nonrheumatic aortic valve insufficiency     Stress-induced cardiomyopathy     Paroxysmal atrial fibrillation (H)     Peripheral edema     Hip pain, right     Lumbago     Osteopenia of multiple sites     Skin infection     Past Surgical History:   Procedure Laterality Date     C APPENDECTOMY  1957     C LIGATE FALLOPIAN TUBE  1971     C STEREOTACTIC BREAST BIOPSY  2001     CHOLECYSTECTOMY, LAPOROSCOPIC  2008    Cholecystectomy, Laparoscopic      ESOPHAGOSCOPY, GASTROSCOPY, DUODENOSCOPY (EGD), COMBINED  5/17/2013    Procedure: COMBINED ESOPHAGOSCOPY, GASTROSCOPY, DUODENOSCOPY (EGD), BIOPSY SINGLE OR MULTIPLE;  ESOPHAGOSCOPY, GASTROSCOPY, DUODENOSCOPY (EGD)  with bx;  Surgeon: Jeffery Yanez MD;  Location: [facility]     HC DILATION/CURETTAGE DIAG/THER NON OB  1967,1971     HC REMOVE TONSILS/ADENOIDS,<11 Y/O         Social History     Tobacco Use     Smoking status: Never Smoker     Smokeless tobacco: Never Used   Substance Use Topics     Alcohol use: Yes     Alcohol/week: 0.6 - 1.2 oz     Types: 1 - 2 Standard drinks or equivalent per week     Comment: 1 glass of wine 1-2 days per week     Family History   Problem Relation Age of Onset     Hypertension Mother      Osteoporosis Mother      C.A.D. Mother      Connective Tissue Disorder Father         scleraderma     Cerebrovascular Disease Paternal Grandmother      Prostate Cancer Other         9/05 passed away due to complications     Breast Cancer Child         youngest dtr         Current Outpatient Medications   Medication Sig Dispense Refill     acetaminophen (TYLENOL) 500 MG tablet Take 500-1,000 mg by mouth every 8 hours as needed for mild pain       albuterol (PROAIR HFA, PROVENTIL HFA, VENTOLIN HFA) 108 (90 BASE) MCG/ACT inhaler Inhale 2 puffs into the lungs every 6 hours as needed        aspirin 81 MG EC tablet Take 1 tablet (81 mg) by mouth daily       azelastine (ASTELIN) 0.1 % nasal spray SPRAY 1 TO 2 SPRAYS INTO BOTH NOSTRILS 2 TIMES DAILY 30 mL 4     calcium citrate-vitamin D (CALCIUM CITRATE +) 315-200 MG-UNIT TABS Take 2 tablets by mouth every evening        dicyclomine (BENTYL) 10 MG capsule Take 1 capsule (10 mg) by mouth 4 times daily (before meals and nightly) 30 capsule 0     fluticasone-salmeterol (ADVAIR-HFA) 230-21 MCG/ACT inhaler Inhale 2 puffs into the lungs 2 times daily 12 g 1     hydrochlorothiazide (MICROZIDE) 12.5 MG capsule Take 1 capsule (12.5 mg) by mouth daily 90 capsule  3     ipratropium (ATROVENT) 0.03 % spray Spray 2 sprays in nostril 2 times daily       latanoprost (XALATAN) 0.005 % ophthalmic solution Place 1 drop into both eyes At Bedtime       levocetirizine (XYZAL) 5 MG tablet Take 5 mg by mouth daily       losartan (COZAAR) 100 MG tablet Take 1 tablet (100 mg) by mouth daily 90 tablet 3     metoprolol succinate (TOPROL XL) 100 MG 24 hr tablet Take 1 tablet (100 mg) by mouth daily 90 tablet 3     omeprazole (PRILOSEC) 20 MG DR capsule Take 1 capsule (20 mg) by mouth daily *due for office visit before next refill. 90 capsule 3     PREDNISONE PO As needed for asthma flares       simvastatin (ZOCOR) 20 MG tablet Take 1 tablet (20 mg) by mouth At Bedtime 90 tablet 1     Allergies   Allergen Reactions     Fosamax [Alendronic Acid] GI Disturbance     Augmentin [Amoxicillin-Pot Clavulanate] Diarrhea     Diarrhea and rash     Evista [Raloxifene]      abd symptoms.      Flu Virus Vaccine      swollen and red at inj site     BP Readings from Last 3 Encounters:   05/21/19 148/44   05/15/19 159/69   04/28/19 144/54    Wt Readings from Last 3 Encounters:   05/21/19 64 kg (141 lb)   05/15/19 64.1 kg (141 lb 6.4 oz)   04/28/19 64.8 kg (142 lb 12.8 oz)                    Reviewed and updated as needed this visit by Provider         Review of Systems   ROS COMP: CONSTITUTIONAL: NEGATIVE for fever, chills, change in weight  INTEGUMENTARY/SKIN: NEGATIVE for worrisome rashes, moles or lesions  EYES: NEGATIVE for vision changes or irritation  ENT/MOUTH: NEGATIVE for ear, mouth and throat problems  RESP: NEGATIVE for significant cough or SOB  BREAST: NEGATIVE for masses, tenderness or discharge  CV: NEGATIVE for chest pain, palpitations or peripheral edema  GI: NEGATIVE for nausea, abdominal pain, heartburn, or change in bowel habits  : NEGATIVE for frequency, dysuria, or hematuria  MUSCULOSKELETAL: NEGATIVE for significant arthralgias or myalgia  NEURO: NEGATIVE for weakness, dizziness or  paresthesias  ENDOCRINE: NEGATIVE for temperature intolerance, skin/hair changes  HEME: NEGATIVE for bleeding problems  PSYCHIATRIC: NEGATIVE for changes in mood or affect      Objective    /44   Pulse 80   Temp 97.6  F (36.4  C) (Oral)   Resp 20   Wt 64 kg (141 lb)   LMP  (LMP Unknown)   BMI 25.79 kg/m    Body mass index is 25.79 kg/m .  Physical Exam   GENERAL: healthy, alert and no distress  EYES: Eyes grossly normal to inspection, PERRL and conjunctivae and sclerae normal  HENT: ear canals and TM's normal, nose and mouth without ulcers or lesions  NECK: no adenopathy, no asymmetry, masses, or scars and thyroid normal to palpation  RESP: lungs clear to auscultation - no rales, rhonchi or wheezes  BREAST: normal without masses, bilaterally.  No lumps. tenderness or nipple discharge and no palpable axillary masses or adenopathy  CV: regular rate and rhythm, normal S1 S2, no S3 or S4, no murmur, click or rub, no peripheral edema and peripheral pulses strong  ABDOMEN: soft, nontender, no hepatosplenomegaly, no masses and bowel sounds normal  MS: no gross musculoskeletal defects noted, no edema  SKIN: no suspicious lesions or rashes  NEURO: Normal strength and tone, mentation intact and speech normal  PSYCH: mentation appears normal, affect normal/bright    Diagnostic Test Results:  Labs reviewed in Epic        Assessment & Plan     1. Candidal esophagitis (H)  Doing well on proton pump inhibitor; I would like to change to H2 blocker, if tolerated.   - omeprazole (PRILOSEC) 20 MG DR capsule; Take 1 capsule (20 mg) by mouth daily *due for office visit before next refill.  Dispense: 90 capsule; Refill: 3    2. Irritable bowel syndrome with diarrhea  Stool testing if diarrhea recurs; in meantime, trial of bentyl as needed .   - dicyclomine (BENTYL) 10 MG capsule; Take 1 capsule (10 mg) by mouth 4 times daily (before meals and nightly)  Dispense: 30 capsule; Refill: 0  - Enteric Bacteria and Virus Panel by LISA  "Stool; Future    3. Peripheral edema  Stable.  No signs of congestive heart failure.     4. Coronary artery disease involving native coronary artery of native heart without angina pectoris  Parameters well controlled.  Continue secondary risk factor modification for BP, cholesterol, anticoagulation, and smoking cessation. Followed by cardiology.     5. Anemia, unspecified type  Labs today.   - CBC with platelets and differential    6. Essential hypertension with goal blood pressure less than 140/90  Gets dizziness with any upward titration.  Will monitor on current doses.   - Comprehensive metabolic panel    7. Osteopenia of multiple sites  Calcium and vitamin D.        E5: Spent 40 minutes face to face.     More than 50% of the time was spent in counseling and coordination of care of these issues     BMI:   Estimated body mass index is 25.79 kg/m  as calculated from the following:    Height as of 4/28/19: 1.575 m (5' 2\").    Weight as of this encounter: 64 kg (141 lb).   Weight management plan: Patient was referred to their PCP to discuss a diet and exercise plan.        See Patient Instructions    Return in about 6 months (around 11/21/2019) for Physical Exam.    Layo Sevilla MD  [facility]      "

## 2019-05-21 NOTE — PATIENT INSTRUCTIONS
"Lab work today.       a stool test today at lab:  Directions:  As you walk through the first floor, you'll see (on the right) first the pharmacy, then some bathrooms, then the \"Lab and Imaging\" area. Give them your name at the window there and wait for them to call you.     If you develop diarrhea, let's try some dicyclomine (Bentyl) up to four times daily.      Avoid alcohol, caffeine, tobacco, spicy foods, citrus foods, aspirin and anti-inflammatories like ibuprofen (\"Advil\" and \"Motrin\") or naproxen (\"Aleve\").           With respect to your gastroesophageal reflux disease, we can continue omeprazole if that is what helps. You can also instead try zantac, which might be safer with respect to calcium absorption.      With respect to blood pressure:  Let's just monitor at current doses of medicine.        Consider Getting your shingles vaccine (\"Shingrix\") at our pharmacy downstairs (or at another pharmacy), sometime this year--even if you've already received the old \"Zostavax\" shingles vaccine.  The \"Shingrix\" has better immunity and lasts longer, and is cheaper if you get it directly at a pharmacy.  You should not need an appointment.     You will need a second Shingrix anywhere from 2-6 months later.          Follow up in 6 months with me at a complete physcial exam.     "

## 2019-05-21 NOTE — LETTER
[facility]  [address]  [place] [address]                  [phone]   May 21, 2019    Genie Persaud  [address]   [place] [address]      Christina,     Your complete blood count looks within normal limits.     Hope you're well.     Layo Sevilla MD   Internal Medicine and Pediatrics        Results for orders placed or performed in visit on 05/21/19   CBC with platelets and differential   Result Value Ref Range    WBC 9.8 4.0 - 11.0 10e9/L    RBC Count 3.74 (L) 3.8 - 5.2 10e12/L    Hemoglobin 11.4 (L) 11.7 - 15.7 g/dL    Hematocrit 35.2 35.0 - 47.0 %    MCV 94 78 - 100 fl    MCH 30.5 26.5 - 33.0 pg    MCHC 32.4 31.5 - 36.5 g/dL    RDW 13.2 10.0 - 15.0 %    Platelet Count 258 150 - 450 10e9/L    Diff Method Automated Method     % Neutrophils 73.7 %    % Lymphocytes 12.1 %    % Monocytes 12.5 %    % Eosinophils 1.4 %    % Basophils 0.3 %    Absolute Neutrophil 7.2 1.6 - 8.3 10e9/L    Absolute Lymphocytes 1.2 0.8 - 5.3 10e9/L    Absolute Monocytes 1.2 0.0 - 1.3 10e9/L    Absolute Eosinophils 0.1 0.0 - 0.7 10e9/L    Absolute Basophils 0.0 0.0 - 0.2 10e9/L

## 2019-05-22 LAB
ALBUMIN SERPL-MCNC: 3.7 G/DL (ref 3.4–5)
ALP SERPL-CCNC: 73 U/L (ref 40–150)
ALT SERPL W P-5'-P-CCNC: 21 U/L (ref 0–50)
ANION GAP SERPL CALCULATED.3IONS-SCNC: 6 MMOL/L (ref 3–14)
AST SERPL W P-5'-P-CCNC: 16 U/L (ref 0–45)
BILIRUB SERPL-MCNC: 0.5 MG/DL (ref 0.2–1.3)
BUN SERPL-MCNC: 29 MG/DL (ref 7–30)
CALCIUM SERPL-MCNC: 9.2 MG/DL (ref 8.5–10.1)
CHLORIDE SERPL-SCNC: 105 MMOL/L (ref 94–109)
CO2 SERPL-SCNC: 27 MMOL/L (ref 20–32)
CREAT SERPL-MCNC: 1.21 MG/DL (ref 0.52–1.04)
GFR SERPL CREATININE-BSD FRML MDRD: 42 ML/MIN/{1.73_M2}
GLUCOSE SERPL-MCNC: 110 MG/DL (ref 70–99)
POTASSIUM SERPL-SCNC: 4.4 MMOL/L (ref 3.4–5.3)
PROT SERPL-MCNC: 6.9 G/DL (ref 6.8–8.8)
SODIUM SERPL-SCNC: 138 MMOL/L (ref 133–144)

## 2019-05-22 ASSESSMENT — ASTHMA QUESTIONNAIRES: ACT_TOTALSCORE: 21

## 2019-05-24 ENCOUNTER — TELEPHONE (OUTPATIENT)
Dept: PEDIATRICS | Facility: CLINIC | Age: 84
End: 2019-05-24

## 2019-05-24 NOTE — TELEPHONE ENCOUNTER
Please schedule patient in my next SB5 week. PAL 5A  Dr. Sevilla saw patient 5/21/19. He would like to see patient back in 6 months around Oct/Nov.    Thanks  Sudhakar VIDES  Team Coodinator

## 2019-06-10 ENCOUNTER — OFFICE VISIT (OUTPATIENT)
Dept: PEDIATRICS | Facility: CLINIC | Age: 84
End: 2019-06-10
Payer: COMMERCIAL

## 2019-06-10 VITALS
SYSTOLIC BLOOD PRESSURE: 164 MMHG | WEIGHT: 140.6 LBS | OXYGEN SATURATION: 96 % | HEART RATE: 77 BPM | HEIGHT: 62 IN | DIASTOLIC BLOOD PRESSURE: 50 MMHG | TEMPERATURE: 97.8 F | BODY MASS INDEX: 25.88 KG/M2

## 2019-06-10 DIAGNOSIS — R11.0 NAUSEA: ICD-10-CM

## 2019-06-10 DIAGNOSIS — R19.7 DIARRHEA, UNSPECIFIED TYPE: ICD-10-CM

## 2019-06-10 DIAGNOSIS — R73.9 HYPERGLYCEMIA: ICD-10-CM

## 2019-06-10 DIAGNOSIS — R42 DIZZINESS: ICD-10-CM

## 2019-06-10 DIAGNOSIS — E03.9 HYPOTHYROIDISM, UNSPECIFIED TYPE: Primary | ICD-10-CM

## 2019-06-10 LAB
FOLATE SERPL-MCNC: 20 NG/ML
HBA1C MFR BLD: 6.2 % (ref 0–5.6)
VIT B12 SERPL-MCNC: 394 PG/ML (ref 193–986)

## 2019-06-10 PROCEDURE — 82607 VITAMIN B-12: CPT | Performed by: INTERNAL MEDICINE

## 2019-06-10 PROCEDURE — 83036 HEMOGLOBIN GLYCOSYLATED A1C: CPT | Performed by: INTERNAL MEDICINE

## 2019-06-10 PROCEDURE — 36415 COLL VENOUS BLD VENIPUNCTURE: CPT | Performed by: INTERNAL MEDICINE

## 2019-06-10 PROCEDURE — 86780 TREPONEMA PALLIDUM: CPT | Performed by: INTERNAL MEDICINE

## 2019-06-10 PROCEDURE — 84443 ASSAY THYROID STIM HORMONE: CPT | Performed by: INTERNAL MEDICINE

## 2019-06-10 PROCEDURE — 82746 ASSAY OF FOLIC ACID SERUM: CPT | Performed by: INTERNAL MEDICINE

## 2019-06-10 PROCEDURE — 99215 OFFICE O/P EST HI 40 MIN: CPT | Performed by: INTERNAL MEDICINE

## 2019-06-10 ASSESSMENT — MIFFLIN-ST. JEOR: SCORE: 1056.01

## 2019-06-10 NOTE — PROGRESS NOTES
"Subjective  Genie Persaud is a 83 year old female who presents to clinic today for the following health issues:    HPI   Dizziness  Onset: 6 weeks ago  Began with episode of loose stools.  Was seen in Urgent care 04/28, and given Imodium and diarrhea resolved.  In the past two days has noted recurrence of loose stools with flatulence.  Diarrhea occurs a couple of times at one episode and then no further episodes throughout the day.  Has not tried the Bentyl that was prescribed in May.  Over Mother's Day the dizziness had resolved.      The dizziness comes and goes and noticed this when she is up and about.  Doesn't notice this when she is lying down.  Feels dizzy almost everyday.  Today and yesterday felt nauseous to the point of feeling \"gaggy\".  No emesis.    Description:   Do you feel faint:  no   Does it feel like the surroundings (bed, room) are moving: no   Unsteady/off balance: no   Have you passed out or fallen: no     Intensity: moderate-Is keeping her from doing things she wants to do.    Was resolved over Mother's day, and has recurred the week after.    Progression of Symptoms:  worsening    Accompanying Signs & Symptoms:  Heart palpitations: no   Nausea, vomiting: YES-nausea  Weakness in arms or legs: no   Fatigue: no   Vision or speech changes: no   Ringing in ears (Tinnitus): no   Hearing Loss: no     History:   Head trauma/concussion hx: no   Previous similar symptoms: YES- For 6 weeks  Recent bleeding history: no     Precipitating factors:     Patient with history of allergies and notes recurrence of symptoms every spring.    Has pressure over maxillary sinuses and eyes.  Worse with activity or head movement: yes-thinks this is medication related.  Any new medications (BP?): no   Alcohol/drug abuse/withdrawal: no     Alleviating factors:   Does staying in a fixed position give relief:  no     Therapies Tried and outcome: Imodium resolved sympotms.   Did take one over the weekend.  Patient states " that she tried stopping the Metoprolol, Losartan, and hydrochlorothiazide for a couple of days, but didn't notice and improvement in symptoms.   She has resumed taking the medications again.  Has seen ENT in the past    Diarrhea  This began 6 weeks ago with the onset of the dizziness.  No blood noted in the stool.  May have normal bowel movement, and then a loose stool after that.  Please see note above regarding diarrrhea.  Patient Active Problem List   Diagnosis     Swelling, mass, or lump in head and neck     Pulmonary nodule     Anemia     Vasculitis (H)     HYPERLIPIDEMIA LDL GOAL <130     Impaired fasting glucose     Candidal esophagitis (H)     Thoracic aortic aneurysm without rupture (H)     Stroke (H)     Moderate persistent asthma without complication     Essential hypertension with goal blood pressure less than 140/90     Coronary artery disease involving native coronary artery of native heart without angina pectoris     SVT -noted during Cath procedure     Nonrheumatic aortic valve insufficiency     Stress-induced cardiomyopathy     Paroxysmal atrial fibrillation (H)     Peripheral edema     Hip pain, right     Lumbago     Osteopenia of multiple sites     Skin infection     Past Surgical History:   Procedure Laterality Date     C APPENDECTOMY  1957     C LIGATE FALLOPIAN TUBE  1971     C STEREOTACTIC BREAST BIOPSY  2001     CHOLECYSTECTOMY, LAPOROSCOPIC  2008    Cholecystectomy, Laparoscopic     ESOPHAGOSCOPY, GASTROSCOPY, DUODENOSCOPY (EGD), COMBINED  5/17/2013    Procedure: COMBINED ESOPHAGOSCOPY, GASTROSCOPY, DUODENOSCOPY (EGD), BIOPSY SINGLE OR MULTIPLE;  ESOPHAGOSCOPY, GASTROSCOPY, DUODENOSCOPY (EGD)  with bx;  Surgeon: Jeffery Yanez MD;  Location:  GI     HC DILATION/CURETTAGE DIAG/THER NON OB  1967,1971     HC REMOVE TONSILS/ADENOIDS,<11 Y/O         Social History     Tobacco Use     Smoking status: Never Smoker     Smokeless tobacco: Never Used   Substance Use Topics     Alcohol use: Yes      Alcohol/week: 0.6 - 1.2 oz     Types: 1 - 2 Standard drinks or equivalent per week     Comment: 1 glass of wine 1-2 days per week     Family History   Problem Relation Age of Onset     Hypertension Mother      Osteoporosis Mother      C.A.D. Mother      Connective Tissue Disorder Father         scleraderma     Cerebrovascular Disease Paternal Grandmother      Prostate Cancer Other         9/05 passed away due to complications     Breast Cancer Child         youngest dtr         Current Outpatient Medications   Medication Sig Dispense Refill     acetaminophen (TYLENOL) 500 MG tablet Take 500-1,000 mg by mouth every 8 hours as needed for mild pain       albuterol (PROAIR HFA, PROVENTIL HFA, VENTOLIN HFA) 108 (90 BASE) MCG/ACT inhaler Inhale 2 puffs into the lungs every 6 hours as needed        aspirin 81 MG EC tablet Take 1 tablet (81 mg) by mouth daily       azelastine (ASTELIN) 0.1 % nasal spray SPRAY 1 TO 2 SPRAYS INTO BOTH NOSTRILS 2 TIMES DAILY 30 mL 4     calcium citrate-vitamin D (CALCIUM CITRATE +) 315-200 MG-UNIT TABS Take 2 tablets by mouth every evening        dicyclomine (BENTYL) 10 MG capsule Take 1 capsule (10 mg) by mouth 4 times daily (before meals and nightly) 30 capsule 0     fluticasone-salmeterol (ADVAIR-HFA) 230-21 MCG/ACT inhaler Inhale 2 puffs into the lungs 2 times daily 12 g 1     hydrochlorothiazide (MICROZIDE) 12.5 MG capsule Take 1 capsule (12.5 mg) by mouth daily 90 capsule 3     ipratropium (ATROVENT) 0.03 % spray Spray 2 sprays in nostril 2 times daily       latanoprost (XALATAN) 0.005 % ophthalmic solution Place 1 drop into both eyes At Bedtime       levocetirizine (XYZAL) 5 MG tablet Take 5 mg by mouth daily       losartan (COZAAR) 100 MG tablet Take 1 tablet (100 mg) by mouth daily 90 tablet 3     metoprolol succinate (TOPROL XL) 100 MG 24 hr tablet Take 1 tablet (100 mg) by mouth daily 90 tablet 3     omeprazole (PRILOSEC) 20 MG DR capsule Take 1 capsule (20 mg) by mouth daily  "*due for office visit before next refill. 90 capsule 3     PREDNISONE PO As needed for asthma flares       simvastatin (ZOCOR) 20 MG tablet Take 1 tablet (20 mg) by mouth At Bedtime 90 tablet 1       Reviewed and updated as needed this visit by Provider         Review of Systems   ROS COMP: Constitutional, HEENT, cardiovascular, pulmonary, , , musculoskeletal, neuro, skin, endocrine and psych systems are negative, except as otherwise noted.  GI-Has noted episodes of diarrhea off and on for the past 6 weeks.  Patient reports history of IBS.  Has not taken the Bentyl.  Didn't submit stool specimens.      Objective    /56 (BP Location: Right arm, Patient Position: Sitting, Cuff Size: Adult Regular)   Pulse 77   Temp 97.8  F (36.6  C) (Oral)   Ht 1.575 m (5' 2\")   Wt 63.8 kg (140 lb 9.6 oz)   LMP  (LMP Unknown)   SpO2 96%   BMI 25.72 kg/m    Body mass index is 25.72 kg/m .   Vitals taken by CAITIE Pace RN    Physical Exam   GENERAL: healthy, alert and no distress  EYES: Eyes grossly normal to inspection, PERRL and conjunctivae and sclerae normal  HENT: ear canals and TM's normal, nose and mouth without ulcers or lesions  NECK: no adenopathy, no asymmetry, masses, or scars and thyroid normal to palpation  RESP: lungs clear to auscultation - no rales, rhonchi or wheezes  CV: regular rate and rhythm, normal S1 S2, no S3 or S4, no murmur, click or rub, no peripheral edema and peripheral pulses strong  ABDOMEN: soft, nontender, no hepatosplenomegaly, no masses and bowel sounds normal  MS: no gross musculoskeletal defects noted, no edema  SKIN: no suspicious lesions or rashes  NEURO: Normal strength and tone, mentation intact and speech normal  PSYCH: mentation appears normal, affect normal/bright    Diagnostic Test Results:  Labs reviewed in Epic       Assessment & Plan     (E03.9) Hypothyroidism  (primary encounter diagnosis)  Comment:   Plan: TSH with free T4 reflex        Recheck labs given her dizziness. " "    (R42) Dizziness  Comment:   Plan: PHYSICAL THERAPY REFERRAL, Folate, Vitamin B12,        Treponema Abs w Reflex to RPR and Titer  Patient has had intermittent dizziness for approximately 1 month now.  She says that this has recurred several times each year at around the same time of the year and therefore she has attributed to her dizziness from allergies.  She currently has allergic symptoms such as some sinus congestion and ear fullness, and I am leaning toward attributing her dizziness to allergies.  However, she does have a history of stroke, at least as evidenced by a retinal artery occlusion at her ophthalmologist.  She does not have any visible signs of having a stroke recently, and therefore I will have her evaluated by physical therapy and balance specialist for approximately 2 to 4 weeks.  If she is not improving from this I will refer her to get an MRI of her brain to see if there is any new evidence of vascular disease.  The patient also dates the beginning of her symptoms to a recent gastroenteritis episode approximately 4 weeks ago during which time she had diarrhea.  Her electrolytes and CBC at that time were normal.  I will check her for some vitamin B12 folate levels and a syphilis test to make sure that those are not normal as well.  This seems to be less likely.    (R11.0) Nausea  Comment:   Plan: Her nausea seems to date also from her episode of gastrointestinal discomfort approximately 4 weeks ago.  She has had no vomiting, and I am not sure that this is unrelated to her dizziness.    (R19.7) Diarrhea  Comment:   Plan: Her diarrhea has resolved.    (R73.9) Hyperglycemia  Comment:   Plan: Hemoglobin A1c        Patient has no history of diabetes but I will check her A1c given her unexplained dizziness.     BMI:   Estimated body mass index is 25.72 kg/m  as calculated from the following:    Height as of this encounter: 1.575 m (5' 2\").    Weight as of this encounter: 63.8 kg (140 lb 9.6 oz). "           Patient Instructions   We have referred you to Physical therapy for balance and dizziness.  Please call us in 2-4 weeks with an update on how the dizziness is doing.  If not improved, we will consider some imaging.  We will get labs done today as well.      Follow-up by phone in 2-4 weeks.    Scribe Disclosure:   I, Lucretia Sanjeev, am serving as a scribe; to document services personally performed by Dr. Sevilla -based on data collection and the provider's statements to me.     Provider Disclosure:  I agree with above History, Review of Systems, Physical exam and Plan.  I have reviewed the content of the documentation and have edited it as needed. I have personally performed the services documented here and the documentation accurately represents those services and the decisions I have made.      Electronically signed by:  Layo Sevilla MD  [facility] [place]    E5: Spent 40 minutes face to face.     More than 50% of the time was spent in counseling and coordination of care of these issues.

## 2019-06-10 NOTE — PATIENT INSTRUCTIONS
We have referred you to Physical therapy for balance and dizziness.  Please call us in 2-4 weeks with an update on how the dizziness is doing.  If not improved, we will consider some imaging.  We will get labs done today as well.  Patient Education

## 2019-06-11 LAB
T PALLIDUM AB SER QL: NONREACTIVE
TSH SERPL DL<=0.005 MIU/L-ACNC: 1.94 MU/L (ref 0.4–4)

## 2019-06-26 ENCOUNTER — TRANSFERRED RECORDS (OUTPATIENT)
Dept: HEALTH INFORMATION MANAGEMENT | Facility: CLINIC | Age: 84
End: 2019-06-26

## 2019-07-20 DIAGNOSIS — I10 ESSENTIAL HYPERTENSION WITH GOAL BLOOD PRESSURE LESS THAN 140/90: ICD-10-CM

## 2019-07-21 NOTE — TELEPHONE ENCOUNTER
Routing refill request to provider for review/approval because:  Labs out of range:    Creatinine   Date Value Ref Range Status   05/21/2019 1.21 (H) 0.52 - 1.04 mg/dL Final

## 2019-07-22 RX ORDER — LOSARTAN POTASSIUM 100 MG/1
TABLET ORAL
Qty: 90 TABLET | Refills: 3 | Status: SHIPPED | OUTPATIENT
Start: 2019-07-22 | End: 2019-10-17

## 2019-08-06 NOTE — NURSING NOTE
Chart addended to add scribe dot phrase for coding requirement, and also to document that I took the vitals.  CAITIE Pace RN

## 2019-08-08 ENCOUNTER — RECORDS - HEALTHEAST (OUTPATIENT)
Dept: ADMINISTRATIVE | Facility: OTHER | Age: 84
End: 2019-08-08

## 2019-08-08 ENCOUNTER — TRANSFERRED RECORDS (OUTPATIENT)
Dept: HEALTH INFORMATION MANAGEMENT | Facility: CLINIC | Age: 84
End: 2019-08-08

## 2019-08-09 ENCOUNTER — TELEPHONE (OUTPATIENT)
Dept: PEDIATRICS | Facility: CLINIC | Age: 84
End: 2019-08-09

## 2019-08-09 NOTE — TELEPHONE ENCOUNTER
Patient called for pre-op w/EKG and labs for surgery on 8/16/19.    Neither Sudhakar or Sridevi were available to schedule on Friday (8/9) so I scheduled as PCT SB5 pre-op. Please review for accuracy and call patient if anything needs to be adjusted.    Note - Christina does prefer afternoon if the last appt of the day is available - was not an option via Epic PCT scheduling.    Thanks!

## 2019-08-12 ENCOUNTER — RECORDS - HEALTHEAST (OUTPATIENT)
Dept: ADMINISTRATIVE | Facility: OTHER | Age: 84
End: 2019-08-12

## 2019-08-12 ENCOUNTER — OFFICE VISIT (OUTPATIENT)
Dept: PEDIATRICS | Facility: CLINIC | Age: 84
End: 2019-08-12
Payer: COMMERCIAL

## 2019-08-12 VITALS
TEMPERATURE: 97.8 F | WEIGHT: 138.3 LBS | BODY MASS INDEX: 25.3 KG/M2 | DIASTOLIC BLOOD PRESSURE: 64 MMHG | SYSTOLIC BLOOD PRESSURE: 180 MMHG | HEART RATE: 71 BPM | OXYGEN SATURATION: 96 %

## 2019-08-12 DIAGNOSIS — I25.10 CORONARY ARTERY DISEASE INVOLVING NATIVE CORONARY ARTERY OF NATIVE HEART WITHOUT ANGINA PECTORIS: ICD-10-CM

## 2019-08-12 DIAGNOSIS — M85.89 OSTEOPENIA OF MULTIPLE SITES: ICD-10-CM

## 2019-08-12 DIAGNOSIS — B37.81 CANDIDAL ESOPHAGITIS (H): ICD-10-CM

## 2019-08-12 DIAGNOSIS — I10 ESSENTIAL HYPERTENSION WITH GOAL BLOOD PRESSURE LESS THAN 140/90: ICD-10-CM

## 2019-08-12 DIAGNOSIS — Z01.818 PREOP GENERAL PHYSICAL EXAM: Primary | ICD-10-CM

## 2019-08-12 DIAGNOSIS — C44.310 BCC (BASAL CELL CARCINOMA), FACE: ICD-10-CM

## 2019-08-12 DIAGNOSIS — I48.0 PAROXYSMAL ATRIAL FIBRILLATION (H): ICD-10-CM

## 2019-08-12 PROBLEM — R19.7 DIARRHEA: Status: RESOLVED | Noted: 2019-06-10 | Resolved: 2019-08-12

## 2019-08-12 LAB
ERYTHROCYTE [DISTWIDTH] IN BLOOD BY AUTOMATED COUNT: 12.9 % (ref 10–15)
HCT VFR BLD AUTO: 37.6 % (ref 35–47)
HGB BLD-MCNC: 12.1 G/DL (ref 11.7–15.7)
MCH RBC QN AUTO: 30.4 PG (ref 26.5–33)
MCHC RBC AUTO-ENTMCNC: 32.2 G/DL (ref 31.5–36.5)
MCV RBC AUTO: 95 FL (ref 78–100)
PLATELET # BLD AUTO: 296 10E9/L (ref 150–450)
RBC # BLD AUTO: 3.98 10E12/L (ref 3.8–5.2)
WBC # BLD AUTO: 10.8 10E9/L (ref 4–11)

## 2019-08-12 PROCEDURE — 36415 COLL VENOUS BLD VENIPUNCTURE: CPT | Performed by: INTERNAL MEDICINE

## 2019-08-12 PROCEDURE — 85027 COMPLETE CBC AUTOMATED: CPT | Performed by: INTERNAL MEDICINE

## 2019-08-12 PROCEDURE — 80048 BASIC METABOLIC PNL TOTAL CA: CPT | Performed by: INTERNAL MEDICINE

## 2019-08-12 PROCEDURE — 93000 ELECTROCARDIOGRAM COMPLETE: CPT | Performed by: INTERNAL MEDICINE

## 2019-08-12 PROCEDURE — 99215 OFFICE O/P EST HI 40 MIN: CPT | Performed by: INTERNAL MEDICINE

## 2019-08-12 RX ORDER — AMLODIPINE BESYLATE 2.5 MG/1
2.5 TABLET ORAL DAILY
Qty: 30 TABLET | Refills: 0 | Status: SHIPPED | OUTPATIENT
Start: 2019-08-12 | End: 2019-09-09

## 2019-08-12 NOTE — PATIENT INSTRUCTIONS
"  Before Your Surgery      Call your surgeon if there is any change in your health. This includes signs of a cold or flu (such as a sore throat, runny nose, cough, rash or fever).    Do not smoke, drink alcohol or take over the counter medicine (unless your surgeon or primary care doctor tells you to) for the 24 hours before and after surgery.    If you take prescribed drugs: Follow your doctor s orders about which medicines to take and which to stop until after surgery.    Stop aspirin until after surgery.  May continue all other medicines.  Lab work downstairs today.  Directions:  As you walk through the first floor, you'll see (on the right) first the pharmacy, then some bathrooms, then the \"Lab and Imaging\" area. Give them your name at the window there and wait for them to call you.   With respect to your blood pressure, it is high; let's add amlodipine 2.5 mg and follow up in 2-3 days at our pharmacy for  blood pressure recheck  Mammogram: you can call our clinic at  to set this up, or stop at our desk on the way out.  Or, you can call [facility] at  (business hours) or  (24 hours) to set up your mammogram.  If they have not heard from you, they may call you directly.          Eating and drinking prior to surgery: follow the instructions from your surgeon    Take a shower or bath the night before surgery. Use the soap your surgeon gave you to gently clean your skin. If you do not have soap from your surgeon, use your regular soap. Do not shave or scrub the surgery site.  Wear clean pajamas and have clean sheets on your bed.   "

## 2019-08-12 NOTE — LETTER
[facility]  [address]  [place] [address]                  [phone]   August 16, 2019    Genie Persaud  [address] [place] [address]        Christina,     Your electrolytes, liver, and kidney panels looked within normal limits, as did your complete blood count.     Hope you're well.     Layo Sevilla MD   Internal Medicine and Pediatrics    Results for orders placed or performed in visit on 08/12/19   CBC with platelets   Result Value Ref Range    WBC 10.8 4.0 - 11.0 10e9/L    RBC Count 3.98 3.8 - 5.2 10e12/L    Hemoglobin 12.1 11.7 - 15.7 g/dL    Hematocrit 37.6 35.0 - 47.0 %    MCV 95 78 - 100 fl    MCH 30.4 26.5 - 33.0 pg    MCHC 32.2 31.5 - 36.5 g/dL    RDW 12.9 10.0 - 15.0 %    Platelet Count 296 150 - 450 10e9/L   Basic metabolic panel  (Ca, Cl, CO2, Creat, Gluc, K, Na, BUN)   Result Value Ref Range    Sodium 138 133 - 144 mmol/L    Potassium 4.6 3.4 - 5.3 mmol/L    Chloride 103 94 - 109 mmol/L    Carbon Dioxide 28 20 - 32 mmol/L    Anion Gap 7 3 - 14 mmol/L    Glucose 108 (H) 70 - 99 mg/dL    Urea Nitrogen 20 7 - 30 mg/dL    Creatinine 0.98 0.52 - 1.04 mg/dL    GFR Estimate 54 (L) >60 mL/min/[1.73_m2]    GFR Estimate If Black 63 >60 mL/min/[1.73_m2]    Calcium 9.4 8.5 - 10.1 mg/dL

## 2019-08-13 LAB
ANION GAP SERPL CALCULATED.3IONS-SCNC: 7 MMOL/L (ref 3–14)
BUN SERPL-MCNC: 20 MG/DL (ref 7–30)
CALCIUM SERPL-MCNC: 9.4 MG/DL (ref 8.5–10.1)
CHLORIDE SERPL-SCNC: 103 MMOL/L (ref 94–109)
CO2 SERPL-SCNC: 28 MMOL/L (ref 20–32)
CREAT SERPL-MCNC: 0.98 MG/DL (ref 0.52–1.04)
GFR SERPL CREATININE-BSD FRML MDRD: 54 ML/MIN/{1.73_M2}
GLUCOSE SERPL-MCNC: 108 MG/DL (ref 70–99)
POTASSIUM SERPL-SCNC: 4.6 MMOL/L (ref 3.4–5.3)
SODIUM SERPL-SCNC: 138 MMOL/L (ref 133–144)

## 2019-08-14 ENCOUNTER — ALLIED HEALTH/NURSE VISIT (OUTPATIENT)
Dept: PEDIATRICS | Facility: CLINIC | Age: 84
End: 2019-08-14
Payer: COMMERCIAL

## 2019-08-14 VITALS — SYSTOLIC BLOOD PRESSURE: 110 MMHG | DIASTOLIC BLOOD PRESSURE: 48 MMHG

## 2019-08-14 DIAGNOSIS — Z01.30 BP CHECK: Primary | ICD-10-CM

## 2019-08-14 PROCEDURE — 99207 ZZC NO CHARGE NURSE ONLY: CPT | Performed by: INTERNAL MEDICINE

## 2019-08-14 NOTE — PROGRESS NOTES
Genie Persaud was evaluated at [facility] on August 14, 2019 at which time her blood pressure was:    BP Readings from Last 3 Encounters:   08/14/19 110/48   08/12/19 (!) 180/64   06/10/19 164/50     Pulse Readings from Last 3 Encounters:   08/12/19 71   06/10/19 77   05/21/19 80       Reviewed lifestyle modifications for blood pressure control and reduction: including making healthy food choices, managing weight, getting regular exercise, smoking cessation, reducing alcohol consumption, monitoring blood pressure regularly.     Symptoms: None    BP Goal:< 140/90 mmHg    BP Assessment:  BP at goal    Potential Reasons for BP too high: NA - Not applicable    BP Follow-Up Plan: Recheck BP in 6 months at pharmacy    Recommendation to Provider: n/a    Note completed by:Wade Naranjo, PharmD  [facility] [place]

## 2019-08-15 NOTE — TELEPHONE ENCOUNTER
Called spoke to patient, scheduled for 11/7/19 with Dr. Sevilla in [facility].    Thanks  Sudhakar VIDES  Team Coodinator

## 2019-09-06 DIAGNOSIS — I10 ESSENTIAL HYPERTENSION WITH GOAL BLOOD PRESSURE LESS THAN 140/90: ICD-10-CM

## 2019-09-06 NOTE — TELEPHONE ENCOUNTER
"Requested Prescriptions   Pending Prescriptions Disp Refills     amLODIPine (NORVASC) 2.5 MG tablet  Last Written Prescription Date:  08/12/2019  Last Fill Quantity: 30 tablet,  # refills: 0   Last Office Visit: 8/12/2019 Layo Sevilla MD   Future Office Visit:    Next 5 appointments (look out 90 days)    Oct 17, 2019 10:00 AM CDT  Return Visit with Barak Hernandez MD  [facility] ([facility]) [address]  [facility] [id number] [phone]   Nov 07, 2019  1:30 PM CST  Adult Preventative visit with Layo Sevilla MD, Ea Rn Pal 5a, EA EXAM ROOM 49  [facility] [address]  [facility] [id number] [phone]          30 tablet 0     Sig: Take 1 tablet (2.5 mg) by mouth daily       Calcium Channel Blockers Protocol  Passed - 9/6/2019  1:42 PM        Passed - Blood pressure under 140/90 in past 12 months     BP Readings from Last 3 Encounters:   08/14/19 110/48   08/12/19 (!) 180/64   06/10/19 164/50             Passed - Recent (12 mo) or future (30 days) visit within the authorizing provider's specialty     Patient had office visit in the last 12 months or has a visit in the next 30 days with authorizing provider or within the authorizing provider's specialty.  See \"Patient Info\" tab in inbasket, or \"Choose Columns\" in Meds & Orders section of the refill encounter.              Passed - Medication is active on med list        Passed - Patient is age 18 or older        Passed - No active pregnancy on record        Passed - Normal serum creatinine on file in past 12 months     Recent Labs   Lab Test 08/12/19  1114  07/30/18  1528   CR 0.98   < >  --    CREAT  --   --  1.0    < > = values in this interval not displayed.             Passed - No positive pregnancy test in past 12 months      .    "

## 2019-09-09 ENCOUNTER — TRANSFERRED RECORDS (OUTPATIENT)
Dept: HEALTH INFORMATION MANAGEMENT | Facility: CLINIC | Age: 84
End: 2019-09-09

## 2019-09-09 RX ORDER — AMLODIPINE BESYLATE 2.5 MG/1
2.5 TABLET ORAL DAILY
Qty: 90 TABLET | Refills: 3 | Status: SHIPPED | OUTPATIENT
Start: 2019-09-09 | End: 2019-10-17

## 2019-09-09 NOTE — TELEPHONE ENCOUNTER
Routing refill request to provider for review/approval because:  Labs out of range:  BP    BP Readings from Last 3 Encounters:   08/14/19 110/48   08/12/19 (!) 180/64   06/10/19 164/50     Cristy Campo RN on 9/9/2019 at 12:29 PM

## 2019-09-10 ENCOUNTER — DOCUMENTATION ONLY (OUTPATIENT)
Dept: CARDIOLOGY | Facility: CLINIC | Age: 84
End: 2019-09-10

## 2019-09-23 DIAGNOSIS — K58.0 IRRITABLE BOWEL SYNDROME WITH DIARRHEA: ICD-10-CM

## 2019-09-23 NOTE — TELEPHONE ENCOUNTER
"Requested Prescriptions   Pending Prescriptions Disp Refills     dicyclomine (BENTYL) 10 MG capsule [Pharmacy Med Name: Dicyclomine HCl Oral Capsule 10 MG]  DISCONTINUED 05/21/2019  Last Written Prescription Date:  05/21/2019  Last Fill Quantity: 30 capsule,  # refills: 0   Last Office Visit: 8/12/2019 Layo Sevilla MD   Future Office Visit:    Next 5 appointments (look out 90 days)    Oct 17, 2019 10:00 AM CDT  Return Visit with Barak Hernandez MD  [facility] [address]  [facility] [address]  [phone]   Nov 07, 2019  1:30 PM CST  Adult Preventative visit with Layo Sevilla MD, Ea Rn Pal 5a, EA EXAM ROOM 49  [facility] ([facility]) [address]  [facility] [address]  [phone]          30 capsule 0     Sig: Take 1 capsule (10 mg) by mouth 4 times daily (before meals and nightly)       Oral Anticholinergic Agents Failed - 9/23/2019  2:08 PM        Failed - Medication is active on med list        Passed - Patient is of age 12 or older        Passed - Recent (12 mo) or future (30 days) visit with authorizing provider's specialty     Patient had office visit in the last 12 months or has a visit in the next 30 days with authorizing provider or within the authorizing provider's specialty.  See \"Patient Info\" tab in inbasket, or \"Choose Columns\" in Meds & Orders section of the refill encounter.              Passed - Patient is not pregnant        Passed - No positive pregnancy test on file within past 12 months        Routing refill request to provider for review/approval because:  Drug not active on patient's medication list    "

## 2019-09-24 DIAGNOSIS — I10 ESSENTIAL HYPERTENSION WITH GOAL BLOOD PRESSURE LESS THAN 140/90: ICD-10-CM

## 2019-09-24 RX ORDER — HYDROCHLOROTHIAZIDE 12.5 MG/1
12.5 CAPSULE ORAL DAILY
Qty: 90 CAPSULE | Refills: 0 | Status: SHIPPED | OUTPATIENT
Start: 2019-09-24 | End: 2019-11-06

## 2019-09-25 RX ORDER — DICYCLOMINE HYDROCHLORIDE 10 MG/1
10 CAPSULE ORAL
Qty: 30 CAPSULE | Refills: 11 | Status: SHIPPED | OUTPATIENT
Start: 2019-09-25 | End: 2019-11-07

## 2019-09-25 NOTE — TELEPHONE ENCOUNTER
Routing refill request to provider for review/approval because:  Drug not active on patient's medication list.    Magda Mojica RN, [facility]

## 2019-10-01 PROBLEM — A41.9 SEPSIS, UNSPECIFIED ORGANISM (H): Status: RESOLVED | Noted: 2017-11-15 | Resolved: 2017-12-01

## 2019-10-08 DIAGNOSIS — I25.10 CORONARY ARTERY DISEASE INVOLVING NATIVE CORONARY ARTERY OF NATIVE HEART WITHOUT ANGINA PECTORIS: ICD-10-CM

## 2019-10-08 LAB
ANION GAP SERPL CALCULATED.3IONS-SCNC: 4 MMOL/L (ref 3–14)
BUN SERPL-MCNC: 23 MG/DL (ref 7–30)
CALCIUM SERPL-MCNC: 8.8 MG/DL (ref 8.5–10.1)
CHLORIDE SERPL-SCNC: 104 MMOL/L (ref 94–109)
CO2 SERPL-SCNC: 28 MMOL/L (ref 20–32)
CREAT SERPL-MCNC: 1.09 MG/DL (ref 0.52–1.04)
GFR SERPL CREATININE-BSD FRML MDRD: 47 ML/MIN/{1.73_M2}
GLUCOSE SERPL-MCNC: 99 MG/DL (ref 70–99)
POTASSIUM SERPL-SCNC: 4.3 MMOL/L (ref 3.4–5.3)
SODIUM SERPL-SCNC: 136 MMOL/L (ref 133–144)

## 2019-10-08 PROCEDURE — 80048 BASIC METABOLIC PNL TOTAL CA: CPT | Performed by: INTERNAL MEDICINE

## 2019-10-08 PROCEDURE — 36415 COLL VENOUS BLD VENIPUNCTURE: CPT | Performed by: INTERNAL MEDICINE

## 2019-10-09 DIAGNOSIS — I10 ESSENTIAL HYPERTENSION WITH GOAL BLOOD PRESSURE LESS THAN 140/90: ICD-10-CM

## 2019-10-09 DIAGNOSIS — I51.81 STRESS-INDUCED CARDIOMYOPATHY: ICD-10-CM

## 2019-10-09 RX ORDER — METOPROLOL SUCCINATE 100 MG/1
100 TABLET, EXTENDED RELEASE ORAL DAILY
Qty: 30 TABLET | Refills: 0 | Status: SHIPPED | OUTPATIENT
Start: 2019-10-09 | End: 2019-10-25

## 2019-10-09 NOTE — TELEPHONE ENCOUNTER
Medication Refilled: Metoprolol Succinate   Last office visit: 2/21/19  Last Labs/EKG: EKG 8/12/19; HR 70  Next office visit: 10/17/19 w/ Dr. Hernandez  Pharmacy sent to: [facility] off [address] in [place]. [phone]    30 day supply given.  Noted to pharmacy to have pt request more at next appt w/ Dr. Hernandez on 10/17/19.      Mamta Hernandez RN BSN  [facility] Clinic Care Coordinator  [facility] [place], [place]  [phone]  10/09/19, 12:33 PM

## 2019-10-15 ENCOUNTER — PRE VISIT (OUTPATIENT)
Dept: CARDIOLOGY | Facility: CLINIC | Age: 84
End: 2019-10-15

## 2019-10-15 DIAGNOSIS — E78.5 HYPERLIPIDEMIA LDL GOAL <130: Primary | ICD-10-CM

## 2019-10-15 NOTE — TELEPHONE ENCOUNTER
Chart Update for OV with Dr. Hernandez scheduled for 10/17/19.   ANIA Westbrook RN, BSN.  10/15/19 11:11 AM

## 2019-10-17 ENCOUNTER — OFFICE VISIT (OUTPATIENT)
Dept: CARDIOLOGY | Facility: CLINIC | Age: 84
End: 2019-10-17
Payer: COMMERCIAL

## 2019-10-17 VITALS
HEART RATE: 76 BPM | WEIGHT: 139.8 LBS | HEIGHT: 62 IN | BODY MASS INDEX: 25.73 KG/M2 | DIASTOLIC BLOOD PRESSURE: 66 MMHG | SYSTOLIC BLOOD PRESSURE: 168 MMHG

## 2019-10-17 DIAGNOSIS — I71.20 THORACIC AORTIC ANEURYSM WITHOUT RUPTURE (H): Primary | ICD-10-CM

## 2019-10-17 DIAGNOSIS — I35.1 NONRHEUMATIC AORTIC VALVE INSUFFICIENCY: ICD-10-CM

## 2019-10-17 DIAGNOSIS — I25.10 CORONARY ARTERY DISEASE INVOLVING NATIVE CORONARY ARTERY OF NATIVE HEART WITHOUT ANGINA PECTORIS: ICD-10-CM

## 2019-10-17 DIAGNOSIS — I10 ESSENTIAL HYPERTENSION WITH GOAL BLOOD PRESSURE LESS THAN 140/90: ICD-10-CM

## 2019-10-17 DIAGNOSIS — I47.10 SVT (SUPRAVENTRICULAR TACHYCARDIA) (H): ICD-10-CM

## 2019-10-17 DIAGNOSIS — I48.91 ATRIAL FIBRILLATION, UNSPECIFIED TYPE (H): ICD-10-CM

## 2019-10-17 PROCEDURE — 99214 OFFICE O/P EST MOD 30 MIN: CPT | Performed by: INTERNAL MEDICINE

## 2019-10-17 RX ORDER — AMLODIPINE BESYLATE 2.5 MG/1
2.5 TABLET ORAL 2 TIMES DAILY
Qty: 180 TABLET | Refills: 3 | Status: SHIPPED | OUTPATIENT
Start: 2019-10-17 | End: 2019-11-07

## 2019-10-17 ASSESSMENT — MIFFLIN-ST. JEOR: SCORE: 1047.38

## 2019-10-17 NOTE — LETTER
10/17/2019    Layo Sevilla MD  [address] [place] [address]    RE: Genie Persaud       Dear Colleague,    I had the pleasure of seeing Genie Persaud in the [facility].    HPI and Plan:   See dictation    Orders Placed This Encounter   Procedures     Follow-Up with Cardiac Advanced Practice Provider     Echocardiogram Complete       Orders Placed This Encounter   Medications     amLODIPine (NORVASC) 2.5 MG tablet     Sig: Take 1 tablet (2.5 mg) by mouth 2 times daily     Dispense:  180 tablet     Refill:  3       Medications Discontinued During This Encounter   Medication Reason     losartan (COZAAR) 100 MG tablet Stopped by Patient     amLODIPine (NORVASC) 2.5 MG tablet Reorder         Encounter Diagnoses   Name Primary?     Thoracic aortic aneurysm without rupture (H) Yes     SVT -noted during Cath procedure      Coronary artery disease involving native coronary artery of native heart without angina pectoris      Atrial fibrillation, unspecified type (H)      Nonrheumatic aortic valve insufficiency      Essential hypertension with goal blood pressure less than 140/90        CURRENT MEDICATIONS:  Current Outpatient Medications   Medication Sig Dispense Refill     acetaminophen (TYLENOL) 500 MG tablet Take 500-1,000 mg by mouth every 8 hours as needed for mild pain       albuterol (PROAIR HFA, PROVENTIL HFA, VENTOLIN HFA) 108 (90 BASE) MCG/ACT inhaler Inhale 2 puffs into the lungs every 6 hours as needed        amLODIPine (NORVASC) 2.5 MG tablet Take 1 tablet (2.5 mg) by mouth 2 times daily 180 tablet 3     aspirin 81 MG EC tablet Take 1 tablet (81 mg) by mouth daily       azelastine (ASTELIN) 0.1 % nasal spray SPRAY 1 TO 2 SPRAYS INTO BOTH NOSTRILS 2 TIMES DAILY 30 mL 4     calcium citrate-vitamin D (CALCIUM CITRATE +) 315-200 MG-UNIT TABS Take 2 tablets by mouth every evening        dicyclomine (BENTYL) 10 MG capsule Take 1 capsule (10 mg) by mouth 4 times daily  (before meals and nightly) 30 capsule 11     fluticasone-salmeterol (ADVAIR-HFA) 230-21 MCG/ACT inhaler Inhale 2 puffs into the lungs 2 times daily 12 g 1     hydrochlorothiazide (MICROZIDE) 12.5 MG capsule Take 1 capsule (12.5 mg) by mouth daily 90 capsule 0     ipratropium (ATROVENT) 0.03 % spray Spray 2 sprays in nostril 2 times daily       latanoprost (XALATAN) 0.005 % ophthalmic solution Place 1 drop into both eyes At Bedtime       levocetirizine (XYZAL) 5 MG tablet Take 5 mg by mouth daily       metoprolol succinate ER (TOPROL XL) 100 MG 24 hr tablet Take 1 tablet (100 mg) by mouth daily 30 tablet 0     omeprazole (PRILOSEC) 20 MG DR capsule Take 1 capsule (20 mg) by mouth daily *due for office visit before next refill. 90 capsule 3     PREDNISONE PO As needed for asthma flares       simvastatin (ZOCOR) 20 MG tablet Take 1 tablet (20 mg) by mouth At Bedtime 90 tablet 1       ALLERGIES     Allergies   Allergen Reactions     Fosamax [Alendronic Acid] GI Disturbance     Augmentin [Amoxicillin-Pot Clavulanate] Diarrhea     Diarrhea and rash     Contrast Dye      Red arm redness and swelling      Evista [Raloxifene]      abd symptoms.      Flu Virus Vaccine      swollen and red at inj site       PAST MEDICAL HISTORY:  Past Medical History:   Diagnosis Date     Anemia 3/10/2009    Chronic disease, by Fe studies; awaiting full GI w/u and repeat colonoscopy, ERCP.     Basal Cell Carcinoma--back      Coronary artery disease involving native coronary artery of native heart without angina pectoris 3/9/2017    3/2/2017 - Two vessel coronary artery disease with mLAD 50% stenosis, D3 50% stenosis, pLCx 50% stenosis and mLCx 50% stenosis     Essential hypertension with goal blood pressure less than 140/90 9/1/2016    White coat hypertension;  24 hour ambulatory monitoring normal. Do not titrate up further.       Hyperlipidemia LDL goal <130 2/10/2010     Impaired fasting glucose 8/26/2010     Moderate persistent asthma       Nonrheumatic aortic valve insufficiency 6/29/2017     osteopenia      Paroxysmal atrial fibrillation (H) 12/26/2017     Pulmonary nodule 3/3/2009    PET scan reportedly normal; biopsy not advised.     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  Problem list name updated by automated process. Provider to review     Unspecified arthropathy, hand      Vasculitis (H) 4/20/2009    Possible increased activity of thoracic aorta, on PET scan w/u for pulmonary nodule.        PAST SURGICAL HISTORY:  Past Surgical History:   Procedure Laterality Date     C APPENDECTOMY  1957     C LIGATE FALLOPIAN TUBE  1971     C STEREOTACTIC BREAST BIOPSY  2001     CHOLECYSTECTOMY, LAPOROSCOPIC  2008    Cholecystectomy, Laparoscopic     ESOPHAGOSCOPY, GASTROSCOPY, DUODENOSCOPY (EGD), COMBINED  5/17/2013    Procedure: COMBINED ESOPHAGOSCOPY, GASTROSCOPY, DUODENOSCOPY (EGD), BIOPSY SINGLE OR MULTIPLE;  ESOPHAGOSCOPY, GASTROSCOPY, DUODENOSCOPY (EGD)  with bx;  Surgeon: Jeffery Yanez MD;  Location: [facility]     HC DILATION/CURETTAGE DIAG/THER NON OB  1967,1971     HC REMOVE TONSILS/ADENOIDS,<13 Y/O         FAMILY HISTORY:  Family History   Problem Relation Age of Onset     Hypertension Mother      Osteoporosis Mother      C.A.D. Mother      Connective Tissue Disorder Father         scleraderma     Cerebrovascular Disease Paternal Grandmother      Prostate Cancer Other         9/05 passed away due to complications     Breast Cancer Child         youngest dtr       SOCIAL HISTORY:  Social History     Socioeconomic History     Marital status: Single     Spouse name: None     Number of children: 3     Years of education: 15     Highest  education level: None   Occupational History     Occupation: semi-retired   Social Needs     Financial resource strain: None     Food insecurity:     Worry: None     Inability: None     Transportation needs:     Medical: None     Non-medical: None   Tobacco Use     Smoking status: Never Smoker     Smokeless tobacco: Never Used   Substance and Sexual Activity     Alcohol use: Yes     Alcohol/week: 1.0 - 2.0 standard drinks     Types: 1 - 2 Standard drinks or equivalent per week     Comment: 1 glass of wine 1-2 days per week     Drug use: No     Sexual activity: Not Currently   Lifestyle     Physical activity:     Days per week: None     Minutes per session: None     Stress: None   Relationships     Social connections:     Talks on phone: None     Gets together: None     Attends Faith service: None     Active member of club or organization: None     Attends meetings of clubs or organizations: None     Relationship status: None     Intimate partner violence:     Fear of current or ex partner: None     Emotionally abused: None     Physically abused: None     Forced sexual activity: None   Other Topics Concern      Service Not Asked     Blood Transfusions Not Asked     Caffeine Concern Not Asked     Occupational Exposure Not Asked     Hobby Hazards Not Asked     Sleep Concern Not Asked     Stress Concern Not Asked     Weight Concern Not Asked     Special Diet Not Asked     Back Care Not Asked     Exercise Not Asked     Bike Helmet Not Asked     Seat Belt Not Asked     Self-Exams Not Asked     Parent/sibling w/ CABG, MI or angioplasty before 65F 55M? No   Social History Narrative     None       Review of Systems:  Skin:  Positive for   hx basal cell on face   Eyes:  Positive for glasses cataract extraction of both eyes, wears glasses - reading, driving, pre-glaucoma - pressure is currently normal per pt  ENT:  Positive for sinus trouble;postnasal drainage;nasal congestion asthma  Respiratory:  Positive for  "dyspnea on exertion stairs   Cardiovascular:    Positive for wears compression stockings  Gastroenterology: Positive for heartburn;reflux reflux under control with Omeprazole  Genitourinary:  Positive for nocturia 1-2 times per pm  Musculoskeletal:  Positive for arthritis not bothersome  Neurologic:  Negative      Psychiatric:  Negative      Heme/Lymph/Imm:  Positive for allergies RX allergies ,  Allergic to almost  everything- per pt  -  environmental allergies , cats - certain trees -  no food allergies  Endocrine:  Negative        Physical Exam:  Vitals: BP (!) 168/66 (BP Location: Right arm, Patient Position: Chair, Cuff Size: Adult Regular)   Pulse 76   Ht 1.575 m (5' 2\")   Wt 63.4 kg (139 lb 12.8 oz)   LMP  (LMP Unknown)   BMI 25.57 kg/m       Constitutional:  cooperative, alert and oriented, well developed, well nourished, in no acute distress   uses walker    Skin:  warm and dry to the touch, no apparent skin lesions or masses noted     ecchymosis on forearms    Head:  normocephalic, no masses or lesions        Eyes:  pupils equal and round, conjunctivae and lids unremarkable, sclera white, no xanthalasma, EOMS intact, no nystagmus        Lymph:      ENT:  no pallor or cyanosis, dentition good        Neck:  carotid pulses are full and equal bilaterally;no carotid bruit        Respiratory:  normal breath sounds, clear to auscultation, normal A-P diameter, normal symmetry, normal respiratory excursion, no use of accessory muscles         Cardiac: regular rhythm       systolic murmur;grade 2;RUSB   diastolic murmur;decrescendo;RUSB;grade 1    pulses full and equal                                        GI:           Extremities and Muscular Skeletal:  no spinal abnormalities noted;normal muscle strength and tone   bilateral LE edema;trace;1+     uses compression stockings    Neurological:  no gross motor deficits        Psych:  affect appropriate, oriented to time, person and place          CC  No " referring provider defined for this encounter.                    Thank you for allowing me to participate in the care of your patient.      Sincerely,     Caridad Vincent,      [facility]    cc:   No referring provider defined for this encounter.

## 2019-10-17 NOTE — PROGRESS NOTES
HPI and Plan:   See dictation    Orders Placed This Encounter   Procedures     Follow-Up with Cardiac Advanced Practice Provider     Echocardiogram Complete       Orders Placed This Encounter   Medications     amLODIPine (NORVASC) 2.5 MG tablet     Sig: Take 1 tablet (2.5 mg) by mouth 2 times daily     Dispense:  180 tablet     Refill:  3       Medications Discontinued During This Encounter   Medication Reason     losartan (COZAAR) 100 MG tablet Stopped by Patient     amLODIPine (NORVASC) 2.5 MG tablet Reorder         Encounter Diagnoses   Name Primary?     Thoracic aortic aneurysm without rupture (H) Yes     SVT -noted during Cath procedure      Coronary artery disease involving native coronary artery of native heart without angina pectoris      Atrial fibrillation, unspecified type (H)      Nonrheumatic aortic valve insufficiency      Essential hypertension with goal blood pressure less than 140/90        CURRENT MEDICATIONS:  Current Outpatient Medications   Medication Sig Dispense Refill     acetaminophen (TYLENOL) 500 MG tablet Take 500-1,000 mg by mouth every 8 hours as needed for mild pain       albuterol (PROAIR HFA, PROVENTIL HFA, VENTOLIN HFA) 108 (90 BASE) MCG/ACT inhaler Inhale 2 puffs into the lungs every 6 hours as needed        amLODIPine (NORVASC) 2.5 MG tablet Take 1 tablet (2.5 mg) by mouth 2 times daily 180 tablet 3     aspirin 81 MG EC tablet Take 1 tablet (81 mg) by mouth daily       azelastine (ASTELIN) 0.1 % nasal spray SPRAY 1 TO 2 SPRAYS INTO BOTH NOSTRILS 2 TIMES DAILY 30 mL 4     calcium citrate-vitamin D (CALCIUM CITRATE +) 315-200 MG-UNIT TABS Take 2 tablets by mouth every evening        dicyclomine (BENTYL) 10 MG capsule Take 1 capsule (10 mg) by mouth 4 times daily (before meals and nightly) 30 capsule 11     fluticasone-salmeterol (ADVAIR-HFA) 230-21 MCG/ACT inhaler Inhale 2 puffs into the lungs 2 times daily 12 g 1     hydrochlorothiazide (MICROZIDE) 12.5 MG capsule Take 1 capsule  (12.5 mg) by mouth daily 90 capsule 0     ipratropium (ATROVENT) 0.03 % spray Spray 2 sprays in nostril 2 times daily       latanoprost (XALATAN) 0.005 % ophthalmic solution Place 1 drop into both eyes At Bedtime       levocetirizine (XYZAL) 5 MG tablet Take 5 mg by mouth daily       metoprolol succinate ER (TOPROL XL) 100 MG 24 hr tablet Take 1 tablet (100 mg) by mouth daily 30 tablet 0     omeprazole (PRILOSEC) 20 MG DR capsule Take 1 capsule (20 mg) by mouth daily *due for office visit before next refill. 90 capsule 3     PREDNISONE PO As needed for asthma flares       simvastatin (ZOCOR) 20 MG tablet Take 1 tablet (20 mg) by mouth At Bedtime 90 tablet 1       ALLERGIES     Allergies   Allergen Reactions     Fosamax [Alendronic Acid] GI Disturbance     Augmentin [Amoxicillin-Pot Clavulanate] Diarrhea     Diarrhea and rash     Contrast Dye      Red arm redness and swelling      Evista [Raloxifene]      abd symptoms.      Flu Virus Vaccine      swollen and red at inj site       PAST MEDICAL HISTORY:  Past Medical History:   Diagnosis Date     Anemia 3/10/2009    Chronic disease, by Fe studies; awaiting full GI w/u and repeat colonoscopy, ERCP.     Basal Cell Carcinoma--back      Coronary artery disease involving native coronary artery of native heart without angina pectoris 3/9/2017    3/2/2017 - Two vessel coronary artery disease with mLAD 50% stenosis, D3 50% stenosis, pLCx 50% stenosis and mLCx 50% stenosis     Essential hypertension with goal blood pressure less than 140/90 9/1/2016    White coat hypertension;  24 hour ambulatory monitoring normal. Do not titrate up further.       Hyperlipidemia LDL goal <130 2/10/2010     Impaired fasting glucose 8/26/2010     Moderate persistent asthma      Nonrheumatic aortic valve insufficiency 6/29/2017     osteopenia      Paroxysmal atrial fibrillation (H) 12/26/2017     Pulmonary nodule 3/3/2009    PET scan reportedly normal; biopsy not advised.     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  Problem list name updated by automated process. Provider to review     Unspecified arthropathy, hand      Vasculitis (H) 4/20/2009    Possible increased activity of thoracic aorta, on PET scan w/u for pulmonary nodule.        PAST SURGICAL HISTORY:  Past Surgical History:   Procedure Laterality Date     C APPENDECTOMY  1957     C LIGATE FALLOPIAN TUBE  1971     C STEREOTACTIC BREAST BIOPSY  2001     CHOLECYSTECTOMY, LAPOROSCOPIC  2008    Cholecystectomy, Laparoscopic     ESOPHAGOSCOPY, GASTROSCOPY, DUODENOSCOPY (EGD), COMBINED  5/17/2013    Procedure: COMBINED ESOPHAGOSCOPY, GASTROSCOPY, DUODENOSCOPY (EGD), BIOPSY SINGLE OR MULTIPLE;  ESOPHAGOSCOPY, GASTROSCOPY, DUODENOSCOPY (EGD)  with bx;  Surgeon: Jeffery Yanez MD;  Location: [facility]     HC DILATION/CURETTAGE DIAG/THER NON OB  1967,1971     HC REMOVE TONSILS/ADENOIDS,<13 Y/O         FAMILY HISTORY:  Family History   Problem Relation Age of Onset     Hypertension Mother      Osteoporosis Mother      C.A.D. Mother      Connective Tissue Disorder Father         scleraderma     Cerebrovascular Disease Paternal Grandmother      Prostate Cancer Other         9/05 passed away due to complications     Breast Cancer Child         youngest dtr       SOCIAL HISTORY:  Social History     Socioeconomic History     Marital status: Single     Spouse name: None     Number of children: 3     Years of education: 15     Highest education level: None   Occupational History     Occupation: semi-retired   Social Needs     Financial resource strain: None     Food insecurity:     Worry: None     Inability: None     Transportation needs:     Medical:  None     Non-medical: None   Tobacco Use     Smoking status: Never Smoker     Smokeless tobacco: Never Used   Substance and Sexual Activity     Alcohol use: Yes     Alcohol/week: 1.0 - 2.0 standard drinks     Types: 1 - 2 Standard drinks or equivalent per week     Comment: 1 glass of wine 1-2 days per week     Drug use: No     Sexual activity: Not Currently   Lifestyle     Physical activity:     Days per week: None     Minutes per session: None     Stress: None   Relationships     Social connections:     Talks on phone: None     Gets together: None     Attends Jew service: None     Active member of club or organization: None     Attends meetings of clubs or organizations: None     Relationship status: None     Intimate partner violence:     Fear of current or ex partner: None     Emotionally abused: None     Physically abused: None     Forced sexual activity: None   Other Topics Concern      Service Not Asked     Blood Transfusions Not Asked     Caffeine Concern Not Asked     Occupational Exposure Not Asked     Hobby Hazards Not Asked     Sleep Concern Not Asked     Stress Concern Not Asked     Weight Concern Not Asked     Special Diet Not Asked     Back Care Not Asked     Exercise Not Asked     Bike Helmet Not Asked     Seat Belt Not Asked     Self-Exams Not Asked     Parent/sibling w/ CABG, MI or angioplasty before 65F 55M? No   Social History Narrative     None       Review of Systems:  Skin:  Positive for   hx basal cell on face   Eyes:  Positive for glasses cataract extraction of both eyes, wears glasses - reading, driving, pre-glaucoma - pressure is currently normal per pt  ENT:  Positive for sinus trouble;postnasal drainage;nasal congestion asthma  Respiratory:  Positive for dyspnea on exertion stairs   Cardiovascular:    Positive for wears compression stockings  Gastroenterology: Positive for heartburn;reflux reflux under control with Omeprazole  Genitourinary:  Positive for nocturia 1-2 times  "per pm  Musculoskeletal:  Positive for arthritis not bothersome  Neurologic:  Negative      Psychiatric:  Negative      Heme/Lymph/Imm:  Positive for allergies RX allergies ,  Allergic to almost  everything- per pt  -  environmental allergies , cats - certain trees -  no food allergies  Endocrine:  Negative        Physical Exam:  Vitals: BP (!) 168/66 (BP Location: Right arm, Patient Position: Chair, Cuff Size: Adult Regular)   Pulse 76   Ht 1.575 m (5' 2\")   Wt 63.4 kg (139 lb 12.8 oz)   LMP  (LMP Unknown)   BMI 25.57 kg/m      Constitutional:  cooperative, alert and oriented, well developed, well nourished, in no acute distress   uses walker    Skin:  warm and dry to the touch, no apparent skin lesions or masses noted     ecchymosis on forearms    Head:  normocephalic, no masses or lesions        Eyes:  pupils equal and round, conjunctivae and lids unremarkable, sclera white, no xanthalasma, EOMS intact, no nystagmus        Lymph:      ENT:  no pallor or cyanosis, dentition good        Neck:  carotid pulses are full and equal bilaterally;no carotid bruit        Respiratory:  normal breath sounds, clear to auscultation, normal A-P diameter, normal symmetry, normal respiratory excursion, no use of accessory muscles         Cardiac: regular rhythm       systolic murmur;grade 2;RUSB   diastolic murmur;decrescendo;RUSB;grade 1    pulses full and equal                                        GI:           Extremities and Muscular Skeletal:  no spinal abnormalities noted;normal muscle strength and tone   bilateral LE edema;trace;1+     uses compression stockings    Neurological:  no gross motor deficits        Psych:  affect appropriate, oriented to time, person and place          CC  No referring provider defined for this encounter.                  "

## 2019-10-17 NOTE — LETTER
10/17/2019      Layo Sevilla MD  [address] [place] [address]      RE: Genie THORPE Hung       Dear Colleague,    I had the pleasure of seeing Genie Persaud in the [facility].    Service Date: 10/17/2019      CLINIC FOLLOWUP VISIT      REFERRING PHYSICIAN:  Dr. Layo Sevilla      HISTORY OF PRESENT ILLNESS:  Ms. Persaud is a very pleasant 84-year-old female patient of Dr. Hernandez's who I am seeing in his absence today.  He was called to away to an emergency.  Ms. Persaud has a history of hypertension, aortic stenosis, thoracic aortic aneurysm, paroxysmal atrial fibrillation and coronary disease with a previous history of nonischemic cardiomyopathy.  She is here simply for a followup visit.  She has been doing well over the past year without any symptoms.  She has not had any palpitations or fast heart rhythms in quite a while.  She has had difficulty with her blood pressure.  She states she has been off of losartan since it was recalled and does not want to restart it because she has had some issues with dizziness with this.  Her blood pressure was quite elevated today at 168/66.  She does take it at home and she said this morning it was in the 120s range but she has noted that it has been higher.  She had a chest CT last year to monitor for expansion of her thoracic aneurysm.  It is just above the level of the aortic root at its maximal diameter of 4.4 cm.  She did tell me she had a reaction to the dye with redness and swelling of her arm.  She did not have any evaluation this year prior to this appointment.      PHYSICAL EXAMINATION:   CARDIOVASCULAR:  Tones are regular.  She does have a soft systolic ejection murmur.   LUNGS:  Clear.   EXTREMITIES:  She has no peripheral edema.      SUMMARY:  Ms. Persaud is a very pleasant 84-year-old female with a history of coronary artery disease, aortic valve disease, thoracic aortic aneurysm, paroxysmal atrial fibrillation and  hypertension.  She seems stable from a cardiac perspective but she has been off of her losartan due to dizziness and I guess it was on recall at one time as well when she decided not to continue it.  Her blood pressures have been elevated, although she is getting lower numbers at home.  It looks like it is a little bit all over the place here.  Today was 168/66 but in August, it was measured at 110/48.  Two days prior to the measurement, it was 180/64.  I had suggested that we increase her amlodipine dose, which she is currently taking 2.5 mg once daily, to 2.5 mg twice daily.  Maybe we can cover a better average of her blood pressures that way without increasing the risk of side effect by doubling up a dose all at once since she has had issues with dizziness in the past.  She is willing to try this and so I will go ahead and change her prescription to reflect the increase in frequency of dosing of amlodipine to twice daily.  I would like to have her undergo evaluation for expansion of her thoracic aneurysm since it has been over a year and also reevaluation of her valvular heart disease.  I think we can do this with an echocardiogram based upon where the maximal diameter is at on the chest CT.  Also, it sounds like she may have had a reaction to the dye and I placed this in her allergies list for future reference.  She has not had any issues with arrhythmia at this time.  We will continue her beta blocker as is and I will have her follow up with Dr. Hernandez's nurse practitioner to go over her echocardiogram and to ensure that her blood pressure is better controlled with the increased dose of the amlodipine.      Please feel free to contact me with any questions you have in regards to her care.      cc:   Layo Sevilla MD    [facility]    \[address] [place] [id number]         FERMIN TOWNSEND DO             D: 10/17/2019   T: 10/17/2019   MT: DARLENE      Name:     TERESA HARTMAN   MRN:       [id number]        Account:      [id number]   :      1935           Service Date: 10/17/2019      Document: [id number]         Outpatient Encounter Medications as of 10/17/2019   Medication Sig Dispense Refill     acetaminophen (TYLENOL) 500 MG tablet Take 500-1,000 mg by mouth every 8 hours as needed for mild pain       albuterol (PROAIR HFA, PROVENTIL HFA, VENTOLIN HFA) 108 (90 BASE) MCG/ACT inhaler Inhale 2 puffs into the lungs every 6 hours as needed        amLODIPine (NORVASC) 2.5 MG tablet Take 1 tablet (2.5 mg) by mouth 2 times daily 180 tablet 3     aspirin 81 MG EC tablet Take 1 tablet (81 mg) by mouth daily       azelastine (ASTELIN) 0.1 % nasal spray SPRAY 1 TO 2 SPRAYS INTO BOTH NOSTRILS 2 TIMES DAILY 30 mL 4     calcium citrate-vitamin D (CALCIUM CITRATE +) 315-200 MG-UNIT TABS Take 2 tablets by mouth every evening        dicyclomine (BENTYL) 10 MG capsule Take 1 capsule (10 mg) by mouth 4 times daily (before meals and nightly) 30 capsule 11     fluticasone-salmeterol (ADVAIR-HFA) 230-21 MCG/ACT inhaler Inhale 2 puffs into the lungs 2 times daily 12 g 1     hydrochlorothiazide (MICROZIDE) 12.5 MG capsule Take 1 capsule (12.5 mg) by mouth daily 90 capsule 0     ipratropium (ATROVENT) 0.03 % spray Spray 2 sprays in nostril 2 times daily       latanoprost (XALATAN) 0.005 % ophthalmic solution Place 1 drop into both eyes At Bedtime       levocetirizine (XYZAL) 5 MG tablet Take 5 mg by mouth daily       metoprolol succinate ER (TOPROL XL) 100 MG 24 hr tablet Take 1 tablet (100 mg) by mouth daily 30 tablet 0     omeprazole (PRILOSEC) 20 MG DR capsule Take 1 capsule (20 mg) by mouth daily *due for office visit before next refill. 90 capsule 3     PREDNISONE PO As needed for asthma flares       simvastatin (ZOCOR) 20 MG tablet Take 1 tablet (20 mg) by mouth At Bedtime 90 tablet 1     [DISCONTINUED] amLODIPine (NORVASC) 2.5 MG tablet Take 1 tablet (2.5 mg) by mouth daily 90 tablet 3      [DISCONTINUED] losartan (COZAAR) 100 MG tablet TAKE ONE TABLET BY MOUTH ONE TIME DAILY  90 tablet 3     No facility-administered encounter medications on file as of 10/17/2019.        Again, thank you for allowing me to participate in the care of your patient.      Sincerely,    Caridad Vincent,      [facility]

## 2019-10-17 NOTE — PROGRESS NOTES
Service Date: 10/17/2019      CLINIC FOLLOWUP VISIT      REFERRING PHYSICIAN:  Dr. Layo Sevilla      HISTORY OF PRESENT ILLNESS:  Ms. Persaud is a very pleasant 84-year-old female patient of Dr. Hernandez's who I am seeing in his absence today.  He was called to away to an emergency.  Ms. Persaud has a history of hypertension, aortic stenosis, thoracic aortic aneurysm, paroxysmal atrial fibrillation and coronary disease with a previous history of nonischemic cardiomyopathy.  She is here simply for a followup visit.  She has been doing well over the past year without any symptoms.  She has not had any palpitations or fast heart rhythms in quite a while.  She has had difficulty with her blood pressure.  She states she has been off of losartan since it was recalled and does not want to restart it because she has had some issues with dizziness with this.  Her blood pressure was quite elevated today at 168/66.  She does take it at home and she said this morning it was in the 120s range but she has noted that it has been higher.  She had a chest CT last year to monitor for expansion of her thoracic aneurysm.  It is just above the level of the aortic root at its maximal diameter of 4.4 cm.  She did tell me she had a reaction to the dye with redness and swelling of her arm.  She did not have any evaluation this year prior to this appointment.      PHYSICAL EXAMINATION:   CARDIOVASCULAR:  Tones are regular.  She does have a soft systolic ejection murmur.   LUNGS:  Clear.   EXTREMITIES:  She has no peripheral edema.      SUMMARY:  Ms. Persaud is a very pleasant 84-year-old female with a history of coronary artery disease, aortic valve disease, thoracic aortic aneurysm, paroxysmal atrial fibrillation and hypertension.  She seems stable from a cardiac perspective but she has been off of her losartan due to dizziness and I guess it was on recall at one time as well when she decided not to continue it.  Her blood pressures have been  elevated, although she is getting lower numbers at home.  It looks like it is a little bit all over the place here.  Today was 168/66 but in August, it was measured at 110/48.  Two days prior to the measurement, it was 180/64.  I had suggested that we increase her amlodipine dose, which she is currently taking 2.5 mg once daily, to 2.5 mg twice daily.  Maybe we can cover a better average of her blood pressures that way without increasing the risk of side effect by doubling up a dose all at once since she has had issues with dizziness in the past.  She is willing to try this and so I will go ahead and change her prescription to reflect the increase in frequency of dosing of amlodipine to twice daily.  I would like to have her undergo evaluation for expansion of her thoracic aneurysm since it has been over a year and also reevaluation of her valvular heart disease.  I think we can do this with an echocardiogram based upon where the maximal diameter is at on the chest CT.  Also, it sounds like she may have had a reaction to the dye and I placed this in her allergies list for future reference.  She has not had any issues with arrhythmia at this time.  We will continue her beta blocker as is and I will have her follow up with Dr. Hernandez's nurse practitioner to go over her echocardiogram and to ensure that her blood pressure is better controlled with the increased dose of the amlodipine.      Please feel free to contact me with any questions you have in regards to her care.      cc:   Layo Sevilla MD    [facility]    \[address] [place] [id number]         FERMIN TOWNSEND DO             D: 10/17/2019   T: 10/17/2019   MT: DARLENE      Name:     TERESA HARTMAN   MRN:      [id number]        Account:      [id number]   :      1935           Service Date: 10/17/2019      Document: [id number]

## 2019-10-21 DIAGNOSIS — E78.5 HYPERLIPIDEMIA LDL GOAL <130: ICD-10-CM

## 2019-10-21 LAB
CHOLEST SERPL-MCNC: 152 MG/DL
HDLC SERPL-MCNC: 59 MG/DL
LDLC SERPL CALC-MCNC: 76 MG/DL
NONHDLC SERPL-MCNC: 93 MG/DL
TRIGL SERPL-MCNC: 84 MG/DL

## 2019-10-21 PROCEDURE — 36415 COLL VENOUS BLD VENIPUNCTURE: CPT | Performed by: INTERNAL MEDICINE

## 2019-10-21 PROCEDURE — 80061 LIPID PANEL: CPT | Performed by: INTERNAL MEDICINE

## 2019-10-25 ENCOUNTER — TELEPHONE (OUTPATIENT)
Dept: CARDIOLOGY | Facility: CLINIC | Age: 84
End: 2019-10-25

## 2019-10-25 DIAGNOSIS — I51.81 STRESS-INDUCED CARDIOMYOPATHY: ICD-10-CM

## 2019-10-25 DIAGNOSIS — I10 ESSENTIAL HYPERTENSION WITH GOAL BLOOD PRESSURE LESS THAN 140/90: ICD-10-CM

## 2019-10-25 RX ORDER — METOPROLOL SUCCINATE 100 MG/1
100 TABLET, EXTENDED RELEASE ORAL DAILY
Qty: 30 TABLET | Refills: 3 | Status: SHIPPED | OUTPATIENT
Start: 2019-10-25 | End: 2020-03-09

## 2019-10-25 NOTE — TELEPHONE ENCOUNTER
Medication Refilled: metoprolol  Last office visit: 10/17/19  Last Labs/EKG: 10/17/19  Next office visit: 11/15/19  Pharmacy sent to: Silvana Westbrook RN

## 2019-10-25 NOTE — TELEPHONE ENCOUNTER
"Writer received a refill request for Metoprolol 100mg. Note on refills : \"Pt states she takes 1 tablet BID now\".    Upon chart review Pt in clinic 10/17/19 to see Dr. Vincent (covering for Dr. Hernandez), per OV note: \" I had suggested that we increase her amlodipine dose, which she is currently taking 2.5 mg once daily, to 2.5 mg twice daily\". Additionally the OV note states: \" We will continue her beta blocker as is and I will have her follow up with Dr. Hernandez's nurse practitioner to go over her echocardiogram and to ensure that her blood pressure is better controlled with the increased dose of the amlodipine.\"    Writer called to clarify refill request Pt states that she has been taking the metoprolol 100mg BID because she \"thought that is what the doctor said to do\". Pt unsure of where she had placed her AVS. Pt advised of the correct recommendation given by Melisa Salas at previous OV.   Pt stated that she did not even have any medication bottles with that medication name on them. Upon review it appears as though the pt has been taking Amlodipine since approx 2012.     Correct dose of medications sent to pharmacy. [facility] pharmacy in [employer] was contacted to verify the correct prescriptions were obtained.     ANIA Westbrook RN, BSN.   10/25/19 1:47 PM   "

## 2019-10-25 NOTE — TELEPHONE ENCOUNTER
Received update from Bonnie Westbrook RN regarding pt's confusion with medications.   Pt was to start amlodipine per Dr Sevilla starting 8/2019.    Did discuss with Diana as she see her on 11/15. Diana would like pt to come in Monday for RN visit with her medications and BP check.     Called to pt. No answer. Left VM.

## 2019-10-28 NOTE — TELEPHONE ENCOUNTER
Spoke to pt. Confirmed she is not taking amlodipine . Instructed her to NOT start it as Diana will address it. Confirmed she is taking Toprol  mg daily and not BID as she was before.   Pt declines Nurse only visit. Pt states she checks her BP twice a day. This am it was 125/64 after medications. Pt has not been monitoring HR. Pt denies dizziness/LH, sob or there symptoms.   Will let Diana know pt declined RN visit.   Sandy Martinez RN 4:08 PM 10/28/19

## 2019-11-01 ENCOUNTER — HOSPITAL ENCOUNTER (OUTPATIENT)
Dept: CARDIOLOGY | Facility: CLINIC | Age: 84
Discharge: HOME | End: 2019-11-01
Attending: INTERNAL MEDICINE | Admitting: INTERNAL MEDICINE
Payer: COMMERCIAL

## 2019-11-01 DIAGNOSIS — I71.20 THORACIC AORTIC ANEURYSM WITHOUT RUPTURE (H): ICD-10-CM

## 2019-11-01 DIAGNOSIS — I35.1 NONRHEUMATIC AORTIC VALVE INSUFFICIENCY: ICD-10-CM

## 2019-11-01 PROCEDURE — 93306 TTE W/DOPPLER COMPLETE: CPT | Mod: 26 | Performed by: INTERNAL MEDICINE

## 2019-11-01 PROCEDURE — 93306 TTE W/DOPPLER COMPLETE: CPT

## 2019-11-06 NOTE — PROGRESS NOTES
SUBJECTIVE:   Genie Persaud is a 84 year old female who presents for Preventive Visit.  Are you in the first 12 months of your Medicare coverage?  No    HPI  Do you feel safe in your environment? Yes    Have you ever done Advance Care Planning? (For example, a Health Directive, POLST, or a discussion with a medical provider or your loved ones about your wishes): Yes, advance care planning is on file.    Fall risk  Fallen 2 or more times in the past year?: No  Any fall with injury in the past year?: No    Cognitive Screening   1) Repeat 3 items    2) Clock draw: NORMAL  3) 3 item recall: Results: 3 items recalled: COGNITIVE IMPAIRMENT LESS LIKELY    Mini-CogTM Copyright S Mony. Licensed by the author for use in [facility]; reprinted with permission ([email or web address]). All rights reserved.      Do you have sleep apnea, excessive snoring or daytime drowsiness?: no    Reviewed and updated as needed this visit by clinical staff  Tobacco  Soc Hx    Reviewed and updated as needed this visit by Provider        Social History     Tobacco Use     Smoking status: Never Smoker     Smokeless tobacco: Never Used   Substance Use Topics     Alcohol use: Yes     Alcohol/week: 1.0 - 2.0 standard drinks     Types: 1 - 2 Standard drinks or equivalent per week     Comment: 1 glass of wine 1-2 days per week     If you drink alcohol do you typically have >3 drinks per day or >7 drinks per week? No    Hyperlipidemia Follow-Up      Are you having any of the following symptoms? (Select all that apply)  Shortness of breath -due to asthma    Are you regularly taking any medication or supplement to lower your cholesterol?   Yes- Simvastatin 20 mg daily    Are you having muscle aches or other side effects that you think could be caused by your cholesterol lowering medication?  No    Hypertension Follow-up  Patient had appointment with Cardiologist on 10/17, and she recommended patient increase Amlodipine to 2.5 mg twice daily instead  "of daily.   Has tolerated this well without any dizziness.    Do you check your blood pressure regularly outside of the clinic? Yes     Are you following a low salt diet? Yes    Are your blood pressures ever more than 140 on the top number (systolic) OR more   than 90 on the bottom number (diastolic), for example 140/90? No-Not at home, but is always elevated when at an office appointment.    Vascular Disease Follow-up  Has noted a \"funny sensation\" in all of her toes and right heel.  This is most noticeable at night.   This has been ongoing since she had edema in her feet a few years ago.   No longer has edema, but notes change in sensation of her feet.       Had Echocardiogram done on 11/01, and would like results.    Left Ventricle  The left ventricle is normal in size. There is mild concentric left  ventricular hypertrophy. Grade I or early diastolic dysfunction. The visual  ejection fraction is estimated at 50-55%. There is borderline-mild global  hypokinesia of the left ventricle.   Right Ventricle  The right ventricle is normal size. The right ventricular systolic function is  normal.  Atria  The left atrium is mildly dilated. Right atrial size is normal. There is no  color Doppler evidence of an atrial shunt.  Mitral Valve  There is mild mitral annular calcification. There is mild (1+) mitral  regurgitation.  Tricuspid Valve  There is trace tricuspid regurgitation. The right ventricular systolic  pressure is approximated at 36mmHg plus the right atrial pressure. Pulmonary  hypertension.  Aortic Valve  The aortic valve is trileaflet with aortic valve sclerosis. There is mild to  moderate (1-2+) aortic regurgitation. No aortic stenosis is present.  Pulmonic Valve  There is no pulmonic valvular stenosis.  Vessels  The aortic root is normal size. The ascending aorta is Mildly dilated. 4.1 cm.  The inferior vena cava was normal in size with preserved respiratory  variability.  Pericardium  There is no pericardial " effusion.      Are you having any of the following symptoms? (Select all that apply) Shortness of breath-due to asthma    How often do you take nitroglycerin? Never    Do you take an aspirin every day? No-Currently not taking Aspirin.  Has been taking this off and on.    Asthma Follow-Up    Was ACT completed today?    Yes    ACT Total Scores 11/7/2019   ACT TOTAL SCORE -   ASTHMA ER VISITS -   ASTHMA HOSPITALIZATIONS -   ACT TOTAL SCORE (Goal Greater than or Equal to 20) 22   In the past 12 months, how many times did you visit the emergency room for your asthma without being admitted to the hospital? 0   In the past 12 months, how many times were you hospitalized overnight because of your asthma? 0       How many days per week do you miss taking your asthma controller medication?  0    Please describe any recent triggers for your asthma: cold air    Have you had any Emergency Room Visits, Urgent Care Visits, or Hospital Admissions since your last office visit?  No    ABDOMINAL CRAMPING  Patient reports this has resolved.   Has history of IBS.   Has only used the Bentyl once since Dr Sevilla prescribed it.    IMMUNIZATIONS  Declines Influenza and Shingrix vaccines.    Current providers sharing in care for this patient include:   Patient Care Team:  Layo Sevilla MD as PCP - General  Layo Sevilla MD as Assigned PCP  Faby Conner RN as   Viral Metz MD as MD (Pulmonary)  Lucretia Pace, RN as Personal Advocate & Liaison (PAL)  Lucretia Pace, RN as Personal Advocate & Liaison (PAL)  Delroy Samson MD as MD (Pulmonary)    The following health maintenance items are reviewed in Epic and correct as of today:  Health Maintenance   Topic Date Due     ZOSTER IMMUNIZATION (1 of 2) 09/25/1987     MEDICARE ANNUAL WELLNESS VISIT  11/09/2008     COLONOSCOPY  01/17/2018     ASTHMA ACTION PLAN  08/09/2019     FALL RISK ASSESSMENT  08/09/2019     ASTHMA CONTROL TEST  11/21/2019     CMP  05/21/2020     ANNUAL  REVIEW OF HM ORDERS  06/10/2020     DEXA  07/13/2021     LIPID  10/21/2024     ADVANCE CARE PLANNING  11/06/2024     DTAP/TDAP/TD IMMUNIZATION (3 - Td) 10/06/2027     PHQ-2  Completed     PNEUMOCOCCAL IMMUNIZATION 65+ LOW/MEDIUM RISK  Completed     IPV IMMUNIZATION  Aged Out     MENINGITIS IMMUNIZATION  Aged Out     BP Readings from Last 3 Encounters:   11/07/19 (!) 162/52   10/17/19 (!) 168/66   08/14/19 110/48    Wt Readings from Last 3 Encounters:   11/07/19 64 kg (141 lb 3.2 oz)   10/17/19 63.4 kg (139 lb 12.8 oz)   08/12/19 62.7 kg (138 lb 4.8 oz)                  Patient Active Problem List   Diagnosis     Swelling, mass, or lump in head and neck     Pulmonary nodule     Anemia     Vasculitis (H)     HYPERLIPIDEMIA LDL GOAL <130     Impaired fasting glucose     Candidal esophagitis (H)     Thoracic aortic aneurysm without rupture (H)     Stroke (H)     Moderate persistent asthma without complication     Essential hypertension with goal blood pressure less than 140/90     Coronary artery disease involving native coronary artery of native heart without angina pectoris     SVT -noted during Cath procedure     Nonrheumatic aortic valve insufficiency     Stress-induced cardiomyopathy     Paroxysmal atrial fibrillation (H)     Peripheral edema     Hip pain, right     Lumbago     Osteopenia of multiple sites     Skin infection     Dizziness     Nausea     Past Surgical History:   Procedure Laterality Date     C APPENDECTOMY  1957     C LIGATE FALLOPIAN TUBE  1971     C STEREOTACTIC BREAST BIOPSY  2001     CHOLECYSTECTOMY, LAPOROSCOPIC  2008    Cholecystectomy, Laparoscopic     ESOPHAGOSCOPY, GASTROSCOPY, DUODENOSCOPY (EGD), COMBINED  5/17/2013    Procedure: COMBINED ESOPHAGOSCOPY, GASTROSCOPY, DUODENOSCOPY (EGD), BIOPSY SINGLE OR MULTIPLE;  ESOPHAGOSCOPY, GASTROSCOPY, DUODENOSCOPY (EGD)  with bx;  Surgeon: Jeffery Yanez MD;  Location:  GI     HC DILATION/CURETTAGE DIAG/THER NON OB  1967,1971     HC REMOVE  TONSILS/ADENOIDS,<11 Y/O         Social History     Tobacco Use     Smoking status: Never Smoker     Smokeless tobacco: Never Used   Substance Use Topics     Alcohol use: Yes     Alcohol/week: 1.0 - 2.0 standard drinks     Types: 1 - 2 Standard drinks or equivalent per week     Comment: 1 glass of wine 1-2 days per week     Family History   Problem Relation Age of Onset     Hypertension Mother      Osteoporosis Mother      C.A.D. Mother      Connective Tissue Disorder Father         scleraderma     Cerebrovascular Disease Paternal Grandmother      Prostate Cancer Other         9/05 passed away due to complications     Breast Cancer Child         youngest dtr         Current Outpatient Medications   Medication Sig Dispense Refill     acetaminophen (TYLENOL) 500 MG tablet Take 500-1,000 mg by mouth every 8 hours as needed for mild pain       albuterol (PROAIR HFA, PROVENTIL HFA, VENTOLIN HFA) 108 (90 BASE) MCG/ACT inhaler Inhale 2 puffs into the lungs every 6 hours as needed        amLODIPine (NORVASC) 2.5 MG tablet Take 1 tablet (2.5 mg) by mouth 2 times daily 180 tablet 3     azelastine (ASTELIN) 0.1 % nasal spray SPRAY 1 TO 2 SPRAYS INTO BOTH NOSTRILS 2 TIMES DAILY 30 mL 4     calcium citrate-vitamin D (CALCIUM CITRATE +) 315-200 MG-UNIT TABS Take 2 tablets by mouth every evening        fluticasone-salmeterol (ADVAIR-HFA) 230-21 MCG/ACT inhaler Inhale 2 puffs into the lungs 2 times daily 12 g 1     hydrochlorothiazide (MICROZIDE) 12.5 MG capsule Take 1 capsule (12.5 mg) by mouth daily 90 capsule 0     ipratropium (ATROVENT) 0.03 % spray Spray 2 sprays in nostril 2 times daily       latanoprost (XALATAN) 0.005 % ophthalmic solution Place 1 drop into both eyes At Bedtime       levocetirizine (XYZAL) 5 MG tablet Take 5 mg by mouth daily       metoprolol succinate ER (TOPROL XL) 100 MG 24 hr tablet Take 1 tablet (100 mg) by mouth daily 30 tablet 3     omeprazole (PRILOSEC) 20 MG DR capsule Take 1 capsule (20 mg) by  "mouth daily *due for office visit before next refill. 90 capsule 3     PREDNISONE PO As needed for asthma flares       simvastatin (ZOCOR) 20 MG tablet Take 1 tablet (20 mg) by mouth At Bedtime 90 tablet 1     aspirin 81 MG EC tablet Take 1 tablet (81 mg) by mouth daily       dicyclomine (BENTYL) 10 MG capsule Take 1 capsule (10 mg) by mouth 4 times daily (before meals and nightly) 30 capsule 11     Allergies   Allergen Reactions     Fosamax [Alendronic Acid] GI Disturbance     Augmentin [Amoxicillin-Pot Clavulanate] Diarrhea     Diarrhea and rash     Contrast Dye      Red arm redness and swelling      Evista [Raloxifene]      abd symptoms.      Flu Virus Vaccine      swollen and red at inj site     Mammogram Screening: Mammo done today.   Discussed with patient and will not do any further mammograms.    Review of Systems  Constitutional, HEENT, cardiovascular, GI, , musculoskeletal, neuro, skin, endocrine and psych systems are negative, except as otherwise noted.  Pulmonary-Shortness of breath with activity due to asthma and cough.  Musculoskeletal-Decreased sensation in her toes.      OBJECTIVE:   BP (!) 162/52 (BP Location: Right arm, Patient Position: Sitting, Cuff Size: Adult Regular)   Pulse 70   Temp 97.9  F (36.6  C) (Oral)   Resp 18   Ht 1.575 m (5' 2\")   Wt 64 kg (141 lb 3.2 oz)   LMP  (LMP Unknown)   SpO2 95%   BMI 25.83 kg/m   Estimated body mass index is 25.83 kg/m  as calculated from the following:    Height as of this encounter: 1.575 m (5' 2\").    Weight as of this encounter: 64 kg (141 lb 3.2 oz).   Vitals taken by CAITIE Pace RN    Physical Exam  GENERAL: healthy, alert and no distress  EYES: Eyes grossly normal to inspection, PERRL and conjunctivae and sclerae normal  HENT: ear canals and TM's normal, nose and mouth without ulcers or lesions  NECK: no adenopathy, no asymmetry, masses, or scars and thyroid normal to palpation  RESP: lungs clear to auscultation - no rales, rhonchi or " wheezes  CV: regular rate and rhythm, normal S1 S2, no S3 or S4, no murmur, click or rub, no peripheral edema and peripheral pulses strong  ABDOMEN: soft, nontender, no hepatosplenomegaly, no masses and bowel sounds normal  MS: no gross musculoskeletal defects noted, no edema  SKIN: no suspicious lesions or rashes  NEURO: Normal strength and tone, mentation intact and speech normal  PSYCH: mentation appears normal, affect normal/bright    Diagnostic Test Results:  Labs reviewed in Epic    ASSESSMENT / PLAN:     Problem List Items Addressed This Visit        Acute    Stress-induced cardiomyopathy     Echocardiogram completed 11/01/19-Left Ventricle  The left ventricle is normal in size. There is mild concentric left  ventricular hypertrophy. Grade I or early diastolic dysfunction. The visual  ejection fraction is estimated at 50-55%. There is borderline-mild global  hypokinesia of the left ventricle.     Right Ventricle  The right ventricle is normal size. The right ventricular systolic function is  normal.     Atria  The left atrium is mildly dilated. Right atrial size is normal. There is no  color Doppler evidence of an atrial shunt.     Mitral Valve  There is mild mitral annular calcification. There is mild (1+) mitral  regurgitation.        Tricuspid Valve  There is trace tricuspid regurgitation. The right ventricular systolic  pressure is approximated at 36mmHg plus the right atrial pressure. Pulmonary  hypertension.     Aortic Valve  The aortic valve is trileaflet with aortic valve sclerosis. There is mild to  moderate (1-2+) aortic regurgitation. No aortic stenosis is present.     Pulmonic Valve  There is no pulmonic valvular stenosis.     Vessels  The aortic root is normal size. The ascending aorta is Mildly dilated. 4.1 cm.  The inferior vena cava was normal in size with preserved respiratory  variability.     Pericardium  There is no pericardial effusion.    "  _____________________________________________________________________________  __              Other    Thoracic aortic aneurysm without rupture (H)     Interpretation Summary-Echocardiogram 11/01/19-     The ascending aorta is mildly dilated- 4.1 cm     On echo dated 09/05/2018 LVEF was reported 55% and ascending aorta was noted  4.3-4.4 cm.         Osteopenia of multiple sites     Advised patient to take Calcium with Vitamin D-one tablet twice daily.  Patient reports that she didn't tolerate Alendronate in the past -this causes \"stomach problems\".           Relevant Orders    Vitamin D Deficiency    Nausea     Stop Omeprazole.  Take Famotidine daily.         Relevant Medications    famotidine (PEPCID) 10 MG tablet    Irritable bowel syndrome with diarrhea     Well controlled.  Continue to use Bentyl prn         Relevant Medications    dicyclomine (BENTYL) 10 MG capsule    famotidine (PEPCID) 10 MG tablet    HYPERLIPIDEMIA LDL GOAL <130     Due to Creatinine clearance will change from Simvastatin 20 mg to Atorvastatin 20 mg daily.  Repeat Lipid level in two months.         Relevant Orders    Lipid panel reflex to direct LDL Fasting    Essential hypertension with goal blood pressure less than 140/90     Blood pressure elevated at today's appointment.   Per Cardiology note of 10/17/19-from a cardiac perspective she has been off of her losartan due to dizziness and I guess it was on recall at one time as well when she decided not to continue it.  Her blood pressures have been elevated, although she is getting lower numbers at home.  It looks like it is a little bit all over the place here.  Today was 168/66 but in August, it was measured at 110/48.  Two days prior to the measurement, it was 180/64.  I had suggested that we increase her amlodipine dose, which she is currently taking 2.5 mg once daily, to 2.5 mg twice daily.  BP Readings from Last 3 Encounters:   11/07/19 (!) 162/52   10/17/19 (!) 168/66   08/14/19 " "110/48   Today advised patient to increase Amlodipine to 5 mg in the am and 2.5 mg in the evening.  Recheck blood pressure in two weeks at Cardiology appointment          Relevant Medications    hydrochlorothiazide (MICROZIDE) 12.5 MG capsule    amLODIPine (NORVASC) 2.5 MG tablet    Encounter for Medicare annual wellness exam - Primary     Advised Shingrix and Influenza vaccines, but patient declined any further vaccinations.  Colon cancer screenings up to date.  AAP completed today.  Mammogram done today and discussed with patient there is no need to continue mammograms going forward.  No need for further Colonoscopies.               COUNSELING:  Reviewed preventive health counseling, as reflected in patient instructions       Immunizations    Declined: Influenza and Zoster due to Other States she always reacts to vaccines               Aspirin Prophylaxsis       Osteoporosis Prevention/Bone Health-Take Calcium with Vitamin D twice daily.    Estimated body mass index is 25.83 kg/m  as calculated from the following:    Height as of this encounter: 1.575 m (5' 2\").    Weight as of this encounter: 64 kg (141 lb 3.2 oz).         reports that she has never smoked. She has never used smokeless tobacco.      Appropriate preventive services were discussed with this patient, including applicable screening as appropriate for cardiovascular disease, diabetes, osteopenia/osteoporosis, and glaucoma.  As appropriate for age/gender, discussed screening for colorectal cancer, prostate cancer, breast cancer, and cervical cancer. Checklist reviewing preventive services available has been given to the patient.    Reviewed patients plan of care and provided an AVS. The Basic Care Plan (routine screening as documented in Health Maintenance) for Genie meets the Care Plan requirement. This Care Plan has been established and reviewed with the Patient.    Counseling Resources:  ATP IV Guidelines  Pooled Cohorts Equation Calculator  Breast " Cancer Risk Calculator  FRAX Risk Assessment  ICSI Preventive Guidelines  Dietary Guidelines for Americans, 2010  USDA's MyPlate  ASA Prophylaxis  Lung CA Screening    Scribe Disclosure:   I, Lucretia Pace RN, am serving as a scribe; to document services personally performed by Dr. Layo Sevilla -based on data collection and the provider's statements to me.     Provider Disclosure:  I agree with above History, Review of Systems, Physical exam and Plan.  I have reviewed the content of the documentation and have edited it as needed. I have personally performed the services documented here and the documentation accurately represents those services and the decisions I have made.      Electronically signed by:  Layo Sevilla MD  [facility] [place]    Identified Health Risks:

## 2019-11-07 ENCOUNTER — OFFICE VISIT (OUTPATIENT)
Dept: PEDIATRICS | Facility: CLINIC | Age: 84
End: 2019-11-07
Payer: COMMERCIAL

## 2019-11-07 ENCOUNTER — TELEPHONE (OUTPATIENT)
Dept: PEDIATRICS | Facility: CLINIC | Age: 84
End: 2019-11-07

## 2019-11-07 ENCOUNTER — ANCILLARY PROCEDURE (OUTPATIENT)
Dept: MAMMOGRAPHY | Facility: CLINIC | Age: 84
End: 2019-11-07
Payer: COMMERCIAL

## 2019-11-07 ENCOUNTER — OFFICE VISIT (OUTPATIENT)
Dept: PHARMACY | Facility: CLINIC | Age: 84
End: 2019-11-07
Payer: COMMERCIAL

## 2019-11-07 VITALS
HEIGHT: 62 IN | WEIGHT: 141.2 LBS | RESPIRATION RATE: 18 BRPM | OXYGEN SATURATION: 95 % | BODY MASS INDEX: 25.98 KG/M2 | DIASTOLIC BLOOD PRESSURE: 52 MMHG | SYSTOLIC BLOOD PRESSURE: 162 MMHG | HEART RATE: 70 BPM | TEMPERATURE: 97.9 F

## 2019-11-07 DIAGNOSIS — J45.40 MODERATE PERSISTENT ASTHMA WITHOUT COMPLICATION: ICD-10-CM

## 2019-11-07 DIAGNOSIS — R20.0 NUMBNESS AND TINGLING OF FOOT: ICD-10-CM

## 2019-11-07 DIAGNOSIS — M85.89 OSTEOPENIA OF MULTIPLE SITES: ICD-10-CM

## 2019-11-07 DIAGNOSIS — H40.003 GLAUCOMA SUSPECT, BILATERAL: ICD-10-CM

## 2019-11-07 DIAGNOSIS — K58.0 IRRITABLE BOWEL SYNDROME WITH DIARRHEA: ICD-10-CM

## 2019-11-07 DIAGNOSIS — E78.5 HYPERLIPIDEMIA LDL GOAL <130: ICD-10-CM

## 2019-11-07 DIAGNOSIS — I51.81 STRESS-INDUCED CARDIOMYOPATHY: ICD-10-CM

## 2019-11-07 DIAGNOSIS — I25.2 OLD MYOCARDIAL INFARCTION: ICD-10-CM

## 2019-11-07 DIAGNOSIS — J30.2 SEASONAL ALLERGIC RHINITIS, UNSPECIFIED TRIGGER: ICD-10-CM

## 2019-11-07 DIAGNOSIS — R73.03 PREDIABETES: ICD-10-CM

## 2019-11-07 DIAGNOSIS — I10 ESSENTIAL HYPERTENSION WITH GOAL BLOOD PRESSURE LESS THAN 140/90: ICD-10-CM

## 2019-11-07 DIAGNOSIS — R11.0 NAUSEA: ICD-10-CM

## 2019-11-07 DIAGNOSIS — K21.9 GASTROESOPHAGEAL REFLUX DISEASE WITHOUT ESOPHAGITIS: Primary | ICD-10-CM

## 2019-11-07 DIAGNOSIS — I71.20 THORACIC AORTIC ANEURYSM WITHOUT RUPTURE (H): ICD-10-CM

## 2019-11-07 DIAGNOSIS — Z12.31 VISIT FOR SCREENING MAMMOGRAM: ICD-10-CM

## 2019-11-07 DIAGNOSIS — Z00.00 ENCOUNTER FOR MEDICARE ANNUAL WELLNESS EXAM: Primary | ICD-10-CM

## 2019-11-07 DIAGNOSIS — Z71.85 VACCINE COUNSELING: ICD-10-CM

## 2019-11-07 DIAGNOSIS — R20.2 NUMBNESS AND TINGLING OF FOOT: ICD-10-CM

## 2019-11-07 PROBLEM — L08.9 SKIN INFECTION: Status: RESOLVED | Noted: 2018-08-01 | Resolved: 2019-11-07

## 2019-11-07 PROCEDURE — 77067 SCR MAMMO BI INCL CAD: CPT | Mod: TC

## 2019-11-07 PROCEDURE — 99397 PER PM REEVAL EST PAT 65+ YR: CPT | Performed by: INTERNAL MEDICINE

## 2019-11-07 PROCEDURE — 99213 OFFICE O/P EST LOW 20 MIN: CPT | Mod: 25 | Performed by: INTERNAL MEDICINE

## 2019-11-07 PROCEDURE — 99207 C PAF COMPLETED  NO CHARGE: CPT | Mod: 25 | Performed by: INTERNAL MEDICINE

## 2019-11-07 PROCEDURE — 99207 ZZC NO CHARGE LOS: CPT | Performed by: PHARMACIST

## 2019-11-07 RX ORDER — FAMOTIDINE 10 MG
10 TABLET ORAL 2 TIMES DAILY PRN
Qty: 60 TABLET | Refills: 2 | Status: SHIPPED | OUTPATIENT
Start: 2019-11-07 | End: 2019-11-21

## 2019-11-07 RX ORDER — AMLODIPINE BESYLATE 2.5 MG/1
TABLET ORAL
Qty: 180 TABLET | Refills: 3
Start: 2019-11-07 | End: 2019-11-21 | Stop reason: DRUGHIGH

## 2019-11-07 RX ORDER — DICYCLOMINE HYDROCHLORIDE 10 MG/1
10 CAPSULE ORAL 4 TIMES DAILY PRN
Qty: 30 CAPSULE | Refills: 11
Start: 2019-11-07 | End: 2020-08-06

## 2019-11-07 RX ORDER — HYDROCHLOROTHIAZIDE 12.5 MG/1
12.5 CAPSULE ORAL DAILY
Qty: 90 CAPSULE | Refills: 0 | Status: SHIPPED | OUTPATIENT
Start: 2019-11-07 | End: 2019-12-20

## 2019-11-07 ASSESSMENT — MIFFLIN-ST. JEOR: SCORE: 1053.73

## 2019-11-07 NOTE — ASSESSMENT & PLAN NOTE
"Advised patient to take Calcium with Vitamin D-one tablet twice daily.  Patient reports that she didn't tolerate Alendronate in the past -this causes \"stomach problems\".    "

## 2019-11-07 NOTE — LETTER
My Asthma Action Plan    Name: Genie Persaud   YOB: 1935  Date: 11/7/2019   My doctor: Layo Sevilla MD   My clinic: [facility]        My Control Medicine: Fluticasone propionate + salmeterol (Advair Diskus or Wixela Inhub) -  250/50 mcg .  My Rescue Medicine: Albuterol (Proair/Ventolin/Proventil HFA) 2-4 puffs EVERY 4 HOURS as needed. Use a spacer if recommended by your provider.  My Oral Steroid Medicine: Prednisone prn asthma flare-up My Asthma Severity:   Moderate Persistent  Know your asthma triggers: cold air and seasonal allergies               GREEN ZONE   Good Control    I feel good    No cough or wheeze    Can work, sleep and play without asthma symptoms       Take your asthma control medicine every day.     1. If exercise triggers your asthma, take your rescue medication    15 minutes before exercise or sports, and    During exercise if you have asthma symptoms  2. Spacer to use with inhaler: If you have a spacer, make sure to use it with your inhaler             YELLOW ZONE Getting Worse  I have ANY of these:    I do not feel good    Cough or wheeze    Chest feels tight    Wake up at night   1. Keep taking your Green Zone medications  2. Start taking your rescue medicine:    every 20 minutes for up to 1 hour. Then every 4 hours for 24-48 hours.  3. If you stay in the Yellow Zone for more than 12-24 hours, contact your doctor.  4. If you do not return to the Green Zone in 12-24 hours or you get worse, start taking your oral steroid medicine if prescribed by your provider.           RED ZONE Medical Alert - Get Help  I have ANY of these:    I feel awful    Medicine is not helping    Breathing getting harder    Trouble walking or talking    Nose opens wide to breathe       1. Take your rescue medicine NOW  2. If your provider has prescribed an oral steroid medicine, start taking it NOW  3. Call your doctor NOW  4. If you are still in the Red Zone after 20 minutes and you  have not reached your doctor:    Take your rescue medicine again and    Call 911 or go to the emergency room right away    See your regular doctor within 2 weeks of an Emergency Room or Urgent Care visit for follow-up treatment.          Annual Reminders:  Meet with Asthma Educator,  Flu Shot in the Fall, consider Pneumonia Vaccination for patients with asthma (aged 19 and older).    Pharmacy: [id number] - [place], [place] - [address]                          Asthma Triggers  How To Control Things That Make Your Asthma Worse    Triggers are things that make your asthma worse.  Look at the list below to help you find your triggers and what you can do about them.  You can help prevent asthma flare-ups by staying away from your triggers.      Trigger                                                          What you can do   Cigarette Smoke  Tobacco smoke can make asthma worse. Do not allow smoking in your home, car or around you.  Be sure no one smokes at a child s day care or school.  If you smoke, ask your health care provider for ways to help you quit.  Ask family members to quit too.  Ask your health care provider for a referral to Quit Plan to help you quit smoking, or call 1-346-069-PLAN.     Colds, Flu, Bronchitis  These are common triggers of asthma. Wash your hands often.  Don t touch your eyes, nose or mouth.  Get a flu shot every year.     Dust Mites  These are tiny bugs that live in cloth or carpet. They are too small to see. Wash sheets and blankets in hot water every week.   Encase pillows and mattress in dust mite proof covers.  Avoid having carpet if you can. If you have carpet, vacuum weekly.   Use a dust mask and HEPA vacuum.   Pollen and Outdoor Mold  Some people are allergic to trees, grass, or weed pollen, or molds. Try to keep your windows closed.  Limit time out doors when pollen count is high.   Ask you health care provider about taking medicine during allergy season.     Animal  Dander  Some people are allergic to skin flakes, urine or saliva from pets with fur or feathers. Keep pets with fur or feathers out of your home.    If you can t keep the pet outdoors, then keep the pet out of your bedroom.  Keep the bedroom door closed.  Keep pets off cloth furniture and away from stuffed toys.     Mice, Rats, and Cockroaches   Some people are allergic to the waste from these pests.   Cover food and garbage.  Clean up spills and food crumbs.  Store grease in the refrigerator.   Keep food out of the bedroom.   Indoor Mold  This can be a trigger if your home has high moisture. Fix leaking faucets, pipes, or other sources of water.   Clean moldy surfaces.  Dehumidify basement if it is damp and smelly.   Smoke, Strong Odors, and Sprays  These can reduce air quality. Stay away from strong odors and sprays, such as perfume, powder, hair spray, paints, smoke incense, paint, cleaning products, candles and new carpet.   Exercise or Sports  Some people with asthma have this trigger. Be active!  Ask your doctor about taking medicine before sports or exercise to prevent symptoms.    Warm up for 5-10 minutes before and after sports or exercise.     Other Triggers of Asthma  Cold air:  Cover your nose and mouth with a scarf.  Sometimes laughing or crying can be a trigger.  Some medicines and food can trigger asthma.

## 2019-11-07 NOTE — ASSESSMENT & PLAN NOTE
Reports that she has been off of Aspirin for one year.  Resume taking Aspirin 81 mg daily  Has follow-up appointment with Cardiology in two weeks

## 2019-11-07 NOTE — PROGRESS NOTES
SUBJECTIVE/OBJECTIVE:                Genie Persaud is a 84 year old female coming in for a follow-up visit for Medication Therapy Management.  She was referred to me from Dr. Sevilla. Seen as a care team today. First of 2019.    Chief Complaint: Follow up from MTM visit on ***.  ***  Personal Healthcare Goals: ***    Allergies:  Tobacco: none  Alcohol: not currently using  Caffeine: none  PMH: reviewed    Medication Adherence/Access:  Patient uses pill tray for medications  Patient takes medications 3 time(s) per day.   Per patient, misses medication 0 times per week.   Medication barriers: none.   The patient fills medications at [place]: NO, fills medications at [facility], [place].     GERD: Current medications include: Prilosec (omeprazole) 20 once daily. Pt c/o no current symptoms.  Patient feels that current regimen is effective.  She has not tried reducing this medication.     Allergic rhinitis: Current medications include azelastine 0.1% spray 2 sprays twice daily, ipratropium 2 sprays each nostril BID, and Zyzal 5 mg once daily.  Primary triggers are humidity, dust mites, pollens, animal dander and mold. Pt feels that current therapy is effective.See Dr. Monzon, Allergy specialist who recommended stopping azelastine and ipratropium nasal spray last March, she does not want to do this since her allergies are better.     Asthma:  Current medications Fluticasone+Salmeterol (Advair) 2 puffs twice a day (she does not like generic inhaler) and albuterol PRN (not currently using), not currently taking prednisone but has been taking prednisone 5mg once daily as needed, uses about #30 tablets each year.  Pt states that she will use prednisone for 3 days then stop.  Pt states that she cannot sleep when taking prednisone. Pt rinses her mouth, brushes teeth then uses mouthwash after Advair.   Asthma and allergies prevented her from working in her garden this last summer due to increased humidity.  Pt saw Dr. Metz in August who  recommended trying generic Advair inhaler and using nasal saline rinses as needed.    AAP on file: YES  ACT Total Scores 5/1/2018 11/1/2018 5/21/2019   ACT TOTAL SCORE - - -   ASTHMA ER VISITS - - -   ASTHMA HOSPITALIZATIONS - - -   ACT TOTAL SCORE (Goal Greater than or Equal to 20) 20 23 21   In the past 12 months, how many times did you visit the emergency room for your asthma without being admitted to the hospital? 0 0 0   In the past 12 months, how many times were you hospitalized overnight because of your asthma? 0 0 0        Hx MI:  Currently taking aspirin 81mg every day, metoprolol 100 mg daily,and simvastatin 20 mg every other day.  No current side effects and the patient states no bruising form aspirin.  Pt was seen by cardiology Dr. Hernandez in August who recommened stopping Eliquis, resolved Afib.  Pt is no longer taking Eliquis.     Hypertension/Non ischemic Cardiomyopathy: Current medications include hydrochlorothiazide 12.5mg QD, metoprolol 100mg ER QD, and amlodipine 2.5 mg twice daily.  Heart attack in March.  Pt monitors blood pressures at home and states that it fluctuates specifically at the clinic, but is happy about current therapy.  Previus treatment options have caused dizziness, but pt is not experiencing with current therapy.   Echo Sept 2018 shows normalization of EF 55%. Last cardiology visit was 10/26/2018 with Diana Nelson PA-C.    BP Readings from Last 3 Encounters:   10/17/19 (!) 168/66   08/14/19 110/48   08/12/19 (!) 180/64         Hyperlipidemia: Current therapy includes {LIPID TREATMENT:915193}.  Pt reports {mtmlipidsideeffect:975778}  {lipidascvd:708667}  Recent Labs   Lab Test 10/21/19  0940 02/28/19  1014  08/13/15  0842 08/30/13  0835   CHOL 152 162   < > 141 158   HDL 59 63   < > 63 48*   LDL 76 83   < > 63 83   TRIG 84 79   < > 75 136   CHOLHDLRATIO  --   --   --  2.2 3.3    < > = values in this interval not displayed.        Osteoporosis:  Pt has been diagnosed  with osteoporosis per Dr. Sevilla's letter. Pt is current taking calcium + D (Citracal) 2 tablets per day.  She reports she had problems with her stomach when on alendronate in the past and does not want to restart as his letter suggested.   Pt currently drinks milk every day along with eating cheese and mixed salads.    Last DEXA: 07/13/2018 Osteoporosis of left and right hip.  No signs of verterbral fractures.  No vitamin D level.         Glaucoma: Current medication is latanoprost 0.005% ou every day.  Reports good eye pressure. Pt is happy with current therapy.     Prediabetes/Foot numbness: Taking none. She endorses feeling occasional foot numbness.  Vitamin B12: 394 (6/10/19)  Lab Results   Component Value Date    A1C 6.2 06/10/2019         Immunizations: Pt feels she gets sick every time from a vaccine. She is not interested in any vaccines today.   Most Recent Immunizations   Administered Date(s) Administered     Mantoux Tuberculin Skin Test 02/27/2009     Pneumo Conj 13-V (2010&after) 10/06/2017     Pneumococcal 23 valent 10/20/2004     TDAP Vaccine (Adacel) 10/06/2017         ***: ***  ***: ***  ***: ***  ***: ***    Today's Vitals: LMP  (LMP Unknown)     Last Comprehensive Metabolic Panel:  Sodium   Date Value Ref Range Status   10/08/2019 136 133 - 144 mmol/L Final     Potassium   Date Value Ref Range Status   10/08/2019 4.3 3.4 - 5.3 mmol/L Final     Chloride   Date Value Ref Range Status   10/08/2019 104 94 - 109 mmol/L Final     Carbon Dioxide   Date Value Ref Range Status   10/08/2019 28 20 - 32 mmol/L Final     Anion Gap   Date Value Ref Range Status   10/08/2019 4 3 - 14 mmol/L Final     Glucose   Date Value Ref Range Status   10/08/2019 99 70 - 99 mg/dL Final     Urea Nitrogen   Date Value Ref Range Status   10/08/2019 23 7 - 30 mg/dL Final     Creatinine   Date Value Ref Range Status   10/08/2019 1.09 (H) 0.52 - 1.04 mg/dL Final     GFR Estimate   Date Value Ref Range Status   10/08/2019 47 (L) >60  "mL/min/[1.73_m2] Final     Comment:     Non  GFR Calc  Starting 12/18/2018, serum creatinine based estimated GFR (eGFR) will be   calculated using the Chronic Kidney Disease Epidemiology Collaboration   (CKD-EPI) equation.       Calcium   Date Value Ref Range Status   10/08/2019 8.8 8.5 - 10.1 mg/dL Final     Bilirubin Total   Date Value Ref Range Status   05/21/2019 0.5 0.2 - 1.3 mg/dL Final     Alkaline Phosphatase   Date Value Ref Range Status   05/21/2019 73 40 - 150 U/L Final     ALT   Date Value Ref Range Status   05/21/2019 21 0 - 50 U/L Final     AST   Date Value Ref Range Status   05/21/2019 16 0 - 45 U/L Final         ASSESSMENT:              {mtmpartdquestion:487437}    Medication Adherence: {adherenceassess:918166}, {ADHERENCEOPTIONSASSES:047302}    ***: ***  ***: ***  ***: ***  ***: ***  ***: ***     PLAN:                {ENRIQUE?:839363}      Asthma:  1. ACT today    I spent {time:140837} with this patient today{MTMpartdbillingquestion:406101}. { :453654}. A copy of the visit note was provided to the patient's {[facility] chart:864508} provider.     Will follow up in ***.    The patient {GIVEN/NOT GIVEN:909688::\"was given\"} a summary of these recommendations as an after visit summary.    ***  "

## 2019-11-07 NOTE — ASSESSMENT & PLAN NOTE
Interpretation Summary-Echocardiogram 11/01/19-     The ascending aorta is mildly dilated- 4.1 cm     On echo dated 09/05/2018 LVEF was reported 55% and ascending aorta was noted  4.3-4.4 cm.

## 2019-11-07 NOTE — PROGRESS NOTES
SUBJECTIVE/OBJECTIVE:                Genie Persaud is a 84 year old female coming in for a follow-up visit for Medication Therapy Management.  She was referred to me from Dr. Sevilla. Seen as a care team today. First of 2019.    Chief Complaint: Follow up from MTM visit on 11/1/18 with Kathie Forde.  Mediation review.    Allergies: Revied  Tobacco: none  Alcohol: not currently using  Caffeine: none  PMH: reviewed    Medication Adherence/Access:  Patient uses pill tray for medications  Patient takes medications 3 time(s) per day.   Per patient, misses medication 0 times per week.   Medication barriers: none.   The patient fills medications at [place]: NO, fills medications at [facility].     GERD: Current medications include: Prilosec (omeprazole) 20 once daily. Pt c/o no current symptoms.  Patient feels that current regimen is effective for nausea sensation in the morning. She denies symptoms of reflux or heartburn.  She has not tried reducing this medication. However, is she interested in doing so.     Allergic rhinitis: Current medications include azelastine 0.1% spray 2 sprays twice daily, ipratropium 2 sprays each nostril BID, and Zyzal 5 mg once daily.  Primary triggers are humidity, dust mites, pollens, animal dander and mold. Pt feels that current therapy is effective. See Dr. Monzon, Allergy specialist. She feels her current regimen is effective and is not interested in changing regimen. Denies any side effects with therapy.     Asthma:  Current medications Fluticasone+Salmeterol (Advair) 2 puffs twice a day and albuterol PRN (not currently using), not currently taking prednisone but has been taking prednisone 5mg once daily as needed. Followed by pulmonology for this. Pt rinses her mouth, brushes teeth then uses mouthwash after Advair.     Triggers: pollen, changes in seasons, allergies  Symptoms: she has a cough and SOB with exertion. However she states her asthma is well controlled.  AAP on file: YES  ACT  Total Scores 11/1/2018 5/21/2019 11/7/2019   ACT TOTAL SCORE - - -   ASTHMA ER VISITS - - -   ASTHMA HOSPITALIZATIONS - - -   ACT TOTAL SCORE (Goal Greater than or Equal to 20) 23 21 22   In the past 12 months, how many times did you visit the emergency room for your asthma without being admitted to the hospital? 0 0 0   In the past 12 months, how many times were you hospitalized overnight because of your asthma? 0 0 0        Hx MI:  She states she has been off aspirin 81mg daily for a year now. She is not certain if she is to restart or not. Currently taking metoprolol  mg daily and simvastatin 20 mg every other day.  No current side effects. Pt was previously taking Eliquis. Denies any side effects.      Hypertension/Non ischemic Cardiomyopathy: Current medications include hydrochlorothiazide 12.5mg QD, metoprolol 100mg ER QD, and amlodipine 2.5 mg twice daily. Did not tolerate losartan in the past. Pt monitors blood pressures at home and states they are always better/lower. She notices her BP is always higher in clinic. Cardiology recently increased her amlodipine and has been tolerating therapy.   Follows with cardiology.  ECHO: 11/1/19 EF 50-55%  BP Readings from Last 3 Encounters:   10/17/19 (!) 168/66   08/14/19 110/48   08/12/19 (!) 180/64       Hyperlipidemia: Current therapy includes simvastatin 20 mg once daily.  Pt reports no significant myalgias or other side effects. Previously was on atorvastatin but cannot recall why this was changed.   Recent Labs   Lab Test 10/21/19  0940 02/28/19  1014  08/13/15  0842 08/30/13  0835   CHOL 152 162   < > 141 158   HDL 59 63   < > 63 48*   LDL 76 83   < > 63 83   TRIG 84 79   < > 75 136   CHOLHDLRATIO  --   --   --  2.2 3.3    < > = values in this interval not displayed.        Osteopenia:  Pt has been diagnosed with osteoporosis per Dr. Sevilla's letter. Pt is taking calcium + D (Citracal) 2 tablets per day.  She reports she had problems with her stomach when on  "alendronate in the past and does not want to restart as his letter suggested.   Pt currently drinks milk every day along with eating cheese.    Last DEXA: 07/13/2018 Osteopenia   No signs of verterbral fractures.  No vitamin D level.       Glaucoma: Current medication is latanoprost 0.005% ou every day.  Reports good eye pressure. Pt is happy with current therapy. No side effects or concerns.    Prediabetes/Foot numbness: Taking none. She endorses feeling occasional foot numbness.  Vitamin B12: 394 (6/10/19)  TSH   Date Value Ref Range Status   06/10/2019 1.94 0.40 - 4.00 mU/L Final     Lab Results   Component Value Date    A1C 6.2 06/10/2019       Immunizations: Pt feels she gets sick every time from a vaccine. She is not interested in any vaccines today.   Most Recent Immunizations   Administered Date(s) Administered     Mantoux Tuberculin Skin Test 02/27/2009     Pneumo Conj 13-V (2010&after) 10/06/2017     Pneumococcal 23 valent 10/20/2004     TDAP Vaccine (Adacel) 10/06/2017       Today's Vitals: BP (!) 162/52 (BP Location: Right arm, Patient Position: Sitting, Cuff Size: Adult Regular)   Pulse 70   Temp 97.9  F (36.6  C) (Oral)   Resp 18   Ht 5' 2\" (1.575 m)   Wt 141 lb 3.2 oz (64 kg)   LMP  (LMP Unknown)   SpO2 95%   BMI 25.83 kg/m      Last Comprehensive Metabolic Panel:  Sodium   Date Value Ref Range Status   10/08/2019 136 133 - 144 mmol/L Final     Potassium   Date Value Ref Range Status   10/08/2019 4.3 3.4 - 5.3 mmol/L Final     Chloride   Date Value Ref Range Status   10/08/2019 104 94 - 109 mmol/L Final     Carbon Dioxide   Date Value Ref Range Status   10/08/2019 28 20 - 32 mmol/L Final     Anion Gap   Date Value Ref Range Status   10/08/2019 4 3 - 14 mmol/L Final     Glucose   Date Value Ref Range Status   10/08/2019 99 70 - 99 mg/dL Final     Urea Nitrogen   Date Value Ref Range Status   10/08/2019 23 7 - 30 mg/dL Final     Creatinine   Date Value Ref Range Status   10/08/2019 1.09 (H) 0.52 " - 1.04 mg/dL Final     GFR Estimate   Date Value Ref Range Status   10/08/2019 47 (L) >60 mL/min/[1.73_m2] Final     Comment:     Non  GFR Calc  Starting 12/18/2018, serum creatinine based estimated GFR (eGFR) will be   calculated using the Chronic Kidney Disease Epidemiology Collaboration   (CKD-EPI) equation.       Calcium   Date Value Ref Range Status   10/08/2019 8.8 8.5 - 10.1 mg/dL Final     Bilirubin Total   Date Value Ref Range Status   05/21/2019 0.5 0.2 - 1.3 mg/dL Final     Alkaline Phosphatase   Date Value Ref Range Status   05/21/2019 73 40 - 150 U/L Final     ALT   Date Value Ref Range Status   05/21/2019 21 0 - 50 U/L Final     AST   Date Value Ref Range Status   05/21/2019 16 0 - 45 U/L Final         ASSESSMENT:                  Medication Adherence: good, no issues identified    GERD: Needs improvement.  Chronic PPI use places patient at an increased risk of C. Diff, hypomagnesemia and lower bone mineral density, these risks were reviewed with the patient.  Pt would benefit from the following changes: switching to H2RA agent.    Allergic rhinitis: Stable.      Asthma: Stable. Up to date and at goal of ACT > or equal to 20.    Hx MI: Needs improvement. Pt would benefit from aspirin therapy.    Hypertension/Non ischemic Cardiomyopathy: Needs improvement. Pt is not meeting BP goal of < 140/90 mmHg. Given history of intolerance to hypertensive agents, recommend she increase CCB slowly.    Hyperlipidemia: Needs Improvement. Pt is on moderate intensity statin which is indicated based on 2019 ACC/AHA guidelines for lipid management. However, given drug interaction with simvastatin and CKD recommend switch to atorvastatin.    Osteopenia: Needs Improvement. Pt would benefit from changing calcium dosing from every day to BID. Pt would benefit from a vitamin D level to ensure level is > 30.     Glaucoma: stable.    Prediabetes/Foot numbness: Needs improvement. PCP assess and r/o neuropathy.  Future may consider therapy to relieve discomfort. No change at this time.    Immunization: Needs improvement. Pt would benefit from influenza and shingles vaccine.      PLAN:                  GERD:  1. Stop the omeprazole.  2. Start famotidine 10 mg daily. If symptoms not controlled, you may increase to 10 mg twice a day.    Osteopenia:  1. Change the calcium + vitamin D supplement from 2 tablets once a day to 1 tablet twice a day.    Aspirin:  1. Restart the aspirin 81 mg daily. Pt to ask the cardiologist about when you see them in 2 weeks if you can continue the aspirin.     Cholesterol:  1. Stop simvastatin. Start atorvastatin 20 mg daily.     Blood pressure :  1. Increase amlodipine to 2 tabs in the AM and 1 in the PM.  Follow up with cardiology for a blood pressure recheck.      Screenings: we can stop mammogram and colonoscopy screenings moving forward.    Asthma:  1. ACT today    Foot Numbness:  1. Continue to monitor at this time.    I spent 70 minutes with this patient today. All changes were made via verbal approval with Dr. Sevilla. A copy of the visit note was provided to the patient's primary care provider.     Will follow up in 6 months (SB3). 3 month lab and BP check.    The patient was given a summary of these recommendations as an after visit summary. Please see provider note from this encounter note for AVS.       Reina Mason PharmD  Medication Therapy Management Pharmacist  Pager#: [phone]

## 2019-11-07 NOTE — ASSESSMENT & PLAN NOTE
Due to Creatinine clearance will change from Simvastatin 20 mg to Atorvastatin 20 mg daily.  Repeat Lipid level in two months.

## 2019-11-07 NOTE — ASSESSMENT & PLAN NOTE
PFTs done on 09/13/19  FVC-1.36  FEV1-0.82  FEV1/FEV%-61  PFTs demonstrate severe obstructive impairment.  Continue daily Advair Diskus.  Albuterol inhaler prn

## 2019-11-07 NOTE — ASSESSMENT & PLAN NOTE
Advised Shingrix and Influenza vaccines, but patient declined any further vaccinations.  Colon cancer screenings up to date.  AAP completed today.  Mammogram done today and discussed with patient there is no need to continue mammograms going forward.  No need for further Colonoscopies.

## 2019-11-07 NOTE — PATIENT INSTRUCTIONS
Recommendations from today's Care Team visit:                                                      Thank you for participating in our care team visit today!    Reflux/nausea:  1. Stop the omeprazole.  2. Start famotidine 10 mg daily. If symptoms not controlled, you may increase to 10 mg twice a day.    Osteopenia:  1. Please change the calcium + vitamin D supplement from 2 tablets once a day to 1 tablet twice a day.    Aspirin:  1. Restart the aspirin 81 mg daily. Please ask the cardiologist about when you see them in 2 weeks if you can continue the aspirin.     Cholesterol:  1. Stop simvastatin. Start atorvastatin 20 mg daily.     blood pressure :  Let's increase your amlodipine to 2 tabs in the AM and 1 in the PM.  Follow up with cardiology for a blood pressure recheck.     Set up a blood pressure recheck in 3 months, fasting.     Screenings: we can stop mammogram and colonoscopy screenings moving forward.    Next care team visit:     It was a pleasure seeing you today!  Please feel free to contact us with any questions or concerns you have.      My Care Team Members                                                      Dr. Sevilla     Nurse DAVID Mason, PharmD  Medication Therapy Management Pharmacist  Pager#: [phone]    You may receive a survey about the services you received.  I would appreciate your feedback to us serve you better in the future. Please fill it out and return it when you can. Your comments will be anonymous.

## 2019-11-07 NOTE — ASSESSMENT & PLAN NOTE
Blood pressure elevated at today's appointment.   Per Cardiology note of 10/17/19-from a cardiac perspective she has been off of her losartan due to dizziness and I guess it was on recall at one time as well when she decided not to continue it.  Her blood pressures have been elevated, although she is getting lower numbers at home.  It looks like it is a little bit all over the place here.  Today was 168/66 but in August, it was measured at 110/48.  Two days prior to the measurement, it was 180/64.  I had suggested that we increase her amlodipine dose, which she is currently taking 2.5 mg once daily, to 2.5 mg twice daily.  BP Readings from Last 3 Encounters:   11/07/19 (!) 162/52   10/17/19 (!) 168/66   08/14/19 110/48   Today advised patient to increase Amlodipine to 5 mg in the am and 2.5 mg in the evening.  Recheck blood pressure in two weeks at Cardiology appointment

## 2019-11-07 NOTE — PROGRESS NOTES
SUBJECTIVE/OBJECTIVE:                Genie Persaud is a 84 year old female coming in for a follow-up visit for Medication Therapy Management.  She was referred to me from Dr. Sevilla. Seen as a care team today. First of 2019.    Chief Complaint: Follow up from MTM visit on 11/1/18 with Kathie Forde.  Mediation review.    Allergies: Revied  Tobacco: none  Alcohol: not currently using  Caffeine: none  PMH: reviewed    Medication Adherence/Access:  Patient uses pill tray for medications  Patient takes medications 3 time(s) per day.   Per patient, misses medication 0 times per week.   Medication barriers: none.   The patient fills medications at [facility]: NO, fills medications at [facility].     GERD: Current medications include: Prilosec (omeprazole) 20 once daily. Pt c/o no current symptoms.  Patient feels that current regimen is effective for nausea sensation in the morning. She denies symptoms of reflux or heartburn.  She has not tried reducing this medication. However, is she interested in doing so.     Allergic rhinitis: Current medications include azelastine 0.1% spray 2 sprays twice daily, ipratropium 2 sprays each nostril BID, and Zyzal 5 mg once daily.  Primary triggers are humidity, dust mites, pollens, animal dander and mold. Pt feels that current therapy is effective. See Dr. oMnzon, Allergy specialist. She feels her current regimen is effective and is not interested in changing regimen. Denies any side effects with therapy.     Asthma:  Current medications Fluticasone+Salmeterol (Advair) 2 puffs twice a day and albuterol PRN (not currently using), not currently taking prednisone but has been taking prednisone 5mg once daily as needed. Followed by pulmonology for this. Pt rinses her mouth, brushes teeth then uses mouthwash after Advair.     Triggers: pollen, changes in seasons, allergies  Symptoms: she has a cough and SOB with exertion. However she states her asthma is well controlled.  AAP on file: YES  ACT  Total Scores 11/1/2018 5/21/2019 11/7/2019   ACT TOTAL SCORE - - -   ASTHMA ER VISITS - - -   ASTHMA HOSPITALIZATIONS - - -   ACT TOTAL SCORE (Goal Greater than or Equal to 20) 23 21 22   In the past 12 months, how many times did you visit the emergency room for your asthma without being admitted to the hospital? 0 0 0   In the past 12 months, how many times were you hospitalized overnight because of your asthma? 0 0 0        Hx MI:  She states she has been off aspirin 81mg daily for a year now. She is not certain if she is to restart or not. Currently taking metoprolol  mg daily and simvastatin 20 mg every other day.  No current side effects. Pt was previously taking Eliquis. Denies any side effects.      Hypertension/Non ischemic Cardiomyopathy: Current medications include hydrochlorothiazide 12.5mg QD, metoprolol 100mg ER QD, and amlodipine 2.5 mg twice daily. Did not tolerate losartan in the past. Pt monitors blood pressures at home and states they are always better/lower. She notices her BP is always higher in clinic. Cardiology recently increased her amlodipine and has been tolerating therapy.   Follows with cardiology.  ECHO: 11/1/19 EF 50-55%  BP Readings from Last 3 Encounters:   10/17/19 (!) 168/66   08/14/19 110/48   08/12/19 (!) 180/64       Hyperlipidemia: Current therapy includes simvastatin 20 mg once daily.  Pt reports no significant myalgias or other side effects. Previously was on atorvastatin but cannot recall why this was changed.   Recent Labs   Lab Test 10/21/19  0940 02/28/19  1014  08/13/15  0842 08/30/13  0835   CHOL 152 162   < > 141 158   HDL 59 63   < > 63 48*   LDL 76 83   < > 63 83   TRIG 84 79   < > 75 136   CHOLHDLRATIO  --   --   --  2.2 3.3    < > = values in this interval not displayed.        Osteopenia:  Pt has been diagnosed with osteoporosis per Dr. Sevilla's letter. Pt is taking calcium + D (Citracal) 2 tablets per day.  She reports she had problems with her stomach when on  "alendronate in the past and does not want to restart as his letter suggested.   Pt currently drinks milk every day along with eating cheese.    Last DEXA: 07/13/2018 Osteopenia   No signs of verterbral fractures.  No vitamin D level.       Glaucoma: Current medication is latanoprost 0.005% ou every day.  Reports good eye pressure. Pt is happy with current therapy. No side effects or concerns.    Prediabetes/Foot numbness: Taking none. She endorses feeling occasional foot numbness.  Vitamin B12: 394 (6/10/19)  TSH   Date Value Ref Range Status   06/10/2019 1.94 0.40 - 4.00 mU/L Final     Lab Results   Component Value Date    A1C 6.2 06/10/2019       Immunizations: Pt feels she gets sick every time from a vaccine. She is not interested in any vaccines today.   Most Recent Immunizations   Administered Date(s) Administered     Mantoux Tuberculin Skin Test 02/27/2009     Pneumo Conj 13-V (2010&after) 10/06/2017     Pneumococcal 23 valent 10/20/2004     TDAP Vaccine (Adacel) 10/06/2017       Today's Vitals: BP (!) 162/52 (BP Location: Right arm, Patient Position: Sitting, Cuff Size: Adult Regular)   Pulse 70   Temp 97.9  F (36.6  C) (Oral)   Resp 18   Ht 5' 2\" (1.575 m)   Wt 141 lb 3.2 oz (64 kg)   LMP  (LMP Unknown)   SpO2 95%   BMI 25.83 kg/m      Last Comprehensive Metabolic Panel:  Sodium   Date Value Ref Range Status   10/08/2019 136 133 - 144 mmol/L Final     Potassium   Date Value Ref Range Status   10/08/2019 4.3 3.4 - 5.3 mmol/L Final     Chloride   Date Value Ref Range Status   10/08/2019 104 94 - 109 mmol/L Final     Carbon Dioxide   Date Value Ref Range Status   10/08/2019 28 20 - 32 mmol/L Final     Anion Gap   Date Value Ref Range Status   10/08/2019 4 3 - 14 mmol/L Final     Glucose   Date Value Ref Range Status   10/08/2019 99 70 - 99 mg/dL Final     Urea Nitrogen   Date Value Ref Range Status   10/08/2019 23 7 - 30 mg/dL Final     Creatinine   Date Value Ref Range Status   10/08/2019 1.09 (H) 0.52 " - 1.04 mg/dL Final     GFR Estimate   Date Value Ref Range Status   10/08/2019 47 (L) >60 mL/min/[1.73_m2] Final     Comment:     Non  GFR Calc  Starting 12/18/2018, serum creatinine based estimated GFR (eGFR) will be   calculated using the Chronic Kidney Disease Epidemiology Collaboration   (CKD-EPI) equation.       Calcium   Date Value Ref Range Status   10/08/2019 8.8 8.5 - 10.1 mg/dL Final     Bilirubin Total   Date Value Ref Range Status   05/21/2019 0.5 0.2 - 1.3 mg/dL Final     Alkaline Phosphatase   Date Value Ref Range Status   05/21/2019 73 40 - 150 U/L Final     ALT   Date Value Ref Range Status   05/21/2019 21 0 - 50 U/L Final     AST   Date Value Ref Range Status   05/21/2019 16 0 - 45 U/L Final         ASSESSMENT:                  Medication Adherence: good, no issues identified    GERD: Needs improvement.  Chronic PPI use places patient at an increased risk of C. Diff, hypomagnesemia and lower bone mineral density, these risks were reviewed with the patient.  Pt would benefit from the following changes: switching to H2RA agent.    Allergic rhinitis: Stable.      Asthma: Stable. Up to date and at goal of ACT > or equal to 20.    Hx MI: Needs improvement. Pt would benefit from aspirin therapy.    Hypertension/Non ischemic Cardiomyopathy: Needs improvement. Pt is not meeting BP goal of < 140/90 mmHg. Given history of intolerance to hypertensive agents, recommend she increase CCB slowly.    Hyperlipidemia: Needs Improvement. Pt is on moderate intensity statin which is indicated based on 2019 ACC/AHA guidelines for lipid management. However, given drug interaction with simvastatin and CKD recommend switch to atorvastatin.    Osteopenia: Needs Improvement. Pt would benefit from changing calcium dosing from every day to BID. Pt would benefit from a vitamin D level to ensure level is > 30.     Glaucoma: stable.    Prediabetes/Foot numbness: Needs improvement. PCP assess and r/o neuropathy.  Future may consider therapy to relieve discomfort. No change at this time.    Immunization: Needs improvement. Pt would benefit from influenza and shingles vaccine.      PLAN:                  GERD:  1. Stop the omeprazole.  2. Start famotidine 10 mg daily. If symptoms not controlled, you may increase to 10 mg twice a day.    Osteopenia:  1. Change the calcium + vitamin D supplement from 2 tablets once a day to 1 tablet twice a day.    Aspirin:  1. Restart the aspirin 81 mg daily. Pt to ask the cardiologist about when you see them in 2 weeks if you can continue the aspirin.     Cholesterol:  1. Stop simvastatin. Start atorvastatin 20 mg daily.     Blood pressure :  1. Increase amlodipine to 2 tabs in the AM and 1 in the PM.  Follow up with cardiology for a blood pressure recheck.      Screenings: we can stop mammogram and colonoscopy screenings moving forward.    Asthma:  1. ACT today    Foot Numbness:  1. Continue to monitor at this time.    I spent 70 minutes with this patient today. All changes were made via verbal approval with Dr. Sevilla. A copy of the visit note was provided to the patient's primary care provider.     Will follow up in 6 months. 3 month lab and BP check.    The patient was given a summary of these recommendations as an after visit summary.    Reina Mason PharmD  Medication Therapy Management Pharmacist  Pager#: [phone]

## 2019-11-07 NOTE — ASSESSMENT & PLAN NOTE
Echocardiogram completed 11/01/19-Left Ventricle  The left ventricle is normal in size. There is mild concentric left  ventricular hypertrophy. Grade I or early diastolic dysfunction. The visual  ejection fraction is estimated at 50-55%. There is borderline-mild global  hypokinesia of the left ventricle.     Right Ventricle  The right ventricle is normal size. The right ventricular systolic function is  normal.     Atria  The left atrium is mildly dilated. Right atrial size is normal. There is no  color Doppler evidence of an atrial shunt.     Mitral Valve  There is mild mitral annular calcification. There is mild (1+) mitral  regurgitation.        Tricuspid Valve  There is trace tricuspid regurgitation. The right ventricular systolic  pressure is approximated at 36mmHg plus the right atrial pressure. Pulmonary  hypertension.     Aortic Valve  The aortic valve is trileaflet with aortic valve sclerosis. There is mild to  moderate (1-2+) aortic regurgitation. No aortic stenosis is present.     Pulmonic Valve  There is no pulmonic valvular stenosis.     Vessels  The aortic root is normal size. The ascending aorta is Mildly dilated. 4.1 cm.  The inferior vena cava was normal in size with preserved respiratory  variability.     Pericardium  There is no pericardial effusion.     _____________________________________________________________________________  __

## 2019-11-08 ASSESSMENT — ASTHMA QUESTIONNAIRES: ACT_TOTALSCORE: 22

## 2019-11-15 ENCOUNTER — OFFICE VISIT (OUTPATIENT)
Dept: CARDIOLOGY | Facility: CLINIC | Age: 84
End: 2019-11-15
Attending: INTERNAL MEDICINE
Payer: COMMERCIAL

## 2019-11-15 VITALS
BODY MASS INDEX: 25.76 KG/M2 | SYSTOLIC BLOOD PRESSURE: 158 MMHG | WEIGHT: 140 LBS | DIASTOLIC BLOOD PRESSURE: 56 MMHG | HEIGHT: 62 IN | OXYGEN SATURATION: 93 % | HEART RATE: 72 BPM

## 2019-11-15 DIAGNOSIS — I47.10 SVT (SUPRAVENTRICULAR TACHYCARDIA) (H): ICD-10-CM

## 2019-11-15 DIAGNOSIS — I25.10 CORONARY ARTERY DISEASE INVOLVING NATIVE CORONARY ARTERY OF NATIVE HEART WITHOUT ANGINA PECTORIS: ICD-10-CM

## 2019-11-15 DIAGNOSIS — I71.20 THORACIC AORTIC ANEURYSM WITHOUT RUPTURE (H): ICD-10-CM

## 2019-11-15 DIAGNOSIS — I35.1 NONRHEUMATIC AORTIC VALVE INSUFFICIENCY: ICD-10-CM

## 2019-11-15 DIAGNOSIS — I48.91 ATRIAL FIBRILLATION, UNSPECIFIED TYPE (H): ICD-10-CM

## 2019-11-15 PROCEDURE — 99214 OFFICE O/P EST MOD 30 MIN: CPT | Performed by: PHYSICIAN ASSISTANT

## 2019-11-15 RX ORDER — LOSARTAN POTASSIUM 100 MG/1
100 TABLET ORAL DAILY
Refills: 3 | COMMUNITY
Start: 2019-07-25 | End: 2019-11-15

## 2019-11-15 ASSESSMENT — MIFFLIN-ST. JEOR: SCORE: 1048.42

## 2019-11-15 NOTE — PATIENT INSTRUCTIONS
Visit Summary:    Today we discussed:   We need to have you sit down with the pharmacist again and ensure your medication bottles match our list and that it is appropriately updated.     No changes today, but we may go up slightly on one of your blood pressure medicines once we match the lists correctly.     Test results:   Your recent cholesterol was acceptable, and your echocardiogram showed that your aneurysm has not gotten any bigger.     Follow up:   With Dr. Hernandez in ~6 months or sooner if needed.     Please call my nurse Olivia at [phone] with any questions or concerns.

## 2019-11-15 NOTE — LETTER
11/15/2019    Layo Sevilla MD  [address] [place] [address]    RE: Genie Persaud       Dear Colleague,    I had the pleasure of seeing Genie Persaud in the [facility].      Cardiology Progress Note    Date of Service: 11/15/2019      Reason for visit: Follow up hypertension, cardiomyopathy.    Primary cardiologist: Dr. Barak Hernandez      HPI:  Ms. Persaud is a very pleasant 84-year-old woman with PMHx including asthma, chronic peripheral edema, labile hypertension, hypercholesterolemia, ascending aortic aneurysm, CAD, moderate aortic valve disease, and nonischemic cardiomyopathy.  In March 2017 she had an angiogram which showed two vessel nonobstructive coronary disease, but she was also noted to have short bursts of nonsustained supraventricular tachycardia with rates of up to 130 during evaluation. She was then hospitalized again in Nov 2017 with pneumonia and sepsis. Her stay was complicated by a takotsubo stress cardiomyopathy with troponin rise and severe hypokinesis of the LV.  In addition, she developed rapid atrial fibrillation during her stay, with severe peripheral edema and biventricular heart failure. Repeat echo in Jan 2018 showed normalization of her LVEF to 60-65%. Over the next few visits in our clinic her amiodarone and anticoagulation were discontinued as she had no recurrence of afib. In addition, she discontinued her lasix on her own as she felt she no longer required it.     Follow up CT of the aorta in July 2018 showed no significant change in her aneurysm from 2017, measuring 4.4cm. Due to ongoing labile BP, she was placed on hydrochlorothiazide. Repeat echo showed EF 55%, though suggested elevated filling pressures. The RV pressure are elevated at 40-50mmHg + RAP, but RV was of normal size and function. She had mild to moderate AR with no hemodynamically significant aortic stenosis. She has had somewhat challenging control of her BP, and over  the last several months there have been several changes. When she came back in October, her BP was still labile and her amlodipine was increased to 2.5mg BID (she reported being on once daily at that time). She's back today for a planned 1 month follow up after this change.    After her last visit, when we called to check on her progress with the change, there was confusion about her medications. She did not recall being on amlodipine at all and she was taking the metoprolol differently than listed. I asked her to come for a nurse visit to reconcile her medications but she declined. Her BP at that time was running good at home at 125/64. Since that time, she saw her primary provider and it appears that her list has been updated. At that visit, he requested she increase her amlodipine to 5mg in the AM, and 2.5mg in the PM. Her simvastatin was also changed to atorvastatin with plans for an FLP in 2 months.     Today she's back for our planned follow up. Her BP today here is high at 158/56, but she reports it is always higher here. At home she tells me its running 120-140s systolic, and almost always in the 50s for a diastolic. Unfortunately, again today there is significant confusion about her home medications. She initially told our rooming staff she was on losartan, but then told me she stopped it awhile back because it made her dizzy. Then she told me she was always on amlodipine 2.5mg daily and never went up. She also states she didn't make some of the changes the pharmacist assisted her with when she saw Dr. Sevilla. She tells me she continues to just bring the updated list with her everytime one is printed out, but I am not entirely convinced she is making all of these changes at home.     She had an echo done a few weeks ago for continued evaluation of her thoracic aneurysm, which showed mildly dilated aorta at 4.1cm, which is stable from 2018. Her LVEF remains low normal at 50-55%, and RV function is also normal.  She has mild to moderate aortic regurgitation.        ASSESSMENT/PLAN:    1. Labile hypertension.   --BP today is up again here, and she says routinely runs lower at home. Unfortunately, there remains a lot of confusion about her antihypertensive regimen. I've asked her to meet with the pharmacist again and bring all of her pill bottles so we may reconcile her current list appropriately. Based on this, we can consider going up a bit further on the amlodipine if she is willing. I am also concerned that having multiple providers involved is causing confusion for her. Thus, will defer to Dr. Sevilla and her pharmacy team for now. We are happy to assist again if necessary.      2. Nonischemic cardiomyopathy.   --Most recent Echo Nov 2019 with EF 50-55%. Previously, she had a history of stress cardiomyopathy in 2017 in the setting of sepsis and pneumonia.    --I do not believe she is currently on losartan, but does remain on Toprol XL.    --She likes salty food and has some mild chronic dependent leg edema which is unchanged. She has acknowledged in the past she is unlikely to change her dietary habits.      3. Coronary artery disease, nonobstructive.    --Angiogram 3/2017 showed nonobstructive 2V disease with mLAD 50%, D3 50%, pLCx 50%, and mLCx 50% stenosis. Medical management purused. She remains free of angina.   --Continue ASA 81mg daily.     --Was recently on simvastatin, per recent documentation changed to atorvastatin. However, this is not on her current list and will need to be reconciled at her pharmacy visit as well. Her last LDL in Oct 2019 was under reasonable control at 76.     4. Ascending aortic aneurysm.   --Via Echo Oct 2019 her ascending aorta is mildly dilated- 4.1 cm, which is stable from previous. Continue yearly monitoring and focus on BP control.                  Follow up plan:   I've asked her to follow with her primary provider and pharmacy team as above. Will have her return to see Dr. Hernandez  in ~6 months, or sooner if needed.       Orders this Visit:  Orders Placed This Encounter   Procedures     Follow-Up with Cardiologist     Orders Placed This Encounter   Medications     DISCONTD: losartan (COZAAR) 100 MG tablet     Sig: Take 100 mg by mouth daily     Refill:  3     Medications Discontinued During This Encounter   Medication Reason     losartan (COZAAR) 100 MG tablet Stopped by Patient         CURRENT MEDICATIONS:  Current Outpatient Medications   Medication Sig Dispense Refill     acetaminophen (TYLENOL) 500 MG tablet Take 500-1,000 mg by mouth every 8 hours as needed for mild pain       albuterol (PROAIR HFA, PROVENTIL HFA, VENTOLIN HFA) 108 (90 BASE) MCG/ACT inhaler Inhale 2 puffs into the lungs every 6 hours as needed        amLODIPine (NORVASC) 2.5 MG tablet Take 2 tablets (5 mg) in the morning and 1 tablet (2.5 mg) in the evening 180 tablet 3     aspirin 81 MG EC tablet Take 1 tablet (81 mg) by mouth daily       azelastine (ASTELIN) 0.1 % nasal spray SPRAY 1 TO 2 SPRAYS INTO BOTH NOSTRILS 2 TIMES DAILY 30 mL 4     calcium citrate-vitamin D (CALCIUM CITRATE +) 315-200 MG-UNIT TABS Take 2 tablets by mouth every evening        dicyclomine (BENTYL) 10 MG capsule Take 1 capsule (10 mg) by mouth 4 times daily as needed (diarrhea) 30 capsule 11     fluticasone-salmeterol (ADVAIR-HFA) 230-21 MCG/ACT inhaler Inhale 2 puffs into the lungs 2 times daily 12 g 1     hydrochlorothiazide (MICROZIDE) 12.5 MG capsule Take 1 capsule (12.5 mg) by mouth daily 90 capsule 0     ipratropium (ATROVENT) 0.03 % spray Spray 2 sprays in nostril 2 times daily       latanoprost (XALATAN) 0.005 % ophthalmic solution Place 1 drop into both eyes At Bedtime       levocetirizine (XYZAL) 5 MG tablet Take 5 mg by mouth daily       metoprolol succinate ER (TOPROL XL) 100 MG 24 hr tablet Take 1 tablet (100 mg) by mouth daily 30 tablet 3     PREDNISONE PO As needed for asthma flares       famotidine (PEPCID) 10 MG tablet Take 1  tablet (10 mg) by mouth 2 times daily as needed (nausea) (Patient not taking: Reported on 11/15/2019) 60 tablet 2       ALLERGIES     Allergies   Allergen Reactions     Fosamax [Alendronic Acid] GI Disturbance     Augmentin [Amoxicillin-Pot Clavulanate] Diarrhea     Diarrhea and rash     Contrast Dye      Red arm redness and swelling      Evista [Raloxifene]      abd symptoms.      Flu Virus Vaccine      swollen and red at inj site       PAST MEDICAL HISTORY:  Past Medical History:   Diagnosis Date     Anemia 3/10/2009    Chronic disease, by Fe studies; awaiting full GI w/u and repeat colonoscopy, ERCP.     Basal Cell Carcinoma--back      Coronary artery disease involving native coronary artery of native heart without angina pectoris 3/9/2017    3/2/2017 - Two vessel coronary artery disease with mLAD 50% stenosis, D3 50% stenosis, pLCx 50% stenosis and mLCx 50% stenosis     Essential hypertension with goal blood pressure less than 140/90 9/1/2016    White coat hypertension;  24 hour ambulatory monitoring normal. Do not titrate up further.       Hyperlipidemia LDL goal <130 2/10/2010     Impaired fasting glucose 8/26/2010     Moderate persistent asthma      Nonrheumatic aortic valve insufficiency 6/29/2017     osteopenia      Paroxysmal atrial fibrillation (H) 12/26/2017     Pulmonary nodule 3/3/2009    PET scan reportedly normal; biopsy not advised.     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  Problem list name updated by automated process. Provider to review     Unspecified arthropathy, hand      Vasculitis (H) 4/20/2009    Possible increased activity of thoracic aorta, on PET scan w/u for pulmonary  nodule.        PAST SURGICAL HISTORY:  Past Surgical History:   Procedure Laterality Date     C APPENDECTOMY  1957     C LIGATE FALLOPIAN TUBE  1971     C STEREOTACTIC BREAST BIOPSY  2001     CHOLECYSTECTOMY, LAPOROSCOPIC  2008    Cholecystectomy, Laparoscopic     ESOPHAGOSCOPY, GASTROSCOPY, DUODENOSCOPY (EGD), COMBINED  5/17/2013    Procedure: COMBINED ESOPHAGOSCOPY, GASTROSCOPY, DUODENOSCOPY (EGD), BIOPSY SINGLE OR MULTIPLE;  ESOPHAGOSCOPY, GASTROSCOPY, DUODENOSCOPY (EGD)  with bx;  Surgeon: Jeffery Yanez MD;  Location: [facility]     HC DILATION/CURETTAGE DIAG/THER NON OB  1967,1971     HC REMOVE TONSILS/ADENOIDS,<13 Y/O         FAMILY HISTORY:  Family History   Problem Relation Age of Onset     Hypertension Mother      Osteoporosis Mother      C.A.D. Mother      Connective Tissue Disorder Father         scleraderma     Cerebrovascular Disease Paternal Grandmother      Prostate Cancer Other         9/05 passed away due to complications     Breast Cancer Child         youngest dtr       SOCIAL HISTORY:  Social History     Socioeconomic History     Marital status: Single     Spouse name: None     Number of children: 3     Years of education: 15     Highest education level: None   Occupational History     Occupation: semi-retired   Social Needs     Financial resource strain: None     Food insecurity:     Worry: None     Inability: None     Transportation needs:     Medical: None     Non-medical: None   Tobacco Use     Smoking status: Never Smoker     Smokeless tobacco: Never Used   Substance and Sexual Activity     Alcohol use: Yes     Alcohol/week: 1.0 - 2.0 standard drinks     Types: 1 - 2 Standard drinks or equivalent per week     Comment: 1 glass of wine 1-2 days per week     Drug use: No     Sexual activity: Not Currently   Lifestyle     Physical activity:     Days per week: None     Minutes per session: None     Stress: None   Relationships     Social connections:     Talks on phone: None     Gets together:  "None     Attends [employer] service: None     Active member of club or organization: None     Attends meetings of clubs or organizations: None     Relationship status: None     Intimate partner violence:     Fear of current or ex partner: None     Emotionally abused: None     Physically abused: None     Forced sexual activity: None   Other Topics Concern      Service Not Asked     Blood Transfusions Not Asked     Caffeine Concern Not Asked     Occupational Exposure Not Asked     Hobby Hazards Not Asked     Sleep Concern Not Asked     Stress Concern Not Asked     Weight Concern Not Asked     Special Diet Not Asked     Back Care Not Asked     Exercise Not Asked     Bike Helmet Not Asked     Seat Belt Not Asked     Self-Exams Not Asked     Parent/sibling w/ CABG, MI or angioplasty before 65F 55M? No   Social History Narrative     None       Review of Systems:  Cardiovascular: negative for chest pain, palpitations, orthopnea, pos chronic LE edema, unchanged.  Constitutional: negative for chills, sweats, fevers. Pos occasional fatigue.   Resp: Negative for dyspnea at rest, dyspnea on exertion, neg recent cough, neg hemoptysis, pos known chronic lung disease/asthma.  HEENT: Negative for new visual changes, frequent headaches  Gastrointestinal: negative for abdominal pain, diarrhea, nausea, vomiting  Hematologic/lymphatic: Neg systemic anticoagulation, neg hx of blood clots  Musculoskeletal: negative for new back pain, joint pain.  Neurological: negative for focal weakness, LOC, seizures, syncope. Neg dizziness/presyncope.      Physical Exam:  Vitals: BP (!) 158/56 (BP Location: Right arm, Patient Position: Sitting, Cuff Size: Adult Small)   Pulse 72   Ht 1.575 m (5' 2.01\")   Wt 63.5 kg (140 lb)   LMP  (LMP Unknown)   SpO2 93%   BMI 25.60 kg/m      Wt Readings from Last 4 Encounters:   11/15/19 63.5 kg (140 lb)   11/07/19 64 kg (141 lb 3.2 oz)   10/17/19 63.4 kg (139 lb 12.8 oz)   08/12/19 62.7 kg (138 lb 4.8 " oz)       GEN:  In general, this is a well nourished  female in no acute distress on room air.  Patient ambulatory, unaccompanied today.  HEENT:  Pupils equal, round. Sclerae nonicteric. Clear oropharynx.   NECK: Supple, no masses appreciated. Trachea midline. No JVD.  C/V:  Regular rate and rhythm, 1/6 BRAULIO heard RSB. No rub or gallop.   RESP: Respirations are unlabored. No use of accessory muscles. Clear to auscultation bilaterally without wheezing, rales, or rhonchi.  GI: Abdomen soft, nontender, nondistended.   EXTREM: Trace bilateral  LE edema. No cyanosis or clubbing.  NEURO: Alert and oriented, cooperative. Gait not formally assessed. No obvious focal deficits.   PSYCH: Normal affect.  SKIN: Warm and dry. No rashes or petechiae appreciated.       Recent Lab Results:            New/Pertinent imaging results since last visit:  Echo 11/1/19  Interpretation Summary     The left ventricle visual ejection fraction is estimated at 50-55%.  The right ventricular systolic function is normal.  There is mild to moderate (1-2+) aortic regurgitation.  Pulmonary hypertension- RVSP 36 mm hg +RA.  The ascending aorta is mildly dilated- 4.1 cm     On echo dated 09/05/2018 LVEF was reported 55% and ascending aorta was noted  4.3-4.4 cm.      Diana Nelson PA-C  [facility]  Pager [phone]      OK      Thank you for allowing me to participate in the care of your patient.    Sincerely,     ANA MARIA Coe     [facility]

## 2019-11-15 NOTE — PROGRESS NOTES
Cardiology Progress Note    Date of Service: 11/15/2019      Reason for visit: Follow up hypertension, cardiomyopathy.    Primary cardiologist: Dr. Barak Hernandez      HPI:  Ms. Persaud is a very pleasant 84-year-old woman with PMHx including asthma, chronic peripheral edema, labile hypertension, hypercholesterolemia, ascending aortic aneurysm, CAD, moderate aortic valve disease, and nonischemic cardiomyopathy.  In March 2017 she had an angiogram which showed two vessel nonobstructive coronary disease, but she was also noted to have short bursts of nonsustained supraventricular tachycardia with rates of up to 130 during evaluation. She was then hospitalized again in Nov 2017 with pneumonia and sepsis. Her stay was complicated by a takotsubo stress cardiomyopathy with troponin rise and severe hypokinesis of the LV.  In addition, she developed rapid atrial fibrillation during her stay, with severe peripheral edema and biventricular heart failure. Repeat echo in Jan 2018 showed normalization of her LVEF to 60-65%. Over the next few visits in our clinic her amiodarone and anticoagulation were discontinued as she had no recurrence of afib. In addition, she discontinued her lasix on her own as she felt she no longer required it.     Follow up CT of the aorta in July 2018 showed no significant change in her aneurysm from 2017, measuring 4.4cm. Due to ongoing labile BP, she was placed on hydrochlorothiazide. Repeat echo showed EF 55%, though suggested elevated filling pressures. The RV pressure are elevated at 40-50mmHg + RAP, but RV was of normal size and function. She had mild to moderate AR with no hemodynamically significant aortic stenosis. She has had somewhat challenging control of her BP, and over the last several months there have been several changes. When she came back in October, her BP was still labile and her amlodipine was increased to 2.5mg BID (she reported being on once daily at that time). She's back  today for a planned 1 month follow up after this change.    After her last visit, when we called to check on her progress with the change, there was confusion about her medications. She did not recall being on amlodipine at all and she was taking the metoprolol differently than listed. I asked her to come for a nurse visit to reconcile her medications but she declined. Her BP at that time was running good at home at 125/64. Since that time, she saw her primary provider and it appears that her list has been updated. At that visit, he requested she increase her amlodipine to 5mg in the AM, and 2.5mg in the PM. Her simvastatin was also changed to atorvastatin with plans for an FLP in 2 months.     Today she's back for our planned follow up. Her BP today here is high at 158/56, but she reports it is always higher here. At home she tells me its running 120-140s systolic, and almost always in the 50s for a diastolic. Unfortunately, again today there is significant confusion about her home medications. She initially told our rooming staff she was on losartan, but then told me she stopped it awhile back because it made her dizzy. Then she told me she was always on amlodipine 2.5mg daily and never went up. She also states she didn't make some of the changes the pharmacist assisted her with when she saw Dr. Sevilla. She tells me she continues to just bring the updated list with her everytime one is printed out, but I am not entirely convinced she is making all of these changes at home.     She had an echo done a few weeks ago for continued evaluation of her thoracic aneurysm, which showed mildly dilated aorta at 4.1cm, which is stable from 2018. Her LVEF remains low normal at 50-55%, and RV function is also normal. She has mild to moderate aortic regurgitation.        ASSESSMENT/PLAN:    1. Labile hypertension.   --BP today is up again here, and she says routinely runs lower at home. Unfortunately, there remains a lot of  confusion about her antihypertensive regimen. I've asked her to meet with the pharmacist again and bring all of her pill bottles so we may reconcile her current list appropriately. Based on this, we can consider going up a bit further on the amlodipine if she is willing. I am also concerned that having multiple providers involved is causing confusion for her. Thus, will defer to Dr. Sevilla and her pharmacy team for now. We are happy to assist again if necessary.      2. Nonischemic cardiomyopathy.   --Most recent Echo Nov 2019 with EF 50-55%. Previously, she had a history of stress cardiomyopathy in 2017 in the setting of sepsis and pneumonia.    --I do not believe she is currently on losartan, but does remain on Toprol XL.    --She likes salty food and has some mild chronic dependent leg edema which is unchanged. She has acknowledged in the past she is unlikely to change her dietary habits.      3. Coronary artery disease, nonobstructive.    --Angiogram 3/2017 showed nonobstructive 2V disease with mLAD 50%, D3 50%, pLCx 50%, and mLCx 50% stenosis. Medical management purused. She remains free of angina.   --Continue ASA 81mg daily.     --Was recently on simvastatin, per recent documentation changed to atorvastatin. However, this is not on her current list and will need to be reconciled at her pharmacy visit as well. Her last LDL in Oct 2019 was under reasonable control at 76.     4. Ascending aortic aneurysm.   --Via Echo Oct 2019 her ascending aorta is mildly dilated- 4.1 cm, which is stable from previous. Continue yearly monitoring and focus on BP control.                  Follow up plan:   I've asked her to follow with her primary provider and pharmacy team as above. Will have her return to see Dr. Hernandez in ~6 months, or sooner if needed.       Orders this Visit:  Orders Placed This Encounter   Procedures     Follow-Up with Cardiologist     Orders Placed This Encounter   Medications     DISCONTD: losartan  (COZAAR) 100 MG tablet     Sig: Take 100 mg by mouth daily     Refill:  3     Medications Discontinued During This Encounter   Medication Reason     losartan (COZAAR) 100 MG tablet Stopped by Patient         CURRENT MEDICATIONS:  Current Outpatient Medications   Medication Sig Dispense Refill     acetaminophen (TYLENOL) 500 MG tablet Take 500-1,000 mg by mouth every 8 hours as needed for mild pain       albuterol (PROAIR HFA, PROVENTIL HFA, VENTOLIN HFA) 108 (90 BASE) MCG/ACT inhaler Inhale 2 puffs into the lungs every 6 hours as needed        amLODIPine (NORVASC) 2.5 MG tablet Take 2 tablets (5 mg) in the morning and 1 tablet (2.5 mg) in the evening 180 tablet 3     aspirin 81 MG EC tablet Take 1 tablet (81 mg) by mouth daily       azelastine (ASTELIN) 0.1 % nasal spray SPRAY 1 TO 2 SPRAYS INTO BOTH NOSTRILS 2 TIMES DAILY 30 mL 4     calcium citrate-vitamin D (CALCIUM CITRATE +) 315-200 MG-UNIT TABS Take 2 tablets by mouth every evening        dicyclomine (BENTYL) 10 MG capsule Take 1 capsule (10 mg) by mouth 4 times daily as needed (diarrhea) 30 capsule 11     fluticasone-salmeterol (ADVAIR-HFA) 230-21 MCG/ACT inhaler Inhale 2 puffs into the lungs 2 times daily 12 g 1     hydrochlorothiazide (MICROZIDE) 12.5 MG capsule Take 1 capsule (12.5 mg) by mouth daily 90 capsule 0     ipratropium (ATROVENT) 0.03 % spray Spray 2 sprays in nostril 2 times daily       latanoprost (XALATAN) 0.005 % ophthalmic solution Place 1 drop into both eyes At Bedtime       levocetirizine (XYZAL) 5 MG tablet Take 5 mg by mouth daily       metoprolol succinate ER (TOPROL XL) 100 MG 24 hr tablet Take 1 tablet (100 mg) by mouth daily 30 tablet 3     PREDNISONE PO As needed for asthma flares       famotidine (PEPCID) 10 MG tablet Take 1 tablet (10 mg) by mouth 2 times daily as needed (nausea) (Patient not taking: Reported on 11/15/2019) 60 tablet 2       ALLERGIES     Allergies   Allergen Reactions     Fosamax [Alendronic Acid] GI Disturbance      Augmentin [Amoxicillin-Pot Clavulanate] Diarrhea     Diarrhea and rash     Contrast Dye      Red arm redness and swelling      Evista [Raloxifene]      abd symptoms.      Flu Virus Vaccine      swollen and red at inj site       PAST MEDICAL HISTORY:  Past Medical History:   Diagnosis Date     Anemia 3/10/2009    Chronic disease, by Fe studies; awaiting full GI w/u and repeat colonoscopy, ERCP.     Basal Cell Carcinoma--back      Coronary artery disease involving native coronary artery of native heart without angina pectoris 3/9/2017    3/2/2017 - Two vessel coronary artery disease with mLAD 50% stenosis, D3 50% stenosis, pLCx 50% stenosis and mLCx 50% stenosis     Essential hypertension with goal blood pressure less than 140/90 9/1/2016    White coat hypertension;  24 hour ambulatory monitoring normal. Do not titrate up further.       Hyperlipidemia LDL goal <130 2/10/2010     Impaired fasting glucose 8/26/2010     Moderate persistent asthma      Nonrheumatic aortic valve insufficiency 6/29/2017     osteopenia      Paroxysmal atrial fibrillation (H) 12/26/2017     Pulmonary nodule 3/3/2009    PET scan reportedly normal; biopsy not advised.     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  Problem list name updated by automated process. Provider to review     Unspecified arthropathy, hand      Vasculitis (H) 4/20/2009    Possible increased activity of thoracic aorta, on PET scan w/u for pulmonary nodule.        PAST SURGICAL HISTORY:  Past Surgical History:   Procedure Laterality Date     C APPENDECTOMY  1957     C LIGATE FALLOPIAN TUBE  1971     C STEREOTACTIC BREAST BIOPSY  2001     CHOLECYSTECTOMY,  LAPOROSCOPIC  2008    Cholecystectomy, Laparoscopic     ESOPHAGOSCOPY, GASTROSCOPY, DUODENOSCOPY (EGD), COMBINED  5/17/2013    Procedure: COMBINED ESOPHAGOSCOPY, GASTROSCOPY, DUODENOSCOPY (EGD), BIOPSY SINGLE OR MULTIPLE;  ESOPHAGOSCOPY, GASTROSCOPY, DUODENOSCOPY (EGD)  with bx;  Surgeon: Jeffery Yanez MD;  Location: [facility]     HC DILATION/CURETTAGE DIAG/THER NON OB  1967,1971     HC REMOVE TONSILS/ADENOIDS,<11 Y/O         FAMILY HISTORY:  Family History   Problem Relation Age of Onset     Hypertension Mother      Osteoporosis Mother      C.A.D. Mother      Connective Tissue Disorder Father         scleraderma     Cerebrovascular Disease Paternal Grandmother      Prostate Cancer Other         9/05 passed away due to complications     Breast Cancer Child         youngest dtr       SOCIAL HISTORY:  Social History     Socioeconomic History     Marital status: Single     Spouse name: None     Number of children: 3     Years of education: 15     Highest education level: None   Occupational History     Occupation: semi-retired   Social Needs     Financial resource strain: None     Food insecurity:     Worry: None     Inability: None     Transportation needs:     Medical: None     Non-medical: None   Tobacco Use     Smoking status: Never Smoker     Smokeless tobacco: Never Used   Substance and Sexual Activity     Alcohol use: Yes     Alcohol/week: 1.0 - 2.0 standard drinks     Types: 1 - 2 Standard drinks or equivalent per week     Comment: 1 glass of wine 1-2 days per week     Drug use: No     Sexual activity: Not Currently   Lifestyle     Physical activity:     Days per week: None     Minutes per session: None     Stress: None   Relationships     Social connections:     Talks on phone: None     Gets together: None     Attends Jain service: None     Active member of club or organization: None     Attends meetings of clubs or organizations: None     Relationship status: None     Intimate partner violence:     Fear  "of current or ex partner: None     Emotionally abused: None     Physically abused: None     Forced sexual activity: None   Other Topics Concern      Service Not Asked     Blood Transfusions Not Asked     Caffeine Concern Not Asked     Occupational Exposure Not Asked     Hobby Hazards Not Asked     Sleep Concern Not Asked     Stress Concern Not Asked     Weight Concern Not Asked     Special Diet Not Asked     Back Care Not Asked     Exercise Not Asked     Bike Helmet Not Asked     Seat Belt Not Asked     Self-Exams Not Asked     Parent/sibling w/ CABG, MI or angioplasty before 65F 55M? No   Social History Narrative     None       Review of Systems:  Cardiovascular: negative for chest pain, palpitations, orthopnea, pos chronic LE edema, unchanged.  Constitutional: negative for chills, sweats, fevers. Pos occasional fatigue.   Resp: Negative for dyspnea at rest, dyspnea on exertion, neg recent cough, neg hemoptysis, pos known chronic lung disease/asthma.  HEENT: Negative for new visual changes, frequent headaches  Gastrointestinal: negative for abdominal pain, diarrhea, nausea, vomiting  Hematologic/lymphatic: Neg systemic anticoagulation, neg hx of blood clots  Musculoskeletal: negative for new back pain, joint pain.  Neurological: negative for focal weakness, LOC, seizures, syncope. Neg dizziness/presyncope.      Physical Exam:  Vitals: BP (!) 158/56 (BP Location: Right arm, Patient Position: Sitting, Cuff Size: Adult Small)   Pulse 72   Ht 1.575 m (5' 2.01\")   Wt 63.5 kg (140 lb)   LMP  (LMP Unknown)   SpO2 93%   BMI 25.60 kg/m     Wt Readings from Last 4 Encounters:   11/15/19 63.5 kg (140 lb)   11/07/19 64 kg (141 lb 3.2 oz)   10/17/19 63.4 kg (139 lb 12.8 oz)   08/12/19 62.7 kg (138 lb 4.8 oz)       GEN:  In general, this is a well nourished  female in no acute distress on room air.  Patient ambulatory, unaccompanied today.  HEENT:  Pupils equal, round. Sclerae nonicteric. Clear oropharynx. "   NECK: Supple, no masses appreciated. Trachea midline. No JVD.  C/V:  Regular rate and rhythm, 1/6 BRAULIO heard RSB. No rub or gallop.   RESP: Respirations are unlabored. No use of accessory muscles. Clear to auscultation bilaterally without wheezing, rales, or rhonchi.  GI: Abdomen soft, nontender, nondistended.   EXTREM: Trace bilateral  LE edema. No cyanosis or clubbing.  NEURO: Alert and oriented, cooperative. Gait not formally assessed. No obvious focal deficits.   PSYCH: Normal affect.  SKIN: Warm and dry. No rashes or petechiae appreciated.       Recent Lab Results:            New/Pertinent imaging results since last visit:  Echo 11/1/19  Interpretation Summary     The left ventricle visual ejection fraction is estimated at 50-55%.  The right ventricular systolic function is normal.  There is mild to moderate (1-2+) aortic regurgitation.  Pulmonary hypertension- RVSP 36 mm hg +RA.  The ascending aorta is mildly dilated- 4.1 cm     On echo dated 09/05/2018 LVEF was reported 55% and ascending aorta was noted  4.3-4.4 cm.      Diana Nelson PA-C  [facility]  Pager [phone]

## 2019-11-21 ENCOUNTER — OFFICE VISIT (OUTPATIENT)
Dept: PHARMACY | Facility: CLINIC | Age: 84
End: 2019-11-21
Payer: COMMERCIAL

## 2019-11-21 VITALS — DIASTOLIC BLOOD PRESSURE: 40 MMHG | SYSTOLIC BLOOD PRESSURE: 130 MMHG | HEART RATE: 72 BPM

## 2019-11-21 DIAGNOSIS — I10 ESSENTIAL HYPERTENSION WITH GOAL BLOOD PRESSURE LESS THAN 140/90: Primary | ICD-10-CM

## 2019-11-21 DIAGNOSIS — I25.2 OLD MYOCARDIAL INFARCTION: ICD-10-CM

## 2019-11-21 DIAGNOSIS — I51.81 STRESS-INDUCED CARDIOMYOPATHY: ICD-10-CM

## 2019-11-21 DIAGNOSIS — E78.5 HYPERLIPIDEMIA LDL GOAL <130: ICD-10-CM

## 2019-11-21 DIAGNOSIS — K21.9 GASTROESOPHAGEAL REFLUX DISEASE WITHOUT ESOPHAGITIS: ICD-10-CM

## 2019-11-21 PROCEDURE — 99207 ZZC NO CHARGE LOS: CPT | Performed by: PHARMACIST

## 2019-11-21 RX ORDER — SPIRONOLACTONE 25 MG/1
12.5 TABLET ORAL DAILY
COMMUNITY
End: 2020-01-23

## 2019-11-21 RX ORDER — AMLODIPINE BESYLATE 5 MG/1
5 TABLET ORAL AT BEDTIME
Qty: 90 TABLET | Refills: 0 | Status: SHIPPED | OUTPATIENT
Start: 2019-11-21 | End: 2020-02-18

## 2019-11-21 RX ORDER — ATORVASTATIN CALCIUM 20 MG/1
20 TABLET, FILM COATED ORAL DAILY
Qty: 90 TABLET | Refills: 1 | Status: SHIPPED | OUTPATIENT
Start: 2019-11-21 | End: 2020-07-06

## 2019-11-21 NOTE — Clinical Note
She is actually on spironolactone (never stopped in March). Given improvement in EF, I am thinking at our next visit we could stop spironolactone. Her DBP is very low though she is not symptomatic and has historically had readings in this range. Let me know if you think more needs to be done at this time. Thanks.

## 2019-11-21 NOTE — PATIENT INSTRUCTIONS
Recommendations from today's MTM visit:                                                      Blood Pressure:  1. Take amlodipine 2 tablets (5 mg) at bedtime.  2. Please bring your blood pressure cuff to clinic next visit.     Reflux/heartburn:  1. Once you finish omeprazole you can try ranitidine 75 mg twice a day as needed. I will check with the pharmacist to make sure this is covered by your insurance.     Heart protection/lipids:  1. Switch simvastatin to atorvastatin 10 mg daily.     It was great to speak with you today.  I value your experience and would be very thankful for your time with providing feedback on our clinic survey. You may receive a survey via email or text message in the next few days.     Next MTM visit: 1 month MTM    To schedule another MTM appointment, please call the clinic directly or you may call the MTM scheduling line at [phone] or toll-free at [phone].     My Clinical Pharmacist's contact information:                                                      It was a pleasure talking with you today!  Please feel free to contact me with any questions or concerns you have.      Reina Mason, PharmD  Medication Therapy Management Pharmacist  Pager#: [phone]

## 2019-11-21 NOTE — PROGRESS NOTES
SUBJECTIVE/OBJECTIVE:                Genie Persaud is a 84 year old female coming in for a follow-up visit for Medication Therapy Management.  She was referred to me from Dr. Sevilla. Seen as a care team today. First of 2019.    Chief Complaint: Follow up from MTM visit on 11/1/18 with Kathie Forde.  Mediation review.    Allergies: Revied  Tobacco: none  Alcohol: not currently using  Caffeine: none  PMH: reviewed    Medication Adherence/Access:  Patient uses pill tray for medications  Patient takes medications 3 time(s) per day.   Per patient, misses medication 0 times per week.   Medication barriers: none.   The patient fills medications at [place]: NO, fills medications at [facility].     GERD: Current medications include: Prilosec (omeprazole) 20 once daily. Pt c/o no current symptoms.  Patient feels that current regimen is effective for nausea sensation in the morning. She denies symptoms of reflux or heartburn.  She has not tried reducing this medication. However, is she interested in doing so.     Allergic rhinitis: Current medications include azelastine 0.1% spray 2 sprays twice daily, ipratropium 2 sprays each nostril BID, and Zyzal 5 mg once daily.  Primary triggers are humidity, dust mites, pollens, animal dander and mold. Pt feels that current therapy is effective. See Dr. Monzon, Allergy specialist. She feels her current regimen is effective and is not interested in changing regimen. Denies any side effects with therapy.     Asthma:  Current medications Fluticasone+Salmeterol (Advair) 2 puffs twice a day and albuterol PRN (not currently using), not currently taking prednisone but has been taking prednisone 5mg once daily as needed. Followed by pulmonology for this. Pt rinses her mouth, brushes teeth then uses mouthwash after Advair.     Triggers: pollen, changes in seasons, allergies  Symptoms: she has a cough and SOB with exertion. However she states her asthma is well controlled.  AAP on file: YES  ACT  Total Scores 11/1/2018 5/21/2019 11/7/2019   ACT TOTAL SCORE - - -   ASTHMA ER VISITS - - -   ASTHMA HOSPITALIZATIONS - - -   ACT TOTAL SCORE (Goal Greater than or Equal to 20) 23 21 22   In the past 12 months, how many times did you visit the emergency room for your asthma without being admitted to the hospital? 0 0 0   In the past 12 months, how many times were you hospitalized overnight because of your asthma? 0 0 0        Hx MI:  She states she has been off aspirin 81mg daily for a year now. She is not certain if she is to restart or not. Currently taking metoprolol  mg daily and simvastatin 20 mg every other day.  No current side effects. Pt was previously taking Eliquis. Denies any side effects.      Hypertension/Non ischemic Cardiomyopathy: Current medications include hydrochlorothiazide 12.5mg QD, metoprolol 100mg ER QD, and amlodipine 2.5 mg twice daily. Did not tolerate losartan in the past. Pt monitors blood pressures at home and states they are always better/lower. She notices her BP is always higher in clinic. Cardiology recently increased her amlodipine and has been tolerating therapy.   Follows with cardiology.  ECHO: 11/1/19 EF 50-55%  BP Readings from Last 3 Encounters:   10/17/19 (!) 168/66   08/14/19 110/48   08/12/19 (!) 180/64       Hyperlipidemia: Current therapy includes simvastatin 20 mg once daily.  Pt reports no significant myalgias or other side effects. Previously was on atorvastatin but cannot recall why this was changed.   Recent Labs   Lab Test 10/21/19  0940 02/28/19  1014  08/13/15  0842 08/30/13  0835   CHOL 152 162   < > 141 158   HDL 59 63   < > 63 48*   LDL 76 83   < > 63 83   TRIG 84 79   < > 75 136   CHOLHDLRATIO  --   --   --  2.2 3.3    < > = values in this interval not displayed.        Osteopenia:  Pt has been diagnosed with osteoporosis per Dr. Sevilla's letter. Pt is taking calcium + D (Citracal) 2 tablets per day.  She reports she had problems with her stomach when on  "alendronate in the past and does not want to restart as his letter suggested.   Pt currently drinks milk every day along with eating cheese.    Last DEXA: 07/13/2018 Osteopenia   No signs of verterbral fractures.  No vitamin D level.       Glaucoma: Current medication is latanoprost 0.005% ou every day.  Reports good eye pressure. Pt is happy with current therapy. No side effects or concerns.    Prediabetes/Foot numbness: Taking none. She endorses feeling occasional foot numbness.  Vitamin B12: 394 (6/10/19)  TSH   Date Value Ref Range Status   06/10/2019 1.94 0.40 - 4.00 mU/L Final     Lab Results   Component Value Date    A1C 6.2 06/10/2019       Immunizations: Pt feels she gets sick every time from a vaccine. She is not interested in any vaccines today.   Most Recent Immunizations   Administered Date(s) Administered     Mantoux Tuberculin Skin Test 02/27/2009     Pneumo Conj 13-V (2010&after) 10/06/2017     Pneumococcal 23 valent 10/20/2004     TDAP Vaccine (Adacel) 10/06/2017       Today's Vitals: BP (!) 162/52 (BP Location: Right arm, Patient Position: Sitting, Cuff Size: Adult Regular)   Pulse 70   Temp 97.9  F (36.6  C) (Oral)   Resp 18   Ht 5' 2\" (1.575 m)   Wt 141 lb 3.2 oz (64 kg)   LMP  (LMP Unknown)   SpO2 95%   BMI 25.83 kg/m      Last Comprehensive Metabolic Panel:  Sodium   Date Value Ref Range Status   10/08/2019 136 133 - 144 mmol/L Final     Potassium   Date Value Ref Range Status   10/08/2019 4.3 3.4 - 5.3 mmol/L Final     Chloride   Date Value Ref Range Status   10/08/2019 104 94 - 109 mmol/L Final     Carbon Dioxide   Date Value Ref Range Status   10/08/2019 28 20 - 32 mmol/L Final     Anion Gap   Date Value Ref Range Status   10/08/2019 4 3 - 14 mmol/L Final     Glucose   Date Value Ref Range Status   10/08/2019 99 70 - 99 mg/dL Final     Urea Nitrogen   Date Value Ref Range Status   10/08/2019 23 7 - 30 mg/dL Final     Creatinine   Date Value Ref Range Status   10/08/2019 1.09 (H) 0.52 " - 1.04 mg/dL Final     GFR Estimate   Date Value Ref Range Status   10/08/2019 47 (L) >60 mL/min/[1.73_m2] Final     Comment:     Non  GFR Calc  Starting 12/18/2018, serum creatinine based estimated GFR (eGFR) will be   calculated using the Chronic Kidney Disease Epidemiology Collaboration   (CKD-EPI) equation.       Calcium   Date Value Ref Range Status   10/08/2019 8.8 8.5 - 10.1 mg/dL Final     Bilirubin Total   Date Value Ref Range Status   05/21/2019 0.5 0.2 - 1.3 mg/dL Final     Alkaline Phosphatase   Date Value Ref Range Status   05/21/2019 73 40 - 150 U/L Final     ALT   Date Value Ref Range Status   05/21/2019 21 0 - 50 U/L Final     AST   Date Value Ref Range Status   05/21/2019 16 0 - 45 U/L Final         ASSESSMENT:                  Medication Adherence: good, no issues identified    GERD: Needs improvement.  Chronic PPI use places patient at an increased risk of C. Diff, hypomagnesemia and lower bone mineral density, these risks were reviewed with the patient.  Pt would benefit from the following changes: switching to H2RA agent.    Allergic rhinitis: Stable.      Asthma: Stable. Up to date and at goal of ACT > or equal to 20.    Hx MI: Needs improvement. Pt would benefit from aspirin therapy.    Hypertension/Non ischemic Cardiomyopathy: Needs improvement. Pt is not meeting BP goal of < 140/90 mmHg. Given history of intolerance to hypertensive agents, recommend she increase CCB slowly.    Hyperlipidemia: Needs Improvement. Pt is on moderate intensity statin which is indicated based on 2019 ACC/AHA guidelines for lipid management. However, given drug interaction with simvastatin and CKD recommend switch to atorvastatin.    Osteopenia: Needs Improvement. Pt would benefit from changing calcium dosing from every day to BID. Pt would benefit from a vitamin D level to ensure level is > 30.     Glaucoma: stable.    Prediabetes/Foot numbness: Needs improvement. PCP assess and r/o neuropathy.  Future may consider therapy to relieve discomfort. No change at this time.    Immunization: Needs improvement. Pt would benefit from influenza and shingles vaccine.      PLAN:                  GERD:  1. Stop the omeprazole.  2. Start famotidine 10 mg daily. If symptoms not controlled, you may increase to 10 mg twice a day.    Osteopenia:  1. Change the calcium + vitamin D supplement from 2 tablets once a day to 1 tablet twice a day.    Aspirin:  1. Restart the aspirin 81 mg daily. Pt to ask the cardiologist about when you see them in 2 weeks if you can continue the aspirin.     Cholesterol:  1. Stop simvastatin. Start atorvastatin 20 mg daily.     Blood pressure :  1. Increase amlodipine to 2 tabs in the AM and 1 in the PM.  Follow up with cardiology for a blood pressure recheck.      Screenings: we can stop mammogram and colonoscopy screenings moving forward.    Asthma:  1. ACT today    Foot Numbness:  1. Continue to monitor at this time.    I spent 70 minutes with this patient today. All changes were made via verbal approval with Dr. Sevilla. A copy of the visit note was provided to the patient's primary care provider.     Will follow up in 6 months (SB3). 3 month lab and BP check.    The patient was given a summary of these recommendations as an after visit summary. Please see provider note from this encounter note for AVS.       Reina Mason PharmD  Medication Therapy Management Pharmacist  Pager#: [phone]

## 2019-11-21 NOTE — PROGRESS NOTES
SUBJECTIVE/OBJECTIVE:                Genie Persaud is a 84 year old female coming in for a follow-up visit for Medication Therapy Management.  She was referred to me from Dr. Sevilla and  [facility]-Card[facility].    Chief Complaint: Follow up from [facility] visit on 11/7/19.  She wants her allergy of Augmentin to be removed. Her for HTN management. Had visit with cardiology and seemed to have a lot of confusion with which medication she is actually on. She is here with her medications today.    Allergies: Reviewed  Tobacco: none  Alcohol: not currently using  Caffeine: none  PMH: reviewed    Medication Adherence/Access:  Patient uses pill tray for medications  Patient takes medications BID + PRN agents  The patient fills medications at [place]: NO, fills medications at [facility].     GERD: Current medications include: Prilosec (omeprazole) 20 once daily. She did not  the famotidine because it was not covered and the OTC were in foil blister pack which she does not like. She refilled the omeprazole. Pt c/o no current symptoms. She is still interested in doing coming off PPI.       Hx MI/Hyperlipidea:  Has restarted the aspirin 81mg daily. She did discuss this with cardiology on 11/15/19. Currently taking metoprolol  mg daily and simvastatin 20 mg every other day. She did not switch to atorvastatin yet, states pharmacy never received the prescription. No current side effects. Pt was previously taking Eliquis. Denies any side effects.      Hypertension/Non ischemic Cardiomyopathy: Current medications include hydrochlorothiazide 12.5mg qAM, metoprolol 100mg ER qPM, spironolactone 12.5mg qAM (which this was discontinue in the chart in March for dizziness) and amlodipine 2.5 mg qAM (she never increased this). Did not tolerate losartan in the past. Pt monitors blood pressures at home and states they are always better/lower. She has noticed morning BP is 120-110/50-40's and bedtime is 140-150/50's.   Follows with  "cardiology.   ECHO: 11/1/19 EF 50-55%  BP Readings from Last 3 Encounters:   11/21/19 130/40   11/15/19 (!) 158/56   11/07/19 (!) 162/52        Today's Vitals:  BP Readings from Last 1 Encounters:   11/21/19 130/40     Pulse Readings from Last 1 Encounters:   11/21/19 72     Wt Readings from Last 1 Encounters:   11/15/19 140 lb (63.5 kg)     Ht Readings from Last 1 Encounters:   11/15/19 5' 2.01\" (1.575 m)     Estimated body mass index is 25.6 kg/m  as calculated from the following:    Height as of 11/15/19: 5' 2.01\" (1.575 m).    Weight as of 11/15/19: 140 lb (63.5 kg).    Temp Readings from Last 1 Encounters:   11/07/19 97.9  F (36.6  C) (Oral)       Last Comprehensive Metabolic Panel:  Sodium   Date Value Ref Range Status   10/08/2019 136 133 - 144 mmol/L Final     Potassium   Date Value Ref Range Status   10/08/2019 4.3 3.4 - 5.3 mmol/L Final     Chloride   Date Value Ref Range Status   10/08/2019 104 94 - 109 mmol/L Final     Carbon Dioxide   Date Value Ref Range Status   10/08/2019 28 20 - 32 mmol/L Final     Anion Gap   Date Value Ref Range Status   10/08/2019 4 3 - 14 mmol/L Final     Glucose   Date Value Ref Range Status   10/08/2019 99 70 - 99 mg/dL Final     Urea Nitrogen   Date Value Ref Range Status   10/08/2019 23 7 - 30 mg/dL Final     Creatinine   Date Value Ref Range Status   10/08/2019 1.09 (H) 0.52 - 1.04 mg/dL Final     GFR Estimate   Date Value Ref Range Status   10/08/2019 47 (L) >60 mL/min/[1.73_m2] Final     Comment:     Non  GFR Calc  Starting 12/18/2018, serum creatinine based estimated GFR (eGFR) will be   calculated using the Chronic Kidney Disease Epidemiology Collaboration   (CKD-EPI) equation.       Calcium   Date Value Ref Range Status   10/08/2019 8.8 8.5 - 10.1 mg/dL Final     Bilirubin Total   Date Value Ref Range Status   05/21/2019 0.5 0.2 - 1.3 mg/dL Final     Alkaline Phosphatase   Date Value Ref Range Status   05/21/2019 73 40 - 150 U/L Final     ALT   Date " Value Ref Range Status   05/21/2019 21 0 - 50 U/L Final     AST   Date Value Ref Range Status   05/21/2019 16 0 - 45 U/L Final         ASSESSMENT:                  Medication Adherence: poor, needs improvement.     GERD: Needs improvement. Discussed ranitidine recall and process. She is willing to try stopping omeprazole and trying ranitidine PRN if covered by insurance.     Hx MI/Hyperlipids: Needs improvement. Pt would benefit from switch to atorvastatin due to CKD and drug drug interaction with amlodipine.     Hypertension/Non ischemic Cardiomyopathy: Needs improvement. Pt is meeting BP goal of < 140/90 mmHg after recheck. However, her first reading was elevated and home BP in the evenings are not at goal. For this reason, will suggest switching her amlodipine from AM to PM dosing and increase to 2 tablets. Though spironolactone is not on chart it seems appropriate to continue at this time (no hyperkalemia and GFR is NOT < 25).   Future will consider stopping spironolactone given EF is no longer < 30-35%.      PLAN:                  GERD:  1. Change famotidine 10 mg daily to ranitidine 75 mg BID PRN once omeprazole is done.     Cholesterol:  1. Stop simvastatin. Start atorvastatin 20 mg daily. Discussed reasons for change and resent Rx.    Blood pressure :  1. Increase amlodipine to 2 tabs (5 mg) HS.       I spent 60 minutes with this patient today. All changes were made via collaborative agreement with Dr. Sevilla. A copy of the visit note was provided to the patient's primary care provider and cardiology.     Will follow up in 1 month BP recheck with medication and BP cuff.    The patient was given a summary of these recommendations as an after visit summary.      Reina Mason, PharmD  Medication Therapy Management Pharmacist  Pager#: [phone]

## 2019-11-23 DIAGNOSIS — K21.9 GASTROESOPHAGEAL REFLUX DISEASE WITHOUT ESOPHAGITIS: Primary | ICD-10-CM

## 2019-11-25 RX ORDER — FAMOTIDINE 40 MG/1
40 TABLET, FILM COATED ORAL DAILY
Qty: 90 TABLET | Refills: 3 | Status: SHIPPED | OUTPATIENT
Start: 2019-11-25 | End: 2019-12-20

## 2019-12-15 DIAGNOSIS — J30.2 SEASONAL ALLERGIC RHINITIS, UNSPECIFIED TRIGGER: ICD-10-CM

## 2019-12-17 RX ORDER — AZELASTINE 1 MG/ML
SPRAY, METERED NASAL
Qty: 30 ML | Refills: 5 | Status: SHIPPED | OUTPATIENT
Start: 2019-12-17 | End: 2022-03-21

## 2019-12-20 ENCOUNTER — OFFICE VISIT (OUTPATIENT)
Dept: PHARMACY | Facility: CLINIC | Age: 84
End: 2019-12-20
Payer: COMMERCIAL

## 2019-12-20 VITALS — SYSTOLIC BLOOD PRESSURE: 124 MMHG | OXYGEN SATURATION: 94 % | HEART RATE: 76 BPM | DIASTOLIC BLOOD PRESSURE: 42 MMHG

## 2019-12-20 DIAGNOSIS — E78.5 HYPERLIPIDEMIA LDL GOAL <130: ICD-10-CM

## 2019-12-20 DIAGNOSIS — I10 ESSENTIAL HYPERTENSION WITH GOAL BLOOD PRESSURE LESS THAN 140/90: ICD-10-CM

## 2019-12-20 DIAGNOSIS — K21.9 GASTROESOPHAGEAL REFLUX DISEASE WITHOUT ESOPHAGITIS: ICD-10-CM

## 2019-12-20 DIAGNOSIS — I25.2 OLD MYOCARDIAL INFARCTION: Primary | ICD-10-CM

## 2019-12-20 PROCEDURE — 99207 ZZC NO CHARGE LOS: CPT | Performed by: PHARMACIST

## 2019-12-20 RX ORDER — HYDROCHLOROTHIAZIDE 12.5 MG/1
12.5 CAPSULE ORAL DAILY
Qty: 90 CAPSULE | Refills: 0 | Status: SHIPPED | OUTPATIENT
Start: 2019-12-20 | End: 2020-07-01

## 2019-12-20 RX ORDER — FAMOTIDINE 40 MG/1
40 TABLET, FILM COATED ORAL EVERY OTHER DAY
Qty: 90 TABLET | Refills: 3
Start: 2019-12-20 | End: 2020-08-06

## 2019-12-20 NOTE — PROGRESS NOTES
SUBJECTIVE/OBJECTIVE:                Genie Persaud is a 84 year old female coming in for a follow-up visit for Medication Therapy Management.  She was referred to me from Dr. Sevilla and [facility]-Cardiology.    Chief Complaint: Follow up from MTM visit on 11/21/19.  Stopped aspirin, was only on aspirin for a few weeks.     Allergies: Reviewed  Tobacco: none  Alcohol: not currently using  Caffeine: none  PMH: reviewed    Medication Adherence/Access:  Patient uses pill tray for medications  Patient takes medications BID + PRN agents  The patient fills medications at [place]: NO, fills medications at [facility].     GERD: Current medications include: Prilosec (omeprazole) 20 once daily. She did  the famotidine but has a lot of omeprazole left too, she is wondering if she can use these both. Pt c/o no current symptoms. Was previously noted that she was on this for morning nausea sensation. She is still interested in coming off PPI.       Hx MI/HTN/Lipids:  [facility] pt to restart aspirin. She states she actually only restarted for maybe a little over a week, then stopped aspirin 81mg daily. She does not liek how much bruising she gets while is on the aspirin.   For lipids, taking simvastatin 20 mg every other day. She did not switch to atorvastatin yet, however, plans to once she runs out of the simvastatin.   Current medications include for BP include: hydrochlorothiazide 12.5mg qAM, metoprolol 100mg ER qPM, spironolactone 12.5mg qAM and amlodipine 5 mg HS. Did not tolerate losartan in the past. Pt monitors blood pressures at home and states they are always better/lower in the morning compared to the evening.  Reported home BP: AM ~111-112/40; PM ~120-150/50-70.  Follows with cardiology.   ECHO: 11/1/19 EF 50-55%  BP Readings from Last 3 Encounters:   12/20/19 124/42   11/21/19 130/40   11/15/19 (!) 158/56        Today's Vitals:  BP Readings from Last 1 Encounters:   11/21/19 130/40  "    Pulse Readings from Last 1 Encounters:   11/21/19 72     Wt Readings from Last 1 Encounters:   11/15/19 140 lb (63.5 kg)     Ht Readings from Last 1 Encounters:   11/15/19 5' 2.01\" (1.575 m)     Estimated body mass index is 25.6 kg/m  as calculated from the following:    Height as of 11/15/19: 5' 2.01\" (1.575 m).    Weight as of 11/15/19: 140 lb (63.5 kg).    Temp Readings from Last 1 Encounters:   11/07/19 97.9  F (36.6  C) (Oral)       Last Comprehensive Metabolic Panel:  Sodium   Date Value Ref Range Status   10/08/2019 136 133 - 144 mmol/L Final     Potassium   Date Value Ref Range Status   10/08/2019 4.3 3.4 - 5.3 mmol/L Final     Chloride   Date Value Ref Range Status   10/08/2019 104 94 - 109 mmol/L Final     Carbon Dioxide   Date Value Ref Range Status   10/08/2019 28 20 - 32 mmol/L Final     Anion Gap   Date Value Ref Range Status   10/08/2019 4 3 - 14 mmol/L Final     Glucose   Date Value Ref Range Status   10/08/2019 99 70 - 99 mg/dL Final     Urea Nitrogen   Date Value Ref Range Status   10/08/2019 23 7 - 30 mg/dL Final     Creatinine   Date Value Ref Range Status   10/08/2019 1.09 (H) 0.52 - 1.04 mg/dL Final     GFR Estimate   Date Value Ref Range Status   10/08/2019 47 (L) >60 mL/min/[1.73_m2] Final     Comment:     Non  GFR Calc  Starting 12/18/2018, serum creatinine based estimated GFR (eGFR) will be   calculated using the Chronic Kidney Disease Epidemiology Collaboration   (CKD-EPI) equation.       Calcium   Date Value Ref Range Status   10/08/2019 8.8 8.5 - 10.1 mg/dL Final     Bilirubin Total   Date Value Ref Range Status   05/21/2019 0.5 0.2 - 1.3 mg/dL Final     Alkaline Phosphatase   Date Value Ref Range Status   05/21/2019 73 40 - 150 U/L Final     ALT   Date Value Ref Range Status   05/21/2019 21 0 - 50 U/L Final     AST   Date Value Ref Range Status   05/21/2019 16 0 - 45 U/L Final         ASSESSMENT:                  Medication Adherence: poor, needs improvement. "     GERD: unchanged. Pt may alternate between H2RA and PPI until she runs out of supply, then switch to famotidine only.    Hx MI/Hyperlipids/HTN: Needs improvement. Pt is meeting BP goal of < 140/90 mmHg.For this reason no change recommended at this time. Given CV history, pt is a candidate for aspirin therapy.     PLAN:                  CAD:  1. MTM to discuss with cardiology regarding aspirin therapy, may need to consider 81 mg every other day due to pt preference.    GERD:  1. Pt may tack famotidine every other day alternating with omeprazole.     I spent 35 minutes with this patient today. All changes were made via collaborative agreement with Dr. Sevilla. A copy of the visit note was provided to the patient's primary care provider and cardiology.     Will follow up in 2 months.    The patient was given a summary of these recommendations as an after visit summary.      Jyotsna MartinD  Medication Therapy Management Pharmacist  Pager#: [phone]

## 2019-12-20 NOTE — Clinical Note
Inquiring about aspirin. Pt admits she only restarted aspirin for may 1-2 weeks in early November. Stopped due to persistent bruising. Given her history it seems best for her to remain on aspirin. Given her resistance to 81 mg daily dosing would 81 mg every other day be reasonable? Thank you for your thoughts! Reina Mason, PharmDMedication Therapy Management PharmacistPager#: [phone]

## 2019-12-20 NOTE — PROGRESS NOTES
SUBJECTIVE/OBJECTIVE:                Genie Persaud is a 84 year old female coming in for a follow-up visit for Medication Therapy Management.  She was referred to me from Dr. Sevilla and  [facility].    Chief Complaint: Follow up from [facility] visit on 11/7/19.  She wants her allergy of Augmentin to be removed. Her for HTN management. Had visit with cardiology and seemed to have a lot of confusion with which medication she is actually on. She is here with her medications today.    Allergies: Reviewed  Tobacco: none  Alcohol: not currently using  Caffeine: none  PMH: reviewed    Medication Adherence/Access:  Patient uses pill tray for medications  Patient takes medications BID + PRN agents  The patient fills medications at [facility]: NO, fills medications at [facility].     GERD: Current medications include: Prilosec (omeprazole) 20 once daily. She did not  the famotidine because it was not covered and the OTC were in foil blister pack which she does not like. She refilled the omeprazole. Pt c/o no current symptoms. She is still interested in doing coming off PPI.       Hx MI/Hyperlipidea:  Has restarted the aspirin 81mg daily. She did discuss this with cardiology on 11/15/19. Currently taking metoprolol  mg daily and simvastatin 20 mg every other day. She did not switch to atorvastatin yet, states pharmacy never received the prescription. No current side effects. Pt was previously taking Eliquis. Denies any side effects.      Hypertension/Non ischemic Cardiomyopathy: Current medications include hydrochlorothiazide 12.5mg qAM, metoprolol 100mg ER qPM, spironolactone 12.5mg qAM (which this was discontinue in the chart in March for dizziness) and amlodipine 2.5 mg qAM (she never increased this). Did not tolerate losartan in the past. Pt monitors blood pressures at home and states they are always better/lower. She has noticed morning BP is 120-110/50-40's and bedtime is 140-150/50's.   Follows with  "cardiology.   ECHO: 11/1/19 EF 50-55%  BP Readings from Last 3 Encounters:   11/21/19 130/40   11/15/19 (!) 158/56   11/07/19 (!) 162/52        Today's Vitals:  BP Readings from Last 1 Encounters:   11/21/19 130/40     Pulse Readings from Last 1 Encounters:   11/21/19 72     Wt Readings from Last 1 Encounters:   11/15/19 140 lb (63.5 kg)     Ht Readings from Last 1 Encounters:   11/15/19 5' 2.01\" (1.575 m)     Estimated body mass index is 25.6 kg/m  as calculated from the following:    Height as of 11/15/19: 5' 2.01\" (1.575 m).    Weight as of 11/15/19: 140 lb (63.5 kg).    Temp Readings from Last 1 Encounters:   11/07/19 97.9  F (36.6  C) (Oral)       Last Comprehensive Metabolic Panel:  Sodium   Date Value Ref Range Status   10/08/2019 136 133 - 144 mmol/L Final     Potassium   Date Value Ref Range Status   10/08/2019 4.3 3.4 - 5.3 mmol/L Final     Chloride   Date Value Ref Range Status   10/08/2019 104 94 - 109 mmol/L Final     Carbon Dioxide   Date Value Ref Range Status   10/08/2019 28 20 - 32 mmol/L Final     Anion Gap   Date Value Ref Range Status   10/08/2019 4 3 - 14 mmol/L Final     Glucose   Date Value Ref Range Status   10/08/2019 99 70 - 99 mg/dL Final     Urea Nitrogen   Date Value Ref Range Status   10/08/2019 23 7 - 30 mg/dL Final     Creatinine   Date Value Ref Range Status   10/08/2019 1.09 (H) 0.52 - 1.04 mg/dL Final     GFR Estimate   Date Value Ref Range Status   10/08/2019 47 (L) >60 mL/min/[1.73_m2] Final     Comment:     Non  GFR Calc  Starting 12/18/2018, serum creatinine based estimated GFR (eGFR) will be   calculated using the Chronic Kidney Disease Epidemiology Collaboration   (CKD-EPI) equation.       Calcium   Date Value Ref Range Status   10/08/2019 8.8 8.5 - 10.1 mg/dL Final     Bilirubin Total   Date Value Ref Range Status   05/21/2019 0.5 0.2 - 1.3 mg/dL Final     Alkaline Phosphatase   Date Value Ref Range Status   05/21/2019 73 40 - 150 U/L Final     ALT   Date " Value Ref Range Status   05/21/2019 21 0 - 50 U/L Final     AST   Date Value Ref Range Status   05/21/2019 16 0 - 45 U/L Final         ASSESSMENT:                  Medication Adherence: poor, needs improvement.     GERD: Needs improvement. Discussed ranitidine recall and process. She is willing to try stopping omeprazole and trying ranitidine PRN if covered by insurance.     Hx MI/Hyperlipids: Needs improvement. Pt would benefit from switch to atorvastatin due to CKD and drug drug interaction with amlodipine.     Hypertension/Non ischemic Cardiomyopathy: Needs improvement. Pt is meeting BP goal of < 140/90 mmHg after recheck. However, her first reading was elevated and home BP in the evenings are not at goal. For this reason, will suggest switching her amlodipine from AM to PM dosing and increase to 2 tablets. Though spironolactone is not on chart it seems appropriate to continue at this time (no hyperkalemia and GFR is NOT < 25).   Future will consider stopping spironolactone given EF is no longer < 30-35%.      PLAN:                  GERD:  1. Change famotidine 10 mg daily to ranitidine 75 mg BID PRN once omeprazole is done.     Cholesterol:  1. Stop simvastatin. Start atorvastatin 20 mg daily. Discussed reasons for change and resent Rx.    Blood pressure :  1. Increase amlodipine to 2 tabs (5 mg) HS.       I spent 60 minutes with this patient today. All changes were made via collaborative agreement with Dr. Sevilla. A copy of the visit note was provided to the patient's primary care provider and cardiology.     Will follow up in 1 month BP recheck with medication and BP cuff.    The patient was given a summary of these recommendations as an after visit summary.      Reina Mason, PharmD  Medication Therapy Management Pharmacist  Pager#: [phone]

## 2019-12-20 NOTE — PATIENT INSTRUCTIONS
Recommendations from today's MTM visit:                                                      1. No changes in medications, keep the great work!  2. You may decrease how often you check your blood pressure at home. Though continue to check if not feeling quite right.  Check a few times a week alternating between morning and night. If you blood pressure after 5 minutes of rest is > 140/90 mm Hg, please wait 5 minute and recheck again.     It was great to speak with you today.  I value your experience and would be very thankful for your time with providing feedback on our clinic survey. You may receive a survey via email or text message in the next few days.     Next MTM visit:     To schedule another MTM appointment, please call the clinic directly or you may call the MTM scheduling line at [phone] or toll-free at [phone].     My Clinical Pharmacist's contact information:                                                      It was a pleasure talking with you today!  Please feel free to contact me with any questions or concerns you have.      .

## 2019-12-26 NOTE — PROGRESS NOTES
Discussed with cardiology,  Diana Nelson PA agreeable to have pt at least try aspirin 81 mg MWF. Discussed with pt and is willing to try this.     Reina Mason, PharmD  Medication Therapy Management Pharmacist  Pager#: [phone]

## 2020-01-22 ENCOUNTER — CARE COORDINATION (OUTPATIENT)
Dept: CARDIOLOGY | Facility: CLINIC | Age: 85
End: 2020-01-22

## 2020-01-22 DIAGNOSIS — I10 ESSENTIAL HYPERTENSION WITH GOAL BLOOD PRESSURE LESS THAN 140/90: Primary | ICD-10-CM

## 2020-01-22 DIAGNOSIS — I51.81 STRESS-INDUCED CARDIOMYOPATHY: ICD-10-CM

## 2020-01-22 NOTE — PROGRESS NOTES
Fax request from: [facility]  Medication requested:Spironoalactone  Last Office visit with: Diana RODGERS  Follow up scheduled:   Future Appointments   Date Time Provider Department Center   2/7/2020  9:30 AM EA LAB EALAB EA   2/14/2020  1:15 PM Reina Mason, [facility] EAMTM EA     Messaged to LAYA due to confusion related to medication.  See LAYA office notes - plan to see PharmD to assist in figuring out medications  Seen by PharmD multiple times - notes show recommendations to stay on spironolactone  Dr Hernandez and Team 2 nurses notes 3/2019 -spironolactone was stopped due to dizziness.    Forwarded to LAYA to review and advise on whether to refill medication or defer to Primary MD.  Olivia Guy RN 01/22/20 4:59 PM  --------------------------------------------------------    Diana Nelson PA  You 24 minutes ago (11:51 AM)      Follow what most recent pharmacist note says.   That's why I sent this lady there, she had been misreporting medications and the pharmacist is helping clarify these things.   Thanks   Diana     -------------------------------------------------------------------------------    See pharmacy notes. Refill sent.  Olivia Guy RN 01/23/20 12:17 PM

## 2020-01-23 RX ORDER — SPIRONOLACTONE 25 MG/1
12.5 TABLET ORAL DAILY
Qty: 45 TABLET | Refills: 3 | Status: SHIPPED | OUTPATIENT
Start: 2020-01-23 | End: 2021-01-07

## 2020-02-19 ENCOUNTER — OFFICE VISIT (OUTPATIENT)
Dept: PEDIATRICS | Facility: CLINIC | Age: 85
End: 2020-02-19
Payer: COMMERCIAL

## 2020-02-19 VITALS
TEMPERATURE: 97.3 F | SYSTOLIC BLOOD PRESSURE: 128 MMHG | WEIGHT: 138 LBS | HEIGHT: 62 IN | HEART RATE: 80 BPM | OXYGEN SATURATION: 95 % | BODY MASS INDEX: 25.4 KG/M2 | RESPIRATION RATE: 14 BRPM | DIASTOLIC BLOOD PRESSURE: 72 MMHG

## 2020-02-19 DIAGNOSIS — H61.21 IMPACTED CERUMEN OF RIGHT EAR: Primary | ICD-10-CM

## 2020-02-19 PROCEDURE — 69209 REMOVE IMPACTED EAR WAX UNI: CPT | Performed by: NURSE PRACTITIONER

## 2020-02-19 ASSESSMENT — MIFFLIN-ST. JEOR: SCORE: 1039.37

## 2020-02-19 NOTE — PATIENT INSTRUCTIONS
Patient Education     Earwax Removal    The ear canal makes earwax from the canal s lining. The ears make wax to lubricate and protect the ear canal. The ear canal is the tube that connects the middle ear to the outside of the ear. The wax protects the ear from bacteria, infection, and damage from water or trauma.  The wax that forms in the canal naturally moves toward the outside of the ear and falls out. In some cases, the ear may make too much wax. If the wax causes problems or keeps the healthcare provider from seeing into the ear, the extra wax may be removed.  Too much wax can affect your hearing. It can cause itching. In rare cases, it can be painful. Earwax should not be removed unless it is causing a problem. You should not stick objects into your ear to remove wax unless told to do so by your healthcare provider.  Healthcare providers can remove earwax safely. It is important to stay still during the procedure to avoid damage to the ear canal. But removing earwax generally doesn t hurt. You will not usually need anesthesia or pain medicine when the provider removes the earwax.  A number of conditions lead to earwax buildup. These include some skin problems, a narrow ear canal, or ears that make too much earwax. Using cotton swabs in the canal pushes earwax deeper into the ear and contributes to the buildup of earwax.  Home care    The healthcare provider may recommend mineral oil or an over-the-counter eardrop to use at home to soften the earwax. Use these products only if the provider recommends them. Carefully follow the instructions given.    Don t use mineral oil or OTC eardrops if you might have an ear infection or a ruptured eardrum. Tell your healthcare provider right away if you have diabetes or an immune disorder.    Don t use cotton swabs in your ears. Cotton swabs may push wax deeper into the ear canal or damage the eardrum. Use cotton gauze or a wet washcloth  to gently remove wax on the  outside of the ear and around the opening to the ear canal.    Don't use any probing device or object such as cotton-tipped swabs or rosie pins to clean the inside of your ears.    Don t use ear candles to clean your ears. Candling can be dangerous. It can burn the ear canal. It can also make the condition worse instead of better.    Don t use cold water to rinse the ear. This will make you dizzy. If your provider tells you to rinse your ear, use only warm water or follow his or her instructions.    Check the ear for signs of infection or irritation listed below under When to seek medical advice.  Steps for using eardrops  1. Warm the medicine bottle by rubbing it between your hands for a few minutes.  2. Lie down on your side, with the affected ear up.  3. Place the recommended number of drops in the ear. Wet a cotton ball with the medicine. Gently put the cotton ball into the ear opening.    Follow-up care  Follow up with your healthcare provider, or as directed.  When to seek medical advice  Call the provider right away if you have:    Ear pain that gets worse    Fever of 100.4F F (38 C) or higher, or as directed by your healthcare provider    Worsening wax buildup    Severe pain, dizziness, or nausea    Bleeding from the ear    Hearing problems    Signs of irritation from the eardrops, such as burning, stinging, or swelling and tenderness    Foul-smelling fluid draining from the ear    Swelling, redness, or tenderness of the outer ear    Headache, neck pain, or stiff neck  Date Last Reviewed: 6/1/2017 2000-2019 [employer]. [address] [place] [address], [place] [address]. All rights reserved. This information is not intended as a substitute for professional medical care. Always follow your healthcare professional's instructions.

## 2020-02-19 NOTE — PROGRESS NOTES
"Subjective     Genie Persaud is a 84 year old female who presents to clinic today for the following health issues:    HPI   Concern - Ear Problem  Onset: 2 days     Description:   Right ear is plugged     Intensity: mild    Progression of Symptoms:  same    Accompanying Signs & Symptoms:  PND and wheezing     Previous history of similar problem:   YES - every couple of years gets a lot of wax    Precipitating factors:   Worsened by: NA    Alleviating factors:  Improved by: NA    Therapies Tried and outcome: Ear drops not helping     Reviewed and updated as needed this visit by Provider         Review of Systems   ROS COMP: Constitutional, HEENT, cardiovascular, pulmonary systems are negative, except as otherwise noted.      Objective    /72 (BP Location: Right arm, Patient Position: Chair, Cuff Size: Adult Regular)   Pulse 80   Temp 97.3  F (36.3  C) (Tympanic)   Resp 14   Ht 1.575 m (5' 2.01\")   Wt 62.6 kg (138 lb)   LMP  (LMP Unknown)   SpO2 95%   BMI 25.23 kg/m    Body mass index is 25.23 kg/m .  Physical Exam   GENERAL: healthy, alert and no distress  EYES: Eyes grossly normal to inspection, PERRL and conjunctivae and sclerae normal  HENT: right ear: occluded with wax, post-removal clear, left ear: normal: no effusions, no erythema, normal landmarks and scant amount of soft yellow cerumen but not obstructing canal or TM, nose and mouth without ulcers or lesions, oropharynx clear and oral mucous membranes moist  NECK: no adenopathy      Diagnostic Test Results:  none         Assessment & Plan     1. Impacted cerumen of right ear  Tolerated well.   - HC REMOVAL IMPACTED CERUMEN IRRIGATION/LVG UNILAT       Patient Instructions   Patient Education     Earwax Removal    The ear canal makes earwax from the canal s lining. The ears make wax to lubricate and protect the ear canal. The ear canal is the tube that connects the middle ear to the outside of the ear. The wax protects the ear from bacteria, " infection, and damage from water or trauma.  The wax that forms in the canal naturally moves toward the outside of the ear and falls out. In some cases, the ear may make too much wax. If the wax causes problems or keeps the healthcare provider from seeing into the ear, the extra wax may be removed.  Too much wax can affect your hearing. It can cause itching. In rare cases, it can be painful. Earwax should not be removed unless it is causing a problem. You should not stick objects into your ear to remove wax unless told to do so by your healthcare provider.  Healthcare providers can remove earwax safely. It is important to stay still during the procedure to avoid damage to the ear canal. But removing earwax generally doesn t hurt. You will not usually need anesthesia or pain medicine when the provider removes the earwax.  A number of conditions lead to earwax buildup. These include some skin problems, a narrow ear canal, or ears that make too much earwax. Using cotton swabs in the canal pushes earwax deeper into the ear and contributes to the buildup of earwax.  Home care    The healthcare provider may recommend mineral oil or an over-the-counter eardrop to use at home to soften the earwax. Use these products only if the provider recommends them. Carefully follow the instructions given.    Don t use mineral oil or OTC eardrops if you might have an ear infection or a ruptured eardrum. Tell your healthcare provider right away if you have diabetes or an immune disorder.    Don t use cotton swabs in your ears. Cotton swabs may push wax deeper into the ear canal or damage the eardrum. Use cotton gauze or a wet washcloth  to gently remove wax on the outside of the ear and around the opening to the ear canal.    Don't use any probing device or object such as cotton-tipped swabs or rosie pins to clean the inside of your ears.    Don t use ear candles to clean your ears. Candling can be dangerous. It can burn the ear canal.  It can also make the condition worse instead of better.    Don t use cold water to rinse the ear. This will make you dizzy. If your provider tells you to rinse your ear, use only warm water or follow his or her instructions.    Check the ear for signs of infection or irritation listed below under When to seek medical advice.  Steps for using eardrops  1. Warm the medicine bottle by rubbing it between your hands for a few minutes.  2. Lie down on your side, with the affected ear up.  3. Place the recommended number of drops in the ear. Wet a cotton ball with the medicine. Gently put the cotton ball into the ear opening.    Follow-up care  Follow up with your healthcare provider, or as directed.  When to seek medical advice  Call the provider right away if you have:    Ear pain that gets worse    Fever of 100.4F F (38 C) or higher, or as directed by your healthcare provider    Worsening wax buildup    Severe pain, dizziness, or nausea    Bleeding from the ear    Hearing problems    Signs of irritation from the eardrops, such as burning, stinging, or swelling and tenderness    Foul-smelling fluid draining from the ear    Swelling, redness, or tenderness of the outer ear    Headache, neck pain, or stiff neck  Date Last Reviewed: 6/1/2017 2000-2019 [employer]. [address] [place] [address], [place] [address]. All rights reserved. This information is not intended as a substitute for professional medical care. Always follow your healthcare professional's instructions.               Return if symptoms worsen or fail to improve.    Fern Montes NP  [facility]

## 2020-03-05 ENCOUNTER — TELEPHONE (OUTPATIENT)
Dept: PEDIATRICS | Facility: CLINIC | Age: 85
End: 2020-03-05

## 2020-03-05 ENCOUNTER — ANCILLARY PROCEDURE (OUTPATIENT)
Dept: GENERAL RADIOLOGY | Facility: CLINIC | Age: 85
End: 2020-03-05
Attending: NURSE PRACTITIONER
Payer: COMMERCIAL

## 2020-03-05 ENCOUNTER — OFFICE VISIT (OUTPATIENT)
Dept: PEDIATRICS | Facility: CLINIC | Age: 85
End: 2020-03-05
Payer: COMMERCIAL

## 2020-03-05 VITALS
TEMPERATURE: 98.8 F | RESPIRATION RATE: 22 BRPM | DIASTOLIC BLOOD PRESSURE: 44 MMHG | HEART RATE: 79 BPM | BODY MASS INDEX: 25.12 KG/M2 | SYSTOLIC BLOOD PRESSURE: 140 MMHG | WEIGHT: 137.4 LBS | OXYGEN SATURATION: 96 %

## 2020-03-05 DIAGNOSIS — R05.9 COUGH: ICD-10-CM

## 2020-03-05 DIAGNOSIS — R06.2 WHEEZE: ICD-10-CM

## 2020-03-05 DIAGNOSIS — J45.901 EXACERBATION OF PERSISTENT ASTHMA, UNSPECIFIED ASTHMA SEVERITY: Primary | ICD-10-CM

## 2020-03-05 PROCEDURE — 99214 OFFICE O/P EST MOD 30 MIN: CPT | Performed by: NURSE PRACTITIONER

## 2020-03-05 PROCEDURE — 71046 X-RAY EXAM CHEST 2 VIEWS: CPT

## 2020-03-05 RX ORDER — PREDNISONE 10 MG/1
TABLET ORAL
Qty: 20 TABLET | Refills: 0 | Status: SHIPPED | OUTPATIENT
Start: 2020-03-05 | End: 2020-08-06

## 2020-03-05 RX ORDER — IPRATROPIUM BROMIDE AND ALBUTEROL SULFATE 2.5; .5 MG/3ML; MG/3ML
3 SOLUTION RESPIRATORY (INHALATION) ONCE
Status: COMPLETED | OUTPATIENT
Start: 2020-03-05 | End: 2020-03-05

## 2020-03-05 RX ADMIN — IPRATROPIUM BROMIDE AND ALBUTEROL SULFATE 3 ML: 2.5; .5 SOLUTION RESPIRATORY (INHALATION) at 11:31

## 2020-03-05 NOTE — PROGRESS NOTES
Subjective     Genie Persaud is a 84 year old female who presents to clinic today for the following health issues:    HPI   RESPIRATORY SYMPTOMS      Duration: x 3-4 days     Description: rhinorrhea, cough, phlegm, wheezing and fatigue/malaise    Severity: severe - pt. states she cant sleep due to cough    Accompanying signs and symptoms: None    History (predisposing factors):  asthma    Precipitating or alleviating factors: rescue inhaler and prednisone helps breifly    Therapies tried and outcome:  rest and fluids and cough drops    Hx of seasonal allergies and asthma.  On Advair, prn albuterol.   Followed by pulm last visit 9/9/19-stable.  Used albuterol x2 last night, did help a little bit with breathing.  Had 3 doses of prednisone at home from pulmonologist (10 mg daily) , took this the past 3 days but not helpful.   CKD, afib.   Feels difficult to take a deep breath. No shortness of breath.  No fevers.  Hx of pneumonia.    Reviewed and updated as needed this visit by Provider  Meds  Problems         Review of Systems   ROS COMP: Constitutional, HEENT, cardiovascular, pulmonary, gi and gu systems are negative, except as otherwise noted.      Objective    BP (!) 140/44 (BP Location: Right arm, Patient Position: Sitting, Cuff Size: Adult Regular)   Pulse 79   Temp 98.8  F (37.1  C) (Tympanic)   Resp 22   Wt 62.3 kg (137 lb 6.4 oz)   LMP  (LMP Unknown)   SpO2 96%   BMI 25.12 kg/m    Body mass index is 25.12 kg/m .  Physical Exam   GENERAL: healthy, alert and no distress  EYES: Eyes grossly normal to inspection  HENT: ear canals and TM's normal, nose and mouth without ulcers or lesions, mouth slightly dry (she tells me this is normal for her)  NECK: no adenopathy  RESP: decreased breath sounds throughout with exp wheeze in left lower lung field, post-neb some improved air flow  CV: regular rates and rhythm, normal S1 S2, no S3 or S4 and no murmur, click or rub    Diagnostic Test Results:  CXR - my  personal interpretation is negative for pneumonia        Assessment & Plan       ICD-10-CM    1. Exacerbation of persistent asthma, unspecified asthma severity J45.901 predniSONE (DELTASONE) 10 MG tablet   2. Cough R05 XR Chest 2 Views     ipratropium - albuterol 0.5 mg/2.5 mg/3 mL (DUONEB) neb solution 3 mL     predniSONE (DELTASONE) 10 MG tablet   3. Wheeze R06.2 XR Chest 2 Views     ipratropium - albuterol 0.5 mg/2.5 mg/3 mL (DUONEB) neb solution 3 mL     predniSONE (DELTASONE) 10 MG tablet   Feels better after duoneb, able to take deep breath and O2 sats increased to 96%.  Increase dose of prednisone and taper as directed.  Also increase frequency of albuterol inhaler or neb to 4x daily.  No indication for abx at this time, chest x-ray clear.  Discussed s/s to watch for and when to follow-up.    Patient Instructions   Take the prednisone as directed over the next 8 days.  Use your inhaler or neb 4x daily.  If not improving in the next 3-4 days to let us know, sooner if worsening or new symptoms. (such as shortness of breath or fevers) to be seen right away.      Return in about 3 days (around 3/8/2020), or if symptoms worsen or fail to improve.    Fern Montes NP  [facility]

## 2020-03-05 NOTE — TELEPHONE ENCOUNTER
Patient called the pharmacy never got her prednisone could that please be resent she has been trying to get it since her appt this morning

## 2020-03-05 NOTE — PATIENT INSTRUCTIONS
Take the prednisone as directed over the next 8 days.  Use your inhaler or neb 4x daily.  If not improving in the next 3-4 days to let us know, sooner if worsening or new symptoms. (such as shortness of breath or fevers) to be seen right away.

## 2020-03-09 DIAGNOSIS — I51.81 STRESS-INDUCED CARDIOMYOPATHY: ICD-10-CM

## 2020-03-09 DIAGNOSIS — I10 ESSENTIAL HYPERTENSION WITH GOAL BLOOD PRESSURE LESS THAN 140/90: ICD-10-CM

## 2020-03-09 RX ORDER — METOPROLOL SUCCINATE 100 MG/1
100 TABLET, EXTENDED RELEASE ORAL DAILY
Qty: 90 TABLET | Refills: 3 | Status: SHIPPED | OUTPATIENT
Start: 2020-03-09 | End: 2020-08-06

## 2020-03-09 NOTE — TELEPHONE ENCOUNTER
Medication Refilled: metoprolol  Last office visit: 11/15/19 - with Diana RODGERS  Last Labs/EKG: NA  Next office visit: 5/2020 orders in epic  Pharmacy sent to: Silvana Westbrook RN

## 2020-07-05 DIAGNOSIS — E78.5 HYPERLIPIDEMIA LDL GOAL <130: ICD-10-CM

## 2020-07-06 RX ORDER — ATORVASTATIN CALCIUM 20 MG/1
20 TABLET, FILM COATED ORAL DAILY
Qty: 90 TABLET | Refills: 0 | Status: SHIPPED | OUTPATIENT
Start: 2020-07-06 | End: 2020-10-05

## 2020-08-05 ENCOUNTER — PRE VISIT (OUTPATIENT)
Dept: CARDIOLOGY | Facility: CLINIC | Age: 85
End: 2020-08-05

## 2020-08-06 ENCOUNTER — VIRTUAL VISIT (OUTPATIENT)
Dept: CARDIOLOGY | Facility: CLINIC | Age: 85
End: 2020-08-06
Attending: PHYSICIAN ASSISTANT
Payer: COMMERCIAL

## 2020-08-06 DIAGNOSIS — I71.20 THORACIC AORTIC ANEURYSM WITHOUT RUPTURE (H): ICD-10-CM

## 2020-08-06 DIAGNOSIS — I47.10 SVT (SUPRAVENTRICULAR TACHYCARDIA) (H): ICD-10-CM

## 2020-08-06 DIAGNOSIS — I25.10 CORONARY ARTERY DISEASE INVOLVING NATIVE CORONARY ARTERY OF NATIVE HEART WITHOUT ANGINA PECTORIS: ICD-10-CM

## 2020-08-06 PROCEDURE — 99214 OFFICE O/P EST MOD 30 MIN: CPT | Mod: 95 | Performed by: INTERNAL MEDICINE

## 2020-08-06 NOTE — PROGRESS NOTES
"Genie Persaud is a 84 year old female who is being evaluated via a billable telephone visit.      The patient has been notified of following:     \"This telephone visit will be conducted via a call between you and your physician/provider. We have found that certain health care needs can be provided without the need for a physical exam.  This service lets us provide the care you need with a short phone conversation.  If a prescription is necessary we can send it directly to your pharmacy.  If lab work is needed we can place an order for that and you can then stop by our lab to have the test done at a later time.    Telephone visits are billed at different rates depending on your insurance coverage. During this emergency period, for some insurers they may be billed the same as an in-person visit.  Please reach out to your insurance provider with any questions.    If during the course of the call the physician/provider feels a telephone visit is not appropriate, you will not be charged for this service.\"    Patient has given verbal consent for Telephone visit?  Yes    What phone number would you like to be contacted at? [phone]    How would you like to obtain your AVS? Mail a copy      Review Of Systems  Skin: positive for negative, bruising  Eyes: positive for glasses  Ears/Nose/Throat: positive for nasal congestion, postnasal drainage, sinus trouble  Respiratory: Shortness of breath asthma   Cardiovascular: positive for lower extremity edema wears stockings   Gastrointestinal: negative  Genitourinary: positive for nocturia  Musculoskeletal: positive for arthritis  Neurologic: negative  Psychiatric: negative  Hematologic/Lymphatic/Immunologic: easy bruising   Endocrine: negative    Patient reported vitals:  BP: 135/55  Heart rate: na  Weight: na   Celina Schulz CMA      Phone call duration: 21 minutes    HISTORY OF PRESENT ILLNESS:  Genie a very nice 84-year-old woman with a past medical history significant for " chronic peripheral edema, severe labile hypertension, hypercholesterolemia, a known ascending aortic aneurysm, mild to moderate coronary artery disease based on angiogram from 03/2017, aortic insufficiency that was thought to be moderately severe, again, based on echocardiogram from 03/2017 with an ejection fraction of 45%-50%, consistent with a nonischemic cardiomyopathy.  In the Cath Lab, she was noted to have short bursts of nonsustained supraventricular tachycardia up to 130 beats per minute.      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  Hospital course was further complicated by asymptomatic rapid atrial fibrillation, severe peripheral edema and biventricular heart failure.      Followup echocardiogram in 01/2018 showed her ejection fraction had completely normalized and was estimated to be 60%-65% and her aortic regurgitation was now only mild.  It appeared that she had no further recurrences of her atrial fibrillation, so I stopped her amiodarone ultimately also stopping Eliquis after negative Zio Patch.    Due to Covid pandemic a telephone visit was conducted with Genie today.  She reports no symptoms consistent with a Covid infection.  She is practicing social distancing.    Genie states that she thinks she is doing well.  She is not having any chest, arm, neck, jaw or shoulder discomfort.  She has no lightheadedness, dizziness, syncope or near-syncope. She notes no problems or side effects with her current medical regimen.  She does relate her blood pressure was running low and she stopped metoprolol on her own.  Since that time her blood pressures been very well controlled usually running in the 120s over 50s.  Today she reports a 135/55.     ASSESSMENT AND PLAN:  Genie has no symptoms to suggest ischemia.  We will continue with aggressive risk  factor modification and medical management.      She has no symptoms to suggest heart failure or any significant arrhythmia.  Her most recent echo from 2019 describes an ejection fraction of 50 to 55% again with mild cardiomyopathy possibly due to her hypertension.    Blood pressure does appear to be well controlled off of her metoprolol.  She states she tries to stay quite active gardening and going up and down the stairs to for her laundry she occasionally uses her stationary bike.  She also relates he tries to follow a low-salt diet.  She eats plenty of fresh fruits and vegetables and loves this time a year because the abundance [place] the [employer].     Basic metabolic profile was checked also in October with a sodium of 136, potassium of 4.3, creatinine of 1.09 giving her a GFR of 47.  I will continue her spironolactone and HCTZ as is.     We did do a followup CT scan of her aorta last in 2018 and her ascending aorta again measures 4.4 cm, unchanged from 03/2017.  I will recheck it next year.     I congratulated her on healthy lifestyle encouraged her to exercise daily.  I also emphasized importance of a low-salt diet high in fresh fruits and vegetables which she appears to be doing a good job.  She thinks her weight may be down 1 or 2 pounds.     I will have her follow-up with my LAYA in 1 year.  I will see her back in 2 years.  If she should have any problems or be let us see her sooner.  Thank you for allowing me to participate in her care.  Sincerely,        FILI ROOT MD, [facility]

## 2020-08-06 NOTE — LETTER
8/6/2020    Layo Sevilla MD  [address] [place] [address]    RE: Genie Persaud       Dear Colleague,    I had the pleasure of seeing Genie Persaud in the [facility].    Genie Persaud is a 84 year old female who is being evaluated via a billable telephone visit.      HISTORY OF PRESENT ILLNESS:  Genie a very nice 84-year-old woman with a past medical history significant for chronic peripheral edema, severe labile hypertension, hypercholesterolemia, a known ascending aortic aneurysm, mild to moderate coronary artery disease based on angiogram from 03/2017, aortic insufficiency that was thought to be moderately severe, again, based on echocardiogram from 03/2017 with an ejection fraction of 45%-50%, consistent with a nonischemic cardiomyopathy.  In the Cath Lab, she was noted to have short bursts of nonsustained supraventricular tachycardia up to 130 beats per minute.      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  Hospital course was further complicated by asymptomatic rapid atrial fibrillation, severe peripheral edema and biventricular heart failure.      Followup echocardiogram in 01/2018 showed her ejection fraction had completely normalized and was estimated to be 60%-65% and her aortic regurgitation was now only mild.  It appeared that she had no further recurrences of her atrial fibrillation, so I stopped her amiodarone ultimately also stopping Eliquis after negative Zio Patch.    Due to Covid pandemic a telephone visit was conducted with Genie today.  She reports no symptoms consistent with a Covid infection.  She is practicing social distancing.    Genie states that she thinks she is doing well.  She is not having any chest, arm, neck, jaw or shoulder discomfort.  She has no lightheadedness, dizziness,  syncope or near-syncope. She notes no problems or side effects with her current medical regimen.  She does relate her blood pressure was running low and she stopped metoprolol on her own.  Since that time her blood pressures been very well controlled usually running in the 120s over 50s.  Today she reports a 135/55.     ASSESSMENT AND PLAN:  Genie has no symptoms to suggest ischemia.  We will continue with aggressive risk factor modification and medical management.      She has no symptoms to suggest heart failure or any significant arrhythmia.  Her most recent echo from 2019 describes an ejection fraction of 50 to 55% again with mild cardiomyopathy possibly due to her hypertension.    Blood pressure does appear to be well controlled off of her metoprolol.  She states she tries to stay quite active gardening and going up and down the stairs to for her laundry she occasionally uses her stationary bike.  She also relates he tries to follow a low-salt diet.  She eats plenty of fresh fruits and vegetables and loves this time a year because the [place] [employer].     Basic metabolic profile was checked also in October with a sodium of 136, potassium of 4.3, creatinine of 1.09 giving her a GFR of 47.  I will continue her spironolactone and HCTZ as is.     We did do a followup CT scan of her aorta last in 2018 and her ascending aorta again measures 4.4 cm, unchanged from 03/2017.  I will recheck it next year.     I congratulated her on healthy lifestyle encouraged her to exercise daily.  I also emphasized importance of a low-salt diet high in fresh fruits and vegetables which she appears to be doing a good job.  She thinks her weight may be down 1 or 2 pounds.     I will have her follow-up with my LAYA in 1 year.  I will see her back in 2 years.  If she should have any problems or be let us see her sooner.  Thank you for allowing me to participate in her care.  Sincerely,       Thank you for allowing me to  participate in the care of your patient.    Sincerely,     Barak Hernandez MD     [facility]

## 2020-08-19 ENCOUNTER — TRANSFERRED RECORDS (OUTPATIENT)
Dept: HEALTH INFORMATION MANAGEMENT | Facility: CLINIC | Age: 85
End: 2020-08-19

## 2020-09-28 ENCOUNTER — OFFICE VISIT (OUTPATIENT)
Dept: URGENT CARE | Facility: URGENT CARE | Age: 85
End: 2020-09-28
Payer: COMMERCIAL

## 2020-09-28 VITALS
HEIGHT: 62 IN | TEMPERATURE: 98.1 F | DIASTOLIC BLOOD PRESSURE: 52 MMHG | OXYGEN SATURATION: 94 % | HEART RATE: 92 BPM | SYSTOLIC BLOOD PRESSURE: 170 MMHG | BODY MASS INDEX: 23.92 KG/M2 | RESPIRATION RATE: 14 BRPM | WEIGHT: 130 LBS

## 2020-09-28 DIAGNOSIS — J45.901 EXACERBATION OF ASTHMA, UNSPECIFIED ASTHMA SEVERITY, UNSPECIFIED WHETHER PERSISTENT: Primary | ICD-10-CM

## 2020-09-28 PROCEDURE — 99214 OFFICE O/P EST MOD 30 MIN: CPT | Performed by: PHYSICIAN ASSISTANT

## 2020-09-28 RX ORDER — PREDNISONE 10 MG/1
10 TABLET ORAL DAILY
Qty: 5 TABLET | Refills: 0 | Status: SHIPPED | OUTPATIENT
Start: 2020-09-28 | End: 2020-09-28

## 2020-09-28 RX ORDER — PREDNISONE 10 MG/1
10 TABLET ORAL DAILY
Qty: 5 TABLET | Refills: 0 | Status: SHIPPED | OUTPATIENT
Start: 2020-09-28 | End: 2020-10-09

## 2020-09-28 ASSESSMENT — ENCOUNTER SYMPTOMS
DIZZINESS: 1
COUGH: 0
CONSTITUTIONAL NEGATIVE: 1
SHORTNESS OF BREATH: 1

## 2020-09-28 ASSESSMENT — MIFFLIN-ST. JEOR: SCORE: 997.93

## 2020-09-28 NOTE — PATIENT INSTRUCTIONS
"  Patient Education     Asthma (Adult)  Asthma is a disease where the medium and  small air passages within the lung go into spasm and restrict the flow of air. Inflammation and swelling of the airways cause further blockage. During an acute asthma attack, these factors cause trouble breathing, wheezing, cough and chest tightness.    An asthma attack can be triggered by many things. Common triggers include infections such as the common cold, bronchitis, and pneumonia. Irritants such as smoke or pollutants in the air, very cold air, emotional upset, and exercise can also trigger an attack. In many adults with asthma, allergies to dust, mold, pollen and animal dander can cause an asthma attack. Skipping doses of daily asthma medicine can also bring on an asthma attack.  Asthma can be controlled using the proper medicines prescribed by your healthcare provider and avoiding exposure to known triggers including allergens and irritants.  Home care    Take prescribed medicine exactly at the times advised. If you need medicine such as from a hand held inhaler or aerosol breathing machine more than every 4 hours, contact your healthcare provider or seek immediate medical attention. If prescribed an antibiotic or prednisone, take all of the medicine as prescribed, even if you are feeling better after a few days.    Don't smoke. Avoid being exposed to the smoke of others.    Some people with asthma have worsening of their symptoms when they take aspirin and non-steroidal or fever-reducing medicines like ibuprofen and naproxen. Talk to your healthcare provider if you think this may apply to you.  Follow-up care  Follow up with your healthcare provider, or as advised. Always bring all of your current medicines to any appointments with your healthcare provider. Also bring a complete list of medicines even those not taken for asthma. If you don't already have one, talk to your healthcare provider about developing your own \"Asthma " "Action Plan.\"  A pneumococcal (pneumonia) vaccine and yearly flu shot (every fall) are recommended. Ask your doctor about this.  When to seek medical advice  Call your healthcare provider right away if any of these occur:     Increased wheezing or shortness of breath    Need to use your inhalers more often than usual without relief    Fever of 100.4 F (38 C) or higher, or as directed by your healthcare provider    Coughing up lots of dark-colored or bloody sputum (mucus)    Chest pain with each breath    If you use a peak flow meter as part of an Asthma Action Plan, and you are still in the yellow zone (50% to 80%) 15 minutes after using inhaler medicine.  Call 911  Call 911 if any of the following occur    Trouble walking or talking because of shortness of breath    If you use a peak flow meter as part of an Asthma Action Plan and you are still in the red zone (less than 50%) 15 minutes after using inhaler medicine    Lips or fingernails turning gray or blue  Date Last Reviewed: 5/1/2017 2000-2019 [employer]. [address] [place] [address], [place] [address]. All rights reserved. This information is not intended as a substitute for professional medical care. Always follow your healthcare professional's instructions.           "

## 2020-09-28 NOTE — PROGRESS NOTES
SUBJECTIVE:   Genie Persaud is a 85 year old female presenting with a chief complaint of   Chief Complaint   Patient presents with     Dizziness     Hx of vertigo with allergies. increase heartburn       She is an established patient of [facility].  Patient presents with complaints of dizziness x 2 months.  She states she gets with seasonal allergies.  Occurs every year and normally goes away in September, but she has had increase in GERD symptoms lately and has heard herself wheezing.  She has a history of asthma and is using inhalers.  She is here to check on her breathing.  Last albuterol inhaler was this morning.        Review of Systems   Constitutional: Negative.    HENT: Negative.    Respiratory: Positive for shortness of breath. Negative for cough.    Genitourinary: Negative.    Neurological: Positive for dizziness.   All other systems reviewed and are negative.      Past Medical History:   Diagnosis Date     Anemia 3/10/2009    Chronic disease, by Fe studies; awaiting full GI w/u and repeat colonoscopy, ERCP.     Basal Cell Carcinoma--back      Coronary artery disease involving native coronary artery of native heart without angina pectoris 3/9/2017    3/2/2017 - Two vessel coronary artery disease with mLAD 50% stenosis, D3 50% stenosis, pLCx 50% stenosis and mLCx 50% stenosis     Essential hypertension with goal blood pressure less than 140/90 9/1/2016    White coat hypertension;  24 hour ambulatory monitoring normal. Do not titrate up further.       Hyperlipidemia LDL goal <130 2/10/2010     Impaired fasting glucose 8/26/2010     Moderate persistent asthma      Nonrheumatic aortic valve insufficiency 6/29/2017     osteopenia      Paroxysmal atrial fibrillation (H) 12/26/2017     Pulmonary nodule 3/3/2009    PET scan reportedly normal; biopsy not advised.     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  Problem list name updated by automated process. Provider to review     Unspecified arthropathy, hand      Vasculitis (H) 4/20/2009    Possible increased activity of thoracic aorta, on PET scan w/u for pulmonary nodule.      Family History   Problem Relation Age of Onset     Hypertension Mother      Osteoporosis Mother      C.A.D. Mother      Connective Tissue Disorder Father         scleraderma     Cerebrovascular Disease Paternal Grandmother      Prostate Cancer Other         9/05 passed away due to complications     Breast Cancer Child         youngest dtr     Current Outpatient Medications   Medication Sig Dispense Refill     acetaminophen (TYLENOL) 500 MG tablet Take 500-1,000 mg by mouth every 8 hours as needed for mild pain       albuterol (PROAIR HFA, PROVENTIL HFA, VENTOLIN HFA) 108 (90 BASE) MCG/ACT inhaler Inhale 2 puffs into the lungs every 6 hours as needed        amLODIPine 5 MG PO tablet Take 1 tablet (5 mg) by mouth At Bedtime 90 tablet 3     atorvastatin (LIPITOR) 20 MG tablet Take 1 tablet (20 mg) by mouth daily 90 tablet 0     azelastine (ASTELIN) 0.1 % nasal spray SPRAY 1 TO 2 SPRAYS INTO BOTH NOSTRILS 2 TIMES DAILY 30 mL 5     calcium Citrate-vitamin D 500-400 MG-UNIT CHEW 1 tablet twice day       fluticasone-salmeterol (ADVAIR-HFA) 230-21 MCG/ACT inhaler Inhale 2 puffs into the lungs 2 times daily 12 g 1     hydrochlorothiazide (MICROZIDE) 12.5 MG capsule Take 1 capsule (12.5 mg) by mouth daily 90 capsule 3     ipratropium (ATROVENT) 0.03 % spray Spray 2 sprays in nostril daily        latanoprost (XALATAN) 0.005 % ophthalmic solution Place 1 drop into both eyes At Bedtime       levocetirizine (XYZAL) 5 MG tablet Take 5 mg by mouth daily       Multiple Vitamins-Minerals (PRESERVISION AREDS PO) 1  "capsule twice a day       predniSONE (DELTASONE) 10 MG tablet Take 1 tablet (10 mg) by mouth daily 5 tablet 0     PREDNISONE PO As needed for asthma flares       spironolactone (ALDACTONE) 25 MG tablet Take 0.5 tablets (12.5 mg) by mouth daily In the morning 45 tablet 3     omeprazole (PRILOSEC) 20 MG DR capsule Take 1 capsule (20 mg) by mouth every other day (Patient not taking: Reported on 9/28/2020) 90 capsule 1     Social History     Tobacco Use     Smoking status: Never Smoker     Smokeless tobacco: Never Used   Substance Use Topics     Alcohol use: Yes     Alcohol/week: 1.0 - 2.0 standard drinks     Types: 1 - 2 Standard drinks or equivalent per week     Comment: 1 glass of wine 1-2 days per week       OBJECTIVE  BP (!) 170/52   Pulse 92   Temp 98.1  F (36.7  C) (Oral)   Resp 14   Ht 1.575 m (5' 2\")   Wt 59 kg (130 lb)   LMP  (LMP Unknown)   SpO2 94%   BMI 23.78 kg/m      Physical Exam  Vitals signs and nursing note reviewed.   Constitutional:       General: She is not in acute distress.     Appearance: Normal appearance. She is normal weight. She is not ill-appearing or toxic-appearing.   HENT:      Head: Normocephalic and atraumatic.      Right Ear: Tympanic membrane, ear canal and external ear normal.      Left Ear: Tympanic membrane, ear canal and external ear normal.   Eyes:      Extraocular Movements: Extraocular movements intact.      Conjunctiva/sclera: Conjunctivae normal.   Neck:      Musculoskeletal: Normal range of motion and neck supple.   Cardiovascular:      Rate and Rhythm: Normal rate and regular rhythm.      Pulses: Normal pulses.      Heart sounds: Normal heart sounds.   Pulmonary:      Effort: Pulmonary effort is normal.      Breath sounds: Wheezing present.   Skin:     General: Skin is warm and dry.      Capillary Refill: Capillary refill takes less than 2 seconds.   Neurological:      General: No focal deficit present.      Mental Status: She is alert.   Psychiatric:         Mood " and Affect: Mood normal.         Behavior: Behavior normal.         Labs:  No results found for this or any previous visit (from the past 24 hour(s)).    X-Ray was not done.    ASSESSMENT:      ICD-10-CM    1. Exacerbation of asthma, unspecified asthma severity, unspecified whether persistent  J45.901 predniSONE (DELTASONE) 10 MG tablet     DISCONTINUED: predniSONE (DELTASONE) 10 MG tablet        Medical Decision Making:    Differential Diagnosis:  Asthma exacerbation    Serious Comorbid Conditions:  Adult:  Asthma and as above    PLAN:    Prednisone 10 mg po x 5 days.  Discussed reasons to seek immediate medical attention.  Patient agrees.      Followup:    If not improving or if condition worsens, follow up with your Primary Care Provider, If not improving or if conditions worsens over the next 12-24 hours, go to the Emergency Department    Patient Instructions     Patient Education     Asthma (Adult)  Asthma is a disease where the medium and  small air passages within the lung go into spasm and restrict the flow of air. Inflammation and swelling of the airways cause further blockage. During an acute asthma attack, these factors cause trouble breathing, wheezing, cough and chest tightness.    An asthma attack can be triggered by many things. Common triggers include infections such as the common cold, bronchitis, and pneumonia. Irritants such as smoke or pollutants in the air, very cold air, emotional upset, and exercise can also trigger an attack. In many adults with asthma, allergies to dust, mold, pollen and animal dander can cause an asthma attack. Skipping doses of daily asthma medicine can also bring on an asthma attack.  Asthma can be controlled using the proper medicines prescribed by your healthcare provider and avoiding exposure to known triggers including allergens and irritants.  Home care    Take prescribed medicine exactly at the times advised. If you need medicine such as from a hand held inhaler or  "aerosol breathing machine more than every 4 hours, contact your healthcare provider or seek immediate medical attention. If prescribed an antibiotic or prednisone, take all of the medicine as prescribed, even if you are feeling better after a few days.    Don't smoke. Avoid being exposed to the smoke of others.    Some people with asthma have worsening of their symptoms when they take aspirin and non-steroidal or fever-reducing medicines like ibuprofen and naproxen. Talk to your healthcare provider if you think this may apply to you.  Follow-up care  Follow up with your healthcare provider, or as advised. Always bring all of your current medicines to any appointments with your healthcare provider. Also bring a complete list of medicines even those not taken for asthma. If you don't already have one, talk to your healthcare provider about developing your own \"Asthma Action Plan.\"  A pneumococcal (pneumonia) vaccine and yearly flu shot (every fall) are recommended. Ask your doctor about this.  When to seek medical advice  Call your healthcare provider right away if any of these occur:     Increased wheezing or shortness of breath    Need to use your inhalers more often than usual without relief    Fever of 100.4 F (38 C) or higher, or as directed by your healthcare provider    Coughing up lots of dark-colored or bloody sputum (mucus)    Chest pain with each breath    If you use a peak flow meter as part of an Asthma Action Plan, and you are still in the yellow zone (50% to 80%) 15 minutes after using inhaler medicine.  Call 911  Call 911 if any of the following occur    Trouble walking or talking because of shortness of breath    If you use a peak flow meter as part of an Asthma Action Plan and you are still in the red zone (less than 50%) 15 minutes after using inhaler medicine    Lips or fingernails turning gray or blue  Date Last Reviewed: 5/1/2017 2000-2019 [employer]. [address] [facility], " [place] [address]. All rights reserved. This information is not intended as a substitute for professional medical care. Always follow your healthcare professional's instructions.

## 2020-10-09 ENCOUNTER — OFFICE VISIT (OUTPATIENT)
Dept: URGENT CARE | Facility: URGENT CARE | Age: 85
End: 2020-10-09
Payer: COMMERCIAL

## 2020-10-09 VITALS
SYSTOLIC BLOOD PRESSURE: 152 MMHG | TEMPERATURE: 97.6 F | OXYGEN SATURATION: 95 % | HEART RATE: 62 BPM | DIASTOLIC BLOOD PRESSURE: 58 MMHG

## 2020-10-09 DIAGNOSIS — R42 DIZZINESS: Primary | ICD-10-CM

## 2020-10-09 DIAGNOSIS — J01.00 ACUTE MAXILLARY SINUSITIS, RECURRENCE NOT SPECIFIED: ICD-10-CM

## 2020-10-09 PROCEDURE — 99214 OFFICE O/P EST MOD 30 MIN: CPT | Performed by: PHYSICIAN ASSISTANT

## 2020-10-09 PROCEDURE — 93000 ELECTROCARDIOGRAM COMPLETE: CPT | Performed by: PHYSICIAN ASSISTANT

## 2020-10-09 RX ORDER — AMOXICILLIN 875 MG
875 TABLET ORAL 2 TIMES DAILY
Qty: 20 TABLET | Refills: 0 | Status: SHIPPED | OUTPATIENT
Start: 2020-10-09 | End: 2020-10-19

## 2020-10-09 NOTE — PATIENT INSTRUCTIONS
You have PACs, which is a benign condition, but I would like you to contact your Cardiologist regarding this finding.   In regards to sinus pressure and dizziness, I want you to continue with your nasal sprays   Start antibiotics today for sinusitis

## 2020-10-09 NOTE — PROGRESS NOTES
CHIEF COMPLAINT:   Chief Complaint   Patient presents with     Urgent Care     Dizziness     Hx of dizziness. Dizziness has been ongoing since August. Pt thinks it is due to her allergies. Feels like there is lots of pressure around her eye area       HPI: Genie Persaud is a 85 year old female who presents to clinic today for evaluation of dizziness. Dizziness has been present for the past 2 months, and reports it feels like sinus pressure. This typically happens every spring, and resolves spontaneously. She is currently on two nasal sprays to combat her allergies and has recently been on prednisone for allergies. She denies having fever, chills, chest pain, SOB, lightheadedness or spinning sensation.     Past Medical History:   Diagnosis Date     Anemia 3/10/2009    Chronic disease, by Fe studies; awaiting full GI w/u and repeat colonoscopy, ERCP.     Basal Cell Carcinoma--back      Coronary artery disease involving native coronary artery of native heart without angina pectoris 3/9/2017    3/2/2017 - Two vessel coronary artery disease with mLAD 50% stenosis, D3 50% stenosis, pLCx 50% stenosis and mLCx 50% stenosis     Essential hypertension with goal blood pressure less than 140/90 9/1/2016    White coat hypertension;  24 hour ambulatory monitoring normal. Do not titrate up further.       Hyperlipidemia LDL goal <130 2/10/2010     Impaired fasting glucose 8/26/2010     Moderate persistent asthma      Nonrheumatic aortic valve insufficiency 6/29/2017     osteopenia      Paroxysmal atrial fibrillation (H) 12/26/2017     Pulmonary nodule 3/3/2009    PET scan reportedly normal; biopsy not advised.     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  Problem list name updated by automated process. Provider to review     Unspecified arthropathy, hand      Vasculitis (H) 4/20/2009    Possible increased activity of thoracic aorta, on PET scan w/u for pulmonary nodule.      Past Surgical History:   Procedure Laterality Date     C APPENDECTOMY  1957     C LIGATE FALLOPIAN TUBE  1971     C STEREOTACTIC BREAST BIOPSY  2001     CHOLECYSTECTOMY, LAPOROSCOPIC  2008    Cholecystectomy, Laparoscopic     ESOPHAGOSCOPY, GASTROSCOPY, DUODENOSCOPY (EGD), COMBINED  5/17/2013    Procedure: COMBINED ESOPHAGOSCOPY, GASTROSCOPY, DUODENOSCOPY (EGD), BIOPSY SINGLE OR MULTIPLE;  ESOPHAGOSCOPY, GASTROSCOPY, DUODENOSCOPY (EGD)  with bx;  Surgeon: Jeffery Yanez MD;  Location: [facility]     HC DILATION/CURETTAGE DIAG/THER NON OB  1967,1971     HC REMOVE TONSILS/ADENOIDS,<11 Y/O       Social History     Tobacco Use     Smoking status: Never Smoker     Smokeless tobacco: Never Used   Substance Use Topics     Alcohol use: Yes     Alcohol/week: 1.0 - 2.0 standard drinks     Types: 1 - 2 Standard drinks or equivalent per week     Comment: 1 glass of wine 1-2 days per week     Current Outpatient Medications   Medication     acetaminophen (TYLENOL) 500 MG tablet     albuterol (PROAIR HFA, PROVENTIL HFA, VENTOLIN HFA) 108 (90 BASE) MCG/ACT inhaler     amLODIPine 5 MG PO tablet     atorvastatin (LIPITOR) 20 MG tablet     azelastine (ASTELIN) 0.1 % nasal spray     calcium Citrate-vitamin D 500-400 MG-UNIT CHEW     fluticasone-salmeterol (ADVAIR-HFA) 230-21 MCG/ACT inhaler     hydrochlorothiazide (MICROZIDE) 12.5 MG capsule     ipratropium (ATROVENT) 0.03 % spray     latanoprost (XALATAN) 0.005 % ophthalmic solution     levocetirizine (XYZAL) 5 MG tablet     Multiple Vitamins-Minerals (PRESERVISION AREDS PO)     predniSONE (DELTASONE) 10 MG tablet     PREDNISONE PO     spironolactone (ALDACTONE) 25 MG tablet     No current facility-administered medications for this  visit.      Allergies   Allergen Reactions     Fosamax [Alendronic Acid] GI Disturbance     Contrast Dye      Red arm redness and swelling      Evista [Raloxifene]      abd symptoms.      Flu Virus Vaccine      swollen and red at inj site       10 point ROS of systems including Constitutional, Eyes, Respiratory, Cardiovascular, Gastroenterology, Genitourinary, Integumentary, Muscularskeletal, Psychiatric were all negative except for pertinent positives noted in my HPI.        Exam:  BP (!) 152/58   Pulse 62   Temp 97.6  F (36.4  C) (Oral)   LMP  (LMP Unknown)   SpO2 95%   Constitutional: healthy, alert and no distress  Head: Normocephalic, atraumatic.  Eyes: conjunctiva clear, no drainage  ENT: TMs clear and shiny nilton, nasal mucosa pink and moist, throat without tonsillar hypertrophy or erythema  Neck: neck is supple, no cervical lymphadenopathy or nuchal rigidity  Cardiovascular: several skipped beats without mumur  Respiratory: CTA bilaterally, no rhonchi or rales  Gastrointestinal: soft and nontender  Skin: no rashes  Neurologic: Speech clear, gait normal. Moves all extremities.    EKG -- Frequent PAC    ASSESSMENT/PLAN:  1. Dizziness  Related to sinus pressure and constant sinus drainage. RX for amoxicillin to treat for sinusitis. Continue with nasal sprays. Initially, felt pulse to have several skipped beats and EKG was done to show PAC. Advised her to message her Cardiologist for EKG findings.   - EKG 12-lead complete w/read - Clinics  - EKG 12-lead complete w/read - Clinics    Diagnosis and treatment plan was reviewed with patient and/or family.   We went over any labs or imaging. Discussed worsening symptoms or little to no relief despite treatment plan to follow-up with PCP or return to clinic.  Patient verbalizes understanding. All questions were addressed and answered.   Samantha Bocanegra PA-C

## 2020-10-12 ENCOUNTER — TELEPHONE (OUTPATIENT)
Dept: CARDIOLOGY | Facility: CLINIC | Age: 85
End: 2020-10-12

## 2020-10-12 NOTE — TELEPHONE ENCOUNTER
Voicemail received from patient calling to report that she was at Urgent Care yesterday and had an EKG done which noted PACs. She is wondering if she needs any new medications/med changes.     Per [facility] notes, pt was seen for dizziness/sinus pressure x2mos. Pt was given an Rx for amoxicillin and advised to f/up w/ cardiology regarding PACs.     Spoke to patient who states the urgent care doctor thought she was in afib at first. She denies any palpitations/feelings of a racing heart. No chest pain or presyncope. She really isn't aware of the PACs at all. She said the dizziness comes on every year around this time but isn't this prolonged. Routed to Dr Hernandez.

## 2020-10-12 NOTE — TELEPHONE ENCOUNTER
Called patient to review message from Dr Hernandez. Pt states she already stopped the antibiotics due to a rash. Recommended she contact her PCP about this and monitor for signs of serious reaction ie tongue/face/lip swelling or trouble breathing. Pt to call 911 for these. Pt verbalized understanding.      She will call if anything changes related to her PACs/symptoms related to these.

## 2020-10-12 NOTE — TELEPHONE ENCOUNTER
PACs are benign.  I do not think she needs any changes.  If symptoms persist after she is finished her antibiotics she can call and we can make an LAYA appointment if she thinks something is going on with her heart.  When I visited with her in August I thought her heart was doing fine.

## 2020-10-22 ENCOUNTER — TELEPHONE (OUTPATIENT)
Dept: PEDIATRICS | Facility: CLINIC | Age: 85
End: 2020-10-22

## 2020-10-22 DIAGNOSIS — J45.901 EXACERBATION OF ASTHMA, UNSPECIFIED ASTHMA SEVERITY, UNSPECIFIED WHETHER PERSISTENT: Primary | ICD-10-CM

## 2020-10-22 RX ORDER — PREDNISONE 20 MG/1
20 TABLET ORAL DAILY
Qty: 5 TABLET | Refills: 0 | Status: SHIPPED | OUTPATIENT
Start: 2020-10-22 | End: 2020-10-27

## 2020-10-22 NOTE — TELEPHONE ENCOUNTER
Called patient.     Has these symptoms frequently around this time of year.    Patient reports that allergies are contributing to the symptoms.   Patient reports that Dr. Sevilla has given her prednisone in the past for these symptoms.     These symptoms started in August, gets better and worse over time.     Feels wheezing in the chest.   Feels pressure in the face.   Some dizziness occasionally.   Has a cough on and off.     Has been using the rescue inhaler, which has been helping.     Denies difficulty breathing.   Denies chest pain.     Recently had an urgent care visit on 10/9/20.  EKG for dizziness and Amoxicillin for sinusitis.     Advised phone visit.   Patient reports that she does not want to have a visit, and would like to have prednisone prescription.    Routing to Dr. Sevilla to advise.   Patient said that Dr. Sevilla has given her prednisone rx for these symptoms in the past without a visit.   Does this patient need to be assessed in a visit?    Felipe Zimmerman RN on 10/22/2020 at 10:23 AM

## 2020-10-22 NOTE — TELEPHONE ENCOUNTER
General Call:     Who is calling:  Patient    Reason for Call:  Patient would like prednisone for her asthma and she also has allergies. She said Dr. Sevilla has prescribed this for her in the past. She does not want to come out in this weather, so she just would like it sent to [facility] pharmacy in [place]. She has also had this prescribed by urgent care providers she said.  Please call her back.    What are your questions or concerns:  Stated above  Date of last appointment with provider: 11/7/19    Okay to leave a detailed message:Yes at Home number on file [phone] (home)

## 2020-10-22 NOTE — TELEPHONE ENCOUNTER
Called patient.    Relayed below note from Dr. Sevilla to the patient.     Patient verbalized understanding of plan of care.     Felipe Zimmerman RN on 10/22/2020 at 2:40 PM

## 2020-10-22 NOTE — TELEPHONE ENCOUNTER
Please call    We can do to 5 day course of prednisone, and have patient follow up if not improved after the weekend.    Layo Sevilla MD  Internal Medicine and Pediatrics

## 2020-10-24 NOTE — PROGRESS NOTES
"BRIEF NUTRITION ASSESSMENT      REASON FOR ASSESSMENT:  LOS    CURRENT DIET AND INTAKE:  Diet:  Regular              Visited with pt this afternoon  She tells me that her appetite is fair  Has been ordering 2 meals per day  Eating %    Notes that she is typically a good eater when she is feeling well  Eats 3 meals per day  Has used Ensure in the past - likes the strawberry flavor    ANTHROPOMETRICS:  Height: 5'3\"  Weight: (11/21) 70 kg  BMI: 27.3 kg/m2  IBW:  52.3 kg  Weight Status: Overweight BMI 25-29.9  %IBW: 134%  Weight History: Pt notes that she has some edema in her legs  Wt Readings from Last 10 Encounters:   11/21/17 70 kg (154 lb 6.4 oz)   10/06/17 64 kg (141 lb)   09/15/17 63.6 kg (140 lb 4.8 oz)   09/07/17 64.2 kg (141 lb 8 oz)   08/25/17 64.4 kg (142 lb)   08/07/17 64 kg (141 lb)   08/03/17 63.5 kg (140 lb)   06/29/17 64.4 kg (142 lb)   03/29/17 65.1 kg (143 lb 9.6 oz)   03/17/17 64.9 kg (143 lb)         LABS:  Labs noted    MALNUTRITION:  Patient does not meet two of the following criteria necessary for diagnosing malnutrition: significant weight loss, reduced intake, subcutaneous fat loss, muscle loss or fluid retention    NUTRITION INTERVENTION:  Nutrition Diagnosis:  No nutrition diagnosis at this time.    Implementation:  Nutrition Education ---> Per Provider order if indicated  Will send a strawberry Ensure at 2pm    FOLLOW UP/MONITORING:   Will re-evaluate in 7 - 10 days, or sooner, if re-consulted.          " no

## 2020-11-04 ENCOUNTER — TELEPHONE (OUTPATIENT)
Dept: PEDIATRICS | Facility: CLINIC | Age: 85
End: 2020-11-04

## 2020-11-04 NOTE — TELEPHONE ENCOUNTER
Call placed to pt to get more information  Pt states she feels dizzy every spring and summer and was told by her allergist to just live with it.    Pt has had 2 recent UC visits for the same issue  Has tried OTC allergy meds and nasal sprays and nothing has helped  Was on prednisone on 10/22. Pt states it helped a little  Pt would like to be seen    Appt for Friday at 1530 with SDP    Thank you  Madhuri Espitia RN on 11/4/2020 at 12:28 PM

## 2020-11-04 NOTE — TELEPHONE ENCOUNTER
Symptoms    Describe your symptoms: dizziness for the last year. When she was taking the prednisone for something else she was feeling better.  She thinks it is allergies and was told by the allergist to live with it. She was told to be seen and wants and appt thursday or Friday. Would like to find out if it could be something else. Was feeling better and then went out side to do yard work and it came back.     Any pain: No    How long have you been having symptoms: one year      Have you been seen for this:  Yes:     Appointment offered?: No    Triage offered?: No    Home remedies tried: no      Requested Pharmacy:     Okay to leave a detailed message? Yes at Home number on file [phone] (home)

## 2020-11-06 ENCOUNTER — OFFICE VISIT (OUTPATIENT)
Dept: PEDIATRICS | Facility: CLINIC | Age: 85
End: 2020-11-06
Payer: COMMERCIAL

## 2020-11-06 VITALS
WEIGHT: 130 LBS | SYSTOLIC BLOOD PRESSURE: 153 MMHG | DIASTOLIC BLOOD PRESSURE: 55 MMHG | OXYGEN SATURATION: 93 % | HEART RATE: 93 BPM | TEMPERATURE: 98.1 F | RESPIRATION RATE: 24 BRPM | BODY MASS INDEX: 23.78 KG/M2

## 2020-11-06 DIAGNOSIS — J01.00 ACUTE NON-RECURRENT MAXILLARY SINUSITIS: Primary | ICD-10-CM

## 2020-11-06 DIAGNOSIS — H61.23 BILATERAL IMPACTED CERUMEN: ICD-10-CM

## 2020-11-06 PROCEDURE — 99214 OFFICE O/P EST MOD 30 MIN: CPT | Performed by: PHYSICIAN ASSISTANT

## 2020-11-06 RX ORDER — AZITHROMYCIN 250 MG/1
TABLET, FILM COATED ORAL
Qty: 6 TABLET | Refills: 0 | Status: SHIPPED | OUTPATIENT
Start: 2020-11-06 | End: 2021-01-13

## 2020-11-06 NOTE — PROGRESS NOTES
Subjective     Genie Persaud is a 85 year old female who presents to clinic today for the following health issues:    HPI         Dizziness  Onset/Duration: X 3 months   Description: a lot of sinus pressure (facial and down to neck)   Do you feel faint: no  Does it feel like the surroundings (bed, room) are moving: no  Unsteady/off balance: no  Have you passed out or fallen: no  Intensity: mild, moderate  Progression of Symptoms: same and constant  Accompanying Signs & Symptoms:  Heart palpitations or chest pain: no  Nausea, vomiting: no  Weakness or lack of coordination in arms or legs: no  Vision or speech changes: no  Numbness or tingling: no  Ringing in ears (Tinnitus): no  Hearing Loss: no  History:   Head trauma/concussion history: no  Previous similar symptoms: YES- allergies   Recent bleeding history: no  Any new medications (BP?): no  Precipitating factors:   Worse with activity: no  Worse with head movement: no  Alleviating factors:   Does staying in a fixed position give relief: YES    Treated with amox for sinusitis and developed a rash.   Patient has significant seasonal allergies. She has seen allergist and takes nasal sprays and antihistamines.  She felt well in Sept and spent time outdoors. Symptoms returned at that time.    Constitutional, HEENT, cardiovascular, pulmonary, gi and gu systems are negative, except as otherwise noted.      Objective    BP (!) 153/55 (BP Location: Right arm, Cuff Size: Adult Small)   Pulse 93   Temp 98.1  F (36.7  C) (Tympanic)   Resp 24   Wt 59 kg (130 lb)   LMP  (LMP Unknown)   SpO2 93%   BMI 23.78 kg/m    Body mass index is 23.78 kg/m .  Physical Exam   GENERAL: healthy, alert and no distress  EYES: Eyes grossly normal to inspection, PERRL and conjunctivae and sclerae normal  HENT: ear canals and TM's cerumen impactions--resolved, nose and mouth with PND  NECK: no adenopathy  RESP: lungs clear to auscultation - no rales, rhonchi or wheezes  CV: regular rate and  rhythm, normal S1 S2, no S3 or S4  Neuro:  Normal strength and tone, mentation intact, speech normal and Romberg negative    No results found for this or any previous visit (from the past 24 hour(s)).        Assessment & Plan   Acute non-recurrent maxillary sinusitis  Begin antibiotics.   - azithromycin (ZITHROMAX) 250 MG tablet; Two tablets first day, then one tablet daily for four days.    Bilateral impacted cerumen  Resolved.        Bri Montano PA-C  [facility]

## 2020-12-08 ENCOUNTER — TELEPHONE (OUTPATIENT)
Dept: PEDIATRICS | Facility: CLINIC | Age: 85
End: 2020-12-08

## 2020-12-08 DIAGNOSIS — R06.2 WHEEZING: Primary | ICD-10-CM

## 2020-12-08 RX ORDER — PREDNISONE 20 MG/1
40 TABLET ORAL DAILY
Qty: 10 TABLET | Refills: 0 | Status: SHIPPED | OUTPATIENT
Start: 2020-12-08 | End: 2020-12-13

## 2020-12-08 NOTE — TELEPHONE ENCOUNTER
Called patient.     Symptoms have slowly gotten worse over the past few weeks.  Symptoms feel similar to other episodes during this time of year in the past.   Wheezing lately.   Some shortness of breath during activity.   Cough, productive, clear sputum.   Has dizziness on and off, and has been into urgent care multiple times for this.   Fatigue.     Denies fever.   Denies shortness of breath at rest.   Denies contact with sick people.   Denies changes in taste and smell.     Doesn't want to come in due to covid.     Has appt with Pulmonology in a few weeks.     Advised patient schedule an appointment.  Patient refused appointment, stating that Dr. Sevilla has ordered prednisone for this in the past and usually takes care of the symptoms.     Patient requested Dr. Sevilla prescribe prednisone and send to [facility] pharmacy in [place] on [address].     Routing to Dr. Sevilla to advise possible script for prednisone.     Felipe Zimmerman RN on 12/8/2020 at 1:52 PM

## 2020-12-08 NOTE — TELEPHONE ENCOUNTER
Called patient.     Relayed below note from Dr. Sevilla to the patient.     Educated patient regarding signs and symptoms that necessitate immediate medical attention.     Patient verbalized understanding of plan of care.     Felipe Zimmerman RN on 12/8/2020 at 3:34 PM

## 2020-12-08 NOTE — TELEPHONE ENCOUNTER
General Call:     Who is calling:  Patient    Reason for Call:  Requesting a prescription    What are your questions or concerns:  Patient would like prednisone for her asthma without an appt. She stated that Dr. Sevilla has prescribed this for her in the past.    Okay to leave a detailed message:Yes at Home number on file [phone] (home)     Magda Walsh,

## 2021-01-07 DIAGNOSIS — I51.81 STRESS-INDUCED CARDIOMYOPATHY: ICD-10-CM

## 2021-01-07 DIAGNOSIS — I10 ESSENTIAL HYPERTENSION WITH GOAL BLOOD PRESSURE LESS THAN 140/90: ICD-10-CM

## 2021-01-07 RX ORDER — SPIRONOLACTONE 25 MG/1
12.5 TABLET ORAL DAILY
Qty: 45 TABLET | Refills: 2 | Status: SHIPPED | OUTPATIENT
Start: 2021-01-07 | End: 2021-07-19 | Stop reason: DRUGHIGH

## 2021-01-12 ENCOUNTER — VIRTUAL VISIT (OUTPATIENT)
Dept: PHARMACY | Facility: CLINIC | Age: 86
End: 2021-01-12
Payer: COMMERCIAL

## 2021-01-12 DIAGNOSIS — M25.551 HIP PAIN, RIGHT: ICD-10-CM

## 2021-01-12 DIAGNOSIS — H40.059 RAISED INTRAOCULAR PRESSURE, UNSPECIFIED LATERALITY: ICD-10-CM

## 2021-01-12 DIAGNOSIS — J30.2 SEASONAL ALLERGIC RHINITIS, UNSPECIFIED TRIGGER: ICD-10-CM

## 2021-01-12 DIAGNOSIS — I10 ESSENTIAL HYPERTENSION WITH GOAL BLOOD PRESSURE LESS THAN 140/90: ICD-10-CM

## 2021-01-12 DIAGNOSIS — E78.5 HYPERLIPIDEMIA LDL GOAL <130: ICD-10-CM

## 2021-01-12 DIAGNOSIS — K21.9 GASTROESOPHAGEAL REFLUX DISEASE WITHOUT ESOPHAGITIS: Primary | ICD-10-CM

## 2021-01-12 DIAGNOSIS — Z71.85 VACCINE COUNSELING: ICD-10-CM

## 2021-01-12 DIAGNOSIS — H53.9 VISION CHANGES: ICD-10-CM

## 2021-01-12 DIAGNOSIS — M85.89 OSTEOPENIA OF MULTIPLE SITES: ICD-10-CM

## 2021-01-12 DIAGNOSIS — J45.40 MODERATE PERSISTENT ASTHMA WITHOUT COMPLICATION: ICD-10-CM

## 2021-01-12 DIAGNOSIS — I25.10 CORONARY ARTERY DISEASE INVOLVING NATIVE CORONARY ARTERY OF NATIVE HEART WITHOUT ANGINA PECTORIS: ICD-10-CM

## 2021-01-12 PROCEDURE — 99607 MTMS BY PHARM ADDL 15 MIN: CPT | Mod: TEL | Performed by: PHARMACIST

## 2021-01-12 PROCEDURE — 99605 MTMS BY PHARM NP 15 MIN: CPT | Mod: TEL | Performed by: PHARMACIST

## 2021-01-12 RX ORDER — VITS A,C,E/LUTEIN/MINERALS 300MCG-200
1 TABLET ORAL
Status: ON HOLD | COMMUNITY
End: 2022-05-11

## 2021-01-12 NOTE — LETTER
"        Date: 2021    Genie Persaud  [address] [place] [address]    Dear MsAbby Hung,    Thank you for talking with me on 2021 about your health and medications. Medicare s MTM (Medication Therapy Management) program helps you understand your medications and use them safely.      This letter includes an action plan (Medication Action Plan) and medication list (Personal Medication List). The action plan has steps you should take to help you get the best results from your medications. The medication list will help you keep track of your medications and how to use them the right way.       Have your action plan and medication list with you when you talk with your doctors, pharmacists, and other healthcare providers in your care team.     Ask your doctors, pharmacists, and other healthcare providers to update the action plan and medication list at every visit.     Take your medication list with you if you go to the hospital or emergency room.     Give a copy of the action plan and medication list to your family or caregivers.     If you want to talk about this letter or any of the papers with it, please call   [phone].We look forward to working with you, your doctors, and other healthcare providers to help you stay healthy through the [facility] MTM program.    Sincerely,  Reina Mason [facility]    Enclosed: Medication Action Plan and Personal Medication List    MEDICATION ACTION PLAN FOR Genie Persaud,  1935     This action plan will help you get the best results from your medications if you:   1. Read \"What we talked about.\"   2. Take the steps listed in the \"What I need to do\" boxes.   3. Fill in \"What I did and when I did it.\"   4. Fill in \"My follow-up plan\" and \"Questions I want to ask.\"     Have this action plan with you when you talk with your doctors, pharmacists, and other healthcare providers in your care team. Share this with your family or caregivers too.  DATE PREPARED: 2021  What we " talked about: at your next visit with pulmonologist please discuss if restarting an antihistamine pill (such as loratadine or cetirizine) or if we need to escalate the inhaler therapy  (such as adding Spiriva or changing to Trelegy).                                                   What I need to do: Discuss changing medication for asthma and allergies with lung specialist.   What I did and when I did it:                                              What we talked about: new prescription for omeprazole 20 mg daily sent to pharmacy, stay off famotidine.                                                  What I need to do:  new prescription of omeprazole.   What I did and when I did it:                                                 My follow-up plan:                 Questions I want to ask:              If you have any questions about your action plan, call Reina Mason [facility], Phone: [phone] , Monday-Friday 8-4:30pm.           PERSONAL MEDICATION LIST FOR Genie Persaud, WILL 1935     This medication list was made for you after we talked. We also used information from your doctor's chart.      Use blank rows to add new medications. Then fill in the dates you started using them.    Cross out medications when you no longer use them. Then write the date and why you stopped using them.    Ask your doctors, pharmacists, and other healthcare providers to update this list at every visit. Keep this list up-to-date with:       Prescription medications    Over the counter drugs     Herbals    Vitamins    Minerals      If you go to the hospital or emergency room, take this list with you. Share this with your family or caregivers too.     DATE PREPARED: 2021  Allergies or side effects: Fosamax [alendronic acid], Contrast dye, Evista [raloxifene], Flu virus vaccine, and Amoxicillin     Medication:  ACETAMINOPHEN 500 MG PO TABS      How I use it:  Take 500-1,000 mg by mouth every 8 hours as needed for mild pain       Why I use it:  pain    Prescriber:  Layo Sevilla MD      Date I started using it:       Date I stopped using it:         Why I stopped using it:            Medication:  ALBUTEROL SULFATE  (90 BASE) MCG/ACT IN AERS      How I use it:  Inhale 2 puffs into the lungs every 6 hours as needed       Why I use it: Shortness of breath    Prescriber:  Layo Sevilla MD      Date I started using it:       Date I stopped using it:         Why I stopped using it:            Medication:  AMLODIPINE BESYLATE 5 MG PO TABS      How I use it:  Take 1 tablet (5 mg) by mouth At Bedtime      Why I use it: Essential hypertension     Prescriber:  Layo Sevilla MD      Date I started using it:       Date I stopped using it:         Why I stopped using it:            Medication:  ATORVASTATIN CALCIUM 20 MG PO TABS      How I use it:  Take 1 tablet (20 mg) by mouth daily      Why I use it: Hyperlipidemia     Prescriber:  Layo Sevilla MD      Date I started using it:       Date I stopped using it:         Why I stopped using it:            Medication:  AZELASTINE HCL 0.1 % NA SOLN      How I use it:  SPRAY 1 TO 2 SPRAYS INTO BOTH NOSTRILS 2 TIMES DAILY      Why I use it: Seasonal allergic rhinitis    Prescriber:  Layo Sevilla MD      Date I started using it:       Date I stopped using it:         Why I stopped using it:                     Medication:  CALCIUM CITRATE-VITAMIN D 500-400 MG-UNIT PO CHEW      How I use it:  1 tablet twice day      Why I use it:  bone health    Prescriber:  Layo Sevilla MD      Date I started using it:       Date I stopped using it:         Why I stopped using it:            Medication:  FLUTICASONE-SALMETEROL 230-21 MCG/ACT IN AERO      How I use it:  Inhale 2 puffs into the lungs 2 times daily      Why I use it:  asthma    Prescriber:  Layo Sevilla MD      Date I started using it:       Date I stopped using it:         Why I stopped using it:            Medication:  HYDROCHLOROTHIAZIDE 12.5 MG  PO CAPS      How I use it:  Take 1 capsule (12.5 mg) by mouth daily      Why I use it: Essential hypertension     Prescriber:  Layo Sevilla MD      Date I started using it:       Date I stopped using it:         Why I stopped using it:            Medication:  IPRATROPIUM BROMIDE 0.03 % NA SOLN      How I use it:  Spray 2 sprays in nostril daily       Why I use it:  allergies    Prescriber:  Layo Sevilla MD      Date I started using it:       Date I stopped using it:         Why I stopped using it:            Medication:  LATANOPROST 0.005 % OP SOLN      How I use it:  Place 1 drop into both eyes At Bedtime      Why I use it:  elevate eye pressure    Prescriber:  Layo Sevilla MD      Date I started using it:       Date I stopped using it:         Why I stopped using it:                      Medication:  OCUVITE-LUTEIN PO TABS      How I use it:  Take 1 tablet by mouth daily      Why I use it:  prevention of macular degeneration    Prescriber:  Layo Sevilla MD      Date I started using it:       Date I stopped using it:         Why I stopped using it:            Medication:  OMEPRAZOLE 20 MG PO CPDR      How I use it:  Take 1 capsule (20 mg) by mouth daily      Why I use it:  reflux or heartburn    Prescriber:  Layo Sevilla MD      Date I started using it:       Date I stopped using it:         Why I stopped using it:            Medication:  PREDNISONE PO      How I use it:  As needed for asthma flares follows as directed by PCP or pulmonology - has 20 mg tablets      Why I use it:  for asthma flare     Prescriber:  Layo Sevilla MD      Date I started using it:       Date I stopped using it:         Why I stopped using it:            Medication:  SPIRONOLACTONE 25 MG PO TABS      How I use it:  Take 0.5 tablets (12.5 mg) by mouth daily In the morning      Why I use it: Essential hypertension     Prescriber:  ANA MARIA Coe      Date I started using it:       Date I stopped using it:         Why I stopped using  it:            Medication:         How I use it:         Why I use it:      Prescriber:         Date I started using it:       Date I stopped using it:         Why I stopped using it:            Medication:         How I use it:         Why I use it:      Prescriber:         Date I started using it:       Date I stopped using it:         Why I stopped using it:            Medication:         How I use it:         Why I use it:      Prescriber:         Date I started using it:       Date I stopped using it:         Why I stopped using it:              Other Information:     If you have any questions about your medication list, call Reina Mason [facility], Phone: [phone] , Monday-Friday 8-4:30pm.    According to the Paperwork Reduction Act of 1995, no persons are required to respond to a collection of information unless it displays a valid OMB control number. The valid OMB number for this information collection is [id number]. The time required to complete this information collection is estimated to average 40 minutes per response, including the time to review instructions, searching existing data resources, gather the data needed, and complete and review the information collection. If you have any comments concerning the accuracy of the time estimate(s) or suggestions for improving this form, please write to: CMS, Attn: MARY ANNE Reports Clearance Officer, [address] [place] [phone].

## 2021-01-12 NOTE — PROGRESS NOTES
Medication Therapy Management (MTM) Encounter    ASSESSMENT/PLAN:                            Medication Adherence/Access: No issues identified    GERD: did not trial off PPI and symptoms reoccurred, suggest ct PPI therapy.     Hx MI/HTN:  stable. Home blood pressure at goal, suggest she continue to monitor blood pressure. Patient not willing to take aspirin for ppx and no longer on beta-blocker patient preference.  Elevated FBG in based suggest A1c recheck annually.    Hyperlipidemia: due for FLP recheck not on high intensity and last LDL not < 70. May need titration however pend lab first.    Asthma/allergies: ACT due, follow-up with pulm as planned. May need to escalate therapy or restart 2nd gen antihistamine as this seems to be a trigger for patient.     Osteopenia: did not want therapy/poor toleration of oral bisphosphonates. DEXA repeat this year/next years suggested.    Vaccines:  Does not want vaccines.    Pain: stable.    Possible early signs of Macular degeneration/elevated intraocular pressure: stable    PLAN:   1. HM labs/due for CMP, FLP, A1c screening (hx of elevated BG).  2. Decline any future vaccines.   3. Discuss with pulmonologist if restarting an antihistamine pill (such as loratadine or cetirizine) or if we need to escalate the inhaler therapy  (such as adding Spiriva or changing to Trelegy if any obstruction).   4. Patient checked her blood pressure over the phone. Suggest she continue to monitor closely.  5. Mail ACT to patient     Will follow up in 6 months.    SUBJECTIVE/OBJECTIVE:                           Genie Persaud is a 85 year old female called for a follow-up visit. She was referred to me from Dr. Sevilla.  Today's visit is a follow-up MTM visit from 12/20/2019. First of 2021.     Reason for visit: no med question just med review has lab appointment next week. She knows she needs lab work for more refills.    Allergies/ADRs: Reviewed in chart  Tobacco: She reports that she has never  smoked. She has never used smokeless tobacco.  Alcohol: glass of wine a couple times a week  Caffeine: avoiding.   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 Now using exercise bike at home and doing more housework.   Past Medical History: Reviewed in chart    Medication Adherence/Access:  Patient uses pill tray for medications  Patient takes medications BID + PRN agents  The patient fills medications at [facility]: NO, fills medications at [facility].     GERD: Current medications include: Prilosec (omeprazole) 20 once daily. She tried famotidine but had more heartburn so went back to omeprazole. Purchasing OTC would like an Rx.      Hx MI/HTN:  hx of stopping aspirin on her own due to frequent bruising. Visit on 8/6 with cards revealed patient stopped metoprolol due to low blood pressure at home.   Current medications include for BP include: hydrochlorothiazide 12.5mg qAM, spironolactone 12.5mg qAM and amlodipine 5 mg HS.   Did not tolerate losartan in the past.   Reported home BP: AM ~111-112/40; PM ~120-150/50-70. S/E: dizziness from asthma exacerbation or allergies.   Follows with cardiology. Dr. Hernandez. Denies swelling wears compression stockings.  ECHO: 11/1/19 EF 50-55%  Home BP (patient reports): 125/60 mm Hg  BP Readings from Last 3 Encounters:   11/06/20 (!) 153/55   10/09/20 (!) 152/58   09/28/20 (!) 170/52     Hyperlipidemia: Current therapy includes atorvastatin 20 mg daily.  Pt reports no significant myalgias or other side effects.  Recent Labs   Lab Test 10/21/19  0940 02/28/19  1014 08/13/15  0842 08/13/15  0842 08/30/13  0835   CHOL 152 162   < > 141 158   HDL 59 63   < > 63 48*   LDL 76 83   < > 63 83   TRIG 84 79   < > 75 136   CHOLHDLRATIO  --   --   --  2.2 3.3    < > = values in this interval not displayed.     Liver Function Studies -   Recent Labs   Lab Test 05/21/19  1109   PROTTOTAL 6.9   ALBUMIN 3.7   BILITOTAL 0.5   ALKPHOS 73   AST 16   ALT 21        Asthma: Current medications: Short-Acting Bronchodilator: Albuterol MDI - 2-3 times a week, Advair--21 mcg, 2 puffs twice daily. Patient rinses their mouth after using steroid inhaler. Then uses ACT mouthwash.   Triggers include: allergies and change in weather (when snow melts).  Patient reports the following symptoms: the mask causing her breathing to be harder. Sometime has a cough.  Asthma Action Plan on file: YES  Has upcoming pulmonology visit.   ACT Total Scores 11/1/2018 5/21/2019 11/7/2019   ACT TOTAL SCORE - - -   ASTHMA ER VISITS - - -   ASTHMA HOSPITALIZATIONS - - -   ACT TOTAL SCORE (Goal Greater than or Equal to 20) 23 21 22   In the past 12 months, how many times did you visit the emergency room for your asthma without being admitted to the hospital? 0 0 0   In the past 12 months, how many times were you hospitalized overnight because of your asthma? 0 0 0       Allergic Rhinitis: Current medications include ipratropium nasal spray and Astelin nasal spray. Feels this is effective. She is not sure why the levocetirizine was stopped by the allergist back in august. Denies any side effects or concern for poor efficacy. From the chart review, it seems perhaps patient had reported the nasal spray to work better. As mentioned from before, has upcoming pulmonology visit.  Patient feels that current therapy is somewhat effective. Had more issues this year with her allergy.    Osteopenia: Current therapy includes: calcium 500mg/Vitamin D 400 units twice daily. Pt is not experiencing side effects. In the past she did not tolerate Fosamax or Evista.   DEXA History: 2018 FRAX (based on available information) are 17 % for major osteoporotic fracture and 5.6 % for hip fracture.  Last vitamin D level:  No results found for: VITDT    Vaccines:  Feels the pneumonia vaccine cause her to go into afib and pneumonia. Does not want the COVID-19.   Most Recent Immunizations   Administered Date(s) Administered      Mantoux Tuberculin Skin Test 02/27/2009     Pneumo Conj 13-V (2010&after) 10/06/2017     Pneumococcal 23 valent 10/20/2004     TDAP Vaccine (Adacel) 10/06/2017     Pain: taking acetaminophen 500-1000 mg q8h as needed. No concerns.    Possible early signs of Macular degeneration/elevated intraocular pressure: she is taking Ocuvite once daily. No longer on Areds. Also takes Latanoprost drops at bedtime.   Follows with eye specialist regularly.     Today's Vitals: LMP  (LMP Unknown)     Medicare Part D topics discussed:Recommendations to doctor and Medication changes    I spent 45 minutes with this patient today (extra 15 minutes for MAP). All changes were made via collaborative practice agreement with Layo Sevilla. A copy of the visit note was provided to the patient's primary care provider.    The patient was mailed a summary of these recommendations.     Reina Mason, PharmD  Medication Therapy Management Pharmacist  Pager#: [phone]      Telemedicine Visit Details  Type of service:  Telephone visit  Start Time: 11:00 AM  End Time: 11:45 AM  Originating Location (patient location): Home  Distant Location (provider location):  [facility]      Medication Therapy Recommendations  Gastroesophageal reflux disease without esophagitis    Current Medication: omeprazole (PRILOSEC) 20 MG DR capsule   Rationale: More effective medication available - Ineffective medication - Effectiveness   Recommendation: Change Medication - stop famotidine, new rx for omeprazole   Status: Accepted per CPA         Moderate persistent asthma without complication    Rationale: Synergistic therapy - Needs additional medication therapy - Indication   Recommendation: Start Medication - pt to follow-up with pulmonology to discuss adding second gen anthistamine back to escalating inhaler therapy to triple therapy.   Status: Contact Provider - Awaiting Response

## 2021-01-13 NOTE — PATIENT INSTRUCTIONS
Recommendations from today's MTM visit:                                                      1. Lab work as planned.    2. Please fill out this Asthma Control Test (ACT form) and bring back to clinic in person or by mail.    3. Please discuss the following as you had more issues this past year with asthma and allergies:  Restarting an antihistamine pill (such as loratadine or cetirizine) or if we need to escalate the inhaler therapy  (such as adding Spiriva or changing to Trelegy for breathing).     4. Continue to monitor blood pressure at home goal less than 140 over 90. Let us or cardiologist know if high.    5. Stay on omeprazole 20 mg daily, new prescription today sent. Stay off famotidine.      It was great to speak with you today.  I value your experience and would be very thankful for your time with providing feedback on our clinic survey. You may receive a survey via email or text message in the next few days.     Next MTM visit: 6 months    To schedule another MTM appointment, please call the clinic directly or you may call the MTM scheduling line at [phone] or toll-free at [phone].     My Clinical Pharmacist's contact information:                                                      It was a pleasure talking with you today!  Please feel free to contact me with any questions or concerns you have.      Reina Mason, PharmD  Medication Therapy Management Pharmacist  Pager#: [phone]

## 2021-01-20 DIAGNOSIS — I25.10 CORONARY ARTERY DISEASE INVOLVING NATIVE CORONARY ARTERY OF NATIVE HEART WITHOUT ANGINA PECTORIS: ICD-10-CM

## 2021-01-20 DIAGNOSIS — E78.5 HYPERLIPIDEMIA LDL GOAL <130: ICD-10-CM

## 2021-01-20 LAB — HBA1C MFR BLD: 6.2 % (ref 0–5.6)

## 2021-01-20 PROCEDURE — 36415 COLL VENOUS BLD VENIPUNCTURE: CPT | Performed by: INTERNAL MEDICINE

## 2021-01-20 PROCEDURE — 80061 LIPID PANEL: CPT | Performed by: INTERNAL MEDICINE

## 2021-01-20 PROCEDURE — 80053 COMPREHEN METABOLIC PANEL: CPT | Performed by: INTERNAL MEDICINE

## 2021-01-20 PROCEDURE — 83036 HEMOGLOBIN GLYCOSYLATED A1C: CPT | Performed by: INTERNAL MEDICINE

## 2021-01-20 NOTE — LETTER
January 21, 2021      Genie Persaud  [address] [place] [address]        Dear ,    We are writing to inform you of your test results.    Your test results fall within the expected range(s) or remain unchanged from previous results.  Please continue with current treatment plan.    Resulted Orders   Hemoglobin A1c   Result Value Ref Range    Hemoglobin A1C 6.2 (H) 0 - 5.6 %      Comment:      Normal <5.7% Prediabetes 5.7-6.4%  Diabetes 6.5% or higher - adopted from ADA   consensus guidelines.     Lipid panel reflex to direct LDL Fasting   Result Value Ref Range    Cholesterol 128 <200 mg/dL    Triglycerides 68 <150 mg/dL      Comment:      Fasting specimen    HDL Cholesterol 61 >49 mg/dL    LDL Cholesterol Calculated 53 <100 mg/dL      Comment:      Desirable:       <100 mg/dl    Non HDL Cholesterol 67 <130 mg/dL   Comprehensive metabolic panel   Result Value Ref Range    Sodium 138 133 - 144 mmol/L    Potassium 3.8 3.4 - 5.3 mmol/L    Chloride 105 94 - 109 mmol/L    Carbon Dioxide 29 20 - 32 mmol/L    Anion Gap 4 3 - 14 mmol/L    Glucose 111 (H) 70 - 99 mg/dL      Comment:      Fasting specimen    Urea Nitrogen 25 7 - 30 mg/dL    Creatinine 1.04 0.52 - 1.04 mg/dL    GFR Estimate 50 (L) >60 mL/min/[1.73_m2]      Comment:      Non  GFR Calc  Starting 12/18/2018, serum creatinine based estimated GFR (eGFR) will be   calculated using the Chronic Kidney Disease Epidemiology Collaboration   (CKD-EPI) equation.      GFR Estimate If Black 57 (L) >60 mL/min/[1.73_m2]      Comment:       GFR Calc  Starting 12/18/2018, serum creatinine based estimated GFR (eGFR) will be   calculated using the Chronic Kidney Disease Epidemiology Collaboration   (CKD-EPI) equation.      Calcium 9.2 8.5 - 10.1 mg/dL    Bilirubin Total 0.7 0.2 - 1.3 mg/dL    Albumin 3.7 3.4 - 5.0 g/dL    Protein Total 7.1 6.8 - 8.8 g/dL    Alkaline Phosphatase 118 40 - 150 U/L    ALT 26 0 - 50 U/L    AST 20 0 - 45 U/L        If you have any questions or concerns, please call the clinic at the number listed above.       Sincerely,      MD Reina Hi, JyotsnaD  Medication Therapy Management Pharmacist  Pager#: [phone]

## 2021-01-21 LAB
ALBUMIN SERPL-MCNC: 3.7 G/DL (ref 3.4–5)
ALP SERPL-CCNC: 118 U/L (ref 40–150)
ALT SERPL W P-5'-P-CCNC: 26 U/L (ref 0–50)
ANION GAP SERPL CALCULATED.3IONS-SCNC: 4 MMOL/L (ref 3–14)
AST SERPL W P-5'-P-CCNC: 20 U/L (ref 0–45)
BILIRUB SERPL-MCNC: 0.7 MG/DL (ref 0.2–1.3)
BUN SERPL-MCNC: 25 MG/DL (ref 7–30)
CALCIUM SERPL-MCNC: 9.2 MG/DL (ref 8.5–10.1)
CHLORIDE SERPL-SCNC: 105 MMOL/L (ref 94–109)
CHOLEST SERPL-MCNC: 128 MG/DL
CO2 SERPL-SCNC: 29 MMOL/L (ref 20–32)
CREAT SERPL-MCNC: 1.04 MG/DL (ref 0.52–1.04)
GFR SERPL CREATININE-BSD FRML MDRD: 50 ML/MIN/{1.73_M2}
GLUCOSE SERPL-MCNC: 111 MG/DL (ref 70–99)
HDLC SERPL-MCNC: 61 MG/DL
LDLC SERPL CALC-MCNC: 53 MG/DL
NONHDLC SERPL-MCNC: 67 MG/DL
POTASSIUM SERPL-SCNC: 3.8 MMOL/L (ref 3.4–5.3)
PROT SERPL-MCNC: 7.1 G/DL (ref 6.8–8.8)
SODIUM SERPL-SCNC: 138 MMOL/L (ref 133–144)
TRIGL SERPL-MCNC: 68 MG/DL

## 2021-01-22 ASSESSMENT — ASTHMA QUESTIONNAIRES: ACT_TOTALSCORE: 15

## 2021-02-09 ENCOUNTER — TELEPHONE (OUTPATIENT)
Dept: PEDIATRICS | Facility: CLINIC | Age: 86
End: 2021-02-09

## 2021-02-09 NOTE — TELEPHONE ENCOUNTER
Called patient regarding COVID 19 vaccine eligibility. Patient declined scheduling this vaccine due to her allergies.     Gail Luna, CMA

## 2021-02-22 ENCOUNTER — OFFICE VISIT (OUTPATIENT)
Dept: URGENT CARE | Facility: URGENT CARE | Age: 86
End: 2021-02-22
Payer: COMMERCIAL

## 2021-02-22 VITALS
HEART RATE: 88 BPM | RESPIRATION RATE: 20 BRPM | DIASTOLIC BLOOD PRESSURE: 68 MMHG | TEMPERATURE: 98 F | OXYGEN SATURATION: 99 % | SYSTOLIC BLOOD PRESSURE: 144 MMHG

## 2021-02-22 DIAGNOSIS — S81.851A CAT BITE OF RIGHT LOWER LEG WITH INFECTION, INITIAL ENCOUNTER: Primary | ICD-10-CM

## 2021-02-22 DIAGNOSIS — L08.9 CAT BITE OF RIGHT LOWER LEG WITH INFECTION, INITIAL ENCOUNTER: Primary | ICD-10-CM
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
## 2021-02-22 PROCEDURE — 99213 OFFICE O/P EST LOW 20 MIN: CPT | Performed by: PHYSICIAN ASSISTANT

## 2021-02-22 RX ORDER — METRONIDAZOLE 500 MG/1
500 TABLET ORAL 3 TIMES DAILY
Qty: 21 TABLET | Refills: 0 | Status: SHIPPED | OUTPATIENT
Start: 2021-02-22 | End: 2021-03-01

## 2021-02-22 RX ORDER — SULFAMETHOXAZOLE/TRIMETHOPRIM 800-160 MG
1 TABLET ORAL 2 TIMES DAILY
Qty: 14 TABLET | Refills: 0 | Status: SHIPPED | OUTPATIENT
Start: 2021-02-22 | End: 2021-03-01

## 2021-02-22 NOTE — PROGRESS NOTES
SUBJECTIVE:  Genie Persaud is a 85 year old female who presents with a chief complaint of an animal bite on the lower leg right.  She was bitten by a her cat two weeks ago.   Cicumstances of bite: just laying in bed and cat wanted to lick her leg but had long pants on and got upset and bite the front of her leg over shin.   States that bled a lot.  Did not have any issues until 4 days ago when stated to notice area becoming red and warm to the touch.  No real pain and no drainage from area.  Has not had a fever. States that generally feels well   Does have some swelling in the leg but seems to be at baseline.  Severity: moderate, bite with skin break.  Animal's immunizations up to date   Associated symptoms: redness noted    last tetanus booster 4 years ago    Past Medical History:   Diagnosis Date     Anemia 3/10/2009    Chronic disease, by Fe studies; awaiting full GI w/u and repeat colonoscopy, ERCP.     Basal Cell Carcinoma--back      Coronary artery disease involving native coronary artery of native heart without angina pectoris 3/9/2017    3/2/2017 - Two vessel coronary artery disease with mLAD 50% stenosis, D3 50% stenosis, pLCx 50% stenosis and mLCx 50% stenosis     Essential hypertension with goal blood pressure less than 140/90 9/1/2016    White coat hypertension;  24 hour ambulatory monitoring normal. Do not titrate up further.       Hyperlipidemia LDL goal <130 2/10/2010     Impaired fasting glucose 8/26/2010     Moderate persistent asthma      Nonrheumatic aortic valve insufficiency 6/29/2017     osteopenia      Paroxysmal atrial fibrillation (H) 12/26/2017     Pulmonary nodule 3/3/2009    PET scan reportedly normal; biopsy not advised.     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  Problem list name updated by automated process. Provider to review     Unspecified arthropathy, hand      Vasculitis (H) 4/20/2009    Possible increased activity of thoracic aorta, on PET scan w/u for pulmonary nodule.        Current Outpatient Medications:      acetaminophen (TYLENOL) 500 MG tablet, Take 500-1,000 mg by mouth every 8 hours as needed for mild pain, Disp: , Rfl:      albuterol (PROAIR HFA, PROVENTIL HFA, VENTOLIN HFA) 108 (90 BASE) MCG/ACT inhaler, Inhale 2 puffs into the lungs every 6 hours as needed , Disp: , Rfl:      amLODIPine (NORVASC) 5 MG tablet, Take 1 tablet (5 mg) by mouth At Bedtime, Disp: 90 tablet, Rfl: 3     atorvastatin (LIPITOR) 20 MG tablet, Take 1 tablet (20 mg) by mouth daily (Patient taking differently: Take 20 mg by mouth daily HS), Disp: 90 tablet, Rfl: 3     azelastine (ASTELIN) 0.1 % nasal spray, SPRAY 1 TO 2 SPRAYS INTO BOTH NOSTRILS 2 TIMES DAILY, Disp: 30 mL, Rfl: 5     calcium Citrate-vitamin D 500-400 MG-UNIT CHEW, 1 tablet twice day, Disp: , Rfl:      fluticasone-salmeterol (ADVAIR-HFA) 230-21 MCG/ACT inhaler, Inhale 2 puffs into the lungs 2 times daily, Disp: 12 g, Rfl: 1     hydrochlorothiazide (MICROZIDE) 12.5 MG capsule, Take 1 capsule (12.5 mg) by mouth daily, Disp: 90 capsule, Rfl: 3     ipratropium (ATROVENT) 0.03 % spray, Spray 2 sprays in nostril daily , Disp: , Rfl:      latanoprost (XALATAN) 0.005 % ophthalmic solution, Place 1 drop into both eyes At Bedtime, Disp: , Rfl:      multivitamin (OCUVITE) TABS tablet, Take 1 tablet by mouth daily, Disp: , Rfl:      omeprazole (PRILOSEC) 20 MG DR capsule, Take 1 capsule (20 mg) by mouth daily, Disp: 90 capsule, Rfl: 3     PREDNISONE PO, As needed for asthma flares follows with PCP or pulmonology - has 20 mg tablets, Disp: , Rfl:      spironolactone (ALDACTONE) 25 MG tablet, Take 0.5 tablets (12.5 mg) by mouth daily In the  morning, Disp: 45 tablet, Rfl: 2  Social History     Tobacco Use     Smoking status: Never Smoker     Smokeless tobacco: Never Used   Substance Use Topics     Alcohol use: Yes     Alcohol/week: 1.0 - 2.0 standard drinks     Types: 1 - 2 Standard drinks or equivalent per week     Comment: 1 glass of wine 1-2 days per week       ROS:  Review of systems negative except as stated above.    OBJECTIVE:  BP (!) 144/68   Pulse 88   Temp 98  F (36.7  C) (Tympanic)   Resp 20   LMP  (LMP Unknown)   SpO2 99%   GENERAL: healthy, alert no acute distress  SKIN: right lower leg with healing bite overlying shin.  No bleeding or drainage from site.  Surrounding erythema noted over shin that extends to just below knee.   Mild warmth to touch but non tender  MS:extremities normal- no gross deformities noted,  FROM noted in all extremities  NEURO: Normal strength and tone, sensory exam grossly normal,  normal speech and mentation    assessment/plan:  (S81.851A,  L08.9,  W55.01XA) Cat bite of right lower leg with infection, initial encounter  (primary encounter diagnosis)  Comment:   Plan: metroNIDAZOLE (FLAGYL) 500 MG tablet,         sulfamethoxazole-trimethoprim (BACTRIM DS)         800-160 MG tablet          Patient with cat bite to right lower leg that occurred 2 weeks ago.  Healing wound but now redness for the past 4 days.   No abscess noted and vitals reassuring with no fevers.  Wound cleaned and bandaged.   Will start on Bactrim and Flagyl due to Amoxicillin allergy.   Continue to monitor sx and watch for fevers, increased redness or pain and needs to RTC if occurs.   Patient notes understanding and agrees with above plan

## 2021-03-08 NOTE — TELEPHONE ENCOUNTER
Received refill request for:  Spironolactone  Last OV was: 8/6/2020 with Dr. Hernandez  Labs/EKG: last BMP 10/8/2019  F/U scheduled: orders in Epic for 8/2021  New script sent to: [facility]  
You can access the FollowMyHealth Patient Portal offered by [facility] by registering at the following website: [email or web address]. By joining [facility]’s FollowMyHealth portal, you will also be able to view your health information using other applications (apps) compatible with our system.

## 2021-06-02 ENCOUNTER — OFFICE VISIT (OUTPATIENT)
Dept: URGENT CARE | Facility: URGENT CARE | Age: 86
End: 2021-06-02
Payer: COMMERCIAL

## 2021-06-02 VITALS
RESPIRATION RATE: 20 BRPM | DIASTOLIC BLOOD PRESSURE: 55 MMHG | TEMPERATURE: 98.4 F | SYSTOLIC BLOOD PRESSURE: 141 MMHG | HEART RATE: 87 BPM | OXYGEN SATURATION: 98 %

## 2021-06-02 DIAGNOSIS — R42 DIZZINESS: Primary | ICD-10-CM

## 2021-06-02 DIAGNOSIS — J01.90 ACUTE SINUSITIS WITH SYMPTOMS > 10 DAYS: ICD-10-CM

## 2021-06-02 LAB
ALBUMIN SERPL-MCNC: 3.7 G/DL (ref 3.4–5)
ALP SERPL-CCNC: 89 U/L (ref 40–150)
ALT SERPL W P-5'-P-CCNC: 23 U/L (ref 0–50)
ANION GAP SERPL CALCULATED.3IONS-SCNC: 1 MMOL/L (ref 3–14)
AST SERPL W P-5'-P-CCNC: 27 U/L (ref 0–45)
BASOPHILS # BLD AUTO: 0 10E9/L (ref 0–0.2)
BASOPHILS NFR BLD AUTO: 0.3 %
BILIRUB SERPL-MCNC: 0.9 MG/DL (ref 0.2–1.3)
BUN SERPL-MCNC: 25 MG/DL (ref 7–30)
CALCIUM SERPL-MCNC: 9.9 MG/DL (ref 8.5–10.1)
CHLORIDE SERPL-SCNC: 104 MMOL/L (ref 94–109)
CO2 SERPL-SCNC: 33 MMOL/L (ref 20–32)
CREAT SERPL-MCNC: 1.1 MG/DL (ref 0.52–1.04)
DIFFERENTIAL METHOD BLD: NORMAL
EOSINOPHIL # BLD AUTO: 0.1 10E9/L (ref 0–0.7)
EOSINOPHIL NFR BLD AUTO: 1 %
ERYTHROCYTE [DISTWIDTH] IN BLOOD BY AUTOMATED COUNT: 14.1 % (ref 10–15)
GFR SERPL CREATININE-BSD FRML MDRD: 45 ML/MIN/{1.73_M2}
GLUCOSE SERPL-MCNC: 106 MG/DL (ref 70–99)
HCT VFR BLD AUTO: 38 % (ref 35–47)
HGB BLD-MCNC: 12.5 G/DL (ref 11.7–15.7)
LYMPHOCYTES # BLD AUTO: 1.9 10E9/L (ref 0.8–5.3)
LYMPHOCYTES NFR BLD AUTO: 18.4 %
MCH RBC QN AUTO: 29.9 PG (ref 26.5–33)
MCHC RBC AUTO-ENTMCNC: 32.9 G/DL (ref 31.5–36.5)
MCV RBC AUTO: 91 FL (ref 78–100)
MONOCYTES # BLD AUTO: 1.2 10E9/L (ref 0–1.3)
MONOCYTES NFR BLD AUTO: 11.6 %
NEUTROPHILS # BLD AUTO: 7.1 10E9/L (ref 1.6–8.3)
NEUTROPHILS NFR BLD AUTO: 68.7 %
PLATELET # BLD AUTO: 318 10E9/L (ref 150–450)
POTASSIUM SERPL-SCNC: 3.7 MMOL/L (ref 3.4–5.3)
PROT SERPL-MCNC: 7 G/DL (ref 6.8–8.8)
RBC # BLD AUTO: 4.18 10E12/L (ref 3.8–5.2)
SODIUM SERPL-SCNC: 138 MMOL/L (ref 133–144)
WBC # BLD AUTO: 10.3 10E9/L (ref 4–11)

## 2021-06-02 PROCEDURE — 99214 OFFICE O/P EST MOD 30 MIN: CPT | Performed by: FAMILY MEDICINE

## 2021-06-02 PROCEDURE — 36415 COLL VENOUS BLD VENIPUNCTURE: CPT | Performed by: FAMILY MEDICINE

## 2021-06-02 PROCEDURE — 85025 COMPLETE CBC W/AUTO DIFF WBC: CPT | Performed by: FAMILY MEDICINE

## 2021-06-02 PROCEDURE — 80053 COMPREHEN METABOLIC PANEL: CPT | Performed by: FAMILY MEDICINE

## 2021-06-02 PROCEDURE — 93000 ELECTROCARDIOGRAM COMPLETE: CPT | Performed by: FAMILY MEDICINE

## 2021-06-02 RX ORDER — DOXYCYCLINE 100 MG/1
100 CAPSULE ORAL 2 TIMES DAILY
Qty: 20 CAPSULE | Refills: 0 | Status: SHIPPED | OUTPATIENT
Start: 2021-06-02 | End: 2021-06-12

## 2021-06-02 NOTE — PATIENT INSTRUCTIONS
Okay to take tylenol 500 mg - 2 tablets (1000 mg) every 6-8 hours as needed, do not exceed 3000 mg in 24 hours.  Take full course of antibiotic - Doxycycline for sinus infection    Continue with current medications  Please check your blood pressure, follow up with your primary provider if not improving.      Patient Education     Sinusitis (Antibiotic Treatment)    The sinuses are air-filled spaces within the bones of the face. They connect to the inside of the nose. Sinusitis is an inflammation of the tissue that lines the sinuses. Sinusitis can occur during a cold. It can also happen due to allergies to pollens and other particles in the air. Sinusitis can cause symptoms of sinus congestion and a feeling of fullness. A sinus infection causes fever, headache, and facial pain. There is often green or yellow fluid draining from the nose or into the back of the throat (post-nasal drip). You have been given antibiotics to treat this condition.   Home care    Take the full course of antibiotics as instructed. Don't stop taking them, even when you feel better.    Drink plenty of water, hot tea, and other liquids as directed by the healthcare provider. This may help thin nasal mucus. It also may help your sinuses drain fluids.    Heat may help soothe painful areas of your face. Use a towel soaked in hot water. Or,  the shower and direct the warm spray onto your face. Using a vaporizer along with a menthol rub at night may also help soothe symptoms.     An expectorant with guaifenesin may help thin nasal mucus and help your sinuses drain fluids. Talk with your provider or pharmacists before taking an over-the-counter (OTC) medicine if you have any questions about it or its side effects..    You can use an OTC decongestant, unless a similar medicine was prescribed to you. Nasal sprays work the fastest. Use one that contains phenylephrine or oxymetazoline. First blow your nose gently. Then use the spray. Don't use  these medicines more often than directed on the label. If you do, your symptoms may get worse. You may also take pills that contain pseudoephedrine. Don t use products that combine multiple medicines. This is because side effects may be increased. Read labels. You can also ask the pharmacist for help. (People with high blood pressure should not use decongestants. They can raise blood pressure.) Talk with your provider or pharmacist if you have any questions about the medicine..    OTC antihistamines may help if allergies contributed to your sinusitis. Talk with your provider or pharmacist if you have any questions about the medicine..    Don't use nasal rinses or irrigation during an acute sinus infection, unless your healthcare provider tells you to. Rinsing may spread the infection to other areas in your sinuses.    Use acetaminophen or ibuprofen to control pain, unless another pain medicine was prescribed to you. If you have chronic liver or kidney disease or ever had a stomach ulcer, talk with your healthcare provider before using these medicines. Never give aspirin to anyone under age 18 who is ill with a fever. It may cause severe liver damage.    Don't smoke. This can make symptoms worse.    Follow-up care  Follow up with your healthcare provider, or as advised.   When to seek medical advice  Call your healthcare provider if any of these occur:     Facial pain or headache that gets worse    Stiff neck    Unusual drowsiness or confusion    Swelling of your forehead or eyelids    Symptoms don't go away in 10 days    Vision problems, such as blurred or double vision    Fever of 100.4 F (38 C) or higher, or as directed by your healthcare provider  Call 911  Call 911 if any of these occur:     Seizure    Trouble breathing    Feeling dizzy or faint    Fingernails, skin or lips look blue, purple , or gray  Prevention  Here are steps you can take to help prevent an infection:     Keep good hand washing  habits.    Don t have close contact with people who have sore throats, colds, or other upper respiratory infections.    Don t smoke, and stay away from secondhand smoke.    Stay up to date with of your vaccines.  [employer] last reviewed this educational content on 12/1/2019 2000-2021 [employer]. All rights reserved. This information is not intended as a substitute for professional medical care. Always follow your healthcare professional's instructions.           Patient Education     Dizziness (Uncertain Cause)  Dizziness is a common symptom. It may be described as lightheadedness, spinning, or feeling like you are going to faint. Dizziness can have many causes.   Tell the healthcare provider about:     All medicines you take, including prescription, over-the-counter, herbs, and supplements    Any other symptoms you have    Any health problems you are being treated for    Any past major health problems you've had, such as a heart attack, balance issues, hearing problems, or blood pressure problems    Anything that causes the dizziness to get worse or better  Today's exam did not show an exact cause for your dizziness . Other tests may be needed. Follow up with your healthcare provider.   Home care    Dizziness that occurs with sudden standing may be a sign of mild dehydration. Drink extra fluids for the next few days.    If you recently started a new medicine, stopped a medicine, or had the dose of a current medicine changed, talk with the prescribing healthcare provider. Your medicine plan may need adjustment.    If dizziness lasts more than a few seconds, sit or lie down until it passes. This may help prevent injury in case you pass out. Get up slowly when you feel better.    Don't drive or use power tools or dangerous equipment until you have had no dizziness for at least 48 hours.    Follow-up care  Follow up with your healthcare provider for further evaluation in the next 7 days, or as advised.    When to get medical advice  Call your healthcare provider for any of the following:     Worsening of symptoms or new symptoms    Repeated vomiting    Headache    Vision or hearing changes  Call 911  Call 911, or get medical care right away if any of these occur:     Chest, arm, neck, back, or jaw pain    Weakness of an arm or leg or one side of the face    Blood in vomit or stool (black or red color)    Shortness of breath    Feeling that your heart is fluttering or beating fast or hard (palpitations)    Passing out or seizure    Trouble walking or speaking  [employer] last reviewed this educational content on 2/1/2020 2000-2021 [employer]. All rights reserved. This information is not intended as a substitute for professional medical care. Always follow your healthcare professional's instructions.

## 2021-06-02 NOTE — PROGRESS NOTES
SUBJECTIVE:   Genie Persaud is a 85 year old female presenting with a chief complaint of dizziness and allergies symptoms, elevated BP.    Medical history significant for HTN, CAD, paroxysmal a. Fib, asthma, hyperlipidemia, h/o anemia.    Has seasonal allergies and will develop sinus congestion during the spring, states that has been present since April.  Started to get worse recently and having more pressure, worse in the morning.  Now feeling more dizzy, denies vertigo.  Denies any worsening of breathing, states that has asthma so is always a little SOB.  Has been eating well, getting more rest as needed.    Started to notice BP all over the place, more elevated for the past week.    Declined COVID vaccinations, has side effects from prior vaccines and ended up in hospital before    Past Medical History:   Diagnosis Date     Anemia 3/10/2009    Chronic disease, by Fe studies; awaiting full GI w/u and repeat colonoscopy, ERCP.     Basal Cell Carcinoma--back      Coronary artery disease involving native coronary artery of native heart without angina pectoris 3/9/2017    3/2/2017 - Two vessel coronary artery disease with mLAD 50% stenosis, D3 50% stenosis, pLCx 50% stenosis and mLCx 50% stenosis     Essential hypertension with goal blood pressure less than 140/90 9/1/2016    White coat hypertension;  24 hour ambulatory monitoring normal. Do not titrate up further.       Hyperlipidemia LDL goal <130 2/10/2010     Impaired fasting glucose 8/26/2010     Moderate persistent asthma      Nonrheumatic aortic valve insufficiency 6/29/2017     osteopenia      Paroxysmal atrial fibrillation (H) 12/26/2017     Pulmonary nodule 3/3/2009    PET scan reportedly normal; biopsy not advised.     Stress-induced cardiomyopathy 11/16/2017     Stroke (H) 10/18/2013    Retinal artery, discovered by ophthlamology      SVT -noted during Cath procedure 3/28/2017     Swelling, mass, or lump in head and neck     benign nodule thyroid      Thoracic aortic aneurysm without rupture (H) 5/10/2011    CT next due 7/13; refer if > 5 cm, or if > 1 cm/year growth.  Problem list name updated by automated process. Provider to review     Unspecified arthropathy, hand      Vasculitis (H) 4/20/2009    Possible increased activity of thoracic aorta, on PET scan w/u for pulmonary nodule.      Current Outpatient Medications   Medication Sig Dispense Refill     acetaminophen (TYLENOL) 500 MG tablet Take 500-1,000 mg by mouth every 8 hours as needed for mild pain       albuterol (PROAIR HFA, PROVENTIL HFA, VENTOLIN HFA) 108 (90 BASE) MCG/ACT inhaler Inhale 2 puffs into the lungs every 6 hours as needed        amLODIPine (NORVASC) 5 MG tablet Take 1 tablet (5 mg) by mouth At Bedtime 90 tablet 3     atorvastatin (LIPITOR) 20 MG tablet Take 1 tablet (20 mg) by mouth daily (Patient taking differently: Take 20 mg by mouth daily HS) 90 tablet 3     azelastine (ASTELIN) 0.1 % nasal spray SPRAY 1 TO 2 SPRAYS INTO BOTH NOSTRILS 2 TIMES DAILY 30 mL 5     calcium Citrate-vitamin D 500-400 MG-UNIT CHEW 1 tablet twice day       fluticasone-salmeterol (ADVAIR-HFA) 230-21 MCG/ACT inhaler Inhale 2 puffs into the lungs 2 times daily 12 g 1     hydrochlorothiazide (MICROZIDE) 12.5 MG capsule Take 1 capsule (12.5 mg) by mouth daily 90 capsule 3     ipratropium (ATROVENT) 0.03 % spray Spray 2 sprays in nostril daily        latanoprost (XALATAN) 0.005 % ophthalmic solution Place 1 drop into both eyes At Bedtime       multivitamin (OCUVITE) TABS tablet Take 1 tablet by mouth daily       omeprazole (PRILOSEC) 20 MG DR capsule Take 1 capsule (20 mg) by mouth daily 90 capsule 3     PREDNISONE PO As needed for asthma flares follows with PCP or pulmonology - has 20 mg tablets       spironolactone (ALDACTONE) 25 MG tablet Take 0.5 tablets (12.5 mg) by mouth daily In the morning 45 tablet 2     Social History     Tobacco Use     Smoking status: Never Smoker     Smokeless tobacco: Never Used    Substance Use Topics     Alcohol use: Yes     Alcohol/week: 1.0 - 2.0 standard drinks     Types: 1 - 2 Standard drinks or equivalent per week     Comment: 1 glass of wine 1-2 days per week       ROS:  Review of systems negative except as stated above.    OBJECTIVE:  BP (!) 156/88   Pulse 80   Temp 98.4  F (36.9  C) (Tympanic)   Resp 20   LMP  (LMP Unknown)   SpO2 98%   GENERAL APPEARANCE: healthy, alert and no distress  EYES: EOMI,  PERRL, conjunctiva clear  HENT: ear canals and TM's normal.  Nose and mouth without ulcers, erythema or lesions.  Mild maxillary sinus tenderness on percussion  RESP: lungs clear to auscultation - no rales, rhonchi or wheezes  CV: regular rates and rhythm  Extremities: no peripheral edema or tenderness, peripheral pulses normal  PSYCH: mentation appears normal and affect normal/bright    EKG - sinus rhythm, rate 90, 2 PVCs, no ST c/w ischemia    Results for orders placed or performed in visit on 06/02/21   CBC with platelets and differential     Status: None   Result Value Ref Range    WBC 10.3 4.0 - 11.0 10e9/L    RBC Count 4.18 3.8 - 5.2 10e12/L    Hemoglobin 12.5 11.7 - 15.7 g/dL    Hematocrit 38.0 35.0 - 47.0 %    MCV 91 78 - 100 fl    MCH 29.9 26.5 - 33.0 pg    MCHC 32.9 31.5 - 36.5 g/dL    RDW 14.1 10.0 - 15.0 %    Platelet Count 318 150 - 450 10e9/L    % Neutrophils 68.7 %    % Lymphocytes 18.4 %    % Monocytes 11.6 %    % Eosinophils 1.0 %    % Basophils 0.3 %    Absolute Neutrophil 7.1 1.6 - 8.3 10e9/L    Absolute Lymphocytes 1.9 0.8 - 5.3 10e9/L    Absolute Monocytes 1.2 0.0 - 1.3 10e9/L    Absolute Eosinophils 0.1 0.0 - 0.7 10e9/L    Absolute Basophils 0.0 0.0 - 0.2 10e9/L    Diff Method Automated Method    Comprehensive metabolic panel     Status: Abnormal   Result Value Ref Range    Sodium 138 133 - 144 mmol/L    Potassium 3.7 3.4 - 5.3 mmol/L    Chloride 104 94 - 109 mmol/L    Carbon Dioxide 33 (H) 20 - 32 mmol/L    Anion Gap 1 (L) 3 - 14 mmol/L    Glucose 106 (H) 70  - 99 mg/dL    Urea Nitrogen 25 7 - 30 mg/dL    Creatinine 1.10 (H) 0.52 - 1.04 mg/dL    GFR Estimate 45 (L) >60 mL/min/[1.73_m2]    GFR Estimate If Black 52 (L) >60 mL/min/[1.73_m2]    Calcium 9.9 8.5 - 10.1 mg/dL    Bilirubin Total 0.9 0.2 - 1.3 mg/dL    Albumin 3.7 3.4 - 5.0 g/dL    Protein Total 7.0 6.8 - 8.8 g/dL    Alkaline Phosphatase 89 40 - 150 U/L    ALT 23 0 - 50 U/L    AST 27 0 - 45 U/L         ASSESSMENT/PLAN:  (R42) Dizziness  (primary encounter diagnosis)  Plan: EKG 12-lead complete w/read - Clinics, CBC with        platelets and differential, Comprehensive         metabolic panel, EKG 12-lead complete w/read -         Clinics            (J01.90) Acute sinusitis with symptoms > 10 days  Plan: doxycycline hyclate (VIBRAMYCIN) 100 MG capsule            Patient appears well, no acute distress and no respiratory distress.  Reassurance given, reviewed symptomatic treatment with tylenol, plenty of fluids and rest.  RX Doxycycline given for sinus infection.  Discussed that due to underlying co-morbid medical diagnosis that EKG and labs usually indicated, overall stable with no significant acute changes.  Discussed that BP can be more labile when feels unwell, encourage to continue with current medications and check BP at home.  Follow up with primary provider if BP continues to be elevated.    Follow up with primary provider if no improvement of symptoms in 1-2 weeks    Anthony French MD,  June 2, 2021 12:49 PM

## 2021-06-07 ENCOUNTER — OFFICE VISIT (OUTPATIENT)
Dept: PEDIATRICS | Facility: CLINIC | Age: 86
End: 2021-06-07
Payer: COMMERCIAL

## 2021-06-07 VITALS
BODY MASS INDEX: 24.58 KG/M2 | SYSTOLIC BLOOD PRESSURE: 138 MMHG | OXYGEN SATURATION: 96 % | WEIGHT: 134.4 LBS | DIASTOLIC BLOOD PRESSURE: 60 MMHG | TEMPERATURE: 97.5 F | HEART RATE: 80 BPM

## 2021-06-07 DIAGNOSIS — I71.20 THORACIC AORTIC ANEURYSM WITHOUT RUPTURE (H): ICD-10-CM

## 2021-06-07 DIAGNOSIS — I42.8 OTHER CARDIOMYOPATHIES (H): ICD-10-CM

## 2021-06-07 DIAGNOSIS — J30.2 SEASONAL ALLERGIC RHINITIS, UNSPECIFIED TRIGGER: Primary | ICD-10-CM

## 2021-06-07 DIAGNOSIS — N18.30 STAGE 3 CHRONIC KIDNEY DISEASE, UNSPECIFIED WHETHER STAGE 3A OR 3B CKD (H): ICD-10-CM

## 2021-06-07 PROBLEM — Z00.00 ENCOUNTER FOR MEDICARE ANNUAL WELLNESS EXAM: Status: RESOLVED | Noted: 2019-11-07 | Resolved: 2021-06-07

## 2021-06-07 PROBLEM — R11.0 NAUSEA: Status: RESOLVED | Noted: 2019-06-10 | Resolved: 2021-06-07

## 2021-06-07 PROBLEM — R42 DIZZINESS: Status: RESOLVED | Noted: 2019-06-10 | Resolved: 2021-06-07

## 2021-06-07 PROBLEM — I48.0 PAROXYSMAL ATRIAL FIBRILLATION (H): Status: RESOLVED | Noted: 2017-12-26 | Resolved: 2021-06-07

## 2021-06-07 PROBLEM — I47.10 SVT (SUPRAVENTRICULAR TACHYCARDIA) (H): Status: RESOLVED | Noted: 2017-03-28 | Resolved: 2021-06-07

## 2021-06-07 PROCEDURE — 99214 OFFICE O/P EST MOD 30 MIN: CPT | Performed by: INTERNAL MEDICINE

## 2021-06-07 RX ORDER — MONTELUKAST SODIUM 10 MG/1
10 TABLET ORAL AT BEDTIME
Qty: 30 TABLET | Refills: 2 | Status: SHIPPED | OUTPATIENT
Start: 2021-06-07 | End: 2021-07-01

## 2021-06-07 NOTE — PROGRESS NOTES
Assessment & Plan     Seasonal allergic rhinitis, unspecified trigger  She still seems to attribute most of her symptoms to allergies.  Her main symptom is unsteadiness when she stands up, as well as some pressure on bilateral sinus cavities.  She also describes some burning sensation in her anterior chest, as well as a bandlike sensation around her chest that is even more intermittent.  This is concerning to me, however she has had similar symptoms in the many years that I have known her.  It looks like she is going to be due for a follow-up of her CT scan of her chest, but back in 2018 when she had this done she was admitted to the hospital for contrast dye reaction.  I am hesitant to repeat this study at the current time, and I will see what further treatment of her allergies with Singulair, Mucinex, and mini humidifier will do over the next couple of weeks.  If she continues to have symptoms radiating into her chest, I will proceed with some chest imaging, as well as a MRI of her brain and MRA of her arteries to make sure that there is no central cause of her unsteadiness.  - montelukast (SINGULAIR) 10 MG tablet; Take 1 tablet (10 mg) by mouth At Bedtime    Stage 3 chronic kidney disease, unspecified whether stage 3a or 3b CKD  This has been stable.    Other cardiomyopathies (H)  She has follow-up with cardiology this summer.    Thoracic aortic aneurysm without rupture (H)  As noted above, she will have a CT without contrast in order to follow-up on her previously stable aneurysm.                    Return in about 2 weeks (around 6/21/2021).    Layo Sevilla MD  [facility] MICHAELLE Vasquez is a 85 year old who presents for the following health issues     HPI     ED/UC Followup:    Facility:  [facility]  Date of visit: 06/02/2021  Reason for visit: dizziness and elevated BP  Current Status: still having dizzy spells and intermittent burning sensation near sternum  "(patient states has had similar discomfort with asthma issues) BP is still high on home readings. Is also having trouble with seasonal allergies and has maxillary sinus pressure.        Has had dizziness and pressure since April.    Last week, blood pressure went up, and began to feel it in central chest. Will last a few minutes, on and off through the day.  Sometimes gets a tightness \"like is wearing too tight a bra\" will last about 5-10 min; associated with some shortness of breath.     Dizziness and pressure in bilateral sinuses.  No fevers.  No worsening of her chronic cough.    Main symptoms now is just pressure in sinuses and dizziness.     Seen in urgent care and given doxy; did not change much at all.      Cardiology follow up in 8/2020:    ASSESSMENT AND PLAN:  Genie has no symptoms to suggest ischemia.  We will continue with aggressive risk factor modification and medical management.      She has no symptoms to suggest heart failure or any significant arrhythmia.  Her most recent echo from 2019 describes an ejection fraction of 50 to 55% again with mild cardiomyopathy possibly due to her hypertension.     Blood pressure does appear to be well controlled off of her metoprolol.  She states she tries to stay quite active gardening and going up and down the stairs to for her laundry she occasionally uses her stationary bike.  She also relates he tries to follow a low-salt diet.  She eats plenty of fresh fruits and vegetables and loves this time a year because the abundance [place] [employer].     Basic metabolic profile was checked also in October with a sodium of 136, potassium of 4.3, creatinine of 1.09 giving her a GFR of 47.  I will continue her spironolactone and HCTZ as is.     We did do a followup CT scan of her aorta last in 2018 and her ascending aorta again measures 4.4 cm, unchanged from 03/2017.  I will recheck it next year.     I congratulated her on healthy lifestyle encouraged her to " exercise daily.  I also emphasized importance of a low-salt diet high in fresh fruits and vegetables which she appears to be doing a good job.  She thinks her weight may be down 1 or 2 pounds.     I will have her follow-up with my LAYA in 1 year.  I will see her back in 2 years.  If she should have any problems or be let us see her sooner.  Thank you for allowing me to participate in her care.  Sincerely,        Review of Systems   CONSTITUTIONAL: NEGATIVE for fever, chills, change in weight  INTEGUMENTARY/SKIN: NEGATIVE for worrisome rashes, moles or lesions  EYES: NEGATIVE for vision changes or irritation  ENT/MOUTH: NEGATIVE for ear, mouth and throat problems  RESP: NEGATIVE for significant cough or SOB  BREAST: NEGATIVE for masses, tenderness or discharge  CV: NEGATIVE for chest pain, palpitations or peripheral edema  GI: NEGATIVE for nausea, abdominal pain, heartburn, or change in bowel habits  : NEGATIVE for frequency, dysuria, or hematuria  MUSCULOSKELETAL: NEGATIVE for significant arthralgias or myalgia  NEURO: NEGATIVE for weakness, dizziness or paresthesias  ENDOCRINE: NEGATIVE for temperature intolerance, skin/hair changes  HEME: NEGATIVE for bleeding problems  PSYCHIATRIC: NEGATIVE for changes in mood or affect      Objective    BP (!) 144/68 (BP Location: Right arm, Patient Position: Sitting, Cuff Size: Adult Regular)   Pulse 80   Temp 97.5  F (36.4  C) (Temporal)   Wt 61 kg (134 lb 6.4 oz)   LMP  (LMP Unknown)   SpO2 96%   Breastfeeding No   BMI 24.58 kg/m    Body mass index is 24.58 kg/m .  Physical Exam   GENERAL: healthy, alert and no distress  EYES: Eyes grossly normal to inspection, PERRL and conjunctivae and sclerae normal  HENT: ear canals and TM's normal, nose and mouth without ulcers or lesions  NECK: no adenopathy, no asymmetry, masses, or scars and thyroid normal to palpation  RESP: lungs clear to auscultation - no rales, rhonchi or wheezes  BREAST: normal without masses, tenderness  or nipple discharge and no palpable axillary masses or adenopathy  CV: regular rate and rhythm, normal S1 S2, no S3 or S4, no murmur, click or rub, no peripheral edema and peripheral pulses strong  ABDOMEN: soft, nontender, no hepatosplenomegaly, no masses and bowel sounds normal  MS: no gross musculoskeletal defects noted, no edema  SKIN: no suspicious lesions or rashes  NEURO: Normal strength and tone, mentation intact and speech normal  PSYCH: mentation appears normal, affect normal/bright

## 2021-06-07 NOTE — PATIENT INSTRUCTIONS
"Let's add in some singulair for next 2 weeks:  10 mg daily.    Saline spray (nonmedicated salt water) in small squirt bottles can be used every hour or two during the day, as can humidifiers during the night.  Steam showers can help keep mucous loose.       For adults and kids over 6, expectorants (like \"Mucinex\" or \"Robitussin\") may help.    Continue on the azelastine and the ipratropium for now.     Finish the antibiotic course from urgent care.      If your symptoms persist in the next weeks, call us and we can order an MRI/MRA of the brain and sinuses.    Layo Sevilla MD  Internal Medicine and Pediatrics     "

## 2021-06-17 ENCOUNTER — OFFICE VISIT (OUTPATIENT)
Dept: PEDIATRICS | Facility: CLINIC | Age: 86
End: 2021-06-17
Payer: COMMERCIAL

## 2021-06-17 VITALS
TEMPERATURE: 97.6 F | BODY MASS INDEX: 24.33 KG/M2 | WEIGHT: 133 LBS | SYSTOLIC BLOOD PRESSURE: 122 MMHG | DIASTOLIC BLOOD PRESSURE: 48 MMHG | HEART RATE: 90 BPM | OXYGEN SATURATION: 95 %

## 2021-06-17 DIAGNOSIS — K59.00 CONSTIPATION, UNSPECIFIED CONSTIPATION TYPE: ICD-10-CM

## 2021-06-17 DIAGNOSIS — I35.1 AORTIC VALVE INSUFFICIENCY, ETIOLOGY OF CARDIAC VALVE DISEASE UNSPECIFIED: ICD-10-CM

## 2021-06-17 DIAGNOSIS — R42 DIZZINESS: Primary | ICD-10-CM

## 2021-06-17 DIAGNOSIS — R20.0 NUMBNESS AND TINGLING OF BOTH LEGS: ICD-10-CM

## 2021-06-17 DIAGNOSIS — R20.2 NUMBNESS AND TINGLING OF BOTH LEGS: ICD-10-CM

## 2021-06-17 PROCEDURE — 99214 OFFICE O/P EST MOD 30 MIN: CPT | Mod: GC | Performed by: STUDENT IN AN ORGANIZED HEALTH CARE EDUCATION/TRAINING PROGRAM

## 2021-06-17 RX ORDER — POLYETHYLENE GLYCOL 3350 17 G/17G
1 POWDER, FOR SOLUTION ORAL DAILY
Qty: 510 G | Refills: 3 | Status: SHIPPED | OUTPATIENT
Start: 2021-06-17 | End: 2021-07-29

## 2021-06-17 NOTE — PROGRESS NOTES
Assessment & Plan     Dizziness  Aortic valve insufficiency, etiology of cardiac valve disease unspecified  Symptoms described not clearly vertigo, more lightheadedness. Not worsened by bending or standing. Better with lying down. Not associated with other symptoms other than pressure in face and back of head. Differential remains broad and unclear ? Cardiac with history of aortic regurg on echo 2 years ago, carotid artery issues, ? Neurologic cerebellar stroke or other microvascular disease, ? Inner ear but not clearly BPPV. Normal EKG no palpitations.   - PHYSICAL THERAPY REFERRAL; Future  - Echocardiogram Complete; Future  - US Carotid Bilateral; Future  - follow up in 6 weeks   - could consider advanced neurologic imaging if continued    Constipation, unspecified constipation type  - polyethylene glycol (MIRALAX) 17 GM/Dose powder; Take 17 g (1 capful) by mouth daily    Numbness and tingling of both legs  - physical therapy referral as above for gait and balance      Return in about 6 weeks (around 7/29/2021) for Follow up.    Shelby Roth MD  Internal Medicine & Pediatrics PGY4  [facility]      Yudith Vasquez is a 85 year old who presents for the following health issues     HPI     Concern - Leg and Foot pain   Onset: since 6/15/21  Description: bilateral leg pain  Intensity: mild  Progression of Symptoms:  same  Accompanying Signs & Symptoms: tingling starts in the feet and moves up into calf, left leg is worse, can't wear cullen socks, slight swelling and pain with that  Previous history of similar problem: hx of lymphedema   Precipitating factors:        Worsened by: none  Alleviating factors:        Improved by: none   Therapies tried and outcome:  none     Concern - Abdominal Pressure   Onset: Longer than a month especially when she is constipated - improves with bowel movements  Description: experiencing pain when bearing down for BM   Intensity: moderate  Progression of Symptoms:   worsening  Accompanying Signs & Symptoms: felt like something was catching, used to be mild, red spot, tender spot    Previous history of similar problem: no  Precipitating factors:        Worsened by: pressing down   Alleviating factors:        Improved by: None   Therapies tried and outcome: None    Dizziness  Dizziness has no improved still nausea. Pressure in sinuses is not better. Wraps around down into back of neck. Not room spinning, does not impact function, doesn't wake her up. Dizziness is more of a light headed feeling, fogginess. Seems worse in the morning. No vision changes. Eye doctor had no concerns a few months ago. Tried doxycycline, montelukast, mucinex and humidifier, 2 nasal sprays. Nothing really makes it worse.    Review of Systems   Constitutional, HEENT, cardiovascular, pulmonary, gi and gu systems are negative, except as otherwise noted.      Objective    /48   Pulse 90   Temp 97.6  F (36.4  C)   Wt 60.3 kg (133 lb)   LMP  (LMP Unknown)   SpO2 95%   BMI 24.33 kg/m    Body mass index is 24.33 kg/m .  Physical Exam   GENERAL: healthy, alert and no distress  EYES: Eyes grossly normal to inspection, PERRL and conjunctivae and sclerae normal  HENT: ear canals and TM's normal, nose and mouth without ulcers or lesions  NECK: no adenopathy, no asymmetry, masses, or scars and thyroid normal to palpation  RESP: lungs clear to auscultation - no rales, rhonchi or wheezes  CV: regular rate and rhythm, normal S1 S2, murmur, click or rub, peripheral edema bilateral LE and peripheral pulses strong  ABDOMEN: soft, nontender, no hepatosplenomegaly, no masses and bowel sounds normal  MS: no gross musculoskeletal defects noted, no edema  NEURO: Normal strength and tone, mentation intact and speech normal. CN 2-12 intact, finger to nose normal, knee to shin slide normal, rapid alternating movements of hands normal bilaterally, gait steady  PSYCH: mentation appears normal, affect normal/bright  LYMPH:  no cervical, supraclavicular, axillary, or inguinal adenopathy      Physician Attestation   I, Layo Sevilla MD, saw this patient and agree with the findings and plan of care as documented in the note.      Items personally reviewed/procedural attestation: seen and agree with the interpretation documented in the note.    Layo Sevilla MD

## 2021-06-28 ENCOUNTER — APPOINTMENT (OUTPATIENT)
Dept: ULTRASOUND IMAGING | Facility: CLINIC | Age: 86
End: 2021-06-28
Attending: EMERGENCY MEDICINE
Payer: COMMERCIAL

## 2021-06-28 ENCOUNTER — NURSE TRIAGE (OUTPATIENT)
Dept: PEDIATRICS | Facility: CLINIC | Age: 86
End: 2021-06-28

## 2021-06-28 ENCOUNTER — HOSPITAL ENCOUNTER (EMERGENCY)
Facility: CLINIC | Age: 86
Discharge: HOME | End: 2021-06-28
Attending: PHYSICIAN ASSISTANT | Admitting: PHYSICIAN ASSISTANT
Payer: COMMERCIAL

## 2021-06-28 VITALS
OXYGEN SATURATION: 95 % | TEMPERATURE: 97.6 F | RESPIRATION RATE: 20 BRPM | HEART RATE: 89 BPM | SYSTOLIC BLOOD PRESSURE: 155 MMHG | DIASTOLIC BLOOD PRESSURE: 53 MMHG

## 2021-06-28 DIAGNOSIS — R60.0 BILATERAL LOWER EXTREMITY EDEMA: ICD-10-CM

## 2021-06-28 DIAGNOSIS — M79.662 PAIN OF LEFT LOWER LEG: ICD-10-CM

## 2021-06-28 LAB
ALBUMIN SERPL-MCNC: 3.7 G/DL (ref 3.4–5)
ALP SERPL-CCNC: 112 U/L (ref 40–150)
ALT SERPL W P-5'-P-CCNC: 28 U/L (ref 0–50)
ANION GAP SERPL CALCULATED.3IONS-SCNC: 4 MMOL/L (ref 3–14)
AST SERPL W P-5'-P-CCNC: 20 U/L (ref 0–45)
BASOPHILS # BLD AUTO: 0.1 10E9/L (ref 0–0.2)
BASOPHILS NFR BLD AUTO: 0.5 %
BILIRUB SERPL-MCNC: 0.6 MG/DL (ref 0.2–1.3)
BUN SERPL-MCNC: 23 MG/DL (ref 7–30)
CALCIUM SERPL-MCNC: 9.9 MG/DL (ref 8.5–10.1)
CHLORIDE SERPL-SCNC: 100 MMOL/L (ref 94–109)
CO2 SERPL-SCNC: 28 MMOL/L (ref 20–32)
CREAT SERPL-MCNC: 0.91 MG/DL (ref 0.52–1.04)
DIFFERENTIAL METHOD BLD: ABNORMAL
EOSINOPHIL # BLD AUTO: 0.1 10E9/L (ref 0–0.7)
EOSINOPHIL NFR BLD AUTO: 0.7 %
ERYTHROCYTE [DISTWIDTH] IN BLOOD BY AUTOMATED COUNT: 13.4 % (ref 10–15)
GFR SERPL CREATININE-BSD FRML MDRD: 58 ML/MIN/{1.73_M2}
GLUCOSE SERPL-MCNC: 117 MG/DL (ref 70–99)
HCT VFR BLD AUTO: 39.2 % (ref 35–47)
HGB BLD-MCNC: 12.9 G/DL (ref 11.7–15.7)
IMM GRANULOCYTES # BLD: 0 10E9/L (ref 0–0.4)
IMM GRANULOCYTES NFR BLD: 0.3 %
LYMPHOCYTES # BLD AUTO: 1.4 10E9/L (ref 0.8–5.3)
LYMPHOCYTES NFR BLD AUTO: 11.7 %
MCH RBC QN AUTO: 30.1 PG (ref 26.5–33)
MCHC RBC AUTO-ENTMCNC: 32.9 G/DL (ref 31.5–36.5)
MCV RBC AUTO: 92 FL (ref 78–100)
MONOCYTES # BLD AUTO: 1 10E9/L (ref 0–1.3)
MONOCYTES NFR BLD AUTO: 8.7 %
NEUTROPHILS # BLD AUTO: 9.1 10E9/L (ref 1.6–8.3)
NEUTROPHILS NFR BLD AUTO: 78.1 %
NRBC # BLD AUTO: 0 10*3/UL
NRBC BLD AUTO-RTO: 0 /100
NT-PROBNP SERPL-MCNC: 1111 PG/ML (ref 0–1800)
PLATELET # BLD AUTO: 297 10E9/L (ref 150–450)
POTASSIUM SERPL-SCNC: 4 MMOL/L (ref 3.4–5.3)
PROT SERPL-MCNC: 7.6 G/DL (ref 6.8–8.8)
RBC # BLD AUTO: 4.28 10E12/L (ref 3.8–5.2)
SODIUM SERPL-SCNC: 132 MMOL/L (ref 133–144)
WBC # BLD AUTO: 11.6 10E9/L (ref 4–11)

## 2021-06-28 PROCEDURE — 80053 COMPREHEN METABOLIC PANEL: CPT | Performed by: PHYSICIAN ASSISTANT

## 2021-06-28 PROCEDURE — 36415 COLL VENOUS BLD VENIPUNCTURE: CPT | Performed by: PHYSICIAN ASSISTANT

## 2021-06-28 PROCEDURE — 85025 COMPLETE CBC W/AUTO DIFF WBC: CPT | Performed by: PHYSICIAN ASSISTANT

## 2021-06-28 PROCEDURE — 99284 EMERGENCY DEPT VISIT MOD MDM: CPT | Mod: 25

## 2021-06-28 PROCEDURE — 93971 EXTREMITY STUDY: CPT | Mod: LT

## 2021-06-28 PROCEDURE — 83880 ASSAY OF NATRIURETIC PEPTIDE: CPT | Mod: GZ | Performed by: PHYSICIAN ASSISTANT

## 2021-06-28 ASSESSMENT — ENCOUNTER SYMPTOMS
SHORTNESS OF BREATH: 0
FEVER: 0
DIFFICULTY URINATING: 0
ABDOMINAL PAIN: 0
DYSURIA: 0

## 2021-06-28 NOTE — ED TRIAGE NOTES
Pt presents to ED with c/o tingling, pain, and swelling in her L leg for 3-4 days. Pt states that she called her regular doctor and was told to come in to r/o bloodclot. Pt states that she is having some mild tingling in her R leg, but no swelling or pain. ABC Intact. Denies chest pain or sob.

## 2021-06-28 NOTE — TELEPHONE ENCOUNTER
Patient called in to [facility] direct line.     Dizziness, ongoing since the beginning of May. Has been seen multiple times for the dizziness, and has therapy starting next week.     Swelling in the legs. First noticed 2 days ago, not getting better or worse.   Swelling in both legs, but noticeably more in the left leg.   Swelling goes up to the knee.   Ache in the left calf area, comes and goes.   Both legs are numb and tingling on and off.   Legs look and feel better in the morning after propping legs up all night.     Able to walk around.     Denies pain in the right leg.   Denies discoloration.   Denies difficulty breathing.   Denies swelling in the face or hands.     Props legs up on pillows at night and when resting.     Advised patient to be seen in the Emergency Department.    Patient verbalized understanding and agreement with plan of care.   Patient reports that her son will take her to the Emergency Department.     Felipe Zimmerman RN on 6/28/2021 at 1:50 PM

## 2021-06-28 NOTE — ED PROVIDER NOTES
History   Chief Complaint:  Leg swelling     HPI   Genie Persaud is a 85 year old female with history of peripheral edema, stroke, hypertension, hyperlipidemia, atrial fibrillation, cancer who presents with leg swelling which she describes as abnormal, not painful, and began a few days ago. The patient reports that she has been keeping her legs elevated on a pillow overnight and the swelling seems to resolve after elevation. She states that her activity level has been decreased over the past year and that she has spent more time sitting down recently. he additionally reports a history of lymphedema. She denies shortness of breath, chest pain, difficulty urinating, abdominal pain, fever.  She notes some intermittent shooting pain in the left leg which does not seem to be triggered by anything in specific.  She is still able to ambulate.      Review of Systems   Constitutional: Negative for fever.   Respiratory: Negative for shortness of breath.    Cardiovascular: Positive for leg swelling. Negative for chest pain.   Gastrointestinal: Negative for abdominal pain.   Genitourinary: Negative for difficulty urinating and dysuria.   All other systems reviewed and are negative.      Allergies:  Fosamax  Contrast dye  Evista  Flu virus vaccine  Amoxicillin    Medications:  Albuterol  Advair-HFA  Prednisone  Amlodipine  Lipitor  Singulair  Omeprazole  Spironolactone    Past Medical History:    Anemia  Basal Cell Carcinoma  CAD  Hypertension  Hyperlipidemia  Impaired fasting glucose  Asthma   Nonrheumatic aortic valve insufficiency  Osteopenia   Paroxysmal atrial fibrillation  Pulmonary nodule  Stress-induced cardiomyopathy  Stroke  SVT  Thoracic aortic aneurysm without rupture  Arthropathy, hand   Vasculitis  CKD  IBS  Peripheral edema  Candidal esophagitis    Past Surgical History:    Appendectomy  Tubal ligation  Breast biopsy  Cholecystectomy, laparoscopic  EGD  combined  Dilation/curettage  Tonsillectomy  Adenoidectomy    Family History:   Mother: hypertension, osteoporosis, CAD  Father: connective tissue disorder  Child: breast cancer    Social History:  Patient presents to the ED alone.  Patient presents to the ED via car.    Physical Exam     Patient Vitals for the past 24 hrs:   BP Temp Pulse Resp SpO2   06/28/21 1915 -- -- -- -- 95 %   06/28/21 1900 (!) 150/48 -- 80 -- 95 %   06/28/21 1845 (!) 152/51 -- 77 -- 97 %   06/28/21 1830 (!) 144/67 -- 94 -- 95 %   06/28/21 1815 (!) 153/59 -- 82 -- 96 %   06/28/21 1803 -- -- -- -- 99 %   06/28/21 1800 (!) 169/59 -- -- -- --   06/28/21 1751 -- -- -- -- 96 %   06/28/21 1750 -- -- -- -- 98 %   06/28/21 1749 (!) 168/60 -- 100 -- --   06/28/21 1542 (!) 181/59 97.6  F (36.4  C) 82 20 96 %       Physical Exam  General: Awake, alert, pleasant, non-toxic.  Head:  Scalp is NC/AT  Eyes:  Conjunctiva normal, PERRL  ENT:  The external nose and ears are normal.   Neck:  Normal range of motion without rigidity.  CV:  Regular rate and rhythm    No pathologic murmur, rubs, or gallops.  Resp:  Breath sounds are clear bilaterally.  No crackles, wheezes, rhonchi, stridor.    Non-labored, no retractions or accessory muscle use  Abdomen: Abdomen is soft, no distension, no tenderness, no masses. No CVA tenderness.  MS:  2+ approximately symmetric pitting edema to lower extremities BL. Normal ROM in all joints without effusions.    No midline cervical, thoracic, or lumbar tenderness  Skin:  Warm and dry, No rash or lesions noted. 2+ peripheral pulses in all extremities  Neuro: Alert and oriented x3.  No gross motor deficits.  No facial asymmetry.  Psych: Awake. Alert. Normal affect. Appropriate interactions.    Emergency Department Course     Imaging:    US Lower Extremity Venous Duplex Left  1.  No deep venous thrombosis in the left lower extremity.  As per radiology    Laboratory:    CBC: WBC 11.6 (H), HGB 12.9,      CMP:  (L), Glucose  117 (H), GFR 58 (L), o/w WNL (Creatinine 0.91)     N-Terminal Pro BNP Inpatient: 1,111    Emergency Department Course:    Reviewed:  I reviewed nursing notes, vitals and care everywhere    Assessments:   I obtained history and examined the patient as noted above.    I rechecked the patient and explained findings.     Disposition:  The patient was discharged to home.       Impression & Plan     Medical Decision Makin-year-old female presents with bilateral lower extremity edema and intermittent left leg pains.  Broad differential considered.  Her work-up here is reassuring.  Ultrasound of the left leg is negative for DVT and she denies any pain at this time.  She does have bilateral pitting edema however BNP is within normal limits, no chest pain or shortness of breath, lungs clear and doubt acute CHF flare.  She already has a regularly scheduled outpatient echocardiogram coming up.  Her kidney and liver function is at baseline and she states she is urinating normally and her albumin is unremarkable.  She does not have any abdominal pain, distention, or tenderness to suggest concern for more proximal obstruction or emergency at this time.  Per review of her records she does have a history of bilateral lower extremity edema.  Her pulses are good and there is no sign of ischemic limb, and no evidence of compartment syndrome or infectious process such as cellulitis, septic joint, or necrotizing soft tissue infection.  The patient is comfortable and would like to go home at this time.  She will follow-up for her echocardiogram as previously scheduled and will follow up with her PCP to discuss whether or not to consider starting a loop diuretic such as furosemide.  She will return if she develops any fever, chest pain, abdominal pain, shortness of breath, rash, discoloration in the legs or other new symptoms.    Diagnosis:    ICD-10-CM    1. Bilateral lower extremity edema  R60.0    2. Pain of left lower leg   M79.662     intermittent       Discharge Medications:  New Prescriptions    No medications on file       Scribe Disclosure:  I, Rea Lopez, am serving as a scribe at 5:54 PM on 6/28/2021 to document services personally performed by Tk Marcano PA based on my observations and the provider's statements to me.          Tk Marcano PA-C  06/28/21 2014

## 2021-06-29 ENCOUNTER — HOSPITAL ENCOUNTER (OUTPATIENT)
Dept: ULTRASOUND IMAGING | Facility: CLINIC | Age: 86
Discharge: HOME | End: 2021-06-29
Attending: INTERNAL MEDICINE | Admitting: INTERNAL MEDICINE
Payer: COMMERCIAL

## 2021-06-29 ENCOUNTER — TELEPHONE (OUTPATIENT)
Dept: PEDIATRICS | Facility: CLINIC | Age: 86
End: 2021-06-29

## 2021-06-29 DIAGNOSIS — R42 DIZZINESS: ICD-10-CM

## 2021-06-29 DIAGNOSIS — I10 ESSENTIAL HYPERTENSION WITH GOAL BLOOD PRESSURE LESS THAN 140/90: ICD-10-CM

## 2021-06-29 PROCEDURE — 93880 EXTRACRANIAL BILAT STUDY: CPT

## 2021-06-29 NOTE — TELEPHONE ENCOUNTER
Patient was seen in the ED yesterday for bilateral LE edema (worse in the left leg) and Left calf pain.     Patient reports that she would like to adjust medications before the upcoming visit with Dr. Sevilla on 7/1/21. Patient would like to adjust medications to manage BLE edema before seeing Dr. Sevilla.     Routing to Dr. Sevilla:    Ok to wait until 7/1/21 appt with Dr. Sevilla to adjust medications?    Felipe Zimmerman RN on 6/29/2021 at 10:45 AM

## 2021-06-29 NOTE — TELEPHONE ENCOUNTER
General Call:     Who is calling:  Patient    Reason for Call:  Patient would like one of her medications increased in order to get rid of the edema in her legs. She cannot remember the name of the medication. She has an appt with Dr. Sevilla on Thursday, but she thinks she should increase this medication today. She was seen in the ER yesterday for lower leg edema and had an ultrasound. Please call her back at [phone].    What are your questions or concerns:  Stated above.    Date of last appointment with provider: 6/17/21    Okay to leave a detailed message:[facility] at Home number on file [phone] (home)

## 2021-06-30 NOTE — TELEPHONE ENCOUNTER
Spoke to patient. She denies any new SOB. She is agreeable to waiting until her appointment to see PCP tomorrow, 7/01/21.  Jie PATTERSON RN, BSN

## 2021-06-30 NOTE — TELEPHONE ENCOUNTER
Please call her. Visit in 2 days. At this point, if she is not having additional symptoms, shortness of breath, etc, OK to wait for DY.  Brandi Quintero M.D.

## 2021-07-01 ENCOUNTER — OFFICE VISIT (OUTPATIENT)
Dept: PEDIATRICS | Facility: CLINIC | Age: 86
End: 2021-07-01
Payer: COMMERCIAL

## 2021-07-01 VITALS
BODY MASS INDEX: 24.55 KG/M2 | SYSTOLIC BLOOD PRESSURE: 132 MMHG | DIASTOLIC BLOOD PRESSURE: 78 MMHG | OXYGEN SATURATION: 93 % | HEART RATE: 93 BPM | WEIGHT: 134.2 LBS | TEMPERATURE: 98.4 F

## 2021-07-01 DIAGNOSIS — R60.0 LOWER EXTREMITY EDEMA: Primary | ICD-10-CM

## 2021-07-01 DIAGNOSIS — J30.2 SEASONAL ALLERGIC RHINITIS, UNSPECIFIED TRIGGER: ICD-10-CM

## 2021-07-01 DIAGNOSIS — I10 ESSENTIAL HYPERTENSION WITH GOAL BLOOD PRESSURE LESS THAN 140/90: ICD-10-CM

## 2021-07-01 PROCEDURE — 99214 OFFICE O/P EST MOD 30 MIN: CPT | Performed by: INTERNAL MEDICINE

## 2021-07-01 RX ORDER — HYDROCHLOROTHIAZIDE 12.5 MG/1
12.5 CAPSULE ORAL DAILY
Qty: 90 CAPSULE | Refills: 3 | Status: SHIPPED | OUTPATIENT
Start: 2021-07-01 | End: 2021-07-19 | Stop reason: DRUGHIGH

## 2021-07-01 RX ORDER — MONTELUKAST SODIUM 10 MG/1
10 TABLET ORAL AT BEDTIME
Qty: 90 TABLET | Refills: 3 | Status: SHIPPED | OUTPATIENT
Start: 2021-07-01 | End: 2022-11-14

## 2021-07-01 RX ORDER — HYDROCHLOROTHIAZIDE 12.5 MG/1
12.5 CAPSULE ORAL DAILY
Qty: 90 CAPSULE | Refills: 0 | OUTPATIENT
Start: 2021-07-01

## 2021-07-01 NOTE — PATIENT INSTRUCTIONS
Let's try using 15/20 stockings first thing in AM until nighttime.     Limit your salt intake to less than 2000 mg of sodium per day.  (Low salt).    Elevate legs at night and during day.    Walking or biking as much as possible.    We could try lasix (furosemide) as needed, but let's hold on this unless the above does not help.     Echocardiogram next week.     Let's have you follow up in 1 month to see what your edema is like.

## 2021-07-01 NOTE — PROGRESS NOTES
Assessment & Plan     Lower extremity edema  No signs of congestive heart failure.  I am hesitant to stop the calcium channel blocker, given her labile blood pressure.  We could consider changing this if not improved with the management below:    Patient Instructions   Let's try using 15/20 stockings first thing in AM until nighttime.     Limit your salt intake to less than 2000 mg of sodium per day.  (Low salt).    Elevate legs at night and during day.    Walking or biking as much as possible.    We could try lasix (furosemide) as needed, but let's hold on this unless the above does not help.     Echocardiogram next week.     Let's have you follow up in 1 month to see what your edema is like.        - Compression Sleeve/Stocking Order for DME - ONLY FOR DME    Essential hypertension with goal blood pressure less than 140/90  At goal.    - hydrochlorothiazide (MICROZIDE) 12.5 MG capsule; Take 1 capsule (12.5 mg) by mouth daily    Seasonal allergic rhinitis, unspecified trigger  Continue with singulari.  - montelukast (SINGULAIR) 10 MG tablet; Take 1 tablet (10 mg) by mouth At Bedtime    She still complains of some symptoms of lightheadedness and head fullness upon awakening to use the bathroom at night.  She states that the symptoms during the day are improved, but her nighttime symptoms are still bothersome for her.  She did not express any significant symptoms of shortness of breath or chest pressure, and she has an echocardiogram scheduled next week.  Her carotid Dopplers were negative.  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  I will wait for her echocardiogram before proceeding with further imaging of her chest.             Patient Instructions   Let's try using 15/20 stockings first thing in AM until nighttime.     Limit your salt intake to less than 2000 mg of sodium per day.  (Low salt).    Elevate legs at  night and during day.    Walking or biking as much as possible.    We could try lasix (furosemide) as needed, but let's hold on this unless the above does not help.     Echocardiogram next week.     Let's have you follow up in 1 month to see what your edema is like.         Return in about 4 weeks (around 7/29/2021) for medicine followup, with me, in person.    Layo Sevilla MD  [facility] MICHAELLE Vasquez is a 85 year old who presents for the following health issues     HPI     ED/UC Followup:    Facility:  [facility]   Date of visit: 06/28/21  Reason for visit: Leg Swelling   Current Status: no clots, have been elevating legs, towards the end of the day legs get swollen, fluid in both legs      Legs swollen; recommended an ultrasound so went to emergency room.     Has been going on a few weeks now.  Had been wearing compression stockings, but after stopped, edema recurred.  If elevates her legs, edema improves.      Left side is more swollen.  Breathing stable.     Light headedness is better at night.  But when gets up, feels dizzy again.          Review of Systems   CONSTITUTIONAL: NEGATIVE for fever, chills, change in weight  INTEGUMENTARY/SKIN: NEGATIVE for worrisome rashes, moles or lesions  EYES: NEGATIVE for vision changes or irritation  ENT/MOUTH: NEGATIVE for ear, mouth and throat problems  RESP: NEGATIVE for significant cough or SOB  BREAST: NEGATIVE for masses, tenderness or discharge  CV: NEGATIVE for chest pain, palpitations or peripheral edema  GI: NEGATIVE for nausea, abdominal pain, heartburn, or change in bowel habits  : NEGATIVE for frequency, dysuria, or hematuria  MUSCULOSKELETAL: NEGATIVE for significant arthralgias or myalgia  NEURO: NEGATIVE for weakness, dizziness or paresthesias  ENDOCRINE: NEGATIVE for temperature intolerance, skin/hair changes  HEME: NEGATIVE for bleeding problems  PSYCHIATRIC: NEGATIVE for changes in mood or affect      Objective     /78   Pulse 93   Temp 98.4  F (36.9  C) (Temporal)   Wt 60.9 kg (134 lb 3.2 oz)   LMP  (LMP Unknown)   SpO2 93%   BMI 24.55 kg/m    Body mass index is 24.55 kg/m .  Physical Exam   GENERAL: healthy, alert and no distress  EYES: Eyes grossly normal to inspection, PERRL and conjunctivae and sclerae normal  HENT: ear canals and TM's normal, nose and mouth without ulcers or lesions  NECK: no adenopathy, no asymmetry, masses, or scars and thyroid normal to palpation  RESP: lungs clear to auscultation - no rales, rhonchi or wheezes  BREAST: normal without masses, tenderness or nipple discharge and no palpable axillary masses or adenopathy  CV: regular rate and rhythm, normal S1 S2, no S3 or S4, no murmur, click or rub, no peripheral edema and peripheral pulses strong  ABDOMEN: soft, nontender, no hepatosplenomegaly, no masses and bowel sounds normal  MS: no gross musculoskeletal defects noted, no edema  SKIN: no suspicious lesions or rashes  NEURO: Normal strength and tone, mentation intact and speech normal  PSYCH: mentation appears normal, affect normal/bright

## 2021-07-02 ASSESSMENT — ASTHMA QUESTIONNAIRES: ACT_TOTALSCORE: 16

## 2021-07-07 ENCOUNTER — HOSPITAL ENCOUNTER (OUTPATIENT)
Dept: PHYSICAL THERAPY | Facility: CLINIC | Age: 86
End: 2021-07-07
Attending: INTERNAL MEDICINE
Payer: COMMERCIAL

## 2021-07-07 DIAGNOSIS — R42 DIZZINESS: Primary | ICD-10-CM

## 2021-07-07 PROCEDURE — 97162 PT EVAL MOD COMPLEX 30 MIN: CPT | Mod: GP | Performed by: PHYSICAL THERAPIST

## 2021-07-07 PROCEDURE — 97112 NEUROMUSCULAR REEDUCATION: CPT | Mod: GP | Performed by: PHYSICAL THERAPIST

## 2021-07-08 NOTE — PROGRESS NOTES
[facility]        OUTPATIENT PHYSICAL THERAPY FUNCTIONAL EVALUATION  PLAN OF TREATMENT FOR OUTPATIENT REHABILITATION  (COMPLETE FOR INITIAL CLAIMS ONLY)  Patient's Last Name, First Name, M.I.  YOB: 1935  Genie Persaud     Provider's Name   [facility]   Medical Record No.  [id number]     Start of Care Date:  07/07/21   Onset Date:  06/17/21   Type:     _X__PT   ____OT  ____SLP Medical Diagnosis:   Dizziness     PT Diagnosis:  Decreased safe functional mobility and activity tolerance Visits from SOC:  1                              __________________________________________________________________________________  Plan of Treatment/Functional Goals:  balance training, neuromuscular re-education, ROM, strengthening, stretching, manual therapy, other (see comments)(Vestibualr rehab)           GOALS  DHI  Patient will score 12/100 or less on DHI assessment to demonstarte a significant improvement in dizziness symptom severity.  10/04/21    DVA  The patient will score within 2 lines of SVA with performance of DVA to demonstrate a significant improvement in vestibualr function in being able to support gaze stability within a normative range.   10/04/21                                                                      Therapy Frequency:  1 time/week   Predicted Duration of Therapy Intervention:  4-6 weeks    Jessica Huff, PT                                    I CERTIFY THE NEED FOR THESE SERVICES FURNISHED UNDER        THIS PLAN OF TREATMENT AND WHILE UNDER MY CARE     (Physician co-signature of this document indicates review and certification of the therapy plan).                Certification Date From:    7/7/21  Certification Date To:   10/4/21    Referring Provider:  Shelby Roth MD    Initial Assessment  See Epic Evaluation- Start of Care Date:  07/07/21

## 2021-07-08 NOTE — PROGRESS NOTES
07/07/21 1400   Quick Adds   Quick Adds Vestibular Eval   Type of Visit Initial OP PT Evaluation   General Information   Start of Care Date 07/07/21   Referring Physician Shelby Roth MD   Orders Evaluate and Treat as Indicated   Order Date 06/17/21   Medical Diagnosis Dizziness   Onset of illness/injury or Date of Surgery 06/17/21   Pertinent history of current problem (include personal factors and/or comorbidities that impact the POC) Patient presenting with history of dizziness. Noting symptoms to have started in May. Describing symptoms as a pressure. Noting that her allergies have been worse this year and indicating sinus pressure and pain throughout. Indicating symptoms occur daily but tend to improve throughout the day. Denying room spinning sensation or symptoms consistent with BPPV. Denying change sin eharing or vision. Likewise, no illness or infection preceding symptoms. Noting that she did do an anti-biotic course for sinuses but with minimal change. PMH: Cardiac (this has been stable since last year), and increased swelling in the legs. Swelling has improved significantly, no migraine history, no history of vertigo.   [facility]   Home/Community Accessibility Comments stairs at home, railing present   Assistive Devices Comments No AD at this time.    Patient/Family Goals Statement Would like to rule out whether dizziness is comming from the inner ear.    Fall Risk Screen   Fall screen completed by PT   Have you fallen 2 or more times in the past year? No   Have you fallen and had an injury in the past year? No   Is patient a fall risk? Yes   Fall screen comments Inherently at elevated risk fo falling due to dizziness symptoms.    Abuse Screen (yes response referral indicated)   Feels Unsafe at Home or Work/School no   Feels Threatened by Someone no   Does Anyone Try to Keep You From Having Contact with Others or Doing Things Outside Your Home? no   Physical Signs of  Abuse Present no   Functional Scales   Functional Scales and Outcomes DHI = 30/100   Pain   Patient currently in pain No   Cognitive Status Examination   Orientation orientation to person, place and time   Level of Consciousness alert   Follows Commands and Answers Questions 100% of the time   Personal Safety and Judgment intact   Memory intact   Posture   Posture Forward head position;Protracted shoulders   Range of Motion (ROM)   ROM Comment eneral mobility screening and found to be within functional limits.    Strength   Strength Comments B LEs functionally assessed through general mobility screening and found to be within functional limits. Globally graded at least 3/5 as patient is able to lift B extremities against gravity without restriction.    Transfer Skills   Transfer Comments IND   Gait   Gait Comments IND and demonstrating normalized mechnaics.    Balance   Balance Comments Occasional limitaiotns noted in postural stability with elevated symptoms. Patient able to self correct without difficulty.    Coordination   Coordination no deficits were identified   Muscle Tone   Muscle Tone no deficits were identified   Cervicogenic Screen   Neck ROM globally restricted but within acceptable range for testing.    Vertebral Artery Test Normal   Oculomotor Exam   Smooth Pursuit Abnormal   Smooth Pursuit Comment Reproting some dizziness, nystagmus noted with R sided gaze.    Saccades Abnormal   Saccades Comments Reproting some dizziness wiht this, difficuty tracking noted.    VOR Abnormal   VOR Comments Reproting dizziness with this activity. Utilizing self paced performanc eof this drill today due to degree of cervical guarding when attempting head thrust assessment.    Rapid Head Thrust Comments Unable to accurately assess due to patient guarding.    Infrared Goggle Exam or Frenzel Lense Exam   Vestibular Suppressant in Last 24 Hours? No   Exam completed with Infrared Goggles   Spontaneous Nystagmus Negative    Gaze Evoked Nystagmus Negative   Mathews-Hallpike (right) Negative   Phil-Hallpike (right) comments Dizziness upon return to sitting, no nystagmus observed. Patient indicating pressure and lightheadedness, not spinning.    Mathews-Hallpike (Left) Negative   Mathews-Hallpike (left) comments Dizziness upon return to sitting, no nystagmus observed. Patient indicating pressure and lightheadedness, not spinning.    HSCC Supine Roll Test (Right) Negative   HSCC Supine Roll Test (Right) Comments asymptomatic   HSCC Supine Roll Test (Left) Negative   HSCC Supine Roll Test (Left) Comments  Reproting dizzienss, no nystagmus observed.    Dynamic Visual Acuity (DVA)   DVA Comments Held this assessment today due to level of guarding noted with head thrust assessment and trial of head shake test.    Planned Therapy Interventions   Planned Therapy Interventions balance training;neuromuscular re-education;ROM;strengthening;stretching;manual therapy;other (see comments)  (Vestibualr rehab)   Clinical Impression   Criteria for Skilled Therapeutic Interventions Met yes, treatment indicated   PT Diagnosis Decreased safe functional mobility and activity tolerance   Influenced by the following impairments Dizziness, limitaiotns in postural stability   Functional limitations due to impairments Decreased safe functional mobility and activity tolerance   Clinical Presentation Evolving/Changing   Clinical Decision Making (Complexity) Moderate complexity   Therapy Frequency 1 time/week   Predicted Duration of Therapy Intervention (days/wks) 4-6 weeks   Risk & Benefits of therapy have been explained Yes   Patient, Family & other staff in agreement with plan of care Yes   Clinical Impression Comments Patient presenting with history of long standing dizziness, but atypical subjective reporting of symptoms. BPPV ruled out by today assessment. Occulomotor screening and symptoms presentatin with performanc eof VOR x 1 drills are most suggestive of vestibualr  hypofunction. This also aligns with long standing sinus problems. Unable to accuraetly assess head thrust, head shake and DVA this date due to level of patient guarding. Will attempt at later sessions but will proceed with treatemnt for functional gaze stability deficits in management of vestibualr hypofunction. Patient will benefit from skilled PT services to address identified limtiaitons and facilitate return to baseline level of function.    GOALS   PT Eval Goals 1;2   Goal 1   Goal Identifier DHI   Goal Description Patient will score 12/100 or less on DHI assessment to demonstarte a significant improvement in dizziness symptom severity.   Target Date 08/31/21   Goal 2   Goal Identifier DVA   Goal Description The patient will score within 2 lines of SVA with performance of DVA to demonstrate a significant improvement in vestibualr function in being able to support gaze stability within a normative range.    Target Date 08/31/21   Total Evaluation Time   PT Richard, Moderate Complexity Minutes (11995) 60     Addendum: Patient did note return to therapy following initial evaluation. Please utilize this as discharge summary.

## 2021-07-19 ENCOUNTER — OFFICE VISIT (OUTPATIENT)
Dept: PHARMACY | Facility: CLINIC | Age: 86
End: 2021-07-19
Payer: COMMERCIAL

## 2021-07-19 VITALS
WEIGHT: 133.1 LBS | DIASTOLIC BLOOD PRESSURE: 48 MMHG | OXYGEN SATURATION: 95 % | BODY MASS INDEX: 24.34 KG/M2 | HEART RATE: 84 BPM | SYSTOLIC BLOOD PRESSURE: 138 MMHG

## 2021-07-19 DIAGNOSIS — K21.9 GASTROESOPHAGEAL REFLUX DISEASE WITHOUT ESOPHAGITIS: ICD-10-CM

## 2021-07-19 DIAGNOSIS — I10 ESSENTIAL HYPERTENSION WITH GOAL BLOOD PRESSURE LESS THAN 140/90: ICD-10-CM

## 2021-07-19 DIAGNOSIS — E78.5 HYPERLIPIDEMIA LDL GOAL <130: ICD-10-CM

## 2021-07-19 DIAGNOSIS — R42 DIZZINESS: ICD-10-CM

## 2021-07-19 DIAGNOSIS — J30.2 SEASONAL ALLERGIC RHINITIS, UNSPECIFIED TRIGGER: ICD-10-CM

## 2021-07-19 DIAGNOSIS — M85.89 OSTEOPENIA OF MULTIPLE SITES: ICD-10-CM

## 2021-07-19 DIAGNOSIS — J45.40 MODERATE PERSISTENT ASTHMA WITHOUT COMPLICATION: Primary | ICD-10-CM

## 2021-07-19 DIAGNOSIS — H40.059 RAISED INTRAOCULAR PRESSURE, UNSPECIFIED LATERALITY: ICD-10-CM

## 2021-07-19 DIAGNOSIS — I25.10 CORONARY ARTERY DISEASE INVOLVING NATIVE CORONARY ARTERY OF NATIVE HEART WITHOUT ANGINA PECTORIS: ICD-10-CM

## 2021-07-19 PROCEDURE — 99607 MTMS BY PHARM ADDL 15 MIN: CPT | Performed by: PHARMACIST

## 2021-07-19 PROCEDURE — 99606 MTMS BY PHARM EST 15 MIN: CPT | Performed by: PHARMACIST

## 2021-07-19 RX ORDER — OMEPRAZOLE 10 MG/1
10 CAPSULE, DELAYED RELEASE ORAL DAILY
Qty: 90 CAPSULE | Refills: 0 | Status: SHIPPED | OUTPATIENT
Start: 2021-07-19 | End: 2021-10-21

## 2021-07-19 RX ORDER — LOSARTAN POTASSIUM AND HYDROCHLOROTHIAZIDE 12.5; 5 MG/1; MG/1
1 TABLET ORAL DAILY
Qty: 90 TABLET | Refills: 0 | Status: SHIPPED | OUTPATIENT
Start: 2021-07-19 | End: 2021-10-21

## 2021-07-19 RX ORDER — FLUTICASONE PROPIONATE AND SALMETEROL XINAFOATE 230; 21 UG/1; UG/1
2 AEROSOL, METERED RESPIRATORY (INHALATION) 2 TIMES DAILY
COMMUNITY
End: 2021-07-27

## 2021-07-19 NOTE — Clinical Note
Helqueta moreau made a few changes to her blood pressure regimen to help simplify and reduce her duplicated diuretic therapy given her ongoing dizziness concerns. She has a visit with you in a few weeks in clinic for blood pressure check. I was hoping she could also have her bmp recheck at that time as well. I plan on calling her in a week to check in as I know she has had some adherence issues in the past to ensure no confusion with changes. Given she has just started the Singulair per your recs, I did not change inhaler therapy. However, I did check on the cost of Trelegy - triple therapy with addition of the LAMA and her inhaler is covered by insurance may be more costly though difficult to  with this time of the year, could just be where her deductible. Thank you!

## 2021-07-19 NOTE — PATIENT INSTRUCTIONS
Recommendations from today's MTM visit:                                                      1. STOP spironolactone. Stop hydrochlorothiazide.    2. Start losartan-hydrochlorothiazide 50-12.5 mg daily.     3. Stop omeprazole 20mg. Start Omeprazole 10 mg once daily for nausea. If nausea worsens then can go back to 20mg.     4. No change to inhaler, Reina will check on the cost of Trelegy and consider changing at next visit.     Follow-up: 1 year    It was great to speak with you today.  I value your experience and would be very thankful for your time with providing feedback on our clinic survey. You may receive a survey via email or text message in the next few days.     To schedule another MTM appointment, please call the clinic directly or you may call the MTM scheduling line at [phone] or toll-free at [phone].     My Clinical Pharmacist's contact information:                                                      Please feel free to contact me with any questions or concerns you have.      Reina Mason, PharmD  Medication Therapy Management Pharmacist  Pager#: [phone]

## 2021-07-19 NOTE — Clinical Note
Sorry second note, for got to add with her work up for aortic insufficiency, I also thought she may have a few reasons perhaps to be on an ACEI/ARB rather the spironolactone. Thanks!

## 2021-07-19 NOTE — PROGRESS NOTES
Medication Therapy Management (MTM) Encounter    ASSESSMENT:                            Medication Adherence/Access: No issues identified    GERD: may try lower PPI dose.      Hx MI/HTN/dizziness:  Patient is on duplicate diuretic therapy - no clear indication for need of duplicate therapy as other antihypertensive agents are not optimized. Given her change would be reasonable to minimize diuretic therapies.  suggest we trial losartan to replace the spironolactone and recommend a combination tablet as her adherence in the past has been questionable. Blood pressure meeting goal of < 140/90 mm Hg.     Hyperlipidemia: LDL at goal < 70. No change.    Asthma/allergies: ACT no at goal, < 19. Recent Singulair start will await change. Given increase congestion symptoms would consider starting LAMA and escalating therapy if no improved.    Osteopenia: DEXA repeat in future but would benefit from a discussion if patient interested in treatment if worsening - she has a poor tolerance to Fosamax in the past.     Possible early signs of Macular degeneration/elevated intraocular pressure: stable      PLAN:                            1. No change to inhalers, but MTM did check on cost of Trelegy at pharmacy - Rx is covered but may be more costly (however, I suspect this is cheaper than adding on monotherapy LAMA to Advair HFA agent if considering).    2. Simplify her HTN regimen and reduce duplicate diuretic therapy. Stop spironolactone and hydrochlorothiazide. Start combo losartan 50mg-hydrochlorothiazide 12.5mg in the morning.  - bmp recheck on 8/5 OV  - blood pressure recheck at OV on 8/5    3. Reduce omeprazole 10mg daily     Follow-up: Return in about 1 week (around 7/26/2021) for Medication Therapy Telephone Visit. check-in on changes with MTM.      SUBJECTIVE/OBJECTIVE:                          Genie Persaud is a 85 year old female coming in for a follow-up visit. She was referred to me from [facility].   "Today's visit is a follow-up [facility] visit from 1/12/2021.     Reason for visit: she reports the swelling has resolved. She is still \"frustrated\" that her dizziness is ongoing and that all of her tests are unremarkable for the dizziness. She wonders if its the medications.    Allergies/ADRs: Reviewed in chart  Tobacco: She reports that she has never smoked. She has never used smokeless tobacco.  Alcohol: glass of wine a couple times a week  Past Medical History: Reviewed in chart    Medication Adherence/Access:  Hx of stopping medications on her own.  Patients is present with medications organized in MORNING, evening and \"misc\" categorizes. Denies any concerns with splitting tablets or missed doses. She does note inhalers can be costly.   The patient fills medications at [facility]: NO, fills medications at [facility].     GERD: Current medications include: Prilosec (omeprazole) 20 once daily. She tried famotidine but had more heartburn so went back to omeprazole. She tells me today that she does want to try to get off this just didn't work last time. Wondering if there are any other options.     Hx MI/HTN/Dizziness:  hx of stopping aspirin on her own due to frequent bruising. Patient has declined to restart aspirin in the past.   Current medications include for BP include: hydrochlorothiazide 12.5mg qAM, spironolactone 12.5mg qAM and amlodipine 5 mg HS.   She reports taking prednisone 10mg dose has helped her dizziness.  Previously tried: metoprolol (notes that patient had self stopped), losartan at dose of 100 mg along with current regimen caused dizziness so stopped)  Reported home BP: 130-140/40-50   S/E: dizziness from asthma exacerbation or allergies. Frequent urination at night even though takes diuretics in MORNING.  Follows with cardiology. Dr. Hernandez. Denies swelling wears compression stockings.  ECHO: 11/1/19 EF 50-55%  Home BP (patient reports): 125/60 mm Hg  She is now completing physical therapy for her " dizziness, she calls then her ear exercises.  BP Readings from Last 3 Encounters:   07/19/21 138/48   07/01/21 132/78   06/28/21 (!) 155/53     Potassium   Date Value Ref Range Status   06/28/2021 4.0 3.4 - 5.3 mmol/L Final       Hyperlipidemia: Current therapy includes atorvastatin 20 mg daily.  Pt reports no significant myalgias or other side effects.  Recent Labs   Lab Test 01/20/21  0927 10/21/19  0940 08/13/15  0842 08/30/13  0835   CHOL 128 152 141 158   HDL 61 59 63 48*   LDL 53 76 63 83   TRIG 68 84 75 136   CHOLHDLRATIO  --   --  2.2 3.3       Asthma: Current medications: Singulair 10 mg daily (recently started this month per PCP), Short-Acting Bronchodilator: Albuterol MDI 2-3 times last week (using more), Advair--21 mcg, 2 puffs twice daily (she does use a spacer).  Patient rinses their mouth after using steroid inhaler. Then uses ACT mouthwash.  Due to symptoms of chest congestion she tried Mucinex not helpful. She has prednisone for flare ups which she admits she has been taking 10 mg daily dose more frequently for her dizziness.   Triggers include: allergies and change in weather (when snow melts).  Patient reports the following symptoms: chest congestion.  Asthma Action Plan on file: YES  She reports the pulmonologist has recommend a different inhaler before but she never started due to high cost.   ACT Total Scores 11/7/2019 1/21/2021 7/1/2021   ACT TOTAL SCORE - - -   ASTHMA ER VISITS - - -   ASTHMA HOSPITALIZATIONS - - -   ACT TOTAL SCORE (Goal Greater than or Equal to 20) 22 15 16   In the past 12 months, how many times did you visit the emergency room for your asthma without being admitted to the hospital? 0 0 0   In the past 12 months, how many times were you hospitalized overnight because of your asthma? 0 0 0     Allergic Rhinitis: Current medications include ipratropium nasal spray and Astelin nasal spray.  Reports ongoing symptoms. Doesn't want any allergy shots though. Not sure if  she wants to go back to allergist anymore.    Osteopenia: Current therapy includes: calcium/Vitamin D 1 tablet twice daily. Pt is not experiencing side effects. In the past she did not tolerate Fosamax or Evista.   DEXA History: 2018 FRAX (based on available information) are 17 % for major osteoporotic fracture and 5.6 % for hip fracture.  No results found for: VITDT      Possible early signs of Macular degeneration/elevated intraocular pressure: she is taking Ocuvite once daily. Also takes Latanoprost drops at bedtime.   Follows with eye specialist regularly.       Today's Vitals: /48   Pulse 84   Wt 133 lb 1.6 oz (60.4 kg)   LMP  (LMP Unknown)   SpO2 95%   BMI 24.34 kg/m    ----------------    I spent 45 minutes with this patient today. All changes were made via collaborative practice agreement with Layo Sevilla. A copy of the visit note was provided to the patient's primary care provider.    The patient was given a summary of these recommendations.     Reina Mason, PharmD  Medication Therapy Management Pharmacist  Pager#: [phone]     Medication Therapy Recommendations  Essential hypertension with goal blood pressure less than 140/90    Current Medication: spironolactone (ALDACTONE) 25 MG tablet (Discontinued)   Rationale: Undesirable effect - Adverse medication event - Safety   Recommendation: Change Medication - losartan 50 MG tablet - started combination tablet.   Status: Accepted per [facility]         Gastroesophageal reflux disease without esophagitis    Current Medication: omeprazole (PRILOSEC) 10 MG DR capsule   Rationale: Dose too high - Dosage too high - Safety   Recommendation: Decrease Dose   Status: Accepted per [facility]

## 2021-07-23 ENCOUNTER — TELEPHONE (OUTPATIENT)
Dept: PHARMACY | Facility: CLINIC | Age: 86
End: 2021-07-23

## 2021-07-23 NOTE — TELEPHONE ENCOUNTER
LVM for patient. MTM wanted to check in on blood pressure med changes and set up a short phone visit.    Reina Mason PharmD  Medication Therapy Management Pharmacist  Pager#: [phone]

## 2021-07-26 NOTE — TELEPHONE ENCOUNTER
"She reports her blood pressure is still good has been ~ 128-130/45-50. She feels dizziness was better over the weekend and last night. However, she reports with her dew point is low she feels better. Her dizziness is a \"woozy\" sensation. She has not issues with \"inhaling\" but when she exhale feels tight. Has been taking prednisone 10 mg once daily for 4 days in a row now.      She confirms only taking losartan-hydrochlorothiazide in the morning (stopped the spironolactone and monotherapy hydrochlorothiazide tablet).     Reina Mason, PharmD  Medication Therapy Management Pharmacist  Pager#: [phone]      "

## 2021-07-27 ENCOUNTER — VIRTUAL VISIT (OUTPATIENT)
Dept: PHARMACY | Facility: CLINIC | Age: 86
End: 2021-07-27
Payer: COMMERCIAL

## 2021-07-27 DIAGNOSIS — K21.9 GASTROESOPHAGEAL REFLUX DISEASE WITHOUT ESOPHAGITIS: ICD-10-CM

## 2021-07-27 DIAGNOSIS — I25.10 CORONARY ARTERY DISEASE INVOLVING NATIVE CORONARY ARTERY OF NATIVE HEART WITHOUT ANGINA PECTORIS: ICD-10-CM

## 2021-07-27 DIAGNOSIS — I10 ESSENTIAL HYPERTENSION WITH GOAL BLOOD PRESSURE LESS THAN 140/90: ICD-10-CM

## 2021-07-27 DIAGNOSIS — J45.40 MODERATE PERSISTENT ASTHMA WITHOUT COMPLICATION: Primary | ICD-10-CM

## 2021-07-27 DIAGNOSIS — R42 DIZZINESS: ICD-10-CM

## 2021-07-27 PROCEDURE — 99607 MTMS BY PHARM ADDL 15 MIN: CPT | Performed by: PHARMACIST

## 2021-07-27 PROCEDURE — 99606 MTMS BY PHARM EST 15 MIN: CPT | Performed by: PHARMACIST

## 2021-07-27 NOTE — PROGRESS NOTES
Medication Therapy Management (MTM) Encounter    ASSESSMENT:                            Medication Adherence/Access: No issues identified    GERD: stable on lower dose, if she were to have symptoms would of expected symptoms by now.     Hx MI/HTN/dizziness:  Patient's home blood pressure remains at goal < 130/80. DBP remains on low end still which has been ongoing for patient for over a month now. No new symptoms. Suggest she try meclizine (she did not feels this sounded familiar even though may have tried in the past).    Asthma: ACT no at goal, < 19. Although not on Singulair for a month yet, given her bothersome symptoms and ongoing congestion symptoms would consider starting LAMA and escalating therapy if no improved. Recommend she does follow-up with pulmonology as mentioned herself.      PLAN:                            1. PCP ok with Trelegy start. Discussed with patient and reminded her how to use Trelegy 200-62.5-25 mcg, 1 puff once daily in the MORNING rinse mouth afterwards to replace advair HFA.    2. Try OTC meclizine as needed dizziness sensation    3. MTM added appt note reminder need bmp at next OV 8/5, patient declined to set up lab appt prior to appt.    Follow-up: No follow-ups on file.      SUBJECTIVE/OBJECTIVE:                          Genie Persaud is a 85 year old female called for a follow-up visit. She was referred to me from Dr. Sevilla.  Today's visit is a follow-up MTM visit from 7/19/21.     Reason for visit: follow-up on patient's request to start Trelegy sooner. She cancelled her ECHO today, not feeling up for it. No acute changes in symptoms but just not feeling well enough. Humidity bothers her too.    Allergies/ADRs: Reviewed in chart  Past Medical History: Reviewed in chart  Tobacco: She reports that she has never smoked. She has never used smokeless tobacco.  Alcohol: glass of wine a couple times a week    Medication Adherence/Access:  Hx of stopping medications on her own.  Patients  "organized her medications by MORNING, evening and \"misc\" categorizes. Denies any concerns with splitting tablets or missed doses. She does note inhalers can be costly.   The patient fills medications at [facility]: NO, fills medications at [facility].     GERD: Current medications include: Prilosec (omeprazole) 10 once daily. Decreased on 7/19/21 denies any worsening nausea sensation. She does not have reflux or heart burn symptoms. She was on PPI more for her nausea that she gets daily-chronically.     Hx MI/HTN/Dizziness:  hx of stopping aspirin on her own due to frequent bruising. Patient has declined to restart aspirin in the past. As mentioned from above she cancelled the ECHO for today. She continues with symptoms that is best described as dizziness - where it makes her feel unwell. Tends to improve by the evening. She tells me she is frustrated as nothing seems to help this because otherwise she feels good. She is completed \"ear\" physical therapy exercises as directed to her but no difference. When we last met on 7/19 we tried to minimize the diuretics to help with these symptoms but she reports there has been no change. Not worse and not better. Denies any weight gain or swelling. Denies CP or flutter, etc.  Current medications include for BP include: losartan-hydrochlorothiazide 50-12.5 mg in the morning and amlodipine 5 mg HS.   Previously tried: metoprolol (notes that patient had self stopped), losartan at dose of 100 mg along with current regimen caused dizziness so stopped)  Reported home BP today ~ 125/50.  She denies any hypotension.  Follows with cardiology. Dr. Hernandez.   ECHO: 11/1/19 EF 50-55% --> repeat ECHO today  BP Readings from Last 3 Encounters:   07/19/21 138/48   07/01/21 132/78   06/28/21 (!) 155/53     Potassium   Date Value Ref Range Status   06/28/2021 4.0 3.4 - 5.3 mmol/L Final       Asthma: Current medications: Singulair 10 mg daily (6/7/21 by PCP be reports delay in start but likely no on " for ~3-4 weeks), Short-Acting Bronchodilator: Albuterol MDI 2-3 times last week (using more), Advair--21 mcg, 2 puffs twice daily (she does use a spacer). We had discussed 3rd agent - LAMA such as Trelegy and she wants to start this sooner rather than wait for follow-up with PCP.   Patient rinses their mouth after using steroid inhaler. Then uses ACT mouthwash.  Symptoms: coughing during the day more, coughing up phlegm. Denies this to be worse or better in the last weak. She feels she can breath in fine but can't breath out very well - tight.  Due to symptoms of chest congestion she tried Mucinex not helpful. She has prednisone for flare ups which she admits she has been taking 10 mg daily dose more frequently for her dizziness. Did not take a dose today and feels about the same.  Triggers include: allergies and change in weather (when snow melts), humidity/dew point.  Asthma Action Plan on file: YES  She reports the pulmonologist has recommend a different inhaler before but she never started due to high cost. She tells me she will follow-up with pulmonologist soon.  ACT Total Scores 11/7/2019 1/21/2021 7/1/2021   ACT TOTAL SCORE - - -   ASTHMA ER VISITS - - -   ASTHMA HOSPITALIZATIONS - - -   ACT TOTAL SCORE (Goal Greater than or Equal to 20) 22 15 16   In the past 12 months, how many times did you visit the emergency room for your asthma without being admitted to the hospital? 0 0 0   In the past 12 months, how many times were you hospitalized overnight because of your asthma? 0 0 0     Today's Vitals: LMP  (LMP Unknown)   ----------------      I spent 19 minutes with this patient today. All changes were made via collaborative practice agreement with Layo Sevilla. A copy of the visit note was provided to the patient's primary care provider.    The patient declined a summary of these recommendations. She wrote down instructions on notepad at home and read back to me.     Reina Mason, PharmD  Medication  Therapy Management Pharmacist  Pager#: [phone]    Telemedicine Visit Details  Type of service:  Telephone visit  Start Time: 12:30 PM  End Time: 12:49 PM  Originating Location (patient location): Home  Distant Location (provider location):  [facility]     Medication Therapy Recommendations  No medication therapy recommendations to display

## 2021-07-29 RX ORDER — MECLIZINE HCL 12.5 MG 12.5 MG/1
TABLET ORAL 3 TIMES DAILY PRN
COMMUNITY
End: 2021-09-01

## 2021-07-29 NOTE — PROGRESS NOTES
Pre-Visit Planning   Next 5 appointments (look out 90 days)    Aug 05, 2021  1:50 PM  (Arrive by 1:30 PM)  Office Visit with Layo Sevilla MD  [facility] [place] ([facility] ) [address] [place]  [address]  [place] [address] [phone]        Appointment Notes for this encounter:   f/up *needs bmp.   Check legs.   Dizziness.   Patient reports Left leg pain for the past week. Patient reports that she went to the ED for this.     Questionnaires Reviewed/Assigned  No additional questionnaires are needed    Patient preferred phone number: [phone]    Discontinue the albuterol inhaler. Change to fluticasone trelegy.   miralax discontinued.     Lung 10 mg prn prednisone.     Meclizine started by Reina. Can get over the counter.        Spoke to patient via phone. Are there any additional questions or concerns you'd like to review with your provider during your visit? Yes: Check legs and discuss dizziness.     Patient does have additional questions or concerns. Adjusted Appointment Notes.       Visit is not preventive.    Meds  Is there anything on your medication list that needs to be updated? Yes: See med list updates.     Current Outpatient Medications   Medication     meclizine (ANTIVERT) 12.5 MG tablet     acetaminophen (TYLENOL) 500 MG tablet     amLODIPine (NORVASC) 5 MG tablet     atorvastatin (LIPITOR) 20 MG tablet     azelastine (ASTELIN) 0.1 % nasal spray     calcium citrate-vitamin D (CITRACAL) 315-200 MG-UNIT TABS per tablet     Fluticasone-Umeclidin-Vilanterol (TRELEGY ELLIPTA) 200-62.5-25 MCG/INH oral inhaler     ipratropium (ATROVENT) 0.03 % spray     latanoprost (XALATAN) 0.005 % ophthalmic solution     losartan-hydrochlorothiazide (HYZAAR) 50-12.5 MG tablet     montelukast (SINGULAIR) 10 MG tablet     multivitamin (OCUVITE) TABS tablet     omeprazole (PRILOSEC) 10 MG DR capsule     PREDNISONE PO     No current facility-administered medications for  "this visit.     Which pharmacy do you prefer to use for medications during this visit if needed? [facility] pharmacy in [place] on [address].     Do you need refills on any of your medications? No    Health Maintenance Due   Topic Date Due     COVID-19 Vaccine (1) Never done     ZOSTER IMMUNIZATION (1 of 2) Never done     ANNUAL REVIEW OF HM ORDERS  06/10/2020     MEDICARE ANNUAL WELLNESS VISIT  11/07/2020     FALL RISK ASSESSMENT  11/07/2020     DEXA  07/13/2021     Patient is due for:  No appointment needed.    [employer]  Patient is not active on [employer]. Encouraged [employer] activation.    Questionnaire Review   Advised patient to arrive early in order to complete questionnaires.    Call Summary  \"Thank you for your time today.  If anything comes up before your appointment, please feel free to contact us at [phone].\"    Felipe Zimmerman RN on 7/29/2021 at 4:15 PM    "

## 2021-08-01 ENCOUNTER — HOSPITAL ENCOUNTER (EMERGENCY)
Facility: CLINIC | Age: 86
Discharge: HOME | End: 2021-08-01
Attending: EMERGENCY MEDICINE | Admitting: EMERGENCY MEDICINE
Payer: COMMERCIAL

## 2021-08-01 ENCOUNTER — APPOINTMENT (OUTPATIENT)
Dept: GENERAL RADIOLOGY | Facility: CLINIC | Age: 86
End: 2021-08-01
Attending: EMERGENCY MEDICINE
Payer: COMMERCIAL

## 2021-08-01 VITALS
RESPIRATION RATE: 16 BRPM | SYSTOLIC BLOOD PRESSURE: 125 MMHG | TEMPERATURE: 98 F | OXYGEN SATURATION: 94 % | DIASTOLIC BLOOD PRESSURE: 51 MMHG | HEART RATE: 82 BPM

## 2021-08-01 DIAGNOSIS — J45.901 EXACERBATION OF ASTHMA, UNSPECIFIED ASTHMA SEVERITY, UNSPECIFIED WHETHER PERSISTENT: ICD-10-CM

## 2021-08-01 LAB
ALBUMIN SERPL-MCNC: 3.3 G/DL (ref 3.4–5)
ALP SERPL-CCNC: 106 U/L (ref 40–150)
ALT SERPL W P-5'-P-CCNC: 24 U/L (ref 0–50)
ANION GAP SERPL CALCULATED.3IONS-SCNC: 4 MMOL/L (ref 3–14)
AST SERPL W P-5'-P-CCNC: 20 U/L (ref 0–45)
BASOPHILS # BLD AUTO: 0 10E3/UL (ref 0–0.2)
BASOPHILS NFR BLD AUTO: 0 %
BILIRUB SERPL-MCNC: 0.5 MG/DL (ref 0.2–1.3)
BUN SERPL-MCNC: 23 MG/DL (ref 7–30)
CALCIUM SERPL-MCNC: 9.1 MG/DL (ref 8.5–10.1)
CHLORIDE BLD-SCNC: 96 MMOL/L (ref 94–109)
CO2 SERPL-SCNC: 30 MMOL/L (ref 20–32)
CREAT SERPL-MCNC: 0.98 MG/DL (ref 0.52–1.04)
EOSINOPHIL # BLD AUTO: 0.1 10E3/UL (ref 0–0.7)
EOSINOPHIL NFR BLD AUTO: 1 %
ERYTHROCYTE [DISTWIDTH] IN BLOOD BY AUTOMATED COUNT: 13.2 % (ref 10–15)
GFR SERPL CREATININE-BSD FRML MDRD: 54 ML/MIN/1.73M2
GLUCOSE BLD-MCNC: 108 MG/DL (ref 70–99)
HCT VFR BLD AUTO: 33.9 % (ref 35–47)
HGB BLD-MCNC: 11.1 G/DL (ref 11.7–15.7)
HOLD SPECIMEN: NORMAL
IMM GRANULOCYTES # BLD: 0 10E3/UL
IMM GRANULOCYTES NFR BLD: 0 %
LYMPHOCYTES # BLD AUTO: 2.2 10E3/UL (ref 0.8–5.3)
LYMPHOCYTES NFR BLD AUTO: 21 %
MCH RBC QN AUTO: 29.7 PG (ref 26.5–33)
MCHC RBC AUTO-ENTMCNC: 32.7 G/DL (ref 31.5–36.5)
MCV RBC AUTO: 91 FL (ref 78–100)
MONOCYTES # BLD AUTO: 0.9 10E3/UL (ref 0–1.3)
MONOCYTES NFR BLD AUTO: 9 %
NEUTROPHILS # BLD AUTO: 7.1 10E3/UL (ref 1.6–8.3)
NEUTROPHILS NFR BLD AUTO: 69 %
NRBC # BLD AUTO: 0 10E3/UL
NRBC BLD AUTO-RTO: 0 /100
NT-PROBNP SERPL-MCNC: 902 PG/ML (ref 0–1800)
PLATELET # BLD AUTO: 276 10E3/UL (ref 150–450)
POTASSIUM BLD-SCNC: 3.3 MMOL/L (ref 3.4–5.3)
PROT SERPL-MCNC: 6.5 G/DL (ref 6.8–8.8)
RBC # BLD AUTO: 3.74 10E6/UL (ref 3.8–5.2)
SARS-COV-2 RNA RESP QL NAA+PROBE: NEGATIVE
SODIUM SERPL-SCNC: 130 MMOL/L (ref 133–144)
TROPONIN I SERPL-MCNC: <0.015 UG/L (ref 0–0.04)
WBC # BLD AUTO: 10.5 10E3/UL (ref 4–11)

## 2021-08-01 PROCEDURE — 99285 EMERGENCY DEPT VISIT HI MDM: CPT | Mod: 25

## 2021-08-01 PROCEDURE — 85025 COMPLETE CBC W/AUTO DIFF WBC: CPT | Performed by: EMERGENCY MEDICINE

## 2021-08-01 PROCEDURE — 82247 BILIRUBIN TOTAL: CPT | Performed by: EMERGENCY MEDICINE

## 2021-08-01 PROCEDURE — 82040 ASSAY OF SERUM ALBUMIN: CPT | Performed by: EMERGENCY MEDICINE

## 2021-08-01 PROCEDURE — 71045 X-RAY EXAM CHEST 1 VIEW: CPT

## 2021-08-01 PROCEDURE — C9803 HOPD COVID-19 SPEC COLLECT: HCPCS

## 2021-08-01 PROCEDURE — 36415 COLL VENOUS BLD VENIPUNCTURE: CPT | Performed by: EMERGENCY MEDICINE

## 2021-08-01 PROCEDURE — 96360 HYDRATION IV INFUSION INIT: CPT

## 2021-08-01 PROCEDURE — 258N000003 HC RX IP 258 OP 636: Performed by: EMERGENCY MEDICINE

## 2021-08-01 PROCEDURE — 87635 SARS-COV-2 COVID-19 AMP PRB: CPT | Performed by: EMERGENCY MEDICINE

## 2021-08-01 PROCEDURE — 250N000012 HC RX MED GY IP 250 OP 636 PS 637: Performed by: EMERGENCY MEDICINE

## 2021-08-01 PROCEDURE — 83880 ASSAY OF NATRIURETIC PEPTIDE: CPT | Performed by: EMERGENCY MEDICINE

## 2021-08-01 PROCEDURE — 94640 AIRWAY INHALATION TREATMENT: CPT

## 2021-08-01 PROCEDURE — 84484 ASSAY OF TROPONIN QUANT: CPT | Performed by: EMERGENCY MEDICINE

## 2021-08-01 PROCEDURE — 93005 ELECTROCARDIOGRAM TRACING: CPT

## 2021-08-01 PROCEDURE — 96361 HYDRATE IV INFUSION ADD-ON: CPT

## 2021-08-01 PROCEDURE — 250N000009 HC RX 250: Performed by: EMERGENCY MEDICINE

## 2021-08-01 RX ORDER — PREDNISONE 20 MG/1
TABLET ORAL
Qty: 8 TABLET | Refills: 0 | Status: SHIPPED | OUTPATIENT
Start: 2021-08-01 | End: 2021-09-01

## 2021-08-01 RX ORDER — AZITHROMYCIN 250 MG/1
TABLET, FILM COATED ORAL
Qty: 6 TABLET | Refills: 0 | Status: SHIPPED | OUTPATIENT
Start: 2021-08-01 | End: 2021-08-06

## 2021-08-01 RX ORDER — PREDNISONE 20 MG/1
40 TABLET ORAL ONCE
Status: COMPLETED | OUTPATIENT
Start: 2021-08-01 | End: 2021-08-01

## 2021-08-01 RX ORDER — IPRATROPIUM BROMIDE AND ALBUTEROL SULFATE 2.5; .5 MG/3ML; MG/3ML
3 SOLUTION RESPIRATORY (INHALATION)
Status: COMPLETED | OUTPATIENT
Start: 2021-08-01 | End: 2021-08-01

## 2021-08-01 RX ADMIN — IPRATROPIUM BROMIDE AND ALBUTEROL SULFATE 3 ML: .5; 3 SOLUTION RESPIRATORY (INHALATION) at 07:57

## 2021-08-01 RX ADMIN — PREDNISONE 40 MG: 20 TABLET ORAL at 07:45

## 2021-08-01 RX ADMIN — SODIUM CHLORIDE 500 ML: 9 INJECTION, SOLUTION INTRAVENOUS at 07:57

## 2021-08-01 ASSESSMENT — ENCOUNTER SYMPTOMS
COLOR CHANGE: 0
COUGH: 1
NUMBNESS: 0
WHEEZING: 1
SHORTNESS OF BREATH: 1

## 2021-08-01 NOTE — ED TRIAGE NOTES
Woke up feeling SOB around 0200.  Used rescue inhaler and cough drops which did help symptoms somewhat.  Fell back asleep, awoke at 0500 and felt worse.  Used rescue inhaler a second time.  EMS gave duoneb and albuterol neb.

## 2021-08-01 NOTE — ED PROVIDER NOTES
History   Chief Complaint:  Shortness of Breath       HPI   Genie Persaud is an 85 year old female with history of asthma, atrial fibrillation, stroke, hypertension, and hyperlipidemia who presents with shortness of breath. The patient reports that she developed increased shortness of breath at around 0200 this morning. She noticed wheezing at this time, which seemed consistent with past asthma exacerbations. Her inhaler helped with symptoms at this time. She subsequently had trouble sleeping throughout the night, and notes that shortness of breath became severe again at around 0500, prompting her to visit the ED. The patient also reports that she has had two weeks of bilateral leg swelling. Her primary care doctor thought this could be related to inactivity during the pandemic. After regularly exercising on her stationary bike, this leg swelling seemed to improve. She says that her left leg currently seems more swollen then her right, but denies any color or sensation change. She is not currently on a diuretic and denies history of CHF. Here in the ED, she feels better after initial breathing treatment, but is still noticing some wheezing. The last time she took prednisone was two weeks ago, which she reports taking as needed. Her cough has been more severe lately, and she notes that this is often a productive cough. She denies abnormal colored sputum. The patient has not been vaccinated for COVID. She denies history of smoking.     Review of Systems   Respiratory: Positive for cough, shortness of breath and wheezing.    Cardiovascular: Positive for leg swelling (chronic, improved).   Skin: Negative for color change.   Neurological: Negative for numbness.   All other systems reviewed and are negative.      Allergies:  Fosamax  Contrast dye  Evista  Flu virus vaccine  Amoxicillin    Medications:  Amlodipine  Atorvastatin  Trelegy  Atrovent  Xalatan  Hyzaar  Antivert  Singulair  Prilosec  Prednisone    Past Medical  History:    Anemia  Basal cell carcinoma  CAD  Hypertension  Hyperlipidemia  Impaired fasting glucose  Moderate persistent asthma  Non rheumatic aortic valve insufficiency  Osteopenia  Paroxysmal atrial fibrillation  Pulmonary nodule  Stress-induced cardiomyopathy  Stroke  SVT  Thoracic aortic aneurysm without rupture  Vasculitis  Other cardiomyopathies  JOSLYN  IBS  Peripheral edema  Candidal esophagitis    Past Surgical History:    Appendectomy  C ligate fallopian tube  C stereotactic breast biopsy  Cholecystectomy  EGD  Dilation and curettage  Tonsillectomy adenoidectomy     Family History:    Hypertension, mother  Osteoporosis, mother  CAD, mother  Scleraderma, father  Breast cancer, daughter    Social History:  Arrives via EMS  Unaccompanied in ED  Non-smoker.    Physical Exam     Patient Vitals for the past 24 hrs:   BP Temp Temp src Pulse Resp SpO2   08/01/21 0800 -- -- -- 82 16 94 %   08/01/21 0745 125/51 -- -- 83 15 91 %   08/01/21 0730 120/52 -- -- 81 20 92 %   08/01/21 0715 132/67 -- -- 88 29 94 %   08/01/21 0700 (!) 147/51 -- -- 93 21 94 %   08/01/21 0645 (!) 171/95 -- -- 108 -- 96 %   08/01/21 0643 (!) 171/78 98  F (36.7  C) Oral 92 20 96 %       Physical Exam  General: Elderly female sitting upright  Eyes: PERRL, Conjunctive within normal limits  ENT: Moist mucous membranes, oropharynx clear.   CV: Normal S1S2, no murmur, rub or gallop.  Irregularly, irregular.  Rate within normal range.  Resp: Diminished throughout, better air movement in the right upper lung.  No wheezes, rales, or rhonchi.  Normal respiratory effort.  GI: Abdomen is soft, nontender and nondistended. No palpable masses. No rebound or guarding.  MSK: 2+ pitting edema bilateral lower legs.  No asymmetry. Nontender. Normal active range of motion.  Skin: Warm and dry. No rashes or lesions or ecchymoses on visible skin.  Neuro: Alert and oriented. Responds appropriately to all questions and commands. No focal findings appreciated. Normal  muscle tone.  Psych: Normal mood and affect. Pleasant.    Emergency Department Course     ECG (09:26:16):  Indication: shortness of breath  Rate 97 bpm. IN interval 188. QRS duration 94. QT/QTc 374/474. P-R-T axes 62 9 54.   Sinus rhythm with premature atrial complexes  Otherwise normal ECG  Agree with computer interpretation.     Imaging:    XR Chest Port 1 View  Mild bibasilar streaky opacities, which may represent  atelectasis versus pneumonitis.     HAILEY SANTIAGO MD      Read per radiology    Laboratory:    Symptomatic Influenza A/B & SARS-CoV2 (COVID19) Virus PCR Multiplex: in process     CBC: WBC 10.5, HGB 11.1 (L),     BNP: 902    Troponin (Collected 0651): <0.015     Emergency Department Course:    Reviewed:  I reviewed nursing notes, vitals, past medical history and care everywhere    Assessments:  0649 I obtained history and examined the patient as noted above.   0937 I rechecked the patient and explained findings.  She notes she is feeling improved.  Still notes some difficulty ambulating longer distances in the ED.  She feels comfortable with plan to go home however.    Interventions:  0745 Deltasone 40 mg PO  0757 Duoneb 3 ml nebulization  0757 Normal Saline 500 mL IV    Disposition:  The patient was discharged to home.     Impression & Plan     Medical Decision Making:  Genie Persaud is an 85 year old female who presents for evaluation of shortness of breath and wheezing as detailed above. Signs and symptoms are consistent with an asthma exacerbation. A broad differential was considered including foreign body, asthma, pneumonia, bronchitis, reactive airway disease, pneumothorax, cardiac equivalent, viral induced wheezing, allergic phenomena, etc. Patient feels improved after the above interventions here in the ED. she is still mildly symptomatic with ambulation but notes she prefers to go home.  She has a daughter who be coming in 2 days and a son who can check in on her.  There are no signs at  this point of any serious etiologies including those mentioned above. No indication for hospitalization at this time including no hypoxia, no marked increase in respiratory rate, minimal to no retractions. Supportive outpatient management is indicated, medications for discharge noted below. Close followup with primary care physician. Return for increased wheezing, progressive shortness of breath, develops fever greater than 102.  She was discharged home in improved condition.    Covid-19  eGnie Persaud was evaluated during a global COVID-19 pandemic, which necessitated consideration that the patient might be at risk for infection with the SARS-CoV-2 virus that causes COVID-19.   Applicable protocols for evaluation were followed during the patient's care.   COVID-19 was considered as part of the patient's evaluation. The plan for testing is:  a test was obtained during this visit.    Diagnosis:    ICD-10-CM    1. Exacerbation of asthma, unspecified asthma severity, unspecified whether persistent  J45.901        Discharge Medications:  New Prescriptions    AZITHROMYCIN (ZITHROMAX) 250 MG TABLET    Take 2 tablets (500 mg) by mouth daily for 1 day, THEN 1 tablet (250 mg) daily for 4 days.    PREDNISONE (DELTASONE) 20 MG TABLET    Take 40mg (two tablets) by mouth daily for 4 more days (start 8/2/21)       Scribe Disclosure:  Brayan KINSEY, am serving as a scribe at 6:52 AM on 8/1/2021 to document services personally performed by Lali Goel MD based on my observations and the provider's statements to me.              Lali Goel MD  08/01/21 3600

## 2021-08-02 LAB
ATRIAL RATE - MUSE: 97 BPM
DIASTOLIC BLOOD PRESSURE - MUSE: NORMAL MMHG
INTERPRETATION ECG - MUSE: NORMAL
P AXIS - MUSE: 62 DEGREES
PR INTERVAL - MUSE: 188 MS
QRS DURATION - MUSE: 94 MS
QT - MUSE: 374 MS
QTC - MUSE: 474 MS
R AXIS - MUSE: 9 DEGREES
SYSTOLIC BLOOD PRESSURE - MUSE: NORMAL MMHG
T AXIS - MUSE: 54 DEGREES
VENTRICULAR RATE- MUSE: 97 BPM

## 2021-08-05 ENCOUNTER — OFFICE VISIT (OUTPATIENT)
Dept: PEDIATRICS | Facility: CLINIC | Age: 86
End: 2021-08-05
Payer: COMMERCIAL

## 2021-08-05 ENCOUNTER — TELEPHONE (OUTPATIENT)
Dept: CARDIOLOGY | Facility: CLINIC | Age: 86
End: 2021-08-05

## 2021-08-05 VITALS
DIASTOLIC BLOOD PRESSURE: 60 MMHG | WEIGHT: 136.4 LBS | HEART RATE: 86 BPM | TEMPERATURE: 97.5 F | HEIGHT: 62 IN | RESPIRATION RATE: 20 BRPM | OXYGEN SATURATION: 96 % | BODY MASS INDEX: 25.1 KG/M2 | SYSTOLIC BLOOD PRESSURE: 154 MMHG

## 2021-08-05 DIAGNOSIS — R60.0 LOWER EXTREMITY EDEMA: ICD-10-CM

## 2021-08-05 DIAGNOSIS — J45.40 MODERATE PERSISTENT ASTHMA WITHOUT COMPLICATION: Primary | ICD-10-CM

## 2021-08-05 DIAGNOSIS — I71.20 THORACIC AORTIC ANEURYSM WITHOUT RUPTURE (H): ICD-10-CM

## 2021-08-05 DIAGNOSIS — I10 ESSENTIAL HYPERTENSION WITH GOAL BLOOD PRESSURE LESS THAN 140/90: ICD-10-CM

## 2021-08-05 PROCEDURE — 99214 OFFICE O/P EST MOD 30 MIN: CPT | Performed by: INTERNAL MEDICINE

## 2021-08-05 ASSESSMENT — MIFFLIN-ST. JEOR: SCORE: 1026.96

## 2021-08-05 NOTE — TELEPHONE ENCOUNTER
Okay no need for CT scan.  Echocardiogram will show the base of her aorta and we can proceed from there if it significantly dilated.

## 2021-08-05 NOTE — TELEPHONE ENCOUNTER
Left message for patient with plan to defer MRA for now unless echo is abnormal per Dr Hernandez. Reviewed echo & labs on 8/26 and OV on 8/30 in message.

## 2021-08-05 NOTE — TELEPHONE ENCOUNTER
Message from Dr. Hernandez:    Barak Hernandez MD Sprangers, Danielle; P Renteria [facility] Heart Team 2  We will change her to an MRA.  Thanks.           Previous Messages       ----- Message -----   From: Janina Beach   Sent: 8/5/2021  10:13 AM CDT   To: Barak Hernandez MD   Subject: CTA Order                                         Dr. Hernandez,     My name is Janian I work for [employer]. I received a call from Christina today regarding scheduling the CTA that you had ordered for her. The patient stated that the last time they had contrast they had a severe reaction to the dye. The patient would like to discuss options or if they would be able to do the exam without contrast. If you place a new order would you be able to let the patient know so they can call us to schedule?     Thank you!     Janina Beach

## 2021-08-05 NOTE — TELEPHONE ENCOUNTER
Called patient to review the message from Dr. Hernandez to change the CTa chest (allergy to dye) to an MRA. Started to review the plan with the patient, but when the location was mentioned she stated that she is not allowed to drive that far into the city and she is not willing to ask her son to drive her. She will only do tests that are located at the [facility] site.    Patient has an appointment for echo and labs on 8/26/2021 and has an OV with LAYA Alexia Poon on 8/30/2021.    Patient states her biggest concern at this time is her asthma and the smoky air. She sees her PCP tomorrow to work on her meds for that.    Will message Dr. Hernandez with update

## 2021-08-05 NOTE — PATIENT INSTRUCTIONS
For your edema:  Limit salt, elevate legs, exercise, and wear stockings.    Get your echocardiogram later this and follow up with cardiology.    We'll need to reorder your MRI if your echocardiogram is reassuring.    Continue on Trelegy and as needed albuterol.    Layo Sevilla MD  Internal Medicine and Pediatrics

## 2021-08-05 NOTE — PROGRESS NOTES
"    Assessment & Plan     Thoracic aortic aneurysm without rupture (H)  MRI to be scheduled after her echocardiogram.  She does not want to schedule until echocardiogram done.     Essential hypertension with goal blood pressure less than 140/90  Not at goal, but has been VERY sensitive to changes in meds.    Lower extremity edema  LImit salt; wear compression stockings.     Moderate persistent asthma without complication  Improved on prednisone and azithrom.                Patient Instructions   For your edema:  Limit salt, elevate legs, exercise, and wear stockings.    Get your echocardiogram later this and follow up with cardiology.    We'll need to reorder your MRI if your echocardiogram is reassuring.    Continue on Trelegy and as needed albuterol.    Layo Sevilla MD  Internal Medicine and Pediatrics         No follow-ups on file.    Layo Sevilla MD  [facility] [place] Vasquez is a 85 year old who presents for the following health issues     HPI     ED Followup:    Facility:  [facility]  Date of visit: 8/1/21   Reason for visit: Exacerbation of asthma, unspecified asthma severity, unspecified whether persistent    Current Status: much better     Seen in emergency room for asthma flare up, presumed due to outdoor smoke.  Given prednisone. Most of her symptoms are now improved.     Remains on Trelegy.      Had negative COVID test     Leg swelling.  From ankle to knee remains \"bigger\".  Hurts and takes tylenol. Right leg is mostly normal. Worse by end of day. Fine in AM.  No change with salty foods.   Trying to raise legs.     She reports that her cardiologist wanted an MRI of lungs. But she'd like to wait until after her upcoming echocardiogram.     Dizziness; had been doing physical therapy, but not much improved. Does not feel dizzy right now; in fact has resolved completely.       Review of Systems   CONSTITUTIONAL: NEGATIVE for fever, chills, change in weight  INTEGUMENTARY/SKIN: NEGATIVE " "for worrisome rashes, moles or lesions  EYES: NEGATIVE for vision changes or irritation  ENT/MOUTH: NEGATIVE for ear, mouth and throat problems  RESP: NEGATIVE for significant cough or SOB  BREAST: NEGATIVE for masses, tenderness or discharge  CV: NEGATIVE for chest pain, palpitations or peripheral edema  GI: NEGATIVE for nausea, abdominal pain, heartburn, or change in bowel habits  : NEGATIVE for frequency, dysuria, or hematuria  MUSCULOSKELETAL: NEGATIVE for significant arthralgias or myalgia  NEURO: NEGATIVE for weakness, dizziness or paresthesias  ENDOCRINE: NEGATIVE for temperature intolerance, skin/hair changes  HEME: NEGATIVE for bleeding problems  PSYCHIATRIC: NEGATIVE for changes in mood or affect      Objective    BP (!) 178/60 (BP Location: Right arm, Patient Position: Sitting, Cuff Size: Adult Regular)   Pulse 86   Temp 97.5  F (36.4  C) (Tympanic)   Resp 20   Ht 1.575 m (5' 2\")   Wt 61.9 kg (136 lb 6.4 oz)   LMP  (LMP Unknown)   SpO2 96%   BMI 24.95 kg/m    Body mass index is 24.95 kg/m .  Physical Exam   GENERAL: healthy, alert and no distress  EYES: Eyes grossly normal to inspection, PERRL and conjunctivae and sclerae normal  HENT: ear canals and TM's normal, nose and mouth without ulcers or lesions  NECK: no adenopathy, no asymmetry, masses, or scars and thyroid normal to palpation  RESP: lungs clear to auscultation - no rales, rhonchi or wheezes  CV: regular rate and rhythm, normal S1 S2, no S3 or S4, no murmur, click or rub, no peripheral edema and peripheral pulses strong  ABDOMEN: soft, nontender, no hepatosplenomegaly, no masses and bowel sounds normal  MS: no gross musculoskeletal defects noted, no edema  SKIN: no suspicious lesions or rashes  NEURO: Normal strength and tone, mentation intact and speech normal  PSYCH: mentation appears normal, affect normal/bright                "

## 2021-08-26 ENCOUNTER — LAB (OUTPATIENT)
Dept: LAB | Facility: CLINIC | Age: 86
End: 2021-08-26
Payer: COMMERCIAL

## 2021-08-26 ENCOUNTER — HOSPITAL ENCOUNTER (OUTPATIENT)
Dept: CARDIOLOGY | Facility: CLINIC | Age: 86
Discharge: HOME | End: 2021-08-26
Attending: INTERNAL MEDICINE | Admitting: INTERNAL MEDICINE
Payer: COMMERCIAL

## 2021-08-26 DIAGNOSIS — I35.1 AORTIC VALVE INSUFFICIENCY, ETIOLOGY OF CARDIAC VALVE DISEASE UNSPECIFIED: ICD-10-CM

## 2021-08-26 DIAGNOSIS — R42 DIZZINESS: ICD-10-CM

## 2021-08-26 DIAGNOSIS — I25.10 CORONARY ARTERY DISEASE INVOLVING NATIVE CORONARY ARTERY OF NATIVE HEART WITHOUT ANGINA PECTORIS: ICD-10-CM

## 2021-08-26 LAB
ANION GAP SERPL CALCULATED.3IONS-SCNC: 6 MMOL/L (ref 3–14)
BUN SERPL-MCNC: 33 MG/DL (ref 7–30)
CALCIUM SERPL-MCNC: 8.8 MG/DL (ref 8.5–10.1)
CHLORIDE BLD-SCNC: 103 MMOL/L (ref 94–109)
CHOLEST SERPL-MCNC: 159 MG/DL
CO2 SERPL-SCNC: 26 MMOL/L (ref 20–32)
CREAT SERPL-MCNC: 1.01 MG/DL (ref 0.52–1.04)
FASTING STATUS PATIENT QL REPORTED: YES
GFR SERPL CREATININE-BSD FRML MDRD: 52 ML/MIN/1.73M2
GLUCOSE BLD-MCNC: 95 MG/DL (ref 70–99)
HDLC SERPL-MCNC: 95 MG/DL
LDLC SERPL CALC-MCNC: 53 MG/DL
LVEF ECHO: NORMAL
NONHDLC SERPL-MCNC: 64 MG/DL
POTASSIUM BLD-SCNC: 3.6 MMOL/L (ref 3.4–5.3)
SODIUM SERPL-SCNC: 135 MMOL/L (ref 133–144)
TRIGL SERPL-MCNC: 56 MG/DL

## 2021-08-26 PROCEDURE — 80061 LIPID PANEL: CPT

## 2021-08-26 PROCEDURE — 93306 TTE W/DOPPLER COMPLETE: CPT | Mod: 26 | Performed by: INTERNAL MEDICINE

## 2021-08-26 PROCEDURE — 80048 BASIC METABOLIC PNL TOTAL CA: CPT

## 2021-08-26 PROCEDURE — 36415 COLL VENOUS BLD VENIPUNCTURE: CPT

## 2021-08-26 PROCEDURE — 93306 TTE W/DOPPLER COMPLETE: CPT

## 2021-08-30 ENCOUNTER — OFFICE VISIT (OUTPATIENT)
Dept: CARDIOLOGY | Facility: CLINIC | Age: 86
End: 2021-08-30
Payer: COMMERCIAL

## 2021-08-30 VITALS
BODY MASS INDEX: 24.73 KG/M2 | SYSTOLIC BLOOD PRESSURE: 128 MMHG | WEIGHT: 134.4 LBS | DIASTOLIC BLOOD PRESSURE: 62 MMHG | HEIGHT: 62 IN | HEART RATE: 83 BPM | OXYGEN SATURATION: 97 %

## 2021-08-30 DIAGNOSIS — I35.1 AORTIC VALVE INSUFFICIENCY, ETIOLOGY OF CARDIAC VALVE DISEASE UNSPECIFIED: Primary | ICD-10-CM

## 2021-08-30 DIAGNOSIS — I10 ESSENTIAL HYPERTENSION WITH GOAL BLOOD PRESSURE LESS THAN 140/90: ICD-10-CM

## 2021-08-30 DIAGNOSIS — I25.10 CORONARY ARTERY DISEASE INVOLVING NATIVE CORONARY ARTERY OF NATIVE HEART WITHOUT ANGINA PECTORIS: ICD-10-CM

## 2021-08-30 DIAGNOSIS — I71.20 THORACIC AORTIC ANEURYSM WITHOUT RUPTURE (H): ICD-10-CM

## 2021-08-30 PROCEDURE — 99214 OFFICE O/P EST MOD 30 MIN: CPT | Performed by: PHYSICIAN ASSISTANT

## 2021-08-30 ASSESSMENT — MIFFLIN-ST. JEOR: SCORE: 1017.88
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
## 2021-09-01 ENCOUNTER — VIRTUAL VISIT (OUTPATIENT)
Dept: PHARMACY | Facility: CLINIC | Age: 86
End: 2021-09-01
Payer: COMMERCIAL

## 2021-09-01 DIAGNOSIS — J45.40 MODERATE PERSISTENT ASTHMA WITHOUT COMPLICATION: Primary | ICD-10-CM

## 2021-09-01 DIAGNOSIS — R42 DIZZINESS: ICD-10-CM

## 2021-09-01 DIAGNOSIS — I10 ESSENTIAL HYPERTENSION WITH GOAL BLOOD PRESSURE LESS THAN 140/90: ICD-10-CM

## 2021-09-01 DIAGNOSIS — I25.10 CORONARY ARTERY DISEASE INVOLVING NATIVE CORONARY ARTERY OF NATIVE HEART WITHOUT ANGINA PECTORIS: ICD-10-CM

## 2021-09-01 PROCEDURE — 99607 MTMS BY PHARM ADDL 15 MIN: CPT | Performed by: PHARMACIST

## 2021-09-01 PROCEDURE — 99606 MTMS BY PHARM EST 15 MIN: CPT | Performed by: PHARMACIST

## 2021-09-01 RX ORDER — ALBUTEROL SULFATE 90 UG/1
1-2 AEROSOL, METERED RESPIRATORY (INHALATION) EVERY 6 HOURS PRN
Start: 2021-09-01 | End: 2022-11-14

## 2021-09-01 NOTE — PATIENT INSTRUCTIONS
Recommendations from today's MTM visit:                                                      1. Continue the Trelegy inhaler as you have been with the exception of trying to breathe in the medications more steadily to avoid the medication hitting the back of the throat. If this causes coughing when you breath in the Trelegy, then you can take a puff albuterol before you take in the Trelegy.     2. If breathing is worse please reach out to Dr. Sevilla's team sooner rather than later.     Follow-up: Return in about 1 year (around 9/1/2022) for Medication Therapy Telephone Visit. or sooner if you have questions.    It was great to speak with you today.  I value your experience and would be very thankful for your time with providing feedback on our clinic survey. You may receive a survey via email or text message in the next few days.     To schedule another MTM appointment, please call the clinic directly or you may call the MTM scheduling line at [phone] or toll-free at [phone].     My Clinical Pharmacist's contact information:                                                      Please feel free to contact me with any questions or concerns you have.      Reina Mason, PharmD  Medication Therapy Management Pharmacist

## 2021-09-01 NOTE — PROGRESS NOTES
"Medication Therapy Management (MTM) Encounter    ASSESSMENT:                            Medication Adherence/Access: No issues identified    Hx MI/HTN/dizziness:  Patient's blood pressure remains at goal < 130/80. Dizziness resolved, no changes today.    Asthma: ACT no at goal, < 19. Recent exacerbation, ct with step up in triple inhaler therapy and follow-up with pulmonology as planned    PLAN:                            1. Reviewed inhaler technique  2. May use albuterol 1 puff prior to Trelegy inhalation if having coughing with dosing.  3. Encouraged patient to reach out sooner if SOB worsens.    Follow-up: Return in about 1 year (around 9/1/2022) for Medication Therapy Telephone Visit.      SUBJECTIVE/OBJECTIVE:                          Genie Persaud is a 85 year old female called for a transitions of care visit. She was discharged from ED on 8/1/21 for asthma exacerbation.      Reason for visit: follow-up on Trelegy inhaler. She reports completed asthma exacerbation medications now breathing back to before the exacerbation but still feels not quite back to baseline. However today she felt she could breath in deeper which she is happy about.    Allergies/ADRs: Reviewed in chart  Past Medical History: Reviewed in chart  Tobacco: She reports that she has never smoked. She has never used smokeless tobacco.  Alcohol: glass of wine a couple times a week    Medication Adherence/Access:  Hx of stopping medications on her own.  Patients organized her medications by MORNING, evening and \"misc\" categorizes. Denies any concerns with splitting tablets or missed doses. She does note inhalers can be costly in the past.   The patient fills medications at [place]: NO, fills medications at [facility].     Hx MI/HTN/Dizziness:  hx of stopping aspirin on her own due to frequent bruising and metoprolol. Not interested in restarting. She reports the dizziness is gone.   Denies any weight gain, swelling, CP, flutter, etc.  She did have ECHO " completed & follow-up with cardiology on 8/30/21 --> no changes, RTC in 1 year with Dr. Hernandez.   Current medications include for BP include: losartan-hydrochlorothiazide 50-12.5 mg in the morning and amlodipine 5 mg HS.   Previously tried: metoprolol (notes that patient had self stopped), losartan at dose of 100 mg along with current regimen caused dizziness so stopped), spironolactone (which we stopped and placed her on losartan instead on 7/19/21).  Reported home BP today ~ 125/50.  She denies any hypotension.  Follows with cardiology. Dr. Hernandez.   ECHO: 11/1/19 EF 50-55% --> repeat ECHO today  BP Readings from Last 3 Encounters:   08/30/21 128/62   08/05/21 (!) 154/60   08/01/21 125/51     Potassium   Date Value Ref Range Status   08/26/2021 3.6 3.4 - 5.3 mmol/L Final   06/28/2021 4.0 3.4 - 5.3 mmol/L Final       Asthma: Current medications: Singulair 10 mg daily (6/7/21 by PCP be reports delay in start but likely no on for ~3-4 weeks), Short-Acting Bronchodilator: Albuterol MDI 2-3 times last week (using more), Trelegy 200-62.5-25 mcg, puff once daily (rinsing mouth then uses ACT mouthwash.). She like the Trelegy since using once a day.   She completed the prednisone 4 day burst and azithromycin.  Symptoms: denies excessive coughing, she knows she has more shortness of breath and coughs up phlegm.   Triggers include: allergies and change in weather (when snow melts), humidity/dew point.  Asthma Action Plan on file: YES  She has a follow-up with the pulmonologist in October.   ACT Total Scores 11/7/2019 1/21/2021 7/1/2021   ACT TOTAL SCORE - - -   ASTHMA ER VISITS - - -   ASTHMA HOSPITALIZATIONS - - -   ACT TOTAL SCORE (Goal Greater than or Equal to 20) 22 15 16   In the past 12 months, how many times did you visit the emergency room for your asthma without being admitted to the hospital? 0 0 0   In the past 12 months, how many times were you hospitalized overnight because of your asthma? 0 0 0        Today's Vitals: LMP  (LMP Unknown)   ----------------  Post Discharge Medication Reconciliation Status: discharge medications reconciled and changed, per note/orders.    I spent 28 minutes with this patient today. All changes were made via collaborative practice agreement with Layo Sevilla MD. A copy of the visit note was provided to the patient's primary care provider.    The patient was mailed a summary of these recommendations.     Reina Mason, PharmD  Medication Therapy Management Pharmacist  Pager#: [phone]    Telemedicine Visit Details  Type of service:  Telephone visit  Start Time: 12:30 pm  End Time: 12:58 PM  Originating Location (patient location): [place]  Distant Location (provider location):  [facility]     Medication Therapy Recommendations  Moderate persistent asthma without complication    Current Medication: albuterol (PROAIR HFA/PROVENTIL HFA/VENTOLIN HFA) 108 (90 Base) MCG/ACT inhaler   Rationale: Synergistic therapy - Needs additional medication therapy - Indication   Recommendation: Provide Adherence Intervention - May use albuterol 1 puff prior to Trelegy inhalation if having coughing with dosing.   Status: Patient Agreed - Adherence/Education

## 2021-09-29 ENCOUNTER — TELEPHONE (OUTPATIENT)
Dept: PHARMACY | Facility: CLINIC | Age: 86
End: 2021-09-29

## 2021-09-29 DIAGNOSIS — J45.40 MODERATE PERSISTENT ASTHMA WITHOUT COMPLICATION: Primary | ICD-10-CM

## 2021-09-29 DIAGNOSIS — J30.2 SEASONAL ALLERGIC RHINITIS, UNSPECIFIED TRIGGER: ICD-10-CM

## 2021-09-29 RX ORDER — FLUTICASONE PROPIONATE AND SALMETEROL XINAFOATE 230; 21 UG/1; UG/1
2 AEROSOL, METERED RESPIRATORY (INHALATION) 2 TIMES DAILY
Start: 2021-09-29 | End: 2022-09-20

## 2021-09-29 RX ORDER — LORATADINE 10 MG/1
10 TABLET ORAL DAILY
Qty: 90 TABLET | Refills: 1 | Status: SHIPPED | OUTPATIENT
Start: 2021-09-29 | End: 2022-04-07

## 2021-09-29 NOTE — TELEPHONE ENCOUNTER
"She reports recurrent dizziness again and wants to start an allergy medication. She also stopped Trelegy because she felt the Trelegy was not going \"deep enough\" into her lungs. She is on her Advair HFA 230mcg 2 puffs twice daily. She has follow-up with pulmonology in a few weeks.      Per CPA with PCP, add spiriva respimat follow-up with pulmonology and start loratadine 10mg daily.    Reina Mason, PharmD  Medication Therapy Management Pharmacist  Pager#: [phone]      "

## 2021-09-29 NOTE — TELEPHONE ENCOUNTER
Patient left VM on SB5 line at 2:29pm, she is a patient of Dr. Cardoso.    Looking to speak to Reina, requesting a callback when able.    Phone # [phone]    Thank you  Sudhakar VIDES   - Complex Care Team

## 2021-10-06 ENCOUNTER — OFFICE VISIT (OUTPATIENT)
Dept: PEDIATRICS | Facility: CLINIC | Age: 86
End: 2021-10-06
Payer: COMMERCIAL

## 2021-10-06 VITALS
BODY MASS INDEX: 25.21 KG/M2 | WEIGHT: 137 LBS | RESPIRATION RATE: 20 BRPM | SYSTOLIC BLOOD PRESSURE: 132 MMHG | DIASTOLIC BLOOD PRESSURE: 64 MMHG | HEART RATE: 96 BPM | HEIGHT: 62 IN | TEMPERATURE: 98.3 F

## 2021-10-06 DIAGNOSIS — I10 ESSENTIAL HYPERTENSION WITH GOAL BLOOD PRESSURE LESS THAN 140/90: ICD-10-CM

## 2021-10-06 DIAGNOSIS — H61.23 BILATERAL IMPACTED CERUMEN: Primary | ICD-10-CM

## 2021-10-06 PROCEDURE — 69209 REMOVE IMPACTED EAR WAX UNI: CPT | Performed by: NURSE PRACTITIONER

## 2021-10-06 PROCEDURE — 99213 OFFICE O/P EST LOW 20 MIN: CPT | Mod: 25 | Performed by: NURSE PRACTITIONER

## 2021-10-06 ASSESSMENT — MIFFLIN-ST. JEOR: SCORE: 1020.71

## 2021-10-06 NOTE — PATIENT INSTRUCTIONS
Patient Education     Keep track of BPs at home as you have been doing.  Earwax Removal    The ear canal makes earwax from the canal s lining. The ears make wax to lubricate and protect the ear canal. The ear canal is the tube that connects the middle ear to the outside of the ear. The wax protects the ear from bacteria, infection, and damage from water or trauma.   The wax that forms in the canal naturally moves toward the outside of the ear and falls out. In some cases, the ear may make too much wax. If the wax causes problems or keeps the healthcare provider from seeing into the ear, the extra wax may be removed.   Too much wax can affect your hearing. It can cause itching. In rare cases, it can be painful. Earwax should not be removed unless it is causing a problem. You should not stick objects such as cotton swabs into your ear to remove wax unless told to do so by your healthcare provider.   Healthcare providers can remove earwax safely. Often flushing the earwax out with water (irrigation) and syringing will help. Sometimes devices or suction are used to remove the wax. It is important to stay still during the procedure to prevent damage to the ear canal. But removing earwax generally doesn t hurt. You won't need anesthesia or pain medicine when the provider removes the earwax.   A number of conditions lead to earwax buildup. These include some skin problems, a narrow ear canal, or ears that make too much earwax. Using cotton swabs in the canal pushes earwax deeper into the ear and helps lead to the buildup of earwax.   Home care    The healthcare provider may advise mineral oil or an over-the-counter (OTC) eardrop to use at home to soften the earwax. He or she may also advise a home irrigation or syringing kit. Use these products only if the provider advises them. Carefully follow the instructions given.    Don t use mineral oil or OTC eardrops if you might have an ear infection or a burst (ruptured)  "eardrum. Tell your provider right away if you have diabetes or an immune disorder.    Don t use cotton swabs in your ears. Cotton swabs may push wax deeper into the ear canal or damage the eardrum. Use cotton gauze or a wet washcloth to gently remove wax on the outside of the ear and around the opening to the ear canal.    Don't use any probing device or object such as cotton-tipped swabs or rosie pins to clean the inside of your ears.    Don t use ear candles to clean your ears. Candling can be dangerous. It can burn the ear canal. It can also make the condition worse instead of better.    Don t use cold water to rinse the ear. This will make you dizzy. If your provider tells you to rinse your ear, use only warm water or follow his or her instructions.    Check the ear for signs of infection or irritation (listed below under \"When to get medical advice\").  Steps for using eardrops  1. Warm the medicine bottle by rubbing it between your hands for a few minutes.  2. Lie down on your side, with the affected ear up.  3. Place the advised number of drops in the ear. Wet a cotton ball with the medicine. Gently put the cotton ball into the ear opening.    Follow-up care  Follow up with your healthcare provider, or as advised.   When to get medical advice   Call your healthcare provider right away if you have:     Ear pain that gets worse    Fever of 100.4F F (38 C) or higher, or as directed by your healthcare provider    Worsening wax buildup    Severe pain, dizziness, or nausea    Bleeding from the ear    Hearing problems    Signs of irritation from the eardrops, such as burning, stinging, or swelling and soreness    Foul-smelling fluid draining from the ear    Signs of infection such as outer ear swelling, redness, or soreness    Headache, neck pain, or stiff neck  [employer] last reviewed this educational content on 6/1/2020 2000-2021 [employer]. All rights reserved. This information is not intended as " a substitute for professional medical care. Always follow your healthcare professional's instructions.

## 2021-10-06 NOTE — PROGRESS NOTES
"    Assessment & Plan     Bilateral impacted cerumen  Tolerated well. Return prn.  - FL REMOVAL IMPACTED CERUMEN IRRIGATION/LVG UNILAT    Essential hypertension with goal blood pressure less than 140/90  Quite elevated on initial check, improved on recheck. She checks BP at home and tells me home readings have overall been \"good.\" Continue to track at home and update with readings in 2 weeks.      Patient Instructions   Patient Education     Keep track of BPs at home as you have been doing.  Earwax Removal    The ear canal makes earwax from the canal s lining. The ears make wax to lubricate and protect the ear canal. The ear canal is the tube that connects the middle ear to the outside of the ear. The wax protects the ear from bacteria, infection, and damage from water or trauma.   The wax that forms in the canal naturally moves toward the outside of the ear and falls out. In some cases, the ear may make too much wax. If the wax causes problems or keeps the healthcare provider from seeing into the ear, the extra wax may be removed.   Too much wax can affect your hearing. It can cause itching. In rare cases, it can be painful. Earwax should not be removed unless it is causing a problem. You should not stick objects such as cotton swabs into your ear to remove wax unless told to do so by your healthcare provider.   Healthcare providers can remove earwax safely. Often flushing the earwax out with water (irrigation) and syringing will help. Sometimes devices or suction are used to remove the wax. It is important to stay still during the procedure to prevent damage to the ear canal. But removing earwax generally doesn t hurt. You won't need anesthesia or pain medicine when the provider removes the earwax.   A number of conditions lead to earwax buildup. These include some skin problems, a narrow ear canal, or ears that make too much earwax. Using cotton swabs in the canal pushes earwax deeper into the ear and helps lead " "to the buildup of earwax.   Home care    The healthcare provider may advise mineral oil or an over-the-counter (OTC) eardrop to use at home to soften the earwax. He or she may also advise a home irrigation or syringing kit. Use these products only if the provider advises them. Carefully follow the instructions given.    Don t use mineral oil or OTC eardrops if you might have an ear infection or a burst (ruptured) eardrum. Tell your provider right away if you have diabetes or an immune disorder.    Don t use cotton swabs in your ears. Cotton swabs may push wax deeper into the ear canal or damage the eardrum. Use cotton gauze or a wet washcloth to gently remove wax on the outside of the ear and around the opening to the ear canal.    Don't use any probing device or object such as cotton-tipped swabs or rosie pins to clean the inside of your ears.    Don t use ear candles to clean your ears. Candling can be dangerous. It can burn the ear canal. It can also make the condition worse instead of better.    Don t use cold water to rinse the ear. This will make you dizzy. If your provider tells you to rinse your ear, use only warm water or follow his or her instructions.    Check the ear for signs of infection or irritation (listed below under \"When to get medical advice\").  Steps for using eardrops  1. Warm the medicine bottle by rubbing it between your hands for a few minutes.  2. Lie down on your side, with the affected ear up.  3. Place the advised number of drops in the ear. Wet a cotton ball with the medicine. Gently put the cotton ball into the ear opening.    Follow-up care  Follow up with your healthcare provider, or as advised.   When to get medical advice   Call your healthcare provider right away if you have:     Ear pain that gets worse    Fever of 100.4F F (38 C) or higher, or as directed by your healthcare provider    Worsening wax buildup    Severe pain, dizziness, or nausea    Bleeding from the " "ear    Hearing problems    Signs of irritation from the eardrops, such as burning, stinging, or swelling and soreness    Foul-smelling fluid draining from the ear    Signs of infection such as outer ear swelling, redness, or soreness    Headache, neck pain, or stiff neck  Manny last reviewed this educational content on 6/1/2020 2000-2021 [employer]. All rights reserved. This information is not intended as a substitute for professional medical care. Always follow your healthcare professional's instructions.               No follow-ups on file.    Fern Montes NP  [facility] MICHAELLE Vasquez is a 86 year old who presents for the following health issues     HPI     Concern - Rt ear feels full  Onset: two weeks ago  Description: Pressure in the Rt ear  Intensity: moderate  Progression of Symptoms:  constant  Accompanying Signs & Symptoms: hard to hear  Previous history of similar problem: Pt have a Hx of wax build up  Precipitating factors:        Worsened by: na  Alleviating factors:        Improved by: ear lavage  Therapies tried and outcome: Pt stated that she used otc ear wax removal one week ago and Sx have subsided slightly    Ears felt plugged  Tried home remedies    Checks BPs at home  Yesterday SBP in the 120s  Thought BP was high today,  SBP 150s  Took her BP meds today    Review of Systems   Constitutional, HEENT, cardiovascular, pulmonary, gi and gu systems are negative, except as otherwise noted.      Objective    /64 (BP Location: Right arm, Patient Position: Chair, Cuff Size: Adult Regular)   Pulse 96   Temp 98.3  F (36.8  C) (Oral)   Resp 20   Ht 1.568 m (5' 1.75\")   Wt 62.1 kg (137 lb)   LMP  (LMP Unknown)   BMI 25.26 kg/m    Body mass index is 25.26 kg/m .  Physical Exam   GENERAL: healthy, alert and no distress  HENT: ear canals and TM's normal              "

## 2021-10-15 ENCOUNTER — TRANSFERRED RECORDS (OUTPATIENT)
Dept: HEALTH INFORMATION MANAGEMENT | Facility: CLINIC | Age: 86
End: 2021-10-15
Payer: COMMERCIAL

## 2021-10-19 DIAGNOSIS — I10 ESSENTIAL HYPERTENSION WITH GOAL BLOOD PRESSURE LESS THAN 140/90: ICD-10-CM

## 2021-10-19 DIAGNOSIS — K21.9 GASTROESOPHAGEAL REFLUX DISEASE WITHOUT ESOPHAGITIS: ICD-10-CM

## 2021-10-21 RX ORDER — OMEPRAZOLE 10 MG/1
CAPSULE, DELAYED RELEASE ORAL
Qty: 90 CAPSULE | Refills: 1 | Status: SHIPPED | OUTPATIENT
Start: 2021-10-21 | End: 2022-04-27

## 2021-10-21 RX ORDER — LOSARTAN POTASSIUM AND HYDROCHLOROTHIAZIDE 12.5; 5 MG/1; MG/1
1 TABLET ORAL DAILY
Qty: 90 TABLET | Refills: 1 | Status: SHIPPED | OUTPATIENT
Start: 2021-10-21 | End: 2022-03-07

## 2021-10-21 NOTE — TELEPHONE ENCOUNTER
Prescription approved per [facility] Refill Protocol.  Jaqueline Hutchinson RN

## 2021-10-28 ENCOUNTER — TELEPHONE (OUTPATIENT)
Dept: PEDIATRICS | Facility: CLINIC | Age: 86
End: 2021-10-28

## 2021-10-28 DIAGNOSIS — J30.2 SEASONAL ALLERGIC RHINITIS, UNSPECIFIED TRIGGER: Primary | ICD-10-CM

## 2021-10-28 NOTE — TELEPHONE ENCOUNTER
Maybe try over-the-counter flonase.  If not, we can try prescription for singulair.    Layo Sevilla MD  Internal Medicine and Pediatrics

## 2021-10-28 NOTE — TELEPHONE ENCOUNTER
[facility]  Phone: [phone].    Called patient.     Dr. Sinha is current allergist at [facility].   Patient has an appointment on 11/10/21 to see Dr. Sinha.     Advised patient to contact her care team if symptoms worsen.     Felipe Zimmerman RN on 10/28/2021 at 3:55 PM

## 2021-10-28 NOTE — TELEPHONE ENCOUNTER
The only thing left would be trying an allergist evaluation.    I have placed the referral.    Layo Sevilla MD  Internal Medicine and Pediatrics

## 2021-10-28 NOTE — TELEPHONE ENCOUNTER
Called patient.     Patient has used Flonase and it has been unsuccessful.   Currently taking Claritin, and multiple asthma medications.     Patient has been taking Singulair as ordered since 7/1/21.     Routing to Dr. Sevilla:  -any other med recommendations?    Felipe Zimmerman RN on 10/28/2021 at 2:27 PM

## 2021-10-28 NOTE — TELEPHONE ENCOUNTER
Patient called in.    Patient reports continued symptoms of sinus infection or allergies.      Sinus pressure on and off since May.   Dizziness, which has been on and off since May. This has been investigated medically.     Has been dealing with these symptoms since May.   Some days are better than others, but symptoms are getting worse.  Usually these symptoms don't last so late into the year.     Denies fever.   Denies sore throat.   Denies headache.   Denies difficulty breathing outside of usual asthma symptoms.     Blood pressures 120-140/50's.    Has tried a few over the counter medications, none have worked.   Mucinex and Claritin have not been effective.     Routing to Dr. Sevilla:   -Any suggestions for medications that might be effective for her?   Neti Pot?     Felipe Zimmerman RN on 10/28/2021 at 1:51 PM

## 2021-11-10 ENCOUNTER — TRANSFERRED RECORDS (OUTPATIENT)
Dept: HEALTH INFORMATION MANAGEMENT | Facility: CLINIC | Age: 86
End: 2021-11-10
Payer: COMMERCIAL

## 2021-11-29 ENCOUNTER — TRANSFERRED RECORDS (OUTPATIENT)
Dept: HEALTH INFORMATION MANAGEMENT | Facility: CLINIC | Age: 86
End: 2021-11-29
Payer: COMMERCIAL

## 2022-01-13 DIAGNOSIS — E78.5 HYPERLIPIDEMIA LDL GOAL <130: ICD-10-CM

## 2022-01-13 RX ORDER — ATORVASTATIN CALCIUM 20 MG/1
20 TABLET, FILM COATED ORAL DAILY
Qty: 90 TABLET | Refills: 0 | Status: SHIPPED | OUTPATIENT
Start: 2022-01-13 | End: 2022-04-27

## 2022-02-08 DIAGNOSIS — I10 ESSENTIAL HYPERTENSION WITH GOAL BLOOD PRESSURE LESS THAN 140/90: ICD-10-CM

## 2022-02-10 RX ORDER — AMLODIPINE BESYLATE 5 MG/1
5 TABLET ORAL AT BEDTIME
Qty: 90 TABLET | Refills: 1 | Status: SHIPPED | OUTPATIENT
Start: 2022-02-10 | End: 2022-05-27

## 2022-02-10 NOTE — TELEPHONE ENCOUNTER
Prescription approved per [facility] Refill Protocol.    Lisa Jaimes RN on 2/10/2022 at 10:42 AM

## 2022-02-13 NOTE — ED NOTES
[facility]  ED Nurse Handoff Report    Genie Persaud is a 82 year old female   ED Chief complaint: Wrist Pain  . ED Diagnosis:   Final diagnoses:   Cellulitis of right upper extremity     Allergies:   Allergies   Allergen Reactions     Fosamax [Alendronic Acid] GI Disturbance     Augmentin [Amoxicillin-Pot Clavulanate] Diarrhea     Diarrhea and rash     Evista [Raloxifene]      abd symptoms.      Flu Virus Vaccine      swollen and red at inj site       Code Status: Full Code  Activity level - Baseline/Home:  Stand with Assist. Activity Level - Current:   Stand with Assist. Lift room needed: No. Bariatric: No   Needed: No   Isolation: No. Infection: Not Applicable.     Vital Signs:   Vitals:    08/01/18 0006   BP: 163/47   Resp: 18   Temp: 97.5  F (36.4  C)   SpO2: 90%   Weight: 62.6 kg (138 lb)       Cardiac Rhythm:  ,      Pain level: 0-10 Pain Scale: 5  Patient confused: No. Patient Falls Risk: Yes.   Elimination Status: Has voided   Patient Report - Initial Complaint: rt wrist pain. Focused Assessment: rt wrist is rd and swollen at the joint  Tests Performed: US xrays. Abnormal Results: see epic.   Treatments provided: blood cultures and antibiotic  Family Comments: no family here  OBS brochure/video discussed/provided to patient:  No  ED Medications:   Medications   ceFAZolin (ANCEF) intermittent infusion 1 g (not administered)     Drips infusing:  No  For the majority of the shift, the patient's behavior Green. Interventions performed were antibiotics.     Severe Sepsis OR Septic Shock Diagnosis Present: No      ED Nurse Name/Phone Number: Karolina Doroteo,   1:46 AM    RECEIVING UNIT ED HANDOFF REVIEW    Above ED Nurse Handoff Report was reviewed: Yes  Reviewed by: Renetta Mckinnon on August 1, 2018 at 2:23 AM        Pt states he does not want to live due to his HIV status but does not have a plan. Caregiver is concerned that patient will attempt to take his life when he is home by himself.   Pt lives alone

## 2022-02-20 NOTE — ADDENDUM NOTE
Encounter addended by: Jessica Huff, PT on: 2/20/2022 5:00 PM   Actions taken: Clinical Note Signed, Episode resolved

## 2022-03-07 ENCOUNTER — TELEPHONE (OUTPATIENT)
Dept: PEDIATRICS | Facility: CLINIC | Age: 87
End: 2022-03-07
Payer: COMMERCIAL

## 2022-03-07 ENCOUNTER — VIRTUAL VISIT (OUTPATIENT)
Dept: PHARMACY | Facility: CLINIC | Age: 87
End: 2022-03-07
Payer: COMMERCIAL

## 2022-03-07 DIAGNOSIS — J30.2 SEASONAL ALLERGIC RHINITIS, UNSPECIFIED TRIGGER: ICD-10-CM

## 2022-03-07 DIAGNOSIS — E78.5 HYPERLIPIDEMIA LDL GOAL <70: ICD-10-CM

## 2022-03-07 DIAGNOSIS — H40.059 RAISED INTRAOCULAR PRESSURE, UNSPECIFIED LATERALITY: ICD-10-CM

## 2022-03-07 DIAGNOSIS — R42 DIZZINESS: ICD-10-CM

## 2022-03-07 DIAGNOSIS — I10 ESSENTIAL HYPERTENSION WITH GOAL BLOOD PRESSURE LESS THAN 140/90: Primary | ICD-10-CM

## 2022-03-07 DIAGNOSIS — I25.10 CORONARY ARTERY DISEASE INVOLVING NATIVE CORONARY ARTERY OF NATIVE HEART WITHOUT ANGINA PECTORIS: ICD-10-CM

## 2022-03-07 DIAGNOSIS — J45.40 MODERATE PERSISTENT ASTHMA WITHOUT COMPLICATION: ICD-10-CM

## 2022-03-07 DIAGNOSIS — Z78.9 TAKES DIETARY SUPPLEMENTS: ICD-10-CM

## 2022-03-07 DIAGNOSIS — K21.9 GASTROESOPHAGEAL REFLUX DISEASE WITHOUT ESOPHAGITIS: ICD-10-CM

## 2022-03-07 PROCEDURE — 99607 MTMS BY PHARM ADDL 15 MIN: CPT | Performed by: PHARMACIST

## 2022-03-07 PROCEDURE — 99605 MTMS BY PHARM NP 15 MIN: CPT | Performed by: PHARMACIST

## 2022-03-07 RX ORDER — LOSARTAN POTASSIUM AND HYDROCHLOROTHIAZIDE 12.5; 5 MG/1; MG/1
TABLET ORAL
Qty: 45 TABLET | Refills: 1 | Status: SHIPPED | OUTPATIENT
Start: 2022-03-07 | End: 2022-03-21

## 2022-03-07 NOTE — LETTER
_  Medication List        Prepared on: 3/7/2022     Bring your Medication List when you go to the doctor, hospital, or   emergency room. And, share it with your family or caregivers.     Note any changes to how you take your medications.  Cross out medications when you no longer use them.    Medication How I take it Why I use it Prescriber   acetaminophen (TYLENOL) 500 MG tablet Take 500-1,000 mg by mouth every 8 hours as needed for mild pain pain Patient Reported   albuterol (PROAIR HFA/PROVENTIL HFA/VENTOLIN HFA) 108 (90 Base) MCG/ACT inhaler Inhale 1-2 puffs into the lungs every 6 hours as needed for shortness of breath / dyspnea or wheezing Moderate persistent asthma  Layo Sevilla MD   amLODIPine (NORVASC) 5 MG tablet Take 1 tablet (5 mg) by mouth At Bedtime hypertension Layo Sevilla MD   atorvastatin (LIPITOR) 20 MG tablet Take 1 tablet (20 mg) by mouth daily at bedtime  Hyperlipidemia  Layo Sevilla MD   azelastine (ASTELIN) 0.1 % nasal spray SPRAY 1 TO 2 SPRAYS INTO BOTH NOSTRILS 2 TIMES DAILY as needed Seasonal allergic rhinitis Layo Sevilla MD   calcium citrate-vitamin D (CITRACAL) 315-200 MG-UNIT TABS per tablet Take 1 tablet by mouth 2 times daily Bone health Patient Reported   fluticasone-salmeterol (ADVAIR-HFA) 230-21 MCG/ACT inhaler Inhale 2 puffs into the lungs 2 times daily, rinse mouth afterwards  asthma Laoy Sevilla MD   ipratropium (ATROVENT) 0.03 % spray Spray 2 sprays in nostril daily as needed for rhinitis  rhinitis Patient Reported   latanoprost (XALATAN) 0.005 % ophthalmic solution Place 1 drop into both eyes At Bedtime Elevate intraocular pressure Patient Reported   loratadine (CLARITIN) 10 MG tablet Take 1 tablet (10 mg) by mouth daily Seasonal allergic rhinitis Layo Sevilla MD   losartan-hydrochlorothiazide (HYZAAR) 50-12.5 MG tablet Take 1/2 tablets once daily in morning by mouth Essential hypertension Layo Sevilla MD   montelukast (SINGULAIR) 10 MG tablet Take 1 tablet (10 mg) by  mouth At Bedtime Seasonal allergic rhinitis and asthma Layo Sevilla MD   multivitamin (OCUVITE) TABS tablet Take 1 tablet by mouth daily (with breakfast)  Eye health Patient Reported   omeprazole (PRILOSEC) 10 MG DR capsule TAKE ONE CAPSULE BY MOUTH ONE TIME DAILY  Gastroesophageal Reflux Disease  Layo Sevilla MD   predniSONE (DELTASONE) 10 MG tablet Take 10 mg by mouth As needed for asthma flares follows with PCP or pulmonology  Asthma flares Patient Reported   tiotropium (SPIRIVA RESPIMAT) 2.5 MCG/ACT inhaler Inhale 2 puffs into the lungs daily Moderate persistent asthma Layo Sevilla MD         Add new medications, over-the-counter drugs, herbals, vitamins, or  minerals in the blank rows below.    Medication How I take it Why I use it Prescriber                          Allergies:      fosamax [alendronic acid]; contrast dye; evista [raloxifene]; flu virus vaccine; amoxicillin        Side effects I have had:              Other Information:             My notes and questions:

## 2022-03-07 NOTE — PROGRESS NOTES
"Medication Therapy Management (MTM) Encounter    ASSESSMENT:                            Medication Adherence/Access: No issues identified    CAD/HTN/Dizziness:  Given her age and dizziness issues blood pressure < 140/90 is appropriate, suggest we trial lowering the losartan-hydrochlorothiazide that is taken in the morning.    Asthma/Allergies: ACT not at goal > 19, suggest she restart Spiriva Respimat. Ok to trial off her nasal spray. She has interested in coming off loratadine explained would first see how coming off nasal spray goes first.     Hyperlipidemia: LDL < 70, stable.    Supplements: stable.     Elevated IOP: stable.     GERD: stable. Future if she re-challenges will need a slower taper.     PLAN:                            1. Reduce losartan-hydrochlorothiazide 50-12.5 mg tablet to 1/2 tablet in the morning. Follow-up with Dr. Sevilla for blood pressure recheck 3/21.  2. Ok to try changing the nasal spray (azelastie and ipratropium) to as needed if nasal congestion or sneezing returns. If no change, ok to then try changing Loratadine to as needed.     Follow-up: Return in about 6 months (around 9/7/2022) for Medication Therapy Telephone Visit.    SUBJECTIVE/OBJECTIVE:                          Genie Persaud is a 86 year old female called for a follow-up visit.  Today's visit is a follow-up MTM visit from 9/1/21, first of 2022.     Reason for visit: she wants to know about her medications and dizziness. She reports since last Spring her dizziness is not improved.     Allergies/ADRs: Reviewed in chart  Past Medical History: Reviewed in chart  Tobacco: She reports that she has never smoked. She has never used smokeless tobacco.  Alcohol: glass of wine a couple times a week    Medication Adherence/Access:  Hx of stopping medications on her own.  Patients organized her medications by MORNING, evening and \"misc\" categorizes. Denies any concerns with splitting tablets or missed doses. She does note inhalers can be " costly in the past.   The patient fills medications at [place]: NO, fills medications at [facility].     CAD/HTN/Dizziness:  Last few weeks her dizziness is worse again. Last couple of week notices dizziness if on and off. Home blood pressure: 120/40-50, 120-130's/50's. Reports DBP is always low.   Denies any CP.  Current medications include for BP include: losartan-hydrochlorothiazide 50-12.5 mg in the morning and amlodipine 5 mg at bedtime.  Not on aspirin patient has refused.  Previously tried: metoprolol (notes that patient had self stopped), losartan at dose of 100 mg along with current regimen caused dizziness so stopped), spironolactone (which we stopped and placed her on losartan instead on 7/19/21).  She has worked with cardiology Dr. Hernandez in the past.   BP Readings from Last 3 Encounters:   10/06/21 132/64   08/30/21 128/62   08/05/21 (!) 154/60     Potassium   Date Value Ref Range Status   08/26/2021 3.6 3.4 - 5.3 mmol/L Final   06/28/2021 4.0 3.4 - 5.3 mmol/L Final       Asthma/Allergies: Current medications: Singulair 10 mg daily, Short-Acting Bronchodilator: Albuterol MDI as needed - does use a few times during the week, Advair 2 puff twice daily and rinses mouth afterward. She is not taking Spiriva. She was on Trelegy but felt it was not as effective so switch back to Advair but did not resume Spiriva. Using loratadine 10mg daily, azelastine nasal spray and out of ipratropium nasal spray not sure if needed per patient. Denies any sneezing or nasal congestion.  She has prednisone for as needed flare, not taking currently.  Symptoms: can have sob with exertion.  Triggers include: allergies and change in weather (when snow melts), humidity/dew point.  Asthma Action Plan on file: YES  She does work with [facility].   ACT Total Scores 11/7/2019 1/21/2021 7/1/2021   ACT TOTAL SCORE - - -   ASTHMA ER VISITS - - -   ASTHMA HOSPITALIZATIONS - - -   ACT TOTAL SCORE (Goal Greater than or Equal to 20) 22 15 16   In  the past 12 months, how many times did you visit the emergency room for your asthma without being admitted to the hospital? 0 0 0   In the past 12 months, how many times were you hospitalized overnight because of your asthma? 0 0 0     Hyperlipidemia: Current therapy includes atorvastatin 20 mg at bedtime.  Patient reports no significant myalgias or other side effects.    Recent Labs   Lab Test 08/26/21  0949 01/20/21  0927 02/09/17  0855 08/13/15  0842   CHOL 159 128   < > 141   HDL 95 61   < > 63   LDL 53 53   < > 63   TRIG 56 68   < > 75   CHOLHDLRATIO  --   --   --  2.2    < > = values in this interval not displayed.       Supplements: takes calcium-vitamin D 1 tab twice daily and Ocuvite daily. No concerns.    Elevated IOP: using latanoprost drops daily. Feels this is effective.    GERD: Current medications include: Prilosec (omeprazole) 10 mg daily. Has tried coming off but had reflux right way. Currently regimen is effective.     Today's Vitals: LMP  (LMP Unknown)   ----------------    I spent 36 minutes with this patient today. All changes were made via collaborative practice agreement with Layo Sevilla MD. A copy of the visit note was provided to the patient's provider(s).    The patient was mailed a summary of these recommendations.     Reina Mason, PharmD  Medication Therapy Management Pharmacist  Pager#: [phone]    Telemedicine Visit Details  Type of service:  Telephone visit  Start Time: 3:20 PM  End Time: 3:56 PM  Originating Location (patient location): [place]  Distant Location (provider location):  [facility]     Medication Therapy Recommendations  Essential hypertension with goal blood pressure less than 140/90    Current Medication: losartan-hydrochlorothiazide (HYZAAR) 50-12.5 MG tablet   Rationale: Dose too high - Dosage too high - Safety   Recommendation: Decrease Dose   Status: Accepted per CPA         Seasonal allergic rhinitis, unspecified trigger    Current  Medication: azelastine (ASTELIN) 0.1 % nasal spray   Rationale: Patient prefers not to take - Adherence - Adherence   Recommendation: Decrease Frequency   Status: Accepted per CPA

## 2022-03-07 NOTE — TELEPHONE ENCOUNTER
Patient called requesting a call back from [facility] or specifically from Reina Mason to go over possible drug interactions with her medications that could cause dizziness. She thinks that she had issues with dizziness with her losartan a long time ago and recently this was increased.     Lisa Jaimes RN on 3/7/2022 at 10:41 AM

## 2022-03-07 NOTE — PATIENT INSTRUCTIONS
"Recommendations from today's MTM visit:                                                      1. Reduce losartan-hydrochlorothiazide 50-12.5 mg tablet to 1/2 tablet in the morning. Follow-up with Dr. Sevilla for blood pressure recheck 3/21.    2. Ok to try changing the nasal spray (azelastie and ipratropium) to as needed if nasal congestion or sneezing returns. If no change, ok to then try changing Loratadine to as needed.       Follow-up: Return in about 6 months (around 9/7/2022) for Medication Therapy Telephone Visit.    To schedule another MT appointment, please call the clinic directly or you may call the MTM scheduling line at [phone] or toll-free at [phone]. Or, you can also set up a follow-up appointment directly from [facility] by going to Schedule an Appointment and choose \"Medication Therapy Management\".    My Clinical Pharmacist's contact information:                                                      Please feel free to contact me with any questions or concerns you have.      Reina Mason, PharmD  Medication Therapy Management Pharmacist      It was great to speak with you today. You may receive a survey via email or text message in the next few days. I value your experience and would be very thankful for your time with providing feedback on our clinic survey.   "

## 2022-03-07 NOTE — LETTER
"Recommended To-Do List      Prepared on: 3/7/2022     You can get the best results from your medications by completing the items on this \"To-Do List.\"      Bring your To-Do List when you go to your doctor. And, share it with your family or caregivers.    My To-Do List:  What we talked about: What I should do:   Your medication dosage being too high    Decrease your dosage of losartan-hydrochlorothiazide (HYZAAR) 0.5 tablet in the morning          What we talked about: What I should do:   The importance of taking your medication as intended    Decrease how often you take azelastine (ASTELIN) and ipratropium nasal spray to just as needed.          What we talked about: What I should do:                     "

## 2022-03-21 ENCOUNTER — OFFICE VISIT (OUTPATIENT)
Dept: PEDIATRICS | Facility: CLINIC | Age: 87
End: 2022-03-21
Payer: COMMERCIAL

## 2022-03-21 VITALS
WEIGHT: 140.1 LBS | SYSTOLIC BLOOD PRESSURE: 168 MMHG | DIASTOLIC BLOOD PRESSURE: 60 MMHG | RESPIRATION RATE: 28 BRPM | BODY MASS INDEX: 25.83 KG/M2 | HEART RATE: 99 BPM | OXYGEN SATURATION: 95 % | TEMPERATURE: 97.7 F

## 2022-03-21 DIAGNOSIS — Z78.0 ASYMPTOMATIC POSTMENOPAUSAL STATUS: Primary | ICD-10-CM

## 2022-03-21 DIAGNOSIS — G62.9 PERIPHERAL POLYNEUROPATHY: ICD-10-CM

## 2022-03-21 DIAGNOSIS — I10 ESSENTIAL HYPERTENSION WITH GOAL BLOOD PRESSURE LESS THAN 140/90: ICD-10-CM

## 2022-03-21 DIAGNOSIS — I42.8 OTHER CARDIOMYOPATHIES (H): ICD-10-CM

## 2022-03-21 DIAGNOSIS — I49.9 IRREGULAR HEART RATE: ICD-10-CM

## 2022-03-21 DIAGNOSIS — I71.20 THORACIC AORTIC ANEURYSM WITHOUT RUPTURE (H): ICD-10-CM

## 2022-03-21 DIAGNOSIS — E55.9 VITAMIN D DEFICIENCY, UNSPECIFIED: ICD-10-CM

## 2022-03-21 DIAGNOSIS — R79.9 ABNORMAL FINDING OF BLOOD CHEMISTRY, UNSPECIFIED: ICD-10-CM

## 2022-03-21 DIAGNOSIS — N18.30 STAGE 3 CHRONIC KIDNEY DISEASE, UNSPECIFIED WHETHER STAGE 3A OR 3B CKD (H): ICD-10-CM

## 2022-03-21 DIAGNOSIS — R06.00 DYSPNEA, UNSPECIFIED TYPE: ICD-10-CM

## 2022-03-21 LAB — HBA1C MFR BLD: 6.4 % (ref 0–5.6)

## 2022-03-21 PROCEDURE — 83036 HEMOGLOBIN GLYCOSYLATED A1C: CPT | Performed by: INTERNAL MEDICINE

## 2022-03-21 PROCEDURE — 36415 COLL VENOUS BLD VENIPUNCTURE: CPT | Performed by: INTERNAL MEDICINE

## 2022-03-21 PROCEDURE — 99214 OFFICE O/P EST MOD 30 MIN: CPT | Performed by: INTERNAL MEDICINE

## 2022-03-21 PROCEDURE — 82607 VITAMIN B-12: CPT | Performed by: INTERNAL MEDICINE

## 2022-03-21 PROCEDURE — 83735 ASSAY OF MAGNESIUM: CPT | Performed by: INTERNAL MEDICINE

## 2022-03-21 PROCEDURE — 80053 COMPREHEN METABOLIC PANEL: CPT | Performed by: INTERNAL MEDICINE

## 2022-03-21 PROCEDURE — 93000 ELECTROCARDIOGRAM COMPLETE: CPT | Performed by: INTERNAL MEDICINE

## 2022-03-21 PROCEDURE — 82043 UR ALBUMIN QUANTITATIVE: CPT | Performed by: INTERNAL MEDICINE

## 2022-03-21 PROCEDURE — 83880 ASSAY OF NATRIURETIC PEPTIDE: CPT | Performed by: INTERNAL MEDICINE

## 2022-03-21 PROCEDURE — 82306 VITAMIN D 25 HYDROXY: CPT | Performed by: INTERNAL MEDICINE

## 2022-03-21 RX ORDER — LOSARTAN POTASSIUM AND HYDROCHLOROTHIAZIDE 12.5; 5 MG/1; MG/1
TABLET ORAL
Qty: 45 TABLET | Refills: 1 | COMMUNITY
Start: 2022-03-21 | End: 2022-03-21

## 2022-03-21 RX ORDER — LOSARTAN POTASSIUM AND HYDROCHLOROTHIAZIDE 12.5; 5 MG/1; MG/1
1 TABLET ORAL DAILY
Qty: 45 TABLET | Refills: 1 | COMMUNITY
Start: 2022-03-21 | End: 2022-06-03

## 2022-03-21 NOTE — PATIENT INSTRUCTIONS
Labs today.    They will call you for a CT of your chest.    With respect to your lower extremity edema, continue to wear compression stockings.    Follow-up with your pulmonologist with respect to your shortness of breath.    Layo Sevilla MD  Internal Medicine and Pediatrics

## 2022-03-21 NOTE — PROGRESS NOTES
"  Assessment & Plan     Essential hypertension with goal blood pressure less than 140/90  Not at goal, but she jumps up and down a lot.  Remain on current regimen.   - Albumin Random Urine Quantitative with Creat Ratio; Future  - losartan-hydrochlorothiazide (HYZAAR) 50-12.5 MG tablet; Take 1 tablet by mouth daily    Asymptomatic postmenopausal status  Due for dexa.   - DEXA HIP/PELVIS/SPINE - Future; Future    Irregular heart rate  Ongoing premature beats.  Doubt this is the cause her shortness of breath.   - EKG 12-lead complete w/read - Clinics    Peripheral polyneuropathy  likley from her venous stasis.  Labs today.   - Vitamin B12; Future  - Vitamin D Deficiency; Future  - Comprehensive metabolic panel (BMP + Alb, Alk Phos, ALT, AST, Total. Bili, TP); Future  - Magnesium; Future  - Hemoglobin A1c; Future    Vitamin D deficiency, unspecified     - Vitamin D Deficiency; Future    Abnormal finding of blood chemistry, unspecified     - Hemoglobin A1c; Future    Thoracic aortic aneurysm without rupture (H)  Recheck; 4.4 cm in 2018;   - CT Chest w/o Contrast; Future    Other cardiomyopathies (H)  No signs of overt HF.     Stage 3 chronic kidney disease, unspecified whether stage 3a or 3b CKD (H)  Secondary risk factor modification.                BMI:   Estimated body mass index is 25.83 kg/m  as calculated from the following:    Height as of 10/6/21: 1.568 m (5' 1.75\").    Weight as of this encounter: 63.5 kg (140 lb 1.6 oz).           No follow-ups on file.    Layo Sevilla MD  [facility] MICHAELLE Vasquez is a 86 year old who presents for the following health issues     HPI     Acute Illness  Acute illness concerns: sinus pressure   Onset/Duration: since last visit.   Symptoms:  Fever: no  Chills/Sweats: no  Headache (location?): no  Sinus Pressure: YES  Conjunctivitis:  YES- left eye   Ear Pain: no  Rhinorrhea: no  Congestion: no  Sore Throat: no  Cough: YES-non-productive  Wheeze: YES- " "asthma   Decreased Appetite: no  Nausea: no  Vomiting: no  Diarrhea: no  Dysuria/Freq.: no  Dysuria or Hematuria: no  Fatigue/Achiness: no  Sick/Strep Exposure: no  Therapies tried and outcome: redoing exercises for therapy but not improving.     Ongoing sinus pressure.     Patient states her SOB is getting worse. Also states that she feels pressure underneath the eyes (near cheekbones). Patient reports that both legs \"feel funny\" but denies tingling, pain or numbness.     Legs no longer swollen, but feels \"not right\", bother her worse when walking;     Left eye bloodshot.     blood pressure at home running closer to 135/54.    Review of Systems         Objective    BP (!) 168/60 (BP Location: Right arm, Patient Position: Sitting, Cuff Size: Adult Regular)   Pulse 99   Temp 97.7  F (36.5  C) (Tympanic)   Resp 28   Wt 63.5 kg (140 lb 1.6 oz)   LMP  (LMP Unknown)   SpO2 95%   BMI 25.83 kg/m    Body mass index is 25.83 kg/m .  Physical Exam                   "

## 2022-03-22 LAB
ALBUMIN SERPL-MCNC: 3.6 G/DL (ref 3.4–5)
ALP SERPL-CCNC: 90 U/L (ref 40–150)
ALT SERPL W P-5'-P-CCNC: 27 U/L (ref 0–50)
ANION GAP SERPL CALCULATED.3IONS-SCNC: 8 MMOL/L (ref 3–14)
AST SERPL W P-5'-P-CCNC: 19 U/L (ref 0–45)
BILIRUB SERPL-MCNC: 0.6 MG/DL (ref 0.2–1.3)
BUN SERPL-MCNC: 29 MG/DL (ref 7–30)
CALCIUM SERPL-MCNC: 9.9 MG/DL (ref 8.5–10.1)
CHLORIDE BLD-SCNC: 101 MMOL/L (ref 94–109)
CO2 SERPL-SCNC: 27 MMOL/L (ref 20–32)
CREAT SERPL-MCNC: 1 MG/DL (ref 0.52–1.04)
CREAT UR-MCNC: 41 MG/DL
DEPRECATED CALCIDIOL+CALCIFEROL SERPL-MC: 37 UG/L (ref 20–75)
GFR SERPL CREATININE-BSD FRML MDRD: 55 ML/MIN/1.73M2
GLUCOSE BLD-MCNC: 132 MG/DL (ref 70–99)
MAGNESIUM SERPL-MCNC: 2.3 MG/DL (ref 1.6–2.3)
MICROALBUMIN UR-MCNC: 66 MG/L
MICROALBUMIN/CREAT UR: 160.98 MG/G CR (ref 0–25)
NT-PROBNP SERPL-MCNC: 1021 PG/ML (ref 0–450)
POTASSIUM BLD-SCNC: 3.9 MMOL/L (ref 3.4–5.3)
PROT SERPL-MCNC: 7.1 G/DL (ref 6.8–8.8)
SODIUM SERPL-SCNC: 136 MMOL/L (ref 133–144)
VIT B12 SERPL-MCNC: 273 PG/ML (ref 193–986)

## 2022-03-22 ASSESSMENT — ASTHMA QUESTIONNAIRES: ACT_TOTALSCORE: 10

## 2022-03-29 ENCOUNTER — HOSPITAL ENCOUNTER (OUTPATIENT)
Dept: CT IMAGING | Facility: CLINIC | Age: 87
Discharge: HOME | End: 2022-03-29
Attending: INTERNAL MEDICINE | Admitting: INTERNAL MEDICINE
Payer: COMMERCIAL

## 2022-03-29 DIAGNOSIS — I71.20 THORACIC AORTIC ANEURYSM WITHOUT RUPTURE (H): ICD-10-CM

## 2022-03-29 PROCEDURE — 71250 CT THORAX DX C-: CPT

## 2022-04-25 ENCOUNTER — NURSE TRIAGE (OUTPATIENT)
Dept: PEDIATRICS | Facility: CLINIC | Age: 87
End: 2022-04-25
Payer: COMMERCIAL

## 2022-04-25 ENCOUNTER — OFFICE VISIT (OUTPATIENT)
Dept: PEDIATRICS | Facility: CLINIC | Age: 87
End: 2022-04-25
Attending: NURSE PRACTITIONER
Payer: COMMERCIAL

## 2022-04-25 ENCOUNTER — HOSPITAL ENCOUNTER (OUTPATIENT)
Dept: ULTRASOUND IMAGING | Facility: CLINIC | Age: 87
Discharge: HOME | End: 2022-04-25
Attending: PHYSICIAN ASSISTANT | Admitting: PHYSICIAN ASSISTANT
Payer: COMMERCIAL

## 2022-04-25 VITALS
HEART RATE: 77 BPM | DIASTOLIC BLOOD PRESSURE: 73 MMHG | TEMPERATURE: 98 F | OXYGEN SATURATION: 95 % | SYSTOLIC BLOOD PRESSURE: 157 MMHG

## 2022-04-25 DIAGNOSIS — M79.661 PAIN OF RIGHT LOWER LEG: ICD-10-CM

## 2022-04-25 DIAGNOSIS — M71.21 BAKER'S CYST OF KNEE, RIGHT: Primary | ICD-10-CM

## 2022-04-25 PROCEDURE — 93971 EXTREMITY STUDY: CPT | Mod: RT

## 2022-04-25 PROCEDURE — 99215 OFFICE O/P EST HI 40 MIN: CPT | Performed by: PHYSICIAN ASSISTANT

## 2022-04-25 PROCEDURE — 99207 REFERRAL TO ACUTE AND DIAGNOSTIC SERVICES: CPT | Performed by: INTERNAL MEDICINE

## 2022-04-25 NOTE — TELEPHONE ENCOUNTER
"S-(situation): right lower leg pain      B-(background): last week on Tues or Wed (pt unsure) patient started having pain in right lower leg      A-(assessment): pain is located on front outer part of lower leg, pain rated at 7 or 8 out of 10 on pain scale,     pt denies: chest pain, back pain, rash, swelling, radiating pain, numbness, fever, sickness    Pain is when walking, pain is not present at rest      R-(recommendations): be seen at acute diagnostic center in [place] at 4:15pm today 4/25/22    Kaye Patrick RN        Reason for Disposition    Thigh or calf pain in only one leg and present > 1 hour    Additional Information    Negative: Looks like a broken bone or dislocated joint (e.g., crooked or deformed)    Negative: Sounds like a life-threatening emergency to the triager    Negative: Followed a hip injury    Negative: Followed a knee injury    Negative: Followed an ankle or foot injury    Negative: Back pain radiating (shooting) into leg(s)    Negative: Foot pain is the main symptom    Negative: Ankle pain is the main symptom    Negative: Knee pain is the main symptom    Negative: Leg swelling is the main symptom    Negative: Chest pain    Negative: Difficulty breathing    Negative: Entire foot is cool or blue in comparison to other side    Negative: Unable to walk    Negative: Fever and red area (or area very tender to touch)    Negative: Fever and swollen joint    Answer Assessment - Initial Assessment Questions  1. ONSET: \"When did the pain start?\"       Last week tues/wed  2. LOCATION: \"Where is the pain located?\"       Outer front lower right leg  3. PAIN: \"How bad is the pain?\"    (Scale 1-10; or mild, moderate, severe)    -  MILD (1-3): doesn't interfere with normal activities     -  MODERATE (4-7): interferes with normal activities (e.g., work or school) or awakens from sleep, limping     -  SEVERE (8-10): excruciating pain, unable to do any normal activities, unable to walk      7 or 8 out " "of 10, ok at rest, not bad when sitting, just painful when walking  4. WORK OR EXERCISE: \"Has there been any recent work or exercise that involved this part of the body?\"       no  5. CAUSE: \"What do you think is causing the leg pain?\"      unknown  6. OTHER SYMPTOMS: \"Do you have any other symptoms?\" (e.g., chest pain, back pain, breathing difficulty, swelling, rash, fever, numbness, weakness)      Denies swelling, rash, numbness, chest pain, back pain, fever, sickness  7. PREGNANCY: \"Is there any chance you are pregnant?\" \"When was your last menstrual period?\"      n/a    Protocols used: LEG PAIN-A-OH      "

## 2022-04-25 NOTE — RESULT ENCOUNTER NOTE
Results discussed directly with patient while patient was present. Any further details documented in the note.   Jaqueline Barboza PA-C

## 2022-04-26 ENCOUNTER — TELEPHONE (OUTPATIENT)
Dept: PEDIATRICS | Facility: CLINIC | Age: 87
End: 2022-04-26
Payer: COMMERCIAL

## 2022-04-26 DIAGNOSIS — K21.9 GASTROESOPHAGEAL REFLUX DISEASE WITHOUT ESOPHAGITIS: ICD-10-CM

## 2022-04-26 DIAGNOSIS — M71.20 SYNOVIAL CYST OF POPLITEAL SPACE, UNSPECIFIED LATERALITY: Primary | ICD-10-CM

## 2022-04-26 DIAGNOSIS — E78.5 HYPERLIPIDEMIA LDL GOAL <130: ICD-10-CM

## 2022-04-26 RX ORDER — HYDROCODONE BITARTRATE AND ACETAMINOPHEN 5; 325 MG/1; MG/1
1 TABLET ORAL EVERY 6 HOURS PRN
Qty: 18 TABLET | Refills: 0 | Status: SHIPPED | OUTPATIENT
Start: 2022-04-26 | End: 2022-04-29

## 2022-04-26 NOTE — TELEPHONE ENCOUNTER
Please call.  She can take ibuprofen 400 up to four times daily; if not helping, given prescription for some hydrocodone/acetaminophen; No driving.

## 2022-04-26 NOTE — TELEPHONE ENCOUNTER
Called the patient.     Patient reports that she has asthma, and has been told in the past that she should only take ibuprofen or tylenol for pain and avoid anything stronger.   Patient is unsure of the rationale for this recommendation, and wanted to verify with Dr. Sevilla that it is ok to take [place].    Routing to Dr. Sevilla:  -is it ok for the patient to take Norco while she has asthma?    Felipe Zimmerman RN on 4/26/2022 at 2:32 PM

## 2022-04-26 NOTE — TELEPHONE ENCOUNTER
Called the patient.     Relayed below note from Dr. Sevilla to the patient.     Patient verbalized understanding.     Felipe Zimmerman RN on 4/26/2022 at 3:37 PM

## 2022-04-26 NOTE — TELEPHONE ENCOUNTER
Patient was seen yesterday for cyst in knee by Jabari RODGERS in [facility].     The plan is to have Orthopedic referral, but patient cannot get in until next week.     Patient is recommending something for pain in the meantime.     Routing to Dr. Sevilla:  -recommendations for knee pain until patient can get in with Ortho?     Felipe Zimmerman RN on 4/26/2022 at 1:54 PM

## 2022-04-26 NOTE — TELEPHONE ENCOUNTER
General Call:     Who is calling:  Patient     Reason for Call:  was told that her US showed a baker's cyst and she can not get into Ortho till next week    What are your questions or concerns:  Baker's cyst is painful, what can she take for pain?    Date of last appointment with provider: 3/21/22    Okay to leave a detailed message:Yes at Home number on file [phone] (home)       Routing to RAFAEL Antonio LPN

## 2022-04-27 RX ORDER — OMEPRAZOLE 10 MG/1
CAPSULE, DELAYED RELEASE ORAL
Qty: 90 CAPSULE | Refills: 1 | Status: SHIPPED | OUTPATIENT
Start: 2022-04-27 | End: 2022-11-14

## 2022-04-27 RX ORDER — ATORVASTATIN CALCIUM 20 MG/1
TABLET, FILM COATED ORAL
Qty: 90 TABLET | Refills: 1 | Status: SHIPPED | OUTPATIENT
Start: 2022-04-27 | End: 2022-06-09

## 2022-04-27 NOTE — TELEPHONE ENCOUNTER
Prescription approved per [facility] Refill Protocol.  Cesilia Alves RN

## 2022-05-01 ENCOUNTER — HOSPITAL ENCOUNTER (EMERGENCY)
Facility: CLINIC | Age: 87
Discharge: HOME | End: 2022-05-01
Attending: EMERGENCY MEDICINE | Admitting: EMERGENCY MEDICINE
Payer: COMMERCIAL

## 2022-05-01 ENCOUNTER — APPOINTMENT (OUTPATIENT)
Dept: GENERAL RADIOLOGY | Facility: CLINIC | Age: 87
End: 2022-05-01
Attending: EMERGENCY MEDICINE
Payer: COMMERCIAL

## 2022-05-01 VITALS
TEMPERATURE: 98.1 F | WEIGHT: 137 LBS | RESPIRATION RATE: 20 BRPM | OXYGEN SATURATION: 91 % | BODY MASS INDEX: 25.26 KG/M2 | HEART RATE: 85 BPM | SYSTOLIC BLOOD PRESSURE: 127 MMHG | DIASTOLIC BLOOD PRESSURE: 42 MMHG

## 2022-05-01 DIAGNOSIS — M79.661 PAIN OF RIGHT LOWER LEG: ICD-10-CM

## 2022-05-01 DIAGNOSIS — J31.0 CHRONIC RHINITIS: ICD-10-CM

## 2022-05-01 LAB
ANION GAP SERPL CALCULATED.3IONS-SCNC: 2 MMOL/L (ref 3–14)
BASOPHILS # BLD AUTO: 0.1 10E3/UL (ref 0–0.2)
BASOPHILS NFR BLD AUTO: 1 %
BUN SERPL-MCNC: 22 MG/DL (ref 7–30)
CALCIUM SERPL-MCNC: 9.3 MG/DL (ref 8.5–10.1)
CHLORIDE BLD-SCNC: 100 MMOL/L (ref 94–109)
CO2 SERPL-SCNC: 29 MMOL/L (ref 20–32)
CREAT SERPL-MCNC: 0.81 MG/DL (ref 0.52–1.04)
EOSINOPHIL # BLD AUTO: 0.1 10E3/UL (ref 0–0.7)
EOSINOPHIL NFR BLD AUTO: 1 %
ERYTHROCYTE [DISTWIDTH] IN BLOOD BY AUTOMATED COUNT: 13.2 % (ref 10–15)
FLUAV RNA SPEC QL NAA+PROBE: NEGATIVE
FLUBV RNA RESP QL NAA+PROBE: NEGATIVE
GFR SERPL CREATININE-BSD FRML MDRD: 71 ML/MIN/1.73M2
GLUCOSE BLD-MCNC: 119 MG/DL (ref 70–99)
HCT VFR BLD AUTO: 33.8 % (ref 35–47)
HGB BLD-MCNC: 11 G/DL (ref 11.7–15.7)
HOLD SPECIMEN: NORMAL
IMM GRANULOCYTES # BLD: 0 10E3/UL
IMM GRANULOCYTES NFR BLD: 0 %
LYMPHOCYTES # BLD AUTO: 1.1 10E3/UL (ref 0.8–5.3)
LYMPHOCYTES NFR BLD AUTO: 14 %
MCH RBC QN AUTO: 29.9 PG (ref 26.5–33)
MCHC RBC AUTO-ENTMCNC: 32.5 G/DL (ref 31.5–36.5)
MCV RBC AUTO: 92 FL (ref 78–100)
MONOCYTES # BLD AUTO: 0.9 10E3/UL (ref 0–1.3)
MONOCYTES NFR BLD AUTO: 11 %
NEUTROPHILS # BLD AUTO: 5.9 10E3/UL (ref 1.6–8.3)
NEUTROPHILS NFR BLD AUTO: 73 %
NRBC # BLD AUTO: 0 10E3/UL
NRBC BLD AUTO-RTO: 0 /100
NT-PROBNP SERPL-MCNC: 1094 PG/ML (ref 0–1800)
PLATELET # BLD AUTO: 258 10E3/UL (ref 150–450)
POTASSIUM BLD-SCNC: 3.6 MMOL/L (ref 3.4–5.3)
RBC # BLD AUTO: 3.68 10E6/UL (ref 3.8–5.2)
RSV RNA SPEC NAA+PROBE: NEGATIVE
SARS-COV-2 RNA RESP QL NAA+PROBE: NEGATIVE
SODIUM SERPL-SCNC: 131 MMOL/L (ref 133–144)
TROPONIN I SERPL HS-MCNC: 11 NG/L
WBC # BLD AUTO: 8.1 10E3/UL (ref 4–11)

## 2022-05-01 PROCEDURE — 85025 COMPLETE CBC W/AUTO DIFF WBC: CPT | Performed by: EMERGENCY MEDICINE

## 2022-05-01 PROCEDURE — C9803 HOPD COVID-19 SPEC COLLECT: HCPCS

## 2022-05-01 PROCEDURE — 36415 COLL VENOUS BLD VENIPUNCTURE: CPT | Performed by: EMERGENCY MEDICINE

## 2022-05-01 PROCEDURE — 84484 ASSAY OF TROPONIN QUANT: CPT | Performed by: EMERGENCY MEDICINE

## 2022-05-01 PROCEDURE — 87637 SARSCOV2&INF A&B&RSV AMP PRB: CPT | Performed by: EMERGENCY MEDICINE

## 2022-05-01 PROCEDURE — 99284 EMERGENCY DEPT VISIT MOD MDM: CPT | Mod: 25

## 2022-05-01 PROCEDURE — 999N000065 XR CHEST PORT 1 VIEW

## 2022-05-01 PROCEDURE — 80048 BASIC METABOLIC PNL TOTAL CA: CPT | Performed by: EMERGENCY MEDICINE

## 2022-05-01 PROCEDURE — 83880 ASSAY OF NATRIURETIC PEPTIDE: CPT | Mod: GZ | Performed by: EMERGENCY MEDICINE

## 2022-05-01 RX ORDER — FLUTICASONE PROPIONATE 50 MCG
1 SPRAY, SUSPENSION (ML) NASAL DAILY
Qty: 9.9 ML | Refills: 0 | Status: ON HOLD | OUTPATIENT
Start: 2022-05-01 | End: 2022-05-11

## 2022-05-01 RX ORDER — FAMOTIDINE 20 MG/1
20 TABLET, FILM COATED ORAL 2 TIMES DAILY
Qty: 14 TABLET | Refills: 0 | Status: SHIPPED | OUTPATIENT
Start: 2022-05-01 | End: 2022-05-08

## 2022-05-01 ASSESSMENT — ENCOUNTER SYMPTOMS
CHILLS: 0
NAUSEA: 1
DIZZINESS: 1
FEVER: 0
MYALGIAS: 1
SINUS PRESSURE: 1

## 2022-05-01 NOTE — ED TRIAGE NOTES
Pt arrives from home w/ complaint of R sided leg pain. 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 Pt was seen on Monday for her pain, xrays were done, results normal and pt was discharged. Pt arrives w/ increasing R sided leg pain radiating from her knee to her bottom, pain that comes and goes, worsened w/ ambulation. Reports some new nausea this morning denies emesis. A&O x 4.

## 2022-05-01 NOTE — ED PROVIDER NOTES
History   Chief Complaint:  Leg Pain     HPI   Genie Persaud is a 86 year old female with history of HTN who presents with CC 2 week history of right leg pain. Pt saw PCP about 1 week ago, had negative x-ray and venous ultrasound. Pickard's cyst was noted. She reports she has follow-up scheduled with orthopedics in 5 days. She reports it only seems to hurt when she's up and moving around and is absent when she is sitting or laying down. She was given pain medication from her doctor and has tried ibuprofen and neither has helped the pain. She localizes the pain to her shin and reports she can move her knee normally.     She also reports acid reflux symptoms after she eats that has been ongoing for several years. She also reports sinus pressure and is dizziness that has also been ongoing for years. She denies fever or chills. She expresses great frustration with all of her symptoms and multiple doctor's visits without improvement in her symptoms.     Review of Systems   Constitutional: Negative for chills and fever.   HENT: Positive for sinus pressure.    Cardiovascular: Positive for chest pain (after she eats).   Gastrointestinal: Positive for nausea.   Musculoskeletal: Positive for myalgias.   Neurological: Positive for dizziness.   All other systems reviewed and are negative.    Allergies:  Fosamax [Alendronic Acid]  Contrast Dye  Evista [Raloxifene]  Flu Virus Vaccine  Amoxicillin    Medications:  Albuterol HFA  Norvasc  Lipitor  Advair HFA  Claritin  Singulair  Prilosec  Spiriva respimat  Citracal  Hyzaar  Ocuvite  Deltasone    Past Medical History:     Anemia  Basal cell carcinoma  CAD  Hypertension  Hyperlipidemia  Asthma  Nonrheumatic aortic valve insufficiency  Osteopenia  Paroxysmal atrial fibrillation  Pulmonary nodule  Stress-induced cardiomyopathy  Stroke  Thoracic aortic aneurysm without rupture  Vasculitis   Candidal esophagitis  Stroke  IBS  CKD, stage 3      Past Surgical History:    Appendectomy,  open  C stereotactic breast biopsy  Cholecystectomy, laparoscopic  EGD, combined  HC dilation/ curettage diag/ther non OB  Tonsillectomy  Ligate fallopian tube      Family History:    Mother: hypertension, osteoporosis, CAD  Father: connective tissue disorder, scleroderma   Child: breast cancer     Social History:  The patient presents to the ED via ambulance.   PCP: Layo Sevilla MD.     Physical Exam     Patient Vitals for the past 24 hrs:   BP Temp Temp src Pulse Resp SpO2 Weight   05/01/22 0630 122/52 -- -- 91 -- 90 % --   05/01/22 0615 128/42 -- -- 80 -- -- --   05/01/22 0513 (!) 143/55 98.1  F (36.7  C) Oral 89 20 94 % 62.1 kg (137 lb)       Physical Exam  Nursing note and vitals reviewed.  Constitutional: Cooperative.   HENT:   Mouth/Throat: Moist mucous membranes.   No tenderness to percussion over sinuses. No erythema/swelling. No nasal drainage.   Eyes: EOMI, nonicteric sclera  Cardiovascular: Normal rate, regular rhythm, no murmurs, rubs, or gallops. Normal DP/PT pulses bilaterally.   Pulmonary/Chest: Effort normal and breath sounds normal. No respiratory distress. No wheezes. No rales.   Abdominal: Soft. Nontender, nondistended, no guarding or rigidity.   Musculoskeletal: Normal range of motion of bilateral hips, knees, ankles. No pain to palpation of right knee and shin.   Neurological: Alert. Moves all extremities spontaneously.   Skin: Skin is warm and dry. No rash noted.       Emergency Department Course     Imaging:  XR Chest Port 1 View   Final Result   IMPRESSION: No convincing evidence of active cardiopulmonary disease.            Report per radiology    Laboratory:  Labs Ordered and Resulted from Time of ED Arrival to Time of ED Departure   BASIC METABOLIC PANEL - Abnormal       Result Value    Sodium 131 (*)     Potassium 3.6      Chloride 100      Carbon Dioxide (CO2) 29      Anion Gap 2 (*)     Urea Nitrogen 22      Creatinine 0.81      Calcium 9.3      Glucose 119 (*)     GFR Estimate 71      CBC WITH PLATELETS AND DIFFERENTIAL - Abnormal    WBC Count 8.1      RBC Count 3.68 (*)     Hemoglobin 11.0 (*)     Hematocrit 33.8 (*)     MCV 92      MCH 29.9      MCHC 32.5      RDW 13.2      Platelet Count 258      % Neutrophils 73      % Lymphocytes 14      % Monocytes 11      % Eosinophils 1      % Basophils 1      % Immature Granulocytes 0      NRBCs per 100 WBC 0      Absolute Neutrophils 5.9      Absolute Lymphocytes 1.1      Absolute Monocytes 0.9      Absolute Eosinophils 0.1      Absolute Basophils 0.1      Absolute Immature Granulocytes 0.0      Absolute NRBCs 0.0     NT PROBNP INPATIENT - Normal    N terminal Pro BNP Inpatient 1,094     INFLUENZA A/B & SARS-COV2 PCR MULTIPLEX - Normal    Influenza A PCR Negative      Influenza B PCR Negative      RSV PCR Negative      SARS CoV2 PCR Negative     TROPONIN I - Normal    Troponin I High Sensitivity 11        Emergency Department Course:         Reviewed:  I reviewed nursing notes, vitals, past medical history and Care Everywhere    Assessments:  0655 I obtained history and examined the patient as noted above.   0714 I rechecked the patient and explained findings.     The patient was discharged to home.     Impression & Plan     Medical Decision Making:  Pt presents with multiple complaints. Regarding her CC of RLE pain. I reviewed prior imaging without evidence of fracture, mass, or DVT. Baker's cyst noted which may be contributing to pain, but difficult to ascertain as pt notes pain seems to be in entire shin. Unable to reproduce pain on exam. She is neurovascularly intact distally. In the absence of limb threatening emergency, she is safe to follow-up with orthopedics as scheduled, especially when leg appears to only hurt with ambulation. Notably, she was noted to ambulate to the bathroom without any limp during course of ED stay.     Regarding her complaint of epigastric pain/heartburn after eating, this is a chronic complaint for her and therefore  very unlikely to represent emergent process. Regardless, troponin was obtained and is negative ruling out ACS. Pt notes certain foods seem to trigger symptoms more than others. We discussed common triggers of heartburn/acid reflex and she was encouraged to avoid those foods going forward.  She is already s/p cholecystectomy therefore biliary pathology is very unlikely to be contributing. She reports being told that she had a fungal infection of her esophagus after prior EGD. This appears to have been from 9/2/08, but subsequent EGDs suggested good response to treatment, though no EGD performed in the last 9 years. She was advised that she likely needs to get back in with GI to discuss repeat EGD given her symptoms. We will start pepcid in the meantime.     Finally, regarding her sinus pain/pressure, I reviewed extensive visits for this complaint. Pt is prescribed nasal sprays which she states she isn't currently taking. Etiology is thought to be allergic in nature. There does not appear to be any signs of mucormycosis or bacterial sinusitis. Pt feeling like prescribed nasal sprays aren't helping. Discussed that we can certainly try flonase again, but that she likely needs to discuss her symptoms with her specialist.     She is in stable condition at the time of discharge, indications for return to the ED were discussed as well as follow up. All questions were answered.     Covid-19  Genie Persaud was evaluated during a global COVID-19 pandemic, which necessitated consideration that the patient might be at risk for infection with the SARS-CoV-2 virus that causes COVID-19.   Applicable protocols for evaluation were followed during the patient's care.   COVID-19 was considered as part of the patient's evaluation. The plan for testing is:  a test was obtained during this visit.    Diagnosis:    ICD-10-CM    1. Pain of right lower leg  M79.661    2. Chronic rhinitis  J31.0        Discharge Medications:  New Prescriptions     FAMOTIDINE (PEPCID) 20 MG TABLET    Take 1 tablet (20 mg) by mouth 2 times daily for 7 days    FLUTICASONE (FLONASE) 50 MCG/ACT NASAL SPRAY    Spray 1 spray into both nostrils daily       Scribe Disclosure:  I, Jamie Restrepo, am serving as a scribe at 5:16 AM on 5/1/2022 to document services personally performed by Ko Cooper MD based on my observations and the provider's statements to me.      Ko Cooper MD  05/02/22 [id number]

## 2022-05-01 NOTE — DISCHARGE INSTRUCTIONS
No signs of emergency today.   Follow-up with orthopedics as scheduled on Thursday for your right leg pain.   Continue norco as needed for pain relief.   May also add ice or heat.     Start flonase for your nasal congestion. It appears your specialists have prescribed you nasal sprays for this as well that you should consider restarting.     We'll add pepcid for your treatment for acid reflux symptoms.    Follow-up with your primary care physician this week for recheck of your symptoms.     You can always return to the emergency department for worsening symptoms or new concerns.

## 2022-05-02 ENCOUNTER — TELEPHONE (OUTPATIENT)
Dept: PEDIATRICS | Facility: CLINIC | Age: 87
End: 2022-05-02
Payer: COMMERCIAL

## 2022-05-02 DIAGNOSIS — M79.662 PAIN OF LEFT LOWER LEG: Primary | ICD-10-CM

## 2022-05-02 RX ORDER — TRAMADOL HYDROCHLORIDE 50 MG/1
50 TABLET ORAL EVERY 6 HOURS PRN
Qty: 10 TABLET | Refills: 0 | Status: SHIPPED | OUTPATIENT
Start: 2022-05-02 | End: 2022-05-05

## 2022-05-02 NOTE — TELEPHONE ENCOUNTER
"Called the pt.    Reviewed provider message. Pt unable to  medication until tomorrow morning.      - Alexander \"Dung\" Kavita (he/him/his), RN - Patient Advocate Liason (PAL)  [facility]      "

## 2022-05-02 NOTE — TELEPHONE ENCOUNTER
"Called the pt.    Pt states that they are still struggling to manage pain in their right leg and knee. States that pain radiates from calf to thigh.    Pt seen in clinic and ER (last night), but pt frustrated that she was not prescribed anything for pain until she is seen on Thursday by ortho.    Pt is currently resting, icing the area intermittently, using compression stockings and some compression on knee, and elevating her leg. Pt states that she has used ibuprofen intermittently, but does not feel its been very helpful.    Encouraged the pt to continue with RICE therapy, and possibly try topical pain reliever like Voltaren if available, as pt has tolerated oral ibuprofen.    Dr Sevilla: Pt seen in-clinic for this last week and then ER. Seeking possible pain management until seen. Any recommendations beyond RICE, topical pain reliever? Pt scheduled to see ortho on Thursday.      - Alexander \"Dung\" Kavita (he/him/his), RN - Patient Advocate Liason (PAL)  [facility]      "

## 2022-05-02 NOTE — TELEPHONE ENCOUNTER
Patient is requesting to speak with a nurse to see what medication she can take until she can see Dr. Sevilla 5/5/22.  Please call her at [phone].  Zahira Gonzalez   [facility]  Central Scheduler

## 2022-05-02 NOTE — TELEPHONE ENCOUNTER
Please call.  We can try a short course of tramadol; faxed. Please have her watch for any dizziness.  Layo Sevilla MD  Internal Medicine and Pediatrics

## 2022-05-08 ENCOUNTER — HOSPITAL ENCOUNTER (EMERGENCY)
Facility: CLINIC | Age: 87
Discharge: HOME | End: 2022-05-08
Attending: EMERGENCY MEDICINE | Admitting: EMERGENCY MEDICINE
Payer: COMMERCIAL

## 2022-05-08 VITALS
DIASTOLIC BLOOD PRESSURE: 51 MMHG | TEMPERATURE: 98.3 F | SYSTOLIC BLOOD PRESSURE: 129 MMHG | RESPIRATION RATE: 18 BRPM | HEART RATE: 76 BPM | OXYGEN SATURATION: 94 %

## 2022-05-08 DIAGNOSIS — J32.9 CHRONIC CONGESTION OF PARANASAL SINUS: ICD-10-CM

## 2022-05-08 DIAGNOSIS — M54.50 ACUTE BILATERAL LOW BACK PAIN WITHOUT SCIATICA: ICD-10-CM

## 2022-05-08 DIAGNOSIS — R42 DIZZINESS: ICD-10-CM

## 2022-05-08 PROCEDURE — 93005 ELECTROCARDIOGRAM TRACING: CPT

## 2022-05-08 PROCEDURE — 99283 EMERGENCY DEPT VISIT LOW MDM: CPT

## 2022-05-08 PROCEDURE — 250N000011 HC RX IP 250 OP 636: Performed by: EMERGENCY MEDICINE

## 2022-05-08 PROCEDURE — 250N000013 HC RX MED GY IP 250 OP 250 PS 637: Performed by: EMERGENCY MEDICINE

## 2022-05-08 RX ORDER — ONDANSETRON 4 MG/1
4 TABLET, ORALLY DISINTEGRATING ORAL ONCE
Status: COMPLETED | OUTPATIENT
Start: 2022-05-08 | End: 2022-05-08

## 2022-05-08 RX ORDER — ACETAMINOPHEN 500 MG
1000 TABLET ORAL ONCE
Status: COMPLETED | OUTPATIENT
Start: 2022-05-08 | End: 2022-05-08

## 2022-05-08 RX ADMIN — ONDANSETRON 4 MG: 4 TABLET, ORALLY DISINTEGRATING ORAL at 18:06

## 2022-05-08 RX ADMIN — ACETAMINOPHEN 1000 MG: 500 TABLET, FILM COATED ORAL at 18:06

## 2022-05-08 ASSESSMENT — ENCOUNTER SYMPTOMS
BACK PAIN: 1
NAUSEA: 1
PALPITATIONS: 1
SINUS PRESSURE: 1

## 2022-05-08 NOTE — ED TRIAGE NOTES
Patient states she has been having pain in her lower back since this morning, patient states upon arrival that she is not having pain upon arrival.  Patient states she coughed once and than began having lower back pain that at it's worst was a 4/10     Triage Assessment     Row Name 05/08/22 [id number]       Triage Assessment (Adult)    Airway WDL WDL       Respiratory WDL    Respiratory WDL cough       Skin Circulation/Temperature WDL    Skin Circulation/Temperature WDL WDL       Cardiac WDL    Cardiac WDL WDL       Peripheral/Neurovascular WDL    Peripheral Neurovascular WDL WDL       Cognitive/Neuro/Behavioral WDL    Cognitive/Neuro/Behavioral WDL WDL

## 2022-05-08 NOTE — ED PROVIDER NOTES
History   Chief Complaint:  Back Pain       HPI   Genie Persaud is a 86 year old female with history of recent baker's cyst who presents with back pain since early afternoon. She reports the pain was in the lower back but is now mostly alleviated, noting 2/10 pain, worst at 4/10 pain earlier today. Also complains of nausea, facial pressure, and flutter.     Laboratory Results 5/1/22:  BMP: sodium 131, glucose 119, anion gap 2 o/w WNL (Creatinine 0.81)   CBC: WBC 8.1, HGB 11.0,    NT PRO BNP: 1,094  Influenza A/B Antigen: negative  Troponin: 11      Review of Systems   HENT: Positive for sinus pressure.    Cardiovascular: Positive for palpitations.   Gastrointestinal: Positive for nausea.   Musculoskeletal: Positive for back pain.     Allergies:  Fosamax [Alendronic Acid]  Contrast Dye  Evista [Raloxifene]  Flu Virus Vaccine  Amoxicillin    Medications:  Albuterol HFA  Norvasc  Lipitor  Advair HFA  Claritin  Singulair  Prilosec  Spiriva respimat  Citracal  Hyzaar  Ocuvite  Deltasone     Past Medical History:     Anemia  Basal cell carcinoma  CAD  Hypertension  Hyperlipidemia  Asthma  Nonrheumatic aortic valve insufficiency  Osteopenia  Paroxysmal atrial fibrillation  Pulmonary nodule  Stress-induced cardiomyopathy  Stroke  Thoracic aortic aneurysm without rupture  Vasculitis   Candidal esophagitis  Stroke  IBS  CKD, stage 3       Past Surgical History:    Appendectomy, open  C stereotactic breast biopsy  Cholecystectomy, laparoscopic  EGD, combined  HC dilation/ curettage diag/ther non OB  Tonsillectomy  Ligate fallopian tube       Family History:    Mother: hypertension, osteoporosis, CAD  Father: connective tissue disorder, scleroderma   Child: breast cancer     Social History:  Presents alone via EMS.   PCP: Di  Lives in own home.     Physical Exam     Patient Vitals for the past 24 hrs:   BP Temp Temp src Pulse Resp SpO2   05/08/22 2030 129/51 -- -- 76 18 94 %   05/08/22 2000 118/45 -- -- 89 -- 91 %   [facility]    [address]    [facility] [address]    Phone:  [phone]    Fax:  [phone]                                       Audrey Cobian   MRN: [id number]    Department:  [facility]   Date of Visit:  9/3/2018           After Visit Summary Signature Page     I have received my discharge instructions, and my questions have been answered. I have discussed any challenges I see with this plan with the nurse or doctor.    ..........................................................................................................................................  Patient/Patient Representative Signature      ..........................................................................................................................................  Patient Representative Print Name and Relationship to Patient    ..................................................               ................................................  Date                                            Time    ..........................................................................................................................................  Reviewed by Signature/Title    ...................................................              ..............................................  Date                                                            Time          22EPIC Rev 08/18           05/08/22 1945 (!) 150/52 -- -- 88 18 94 %   05/08/22 1900 137/70 -- -- 82 -- 93 %   05/08/22 1845 -- -- -- -- -- 93 %   05/08/22 1830 (!) 140/81 -- -- 81 -- 96 %   05/08/22 1800 138/53 98.3  F (36.8  C) Oral 89 20 95 %       Physical Exam  General: Patient is alert and cooperative.  HENT:  Normal nose, oropharynx. Moist oral mucosa.  Eyes: EOMI. Normal conjunctiva.  Neck:  Normal range of motion and appearance.   Cardiovascular:  Normal rate, regular rhythm and normal heart sounds.   Pulmonary/Chest:  Effort normal. No wheezing or crackles.  Abdominal: Soft. No distension or tenderness.     Musculoskeletal: Normal range of motion. No edema or tenderness.   Neurological: oriented, normal strength, sensation, and coordination.   Skin: Warm and dry. No rash or bruising.   Psychiatric: Normal mood and affect. Normal behavior and judgement.      Emergency Department Course   ECG  ECG obtained at 1756, ECG read at 1800  Sinus tachycardia with blocked premature atrial complexes with occasional premature ventricular complexes  Nonspecific T wave abnormality  Abnormal ECG   Rate 103 bpm. MT interval 176 ms. QRS duration 96 ms. QT/QTc 400/524 ms. P-R-T axes 72 27 64.     Emergency Department Course:       Reviewed:  I reviewed nursing notes, vitals, past medical history and Care Everywhere    Assessments:  1745 I obtained history and examined the patient as noted above.   2000 I rechecked the patient and explained findings. She had some orthostatics done.    The patient's orthostatic vitals were monitored here in the emergency department and found to be 150/52 with a pulse of 80 while lying, 111/73 with a pulse of 82 while sitting, and 109/81 with a pulse of 91 standing.     Interventions:  1806 Zofran 4 mg PO  1806 Tylenol 1000 mg PO    Disposition:  The patient was discharged to home.     Impression & Plan     Medical Decision Making:  Afebrile and hemodynamically stable 86-year-old female arrives via ambulance with  myriad complaints.  This was initially billed as some low back discomfort brought on by a cough according to medics.  While she does endorse this, she also has a number of other concerns including chronic facial and sinus pressure for over the past year as well as some mild nausea without vomiting and a sensation of heart palpitations without chest pain.  She feels a bit dizzy at times.  Her physical exam is unremarkable.  She was treated with Zofran and Tylenol.  Orthostatic vital signs were performed and are unremarkable.  An EKG depicts a sinus rhythm with PVCs and PACs.  The EMR was reviewed.  She was seen in this emergency department on May 1 at which time she had a variety of unremarkable testing done including negative influenza test, negative COVID test, normal CBC, BMP, and troponin.  She also had a normal chest x-ray done.  She had multiple complaints at that time as well including some leg discomfort, heartburn, and sinus pressure.  It was clear at that time that patient had had extensive numerous previous medical evaluations for that there is no concern for an acute process or bacterial sinusitis.  I am in agreement with that today.  She was reassured and is stable for discharge and can follow-up with her provider to readdress her multiple symptoms.    Diagnosis:    ICD-10-CM    1. Acute bilateral low back pain without sciatica  M54.50    2. Dizziness  R42    3. Chronic congestion of paranasal sinus  J32.9        Discharge Medications:  New Prescriptions    No medications on file       Scribe Disclosure:  Roro KINSEY, am serving as a scribe at 5:42 PM on 5/8/2022 to document services personally performed by Vidal Lott MD based on my observations and the provider's statements to me.              Vidal Lott MD  05/10/22 2861

## 2022-05-08 NOTE — ED NOTES
Bed: ED14  Expected date: 5/8/22  Expected time:   Means of arrival: Ambulance  Comments:  [facility]-87 yo

## 2022-05-09 LAB
ATRIAL RATE - MUSE: 122 BPM
DIASTOLIC BLOOD PRESSURE - MUSE: NORMAL MMHG
INTERPRETATION ECG - MUSE: NORMAL
P AXIS - MUSE: 72 DEGREES
PR INTERVAL - MUSE: 176 MS
QRS DURATION - MUSE: 96 MS
QT - MUSE: 400 MS
QTC - MUSE: 524 MS
R AXIS - MUSE: 27 DEGREES
SYSTOLIC BLOOD PRESSURE - MUSE: NORMAL MMHG
T AXIS - MUSE: 64 DEGREES
VENTRICULAR RATE- MUSE: 103 BPM

## 2022-05-09 NOTE — ED NOTES
Pt ambulated length of hallway with assistance of walker.  Gait chanel and steady.  Denies dizziness or lightheadedness.  C/o slight tightness to posterior thighs.    Pt reports fear of possible MS or Parkinson's as she read something about a correlation between her sinus pressure and tightness in her posterior thighs with ambulation.  MD updated.

## 2022-05-10 ENCOUNTER — TELEPHONE (OUTPATIENT)
Dept: PEDIATRICS | Facility: CLINIC | Age: 87
End: 2022-05-10

## 2022-05-10 NOTE — TELEPHONE ENCOUNTER
Wilda, Daughter of the patient called in. [phone].  No CTC on file.     Patient has been to ED twice in the past 2 weeks for pressure in her head, and pain in the legs.   Patient has complained of this pressure in the head for years.     ED has told her that symptoms are likely due to lack of exercise.    Patient is having difficulty getting to her mailbox, and making food for herself.   Neighbor has been getting mail for her, and  is delivering food for her.     Daughter would like to move the patient to senior housing, but the patient is resistant to this idea.   Is on a waiting list at [facility], which is a long term facility that the patient has said she would live in if necessary.     Increased weakness, and decreased ability to perform ADL's over the past 6 months.   Patient is very isolated, and has been afraid to leave the house for over a year due to covid, so is feeling isolated.   Patient strongly has voiced resistance to getting the vaccine for COVID due to fear of allergy.   Patient has asthma.   Patient may have anxiety.    Requesting an appointment with Dr. Sevilla to discuss symptoms, as well as living arrangement, home care, etc.   Needs CTC with Wilda    Called the patient.   Unable to leave voicemail, busy signal.     When patient calls back:  -assist the patient in scheduling a visit with Dr. Sevilla.     eFlipe Zimmerman RN on 5/10/2022 at 9:30 AM

## 2022-05-11 ENCOUNTER — HOSPITAL ENCOUNTER (OUTPATIENT)
Facility: CLINIC | Age: 87
Setting detail: OBSERVATION
Discharge: NURSING, REHAB OR LONG-TERM CARE | End: 2022-05-12
Attending: EMERGENCY MEDICINE | Admitting: HOSPITALIST
Payer: COMMERCIAL

## 2022-05-11 ENCOUNTER — APPOINTMENT (OUTPATIENT)
Dept: PHYSICAL THERAPY | Facility: CLINIC | Age: 87
End: 2022-05-11
Attending: EMERGENCY MEDICINE
Payer: COMMERCIAL

## 2022-05-11 ENCOUNTER — APPOINTMENT (OUTPATIENT)
Dept: MRI IMAGING | Facility: CLINIC | Age: 87
End: 2022-05-11
Attending: EMERGENCY MEDICINE
Payer: COMMERCIAL

## 2022-05-11 DIAGNOSIS — M48.061 FORAMINAL STENOSIS OF LUMBAR REGION: ICD-10-CM

## 2022-05-11 DIAGNOSIS — M25.551 HIP PAIN, RIGHT: ICD-10-CM

## 2022-05-11 DIAGNOSIS — N18.30 STAGE 3 CHRONIC KIDNEY DISEASE, UNSPECIFIED WHETHER STAGE 3A OR 3B CKD (H): Primary | ICD-10-CM

## 2022-05-11 DIAGNOSIS — M54.41 BILATERAL LOW BACK PAIN WITH RIGHT-SIDED SCIATICA, UNSPECIFIED CHRONICITY: ICD-10-CM

## 2022-05-11 LAB
ALBUMIN UR-MCNC: NEGATIVE MG/DL
APPEARANCE UR: CLEAR
BILIRUB UR QL STRIP: NEGATIVE
COLOR UR AUTO: ABNORMAL
GLUCOSE UR STRIP-MCNC: NEGATIVE MG/DL
HGB UR QL STRIP: NEGATIVE
KETONES UR STRIP-MCNC: NEGATIVE MG/DL
LEUKOCYTE ESTERASE UR QL STRIP: ABNORMAL
NITRATE UR QL: NEGATIVE
PH UR STRIP: 7 [PH] (ref 5–7)
RBC URINE: 1 /HPF
SARS-COV-2 RNA RESP QL NAA+PROBE: NEGATIVE
SP GR UR STRIP: 1.01 (ref 1–1.03)
SQUAMOUS EPITHELIAL: 1 /HPF
UROBILINOGEN UR STRIP-MCNC: NORMAL MG/DL
WBC URINE: 1 /HPF

## 2022-05-11 PROCEDURE — 97530 THERAPEUTIC ACTIVITIES: CPT | Mod: GP | Performed by: PHYSICAL THERAPIST

## 2022-05-11 PROCEDURE — 99285 EMERGENCY DEPT VISIT HI MDM: CPT | Mod: 25

## 2022-05-11 PROCEDURE — 81001 URINALYSIS AUTO W/SCOPE: CPT | Performed by: EMERGENCY MEDICINE

## 2022-05-11 PROCEDURE — 99220 PR INITIAL OBSERVATION CARE,LEVEL III: CPT | Performed by: PHYSICIAN ASSISTANT

## 2022-05-11 PROCEDURE — C9803 HOPD COVID-19 SPEC COLLECT: HCPCS

## 2022-05-11 PROCEDURE — 72148 MRI LUMBAR SPINE W/O DYE: CPT

## 2022-05-11 PROCEDURE — 250N000013 HC RX MED GY IP 250 OP 250 PS 637: Performed by: PHYSICIAN ASSISTANT

## 2022-05-11 PROCEDURE — 250N000013 HC RX MED GY IP 250 OP 250 PS 637: Performed by: EMERGENCY MEDICINE

## 2022-05-11 PROCEDURE — G0378 HOSPITAL OBSERVATION PER HR: HCPCS

## 2022-05-11 PROCEDURE — 97116 GAIT TRAINING THERAPY: CPT | Mod: GP | Performed by: PHYSICAL THERAPIST

## 2022-05-11 PROCEDURE — 84300 ASSAY OF URINE SODIUM: CPT | Performed by: INTERNAL MEDICINE

## 2022-05-11 PROCEDURE — 97161 PT EVAL LOW COMPLEX 20 MIN: CPT | Mod: GP | Performed by: PHYSICAL THERAPIST

## 2022-05-11 PROCEDURE — U0003 INFECTIOUS AGENT DETECTION BY NUCLEIC ACID (DNA OR RNA); SEVERE ACUTE RESPIRATORY SYNDROME CORONAVIRUS 2 (SARS-COV-2) (CORONAVIRUS DISEASE [COVID-19]), AMPLIFIED PROBE TECHNIQUE, MAKING USE OF HIGH THROUGHPUT TECHNOLOGIES AS DESCRIBED BY CMS-2020-01-R: HCPCS | Performed by: EMERGENCY MEDICINE

## 2022-05-11 RX ORDER — AMOXICILLIN 250 MG
1 CAPSULE ORAL 2 TIMES DAILY PRN
Status: DISCONTINUED | OUTPATIENT
Start: 2022-05-11 | End: 2022-05-12 | Stop reason: HOSPADM

## 2022-05-11 RX ORDER — AMOXICILLIN 250 MG
2 CAPSULE ORAL 2 TIMES DAILY PRN
Status: DISCONTINUED | OUTPATIENT
Start: 2022-05-11 | End: 2022-05-12 | Stop reason: HOSPADM

## 2022-05-11 RX ORDER — LOSARTAN POTASSIUM AND HYDROCHLOROTHIAZIDE 12.5; 5 MG/1; MG/1
1 TABLET ORAL DAILY
Status: DISCONTINUED | OUTPATIENT
Start: 2022-05-11 | End: 2022-05-12 | Stop reason: HOSPADM

## 2022-05-11 RX ORDER — NALOXONE HYDROCHLORIDE 0.4 MG/ML
0.2 INJECTION, SOLUTION INTRAMUSCULAR; INTRAVENOUS; SUBCUTANEOUS
Status: DISCONTINUED | OUTPATIENT
Start: 2022-05-11 | End: 2022-05-12 | Stop reason: HOSPADM

## 2022-05-11 RX ORDER — ATORVASTATIN CALCIUM 20 MG/1
20 TABLET, FILM COATED ORAL EVERY EVENING
Status: DISCONTINUED | OUTPATIENT
Start: 2022-05-11 | End: 2022-05-12 | Stop reason: HOSPADM

## 2022-05-11 RX ORDER — LIDOCAINE 4 G/G
1 PATCH TOPICAL ONCE
Status: COMPLETED | OUTPATIENT
Start: 2022-05-11 | End: 2022-05-11

## 2022-05-11 RX ORDER — LIDOCAINE 40 MG/G
CREAM TOPICAL
Status: DISCONTINUED | OUTPATIENT
Start: 2022-05-11 | End: 2022-05-12 | Stop reason: HOSPADM

## 2022-05-11 RX ORDER — HYDROCODONE BITARTRATE AND ACETAMINOPHEN 5; 325 MG/1; MG/1
1 TABLET ORAL ONCE
Status: COMPLETED | OUTPATIENT
Start: 2022-05-11 | End: 2022-05-11

## 2022-05-11 RX ORDER — PANTOPRAZOLE SODIUM 20 MG/1
20 TABLET, DELAYED RELEASE ORAL DAILY
Status: DISCONTINUED | OUTPATIENT
Start: 2022-05-11 | End: 2022-05-12 | Stop reason: HOSPADM

## 2022-05-11 RX ORDER — ONDANSETRON 4 MG/1
4 TABLET, ORALLY DISINTEGRATING ORAL EVERY 6 HOURS PRN
Status: DISCONTINUED | OUTPATIENT
Start: 2022-05-11 | End: 2022-05-12 | Stop reason: HOSPADM

## 2022-05-11 RX ORDER — ACETAMINOPHEN 325 MG/1
650 TABLET ORAL ONCE
Status: COMPLETED | OUTPATIENT
Start: 2022-05-11 | End: 2022-05-11

## 2022-05-11 RX ORDER — NALOXONE HYDROCHLORIDE 0.4 MG/ML
0.4 INJECTION, SOLUTION INTRAMUSCULAR; INTRAVENOUS; SUBCUTANEOUS
Status: DISCONTINUED | OUTPATIENT
Start: 2022-05-11 | End: 2022-05-12 | Stop reason: HOSPADM

## 2022-05-11 RX ORDER — ACETAMINOPHEN 325 MG/1
975 TABLET ORAL EVERY 8 HOURS
Status: DISCONTINUED | OUTPATIENT
Start: 2022-05-11 | End: 2022-05-12 | Stop reason: HOSPADM

## 2022-05-11 RX ORDER — ONDANSETRON 2 MG/ML
4 INJECTION INTRAMUSCULAR; INTRAVENOUS EVERY 6 HOURS PRN
Status: DISCONTINUED | OUTPATIENT
Start: 2022-05-11 | End: 2022-05-12 | Stop reason: HOSPADM

## 2022-05-11 RX ORDER — AMLODIPINE BESYLATE 5 MG/1
5 TABLET ORAL AT BEDTIME
Status: DISCONTINUED | OUTPATIENT
Start: 2022-05-11 | End: 2022-05-12 | Stop reason: HOSPADM

## 2022-05-11 RX ADMIN — OXYCODONE HYDROCHLORIDE 2.5 MG: 5 TABLET ORAL at 23:52

## 2022-05-11 RX ADMIN — ACETAMINOPHEN 975 MG: 325 TABLET, FILM COATED ORAL at 23:53

## 2022-05-11 RX ADMIN — PANTOPRAZOLE SODIUM 20 MG: 20 TABLET, DELAYED RELEASE ORAL at 20:35

## 2022-05-11 RX ADMIN — LIDOCAINE 1 PATCH: 246 PATCH TOPICAL at 09:25

## 2022-05-11 RX ADMIN — HYDROCODONE BITARTRATE AND ACETAMINOPHEN 1 TABLET: 5; 325 TABLET ORAL at 08:49

## 2022-05-11 RX ADMIN — ATORVASTATIN CALCIUM 20 MG: 20 TABLET, FILM COATED ORAL at 20:35

## 2022-05-11 RX ADMIN — ACETAMINOPHEN 650 MG: 325 TABLET, FILM COATED ORAL at 08:49

## 2022-05-11 ASSESSMENT — ENCOUNTER SYMPTOMS
DYSURIA: 0
CONSTIPATION: 0
BACK PAIN: 1
ABDOMINAL PAIN: 0
DIARRHEA: 0
FEVER: 0
FREQUENCY: 1
NUMBNESS: 0

## 2022-05-11 ASSESSMENT — ACTIVITIES OF DAILY LIVING (ADL): DEPENDENT_IADLS:: INDEPENDENT

## 2022-05-11 NOTE — TELEPHONE ENCOUNTER
Wilda, daughter of patient calls again.  There is no CTC on file to discuss with family.  Daughter is aware of that.  She is aware that I can take the information down but can't release any information to her.    Reporting patient called 911 again today. Daughter states that patient is weak, can't walk to the mailbox to get her mail as she has no strength in her legs, seems more depressed and very anxious.    Daughter lives in [place], another daughter lives in another state and patient also has a son who does not live close either.  Wilda wanted us to know that the family wants to be helpful, but they live far.    Patient currently in the ER-no notes yet.  .  Wilda is interested in having patient go to a TCU to get better as she has called 911 three times in May as she was scared as she was getting weaker.  She would like them to consider placement in a TCU and not send patient home.   I let Wilda know that I will send this to our care coordination who can in turn let the hospital care coordination know/communicate with- that family is interested in a TCU placement. There is consent to communicate on file for the hospital team that was signed, per Wilda.  She is aware that the hospital team is in charge of decision making at this time, but the message will be sent to communicate with them to help the patient.  Will have to sign the CTC for the clinic.    Routing to [facility] care coordination [facility] [place].    Aurora Wolf, RAFAEL  Message handled by Nurse Triage.

## 2022-05-11 NOTE — PROGRESS NOTES
[facility]      OUTPATIENT PHYSICAL THERAPY EVALUATION  PLAN OF TREATMENT FOR OUTPATIENT REHABILITATION  (COMPLETE FOR INITIAL CLAIMS ONLY)  Patient's Last Name, First Name, M.I.  YOB: 1935  Genie Persaud                        Provider's Name  [facility] Medical Record No.  [id number]                               Onset Date:  05/11/22   Start of Care Date:  05/11/22      Type:     _X_PT   ___OT   ___SLP Medical Diagnosis:                           PT Diagnosis:  Decreased functional  mobility   Visits from SOC:  1   _________________________________________________________________________________  Plan of Treatment/Functional Goals    Planned Interventions: balance training, bed mobility training, gait training, stair training, strengthening, stretching, patient/family education, transfer training, progressive activity/exercise, risk factor education     Goals: See Physical Therapy Goals on Care Plan in [employer] electronic health record.    Therapy Frequency: 5x/week  Predicted Duration of Therapy Intervention: 05/12/22  _________________________________________________________________________________    I CERTIFY THE NEED FOR THESE SERVICES FURNISHED UNDER        THIS PLAN OF TREATMENT AND WHILE UNDER MY CARE     (Physician co-signature of this document indicates review and certification of the therapy plan).                Certification date from: 05/11/22, Certification date to: 05/12/22    Referring Physician: Shila Mendez            Initial Assessment        See Physical Therapy evaluation dated 05/11/22 in Epic electronic health record.

## 2022-05-11 NOTE — ED NOTES
"[facility]  ED Nurse Handoff Report    Genie Persaud is a 86 year old female   ED Chief complaint: Back Pain  . ED Diagnosis:   Final diagnoses:   Bilateral low back pain with right-sided sciatica, unspecified chronicity   Foraminal stenosis of lumbar region     Allergies:   Allergies   Allergen Reactions     Fosamax [Alendronic Acid] GI Disturbance     Contrast Dye      Red arm redness and swelling      Evista [Raloxifene]      abd symptoms.      Flu Virus Vaccine      swollen and red at inj site     Amoxicillin Rash       Code Status: Prior  Activity level - Baseline/Home:  Independent. Activity Level - Current:   Assist X 1. Lift room needed: No. Bariatric: No   Needed: No   Isolation: No. Infection: Not Applicable.     Vital Signs:   Vitals:    05/11/22 1130 05/11/22 1200 05/11/22 1420 05/11/22 1545   BP: 123/69 120/62 (!) 146/81 (!) 143/56   Pulse: 79 81  76   Resp:       Temp:       TempSrc:       SpO2: 96% 98%  96%       Cardiac Rhythm:  ,      Pain level:    Patient confused: No. Patient Falls Risk: Yes.   Elimination Status: Has voided   Patient Report - Initial Complaint: Brought in by EMS. Patient out walking last evening around 1700. Patient stumbled onto right knee, \"I didn't fall\". Patient c/o lower back pain. Has tried tylenol/ibuprofen without relief. Patient states pain extends across lower back. Patient denies knee pain. ABCs intact. VSS stable. EMS provided patient with warm pack.    . Focused Assessment: Back Pain Assessments - General Mobility: mildly impaired; generalized weakness   Back Pain Assessment - Back Pain Location: flank  Description/Character: acute; aching   Respiratory WDL - Respiratory WDL: WDL   Cardiac WDL - Cardiac WDL: WDL   Musculoskeletal WDL - Musculoskeletal WDL: .WDL except  General Mobility: mildly impaired; generalized weakness   Coping - Observed Emotional State: calm; cooperative    Tests Performed: MRI . Abnormal Results:   Labs Ordered and " Resulted from Time of ED Arrival to Time of ED Departure   ROUTINE UA WITH MICROSCOPIC REFLEX TO CULTURE - Abnormal       Result Value    Color Urine Light Yellow      Appearance Urine Clear      Glucose Urine Negative      Bilirubin Urine Negative      Ketones Urine Negative      Specific Gravity Urine 1.009      Blood Urine Negative      pH Urine 7.0      Protein Albumin Urine Negative      Urobilinogen Urine Normal      Nitrite Urine Negative      Leukocyte Esterase Urine Small (*)     RBC Urine 1      WBC Urine 1      Squamous Epithelials Urine 1     COVID-19 VIRUS (CORONAVIRUS) BY PCR     Lumbar spine MRI w/o contrast   Final Result   IMPRESSION:   1. Diffuse degenerative change of the lumbar spine as detailed above.   2. Moderate to severe neural foraminal stenosis on the right at L4-L5   and on the left at L5-S1.   3. No other significant spinal canal or neural foraminal narrowing of   the lumbar spine.   4. No fractures.       KEV BAEZA MD            SYSTEM ID:  [id number]        .   Treatments provided: see MAR  Family Comments: daughter updated via phone   OBS brochure/video discussed/provided to patient:  Yes  ED Medications:   Medications   Lidocaine (LIDOCARE) 4 % Patch 1 patch (1 patch Transdermal Patch/Med Applied 5/11/22 0925)   HYDROcodone-acetaminophen (NORCO) 5-325 MG per tablet 1 tablet (1 tablet Oral Given 5/11/22 0849)   acetaminophen (TYLENOL) tablet 650 mg (650 mg Oral Given 5/11/22 0849)     Drips infusing:  No  For the majority of the shift, the patient's behavior Green. Interventions performed were NA.    Sepsis treatment initiated: No     Patient tested for COVID 19 prior to admission: YES    ED Nurse Name/Phone Number: Marcie Persaud RN,   5:30 PM    RECEIVING UNIT ED HANDOFF REVIEW    Above ED Nurse Handoff Report was reviewed: Yes  Reviewed by: Rosa Bray RN on May 11, 2022 at 6:25 PM

## 2022-05-11 NOTE — ED PROVIDER NOTES
3rd visit for back pain. Lives alone and daughter is worried. SW working on TCU after PT eval in the ER.     Potential TCU bed available tomorrow, as such we will plan for observation admission.  Case discussed with and ANA MARIA Cramer MD  [employer]  3:35 PM 05/11/22        Pantera Zazueta MD  05/11/22 8018

## 2022-05-11 NOTE — ED NOTES
Care Management Initial Consult    General Information  Assessment completed with: Patient,    Type of CM/SW Visit: Initial Assessment    Primary Care Provider verified and updated as needed: Yes   Readmission within the last 30 days: no previous admission in last 30 days   Return Category: New Diagnosis  Reason for Consult: discharge planning  Advance Care Planning:          Communication Assessment  Patient's communication style: spoken language (English or Bilingual)       Cognitive  Cognitive/Neuro/Behavioral:                        Living Environment:   People in home: alone     Current living Arrangements: house      Able to return to prior arrangements: yes    Family/Social Support:  Care provided by: self, other (see comments)  Provides care for: no one, unable/limited ability to care for self  Marital Status: Single  Children, Neighbor          Description of Support System: Involved    Support Assessment: Lacks necessary supervision and assistance    Current Resources:   Patient receiving home care services: No     Community Resources: None  Equipment currently used at home: walker, standard  Supplies currently used at home: None    Employment/Financial:  Employment Status: retired, RN Aid at [employer]   Financial Concerns:   no     Lifestyle & Psychosocial Needs:  Social Determinants of Health     Tobacco Use: Low Risk      Smoking Tobacco Use: Never Smoker     Smokeless Tobacco Use: Never Used   Alcohol Use: Not on file   Financial Resource Strain: Not on file   Food Insecurity: Not on file   Transportation Needs: Not on file   Physical Activity: Not on file   Stress: Not on file   Social Connections: Not on file   Intimate Partner Violence: Not on file   Depression: Not at risk     PHQ-2 Score: 0   Housing Stability: Not on file     Functional Status:  Prior to admission patient needed assistance:   Dependent ADLs:: Ambulation-walker  Dependent IADLs:: Independent  Assesssment of Functional Status: Not  at baseline with ADL Functioning    Mental Health Status:  Mental Health Status: No Current Concerns     Chemical Dependency Status:  Chemical Dependency Status: No Current Concerns       Values/Beliefs:  Spiritual, Cultural Beliefs, Mandaen Practices, Values that affect care:           Additional Information:  ED requested SW to speak with pt. Writer met with pt at bedside and introduced self and role. Pt reports she lives in a 1 level home with a basement. 3 steps to enter the front door and 2 into the garage door. Pt is normally independent with mobility, ADL's, medication and still drives. Pt reports the last 2 weeks she has been using a walker and getting her groceries delivered. Pt is not COVID vaccinated. Pt states she has multiple allergies and had an allergic reaction to the flu shot that sent her to the hospital... therefor is resistant to the COVID vaccine. Pt reports her neighbor assist with brining her mail and taking out the trash. Pt has 3 adult children (Wilda, Brandon, and Elizabeth). Brandon is the only child who lives locally in [place] and assist with yard work and transportation. Pt reports her daughter Wilda who lives in [place] is in charge of her health care decisions. Pt is aware that her daughter has placed her on a waiting list at [facility]. Discussed possible TCU vs Home care. Pt feels if her pain decreased she would be fine to return home today. Pt would prefer to return home and states she has had [facility] home care in the past and would be open to home care again. 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 Writer left a TCU list in pt's room.     Requested writer send home care referral. Referral placed to [facility] home care.     CLAUDIA Regalado

## 2022-05-11 NOTE — H&P
History and Physical     Genie Persaud MRN# [id number]   YOB: 1935 Age: 86 year old      Date of Admission:  5/11/2022    Primary care provider: Layo Sevilla          Assessment and Plan:   Genie Persaud is a 86 year old female with a PMH significant for CAD, HTN, HLP, asthma, paroxysmal atrial fibrillation, cardiomyopathy and anemia of chronic disease who presents with atraumatic right leg pain with ambulation and low back pain.     Patient was discussed with Dr. Zazueta, who was provider in ED. Chart review of ED work up was reviewed as well as chart review of Care Everywhere, previous visits and admissions.     #Atraumatic right leg pain with ambulation  -Patient has been seen in the emergency room with complaints of right leg pain on 5/1, 5/8 and 5/11.  X-ray of the leg was negative and ultrasound showed a right Baker's cyst which is thought to be the source of her pain.  -Unfortunately is not able to tolerate the pain at home with request for TCU placement  -PT assessed patient on 5/11 with recommendation for TCU versus 24/7 assist at home  -Social work was unable to find TCU placement from the ED so observation admission requested  -Plan for scheduled Tylenol, as needed ibuprofen, as needed oxycodone, icing  -PT and social work consults    #Asthma  -Lungs are clear  -Continue inhalers if she has    #Paroxymal atrial fibrillation  -Not on anticoagulation, I am not entirely clear as to why  -Not on any rate controlling medicines    #Thoracic aortic aneurysm  -Last imaging in 2018 showed at 4.4 cm    #History of CAD  #History of nonischemic cardiomyopathy  -No complaints of chest discomfort  -Last echo in 2018 showed EF of 55%, increased ventricular filling pressures, borderline inferior lateral wall hypokinesis and the left atrium is moderately dilated  -No evidence of heart failure      #Hypertension  -Continue PTA amlodipine and losartan/hydrochlorothiazide    #HLP  -Continue  atorvastatin    #GERD  -Continue omeprazole        Social: Lives independently  Code: Discussed with patient and they have chosen DNR/DNI  VTE prophylaxis: Lovenox  Disposition: Observation                    Chief Complaint:   Right leg pain         History of Present Illness:   Genie Persaud is a 86 year old female who presents with low back pain, right leg pain with difficulty walking and failure to thrive at home.  Today was her third visit related to back pain/right leg pain.  She was recently diagnosed with a right-sided Baker's cyst and has had persistent right leg pain with ambulation due to this.  Up until the last couple days she was still driving and doing her normal chores but the last couple days have been difficult for her and she has mostly been staying at home sitting down.  She also recently started to use a walker.  She denies significant pain if she is lying or just sitting.  She denies shortness of breath, cough, fever, vomiting, abdominal pain, urinary symptoms and diarrhea.  She does have some nausea with eating.  She does not drink alcohol regularly or smoke cigarettes.  She lives independently.              Past Medical History:     Past Medical History:   Diagnosis Date     Anemia 03/10/2009    Chronic disease, by Fe studies; awaiting full GI w/u and repeat colonoscopy, ERCP.     Basal Cell Carcinoma--back      Coronary artery disease 03/09/2017    3/2/2017 - Two vessel coronary artery disease with mLAD 50% stenosis, D3 50% stenosis, pLCx 50% stenosis and mLCx 50% stenosis     Essential hypertension 09/01/2016    White coat hypertension;  24 hour ambulatory monitoring normal. Do not titrate up further.       Hyperlipidemia 02/10/2010     Moderate persistent asthma      Nonrheumatic aortic valve insufficiency 06/29/2017     Osteopenia      Paroxysmal atrial fibrillation 12/26/2017     Pulmonary nodule 03/03/2009    PET scan reportedly normal; biopsy not advised.     Stress-induced  cardiomyopathy 11/16/2017     Stroke 10/18/2013    Retinal artery, discovered by ophthlamology      Thoracic aortic aneurysm without rupture 05/10/2011    CT next due 7/13; refer if > 5 cm, or if > 1 cm/year growth.  Problem list name updated by automated process. Provider to review     Vasculitis 04/20/2009    Possible increased activity of thoracic aorta, on PET scan w/u for pulmonary nodule.                Past Surgical History:     Past Surgical History:   Procedure Laterality Date     APPENDECTOMY OPEN  1957     C STEREOTACTIC BREAST BIOPSY  01/01/2001     CHOLECYSTECTOMY, LAPOROSCOPIC  01/01/2008     ESOPHAGOSCOPY, GASTROSCOPY, DUODENOSCOPY (EGD), COMBINED  05/17/2013    Procedure: COMBINED ESOPHAGOSCOPY, GASTROSCOPY, DUODENOSCOPY (EGD), BIOPSY SINGLE OR MULTIPLE;  ESOPHAGOSCOPY, GASTROSCOPY, DUODENOSCOPY (EGD)  with bx;  Surgeon: Jeffery Yanez MD;  Location: [facility]     HC DILATION/CURETTAGE DIAG/THER NON OB  1967,1971     TONSILLECTOMY       ZZC LIGATE FALLOPIAN TUBE  01/01/1971               Social History:     Social History     Socioeconomic History     Marital status: Single     Spouse name: Not on file     Number of children: 3     Years of education: 15     Highest education level: Not on file   Occupational History     Occupation: semi-retired   Tobacco Use     Smoking status: Never Smoker     Smokeless tobacco: Never Used   Substance and Sexual Activity     Alcohol use: Yes     Alcohol/week: 1.0 - 2.0 standard drink     Types: 1 - 2 Standard drinks or equivalent per week     Comment: 1 glass of wine 1-2 days per week     Drug use: No     Sexual activity: Not Currently     Partners: Male   Other Topics Concern      Service Not Asked     Blood Transfusions Not Asked     Caffeine Concern Not Asked     Occupational Exposure Not Asked     Hobby Hazards Not Asked     Sleep Concern Not Asked     Stress Concern Not Asked     Weight Concern Not Asked     Special Diet Not Asked     Back Care Not  Asked     Exercise Not Asked     Bike Helmet Not Asked     Seat Belt Not Asked     Self-Exams Not Asked     Parent/sibling w/ CABG, MI or angioplasty before 65F 55M? No   Social History Narrative     Not on file     Social Determinants of Health     Financial Resource Strain: Not on file   Food Insecurity: Not on file   Transportation Needs: Not on file   Physical Activity: Not on file   Stress: Not on file   Social Connections: Not on file   Intimate Partner Violence: Not on file   Housing Stability: Not on file               Family History:     Family History   Problem Relation Age of Onset     Hypertension Mother      Osteoporosis Mother      C.A.D. Mother      Connective Tissue Disorder Father         scleraderma     Cerebrovascular Disease Paternal Grandmother      Prostate Cancer Other         9/05 passed away due to complications     Breast Cancer Child         youngest dtr              Allergies:      Allergies   Allergen Reactions     Fosamax [Alendronic Acid] GI Disturbance     Contrast Dye      Red arm redness and swelling      Evista [Raloxifene]      abd symptoms.      Flu Virus Vaccine      swollen and red at inj site     Amoxicillin Rash               Medications:     Prior to Admission medications    Medication Sig Last Dose Taking? Auth Provider   albuterol (PROAIR HFA/PROVENTIL HFA/VENTOLIN HFA) 108 (90 Base) MCG/ACT inhaler Inhale 1-2 puffs into the lungs every 6 hours as needed for shortness of breath / dyspnea or wheezing   Layo Sevilla MD   amLODIPine (NORVASC) 5 MG tablet Take 1 tablet (5 mg) by mouth At Bedtime   Layo Sevilla MD   atorvastatin (LIPITOR) 20 MG tablet TAKE ONE TABLET BY MOUTH AT BEDTIME   Layo Sevilla MD   calcium citrate-vitamin D (CITRACAL) 315-200 MG-UNIT TABS per tablet Take 1 tablet by mouth 2 times daily   Reported, Patient   fluticasone (FLONASE) 50 MCG/ACT nasal spray Spray 1 spray into both nostrils daily   Ko Cooper MD   fluticasone-salmeterol  (ADVAIR-HFA) 230-21 MCG/ACT inhaler Inhale 2 puffs into the lungs 2 times daily   Layo Sevilla MD   latanoprost (XALATAN) 0.005 % ophthalmic solution Place 1 drop into both eyes At Bedtime   Unknown, Entered By History   loratadine (CLARITIN) 10 MG tablet Take 1 tablet (10 mg) by mouth daily   Layo Sevilla MD   losartan-hydrochlorothiazide (HYZAAR) 50-12.5 MG tablet Take 1 tablet by mouth daily   Layo Sevilla MD   montelukast (SINGULAIR) 10 MG tablet Take 1 tablet (10 mg) by mouth At Bedtime   Layo Sevilla MD   multivitamin (OCUVITE) TABS tablet Take 1 tablet by mouth daily (with breakfast)    Reported, Patient   omeprazole (PRILOSEC) 10 MG DR capsule TAKE ONE CAPSULE BY MOUTH ONE TIME DAILY   Layo Sevilla MD   predniSONE (DELTASONE) 10 MG tablet Take 10 mg by mouth As needed for asthma flares follows with PCP or pulmonology (per patient she takes 1 tablet for 3 days)   Reported, Patient   tiotropium (SPIRIVA RESPIMAT) 2.5 MCG/ACT inhaler Inhale 2 puffs into the lungs daily   Layo Sevilla MD              Review of Systems:   A Comprehensive greater than 10 system review of systems was carried out.  Pertinent positives and negatives are noted above.  Otherwise negative for contributory information.            Physical Exam:   Blood pressure (!) 143/56, pulse 76, temperature 97.7  F (36.5  C), temperature source Oral, resp. rate 18, SpO2 96 %, not currently breastfeeding.  Exam:  GENERAL:  Comfortable.  PSYCH: pleasant, oriented, No acute distress.  HEENT:  PERRLA. Normal conjunctiva, normal hearing, nasal mucosa and Oropharynx are normal.  NECK:  Supple, no neck vein distention, adenopathy or bruits, normal thyroid.  HEART:  Normal S1, S2 with no murmur, no pericardial rub, gallops or S3 or S4.  LUNGS:  Clear to auscultation, normal Respiratory effort. No wheezing, rales or ronchi.  ABDOMEN:  Soft, no hepatosplenomegaly, normal bowel sounds. Non-tender, non distended.   EXTREMITIES:  No pedal edema, +2  pulses bilateral and equal.  SKIN:  Dry to touch, No rash, wound or ulcerations.  NEUROLOGIC:  CN 2-12 grossly intact,  sensation is intact with no focal deficits.               Data:     No results for input(s): WBC, HGB, HCT, MCV, PLT in the last 168 hours.  No results for input(s): NA, POTASSIUM, CHLORIDE, CO2, ANIONGAP, GLC, BUN, CR, GFRESTIMATED, GFRESTBLACK, ELROY, MAG, PHOS, PROTTOTAL, ALBUMIN, BILITOTAL, ALKPHOS, AST, ALT in the last 168 hours.  Recent Labs   Lab 05/11/22  0958   COLOR Light Yellow   APPEARANCE Clear   URINEGLC Negative   URINEBILI Negative   URINEKETONE Negative   SG 1.009   UBLD Negative   URINEPH 7.0   PROTEIN Negative   NITRITE Negative   LEUKEST Small*   RBCU 1   WBCU 1         Recent Results (from the past 24 hour(s))   Lumbar spine MRI w/o contrast    Narrative    MRI OF THE LUMBAR SPINE WITHOUT CONTRAST 5/11/2022 11:09 AM     COMPARISON: None.    HISTORY: Low back pain, increased fracture risk.    TECHNIQUE: Multiplanar, multisequence MRI images of the lumbar spine  were acquired without IV contrast.    FINDINGS: There are five lumbar-type vertebrae for the purposes of  this dictation.     There is normal alignment of the lumbar vertebrae; however, there is  straightening of normal lumbar lordosis. Vertebral body heights of the  lumbar spine are normal. Marrow signal throughout the lumbar vertebrae  is normal. There is no evidence for fracture or pathologic bony lesion  of the lumbar spine.     There is loss of disc height, disc desiccation and posterior disc  bulging/herniation to varying degrees at all levels of the lumbar  spine.     The tip of the conus medullaris is at the L1-L2 level which is within  normal limits. There is no evidence for intrathecal abnormality.     Level by level:     T12-L1: Normal disc height and signal. No herniation. Normal facet  joints. No spinal canal stenosis. No foraminal stenosis on either  side.     L1-L2: Loss of disc height, disc desiccation and  mild circumferential  disc bulging. No herniation. Normal facets. No spinal canal stenosis.  No foraminal stenosis on either side.    L2-L3: Loss of disc height, disc desiccation and mild circumferential  disc bulging. No herniation. Normal facets. No spinal canal stenosis.  No foraminal stenosis on either side.    L3-L4: Loss of disc height, disc desiccation and moderate  circumferential disc bulging. No herniation. Circumferential endplate  spurring. Normal facets. No spinal canal stenosis. Mild-moderate right  foraminal narrowing. No left foraminal stenosis.    L4-L5: Loss of disc height, disc desiccation and circumferential disc  bulging with a superimposed right lateral (foraminal zone) disc  herniation (extrusion). Minimal spinal canal narrowing. Severe right  foraminal stenosis. The herniated disc material appears to compress  the exiting right L4 nerve root against the right L4 pedicle. Mild  left foraminal narrowing.    L5-S1: Loss of disc height, disc desiccation and mild circumferential  disc bulging. No herniation. Mild facet arthropathy bilaterally. No  spinal canal stenosis. Moderate left foraminal stenosis. No right  foraminal narrowing.      Impression    IMPRESSION:  1. Diffuse degenerative change of the lumbar spine as detailed above.  2. Moderate to severe neural foraminal stenosis on the right at L4-L5  and on the left at L5-S1.  3. No other significant spinal canal or neural foraminal narrowing of  the lumbar spine.  4. No fractures.     KEV BAEZA MD         SYSTEM ID:  [id number]         Kathie Hutchinson PA-C

## 2022-05-11 NOTE — ED TRIAGE NOTES
"Brought in by EMS. Patient out walking last evening around 1700. Patient stumbled onto right knee, \"I didn't fall\". Patient c/o lower back pain. Has tried tylenol/ibuprofen without relief. Patient states pain extends across lower back. Patient denies knee pain. ABCs intact. VSS stable. EMS provided patient with warm pack.       "

## 2022-05-11 NOTE — ED NOTES
Care Management Follow Up    Length of Stay (days): 0    Expected Discharge Date:       Concerns to be Addressed:       Patient plan of care discussed at interdisciplinary rounds: Yes    Anticipated Discharge Disposition: Transitional Care     Anticipated Discharge Services: None  Anticipated Discharge DME:      Patient/family educated on Medicare website which has current facility and service quality ratings: yes  Education Provided on the Discharge Plan:    Patient/Family in Agreement with the Plan: yes    Referrals Placed by CM/SW:    Private pay costs discussed: Not applicable    Additional Information:  RN reports pt has changed her mind and would like TCU. Writer met with pt who states she feels she would benefit from TCU. She states she use to work for [employer] and would like [facility] as her first choice. [place] [facility], [place], [place] as alternative options.    Pt needs PT to eval prior to TCU referrals. Updated ED RN    Addendum 1508:   PT eval completed. TCU referrals sent. Pt reports she would be willing to pay for a private bed at [facility]. [facility] states [facility] has no beds today but will review for any possible openings.     Teri TAYLOR, [facility]  ED    [phone]

## 2022-05-11 NOTE — ED PROVIDER NOTES
"  History     Chief Complaint:  Back Pain     HPI:  The history is provided by the patient.      Genie \"Christina\" Hung is an 86 year old female with a history of HTN who presents with back pain. Christina has bilateral low back pain which began approximately 15 hours ago, around 1700 last night, when she twisted her body while holding onto a bar in her garage, causing her to fall onto her right knee. She denies knee pain or other trauma. Here pain is tolerable when she is supine, but when she sits up or ambulaties, the pain increases to an 8 or a 9 out of 10. She denies radiation of pain but has separate right lower leg pain that she attributes to a Baker's cyst and has not changed. She had no difficulty urinating - rather she had urinary frequeuncy last night - and denies constipation. She denies any new numbness/tingling, abdominal pain, dysuria, or fever. She took two tablets of aspirin last night as well as Tylenol without relief of pain.    Per chart review, Christina was seen here 10 days ago for the aforementioned right lower leg pain. After that visit her PCP prescribed tramadol but she took this only once as it made her dizzy. She was seen again in this ER 3 days ago with bilateral low back pain which improved until last night. Yesterday her daughter called Christina's primary clinic as she was concerned her mother has recently been having difficulty performing ADLs and lives alone and her daughter feels the patient needs a new living situation.    Review of Systems   Constitutional: Negative for fever.   Gastrointestinal: Negative for abdominal pain, constipation and diarrhea.   Genitourinary: Positive for frequency. Negative for difficulty urinating and dysuria.   Musculoskeletal: Positive for back pain.   Neurological: Negative for numbness.   All other systems reviewed and are negative.    Allergies:  Alendronic acid  Contrast dye  Raloxifene  Flu virus vaccine  Amoxicillin    Medications:  Albuterol " inhaler  Amlodipine  Lipitor  Flonase  Advair inhaler  Claritin  Hyzaar  Singulair  Omeprazole  Tiotropium inhaler    Past Medical History:     Anemia   Basal cell carcinoma, back   Coronary artery disease   Hypertension    Hyperlipidemia   Asthma   Nonrheumatic aortic valve insufficiency   Osteopenia   Paroxysmal atrial fibrillation   Pulmonary nodule  Stress-induced cardiomyopathy  Stroke  Thoracic aortic aneurysm without rupture  Vasculitis  Pulmonary nodule  Impaired fasting glucose  Candidal esophagitis  Peripheral edema  Irritable bowel syndrome with diarrhea  Chronic kidney disease, stage 3    Past Surgical History:    Appendectomy  Breast biopsy  Cholecystectomy  EGD   DD&C  Tonsillectomy  Tubal ligation    Family History:   Mother: hypertension, coronary artery disease, osteoporosis  Father: scleroderma     Social History:  The patient presents to the ED alone. She lives alone.  The patient presents to the ED via EMS.     Physical Exam     Patient Vitals for the past 24 hrs:   BP Temp Temp src Pulse Resp SpO2   05/11/22 1420 (!) 146/81 -- -- -- -- --   05/11/22 1200 120/62 -- -- 81 -- 98 %   05/11/22 1130 123/69 -- -- 79 -- 96 %   05/11/22 1000 -- 97.7  F (36.5  C) Oral -- -- --   05/11/22 0930 (!) 117/36 -- -- 74 -- --   05/11/22 0900 (!) 145/49 -- -- 80 -- --   05/11/22 0749 (!) 146/96 -- -- 83 18 95 %     Physical Exam  General: Well-developed and well-nourished. Well appearing elderly  woman. Cooperative.  Head:  Atraumatic.  Eyes:  Conjunctivae, lids, and sclerae are normal.  Neck:  Supple. Normal range of motion.  CV:  Regular rate and rhythm. Normal heart sounds with no murmurs, rubs, or gallops detected. 2+ DP bilaterally.  Resp:  No respiratory distress. Clear to auscultation bilaterally without decreased breath sounds, wheezing, rales, or rhonchi.  GI:  Soft. Non-distended. Non-tender.    MS:  Normal ROM. No bilateral lower extremity edema. No reproducible midline lumbar spine  pain.  Skin:  Warm. Non-diaphoretic. No pallor.  Neuro: Awake. A&Ox3. Normal strength and sensation.  Psych:  Normal mood and affect. Normal speech.  Vitals reviewed.    Emergency Department Course     Imaging:  Lumbar spine MRI w/o contrast   Final Result   IMPRESSION:   1. Diffuse degenerative change of the lumbar spine as detailed above.   2. Moderate to severe neural foraminal stenosis on the right at L4-L5   and on the left at L5-S1.   3. No other significant spinal canal or neural foraminal narrowing of   the lumbar spine.   4. No fractures.       KEV BAEZA MD            SYSTEM ID:  [id number]      Report per radiology    Laboratory:  Labs Ordered and Resulted from Time of ED Arrival to Time of ED Departure   ROUTINE UA WITH MICROSCOPIC REFLEX TO CULTURE - Abnormal       Result Value    Color Urine Light Yellow      Appearance Urine Clear      Glucose Urine Negative      Bilirubin Urine Negative      Ketones Urine Negative      Specific Gravity Urine 1.009      Blood Urine Negative      pH Urine 7.0      Protein Albumin Urine Negative      Urobilinogen Urine Normal      Nitrite Urine Negative      Leukocyte Esterase Urine Small (*)     RBC Urine 1      WBC Urine 1      Squamous Epithelials Urine 1        Emergency Department Course:  Reviewed:  I reviewed nursing notes, vitals, and past medical history.    Assessments:  0807 I obtained history and examined the patient as noted above.   1217 I rechecked the patient and explained findings. She still has pain when sitting up, but she overall feels improved. She spoke with a  and decided that she did not want to be placed in a rehab facility and would like to go home. She plans to call a Lyft or call her son for a ride.  1334 The patient was rechecked and updated. She thinks she may want to go to a rehab facility now.  1413 The patient was rechecked and updated. PT is at the bedside.    Consults:  1307 I spoke with Teri from social work  regarding the patient's presentation and plan of care.   1319 I spoke with Teri from social work regarding the patient's plan of care.   1338 I spoke with Aniya Nava PA-C from the hospitalist service, regarding the patient's presentation, findings here in the ED, and plan of care.     Interventions:  0849 Norco 5-325 mg 1 tablet PO  0849 Tylenol 650 mg PO  0925 Lidocaine 4% 1 patch transdermal    Disposition:  Care of the patient was transferred to my colleague, Dr. Zazueta, pending possible TCU placement.     Impression & Plan     Medical Decision Making:  Christina is an 86 year old woman who presents with bilateral low back pain.  Although she tells me this started just last night, she has been seen in this emergency department now 3 times in the last 10 days for back and right leg pain.  Fortunately, she has no red flag symptoms such as fever, constipation, or urinary retention.  However given her advanced age and multiple visits I did proceed with MRI of the lumbar spine.  This reveals diffuse degenerative changes with moderate to severe neural foraminal stenosis on the right at L4-L5 - which may very well be the cause of her pain - and on the left at L5-S1.  She endorsed urinary frequency but there is no evidence of urinary tract infection.    Her pain was significantly improved with Norco, Tylenol, and a Lidoderm patch.  Review of this patient's chart indicates her daughter is very concerned about Christina's ability to care for herself at home.  I would agree with this given her recurrent visits in the last week and a half.  She was evaluated by social work and initially declined TCU but was willing to accept home care.  When we are investigating home care referrals Christina then changed her mind that she wanted to go to a TCU.  This required a physical therapy assessment and PT did complete this in the emergency department.  At the end of my shift social work is searching for appropriate TCU bed placement with  plan for discharge to TCU.  If this is not feasible this afternoon she will require observation admission for placement.  I have answered all of the Christina's questions and she verbalized understanding is amenable to this plan.  At the end of my shift she was endorsed my partner, Dr. Zazueta.    Diagnosis:    ICD-10-CM    1. Bilateral low back pain with right-sided sciatica, unspecified chronicity  M54.41    2. Foraminal stenosis of lumbar region  M48.061      Scribe Disclosure:  I, Rea Lopez, am serving as a scribe at 7:59 AM on 5/11/2022 to document services personally performed by Shila Saldana MD based on my observations and the provider's statements to me.      Shila Saldana MD  05/12/22 2732

## 2022-05-11 NOTE — PROGRESS NOTES
05/11/22 1400   Quick Adds   Type of Visit Initial PT Evaluation   Living Environment   People in Home alone   Current Living Arrangements house   Home Accessibility stairs to enter home;stairs within home   Number of Stairs, Main Entrance 2   Stair Railings, Main Entrance railing on left side (ascending)   Number of Stairs, Within Home, Primary greater than 10 stairs   Stair Railings, Within Home, Primary railing on right side (ascending)   Transportation Anticipated car, drives self   Living Environment Comments Pt lives alone in a rambler with laundry in the basement.   Self-Care   Usual Activity Tolerance good   Current Activity Tolerance moderate   Equipment Currently Used at Home walker, rolling;cane, straight   Fall history within last six months yes   Number of times patient has fallen within last six months 1   Activity/Exercise/Self-Care Comment Pt reports driving and running errands up until 2 weeks ago when her dizziness/light headed ness go worse.  Pt also began using her walker the past few weeks.  Pt reports being independent with taking a bath and all ADLs.  Pt has a son who lives in the area and helps with mowing the lawn.   General Information   Onset of Illness/Injury or Date of Surgery 05/11/22   Referring Physician Shila Mendez   Patient/Family Therapy Goals Statement (PT) Pt agreeable to PT goals   Pertinent History of Current Problem (include personal factors and/or comorbidities that impact the POC) Per chart review: Genie Persaud is a 86 year old female with a history of hypertension, hyperlipidemia, coronary artery disease, stroke, asthma, atrial fibrillation, cardiomyopathy, thoracic aortic aneurysm, stage III CKD who presents with bilateral low back pain which began approximately 15 hours ago around 1700 last night. She states that the pain began after she twisted her body while holding onto a bar in her garage, causing her to fall onto her right knee. She reports that the pain is  tolerable when she lays down, but when she is up and ambulating, the pain increases to an 8 or a 9 out of 10.  Genie was seen here 10 days ago with right lower leg pain. She reports that she was diagnosed with a Baker's cyst of her right knee at that time.  MRI of spine negative for fxs.  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   Existing Precautions/Restrictions fall   General Observations Pt lying in bed.  Vitals in supine: /81   , HR 79, SpO2 91% on RA   Cognition   Orientation Status (Cognition) oriented x 3   Pain Assessment   Patient Currently in Pain Yes, see Vital Sign flowsheet  (R knee bakers cyst pain 5/10 with gait)   Posture    Posture Forward head position;Protracted shoulders   Range of Motion (ROM)   ROM Comment tight hamstrings and gastrocs with B SLR to ~ 65 deg and B ankle DF to 0 deg.  Decreased B active shld flex to ~ 90 deg.   Strength (Manual Muscle Testing)   Strength (Manual Muscle Testing) Deficits observed during functional mobility   Strength Comments pt performed 3 sit <> stands in 30 sec from low chair without arms.  This is below age based norms of 14, indicating decreased functional LE strength.   Bed Mobility   Bed Mobility supine-sit   Supine-Sit [place] (Bed Mobility) supervision   Comment, (Bed Mobility) sup > sit from ED bed with SBA with HOB elevated.   Transfers   Transfers sit-stand transfer   Sit-Stand Transfer   Sit-Stand [place] (Transfers) contact guard;verbal cues   Assistive Device (Sit-Stand Transfers) walker, front-wheeled   Comment, (Sit-Stand Transfer) sit <> stand to FWW with CGA/SBA and VCs for safe hand placement.   Gait/Stairs (Locomotion)   [place] Level (Gait) contact guard   Assistive Device (Gait) walker, front-wheeled   Comment, (Gait/Stairs) Pt amb 60' with FWW and CGA with slow gait and short step lengths.  Pt with increased SOB and fatigue after gait.  SpO2 94%.   Sensory  Examination   Sensory Perception Comments Pt reports impaired sensation in feet   Coordination   Coordination no deficits were identified   Muscle Tone   Muscle Tone no deficits were identified   Clinical Impression   Criteria for Skilled Therapeutic Intervention Yes, treatment indicated   PT Diagnosis (PT) Decreased functional  mobility   Influenced by the following impairments Impaired gait and balance, decreased functional LE strength, decreased activity tolerance, R knee pain   Functional limitations due to impairments Pt doesn't feel she is able to take care of herself at the present time, pt unable to amb longer community distances, increased falls risk   Clinical Presentation (PT Evaluation Complexity) Stable/Uncomplicated   Clinical Presentation Rationale Pt medically stable   Clinical Decision Making (Complexity) low complexity   Planned Therapy Interventions (PT) balance training;bed mobility training;gait training;stair training;strengthening;stretching;patient/family education;transfer training;progressive activity/exercise;risk factor education   Risk & Benefits of therapy have been explained evaluation/treatment results reviewed;care plan/treatment goals reviewed;risks/benefits reviewed;current/potential barriers reviewed;participants voiced agreement with care plan;participants included;patient   PT Discharge Planning   PT Discharge Recommendation (DC Rec) Transitional Care Facility;home with assist;home with home care physical therapy   PT Rationale for DC Rec Pt below baseline with all mobility.  Pt fatigues quickly with all mobility and is unsafe to return home alone.  Recommend TCU to increase activity tolerance, strength and progress independence with all mobility.  Pt pt does not go to TCU she would need 24/7 assist at home for SBA with mobility and to assist with all household chores and laundry in basement with HHPT to progress independence.   PT Brief overview of current status A x 1 FWW    Therapy Certification   Start of care date 05/11/22   Certification date from 05/11/22   Certification date to 05/12/22   Total Evaluation Time   Total Evaluation Time (Minutes) 10   Physical Therapy Goals   PT Frequency 5x/week   PT Predicted Duration/Target Date for Goal Attainment 05/12/22   PT Goals Bed Mobility;Transfers;Gait;Stairs   PT: Bed Mobility Independent;Supine to/from sit   PT: Transfers Modified independent;Sit to/from stand;Assistive device   PT: Gait Modified independent;Rolling walker;150 feet   PT: Stairs Minimal assist;2 stairs;Rail on left

## 2022-05-12 ENCOUNTER — MEDICAL CORRESPONDENCE (OUTPATIENT)
Dept: HEALTH INFORMATION MANAGEMENT | Facility: CLINIC | Age: 87
End: 2022-05-12

## 2022-05-12 ENCOUNTER — PATIENT OUTREACH (OUTPATIENT)
Dept: CARE COORDINATION | Facility: CLINIC | Age: 87
End: 2022-05-12
Payer: COMMERCIAL

## 2022-05-12 ENCOUNTER — LAB REQUISITION (OUTPATIENT)
Dept: LAB | Facility: CLINIC | Age: 87
End: 2022-05-12
Payer: COMMERCIAL

## 2022-05-12 VITALS
DIASTOLIC BLOOD PRESSURE: 53 MMHG | TEMPERATURE: 97.4 F | RESPIRATION RATE: 16 BRPM | BODY MASS INDEX: 24.17 KG/M2 | WEIGHT: 128 LBS | HEART RATE: 81 BPM | SYSTOLIC BLOOD PRESSURE: 148 MMHG | HEIGHT: 61 IN | OXYGEN SATURATION: 94 %

## 2022-05-12 DIAGNOSIS — U07.1 COVID-19: ICD-10-CM

## 2022-05-12 LAB
ANION GAP SERPL CALCULATED.3IONS-SCNC: 6 MMOL/L (ref 3–14)
BUN SERPL-MCNC: 20 MG/DL (ref 7–30)
CALCIUM SERPL-MCNC: 8.4 MG/DL (ref 8.5–10.1)
CHLORIDE BLD-SCNC: 92 MMOL/L (ref 94–109)
CO2 SERPL-SCNC: 30 MMOL/L (ref 20–32)
CREAT SERPL-MCNC: 0.96 MG/DL (ref 0.52–1.04)
GFR SERPL CREATININE-BSD FRML MDRD: 58 ML/MIN/1.73M2
GLUCOSE BLD-MCNC: 95 MG/DL (ref 70–99)
POTASSIUM BLD-SCNC: 3.3 MMOL/L (ref 3.4–5.3)
SODIUM SERPL-SCNC: 128 MMOL/L (ref 133–144)

## 2022-05-12 PROCEDURE — 36415 COLL VENOUS BLD VENIPUNCTURE: CPT | Performed by: PHYSICIAN ASSISTANT

## 2022-05-12 PROCEDURE — G0378 HOSPITAL OBSERVATION PER HR: HCPCS

## 2022-05-12 PROCEDURE — 250N000013 HC RX MED GY IP 250 OP 250 PS 637: Performed by: PHYSICIAN ASSISTANT

## 2022-05-12 PROCEDURE — U0003 INFECTIOUS AGENT DETECTION BY NUCLEIC ACID (DNA OR RNA); SEVERE ACUTE RESPIRATORY SYNDROME CORONAVIRUS 2 (SARS-COV-2) (CORONAVIRUS DISEASE [COVID-19]), AMPLIFIED PROBE TECHNIQUE, MAKING USE OF HIGH THROUGHPUT TECHNOLOGIES AS DESCRIBED BY CMS-2020-01-R: HCPCS | Performed by: NURSE PRACTITIONER

## 2022-05-12 PROCEDURE — 99217 PR OBSERVATION CARE DISCHARGE: CPT | Performed by: PHYSICIAN ASSISTANT

## 2022-05-12 PROCEDURE — 82310 ASSAY OF CALCIUM: CPT | Performed by: PHYSICIAN ASSISTANT

## 2022-05-12 PROCEDURE — 250N000011 HC RX IP 250 OP 636: Performed by: PHYSICIAN ASSISTANT

## 2022-05-12 RX ORDER — ACETAMINOPHEN 325 MG/1
975 TABLET ORAL EVERY 8 HOURS
Qty: 90 TABLET | Refills: 0 | DISCHARGE
Start: 2022-05-12 | End: 2022-05-15

## 2022-05-12 RX ORDER — AMOXICILLIN 250 MG
1 CAPSULE ORAL 2 TIMES DAILY
Qty: 14 TABLET | Refills: 0 | Status: ON HOLD | DISCHARGE
Start: 2022-05-12 | End: 2022-05-23

## 2022-05-12 RX ORDER — OXYCODONE HYDROCHLORIDE 5 MG/1
2.5 TABLET ORAL EVERY 4 HOURS PRN
Qty: 10 TABLET | Refills: 0 | Status: SHIPPED | OUTPATIENT
Start: 2022-05-12 | End: 2022-05-23

## 2022-05-12 RX ADMIN — ONDANSETRON 4 MG: 4 TABLET, ORALLY DISINTEGRATING ORAL at 12:07

## 2022-05-12 RX ADMIN — PANTOPRAZOLE SODIUM 20 MG: 20 TABLET, DELAYED RELEASE ORAL at 09:04

## 2022-05-12 RX ADMIN — LOSARTAN POTASSIUM AND HYDROCHLOROTHIAZIDE 1 TABLET: 50; 12.5 TABLET, FILM COATED ORAL at 09:04

## 2022-05-12 RX ADMIN — OXYCODONE HYDROCHLORIDE 2.5 MG: 5 TABLET ORAL at 09:16

## 2022-05-12 RX ADMIN — ACETAMINOPHEN 975 MG: 325 TABLET, FILM COATED ORAL at 09:04

## 2022-05-12 ASSESSMENT — ENCOUNTER SYMPTOMS: DIFFICULTY URINATING: 0

## 2022-05-12 NOTE — PLAN OF CARE
PRIMARY DIAGNOSIS: LOW BACK PAIN, RIGHT LEG PAIN  OUTPATIENT/OBSERVATION GOALS TO BE MET BEFORE DISCHARGE:  1. Pain Status: 7/10 low back pain    2. Return to near baseline physical activity: No    3. Cleared for discharge by consultants (if involved): No    Discharge Planner Nurse   Safe discharge environment identified: No, needs TCU placement  Barriers to discharge: Yes       Entered by: Bibiana Harrison RN 05/12/2022 12:09 AM     Vitals are Temp: 97.7  F (36.5  C) Temp src: Oral BP: 120/53 Pulse: 85   Resp: 20 SpO2: 95 %.  Patient is Alert and Oriented x4. They are SBA with Gait Belt and Walker.  Pt is a regular diet.  They are complaining of 7/10 pain in their low back.  Tylenol and Oxycodone given for pain.  Patient has no IV access d/t refusal. Will continue to monitor and provide supportive cares.       Please review provider order for any additional goals.   Nurse to notify provider when observation goals have been met and patient is ready for discharge.

## 2022-05-12 NOTE — PLAN OF CARE
PRIMARY DIAGNOSIS: LOW BACK PAIN, RIGHT LEG PAIN  OUTPATIENT/OBSERVATION GOALS TO BE MET BEFORE DISCHARGE:  1. Pain Status: Pt sleeping. Physical signs of pain not observed.    2. Return to near baseline physical activity: No    3. Cleared for discharge by consultants (if involved): No    Discharge Planner Nurse   Safe discharge environment identified: No, needs TCU placement  Barriers to discharge: Yes       Entered by: Bibiana Harrison RN 05/12/2022 5:10 AM     Vitals are Temp: 97.6  F (36.4  C) Temp src: Oral BP: 100/46 Pulse: 85   Resp: 16 SpO2: 95 %.  Patient is Alert and Oriented x4. They are SBA/A1 with Gait Belt and Walker.Pt sleeping. Patient has no IV access d/t refusal. Will continue to monitor and provide supportive cares.       Please review provider order for any additional goals.   Nurse to notify provider when observation goals have been met and patient is ready for discharge.

## 2022-05-12 NOTE — PHARMACY-ADMISSION MEDICATION HISTORY
Admission medication history interview status for this patient is complete. See [facility] admission navigator for allergy information, prior to admission medications and immunization status.     Medication history interview done, indicate source(s): Patient  Medication history resources (including written lists, pill bottles, clinic record):None      Changes made to PTA medication list:  Added: none  Changed: none  Reported as Not Taking: none  Removed: flonase, ocuvite    Actions taken by pharmacist (provider contacted, etc):None     Additional medication history information:None    Medication reconciliation/reorder completed by provider prior to medication history?  y   (Y/N)     For patients on insulin therapy:   Do you use sliding scale insulin based on blood sugars?   What is your pre-meal insulin coverage?    Do you typically eat three meals a day?   How many times do you check your blood glucose per day?   How many episodes of hypoglycemia do you typically have per month?   Do you have a Continuous Glucose Monitor (CGM)?      Prior to Admission medications    Medication Sig Last Dose Taking? Auth Provider   albuterol (PROAIR HFA/PROVENTIL HFA/VENTOLIN HFA) 108 (90 Base) MCG/ACT inhaler Inhale 1-2 puffs into the lungs every 6 hours as needed for shortness of breath / dyspnea or wheezing  Yes Layo Sevilla MD   amLODIPine (NORVASC) 5 MG tablet Take 1 tablet (5 mg) by mouth At Bedtime 5/10/2022 at Unknown time Yes Layo Sevilla MD   atorvastatin (LIPITOR) 20 MG tablet TAKE ONE TABLET BY MOUTH AT BEDTIME Past Week at Unknown time Yes Layo Sevilla MD   calcium citrate-vitamin D (CITRACAL) 315-200 MG-UNIT TABS per tablet Take 1 tablet by mouth 2 times daily Past Week at Unknown time Yes Reported, Patient   fluticasone-salmeterol (ADVAIR-HFA) 230-21 MCG/ACT inhaler Inhale 2 puffs into the lungs 2 times daily 5/10/2022 at Unknown time Yes Layo Sevilla MD   latanoprost (XALATAN) 0.005 % ophthalmic solution Place 1  drop into both eyes At Bedtime Past Week at Unknown time Yes Unknown, Entered By History   loratadine (CLARITIN) 10 MG tablet Take 1 tablet (10 mg) by mouth daily 5/10/2022 at Unknown time Yes Layo Sevilla MD   losartan-hydrochlorothiazide (HYZAAR) 50-12.5 MG tablet Take 1 tablet by mouth daily 5/10/2022 at Unknown time Yes Layo Sevilla MD   montelukast (SINGULAIR) 10 MG tablet Take 1 tablet (10 mg) by mouth At Bedtime Past Week at Unknown time Yes Layo Sevilla MD   omeprazole (PRILOSEC) 10 MG DR capsule TAKE ONE CAPSULE BY MOUTH ONE TIME DAILY 5/10/2022 at Unknown time Yes Layo Sevilla MD   predniSONE (DELTASONE) 10 MG tablet Take 10 mg by mouth As needed for asthma flares follows with PCP or pulmonology (per patient she takes 1 tablet for 3 days) Past Month at Unknown time Yes Reported, Patient   tiotropium (SPIRIVA RESPIMAT) 2.5 MCG/ACT inhaler Inhale 2 puffs into the lungs daily 5/10/2022 at Unknown time Yes Layo Sevilla MD

## 2022-05-12 NOTE — PROGRESS NOTES
Physical Therapy Discharge Summary    Reason for therapy discharge:    Discharged to transitional care facility.    Progress towards therapy goal(s). See goals on Care Plan in [facility] electronic health record for goal details.  Goals not met.  Barriers to achieving goals:   discharge on same date as initial evaluation.    Therapy recommendation(s):    Continued therapy is recommended.  Rationale/Recommendations:  strengthening and maximize IND with functional mobility.

## 2022-05-12 NOTE — PLAN OF CARE
"PRIMARY DIAGNOSIS: LUMBAGO  OUTPATIENT/OBSERVATION GOALS TO BE MET BEFORE DISCHARGE:  1. Pain Status: Improved-controlled with oral pain medications.    2. Return to near baseline physical activity: No    3. Cleared for discharge by consultants (if involved): No    Discharge Planner Nurse   Safe discharge environment identified: Yes  Barriers to discharge: No       Entered by: Julia Mccoy RN 05/12/2022    Please review provider order for any additional goals.   Nurse to notify provider when observation goals have been met and patient is ready for discharge.    Patient is alert and oriented x 4. Lungs are clear, on room air. Bowels are active, tolerating oral diet. Patient is stand by assist. No Iv access. Zofran given for nausea. Seen by Social work and PT. Plan to discharge via [facility].    BP (!) 148/53   Pulse 81   Temp 97.4  F (36.3  C) (Oral)   Resp 16   Ht 1.549 m (5' 1\")   Wt 58.1 kg (128 lb)   LMP  (LMP Unknown)   SpO2 94%   BMI 24.19 kg/m  '  "

## 2022-05-12 NOTE — PLAN OF CARE
ROOM # 228    Living Situation (if not independent, order SW consult):  Facility name:  : Son Brandon, pt lives at home alone     Activity level at baseline: Ind  Activity level on admit: Ax1 w/ walker     Who will be transporting you at discharge: pt needs transport setup as Brandon will be going out of town. Daughter is flying in from [place] later tomorrow.      Patient registered to observation; given Patient Bill of Rights; given the opportunity to ask questions about observation status and their plan of care.  Patient has been oriented to the observation room, bathroom and call light is in place.    Discussed discharge goals and expectations with patient/family.

## 2022-05-12 NOTE — DISCHARGE SUMMARY
[facility]  Discharge Summary        Genie Persaud MRN# [id number]   YOB: 1935 Age: 86 year old     Date of Admission:  5/11/2022  Date of Discharge:  5/12/2022  Admitting Physician:  Cristhian Perry MD  Discharge Physician: Aniya Nava PA-C  Discharging Service: Hospitalist     Primary Provider: Layo Sevilla  Primary Care Physician Phone Number: [phone]         Discharge Diagnoses/Problem Oriented Hospital Course (Providers):    Genie Persaud was admitted on 5/11/2022 by Cristhian Perry MD and I would refer you to their history and physical.  The following problems were addressed during her hospitalization:    1. Atraumatic right leg pain  2. Acute on chronic back pain   3. Generalized weakness due to above requiring TCU placement          Code Status:      DNR / DNI        Brief Hospital Stay Summary Sent Home With Patient in AVS:        Reason for your hospital stay      You are admitted for concerns of weakness related to atraumatic right leg   pain.  We have attempted to place you in rehab from the emergency room but   they did not have bed available until today.  You will be discharged to   transitional care unit.  Take medication as prescribed.  Follow-up with   your PCP in 1 to 2 weeks.         Genie Persaud is a 86 year old female with a PMH significant for CAD, HTN, HLP, asthma, paroxysmal atrial fibrillation, cardiomyopathy and anemia of chronic disease who presents with atraumatic right leg pain with ambulation and low back pain.         #Atraumatic right leg pain with ambulation  -Patient has been seen in the emergency room with complaints of right leg pain on 5/1, 5/8 and 5/11.  X-ray of the leg was negative and ultrasound showed a right Baker's cyst which is thought to be the source of her pain.  -Unfortunately is not able to tolerate the pain at home with request for TCU placement  -PT assessed patient on 5/11 with recommendation for TCU versus 24/7 assist  at home  -Social work was unable to find TCU placement from the ED so observation admission requested  -Plan for scheduled Tylenol, as needed ibuprofen, as needed oxycodone, icing  -PT and social work consults.  TCU bed was found the next day and patient was discharged in stable condition.  All of her other medical condition including hypertension, hyperlipidemia,, atrial fibrillation, cardiomyopathy, asthma and known thoracic aortic aneurysm was stable.  No changes to her PTA medications were made.            Important Results:      No results for input(s): WBC, HGB, HCT, MCV, PLT in the last 168 hours.  Recent Labs   Lab 05/12/22  0547   *   POTASSIUM 3.3*   CHLORIDE 92*   CO2 30   ANIONGAP 6   GLC 95   BUN 20   CR 0.96   GFRESTIMATED 58*   ELROY 8.4*     No results for input(s): CULT in the last 168 hours.  Recent Labs   Lab 05/12/22  0547   *   POTASSIUM 3.3*   CHLORIDE 92*   CO2 30   ANIONGAP 6   GLC 95   BUN 20   CR 0.96   GFRESTIMATED 58*   ELROY 8.4*     No results for input(s): SED, CRP in the last 168 hours.  Recent Labs   Lab 05/12/22  0547   GLC 95     Recent Labs   Lab 05/11/22  0958   COLOR Light Yellow   APPEARANCE Clear   URINEGLC Negative   URINEBILI Negative   URINEKETONE Negative   SG 1.009   UBLD Negative   URINEPH 7.0   PROTEIN Negative   NITRITE Negative   LEUKEST Small*   RBCU 1   WBCU 1              Pending Results:        Unresulted Labs Ordered in the Past 30 Days of this Admission     No orders found for last 31 day(s).            Discharge Instructions and Follow-Up:      Follow-up Appointments     Follow Up and recommended labs and tests      Follow up with Nursing home physician.  No follow up labs or test are   needed.               Discharge Disposition:      Discharged to home         Discharge Medications:        Current Discharge Medication List      START taking these medications    Details   acetaminophen (TYLENOL) 325 MG tablet Take 3 tablets (975 mg) by mouth every 8  hours for 10 days  Qty: 90 tablet, Refills: 0    Associated Diagnoses: Stage 3 chronic kidney disease, unspecified whether stage 3a or 3b CKD (H)      oxyCODONE (ROXICODONE) 5 MG tablet Take 0.5 tablets (2.5 mg) by mouth every 4 hours as needed for moderate to severe pain  Qty: 10 tablet, Refills: 0    Associated Diagnoses: Hip pain, right      senna-docusate (SENOKOT-S/PERICOLACE) 8.6-50 MG tablet Take 1 tablet by mouth 2 times daily for 7 days  Qty: 14 tablet, Refills: 0    Associated Diagnoses: Stage 3 chronic kidney disease, unspecified whether stage 3a or 3b CKD (H)         CONTINUE these medications which have NOT CHANGED    Details   albuterol (PROAIR HFA/PROVENTIL HFA/VENTOLIN HFA) 108 (90 Base) MCG/ACT inhaler Inhale 1-2 puffs into the lungs every 6 hours as needed for shortness of breath / dyspnea or wheezing    Comments: Pharmacy may dispense brand covered by insurance (Proair, or proventil or ventolin or generic albuterol inhaler)  Associated Diagnoses: Moderate persistent asthma without complication      amLODIPine (NORVASC) 5 MG tablet Take 1 tablet (5 mg) by mouth At Bedtime  Qty: 90 tablet, Refills: 1    Associated Diagnoses: Essential hypertension with goal blood pressure less than 140/90      atorvastatin (LIPITOR) 20 MG tablet TAKE ONE TABLET BY MOUTH AT BEDTIME  Qty: 90 tablet, Refills: 1    Associated Diagnoses: Hyperlipidemia LDL goal <130      calcium citrate-vitamin D (CITRACAL) 315-200 MG-UNIT TABS per tablet Take 1 tablet by mouth 2 times daily      fluticasone-salmeterol (ADVAIR-HFA) 230-21 MCG/ACT inhaler Inhale 2 puffs into the lungs 2 times daily    Associated Diagnoses: Moderate persistent asthma without complication      latanoprost (XALATAN) 0.005 % ophthalmic solution Place 1 drop into both eyes At Bedtime      loratadine (CLARITIN) 10 MG tablet Take 1 tablet (10 mg) by mouth daily  Qty: 90 tablet, Refills: 3    Associated Diagnoses: Seasonal allergic rhinitis, unspecified trigger  "     losartan-hydrochlorothiazide (HYZAAR) 50-12.5 MG tablet Take 1 tablet by mouth daily  Qty: 45 tablet, Refills: 1    Associated Diagnoses: Essential hypertension with goal blood pressure less than 140/90      montelukast (SINGULAIR) 10 MG tablet Take 1 tablet (10 mg) by mouth At Bedtime  Qty: 90 tablet, Refills: 3    Associated Diagnoses: Seasonal allergic rhinitis, unspecified trigger      omeprazole (PRILOSEC) 10 MG DR capsule TAKE ONE CAPSULE BY MOUTH ONE TIME DAILY  Qty: 90 capsule, Refills: 1    Associated Diagnoses: Gastroesophageal reflux disease without esophagitis      predniSONE (DELTASONE) 10 MG tablet Take 10 mg by mouth As needed for asthma flares follows with PCP or pulmonology (per patient she takes 1 tablet for 3 days)      tiotropium (SPIRIVA RESPIMAT) 2.5 MCG/ACT inhaler Inhale 2 puffs into the lungs daily  Qty: 4 g, Refills: 2    Associated Diagnoses: Moderate persistent asthma without complication               Allergies:         Allergies   Allergen Reactions     Fosamax [Alendronic Acid] GI Disturbance     Contrast Dye      Red arm redness and swelling      Evista [Raloxifene]      abd symptoms.      Flu Virus Vaccine      swollen and red at inj site     Amoxicillin Rash           Consultations This Hospital Stay:      No consultations were requested during this admission         Condition and Physical on Discharge:      Discharge condition: Stable   Vitals: Blood pressure (!) 141/45, pulse 75, temperature 97.5  F (36.4  C), temperature source Oral, resp. rate 16, height 1.549 m (5' 1\"), weight 58.1 kg (128 lb), SpO2 95 %, not currently breastfeeding.  128 lbs 0 oz      GENERAL:  Comfortable.  PSYCH: pleasant, oriented, No acute distress.  HEENT:  PERRLA. Normal conjunctiva, normal hearing, nasal mucosa and Oropharynx are normal.  NECK:  Supple, no neck vein distention, adenopathy or bruits, normal thyroid.  HEART:  Normal S1, S2 with no murmur, no pericardial rub, gallops or S3 or " S4.  LUNGS:  Clear to auscultation, normal Respiratory effort. No wheezing, rales or ronchi.  ABDOMEN:  Soft, no hepatosplenomegaly, normal bowel sounds. Non-tender, non distended.   EXTREMITIES:  No pedal edema, +2 pulses bilateral and equal.  SKIN:  Dry to touch, No rash, wound or ulcerations.  NEUROLOGIC:  CN 2-12 intact, BL 5/5 symmetric upper and lower extremity strength, sensation is intact with no focal deficits.         Discharge Time:      <30 mins         Image Results From This Hospital Stay (For Non-EPIC Providers):        Results for orders placed or performed during the hospital encounter of 05/11/22   Lumbar spine MRI w/o contrast    Narrative    MRI OF THE LUMBAR SPINE WITHOUT CONTRAST 5/11/2022 11:09 AM     COMPARISON: None.    HISTORY: Low back pain, increased fracture risk.    TECHNIQUE: Multiplanar, multisequence MRI images of the lumbar spine  were acquired without IV contrast.    FINDINGS: There are five lumbar-type vertebrae for the purposes of  this dictation.     There is normal alignment of the lumbar vertebrae; however, there is  straightening of normal lumbar lordosis. Vertebral body heights of the  lumbar spine are normal. Marrow signal throughout the lumbar vertebrae  is normal. There is no evidence for fracture or pathologic bony lesion  of the lumbar spine.     There is loss of disc height, disc desiccation and posterior disc  bulging/herniation to varying degrees at all levels of the lumbar  spine.     The tip of the conus medullaris is at the L1-L2 level which is within  normal limits. There is no evidence for intrathecal abnormality.     Level by level:     T12-L1: Normal disc height and signal. No herniation. Normal facet  joints. No spinal canal stenosis. No foraminal stenosis on either  side.     L1-L2: Loss of disc height, disc desiccation and mild circumferential  disc bulging. No herniation. Normal facets. No spinal canal stenosis.  No foraminal stenosis on either  side.    L2-L3: Loss of disc height, disc desiccation and mild circumferential  disc bulging. No herniation. Normal facets. No spinal canal stenosis.  No foraminal stenosis on either side.    L3-L4: Loss of disc height, disc desiccation and moderate  circumferential disc bulging. No herniation. Circumferential endplate  spurring. Normal facets. No spinal canal stenosis. Mild-moderate right  foraminal narrowing. No left foraminal stenosis.    L4-L5: Loss of disc height, disc desiccation and circumferential disc  bulging with a superimposed right lateral (foraminal zone) disc  herniation (extrusion). Minimal spinal canal narrowing. Severe right  foraminal stenosis. The herniated disc material appears to compress  the exiting right L4 nerve root against the right L4 pedicle. Mild  left foraminal narrowing.    L5-S1: Loss of disc height, disc desiccation and mild circumferential  disc bulging. No herniation. Mild facet arthropathy bilaterally. No  spinal canal stenosis. Moderate left foraminal stenosis. No right  foraminal narrowing.      Impression    IMPRESSION:  1. Diffuse degenerative change of the lumbar spine as detailed above.  2. Moderate to severe neural foraminal stenosis on the right at L4-L5  and on the left at L5-S1.  3. No other significant spinal canal or neural foraminal narrowing of  the lumbar spine.  4. No fractures.     KEV BAEZA MD         SYSTEM ID:  [id number]

## 2022-05-12 NOTE — PLAN OF CARE
PRIMARY DIAGNOSIS: LUMBAGO  OUTPATIENT/OBSERVATION GOALS TO BE MET BEFORE DISCHARGE:  1. Pain Status: Improved-controlled with oral pain medications.    2. Return to near baseline physical activity: No    3. Cleared for discharge by consultants (if involved): No    Discharge Planner Nurse   Safe discharge environment identified: Yes  Barriers to discharge: Yes       Entered by: Julia Mccoy RN 05/12/2022    Please review provider order for any additional goals.   Nurse to notify provider when observation goals have been met and patient is ready for discharge.    Patient is alert and oriented x 4. Lungs are clear, on room air. Bowels are active, tolerating oral diet. Patient is stand by assist. No Iv access. Pain controlled with Tylenol and oxycodone. PT and social work following.

## 2022-05-12 NOTE — PROGRESS NOTES
You have successfully submitted the preadmission screening (PAS) to the [employer] on:  Created On  5/12/2022 10:58 AM  Your confirmation number is:  [id number]    Navya Monahan RN BSN   Inpatient Care Coordination  [facility]

## 2022-05-12 NOTE — PROGRESS NOTES
Care Management Discharge Note    Discharge Date: 05/12/2022     Discharge Disposition: [facility]    Discharge Services: PT/OT    Private pay costs discussed: transportation costs    PAS Confirmation Code:    Patient/family educated on Medicare website which has current facility and service quality ratings: yes    Education Provided on the Discharge Plan:  Ye  Persons Notified of Discharge Plans: Patient, [facility] admissions  Patient/Family in Agreement with the Plan: yes    Handoff Referral Completed: Yes    Additional Information:  [facility] clinically [facility] patient, private room available today. Updated pt who is agreeable. She is requesting WC transport as she reports her son is not able to transport. Reviewed out of pocket cost for [employer]  transport, $81.80 for base rate and $5.26 per mile to the destination. Spoke with patient, they expressed understanding and are agreeable to this. [facility] transport arranged for 1430 today. Facility updated on transport time. Pt will update son as he will be visiting soon. SW will continue to follow.     CLAUDIA Quintero

## 2022-05-12 NOTE — PLAN OF CARE
"Patient's After Visit Summary was reviewed with patient.   Patient verbalized understanding of After Visit Summary, recommended follow up and was given an opportunity to ask questions.   Discharge medications sent home with patient/family: No, script sent with patient.   Discharged with transport tech    Patient alert and oriented. No IV. Belongings sent home with patient. Discharged with  WC.     BP (!) 148/53   Pulse 81   Temp 97.4  F (36.3  C) (Oral)   Resp 16   Ht 1.549 m (5' 1\")   Wt 58.1 kg (128 lb)   LMP  (LMP Unknown)   SpO2 94%   BMI 24.19 kg/m          "

## 2022-05-12 NOTE — PLAN OF CARE
"PRIMARY DIAGNOSIS:Low back pain  OUTPATIENT/OBSERVATION GOALS TO BE MET BEFORE DISCHARGE:  1. Pain Status: Improved with use of alternative comfort measures i.e.: positioning    2. Return to near baseline physical activity: Yes    3. Cleared for discharge by consultants (if involved): Yes      Blood pressure 120/53, pulse 85, temperature 97.7  F (36.5  C), temperature source Oral, resp. rate 20, height 1.549 m (5' 1\"), weight 58.1 kg (128 lb), SpO2 95 %, not currently breastfeeding.      Patient alert and oriented x4. VSS. Assist of one with walker for mobility. Denies pain at this time. Held amlodipine and Hyzaar due to low blood pressure per parameters. Plan: possible discharge to TCU.         Discharge Planner Nurse   Safe discharge environment identified: Yes  Barriers to discharge: Yes       Entered by: Rosa Bray RN 05/11/2022 11:20 PM     Please review provider order for any additional goals.   Nurse to notify provider when observation goals have been met and patient is ready for discharge.                      "

## 2022-05-12 NOTE — PROGRESS NOTES
Clinic Care Coordination Contact  Care Team Conversations    Situation: RN Clinical Product Navigator following patient through TCU care progression.     Background: Patient recently admitted overnight at [facility] with concerns of lower extremity (non-traumatic) pain and decreased ability to manage cares at home.     Assessment: Patient has been in ED twice in the past 2 weeks preceding this admission, chief complaint was pain related.   Prior to admission, patient resided alone in home with support from neighbors and an adult son who lives nearby.   Patient's daughter has been working to get her admitted to [facility].     Plan/Recommendations: RN Clinical Product Navigator will send TCU Care Team Care Management hand in communication and request involvement in discharge planning and coordination of care.       Josseline Salinas RN  Clinical Product Navigator   .

## 2022-05-13 ENCOUNTER — VIRTUAL VISIT (OUTPATIENT)
Dept: GERIATRICS | Facility: CLINIC | Age: 87
End: 2022-05-13
Payer: COMMERCIAL

## 2022-05-13 ENCOUNTER — TELEPHONE (OUTPATIENT)
Dept: PEDIATRICS | Facility: CLINIC | Age: 87
End: 2022-05-13

## 2022-05-13 VITALS
HEIGHT: 61 IN | HEART RATE: 67 BPM | RESPIRATION RATE: 18 BRPM | OXYGEN SATURATION: 91 % | SYSTOLIC BLOOD PRESSURE: 96 MMHG | BODY MASS INDEX: 24.18 KG/M2 | WEIGHT: 128.09 LBS | TEMPERATURE: 98.4 F | DIASTOLIC BLOOD PRESSURE: 51 MMHG

## 2022-05-13 DIAGNOSIS — D64.9 CHRONIC ANEMIA: ICD-10-CM

## 2022-05-13 DIAGNOSIS — M54.50 ACUTE BILATERAL LOW BACK PAIN, UNSPECIFIED WHETHER SCIATICA PRESENT: Primary | ICD-10-CM

## 2022-05-13 DIAGNOSIS — I48.0 PAROXYSMAL ATRIAL FIBRILLATION (H): ICD-10-CM

## 2022-05-13 DIAGNOSIS — J45.40 MODERATE PERSISTENT ASTHMA WITHOUT COMPLICATION: ICD-10-CM

## 2022-05-13 DIAGNOSIS — E87.6 HYPOKALEMIA: ICD-10-CM

## 2022-05-13 DIAGNOSIS — Z86.79 HISTORY OF CARDIOMYOPATHY: ICD-10-CM

## 2022-05-13 DIAGNOSIS — I25.10 CORONARY ARTERY DISEASE INVOLVING NATIVE CORONARY ARTERY OF NATIVE HEART WITHOUT ANGINA PECTORIS: ICD-10-CM

## 2022-05-13 DIAGNOSIS — N18.31 STAGE 3A CHRONIC KIDNEY DISEASE (H): ICD-10-CM

## 2022-05-13 DIAGNOSIS — M79.604 PAIN OF RIGHT LOWER EXTREMITY: ICD-10-CM

## 2022-05-13 DIAGNOSIS — I71.20 THORACIC AORTIC ANEURYSM WITHOUT RUPTURE (H): ICD-10-CM

## 2022-05-13 DIAGNOSIS — R11.0 NAUSEA: Primary | ICD-10-CM

## 2022-05-13 DIAGNOSIS — E87.1 HYPONATREMIA: ICD-10-CM

## 2022-05-13 DIAGNOSIS — R53.81 PHYSICAL DECONDITIONING: ICD-10-CM

## 2022-05-13 DIAGNOSIS — I10 ESSENTIAL HYPERTENSION: ICD-10-CM

## 2022-05-13 DIAGNOSIS — R11.0 NAUSEA: ICD-10-CM

## 2022-05-13 DIAGNOSIS — K21.9 GASTROESOPHAGEAL REFLUX DISEASE, UNSPECIFIED WHETHER ESOPHAGITIS PRESENT: ICD-10-CM

## 2022-05-13 DIAGNOSIS — E78.5 DYSLIPIDEMIA: ICD-10-CM

## 2022-05-13 DIAGNOSIS — K59.01 SLOW TRANSIT CONSTIPATION: ICD-10-CM

## 2022-05-13 LAB — SARS-COV-2 RNA RESP QL NAA+PROBE: NEGATIVE

## 2022-05-13 PROCEDURE — 99310 SBSQ NF CARE HIGH MDM 45: CPT | Performed by: NURSE PRACTITIONER

## 2022-05-13 RX ORDER — ONDANSETRON 4 MG/1
4 TABLET, FILM COATED ORAL EVERY 8 HOURS PRN
Start: 2022-05-13 | End: 2022-11-14

## 2022-05-13 RX ORDER — SENNA AND DOCUSATE SODIUM 50; 8.6 MG/1; MG/1
2 TABLET, FILM COATED ORAL 2 TIMES DAILY PRN
Start: 2022-05-13 | End: 2022-05-13

## 2022-05-13 RX ORDER — POLYETHYLENE GLYCOL 3350 17 G/17G
17 POWDER, FOR SOLUTION ORAL DAILY PRN
Qty: 510 G
Start: 2022-05-13 | End: 2022-11-14

## 2022-05-13 NOTE — PATIENT INSTRUCTIONS
Orders  Genie Persaud  1935  1) BMP 5/16. Diagnosis: CKD  2) Zofran 4 mg q8h PRN for nausea  3). Nursing to ensure bowels move today- administer miralax STAT now.  For constipation   4) If no BM tonight administer suppository per LARRY  5) Miralax 17 gram po daily for constipation  Claudette Heran, APRN CNP on 5/13/2022 at 11:55 AM

## 2022-05-13 NOTE — PROGRESS NOTES
"[facility] GERIATRICS  Genie Persaud is being evaluated via a billable video visit.   The patient has been notified of following: \"This video visit will be conducted via a call between you and your provider. We have found that certain health care needs can be provided without the need for an in-person physical exam. This service lets us provide the care you need with a video conversation. If during the course of the call the provider feels a video visit is not appropriate, you will not be charged for this service.\"   The provider has received verbal consent for a Video Visit from the patient or first contact? Yes  Patient or facility staff would like the video invitation sent by: N/A   Video Start Time: 10:32 AM    Primary Care Provider & Clinic: Layo Sevilla MD, [facility]  / [place] [id number]  [place] Medical Record Number: [id number]  Place of Location at the time of visit: [facility]  ([facility]) [[id number]]  Chief Complaint   Patient presents with     Hospital F/U     Genie Persaud is a 86 year old (1935), admitted to the above facility from  [facility]. Hospital stay 5/11/22 through 5/12/22.    HPI:   HPI information obtained from: facility chart records, facility staff, patient report and [facility] chart review.     PMH significant for HTN, atrial fibrillation, history of cardiomyopathy, nonobstructive CAD, dyslipidemia, CKD stage 3, chronic anemia, asthma, GERD.     Brief Summary of Hospital Course:   Genie Persaud was hospitalized at [facility] from 5/11-5/12/22. She presented to hospital with back pain and lower right extremity pain. Note that she has been in ED with similar complaints on 5/1 and 5/8. Has had US evaluation 4/25 that was negative and showed right Baker's cyst as likely cause of pain. MRI of lumbar spine showed no acute fractures and diffuse degenerative changes including moderate to severe neural foraminal stenosis on the " right at L4-L5 and on the left at L5-S1. UA was negative. She was admitted to hospital for TCU placement, as felt she is not able to tolerate pain. Was started on pain regimen.     Additionally labs in hospital revealed low sodium of 128 and low potassium of 3.3    Discharged to TCU for physical rehabilitation and medical management.     Of note there is no discharge summary available.     Updates on Status Since Skilled nursing Admission: Today she denies pain to back, reports pain to knee is at baseline and due to baker's cyst. Reports ongoing nausea for at least a week. Is concerned that she has issue in her esophagus like she has had in the past. She is eating and drinking. Reports taking medicine for nausea that isn't working (though nothing on her orders in TCU). She is eating and able to swallow without difficulty. Does not feel things are getting stuck. Reports no BM for 4-5 days. Denies abdominal pain. She feels she is taking in enough fluids. Reports breathing is ok- doesn't feel SOB or coughing. Denies CP. Denies dysuria, trouble urinating. Feels weak. She lives alone. Discussed what to expect while in TCU.     CODE STATUS/ADVANCE DIRECTIVES DISCUSSION: Prior - DNR / DNI  Patient's living condition: lives alone  ALLERGIES:   Allergies   Allergen Reactions     Fosamax [Alendronic Acid] GI Disturbance     Contrast Dye      Red arm redness and swelling      Evista [Raloxifene]      abd symptoms.      Flu Virus Vaccine      swollen and red at inj site     Amoxicillin Rash      PAST MEDICAL HISTORY:   Past Medical History:   Diagnosis Date     Anemia 03/10/2009    Chronic disease, by Fe studies; awaiting full GI w/u and repeat colonoscopy, ERCP.     Basal Cell Carcinoma--back      Coronary artery disease 03/09/2017    3/2/2017 - Two vessel coronary artery disease with mLAD 50% stenosis, D3 50% stenosis, pLCx 50% stenosis and mLCx 50% stenosis     Essential hypertension 09/01/2016    White coat hypertension;   24 hour ambulatory monitoring normal. Do not titrate up further.       Hyperlipidemia 02/10/2010     Moderate persistent asthma      Nonrheumatic aortic valve insufficiency 06/29/2017     Osteopenia      Paroxysmal atrial fibrillation 12/26/2017     Pulmonary nodule 03/03/2009    PET scan reportedly normal; biopsy not advised.     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  Problem list name updated by automated process. Provider to review     Vasculitis 04/20/2009    Possible increased activity of thoracic aorta, on PET scan w/u for pulmonary nodule.       PAST SURGICAL HISTORY:  has a past surgical history that includes DILATION/CURETTAGE DIAG/THER NON OB (1967,1971); LIGATE FALLOPIAN TUBE (01/01/1971); STEREOTACTIC BREAST BIOPSY (01/01/2001); cholecystectomy, laporoscopic (01/01/2008); Esophagoscopy, gastroscopy, duodenoscopy (EGD), combined (05/17/2013); Tonsillectomy; and Appendectomy open (1957).  FAMILY HISTORY: family history includes Breast Cancer in her child; C.A.D. in her mother; Cerebrovascular Disease in her paternal grandmother; Connective Tissue Disorder in her father; Hypertension in her mother; Osteoporosis in her mother; Prostate Cancer in an other family member.  SOCIAL HISTORY:  reports that she has never smoked. She has never used smokeless tobacco. She reports current alcohol use of about 1.0 - 2.0 standard drink of alcohol per week. She reports that she does not use drugs.    MEDICATIONS:  Current Outpatient Medications   Medication Sig Dispense Refill     acetaminophen (TYLENOL) 325 MG tablet Take 3 tablets (975 mg) by mouth every 8 hours for 10 days 90 tablet 0     albuterol (PROAIR HFA/PROVENTIL HFA/VENTOLIN HFA) 108 (90 Base) MCG/ACT inhaler Inhale 1-2 puffs into the lungs every 6 hours as needed for  shortness of breath / dyspnea or wheezing       amLODIPine (NORVASC) 5 MG tablet Take 1 tablet (5 mg) by mouth At Bedtime 90 tablet 1     atorvastatin (LIPITOR) 20 MG tablet TAKE ONE TABLET BY MOUTH AT BEDTIME 90 tablet 1     calcium citrate-vitamin D (CITRACAL) 315-200 MG-UNIT TABS per tablet Take 1 tablet by mouth 2 times daily       fluticasone-salmeterol (ADVAIR-HFA) 230-21 MCG/ACT inhaler Inhale 2 puffs into the lungs 2 times daily       latanoprost (XALATAN) 0.005 % ophthalmic solution Place 1 drop into both eyes At Bedtime       loratadine (CLARITIN) 10 MG tablet Take 1 tablet (10 mg) by mouth daily 90 tablet 3     losartan-hydrochlorothiazide (HYZAAR) 50-12.5 MG tablet Take 1 tablet by mouth daily 45 tablet 1     montelukast (SINGULAIR) 10 MG tablet Take 1 tablet (10 mg) by mouth At Bedtime 90 tablet 3     omeprazole (PRILOSEC) 10 MG DR capsule TAKE ONE CAPSULE BY MOUTH ONE TIME DAILY 90 capsule 1     ondansetron (ZOFRAN) 4 MG tablet Take 1 tablet (4 mg) by mouth every 8 hours as needed for nausea       oxyCODONE (ROXICODONE) 5 MG tablet Take 0.5 tablets (2.5 mg) by mouth every 4 hours as needed for moderate to severe pain 10 tablet 0     polyethylene glycol (MIRALAX) 17 GM/Dose powder Take 17 g by mouth daily as needed for constipation 510 g      predniSONE (DELTASONE) 10 MG tablet Take 10 mg by mouth As needed for asthma flares follows with PCP or pulmonology (per patient she takes 1 tablet for 3 days)       senna-docusate (SENOKOT-S/PERICOLACE) 8.6-50 MG tablet Take 1 tablet by mouth 2 times daily for 7 days 14 tablet 0     tiotropium (SPIRIVA RESPIMAT) 2.5 MCG/ACT inhaler Inhale 2 puffs into the lungs daily 4 g 2     discharge medications reconciled and changed, per note/orders     REVIEW OF SYSTEMS: 10 point ROS of systems including Constitutional, Eyes, Respiratory, Cardiovascular, Gastroenterology, Genitourinary, Integumentary, Musculoskeletal, Psychiatric were all negative except for pertinent  "positives noted in my HPI.    Objective: BP 96/51   Pulse 67   Temp 98.4  F (36.9  C)   Resp 18   Ht 1.549 m (5' 0.98\")   Wt 58.1 kg (128 lb 1.4 oz)   LMP  (LMP Unknown)   SpO2 91%   BMI 24.21 kg/m    Limited visit exam done given COVID-19 precautions.   GENERAL APPEARANCE:  Alert, in NAD  HEENT: normocephalic, moist mucous membranes, nose without drainage or crusting  RESP:  respiratory effort normal, no respiratory distress, patient is on RA  NEURO: moves extremities freely  PSYCH:  affect and mood normal    Labs:   Labs done in SNF are in [facility]. Please refer to them using [facility] Everywhere. and Recent labs in EPIC reviewed by me today.     ASSESSMENT/PLAN:  (M54.50) Acute bilateral low back pain, unspecified whether sciatica present  (primary encounter diagnosis)  (M79.604) Pain of right lower extremity  Comment: denies back pain today, reports knee pain is at baseline secondary to baker's cyst  Plan: Continue scheduled tylenol 975 mg q8h, oxycodone PRN. Monitor pain. Therapy as below    (R11.0) Nausea  (K59.01) Constipation  Comment: reports nausea that has been ongoing for awhile-she tells me she has been taking medicine for the nausea, but it isn't helping, although she has no orders for anything in TCU, also reports no BM x4-5 days.   -denies abdominal pain  -is concerned she may have a scar in her esophagus that is causing symptoms- as she had that in the past and would like \"scope and to see GI\"  -is able to eat and drink, denies troubles swallowing  Plan: Zofran PRN. Nursing to ensure bowels move today- miralax STAT now. If no BM tonight administer suppository. Continue senna S 2 tabs BID and miralax daily PRN. Monitor nausea, bowels, oral intakes closely. BMP 5/16. Consider GI referral if ongoing symptoms.     (I48.0) Paroxysmal atrial fibrillation (H)  Comment: HR controlled 77,67,62,66  -is not on anticoagulation for unknown reason  Plan: VS per policy.     (I10) Essential " hypertension  Comment: BP with ok control 128/38, 96/51, 148/50, 114/68  Plan: Continue amlodipine 5 mg at bedtime, losartan-hydrochlorothiazide 50-12.5 mg daily. BMP 5/16. VS per policy. Adjust medications as needed.    (Z86.79) History of cardiomyopathy  (I25.10) Coronary artery disease involving native coronary artery of native heart without angina pectoris  (E78.5) Dyslipidemia  Comment: chronic, with nonobstructive CAD, history of cardiomyopathy in 2017 when she was septic  -TTE from 8/2021 with EF 60-65%, ascending aorta mildly dilated, 2+ aortic regurgitation  -coronary angiogram 2017 with mild to moderate CAD that is nonobstructive  -denies CP today  Plan: Continue atorvastatin 20 mg at bedtime. Follow up with cardiology per recommendations    (N18.31) Stage 3a chronic kidney disease (H)  Comment: chronic  -baseline creatinine 0.9-1.1  -creatinine 0.96 on 5/12  Plan: BMP 5/16. Avoid nephrotoxins. Renally dose medications as indicated.    (E87.6) Hypokalemia  (E87.1) Hyponatremia  Comment:   -potassium 3.3 on 5/12   -baseline sodium 130-136 recently  -sodium 128 on 5/12  -hydrochlorothiazide could be contributing   Plan: BMP 5/16.     (D64.9) Chronic anemia  Comment: chronic  -hgb at baseline 11-12  -hgb 11.0 on 5/1/22  Plan: CBC PRN.     (J45.40) Moderate persistent asthma without complication  Comment: chronic, no s/sx of exacerbation today  Plan: Continue tiotropium daily, advair BID, montelukast daily, albuterol PRN and prednisone 10 mg daily x 3 days PRN if exacerbation. Monitory symptoms. Follow up with pulmonary per previous recommendations.     ( I71.2) Thoracic aortic aneurysm without rupture(H)  Comment: last measured at 3.9 cm on ECHO from 8/2021  Plan: Follow up outpatient    (K21.9) Gastroesophageal reflux disease, unspecified whether esophagitis present  Comment: chronic  Plan: Continue omeprazole 10 mg daily.     (R53.81) Physical deconditioning  Comment: Acute, secondary to recent  hospitalization, medical conditions as above  Plan: Encourage participation in physical therapy/occupational therapy for strengthening and deconditioning. Discharge planning per their recommendation. Social work to assist with d/c planning.    Total time spent with patient visit at the skilled nursing facility was 36 minutes including patient visit and review of past records. Greater than 50% of total time spent with counseling and coordinating care with facility staff regarding admission and medication orders, plan of care as described above. Counseling patient about current medications, plan of care and what to expect at TCU.      Electronically signed by:  TOREY Edmonds CNP    Video-Visit Details  Type of service: Video Visit  Video End Time (time video stopped): 10:42AM  Distant Location (provider location): [facility]

## 2022-05-13 NOTE — TELEPHONE ENCOUNTER
Patients Daughter Wilda is trying to get ahold of Felipe. She is at [facility] with her mom and would like to him to call her back at [phone]. She says she keeps getting transferred and the line goes dead.

## 2022-05-13 NOTE — LETTER
"    5/13/2022        RE: Genie Persaud  [address] [place] [address]        [facility] GERIATRICS  Genie Persaud is being evaluated via a billable video visit.   The patient has been notified of following: \"This video visit will be conducted via a call between you and your provider. We have found that certain health care needs can be provided without the need for an in-person physical exam. This service lets us provide the care you need with a video conversation. If during the course of the call the provider feels a video visit is not appropriate, you will not be charged for this service.\"   The provider has received verbal consent for a Video Visit from the patient or first contact? Yes  Patient or facility staff would like the video invitation sent by: N/A   Video Start Time: 10:32 AM    Primary Care Provider & Clinic: Layo Sevilla MD, [address] [place] [address] [address] [place] [id number]  [place] [facility] Record Number: [id number]  Place of Location at the time of visit: [facility]  ([facility]) [[id number]]  Chief Complaint   Patient presents with     Hospital F/U     Genie Persaud is a 86 year old (1935), admitted to the above facility from  [facility]. Hospital stay 5/11/22 through 5/12/22.    HPI:   HPI information obtained from: facility chart records, facility staff, patient report and [facility] chart review.     PMH significant for HTN, atrial fibrillation, history of cardiomyopathy, nonobstructive CAD, dyslipidemia, CKD stage 3, chronic anemia, asthma, GERD.     Brief Summary of Hospital Course:   Genie Persaud was hospitalized at [facility] from 5/11-5/12/22. She presented to hospital with back pain and lower right extremity pain. Note that she has been in ED with similar complaints on 5/1 and 5/8. Has had US evaluation 4/25 that was negative and showed right Baker's cyst as likely cause of pain. MRI of lumbar spine showed no acute fractures and diffuse " degenerative changes including moderate to severe neural foraminal stenosis on the right at L4-L5 and on the left at L5-S1. UA was negative. She was admitted to hospital for TCU placement, as felt she is not able to tolerate pain. Was started on pain regimen.     Additionally labs in hospital revealed low sodium of 128 and low potassium of 3.3    Discharged to TCU for physical rehabilitation and medical management.     Of note there is no discharge summary available.     Updates on Status Since Skilled nursing Admission: Today she denies pain to back, reports pain to knee is at baseline and due to baker's cyst. Reports ongoing nausea for at least a week. Is concerned that she has issue in her esophagus like she has had in the past. She is eating and drinking. Reports taking medicine for nausea that isn't working (though nothing on her orders in TCU). She is eating and able to swallow without difficulty. Does not feel things are getting stuck. Reports no BM for 4-5 days. Denies abdominal pain. She feels she is taking in enough fluids. Reports breathing is ok- doesn't feel SOB or coughing. Denies CP. Denies dysuria, trouble urinating. Feels weak. She lives alone. Discussed what to expect while in TCU.     CODE STATUS/ADVANCE DIRECTIVES DISCUSSION: Prior - DNR / DNI  Patient's living condition: lives alone  ALLERGIES:   Allergies   Allergen Reactions     Fosamax [Alendronic Acid] GI Disturbance     Contrast Dye      Red arm redness and swelling      Evista [Raloxifene]      abd symptoms.      Flu Virus Vaccine      swollen and red at inj site     Amoxicillin Rash      PAST MEDICAL HISTORY:   Past Medical History:   Diagnosis Date     Anemia 03/10/2009    Chronic disease, by Fe studies; awaiting full GI w/u and repeat colonoscopy, ERCP.     Basal Cell Carcinoma--back      Coronary artery disease 03/09/2017    3/2/2017 - Two vessel coronary artery disease with mLAD 50% stenosis, D3 50% stenosis, pLCx 50% stenosis and  mLCx 50% stenosis     Essential hypertension 09/01/2016    White coat hypertension;  24 hour ambulatory monitoring normal. Do not titrate up further.       Hyperlipidemia 02/10/2010     Moderate persistent asthma      Nonrheumatic aortic valve insufficiency 06/29/2017     Osteopenia      Paroxysmal atrial fibrillation 12/26/2017     Pulmonary nodule 03/03/2009    PET scan reportedly normal; biopsy not advised.     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  Problem list name updated by automated process. Provider to review     Vasculitis 04/20/2009    Possible increased activity of thoracic aorta, on PET scan w/u for pulmonary nodule.       PAST SURGICAL HISTORY:  has a past surgical history that includes DILATION/CURETTAGE DIAG/THER NON OB (1967,1971); LIGATE FALLOPIAN TUBE (01/01/1971); STEREOTACTIC BREAST BIOPSY (01/01/2001); cholecystectomy, laporoscopic (01/01/2008); Esophagoscopy, gastroscopy, duodenoscopy (EGD), combined (05/17/2013); Tonsillectomy; and Appendectomy open (1957).  FAMILY HISTORY: family history includes Breast Cancer in her child; C.A.D. in her mother; Cerebrovascular Disease in her paternal grandmother; Connective Tissue Disorder in her father; Hypertension in her mother; Osteoporosis in her mother; Prostate Cancer in an other family member.  SOCIAL HISTORY:  reports that she has never smoked. She has never used smokeless tobacco. She reports current alcohol use of about 1.0 - 2.0 standard drink of alcohol per week. She reports that she does not use drugs.    MEDICATIONS:  Current Outpatient Medications   Medication Sig Dispense Refill     acetaminophen (TYLENOL) 325 MG tablet Take 3 tablets (975 mg) by mouth every 8 hours for 10 days 90 tablet 0     albuterol (PROAIR HFA/PROVENTIL HFA/VENTOLIN HFA) 108 (90  Base) MCG/ACT inhaler Inhale 1-2 puffs into the lungs every 6 hours as needed for shortness of breath / dyspnea or wheezing       amLODIPine (NORVASC) 5 MG tablet Take 1 tablet (5 mg) by mouth At Bedtime 90 tablet 1     atorvastatin (LIPITOR) 20 MG tablet TAKE ONE TABLET BY MOUTH AT BEDTIME 90 tablet 1     calcium citrate-vitamin D (CITRACAL) 315-200 MG-UNIT TABS per tablet Take 1 tablet by mouth 2 times daily       fluticasone-salmeterol (ADVAIR-HFA) 230-21 MCG/ACT inhaler Inhale 2 puffs into the lungs 2 times daily       latanoprost (XALATAN) 0.005 % ophthalmic solution Place 1 drop into both eyes At Bedtime       loratadine (CLARITIN) 10 MG tablet Take 1 tablet (10 mg) by mouth daily 90 tablet 3     losartan-hydrochlorothiazide (HYZAAR) 50-12.5 MG tablet Take 1 tablet by mouth daily 45 tablet 1     montelukast (SINGULAIR) 10 MG tablet Take 1 tablet (10 mg) by mouth At Bedtime 90 tablet 3     omeprazole (PRILOSEC) 10 MG DR capsule TAKE ONE CAPSULE BY MOUTH ONE TIME DAILY 90 capsule 1     ondansetron (ZOFRAN) 4 MG tablet Take 1 tablet (4 mg) by mouth every 8 hours as needed for nausea       oxyCODONE (ROXICODONE) 5 MG tablet Take 0.5 tablets (2.5 mg) by mouth every 4 hours as needed for moderate to severe pain 10 tablet 0     polyethylene glycol (MIRALAX) 17 GM/Dose powder Take 17 g by mouth daily as needed for constipation 510 g      predniSONE (DELTASONE) 10 MG tablet Take 10 mg by mouth As needed for asthma flares follows with PCP or pulmonology (per patient she takes 1 tablet for 3 days)       senna-docusate (SENOKOT-S/PERICOLACE) 8.6-50 MG tablet Take 1 tablet by mouth 2 times daily for 7 days 14 tablet 0     tiotropium (SPIRIVA RESPIMAT) 2.5 MCG/ACT inhaler Inhale 2 puffs into the lungs daily 4 g 2     discharge medications reconciled and changed, per note/orders     REVIEW OF SYSTEMS: 10 point ROS of systems including Constitutional, Eyes, Respiratory, Cardiovascular, Gastroenterology, Genitourinary,  "Integumentary, Musculoskeletal, Psychiatric were all negative except for pertinent positives noted in my HPI.    Objective: BP 96/51   Pulse 67   Temp 98.4  F (36.9  C)   Resp 18   Ht 1.549 m (5' 0.98\")   Wt 58.1 kg (128 lb 1.4 oz)   LMP  (LMP Unknown)   SpO2 91%   BMI 24.21 kg/m    Limited visit exam done given COVID-19 precautions.   GENERAL APPEARANCE:  Alert, in NAD  HEENT: normocephalic, moist mucous membranes, nose without drainage or crusting  RESP:  respiratory effort normal, no respiratory distress, patient is on RA  NEURO: moves extremities freely  PSYCH:  affect and mood normal    Labs:   Labs done in SNF are in [place] [facility]. Please refer to them using [facility]Care Everywhere. and Recent labs in EPIC reviewed by me today.     ASSESSMENT/PLAN:  (M54.50) Acute bilateral low back pain, unspecified whether sciatica present  (primary encounter diagnosis)  (M79.604) Pain of right lower extremity  Comment: denies back pain today, reports knee pain is at baseline secondary to baker's cyst  Plan: Continue scheduled tylenol 975 mg q8h, oxycodone PRN. Monitor pain. Therapy as below    (R11.0) Nausea  (K59.01) Constipation  Comment: reports nausea that has been ongoing for awhile-she tells me she has been taking medicine for the nausea, but it isn't helping, although she has no orders for anything in TCU, also reports no BM x4-5 days.   -denies abdominal pain  -is concerned she may have a scar in her esophagus that is causing symptoms- as she had that in the past and would like \"scope and to see GI\"  -is able to eat and drink, denies troubles swallowing  Plan: Zofran PRN. Nursing to ensure bowels move today- miralax STAT now. If no BM tonight administer suppository. Continue senna S 2 tabs BID and miralax daily PRN. Monitor nausea, bowels, oral intakes closely. BMP 5/16. Consider GI referral if ongoing symptoms.     (I48.0) Paroxysmal atrial fibrillation (H)  Comment: HR controlled 77,67,62,66  -is not on " anticoagulation for unknown reason  Plan: VS per policy.     (I10) Essential hypertension  Comment: BP with ok control 128/38, 96/51, 148/50, 114/68  Plan: Continue amlodipine 5 mg at bedtime, losartan-hydrochlorothiazide 50-12.5 mg daily. BMP 5/16. VS per policy. Adjust medications as needed.    (Z86.79) History of cardiomyopathy  (I25.10) Coronary artery disease involving native coronary artery of native heart without angina pectoris  (E78.5) Dyslipidemia  Comment: chronic, with nonobstructive CAD, history of cardiomyopathy in 2017 when she was septic  -TTE from 8/2021 with EF 60-65%, ascending aorta mildly dilated, 2+ aortic regurgitation  -coronary angiogram 2017 with mild to moderate CAD that is nonobstructive  -denies CP today  Plan: Continue atorvastatin 20 mg at bedtime. Follow up with cardiology per recommendations    (N18.31) Stage 3a chronic kidney disease (H)  Comment: chronic  -baseline creatinine 0.9-1.1  -creatinine 0.96 on 5/12  Plan: BMP 5/16. Avoid nephrotoxins. Renally dose medications as indicated.    (E87.6) Hypokalemia  (E87.1) Hyponatremia  Comment:   -potassium 3.3 on 5/12   -baseline sodium 130-136 recently  -sodium 128 on 5/12  -hydrochlorothiazide could be contributing   Plan: BMP 5/16.     (D64.9) Chronic anemia  Comment: chronic  -hgb at baseline 11-12  -hgb 11.0 on 5/1/22  Plan: CBC PRN.     (J45.40) Moderate persistent asthma without complication  Comment: chronic, no s/sx of exacerbation today  Plan: Continue tiotropium daily, advair BID, montelukast daily, albuterol PRN and prednisone 10 mg daily x 3 days PRN if exacerbation. Monitory symptoms. Follow up with pulmonary per previous recommendations.     ( I71.2) Thoracic aortic aneurysm without rupture(H)  Comment: last measured at 3.9 cm on ECHO from 8/2021  Plan: Follow up outpatient    (K21.9) Gastroesophageal reflux disease, unspecified whether esophagitis present  Comment: chronic  Plan: Continue omeprazole 10 mg daily.      (R53.81) Physical deconditioning  Comment: Acute, secondary to recent hospitalization, medical conditions as above  Plan: Encourage participation in physical therapy/occupational therapy for strengthening and deconditioning. Discharge planning per their recommendation. Social work to assist with d/c planning.    Total time spent with patient visit at the skilled nursing facility was 36 minutes including patient visit and review of past records. Greater than 50% of total time spent with counseling and coordinating care with facility staff regarding admission and medication orders, plan of care as described above. Counseling patient about current medications, plan of care and what to expect at TCU.      Electronically signed by:  TOREY Edmonds CNP    Video-Visit Details  Type of service: Video Visit  Video End Time (time video stopped): 10:42AM  [facility]        Sincerely,        TOREY Edmonds CNP

## 2022-05-13 NOTE — TELEPHONE ENCOUNTER
Called the patient [phone].    Informed the patient of the referral.   Gave the patient the phone number to schedule Phone: [phone].     Patient verbalized understanding.     Felipe Zimmerman RN on 5/13/2022 at 2:16 PM

## 2022-05-13 NOTE — TELEPHONE ENCOUNTER
Yes, we can work on getting her evaluation set up with gastroenterology.  Order placed.   Help her call to schedule.

## 2022-05-13 NOTE — TELEPHONE ENCOUNTER
Daughter Wilda is calling in.     Wilda spoke with the patient.     Patient is currently with [facility] in [facility] in Rehab getting physical therapy.   Plan appears to be rehab for a few days with PT and OT, then become strong enough to discharge home.     Weak and nauseous, and believes that she needs to have an upper endoscopy. This was needed years ago.   Patient has been having issues eating.   Patient is feeling worse every day.     Patient is asking for RN to call her to discuss her concerns further.     Called Patient's cell: [phone].   Left a voicemail with instruction to return call.   Called patients main phone number on file, and Wilda picked up.    Wilda reports that she will visit with the patient later today and have her call the clinic to discuss.     Felipe Zimmerman RN on 5/13/2022 at 11:30 AM

## 2022-05-13 NOTE — TELEPHONE ENCOUNTER
Called the patient at [phone].    At [facility] with [facility].     Patient reports that she is having nausea, and is having difficulty eating.   Nausea is constant.   Nausea started a couple weeks ago.     Pressure in the face, which causes nausea.   Facial pressure has been present for over 1 year, and this has been investigated by multiple providers.     Had nausea 15 years ago. Scar discovered in the esophagus.   Was given a pink drink that resolved the symptoms.     Care team at [facility] knows about the nausea, and have given her zofran, among other medications.   Medications are not helping.     Patient has been able to eat and drink small amounts, but it feels gross.   Denies abdominal pain.     Patient reports that she has lost 9 pounds in the past 2 months.     Advised patient to discuss these symptoms with her current care team at [facility] (Claudette Hearn NP).   Patient reports that she has been discussing these symptoms with Claudette NP and the team.   Per Patient, Claudette has discussed referral for upper endoscopy or gastro referral with the patient, though nothing is decided.     Routing to Dr. Sevilla:  Patient insisted that Dr. Seivlla be asked if he can sign a referral to Gastroenterology to investigate the ongoing nausea.  -ok for gastro referral?  -Appt with Dr. Sevilla first, or straight to Gastro?  Unknown when the patient will be discharged from TCU.     Felipe Zimmerman RN on 5/13/2022 at 1:48 PM

## 2022-05-15 ENCOUNTER — APPOINTMENT (OUTPATIENT)
Dept: GENERAL RADIOLOGY | Facility: CLINIC | Age: 87
DRG: 643 | End: 2022-05-15
Attending: EMERGENCY MEDICINE
Payer: COMMERCIAL

## 2022-05-15 ENCOUNTER — HOSPITAL ENCOUNTER (INPATIENT)
Facility: CLINIC | Age: 87
LOS: 8 days | Discharge: NURSING, REHAB OR LONG-TERM CARE | DRG: 643 | End: 2022-05-23
Attending: EMERGENCY MEDICINE | Admitting: INTERNAL MEDICINE
Payer: COMMERCIAL

## 2022-05-15 ENCOUNTER — APPOINTMENT (OUTPATIENT)
Dept: CT IMAGING | Facility: CLINIC | Age: 87
DRG: 643 | End: 2022-05-15
Attending: EMERGENCY MEDICINE
Payer: COMMERCIAL

## 2022-05-15 DIAGNOSIS — E86.0 DEHYDRATION: ICD-10-CM

## 2022-05-15 DIAGNOSIS — I48.91 NEW ONSET A-FIB (H): Primary | ICD-10-CM

## 2022-05-15 DIAGNOSIS — J45.40 MODERATE PERSISTENT ASTHMA WITHOUT COMPLICATION: ICD-10-CM

## 2022-05-15 DIAGNOSIS — R11.0 NAUSEA: ICD-10-CM

## 2022-05-15 DIAGNOSIS — R53.1 GENERALIZED WEAKNESS: ICD-10-CM

## 2022-05-15 DIAGNOSIS — J18.9 COMMUNITY ACQUIRED PNEUMONIA, UNSPECIFIED LATERALITY: ICD-10-CM

## 2022-05-15 DIAGNOSIS — E87.1 HYPONATREMIA: ICD-10-CM

## 2022-05-15 LAB
ALBUMIN SERPL-MCNC: 2.8 G/DL (ref 3.4–5)
ALBUMIN UR-MCNC: 20 MG/DL
ALP SERPL-CCNC: 162 U/L (ref 40–150)
ALT SERPL W P-5'-P-CCNC: 36 U/L (ref 0–50)
ANION GAP SERPL CALCULATED.3IONS-SCNC: 9 MMOL/L (ref 3–14)
APPEARANCE UR: CLEAR
AST SERPL W P-5'-P-CCNC: 32 U/L (ref 0–45)
BACTERIA #/AREA URNS HPF: ABNORMAL /HPF
BASOPHILS # BLD AUTO: 0 10E3/UL (ref 0–0.2)
BASOPHILS NFR BLD AUTO: 0 %
BILIRUB SERPL-MCNC: 0.4 MG/DL (ref 0.2–1.3)
BILIRUB UR QL STRIP: NEGATIVE
BUN SERPL-MCNC: 18 MG/DL (ref 7–30)
CALCIUM SERPL-MCNC: 9.3 MG/DL (ref 8.5–10.1)
CHLORIDE BLD-SCNC: 84 MMOL/L (ref 94–109)
CO2 SERPL-SCNC: 29 MMOL/L (ref 20–32)
COLOR UR AUTO: YELLOW
CREAT SERPL-MCNC: 0.8 MG/DL (ref 0.52–1.04)
EOSINOPHIL # BLD AUTO: 0.1 10E3/UL (ref 0–0.7)
EOSINOPHIL NFR BLD AUTO: 1 %
ERYTHROCYTE [DISTWIDTH] IN BLOOD BY AUTOMATED COUNT: 12.4 % (ref 10–15)
GFR SERPL CREATININE-BSD FRML MDRD: 72 ML/MIN/1.73M2
GLUCOSE BLD-MCNC: 161 MG/DL (ref 70–99)
GLUCOSE UR STRIP-MCNC: NEGATIVE MG/DL
HCT VFR BLD AUTO: 34.1 % (ref 35–47)
HGB BLD-MCNC: 11.7 G/DL (ref 11.7–15.7)
HGB UR QL STRIP: NEGATIVE
HOLD SPECIMEN: NORMAL
HYALINE CASTS: 1 /LPF
IMM GRANULOCYTES # BLD: 0 10E3/UL
IMM GRANULOCYTES NFR BLD: 0 %
KETONES UR STRIP-MCNC: NEGATIVE MG/DL
LACTATE SERPL-SCNC: 1.3 MMOL/L (ref 0.7–2)
LEUKOCYTE ESTERASE UR QL STRIP: ABNORMAL
LIPASE SERPL-CCNC: 117 U/L (ref 73–393)
LYMPHOCYTES # BLD AUTO: 1.4 10E3/UL (ref 0.8–5.3)
LYMPHOCYTES NFR BLD AUTO: 11 %
MCH RBC QN AUTO: 30 PG (ref 26.5–33)
MCHC RBC AUTO-ENTMCNC: 34.3 G/DL (ref 31.5–36.5)
MCV RBC AUTO: 87 FL (ref 78–100)
MONOCYTES # BLD AUTO: 1 10E3/UL (ref 0–1.3)
MONOCYTES NFR BLD AUTO: 8 %
NEUTROPHILS # BLD AUTO: 9.7 10E3/UL (ref 1.6–8.3)
NEUTROPHILS NFR BLD AUTO: 80 %
NITRATE UR QL: NEGATIVE
NRBC # BLD AUTO: 0 10E3/UL
NRBC BLD AUTO-RTO: 0 /100
OSMOLALITY SERPL: 255 MMOL/KG (ref 280–301)
OSMOLALITY UR: 539 MMOL/KG (ref 100–1200)
PH UR STRIP: 6.5 [PH] (ref 5–7)
PLATELET # BLD AUTO: 374 10E3/UL (ref 150–450)
POTASSIUM BLD-SCNC: 3.6 MMOL/L (ref 3.4–5.3)
PROT SERPL-MCNC: 6.5 G/DL (ref 6.8–8.8)
RBC # BLD AUTO: 3.9 10E6/UL (ref 3.8–5.2)
RBC URINE: <1 /HPF
SARS-COV-2 RNA RESP QL NAA+PROBE: NEGATIVE
SODIUM SERPL-SCNC: 122 MMOL/L (ref 133–144)
SODIUM UR-SCNC: 40 MMOL/L
SP GR UR STRIP: 1.02 (ref 1–1.03)
SQUAMOUS EPITHELIAL: 1 /HPF
UROBILINOGEN UR STRIP-MCNC: NORMAL MG/DL
WBC # BLD AUTO: 12.3 10E3/UL (ref 4–11)
WBC URINE: 2 /HPF

## 2022-05-15 PROCEDURE — 80053 COMPREHEN METABOLIC PANEL: CPT | Performed by: EMERGENCY MEDICINE

## 2022-05-15 PROCEDURE — 71046 X-RAY EXAM CHEST 2 VIEWS: CPT

## 2022-05-15 PROCEDURE — 85025 COMPLETE CBC W/AUTO DIFF WBC: CPT | Performed by: EMERGENCY MEDICINE

## 2022-05-15 PROCEDURE — 83930 ASSAY OF BLOOD OSMOLALITY: CPT | Performed by: INTERNAL MEDICINE

## 2022-05-15 PROCEDURE — 99223 1ST HOSP IP/OBS HIGH 75: CPT | Mod: AI | Performed by: INTERNAL MEDICINE

## 2022-05-15 PROCEDURE — U0005 INFEC AGEN DETEC AMPLI PROBE: HCPCS | Performed by: EMERGENCY MEDICINE

## 2022-05-15 PROCEDURE — G0378 HOSPITAL OBSERVATION PER HR: HCPCS

## 2022-05-15 PROCEDURE — 96361 HYDRATE IV INFUSION ADD-ON: CPT

## 2022-05-15 PROCEDURE — 81001 URINALYSIS AUTO W/SCOPE: CPT | Performed by: EMERGENCY MEDICINE

## 2022-05-15 PROCEDURE — 120N000001 HC R&B MED SURG/OB

## 2022-05-15 PROCEDURE — 96374 THER/PROPH/DIAG INJ IV PUSH: CPT

## 2022-05-15 PROCEDURE — 96376 TX/PRO/DX INJ SAME DRUG ADON: CPT

## 2022-05-15 PROCEDURE — 258N000003 HC RX IP 258 OP 636: Performed by: EMERGENCY MEDICINE

## 2022-05-15 PROCEDURE — 250N000013 HC RX MED GY IP 250 OP 250 PS 637: Performed by: INTERNAL MEDICINE

## 2022-05-15 PROCEDURE — 99285 EMERGENCY DEPT VISIT HI MDM: CPT | Mod: 25

## 2022-05-15 PROCEDURE — 250N000011 HC RX IP 250 OP 636: Performed by: INTERNAL MEDICINE

## 2022-05-15 PROCEDURE — 83605 ASSAY OF LACTIC ACID: CPT | Performed by: EMERGENCY MEDICINE

## 2022-05-15 PROCEDURE — 36415 COLL VENOUS BLD VENIPUNCTURE: CPT | Performed by: INTERNAL MEDICINE

## 2022-05-15 PROCEDURE — 74176 CT ABD & PELVIS W/O CONTRAST: CPT

## 2022-05-15 PROCEDURE — 83935 ASSAY OF URINE OSMOLALITY: CPT | Performed by: INTERNAL MEDICINE

## 2022-05-15 PROCEDURE — 36415 COLL VENOUS BLD VENIPUNCTURE: CPT | Performed by: EMERGENCY MEDICINE

## 2022-05-15 PROCEDURE — 83690 ASSAY OF LIPASE: CPT | Performed by: EMERGENCY MEDICINE

## 2022-05-15 PROCEDURE — 84300 ASSAY OF URINE SODIUM: CPT | Performed by: INTERNAL MEDICINE

## 2022-05-15 PROCEDURE — C9803 HOPD COVID-19 SPEC COLLECT: HCPCS

## 2022-05-15 PROCEDURE — 250N000011 HC RX IP 250 OP 636: Performed by: EMERGENCY MEDICINE

## 2022-05-15 PROCEDURE — 84295 ASSAY OF SERUM SODIUM: CPT | Performed by: INTERNAL MEDICINE

## 2022-05-15 RX ORDER — PANTOPRAZOLE SODIUM 20 MG/1
20 TABLET, DELAYED RELEASE ORAL
Status: DISCONTINUED | OUTPATIENT
Start: 2022-05-15 | End: 2022-05-23 | Stop reason: HOSPADM

## 2022-05-15 RX ORDER — LORATADINE 10 MG/1
10 TABLET ORAL DAILY
Status: DISCONTINUED | OUTPATIENT
Start: 2022-05-15 | End: 2022-05-23 | Stop reason: HOSPADM

## 2022-05-15 RX ORDER — MONTELUKAST SODIUM 10 MG/1
10 TABLET ORAL AT BEDTIME
Status: DISCONTINUED | OUTPATIENT
Start: 2022-05-15 | End: 2022-05-23 | Stop reason: HOSPADM

## 2022-05-15 RX ORDER — ONDANSETRON 2 MG/ML
4 INJECTION INTRAMUSCULAR; INTRAVENOUS EVERY 30 MIN PRN
Status: DISCONTINUED | OUTPATIENT
Start: 2022-05-15 | End: 2022-05-15

## 2022-05-15 RX ORDER — SODIUM CHLORIDE AND POTASSIUM CHLORIDE 150; 900 MG/100ML; MG/100ML
INJECTION, SOLUTION INTRAVENOUS CONTINUOUS
Status: DISCONTINUED | OUTPATIENT
Start: 2022-05-15 | End: 2022-05-16

## 2022-05-15 RX ORDER — ATORVASTATIN CALCIUM 20 MG/1
20 TABLET, FILM COATED ORAL EVERY EVENING
Status: DISCONTINUED | OUTPATIENT
Start: 2022-05-15 | End: 2022-05-23 | Stop reason: HOSPADM

## 2022-05-15 RX ORDER — LATANOPROST 50 UG/ML
1 SOLUTION/ DROPS OPHTHALMIC AT BEDTIME
Status: DISCONTINUED | OUTPATIENT
Start: 2022-05-15 | End: 2022-05-23 | Stop reason: HOSPADM

## 2022-05-15 RX ORDER — AMOXICILLIN 250 MG
1 CAPSULE ORAL 2 TIMES DAILY
Status: DISCONTINUED | OUTPATIENT
Start: 2022-05-15 | End: 2022-05-17

## 2022-05-15 RX ORDER — ONDANSETRON 4 MG/1
4 TABLET, ORALLY DISINTEGRATING ORAL EVERY 6 HOURS PRN
Status: DISCONTINUED | OUTPATIENT
Start: 2022-05-15 | End: 2022-05-23 | Stop reason: HOSPADM

## 2022-05-15 RX ORDER — AMLODIPINE BESYLATE 5 MG/1
5 TABLET ORAL AT BEDTIME
Status: DISCONTINUED | OUTPATIENT
Start: 2022-05-15 | End: 2022-05-23 | Stop reason: HOSPADM

## 2022-05-15 RX ORDER — NYSTATIN 100000/ML
500000 SUSPENSION, ORAL (FINAL DOSE FORM) ORAL 4 TIMES DAILY
Status: DISCONTINUED | OUTPATIENT
Start: 2022-05-15 | End: 2022-05-23 | Stop reason: HOSPADM

## 2022-05-15 RX ORDER — PROCHLORPERAZINE MALEATE 5 MG
5 TABLET ORAL EVERY 6 HOURS PRN
Status: DISCONTINUED | OUTPATIENT
Start: 2022-05-15 | End: 2022-05-23 | Stop reason: HOSPADM

## 2022-05-15 RX ORDER — ALBUTEROL SULFATE 90 UG/1
1-2 AEROSOL, METERED RESPIRATORY (INHALATION) EVERY 6 HOURS PRN
Status: DISCONTINUED | OUTPATIENT
Start: 2022-05-15 | End: 2022-05-23 | Stop reason: HOSPADM

## 2022-05-15 RX ORDER — ACETAMINOPHEN 325 MG/1
975 TABLET ORAL EVERY 8 HOURS PRN
COMMUNITY

## 2022-05-15 RX ORDER — PROCHLORPERAZINE 25 MG
12.5 SUPPOSITORY, RECTAL RECTAL EVERY 12 HOURS PRN
Status: DISCONTINUED | OUTPATIENT
Start: 2022-05-15 | End: 2022-05-23 | Stop reason: HOSPADM

## 2022-05-15 RX ORDER — ACETAMINOPHEN 325 MG/1
650 TABLET ORAL EVERY 6 HOURS PRN
Status: DISCONTINUED | OUTPATIENT
Start: 2022-05-15 | End: 2022-05-23 | Stop reason: HOSPADM

## 2022-05-15 RX ORDER — POLYETHYLENE GLYCOL 3350 17 G/17G
17 POWDER, FOR SOLUTION ORAL DAILY PRN
Status: DISCONTINUED | OUTPATIENT
Start: 2022-05-15 | End: 2022-05-23 | Stop reason: HOSPADM

## 2022-05-15 RX ORDER — ACETAMINOPHEN 650 MG/1
650 SUPPOSITORY RECTAL EVERY 6 HOURS PRN
Status: DISCONTINUED | OUTPATIENT
Start: 2022-05-15 | End: 2022-05-23 | Stop reason: HOSPADM

## 2022-05-15 RX ORDER — NYSTATIN 100000/ML
1000000 SUSPENSION, ORAL (FINAL DOSE FORM) ORAL 4 TIMES DAILY
Status: DISCONTINUED | OUTPATIENT
Start: 2022-05-15 | End: 2022-05-15

## 2022-05-15 RX ORDER — ONDANSETRON 2 MG/ML
4 INJECTION INTRAMUSCULAR; INTRAVENOUS EVERY 6 HOURS PRN
Status: DISCONTINUED | OUTPATIENT
Start: 2022-05-15 | End: 2022-05-23 | Stop reason: HOSPADM

## 2022-05-15 RX ADMIN — AMLODIPINE BESYLATE 5 MG: 5 TABLET ORAL at 21:20

## 2022-05-15 RX ADMIN — MONTELUKAST 10 MG: 10 TABLET, FILM COATED ORAL at 21:20

## 2022-05-15 RX ADMIN — ACETAMINOPHEN 650 MG: 325 TABLET, FILM COATED ORAL at 15:32

## 2022-05-15 RX ADMIN — POTASSIUM CHLORIDE AND SODIUM CHLORIDE: 900; 150 INJECTION, SOLUTION INTRAVENOUS at 21:37

## 2022-05-15 RX ADMIN — LATANOPROST 1 DROP: 50 SOLUTION/ DROPS OPHTHALMIC at 21:30

## 2022-05-15 RX ADMIN — NYSTATIN 500000 UNITS: 100000 SUSPENSION ORAL at 21:20

## 2022-05-15 RX ADMIN — SENNOSIDES AND DOCUSATE SODIUM 1 TABLET: 50; 8.6 TABLET ORAL at 21:20

## 2022-05-15 RX ADMIN — ATORVASTATIN CALCIUM 20 MG: 20 TABLET, FILM COATED ORAL at 21:20

## 2022-05-15 RX ADMIN — POTASSIUM CHLORIDE AND SODIUM CHLORIDE: 900; 150 INJECTION, SOLUTION INTRAVENOUS at 13:06

## 2022-05-15 RX ADMIN — ACETAMINOPHEN 650 MG: 325 TABLET, FILM COATED ORAL at 21:20

## 2022-05-15 RX ADMIN — ONDANSETRON 4 MG: 2 INJECTION INTRAMUSCULAR; INTRAVENOUS at 07:36

## 2022-05-15 RX ADMIN — PANTOPRAZOLE SODIUM 20 MG: 20 TABLET, DELAYED RELEASE ORAL at 15:31

## 2022-05-15 RX ADMIN — LORATADINE 10 MG: 10 TABLET ORAL at 13:07

## 2022-05-15 RX ADMIN — SODIUM CHLORIDE 1000 ML: 9 INJECTION, SOLUTION INTRAVENOUS at 04:29

## 2022-05-15 RX ADMIN — NYSTATIN 500000 UNITS: 100000 SUSPENSION ORAL at 15:35

## 2022-05-15 RX ADMIN — ALBUTEROL SULFATE 2 PUFF: 90 AEROSOL, METERED RESPIRATORY (INHALATION) at 21:35

## 2022-05-15 RX ADMIN — ONDANSETRON 4 MG: 2 INJECTION INTRAMUSCULAR; INTRAVENOUS at 04:29

## 2022-05-15 RX ADMIN — ALBUTEROL SULFATE 2 PUFF: 90 AEROSOL, METERED RESPIRATORY (INHALATION) at 15:37

## 2022-05-15 ASSESSMENT — ACTIVITIES OF DAILY LIVING (ADL)
ADLS_ACUITY_SCORE: 31
ADLS_ACUITY_SCORE: 33
ADLS_ACUITY_SCORE: 31
ADLS_ACUITY_SCORE: 33

## 2022-05-15 ASSESSMENT — ENCOUNTER SYMPTOMS
VOMITING: 0
FEVER: 0
WEAKNESS: 1
NAUSEA: 1
APPETITE CHANGE: 1
CONSTIPATION: 1

## 2022-05-15 NOTE — ED NOTES
[facility]  ED Nurse Handoff Report    Genie Persaud is a 86 year old female   ED Chief complaint: Nausea & Vomiting  . ED Diagnosis:   Final diagnoses:   Nausea   Dehydration   Hyponatremia   Generalized weakness     Allergies:   Allergies   Allergen Reactions     Fosamax [Alendronic Acid] GI Disturbance     Contrast Dye      Red arm redness and swelling      Evista [Raloxifene]      abd symptoms.      Flu Virus Vaccine      swollen and red at inj site     Amoxicillin Rash       Code Status: DNR / DNI  Activity level - Baseline/Home:  Assist X 1. Activity Level - Current:   Assist X 1. Lift room needed: No. Bariatric: No   Needed: No   Isolation: No. Infection: Not Applicable.     Vital Signs:   Vitals:    05/15/22 0404 05/15/22 0405 05/15/22 0415 05/15/22 0430   BP: (!) 175/75   126/54   Pulse: 117  95 96   Resp: 16  23 24   Temp:  97.7  F (36.5  C)     SpO2: 92%  95% 91%       Cardiac Rhythm:  ,      Pain level:    Patient confused: No. Patient Falls Risk: Yes.   Elimination Status: Has voided   Patient Report - Initial Complaint: nausea & vomiting. Focused Assessment:    Tests Performed:   Labs Ordered and Resulted from Time of ED Arrival to Time of ED Departure   COMPREHENSIVE METABOLIC PANEL - Abnormal       Result Value    Sodium 122 (*)     Potassium 3.6      Chloride 84 (*)     Carbon Dioxide (CO2) 29      Anion Gap 9      Urea Nitrogen 18      Creatinine 0.80      Calcium 9.3      Glucose 161 (*)     Alkaline Phosphatase 162 (*)     AST 32      ALT 36      Protein Total 6.5 (*)     Albumin 2.8 (*)     Bilirubin Total 0.4      GFR Estimate 72     CBC WITH PLATELETS AND DIFFERENTIAL - Abnormal    WBC Count 12.3 (*)     RBC Count 3.90      Hemoglobin 11.7      Hematocrit 34.1 (*)     MCV 87      MCH 30.0      MCHC 34.3      RDW 12.4      Platelet Count 374      % Neutrophils 80      % Lymphocytes 11      % Monocytes 8      % Eosinophils 1      % Basophils 0      % Immature  Granulocytes 0      NRBCs per 100 WBC 0      Absolute Neutrophils 9.7 (*)     Absolute Lymphocytes 1.4      Absolute Monocytes 1.0      Absolute Eosinophils 0.1      Absolute Basophils 0.0      Absolute Immature Granulocytes 0.0      Absolute NRBCs 0.0     LIPASE - Normal    Lipase 117     LACTIC ACID WHOLE BLOOD - Normal    Lactic Acid 1.3     ROUTINE UA WITH MICROSCOPIC     Chest XR,  PA & LAT   Final Result   IMPRESSION:       There is a focal band of atelectasis in the paradiaphragmatic left lower lobe. No new right lung opacities. Stable 10 mm nodule in the paradiaphragmatic right lower lobe. No new nodular or airspace opacities.      Diaphragmatic curvature is preserved. No visible pleural fluid.      Cardiac silhouette is not enlarged. Atheromatous aortic calcifications are present. Vascular pedicle width is normal.       Mild thoracic spine disc space narrowing with minimal marginal osteophytes. No compression deformities or displaced rib fractures.      CT Abdomen Pelvis without Contrast (stone protocol)   Final Result   IMPRESSION:    1.  No acute abnormalities identified.      2.  Given interval of stability the areas of nodularity in the right lung base should be benign and no follow-up is recommended.      3.  Diverticulosis with no evidence for diverticulitis.      4.  No evidence for a bowel ileus.      5.  Tiny calcified uterine fibroid.           . Abnormal Results: see above.   Treatments provided: see MAR  Family Comments: unknown  OBS brochure/video discussed/provided to patient:  Yes  ED Medications:   Medications   ondansetron (ZOFRAN) injection 4 mg (4 mg Intravenous Given 5/15/22 1757)   0.9% sodium chloride BOLUS (0 mLs Intravenous Stopped 5/15/22 6830)     Drips infusing:  No  For the majority of the shift, the patient's behavior Green. Interventions performed were N/A.    Sepsis treatment initiated: No     Patient tested for COVID 19 prior to admission: YES    ED Nurse Name/Phone Number:  Danyell Juares RN,   7:24 AM    RECEIVING UNIT ED HANDOFF REVIEW    Above ED Nurse Handoff Report was reviewed: Yes  Reviewed by: Georgiana Odom RN on May 15, 2022 at 10:43 AM

## 2022-05-15 NOTE — H&P
[facility]  Hospitalist Admission Note  Name: Genie Persaud    MRN: [id number]  YOB: 1935    Age: 86 year old  Date of admission: 5/15/2022  Primary care provider: Layo Sevilla             Assessment and Plan:       Dehydration    Hyponatremia    Nausea    Generalized weakness    86-year-old woman with extensive past medical history including moderate persistent asthma on inhalers that include steroid.  She has prior history of esophageal candidiasis diagnosed by endoscopy and is reporting very similar symptoms with nausea started 2 weeks ago esophageal and epigastric pain that worsen with any food intake.  She has had very poor oral intake especially in the last few days and now is presenting back from transitional care unit with moderate hyponatremia, severe weakness, decreased oral intake, dehydration.    1. Generalized weakness secondary to hyponatremia from poor oral intake dehydration and diuretics hydrochlorothiazide  2. 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 Severe weakness wants to go back home declines to go back to transitional care unit  4. Failed recent observation and is readmitted within few days of discharge  5. Recent admission for back pain atraumatic  6. Chronic constipation  7. Moderate persistent asthma on chronic inhaler steroid  8. Paroxysmal atrial fibrillation  9. Stress-induced cardiomyopathy and per past history however echocardiogram in 8/21 showed normal ejection fraction    I will admit her as inpatient with expected length of stay more than 2 midnights based on the patient complex history, the fact that she was hospitalized this week and is readmitted few days after discharge she will be at significant risk of rapid clinical deterioration or readmission if treated in a short stay outpatient/observation.  IV fluid for hydration, check sodium every 6 hours x 2  Check urine and plasma osmolarity and urine sodium  Increase omeprazole from 10  once a day to 20 mg twice daily and add nystatin empirically for candidiasis very high risk patient based on her history and symptoms  Consult GI for possible endoscopy as inpatient prior to discharge especially with persistent symptoms resulting in the complication of poor intake and hyponatremia  Use Tylenol and try to avoid narcotics if possible recently discharged on oxycodone for back pain can be contributing to hyponatremia.   Resume inhaler  Bowel regimen for constipation  Physical and Occupational Therapy consultation, social service    DVT prophylaxis PCD  CODE STATUS DNR/DNI discussed with patient  Expected discharge patient does not want to go back to transitional care unit possibly home with home care with expected stay 3 days.             Chief Complaint:     Severe nausea and weakness worse in the last 2 days         History of Present Illness:   Genie Persaud is a 86 year old female who presents with Nausea & Vomiting  Patient discharged to transitional care unit on B12 when she presented with back pain, since she has been having poor oral intake and significant nausea.  She reports that the nausea started 2 weeks ago associated with epigastric discomfort anytime she eats and significant weakness that worsened in the last 48 hours the reason she was brought back to the emergency room.  She reports that the symptoms are similar to prior episode of esophageal Candida because of her inhalers that she had 10 years ago and was diagnosed by endoscopy.  On further evaluation in the emergency room she had significant hyponatremia which is new sodium 122.  Also has been having constipation.  Denies fever chills chest pain shortness of breath or other symptoms on review of system.            Past Medical History:     Past Medical History:   Diagnosis Date     Anemia 03/10/2009    Chronic disease, by Fe studies; awaiting full GI w/u and repeat colonoscopy, ERCP.     Basal Cell Carcinoma--back      Coronary  artery disease 03/09/2017    3/2/2017 - Two vessel coronary artery disease with mLAD 50% stenosis, D3 50% stenosis, pLCx 50% stenosis and mLCx 50% stenosis     Essential hypertension 09/01/2016    White coat hypertension;  24 hour ambulatory monitoring normal. Do not titrate up further.       Hyperlipidemia 02/10/2010     Moderate persistent asthma      Nonrheumatic aortic valve insufficiency 06/29/2017     Osteopenia      Paroxysmal atrial fibrillation 12/26/2017     Pulmonary nodule 03/03/2009    PET scan reportedly normal; biopsy not advised.     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  Problem list name updated by automated process. Provider to review     Vasculitis 04/20/2009    Possible increased activity of thoracic aorta, on PET scan w/u for pulmonary nodule.                Past Surgical History:     Past Surgical History:   Procedure Laterality Date     APPENDECTOMY OPEN  1957     C STEREOTACTIC BREAST BIOPSY  01/01/2001     CHOLECYSTECTOMY, LAPOROSCOPIC  01/01/2008     DILATION AND CURETTAGE  1967     DILATION AND CURETTAGE  1971     ESOPHAGOSCOPY, GASTROSCOPY, DUODENOSCOPY (EGD), COMBINED  05/17/2013    Procedure: COMBINED ESOPHAGOSCOPY, GASTROSCOPY, DUODENOSCOPY (EGD), BIOPSY SINGLE OR MULTIPLE;  ESOPHAGOSCOPY, GASTROSCOPY, DUODENOSCOPY (EGD)  with bx;  Surgeon: Jeffery Yanez MD;  Location:  GI     TONSILLECTOMY       Tubal Ligation NOS  1971             Social History:    reports that she has never smoked. She has never used smokeless tobacco. She reports current alcohol use of about 1.0 - 2.0 standard drink of alcohol per week. She reports that she does not use drugs.            Family History:     Family History   Problem Relation Age of Onset     Hypertension Mother      Osteoporosis Mother      C.A.D.  Mother      Connective Tissue Disorder Father         scleraderma     Cerebrovascular Disease Paternal Grandmother      Prostate Cancer Other         9/05 passed away due to complications     Breast Cancer Child         youngest dtr            Allergies:     Allergies   Allergen Reactions     Fosamax [Alendronic Acid] GI Disturbance     Contrast Dye      Red arm redness and swelling      Evista [Raloxifene]      abd symptoms.      Flu Virus Vaccine      swollen and red at inj site     Amoxicillin Rash             Review of Systems:     A Comprehensive greater than 10 system review of systems was carried out.  Pertinent positives and negatives are noted.  Otherwise negative for contributory information.             Physical Exam:   Blood pressure 115/64, pulse 92, temperature 97.7  F (36.5  C), resp. rate 24, SpO2 93 %, not currently breastfeeding.  Wt Readings from Last 1 Encounters:   05/13/22 58.1 kg (128 lb 1.4 oz)       EXAM:  General Appearance:  Comfortable. NAD  HEENT: Normal HEENT exam.  PERRLA, Normal Conjunctiva  Psychiatric: mentation appears normal, affect normal and patient appearance is normal  Neck: Neck supple. No adenopathy. Thyroid symmetric, normal size  Lungs:  Normal effort.  Breath sounds clear to auscultation.  No wheezes, rhonchi or decreased breath sounds.    Heart: Regular rhythm.  S1 normal and S2 normal.  No murmur or friction rub. No edema  Chest: symmetric chest wall expansion.  No chest wall tenderness.    Skin:  Warm and dry.  No rash or ulceration.   Abdomen:  Bowel sounds are normal.  There is no abdominal tenderness.   There is no splenomegaly. There is no hepatomegaly.   Neuro: non focal with normal sensation, motor power and reflexes.           Data:   I personally reviewed patient labs and imaging    Most Recent 3 CBC's:Recent Labs   Lab Test 05/15/22  0415 05/01/22  0539 08/01/21  0651   WBC 12.3* 8.1 10.5   HGB 11.7 11.0* 11.1*   MCV 87 92 91    258 276      Most Recent  3 BMP's:  Recent Labs   Lab Test 05/15/22  0415 05/12/22  0547 05/01/22  0539   * 128* 131*   POTASSIUM 3.6 3.3* 3.6   CHLORIDE 84* 92* 100   CO2 29 30 29   BUN 18 20 22   CR 0.80 0.96 0.81   ANIONGAP 9 6 2*   ELROY 9.3 8.4* 9.3   * 95 119*     Most Recent 2 LFT's:  Recent Labs   Lab Test 05/15/22  0415 03/21/22  1553   AST 32 19   ALT 36 27   ALKPHOS 162* 90   BILITOTAL 0.4 0.6     Most Recent INR's and Anticoagulation Dosing History:  Anticoagulation Dose History    There is no flowsheet data to display.       Most Recent 3 Troponin's:  Recent Labs   Lab Test 08/01/21  0651 11/15/17  1800 11/15/17  1420 11/15/17  0645   TROPI  --  4.139* 3.771* 0.262*   TROPONIN <0.015  --   --   --      Most Recent Cholesterol Panel:  Recent Labs   Lab Test 08/26/21  0949   CHOL 159   LDL 53   HDL 95   TRIG 56     Most Recent 6 Bacteria Isolates From Any Culture (See EPIC Reports for Culture Details):  Recent Labs   Lab Test 08/01/18  0314 08/01/18  0305 08/01/18  0154 11/18/17  0800 11/15/17  0717 11/15/17  0640   CULT Canceled, Test credited  Duplicate request   No growth No growth >10 Squamous epithelial cells/low power field indicates oral contamination. Please   recollect.  *  Canceled, Test credited  Notification of test cancellation was given to  Jessika Dinh RN @16:40 on 11/18/17,KO   No growth No growth     Most Recent TSH, T4 and A1c Labs:  Recent Labs   Lab Test 03/21/22  1553 01/20/21  0927 06/10/19  1112   TSH  --   --  1.94   A1C 6.4*   < > 6.2*    < > = values in this interval not displayed.     Results for orders placed or performed during the hospital encounter of 05/15/22   CT Abdomen Pelvis without Contrast (stone protocol)    Narrative    EXAM: CT ABDOMEN PELVIS W/O CONTRAST  LOCATION: [facility]  DATE/TIME: 5/15/2022 6:09 AM    INDICATION: Abd pain, Nausea  COMPARISON: 3/2/2017 and 7/30/2018  TECHNIQUE: CT scan of the abdomen and pelvis was performed without IV contrast.  Multiplanar reformats were obtained. Dose reduction techniques were used.  CONTRAST: None.    FINDINGS:   LOWER CHEST: There are some noncalcified areas of nodularity in the ACR portion of the right lung anteriorly which have been stable dating back to 07/30/2018, this should be benign.    HEPATOBILIARY: The gallbladder is surgically absent. The liver and bile ducts are normal.    PANCREAS: Normal.    SPLEEN: Normal.    ADRENAL GLANDS: Normal.    KIDNEYS/BLADDER: Normal.    BOWEL: There are some mild findings of diverticulosis with no obvious findings of diverticulitis. There is no evidence for an obstructive ileus.    LYMPH NODES: Normal.    VASCULATURE: Moderate calcification is seen with no aneurysmal dilatation.    PELVIC ORGANS: Tiny calcified uterine fibroid..    MUSCULOSKELETAL: Advanced degenerative changes are seen of the spine.      Impression    IMPRESSION:   1.  No acute abnormalities identified.    2.  Given interval of stability the areas of nodularity in the right lung base should be benign and no follow-up is recommended.    3.  Diverticulosis with no evidence for diverticulitis.    4.  No evidence for a bowel ileus.    5.  Tiny calcified uterine fibroid.     Chest XR,  PA & LAT    Narrative    EXAM: XR CHEST 2 VW  LOCATION: [facility]  DATE/TIME: 5/15/2022 6:58 AM    INDICATION: cough  COMPARISON: CT of the abdomen and pelvis which includes the lung bases from earlier today; CT chest without contrast 03/29/2022      Impression    IMPRESSION:     There is a focal band of atelectasis in the paradiaphragmatic left lower lobe. No new right lung opacities. Stable 10 mm nodule in the paradiaphragmatic right lower lobe. No new nodular or airspace opacities.    Diaphragmatic curvature is preserved. No visible pleural fluid.    Cardiac silhouette is not enlarged. Atheromatous aortic calcifications are present. Vascular pedicle width is normal.     Mild thoracic spine disc space  narrowing with minimal marginal osteophytes. No compression deformities or displaced rib fractures.       Case discussed with Brielle RODGERS         Medications:     Prior to Admission medications    Medication Sig Last Dose Taking? Auth Provider   acetaminophen (TYLENOL) 325 MG tablet Take 975 mg by mouth every 8 hours as needed for mild pain Unknown at PRN Yes Unknown, Entered By History   albuterol (PROAIR HFA/PROVENTIL HFA/VENTOLIN HFA) 108 (90 Base) MCG/ACT inhaler Inhale 1-2 puffs into the lungs every 6 hours as needed for shortness of breath / dyspnea or wheezing Unknown at PRN Yes Layo Sevilla MD   amLODIPine (NORVASC) 5 MG tablet Take 1 tablet (5 mg) by mouth At Bedtime 5/14/2022 at Unknown time Yes Layo Sevilla MD   atorvastatin (LIPITOR) 20 MG tablet TAKE ONE TABLET BY MOUTH AT BEDTIME 5/14/2022 at Unknown time Yes Layo Sevilla MD   calcium citrate-vitamin D (CITRACAL) 315-200 MG-UNIT TABS per tablet Take 1 tablet by mouth 2 times daily 5/14/2022 at Unknown time Yes Reported, Patient   fluticasone-salmeterol (ADVAIR-HFA) 230-21 MCG/ACT inhaler Inhale 2 puffs into the lungs 2 times daily 5/14/2022 at Unknown time Yes Layo Sevilla MD   latanoprost (XALATAN) 0.005 % ophthalmic solution Place 1 drop into both eyes At Bedtime 5/14/2022 at Unknown time Yes Unknown, Entered By History   loratadine (CLARITIN) 10 MG tablet Take 1 tablet (10 mg) by mouth daily 5/14/2022 at Unknown time Yes Layo Sevilla MD   losartan-hydrochlorothiazide (HYZAAR) 50-12.5 MG tablet Take 1 tablet by mouth daily 5/14/2022 at Unknown time Yes Layo Sevilla MD   montelukast (SINGULAIR) 10 MG tablet Take 1 tablet (10 mg) by mouth At Bedtime 5/14/2022 at Unknown time Yes Layo Sevilla MD   omeprazole (PRILOSEC) 10 MG DR capsule TAKE ONE CAPSULE BY MOUTH ONE TIME DAILY 5/14/2022 at Unknown time Yes Layo Sevilla MD   ondansetron (ZOFRAN) 4 MG tablet Take 1 tablet (4 mg) by mouth every 8 hours as needed for nausea Unknown at PRN  Yes Claudette Hearn APRN CNP   oxyCODONE (ROXICODONE) 5 MG tablet Take 0.5 tablets (2.5 mg) by mouth every 4 hours as needed for moderate to severe pain Unknown at PRN Yes Aniya Nava PA-C   polyethylene glycol (MIRALAX) 17 GM/Dose powder Take 17 g by mouth daily as needed for constipation Unknown at PRN Yes Claudette Hearn APRN CNP   predniSONE (DELTASONE) 10 MG tablet Take 10 mg by mouth As needed for asthma flares follows with PCP or pulmonology (per patient she takes 1 tablet for 3 days) Unknown at PRN Yes Reported, Patient   senna-docusate (SENOKOT-S/PERICOLACE) 8.6-50 MG tablet Take 1 tablet by mouth 2 times daily for 7 days 5/14/2022 at Unknown time Yes Aniya Nava PA-C   tiotropium (SPIRIVA RESPIMAT) 2.5 MCG/ACT inhaler Inhale 2 puffs into the lungs daily 5/14/2022 at Unknown time Yes Layo Sevilla MD              This document was produced using voice recognition software

## 2022-05-15 NOTE — PLAN OF CARE
ROOM # 204-1    Living Situation (if not independent, order SW consult):  Facility name:  :     Activity level at baseline: Ax1  Activity level on admit: Ax1    Who will be transporting you at discharge:     Patient registered to observation; given Patient Bill of Rights; given the opportunity to ask questions about observation status and their plan of care.  Patient has been oriented to the observation room, bathroom and call light is in place.    Discussed discharge goals and expectations with patient/family.

## 2022-05-15 NOTE — ED TRIAGE NOTES
Nausea, vomiting and weakness x 3 days.  Pt seen in ER 5/11, admitted to OBS and discharged to TCU 5/12 for chronic pain and CKD.       Triage Assessment     Row Name 05/15/22 0403       Triage Assessment (Adult)    Airway WDL WDL       Respiratory WDL    Respiratory WDL WDL       Skin Circulation/Temperature WDL    Skin Circulation/Temperature WDL WDL       Cardiac WDL    Cardiac WDL WDL       Peripheral/Neurovascular WDL    Peripheral Neurovascular WDL WDL       Cognitive/Neuro/Behavioral WDL    Cognitive/Neuro/Behavioral WDL WDL

## 2022-05-15 NOTE — PLAN OF CARE
PRIMARY DIAGNOSIS: GENERALIZED WEAKNESS/dehydration     OUTPATIENT/OBSERVATION GOALS TO BE MET BEFORE DISCHARGE  1. Orthostatic performed: No    2. Tolerating PO medications: Yes    3. Return to near baseline physical activity: Yes    4. Cleared for discharge by consultants (if involved): No    Discharge Planner Nurse   Safe discharge environment identified: Yes  Barriers to discharge: Yes    Entered by: Georgiana Odom RN 05/15/2022 6:41 PM    Pt AandOx4. Ax1 with GB and Walker. Vitals stable Neuro Intact. Pain is managed with tylenol PRN. Lung sounds clear, infrequent cough. Slight wheeze controlled with inhalers. Bowels are active x 4 Quads. Last BM 5/15/22, 2x loose stools. Voiding with no concern. Continent. Skin Bruises on L upper arm and BLE. Left Peripheral IV infusing sodium chloride+KC1 @100ml/hr. Regular diet. Bed Alarm on. Plan: GI consult, xray, and monitor sodium level.                Please review provider order for any additional goals.   Nurse to notify provider when observation goals have been met and patient is ready for discharge.

## 2022-05-15 NOTE — CONSULTS
[facility] CONSULTATION     Genie Persaud  [address] [place] [address]  86 year old female    Admission Date/Time: 5/15/2022  Primary Care Provider: Layo Sevilla    We were asked to see the patient in consultation by Dr. Toth for evaluation of nausea.        HPI:  Genie Persaud is a 86 year old female who was admitted to [facility] today for hyponatremia after she presented with nausea. She states that for the past several weeks she had had increased fatigue and some mild nausea. She also had had some constipation. Over the past few days the nausea and weakness increased significantly.  She denies any new medications.  She did take medications for the constipation and says this morning she was finally able to have a bowel movements.      She has had an esophageal candida infection in the past. I cannot see a record of that in her last EGD, but it is possible the EGD was done elsewhere. She thinks she had nausea with the candida infection.  I do see an EGD from 2011 when she had gastritis.    ROS: A comprehensive ten point review of systems was negative aside from those in mentioned in the HPI.      MEDICATIONS: No current facility-administered medications on file prior to encounter.  acetaminophen (TYLENOL) 325 MG tablet, Take 975 mg by mouth every 8 hours as needed for mild pain  albuterol (PROAIR HFA/PROVENTIL HFA/VENTOLIN HFA) 108 (90 Base) MCG/ACT inhaler, Inhale 1-2 puffs into the lungs every 6 hours as needed for shortness of breath / dyspnea or wheezing  amLODIPine (NORVASC) 5 MG tablet, Take 1 tablet (5 mg) by mouth At Bedtime  atorvastatin (LIPITOR) 20 MG tablet, TAKE ONE TABLET BY MOUTH AT BEDTIME  calcium citrate-vitamin D (CITRACAL) 315-200 MG-UNIT TABS per tablet, Take 1 tablet by mouth 2 times daily  fluticasone-salmeterol (ADVAIR-HFA) 230-21 MCG/ACT inhaler, Inhale 2 puffs into the lungs 2 times daily  latanoprost (XALATAN) 0.005 % ophthalmic solution, Place 1 drop into both  eyes At Bedtime  loratadine (CLARITIN) 10 MG tablet, Take 1 tablet (10 mg) by mouth daily  losartan-hydrochlorothiazide (HYZAAR) 50-12.5 MG tablet, Take 1 tablet by mouth daily  montelukast (SINGULAIR) 10 MG tablet, Take 1 tablet (10 mg) by mouth At Bedtime  omeprazole (PRILOSEC) 10 MG DR capsule, TAKE ONE CAPSULE BY MOUTH ONE TIME DAILY  ondansetron (ZOFRAN) 4 MG tablet, Take 1 tablet (4 mg) by mouth every 8 hours as needed for nausea  oxyCODONE (ROXICODONE) 5 MG tablet, Take 0.5 tablets (2.5 mg) by mouth every 4 hours as needed for moderate to severe pain  polyethylene glycol (MIRALAX) 17 GM/Dose powder, Take 17 g by mouth daily as needed for constipation  predniSONE (DELTASONE) 10 MG tablet, Take 10 mg by mouth As needed for asthma flares follows with PCP or pulmonology (per patient she takes 1 tablet for 3 days)  senna-docusate (SENOKOT-S/PERICOLACE) 8.6-50 MG tablet, Take 1 tablet by mouth 2 times daily for 7 days  tiotropium (SPIRIVA RESPIMAT) 2.5 MCG/ACT inhaler, Inhale 2 puffs into the lungs daily        ALLERGIES:   Allergies   Allergen Reactions     Fosamax [Alendronic Acid] GI Disturbance     Contrast Dye      Red arm redness and swelling      Evista [Raloxifene]      abd symptoms.      Flu Virus Vaccine      swollen and red at inj site     Amoxicillin Rash       Past Medical History:   Diagnosis Date     Anemia 03/10/2009    Chronic disease, by Fe studies; awaiting full GI w/u and repeat colonoscopy, ERCP.     Basal Cell Carcinoma--back      Coronary artery disease 03/09/2017    3/2/2017 - Two vessel coronary artery disease with mLAD 50% stenosis, D3 50% stenosis, pLCx 50% stenosis and mLCx 50% stenosis     Essential hypertension 09/01/2016    White coat hypertension;  24 hour ambulatory monitoring normal. Do not titrate up further.       Hyperlipidemia 02/10/2010     Moderate persistent asthma      Nonrheumatic aortic valve insufficiency 06/29/2017     Osteopenia      Paroxysmal atrial fibrillation  12/26/2017     Pulmonary nodule 03/03/2009    PET scan reportedly normal; biopsy not advised.     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  Problem list name updated by automated process. Provider to review     Vasculitis 04/20/2009    Possible increased activity of thoracic aorta, on PET scan w/u for pulmonary nodule.        Past Surgical History:   Procedure Laterality Date     APPENDECTOMY OPEN  1957     C STEREOTACTIC BREAST BIOPSY  01/01/2001     CHOLECYSTECTOMY, LAPOROSCOPIC  01/01/2008     DILATION AND CURETTAGE  1967     DILATION AND CURETTAGE  1971     ESOPHAGOSCOPY, GASTROSCOPY, DUODENOSCOPY (EGD), COMBINED  05/17/2013    Procedure: COMBINED ESOPHAGOSCOPY, GASTROSCOPY, DUODENOSCOPY (EGD), BIOPSY SINGLE OR MULTIPLE;  ESOPHAGOSCOPY, GASTROSCOPY, DUODENOSCOPY (EGD)  with bx;  Surgeon: Jeffery Yanez MD;  Location:  GI     TONSILLECTOMY       Tubal Ligation NOS  1971         SOCIAL HISTORY:  Social History     Tobacco Use     Smoking status: Never Smoker     Smokeless tobacco: Never Used   Substance Use Topics     Alcohol use: Yes     Alcohol/week: 1.0 - 2.0 standard drink     Types: 1 - 2 Standard drinks or equivalent per week     Comment: 1 glass of wine 1-2 days per week     Drug use: No       FAMILY HISTORY:  No known family history of GI disease    PHYSICAL EXAM:   BP (!) 151/53 (BP Location: Left arm)   Pulse 103   Temp 97.4  F (36.3  C) (Oral)   Resp 15   Wt 63 kg (138 lb 14.4 oz)   LMP  (LMP Unknown)   SpO2 90%   BMI 26.26 kg/m      Constitutional: NAD, comfortable  Cardiovascular: RRR  Respiratory: CTAB  Psychiatric: mentation appears normal and affect normal/bright  Head: Normocephalic. Atraumatic.    Neck: normal size, trachea midline  Eyes:  no icterus  ENT:hearing adequate, pharynx normal without  erythema or exudate  Abdomen:   Auscultation: normal  Appearance: normal  Palpation: non tender  NEURO: grossly negative  SKIN: no suspicious lesions or rashes            ADDITIONAL COMMENTS:   I reviewed the patient's new clinical lab test results.   Recent Labs   Lab Test 05/15/22  0415 05/01/22  0539 08/01/21  0651   WBC 12.3* 8.1 10.5   HGB 11.7 11.0* 11.1*   MCV 87 92 91    258 276     Recent Labs   Lab Test 05/15/22  1152 05/15/22  0415 05/12/22  0547 05/01/22  0539   * 122* 128* 131*   POTASSIUM  --  3.6 3.3* 3.6   CHLORIDE  --  84* 92* 100   CO2  --  29 30 29   BUN  --  18 20 22   CR  --  0.80 0.96 0.81   ANIONGAP  --  9 6 2*   ELROY  --  9.3 8.4* 9.3   GLC  --  161* 95 119*     Recent Labs   Lab Test 05/15/22  0820 05/15/22  0415 05/11/22  0958 03/21/22  1553 08/01/21  0651 06/13/16  1008 06/13/16  0929   ALBUMIN  --  2.8*  --  3.6 3.3*   < >  --    BILITOTAL  --  0.4  --  0.6 0.5   < >  --    ALT  --  36  --  27 24   < >  --    AST  --  32  --  19 20   < >  --    ALKPHOS  --  162*  --  90 106   < >  --    PROTEIN 20 *  --  Negative  --   --   --  10*   LIPASE  --  117  --   --   --   --   --     < > = values in this interval not displayed.     CT 5/15/22:  IMPRESSION:   1.  No acute abnormalities identified.     2.  Given interval of stability the areas of nodularity in the right lung base should be benign and no follow-up is recommended.     3.  Diverticulosis with no evidence for diverticulitis.     4.  No evidence for a bowel ileus.     5.  Tiny calcified uterine fibroid.          CONSULTATION ASSESSMENT AND PLAN:    Active Problems:      Nausea    Assessment: Patient with increasing nausea over the past several weeks.  Her sodium on admission was 122 which could certainly cause nausea. She also states that she has been constipated over the past week (had a bowel movement this morning) and constipation could be contributing as well. History of gastritis on a 2011 EGD.    I would continue to  watch while her Na is corrected.  We can check an xray for stool load and treat if she is constipated.  If the nausea persists when she has a normal sodium and no constipation, an EGD could be considered.  At   this point, would not be safe to sedate her for an EGD with a sodium of 122.      Plan:   - check xray to assess stool load, treat for constipation if indicated  - if nausea persists when Na improves, consider EGD       We will follow up tomorrow.    Meghana Balbuena MD  [facility]  Office:  [phone]    Approximately 35 minutes of total time was spent providing patient care, including patient evaluation, reviewing documentation/test results, and .

## 2022-05-15 NOTE — ED PROVIDER NOTES
"  History     Chief Complaint:  Nausea  Weakness     HPI   Genie Persaud is a 86 year old female with history of hypertension, stroke, paroxysmal atrial fibrillation, stress-induced cardiomyopathy who presents via EMS from TCU with nausea and weakness. She has not been having normal bowel movements. She reports of decreased appetite. She notes of pressure in her abdomen but denies pain. Denies vomiting or fever. She states this has occurred before and she was found to have a \"scar in her esophagus.\" She was recently admitted from 5/11 to 5/12 for atraumatic back and was discharged to TCU.     Review of Systems   Constitutional: Positive for appetite change. Negative for fever.   Gastrointestinal: Positive for constipation and nausea. Negative for vomiting.   Neurological: Positive for weakness.   All other systems reviewed and are negative.    Allergies:  Fosamax [Alendronic Acid]  Contrast Dye  Evista [Raloxifene]  Flu Virus Vaccine  Amoxicillin    Medications:  Albuterol   Norvasc  Lipitor  Citracal  Advair  Xalatan  Claritin  Hyzaar  Singulair  Prilosec  Zofran  Roxicodone  Miralax  Deltasone  Senokot  Spiriva respimat     Past Medical History:     Asthma  Basal cell carcinoma  CAD  Hypertension  Hyperlipidemia  Moderate persistent asthma  Nonrheumatic aortic valve insuffiencey   Osteopenia  Paroxysmal atrial fibrillation  Pulmonary nodule  Stress-induced cardiomyopathy  Stroke  Thoracic aortic aneurysm without rupture  Vasculitis    Past Surgical History:    Appendectomy open  C stereotactic breast biopsy  Cholecystectomy, laparoscopic  EGD, combined  Dilation and curettage x2  Tonsillectomy  Tubal ligation NOS     Family History:    Mother - hypertension, osteoporosis, CAD  Father - scleraderma  Child - breast cancer    Social History:  The patient presents to the ED via EMS alone from TCU.    Physical Exam     Patient Vitals for the past 24 hrs:   BP Temp Pulse Resp SpO2   05/15/22 0430 126/54 -- 96 24 91 % "   05/15/22 0415 -- -- 95 23 95 %   05/15/22 0405 -- 97.7  F (36.5  C) -- -- --   05/15/22 0404 175/75 -- 117 16 92 %     Physical Exam  Nursing note and vitals reviewed.  Constitutional: Cooperative. Tired appearing.   HENT:   Mouth/Throat: Mucous membranes are dry.   Cardiovascular: Normal rate, regular rhythm and normal heart sounds.  No murmur.  Pulmonary/Chest: Effort normal and breath sounds normal. No respiratory distress. No wheezes. No rales.   Abdominal: Soft. Normal appearance and bowel sounds are normal. No distension. There is no tenderness. There is no rigidity and no guarding.   Musculoskeletal: Normal range of motion of extremities.   Neurological: Alert. Oriented x4  Skin: Skin is warm and dry.   Psychiatric: Normal mood and affect.     Emergency Department Course     Imaging:  CT Abdomen Pelvis without Contrast (stone protocol)   Final Result   IMPRESSION:    1.  No acute abnormalities identified.      2.  Given interval of stability the areas of nodularity in the right lung base should be benign and no follow-up is recommended.      3.  Diverticulosis with no evidence for diverticulitis.      4.  No evidence for a bowel ileus.      5.  Tiny calcified uterine fibroid.         Chest XR,  PA & LAT    (Results Pending)     Report per radiology    Laboratory:  Labs Ordered and Resulted from Time of ED Arrival to Time of ED Departure   COMPREHENSIVE METABOLIC PANEL - Abnormal       Result Value    Sodium 122 (*)     Potassium 3.6      Chloride 84 (*)     Carbon Dioxide (CO2) 29      Anion Gap 9      Urea Nitrogen 18      Creatinine 0.80      Calcium 9.3      Glucose 161 (*)     Alkaline Phosphatase 162 (*)     AST 32      ALT 36      Protein Total 6.5 (*)     Albumin 2.8 (*)     Bilirubin Total 0.4      GFR Estimate 72     CBC WITH PLATELETS AND DIFFERENTIAL - Abnormal    WBC Count 12.3 (*)     RBC Count 3.90      Hemoglobin 11.7      Hematocrit 34.1 (*)     MCV 87      MCH 30.0      MCHC 34.3      RDW  12.4      Platelet Count 374      % Neutrophils 80      % Lymphocytes 11      % Monocytes 8      % Eosinophils 1      % Basophils 0      % Immature Granulocytes 0      NRBCs per 100 WBC 0      Absolute Neutrophils 9.7 (*)     Absolute Lymphocytes 1.4      Absolute Monocytes 1.0      Absolute Eosinophils 0.1      Absolute Basophils 0.0      Absolute Immature Granulocytes 0.0      Absolute NRBCs 0.0     LIPASE - Normal    Lipase 117     LACTIC ACID WHOLE BLOOD - Normal    Lactic Acid 1.3     ROUTINE UA WITH MICROSCOPIC - pending       Reviewed:  I reviewed nursing notes, vitals, past medical history and Care Everywhere    Assessments:  0416 I obtained history and examined the patient as noted above.   0553 I rechecked and updated the patient.     Consults:  0648 I spoke to Dr. Heredia, hospitalist, who accepts the patient.    Interventions:  0429 NS bolus 1L IV  0429 Zofran 4 mg IV    Disposition:  The patient was admitted to the hospital under the care of Dr. Heredia.     Impression & Plan     Medical Decision Making:  Ms Persaud is a 86 year old female, recent admission for back pain and pain control, who presents from TCU with decreased oral intake, nausea. She is not actually vomiting but does appear chronically dehydrated on exam. Work up to this point is significant for new hyponatremia with a sodium of 122. Lactate is normal as is her kidney function. CT scan of the abdomen and pelvis does not show any acute abnormalities. There is a chest x-ray as well as an urinalysis pending though I don't have a strong suspicion these will change clinical management. At this time she will be admitted to the hospital for IV fluids, electrolyte correction in observation status.     Diagnosis:    ICD-10-CM    1. Nausea  R11.0    2. Dehydration  E86.0    3. Hyponatremia  E87.1    4. Generalized weakness  R53.1      Scribe Disclosure:  IBradni, am serving as a scribe at 4:04 AM on 5/15/2022 to document services personally performed  by Pantera Stubbs MD based on my observations and the provider's statements to me.            Pantera Stubbs MD  05/15/22 0703

## 2022-05-15 NOTE — PHARMACY-ADMISSION MEDICATION HISTORY
Admission medication history interview status for this patient is complete. See [facility] admission navigator for allergy information, prior to admission medications and immunization status.     Medication history interview done, indicate source(s): [facility]  Medication history resources (including written lists, pill bottles, clinic record): [facility] and [facility]  [id number] [address] [facility]      Changes made to PTA medication list:  Added: APAP  Changed: None  Reported as Not Taking: None  Removed: None    Actions taken by pharmacist (provider contacted, etc): Sticky note to provider     Additional medication history information: Pt. Discharged on 5/12 to [facility]. Verified med history with TCU.  Medication reconciliation/reorder completed by provider prior to medication history?  N   (Y/N)       Prior to Admission medications    Medication Sig Last Dose Taking? Auth Provider   acetaminophen (TYLENOL) 325 MG tablet Take 975 mg by mouth every 8 hours as needed for mild pain Unknown at PRN Yes Unknown, Entered By History   albuterol (PROAIR HFA/PROVENTIL HFA/VENTOLIN HFA) 108 (90 Base) MCG/ACT inhaler Inhale 1-2 puffs into the lungs every 6 hours as needed for shortness of breath / dyspnea or wheezing Unknown at PRN Yes Layo Sevilla MD   amLODIPine (NORVASC) 5 MG tablet Take 1 tablet (5 mg) by mouth At Bedtime 5/14/2022 at Unknown time Yes Layo Sevilla MD   atorvastatin (LIPITOR) 20 MG tablet TAKE ONE TABLET BY MOUTH AT BEDTIME 5/14/2022 at Unknown time Yes Layo Sevilla MD   calcium citrate-vitamin D (CITRACAL) 315-200 MG-UNIT TABS per tablet Take 1 tablet by mouth 2 times daily 5/14/2022 at Unknown time Yes Reported, Patient   fluticasone-salmeterol (ADVAIR-HFA) 230-21 MCG/ACT inhaler Inhale 2 puffs into the lungs 2 times daily 5/14/2022 at Unknown time Yes Layo Sevilla MD   latanoprost (XALATAN) 0.005 % ophthalmic solution Place 1 drop into both eyes At Bedtime  5/14/2022 at Unknown time Yes Unknown, Entered By History   loratadine (CLARITIN) 10 MG tablet Take 1 tablet (10 mg) by mouth daily 5/14/2022 at Unknown time Yes Layo Sevilla MD   losartan-hydrochlorothiazide (HYZAAR) 50-12.5 MG tablet Take 1 tablet by mouth daily 5/14/2022 at Unknown time Yes Layo Sevilla MD   montelukast (SINGULAIR) 10 MG tablet Take 1 tablet (10 mg) by mouth At Bedtime 5/14/2022 at Unknown time Yes Layo Sevilla MD   omeprazole (PRILOSEC) 10 MG DR capsule TAKE ONE CAPSULE BY MOUTH ONE TIME DAILY 5/14/2022 at Unknown time Yes Layo Sevilla MD   ondansetron (ZOFRAN) 4 MG tablet Take 1 tablet (4 mg) by mouth every 8 hours as needed for nausea Unknown at PRN Yes Claudette Hearn APRN CNP   oxyCODONE (ROXICODONE) 5 MG tablet Take 0.5 tablets (2.5 mg) by mouth every 4 hours as needed for moderate to severe pain Unknown at PRN Yes Aniya Nava PA-C   polyethylene glycol (MIRALAX) 17 GM/Dose powder Take 17 g by mouth daily as needed for constipation Unknown at PRN Yes Claudette Hearn APRN CNP   predniSONE (DELTASONE) 10 MG tablet Take 10 mg by mouth As needed for asthma flares follows with PCP or pulmonology (per patient she takes 1 tablet for 3 days) Unknown at PRN Yes Reported, Patient   senna-docusate (SENOKOT-S/PERICOLACE) 8.6-50 MG tablet Take 1 tablet by mouth 2 times daily for 7 days 5/14/2022 at Unknown time Yes Aniya Nava PA-C   tiotropium (SPIRIVA RESPIMAT) 2.5 MCG/ACT inhaler Inhale 2 puffs into the lungs daily 5/14/2022 at Unknown time Yes Layo Sevilla MD

## 2022-05-16 ENCOUNTER — APPOINTMENT (OUTPATIENT)
Dept: GENERAL RADIOLOGY | Facility: CLINIC | Age: 87
DRG: 643 | End: 2022-05-16
Attending: INTERNAL MEDICINE
Payer: COMMERCIAL

## 2022-05-16 LAB
ANION GAP SERPL CALCULATED.3IONS-SCNC: 4 MMOL/L (ref 3–14)
BUN SERPL-MCNC: 18 MG/DL (ref 7–30)
CALCIUM SERPL-MCNC: 8.2 MG/DL (ref 8.5–10.1)
CHLORIDE BLD-SCNC: 94 MMOL/L (ref 94–109)
CO2 SERPL-SCNC: 25 MMOL/L (ref 20–32)
CREAT SERPL-MCNC: 0.93 MG/DL (ref 0.52–1.04)
GFR SERPL CREATININE-BSD FRML MDRD: 60 ML/MIN/1.73M2
GLUCOSE BLD-MCNC: 112 MG/DL (ref 70–99)
GLUCOSE BLDC GLUCOMTR-MCNC: 110 MG/DL (ref 70–99)
POTASSIUM BLD-SCNC: 4.6 MMOL/L (ref 3.4–5.3)
SODIUM SERPL-SCNC: 122 MMOL/L (ref 133–144)
SODIUM SERPL-SCNC: 123 MMOL/L (ref 133–144)
SODIUM SERPL-SCNC: 123 MMOL/L (ref 133–144)
SODIUM SERPL-SCNC: 124 MMOL/L (ref 133–144)
SODIUM SERPL-SCNC: 125 MMOL/L (ref 133–144)
SODIUM UR-SCNC: 41 MMOL/L
TSH SERPL DL<=0.005 MIU/L-ACNC: 1.14 MU/L (ref 0.4–4)

## 2022-05-16 PROCEDURE — 120N000001 HC R&B MED SURG/OB

## 2022-05-16 PROCEDURE — 84295 ASSAY OF SERUM SODIUM: CPT | Performed by: INTERNAL MEDICINE

## 2022-05-16 PROCEDURE — 84443 ASSAY THYROID STIM HORMONE: CPT | Performed by: INTERNAL MEDICINE

## 2022-05-16 PROCEDURE — 250N000013 HC RX MED GY IP 250 OP 250 PS 637: Performed by: INTERNAL MEDICINE

## 2022-05-16 PROCEDURE — 36415 COLL VENOUS BLD VENIPUNCTURE: CPT | Performed by: INTERNAL MEDICINE

## 2022-05-16 PROCEDURE — 250N000011 HC RX IP 250 OP 636: Performed by: INTERNAL MEDICINE

## 2022-05-16 PROCEDURE — 74019 RADEX ABDOMEN 2 VIEWS: CPT

## 2022-05-16 PROCEDURE — 250N000009 HC RX 250: Performed by: INTERNAL MEDICINE

## 2022-05-16 PROCEDURE — 99232 SBSQ HOSP IP/OBS MODERATE 35: CPT | Performed by: INTERNAL MEDICINE

## 2022-05-16 RX ADMIN — ALBUTEROL SULFATE 2 PUFF: 90 AEROSOL, METERED RESPIRATORY (INHALATION) at 14:32

## 2022-05-16 RX ADMIN — ALBUTEROL SULFATE 2 PUFF: 90 AEROSOL, METERED RESPIRATORY (INHALATION) at 08:52

## 2022-05-16 RX ADMIN — PANTOPRAZOLE SODIUM 20 MG: 20 TABLET, DELAYED RELEASE ORAL at 16:33

## 2022-05-16 RX ADMIN — SENNOSIDES AND DOCUSATE SODIUM 1 TABLET: 50; 8.6 TABLET ORAL at 21:02

## 2022-05-16 RX ADMIN — ATORVASTATIN CALCIUM 20 MG: 20 TABLET, FILM COATED ORAL at 21:02

## 2022-05-16 RX ADMIN — MONTELUKAST 10 MG: 10 TABLET, FILM COATED ORAL at 21:02

## 2022-05-16 RX ADMIN — NYSTATIN 500000 UNITS: 100000 SUSPENSION ORAL at 07:51

## 2022-05-16 RX ADMIN — ONDANSETRON 4 MG: 2 INJECTION INTRAMUSCULAR; INTRAVENOUS at 06:06

## 2022-05-16 RX ADMIN — ALBUTEROL SULFATE 2 PUFF: 90 AEROSOL, METERED RESPIRATORY (INHALATION) at 21:08

## 2022-05-16 RX ADMIN — SODIUM CHLORIDE: 234 INJECTION INTRAMUSCULAR; INTRAVENOUS; SUBCUTANEOUS at 18:15

## 2022-05-16 RX ADMIN — FLUTICASONE FUROATE AND VILANTEROL TRIFENATATE 1 PUFF: 200; 25 POWDER RESPIRATORY (INHALATION) at 13:26

## 2022-05-16 RX ADMIN — NYSTATIN 500000 UNITS: 100000 SUSPENSION ORAL at 11:46

## 2022-05-16 RX ADMIN — POTASSIUM CHLORIDE AND SODIUM CHLORIDE: 900; 150 INJECTION, SOLUTION INTRAVENOUS at 06:24

## 2022-05-16 RX ADMIN — LORATADINE 10 MG: 10 TABLET ORAL at 07:51

## 2022-05-16 RX ADMIN — AMLODIPINE BESYLATE 5 MG: 5 TABLET ORAL at 21:02

## 2022-05-16 RX ADMIN — ONDANSETRON 4 MG: 2 INJECTION INTRAMUSCULAR; INTRAVENOUS at 23:09

## 2022-05-16 RX ADMIN — PANTOPRAZOLE SODIUM 20 MG: 20 TABLET, DELAYED RELEASE ORAL at 07:51

## 2022-05-16 RX ADMIN — NYSTATIN 500000 UNITS: 100000 SUSPENSION ORAL at 21:02

## 2022-05-16 RX ADMIN — LATANOPROST 1 DROP: 50 SOLUTION/ DROPS OPHTHALMIC at 21:02

## 2022-05-16 RX ADMIN — UMECLIDINIUM 1 PUFF: 62.5 AEROSOL, POWDER ORAL at 13:29

## 2022-05-16 RX ADMIN — NYSTATIN 500000 UNITS: 100000 SUSPENSION ORAL at 16:33

## 2022-05-16 ASSESSMENT — ACTIVITIES OF DAILY LIVING (ADL)
ADLS_ACUITY_SCORE: 34
ADLS_ACUITY_SCORE: 33
ADLS_ACUITY_SCORE: 34
ADLS_ACUITY_SCORE: 33
ADLS_ACUITY_SCORE: 34
ADLS_ACUITY_SCORE: 33

## 2022-05-16 NOTE — PLAN OF CARE
"    PRIMARY DIAGNOSIS: GENERALIZED WEAKNESS/dehydration     OUTPATIENT/OBSERVATION GOALS TO BE MET BEFORE DISCHARGE  1. Orthostatic performed: No    2. Tolerating PO medications: Yes    3. Return to near baseline physical activity: Yes    4. Cleared for discharge by consultants (if involved): No    Discharge Planner Nurse   Safe discharge environment identified: Yes  Barriers to discharge: Yes    Entered by: Georgiana Odom RN 05/16/2022    Pt AandOx4. Ax1 with GB and Walker. Vitals stable /53 (BP Location: Right arm)   Pulse 83   Temp 98.2  F (36.8  C) (Oral)   Resp 20   Ht 1.549 m (5' 1\")   Wt 64.5 kg (142 lb 4.8 oz)   LMP  (LMP Unknown)   SpO2 90%   BMI 26.89 kg/m   Neuro Intact. Pain is managed with tylenol PRN. Lung sounds clear, infrequent productive cough, dyspnea at exertions. O2 stats between 89%-92% at rest. Slight wheeze controlled with inhalers, prn Albuterol q6h. Bowels are active x 4 Quads. Last BM 5/16/22, 1x BM. Voiding with no concern. Continent. Skin Bruises on L upper arm and BLE. Left Peripheral IV infusing sodium 2% infusing @ 50ml/hr for 1 hr. Regular diet. Bed Alarm on. Plan: GI following, monitor sodium levels, encourage adequate nutrition intake(ensure), and IS use. discharge tbd.   "

## 2022-05-16 NOTE — PROGRESS NOTES
"[facility]  Hospitalist Progress Note  Blanca Pa MD 05/16/2022    Reason for Stay (Diagnosis): nausea/weakness         Assessment and Plan:      Summary of Stay: Genie Persaud is a 86 year old female with a history of htn/hlp, mod 2 V CAD managed medically, hx of PAF during an episode of sepsis in 2017 not on AC, hx of stress CM from sepsis now resolved with nl EF, Thoracic Ao aneurysm stable at 4.4 cm over the past 4 years by most recent CT 3/2022 admitted on 5/15/2022 with nausea and weakness and found to be hyponatremic    She has a hx of candidal esophagitis and there is concern about a recurrence. GI ws consulted but metabolic derangements make the procedure to high risk currently and so placed on empiric nystatin     She's been given IVF without improvement.  UOsms are inappropriately high and U Na is 41 so not consistent with dehydration.  This all looks like SIADH.      Problem List:   SIADH with symptomatic hyponatremia    Looks like she's had mild hyponatremia in the past, it was normal in 3/2022 though during her recent hospitalizations they have been trending down:  5/1->5/12->5/15:  131->128->122   Technically meets criteria then for \"chronic\" hyponatremia yet she's quite symptomatic   Na improved from 122->124 over 32 hours of IVF.  So any component of dehydration resolved.  Now looks like SIADH, she is on combination losartan and hydrochlorothiazide which was held on admission, TSH wnl, she denies SOB/cough, no headache, vision changes, weakness or numbness and neuro exam is stable. She's really not drinking that much so I don't think that a fluid restriction is going to help much.    -stop NS, trial 2 % NS for 100 ml and recheck 2 hours later    Htn/hlp  Non obstructive CAD  -amlodipine 5 mg every day  -cont atorvastatin    Hx of PAF and stress CM in the setting of sepsis in  2017  resolved      Covid negative  No documentation of vaccination     Code Status: DNR / DNI  Functional " "Status: lives alone 2 weeks ago with   Diet: regular as tolerated  Brown: not needed  Access PIV    Disposition Plan   Expected discharge in tbd days to prior living arrangement once above resolved.     Entered: Blanca Pa MD 05/16/2022, 1:28 PM            Interval History (Subjective):      Still feeling weak and nauseated and doesn't want to eat.  Denies any headache/vision changes/=unilateral weakness or numbness, no cp or sob.                      Physical Exam:      Last Vital Signs:  /48 (BP Location: Right arm)   Pulse 83   Temp 97.6  F (36.4  C) (Oral)   Resp 16   Ht 1.549 m (5' 1\")   Wt 63 kg (138 lb 14.4 oz)   LMP  (LMP Unknown)   SpO2 94%   BMI 26.24 kg/m      Pleasant nad but looks like she is feeling poorly head nc at sclera clear lungs cta b nl effort rrr no mrg no le edema skin warm and dry no cyanosis or clubbing alert and oriented affect appropriate enrique cn2-12 grossly intact, strength intact in b UE and LE            Medications:      All current medications were reviewed with changes reflected in problem list.         Data:      All new lab and imaging data was reviewed.   Labs:  Recent Labs   Lab 05/16/22  1159 05/16/22  0522   * 123*  123*   POTASSIUM  --  4.6   CHLORIDE  --  94   CO2  --  25   ANIONGAP  --  4   GLC  --  112*   BUN  --  18   CR  --  0.93   GFRESTIMATED  --  60*   ELROY  --  8.2*     Recent Labs   Lab 05/15/22  0415   WBC 12.3*   HGB 11.7   HCT 34.1*   MCV 87        Recent Labs   Lab 05/16/22  0522 05/16/22  0128 05/15/22  0415 05/12/22  0547   * 110* 161* 95     Recent Labs   Lab 05/15/22  0415   AST 32   ALT 36   ALKPHOS 162*   BILITOTAL 0.4      Imaging:       "

## 2022-05-16 NOTE — PROGRESS NOTES
GASTROENTEROLOGY PROGRESS NOTE     CC: Nausea     SUBJECTIVE:  Felt some improvement last night but the nausea is back this morning.       OBJECTIVE:  General Appearance:  Not in distress.  /41 (BP Location: Right arm)   Pulse 84   Temp 97.6  F (36.4  C) (Oral)   Resp 18   Wt 63 kg (138 lb 14.4 oz)   LMP  (LMP Unknown)   SpO2 91%   BMI 26.26 kg/m    Temp (24hrs), Av.9  F (36.6  C), Min:97.4  F (36.3  C), Max:98.4  F (36.9  C)    Patient Vitals for the past 72 hrs:   Weight   05/15/22 1105 63 kg (138 lb 14.4 oz)     No intake or output data in the 24 hours ending 22 0835     PHYSICAL EXAM  General: alert, oriented, NAD  SKIN: no suspicious lesions, rashes, jaundice, or spider angiomas   EYES: No scleral icterus   RESPIRATORY: Non labored breathing  GASTROINTESTINAL: Active bowel sounds,  abdomen soft and non tender on palpation.   JOINT/EXTREMITIES: extremities normal- no gross deformities noted.   NEURO:Grossly WNL.  PSYCH: no abnormal anxiety/depression     Additional Comments:  ROS, FH, SH: See initial GI consult for details.     I have reviewed the patient's new clinical lab results:     Recent Labs   Lab Test 05/15/22  0415 22  0539 21  0651   WBC 12.3* 8.1 10.5   HGB 11.7 11.0* 11.1*   MCV 87 92 91    258 276     Recent Labs   Lab Test 22  0522 05/15/22  0415 22  0547   POTASSIUM 4.6 3.6 3.3*   CHLORIDE 94 84* 92*   CO2 25 29 30   BUN 18 18 20   ANIONGAP 4 9 6     Recent Labs   Lab Test 05/15/22  0820 05/15/22  0415 22  0958 22  1553 21  0651 16  1008 16  0929   ALBUMIN  --  2.8*  --  3.6 3.3*   < >  --    BILITOTAL  --  0.4  --  0.6 0.5   < >  --    ALT  --  36  --  27 24   < >  --    AST  --  32  --  19 20   < >  --    PROTEIN 20 *  --  Negative  --   --   --  10*   LIPASE  --  117  --   --   --   --   --     < > = values in this interval not displayed.        Imaging and procedures:  ABDOMEN TWO VIEWS  2022 8:24 AM       HISTORY: Constipation.     COMPARISON: 5/15/2022                                                                    IMPRESSION: Nonobstructive gas pattern. Normal stool volume. No gross  free air. Surgical clips in the right upper quadrant related to  cholecystectomy.    Problem list pertaining to GI:  Hyponatremia   Nausea    Assessment: This is a 86 y-o female with a history of  A-Fib, moderate persistent asthma on inhalers that include steroid, chronic constipation, weakness, stress induced cardiomyopathy, and esophageal candidiasis with nausea over the past several weeks with post prandial symptoms.  Her sodium on admission was 122 which could certainly cause nausea. She also states that she has been constipated over the past week (had a bowel movement this morning) and constipation could be contributing as well. History of gastritis on a 2011 EGD.    Reportedly, had a bowel movement yesterday. X- ray showed non obstructive gas pattern and normal amount of stool.      Plan:  - If nausea persist when serum Na improves then EGD       I will discuss with Dr. Buck      Time spent: 20 minutes, greater than 50% of the visit was spent in counseling/coordination of care.     Mandi Colón CNP  [facility] ([facility])  [phone]

## 2022-05-16 NOTE — CONSULTS
Care Management Initial Consult    General Information  Assessment completed with: Patient,    Type of CM/SW Visit: Offer D/C Planning    Primary Care Provider verified and updated as needed:     Readmission within the last 30 days:        Reason for Consult: care coordination/care conference, discharge planning  Advance Care Planning:            Communication Assessment  Patient's communication style: spoken language (English or Bilingual)    Hearing Difficulty or Deaf: yes   Wear Glasses or Blind: yes    Cognitive  Cognitive/Neuro/Behavioral: WDL                      Living Environment:   People in home: alone     Current living Arrangements:  (recent d/c to [facility]- readmitted 5/15 from [facility] TCU)      Able to return to prior arrangements: yes       Family/Social Support:  Care provided by:    Provides care for: no one, unable/limited ability to care for self  Marital Status: Single             Description of Support System: Supportive, Involved         Current Resources:   Patient receiving home care services:       Community Resources:    Equipment currently used at home: walker, rolling  Supplies currently used at home:      Employment/Financial:  Employment Status: retired        Financial Concerns: No concerns identified           Lifestyle & Psychosocial Needs:  Social Determinants of Health     Tobacco Use: Low Risk      Smoking Tobacco Use: Never Smoker     Smokeless Tobacco Use: Never Used   Alcohol Use: Not on file   Financial Resource Strain: Not on file   Food Insecurity: Not on file   Transportation Needs: Not on file   Physical Activity: Not on file   Stress: Not on file   Social Connections: Not on file   Intimate Partner Violence: Not on file   Depression: Not at risk     PHQ-2 Score: 0   Housing Stability: Not on file       Functional Status:  Prior to admission patient needed assistance: Yes-       Mental Health Status:  Mental Health Status: No Current Concerns       Chemical Dependency  Status:  Chemical Dependency Status: No Current Concerns             Values/Beliefs:  Spiritual, Cultural Beliefs, Confucianist Practices, Values that affect care: no               Additional Information:  Patient noted to have unplanned readmission risk, 23%. Patient recently inpatient 5/11-5/12 with stage 3 kidney disease and back pain. She discharged to [facility] TCU. Prior to TCU, patient lived at home alone. Patient readmitted from [facility] 5/15 with weakness, hyponatremia.     Met with patient at bedside. She would like to discharge to [facility] when medically ready. She expressed no concerns with returning to [facility]. States she has a bedhold.     Bedhold verified with [facility] shane, Carmen, ph# [phone].     Caleb Bolivar RN Case Manager  Inpatient Care Coordination   [facility]   [phone]              Caleb Bolivar RN

## 2022-05-16 NOTE — PLAN OF CARE
"  INPATIENT NOTE: [id number]     PRIMARY PROBLEM: Dehydration, Hyponatremia, Generalized weakness        Vital signs:  Temp: (P) 98.4  F (36.9  C) Temp src: (P) Oral BP: (P) 119/51 Pulse: (P) 67   Resp: (P) 16 SpO2: (P) 92 % O2 Device: (P) None (Room air)     Weight: 63 kg (138 lb 14.4 oz)  Estimated body mass index is 26.26 kg/m  as calculated from the following:    Height as of 5/13/22: 1.549 m (5' 0.98\").    Weight as of this encounter: 63 kg (138 lb 14.4 oz).        Orientation: Alert and Oriented x4  Neuro: Intact   Pain status: complaining of 5/10 pain in their back.  Tylenol given for pain.  Medicatons decreased pain.   Resp: Lung sounds are Clear. Denies any SOB.   Cardiac: WNL, Denies any Chest Pain   GI: Bowels are active x 4 Quads. Last BM 5/15/22  : Voiding with no concern    Skin: WNL   LDA: Peripheral IV   Infusions: SL  Diet: Regular  Activity: are 1 Assist with Gait Belt and Walker  Consults: PT/SW/OT       Will continue to monitor and provide cares.     Clay Michelle RN  "

## 2022-05-16 NOTE — UTILIZATION REVIEW
Admission Status; Secondary Review Determination     Under the authority of the Utilization Management Commitee, the utilization review process indicated a secondary review on the above patient. The review outcome is based on review of the medical records, discussions with staff, and applying clinical experience noted on the date of the review.     (x) Inpatient Status Appropriate - This patient's medical care is consistent with medical management for inpatient care and reasonable inpatient medical practice.     RATIONALE FOR DETERMINATION:  86 y-o female with a history of  A-Fib, moderate persistent asthma on inhalers that include steroid, chronic constipation, weakness, stress induced cardiomyopathy, and esophageal candidiasis who presented with nausea over the past several weeks.      Vital signs unremarkable.    Labs notable for persistent hyponatremia with sodium in the low 120 range.    Imaging shows no acute findings to explain her symptoms.    GI has seen the patient.  If her nausea sx persist after sodium is corrected EGD will be considered.    It is anticipated the patient will require at least another night in the hospital given her persistent nausea and ongoing hyponatremia..  Inpatient status appears appropriate.      At the time of admission with the information available to the attending physician more than 2 nights Hospital complex care was anticipated, based on patient risk of adverse outcome if treated as outpatient and complex care required. Inpatient admission is appropriate based on the Medicare guidelines.    The information on this document is developed by the utilization review team in order for the business office to ensure compliance. This only denotes the appropriateness of proper admission status and does not reflect the quality of care rendered.   The definitions of Inpatient Status and Observation Status used in making the determination above are those provided in the CMS Coverage  Manual, Chapter 1 and Chapter 6, section 70.4.     Sincerely,     Brandon Burkett MD  Utilization Review   Physician Advisor   [facility]

## 2022-05-16 NOTE — PROGRESS NOTES
CLINICAL NUTRITION SERVICES  -  ASSESSMENT NOTE      RECOMMENDATIONS FOR MD/PROVIDER TO ORDER:   None     Recommendations Ordered by Registered Dietitian (RD):   Crescent Ensure Enlive BID w/ meals     Future/Additional Recommendations:   Adjust supplements pending PO intake/patient preference     Malnutrition:   Moderate in the context of social and environmental circumstances         REASON FOR ASSESSMENT  Genie Persaud is a 86 year old female seen by Registered Dietitian for Admission Nutrition Risk Screen for positive    Patient presents with esophageal candidiasis, asthma    NUTRITION HISTORY  - Information obtained from patient and chart  - Patient is on a Regular diet at home  - Typical food/fluid intake is cheerios, unable to eat much more than that  - Diet history patient stated she has been eating < 50% usual intake for a couple of weeks  - Supplements citrical per PTA med list    NKFA      CURRENT NUTRITION ORDERS  Diet Order:     Regular       Current Intake/Tolerance:  Patient ordered 1097kcals and 39g protein for 3 meals in the past 24 hours.       NUTRITION FOCUSED PHYSICAL ASSESSMENT FOR DIAGNOSING MALNUTRITION)  Yes             Observed:    Muscle wasting (refer to documentation in Malnutrition section) and Subcutaneous fat loss (refer to documentation in Malnutrition section)    Obtained from Chart/Interdisciplinary Team:  Dry skin, poor skin turgor    ANTHROPOMETRICS  Height: Data Unavailable  Weight: 138 lbs 14.4 oz  Body mass index is 26.26 kg/m .  Weight Status:  Overweight BMI 25-29.9  IBW: 47.8kg  % IBW: 132%  Weight History:   Wt Readings from Last 30 Encounters:   05/15/22 63 kg (138 lb 14.4 oz)   05/13/22 58.1 kg (128 lb 1.4 oz)   05/11/22 58.1 kg (128 lb)   05/01/22 62.1 kg (137 lb)   03/21/22 63.5 kg (140 lb 1.6 oz)        0.8% wt loss in 2 months   10/06/21 62.1 kg (137 lb)   08/30/21 61 kg (134 lb 6.4 oz)   08/05/21 61.9 kg (136 lb 6.4 oz)   07/19/21 60.4 kg (133 lb 1.6 oz)    07/01/21 60.9 kg (134 lb 3.2 oz)   06/17/21 60.3 kg (133 lb)   06/07/21 61 kg (134 lb 6.4 oz)   11/06/20 59 kg (130 lb)   09/28/20 59 kg (130 lb)   03/05/20 62.3 kg (137 lb 6.4 oz)   02/19/20 62.6 kg (138 lb)   11/15/19 63.5 kg (140 lb)   11/07/19 64 kg (141 lb 3.2 oz)   10/17/19 63.4 kg (139 lb 12.8 oz)   08/12/19 62.7 kg (138 lb 4.8 oz)   06/10/19 63.8 kg (140 lb 9.6 oz)   05/21/19 64 kg (141 lb)   05/15/19 64.1 kg (141 lb 6.4 oz)   04/28/19 64.8 kg (142 lb 12.8 oz)   02/21/19 65.8 kg (145 lb)   11/01/18 62.9 kg (138 lb 11.2 oz)   10/26/18 62.1 kg (137 lb)   09/14/18 60.7 kg (133 lb 12.8 oz)   08/16/18 62.2 kg (137 lb 1.6 oz)   08/09/18 62.6 kg (138 lb)     UBW: 137lbs stated per pt    LABS  Labs reviewed: Na 123, GFR 60    MEDICATIONS  Medications reviewed: protonix, senokot-s/pericolace, zofran      ASSESSED NUTRITION NEEDS PER APPROVED PRACTICE GUIDELINES:    Dosing Weight 63 kg (138 lb 14.4 oz)    Estimated Energy Needs: 1228 kcals ([place] St Jeor and activity factor 1.2)  Justification: maintenance and repletion  Estimated Protein Needs: 50-63 grams protein (0.8-1 g pro/Kg)  Justification: Repletion and preservation of lean body mass  Estimated Fluid Needs: 6128-7221  mL (25-30 mL/kg)  Justification: maintenance    MALNUTRITION:  % Weight Loss:  Weight loss does not meet criteria for malnutrition as it is not significant  % Intake:  Decreased intake does not meet criteria for malnutrition as patient is likely meeting at least 75% estimated nutrition needs  Subcutaneous Fat Loss:  Upper arm region mild depletion  Muscle Loss:  Temporal region mild depletion, Clavicle bone region mild depletion, Dorsal hand region moderate depletion, Patellar region moderate depletion and Posterior calf region moderate depletion  Fluid Retention:  None noted    Malnutrition Diagnosis: Moderate malnutrition  In Context of:  Environmental or social circumstances    NUTRITION DIAGNOSIS:  Malnutrition related to social and  environmental circumstances as evidenced by mild subcutaneous fat loss and mild to moderate muscle loss      NUTRITION INTERVENTIONS  Recommendations / Nutrition Prescription  Ensure Enlive BID w/ meals  .      Implementation  Nutrition education: Per Provider order if indicated   Medical Food Supplement  .      Nutrition Goals  Meet estimated nutrition needs  No significant dry weight loss      MONITORING AND EVALUATION:  Progress towards goals will be monitored and evaluated per protocol and Practice Guidelines, Diet Order, Food intake, Liquid meal replacement or supplement, Weight, Biochemical data and Nutrition-focused physical findings

## 2022-05-17 ENCOUNTER — APPOINTMENT (OUTPATIENT)
Dept: PHYSICAL THERAPY | Facility: CLINIC | Age: 87
DRG: 643 | End: 2022-05-17
Attending: INTERNAL MEDICINE
Payer: COMMERCIAL

## 2022-05-17 ENCOUNTER — APPOINTMENT (OUTPATIENT)
Dept: GENERAL RADIOLOGY | Facility: CLINIC | Age: 87
DRG: 643 | End: 2022-05-17
Attending: INTERNAL MEDICINE
Payer: COMMERCIAL

## 2022-05-17 LAB
SODIUM SERPL-SCNC: 123 MMOL/L (ref 133–144)
SODIUM SERPL-SCNC: 124 MMOL/L (ref 133–144)
SODIUM SERPL-SCNC: 124 MMOL/L (ref 133–144)
SODIUM SERPL-SCNC: 125 MMOL/L (ref 133–144)

## 2022-05-17 PROCEDURE — 94640 AIRWAY INHALATION TREATMENT: CPT

## 2022-05-17 PROCEDURE — 71046 X-RAY EXAM CHEST 2 VIEWS: CPT

## 2022-05-17 PROCEDURE — 999N000157 HC STATISTIC RCP TIME EA 10 MIN

## 2022-05-17 PROCEDURE — 97161 PT EVAL LOW COMPLEX 20 MIN: CPT | Mod: GP

## 2022-05-17 PROCEDURE — 94640 AIRWAY INHALATION TREATMENT: CPT | Mod: 76

## 2022-05-17 PROCEDURE — 84295 ASSAY OF SERUM SODIUM: CPT | Performed by: INTERNAL MEDICINE

## 2022-05-17 PROCEDURE — 120N000001 HC R&B MED SURG/OB

## 2022-05-17 PROCEDURE — 250N000009 HC RX 250: Performed by: INTERNAL MEDICINE

## 2022-05-17 PROCEDURE — 999N000127 HC STATISTIC PERIPHERAL IV START W US GUIDANCE

## 2022-05-17 PROCEDURE — 36415 COLL VENOUS BLD VENIPUNCTURE: CPT | Performed by: INTERNAL MEDICINE

## 2022-05-17 PROCEDURE — 96361 HYDRATE IV INFUSION ADD-ON: CPT

## 2022-05-17 PROCEDURE — 99233 SBSQ HOSP IP/OBS HIGH 50: CPT | Performed by: INTERNAL MEDICINE

## 2022-05-17 PROCEDURE — 96367 TX/PROPH/DG ADDL SEQ IV INF: CPT

## 2022-05-17 PROCEDURE — 250N000011 HC RX IP 250 OP 636: Performed by: INTERNAL MEDICINE

## 2022-05-17 PROCEDURE — 96375 TX/PRO/DX INJ NEW DRUG ADDON: CPT

## 2022-05-17 PROCEDURE — 250N000013 HC RX MED GY IP 250 OP 250 PS 637: Performed by: INTERNAL MEDICINE

## 2022-05-17 PROCEDURE — 96366 THER/PROPH/DIAG IV INF ADDON: CPT

## 2022-05-17 PROCEDURE — 97530 THERAPEUTIC ACTIVITIES: CPT | Mod: GP

## 2022-05-17 RX ORDER — NALOXONE HYDROCHLORIDE 0.4 MG/ML
0.4 INJECTION, SOLUTION INTRAMUSCULAR; INTRAVENOUS; SUBCUTANEOUS
Status: DISCONTINUED | OUTPATIENT
Start: 2022-05-17 | End: 2022-05-23 | Stop reason: HOSPADM

## 2022-05-17 RX ORDER — NALOXONE HYDROCHLORIDE 0.4 MG/ML
0.2 INJECTION, SOLUTION INTRAMUSCULAR; INTRAVENOUS; SUBCUTANEOUS
Status: DISCONTINUED | OUTPATIENT
Start: 2022-05-17 | End: 2022-05-23 | Stop reason: HOSPADM

## 2022-05-17 RX ORDER — IPRATROPIUM BROMIDE AND ALBUTEROL SULFATE 2.5; .5 MG/3ML; MG/3ML
3 SOLUTION RESPIRATORY (INHALATION)
Status: DISCONTINUED | OUTPATIENT
Start: 2022-05-17 | End: 2022-05-19

## 2022-05-17 RX ORDER — AMOXICILLIN 250 MG
2 CAPSULE ORAL 2 TIMES DAILY
Status: DISCONTINUED | OUTPATIENT
Start: 2022-05-17 | End: 2022-05-23 | Stop reason: HOSPADM

## 2022-05-17 RX ORDER — FUROSEMIDE 10 MG/ML
20 INJECTION INTRAMUSCULAR; INTRAVENOUS ONCE
Status: COMPLETED | OUTPATIENT
Start: 2022-05-17 | End: 2022-05-17

## 2022-05-17 RX ORDER — OXYCODONE HYDROCHLORIDE 5 MG/1
5 TABLET ORAL EVERY 6 HOURS PRN
Status: DISCONTINUED | OUTPATIENT
Start: 2022-05-17 | End: 2022-05-23 | Stop reason: HOSPADM

## 2022-05-17 RX ADMIN — LATANOPROST 1 DROP: 50 SOLUTION/ DROPS OPHTHALMIC at 21:05

## 2022-05-17 RX ADMIN — ATORVASTATIN CALCIUM 20 MG: 20 TABLET, FILM COATED ORAL at 21:04

## 2022-05-17 RX ADMIN — NYSTATIN 500000 UNITS: 100000 SUSPENSION ORAL at 08:44

## 2022-05-17 RX ADMIN — NYSTATIN 500000 UNITS: 100000 SUSPENSION ORAL at 15:46

## 2022-05-17 RX ADMIN — PROCHLORPERAZINE EDISYLATE 5 MG: 5 INJECTION INTRAMUSCULAR; INTRAVENOUS at 03:49

## 2022-05-17 RX ADMIN — AMLODIPINE BESYLATE 5 MG: 5 TABLET ORAL at 21:04

## 2022-05-17 RX ADMIN — NYSTATIN 500000 UNITS: 100000 SUSPENSION ORAL at 21:04

## 2022-05-17 RX ADMIN — SENNOSIDES AND DOCUSATE SODIUM 1 TABLET: 50; 8.6 TABLET ORAL at 08:45

## 2022-05-17 RX ADMIN — SODIUM CHLORIDE: 234 INJECTION INTRAMUSCULAR; INTRAVENOUS; SUBCUTANEOUS at 10:47

## 2022-05-17 RX ADMIN — IPRATROPIUM BROMIDE AND ALBUTEROL SULFATE 3 ML: 2.5; .5 SOLUTION RESPIRATORY (INHALATION) at 11:48

## 2022-05-17 RX ADMIN — IPRATROPIUM BROMIDE AND ALBUTEROL SULFATE 3 ML: 2.5; .5 SOLUTION RESPIRATORY (INHALATION) at 15:34

## 2022-05-17 RX ADMIN — LORATADINE 10 MG: 10 TABLET ORAL at 08:45

## 2022-05-17 RX ADMIN — PANTOPRAZOLE SODIUM 20 MG: 20 TABLET, DELAYED RELEASE ORAL at 15:46

## 2022-05-17 RX ADMIN — FLUTICASONE FUROATE AND VILANTEROL TRIFENATATE 1 PUFF: 200; 25 POWDER RESPIRATORY (INHALATION) at 08:58

## 2022-05-17 RX ADMIN — IPRATROPIUM BROMIDE AND ALBUTEROL SULFATE 3 ML: 2.5; .5 SOLUTION RESPIRATORY (INHALATION) at 20:32

## 2022-05-17 RX ADMIN — ALBUTEROL SULFATE 2 PUFF: 90 AEROSOL, METERED RESPIRATORY (INHALATION) at 04:12

## 2022-05-17 RX ADMIN — NYSTATIN 500000 UNITS: 100000 SUSPENSION ORAL at 13:07

## 2022-05-17 RX ADMIN — FUROSEMIDE 20 MG: 10 INJECTION, SOLUTION INTRAMUSCULAR; INTRAVENOUS at 14:17

## 2022-05-17 RX ADMIN — MONTELUKAST 10 MG: 10 TABLET, FILM COATED ORAL at 21:04

## 2022-05-17 RX ADMIN — OXYCODONE HYDROCHLORIDE 5 MG: 5 TABLET ORAL at 16:11

## 2022-05-17 RX ADMIN — ALBUTEROL SULFATE 2 PUFF: 90 AEROSOL, METERED RESPIRATORY (INHALATION) at 10:44

## 2022-05-17 RX ADMIN — UMECLIDINIUM 1 PUFF: 62.5 AEROSOL, POWDER ORAL at 08:58

## 2022-05-17 RX ADMIN — PANTOPRAZOLE SODIUM 20 MG: 20 TABLET, DELAYED RELEASE ORAL at 08:45

## 2022-05-17 RX ADMIN — SENNOSIDES AND DOCUSATE SODIUM 2 TABLET: 50; 8.6 TABLET ORAL at 21:04

## 2022-05-17 ASSESSMENT — ACTIVITIES OF DAILY LIVING (ADL)
ADLS_ACUITY_SCORE: 34
ADLS_ACUITY_SCORE: 34
ADLS_ACUITY_SCORE: 38
ADLS_ACUITY_SCORE: 38
ADLS_ACUITY_SCORE: 34
ADLS_ACUITY_SCORE: 34
ADLS_ACUITY_SCORE: 38
ADLS_ACUITY_SCORE: 34

## 2022-05-17 NOTE — PROGRESS NOTES
I was following up a Na level for my colleague- there was no excessive rise. Continue to monitor.

## 2022-05-17 NOTE — PROGRESS NOTES
Daughter, Wilda calls for update. Questions answered to her satisfaction. Hospitalist and social work/care coordinator to keep her updated regarding discharge.

## 2022-05-17 NOTE — PROGRESS NOTES
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 GI ws consulted but metabolic derangements make the procedure to high risk currently and so placed on empiric nystatin     She's been given IVF without improvement.  UOsms are inappropriately high and U Na is 41 so not consistent with dehydration.  This all looks like SIADH.      Problem List:   SIADH with symptomatic hyponatremia    Looks like she's had mild hyponatremia in the past, it was normal in 3/2022 though during her recent hospitalizations they have been trending down:  5/1->5/12->5/15:  131->128->122   Technically meets criteria then for \"chronic\" hyponatremia yet she's quite symptomatic   Na improved from 122->124 over 32 hours of IVF.  So any component of dehydration resolved.  Now looks like SIADH, she is on combination losartan and hydrochlorothiazide which was held on admission, TSH wnl, she denies SOB/cough, no headache, vision changes, weakness or numbness and neuro exam is stable. She's really not drinking that much so I don't think that a fluid restriction is going to help much.  Now she looks volume up and is starting to drop her oxygen levels so  -trial furosemide    Hypoxia her CXR looks like volume to me, exam seems more consistent with asthma as has prolonged expiratory phas  -furosemide 20 mg IV x one and duonebs qid      Hx of esophagitis  Appreciate GI input, low Na makes her " "too risky for EGD  -cont empiric treatment with nystatin     Htn/hlp  Non obstructive CAD  -amlodipine 5 mg every day  -cont atorvastatin    Hx of PAF and stress CM in the setting of sepsis in  2017  resolved      Covid negative  No documentation of vaccination     Code Status: DNR / DNI  Functional Status: lives alone 2 weeks ago with   Diet: regular as tolerated  Brown: not needed  Access PIV    Disposition Plan   Expected discharge in tbd days to prior living arrangement once above resolved.     Entered: Blanca Pa MD 05/17/2022, 2:55 PM            Interval History (Subjective):      Still weak, some sob, less nausea but po is fair at best                   Physical Exam:      Last Vital Signs:  /59 (BP Location: Right arm)   Pulse 88   Temp 98.2  F (36.8  C) (Oral)   Resp 24   Ht 1.549 m (5' 1\")   Wt 64.5 kg (142 lb 4.8 oz)   LMP  (LMP Unknown)   SpO2 94%   BMI 26.89 kg/m      Pleasant nad but still looks like she is feeling poorly head nc at sclera clear lungs junky with exp wheeze and prolonged exp phase rrr no mrg no le edema skin warm and dry no cyanosis or clubbing alert and oriented affect appropriate enrique cn2-12 grossly intact, strength intact in b UE and LE            Medications:      All current medications were reviewed with changes reflected in problem list.         Data:      All new lab and imaging data was reviewed.   Labs:  Recent Labs   Lab 05/17/22  1300 05/16/22  1159 05/16/22 0522   *   < > 123*  123*   POTASSIUM  --   --  4.6   CHLORIDE  --   --  94   CO2  --   --  25   ANIONGAP  --   --  4   GLC  --   --  112*   BUN  --   --  18   CR  --   --  0.93   GFRESTIMATED  --   --  60*   ELROY  --   --  8.2*    < > = values in this interval not displayed.     Recent Labs   Lab 05/15/22  0415   WBC 12.3*   HGB 11.7   HCT 34.1*   MCV 87        Recent Labs   Lab 05/16/22  0522 05/16/22  0128 05/15/22  0415 05/12/22  0547   * 110* 161* 95     Recent Labs   Lab " 05/15/22  0415   AST 32   ALT 36   ALKPHOS 162*   BILITOTAL 0.4      Imaging:

## 2022-05-17 NOTE — PROGRESS NOTES
"GASTROENTEROLOGY PROGRESS NOTE       SUBJECTIVE:  Having SOB today. Nausea a little better today. Sodium remains low.     OBJECTIVE:  /50 (BP Location: Right arm)   Pulse 96   Temp 97.9  F (36.6  C) (Oral)   Resp 26   Ht 1.549 m (5' 1\")   Wt 64.5 kg (142 lb 4.8 oz)   LMP  (LMP Unknown)   SpO2 93%   BMI 26.89 kg/m    Temp (24hrs), Av  F (36.7  C), Min:97.7  F (36.5  C), Max:98.2  F (36.8  C)    Patient Vitals for the past 72 hrs:   Weight   22 1539 64.5 kg (142 lb 4.8 oz)   05/15/22 1105 63 kg (138 lb 14.4 oz)     No intake or output data in the 24 hours ending 22 1404     PHYSICAL EXAM     Respiratory: Increased respiratory effort  Gastrointestinal: Active BS, soft, NT/ND        Recent Labs   Lab Test 05/15/22  0415 22  0539 21  0651   WBC 12.3* 8.1 10.5   HGB 11.7 11.0* 11.1*   MCV 87 92 91    258 276     Recent Labs   Lab Test 22  0522 05/15/22  0415 22  0547   POTASSIUM 4.6 3.6 3.3*   CHLORIDE 94 84* 92*   CO2 25 29 30   BUN 18 18 20   ANIONGAP 4 9 6     Recent Labs   Lab Test 05/15/22  0820 05/15/22  0415 22  0958 22  1553 21  0651 16  1008 16  0929   ALBUMIN  --  2.8*  --  3.6 3.3*   < >  --    BILITOTAL  --  0.4  --  0.6 0.5   < >  --    ALT  --  36  --  27 24   < >  --    AST  --  32  --  19 20   < >  --    PROTEIN 20 *  --  Negative  --   --   --  10*   LIPASE  --  117  --   --   --   --   --     < > = values in this interval not displayed.           Active Problems:  Nausea    Assessment: Nausea continues to be a problem. Had candida esophagitis in , empiric treatment now. Many prior EGDs have not shown significant other pathology. No obvious medications that are causing nausea.    Plan: Continue to follow clinic course and sodium. If sodium improved and breathing OK, will do EGD if nausea persists.        Jeffery Buck MD  [facility]  Office:  [phone]  "

## 2022-05-17 NOTE — PROVIDER NOTIFICATION
MD Notification    Notified Person: MD    Notified Person Name: Dr. Pa    Notification Date/Time: 5/17/2022 1327    Notification Interaction: Face-to-face    Purpose of Notification: Patient c/o R posterior thigh pain. 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 Requesting a stronger pain med. Rating pain 8/10.    Orders Received: Per MD, will order low dose oxycodone for pain.    Comments:

## 2022-05-17 NOTE — PROVIDER NOTIFICATION
MD Notification    Notified Person: MD    Notified Person Name: Dr. Pa    Notification Date/Time: 5/17/2022 1340    Notification Interaction: Web page    Purpose of Notification: Sodium recheck 124. Okay to start NaCl 2% at 50mL/hr over 2 hours? Also, chest xray results are available for viewing.    Orders Received: Per MD, will order IV lasix and discontinue NaCl 2%.     Comments:

## 2022-05-17 NOTE — PROGRESS NOTES
05/17/22 1338   Quick Adds   Type of Visit Initial PT Evaluation   Living Environment   People in Home alone   Current Living Arrangements house   Home Accessibility stairs to enter home;stairs within home   Number of Stairs, Main Entrance 2   Stair Railings, Main Entrance railing on left side (ascending)   Number of Stairs, Within Home, Primary greater than 10 stairs   Stair Railings, Within Home, Primary railing on right side (ascending)   Transportation Anticipated car, drives self   Living Environment Comments Pt lives alone in a rambler with laundry in the basement.   Self-Care   Usual Activity Tolerance moderate   Current Activity Tolerance poor   Equipment Currently Used at Home walker, rolling   Fall history within last six months yes   Number of times patient has fallen within last six months 1   Activity/Exercise/Self-Care Comment Pt reports driving and running errands up until approx 1 month ago when she began experiencing dizziness/lightheadedness.  Was using walker for approx the last month.   General Information   Onset of Illness/Injury or Date of Surgery 05/15/22   Referring Physician Sy Toth MD   Patient/Family Therapy Goals Statement (PT) Patient reports that she plans to return to TCU at discharge from hospital   Pertinent History of Current Problem (include personal factors and/or comorbidities that impact the POC) Per medical chart: Genie Persaud is a 86 year old female with a history of htn/hlp, mod 2 V CAD managed medically, hx of PAF during an episode of sepsis in 2017 not on AC, hx of stress CM from sepsis now resolved with nl EF, Thoracic Ao aneurysm stable at 4.4 cm over the past 4 years by most recent CT 3/2022 admitted on 5/15/2022 with nausea and weakness and found to be hyponatremic   Existing Precautions/Restrictions fall;oxygen therapy device and L/min  (2L NC)   General Observations Patient with high respiratory rate, unable to speak more than 1 word at a time  during conversation   Cognition   Orientation Status (Cognition) oriented x 3   Pain Assessment   Patient Currently in Pain Yes, see Vital Sign flowsheet  (R knee bakers cyst pain 5/10 with gait)   Posture    Posture Forward head position;Protracted shoulders   Range of Motion (ROM)   ROM Comment tight hamstrings and gastrocs with B SLR to ~ 65 deg and B ankle DF to 0 deg.  Decreased B active shld flex to ~ 90 deg.   Strength (Manual Muscle Testing)   Strength (Manual Muscle Testing) Deficits observed during functional mobility   Strength Comments Patient weak, however also limited by severely poor activity tolerance during evaluation.   Bed Mobility   Comment, (Bed Mobility) Patient required min A with bed mobility   Transfers   Comment, (Transfers) Unable to assess secondary to severe SOB   Gait/Stairs (Locomotion)   Comment, (Gait/Stairs) Unable to assess secondary to severe SOB   Clinical Impression   Criteria for Skilled Therapeutic Intervention Yes, treatment indicated   PT Diagnosis (PT) Impaired functional mobility   Influenced by the following impairments decreased activity tolerance, decreased strength   Functional limitations due to impairments difficulties with transfers and gait   Clinical Presentation (PT Evaluation Complexity) Stable/Uncomplicated   Clinical Presentation Rationale complex pmh, increased SOB limiting mobility trial, fair social support   Clinical Decision Making (Complexity) low complexity   Planned Therapy Interventions (PT) balance training;bed mobility training;gait training;stair training;strengthening;stretching;patient/family education;transfer training;progressive activity/exercise;risk factor education   Risk & Benefits of therapy have been explained evaluation/treatment results reviewed;care plan/treatment goals reviewed;risks/benefits reviewed;current/potential barriers reviewed;participants voiced agreement with care plan;participants included;patient   PT Discharge Planning    PT Discharge Recommendation (DC Rec) Transitional Care Facility   PT Rationale for DC Rec Pt below baseline with all mobility.  Significantly limited by SOB during session, is unsafe to return home alone.  Recommend TCU to increase activity tolerance, strength and progress independence with all mobility.  Pt pt does not go to TCU she would need 24/7 assist at home for SBA with mobility and to assist with all household chores and laundry in basement with HHPT to progress independence.   Physical Therapy Goals   PT Frequency 5x/week   PT Predicted Duration/Target Date for Goal Attainment 05/24/22   PT Goals Bed Mobility;Transfers;Gait;Stairs   PT: Bed Mobility Independent;Supine to/from sit   PT: Transfers Modified independent;Sit to/from stand;Assistive device   PT: Gait Modified independent;Rolling walker;150 feet   PT: Stairs Minimal assist;2 stairs;Rail on left

## 2022-05-17 NOTE — PLAN OF CARE
A&Ox4. VSS on 2L O2 via NC. O2 91-92% on RA at rest. SOB at rest. LS coarse with expiratory wheezing. Chest xray with pleural effusion. Scheduled nebs ordered, PRN albuterol, IV lasix x 1. Up with SBA-assist of 1, pivot to BSC. PT/OT following. Voiding via BSC, incontinent at times. Regular diet, poor appetite. Denies N/V. +BS. C/o R posterior thigh pain, given PRN oxycodone x 1. Sodium 124. IV SL. Discharge pending to TCU. Nursing will continue to monitor.

## 2022-05-17 NOTE — PROGRESS NOTES
Daughter, Wilda calls for update. Questions answered to her satisfaction. She is requesting hospitalist call later today with update.

## 2022-05-18 PROCEDURE — 999N000111 HC STATISTIC OT IP EVAL DEFER

## 2022-05-18 PROCEDURE — 250N000013 HC RX MED GY IP 250 OP 250 PS 637: Performed by: INTERNAL MEDICINE

## 2022-05-18 PROCEDURE — 94640 AIRWAY INHALATION TREATMENT: CPT

## 2022-05-18 PROCEDURE — 99233 SBSQ HOSP IP/OBS HIGH 50: CPT | Performed by: HOSPITALIST

## 2022-05-18 PROCEDURE — 250N000012 HC RX MED GY IP 250 OP 636 PS 637: Performed by: HOSPITALIST

## 2022-05-18 PROCEDURE — 250N000009 HC RX 250: Performed by: INTERNAL MEDICINE

## 2022-05-18 PROCEDURE — 999N000157 HC STATISTIC RCP TIME EA 10 MIN

## 2022-05-18 PROCEDURE — 250N000011 HC RX IP 250 OP 636: Performed by: INTERNAL MEDICINE

## 2022-05-18 PROCEDURE — 94640 AIRWAY INHALATION TREATMENT: CPT | Mod: 76

## 2022-05-18 PROCEDURE — 120N000001 HC R&B MED SURG/OB

## 2022-05-18 RX ORDER — PREDNISONE 20 MG/1
20 TABLET ORAL DAILY
Status: DISCONTINUED | OUTPATIENT
Start: 2022-05-18 | End: 2022-05-20

## 2022-05-18 RX ADMIN — NYSTATIN 500000 UNITS: 100000 SUSPENSION ORAL at 14:48

## 2022-05-18 RX ADMIN — PANTOPRAZOLE SODIUM 20 MG: 20 TABLET, DELAYED RELEASE ORAL at 10:39

## 2022-05-18 RX ADMIN — FLUTICASONE FUROATE AND VILANTEROL TRIFENATATE 1 PUFF: 200; 25 POWDER RESPIRATORY (INHALATION) at 09:11

## 2022-05-18 RX ADMIN — NYSTATIN 500000 UNITS: 100000 SUSPENSION ORAL at 10:39

## 2022-05-18 RX ADMIN — IPRATROPIUM BROMIDE AND ALBUTEROL SULFATE 3 ML: 2.5; .5 SOLUTION RESPIRATORY (INHALATION) at 11:29

## 2022-05-18 RX ADMIN — ONDANSETRON 4 MG: 4 TABLET, ORALLY DISINTEGRATING ORAL at 16:09

## 2022-05-18 RX ADMIN — NYSTATIN 500000 UNITS: 100000 SUSPENSION ORAL at 19:48

## 2022-05-18 RX ADMIN — SENNOSIDES AND DOCUSATE SODIUM 2 TABLET: 50; 8.6 TABLET ORAL at 19:48

## 2022-05-18 RX ADMIN — PREDNISONE 20 MG: 20 TABLET ORAL at 16:09

## 2022-05-18 RX ADMIN — PANTOPRAZOLE SODIUM 20 MG: 20 TABLET, DELAYED RELEASE ORAL at 16:09

## 2022-05-18 RX ADMIN — ONDANSETRON 4 MG: 2 INJECTION INTRAMUSCULAR; INTRAVENOUS at 09:05

## 2022-05-18 RX ADMIN — UMECLIDINIUM 1 PUFF: 62.5 AEROSOL, POWDER ORAL at 09:12

## 2022-05-18 RX ADMIN — MONTELUKAST 10 MG: 10 TABLET, FILM COATED ORAL at 21:36

## 2022-05-18 RX ADMIN — SENNOSIDES AND DOCUSATE SODIUM 2 TABLET: 50; 8.6 TABLET ORAL at 10:39

## 2022-05-18 RX ADMIN — IPRATROPIUM BROMIDE AND ALBUTEROL SULFATE 3 ML: 2.5; .5 SOLUTION RESPIRATORY (INHALATION) at 20:20

## 2022-05-18 RX ADMIN — LATANOPROST 1 DROP: 50 SOLUTION/ DROPS OPHTHALMIC at 21:36

## 2022-05-18 RX ADMIN — LORATADINE 10 MG: 10 TABLET ORAL at 10:39

## 2022-05-18 RX ADMIN — OXYCODONE HYDROCHLORIDE 5 MG: 5 TABLET ORAL at 03:03

## 2022-05-18 RX ADMIN — IPRATROPIUM BROMIDE AND ALBUTEROL SULFATE 3 ML: 2.5; .5 SOLUTION RESPIRATORY (INHALATION) at 15:51

## 2022-05-18 RX ADMIN — AMLODIPINE BESYLATE 5 MG: 5 TABLET ORAL at 21:36

## 2022-05-18 RX ADMIN — ATORVASTATIN CALCIUM 20 MG: 20 TABLET, FILM COATED ORAL at 19:49

## 2022-05-18 ASSESSMENT — ACTIVITIES OF DAILY LIVING (ADL)
TRANSFERRING: 0-->INDEPENDENT
WALKING_OR_CLIMBING_STAIRS_DIFFICULTY: YES
ADLS_ACUITY_SCORE: 36
ADLS_ACUITY_SCORE: 36
DIFFICULTY_EATING/SWALLOWING: NO
ADLS_ACUITY_SCORE: 38
ADLS_ACUITY_SCORE: 35
ADLS_ACUITY_SCORE: 35
ADLS_ACUITY_SCORE: 38
TOILETING_ISSUES: NO
WEAR_GLASSES_OR_BLIND: YES
DRESSING/BATHING_DIFFICULTY: NO
CHANGE_IN_FUNCTIONAL_STATUS_SINCE_ONSET_OF_CURRENT_ILLNESS/INJURY: YES
DOING_ERRANDS_INDEPENDENTLY_DIFFICULTY: NO
TRANSFERRING: 0-->INDEPENDENT
ADLS_ACUITY_SCORE: 36
NUMBER_OF_TIMES_PATIENT_HAS_FALLEN_WITHIN_LAST_SIX_MONTHS: 1
ADLS_ACUITY_SCORE: 38
WALKING_OR_CLIMBING_STAIRS: AMBULATION DIFFICULTY, REQUIRES EQUIPMENT
FALL_HISTORY_WITHIN_LAST_SIX_MONTHS: YES
ADLS_ACUITY_SCORE: 40
EQUIPMENT_CURRENTLY_USED_AT_HOME: WALKER, ROLLING
ADLS_ACUITY_SCORE: 36
ADLS_ACUITY_SCORE: 38
ADLS_ACUITY_SCORE: 38

## 2022-05-18 NOTE — PLAN OF CARE
"INPATIENT NOTE: [id number]     PRIMARY PROBLEM: Dehydration, Hyponatremia, Generalized weakness        Vital signs:  Temp: 97.7  F (36.5  C) Temp src: Oral BP: 119/58 Pulse: 60   Resp: 24 SpO2: 98 % O2 Device: Nasal cannula Oxygen Delivery: 2 LPM Height: 154.9 cm (5' 1\") Weight: 64.5 kg (142 lb 4.8 oz)  Estimated body mass index is 26.89 kg/m  as calculated from the following:    Height as of this encounter: 1.549 m (5' 1\").    Weight as of this encounter: 64.5 kg (142 lb 4.8 oz).        Orientation: Alert and Oriented x4  Neuro: Intact   Pain status: complaining of 6/10 pain in their back.  Pt had Oxycodone for pain   Resp: Lung sounds has wheezing and on 02 2L NC.   Cardiac: WNL, Denies any Chest Pain   GI: Bowels are active x 4 Quads. Last BM  5/15/22  : Voiding with no concern    Skin: WNL   LDA: Peripheral IV   Infusions: Patient is Saline locked.    Diet: Regular  Activity: are 1 Assist with Gait Belt and Walker  Consults:  PT/SW/OT  Discharge Plan: Discharge pending to TCU       Will continue to monitor and provide cares.     Clay Michelle RN  "

## 2022-05-18 NOTE — PLAN OF CARE
INPATIENT PLAN OF CARE PROGRESS NOTE    A&Ox4. Remains on 2L of O2 via nasal canula. Up w/ Ax1 to pivot to BSC. LS coarse, expiratory wheezes, dyspnea w/ exertion and at times with rest. BS active x4, regular diet, snacks/Ensure Enlive between meals, fair to poor appetite. Intermittent nausea, relief w/ PRN Zofran. Incontinent at times. Last Na level: 123. 750 mL fluid restriction added this evening.  Oxycodone available for pain, patient has not needed this today. Continues scheduled inhalers and neb tx (see MAR). PT consulted, see notes. Plan to discharge back to [facility] TCU once medically stable.

## 2022-05-18 NOTE — PROGRESS NOTES
[facility]  Hospitalist Progress Note  Cristhian Perry MD 05/18/2022    Reason for Stay (Diagnosis): nausea/weakness, hyponatremia         Assessment and Plan:      Summary of Stay: Genie Persaud is a 86 year old female with a history of candidal esophagitis, htn/hlp, mod 2 V CAD managed medically, hx of PAF during an episode of sepsis in 2017 not on AC, hx of stress CM from sepsis now resolved with nl EF, thoracic Ao aneurysm stable at 4.4 cm over the past 4 years by most recent CT 3/2022 admitted on 5/15/2022 with nausea and weakness and found to be hyponatremic.  SIADH, mild asthma exacerbation    SIADH with symptomatic hyponatremia     - initially rehydrated for 36 hours with Na 122>124    - urine Na and osm consistent with SIADH (41/500)    - also on losartan/hctz at home (held)    - fluid restriction to 750cc    - re-check Na in am    - add salt to food    Hypoxia    - CXR: small r pleural effusion    - likely related to asthma    - patient feels like she is having a flare of her asthma    - on her home inhalers and scheduled duonebs    - will add Prednisone 20mg x 4 days (patient does not want high dose steroids as then she cannot sleep)    Hx of esophagitis      - initially she felt her nausea was similar to her previous esophagitis    - seen by GI input, low Na makes her too risky for EGD    - cont empiric treatment with nystatin     Htn/hlp  Non obstructive CAD    - amlodipine 5 mg every day    - holding hyzaar    - cont atorvastatin    Hx of PAF and stress CM in the setting of sepsis in 2017  resolved    Covid negative  No documentation of vaccination     Called and updated her daughter Wilda. She is planning to come from [place] late tomorrow.     Code Status: DNR / DNI  Functional Status: lives alone 2 weeks ago with   Diet: regular as tolerated  Brown: not needed  Access PIV    Disposition Plan   Expected discharge in tbd days to prior living arrangement once above resolved.     Entered: Cristhian  "Kwame Perry MD 05/18/2022, 3:00 PM        Interval History (Subjective):      Patient in bed. Doing ok. Feels like her asthma is worse after eating. No chest pain, abdo pain, n/v                   Physical Exam:      Last Vital Signs:  /70 (BP Location: Right arm)   Pulse 103   Temp 97.6  F (36.4  C) (Oral)   Resp 18   Ht 1.549 m (5' 1\")   Wt 64.5 kg (142 lb 4.8 oz)   LMP  (LMP Unknown)   SpO2 95%   BMI 26.89 kg/m      Patient is alert and oriented. Comfortable. Appears mildly sob  No wheezing on exam, has prolonged exp phase and upper airway congestion  s1s2 rrr  Soft NT +BS  No edema         Medications:      All current medications were reviewed with changes reflected in problem list.         Data:      All new lab and imaging data was reviewed.   Labs:  Recent Labs   Lab 05/17/22  1800 05/16/22  1159 05/16/22  0522   *   < > 123*  123*   POTASSIUM  --   --  4.6   CHLORIDE  --   --  94   CO2  --   --  25   ANIONGAP  --   --  4   GLC  --   --  112*   BUN  --   --  18   CR  --   --  0.93   GFRESTIMATED  --   --  60*   ELROY  --   --  8.2*    < > = values in this interval not displayed.     Recent Labs   Lab 05/15/22  0415   WBC 12.3*   HGB 11.7   HCT 34.1*   MCV 87        Recent Labs   Lab 05/16/22  0522 05/16/22  0128 05/15/22  0415 05/12/22  0547   * 110* 161* 95     Recent Labs   Lab 05/15/22  0415   AST 32   ALT 36   ALKPHOS 162*   BILITOTAL 0.4      Imaging:     "

## 2022-05-19 ENCOUNTER — TRANSFERRED RECORDS (OUTPATIENT)
Dept: HEALTH INFORMATION MANAGEMENT | Facility: CLINIC | Age: 87
End: 2022-05-19

## 2022-05-19 ENCOUNTER — APPOINTMENT (OUTPATIENT)
Dept: GENERAL RADIOLOGY | Facility: CLINIC | Age: 87
DRG: 643 | End: 2022-05-19
Attending: INTERNAL MEDICINE
Payer: COMMERCIAL

## 2022-05-19 ENCOUNTER — APPOINTMENT (OUTPATIENT)
Dept: CARDIOLOGY | Facility: CLINIC | Age: 87
DRG: 643 | End: 2022-05-19
Attending: INTERNAL MEDICINE
Payer: COMMERCIAL

## 2022-05-19 ENCOUNTER — PATIENT OUTREACH (OUTPATIENT)
Dept: CARE COORDINATION | Facility: CLINIC | Age: 87
End: 2022-05-19
Payer: COMMERCIAL

## 2022-05-19 LAB
ANION GAP SERPL CALCULATED.3IONS-SCNC: 4 MMOL/L (ref 3–14)
ANION GAP SERPL CALCULATED.3IONS-SCNC: 7 MMOL/L (ref 3–14)
ANION GAP SERPL CALCULATED.3IONS-SCNC: 8 MMOL/L (ref 3–14)
BASE EXCESS BLDV CALC-SCNC: 2.7 MMOL/L (ref -7.7–1.9)
BUN SERPL-MCNC: 22 MG/DL (ref 7–30)
BUN SERPL-MCNC: 24 MG/DL (ref 7–30)
BUN SERPL-MCNC: 24 MG/DL (ref 7–30)
CALCIUM SERPL-MCNC: 8.7 MG/DL (ref 8.5–10.1)
CALCIUM SERPL-MCNC: 8.9 MG/DL (ref 8.5–10.1)
CALCIUM SERPL-MCNC: 9.2 MG/DL (ref 8.5–10.1)
CHLORIDE BLD-SCNC: 89 MMOL/L (ref 94–109)
CHLORIDE BLD-SCNC: 91 MMOL/L (ref 94–109)
CHLORIDE BLD-SCNC: 92 MMOL/L (ref 94–109)
CO2 SERPL-SCNC: 26 MMOL/L (ref 20–32)
CO2 SERPL-SCNC: 28 MMOL/L (ref 20–32)
CO2 SERPL-SCNC: 30 MMOL/L (ref 20–32)
CREAT SERPL-MCNC: 0.85 MG/DL (ref 0.52–1.04)
CREAT SERPL-MCNC: 0.88 MG/DL (ref 0.52–1.04)
CREAT SERPL-MCNC: 0.9 MG/DL (ref 0.52–1.04)
ERYTHROCYTE [DISTWIDTH] IN BLOOD BY AUTOMATED COUNT: 12.8 % (ref 10–15)
GFR SERPL CREATININE-BSD FRML MDRD: 63 ML/MIN/1.73M2
GFR SERPL CREATININE-BSD FRML MDRD: 64 ML/MIN/1.73M2
GFR SERPL CREATININE-BSD FRML MDRD: 67 ML/MIN/1.73M2
GLUCOSE BLD-MCNC: 140 MG/DL (ref 70–99)
GLUCOSE BLD-MCNC: 149 MG/DL (ref 70–99)
GLUCOSE BLD-MCNC: 152 MG/DL (ref 70–99)
GLUCOSE BLDC GLUCOMTR-MCNC: 149 MG/DL (ref 70–99)
GLUCOSE BLDC GLUCOMTR-MCNC: 155 MG/DL (ref 70–99)
HCO3 BLDV-SCNC: 28 MMOL/L (ref 21–28)
HCT VFR BLD AUTO: 31.3 % (ref 35–47)
HGB BLD-MCNC: 10.2 G/DL (ref 11.7–15.7)
LACTATE SERPL-SCNC: 0.6 MMOL/L (ref 0.7–2)
LVEF ECHO: NORMAL
MAGNESIUM SERPL-MCNC: 2.1 MG/DL (ref 1.6–2.3)
MCH RBC QN AUTO: 28.7 PG (ref 26.5–33)
MCHC RBC AUTO-ENTMCNC: 32.6 G/DL (ref 31.5–36.5)
MCV RBC AUTO: 88 FL (ref 78–100)
O2/TOTAL GAS SETTING VFR VENT: 10 %
PCO2 BLDV: 43 MM HG (ref 40–50)
PH BLDV: 7.42 [PH] (ref 7.32–7.43)
PLATELET # BLD AUTO: 423 10E3/UL (ref 150–450)
PO2 BLDV: 66 MM HG (ref 25–47)
POTASSIUM BLD-SCNC: 4.6 MMOL/L (ref 3.4–5.3)
POTASSIUM BLD-SCNC: 4.8 MMOL/L (ref 3.4–5.3)
POTASSIUM BLD-SCNC: 5.5 MMOL/L (ref 3.4–5.3)
PROCALCITONIN SERPL-MCNC: 0.15 NG/ML
PROCALCITONIN SERPL-MCNC: 0.18 NG/ML
RBC # BLD AUTO: 3.55 10E6/UL (ref 3.8–5.2)
SODIUM SERPL-SCNC: 122 MMOL/L (ref 133–144)
SODIUM SERPL-SCNC: 126 MMOL/L (ref 133–144)
SODIUM SERPL-SCNC: 127 MMOL/L (ref 133–144)
TROPONIN I SERPL HS-MCNC: 9 NG/L
WBC # BLD AUTO: 9.9 10E3/UL (ref 4–11)

## 2022-05-19 PROCEDURE — 250N000009 HC RX 250: Performed by: INTERNAL MEDICINE

## 2022-05-19 PROCEDURE — 120N000001 HC R&B MED SURG/OB

## 2022-05-19 PROCEDURE — 93306 TTE W/DOPPLER COMPLETE: CPT | Mod: 26 | Performed by: INTERNAL MEDICINE

## 2022-05-19 PROCEDURE — 250N000013 HC RX MED GY IP 250 OP 250 PS 637: Performed by: INTERNAL MEDICINE

## 2022-05-19 PROCEDURE — 250N000013 HC RX MED GY IP 250 OP 250 PS 637: Performed by: HOSPITALIST

## 2022-05-19 PROCEDURE — 83605 ASSAY OF LACTIC ACID: CPT | Performed by: INTERNAL MEDICINE

## 2022-05-19 PROCEDURE — 85027 COMPLETE CBC AUTOMATED: CPT | Performed by: INTERNAL MEDICINE

## 2022-05-19 PROCEDURE — 80048 BASIC METABOLIC PNL TOTAL CA: CPT | Performed by: INTERNAL MEDICINE

## 2022-05-19 PROCEDURE — 250N000012 HC RX MED GY IP 250 OP 636 PS 637: Performed by: HOSPITALIST

## 2022-05-19 PROCEDURE — 999N000157 HC STATISTIC RCP TIME EA 10 MIN

## 2022-05-19 PROCEDURE — 71045 X-RAY EXAM CHEST 1 VIEW: CPT

## 2022-05-19 PROCEDURE — 99207 PR APP CREDIT; MD BILLING SHARED VISIT: CPT | Performed by: HOSPITALIST

## 2022-05-19 PROCEDURE — 84484 ASSAY OF TROPONIN QUANT: CPT | Performed by: INTERNAL MEDICINE

## 2022-05-19 PROCEDURE — 80048 BASIC METABOLIC PNL TOTAL CA: CPT | Performed by: HOSPITALIST

## 2022-05-19 PROCEDURE — 84145 PROCALCITONIN (PCT): CPT | Performed by: INTERNAL MEDICINE

## 2022-05-19 PROCEDURE — 36415 COLL VENOUS BLD VENIPUNCTURE: CPT | Performed by: INTERNAL MEDICINE

## 2022-05-19 PROCEDURE — 84145 PROCALCITONIN (PCT): CPT | Performed by: HOSPITALIST

## 2022-05-19 PROCEDURE — 36415 COLL VENOUS BLD VENIPUNCTURE: CPT | Performed by: HOSPITALIST

## 2022-05-19 PROCEDURE — 250N000011 HC RX IP 250 OP 636: Performed by: INTERNAL MEDICINE

## 2022-05-19 PROCEDURE — 250N000009 HC RX 250: Performed by: HOSPITALIST

## 2022-05-19 PROCEDURE — 94799 UNLISTED PULMONARY SVC/PX: CPT

## 2022-05-19 PROCEDURE — 94660 CPAP INITIATION&MGMT: CPT

## 2022-05-19 PROCEDURE — 94640 AIRWAY INHALATION TREATMENT: CPT | Mod: 76

## 2022-05-19 PROCEDURE — 82803 BLOOD GASES ANY COMBINATION: CPT | Performed by: INTERNAL MEDICINE

## 2022-05-19 PROCEDURE — 83735 ASSAY OF MAGNESIUM: CPT | Performed by: INTERNAL MEDICINE

## 2022-05-19 PROCEDURE — 99291 CRITICAL CARE FIRST HOUR: CPT | Performed by: INTERNAL MEDICINE

## 2022-05-19 PROCEDURE — 93306 TTE W/DOPPLER COMPLETE: CPT

## 2022-05-19 PROCEDURE — 250N000011 HC RX IP 250 OP 636: Performed by: HOSPITALIST

## 2022-05-19 PROCEDURE — 94640 AIRWAY INHALATION TREATMENT: CPT

## 2022-05-19 RX ORDER — GUAIFENESIN 600 MG/1
600 TABLET, EXTENDED RELEASE ORAL 2 TIMES DAILY
Status: DISCONTINUED | OUTPATIENT
Start: 2022-05-19 | End: 2022-05-23 | Stop reason: HOSPADM

## 2022-05-19 RX ORDER — CARBOXYMETHYLCELLULOSE SODIUM 5 MG/ML
1 SOLUTION/ DROPS OPHTHALMIC
Status: DISCONTINUED | OUTPATIENT
Start: 2022-05-19 | End: 2022-05-23 | Stop reason: HOSPADM

## 2022-05-19 RX ORDER — NITROGLYCERIN 0.4 MG/1
0.4 TABLET SUBLINGUAL EVERY 5 MIN PRN
Status: DISCONTINUED | OUTPATIENT
Start: 2022-05-19 | End: 2022-05-23 | Stop reason: HOSPADM

## 2022-05-19 RX ORDER — BENZONATATE 100 MG/1
100 CAPSULE ORAL 3 TIMES DAILY PRN
Status: DISCONTINUED | OUTPATIENT
Start: 2022-05-19 | End: 2022-05-23 | Stop reason: HOSPADM

## 2022-05-19 RX ORDER — FUROSEMIDE 10 MG/ML
20 INJECTION INTRAMUSCULAR; INTRAVENOUS ONCE
Status: COMPLETED | OUTPATIENT
Start: 2022-05-19 | End: 2022-05-19

## 2022-05-19 RX ORDER — LEVALBUTEROL INHALATION SOLUTION 1.25 MG/3ML
1.25 SOLUTION RESPIRATORY (INHALATION) 4 TIMES DAILY
Status: DISCONTINUED | OUTPATIENT
Start: 2022-05-19 | End: 2022-05-23 | Stop reason: HOSPADM

## 2022-05-19 RX ORDER — LIDOCAINE 40 MG/G
CREAM TOPICAL
Status: DISCONTINUED | OUTPATIENT
Start: 2022-05-19 | End: 2022-05-23 | Stop reason: HOSPADM

## 2022-05-19 RX ORDER — FUROSEMIDE 10 MG/ML
20 INJECTION INTRAMUSCULAR; INTRAVENOUS EVERY 12 HOURS
Status: DISCONTINUED | OUTPATIENT
Start: 2022-05-19 | End: 2022-05-23 | Stop reason: HOSPADM

## 2022-05-19 RX ORDER — LEVALBUTEROL INHALATION SOLUTION 0.63 MG/3ML
0.63 SOLUTION RESPIRATORY (INHALATION) EVERY 4 HOURS PRN
Status: DISCONTINUED | OUTPATIENT
Start: 2022-05-19 | End: 2022-05-23 | Stop reason: HOSPADM

## 2022-05-19 RX ORDER — DILTIAZEM HYDROCHLORIDE 5 MG/ML
10 INJECTION INTRAVENOUS ONCE
Status: COMPLETED | OUTPATIENT
Start: 2022-05-19 | End: 2022-05-19

## 2022-05-19 RX ADMIN — PANTOPRAZOLE SODIUM 20 MG: 20 TABLET, DELAYED RELEASE ORAL at 10:52

## 2022-05-19 RX ADMIN — LEVALBUTEROL HYDROCHLORIDE 1.25 MG: 1.25 SOLUTION RESPIRATORY (INHALATION) at 13:01

## 2022-05-19 RX ADMIN — LORATADINE 10 MG: 10 TABLET ORAL at 10:52

## 2022-05-19 RX ADMIN — NYSTATIN 500000 UNITS: 100000 SUSPENSION ORAL at 17:17

## 2022-05-19 RX ADMIN — PANTOPRAZOLE SODIUM 20 MG: 20 TABLET, DELAYED RELEASE ORAL at 17:17

## 2022-05-19 RX ADMIN — AMLODIPINE BESYLATE 5 MG: 5 TABLET ORAL at 21:33

## 2022-05-19 RX ADMIN — FUROSEMIDE 20 MG: 10 INJECTION, SOLUTION INTRAMUSCULAR; INTRAVENOUS at 17:17

## 2022-05-19 RX ADMIN — LEVALBUTEROL HYDROCHLORIDE 0.63 MG: 0.63 SOLUTION RESPIRATORY (INHALATION) at 02:40

## 2022-05-19 RX ADMIN — GUAIFENESIN 600 MG: 600 TABLET, EXTENDED RELEASE ORAL at 13:43

## 2022-05-19 RX ADMIN — BENZONATATE 100 MG: 100 CAPSULE ORAL at 15:10

## 2022-05-19 RX ADMIN — PREDNISONE 20 MG: 20 TABLET ORAL at 10:51

## 2022-05-19 RX ADMIN — MONTELUKAST 10 MG: 10 TABLET, FILM COATED ORAL at 21:33

## 2022-05-19 RX ADMIN — LEVALBUTEROL HYDROCHLORIDE 1.25 MG: 1.25 SOLUTION RESPIRATORY (INHALATION) at 16:07

## 2022-05-19 RX ADMIN — FLUTICASONE FUROATE AND VILANTEROL TRIFENATATE 1 PUFF: 200; 25 POWDER RESPIRATORY (INHALATION) at 08:34

## 2022-05-19 RX ADMIN — FUROSEMIDE 20 MG: 10 INJECTION, SOLUTION INTRAMUSCULAR; INTRAVENOUS at 03:03

## 2022-05-19 RX ADMIN — ALBUTEROL SULFATE 2 PUFF: 90 AEROSOL, METERED RESPIRATORY (INHALATION) at 02:21

## 2022-05-19 RX ADMIN — LATANOPROST 1 DROP: 50 SOLUTION/ DROPS OPHTHALMIC at 21:32

## 2022-05-19 RX ADMIN — NYSTATIN 500000 UNITS: 100000 SUSPENSION ORAL at 21:01

## 2022-05-19 RX ADMIN — APIXABAN 5 MG: 5 TABLET, FILM COATED ORAL at 13:43

## 2022-05-19 RX ADMIN — DILTIAZEM HYDROCHLORIDE 10 MG: 5 INJECTION INTRAVENOUS at 02:46

## 2022-05-19 RX ADMIN — LEVALBUTEROL HYDROCHLORIDE 1.25 MG: 1.25 SOLUTION RESPIRATORY (INHALATION) at 20:33

## 2022-05-19 RX ADMIN — GUAIFENESIN 600 MG: 600 TABLET, EXTENDED RELEASE ORAL at 21:02

## 2022-05-19 RX ADMIN — ATORVASTATIN CALCIUM 20 MG: 20 TABLET, FILM COATED ORAL at 21:02

## 2022-05-19 RX ADMIN — APIXABAN 5 MG: 5 TABLET, FILM COATED ORAL at 21:02

## 2022-05-19 RX ADMIN — NYSTATIN 500000 UNITS: 100000 SUSPENSION ORAL at 10:53

## 2022-05-19 RX ADMIN — UMECLIDINIUM 1 PUFF: 62.5 AEROSOL, POWDER ORAL at 08:32

## 2022-05-19 RX ADMIN — NYSTATIN 500000 UNITS: 100000 SUSPENSION ORAL at 13:44

## 2022-05-19 RX ADMIN — LEVALBUTEROL HYDROCHLORIDE 1.25 MG: 1.25 SOLUTION RESPIRATORY (INHALATION) at 08:30

## 2022-05-19 ASSESSMENT — ACTIVITIES OF DAILY LIVING (ADL)
ADLS_ACUITY_SCORE: 31
ADLS_ACUITY_SCORE: 35
ADLS_ACUITY_SCORE: 35
ADLS_ACUITY_SCORE: 31
ADLS_ACUITY_SCORE: 35
ADLS_ACUITY_SCORE: 31
ADLS_ACUITY_SCORE: 35
ADLS_ACUITY_SCORE: 31
ADLS_ACUITY_SCORE: 35
ADLS_ACUITY_SCORE: 35

## 2022-05-19 NOTE — PROGRESS NOTES
Clinic Care Coordination Contact  Ambulatory Care Coordination to Inpatient Care Management   Hand-In Communication    Date:  May 19, 2022  Name: Genie Persaud is enrolled in Ambulatory Care Coordination program and I am the Lead Care Coordinator.  CC Contact Information: Epic InDiffbotsket + phone  Payor Source: Payor: [facility] / Plan: [facility] MEDICARE / Product Type: HMO /   Current services in place:     Please see the CC Snaphot and Care Management Flowsheets for specific  details of this Genie Persaud care plan.   Additional details/specific concerns r/t this admission:    High Utilization increase in ED and hospital utilization may begin for long term higher level of care discussions    I will follow this admission in Epic. Please feel free to contact me with questions or for further collaboration in discharge planning.    Josseline Salinas RN  Clinical Product Navigator   Ambulatory Care Coordination  [phone]

## 2022-05-19 NOTE — PROGRESS NOTES
CLINICAL NUTRITION SERVICES - REASSESSMENT NOTE      Recommendations Ordered by Registered Dietitian (RD):   Decrease Ensure to 4 oz size BID  Trial Gelatein Plus for lower volume protein supplement. Patient would prefer to continue to consume Ensure if fits within fluid restriction    Malnutrition: (5/19)  % Weight Loss:  Weight loss does not meet criteria for malnutrition as it is not significant  % Intake:   </= 50% for >/= 5 days (severe malnutrition)  Subcutaneous Fat Loss:  Upper arm region mild depletion  Muscle Loss:  Temporal region mild depletion, Clavicle bone region mild depletion, Dorsal hand region moderate depletion, Patellar region moderate depletion and Posterior calf region moderate depletion  Fluid Retention:  None noted     Malnutrition Diagnosis: Moderate malnutrition   In Context of:  Acute illness, Environmental or social circumstances       EVALUATION OF PROGRESS TOWARD GOALS   Diet:  Regular  Ensure Enlive BID  750 mL fluid restriction     Intake/Tolerance:    Nausea noted to have improved and patient reports appetite is at its best today   She is nibbling on jello and stays she can only have liquids at the moment, agreeable to trying high protein jello as a lower volume protein supplement    She continues to consume only ~50% of her meals since admission     ASSESSED NUTRITION NEEDS:  Dosing Weight 63 kg (138 lb 14.4 oz)  Estimated Energy Needs: 1228 kcals ([place] St Jeor and activity factor 1.2)  Justification: maintenance and repletion  Estimated Protein Needs: 50-63 grams protein (0.8-1 g pro/Kg)  Justification: Repletion and preservation of lean body mass  Estimated Fluid Needs: 7607-5170  mL (25-30 mL/kg)  Justification: maintenance      NEW FINDINGS:   RRT called overnight 5/18-5/19 for new onset acute SOB --> BIPAP  Weaned to NC for O2 this morning  EGD deferred per GI at this time  Wt 64.4 kg - lasix BID  Vitals:    05/15/22 1105 05/16/22 1539 05/18/22 2333   Weight: 63 kg (138 lb  14.4 oz) 64.5 kg (142 lb 4.8 oz) 64.4 kg (142 lb)       Previous Goals:   Meet estimated nutrition needs  Evaluation: Met  No significant dry weight loss  Evaluation: Unable to evaluate    Previous Nutrition Diagnosis:   Malnutrition related to social and environmental circumstances as evidenced by mild subcutaneous fat loss and mild to moderate muscle loss  Evaluation: No change, updated below      CURRENT NUTRITION DIAGNOSIS  Inadequate oral intake related to decreased appetite, increased respiratory needs as evidenced by intakes ~50% consumption for the past 5 days     INTERVENTIONS  Recommendations / Nutrition Prescription  Regular diet as tolerated  Liberalize fluid restriction as appropriate  Ensure supplements as fits in fluid restriction  Try low volume supplement such as Gelatein Plus     Implementation  Medical Food Supplement: as above     Goals  Patient will consume 75% meals and supplements BID       MONITORING AND EVALUATION:  Progress towards goals will be monitored and evaluated per protocol and Practice Guidelines      Sandra Joseph RD, LD  Clinical Dietitian

## 2022-05-19 NOTE — PLAN OF CARE
Vital signs stable, tele: afib low 100's -1teens. Pt on eliquis and prednisone. RT following with BIPAP. BIPAP removed this morning. IMC orders d/c'd. O2 stats on 2L NC 90-94%. ECHO EF: 50-55% see report for further details. IV Lasix added BID. Pt sounding more congested this afternoon and coughing. On mucinex. MD added tessalon pearls and wants RT to start aerobika trial. RT paged.

## 2022-05-19 NOTE — CODE/RAPID RESPONSE
-RRT note-    RRT was called for patient with new onset acute shortness of breath.  Evaluated patient at the bedside within 2 minutes.  She has a history of paroxysmal A. fib not on AC, stress cardiomyopathy with now normalized EF, 2+ aortic regurgitation, thoracic aortic aneurysm that has been stable, asthma, SIADH, HTN, HLD and nonobstructive CAD.  She is hospitalized for SIADH with persistent hyponatremia in the low to mid 120 range and has been on a fluid restriction with holding her PTA HCTZ.  She has had ongoing hypoxia and has been on 2 L, but needed to be increased to 6 L after developing acute shortness of breath tonight.  Previous chest x-ray shows small left pleural effusion and atelectasis versus infiltrate in this area.  She is receiving prednisone for asthma exacerbation and has been on scheduled DuoNebs.    The patient reports new onset shortness of breath, and was previously doing quite well on 2 L oxygen earlier in the day.  The shortness breath came when she was just laying in bed had not just gotten up to the bathroom or done any other activity.  She denies any chest pain or pressure.  On exam she has tachypnea with respiratory rate in the high 20s.  She has bilateral expiratory wheeze and bilateral crackles worse on the left compared to the right.  She is tachycardic in the 120s and pulse is irregular.  Her blood pressure is in 160s systolic.  Currently on 6 L via facemask.  She does have 1+ bilateral lower extremity pitting edema.  Her abdomen is not distended and nontender.  She is alert and oriented.    Obtained stat labs, EKG, chest x-ray.  Gave a Xopenex neb and still having wheezing and not feeling much improved.  Lactic acid normal at 0.6, hemoglobin down slightly at 10.2, WBC is 9.9 and platelet count is 423.  VBG shows pH 7.42, PCO2 43, PO2 66, and HCO3 28.  A BMP and troponin is pending.  EKG showed A. fib with RVR with rates about 120 and no ST elevation or depression.  Chest x-ray is  read as a small left pleural effusion and a few patchy opacities in the left lung base which could be atelectasis scarring or pneumonia similar to x-ray from 2 days ago.    Assessment/plan:  Acute hypoxemic respiratory failure: Wheezing with bilateral crackles on exam.  Did not improve with Xopenex nebs and is currently on prednisone for asthma exacerbation.  I suspect she may have flash pulmonary edema in the setting of blood pressure being elevated in the 160 systolic and now in A. fib with heart rates in the 120s.  She has 2+ aortic regurgitation based on TTE from within the last year so she likely does not tolerate rapid elevations in blood pressure and heart rate.  She does have 1+ edema on exam and her home HCTZ has been on hold.  Initially hesitant to give diuretics given her ongoing hyponatremia into the low to mid 120s, however given her acute respiratory failure will give a dose of Lasix.  -Gave 10 mg IV diltiazem push  -Ordered 20 mg IV furosemide  -Start BiPAP  -Continue Xopenex nebs every 4 hours as needed, placed hold her on duo nebs due to tachycardia and A. Fib  -IM status  -TTE ordered for the daytime  -May need further diltiazem, avoiding beta-blockers in the setting of severe asthma  -Less suspicious for pneumonia without fever and normalization of WBC count despite steroids, add on procalcitonin  -Continue prednisone 20 mg daily  -Recheck BMP at 7 AM  -She is DNR/DNI      (I spent 45 minutes at the bedside assessing and managing this critically ill patient.  Critically ill with acute hypoxemic respiratory failure and A. fib with RVR.  Obtained and personally reviewed labs, chest x-ray, and EKG.  Ordered IV diltiazem and IV Lasix for acute management and initiated on BiPAP for respiratory failure.)  -Antony Galvez MD

## 2022-05-19 NOTE — CONSULTS
Patient has Medicare D through [facility].    Xarelto/Eliquis  --Upon receipt of RX, Discharge Pharmacy can provide 1 mo free.  --Subsequent fills will be $35/mo.  --lf/when total drug costs exceed $4,430, price will increase to a 25% coinsurance, equivalent to $130/mo.  --If total out of pocket exceeds $7,050, coinsurance will be reduced to a 5% coinsurance, equivalent to $26/mo.    Jantoven: $0/mo.    Daija Sotelo  Pharmacy Technician/Liaison, Discharge Pharmacy   [phone]  [email or web address]

## 2022-05-19 NOTE — PROGRESS NOTES
Patient is A&Ox4, up with assist x1 and walker, denies pain at this time, PIV-SL, patient SOB with wheezes- intermittent nebs, last Na-123- recheck in the AM, PT/SW following for placement, GI following, tolerating a regular diet, 750ml fluid restriction, patient transferred to 6th floor, report called to RN and all questions answered

## 2022-05-19 NOTE — PROGRESS NOTES
[facility]    Hospitalist Progress Note      Assessment & Plan   Genie Persaud is a 86 year old female with a history of candidal esophagitis, htn/hlp, mod 2 V CAD managed medically, hx of PAF during an episode of sepsis in 2017 not on AC, hx of stress CM from sepsis now resolved with nl EF, thoracic Ao aneurysm stable at 4.4 cm over the past 4 years by most recent CT 3/2022 admitted on 5/15/2022 with nausea and weakness.    Patient is currently being treated for hyponatremia thought secondary to SIADH with fluid overload.  She is also being treated for hypoxemic respiratory failure related to fluid overload and also asthma.    #Acute hypoxemic respiratory failure secondary to fluid overload and asthma exacerbation; patient does endorse chronic shortness of breath that has been worse.  She has been requiring supplemental oxygen during her hospitalization.  Her chest x-ray initially showed small left-sided pleural effusion with congestion.  No significant leukocytosis.  No fevers.  On the evening of 5/18-5/19, patient with rapid response called for worsening shortness of breath with increased O2 requirements.  She was hypotensive at the time with both wheeze and crackles noted.  Placed on BiPAP therapy.  She was noted to be in A. fib with RVR.  Troponin negative.  Chest x-ray showed likely pulmonary congestion.  -On the a.m. of 5/19, patient doing better on 2 L oxygen via nasal cannula.  Do suspect patient has a combination of asthma with superimposed pulmonary congestion.  -We will continue 20 mg IV Lasix twice daily.  Monitor I's and O's.  Wean oxygen as able.  -Change duo nebs to Xopenex.  Continue with her home inhalers.  Will complete course of prednisone.  -Also with secretions. Will start guaifenesin. Aerobika to help with airway clearance. Appreciate RT assistance.   -Lower suspicion for pna given absence of fever, WBC. Will add on procalcitonin.     #New onset afib: Patient with noted  atrial fibrillation overnight.  Likely driven by respiratory issues as above.  She did have afib in setting of sepsis in 2017.   -Follow-up TTE.  -At this point, patient is rate controlled.  Can consider low-dose metoprolol if rates become an issue.  -Discussed anticoagulation with the patient. She has been on eliquis in the past.  We will restart eliquis. Pharmacy liason consult for coverage.     #SIADH with symptomatic hyponatremia: Initially rehydrated for 36 hours with Na 122>124.  Her urine Na and osm consistent with SIADH.  She also has been on hydrochlorothiazide at home which have been held.  She is volume overloaded as above with crackles noted on exam.  Trial of 20 mg IV Lasix twice daily.  Monitor BMP every 12 hours.  Sodium showing some improvement with lasix given overnight.     #Hx of Candidal esophagitis: Nausea seems better.  Seen by GI and deferring EGD at this point. No evidence of bleeding. Empiric treatment with nystatin.     #History of HTN, HL, non-obstructive CAD: Followed by Dr. Hernandez in past. Amlodipine 5 mg daily and statin.     DVT Prophylaxis: DOAC  Code Status: No CPR- Do NOT Intubate   Dispo: Suspect a few days. Can discontinue IMC level cares as off bipap.     I did update daughter by phone.     Brandon Patino MD    Interval History   Pt with rapid response called overnight. See Dr. Galvez's note. Pt feels similar to yesterday afternoon (improved).  Now on 2L NC and off bipap. Conversational. Denies CP. Has congestion with secretions. Eating OK.  Sodium better.     -Data reviewed today: I reviewed all new labs and imaging results over the last 24 hours.    Physical Exam   Temp: 98.1  F (36.7  C) Temp src: Oral BP: 117/51 Pulse: 99   Resp: 22 SpO2: 97 % O2 Device: Nasal cannula Oxygen Delivery: 2 LPM  Vitals:    05/15/22 1105 05/16/22 1539 05/18/22 2333   Weight: 63 kg (138 lb 14.4 oz) 64.5 kg (142 lb 4.8 oz) 64.4 kg (142 lb)     Vital Signs with Ranges  Temp:  [97.6  F (36.4  C)-98.7   F (37.1  C)] 98.1  F (36.7  C)  Pulse:  [] 99  Resp:  [18-34] 22  BP: (111-169)/(43-79) 117/51  FiO2 (%):  [40 %] 40 %  SpO2:  [89 %-98 %] 97 %  I/O last 3 completed shifts:  In: 480 [P.O.:480]  Out: 200 [Urine:200]    Constitutional: Elderly, NAD. Conversational.   HEENT: Normocephalic. MMM, No elevation of JVD noted.   Respiratory: Mild tachypnea. Coarse BS bilaterally at bases. Bilateral expiratory wheeze with prolonged expiratory phase.   Cardiovascular: Irregular, no murmur  GI: BS+, NT, ND  Skin/Integumen: WWP, no rash. Mild edema.   Neuro: CNII-XII intact. Moves all extremities. No tremor. A&Ox3.    Medications     - MEDICATION INSTRUCTIONS -       - MEDICATION INSTRUCTIONS -         amLODIPine  5 mg Oral At Bedtime     atorvastatin  20 mg Oral QPM     fluticasone-vilanterol  1 puff Inhalation Daily     furosemide  20 mg Intravenous Q12H     latanoprost  1 drop Both Eyes At Bedtime     levalbuterol  1.25 mg Nebulization 4x Daily     loratadine  10 mg Oral Daily     montelukast  10 mg Oral At Bedtime     nystatin  500,000 Units Oral 4x Daily     pantoprazole  20 mg Oral BID AC     predniSONE  20 mg Oral Daily     senna-docusate  2 tablet Oral BID     sodium chloride (PF)  3 mL Intracatheter Q8H     umeclidinium  1 puff Inhalation Daily       Data   Recent Labs   Lab 05/19/22  0718 05/19/22  0431 05/19/22  0242 05/19/22  0221 05/17/22  1800 05/16/22  1159 05/16/22  0522 05/15/22  1152 05/15/22  0415   WBC  --   --  9.9  --   --   --   --   --  12.3*   HGB  --   --  10.2*  --   --   --   --   --  11.7   MCV  --   --  88  --   --   --   --   --  87   PLT  --   --  423  --   --   --   --   --  374   *  --  122*  --  123*   < > 123*  123*   < > 122*   POTASSIUM 4.8  --  5.5*  --   --   --  4.6  --  3.6   CHLORIDE 91*  --  92*  --   --   --  94  --  84*   CO2 28  --  26  --   --   --  25  --  29   BUN 24  --  24  --   --   --  18  --  18   CR 0.90  --  0.88  --   --   --  0.93  --  0.80   ANIONGAP 7   --  4  --   --   --  4  --  9   ELROY 8.7  --  9.2  --   --   --  8.2*  --  9.3   * 155* 152*   < >  --   --  112*   < > 161*   ALBUMIN  --   --   --   --   --   --   --   --  2.8*   PROTTOTAL  --   --   --   --   --   --   --   --  6.5*   BILITOTAL  --   --   --   --   --   --   --   --  0.4   ALKPHOS  --   --   --   --   --   --   --   --  162*   ALT  --   --   --   --   --   --   --   --  36   AST  --   --   --   --   --   --   --   --  32   LIPASE  --   --   --   --   --   --   --   --  117    < > = values in this interval not displayed.       Recent Results (from the past 24 hour(s))   XR Chest Port 1 View    Narrative    EXAM: CHEST SINGLE VIEW PORTABLE  LOCATION: [facility]  DATE/TIME: 5/19/2022 2:32 AM    INDICATION: Acute shortness of breath and hypoxia. Left-sided crackles.  COMPARISON: 05/17/2022.      Impression    IMPRESSION:   1. No convincing interval change since the recent study dated 05/17/2022.  2. Small left pleural effusion.  3. A few patchy opacities in the left lung base could represent atelectasis, scarring or pneumonia.

## 2022-05-19 NOTE — PROGRESS NOTES
Pt on 2l/m NC SATs 92-95%.  Pt has good productive cough. Lungs are CRS through-out.   Pt given VALERIE in line with neb. per MD order. Pt has a weak exhale and can only use aerobika at level 1.     Jeffery Pereira, RT

## 2022-05-19 NOTE — PLAN OF CARE
"Orientation:  A&O x4  VS: /55 (BP Location: Right arm)   Pulse 74   Temp 97.7  F (36.5  C) (Axillary)   Resp 26   Ht 1.549 m (5' 1\")   Wt 64.4 kg (142 lb)   LMP  (LMP Unknown)   SpO2 98%   BMI 26.83 kg/m    Pain:  Denies  Tele:  ST  Activity: Bedrest during shift   Resp:   40% biPap  GI:  WDL  : Purewic  Notable Labs: , K5.5    Skin:  Scattered bruising   Lines: SL  Other:  Assumed care for pt at 430  Plan:  Wean off Bipap and continue to monitor.     "

## 2022-05-19 NOTE — PROGRESS NOTES
"A BiPAP of  10/5 @ 40% was applied to the pt via the mask for an increase in WOB and/or SOB.  The bridge of the nose looks clean and dry. Pt is tolerating it well. Will continue to monitor and assess the pt's current respiratory status and needs.    Venous Blood Gas  Recent Labs   Lab 05/19/22  0247   PHV 7.42   PCO2V 43   PO2V 66*   HCO3V 28   ERMELINDA 2.7*   O2PER 10     Vital signs:  Temp: 98.7  F (37.1  C) Temp src: Temporal BP: (!) 155/68 Pulse: 100   Resp: 26 SpO2: 96 % O2 Device: BiPAP/CPAP Oxygen Delivery: 6 LPM Height: 154.9 cm (5' 1\") Weight: 64.4 kg (142 lb)  Estimated body mass index is 26.83 kg/m  as calculated from the following:    Height as of this encounter: 1.549 m (5' 1\").    Weight as of this encounter: 64.4 kg (142 lb).    Mercedez Orr, RT          "

## 2022-05-20 LAB
ANION GAP SERPL CALCULATED.3IONS-SCNC: 5 MMOL/L (ref 3–14)
ANION GAP SERPL CALCULATED.3IONS-SCNC: 9 MMOL/L (ref 3–14)
BUN SERPL-MCNC: 22 MG/DL (ref 7–30)
BUN SERPL-MCNC: 24 MG/DL (ref 7–30)
CALCIUM SERPL-MCNC: 8.7 MG/DL (ref 8.5–10.1)
CALCIUM SERPL-MCNC: 8.8 MG/DL (ref 8.5–10.1)
CHLORIDE BLD-SCNC: 89 MMOL/L (ref 94–109)
CHLORIDE BLD-SCNC: 89 MMOL/L (ref 94–109)
CO2 SERPL-SCNC: 28 MMOL/L (ref 20–32)
CO2 SERPL-SCNC: 30 MMOL/L (ref 20–32)
CREAT SERPL-MCNC: 0.8 MG/DL (ref 0.52–1.04)
CREAT SERPL-MCNC: 0.82 MG/DL (ref 0.52–1.04)
ERYTHROCYTE [DISTWIDTH] IN BLOOD BY AUTOMATED COUNT: 12.9 % (ref 10–15)
GFR SERPL CREATININE-BSD FRML MDRD: 70 ML/MIN/1.73M2
GFR SERPL CREATININE-BSD FRML MDRD: 72 ML/MIN/1.73M2
GLUCOSE BLD-MCNC: 110 MG/DL (ref 70–99)
GLUCOSE BLD-MCNC: 161 MG/DL (ref 70–99)
HCT VFR BLD AUTO: 29 % (ref 35–47)
HGB BLD-MCNC: 9.8 G/DL (ref 11.7–15.7)
MAGNESIUM SERPL-MCNC: 2 MG/DL (ref 1.6–2.3)
MCH RBC QN AUTO: 29.2 PG (ref 26.5–33)
MCHC RBC AUTO-ENTMCNC: 33.8 G/DL (ref 31.5–36.5)
MCV RBC AUTO: 86 FL (ref 78–100)
PLATELET # BLD AUTO: 438 10E3/UL (ref 150–450)
POTASSIUM BLD-SCNC: 3.8 MMOL/L (ref 3.4–5.3)
POTASSIUM BLD-SCNC: 4.1 MMOL/L (ref 3.4–5.3)
RBC # BLD AUTO: 3.36 10E6/UL (ref 3.8–5.2)
SODIUM SERPL-SCNC: 122 MMOL/L (ref 133–144)
SODIUM SERPL-SCNC: 125 MMOL/L (ref 133–144)
SODIUM SERPL-SCNC: 128 MMOL/L (ref 133–144)
WBC # BLD AUTO: 11.5 10E3/UL (ref 4–11)

## 2022-05-20 PROCEDURE — 250N000013 HC RX MED GY IP 250 OP 250 PS 637: Performed by: INTERNAL MEDICINE

## 2022-05-20 PROCEDURE — 36415 COLL VENOUS BLD VENIPUNCTURE: CPT | Performed by: HOSPITALIST

## 2022-05-20 PROCEDURE — 94640 AIRWAY INHALATION TREATMENT: CPT

## 2022-05-20 PROCEDURE — 258N000003 HC RX IP 258 OP 636: Performed by: STUDENT IN AN ORGANIZED HEALTH CARE EDUCATION/TRAINING PROGRAM

## 2022-05-20 PROCEDURE — 250N000013 HC RX MED GY IP 250 OP 250 PS 637: Performed by: HOSPITALIST

## 2022-05-20 PROCEDURE — 999N000157 HC STATISTIC RCP TIME EA 10 MIN

## 2022-05-20 PROCEDURE — 250N000011 HC RX IP 250 OP 636: Performed by: HOSPITALIST

## 2022-05-20 PROCEDURE — 250N000011 HC RX IP 250 OP 636: Performed by: STUDENT IN AN ORGANIZED HEALTH CARE EDUCATION/TRAINING PROGRAM

## 2022-05-20 PROCEDURE — 82310 ASSAY OF CALCIUM: CPT | Performed by: HOSPITALIST

## 2022-05-20 PROCEDURE — 250N000009 HC RX 250: Performed by: HOSPITALIST

## 2022-05-20 PROCEDURE — 99232 SBSQ HOSP IP/OBS MODERATE 35: CPT | Performed by: STUDENT IN AN ORGANIZED HEALTH CARE EDUCATION/TRAINING PROGRAM

## 2022-05-20 PROCEDURE — 83735 ASSAY OF MAGNESIUM: CPT | Performed by: HOSPITALIST

## 2022-05-20 PROCEDURE — 120N000001 HC R&B MED SURG/OB

## 2022-05-20 PROCEDURE — 84295 ASSAY OF SERUM SODIUM: CPT | Performed by: HOSPITALIST

## 2022-05-20 PROCEDURE — 85027 COMPLETE CBC AUTOMATED: CPT | Performed by: HOSPITALIST

## 2022-05-20 PROCEDURE — 250N000012 HC RX MED GY IP 250 OP 636 PS 637: Performed by: HOSPITALIST

## 2022-05-20 PROCEDURE — 94640 AIRWAY INHALATION TREATMENT: CPT | Mod: 76

## 2022-05-20 RX ORDER — CEFTRIAXONE 1 G/1
1 INJECTION, POWDER, FOR SOLUTION INTRAMUSCULAR; INTRAVENOUS EVERY 24 HOURS
Status: DISCONTINUED | OUTPATIENT
Start: 2022-05-20 | End: 2022-05-23

## 2022-05-20 RX ORDER — PREDNISONE 20 MG/1
40 TABLET ORAL DAILY
Status: COMPLETED | OUTPATIENT
Start: 2022-05-21 | End: 2022-05-21

## 2022-05-20 RX ORDER — AZITHROMYCIN 500 MG/5ML
500 INJECTION, POWDER, LYOPHILIZED, FOR SOLUTION INTRAVENOUS EVERY 24 HOURS
Status: DISPENSED | OUTPATIENT
Start: 2022-05-20 | End: 2022-05-22

## 2022-05-20 RX ADMIN — LEVALBUTEROL HYDROCHLORIDE 1.25 MG: 1.25 SOLUTION RESPIRATORY (INHALATION) at 15:22

## 2022-05-20 RX ADMIN — LATANOPROST 1 DROP: 50 SOLUTION/ DROPS OPHTHALMIC at 22:38

## 2022-05-20 RX ADMIN — APIXABAN 5 MG: 5 TABLET, FILM COATED ORAL at 22:25

## 2022-05-20 RX ADMIN — Medication 1 LOZENGE: at 05:27

## 2022-05-20 RX ADMIN — ALBUTEROL SULFATE 2 PUFF: 90 AEROSOL, METERED RESPIRATORY (INHALATION) at 22:49

## 2022-05-20 RX ADMIN — PANTOPRAZOLE SODIUM 20 MG: 20 TABLET, DELAYED RELEASE ORAL at 06:41

## 2022-05-20 RX ADMIN — Medication 1 LOZENGE: at 01:08

## 2022-05-20 RX ADMIN — GUAIFENESIN 600 MG: 600 TABLET, EXTENDED RELEASE ORAL at 09:44

## 2022-05-20 RX ADMIN — LEVALBUTEROL HYDROCHLORIDE 1.25 MG: 1.25 SOLUTION RESPIRATORY (INHALATION) at 20:26

## 2022-05-20 RX ADMIN — NYSTATIN 500000 UNITS: 100000 SUSPENSION ORAL at 22:25

## 2022-05-20 RX ADMIN — PREDNISONE 20 MG: 20 TABLET ORAL at 09:44

## 2022-05-20 RX ADMIN — GUAIFENESIN 600 MG: 600 TABLET, EXTENDED RELEASE ORAL at 22:24

## 2022-05-20 RX ADMIN — NYSTATIN 500000 UNITS: 100000 SUSPENSION ORAL at 17:01

## 2022-05-20 RX ADMIN — ATORVASTATIN CALCIUM 20 MG: 20 TABLET, FILM COATED ORAL at 22:38

## 2022-05-20 RX ADMIN — LEVALBUTEROL HYDROCHLORIDE 1.25 MG: 1.25 SOLUTION RESPIRATORY (INHALATION) at 07:36

## 2022-05-20 RX ADMIN — Medication 1 LOZENGE: at 04:10

## 2022-05-20 RX ADMIN — FLUTICASONE FUROATE AND VILANTEROL TRIFENATATE 1 PUFF: 200; 25 POWDER RESPIRATORY (INHALATION) at 07:36

## 2022-05-20 RX ADMIN — APIXABAN 5 MG: 5 TABLET, FILM COATED ORAL at 09:44

## 2022-05-20 RX ADMIN — NYSTATIN 500000 UNITS: 100000 SUSPENSION ORAL at 09:44

## 2022-05-20 RX ADMIN — PANTOPRAZOLE SODIUM 20 MG: 20 TABLET, DELAYED RELEASE ORAL at 17:01

## 2022-05-20 RX ADMIN — LORATADINE 10 MG: 10 TABLET ORAL at 09:44

## 2022-05-20 RX ADMIN — CEFTRIAXONE 1 G: 1 INJECTION, POWDER, FOR SOLUTION INTRAMUSCULAR; INTRAVENOUS at 18:56

## 2022-05-20 RX ADMIN — AZITHROMYCIN MONOHYDRATE 500 MG: 500 INJECTION, POWDER, LYOPHILIZED, FOR SOLUTION INTRAVENOUS at 17:09

## 2022-05-20 RX ADMIN — AMLODIPINE BESYLATE 5 MG: 5 TABLET ORAL at 22:25

## 2022-05-20 RX ADMIN — FUROSEMIDE 20 MG: 10 INJECTION, SOLUTION INTRAMUSCULAR; INTRAVENOUS at 06:41

## 2022-05-20 RX ADMIN — MONTELUKAST 10 MG: 10 TABLET, FILM COATED ORAL at 22:25

## 2022-05-20 RX ADMIN — UMECLIDINIUM 1 PUFF: 62.5 AEROSOL, POWDER ORAL at 07:36

## 2022-05-20 ASSESSMENT — ACTIVITIES OF DAILY LIVING (ADL)
ADLS_ACUITY_SCORE: 31

## 2022-05-20 NOTE — PROGRESS NOTES
[facility]    Medicine Progress Note - Hospitalist Service  Date of Admission: 5/15/2022     Assessment & Plan         Genie Persaud is a 86 year old female with a history of candidal esophagitis, htn/hlp, mod 2 V CAD managed medically, hx of PAF during an episode of sepsis in 2017 not on AC, hx of stress CM from sepsis now resolved with nl EF, thoracic Ao aneurysm stable at 4.4 cm over the past 4 years by most recent CT 3/2022 admitted on 5/15/2022 with nausea and weakness.     Patient is currently being treated for hyponatremia thought secondary to SIADH with fluid overload.  She is also being treated for hypoxemic respiratory failure related to fluid overload and also asthma.    Acute Hypoxic Respiratory Failure  Asthma Exacerbation  Possible Community-acquired Pneumonia  Presented with nausea and weakness and found to be requiring supplemental oxygen. CXR with evidence of congestion, small left pleural effusion, and left lower opacities that could represent atelectasis vs pneumonia. Started on diuresis. Had RRT called early morning 5/19/2022 for acutely increased oxygen requirements and was started on therapy for asthma exacerbation. Oxygen needs have subsequently decreased a bit, but patient is still requiring 2-3L via NC. Given the possible LLL opacity we will start treatment for possible CAP.  -Supplemental oxygen PRN  -Xoponex nebs QID  -Ceftriaxone 1g daily x5 days  -Azithromycin 500mg daily x3 days  -No additional diuresis needed at this time as patient appears euvolemic    New-onset Atrial Fibrillation  Suspected to be caused by respiratory failure and exacerbated by beta agonists. She did have afib in setting of sepsis in 2017. Started eliquis, but has not yet needed beta blockade. Continuing to monitor and consider starting metoprolol if rates should increase.    SIADH  Symptomatic Hyponatremia  Urine sodium and osm consistent with SIADH, possible exacerbated by poor oral intake  "prior to arrival. Was on hydrochlorothiazide which was stopped on admission. Received fluid resuscitation and was subsequently overload. Na now 128 and slowly self correcting.    Hx of Candidal Esophagitis: GI deferring EGD at this point. Empiric Nystatin swish & swallow.  Hx of Hypertension, Hyperlipidemia, Non-obstructive CAD: Continue PTA Amlodipine + statin.     Diet: Orders Placed This Encounter      Regular Diet Adult  DVT Prophylaxis: DOAC  Brown Catheter: Not present  Code Status: No CPR- Do NOT Intubate    Expected discharge: Likely 1-2 days pending clinical improvement and weaning for supplemental O2.    The patient's care was discussed with the Bedside Nurse and Patient.    Gio Marshall MD, S  Hospitalist Service  [facility]    Securely message with the Vocera Web Console (learn more here)  Text page via [employer] Paging/Directory    ______________________________________________________________________    Interval History   Nursing notes reviewed. Patient still requiring up to 3L via NC. No other acute events. Patient feeling slightly better than admission, but stated she still has a cough that is productive. No fever, chills, nausea, vomiting    A full 10+ point review of systems was performed and found to be negative with the exception of those items noted here.    Physical Exam   Vital signs:  Temp: 97.8  F (36.6  C) Temp src: Oral BP: (!) 140/80 Pulse: 108   Resp: 20 SpO2: 94 % O2 Device: Nasal cannula Oxygen Delivery: 2 LPM Height: 154.9 cm (5' 1\") Weight: 64.4 kg (142 lb)  Estimated body mass index is 26.83 kg/m  as calculated from the following:    Height as of this encounter: 1.549 m (5' 1\").    Weight as of this encounter: 64.4 kg (142 lb).    General: Very pleasant female resting comfortably in hospital bed.  Awake, alert, interactive.  HEENT: Normocephalic, atraumatic.  PERRL, EOMI.  Conjunctiva clear, sclerae anicteric.  Mucous membranes moist.  Cardiac: Regular rate and " rhythm without murmur, gallop, or rub.  Trace peripheral edema.  Respiratory: Normal work of breathing on NC.  Wheezing throughout and decreased air movement in bases.  GI: Normal, active bowel sounds.  Abdomen soft, nontender, nondistended.  : Deferred.  Musculoskeletal: Moving all extremities appropriately.  Skin: No rashes or abrasions on exposed skin.  Neurologic: Alert and oriented x4.  Cranial nerves II through XII grossly intact.  Psychologic: Appropriate mood and affect.    Data   All laboratory results and other diagnostic data from the past 24 hours is available in Epic and has been personally reviewed.    Recent Labs   Lab 05/20/22  0741 05/19/22  1822 05/19/22  0718 05/19/22  0431 05/19/22  0242 05/15/22  1152 05/15/22  0415   WBC 11.5*  --   --   --  9.9  --  12.3*   HGB 9.8*  --   --   --  10.2*  --  11.7   MCV 86  --   --   --  88  --  87     --   --   --  423  --  374   * 127* 126*  --  122*   < > 122*   POTASSIUM 3.8 4.6 4.8  --  5.5*   < > 3.6   CHLORIDE 89* 89* 91*  --  92*   < > 84*   CO2 30 30 28  --  26   < > 29   BUN 22 22 24  --  24   < > 18   CR 0.80 0.85 0.90  --  0.88   < > 0.80   ANIONGAP 9 8 7  --  4   < > 9   ELROY 8.7 8.9 8.7  --  9.2   < > 9.3   * 149* 140*   < > 152*   < > 161*   ALBUMIN  --   --   --   --   --   --  2.8*   PROTTOTAL  --   --   --   --   --   --  6.5*   BILITOTAL  --   --   --   --   --   --  0.4   ALKPHOS  --   --   --   --   --   --  162*   ALT  --   --   --   --   --   --  36   AST  --   --   --   --   --   --  32   LIPASE  --   --   --   --   --   --  117    < > = values in this interval not displayed.     No results found for this or any previous visit (from the past 24 hour(s)).  I personally reviewed: no images or EKG's today.

## 2022-05-20 NOTE — PLAN OF CARE
Goal Outcome Evaluation:    Temp: 98  F (36.7  C) Temp src: Oral BP: 127/52 Pulse: 110   Resp: 20 SpO2: 97 % O2 Device: Nasal cannula Oxygen Delivery: 2 LPM     Axox4. Tele afib RVR. HR up to 160s when using aerobika. Sats stable, some SOB Up assistx1, walker/gaitbelt. Frequent cough. Denies pain. 2+ edema LE. Purewick in place. Coarse/diminished lungs, worse on L side. Redness on sacrum. 750 mL FR, 50mL left for today.

## 2022-05-20 NOTE — PLAN OF CARE
Aox4. Denies pain. Pt sating well on 2 L via NC. Pt had 2 BM on NOC. Using purewick overnight. PRN lozenge given for cough and dry mouth per MAR. Tele: A fib CVR w/ PVCs. Trace BLE edema noted. 750 ml FR w/ 30 ml given on NOC. A1 GB and walker. Regular diet. Continue w/ POC.

## 2022-05-20 NOTE — PROGRESS NOTES
Paged by nurse re sodium level down to 122.    Pt is known to me from yesterday.  She is being treated for respiratory failure secondary to asthma and she was diuresed starting on AM of 5/19 with slow improvement in her sodium levels.  From progress note earlier today she was noted to be euvolemic.  She is down to 1L via NC.      -Will hold PM dose of IV lasix.  -Recheck Na in 4 hours.  Would hold off on large NS given respiratory failure and concern for fluid overload.    Brandon Patino MD

## 2022-05-21 ENCOUNTER — APPOINTMENT (OUTPATIENT)
Dept: PHYSICAL THERAPY | Facility: CLINIC | Age: 87
DRG: 643 | End: 2022-05-21
Payer: COMMERCIAL

## 2022-05-21 LAB
ANION GAP SERPL CALCULATED.3IONS-SCNC: 7 MMOL/L (ref 3–14)
BUN SERPL-MCNC: 23 MG/DL (ref 7–30)
CALCIUM SERPL-MCNC: 8.6 MG/DL (ref 8.5–10.1)
CHLORIDE BLD-SCNC: 90 MMOL/L (ref 94–109)
CO2 SERPL-SCNC: 30 MMOL/L (ref 20–32)
CREAT SERPL-MCNC: 0.76 MG/DL (ref 0.52–1.04)
GFR SERPL CREATININE-BSD FRML MDRD: 77 ML/MIN/1.73M2
GLUCOSE BLD-MCNC: 120 MG/DL (ref 70–99)
POTASSIUM BLD-SCNC: 3.6 MMOL/L (ref 3.4–5.3)
SODIUM SERPL-SCNC: 127 MMOL/L (ref 133–144)

## 2022-05-21 PROCEDURE — 250N000013 HC RX MED GY IP 250 OP 250 PS 637: Performed by: INTERNAL MEDICINE

## 2022-05-21 PROCEDURE — 97530 THERAPEUTIC ACTIVITIES: CPT | Mod: GP

## 2022-05-21 PROCEDURE — 36415 COLL VENOUS BLD VENIPUNCTURE: CPT | Performed by: HOSPITALIST

## 2022-05-21 PROCEDURE — 99232 SBSQ HOSP IP/OBS MODERATE 35: CPT | Performed by: INTERNAL MEDICINE

## 2022-05-21 PROCEDURE — 250N000012 HC RX MED GY IP 250 OP 636 PS 637: Performed by: STUDENT IN AN ORGANIZED HEALTH CARE EDUCATION/TRAINING PROGRAM

## 2022-05-21 PROCEDURE — 94799 UNLISTED PULMONARY SVC/PX: CPT

## 2022-05-21 PROCEDURE — 94640 AIRWAY INHALATION TREATMENT: CPT | Mod: 76

## 2022-05-21 PROCEDURE — 94640 AIRWAY INHALATION TREATMENT: CPT

## 2022-05-21 PROCEDURE — 999N000157 HC STATISTIC RCP TIME EA 10 MIN

## 2022-05-21 PROCEDURE — 97116 GAIT TRAINING THERAPY: CPT | Mod: GP

## 2022-05-21 PROCEDURE — 258N000003 HC RX IP 258 OP 636: Performed by: STUDENT IN AN ORGANIZED HEALTH CARE EDUCATION/TRAINING PROGRAM

## 2022-05-21 PROCEDURE — 250N000011 HC RX IP 250 OP 636: Performed by: STUDENT IN AN ORGANIZED HEALTH CARE EDUCATION/TRAINING PROGRAM

## 2022-05-21 PROCEDURE — 120N000001 HC R&B MED SURG/OB

## 2022-05-21 PROCEDURE — 250N000013 HC RX MED GY IP 250 OP 250 PS 637: Performed by: HOSPITALIST

## 2022-05-21 PROCEDURE — 80048 BASIC METABOLIC PNL TOTAL CA: CPT | Performed by: HOSPITALIST

## 2022-05-21 PROCEDURE — 250N000009 HC RX 250: Performed by: HOSPITALIST

## 2022-05-21 RX ORDER — PREDNISONE 20 MG/1
20 TABLET ORAL DAILY
Status: DISCONTINUED | OUTPATIENT
Start: 2022-05-22 | End: 2022-05-23 | Stop reason: HOSPADM

## 2022-05-21 RX ADMIN — LATANOPROST 1 DROP: 50 SOLUTION/ DROPS OPHTHALMIC at 21:37

## 2022-05-21 RX ADMIN — APIXABAN 5 MG: 5 TABLET, FILM COATED ORAL at 08:53

## 2022-05-21 RX ADMIN — LEVALBUTEROL HYDROCHLORIDE 1.25 MG: 1.25 SOLUTION RESPIRATORY (INHALATION) at 19:52

## 2022-05-21 RX ADMIN — LEVALBUTEROL HYDROCHLORIDE 1.25 MG: 1.25 SOLUTION RESPIRATORY (INHALATION) at 12:36

## 2022-05-21 RX ADMIN — GUAIFENESIN 600 MG: 600 TABLET, EXTENDED RELEASE ORAL at 21:35

## 2022-05-21 RX ADMIN — NYSTATIN 500000 UNITS: 100000 SUSPENSION ORAL at 21:35

## 2022-05-21 RX ADMIN — ATORVASTATIN CALCIUM 20 MG: 20 TABLET, FILM COATED ORAL at 21:35

## 2022-05-21 RX ADMIN — GUAIFENESIN 600 MG: 600 TABLET, EXTENDED RELEASE ORAL at 08:54

## 2022-05-21 RX ADMIN — AMLODIPINE BESYLATE 5 MG: 5 TABLET ORAL at 21:36

## 2022-05-21 RX ADMIN — LEVALBUTEROL HYDROCHLORIDE 1.25 MG: 1.25 SOLUTION RESPIRATORY (INHALATION) at 16:27

## 2022-05-21 RX ADMIN — MONTELUKAST 10 MG: 10 TABLET, FILM COATED ORAL at 21:35

## 2022-05-21 RX ADMIN — SENNOSIDES AND DOCUSATE SODIUM 2 TABLET: 50; 8.6 TABLET ORAL at 21:35

## 2022-05-21 RX ADMIN — FLUTICASONE FUROATE AND VILANTEROL TRIFENATATE 1 PUFF: 200; 25 POWDER RESPIRATORY (INHALATION) at 06:29

## 2022-05-21 RX ADMIN — PREDNISONE 40 MG: 20 TABLET ORAL at 08:53

## 2022-05-21 RX ADMIN — ALBUTEROL SULFATE 2 PUFF: 90 AEROSOL, METERED RESPIRATORY (INHALATION) at 08:52

## 2022-05-21 RX ADMIN — APIXABAN 5 MG: 5 TABLET, FILM COATED ORAL at 21:35

## 2022-05-21 RX ADMIN — PANTOPRAZOLE SODIUM 20 MG: 20 TABLET, DELAYED RELEASE ORAL at 16:06

## 2022-05-21 RX ADMIN — LORATADINE 10 MG: 10 TABLET ORAL at 08:54

## 2022-05-21 RX ADMIN — UMECLIDINIUM 1 PUFF: 62.5 AEROSOL, POWDER ORAL at 06:29

## 2022-05-21 RX ADMIN — LEVALBUTEROL HYDROCHLORIDE 1.25 MG: 1.25 SOLUTION RESPIRATORY (INHALATION) at 06:29

## 2022-05-21 RX ADMIN — AZITHROMYCIN MONOHYDRATE 500 MG: 500 INJECTION, POWDER, LYOPHILIZED, FOR SOLUTION INTRAVENOUS at 16:49

## 2022-05-21 RX ADMIN — PANTOPRAZOLE SODIUM 20 MG: 20 TABLET, DELAYED RELEASE ORAL at 08:54

## 2022-05-21 RX ADMIN — NYSTATIN 500000 UNITS: 100000 SUSPENSION ORAL at 08:55

## 2022-05-21 RX ADMIN — CEFTRIAXONE 1 G: 1 INJECTION, POWDER, FOR SOLUTION INTRAMUSCULAR; INTRAVENOUS at 16:06

## 2022-05-21 ASSESSMENT — ACTIVITIES OF DAILY LIVING (ADL)
ADLS_ACUITY_SCORE: 31
ADLS_ACUITY_SCORE: 31
ADLS_ACUITY_SCORE: 35
ADLS_ACUITY_SCORE: 31
ADLS_ACUITY_SCORE: 35
ADLS_ACUITY_SCORE: 31
ADLS_ACUITY_SCORE: 35

## 2022-05-21 NOTE — PLAN OF CARE
Pt a/o x4. VSS. 1 L nasal canula. Denied pain. C/o wheezing this morning, PRN albuterol given. Assist of 1 with gait belt and walker up to bedside commode and chair. Left arm red and edema, provider looked at it.  Voiding adequately. Treating with nebs and abx. Possible discharge 1-2 day.

## 2022-05-21 NOTE — PLAN OF CARE
"A/Ox4. Up Ax1 with walker & belt. Pt mildly SOB with activity, remains 1L NC. Denies pain. Afib CVR on Tele. On regular diet and 750cc fluid restriction; had about 50cc of fluid intake on nights. Sodium 125 from last nights recheck; MD aware.      Safety checks completed and call light within reach. Continue POC.     /51 (BP Location: Right arm, Patient Position: Left side, Cuff Size: Adult Regular)   Pulse 99   Temp 97.7  F (36.5  C) (Oral)   Resp 20   Ht 1.549 m (5' 1\")   Wt 64.4 kg (142 lb)   LMP  (LMP Unknown)   SpO2 96%   BMI 26.83 kg/m     "

## 2022-05-21 NOTE — PLAN OF CARE
VSS.  AOX4.  Breathing is unlabored at rest on 1 LPM with BARONE.  Pt denies pain.  Assist of 1 with gait belt and walker.  Regular diet with 750 ml fluid restriction.      New orders for abx; see MD note today    Pt Na resulted 122 this shift; MD paged and held lasix and ordered Na recheck at 23:00; following RN aware.    Tele: Afib CVR

## 2022-05-21 NOTE — PROGRESS NOTES
[facility]    Medicine Progress Note - Hospitalist Service    Date of Admission:  5/15/2022    Assessment & Plan          Genie Persaud is a 86 year old female with a history of candidal esophagitis, htn/hlp, mod 2 V CAD managed medically, hx of PAF during an episode of sepsis in 2017 not on AC, hx of stress CM from sepsis now resolved with nl EF, thoracic Ao aneurysm stable at 4.4 cm over the past 4 years by most recent CT 3/2022 admitted on 5/15/2022 with nausea and weakness.     Patient is currently being treated for hyponatremia thought secondary to SIADH with fluid overload.  She is also being treated for hypoxemic respiratory failure related to fluid overload and also asthma.     Acute Hypoxic Respiratory Failure  Asthma Exacerbation  Possible Community-acquired Pneumonia  Presented with nausea and weakness and found to be requiring supplemental oxygen. CXR with evidence of congestion, small left pleural effusion, and left lower opacities that could represent atelectasis vs pneumonia. Started on diuresis. Had RRT called early morning 5/19/2022 for acutely increased oxygen requirements and was started on therapy for asthma exacerbation. Oxygen needs have subsequently decreased a bit, but patient is still requiring 2-3L via NC. Given the possible LLL opacity we will start treatment for possible CAP.  -Supplemental oxygen PRN  -Xoponex nebs QID  -Ceftriaxone 1g daily x5 days  -Azithromycin 500mg daily x3 days  -No additional diuresis needed at this time as patient appears euvolemic  -Her Lasix currently on hold  -Received prednisone 40 mg today.  Previously on 20 mg daily.  Wheezing significantly improved we will switch back to prednisone 20 mg daily     New-onset Atrial Fibrillation  Suspected to be caused by respiratory failure and exacerbated by beta agonists. She did have afib in setting of sepsis in 2017. Started eliquis, but has not yet needed beta blockade. Continuing to monitor and  consider starting metoprolol if rates should increase.     SIADH  Symptomatic Hyponatremia-much improved now at 127  Urine sodium and osm consistent with SIADH, possible exacerbated by poor oral intake prior to arrival. Was on hydrochlorothiazide which was stopped on admission. Received fluid resuscitation and was subsequently overload. Na now 127 and slowly self correcting.     Hx of Candidal Esophagitis: GI deferring EGD at this point. Empiric Nystatin swish & swallow.  Hx of Hypertension, Hyperlipidemia, Non-obstructive CAD: Continue PTA Amlodipine + statin.     Diet: Orders Placed This Encounter      Regular Diet Adult  DVT Prophylaxis: DOAC  Brown Catheter: Not present  Code Status: No CPR- Do NOT Intubate    Expected discharge:  Anticipating Christina will still be needing inpatient care at least in the next 24 to 48 hours        Diet: Snacks/Supplements Adult: Ensure Enlive; With Meals  Regular Diet Adult  Fluid restriction 750 ML FLUID  Snacks/Supplements Adult: Gelatein Plus; Between Meals    DVT Prophylaxis: DOAC  Brown Catheter: Not present  Central Lines: None  Cardiac Monitoring: ACTIVE order. Indication: Tachyarrhythmias, acute (48 hours)  Code Status: No CPR- Do NOT Intubate             The patient's care was discussed with the Patient.  I updated patient's daughter was present at bedside during my exam    Al Chandra Mcdermott MD, MD  Hospitalist Service  [facility]  Securely message with the Vocera Web Console (learn more here)  Text page via [employer] Paging/Directory         Clinically Significant Risk Factors Present on Admission                    ______________________________________________________________________    Interval History   I assume service care today.  Seen and examined.  Chart reviewed.  I met Christina laying comfortably in bed however she was watching [employer] on her electronic device  She mentioned that she is feeling better but still weak.  Able to tolerate  her oral diet.  Denies any ongoing worsening shortness of breath, able to talk in full sentences.  Afebrile.  On minimal oxygen support overnight.  No bleeding tendencies.  No mental status changes.  Currently afebrile.    Data reviewed today: I reviewed all medications, new labs and imaging results over the last 24 hours. I personally reviewed     Physical Exam   Vital Signs: Temp: 97.7  F (36.5  C) Temp src: Oral BP: 112/68 Pulse: 93   Resp: 20 SpO2: 94 % O2 Device: Nasal cannula Oxygen Delivery: 1.5 LPM  Weight: 142 lbs 0 oz  HEENT; Atraumatic, normocephalic, pinkish conjuctiva, pupils bilateral reactive   Skin: warm and moist, no rashes  Lungs: equal chest expansion, fair air entry, scant wheezes, no crackles  Heart: normal rate, normal rhythm, no rubs or gallops.   Abdomen: normal bowel sounds, no tenderness, no peritoneal signs, no guarding  Extremities: no deformities, no edema   Neuro; follow commands, alert and oriented x3, spontaneous speech, coherent, moves all extremities spontaneously  Psych; no hallucination, euthymic mood, not agitated        Data   Recent Labs   Lab 05/21/22  0703 05/20/22  2229 05/20/22  1748 05/20/22  0741 05/19/22  0431 05/19/22  0242 05/15/22  1152 05/15/22  0415   WBC  --   --   --  11.5*  --  9.9  --  12.3*   HGB  --   --   --  9.8*  --  10.2*  --  11.7   MCV  --   --   --  86  --  88  --  87   PLT  --   --   --  438  --  423  --  374   * 125* 122* 128*   < > 122*   < > 122*   POTASSIUM 3.6  --  4.1 3.8   < > 5.5*   < > 3.6   CHLORIDE 90*  --  89* 89*   < > 92*   < > 84*   CO2 30  --  28 30   < > 26   < > 29   BUN 23  --  24 22   < > 24   < > 18   CR 0.76  --  0.82 0.80   < > 0.88   < > 0.80   ANIONGAP 7  --  5 9   < > 4   < > 9   ELROY 8.6  --  8.8 8.7   < > 9.2   < > 9.3   *  --  161* 110*   < > 152*   < > 161*   ALBUMIN  --   --   --   --   --   --   --  2.8*   PROTTOTAL  --   --   --   --   --   --   --  6.5*   BILITOTAL  --   --   --   --   --   --   --  0.4    ALKPHOS  --   --   --   --   --   --   --  162*   ALT  --   --   --   --   --   --   --  36   AST  --   --   --   --   --   --   --  32   LIPASE  --   --   --   --   --   --   --  117    < > = values in this interval not displayed.     No results found for this or any previous visit (from the past 24 hour(s)).  Medications     - MEDICATION INSTRUCTIONS -       - MEDICATION INSTRUCTIONS -         amLODIPine  5 mg Oral At Bedtime     apixaban ANTICOAGULANT  5 mg Oral BID     atorvastatin  20 mg Oral QPM     azithromycin  500 mg Intravenous Q24H     cefTRIAXone  1 g Intravenous Q24H     fluticasone-vilanterol  1 puff Inhalation Daily     [Held by provider] furosemide  20 mg Intravenous Q12H     guaiFENesin  600 mg Oral BID     latanoprost  1 drop Both Eyes At Bedtime     levalbuterol  1.25 mg Nebulization 4x Daily     loratadine  10 mg Oral Daily     montelukast  10 mg Oral At Bedtime     nystatin  500,000 Units Oral 4x Daily     pantoprazole  20 mg Oral BID AC     senna-docusate  2 tablet Oral BID     sodium chloride (PF)  3 mL Intracatheter Q8H     umeclidinium  1 puff Inhalation Daily

## 2022-05-21 NOTE — PLAN OF CARE
A&Ox4. VSS ex elevated bp. Denies pain. Up Ax1 with a gait belt and walker. Nasal cannula 1 L. IV abx zithromax and rocephin. Tele Afib CVR. Possible discharge 1-2 days.

## 2022-05-22 ENCOUNTER — APPOINTMENT (OUTPATIENT)
Dept: PHYSICAL THERAPY | Facility: CLINIC | Age: 87
DRG: 643 | End: 2022-05-22
Payer: COMMERCIAL

## 2022-05-22 LAB
ANION GAP SERPL CALCULATED.3IONS-SCNC: 6 MMOL/L (ref 3–14)
BASOPHILS # BLD AUTO: 0 10E3/UL (ref 0–0.2)
BASOPHILS NFR BLD AUTO: 0 %
BUN SERPL-MCNC: 20 MG/DL (ref 7–30)
CALCIUM SERPL-MCNC: 8.6 MG/DL (ref 8.5–10.1)
CHLORIDE BLD-SCNC: 94 MMOL/L (ref 94–109)
CO2 SERPL-SCNC: 30 MMOL/L (ref 20–32)
CREAT SERPL-MCNC: 0.73 MG/DL (ref 0.52–1.04)
EOSINOPHIL # BLD AUTO: 0 10E3/UL (ref 0–0.7)
EOSINOPHIL NFR BLD AUTO: 0 %
ERYTHROCYTE [DISTWIDTH] IN BLOOD BY AUTOMATED COUNT: 12.7 % (ref 10–15)
GFR SERPL CREATININE-BSD FRML MDRD: 81 ML/MIN/1.73M2
GLUCOSE BLD-MCNC: 111 MG/DL (ref 70–99)
HCT VFR BLD AUTO: 28.4 % (ref 35–47)
HGB BLD-MCNC: 9.3 G/DL (ref 11.7–15.7)
IMM GRANULOCYTES # BLD: 0.2 10E3/UL
IMM GRANULOCYTES NFR BLD: 2 %
LYMPHOCYTES # BLD AUTO: 0.9 10E3/UL (ref 0.8–5.3)
LYMPHOCYTES NFR BLD AUTO: 9 %
MCH RBC QN AUTO: 29.2 PG (ref 26.5–33)
MCHC RBC AUTO-ENTMCNC: 32.7 G/DL (ref 31.5–36.5)
MCV RBC AUTO: 89 FL (ref 78–100)
MONOCYTES # BLD AUTO: 1.2 10E3/UL (ref 0–1.3)
MONOCYTES NFR BLD AUTO: 12 %
NEUTROPHILS # BLD AUTO: 7.7 10E3/UL (ref 1.6–8.3)
NEUTROPHILS NFR BLD AUTO: 77 %
NRBC # BLD AUTO: 0 10E3/UL
NRBC BLD AUTO-RTO: 0 /100
PLATELET # BLD AUTO: 410 10E3/UL (ref 150–450)
POTASSIUM BLD-SCNC: 3.6 MMOL/L (ref 3.4–5.3)
RBC # BLD AUTO: 3.18 10E6/UL (ref 3.8–5.2)
SODIUM SERPL-SCNC: 130 MMOL/L (ref 133–144)
WBC # BLD AUTO: 10 10E3/UL (ref 4–11)

## 2022-05-22 PROCEDURE — 80048 BASIC METABOLIC PNL TOTAL CA: CPT | Performed by: INTERNAL MEDICINE

## 2022-05-22 PROCEDURE — 99232 SBSQ HOSP IP/OBS MODERATE 35: CPT | Performed by: STUDENT IN AN ORGANIZED HEALTH CARE EDUCATION/TRAINING PROGRAM

## 2022-05-22 PROCEDURE — 999N000157 HC STATISTIC RCP TIME EA 10 MIN

## 2022-05-22 PROCEDURE — 250N000011 HC RX IP 250 OP 636: Performed by: STUDENT IN AN ORGANIZED HEALTH CARE EDUCATION/TRAINING PROGRAM

## 2022-05-22 PROCEDURE — 250N000013 HC RX MED GY IP 250 OP 250 PS 637: Performed by: INTERNAL MEDICINE

## 2022-05-22 PROCEDURE — 94640 AIRWAY INHALATION TREATMENT: CPT | Mod: 76

## 2022-05-22 PROCEDURE — 36415 COLL VENOUS BLD VENIPUNCTURE: CPT | Performed by: INTERNAL MEDICINE

## 2022-05-22 PROCEDURE — 250N000013 HC RX MED GY IP 250 OP 250 PS 637: Performed by: HOSPITALIST

## 2022-05-22 PROCEDURE — 94640 AIRWAY INHALATION TREATMENT: CPT

## 2022-05-22 PROCEDURE — 250N000009 HC RX 250: Performed by: HOSPITALIST

## 2022-05-22 PROCEDURE — 120N000001 HC R&B MED SURG/OB

## 2022-05-22 PROCEDURE — 97110 THERAPEUTIC EXERCISES: CPT | Mod: GP

## 2022-05-22 PROCEDURE — 85025 COMPLETE CBC W/AUTO DIFF WBC: CPT | Performed by: INTERNAL MEDICINE

## 2022-05-22 PROCEDURE — 250N000012 HC RX MED GY IP 250 OP 636 PS 637: Performed by: INTERNAL MEDICINE

## 2022-05-22 RX ADMIN — PANTOPRAZOLE SODIUM 20 MG: 20 TABLET, DELAYED RELEASE ORAL at 09:22

## 2022-05-22 RX ADMIN — ATORVASTATIN CALCIUM 20 MG: 20 TABLET, FILM COATED ORAL at 20:14

## 2022-05-22 RX ADMIN — GUAIFENESIN 600 MG: 600 TABLET, EXTENDED RELEASE ORAL at 09:21

## 2022-05-22 RX ADMIN — MONTELUKAST 10 MG: 10 TABLET, FILM COATED ORAL at 22:14

## 2022-05-22 RX ADMIN — UMECLIDINIUM 1 PUFF: 62.5 AEROSOL, POWDER ORAL at 07:27

## 2022-05-22 RX ADMIN — APIXABAN 5 MG: 5 TABLET, FILM COATED ORAL at 09:22

## 2022-05-22 RX ADMIN — GUAIFENESIN 600 MG: 600 TABLET, EXTENDED RELEASE ORAL at 20:14

## 2022-05-22 RX ADMIN — PREDNISONE 20 MG: 20 TABLET ORAL at 09:22

## 2022-05-22 RX ADMIN — PANTOPRAZOLE SODIUM 20 MG: 20 TABLET, DELAYED RELEASE ORAL at 16:53

## 2022-05-22 RX ADMIN — CEFTRIAXONE 1 G: 1 INJECTION, POWDER, FOR SOLUTION INTRAMUSCULAR; INTRAVENOUS at 17:08

## 2022-05-22 RX ADMIN — LORATADINE 10 MG: 10 TABLET ORAL at 09:22

## 2022-05-22 RX ADMIN — LEVALBUTEROL HYDROCHLORIDE 1.25 MG: 1.25 SOLUTION RESPIRATORY (INHALATION) at 19:19

## 2022-05-22 RX ADMIN — LEVALBUTEROL HYDROCHLORIDE 1.25 MG: 1.25 SOLUTION RESPIRATORY (INHALATION) at 16:31

## 2022-05-22 RX ADMIN — LEVALBUTEROL HYDROCHLORIDE 1.25 MG: 1.25 SOLUTION RESPIRATORY (INHALATION) at 11:56

## 2022-05-22 RX ADMIN — AMLODIPINE BESYLATE 5 MG: 5 TABLET ORAL at 22:14

## 2022-05-22 RX ADMIN — FLUTICASONE FUROATE AND VILANTEROL TRIFENATATE 1 PUFF: 200; 25 POWDER RESPIRATORY (INHALATION) at 07:27

## 2022-05-22 RX ADMIN — LEVALBUTEROL HYDROCHLORIDE 1.25 MG: 1.25 SOLUTION RESPIRATORY (INHALATION) at 07:26

## 2022-05-22 RX ADMIN — LATANOPROST 1 DROP: 50 SOLUTION/ DROPS OPHTHALMIC at 22:19

## 2022-05-22 RX ADMIN — APIXABAN 5 MG: 5 TABLET, FILM COATED ORAL at 20:14

## 2022-05-22 ASSESSMENT — ACTIVITIES OF DAILY LIVING (ADL)
ADLS_ACUITY_SCORE: 32
ADLS_ACUITY_SCORE: 35
ADLS_ACUITY_SCORE: 32
ADLS_ACUITY_SCORE: 32
ADLS_ACUITY_SCORE: 35
ADLS_ACUITY_SCORE: 32
ADLS_ACUITY_SCORE: 32
ADLS_ACUITY_SCORE: 35
ADLS_ACUITY_SCORE: 32
ADLS_ACUITY_SCORE: 35
ADLS_ACUITY_SCORE: 31
ADLS_ACUITY_SCORE: 32

## 2022-05-22 NOTE — PLAN OF CARE
Pt Ox4, forgetful. VSS on RA. Denied pain. Tele a-fib CVR w/ occ PVCs, denied CP. PIV to right intact, SL. Up Ax1 GB W. Will continue POC.     Goal Outcome Evaluation:    Plan of Care Reviewed With: patient     Overall Patient Progress: no change

## 2022-05-22 NOTE — PROGRESS NOTES
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     Patient is currently being treated for hyponatremia thought secondary to SIADH with fluid overload.  She is also being treated for hypoxemic respiratory failure related to fluid overload and asthma, as well as possible CAP.     Acute Hypoxic Respiratory Failure  Asthma Exacerbation  Possible Community-acquired Pneumonia  Presented with nausea and weakness and found to be requiring supplemental oxygen. CXR with evidence of congestion, small left pleural effusion, and left lower opacities that could represent atelectasis vs pneumonia. Started on diuresis. Had RRT called early morning 5/19/2022 for acutely increased oxygen requirements and was started on therapy for asthma exacerbation. Oxygen needs have subsequently decreased a bit, but patient is still requiring 1L via NC. Given the possible LLL opacity treatment for CAP started 5/20/22.  -Supplemental oxygen PRN  -Xoponex nebs QID  -Ceftriaxone 1g daily x5 days  -Completed 3 days Azithromycin  -No additional diuresis needed at this time as patient appears euvolemic  -Her Lasix currently on hold  -Prednisone 20mg daily     New-onset Atrial Fibrillation  Suspected to be caused by respiratory failure and exacerbated by beta agonists. She did have afib in setting of sepsis in 2017. Started eliquis, but has not yet needed beta blockade. Continuing to monitor and consider starting metoprolol if rates should increase.     SIADH  Symptomatic Hyponatremia  Urine sodium and  osm consistent with SIADH, possible exacerbated by poor oral intake prior to arrival. Was on hydrochlorothiazide which was stopped on admission. Received fluid resuscitation and was subsequently overload. Na now 130 and slowly self correcting.     Hx of Candidal Esophagitis: GI deferring EGD at this point. Empiric Nystatin swish & swallow.  Hx of Hypertension, Hyperlipidemia, Non-obstructive CAD: Continue PTA Amlodipine + statin.     Diet: Orders Placed This Encounter      Regular Diet Adult  DVT Prophylaxis: DOAC  Brown Catheter: Not present  Code Status: No CPR- Do NOT Intubate    Expected discharge:  Anticipating Christina will still be needing inpatient care at least in the next 24 to 48 hours      The patient's care was discussed with the Patient.  I updated patient's daughter was present at bedside during my exam    Gio Marshall MD  Hospitalist Service  [facility]    ______________________________________________________________________    Interval History   Nursing notes reviewed; no acute events overnight. Patient feeling as if she is improving. Would like to discharge home soon if possible. Breathing has improved since admission. No new symptoms.    Physical Exam   Vital Signs: Temp: 98.6  F (37  C) Temp src: Oral BP: 130/65 Pulse: 83   Resp: 20 SpO2: 98 % O2 Device: None (Room air) Oxygen Delivery: 1 LPM  Weight: 142 lbs 0 oz    General: Very pleasant female resting comfortably in bedside chair.  Awake, alert, interactive.  HEENT: Normocephalic, atraumatic.  PERRL, EOMI.  Conjunctiva clear, sclerae anicteric.  Mucous membranes moist.  Cardiac: Regular rate and rhythm without murmur, gallop, or rub.  Trace peripheral edema.  Respiratory: Normal work of breathing on NC.  Wheezing throughout and decreased air movement in bases.  GI: Normal, active bowel sounds.  Abdomen soft, nontender, nondistended.  : Deferred.  Musculoskeletal: Moving all extremities appropriately.  Skin: No rashes or  abrasions on exposed skin.  Neurologic: Alert and oriented x4.  Cranial nerves II through XII grossly intact.  Psychologic: Appropriate mood and affect.        Data   Recent Labs   Lab 05/22/22  0645 05/21/22  0703 05/20/22  2229 05/20/22  1748 05/20/22  0741 05/19/22  0431 05/19/22  0242   WBC 10.0  --   --   --  11.5*  --  9.9   HGB 9.3*  --   --   --  9.8*  --  10.2*   MCV 89  --   --   --  86  --  88     --   --   --  438  --  423   * 127* 125* 122* 128*   < > 122*   POTASSIUM 3.6 3.6  --  4.1 3.8   < > 5.5*   CHLORIDE 94 90*  --  89* 89*   < > 92*   CO2 30 30  --  28 30   < > 26   BUN 20 23  --  24 22   < > 24   CR 0.73 0.76  --  0.82 0.80   < > 0.88   ANIONGAP 6 7  --  5 9   < > 4   ELROY 8.6 8.6  --  8.8 8.7   < > 9.2   * 120*  --  161* 110*   < > 152*    < > = values in this interval not displayed.     No results found for this or any previous visit (from the past 24 hour(s)).  Medications     - MEDICATION INSTRUCTIONS -       - MEDICATION INSTRUCTIONS -         amLODIPine  5 mg Oral At Bedtime     apixaban ANTICOAGULANT  5 mg Oral BID     atorvastatin  20 mg Oral QPM     azithromycin  500 mg Intravenous Q24H     cefTRIAXone  1 g Intravenous Q24H     fluticasone-vilanterol  1 puff Inhalation Daily     [Held by provider] furosemide  20 mg Intravenous Q12H     guaiFENesin  600 mg Oral BID     latanoprost  1 drop Both Eyes At Bedtime     levalbuterol  1.25 mg Nebulization 4x Daily     loratadine  10 mg Oral Daily     montelukast  10 mg Oral At Bedtime     nystatin  500,000 Units Oral 4x Daily     pantoprazole  20 mg Oral BID AC     predniSONE  20 mg Oral Daily     senna-docusate  2 tablet Oral BID     sodium chloride (PF)  3 mL Intracatheter Q8H     umeclidinium  1 puff Inhalation Daily

## 2022-05-22 NOTE — PLAN OF CARE
"Pt A/O x 4, Ax 1 with GB/W, denies pain, BARONE, LS coarse with expiratory wheezes, 02 1L. Tele A-fib CVR. VSS. Will continue to monitor.  Vitals: /55 (BP Location: Right arm)   Pulse 90   Temp 97.4  F (36.3  C) (Axillary)   Resp 22   Ht 1.549 m (5' 1\")   Wt 64.4 kg (142 lb)   LMP  (LMP Unknown)   SpO2 92%   BMI 26.83 kg/m    BMI= Body mass index is 26.83 kg/m .                      "

## 2022-05-23 VITALS
WEIGHT: 142 LBS | HEIGHT: 61 IN | TEMPERATURE: 98 F | RESPIRATION RATE: 20 BRPM | BODY MASS INDEX: 26.81 KG/M2 | DIASTOLIC BLOOD PRESSURE: 47 MMHG | HEART RATE: 100 BPM | SYSTOLIC BLOOD PRESSURE: 120 MMHG | OXYGEN SATURATION: 93 %

## 2022-05-23 DIAGNOSIS — M25.551 HIP PAIN, RIGHT: ICD-10-CM

## 2022-05-23 PROCEDURE — 250N000012 HC RX MED GY IP 250 OP 636 PS 637: Performed by: INTERNAL MEDICINE

## 2022-05-23 PROCEDURE — 99239 HOSP IP/OBS DSCHRG MGMT >30: CPT | Performed by: STUDENT IN AN ORGANIZED HEALTH CARE EDUCATION/TRAINING PROGRAM

## 2022-05-23 PROCEDURE — 250N000013 HC RX MED GY IP 250 OP 250 PS 637: Performed by: HOSPITALIST

## 2022-05-23 PROCEDURE — 250N000013 HC RX MED GY IP 250 OP 250 PS 637: Performed by: STUDENT IN AN ORGANIZED HEALTH CARE EDUCATION/TRAINING PROGRAM

## 2022-05-23 PROCEDURE — 999N000157 HC STATISTIC RCP TIME EA 10 MIN

## 2022-05-23 PROCEDURE — 250N000013 HC RX MED GY IP 250 OP 250 PS 637: Performed by: INTERNAL MEDICINE

## 2022-05-23 PROCEDURE — 250N000009 HC RX 250: Performed by: HOSPITALIST

## 2022-05-23 PROCEDURE — 94640 AIRWAY INHALATION TREATMENT: CPT

## 2022-05-23 RX ORDER — AZITHROMYCIN 500 MG/1
500 TABLET, FILM COATED ORAL ONCE
Qty: 1 TABLET | Refills: 0 | Status: SHIPPED | OUTPATIENT
Start: 2022-05-23 | End: 2022-05-24

## 2022-05-23 RX ORDER — OXYCODONE HYDROCHLORIDE 5 MG/1
2.5 TABLET ORAL EVERY 4 HOURS PRN
Qty: 10 TABLET | Refills: 0 | Status: ON HOLD | OUTPATIENT
Start: 2022-05-23 | End: 2022-06-04

## 2022-05-23 RX ORDER — LEVALBUTEROL INHALATION SOLUTION 0.63 MG/3ML
0.63 SOLUTION RESPIRATORY (INHALATION) EVERY 4 HOURS PRN
Qty: 90 ML | Refills: 0 | Status: SHIPPED | OUTPATIENT
Start: 2022-05-23 | End: 2022-05-24

## 2022-05-23 RX ORDER — CEFUROXIME AXETIL 250 MG/1
500 TABLET ORAL EVERY 12 HOURS
Qty: 8 TABLET | Refills: 0 | Status: SHIPPED | OUTPATIENT
Start: 2022-05-23 | End: 2022-05-25

## 2022-05-23 RX ORDER — CEFUROXIME AXETIL 250 MG/1
250 TABLET ORAL EVERY 12 HOURS SCHEDULED
Status: DISCONTINUED | OUTPATIENT
Start: 2022-05-23 | End: 2022-05-23 | Stop reason: HOSPADM

## 2022-05-23 RX ORDER — AZITHROMYCIN 250 MG/1
500 TABLET, FILM COATED ORAL ONCE
Status: COMPLETED | OUTPATIENT
Start: 2022-05-23 | End: 2022-05-23

## 2022-05-23 RX ADMIN — PANTOPRAZOLE SODIUM 20 MG: 20 TABLET, DELAYED RELEASE ORAL at 06:30

## 2022-05-23 RX ADMIN — AZITHROMYCIN MONOHYDRATE 500 MG: 250 TABLET ORAL at 12:52

## 2022-05-23 RX ADMIN — LEVALBUTEROL HYDROCHLORIDE 1.25 MG: 1.25 SOLUTION RESPIRATORY (INHALATION) at 11:13

## 2022-05-23 RX ADMIN — PREDNISONE 20 MG: 20 TABLET ORAL at 08:14

## 2022-05-23 RX ADMIN — GUAIFENESIN 600 MG: 600 TABLET, EXTENDED RELEASE ORAL at 08:14

## 2022-05-23 RX ADMIN — LORATADINE 10 MG: 10 TABLET ORAL at 08:14

## 2022-05-23 RX ADMIN — ALBUTEROL SULFATE 2 PUFF: 90 AEROSOL, METERED RESPIRATORY (INHALATION) at 03:10

## 2022-05-23 RX ADMIN — UMECLIDINIUM 1 PUFF: 62.5 AEROSOL, POWDER ORAL at 11:20

## 2022-05-23 RX ADMIN — APIXABAN 5 MG: 5 TABLET, FILM COATED ORAL at 08:14

## 2022-05-23 RX ADMIN — FLUTICASONE FUROATE AND VILANTEROL TRIFENATATE 1 PUFF: 200; 25 POWDER RESPIRATORY (INHALATION) at 11:20

## 2022-05-23 ASSESSMENT — ACTIVITIES OF DAILY LIVING (ADL)
ADLS_ACUITY_SCORE: 31
ADLS_ACUITY_SCORE: 32
ADLS_ACUITY_SCORE: 32
ADLS_ACUITY_SCORE: 31
ADLS_ACUITY_SCORE: 32

## 2022-05-23 NOTE — DISCHARGE SUMMARY
Packet of info given to daughter who verbalized understanding of need to give to TCU staff. Pt and dgtr deny further questions. PIV removed, no complications. Telemetry monitor removed. All belongings returned. Pt escorted to front door via WC by [place] staff, transport to TCU via dgtr.

## 2022-05-23 NOTE — PLAN OF CARE
"Goal Outcome Evaluation:    Vitals: /67 (BP Location: Right arm, Patient Position: Left side, Cuff Size: Adult Regular)   Pulse 102   Temp 97.4  F (36.3  C) (Axillary)   Resp 20   Ht 1.549 m (5' 1\")   Wt 64.4 kg (142 lb)   LMP  (LMP Unknown)   SpO2 95%   BMI 26.83 kg/m      Orientation: A&OX4. Speech clear. Pleasant & cooperative.   Pertinent: 750 mL fluid restriction. Had 60 mL [id number]. Refused to have cullen stockings removed.   Pain: Denies  Respiratory: LS coarse w/ exp wheezes. Had PRN Albuterol inhaler X1 tonight. Helped a lot with cough.   Cardiac/Tele: A-Fib CVR  Bowel Sounds: +X4Q. ABD soft, non-tender.   GI/: Continent urine. Minor dribbling. No bm this shift. Wears pull up for security.   Skin: Scattered scabs and bruising.   LDA: R PIV SL.   Diet: Regular diet w/ 750 ml fluid restriction.   Activity: AX1 GB and walker.  Plan: Possible discharge to [facility]CU today or tomorrow for strengthening.                       "

## 2022-05-23 NOTE — PLAN OF CARE
"Goal Outcome Evaluation:    Plan of Care Reviewed With: patient     Overall Patient Progress: improving     Blood pressure 114/76, pulse 100, temperature 97.5  F (36.4  C), temperature source Axillary, resp. rate 20, height 1.549 m (5' 1\"), weight 64.4 kg (142 lb), SpO2 96 %, not currently breastfeeding.    Patient vital signs stable other than tachycardia at times.  A&Ox4 on RA.  Denies Pain, Up with Ax1 with GB/W.  Patient states nystatin no longer needed.  IV abx Rocephin provided. Tele Afib CVR with occasional PVCs.  Possible discharge tomorrow.  PIV on Right arm SL.  LS Coarse.  Continue with POC.     "

## 2022-05-23 NOTE — PROGRESS NOTES
Care Management Discharge Note    Discharge Date: 05/23/2022       Discharge Disposition: Transitional Care    Discharge Services: None    Discharge DME:  none    Discharge Transportation: family  Private pay costs discussed: Not applicable    PAS Confirmation Code:  Na- bed hold  Patient/family educated on Medicare website which has current facility and service quality ratings:  na    Education Provided on the Discharge Plan:  yes  Persons Notified of Discharge Plans: TCU admissions, nurse, pt  Patient/Family in Agreement with the Plan: yes    Handoff Referral Completed: No    Additional Information:  Pt is discharging back to [facility] today.  Orders were faxed and facility liaison notified.  Some medications were filled at the hospital. Instructed nurse to return medications to pharmacy.  Verified with TCU that pt is in a private room and nebulizer is not an issue.  Family will transport pt this afternoon between 2-4.  Facility is okay with timeline to return.      Renee Garcia RN, BSN, PHN, [facility]  Care Coordinator  [facility]  [phone]

## 2022-05-23 NOTE — PLAN OF CARE
Physical Therapy Discharge Summary    Reason for therapy discharge:    Discharged to transitional care facility.    Progress towards therapy goal(s). See goals on Care Plan in [facility] electronic health record for goal details.  Goals not met.  Barriers to achieving goals:   discharge from facility.    Therapy recommendation(s):    Continued therapy is recommended.  Rationale/Recommendations:  PT as indicated at TCU.      Note: Pt not seen by documenting PT on this date. Information obtained from chart review.

## 2022-05-23 NOTE — PLAN OF CARE
Pt Ox4. VSS on RA. Denied pain. Tele a-fib CVR w/ occ PVCs, denied CP. PIV removed for discharge. Up Ax1 GB W. Adequate for discharge today, back to TCU. Will continue POC.     Goal Outcome Evaluation: Met

## 2022-05-24 ENCOUNTER — TRANSITIONAL CARE UNIT VISIT (OUTPATIENT)
Dept: GERIATRICS | Facility: CLINIC | Age: 87
End: 2022-05-24
Payer: COMMERCIAL

## 2022-05-24 ENCOUNTER — PATIENT OUTREACH (OUTPATIENT)
Dept: CARE COORDINATION | Facility: CLINIC | Age: 87
End: 2022-05-24
Payer: COMMERCIAL

## 2022-05-24 ENCOUNTER — LAB REQUISITION (OUTPATIENT)
Dept: LAB | Facility: CLINIC | Age: 87
End: 2022-05-24
Payer: COMMERCIAL

## 2022-05-24 VITALS
WEIGHT: 142 LBS | DIASTOLIC BLOOD PRESSURE: 68 MMHG | BODY MASS INDEX: 26.81 KG/M2 | HEART RATE: 93 BPM | OXYGEN SATURATION: 93 % | RESPIRATION RATE: 18 BRPM | SYSTOLIC BLOOD PRESSURE: 138 MMHG | HEIGHT: 61 IN | TEMPERATURE: 97.5 F

## 2022-05-24 DIAGNOSIS — E78.5 DYSLIPIDEMIA: ICD-10-CM

## 2022-05-24 DIAGNOSIS — D64.9 ANEMIA, UNSPECIFIED: ICD-10-CM

## 2022-05-24 DIAGNOSIS — M79.604 PAIN OF RIGHT LOWER EXTREMITY: ICD-10-CM

## 2022-05-24 DIAGNOSIS — J96.01 ACUTE RESPIRATORY FAILURE WITH HYPOXIA (H): Primary | ICD-10-CM

## 2022-05-24 DIAGNOSIS — I10 ESSENTIAL HYPERTENSION: ICD-10-CM

## 2022-05-24 DIAGNOSIS — M54.50 ACUTE BILATERAL LOW BACK PAIN, UNSPECIFIED WHETHER SCIATICA PRESENT: ICD-10-CM

## 2022-05-24 DIAGNOSIS — N18.31 STAGE 3A CHRONIC KIDNEY DISEASE (H): ICD-10-CM

## 2022-05-24 DIAGNOSIS — I25.10 CORONARY ARTERY DISEASE INVOLVING NATIVE CORONARY ARTERY OF NATIVE HEART WITHOUT ANGINA PECTORIS: ICD-10-CM

## 2022-05-24 DIAGNOSIS — E87.1 HYPONATREMIA: ICD-10-CM

## 2022-05-24 DIAGNOSIS — R09.89 PULMONARY CONGESTION: ICD-10-CM

## 2022-05-24 DIAGNOSIS — Z71.89 OTHER SPECIFIED COUNSELING: ICD-10-CM

## 2022-05-24 DIAGNOSIS — K21.9 GASTROESOPHAGEAL REFLUX DISEASE, UNSPECIFIED WHETHER ESOPHAGITIS PRESENT: ICD-10-CM

## 2022-05-24 DIAGNOSIS — I71.20 THORACIC AORTIC ANEURYSM WITHOUT RUPTURE (H): ICD-10-CM

## 2022-05-24 DIAGNOSIS — J45.901 EXACERBATION OF PERSISTENT ASTHMA, UNSPECIFIED ASTHMA SEVERITY: ICD-10-CM

## 2022-05-24 DIAGNOSIS — I48.91 ATRIAL FIBRILLATION WITH RVR (H): ICD-10-CM

## 2022-05-24 DIAGNOSIS — D64.9 CHRONIC ANEMIA: ICD-10-CM

## 2022-05-24 DIAGNOSIS — B37.81 CANDIDA ESOPHAGITIS (H): ICD-10-CM

## 2022-05-24 DIAGNOSIS — E22.2 SIADH (SYNDROME OF INAPPROPRIATE ADH PRODUCTION) (H): ICD-10-CM

## 2022-05-24 DIAGNOSIS — J18.9 COMMUNITY ACQUIRED PNEUMONIA, UNSPECIFIED LATERALITY: ICD-10-CM

## 2022-05-24 DIAGNOSIS — Z86.79 HISTORY OF CARDIOMYOPATHY: ICD-10-CM

## 2022-05-24 DIAGNOSIS — R19.5 LOOSE STOOLS: ICD-10-CM

## 2022-05-24 DIAGNOSIS — R53.81 PHYSICAL DECONDITIONING: ICD-10-CM

## 2022-05-24 PROCEDURE — 99310 SBSQ NF CARE HIGH MDM 45: CPT | Performed by: NURSE PRACTITIONER

## 2022-05-24 RX ORDER — LEVALBUTEROL INHALATION SOLUTION 0.63 MG/3ML
0.63 SOLUTION RESPIRATORY (INHALATION) EVERY 4 HOURS PRN
Qty: 90 ML | Refills: 0
Start: 2022-05-24 | End: 2022-11-14

## 2022-05-24 RX ORDER — GUAIFENESIN 600 MG/1
1200 TABLET, EXTENDED RELEASE ORAL 2 TIMES DAILY
Start: 2022-05-24 | End: 2022-05-27

## 2022-05-24 NOTE — PROGRESS NOTES
[facility] GERIATRICS    PRIMARY CARE PROVIDER AND CLINIC:  Layo Sevilla MD, [address] [facility]  / [place] [address]  Chief Complaint   Patient presents with     Hospital F/U      [facility] Record Number:  [id number]  Place of Service where encounter took place:  [facility]  (U) [[id number]]    Genie Persaud  is a 86 year old  (1935), admitted to the above facility from  [facility]. Hospital stay 5/15/22 through 5/23/22..   HPI:    PMH significant for HTN, atrial fibrillation, history of cardiomyopathy, nonobstructive CAD, dyslipidemia, CKD stage 3, chronic anemia, asthma, GERD, history of esophageal candida.    She was hospitalized at [facility] 5/11-5/12/22 for right back and lower right extremity pain. US evaluation 4/25 that was negative and showed right Baker's cyst as likely cause of pain. MRI of lumbar spine showed no acute fractures and diffuse degenerative changes including moderate to severe neural foraminal stenosis on the right at L4-L5 and on the left at L5-S1. UA was negative. She was admitted to hospital for TCU placement, as felt she is not able to tolerate pain. Was started on pain regimen. Additionally labs in hospital revealed low sodium of 128 and low potassium of 3.3. Discharged to [facility] for physical rehabilitation and medical management.     Summary of recent hospitalization:  Hospitalized at [facility] from 5/15-5/23/22 for acute hypoxic respiratory failure, asthma exacerbation, possible CAP, possible candida esophagitis, new atrial fib, SIADH. She presented from [facility] to the ED for evaluation of nausea and weakness. Laboratory evaluation showed sodium 122, alk phos 162, WBC 12.3. CT abdomen pelvis revealed no acute findings. She received IV hydration. Work up consistent with SIADH as cause of hyponatremia. She was discharged on 1L fluid restriction daily. GI consulted due to ongoing nausea that improved, so EGD deferred.  Treated empirically with nystatin due to history of candidal esophagitis. She developed hypoxia thought to be due to fluid overload with pulmonary congestion and asthma exacerbation. Chest xray showed small left sided pleural effusion with congestion. Then had rapid response episode and required BiPAP. Was found to be in atrial fib with RVR at this time. She was started on anticoagulation with apixaban. Troponin was negative.  ECHO showed EF 50-55%, left atrium is mildly dilated, mild (1+) mitral regurgitation, mild (1+) aortic regurgitation, ascending aorta is Mildly dilated, moderate left pleural effusion. Received IV diuresis. Chest xray repeated that showed few patchy opacities in left lower base that could be possible pneumonia vs atelectasis vs scarring. She was treated for possible pneumonia with ceftriaxone and azithromycin-discharged with orders to complete 1 more day of azithromycin and 2 days of cefuroxime to complete course. Also received xopenex nebs and steroids for asthma exacerbation.    Of note there was no discharge summary completed    Discharged to TCU for physical rehabilitation and medical management.     Genie seen today for routine follow up in TCU. She tells me she has not needed oxygen for a few days. Oxygen saturations this AM 91-2%. She reports breathing is back to normal. Denies SOB. Still has productive cough at times producing thick mucus. Cough is worse overnight. Denies CP, dizziness. Reports multiple loose stools- 4 yesterday- no abdominal pain, nausea. Denies dysuria, trouble voiding. Denies any nausea. Discussed what to expect while in TCU.    CODE STATUS/ADVANCE DIRECTIVES DISCUSSION:  No CPR- Do NOT Intubate  DNR / DNI  ALLERGIES:   Allergies   Allergen Reactions     Fosamax [Alendronic Acid] GI Disturbance     Contrast Dye      Red arm redness and swelling      Evista [Raloxifene]      abd symptoms.      Flu Virus Vaccine      swollen and red at inj site     Amoxicillin Rash       PAST MEDICAL HISTORY:   Past Medical History:   Diagnosis Date     Anemia 03/10/2009    Chronic disease, by Fe studies; awaiting full GI w/u and repeat colonoscopy, ERCP.     Basal Cell Carcinoma--back      Coronary artery disease 03/09/2017    3/2/2017 - Two vessel coronary artery disease with mLAD 50% stenosis, D3 50% stenosis, pLCx 50% stenosis and mLCx 50% stenosis     Essential hypertension 09/01/2016    White coat hypertension;  24 hour ambulatory monitoring normal. Do not titrate up further.       Hyperlipidemia 02/10/2010     Moderate persistent asthma      Nonrheumatic aortic valve insufficiency 06/29/2017     Osteopenia      Paroxysmal atrial fibrillation 12/26/2017     Pulmonary nodule 03/03/2009    PET scan reportedly normal; biopsy not advised.     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  Problem list name updated by automated process. Provider to review     Vasculitis 04/20/2009    Possible increased activity of thoracic aorta, on PET scan w/u for pulmonary nodule.       PAST SURGICAL HISTORY:   has a past surgical history that includes STEREOTACTIC BREAST BIOPSY (01/01/2001); cholecystectomy, laporoscopic (01/01/2008); Esophagoscopy, gastroscopy, duodenoscopy (EGD), combined (05/17/2013); Tonsillectomy; Appendectomy open (1957); Dilation and curettage (1967); Dilation and curettage (1971); and Tubal Ligation NOS (1971).  FAMILY HISTORY: family history includes Breast Cancer in her child; C.A.D. in her mother; Cerebrovascular Disease in her paternal grandmother; Connective Tissue Disorder in her father; Hypertension in her mother; Osteoporosis in her mother; Prostate Cancer in an other family member.  SOCIAL HISTORY:   reports that she has never smoked. She has never used smokeless tobacco. She reports current  alcohol use of about 1.0 - 2.0 standard drink of alcohol per week. She reports that she does not use drugs.  Patient's living condition: lives alone    Post Discharge Medication Reconciliation Status: discharge medications reconciled and changed, per note/orders  Current Outpatient Medications   Medication Sig     acetaminophen (TYLENOL) 325 MG tablet Take 975 mg by mouth every 8 hours as needed for mild pain     albuterol (PROAIR HFA/PROVENTIL HFA/VENTOLIN HFA) 108 (90 Base) MCG/ACT inhaler Inhale 1-2 puffs into the lungs every 6 hours as needed for shortness of breath / dyspnea or wheezing     amLODIPine (NORVASC) 5 MG tablet Take 1 tablet (5 mg) by mouth At Bedtime     apixaban ANTICOAGULANT (ELIQUIS) 5 MG tablet Take 1 tablet (5 mg) by mouth 2 times daily     atorvastatin (LIPITOR) 20 MG tablet TAKE ONE TABLET BY MOUTH AT BEDTIME     calcium citrate-vitamin D (CITRACAL) 315-200 MG-UNIT TABS per tablet Take 1 tablet by mouth 2 times daily     fluticasone-salmeterol (ADVAIR-HFA) 230-21 MCG/ACT inhaler Inhale 2 puffs into the lungs 2 times daily     guaiFENesin (MUCINEX) 600 MG 12 hr tablet Take 1 tablet (600 mg) by mouth 2 times daily     latanoprost (XALATAN) 0.005 % ophthalmic solution Place 1 drop into both eyes At Bedtime     levalbuterol (XOPENEX) 0.63 MG/3ML neb solution Take 3 mLs (0.63 mg) by nebulization every 4 hours as needed for wheezing And scheduled at bedtime.     loratadine (CLARITIN) 10 MG tablet Take 1 tablet (10 mg) by mouth daily     losartan-hydrochlorothiazide (HYZAAR) 50-12.5 MG tablet Take 1 tablet by mouth daily     montelukast (SINGULAIR) 10 MG tablet Take 1 tablet (10 mg) by mouth At Bedtime     omeprazole (PRILOSEC) 10 MG DR capsule TAKE ONE CAPSULE BY MOUTH ONE TIME DAILY     ondansetron (ZOFRAN) 4 MG tablet Take 1 tablet (4 mg) by mouth every 8 hours as needed for nausea     oxyCODONE (ROXICODONE) 5 MG tablet Take 0.5 tablets (2.5 mg) by mouth every 4 hours as needed for moderate to  "severe pain     polyethylene glycol (MIRALAX) 17 GM/Dose powder Take 17 g by mouth daily as needed for constipation     tiotropium (SPIRIVA RESPIMAT) 2.5 MCG/ACT inhaler Inhale 2 puffs into the lungs daily     predniSONE (DELTASONE) 10 MG tablet Take 10 mg by mouth As needed for asthma flares follows with PCP or pulmonology (per patient she takes 1 tablet for 3 days)     No current facility-administered medications for this visit.       ROS:  10 point ROS of systems including Constitutional, Eyes, Respiratory, Cardiovascular, Gastroenterology, Genitourinary, Integumentary, Musculoskeletal, Psychiatric were all negative except for pertinent positives noted in my HPI.    Vitals:  /68   Pulse 93   Temp 97.5  F (36.4  C)   Resp 18   Ht 1.549 m (5' 1\")   Wt 64.4 kg (142 lb)   LMP  (LMP Unknown)   SpO2 93%   BMI 26.83 kg/m    Exam:  GENERAL APPEARANCE:  Alert, in NAD  HEENT: normocephalic, moist mucous membranes, nose without drainage or crusting  RESP:  respiratory effort normal, no respiratory distress, Lung sounds diminished bilaterally, patient is on RA  CV: auscultation of heart done, rate and rhythm regular.   ABDOMEN: + bowel sounds, soft, nontender, no grimacing or guarding with palpation.  M/S:  generalized lower extremity edema  SKIN:  Inspection and palpation of skin and subcutaneous tissue: skin warm, dry without rashes  NEURO: cranial nerves 2-12 grossly intact and at patient's baseline; no facial asymmetry, no speech deficits and able to follow directions  PSYCH: oriented x 3, affect and mood ok    Lab/Diagnostic data:  Labs done in SNF are in [facility]. Please refer to them using [facility] Everywhere. and Recent labs in [facility] reviewed by me today.     ASSESSMENT/PLAN:  (J96.01) Acute respiratory failure with hypoxia (H)  (primary encounter diagnosis)  Comment: secondary to pulmonary congestion, asthma exacerbation and possible CAP  -required oxygen and at one point BiPAP  -oxygen weaned off " prior to discharge  -denies SOB today, oxygen saturations 91-92% on RA  Plan: Monitor oxygen saturations and respiratory status. If oxygen saturations drop <90% will restart oxygen at TCU.    (R09.89) Pulmonary congestion  (J45.901) Exacerbation of persistent asthma, unspecified asthma severity  (J18.9) Community acquired pneumonia, unspecified laterality  Comment: Received IV diuretics for fluid overload inpatient, as well as course of prednisone for asthma exacerbation. Started on azithromycin and cefuroxime for possible pneumonia  -WBC 10.0 on 5/22  -she is afebrile and hemodynamically stable  -appears euvolemic today. Continues to have cough worse at night, thick mucus. Denies SOB.  Plan: CBC tomorrow. Continue azithromycin and cefuroxime as ordered to complete antibiotic course. Start mucinex  mg BID for thick secretions. Schedule levalbuterol neb at 10 PM and q4h PRN for SOB, thick secretions, cough. Continue tiotropium daily, advair BID, montelukast daily, albuterol PRN and prednisone 10 mg daily x 3 days PRN if exacerbation. IS 10 reps QID. Monitor symptoms and fluid status. Follow up with pulmonary per previous recommendations.     (I48.91) Atrial fibrillation with RVR (H)  Comment: suspected to be secondary to respiratory failure and beta agonists  -she also had episode of afib in 2017 in setting of sepsis  -was started on apixaban 5 mg BID in hospital and then discharged on 2.5 mg BID dosing  -HR controlled 92, 78, 81  Plan: discussed with geriatric pharmacist today regarding why she was discharged on 2.5 mg BID. Increase apixaban 5 mg BID per our discussion as she does not meet criteria for reduced dose of 2.5 mg BID. Monitor HR. Consider adding beta blocker if rate increases.    (E22.2) SIADH (syndrome of inappropriate ADH production) (H)  (E87.1) Hyponatremia  Comment: sodium on admission on 5/20 was 122   -received IV hydration initially and sodium did improve  -sodium 130 on 5/22  Plan: BMP  5/25. Continue 1L fluid restriction.     (R19.5) Loose stools  Comment: reports 4 loose stools yesterday  -she is afebrile and hemodynamically stable  -is currently on antibiotics  Plan: Collect stool sample to rule out c.diff. Monitor stools    (B37.81) Candida esophagitis (H)-resolved  Comment: treated empirically possible candida esophagitis due to symptoms of nausea  -GI deferred EGD during hospitalization as nausea improved  -completed course of nystatin inpatient  -denies any nausea or further symptoms today  Plan: Monitor symptoms    (M54.50) Acute bilateral low back pain, unspecified whether sciatica present  (M79.604) Pain of right lower extremity  Comment: denies pain today  Plan:Continue tylenol 975 mg q8h PRN, oxycodone PRN. Monitor pain. Therapy as below    (I10) Essential hypertension  Comment: BP controlled 137/69, 145/61, 132/68; HR 92, 78, 81  Plan: Continue amlodipine 5 mg at bedtime, losartan-hydrochlorothiazide 50-12.5 mg daily. BMP 5/25. VS per policy. Adjust medications as needed.    (Z86.79) History of cardiomyopathy  (I25.10) Coronary artery disease involving native coronary artery of native heart without angina pectoris  (E78.5) Dyslipidemia  Comment: chronic, with nonobstructive CAD, history of cardiomyopathy in 2017 when she was septic  - ECHO showed EF 50-55%, left atrium is mildly dilated, mild (1+) mitral regurgitation, mild (1+) aortic regurgitation, ascending aorta is Mildly dilated, moderate left pleural effusion  -coronary angiogram 2017 with mild to moderate CAD that is nonobstructive  -denies CP today  Plan: Continue atorvastatin 20 mg at bedtime. Follow up with cardiology per recommendations    (N18.31) Stage 3a chronic kidney disease (H)  Comment: chronic  -baseline creatinine 0.9-1.1  -creatinine 0.73 on 5/22  Estimated Creatinine Clearance: 49.3 mL/min (based on SCr of 0.73 mg/dL).    Plan: BMP 5/25. Avoid nephrotoxins. Renally dose medications as indicated.    (D64.9) Chronic  anemia  Comment: chronic  -hgb at baseline 11-12  -hgb 9.3 on 5/22/22  -is now on anticoagulation  Plan: CBC 5/25.     (K21.9) Gastroesophageal reflux disease, unspecified whether esophagitis present  Comment: chronic  Plan: Continue omeprazole 10 mg daily.    (R53.81) Physical deconditioning  Comment: Acute, secondary to recent hospitalization, medical conditions as above  Plan: Encourage participation in physical therapy/occupational therapy for strengthening and deconditioning. Discharge planning per their recommendation. Social work to assist with d/c planning.    (I71.2) Thoracic aortic aneurysm without rupture (H)  Comment: ascending aorta is Mildly dilated per ECHO 5/19/22 the Aorta Diam: 4.0 cm, previous ECHO 8/26/21 showed Aorta Diam: 3.9 cm  Plan: Follow up outpatient      Total time spent with patient visit at the skilled nursing facility was 38 minutes including patient visit and review of past records. Greater than 50% of total time spent with counseling and coordinating care with facility staff and discussion with pharmacist regarding admission and medication orders, plan of care as described above. Counseling patient about current medications, plan of care and what to expect at TCU      Electronically signed by:  TOREY Edmonds CNP

## 2022-05-24 NOTE — LETTER
5/24/2022        RE: Genie Persaud  [address] [place] [address]        [facility] GERIATRICS    PRIMARY CARE PROVIDER AND CLINIC:  Layo Sevilla MD, [facility]  / [place] [address]  Chief Complaint   Patient presents with     Hospital F/U      [facility]ells Medical Record Number:  [id number]  Place of Service where encounter took place:  [facility]  ([facility]) [[id number]]    Genie Persaud  is a 86 year old  (1935), admitted to the above facility from  [facility]. Hospital stay 5/15/22 through 5/23/22..   HPI:    PMH significant for HTN, atrial fibrillation, history of cardiomyopathy, nonobstructive CAD, dyslipidemia, CKD stage 3, chronic anemia, asthma, GERD, history of esophageal candida.    She was hospitalized at [facility] 5/11-5/12/22 for right back and lower right extremity pain. US evaluation 4/25 that was negative and showed right Baker's cyst as likely cause of pain. MRI of lumbar spine showed no acute fractures and diffuse degenerative changes including moderate to severe neural foraminal stenosis on the right at L4-L5 and on the left at L5-S1. UA was negative. She was admitted to hospital for TCU placement, as felt she is not able to tolerate pain. Was started on pain regimen. Additionally labs in hospital revealed low sodium of 128 and low potassium of 3.3. Discharged to U for physical rehabilitation and medical management.     Summary of recent hospitalization:  Hospitalized at [facility] from 5/15-5/23/22 for acute hypoxic respiratory failure, asthma exacerbation, possible CAP, possible candida esophagitis, new atrial fib, SIADH. She presented from TCU to the ED for evaluation of nausea and weakness. Laboratory evaluation showed sodium 122, alk phos 162, WBC 12.3. CT abdomen pelvis revealed no acute findings. She received IV hydration. Work up consistent with SIADH as cause of hyponatremia. She was discharged on 1L fluid  restriction daily. GI consulted due to ongoing nausea that improved, so EGD deferred. Treated empirically with nystatin due to history of candidal esophagitis. She developed hypoxia thought to be due to fluid overload with pulmonary congestion and asthma exacerbation. Chest xray showed small left sided pleural effusion with congestion. Then had rapid response episode and required BiPAP. Was found to be in atrial fib with RVR at this time. She was started on anticoagulation with apixaban. Troponin was negative.  ECHO showed EF 50-55%, left atrium is mildly dilated, mild (1+) mitral regurgitation, mild (1+) aortic regurgitation, ascending aorta is Mildly dilated, moderate left pleural effusion. Received IV diuresis. Chest xray repeated that showed few patchy opacities in left lower base that could be possible pneumonia vs atelectasis vs scarring. She was treated for possible pneumonia with ceftriaxone and azithromycin-discharged with orders to complete 1 more day of azithromycin and 2 days of cefuroxime to complete course. Also received xopenex nebs and steroids for asthma exacerbation.    Of note there was no discharge summary completed    Discharged to TCU for physical rehabilitation and medical management.     Genie seen today for routine follow up in TCU. She tells me she has not needed oxygen for a few days. Oxygen saturations this AM 91-2%. She reports breathing is back to normal. Denies SOB. Still has productive cough at times producing thick mucus. Cough is worse overnight. Denies CP, dizziness. Reports multiple loose stools- 4 yesterday- no abdominal pain, nausea. Denies dysuria, trouble voiding. Denies any nausea. Discussed what to expect while in TCU.    CODE STATUS/ADVANCE DIRECTIVES DISCUSSION:  No CPR- Do NOT Intubate  DNR / DNI  ALLERGIES:   Allergies   Allergen Reactions     Fosamax [Alendronic Acid] GI Disturbance     Contrast Dye      Red arm redness and swelling      Evista [Raloxifene]      abd  symptoms.      Flu Virus Vaccine      swollen and red at inj site     Amoxicillin Rash      PAST MEDICAL HISTORY:   Past Medical History:   Diagnosis Date     Anemia 03/10/2009    Chronic disease, by Fe studies; awaiting full GI w/u and repeat colonoscopy, ERCP.     Basal Cell Carcinoma--back      Coronary artery disease 03/09/2017    3/2/2017 - Two vessel coronary artery disease with mLAD 50% stenosis, D3 50% stenosis, pLCx 50% stenosis and mLCx 50% stenosis     Essential hypertension 09/01/2016    White coat hypertension;  24 hour ambulatory monitoring normal. Do not titrate up further.       Hyperlipidemia 02/10/2010     Moderate persistent asthma      Nonrheumatic aortic valve insufficiency 06/29/2017     Osteopenia      Paroxysmal atrial fibrillation 12/26/2017     Pulmonary nodule 03/03/2009    PET scan reportedly normal; biopsy not advised.     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  Problem list name updated by automated process. Provider to review     Vasculitis 04/20/2009    Possible increased activity of thoracic aorta, on PET scan w/u for pulmonary nodule.       PAST SURGICAL HISTORY:   has a past surgical history that includes STEREOTACTIC BREAST BIOPSY (01/01/2001); cholecystectomy, laporoscopic (01/01/2008); Esophagoscopy, gastroscopy, duodenoscopy (EGD), combined (05/17/2013); Tonsillectomy; Appendectomy open (1957); Dilation and curettage (1967); Dilation and curettage (1971); and Tubal Ligation NOS (1971).  FAMILY HISTORY: family history includes Breast Cancer in her child; C.A.D. in her mother; Cerebrovascular Disease in her paternal grandmother; Connective Tissue Disorder in her father; Hypertension in her mother; Osteoporosis in her mother; Prostate Cancer in an other family member.  SOCIAL HISTORY:    reports that she has never smoked. She has never used smokeless tobacco. She reports current alcohol use of about 1.0 - 2.0 standard drink of alcohol per week. She reports that she does not use drugs.  Patient's living condition: lives alone    Post Discharge Medication Reconciliation Status: discharge medications reconciled and changed, per note/orders  Current Outpatient Medications   Medication Sig     acetaminophen (TYLENOL) 325 MG tablet Take 975 mg by mouth every 8 hours as needed for mild pain     albuterol (PROAIR HFA/PROVENTIL HFA/VENTOLIN HFA) 108 (90 Base) MCG/ACT inhaler Inhale 1-2 puffs into the lungs every 6 hours as needed for shortness of breath / dyspnea or wheezing     amLODIPine (NORVASC) 5 MG tablet Take 1 tablet (5 mg) by mouth At Bedtime     apixaban ANTICOAGULANT (ELIQUIS) 5 MG tablet Take 1 tablet (5 mg) by mouth 2 times daily     atorvastatin (LIPITOR) 20 MG tablet TAKE ONE TABLET BY MOUTH AT BEDTIME     calcium citrate-vitamin D (CITRACAL) 315-200 MG-UNIT TABS per tablet Take 1 tablet by mouth 2 times daily     fluticasone-salmeterol (ADVAIR-HFA) 230-21 MCG/ACT inhaler Inhale 2 puffs into the lungs 2 times daily     guaiFENesin (MUCINEX) 600 MG 12 hr tablet Take 1 tablet (600 mg) by mouth 2 times daily     latanoprost (XALATAN) 0.005 % ophthalmic solution Place 1 drop into both eyes At Bedtime     levalbuterol (XOPENEX) 0.63 MG/3ML neb solution Take 3 mLs (0.63 mg) by nebulization every 4 hours as needed for wheezing And scheduled at bedtime.     loratadine (CLARITIN) 10 MG tablet Take 1 tablet (10 mg) by mouth daily     losartan-hydrochlorothiazide (HYZAAR) 50-12.5 MG tablet Take 1 tablet by mouth daily     montelukast (SINGULAIR) 10 MG tablet Take 1 tablet (10 mg) by mouth At Bedtime     omeprazole (PRILOSEC) 10 MG DR capsule TAKE ONE CAPSULE BY MOUTH ONE TIME DAILY     ondansetron (ZOFRAN) 4 MG tablet Take 1 tablet (4 mg) by mouth every 8 hours as needed for nausea     oxyCODONE  "(ROXICODONE) 5 MG tablet Take 0.5 tablets (2.5 mg) by mouth every 4 hours as needed for moderate to severe pain     polyethylene glycol (MIRALAX) 17 GM/Dose powder Take 17 g by mouth daily as needed for constipation     tiotropium (SPIRIVA RESPIMAT) 2.5 MCG/ACT inhaler Inhale 2 puffs into the lungs daily     predniSONE (DELTASONE) 10 MG tablet Take 10 mg by mouth As needed for asthma flares follows with PCP or pulmonology (per patient she takes 1 tablet for 3 days)     No current facility-administered medications for this visit.       ROS:  10 point ROS of systems including Constitutional, Eyes, Respiratory, Cardiovascular, Gastroenterology, Genitourinary, Integumentary, Musculoskeletal, Psychiatric were all negative except for pertinent positives noted in my HPI.    Vitals:  /68   Pulse 93   Temp 97.5  F (36.4  C)   Resp 18   Ht 1.549 m (5' 1\")   Wt 64.4 kg (142 lb)   LMP  (LMP Unknown)   SpO2 93%   BMI 26.83 kg/m    Exam:  GENERAL APPEARANCE:  Alert, in NAD  HEENT: normocephalic, moist mucous membranes, nose without drainage or crusting  RESP:  respiratory effort normal, no respiratory distress, Lung sounds diminished bilaterally, patient is on RA  CV: auscultation of heart done, rate and rhythm regular.   ABDOMEN: + bowel sounds, soft, nontender, no grimacing or guarding with palpation.  M/S:  generalized lower extremity edema  SKIN:  Inspection and palpation of skin and subcutaneous tissue: skin warm, dry without rashes  NEURO: cranial nerves 2-12 grossly intact and at patient's baseline; no facial asymmetry, no speech deficits and able to follow directions  PSYCH: oriented x 3, affect and mood ok    Lab/Diagnostic data:  Labs done in SNF are in [facility]. Please refer to them using [employer]Care Everywhere. and Recent labs in [facility] reviewed by me today.     ASSESSMENT/PLAN:  (J96.01) Acute respiratory failure with hypoxia (H)  (primary encounter diagnosis)  Comment: secondary to pulmonary " congestion, asthma exacerbation and possible CAP  -required oxygen and at one point BiPAP  -oxygen weaned off prior to discharge  -denies SOB today, oxygen saturations 91-92% on RA  Plan: Monitor oxygen saturations and respiratory status. If oxygen saturations drop <90% will restart oxygen at TCU.    (R09.89) Pulmonary congestion  (J45.901) Exacerbation of persistent asthma, unspecified asthma severity  (J18.9) Community acquired pneumonia, unspecified laterality  Comment: Received IV diuretics for fluid overload inpatient, as well as course of prednisone for asthma exacerbation. Started on azithromycin and cefuroxime for possible pneumonia  -WBC 10.0 on 5/22  -she is afebrile and hemodynamically stable  -appears euvolemic today. Continues to have cough worse at night, thick mucus. Denies SOB.  Plan: CBC tomorrow. Continue azithromycin and cefuroxime as ordered to complete antibiotic course. Start mucinex  mg BID for thick secretions. Schedule levalbuterol neb at 10 PM and q4h PRN for SOB, thick secretions, cough. Continue tiotropium daily, advair BID, montelukast daily, albuterol PRN and prednisone 10 mg daily x 3 days PRN if exacerbation. IS 10 reps QID. Monitor symptoms and fluid status. Follow up with pulmonary per previous recommendations.     (I48.91) Atrial fibrillation with RVR (H)  Comment: suspected to be secondary to respiratory failure and beta agonists  -she also had episode of afib in 2017 in setting of sepsis  -was started on apixaban 5 mg BID in hospital and then discharged on 2.5 mg BID dosing  -HR controlled 92, 78, 81  Plan: discussed with geriatric pharmacist today regarding why she was discharged on 2.5 mg BID. Increase apixaban 5 mg BID per our discussion as she does not meet criteria for reduced dose of 2.5 mg BID. Monitor HR. Consider adding beta blocker if rate increases.    (E22.2) SIADH (syndrome of inappropriate ADH production) (H)  (E87.1) Hyponatremia  Comment: sodium on admission  on 5/20 was 122   -received IV hydration initially and sodium did improve  -sodium 130 on 5/22  Plan: BMP 5/25. Continue 1L fluid restriction.     (R19.5) Loose stools  Comment: reports 4 loose stools yesterday  -she is afebrile and hemodynamically stable  -is currently on antibiotics  Plan: Collect stool sample to rule out c.diff. Monitor stools    (B37.81) Candida esophagitis (H)-resolved  Comment: treated empirically possible candida esophagitis due to symptoms of nausea  -GI deferred EGD during hospitalization as nausea improved  -completed course of nystatin inpatient  -denies any nausea or further symptoms today  Plan: Monitor symptoms    (M54.50) Acute bilateral low back pain, unspecified whether sciatica present  (M79.604) Pain of right lower extremity  Comment: denies pain today  Plan:Continue tylenol 975 mg q8h PRN, oxycodone PRN. Monitor pain. Therapy as below    (I10) Essential hypertension  Comment: BP controlled 137/69, 145/61, 132/68; HR 92, 78, 81  Plan: Continue amlodipine 5 mg at bedtime, losartan-hydrochlorothiazide 50-12.5 mg daily. BMP 5/25. VS per policy. Adjust medications as needed.    (Z86.79) History of cardiomyopathy  (I25.10) Coronary artery disease involving native coronary artery of native heart without angina pectoris  (E78.5) Dyslipidemia  Comment: chronic, with nonobstructive CAD, history of cardiomyopathy in 2017 when she was septic  - ECHO showed EF 50-55%, left atrium is mildly dilated, mild (1+) mitral regurgitation, mild (1+) aortic regurgitation, ascending aorta is Mildly dilated, moderate left pleural effusion  -coronary angiogram 2017 with mild to moderate CAD that is nonobstructive  -denies CP today  Plan: Continue atorvastatin 20 mg at bedtime. Follow up with cardiology per recommendations    (N18.31) Stage 3a chronic kidney disease (H)  Comment: chronic  -baseline creatinine 0.9-1.1  -creatinine 0.73 on 5/22  Estimated Creatinine Clearance: 49.3 mL/min (based on SCr of  0.73 mg/dL).    Plan: BMP 5/25. Avoid nephrotoxins. Renally dose medications as indicated.    (D64.9) Chronic anemia  Comment: chronic  -hgb at baseline 11-12  -hgb 9.3 on 5/22/22  -is now on anticoagulation  Plan: CBC 5/25.     (K21.9) Gastroesophageal reflux disease, unspecified whether esophagitis present  Comment: chronic  Plan: Continue omeprazole 10 mg daily.    (R53.81) Physical deconditioning  Comment: Acute, secondary to recent hospitalization, medical conditions as above  Plan: Encourage participation in physical therapy/occupational therapy for strengthening and deconditioning. Discharge planning per their recommendation. Social work to assist with d/c planning.    (I71.2) Thoracic aortic aneurysm without rupture (H)  Comment: ascending aorta is Mildly dilated per ECHO 5/19/22 the Aorta Diam: 4.0 cm, previous ECHO 8/26/21 showed Aorta Diam: 3.9 cm  Plan: Follow up outpatient      Total time spent with patient visit at the skilled nursing facility was 38 minutes including patient visit and review of past records. Greater than 50% of total time spent with counseling and coordinating care with facility staff and discussion with pharmacist regarding admission and medication orders, plan of care as described above. Counseling patient about current medications, plan of care and what to expect at TCU      Electronically signed by:  TOREY Edmonds CNP           Sincerely,        TOREY Edmonds CNP

## 2022-05-24 NOTE — PROGRESS NOTES
Clinic Care Coordination Contact  Care Team Conversations    Situation: RN Clinical Product Navigator following patient through TCU care progression.     Background: [facility] ill: with poor oral intake, hyponatremia, weakness and dehydration prompting return to the hospital.     Assessment: Patient thought to have possible community acquired pneumonia and asthma exacerbation, requiring supplemental O2 while inpatient.  She may need ongoing supplemental O2 PRN as well as asthma nebulizer regimen.   [facility] and likely will transition to an [facility] unit when she is ready to return to a community setting.     Plan/Recommendations: RN Clinical Product Navigator will send TCU Care Team Care Management hand in communication and request involvement in discharge planning and coordination of care.       Josseline Salinas RN  Clinical Product Navigator

## 2022-05-25 LAB
ERYTHROCYTE [DISTWIDTH] IN BLOOD BY AUTOMATED COUNT: 13.3 % (ref 10–15)
HCT VFR BLD AUTO: 31.9 % (ref 35–47)
HGB BLD-MCNC: 10.1 G/DL (ref 11.7–15.7)
MCH RBC QN AUTO: 29.1 PG (ref 26.5–33)
MCHC RBC AUTO-ENTMCNC: 31.7 G/DL (ref 31.5–36.5)
MCV RBC AUTO: 92 FL (ref 78–100)
PLATELET # BLD AUTO: 473 10E3/UL (ref 150–450)
RBC # BLD AUTO: 3.47 10E6/UL (ref 3.8–5.2)
WBC # BLD AUTO: 15.1 10E3/UL (ref 4–11)

## 2022-05-25 PROCEDURE — 80048 BASIC METABOLIC PNL TOTAL CA: CPT | Performed by: NURSE PRACTITIONER

## 2022-05-25 PROCEDURE — 85027 COMPLETE CBC AUTOMATED: CPT | Performed by: NURSE PRACTITIONER

## 2022-05-26 ENCOUNTER — LAB REQUISITION (OUTPATIENT)
Dept: LAB | Facility: CLINIC | Age: 87
End: 2022-05-26

## 2022-05-26 DIAGNOSIS — R19.7 DIARRHEA, UNSPECIFIED: ICD-10-CM

## 2022-05-26 DIAGNOSIS — U07.1 COVID-19: ICD-10-CM

## 2022-05-26 LAB
ANION GAP SERPL CALCULATED.3IONS-SCNC: 6 MMOL/L (ref 3–14)
BUN SERPL-MCNC: 27 MG/DL (ref 7–30)
C DIFF TOX B STL QL: NEGATIVE
CALCIUM SERPL-MCNC: 9 MG/DL (ref 8.5–10.1)
CHLORIDE BLD-SCNC: 97 MMOL/L (ref 94–109)
CO2 SERPL-SCNC: 28 MMOL/L (ref 20–32)
CREAT SERPL-MCNC: 0.92 MG/DL (ref 0.52–1.04)
GFR SERPL CREATININE-BSD FRML MDRD: 61 ML/MIN/1.73M2
GLUCOSE BLD-MCNC: 71 MG/DL (ref 70–99)
POTASSIUM BLD-SCNC: 3.2 MMOL/L (ref 3.4–5.3)
SODIUM SERPL-SCNC: 131 MMOL/L (ref 133–144)

## 2022-05-26 PROCEDURE — 87493 C DIFF AMPLIFIED PROBE: CPT | Performed by: NURSE PRACTITIONER

## 2022-05-26 PROCEDURE — U0003 INFECTIOUS AGENT DETECTION BY NUCLEIC ACID (DNA OR RNA); SEVERE ACUTE RESPIRATORY SYNDROME CORONAVIRUS 2 (SARS-COV-2) (CORONAVIRUS DISEASE [COVID-19]), AMPLIFIED PROBE TECHNIQUE, MAKING USE OF HIGH THROUGHPUT TECHNOLOGIES AS DESCRIBED BY CMS-2020-01-R: HCPCS | Performed by: NURSE PRACTITIONER

## 2022-05-26 NOTE — DISCHARGE SUMMARY
[facility]  Hospitalist Discharge Summary      Date of Admission:  5/15/2022  Date of Discharge:  5/23/2022  2:31 PM  Discharging Provider: Gio Marshall MD  Discharge Service: Hospitalist Service    Discharge Diagnoses   Acute Hypoxic Respiratory Failure  Asthma Exacerbation  Possible Community-acquired Pneumonia  New-onset Atrial Fibrillation  SIADH  Symptomatic Hyponatremia  Hx of Candidal Esophagitis  Hx of Hypertension, Hyperlipidemia, Non-obstructive CAD  Moderate Malnutrition    Follow-ups Needed After Discharge   Follow-up Appointments     Follow Up and recommended labs and tests      Follow up with primary care provider in 1 weeks for hospital follow up.    The following labs/tests are recommended: creatinine and sodium levels.           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 She was found to have acute hypoxic respiratory failure due to asthma exacerbation and possible community acquired pneumonia. She was started on nebulizers, steroids and ceftriaxone + azithromycin with improvement in respiratory function. Patient also noted to have new-onset atrial fibrillation. She was started on anticoagulation with Eliquis, but was not started on beta blockade as she was not in RVR. Also with hyponatremia due to SIADH, which improved with fluid restriction.    Consultations This Hospital Stay   PHYSICAL THERAPY ADULT IP CONSULT  OCCUPATIONAL THERAPY ADULT IP CONSULT  GASTROENTEROLOGY IP CONSULT  SOCIAL WORK IP CONSULT  PHARMACY LIAISON FOR MEDICATION COVERAGE CONSULT  PHARMACY DISCHARGE  "EDUCATION BY PHARMACIST  PHYSICAL THERAPY ADULT IP CONSULT  OCCUPATIONAL THERAPY ADULT IP CONSULT    Code Status   No CPR- Do NOT Intubate    Time Spent on this Encounter   I, Gio Marshall MD, personally saw the patient today and spent greater than 30 minutes discharging this patient.       Gio Marshall MD  [place] [id number] [facility]  [address]  [facility] [address]  Phone: [phone]  Fax: [phone]  ______________________________________________________________________    Physical Exam   /47 (BP Location: Right arm)   Pulse 100   Temp 98  F (36.7  C) (Axillary)   Resp 20   Ht 1.549 m (5' 1\")   Wt 64.4 kg (142 lb)   LMP  (LMP Unknown)   SpO2 93%   BMI 26.83 kg/m      General: Very pleasant female resting comfortably in bedside chair.  Awake, alert, interactive.  HEENT: Normocephalic, atraumatic.  PERRL, EOMI.  Conjunctiva clear, sclerae anicteric.  Mucous membranes moist.  Cardiac: Regular rate and rhythm without murmur, gallop, or rub.  No peripheral edema.  Respiratory: Normal work of breathing ORA.  CTAB.  GI: Normal, active bowel sounds.  Abdomen soft, nontender, nondistended.  : Deferred.  Musculoskeletal: Moving all extremities appropriately.  Skin: No rashes or abrasions on exposed skin.  Neurologic: Alert and oriented x4.  Cranial nerves II through XII grossly intact.  Psychologic: Appropriate mood and affect.       Primary Care Physician   Layo Sevilla    Discharge Orders      Primary Care - Care Coordination Referral      General info for SNF    Length of Stay Estimate: Short Term Care: Estimated # of Days <30  Condition at Discharge: Improving  Level of care:skilled   Rehabilitation Potential: Good  Admission H&P remains valid and up-to-date: Yes  Recent Chemotherapy: N/A  Use Nursing Home Standing Orders: Yes     Follow Up and recommended labs and tests    Follow up with primary care provider in 1 weeks for hospital follow up.  The following labs/tests are " recommended: creatinine and sodium levels.     Reason for your hospital stay    You were admitted to the hospital for nausea and weakness due to low sodium levels. Fluid restriction has improved this. You also had respiratory failure related to asthma and fluid overload, and develop atrial fibrillation while here.     Activity - Up with assistive device     Activity - Up with nursing assistance     Additional Discharge Instructions    Do not drink more than 1L of fluid (of any variety) in a day.     No CPR- Do NOT Intubate     Physical Therapy Adult Consult    Evaluate and treat as clinically indicated.    Reason: physical debility     Occupational Therapy Adult Consult    Evaluate and treat as clinically indicated.    Reason:  lower than baseline functioning     Fall precautions     Diet    Follow this diet upon discharge: Orders Placed This Encounter      Snacks/Supplements Adult: Ensure Enlive; With Meals      Snacks/Supplements Adult: Gelatein Plus; Between Meals      Fluid restriction 1000 ML FLUID      Regular Diet Adult       Significant Results and Procedures   Most Recent 3 CBC's:Recent Labs   Lab Test 05/22/22  0645 05/20/22  0741 05/19/22  0242   WBC 10.0 11.5* 9.9   HGB 9.3* 9.8* 10.2*   MCV 89 86 88    438 423     Most Recent 3 BMP's:Recent Labs   Lab Test 05/22/22  0645 05/21/22  0703 05/20/22  2229 05/20/22  1748   * 127* 125* 122*   POTASSIUM 3.6 3.6  --  4.1   CHLORIDE 94 90*  --  89*   CO2 30 30  --  28   BUN 20 23  --  24   CR 0.73 0.76  --  0.82   ANIONGAP 6 7  --  5   ELROY 8.6 8.6  --  8.8   * 120*  --  161*     Most Recent 2 LFT's:Recent Labs   Lab Test 05/15/22  0415 03/21/22  1553   AST 32 19   ALT 36 27   ALKPHOS 162* 90   BILITOTAL 0.4 0.6   ,   Results for orders placed or performed during the hospital encounter of 05/15/22   CT Abdomen Pelvis without Contrast (stone protocol)    Narrative    EXAM: CT ABDOMEN PELVIS W/O CONTRAST  LOCATION: [facility]  HOSPITAL  DATE/TIME: 5/15/2022 6:09 AM    INDICATION: Abd pain, Nausea  COMPARISON: 3/2/2017 and 7/30/2018  TECHNIQUE: CT scan of the abdomen and pelvis was performed without IV contrast. Multiplanar reformats were obtained. Dose reduction techniques were used.  CONTRAST: None.    FINDINGS:   LOWER CHEST: There are some noncalcified areas of nodularity in the ACR portion of the right lung anteriorly which have been stable dating back to 07/30/2018, this should be benign.    HEPATOBILIARY: The gallbladder is surgically absent. The liver and bile ducts are normal.    PANCREAS: Normal.    SPLEEN: Normal.    ADRENAL GLANDS: Normal.    KIDNEYS/BLADDER: Normal.    BOWEL: There are some mild findings of diverticulosis with no obvious findings of diverticulitis. There is no evidence for an obstructive ileus.    LYMPH NODES: Normal.    VASCULATURE: Moderate calcification is seen with no aneurysmal dilatation.    PELVIC ORGANS: Tiny calcified uterine fibroid..    MUSCULOSKELETAL: Advanced degenerative changes are seen of the spine.      Impression    IMPRESSION:   1.  No acute abnormalities identified.    2.  Given interval of stability the areas of nodularity in the right lung base should be benign and no follow-up is recommended.    3.  Diverticulosis with no evidence for diverticulitis.    4.  No evidence for a bowel ileus.    5.  Tiny calcified uterine fibroid.     Chest XR,  PA & LAT    Narrative    EXAM: XR CHEST 2 VW  LOCATION: [facility]  DATE/TIME: 5/15/2022 6:58 AM    INDICATION: cough  COMPARISON: CT of the abdomen and pelvis which includes the lung bases from earlier today; CT chest without contrast 03/29/2022      Impression    IMPRESSION:     There is a focal band of atelectasis in the paradiaphragmatic left lower lobe. No new right lung opacities. Stable 10 mm nodule in the paradiaphragmatic right lower lobe. No new nodular or airspace opacities.    Diaphragmatic curvature is preserved.  No visible pleural fluid.    Cardiac silhouette is not enlarged. Atheromatous aortic calcifications are present. Vascular pedicle width is normal.     Mild thoracic spine disc space narrowing with minimal marginal osteophytes. No compression deformities or displaced rib fractures.   XR Abdomen 2 Views    Narrative    ABDOMEN TWO VIEWS  2022 8:24 AM     HISTORY: Constipation.    COMPARISON: 5/15/2022      Impression    IMPRESSION: Nonobstructive gas pattern. Normal stool volume. No gross  free air. Surgical clips in the right upper quadrant related to  cholecystectomy.    APOLINAR ARRIETA MD         SYSTEM ID:  [id number]   XR Chest 2 Views    Narrative    CHEST TWO VIEW   2022 12:40 PM     HISTORY: Hypoxia/wheeze.    COMPARISON: Chest x-ray on 5/15/2022.      Impression    IMPRESSION: AP and lateral view of the chest were obtained.  Cardiomediastinal silhouette is within normal limits. Small left  pleural effusion with associated basilar atelectasis/consolidation. No  significant right pleural effusion. No significant pneumothorax.    VANDANA SOLARES MD         SYSTEM ID:  [id number]   XR Chest Port 1 View    Narrative    EXAM: CHEST SINGLE VIEW PORTABLE  LOCATION: [facility]  DATE/TIME: 2022 2:32 AM    INDICATION: Acute shortness of breath and hypoxia. Left-sided crackles.  COMPARISON: 2022.      Impression    IMPRESSION:   1. No convincing interval change since the recent study dated 2022.  2. Small left pleural effusion.  3. A few patchy opacities in the left lung base could represent atelectasis, scarring or pneumonia.    Echocardiogram Complete     Value    LVEF  50-55%    Narrative    [id number]  [id number]  [id number]  [id number]^FIOR^DOMENICA^[facility]  Echocardiography Laboratory  [address] [place] [address]     Name: TERESA HARTMAN  MRN: [id number]  : 1935  Study Date: 2022 08:51 AM  Age: 86 yrs  Gender:  Female  Patient Location: [facility]  Reason For Study: Atrial Fibrillation, Dyspnea  Ordering Physician: DOMENICA CHILDRESS  Performed By: Janina Resendiz     HR: 96  BP: 166/66 mmHg  ______________________________________________________________________________  Procedure  Complete Portable Echo Adult.  ______________________________________________________________________________  Interpretation Summary     The visual ejection fraction is 50-55%.  The right ventricular systolic function is borderline reduced.  The left atrium is mildly dilated.  There is mild (1+) mitral regurgitation.  There is mild (1+) aortic regurgitation.  The ascending aorta is Mildly dilated.  Moderate left pleural effusion  The rhythm was atrial fibrillation.  Compared to prior study, changes are noted.  ______________________________________________________________________________  Left Ventricle  The left ventricle is normal in size. There is normal left ventricular wall  thickness. The visual ejection fraction is 50-55%. Left ventricular systolic  function is borderline reduced. Diastolic Doppler findings (E/E' ratio and/or  other parameters) suggest left ventricular filling pressures are increased. No  regional wall motion abnormalities noted.     Right Ventricle  The right ventricle is normal size. The right ventricular systolic function is  borderline reduced.     Atria  The left atrium is mildly dilated. Right atrial size is normal. There is no  color Doppler evidence of an atrial shunt.     Mitral Valve  There is mild to moderate mitral annular calcification. There is mild (1+)  mitral regurgitation.     Tricuspid Valve  The tricuspid valve is normal in structure and function. There is mild (1+)  tricuspid regurgitation. The right ventricular systolic pressure is  approximated at 29.7 mmHg plus the right atrial pressure. Right ventricular  systolic pressure is elevated, consistent with mild pulmonary hypertension.     Aortic  Valve  There is mild trileaflet aortic sclerosis. There is mild (1+) aortic  regurgitation.     Pulmonic Valve  The pulmonic valve is not well seen, but is grossly normal. There is trace  pulmonic valvular regurgitation.     Vessels  Normal size aorta. The ascending aorta is Mildly dilated. The inferior vena  cava cannot be assessed.     Pericardium  There is no pericardial effusion. Moderate left pleural effusion.     Rhythm  The rhythm was atrial fibrillation.     ______________________________________________________________________________  MMode/2D Measurements & Calculations  IVSd: 1.0 cm  LVIDd: 4.5 cm  LVIDs: 3.2 cm  LVPWd: 0.94 cm  FS: 29.2 %  LV mass(C)d: 150.0 grams     Ao root diam: 3.2 cm  asc Aorta Diam: 4.0 cm  LVOT diam: 2.1 cm  LVOT area: 3.5 cm2  LA Volume (BP): 59.7 ml  RWT: 0.42     Doppler Measurements & Calculations  MV E max michael: 143.8 cm/sec  MV max PG: 10.4 mmHg  MV mean PG: 3.0 mmHg  MV V2 VTI: 30.2 cm  MVA(VTI): 2.6 cm2  MV P1/2t max michael: 163.0 cm/sec  MV P1/2t: 78.8 msec  MVA(P1/2t): 2.8 cm2  MV dec slope: 606.0 cm/sec2  MV dec time: 0.17 sec  Ao V2 max: 147.0 cm/sec  Ao max P.0 mmHg  Ao V2 mean: 101.0 cm/sec  Ao mean P.0 mmHg  Ao V2 VTI: 28.2 cm  JEFFERSON(I,D): 2.8 cm2  JEFFERSON(V,D): 2.7 cm2  AI P1/2t: 239.2 msec  LV V1 max P.2 mmHg  LV V1 max: 114.0 cm/sec  LV V1 VTI: 22.4 cm  SV(LVOT): 77.6 ml  TR max michael: 272.1 cm/sec  TR max P.7 mmHg  AV Michael Ratio (DI): 0.78     E/E' avg: 15.3  Lateral E/e': 15.7  Medial E/e': 14.9     ______________________________________________________________________________  Report approved by: Tha Wu 2022 01:39 PM               Discharge Medications   Discharge Medication List as of 2022  2:14 PM      START taking these medications    Details   cefuroxime (CEFTIN) 250 MG tablet Take 2 tablets (500 mg) by mouth every 12 hours for 2 days, Disp-8 tablet, R-0, E-Prescribe      apixaban ANTICOAGULANT (ELIQUIS) 2.5 MG tablet Take 1  tablet (2.5 mg) by mouth 2 times daily, Disp-60 tablet, R-0, E-Prescribe      azithromycin (ZITHROMAX) 500 MG tablet Take 1 tablet (500 mg) by mouth once for 1 dose, Disp-1 tablet, R-0, E-Prescribe      levalbuterol (XOPENEX) 0.63 MG/3ML neb solution Take 3 mLs (0.63 mg) by nebulization every 4 hours as needed for wheezing, Disp-90 mL, R-0, E-Prescribe         CONTINUE these medications which have NOT CHANGED    Details   acetaminophen (TYLENOL) 325 MG tablet Take 975 mg by mouth every 8 hours as needed for mild pain, Historical      albuterol (PROAIR HFA/PROVENTIL HFA/VENTOLIN HFA) 108 (90 Base) MCG/ACT inhaler Inhale 1-2 puffs into the lungs every 6 hours as needed for shortness of breath / dyspnea or wheezing, No Print OutPharmacy may dispense brand covered by insurance (Proair, or proventil or ventolin or generic albuterol inhaler)      amLODIPine (NORVASC) 5 MG tablet Take 1 tablet (5 mg) by mouth At Bedtime, Disp-90 tablet, R-1, E-Prescribe      atorvastatin (LIPITOR) 20 MG tablet TAKE ONE TABLET BY MOUTH AT BEDTIME, Disp-90 tablet, R-1, E-Prescribe      calcium citrate-vitamin D (CITRACAL) 315-200 MG-UNIT TABS per tablet Take 1 tablet by mouth 2 times daily, Historical      fluticasone-salmeterol (ADVAIR-HFA) 230-21 MCG/ACT inhaler Inhale 2 puffs into the lungs 2 times daily, No Print Out      latanoprost (XALATAN) 0.005 % ophthalmic solution Place 1 drop into both eyes At Bedtime, Historical      loratadine (CLARITIN) 10 MG tablet Take 1 tablet (10 mg) by mouth daily, Disp-90 tablet, R-3, E-Prescribe      losartan-hydrochlorothiazide (HYZAAR) 50-12.5 MG tablet Take 1 tablet by mouth daily, Disp-45 tablet, R-1, Historical      montelukast (SINGULAIR) 10 MG tablet Take 1 tablet (10 mg) by mouth At Bedtime, Disp-90 tablet, R-3, E-Prescribe      omeprazole (PRILOSEC) 10 MG DR capsule TAKE ONE CAPSULE BY MOUTH ONE TIME DAILY, Disp-90 capsule, R-1, E-Prescribe      ondansetron (ZOFRAN) 4 MG tablet Take 1 tablet (4  mg) by mouth every 8 hours as needed for nausea, No Print Out      polyethylene glycol (MIRALAX) 17 GM/Dose powder Take 17 g by mouth daily as needed for constipation, Disp-510 g, No Print Out      predniSONE (DELTASONE) 10 MG tablet Take 10 mg by mouth As needed for asthma flares follows with PCP or pulmonology (per patient she takes 1 tablet for 3 days), Historical      tiotropium (SPIRIVA RESPIMAT) 2.5 MCG/ACT inhaler Inhale 2 puffs into the lungs daily, Disp-4 g, R-2, E-Prescribe      oxyCODONE (ROXICODONE) 5 MG tablet Take 0.5 tablets (2.5 mg) by mouth every 4 hours as needed for moderate to severe pain, Disp-10 tablet, R-0, Local Print         STOP taking these medications       senna-docusate (SENOKOT-S/PERICOLACE) 8.6-50 MG tablet Comments:   Reason for Stopping:             Allergies   Allergies   Allergen Reactions     Fosamax [Alendronic Acid] GI Disturbance     Contrast Dye      Red arm redness and swelling      Evista [Raloxifene]      abd symptoms.      Flu Virus Vaccine      swollen and red at inj site     Amoxicillin Rash

## 2022-05-27 ENCOUNTER — TRANSITIONAL CARE UNIT VISIT (OUTPATIENT)
Dept: GERIATRICS | Facility: CLINIC | Age: 87
End: 2022-05-27
Payer: COMMERCIAL

## 2022-05-27 VITALS
WEIGHT: 141.6 LBS | SYSTOLIC BLOOD PRESSURE: 103 MMHG | TEMPERATURE: 97.2 F | DIASTOLIC BLOOD PRESSURE: 57 MMHG | HEART RATE: 86 BPM | OXYGEN SATURATION: 93 % | BODY MASS INDEX: 26.73 KG/M2 | RESPIRATION RATE: 18 BRPM | HEIGHT: 61 IN

## 2022-05-27 DIAGNOSIS — D64.9 CHRONIC ANEMIA: ICD-10-CM

## 2022-05-27 DIAGNOSIS — R09.89 PULMONARY CONGESTION: ICD-10-CM

## 2022-05-27 DIAGNOSIS — E22.2 SIADH (SYNDROME OF INAPPROPRIATE ADH PRODUCTION) (H): ICD-10-CM

## 2022-05-27 DIAGNOSIS — D72.829 LEUKOCYTOSIS, UNSPECIFIED TYPE: ICD-10-CM

## 2022-05-27 DIAGNOSIS — J45.901 EXACERBATION OF PERSISTENT ASTHMA, UNSPECIFIED ASTHMA SEVERITY: ICD-10-CM

## 2022-05-27 DIAGNOSIS — J18.9 COMMUNITY ACQUIRED PNEUMONIA, UNSPECIFIED LATERALITY: ICD-10-CM

## 2022-05-27 DIAGNOSIS — E87.6 HYPOKALEMIA: Primary | ICD-10-CM

## 2022-05-27 DIAGNOSIS — R19.5 LOOSE STOOLS: ICD-10-CM

## 2022-05-27 DIAGNOSIS — E87.1 HYPONATREMIA: ICD-10-CM

## 2022-05-27 LAB — SARS-COV-2 RNA RESP QL NAA+PROBE: NEGATIVE

## 2022-05-27 PROCEDURE — 99309 SBSQ NF CARE MODERATE MDM 30: CPT | Performed by: NURSE PRACTITIONER

## 2022-05-27 RX ORDER — POTASSIUM CHLORIDE 1500 MG/1
20 TABLET, EXTENDED RELEASE ORAL DAILY
Start: 2022-05-27 | End: 2022-06-01

## 2022-05-27 RX ORDER — GUAIFENESIN 600 MG/1
600 TABLET, EXTENDED RELEASE ORAL 2 TIMES DAILY
Start: 2022-05-27 | End: 2022-11-14

## 2022-05-27 NOTE — LETTER
5/27/2022        RE: Genie Persaud  [address] [place] [address]        [facility] G[facility]    Chief Complaint   Patient presents with     Nursing Home Acute     HPI:  Genie Persaud is a 86 year old  (1935), who is being seen today for an episodic care visit at: [facility] ([facility]) [[id number]].     PMH significant for HTN, atrial fibrillation, history of cardiomyopathy, nonobstructive CAD, dyslipidemia, CKD stage 3, chronic anemia, asthma, GERD, history of esophageal candida.     She was hospitalized at [facility] 5/11-5/12/22 for right back and lower right extremity pain. US evaluation 4/25 that was negative and showed right Baker's cyst as likely cause of pain. MRI of lumbar spine showed no acute fractures and diffuse degenerative changes including moderate to severe neural foraminal stenosis on the right at L4-L5 and on the left at L5-S1. UA was negative. She was admitted to hospital for TCU placement, as felt she is not able to tolerate pain. Was started on pain regimen. Additionally labs in hospital revealed low sodium of 128 and low potassium of 3.3. Discharged to [facility] for physical rehabilitation and medical management.      Summary of recent hospitalization:  Hospitalized at [facility] from 5/15-5/23/22 for acute hypoxic respiratory failure, asthma exacerbation, possible CAP, possible candida esophagitis, new atrial fib, SIADH. She presented from [facility] to the ED for evaluation of nausea and weakness. Laboratory evaluation showed sodium 122, alk phos 162, WBC 12.3. CT abdomen pelvis revealed no acute findings. She received IV hydration. Work up consistent with SIADH as cause of hyponatremia. She was discharged on 1L fluid restriction daily. GI consulted due to ongoing nausea that improved, so EGD deferred. Treated empirically with nystatin due to history of candidal esophagitis. She developed hypoxia thought to be due to fluid overload with pulmonary congestion and asthma  "exacerbation. Chest xray showed small left sided pleural effusion with congestion. Then had rapid response episode and required BiPAP. Was found to be in atrial fib with RVR at this time. She was started on anticoagulation with apixaban. Troponin was negative.  ECHO showed EF 50-55%, left atrium is mildly dilated, mild (1+) mitral regurgitation, mild (1+) aortic regurgitation, ascending aorta is Mildly dilated, moderate left pleural effusion. Received IV diuresis. Chest xray repeated that showed few patchy opacities in left lower base that could be possible pneumonia vs atelectasis vs scarring. She was treated for possible pneumonia with ceftriaxone and azithromycin-discharged with orders to complete 1 more day of azithromycin and 2 days of cefuroxime to complete course. Also received xopenex nebs and steroids for asthma exacerbation.    Today's concern is: Christina was seen today for episodic follow up today. C.diff sample came back negative. Potassium from yesterday 3.2. Christina reports no further loose stools- is having about one bowel movement daily. WBC also elevated to 15.1. Continues to have productive cough which overall is improving. Reports breathing is at baseline- not worse than normal She is afebrile and hemodynamically stable. Oxygen saturations in low 90% on RA.    Allergies, and PMH/PSH reviewed in EPIC today.  REVIEW OF SYSTEMS:  5 point ROS of systems including Constitutional, Respiratory, Cardiovascular, Gastroenterology, Genitourinary were all negative except for pertinent positives noted in my HPI.    Objective:   /57   Pulse 86   Temp 97.2  F (36.2  C)   Resp 18   Ht 1.549 m (5' 1\")   Wt 64.2 kg (141 lb 9.6 oz)   LMP  (LMP Unknown)   SpO2 93%   BMI 26.76 kg/m    GENERAL APPEARANCE:  Alert, in NAD  HEENT: normocephalic, moist mucous membranes, nose without drainage or crusting  RESP:  respiratory effort normal, no respiratory distress, Lung sounds diminished, but overall with improved " airflow compared to last week, patient is on RA  CV: auscultation of heart done, rate and rhythm regular.   ABDOMEN: + bowel sounds, soft, nontender, no grimacing or guarding with palpation.  NEURO: moves extremities freely  PSYCH: oriented x 3, affect and mood ok    Labs done in SNF are in [facility]. Please refer to them using EPIC/Care Everywhere. and Recent labs in EPIC reviewed by me today.     Assessment/Plan:  (E87.6) Hypokalemia  (primary encounter diagnosis)  Comment: suspect secondary to loose stools, which have improved   -is also on hydrochlorothiazide that could be contributing  Plan: Potassium 40 meq po STAT now, then 20 meq po daily. BMP 5/31    (D72.829) Leukocytosis, unspecified type  Comment: WBC 15.1 on 5/26 (labs reported in Epic as 5/25, but weren't drawn and resulted until 5/26)  -suspect secondary to steroid course inpatient  -she is afebrile and hemodynamically stable  -overall feels symptoms are improving  Plan: CBC 5/31. Monitor for s/sx of infection.     (R09.89) Pulmonary congestion  (J45.901) Exacerbation of persistent asthma, unspecified asthma severity  (J18.9) Community acquired pneumonia, unspecified laterality  Comment: Received IV diuretics for fluid overload inpatient, as well as course of prednisone for asthma exacerbation. Started on azithromycin and cefuroxime for possible pneumonia  -completed azithromycin at cefuoxime courses at TCU on 5/24  -she is afebrile and hemodynamically stable  -overall feels breathing is at baseline. Cough is overall improving.  -appears euvolemic today.   Plan: Continue mucinex  mg BID for thick secretions. Continue levalbuterol neb at 10 PM and q4h PRN for SOB, thick secretions, cough. Continue tiotropium daily, advair BID, montelukast daily, albuterol PRN. IS 10 reps QID. Monitor symptoms and fluid status. Follow up with pulmonary per previous recommendations.     (E22.2) SIADH (syndrome of inappropriate ADH production) (H)  (E87.1)  Hyponatremia  Comment: sodium on admission on 5/20 was 122   -received IV hydration initially and sodium did improve  -sodium 131 on 5/26 (labs reported in Epic as 5/25, but weren't drawn and resulted until 5/26)  Plan: BMP 5/31. Continue 1L fluid restriction.     (R19.5) Loose stools  Comment: no longer with loose stools- having 1 bowel movement daily  -c.diff negative 5/26/22  Plan: Monitor bowels. Consider adding loperamide if ongoing loose stools.    (I10) Essential hypertension  Comment: BP soft at times 103/57, 117/65, 114/51  Plan: Discontinue amlodipine. Continue  losartan-hydrochlorothiazide 50-12.5 mg daily. VS per policy. Adjust medications as needed.    (D64.9) Chronic anemia  Comment: chronic  -hgb at baseline 11-12  -hgb 10.1 on 5/26/22  -is now on anticoagulation  Plan: CBC PRN      Electronically signed by: TOREY Edmonds CNP           Sincerely,        TOREY Edmonds CNP

## 2022-05-27 NOTE — PROGRESS NOTES
[facility] GERIATRICS    Chief Complaint   Patient presents with     Nursing Home Acute     HPI:  Genie Perasud is a 86 year old  (1935), who is being seen today for an episodic care visit at: [facility] [place] ([facility]) [[id number]].     PMH significant for HTN, atrial fibrillation, history of cardiomyopathy, nonobstructive CAD, dyslipidemia, CKD stage 3, chronic anemia, asthma, GERD, history of esophageal candida.     She was hospitalized at [facility] 5/11-5/12/22 for right back and lower right extremity pain. US evaluation 4/25 that was negative and showed right Baker's cyst as likely cause of pain. MRI of lumbar spine showed no acute fractures and diffuse degenerative changes including moderate to severe neural foraminal stenosis on the right at L4-L5 and on the left at L5-S1. UA was negative. She was admitted to hospital for TCU placement, as felt she is not able to tolerate pain. Was started on pain regimen. Additionally labs in hospital revealed low sodium of 128 and low potassium of 3.3. Discharged to [facility] for physical rehabilitation and medical management.      Summary of recent hospitalization:  Hospitalized at [facility] from 5/15-5/23/22 for acute hypoxic respiratory failure, asthma exacerbation, possible CAP, possible candida esophagitis, new atrial fib, SIADH. She presented from U to the ED for evaluation of nausea and weakness. Laboratory evaluation showed sodium 122, alk phos 162, WBC 12.3. CT abdomen pelvis revealed no acute findings. She received IV hydration. Work up consistent with SIADH as cause of hyponatremia. She was discharged on 1L fluid restriction daily. GI consulted due to ongoing nausea that improved, so EGD deferred. Treated empirically with nystatin due to history of candidal esophagitis. She developed hypoxia thought to be due to fluid overload with pulmonary congestion and asthma exacerbation. Chest xray showed small left sided pleural effusion with congestion.  "Then had rapid response episode and required BiPAP. Was found to be in atrial fib with RVR at this time. She was started on anticoagulation with apixaban. Troponin was negative.  ECHO showed EF 50-55%, left atrium is mildly dilated, mild (1+) mitral regurgitation, mild (1+) aortic regurgitation, ascending aorta is Mildly dilated, moderate left pleural effusion. Received IV diuresis. Chest xray repeated that showed few patchy opacities in left lower base that could be possible pneumonia vs atelectasis vs scarring. She was treated for possible pneumonia with ceftriaxone and azithromycin-discharged with orders to complete 1 more day of azithromycin and 2 days of cefuroxime to complete course. Also received xopenex nebs and steroids for asthma exacerbation.    Today's concern is: Christina was seen today for episodic follow up today. C.diff sample came back negative. Potassium from yesterday 3.2. Christina reports no further loose stools- is having about one bowel movement daily. WBC also elevated to 15.1. Continues to have productive cough which overall is improving. Reports breathing is at baseline- not worse than normal She is afebrile and hemodynamically stable. Oxygen saturations in low 90% on RA.    Allergies, and PMH/PSH reviewed in EPIC today.  REVIEW OF SYSTEMS:  5 point ROS of systems including Constitutional, Respiratory, Cardiovascular, Gastroenterology, Genitourinary were all negative except for pertinent positives noted in my HPI.    Objective:   /57   Pulse 86   Temp 97.2  F (36.2  C)   Resp 18   Ht 1.549 m (5' 1\")   Wt 64.2 kg (141 lb 9.6 oz)   LMP  (LMP Unknown)   SpO2 93%   BMI 26.76 kg/m    GENERAL APPEARANCE:  Alert, in NAD  HEENT: normocephalic, moist mucous membranes, nose without drainage or crusting  RESP:  respiratory effort normal, no respiratory distress, Lung sounds diminished, but overall with improved airflow compared to last week, patient is on RA  CV: auscultation of heart done, " rate and rhythm regular.   ABDOMEN: + bowel sounds, soft, nontender, no grimacing or guarding with palpation.  NEURO: moves extremities freely  PSYCH: oriented x 3, affect and mood ok    Labs done in SNF are in [facility]. Please refer to them using EPIC/Care Everywhere. and Recent labs in [facility] reviewed by me today.     Assessment/Plan:  (E87.6) Hypokalemia  (primary encounter diagnosis)  Comment: suspect secondary to loose stools, which have improved   -is also on hydrochlorothiazide that could be contributing  Plan: Potassium 40 meq po STAT now, then 20 meq po daily. BMP 5/31    (D72.829) Leukocytosis, unspecified type  Comment: WBC 15.1 on 5/26 (labs reported in Epic as 5/25, but weren't drawn and resulted until 5/26)  -suspect secondary to steroid course inpatient  -she is afebrile and hemodynamically stable  -overall feels symptoms are improving  Plan: CBC 5/31. Monitor for s/sx of infection.     (R09.89) Pulmonary congestion  (J45.901) Exacerbation of persistent asthma, unspecified asthma severity  (J18.9) Community acquired pneumonia, unspecified laterality  Comment: Received IV diuretics for fluid overload inpatient, as well as course of prednisone for asthma exacerbation. Started on azithromycin and cefuroxime for possible pneumonia  -completed azithromycin at cefuoxime courses at TCU on 5/24  -she is afebrile and hemodynamically stable  -overall feels breathing is at baseline. Cough is overall improving.  -appears euvolemic today.   Plan: Continue mucinex  mg BID for thick secretions. Continue levalbuterol neb at 10 PM and q4h PRN for SOB, thick secretions, cough. Continue tiotropium daily, advair BID, montelukast daily, albuterol PRN. IS 10 reps QID. Monitor symptoms and fluid status. Follow up with pulmonary per previous recommendations.     (E22.2) SIADH (syndrome of inappropriate ADH production) (H)  (E87.1) Hyponatremia  Comment: sodium on admission on 5/20 was 122   -received IV hydration  initially and sodium did improve  -sodium 131 on 5/26 (labs reported in Epic as 5/25, but weren't drawn and resulted until 5/26)  Plan: BMP 5/31. Continue 1L fluid restriction.     (R19.5) Loose stools  Comment: no longer with loose stools- having 1 bowel movement daily  -c.diff negative 5/26/22  Plan: Monitor bowels. Consider adding loperamide if ongoing loose stools.    (I10) Essential hypertension  Comment: BP soft at times 103/57, 117/65, 114/51  Plan: Discontinue amlodipine. Continue  losartan-hydrochlorothiazide 50-12.5 mg daily. VS per policy. Adjust medications as needed.    (D64.9) Chronic anemia  Comment: chronic  -hgb at baseline 11-12  -hgb 10.1 on 5/26/22  -is now on anticoagulation  Plan: CBC PRN      Electronically signed by: TOREY Edmonds CNP

## 2022-05-30 ENCOUNTER — LAB REQUISITION (OUTPATIENT)
Dept: LAB | Facility: CLINIC | Age: 87
End: 2022-05-30

## 2022-05-30 DIAGNOSIS — U07.1 COVID-19: ICD-10-CM

## 2022-05-30 DIAGNOSIS — D64.9 ANEMIA, UNSPECIFIED: ICD-10-CM

## 2022-05-30 PROCEDURE — U0005 INFEC AGEN DETEC AMPLI PROBE: HCPCS | Performed by: NURSE PRACTITIONER

## 2022-05-31 VITALS
SYSTOLIC BLOOD PRESSURE: 112 MMHG | HEIGHT: 61 IN | RESPIRATION RATE: 18 BRPM | BODY MASS INDEX: 27.66 KG/M2 | TEMPERATURE: 97.7 F | WEIGHT: 146.5 LBS | OXYGEN SATURATION: 90 % | DIASTOLIC BLOOD PRESSURE: 52 MMHG | HEART RATE: 99 BPM

## 2022-05-31 LAB
ANION GAP SERPL CALCULATED.3IONS-SCNC: 5 MMOL/L (ref 3–14)
BUN SERPL-MCNC: 22 MG/DL (ref 7–30)
CALCIUM SERPL-MCNC: 9.2 MG/DL (ref 8.5–10.1)
CHLORIDE BLD-SCNC: 100 MMOL/L (ref 94–109)
CO2 SERPL-SCNC: 27 MMOL/L (ref 20–32)
CREAT SERPL-MCNC: 0.83 MG/DL (ref 0.52–1.04)
ERYTHROCYTE [DISTWIDTH] IN BLOOD BY AUTOMATED COUNT: 13.5 % (ref 10–15)
GFR SERPL CREATININE-BSD FRML MDRD: 69 ML/MIN/1.73M2
GLUCOSE BLD-MCNC: 87 MG/DL (ref 70–99)
HCT VFR BLD AUTO: 31.9 % (ref 35–47)
HGB BLD-MCNC: 9.8 G/DL (ref 11.7–15.7)
MCH RBC QN AUTO: 28.6 PG (ref 26.5–33)
MCHC RBC AUTO-ENTMCNC: 30.7 G/DL (ref 31.5–36.5)
MCV RBC AUTO: 93 FL (ref 78–100)
PLATELET # BLD AUTO: 335 10E3/UL (ref 150–450)
POTASSIUM BLD-SCNC: 4.7 MMOL/L (ref 3.4–5.3)
RBC # BLD AUTO: 3.43 10E6/UL (ref 3.8–5.2)
SARS-COV-2 RNA RESP QL NAA+PROBE: NEGATIVE
SODIUM SERPL-SCNC: 132 MMOL/L (ref 133–144)
WBC # BLD AUTO: 9.8 10E3/UL (ref 4–11)

## 2022-05-31 PROCEDURE — P9604 ONE-WAY ALLOW PRORATED TRIP: HCPCS | Performed by: NURSE PRACTITIONER

## 2022-05-31 PROCEDURE — 36415 COLL VENOUS BLD VENIPUNCTURE: CPT | Performed by: NURSE PRACTITIONER

## 2022-05-31 PROCEDURE — 82310 ASSAY OF CALCIUM: CPT | Performed by: NURSE PRACTITIONER

## 2022-05-31 PROCEDURE — 85027 COMPLETE CBC AUTOMATED: CPT | Performed by: NURSE PRACTITIONER

## 2022-05-31 NOTE — PROGRESS NOTES
[facility] GERIATRICS    Chief Complaint   Patient presents with     RECHECK     HPI:  Genie Persaud is a 86 year old  (1935), who is being seen today for an episodic care visit at: [facility]  ([facility]) [[id number]].     PMH significant for HTN, atrial fibrillation, history of cardiomyopathy, nonobstructive CAD, dyslipidemia, CKD stage 3, chronic anemia, asthma, GERD, history of esophageal candida.     She was hospitalized at [facility] 5/11-5/12/22 for right back and lower right extremity pain. US evaluation 4/25 that was negative and showed right Baker's cyst as likely cause of pain. MRI of lumbar spine showed no acute fractures and diffuse degenerative changes including moderate to severe neural foraminal stenosis on the right at L4-L5 and on the left at L5-S1. UA was negative. She was admitted to hospital for TCU placement, as felt she is not able to tolerate pain. Was started on pain regimen. Additionally labs in hospital revealed low sodium of 128 and low potassium of 3.3. Discharged to U for physical rehabilitation and medical management.      Summary of recent hospitalization:  Hospitalized at [facility] from 5/15-5/23/22 for acute hypoxic respiratory failure, asthma exacerbation, possible CAP, possible candida esophagitis, new atrial fib, SIADH. She presented from [facility] to the ED for evaluation of nausea and weakness. Laboratory evaluation showed sodium 122, alk phos 162, WBC 12.3. CT abdomen pelvis revealed no acute findings. She received IV hydration. Work up consistent with SIADH as cause of hyponatremia. She was discharged on 1L fluid restriction daily. GI consulted due to ongoing nausea that improved, so EGD deferred. Treated empirically with nystatin due to history of candidal esophagitis. She developed hypoxia thought to be due to fluid overload with pulmonary congestion and asthma exacerbation. Chest xray showed small left sided pleural effusion with congestion. Then  "had rapid response episode and required BiPAP. Was found to be in atrial fib with RVR at this time. She was started on anticoagulation with apixaban. Troponin was negative.  ECHO showed EF 50-55%, left atrium is mildly dilated, mild (1+) mitral regurgitation, mild (1+) aortic regurgitation, ascending aorta is Mildly dilated, moderate left pleural effusion. Received IV diuresis. Chest xray repeated that showed few patchy opacities in left lower base that could be possible pneumonia vs atelectasis vs scarring. She was treated for possible pneumonia with ceftriaxone and azithromycin-discharged with orders to complete 1 more day of azithromycin and 2 days of cefuroxime to complete course. Also received xopenex nebs and steroids for asthma exacerbation.    Today's concern is: Seen today in TCU for routine follow up. Christina reports feeling much worse overall today when compared to last Friday. She reports worsening cough, mucus production. Increased SOB. She had a few saturation checks below 90% overnight, that came up to 90% after coughing. Denies CP. Denies fevers/ chills. She is afebrile. She has significant bilateral lower extremity swelling, increased from last week. Reports occasional loose stools 1-2 per day, which is improved from last week. Denies abdominal pain, nausea. She is eating and drinking ok. She denies dysuria, trouble voiding. Denies dizziness. She continues to work with therapy.     Allergies, and PMH/PSH reviewed in EPIC today.  REVIEW OF SYSTEMS:  6 point ROS of systems including Constitutional,  Respiratory, Cardiovascular, Gastroenterology, Genitourinary, Musculoskeletal were all negative except for pertinent positives noted in my HPI.    Objective:   /52   Pulse 99   Temp 97.7  F (36.5  C)   Resp 18   Ht 1.549 m (5' 1\")   Wt 66.5 kg (146 lb 8 oz)   LMP  (LMP Unknown)   SpO2 90%   BMI 27.68 kg/m    GENERAL APPEARANCE:  Alert, in no distress, frail  ENT:  Mouth and posterior oropharynx " normal, moist mucous membranes  EYES:  Conjunctiva and lids normal  RESP:  respiratory effort and palpation of chest normal, diminished breath sounds bilaterally, no wheezes or crackles heard  CV:  Palpation and auscultation of heart done , regular rate and rhythm  ABDOMEN: normal bowel sounds, soft, nontender, no hepatosplenomegaly or other masses  M/S:   2+ pitting edema to bilateral lower extremities  SKIN:  Inspection of skin and subcutaneous tissue baseline, Palpation of skin and subcutaneous tissue baseline  NEURO:   Cranial nerves 2-12 are normal tested and grossly at patient's baseline, Examination of sensation by touch normal  PSYCH:  oriented X 3, affect and mood normal    Labs done in SNF are in [facility]. Please refer to them using [employer]/Care Everywhere. and Recent labs in EPIC reviewed by me today.     Assessment/Plan:  (R06.02) SOB (shortness of breath)  (primary encounter diagnosis)  (M79.89) Leg swelling  (J45.901) Exacerbation of persistent asthma, unspecified asthma severity  Comment: inpatient was treated for CAP and pulmonary congestion.   -Received IV diuretics for fluid overload inpatient, as well as course of prednisone for asthma exacerbation. Started on azithromycin and cefuroxime for possible pneumonia and completed course at TCU on 5/25, but she refused last 2 doses of cefuroxime at TCU.   -she is afebrile and hemodynamically stable  -WBC 9.8 on 5/31  -clinically she had improved 6/27, now reports worsening SOB, cough, increased sputum production and thickness and with increase in edema to bilateral legs   -suspect asthma exacerbation as well as possible CHF exacerbation (EF inpatient 50-55%); doubt pneumonia due to recent antibiotic treatment, normal WBC, afebrile  Plan: STAT chest xray to evaluate for pulmonary congestion. STAT lasix 40 mg daily x3 days to start. Discontinue hydrochlorothiazide as below). STAT prednisone 40 mg daily x 5 days for asthma exacerbation. Change diet to  FRANCISCO. Daily weights. Notify provider with weight gain >2 lbs in a day, >5 lbs in on week. Continue TG shaper. Continue mucinex  mg BID for thick secretions. Continue levalbuterol neb at 10 PM and q4h PRN for SOB, thick secretions, cough. Continue tiotropium daily, advair BID, montelukast daily  IS 10 reps QID. Monitor symptoms and fluid status. Follow up with pulmonary per previous recommendations.     (J96.01) Acute respiratory failure with hypoxia (H)-resolved   Comment: secondary to pulmonary congestion, asthma exacerbation and possible CAP  -required oxygen and at one point BiPAP  -oxygen weaned off prior to discharge  -denies SOB today, oxygen saturations 90-92%, though did have drop to 86% overnight that improved with coughing  Plan: Monitor oxygen saturations and respiratory status. If oxygen saturations drop <90% start oxygen 2L via NC and update provider.    (I48.91) Atrial fibrillation with RVR (H)  Comment: suspected to be secondary to respiratory failure and beta agonists  -she also had episode of afib in 2017 in setting of sepsis  -was started on apixaban 5 mg BID in hospital and then discharged on 2.5 mg BID dosing   -increased eliquis to 5 mg BID at TCU as she does not meet criteria for reduced dose of 2.5 mg BID  -HR controlled 79, 99, 85,104  Plan: Continue apixaban 5 mg BID. Monitor HR and may need to add beta blocker if ongoing elevated HR.     (E22.2) SIADH (syndrome of inappropriate ADH production) (H)  (E87.1) Hyponatremia  Comment: sodium on admission on 5/20 was 122   -received IV hydration initially and sodium did improve  -sodium continues to improve to 132 on 5/31  Plan: Spoke to geriatric pharmacist today and she suspects hydrochlorothiazide is contributing to hyponatremia. Discontinue hydrochlorothiazide. BMP 5/31 Continue 1L fluid restriction.      (E87.6) Hypokalemia  Comment: resolved -likely due to loose stools that are improved   -potassium 4.7 on 5/31  -potassium discontinued  5/31  Plan: BMP 6/3.    (R19.5) Loose stools  Comment: improving- reports 1-2 stools per day, occasional loose stools  -c.diff negative 5/26/22  Plan: Monitor bowels. Start loperamide 2 mg TID PRN.     (B37.81) Candida esophagitis (H)-resolved  Comment: treated empirically possible candida esophagitis due to symptoms of nausea  -GI deferred EGD during hospitalization as nausea improved  -completed course of nystatin inpatient  -denies any nausea or further symptoms today  Plan: Monitor symptoms     (M54.50) Acute bilateral low back pain, unspecified whether sciatica present  (M79.604) Pain of right lower extremity  Comment: no reports of pain today  Plan: Continue tylenol 975 mg q8h PRN, oxycodone PRN. Monitor pain. Therapy as below     (I10) Essential hypertension  Comment: BP controlled 125/61, 112/52, 138/74; HR 86, 99, 79  Plan: Discontinue hydrochlorothiazide as above is likely contributing to hyponatremia. Start lasix 40 mg daily- first dose today. Continue amlodipine 5 mg at bedtime. Continue losartan 50 mg daily. BMP 6/3. VS per policy. Adjust medications as needed.     (Z86.79) History of cardiomyopathy  (I25.10) Coronary artery disease involving native coronary artery of native heart without angina pectoris  (E78.5) Dyslipidemia  Comment: chronic, with nonobstructive CAD, history of cardiomyopathy in 2017 when she was septic  - ECHO showed EF 50-55%, left atrium is mildly dilated, mild (1+) mitral regurgitation, mild (1+) aortic regurgitation, ascending aorta is Mildly dilated, moderate left pleural effusion  -coronary angiogram 2017 with mild to moderate CAD that is nonobstructive  -denies CP today  Plan: Continue atorvastatin 20 mg at bedtime. Follow up with cardiology per recommendations     (N18.31) Stage 3a chronic kidney disease (H)  Comment: chronic  -baseline creatinine 0.9-1.1  -creatinine 0.83 on 5/31  Plan: BMP 6/3 Avoid nephrotoxins. Renally dose medications as indicated.     (D64.9) Chronic  anemia  Comment: chronic  -hgb at baseline 11-12  -hgb 9.8 on 5/31/22  -is now on anticoagulation  Plan: CBC next week     (K21.9) Gastroesophageal reflux disease, unspecified whether esophagitis present  Comment: chronic  Plan: Continue omeprazole 10 mg daily.     (R53.81) Physical deconditioning  Comment: Acute, secondary to recent hospitalization, medical conditions as above  -continues to work with therapy  Plan: Encourage participation in physical therapy/occupational therapy for strengthening and deconditioning. Discharge planning per their recommendation. Social work to assist with d/c planning.     (I71.2) Thoracic aortic aneurysm without rupture (H)  Comment: ascending aorta is Mildly dilated per ECHO 5/19/22 the Aorta Diam: 4.0 cm, previous ECHO 8/26/21 showed Aorta Diam: 3.9 cm  Plan: Follow up outpatient     Total time spent with patient visit at the skilled nursing facility was 36 minutes including patient visit and review of past records, discussion with geriatric pharmacist regarding hyponatremia, hydrochlorothiazide and diuretic therapy. Greater than 50% of total time spent with counseling and coordinating care with facility staff including nursing as above in plan of care and with geriatric pharmacist as above, as well as counseling patient about medications and plan of care as outlined above.       Electronically signed by: TOREY Edmonds CNP

## 2022-06-01 ENCOUNTER — TRANSITIONAL CARE UNIT VISIT (OUTPATIENT)
Dept: GERIATRICS | Facility: CLINIC | Age: 87
End: 2022-06-01
Payer: COMMERCIAL

## 2022-06-01 DIAGNOSIS — Z86.79 HISTORY OF CARDIOMYOPATHY: ICD-10-CM

## 2022-06-01 DIAGNOSIS — E22.2 SIADH (SYNDROME OF INAPPROPRIATE ADH PRODUCTION) (H): ICD-10-CM

## 2022-06-01 DIAGNOSIS — D64.9 CHRONIC ANEMIA: ICD-10-CM

## 2022-06-01 DIAGNOSIS — M79.89 LEG SWELLING: ICD-10-CM

## 2022-06-01 DIAGNOSIS — E87.6 HYPOKALEMIA: ICD-10-CM

## 2022-06-01 DIAGNOSIS — R53.81 PHYSICAL DECONDITIONING: ICD-10-CM

## 2022-06-01 DIAGNOSIS — I10 ESSENTIAL HYPERTENSION: ICD-10-CM

## 2022-06-01 DIAGNOSIS — R19.5 LOOSE STOOLS: ICD-10-CM

## 2022-06-01 DIAGNOSIS — R06.02 SOB (SHORTNESS OF BREATH): Primary | ICD-10-CM

## 2022-06-01 DIAGNOSIS — J45.901 EXACERBATION OF PERSISTENT ASTHMA, UNSPECIFIED ASTHMA SEVERITY: ICD-10-CM

## 2022-06-01 DIAGNOSIS — E87.1 HYPONATREMIA: ICD-10-CM

## 2022-06-01 DIAGNOSIS — I48.91 ATRIAL FIBRILLATION WITH RVR (H): ICD-10-CM

## 2022-06-01 DIAGNOSIS — N18.31 STAGE 3A CHRONIC KIDNEY DISEASE (H): ICD-10-CM

## 2022-06-01 PROCEDURE — 99310 SBSQ NF CARE HIGH MDM 45: CPT | Performed by: NURSE PRACTITIONER

## 2022-06-01 RX ORDER — LOPERAMIDE HYDROCHLORIDE 2 MG/1
2 TABLET ORAL 3 TIMES DAILY PRN
Start: 2022-06-01 | End: 2022-11-14

## 2022-06-01 NOTE — LETTER
6/1/2022        RE: Genie Persaud  [address] [place] [address]        [facility] GERIATRICS    Chief Complaint   Patient presents with     RECHECK     HPI:  Genie Persaud is a 86 year old  (1935), who is being seen today for an episodic care visit at: [facility]  ([facility]) [[id number]].     PMH significant for HTN, atrial fibrillation, history of cardiomyopathy, nonobstructive CAD, dyslipidemia, CKD stage 3, chronic anemia, asthma, GERD, history of esophageal candida.     She was hospitalized at [facility] 5/11-5/12/22 for right back and lower right extremity pain. US evaluation 4/25 that was negative and showed right Baker's cyst as likely cause of pain. MRI of lumbar spine showed no acute fractures and diffuse degenerative changes including moderate to severe neural foraminal stenosis on the right at L4-L5 and on the left at L5-S1. UA was negative. She was admitted to hospital for TCU placement, as felt she is not able to tolerate pain. Was started on pain regimen. Additionally labs in hospital revealed low sodium of 128 and low potassium of 3.3. Discharged to [facility] for physical rehabilitation and medical management.      Summary of recent hospitalization:  Hospitalized at [facility] from 5/15-5/23/22 for acute hypoxic respiratory failure, asthma exacerbation, possible CAP, possible candida esophagitis, new atrial fib, SIADH. She presented from [facility] to the ED for evaluation of nausea and weakness. Laboratory evaluation showed sodium 122, alk phos 162, WBC 12.3. CT abdomen pelvis revealed no acute findings. She received IV hydration. Work up consistent with SIADH as cause of hyponatremia. She was discharged on 1L fluid restriction daily. GI consulted due to ongoing nausea that improved, so EGD deferred. Treated empirically with nystatin due to history of candidal esophagitis. She developed hypoxia thought to be due to fluid overload with pulmonary congestion and asthma  "exacerbation. Chest xray showed small left sided pleural effusion with congestion. Then had rapid response episode and required BiPAP. Was found to be in atrial fib with RVR at this time. She was started on anticoagulation with apixaban. Troponin was negative.  ECHO showed EF 50-55%, left atrium is mildly dilated, mild (1+) mitral regurgitation, mild (1+) aortic regurgitation, ascending aorta is Mildly dilated, moderate left pleural effusion. Received IV diuresis. Chest xray repeated that showed few patchy opacities in left lower base that could be possible pneumonia vs atelectasis vs scarring. She was treated for possible pneumonia with ceftriaxone and azithromycin-discharged with orders to complete 1 more day of azithromycin and 2 days of cefuroxime to complete course. Also received xopenex nebs and steroids for asthma exacerbation.    Today's concern is: Seen today in TCU for routine follow up. Christina reports feeling much worse overall today when compared to last Friday. She reports worsening cough, mucus production. Increased SOB. She had a few saturation checks below 90% overnight, that came up to 90% after coughing. Denies CP. Denies fevers/ chills. She is afebrile. She has significant bilateral lower extremity swelling, increased from last week. Reports occasional loose stools 1-2 per day, which is improved from last week. Denies abdominal pain, nausea. She is eating and drinking ok. She denies dysuria, trouble voiding. Denies dizziness. She continues to work with therapy.     Allergies, and PMH/PSH reviewed in EPIC today.  REVIEW OF SYSTEMS:  6 point ROS of systems including Constitutional,  Respiratory, Cardiovascular, Gastroenterology, Genitourinary, Musculoskeletal were all negative except for pertinent positives noted in my HPI.    Objective:   /52   Pulse 99   Temp 97.7  F (36.5  C)   Resp 18   Ht 1.549 m (5' 1\")   Wt 66.5 kg (146 lb 8 oz)   LMP  (LMP Unknown)   SpO2 90%   BMI 27.68 kg/m  "   GENERAL APPEARANCE:  Alert, in no distress, frail  ENT:  Mouth and posterior oropharynx normal, moist mucous membranes  EYES:  Conjunctiva and lids normal  RESP:  respiratory effort and palpation of chest normal, diminished breath sounds bilaterally, no wheezes or crackles heard  CV:  Palpation and auscultation of heart done , regular rate and rhythm  ABDOMEN: normal bowel sounds, soft, nontender, no hepatosplenomegaly or other masses  M/S:   2+ pitting edema to bilateral lower extremities  SKIN:  Inspection of skin and subcutaneous tissue baseline, Palpation of skin and subcutaneous tissue baseline  NEURO:   Cranial nerves 2-12 are normal tested and grossly at patient's baseline, Examination of sensation by touch normal  PSYCH:  oriented X 3, affect and mood normal    Labs done in SNF are in [facility]. Please refer to them using [employer]/Care Everywhere. and Recent labs in EPIC reviewed by me today.     Assessment/Plan:  (R06.02) SOB (shortness of breath)  (primary encounter diagnosis)  (M79.89) Leg swelling  (J45.901) Exacerbation of persistent asthma, unspecified asthma severity  Comment: inpatient was treated for CAP and pulmonary congestion.   -Received IV diuretics for fluid overload inpatient, as well as course of prednisone for asthma exacerbation. Started on azithromycin and cefuroxime for possible pneumonia and completed course at TCU on 5/25, but she refused last 2 doses of cefuroxime at TCU.   -she is afebrile and hemodynamically stable  -WBC 9.8 on 5/31  -clinically she had improved 6/27, now reports worsening SOB, cough, increased sputum production and thickness and with increase in edema to bilateral legs   -suspect asthma exacerbation as well as possible CHF exacerbation (EF inpatient 50-55%); doubt pneumonia due to recent antibiotic treatment, normal WBC, afebrile  Plan: STAT chest xray to evaluate for pulmonary congestion. STAT lasix 40 mg daily x3 days to start. Discontinue hydrochlorothiazide  as below). STAT prednisone 40 mg daily x 5 days for asthma exacerbation. Change diet to FRANCISCO. Daily weights. Notify provider with weight gain >2 lbs in a day, >5 lbs in on week. Continue TG shaper. Continue mucinex  mg BID for thick secretions. Continue levalbuterol neb at 10 PM and q4h PRN for SOB, thick secretions, cough. Continue tiotropium daily, advair BID, montelukast daily  IS 10 reps QID. Monitor symptoms and fluid status. Follow up with pulmonary per previous recommendations.     (J96.01) Acute respiratory failure with hypoxia (H)-resolved   Comment: secondary to pulmonary congestion, asthma exacerbation and possible CAP  -required oxygen and at one point BiPAP  -oxygen weaned off prior to discharge  -denies SOB today, oxygen saturations 90-92%, though did have drop to 86% overnight that improved with coughing  Plan: Monitor oxygen saturations and respiratory status. If oxygen saturations drop <90% start oxygen 2L via NC and update provider.    (I48.91) Atrial fibrillation with RVR (H)  Comment: suspected to be secondary to respiratory failure and beta agonists  -she also had episode of afib in 2017 in setting of sepsis  -was started on apixaban 5 mg BID in hospital and then discharged on 2.5 mg BID dosing   -increased eliquis to 5 mg BID at TCU as she does not meet criteria for reduced dose of 2.5 mg BID  -HR controlled 79, 99, 85,104  Plan: Continue apixaban 5 mg BID. Monitor HR and may need to add beta blocker if ongoing elevated HR.     (E22.2) SIADH (syndrome of inappropriate ADH production) (H)  (E87.1) Hyponatremia  Comment: sodium on admission on 5/20 was 122   -received IV hydration initially and sodium did improve  -sodium continues to improve to 132 on 5/31  Plan: Spoke to geriatric pharmacist today and she suspects hydrochlorothiazide is contributing to hyponatremia. Discontinue hydrochlorothiazide. BMP 5/31 Continue 1L fluid restriction.      (E87.6) Hypokalemia  Comment: resolved -likely  due to loose stools that are improved   -potassium 4.7 on 5/31  -potassium discontinued 5/31  Plan: BMP 6/3.    (R19.5) Loose stools  Comment: improving- reports 1-2 stools per day, occasional loose stools  -c.diff negative 5/26/22  Plan: Monitor bowels. Start loperamide 2 mg TID PRN.     (B37.81) Candida esophagitis (H)-resolved  Comment: treated empirically possible candida esophagitis due to symptoms of nausea  -GI deferred EGD during hospitalization as nausea improved  -completed course of nystatin inpatient  -denies any nausea or further symptoms today  Plan: Monitor symptoms     (M54.50) Acute bilateral low back pain, unspecified whether sciatica present  (M79.604) Pain of right lower extremity  Comment: no reports of pain today  Plan: Continue tylenol 975 mg q8h PRN, oxycodone PRN. Monitor pain. Therapy as below     (I10) Essential hypertension  Comment: BP controlled 125/61, 112/52, 138/74; HR 86, 99, 79  Plan: Discontinue hydrochlorothiazide as above is likely contributing to hyponatremia. Start lasix 40 mg daily- first dose today. Continue amlodipine 5 mg at bedtime. Continue losartan 50 mg daily. BMP 6/3. VS per policy. Adjust medications as needed.     (Z86.79) History of cardiomyopathy  (I25.10) Coronary artery disease involving native coronary artery of native heart without angina pectoris  (E78.5) Dyslipidemia  Comment: chronic, with nonobstructive CAD, history of cardiomyopathy in 2017 when she was septic  - ECHO showed EF 50-55%, left atrium is mildly dilated, mild (1+) mitral regurgitation, mild (1+) aortic regurgitation, ascending aorta is Mildly dilated, moderate left pleural effusion  -coronary angiogram 2017 with mild to moderate CAD that is nonobstructive  -denies CP today  Plan: Continue atorvastatin 20 mg at bedtime. Follow up with cardiology per recommendations     (N18.31) Stage 3a chronic kidney disease (H)  Comment: chronic  -baseline creatinine 0.9-1.1  -creatinine 0.83 on  5/31  Plan: BMP 6/3 Avoid nephrotoxins. Renally dose medications as indicated.     (D64.9) Chronic anemia  Comment: chronic  -hgb at baseline 11-12  -hgb 9.8 on 5/31/22  -is now on anticoagulation  Plan: CBC next week     (K21.9) Gastroesophageal reflux disease, unspecified whether esophagitis present  Comment: chronic  Plan: Continue omeprazole 10 mg daily.     (R53.81) Physical deconditioning  Comment: Acute, secondary to recent hospitalization, medical conditions as above  -continues to work with therapy  Plan: Encourage participation in physical therapy/occupational therapy for strengthening and deconditioning. Discharge planning per their recommendation. Social work to assist with d/c planning.     (I71.2) Thoracic aortic aneurysm without rupture (H)  Comment: ascending aorta is Mildly dilated per ECHO 5/19/22 the Aorta Diam: 4.0 cm, previous ECHO 8/26/21 showed Aorta Diam: 3.9 cm  Plan: Follow up outpatient     Total time spent with patient visit at the skilled nursing facility was 36 minutes including patient visit and review of past records, discussion with geriatric pharmacist regarding hyponatremia, hydrochlorothiazide and diuretic therapy. Greater than 50% of total time spent with counseling and coordinating care with facility staff including nursing as above in plan of care and with geriatric pharmacist as above, as well as counseling patient about medications and plan of care as outlined above.       Electronically signed by: TOREY Edmonds CNP             Sincerely,        TOREY Edmonds CNP

## 2022-06-02 ENCOUNTER — LAB REQUISITION (OUTPATIENT)
Dept: LAB | Facility: CLINIC | Age: 87
End: 2022-06-02

## 2022-06-02 DIAGNOSIS — U07.1 COVID-19: ICD-10-CM

## 2022-06-02 DIAGNOSIS — E87.6 HYPOKALEMIA: ICD-10-CM

## 2022-06-02 PROCEDURE — U0005 INFEC AGEN DETEC AMPLI PROBE: HCPCS | Performed by: NURSE PRACTITIONER

## 2022-06-03 ENCOUNTER — TELEPHONE (OUTPATIENT)
Dept: GERIATRICS | Facility: CLINIC | Age: 87
End: 2022-06-03
Payer: COMMERCIAL

## 2022-06-03 ENCOUNTER — APPOINTMENT (OUTPATIENT)
Dept: GENERAL RADIOLOGY | Facility: CLINIC | Age: 87
End: 2022-06-03
Attending: EMERGENCY MEDICINE
Payer: COMMERCIAL

## 2022-06-03 ENCOUNTER — TRANSITIONAL CARE UNIT VISIT (OUTPATIENT)
Dept: GERIATRICS | Facility: CLINIC | Age: 87
End: 2022-06-03
Payer: COMMERCIAL

## 2022-06-03 ENCOUNTER — APPOINTMENT (OUTPATIENT)
Dept: CT IMAGING | Facility: CLINIC | Age: 87
End: 2022-06-03
Attending: PHYSICIAN ASSISTANT
Payer: COMMERCIAL

## 2022-06-03 ENCOUNTER — HOSPITAL ENCOUNTER (OUTPATIENT)
Facility: CLINIC | Age: 87
Setting detail: OBSERVATION
Discharge: NURSING, REHAB OR LONG-TERM CARE | End: 2022-06-04
Attending: EMERGENCY MEDICINE | Admitting: HOSPITALIST
Payer: COMMERCIAL

## 2022-06-03 VITALS
HEART RATE: 88 BPM | RESPIRATION RATE: 18 BRPM | OXYGEN SATURATION: 93 % | WEIGHT: 149 LBS | HEIGHT: 61 IN | DIASTOLIC BLOOD PRESSURE: 69 MMHG | BODY MASS INDEX: 28.13 KG/M2 | TEMPERATURE: 97.6 F | SYSTOLIC BLOOD PRESSURE: 126 MMHG

## 2022-06-03 DIAGNOSIS — R06.02 SOB (SHORTNESS OF BREATH): ICD-10-CM

## 2022-06-03 DIAGNOSIS — I48.91 ATRIAL FIBRILLATION WITH RVR (H): ICD-10-CM

## 2022-06-03 DIAGNOSIS — M79.89 LEG SWELLING: ICD-10-CM

## 2022-06-03 DIAGNOSIS — J18.9 PNEUMONIA OF LEFT LOWER LOBE DUE TO INFECTIOUS ORGANISM: Primary | ICD-10-CM

## 2022-06-03 DIAGNOSIS — R60.0 PERIPHERAL EDEMA: ICD-10-CM

## 2022-06-03 DIAGNOSIS — I50.9 ACUTE CONGESTIVE HEART FAILURE, UNSPECIFIED HEART FAILURE TYPE (H): ICD-10-CM

## 2022-06-03 DIAGNOSIS — J90 PLEURAL EFFUSION: ICD-10-CM

## 2022-06-03 DIAGNOSIS — J45.901 EXACERBATION OF PERSISTENT ASTHMA, UNSPECIFIED ASTHMA SEVERITY: ICD-10-CM

## 2022-06-03 LAB
ANION GAP SERPL CALCULATED.3IONS-SCNC: 6 MMOL/L (ref 3–14)
ANION GAP SERPL CALCULATED.3IONS-SCNC: 9 MMOL/L (ref 3–14)
ATRIAL RATE - MUSE: 227 BPM
BASOPHILS # BLD AUTO: 0 10E3/UL (ref 0–0.2)
BASOPHILS NFR BLD AUTO: 0 %
BUN SERPL-MCNC: 26 MG/DL (ref 7–30)
BUN SERPL-MCNC: 27 MG/DL (ref 7–30)
CALCIUM SERPL-MCNC: 8.9 MG/DL (ref 8.5–10.1)
CALCIUM SERPL-MCNC: 9 MG/DL (ref 8.5–10.1)
CHLORIDE BLD-SCNC: 96 MMOL/L (ref 94–109)
CHLORIDE BLD-SCNC: 97 MMOL/L (ref 94–109)
CO2 SERPL-SCNC: 29 MMOL/L (ref 20–32)
CO2 SERPL-SCNC: 31 MMOL/L (ref 20–32)
CREAT SERPL-MCNC: 0.78 MG/DL (ref 0.52–1.04)
CREAT SERPL-MCNC: 0.85 MG/DL (ref 0.52–1.04)
DIASTOLIC BLOOD PRESSURE - MUSE: NORMAL MMHG
EOSINOPHIL # BLD AUTO: 0 10E3/UL (ref 0–0.7)
EOSINOPHIL NFR BLD AUTO: 0 %
ERYTHROCYTE [DISTWIDTH] IN BLOOD BY AUTOMATED COUNT: 13.9 % (ref 10–15)
GFR SERPL CREATININE-BSD FRML MDRD: 67 ML/MIN/1.73M2
GFR SERPL CREATININE-BSD FRML MDRD: 74 ML/MIN/1.73M2
GLUCOSE BLD-MCNC: 100 MG/DL (ref 70–99)
GLUCOSE BLD-MCNC: 103 MG/DL (ref 70–99)
HCT VFR BLD AUTO: 27.8 % (ref 35–47)
HGB BLD-MCNC: 9 G/DL (ref 11.7–15.7)
IMM GRANULOCYTES # BLD: 0.1 10E3/UL
IMM GRANULOCYTES NFR BLD: 1 %
INTERPRETATION ECG - MUSE: NORMAL
LYMPHOCYTES # BLD AUTO: 1 10E3/UL (ref 0.8–5.3)
LYMPHOCYTES NFR BLD AUTO: 10 %
MCH RBC QN AUTO: 29.4 PG (ref 26.5–33)
MCHC RBC AUTO-ENTMCNC: 32.4 G/DL (ref 31.5–36.5)
MCV RBC AUTO: 91 FL (ref 78–100)
MONOCYTES # BLD AUTO: 1.3 10E3/UL (ref 0–1.3)
MONOCYTES NFR BLD AUTO: 12 %
NEUTROPHILS # BLD AUTO: 8.4 10E3/UL (ref 1.6–8.3)
NEUTROPHILS NFR BLD AUTO: 77 %
NRBC # BLD AUTO: 0 10E3/UL
NRBC BLD AUTO-RTO: 0 /100
NT-PROBNP SERPL-MCNC: 5652 PG/ML (ref 0–1800)
P AXIS - MUSE: NORMAL DEGREES
PLATELET # BLD AUTO: 366 10E3/UL (ref 150–450)
POTASSIUM BLD-SCNC: 3.8 MMOL/L (ref 3.4–5.3)
POTASSIUM BLD-SCNC: 4 MMOL/L (ref 3.4–5.3)
PR INTERVAL - MUSE: NORMAL MS
QRS DURATION - MUSE: 90 MS
QT - MUSE: 364 MS
QTC - MUSE: 442 MS
R AXIS - MUSE: 14 DEGREES
RBC # BLD AUTO: 3.06 10E6/UL (ref 3.8–5.2)
SARS-COV-2 RNA RESP QL NAA+PROBE: NEGATIVE
SARS-COV-2 RNA RESP QL NAA+PROBE: NEGATIVE
SODIUM SERPL-SCNC: 133 MMOL/L (ref 133–144)
SODIUM SERPL-SCNC: 135 MMOL/L (ref 133–144)
SYSTOLIC BLOOD PRESSURE - MUSE: NORMAL MMHG
T AXIS - MUSE: 153 DEGREES
VENTRICULAR RATE- MUSE: 89 BPM
WBC # BLD AUTO: 10.8 10E3/UL (ref 4–11)

## 2022-06-03 PROCEDURE — 83880 ASSAY OF NATRIURETIC PEPTIDE: CPT | Performed by: EMERGENCY MEDICINE

## 2022-06-03 PROCEDURE — 96374 THER/PROPH/DIAG INJ IV PUSH: CPT | Mod: XU

## 2022-06-03 PROCEDURE — 85025 COMPLETE CBC W/AUTO DIFF WBC: CPT | Performed by: EMERGENCY MEDICINE

## 2022-06-03 PROCEDURE — 93005 ELECTROCARDIOGRAM TRACING: CPT

## 2022-06-03 PROCEDURE — 71250 CT THORAX DX C-: CPT

## 2022-06-03 PROCEDURE — 99285 EMERGENCY DEPT VISIT HI MDM: CPT | Mod: 25

## 2022-06-03 PROCEDURE — G0378 HOSPITAL OBSERVATION PER HR: HCPCS

## 2022-06-03 PROCEDURE — 99220 PR INITIAL OBSERVATION CARE,LEVEL III: CPT | Performed by: PHYSICIAN ASSISTANT

## 2022-06-03 PROCEDURE — C9803 HOPD COVID-19 SPEC COLLECT: HCPCS

## 2022-06-03 PROCEDURE — 82374 ASSAY BLOOD CARBON DIOXIDE: CPT | Performed by: EMERGENCY MEDICINE

## 2022-06-03 PROCEDURE — U0003 INFECTIOUS AGENT DETECTION BY NUCLEIC ACID (DNA OR RNA); SEVERE ACUTE RESPIRATORY SYNDROME CORONAVIRUS 2 (SARS-COV-2) (CORONAVIRUS DISEASE [COVID-19]), AMPLIFIED PROBE TECHNIQUE, MAKING USE OF HIGH THROUGHPUT TECHNOLOGIES AS DESCRIBED BY CMS-2020-01-R: HCPCS | Performed by: EMERGENCY MEDICINE

## 2022-06-03 PROCEDURE — 36415 COLL VENOUS BLD VENIPUNCTURE: CPT | Performed by: NURSE PRACTITIONER

## 2022-06-03 PROCEDURE — 36415 COLL VENOUS BLD VENIPUNCTURE: CPT | Performed by: EMERGENCY MEDICINE

## 2022-06-03 PROCEDURE — P9604 ONE-WAY ALLOW PRORATED TRIP: HCPCS | Performed by: NURSE PRACTITIONER

## 2022-06-03 PROCEDURE — 82310 ASSAY OF CALCIUM: CPT | Performed by: NURSE PRACTITIONER

## 2022-06-03 PROCEDURE — 71046 X-RAY EXAM CHEST 2 VIEWS: CPT

## 2022-06-03 PROCEDURE — 80051 ELECTROLYTE PANEL: CPT | Performed by: EMERGENCY MEDICINE

## 2022-06-03 PROCEDURE — 250N000011 HC RX IP 250 OP 636: Performed by: EMERGENCY MEDICINE

## 2022-06-03 PROCEDURE — 99310 SBSQ NF CARE HIGH MDM 45: CPT | Performed by: NURSE PRACTITIONER

## 2022-06-03 RX ORDER — AMOXICILLIN 250 MG
1-2 CAPSULE ORAL 2 TIMES DAILY PRN
Status: DISCONTINUED | OUTPATIENT
Start: 2022-06-03 | End: 2022-06-04 | Stop reason: HOSPADM

## 2022-06-03 RX ORDER — PREDNISONE 20 MG/1
40 TABLET ORAL DAILY
Start: 2022-06-03 | End: 2022-06-06

## 2022-06-03 RX ORDER — FUROSEMIDE 10 MG/ML
40 INJECTION INTRAMUSCULAR; INTRAVENOUS DAILY
Status: DISCONTINUED | OUTPATIENT
Start: 2022-06-04 | End: 2022-06-04 | Stop reason: HOSPADM

## 2022-06-03 RX ORDER — LEVOFLOXACIN 500 MG/1
500 TABLET, FILM COATED ORAL DAILY
COMMUNITY
End: 2022-06-06

## 2022-06-03 RX ORDER — LEVALBUTEROL INHALATION SOLUTION 0.63 MG/3ML
1 SOLUTION RESPIRATORY (INHALATION) AT BEDTIME
COMMUNITY
End: 2022-11-14

## 2022-06-03 RX ORDER — FUROSEMIDE 40 MG
40 TABLET ORAL DAILY
Status: ON HOLD
Start: 2022-06-03 | End: 2022-06-03

## 2022-06-03 RX ORDER — FUROSEMIDE 10 MG/ML
60 INJECTION INTRAMUSCULAR; INTRAVENOUS ONCE
Status: COMPLETED | OUTPATIENT
Start: 2022-06-03 | End: 2022-06-03

## 2022-06-03 RX ORDER — LIDOCAINE 40 MG/G
CREAM TOPICAL
Status: DISCONTINUED | OUTPATIENT
Start: 2022-06-03 | End: 2022-06-04 | Stop reason: HOSPADM

## 2022-06-03 RX ORDER — LOSARTAN POTASSIUM 50 MG/1
50 TABLET ORAL DAILY
Start: 2022-06-03 | End: 2022-06-08

## 2022-06-03 RX ORDER — ACETAMINOPHEN 325 MG/1
650 TABLET ORAL EVERY 4 HOURS PRN
Status: DISCONTINUED | OUTPATIENT
Start: 2022-06-03 | End: 2022-06-04 | Stop reason: HOSPADM

## 2022-06-03 RX ADMIN — FUROSEMIDE 60 MG: 10 INJECTION, SOLUTION INTRAMUSCULAR; INTRAVENOUS at 12:51

## 2022-06-03 ASSESSMENT — ENCOUNTER SYMPTOMS
SHORTNESS OF BREATH: 1
FEVER: 0
COUGH: 1

## 2022-06-03 NOTE — PROGRESS NOTES
[facility] GERIATRICS    Chief Complaint   Patient presents with     Nursing Home Acute     HPI:  Genie Persaud is a 86 year old  (1935), who is being seen today for an episodic care visit at: [facility]  ([facility]) [[id number]].     PMH significant for HTN, atrial fibrillation, history of cardiomyopathy, nonobstructive CAD, dyslipidemia, CKD stage 3, chronic anemia, asthma, GERD, history of esophageal candida.     She was hospitalized at [facility] 5/11-5/12/22 for right back and lower right extremity pain. US evaluation 4/25 that was negative and showed right Baker's cyst as likely cause of pain. MRI of lumbar spine showed no acute fractures and diffuse degenerative changes including moderate to severe neural foraminal stenosis on the right at L4-L5 and on the left at L5-S1. UA was negative. She was admitted to hospital for TCU placement, as felt she is not able to tolerate pain. Was started on pain regimen. Additionally labs in hospital revealed low sodium of 128 and low potassium of 3.3. Discharged to U for physical rehabilitation and medical management.      Summary of recent hospitalization:  Hospitalized at [facility] from 5/15-5/23/22 for acute hypoxic respiratory failure, asthma exacerbation, possible CAP, possible candida esophagitis, new atrial fib, SIADH. She presented from [facility] to the ED for evaluation of nausea and weakness. Laboratory evaluation showed sodium 122, alk phos 162, WBC 12.3. CT abdomen pelvis revealed no acute findings. She received IV hydration. Work up consistent with SIADH as cause of hyponatremia. She was discharged on 1L fluid restriction daily. GI consulted due to ongoing nausea that improved, so EGD deferred. Treated empirically with nystatin due to history of candidal esophagitis. She developed hypoxia thought to be due to fluid overload with pulmonary congestion and asthma exacerbation. Chest xray showed small left sided pleural effusion with  congestion. Then had rapid response episode and required BiPAP. Was found to be in atrial fib with RVR at this time. She was started on anticoagulation with apixaban. Troponin was negative.  ECHO showed EF 50-55%, left atrium is mildly dilated, mild (1+) mitral regurgitation, mild (1+) aortic regurgitation, ascending aorta is Mildly dilated, moderate left pleural effusion. Received IV diuresis. Chest xray repeated that showed few patchy opacities in left lower base that could be possible pneumonia vs atelectasis vs scarring. She was treated for possible pneumonia with ceftriaxone and azithromycin-discharged with orders to complete 1 more day of azithromycin and 2 days of cefuroxime to complete course. Also received xopenex nebs and steroids for asthma exacerbation.     Today's concern is:   Seen today for episodic follow up at TCU. Xray results back last evening that showed 1) cardiomegaly and left pleural effusion. 2) Mildly extensive left lower lobe pneumonia. She was started on levaquin by on call provider last evening. Reports today ongoing SOB, cough. Does not feel any better. Swelling to legs much worse- 4+ swelling. Her legs feel heavy. She would like them wrapped so I did ask therapy to do this today. Denies CP, dizziness. Reports 1-2 loose stools per day. Denies dysuria, trouble voiding. Reports trouble sleeping due to prednisone. Continues to work with therapy. Is very concerned as therapy has planned LCD on 6/6 with discharge home on 6/7- she feels she cannot safely return home with these worsening health issues. She remains afebrile. HR is elevated at times. She is hemodynamically stable    Allergies, and PMH/PSH reviewed in [facility] today.  REVIEW OF SYSTEMS:  7 point ROS of systems including Constitutional, Respiratory, Cardiovascular, Gastroenterology, Genitourinary, Musculoskeletal, Psychiatric were all negative except for pertinent positives noted in my HPI.    Objective:   /69   Pulse 88   Temp  "97.6  F (36.4  C)   Resp 18   Ht 1.549 m (5' 1\")   Wt 67.6 kg (149 lb)   LMP  (LMP Unknown)   SpO2 93%   BMI 28.15 kg/m    GENERAL APPEARANCE:  Alert, in no distress, oriented  EYES:  conjunctiva and lids normal  RESP:  respiratory effort and palpation of chest normal, diminished breath sounds bilaterally with some air exchange, no wheezes or crackles- lungs sound the same as last time I saw her  CV:  Palpation and auscultation of heart done , regular rate and rhythm  ABDOMEN:  bowel sounds normal  M/S:  4+ pitting edema  NEURO:   Cranial nerves 2-12 are normal tested and grossly at patient's baseline, Examination of sensation by touch normal  PSYCH:  oriented X 3    Labs done in SNF are in [facility]. Please refer to them using [employer]/Care Everywhere. and Recent labs in [facility] reviewed by me today.     Assessment/Plan:  (J18.9) Pneumonia of left lower lobe due to infectious organism  (primary encounter diagnosis)  (J90) Pleural effusion  (R06.02) SOB (shortness of breath)  (M79.89) Leg swelling  (J45.901) Exacerbation of persistent asthma, unspecified asthma severity  (I48.91) Atrial fibrillation with RVR (H)  Comment:  Acute, overall with worsening of symptoms and no improvement since prednisone and lasix started on 6/1  -legs significantly more edematous today  -continues to have SOB, cough   -with chest xray resulted that showed extensive LLL pneumonia, which when compared to hospital chest xray from 5/19 is worse despite recent antibiotic administration  -she is afebrile, oxygen saturations in low 90% on RA, has a few higher HR at times, but is hemodynamically stable  Plan: Discussed with physician partner Dr. Butts today. Send to ED for further evaluation and treatment. As unable to aggressively diurese going into the weekend, and unable to check labs. Also needs further evaluation due to worsening chest xray despite recent antibiotics. She is agreeable to visit to ED. Nursing staff updated. I had asked " therapy to start lymph therapy and wrap her legs prior to sending her in, which they were able to do.    Total time spent with patient visit at the skilled nursing facility was 37 minutes including patient visit, review of past records. Greater than 50% of total time spent with counseling and coordinating care including discussion with physician partner, discussion with multiple nursing staff and therapy, as well as multiple visits to talk to patient as discussed in plan of care above.    Electronically signed by: TOREY Edmonds CNP

## 2022-06-03 NOTE — ED PROVIDER NOTES
History   Chief Complaint:  Leg Swelling       The history is provided by the patient and the EMS personnel.      Genie Persaud is a 86 year old female with history of A fib, anemia, CVA and hypertension who presents with leg swelling and shortness of breath. Patient is coming from [facility] after being in the ED from 5/15-5/23 for community acquired pneumonia, respiratory failure, new-onset a fib and SIADH. She was ready for discharge, however her xrays came back with remaining pneumonia. Patient notes she began experiencing leg swelling over the last couple days with no prior swelling to discharge on the 23rd. Patient also has shortness of breath and is currently in a fib for which she is on Eliquis (started during the last hospitalization). Patient notes she feels better than she did previously despite the significant leg swelling and lingering pneumonia, but her shortness of breath (which she attributes predominantly to asthma) is worse than normal. Confirms her cough (also attributed to asthma) is worse than normal and she notes she is afebrile. Genie is on Lasix 40 mg of which she started a couple days ago.     Patient was given Levaquin last night.    Review of Systems   Constitutional: Negative for fever.   Respiratory: Positive for cough (Asthma) and shortness of breath.    Cardiovascular: Positive for leg swelling.   All other systems reviewed and are negative.      Allergies:  Fosamax [Alendronic Acid]  Contrast Dye  Evista [Raloxifene]  Flu Virus Vaccine  Amoxicillin    Medications:  albuterol (PROAIR HFA/PROVENTIL HFA/VENTOLIN HFA) 108 (90 Base) MCG/ACT inhaler  apixaban ANTICOAGULANT (ELIQUIS) 5 MG tablet  atorvastatin (LIPITOR) 20 MG tablet  fluticasone-salmeterol (ADVAIR-HFA) 230-21 MCG/ACT inhaler  furosemide (LASIX) 40 MG tablet  guaiFENesin (MUCINEX) 600 MG 12 hr tablet  latanoprost (XALATAN) 0.005 % ophthalmic solution  levalbuterol (XOPENEX) 0.63 MG/3ML neb solution  loperamide  "(IMODIUM A-D) 2 MG tablet  loratadine (CLARITIN) 10 MG tablet  losartan (COZAAR) 50 MG tablet  montelukast (SINGULAIR) 10 MG tablet  omeprazole (PRILOSEC) 10 MG DR capsule  ondansetron (ZOFRAN) 4 MG tablet  oxyCODONE (ROXICODONE) 5 MG tablet  polyethylene glycol (MIRALAX) 17 GM/Dose powder  predniSONE (DELTASONE) 20 MG tablet  tiotropium (SPIRIVA RESPIMAT) 2.5 MCG/ACT inhaler    Past Medical History:     Anemia  Basal cell carcinoma   CAD  Hypertension  Hyperlipidemia   Asthma  Nonrheumatic aortic valve insufficiency   Osteopenia  Paroxysmal a-fib  Pulmonary nodule   Stress-induced cardiopathy  Stroke  Thoracic aortic aneurysm   Vasculitis    Past Surgical History:    Appendectomy   C stereotactic breast biopsy   Cholecystectomy, laparoscopic   Dilation of curettage (x2)  Esophagoscopy, gastroscopy, duodenoscopy  Tonsillectomy  Tubal ligation NOS     Family History:    Mother - hypertension, osteoporosis, CAD  Father - Connective tissue disorder    Social History:  Arrived in the ED via EMS.   [facility].     Physical Exam     Patient Vitals for the past 24 hrs:   BP Temp Temp src Pulse Resp SpO2 Height Weight   06/03/22 1113 (!) 140/67 97.8  F (36.6  C) Oral 88 26 97 % 1.549 m (5' 1\") 64.9 kg (143 lb)       Physical Exam   General/Appearance: appears stated age, well-groomed, appears comfortable but SOB with minimal exertion  Eyes: EOMI, no scleral injection, no icterus  ENT: MMM  Neck: supple, nl ROM, no stiffness  Cardiovascular: irregularly irregular, nl S1S2, no m/r/g, 2+ pulses in all 4 extremities, cap refill <2sec  Respiratory: CTAB, good air movement throughout, no wheezes/rhonchi/rales, no increased WOB, no retractions  GI: abd soft, non-distended, nttp,  no HSM, no rebound, no guarding, nl BS  MSK: BAUTISTA, good tone, no bony abnormality  Skin: warm and well-perfused, no rash, 3+ pitting bilateral edema distal to knees, no ecchymosis, nl turgor  Neuro: GCS 15, alert and oriented, no " gross focal neuro deficits  Psych: interacts appropriately  Heme: no petechia, no purpura, no active bleeding        Emergency Department Course   ECG  ECG taken at 1131, ECG read at 1135  Atrial fibrillation with premature ventricular or aberrantly conducted complexes. Nonspecific ST and T wave abnormality.    Rate 89 bpm. MT interval * ms. QRS duration 90 ms. QT/QTc 364/442 ms. P-R-T axes * 14 153.     Imaging:  XR Chest 2 Views   Final Result   IMPRESSION: AP and lateral view of the chest were obtained. Stable   mild enlargement of the cardiac silhouette. Small left pleural   effusion and associated basilar atelectasis/consolidation. No   significant right pleural effusion. No significant pneumothorax.      VANDANA SOLARES MD            SYSTEM ID:  [id number]        Report per radiology    Laboratory:  Labs Ordered and Resulted from Time of ED Arrival to Time of ED Departure   CBC WITH PLATELETS AND DIFFERENTIAL - Abnormal       Result Value    WBC Count 10.8      RBC Count 3.06 (*)     Hemoglobin 9.0 (*)     Hematocrit 27.8 (*)     MCV 91      MCH 29.4      MCHC 32.4      RDW 13.9      Platelet Count 366      % Neutrophils 77      % Lymphocytes 10      % Monocytes 12      % Eosinophils 0      % Basophils 0      % Immature Granulocytes 1      NRBCs per 100 WBC 0      Absolute Neutrophils 8.4 (*)     Absolute Lymphocytes 1.0      Absolute Monocytes 1.3      Absolute Eosinophils 0.0      Absolute Basophils 0.0      Absolute Immature Granulocytes 0.1      Absolute NRBCs 0.0     NT PROBNP INPATIENT - Abnormal    N terminal Pro BNP Inpatient 5,652 (*)    BASIC METABOLIC PANEL - Abnormal    Sodium 133      Potassium 4.0      Chloride 96      Carbon Dioxide (CO2) 31      Anion Gap 6      Urea Nitrogen 26      Creatinine 0.85      Calcium 9.0      Glucose 100 (*)     GFR Estimate 67     COVID-19 VIRUS (CORONAVIRUS) BY PCR - Normal    SARS CoV2 PCR Negative          Emergency Department Course:         Reviewed:  I  reviewed nursing notes, vitals and past medical history    Assessments:  1103 I obtained history and examined the patient as noted above.   1259 I rechecked the patient and explained findings.       Consults:  1330 Dr Perry    Interventions:  1251 LASIX 60 mg, IV    Disposition:  The patient was admitted to the hospital under the care of Dr. Perry.     Impression & Plan     Medical Decision Making:  Patient is a pleasant 86-year-old female who recently diagnosed with A. fib, on requests, anemia, SIADH.  She presents today with shortness of breath and more so for increased bilateral lower extremity swelling.  She is got 3+ edema that she states is new since her discharge on May 23.  Respiratory standpoint she is comfortable on room air, without wheezing, rales, rhonchi.  I doubt PE.  I doubt ACS.  She does have an elevated BNP consistent with likely congestive heart failure.  This is consistent with a small pleural effusion seen on chest x-ray.  I think she would benefit from coming into the hospital for diuresis and possibly echo to help expedite care to maximize her cardiac functioning.  Patient is comfortable with this plan.  Of note she was just restarted on Levaquin by the rehab facility for what was diagnosed as a pneumonia on chest x-ray.  She is afebrile without white count, without significant shortness of breath so at this time I am not going to continue antibiotics.  Diagnosis:    ICD-10-CM    1. Acute congestive heart failure, unspecified heart failure type (H)  I50.9    2. Peripheral edema  R60.9        Scribe Disclosure:  I, MAK ESPOSITO, am serving as a scribe at 11:03 AM on 6/3/2022 to document services personally performed by Renetta Carver based on my observations and the provider's statements to me.            Renetta Carver MD  06/03/22 3246

## 2022-06-03 NOTE — ED NOTES
"[facility]  ED Nurse Handoff Report    Genie Persaud is a 86 year old female   ED Chief complaint: Leg Swelling  . ED Diagnosis:   Final diagnoses:   Acute congestive heart failure, unspecified heart failure type (H)   Peripheral edema     Allergies:   Allergies   Allergen Reactions     Fosamax [Alendronic Acid] GI Disturbance     Contrast Dye      Red arm redness and swelling      Evista [Raloxifene]      abd symptoms.      Flu Virus Vaccine      swollen and red at inj site     Amoxicillin Rash       Code Status: DNR / DNI  Activity level - Baseline/Home:  Assist X 1. Activity Level - Current:   Assist X 1. Lift room needed: No. Bariatric: No   Needed: No   Isolation: No. Infection: Not Applicable.     Vital Signs:   Vitals:    06/03/22 1113   BP: (!) 140/67   Pulse: 88   Resp: 26   Temp: 97.8  F (36.6  C)   TempSrc: Oral   SpO2: 97%   Weight: 64.9 kg (143 lb)   Height: 1.549 m (5' 1\")       Cardiac Rhythm:  ,      Pain level:    Patient confused: No. Patient Falls Risk: Yes.   Elimination Status: Has voided   Patient Report - Initial ComplaintGenie Persaud is a 86 year old female with history of A fib, anemia, CVA and hypertension who presents with leg swelling and shortness of breath. Patient is coming from [facility] rehab facility after being in the ED from 5/15-5/23 for community acquired pneumonia, respiratory failure, new-onset a fib and SIADH. She was ready for discharge, however her xrays came back with remaining pneumonia. Patient notes she began experiencing leg swelling over the last couple days with no prior swelling to discharge on the 23rd. Patient also has shortness of breath and is currently in a fib for which she is on Eliquis (started during the last hospitalization). Patient notes she feels better than she did previously despite the significant leg swelling and lingering pneumonia, but her shortness of breath (which she attributes predominantly to asthma) is worse than " normal. Confirms her cough (also attributed to asthma) is worse than normal and she notes she is afebrile. Genie is on Lasix 40 mg of which she started a couple days ago. : .   Tests Performed: refer to results review. Abnormal Results: refer to results review.   Treatments provided: refer to MAR  Family Comments: aware  OBS brochure/video discussed/provided to patient:  Yes  ED Medications:   Medications   furosemide (LASIX) injection 60 mg (60 mg Intravenous Given 6/3/22 1251)     Drips infusing:  No  For the majority of the shift, the patient's behavior Green. Interventions performed were n/a.    Sepsis treatment initiated: No     Patient tested for COVID 19 prior to admission: YES    ED Nurse Name/Phone Number: Corinna Oconnor RN,   2:10 PM    RECEIVING UNIT ED HANDOFF REVIEW    Above ED Nurse Handoff Report was reviewed: Yes  Reviewed by: Sloane Rosa RN on Marielle 3, 2022 at 2:45 PM

## 2022-06-03 NOTE — TELEPHONE ENCOUNTER
Nursing called to report a Xray that was taken last night.  Made sure that this was not addressed already during the daytime.    Extensive LLL pneumonia found.      Ordered Levaquin 500mg po daily x7 days as this sounds like she has been dealing with a pneumonia for some time.    Can start tonight or tomorrow with the ABX    Electronically signed by Anne-Marie Abebe RN, CNP

## 2022-06-03 NOTE — DISCHARGE INSTRUCTIONS
CHF Orders and Instructions for Nursing Home/TCU  1.  Have the patient get a scale to put in their room and then use at home.    2.  Post a weight chart or calendar in room next to the scale.    3.  Ask patient to weigh themselves daily first thing when they get up in the morning, before breakfast, with only their night gown on and record on the chart/calendar (if able).    4.  Record NH/TCU admission weight.    5.  Record daily am weight, with same clothes every day. If patient is in a wheelchair, note weight of wheel chair.     6.  Provide patient CHF education booklet and review with patient and family.    7.  Vital signs twice daily and prn. Check oxygen sats prn dyspnea.    8.  Apply Oxygen 2 or 3 liters/min by nasal cannula prn O2 Sat < 90%.     9.  Notify NP/MD:   If HR <50 bpm, SBP <90mmHg, or O2 Sat < 90%.   If weight is up more than 2 lbs. in one day or 5 lbs. in one week from their admission weight OR if patient has signs or symptoms of CHF, such as worsening dyspnea or leg edema.    10.  ACE-wraps or support stockings (knee high) to bilateral lower extremities prn edema. On in a.m. and off at HS.    11.  Diet: 2 gm sodium cardiac diet, with additional diet modifications if ordered elsewhere.    12.  Fluid restriction:  1500cc/day, if hospital discharge sodium < 134.    13.  Dietician: CHF education.    14.  PT/OT to Evaluate and Treat for deconditioning and CHF.    15.  Activity as tolerated.     16.  PT to notify RN if wheelchair equipment has been modified (change in weight should also be noted on the patients weight calendar).

## 2022-06-03 NOTE — PHARMACY-ADMISSION MEDICATION HISTORY
Admission medication history interview status for this patient is complete. See [facility] admission navigator for allergy information, prior to admission medications and immunization status.     Medication history interview done, indicate source(s): other  Medication history resources (including written lists, pill bottles, clinic record):[facility]  Pharmacy: -    Changes made to PTA medication list:  Added: Levaquin  Changed: none  Reported as Not Taking: none  Removed: furosemide (ordered daily for 2 days, last dose 6/3/22)    Actions taken by pharmacist (provider contacted, etc):None     Additional medication history information:None    Medication reconciliation/reorder completed by provider prior to medication history?  No   (Y/N)     ?      Prior to Admission medications    Medication Sig Last Dose Taking? Auth Provider   acetaminophen (TYLENOL) 325 MG tablet Take 975 mg by mouth every 8 hours as needed for mild pain 6/2/2022 at 2215 Yes Unknown, Entered By History   apixaban ANTICOAGULANT (ELIQUIS) 5 MG tablet Take 1 tablet (5 mg) by mouth 2 times daily 6/3/2022 at 0800 Yes Claudette Hearn APRN CNP   atorvastatin (LIPITOR) 20 MG tablet TAKE ONE TABLET BY MOUTH AT BEDTIME 6/2/2022 at 2000 Yes Layo Sevilla MD   calcium citrate-vitamin D (CITRACAL) 315-200 MG-UNIT TABS per tablet Take 1 tablet by mouth 2 times daily 6/3/2022 at 0800 Yes Reported, Patient   fluticasone-salmeterol (ADVAIR-HFA) 230-21 MCG/ACT inhaler Inhale 2 puffs into the lungs 2 times daily 6/3/2022 at 0800 Yes Layo Sevilla MD   guaiFENesin (MUCINEX) 600 MG 12 hr tablet Take 1 tablet (600 mg) by mouth 2 times daily 6/3/2022 at 0700 Yes Claudette Hearn APRN CNP   latanoprost (XALATAN) 0.005 % ophthalmic solution Place 1 drop into both eyes At Bedtime 6/2/2022 at 2000 Yes Unknown, Entered By History   levalbuterol (XOPENEX) 0.63 MG/3ML neb solution Take 1 ampule by nebulization At Bedtime 6/2/2022 at 2200 Yes Unknown, Entered By History    levofloxacin (LEVAQUIN) 500 MG tablet Take 500 mg by mouth daily For 6 days 6/3/2022 at 0212 Yes Unknown, Entered By History   loratadine (CLARITIN) 10 MG tablet Take 1 tablet (10 mg) by mouth daily 6/3/2022 at 0800 Yes Layo Sevilla MD   losartan (COZAAR) 50 MG tablet Take 1 tablet (50 mg) by mouth daily 6/3/2022 at 0800 Yes Claudette Hearn APRN CNP   montelukast (SINGULAIR) 10 MG tablet Take 1 tablet (10 mg) by mouth At Bedtime 6/2/2022 at 2000 Yes Layo Sevilla MD   omeprazole (PRILOSEC) 10 MG DR capsule TAKE ONE CAPSULE BY MOUTH ONE TIME DAILY 6/3/2022 at 0800 Yes Layo Sevilla MD   oxyCODONE (ROXICODONE) 5 MG tablet Take 0.5 tablets (2.5 mg) by mouth every 4 hours as needed for moderate to severe pain 6/2/2022 at 2214 Yes Claudette Hearn APRN CNP   predniSONE (DELTASONE) 20 MG tablet Take 2 tablets (40 mg) by mouth daily for 5 days 6/3/2022 at 07 day 3 Yes Claudette Hearn APRN CNP   tiotropium (SPIRIVA RESPIMAT) 2.5 MCG/ACT inhaler Inhale 2 puffs into the lungs daily 6/3/2022 at 0800 Yes Layo Sevilla MD   albuterol (PROAIR HFA/PROVENTIL HFA/VENTOLIN HFA) 108 (90 Base) MCG/ACT inhaler Inhale 1-2 puffs into the lungs every 6 hours as needed for shortness of breath / dyspnea or wheezing   Layo Sevilla MD   levalbuterol (XOPENEX) 0.63 MG/3ML neb solution Take 3 mLs (0.63 mg) by nebulization every 4 hours as needed for wheezing And scheduled at bedtime. 6/1/2022 at 0521  Claudette Hearn APRN CNP   loperamide (IMODIUM A-D) 2 MG tablet Take 1 tablet (2 mg) by mouth 3 times daily as needed for diarrhea   Claudette Hearn APRN CNP   ondansetron (ZOFRAN) 4 MG tablet Take 1 tablet (4 mg) by mouth every 8 hours as needed for nausea   Claudette Hearn APRN CNP   polyethylene glycol (MIRALAX) 17 GM/Dose powder Take 17 g by mouth daily as needed for constipation   Claudette Hearn APRN CNP

## 2022-06-03 NOTE — H&P
[facility] Outpatient / Observation Unit  History and Physical Exam     Genie Persaud MRN# [id number]   YOB: 1935 Age: 86 year old      Date of Admission:  6/3/2022    Primary care provider: Layo Sevilla          Assessment:   Genie Persaud is a 86 year old female with a PMH significant for afib - on eliquis, HTN, HLD, CAD, asthma, history of stress CM from sepsis and known 4.4cm thoracic Ao aneurysm, who presents with CHF exacerbation. The patient was recently admitted to [facility] from 5/11-5/12/22 for worsened low back pain and leg pain. She was discharged to TCU and then readmitted on 5/15-5/23/22 for acute RF due to pneumonia, asthma exacerbation and afib. She was started on nebulizers, steroids and ceftriaxone + azithromycin with improvement in respiratory function. She was discharged back to TCU where she has been since. Overall she feels she was improving until a couple days ago when she noted increasing shortness of breath and LE edema. She was seen by the TCU provider on 6/1 and was started on Lasix 40mg qday for 3 doses and prednisone 40mg daily for 5 days for presumed asthma exacerbation. She was re-evaluated at the TCU today and was noted to not be feeling any better and LE edema appeared worse so she was sent to the ER for eval.     Workup in the ER revealed - VSS, afebrile. BMP was within normal limits with Cr 0.85, Na 133, K 4.0. CBC showed wbc of 10.8, hgb 9.0(at baseline), plt 366. BNP elevated at 5652. COVID-19 negative. CXR showed small left pleural effusion and associated basilar atelectasis vs consolidation. She was given Lasix 60mg IV x 1 in the ER.     ECHO (5/19/22):  The visual ejection fraction is 50-55%.  The right ventricular systolic function is borderline reduced.  The left atrium is mildly dilated.  There is mild (1+) mitral regurgitation.  There is mild (1+) aortic regurgitation.  The ascending aorta is Mildly dilated.  Moderate left pleural effusion  The rhythm was atrial  fibrillation.     Problem List:  1. CHF Exacerbation - recent ECHO showed mildly reduced EF of 50-55%. Has a history of CM secondary to infection in the past. Now with 3+ pitting edema, elevated BNP. She was given IV Lasix 60mg in the ER.   - Will start Lasix 40mg IV tomorrow   - Daily weights  - 2g Na diet  - Hold off on repeating ECHO for now (last was 5/19/22)  - telemetry monitoring     2. Recent LLL pneumonia - treated with nebulizers, steroids and ceftriaxone + azithromycin with improvement in respiratory function during 5/15 admission. Patient now reports increasing cough with shortness of breath while at TCU the past few days. Per TCU note, they obtained a CXR recently that showed persisting LLL pneumonia and she was started on Levaquin. CXR here shows atelectasis vs consolidation.   - will obtain chest CT to further evaluate   - hold off on abx for now  - IS    3. Asthma - resume PTA advair, Spiriva and Singulair    4. HTN - resume PTA Losartan    5. A Fib - resume PTA Eliquis    6. HLD - resume PTA Lipitor     ** Awaiting pharmacy med rec to order PTA meds**    Diet - 2g Na diet  DVT prophylaxis: on Eliquis  Code Status: DNR/DNI  Dispo: back to TCU when medically ready              Chief Complaint:   Shortness of breath          History of Present Illness:   Genie Persaud is a 86 year old female with a PMH significant for afib - on eliquis, HTN, HLD, CAD, asthma, history of stress CM from sepsis and known 4.4cm thoracic Ao aneurysm, who presents with CHF exacerbation. The patient was recently admitted to [facility] from 5/11-5/12/22 for worsened low back pain and leg pain. She was discharged to TCU and then readmitted on 5/15-5/23/22 for acute RF due to pneumonia, asthma exacerbation and afib. She was started on nebulizers, steroids and ceftriaxone + azithromycin with improvement in respiratory function. She was discharged back to TCU where she has been since. Overall she feels she was improving until a couple  days ago when she noted increasing shortness of breath and LE edema. She was seen by the TCU provider on 6/1 and was started on Lasix 40mg qday for 3 doses and prednisone 40mg daily for 5 days for presumed asthma exacerbation. She was re-evaluated at the TCU today and was noted to not be feeling any better and LE edema appeared worse so she was sent to the ER for eval.     Workup in the ER revealed - VSS, afebrile. BMP was within normal limits with Cr 0.85, Na 133, K 4.0. CBC showed wbc of 10.8, hgb 9.0(at baseline), plt 366. BNP elevated at 5652. COVID-19 negative. CXR showed small left pleural effusion and associated basilar atelectasis vs consolidation. She was given Lasix 60mg IV x 1 in the ER.          Past Medical History:     Past Medical History:   Diagnosis Date     Anemia 03/10/2009    Chronic disease, by Fe studies; awaiting full GI w/u and repeat colonoscopy, ERCP.     Basal Cell Carcinoma--back      Coronary artery disease 03/09/2017    3/2/2017 - Two vessel coronary artery disease with mLAD 50% stenosis, D3 50% stenosis, pLCx 50% stenosis and mLCx 50% stenosis     Essential hypertension 09/01/2016    White coat hypertension;  24 hour ambulatory monitoring normal. Do not titrate up further.       Hyperlipidemia 02/10/2010     Moderate persistent asthma      Nonrheumatic aortic valve insufficiency 06/29/2017     Osteopenia      Paroxysmal atrial fibrillation 12/26/2017     Pulmonary nodule 03/03/2009    PET scan reportedly normal; biopsy not advised.     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  Problem list name updated by automated process. Provider to review     Vasculitis 04/20/2009    Possible increased activity of thoracic aorta, on PET scan w/u for pulmonary nodule.            Past Surgical History:     Past  Surgical History:   Procedure Laterality Date     APPENDECTOMY OPEN  1957     C STEREOTACTIC BREAST BIOPSY  01/01/2001     CHOLECYSTECTOMY, LAPOROSCOPIC  01/01/2008     DILATION AND CURETTAGE  1967     DILATION AND CURETTAGE  1971     ESOPHAGOSCOPY, GASTROSCOPY, DUODENOSCOPY (EGD), COMBINED  05/17/2013    Procedure: COMBINED ESOPHAGOSCOPY, GASTROSCOPY, DUODENOSCOPY (EGD), BIOPSY SINGLE OR MULTIPLE;  ESOPHAGOSCOPY, GASTROSCOPY, DUODENOSCOPY (EGD)  with bx;  Surgeon: Jeffery Yanez MD;  Location:  GI     TONSILLECTOMY       Tubal Ligation NOS  1971           Social History:     Social History     Socioeconomic History     Marital status: Single     Spouse name: Not on file     Number of children: 3     Years of education: 15     Highest education level: Not on file   Occupational History     Occupation: semi-retired   Tobacco Use     Smoking status: Never Smoker     Smokeless tobacco: Never Used   Substance and Sexual Activity     Alcohol use: Yes     Alcohol/week: 1.0 - 2.0 standard drink     Types: 1 - 2 Standard drinks or equivalent per week     Comment: 1 glass of wine 1-2 days per week     Drug use: No     Sexual activity: Not Currently     Partners: Male   Other Topics Concern      Service Not Asked     Blood Transfusions Not Asked     Caffeine Concern Not Asked     Occupational Exposure Not Asked     Hobby Hazards Not Asked     Sleep Concern Not Asked     Stress Concern Not Asked     Weight Concern Not Asked     Special Diet Not Asked     Back Care Not Asked     Exercise Not Asked     Bike Helmet Not Asked     Seat Belt Not Asked     Self-Exams Not Asked     Parent/sibling w/ CABG, MI or angioplasty before 65F 55M? No   Social History Narrative     Not on file     Social Determinants of Health     Financial Resource Strain: Not on file   Food Insecurity: Not on file   Transportation Needs: Not on file   Physical Activity: Not on file   Stress: Not on file   Social Connections: Not on file    Intimate Partner Violence: Not on file   Housing Stability: Not on file           Family History:     Family History   Problem Relation Age of Onset     Hypertension Mother      Osteoporosis Mother      C.A.D. Mother      Connective Tissue Disorder Father         scleraderma     Cerebrovascular Disease Paternal Grandmother      Prostate Cancer Other         9/05 passed away due to complications     Breast Cancer Child         youngest dtr          Allergies:      Allergies   Allergen Reactions     Fosamax [Alendronic Acid] GI Disturbance     Contrast Dye      Red arm redness and swelling      Evista [Raloxifene]      abd symptoms.      Flu Virus Vaccine      swollen and red at inj site     Amoxicillin Rash           Medications:     Prior to Admission medications    Medication Sig Last Dose Taking? Auth Provider   acetaminophen (TYLENOL) 325 MG tablet Take 975 mg by mouth every 8 hours as needed for mild pain   Unknown, Entered By History   albuterol (PROAIR HFA/PROVENTIL HFA/VENTOLIN HFA) 108 (90 Base) MCG/ACT inhaler Inhale 1-2 puffs into the lungs every 6 hours as needed for shortness of breath / dyspnea or wheezing   Layo Sevilla MD   apixaban ANTICOAGULANT (ELIQUIS) 5 MG tablet Take 1 tablet (5 mg) by mouth 2 times daily   Claudette Hearn APRN CNP   atorvastatin (LIPITOR) 20 MG tablet TAKE ONE TABLET BY MOUTH AT BEDTIME   Layo Sevilla MD   calcium citrate-vitamin D (CITRACAL) 315-200 MG-UNIT TABS per tablet Take 1 tablet by mouth 2 times daily   Reported, Patient   fluticasone-salmeterol (ADVAIR-HFA) 230-21 MCG/ACT inhaler Inhale 2 puffs into the lungs 2 times daily   Layo Sevilla MD   furosemide (LASIX) 40 MG tablet Take 1 tablet (40 mg) by mouth daily   Claudette Hearn APRN CNP   guaiFENesin (MUCINEX) 600 MG 12 hr tablet Take 1 tablet (600 mg) by mouth 2 times daily   Claudette Hearn APRN CNP   latanoprost (XALATAN) 0.005 % ophthalmic solution Place 1 drop into both eyes At Bedtime    Unknown, Entered By History   levalbuterol (XOPENEX) 0.63 MG/3ML neb solution Take 3 mLs (0.63 mg) by nebulization every 4 hours as needed for wheezing And scheduled at bedtime.   Claudette Hearn APRN CNP   loperamide (IMODIUM A-D) 2 MG tablet Take 1 tablet (2 mg) by mouth 3 times daily as needed for diarrhea   Claudette Hearn APRN CNP   loratadine (CLARITIN) 10 MG tablet Take 1 tablet (10 mg) by mouth daily   Layo Sevilla MD   losartan (COZAAR) 50 MG tablet Take 1 tablet (50 mg) by mouth daily   Claudette Hearn APRN CNP   montelukast (SINGULAIR) 10 MG tablet Take 1 tablet (10 mg) by mouth At Bedtime   Layo Sevilla MD   omeprazole (PRILOSEC) 10 MG DR capsule TAKE ONE CAPSULE BY MOUTH ONE TIME DAILY   Layo Sevilla MD   ondansetron (ZOFRAN) 4 MG tablet Take 1 tablet (4 mg) by mouth every 8 hours as needed for nausea   Claudette Hearn APRN CNP   oxyCODONE (ROXICODONE) 5 MG tablet Take 0.5 tablets (2.5 mg) by mouth every 4 hours as needed for moderate to severe pain   Claudette Hearn APRN CNP   polyethylene glycol (MIRALAX) 17 GM/Dose powder Take 17 g by mouth daily as needed for constipation   Claudette Hearn APRN CNP   predniSONE (DELTASONE) 20 MG tablet Take 2 tablets (40 mg) by mouth daily for 5 days   Claudette Hearn APRN CNP   tiotropium (SPIRIVA RESPIMAT) 2.5 MCG/ACT inhaler Inhale 2 puffs into the lungs daily   Layo Sevilla MD          Review of Systems:   A Comprehensive greater than 10 system review of systems was carried out.  Pertinent positives and negatives are noted above.  Otherwise negative for contributory information.     CONSTITUTIONAL:NEGATIVE for fever, chills, change in weight  ENT/MOUTH: NEGATIVE for ear, mouth and throat problems  RESP:POSITIVE for cough-non productive, dyspnea on exertion, Hx asthma, Hx pneumonia and SOB/dyspnea and NEGATIVE for hemoptysis, Hx chronic bronchitis, Hx COPD and pleurisy  CV: NEGATIVE for chest pain, palpitations or peripheral  "edema  GI: NEGATIVE for nausea, abdominal pain, heartburn, or change in bowel habits  MUSCULOSKELETAL: NEGATIVE for significant arthralgias or myalgia  NEURO: NEGATIVE for weakness, dizziness or paresthesias           Physical Exam:   Blood pressure (!) 140/67, pulse 88, temperature 97.8  F (36.6  C), temperature source Oral, resp. rate 26, height 1.549 m (5' 1\"), weight 64.9 kg (143 lb), SpO2 97 %, not currently breastfeeding.    GENERAL: healthy, alert and no distress  EYES: Eyes grossly normal to inspection, extraocular movements - intact, and PERRL  HENT: ear canals- normal; TMs- normal; Nose- normal; Mouth- no ulcers, no lesions  NECK: no tenderness, no adenopathy, no asymmetry, no masses, no stiffness; thyroid- normal to palpation  RESP: course throughout   CV: no murmur, click or rub and irregularly irregular rhythm  ABDOMEN: soft, no tenderness, no  hepatosplenomegaly, no masses, normal bowel sounds  MS: extremities- no gross deformities noted, no edema  SKIN: no suspicious lesions, no rashes  NEURO: strength and tone- normal, sensory exam- grossly normal, mentation- intact, speech- normal, reflexes- symmetric  PSYCH: Alert and oriented times 3; coherent speech. Affect is normal.            Data:     No results for input(s): PH, PHV, PO2, PO2V, SAT, PCO2, PCO2V, HCO3, HCO3V in the last 168 hours.  Recent Labs   Lab 06/03/22  1121   WBC 10.8   HGB 9.0*   HCT 27.8*   MCV 91             Lab Results   Component Value Date     06/03/2022     05/22/2022     05/21/2022     06/28/2021     06/02/2021     01/20/2021    Lab Results   Component Value Date    CHLORIDE 96 06/03/2022    CHLORIDE 94 05/22/2022    CHLORIDE 90 05/21/2022    CHLORIDE 100 06/28/2021    CHLORIDE 104 06/02/2021    CHLORIDE 105 01/20/2021    Lab Results   Component Value Date    BUN 26 06/03/2022    BUN 20 05/22/2022    BUN 23 05/21/2022    BUN 23 06/28/2021    BUN 25 06/02/2021    BUN 25 01/20/2021    "   Lab Results   Component Value Date    POTASSIUM 4.0 06/03/2022    POTASSIUM 3.6 05/22/2022    POTASSIUM 3.6 05/21/2022    POTASSIUM 4.0 06/28/2021    POTASSIUM 3.7 06/02/2021    POTASSIUM 3.8 01/20/2021    Lab Results   Component Value Date    CO2 31 06/03/2022    CO2 30 05/22/2022    CO2 30 05/21/2022    CO2 28 06/28/2021    CO2 33 06/02/2021    CO2 29 01/20/2021    Lab Results   Component Value Date    CR 0.85 06/03/2022    CR 0.73 05/22/2022    CR 0.76 05/21/2022    CR 0.91 06/28/2021    CR 1.10 06/02/2021    CR 1.04 01/20/2021        No results for input(s): CULT in the last 168 hours.  Recent Labs   Lab 06/03/22  1206      POTASSIUM 4.0   CHLORIDE 96   CO2 31   ANIONGAP 6   *   BUN 26   CR 0.85   GFRESTIMATED 67   ELROY 9.0     Recent Labs   Lab 06/03/22  1206   NTBNPI 5,652*     No results for input(s): DD in the last 168 hours.  Recent Labs   Lab 06/03/22  1206      POTASSIUM 4.0   CHLORIDE 96   CO2 31   *     No results for input(s): SED, CRP in the last 168 hours.  No results for input(s): AST, ALT, GGT, ALKPHOS, BILITOTAL, BILICONJ, BILIDIRECT, JEFFERSON in the last 168 hours.    Invalid input(s): BILIRUBININDIRECT  No results for input(s): INR in the last 168 hours.  No results for input(s): LACT in the last 168 hours.  No results for input(s): LIPASE in the last 168 hours.  No results for input(s): TSH in the last 168 hours.  No results for input(s): TROPONIN, TROPI, TROPR, TROPONINIS in the last 168 hours.    Invalid input(s): TROP, TROPONINIES, TNIH  No results for input(s): COLOR, APPEARANCE, URINEGLC, URINEBILI, URINEKETONE, SG, UBLD, URINEPH, PROTEIN, UROBILINOGEN, NITRITE, LEUKEST, RBCU, WBCU in the last 168 hours.    Results for orders placed or performed during the hospital encounter of 06/03/22   XR Chest 2 Views    Narrative    CHEST TWO VIEWS Marielle 3, 2022 12:05 PM     HISTORY: Bilateral lower extremity edema, cough, shortness of breath.    COMPARISON: Chest x-ray on  5/19/2022.      Impression    IMPRESSION: AP and lateral view of the chest were obtained. Stable  mild enlargement of the cardiac silhouette. Small left pleural  effusion and associated basilar atelectasis/consolidation. No  significant right pleural effusion. No significant pneumothorax.    VANDANA SOLARES MD         SYSTEM ID:  [id number]         Jessica Garza PA-C

## 2022-06-03 NOTE — LETTER
6/3/2022        RE: Genie Persaud  [address] [place] [address]        [facility]    Chief Complaint   Patient presents with     Nursing Home Acute     HPI:  Genie Persaud is a 86 year old  (1935), who is being seen today for an episodic care visit at: [facility]  ([facility]) [[id number]].     PMH significant for HTN, atrial fibrillation, history of cardiomyopathy, nonobstructive CAD, dyslipidemia, CKD stage 3, chronic anemia, asthma, GERD, history of esophageal candida.     She was hospitalized at [facility] 5/11-5/12/22 for right back and lower right extremity pain. US evaluation 4/25 that was negative and showed right Baker's cyst as likely cause of pain. MRI of lumbar spine showed no acute fractures and diffuse degenerative changes including moderate to severe neural foraminal stenosis on the right at L4-L5 and on the left at L5-S1. UA was negative. She was admitted to hospital for TCU placement, as felt she is not able to tolerate pain. Was started on pain regimen. Additionally labs in hospital revealed low sodium of 128 and low potassium of 3.3. Discharged to [facility] for physical rehabilitation and medical management.      Summary of recent hospitalization:  Hospitalized at [facility] from 5/15-5/23/22 for acute hypoxic respiratory failure, asthma exacerbation, possible CAP, possible candida esophagitis, new atrial fib, SIADH. She presented from [facility] to the ED for evaluation of nausea and weakness. Laboratory evaluation showed sodium 122, alk phos 162, WBC 12.3. CT abdomen pelvis revealed no acute findings. She received IV hydration. Work up consistent with SIADH as cause of hyponatremia. She was discharged on 1L fluid restriction daily. GI consulted due to ongoing nausea that improved, so EGD deferred. Treated empirically with nystatin due to history of candidal esophagitis. She developed hypoxia thought to be due to fluid overload with pulmonary congestion and  asthma exacerbation. Chest xray showed small left sided pleural effusion with congestion. Then had rapid response episode and required BiPAP. Was found to be in atrial fib with RVR at this time. She was started on anticoagulation with apixaban. Troponin was negative.  ECHO showed EF 50-55%, left atrium is mildly dilated, mild (1+) mitral regurgitation, mild (1+) aortic regurgitation, ascending aorta is Mildly dilated, moderate left pleural effusion. Received IV diuresis. Chest xray repeated that showed few patchy opacities in left lower base that could be possible pneumonia vs atelectasis vs scarring. She was treated for possible pneumonia with ceftriaxone and azithromycin-discharged with orders to complete 1 more day of azithromycin and 2 days of cefuroxime to complete course. Also received xopenex nebs and steroids for asthma exacerbation.     Today's concern is:   Seen today for episodic follow up at TCU. Xray results back last evening that showed 1) cardiomegaly and left pleural effusion. 2) Mildly extensive left lower lobe pneumonia. She was started on levaquin by on call provider last evening. Reports today ongoing SOB, cough. Does not feel any better. Swelling to legs much worse- 4+ swelling. Her legs feel heavy. She would like them wrapped so I did ask therapy to do this today. Denies CP, dizziness. Reports 1-2 loose stools per day. Denies dysuria, trouble voiding. Reports trouble sleeping due to prednisone. Continues to work with therapy. Is very concerned as therapy has planned LCD on 6/6 with discharge home on 6/7- she feels she cannot safely return home with these worsening health issues. She remains afebrile. HR is elevated at times. She is hemodynamically stable    Allergies, and PMH/PSH reviewed in [facility] today.  REVIEW OF SYSTEMS:  7 point ROS of systems including Constitutional, Respiratory, Cardiovascular, Gastroenterology, Genitourinary, Musculoskeletal, Psychiatric were all negative except for  "pertinent positives noted in my HPI.    Objective:   /69   Pulse 88   Temp 97.6  F (36.4  C)   Resp 18   Ht 1.549 m (5' 1\")   Wt 67.6 kg (149 lb)   LMP  (LMP Unknown)   SpO2 93%   BMI 28.15 kg/m    GENERAL APPEARANCE:  Alert, in no distress, oriented  EYES:  conjunctiva and lids normal  RESP:  respiratory effort and palpation of chest normal, diminished breath sounds bilaterally with some air exchange, no wheezes or crackles- lungs sound the same as last time I saw her  CV:  Palpation and auscultation of heart done , regular rate and rhythm  ABDOMEN:  bowel sounds normal  M/S:  4+ pitting edema  NEURO:   Cranial nerves 2-12 are normal tested and grossly at patient's baseline, Examination of sensation by touch normal  PSYCH:  oriented X 3    Labs done in SNF are in [facility]. Please refer to them using [employer]/Care Everywhere. and Recent labs in [facility] reviewed by me today.     Assessment/Plan:  (J18.9) Pneumonia of left lower lobe due to infectious organism  (primary encounter diagnosis)  (J90) Pleural effusion  (R06.02) SOB (shortness of breath)  (M79.89) Leg swelling  (J45.901) Exacerbation of persistent asthma, unspecified asthma severity  (I48.91) Atrial fibrillation with RVR (H)  Comment:  Acute, overall with worsening of symptoms and no improvement since prednisone and lasix started on 6/1  -legs significantly more edematous today  -continues to have SOB, cough   -with chest xray resulted that showed extensive LLL pneumonia, which when compared to hospital chest xray from 5/19 is worse despite recent antibiotic administration  -she is afebrile, oxygen saturations in low 90% on RA, has a few higher HR at times, but is hemodynamically stable  Plan: Discussed with physician partner Dr. Butts today. Send to ED for further evaluation and treatment. As unable to aggressively diurese going into the weekend, and unable to check labs. Also needs further evaluation due to worsening chest xray despite " recent antibiotics. She is agreeable to visit to ED. Nursing staff updated. I had asked therapy to start lymph therapy and wrap her legs prior to sending her in, which they were able to do.    Total time spent with patient visit at the [facility] nursing facility was 37 minutes including patient visit, review of past records. Greater than 50% of total time spent with counseling and coordinating care including discussion with physician partner, discussion with multiple nursing staff and therapy, as well as multiple visits to talk to patient as discussed in plan of care above.    Electronically signed by: TOREY Edmonds CNP             Sincerely,        TOREY Edmonds CNP

## 2022-06-03 NOTE — CONSULTS
Care Management Follow Up  Expected Discharge Date: 06/04/2022     Concerns to be Addressed: Discharge planning      Patient plan of care discussed at interdisciplinary rounds: Yes    Anticipated Discharge Disposition:  Return to [facility] TCU     Anticipated Discharge Services:  PT/OT    Patient/family educated on Medicare website which has current facility and service quality ratings:  Yes  Education Provided on the Discharge Plan:  Yes  Patient/Family in Agreement with the Plan:  Yes    Referrals Placed by CM/SW:  None at this time  Private pay costs discussed: transportation costs    Additional Information:  SW consulted for discharge planning and CHF. RN CC updated discharge instructions with CHF resources.     SW met with patient who confirms that she was admitted from [facility] TCU and has held her bed to return there at discharge. Spoke with [facility] admissions who confirm. Patient is hopeful that she can return tomorrow.     Pt will need [facility] transport arranged at discharge as her [place] and dtr is in [place].     SW will continue to follow.     CLAUDIA Quintero

## 2022-06-03 NOTE — ED TRIAGE NOTES
Pt having increased edema to lower legs, and a repeat chest xray done yesterday confirmed pneumonia.     Triage Assessment     Row Name 06/03/22 1113       Triage Assessment (Adult)    Airway WDL WDL       Respiratory WDL    Respiratory WDL WDL       Skin Circulation/Temperature WDL    Skin Circulation/Temperature WDL WDL       Cardiac WDL    Cardiac WDL WDL       Peripheral/Neurovascular WDL    Peripheral Neurovascular WDL WDL       Cognitive/Neuro/Behavioral WDL    Cognitive/Neuro/Behavioral WDL WDL

## 2022-06-03 NOTE — PLAN OF CARE
ROOM # 202-1     Living Situation (if not independent, order SW consult): Lives independently  Facility name:  : [facility] (daughter)    Activity level at baseline:Ind  Activity level on admit: SBA w/ walker    Who will be transporting you at discharge: TBD    Patient registered to observation; given Patient Bill of Rights; given the opportunity to ask questions about observation status and their plan of care.  Patient has been oriented to the observation room, bathroom and call light is in place.    Discussed discharge goals and expectations with patient/family.

## 2022-06-04 VITALS
OXYGEN SATURATION: 95 % | HEIGHT: 61 IN | BODY MASS INDEX: 27.51 KG/M2 | WEIGHT: 145.7 LBS | TEMPERATURE: 97.6 F | RESPIRATION RATE: 12 BRPM | DIASTOLIC BLOOD PRESSURE: 59 MMHG | SYSTOLIC BLOOD PRESSURE: 144 MMHG | HEART RATE: 97 BPM

## 2022-06-04 LAB
ANION GAP SERPL CALCULATED.3IONS-SCNC: 6 MMOL/L (ref 3–14)
BUN SERPL-MCNC: 29 MG/DL (ref 7–30)
CALCIUM SERPL-MCNC: 9.3 MG/DL (ref 8.5–10.1)
CHLORIDE BLD-SCNC: 95 MMOL/L (ref 94–109)
CO2 SERPL-SCNC: 31 MMOL/L (ref 20–32)
CREAT SERPL-MCNC: 0.87 MG/DL (ref 0.52–1.04)
GFR SERPL CREATININE-BSD FRML MDRD: 65 ML/MIN/1.73M2
GLUCOSE BLD-MCNC: 107 MG/DL (ref 70–99)
POTASSIUM BLD-SCNC: 3.2 MMOL/L (ref 3.4–5.3)
POTASSIUM BLD-SCNC: 3.4 MMOL/L (ref 3.4–5.3)
POTASSIUM BLD-SCNC: 3.5 MMOL/L (ref 3.4–5.3)
SODIUM SERPL-SCNC: 132 MMOL/L (ref 133–144)

## 2022-06-04 PROCEDURE — 99217 PR OBSERVATION CARE DISCHARGE: CPT | Performed by: NURSE PRACTITIONER

## 2022-06-04 PROCEDURE — 80048 BASIC METABOLIC PNL TOTAL CA: CPT | Performed by: PHYSICIAN ASSISTANT

## 2022-06-04 PROCEDURE — G0378 HOSPITAL OBSERVATION PER HR: HCPCS

## 2022-06-04 PROCEDURE — 250N000013 HC RX MED GY IP 250 OP 250 PS 637: Performed by: NURSE PRACTITIONER

## 2022-06-04 PROCEDURE — 36415 COLL VENOUS BLD VENIPUNCTURE: CPT | Performed by: NURSE PRACTITIONER

## 2022-06-04 PROCEDURE — 96376 TX/PRO/DX INJ SAME DRUG ADON: CPT

## 2022-06-04 PROCEDURE — 36415 COLL VENOUS BLD VENIPUNCTURE: CPT | Performed by: PHYSICIAN ASSISTANT

## 2022-06-04 PROCEDURE — 84132 ASSAY OF SERUM POTASSIUM: CPT | Performed by: NURSE PRACTITIONER

## 2022-06-04 PROCEDURE — 250N000011 HC RX IP 250 OP 636: Performed by: PHYSICIAN ASSISTANT

## 2022-06-04 RX ORDER — FUROSEMIDE 20 MG
20 TABLET ORAL DAILY
Qty: 5 TABLET | Refills: 0 | DISCHARGE
Start: 2022-06-04 | End: 2022-06-06

## 2022-06-04 RX ORDER — POTASSIUM CHLORIDE 1500 MG/1
40 TABLET, EXTENDED RELEASE ORAL ONCE
Status: COMPLETED | OUTPATIENT
Start: 2022-06-04 | End: 2022-06-04

## 2022-06-04 RX ADMIN — FUROSEMIDE 40 MG: 10 INJECTION, SOLUTION INTRAMUSCULAR; INTRAVENOUS at 08:05

## 2022-06-04 RX ADMIN — POTASSIUM CHLORIDE 40 MEQ: 1500 TABLET, EXTENDED RELEASE ORAL at 08:05

## 2022-06-04 RX ADMIN — APIXABAN 5 MG: 5 TABLET, FILM COATED ORAL at 08:05

## 2022-06-04 NOTE — PLAN OF CARE
Impression: Dry eye syndrome of bilateral lacrimal glands: H04.123. Plan: Dry eyes account for the patient's complaints. There is no evidence of permanent changes to the cornea. Explained that this condition does not have a cure. The options explained to the patient include topical lubricants, punctal plugs, and laser punctal occlusion. PRIMARY DIAGNOSIS: BLE EDEMA + SOB  OUTPATIENT/OBSERVATION GOALS TO BE MET BEFORE DISCHARGE:  ADLs back to baseline: Yes    Activity and level of assistance: Up with standby assistance.    Pain status: Improved but still requiring IV narcotics.    Return to near baseline physical activity: No     Discharge Planner Nurse   Safe discharge environment identified: Yes  Barriers to discharge: Yes       Entered by: Sloane Rosa RN 06/04/2022      AOx4.  Denies pain. K+ 3.5.  Pt refused lunch due to discharging back to TCU this afternoon.  Will cont to monitor & provide cares.      Please review provider order for any additional goals.   Nurse to notify provider when observation goals have been met and patient is ready for discharge.Goal Outcome Evaluation:

## 2022-06-04 NOTE — PLAN OF CARE
"PRIMARY DIAGNOSIS: \"GENERIC\" NURSING  OUTPATIENT/OBSERVATION GOALS TO BE MET BEFORE DISCHARGE:  1. ADLs back to baseline: No    2. Activity and level of assistance: Up with standby assistance.    3. Pain status: Pain free.    4. Return to near baseline physical activity: No     Discharge Planner Nurse   Safe discharge environment identified: Yes  Barriers to discharge: Yes       Entered by: Shana Hartley RN 06/04/2022 3:01 AM     Pt A & O X 4, lung sounds diminished X 4. BS active X 4, BLE edema X 3+/4+, continue strict I & O. Tele-82 A.fib with PVC. Pt ambulated to bed side commode and void. Elevated lower legs.      Please review provider order for any additional goals.   Nurse to notify provider when observation goals have been met and patient is ready for discharge.    "

## 2022-06-04 NOTE — PLAN OF CARE
Patient's After Visit Summary was reviewed with patient.   Patient verbalized understanding of After Visit Summary, recommended follow up and was given an opportunity to ask questions.   Discharge medications sent home with patient/family: No.  Discharged with other: [facility] transport      OBSERVATION patient END time: 1330.

## 2022-06-04 NOTE — DISCHARGE SUMMARY
[facility]  Hospitalist Discharge Summary      Date of Admission:  6/3/2022  Date of Discharge:  6/4/2022  Discharging Provider: TOREY Herrera CNP  Discharge Service: Hospitalist Service    Discharge Diagnoses   CHF Exacerbation  Recent LLL pneumonia  Asthma  Bilateral lower extremity edema  HTN  A-Fib  HLD    Follow-ups Needed After Discharge   Follow-up Appointments     Follow Up and recommended labs and tests      Follow up with primary care provider in 5 days.  The following labs/tests   are recommended: BMP, CBC, BNP.                Unresulted Labs Ordered in the Past 30 Days of this Admission     No orders found for last 31 day(s).      These results will be followed up by     Discharge Disposition   Discharged to short-term care facility  Condition at discharge: Stable      Hospital Course   Genie Persaud is a 86 year old female with a PMH significant for afib - on eliquis, HTN, HLD, CAD, asthma, history of stress CM from sepsis and known 4.4cm thoracic Ao aneurysm, who presents with CHF exacerbation. The patient was recently admitted to [facility] from 5/11-5/12/22 for worsened low back pain and leg pain. She was discharged to TCU and then readmitted on 5/15-5/23/22 for acute RF due to pneumonia, asthma exacerbation and afib. She was started on nebulizers, steroids and ceftriaxone + azithromycin with improvement in respiratory function. She was discharged back to TCU where she has been since. Overall she feels she was improving until a couple days ago when she noted increasing shortness of breath and LE edema. She was seen by the TCU provider on 6/1 and was started on Lasix 40mg qday for 3 doses and prednisone 40mg daily for 5 days for presumed asthma exacerbation. She was re-evaluated at the TCU today and was noted to not be feeling any better and LE edema appeared worse so she was sent to the ER for eval and admitted to observation on 6/3/2022          Problem List:  1. CHF  Exacerbation with Peripheral edema - recent ECHO on 5/19 showed mildly reduced EF of 50-55%. Has a history of CM secondary to infection in the past. Now with 3+ pitting edema, elevated BNP. She was given IV Lasix 60mg in the ER and 40mg IV lasix today.  Required potassium replacement   - Lasix 20mg daily for 5 days, will defer to TCU for ongoing therapy  -Recommend monitoring potassium and replacement as indicated  - Daily weights  - Continue 2g Na diet  -Compression stockings 20mmHg during day time hours, elevate legs above heart when at rest.         2. Recent LLL pneumonia - treated with nebulizers, steroids and ceftriaxone + azithromycin with improvement in respiratory function during 5/15 admission. Patient now reports increasing cough with shortness of breath while at TCU the past few days. Per TCU note, they obtained a CXR recently that showed persisting LLL pneumonia and she was started on Levaquin. CXR here shows atelectasis vs consolidation, supported by chest CT.   - IS at least TID  -OOB and up to chair during daytime hours, ambulate every shift.     3. Asthma  -ContinuePTA advair, Spiriva and Singulair     4. HTN   - Continue Losartan     5. A Fib   - Continue Eliquis     6. HLD   - Continue Lipitor        Consultations This Hospital Stay   CARE MANAGEMENT / SOCIAL WORK IP CONSULT  PHYSICAL THERAPY ADULT IP CONSULT    Code Status   No CPR- Do NOT Intubate    Time Spent on this Encounter   I, TOREY Herrera CNP, personally saw the patient today and spent greater than 30 minutes discharging this patient.       TOREY Herrera CNP  [facility] OBSERVATION DEPT  [facility] [address] [place] [address]  Phone: [phone]  ______________________________________________________________________    Physical Exam   Vital Signs: Temp: 97.6  F (36.4  C) Temp src: Oral BP: (!) 144/59 Pulse: 97   Resp: 12 SpO2: 95 % O2 Device: None (Room air)    Weight: 145 lbs 11.2 oz  Constitutional:  awake, alert, cooperative, no apparent distress, and appears stated age  Eyes: Lids and lashes normal, pupils equal, round and reactive to light, extra ocular muscles intact, sclera clear, conjunctiva normal  Hematologic / Lymphatic: no cervical lymphadenopathy  Respiratory: no increased work of breathing, decreased air exchange and clear to auscultation  Cardiovascular: irregularly irregular rhythm, normal S1 and S2, no murmur noted and +3 BLE pitting edema  GI: soft and non-distended  Skin: no bruising or bleeding and +3 BLE pitting edema  Musculoskeletal: full range of motion noted  Neurologic: Mental Status Exam:  Level of Alertness:   awake  Orientation:   person, place, time  Memory:   normal         Primary Care Physician   Layo Sevilla    Discharge Orders      Primary Care - Care Coordination Referral      General info for SNF    Length of Stay Estimate: TCU  Condition at Discharge: Stable  Level of care:skilled   Rehabilitation Potential: Good  Admission H&P remains valid and up-to-date: Yes  Recent Chemotherapy: N/A  Use Nursing Home Standing Orders: Yes     Mantoux instructions    Give two-step Mantoux (PPD) Per Facility Policy Yes     Follow Up and recommended labs and tests    Follow up with primary care provider in 5 days.  The following labs/tests are recommended: BMP, CBC, BNP.     Reason for your hospital stay    CHF Exacerbation, Recurrent RLL pneumonia     Intake and output    Every shift     Daily weights    Call Provider for weight gain of more than 2 pounds per day or 5 pounds per week.     Activity - Up with nursing assistance     Physical Therapy Adult Consult    Evaluate and treat as clinically indicated.     Compression Sleeve/Stocking Order    Compression Sleeve/Stocking Documentation:   The patient needs compression stockings for chronic edema    I, the undersigned, certify that the above prescribed supplies are medically necessary for this patient and is both reasonable and necessary in  reference to accepted standards of medical and necessary in reference to accepted standards of medical practice in the treatment of this patient's condition and is not prescribed as a convenience.     Diet    Follow this diet upon discharge: Orders Placed This Encounter      2 Gram Sodium Diet No Caffeine for 24 hours (once tests completed, may have caffeine)       Significant Results and Procedures   Most Recent 3 CBC's:  Recent Labs   Lab Test 06/03/22  1121 05/22/22  0645 05/20/22  0741   WBC 10.8 10.0 11.5*   HGB 9.0* 9.3* 9.8*   MCV 91 89 86    410 438     Most Recent 3 BMP's:  Recent Labs   Lab Test 06/04/22  1150 06/04/22  1056 06/04/22  0525 06/03/22  1206 05/22/22  0645   NA  --   --  132* 133 130*   POTASSIUM 3.5 3.4 3.2* 4.0 3.6   CHLORIDE  --   --  95 96 94   CO2  --   --  31 31 30   BUN  --   --  29 26 20   CR  --   --  0.87 0.85 0.73   ANIONGAP  --   --  6 6 6   ELROY  --   --  9.3 9.0 8.6   GLC  --   --  107* 100* 111*     Most Recent 3 BNP's:  Recent Labs   Lab Test 06/03/22  1206 05/01/22  0539 03/21/22  1553 08/01/21  0651   NTBNPI 5,652* 1,094  --  902   NTBNP  --   --  1,021*  --    ,   Results for orders placed or performed during the hospital encounter of 06/03/22   XR Chest 2 Views    Narrative    CHEST TWO VIEWS Marielle 3, 2022 12:05 PM     HISTORY: Bilateral lower extremity edema, cough, shortness of breath.    COMPARISON: Chest x-ray on 5/19/2022.      Impression    IMPRESSION: AP and lateral view of the chest were obtained. Stable  mild enlargement of the cardiac silhouette. Small left pleural  effusion and associated basilar atelectasis/consolidation. No  significant right pleural effusion. No significant pneumothorax.    VANDANA SOLARES MD         SYSTEM ID:  [id number]   CT Chest w/o Contrast    Narrative    EXAM: CT CHEST W/O CONTRAST  LOCATION: [facility]  DATE/TIME: 6/3/2022 8:11 PM    INDICATION: Persistent cough. Abnormal x-ray showing left pleural  effusion.  COMPARISON: CT chest 03/29/2022, CTA chest 07/30/2018  TECHNIQUE: CT chest without IV contrast. Multiplanar reformats were obtained. Dose reduction techniques were used.  CONTRAST: None.    FINDINGS:   LUNGS AND PLEURA: Evaluation limited by motion artifact. There are geographic areas of groundglass opacities, favored to represent areas of atelectatic lung interspersed with areas of air trapping, similar to prior exam. There is narrowing of the trachea   with severe narrowing of the bilateral mainstem bronchi and smaller order bronchi. Small left pleural effusion with adjacent compressive atelectasis. No right pleural effusion. No pneumothorax. Previously visualized pulmonary nodules are not well   evaluated due to motion artifact. The right lower lobe 8 mm pulmonary nodule seen on prior CT is visualized on series 4, image 174, but unable to be measured.    MEDIASTINUM/AXILLAE: Motion artifact slightly limits evaluation of the ascending aorta, although the ascending aorta measures approximately 4.3 cm, unchanged since 2018. No lymphadenopathy. Trace pericardial effusion.    CORONARY ARTERY CALCIFICATION: Severe.    UPPER ABDOMEN: No significant finding.    MUSCULOSKELETAL: Multilevel degenerative changes of the spine. New T11 compression fracture, age indeterminate. No significant retropulsion.      Impression    IMPRESSION:   1.  Small left pleural effusion with adjacent compressive atelectasis.    2.  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    3.  Unchanged ascending thoracic aortic aneurysm.    4.  New age-indeterminate T11 compression fracture.           Discharge Medications   Current Discharge Medication List      START taking these medications    Details   furosemide (LASIX) 20 MG tablet Take 1 tablet (20 mg) by mouth daily  Qty: 5 tablet, Refills: 0    Associated Diagnoses: Acute congestive heart failure, unspecified heart failure type (H); Peripheral edema          CONTINUE these medications which have NOT CHANGED    Details   acetaminophen (TYLENOL) 325 MG tablet Take 975 mg by mouth every 8 hours as needed for mild pain      apixaban ANTICOAGULANT (ELIQUIS) 5 MG tablet Take 1 tablet (5 mg) by mouth 2 times daily  Qty: 60 tablet    Associated Diagnoses: Atrial fibrillation with RVR (H)      atorvastatin (LIPITOR) 20 MG tablet TAKE ONE TABLET BY MOUTH AT BEDTIME  Qty: 90 tablet, Refills: 1    Associated Diagnoses: Hyperlipidemia LDL goal <130      calcium citrate-vitamin D (CITRACAL) 315-200 MG-UNIT TABS per tablet Take 1 tablet by mouth 2 times daily      fluticasone-salmeterol (ADVAIR-HFA) 230-21 MCG/ACT inhaler Inhale 2 puffs into the lungs 2 times daily    Associated Diagnoses: Moderate persistent asthma without complication      guaiFENesin (MUCINEX) 600 MG 12 hr tablet Take 1 tablet (600 mg) by mouth 2 times daily    Associated Diagnoses: Exacerbation of persistent asthma, unspecified asthma severity      latanoprost (XALATAN) 0.005 % ophthalmic solution Place 1 drop into both eyes At Bedtime      !! levalbuterol (XOPENEX) 0.63 MG/3ML neb solution Take 1 ampule by nebulization At Bedtime      levofloxacin (LEVAQUIN) 500 MG tablet Take 500 mg by mouth daily For 6 days      loratadine (CLARITIN) 10 MG tablet Take 1 tablet (10 mg) by mouth daily  Qty: 90 tablet, Refills: 3    Associated Diagnoses: Seasonal allergic rhinitis, unspecified trigger      losartan (COZAAR) 50 MG tablet Take 1 tablet (50 mg) by mouth daily    Associated Diagnoses: Essential hypertension      montelukast (SINGULAIR) 10 MG tablet Take 1 tablet (10 mg) by mouth At Bedtime  Qty: 90 tablet, Refills: 3    Associated Diagnoses: Seasonal allergic rhinitis, unspecified trigger      omeprazole (PRILOSEC) 10 MG DR capsule TAKE ONE CAPSULE BY MOUTH ONE TIME DAILY  Qty: 90 capsule, Refills: 1    Associated Diagnoses: Gastroesophageal reflux disease without esophagitis      predniSONE (DELTASONE) 20 MG  tablet Take 2 tablets (40 mg) by mouth daily for 5 days    Associated Diagnoses: Exacerbation of persistent asthma, unspecified asthma severity      tiotropium (SPIRIVA RESPIMAT) 2.5 MCG/ACT inhaler Inhale 2 puffs into the lungs daily  Qty: 4 g, Refills: 2    Associated Diagnoses: Moderate persistent asthma without complication      albuterol (PROAIR HFA/PROVENTIL HFA/VENTOLIN HFA) 108 (90 Base) MCG/ACT inhaler Inhale 1-2 puffs into the lungs every 6 hours as needed for shortness of breath / dyspnea or wheezing    Comments: Pharmacy may dispense brand covered by insurance (Proair, or proventil or ventolin or generic albuterol inhaler)  Associated Diagnoses: Moderate persistent asthma without complication      !! levalbuterol (XOPENEX) 0.63 MG/3ML neb solution Take 3 mLs (0.63 mg) by nebulization every 4 hours as needed for wheezing And scheduled at bedtime.  Qty: 90 mL, Refills: 0      loperamide (IMODIUM A-D) 2 MG tablet Take 1 tablet (2 mg) by mouth 3 times daily as needed for diarrhea    Associated Diagnoses: Loose stools      ondansetron (ZOFRAN) 4 MG tablet Take 1 tablet (4 mg) by mouth every 8 hours as needed for nausea    Associated Diagnoses: Nausea      polyethylene glycol (MIRALAX) 17 GM/Dose powder Take 17 g by mouth daily as needed for constipation  Qty: 510 g    Associated Diagnoses: Slow transit constipation       !! - Potential duplicate medications found. Please discuss with provider.      STOP taking these medications       oxyCODONE (ROXICODONE) 5 MG tablet Comments:   Reason for Stopping:             Allergies   Allergies   Allergen Reactions     Fosamax [Alendronic Acid] GI Disturbance     Contrast Dye      Red arm redness and swelling      Evista [Raloxifene]      abd symptoms.      Flu Virus Vaccine      swollen and red at inj site     Amoxicillin Rash

## 2022-06-04 NOTE — PROGRESS NOTES
"PRIMARY DIAGNOSIS: BLE EDEMA  OUTPATIENT/OBSERVATION GOALS TO BE MET BEFORE DISCHARGE:  1. ADLs back to baseline: No    2. Activity and level of assistance: Up with standby assistance.    3. Pain status: Pain free.    4. Return to near baseline physical activity: No     Discharge Planner Nurse   Safe discharge environment identified: Yes  Barriers to discharge: Yes       Entered by: Alison Loya RN 06/03/2022 8:49 PM     Please review provider order for any additional goals.   Nurse to notify provider when observation goals have been met and patient is ready for discharge.    Pt A&O x 4. VSS, Up with SBA. Uses BSC. On room air. [place]. Dyspnea on exertion. LS clear but diminished. BS active. BLE edema 3+/4+. On IV Lasix. PIV (L) SL. Strict I&O. On 2 gm NA diet. Tele Afib CVR/PVC    To be discharge to TCU tomorrow    Will continue to monitor    /52 (BP Location: Left arm, Patient Position: Semi-Stauffer's, Cuff Size: Adult Regular)   Pulse 80   Temp 97.7  F (36.5  C) (Oral)   Resp 24   Ht 1.549 m (5' 1\")   Wt 67.1 kg (147 lb 14.4 oz)   LMP  (LMP Unknown)   SpO2 94%   BMI 27.95 kg/m      "

## 2022-06-04 NOTE — PROGRESS NOTES
Care Management Discharge Note    Discharge Date: 06/04/2022    Discharge Disposition:  [facility] TCU    Discharge Services:  PT/OT    Discharge Transportation:  [facility] transport arranged for 1300    Private pay costs discussed: transportation costs    PAS Confirmation Code:  N/A--admitted from facility  Patient/family educated on Medicare website which has current facility and service quality ratings:      Patient/Family in Agreement with the Plan:  Yes    Handoff Referral Completed: No    Additional Information:  Per care team rounds, patient stable for discharge today. Spoke with [facility] admissions who confirmed bed hold and can accept patient back today. [facility] transport arranged for 1300. Orders to be faxed. SW will continue to follow.     1045: Discharge orders faxed to [facility].     CLAUDIA Quintero, [facility]   Inpatient Care Coordination  [facility]   [phone]

## 2022-06-04 NOTE — PLAN OF CARE
PRIMARY DIAGNOSIS: BLE EDEMA  OUTPATIENT/OBSERVATION GOALS TO BE MET BEFORE DISCHARGE:  ADLs back to baseline: No    Activity and level of assistance: Up with standby assistance.    Pain status: Pain free.    Return to near baseline physical activity: No     Discharge Planner Nurse   Safe discharge environment identified: Yes  Barriers to discharge: Yes       Entered by: Sloane Rosa RN 06/03/2022      AOx4.  Bit hard of hearing, denies pain.  2 gm diet.  Telemonitoring.  Plan: lasix, strict I & os.    Please review provider order for any additional goals.   Nurse to notify provider when observation goals have been met and patient is ready for discharge.Goal Outcome Evaluation:

## 2022-06-04 NOTE — PLAN OF CARE
PRIMARY DIAGNOSIS: BLE EDEMA + SOB  OUTPATIENT/OBSERVATION GOALS TO BE MET BEFORE DISCHARGE:  ADLs back to baseline: Yes    Activity and level of assistance: Up with standby assistance.    Pain status: Pain free.    Return to near baseline physical activity: No     Discharge Planner Nurse   Safe discharge environment identified: Yes  Barriers to discharge: Yes       Entered by: Sloane Rosa RN 06/04/2022       AOx4.  Denies pain.  C/o intermittent cough w/ phlegm.  Tolerating 2 gm NA diet.  Up sba to bedside commode.  K+ 3.2, will replace per protocol.  SL.  Will cont to monitor & provide cares.   Please review provider order for any additional goals.   Nurse to notify provider when observation goals have been met and patient is ready for discharge.

## 2022-06-04 NOTE — PLAN OF CARE
"PRIMARY DIAGNOSIS: \"GENERIC\" NURSING  OUTPATIENT/OBSERVATION GOALS TO BE MET BEFORE DISCHARGE:  1. ADLs back to baseline: No    2. Activity and level of assistance: Up with standby assistance.    3. Pain status: Pain free.    4. Return to near baseline physical activity: No     Discharge Planner Nurse   Safe discharge environment identified: Yes  Barriers to discharge: Yes       Entered by: Shana Hartley RN 06/04/2022 4:54 AM   Pt had x 1 episode of phlegm. Pt denies pain . Tele-A fib PVC HR 82.       Please review provider order for any additional goals.   Nurse to notify provider when observation goals have been met and patient is ready for discharge.    "

## 2022-06-06 ENCOUNTER — TRANSITIONAL CARE UNIT VISIT (OUTPATIENT)
Dept: GERIATRICS | Facility: CLINIC | Age: 87
End: 2022-06-06
Payer: COMMERCIAL

## 2022-06-06 ENCOUNTER — LAB REQUISITION (OUTPATIENT)
Dept: LAB | Facility: CLINIC | Age: 87
End: 2022-06-06

## 2022-06-06 ENCOUNTER — PATIENT OUTREACH (OUTPATIENT)
Dept: CARE COORDINATION | Facility: CLINIC | Age: 87
End: 2022-06-06
Payer: COMMERCIAL

## 2022-06-06 VITALS
SYSTOLIC BLOOD PRESSURE: 99 MMHG | HEART RATE: 61 BPM | HEIGHT: 61 IN | TEMPERATURE: 97.9 F | RESPIRATION RATE: 16 BRPM | WEIGHT: 144.6 LBS | OXYGEN SATURATION: 92 % | BODY MASS INDEX: 27.3 KG/M2 | DIASTOLIC BLOOD PRESSURE: 61 MMHG

## 2022-06-06 DIAGNOSIS — I10 ESSENTIAL HYPERTENSION: ICD-10-CM

## 2022-06-06 DIAGNOSIS — U07.1 COVID-19: ICD-10-CM

## 2022-06-06 DIAGNOSIS — E87.1 HYPONATREMIA: ICD-10-CM

## 2022-06-06 DIAGNOSIS — D64.9 CHRONIC ANEMIA: ICD-10-CM

## 2022-06-06 DIAGNOSIS — I50.31 ACUTE HEART FAILURE WITH PRESERVED EJECTION FRACTION (H): Primary | ICD-10-CM

## 2022-06-06 DIAGNOSIS — J90 PLEURAL EFFUSION: ICD-10-CM

## 2022-06-06 DIAGNOSIS — J45.909 PERSISTENT ASTHMA WITHOUT COMPLICATION, UNSPECIFIED ASTHMA SEVERITY: ICD-10-CM

## 2022-06-06 DIAGNOSIS — R19.5 LOOSE STOOLS: ICD-10-CM

## 2022-06-06 DIAGNOSIS — E22.2 SIADH (SYNDROME OF INAPPROPRIATE ADH PRODUCTION) (H): ICD-10-CM

## 2022-06-06 DIAGNOSIS — N18.31 STAGE 3A CHRONIC KIDNEY DISEASE (H): ICD-10-CM

## 2022-06-06 DIAGNOSIS — D64.9 ACUTE ANEMIA: ICD-10-CM

## 2022-06-06 DIAGNOSIS — D64.9 ANEMIA, UNSPECIFIED: ICD-10-CM

## 2022-06-06 DIAGNOSIS — Z87.01 HISTORY OF RECENT PNEUMONIA: ICD-10-CM

## 2022-06-06 DIAGNOSIS — R53.81 PHYSICAL DECONDITIONING: ICD-10-CM

## 2022-06-06 DIAGNOSIS — I48.91 ATRIAL FIBRILLATION, UNSPECIFIED TYPE (H): ICD-10-CM

## 2022-06-06 DIAGNOSIS — E87.1 HYPO-OSMOLALITY AND HYPONATREMIA: ICD-10-CM

## 2022-06-06 DIAGNOSIS — E87.6 HYPOKALEMIA: ICD-10-CM

## 2022-06-06 PROCEDURE — 99310 SBSQ NF CARE HIGH MDM 45: CPT | Performed by: NURSE PRACTITIONER

## 2022-06-06 PROCEDURE — U0005 INFEC AGEN DETEC AMPLI PROBE: HCPCS | Performed by: NURSE PRACTITIONER

## 2022-06-06 RX ORDER — TORSEMIDE 20 MG/1
20 TABLET ORAL DAILY
Start: 2022-06-06 | End: 2022-09-06

## 2022-06-06 NOTE — PROGRESS NOTES
Clinic Care Coordination Contact  Care Team Conversations     Situation: RN Clinical Product Navigator following patient through TCU care progression.      Background: Patient was recovering at [facility] when she became more ill: increased LE edema, chest congestion     Assessment: Patient thought to have possible community acquired pneumonia and asthma exacerbation, in addition to CHF exacerbation requiring diuretics.  She may need ongoing supplemental O2 PRN as well as asthma nebulizer regimen.   She will be returning to [facility] for continued care and likely will transition to an shelter unit when she is ready to return to a community setting.      Plan/Recommendations: RN Clinical Product Navigator will send TCU Care Team Care Management hand in communication and request involvement in discharge planning and coordination of care.         Josseline Salinas RN  Clinical Product Navigator

## 2022-06-06 NOTE — LETTER
6/6/2022        RE: Genie Persaud  [address] [place] [address]        [facility] GERIATRICS    Chief Complaint   Patient presents with     Hospital F/U     HPI:  Genie Persaud is a 86 year old  (1935), who is being seen today for an episodic care visit at: [facility]  ([facility]) [[id number]].     PMH significant for HTN, atrial fibrillation, history of cardiomyopathy, nonobstructive CAD, dyslipidemia, CKD stage 3, chronic anemia, asthma, GERD, history of esophageal candida.     She was hospitalized at [facility] 5/11-5/12/22 for right back and lower right extremity pain. US evaluation 4/25 that was negative and showed right Baker's cyst as likely cause of pain. MRI of lumbar spine showed no acute fractures and diffuse degenerative changes including moderate to severe neural foraminal stenosis on the right at L4-L5 and on the left at L5-S1. UA was negative. She was admitted to hospital for TCU placement, as felt she is not able to tolerate pain. Was started on pain regimen. Additionally labs in hospital revealed low sodium of 128 and low potassium of 3.3. Discharged to U for physical rehabilitation and medical management.      Hospitalized at [facility] from 5/15-5/23/22 for acute hypoxic respiratory failure, asthma exacerbation, possible CAP, possible candida esophagitis, new atrial fib, SIADH. She presented from U to the ED for evaluation of nausea and weakness. Laboratory evaluation showed sodium 122, alk phos 162, WBC 12.3. CT abdomen pelvis revealed no acute findings. She received IV hydration. Work up consistent with SIADH as cause of hyponatremia. She was discharged on 1L fluid restriction daily. GI consulted due to ongoing nausea that improved, so EGD deferred. Treated empirically with nystatin due to history of candidal esophagitis. She developed hypoxia thought to be due to fluid overload with pulmonary congestion and asthma exacerbation. Chest xray showed small  "left sided pleural effusion with congestion. Then had rapid response episode and required BiPAP. Was found to be in atrial fib with RVR at this time. She was started on anticoagulation with apixaban. Troponin was negative.  ECHO showed EF 50-55%, left atrium is mildly dilated, mild (1+) mitral regurgitation, mild (1+) aortic regurgitation, ascending aorta is Mildly dilated, moderate left pleural effusion. Received IV diuresis. Chest xray repeated that showed few patchy opacities in left lower base that could be possible pneumonia vs atelectasis vs scarring. She was treated for possible pneumonia with ceftriaxone and azithromycin-discharged with orders to complete 1 more day of azithromycin and 2 days of cefuroxime to complete course. Also received xopenex nebs and steroids for asthma exacerbation.     Summary of recent hospitalization:  Hospitalized at [facility] from 6/3-6/5 for CHF exacerbation. She was sent to ED from TCU for SOB, lower extremity swelling. Laboratory work up showed Hgb 9.0, N terminal pro BNP 5652. Chest xray showed stable mild enlargement of cardiac silhouette. Small left pleural effusion and associated basilar atelectasis/ consolidation. She received IV diuretics. She was discharged on lasix 20 mg daily x5 days at discharge. Additionally CT chest completed to evaluate for recent pneumonia. CT showed \"Small left pleural effusion with adjacent compressive atelectasis. 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 Unchanged ascending thoracic aortic aneurysm. New age-indeterminate T11 compression fracture.\" Was started on levaquin in TCU just prior to hospitalization, which was stopped in hospital.    Of note- despite hospital stating they stopped antibiotics she was discharged on course of levaquin and prednisone.     Discharged to TCU for physical rehabilitation and medical management.     Today's concern is: Christina seen today in TCU for follow up after " "hospitalization. She reports frustration today. Feels the hospital really didn't help her. Her leg swelling is unchanged today. Her breathing is improved. She denies SOB, CP, dizziness. Bowels still loose at times. Denies dysuria, trouble voiding. Will be reevaluated by therapy today and will have legs wrapped again today.     Allergies, and PMH/PSH reviewed in EPIC today.  REVIEW OF SYSTEMS:  8 point ROS of systems including Constitutional, Respiratory, Cardiovascular, Gastroenterology, Genitourinary, Integumentary, Musculoskeletal, Psychiatric were all negative except for pertinent positives noted in my HPI.    Objective:   BP 99/61   Pulse 61   Temp 97.9  F (36.6  C)   Resp 16   Ht 1.549 m (5' 1\")   Wt 65.6 kg (144 lb 9.6 oz)   LMP  (LMP Unknown)   SpO2 92%   BMI 27.32 kg/m    GENERAL APPEARANCE:  Alert, in NAD  HEENT: normocephalic, moist mucous membranes, nose without drainage or crusting  RESP:  respiratory effort normal, no respiratory distress, Lung sounds clear, patient is on RA  CV: auscultation of heart done, rate regular and rhythm irregular.  ABDOMEN: + bowel sounds, soft, nontender, no grimacing or guarding with palpation.  M/S:  +3-4 lower extremity edema  SKIN:  Inspection and palpation of skin and subcutaneous tissue: skin warm, dry without rashes  NEURO: cranial nerves 2-12 grossly intact and at patient's baseline; no facial asymmetry, no speech deficits and able to follow directions  PSYCH: oriented x 3, affect and mood normal      Labs done in SNF are in [facility]. Please refer to them using [facility]Care Everywhere. and Recent labs in [facility] reviewed by me today.     Assessment/Plan:  (I50.31) Acute heart failure with preserved ejection fraction (H)  (primary encounter diagnosis)  (J90) Pleural effusion  Comment: ECHO from 5/19 showed EF 50-55%, left atrium is mildly dilated, mild (1+) mitral regurgitation, mild (1+) aortic regurgitation, ascending aorta is Mildly dilated  -treated for CHF " exacerbation with IV diuresis  -discharged on lasix 20 mg daily  -legs today with +3-4 swelling, denies SOB  Plan: Discontinue lasix as 20 and 40 mg were previously not effective at TCU prior to hospitalization. Start torsemide 20 mg po daily (hold if SBP<100)- first dose STAT. Reduce losartan to 25 mg daily as above BMP tomorrow. Lymph therapy- discussed with therapy and they will start today. Daily weights. Notify provider with weight gain >2 lbs in a day, >5 lbs in on week. 2 gram Na diet.       (Z87.01) History of recent pneumonia  Comment: CT chest inpatient with no ongoing pneumonia  -completed course of azithromycin and cefuroxime 5/24  -denies SOB, cough today  -Afebrile and hemodynamically stable  -prior to hospitalization was started on levaquin at TCU, which hospital notes said to stop, but it was on discharge orders from hospital  Plan: Discontinue levaquin. Continue mucinex  mg BID and inhalers as below.    (I48.91) Atrial fibrillation with RVR (H)  Comment: suspected to be secondary to respiratory failure and beta agonists  -she also had episode of afib in 2017 in setting of sepsis  -was started on apixaban 5 mg BID in hospital and then discharged on 2.5 mg BID dosing   -increased eliquis to 5 mg BID at TCU as she does not meet criteria for reduced dose of 2.5 mg BID  -HR controlled 71, 61, 64  Plan: Continue apixaban 5 mg BID. Monitor HR and may need to add beta blocker if ongoing elevated HR.     (E22.2) SIADH (syndrome of inappropriate ADH production) (H)  (E87.1) Hyponatremia  Comment: sodium on initial hospitalization on 5/20 was 122   -received IV hydration initially and sodium did improve  -hydrochlorothiazide discontinued as suspect this contributing  Plan:  BMP 6/7. Continue 1L fluid restriction.      (E87.6) Hypokalemia  Comment: secondary to diuresis  -potassium 3.5 on 6/4  Plan: BMP 6/7     (R19.5) Loose stools   Comment: ongoing  -c.diff negative 5/26/22  Plan: Monitor bowels.  Continue loperamide 2 mg TID PRN.     (I10) Essential hypertension  Comment: BP soft at times 129/69, 99/61, 93/49, 135/59; HR 71, 61, 64  -hydrochlorothiazide stopped at TCU due to likely contributing to SIADH  -amlodipine stopped due to soft BPs  Plan: Reduce losartan to 25 mg daily. Hold if SBP<110. Discontinue lasix as above, as both 20 and 40 mg dose not effective for diuresis in TCU prior to hospitalization. Start torsemide as above. BMP 6/3. VS per policy. Adjust medications as needed.     (Z86.79) History of cardiomyopathy  (I25.10) Coronary artery disease involving native coronary artery of native heart without angina pectoris  (E78.5) Dyslipidemia  Comment: chronic, with nonobstructive CAD, history of cardiomyopathy in 2017 when she was septic  - ECHO showed EF 50-55%, left atrium is mildly dilated, mild (1+) mitral regurgitation, mild (1+) aortic regurgitation, ascending aorta is Mildly dilated, moderate left pleural effusion  -coronary angiogram 2017 with mild to moderate CAD that is nonobstructive  -denies CP today  Plan: Continue atorvastatin 20 mg at bedtime. Follow up with cardiology per recommendations     (N18.31) Stage 3a chronic kidney disease (H)  Comment: chronic  -baseline creatinine 0.9-1.1  -creatinine 0.83 on 5/31  Plan: BMP 6/7. Avoid nephrotoxins. Renally dose medications as indicated.     (D64.9) Acute anemia  (D64.9) Chronic anemia  Comment: acute chronic  -hgb at baseline 11-12  -hgb trending down over past month  -is now on anticoagulation  Plan: CBC 6/7. Guaiac stool test x3.     (J45.909) persistent asthma without complication, unspecified asthma severity.  Comment: breathing now at baseline  -was treated with course of prednisone last week for asthma exacerbation and this was restarted on readmission to TCU (was on hospital discharge orders), so received prednisone 6/5 and 6/6  Plan: Discontinue prednisone. Continue mucinex BID, levalbuterolat bedtime and  PRN, tiotropium daily,  advair BID, albuterol HFA PRN, singulair daily. Monitor respiratory status.    (M54.50) Acute bilateral low back pain, unspecified whether sciatica present  (M79.604) Pain of right lower extremity  Comment: found to have new age-indeterminate T11 compression fracture on chest CT this past admission  -denies pain today  Plan: Continue tylenol 975 mg q8h PRN. Monitor pain. Therapy as below    (K21.9) Gastroesophageal reflux disease, unspecified whether esophagitis present  Comment: chronic  Plan: Continue omeprazole 10 mg daily.     (R53.81) Physical deconditioning  Comment: Acute, secondary to recent hospitalization, medical conditions as above  -continues to work with therapy  Plan: Encourage participation in physical therapy/occupational therapy for strengthening and deconditioning. Discharge planning per their recommendation. Social work to assist with d/c planning.     (I71.2) Thoracic aortic aneurysm without rupture (H)  Comment: ascending aorta is Mildly dilated per ECHO 5/19/22 the Aorta Diam: 4.0 cm, previous ECHO 8/26/21 showed Aorta Diam: 3.9 cm  Plan: Follow up outpatient    (B37.81) Candida esophagitis (H)-resolved  Comment: treated empirically possible candida esophagitis due to symptoms of nausea  -GI deferred EGD during hospitalization as nausea improved  -completed course of nystatin inpatient  Plan: Monitor symptoms    Total time spent with patient visit at the skilled nursing facility was 35 min including patient visit and review of past records. Greater than 50% of total time spent with counseling and coordinating care with facility staff regarding admission and medication orders, plan of care as described above. Counseling patient about current medications, plan of care and what to expect at TCU as above.  .       Electronically signed by: TOREY Edmonds CNP           Sincerely,        TOREY Edmonds CNP

## 2022-06-06 NOTE — PROGRESS NOTES
[facility] GERIATRICS    Chief Complaint   Patient presents with     Hospital F/U     HPI:  Genie Persaud is a 86 year old  (1935), who is being seen today for an episodic care visit at: [facility]  ([facility]) [[id number]].     PMH significant for HTN, atrial fibrillation, history of cardiomyopathy, nonobstructive CAD, dyslipidemia, CKD stage 3, chronic anemia, asthma, GERD, history of esophageal candida.     She was hospitalized at [facility] 5/11-5/12/22 for right back and lower right extremity pain. US evaluation 4/25 that was negative and showed right Baker's cyst as likely cause of pain. MRI of lumbar spine showed no acute fractures and diffuse degenerative changes including moderate to severe neural foraminal stenosis on the right at L4-L5 and on the left at L5-S1. UA was negative. She was admitted to hospital for TCU placement, as felt she is not able to tolerate pain. Was started on pain regimen. Additionally labs in hospital revealed low sodium of 128 and low potassium of 3.3. Discharged to U for physical rehabilitation and medical management.      Hospitalized at [facility] from 5/15-5/23/22 for acute hypoxic respiratory failure, asthma exacerbation, possible CAP, possible candida esophagitis, new atrial fib, SIADH. She presented from U to the ED for evaluation of nausea and weakness. Laboratory evaluation showed sodium 122, alk phos 162, WBC 12.3. CT abdomen pelvis revealed no acute findings. She received IV hydration. Work up consistent with SIADH as cause of hyponatremia. She was discharged on 1L fluid restriction daily. GI consulted due to ongoing nausea that improved, so EGD deferred. Treated empirically with nystatin due to history of candidal esophagitis. She developed hypoxia thought to be due to fluid overload with pulmonary congestion and asthma exacerbation. Chest xray showed small left sided pleural effusion with congestion. Then had rapid response episode and  "required BiPAP. Was found to be in atrial fib with RVR at this time. She was started on anticoagulation with apixaban. Troponin was negative.  ECHO showed EF 50-55%, left atrium is mildly dilated, mild (1+) mitral regurgitation, mild (1+) aortic regurgitation, ascending aorta is Mildly dilated, moderate left pleural effusion. Received IV diuresis. Chest xray repeated that showed few patchy opacities in left lower base that could be possible pneumonia vs atelectasis vs scarring. She was treated for possible pneumonia with ceftriaxone and azithromycin-discharged with orders to complete 1 more day of azithromycin and 2 days of cefuroxime to complete course. Also received xopenex nebs and steroids for asthma exacerbation.     Summary of recent hospitalization:  Hospitalized at [facility] from 6/3-6/5 for CHF exacerbation. She was sent to ED from TCU for SOB, lower extremity swelling. Laboratory work up showed Hgb 9.0, N terminal pro BNP 5652. Chest xray showed stable mild enlargement of cardiac silhouette. Small left pleural effusion and associated basilar atelectasis/ consolidation. She received IV diuretics. She was discharged on lasix 20 mg daily x5 days at discharge. Additionally CT chest completed to evaluate for recent pneumonia. CT showed \"Small left pleural effusion with adjacent compressive atelectasis. 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 Unchanged ascending thoracic aortic aneurysm. New age-indeterminate T11 compression fracture.\" Was started on levaquin in TCU just prior to hospitalization, which was stopped in hospital.    Of note- despite hospital stating they stopped antibiotics she was discharged on course of levaquin and prednisone.     Discharged to TCU for physical rehabilitation and medical management.     Today's concern is: Christina seen today in TCU for follow up after hospitalization. She reports frustration today. Feels the hospital really didn't help " "her. Her leg swelling is unchanged today. Her breathing is improved. She denies SOB, CP, dizziness. Bowels still loose at times. Denies dysuria, trouble voiding. Will be reevaluated by therapy today and will have legs wrapped again today.     Allergies, and PMH/PSH reviewed in EPIC today.  REVIEW OF SYSTEMS:  8 point ROS of systems including Constitutional, Respiratory, Cardiovascular, Gastroenterology, Genitourinary, Integumentary, Musculoskeletal, Psychiatric were all negative except for pertinent positives noted in my HPI.    Objective:   BP 99/61   Pulse 61   Temp 97.9  F (36.6  C)   Resp 16   Ht 1.549 m (5' 1\")   Wt 65.6 kg (144 lb 9.6 oz)   LMP  (LMP Unknown)   SpO2 92%   BMI 27.32 kg/m    GENERAL APPEARANCE:  Alert, in NAD  HEENT: normocephalic, moist mucous membranes, nose without drainage or crusting  RESP:  respiratory effort normal, no respiratory distress, Lung sounds clear, patient is on RA  CV: auscultation of heart done, rate regular and rhythm irregular.  ABDOMEN: + bowel sounds, soft, nontender, no grimacing or guarding with palpation.  M/S:  +3-4 lower extremity edema  SKIN:  Inspection and palpation of skin and subcutaneous tissue: skin warm, dry without rashes  NEURO: cranial nerves 2-12 grossly intact and at patient's baseline; no facial asymmetry, no speech deficits and able to follow directions  PSYCH: oriented x 3, affect and mood normal      Labs done in SNF [facility]. Please refer to them using [facility]/Care Everywhere. and Recent labs in [facility] reviewed by me today.     Assessment/Plan:  (I50.31) Acute heart failure with preserved ejection fraction (H)  (primary encounter diagnosis)  (J90) Pleural effusion  Comment: ECHO from 5/19 showed EF 50-55%, left atrium is mildly dilated, mild (1+) mitral regurgitation, mild (1+) aortic regurgitation, ascending aorta is Mildly dilated  -treated for CHF exacerbation with IV diuresis  -discharged on lasix 20 mg daily  -legs today with " +3-4 swelling, denies SOB  Plan: Discontinue lasix as 20 and 40 mg were previously not effective at TCU prior to hospitalization. Start torsemide 20 mg po daily (hold if SBP<100)- first dose STAT. Reduce losartan to 25 mg daily as above BMP tomorrow. Lymph therapy- discussed with therapy and they will start today. Daily weights. Notify provider with weight gain >2 lbs in a day, >5 lbs in on week. 2 gram Na diet.       (Z87.01) History of recent pneumonia  Comment: CT chest inpatient with no ongoing pneumonia  -completed course of azithromycin and cefuroxime 5/24  -denies SOB, cough today  -Afebrile and hemodynamically stable  -prior to hospitalization was started on levaquin at TCU, which hospital notes said to stop, but it was on discharge orders from hospital  Plan: Discontinue levaquin. Continue mucinex  mg BID and inhalers as below.    (I48.91) Atrial fibrillation with RVR (H)  Comment: suspected to be secondary to respiratory failure and beta agonists  -she also had episode of afib in 2017 in setting of sepsis  -was started on apixaban 5 mg BID in hospital and then discharged on 2.5 mg BID dosing   -increased eliquis to 5 mg BID at TCU as she does not meet criteria for reduced dose of 2.5 mg BID  -HR controlled 71, 61, 64  Plan: Continue apixaban 5 mg BID. Monitor HR and may need to add beta blocker if ongoing elevated HR.     (E22.2) SIADH (syndrome of inappropriate ADH production) (H)  (E87.1) Hyponatremia  Comment: sodium on initial hospitalization on 5/20 was 122   -received IV hydration initially and sodium did improve  -hydrochlorothiazide discontinued as suspect this contributing  Plan:  BMP 6/7. Continue 1L fluid restriction.      (E87.6) Hypokalemia  Comment: secondary to diuresis  -potassium 3.5 on 6/4  Plan: BMP 6/7     (R19.5) Loose stools   Comment: ongoing  -c.diff negative 5/26/22  Plan: Monitor bowels. Continue loperamide 2 mg TID PRN.     (I10) Essential hypertension  Comment: BP soft at  times 129/69, 99/61, 93/49, 135/59; HR 71, 61, 64  -hydrochlorothiazide stopped at TCU due to likely contributing to SIADH  -amlodipine stopped due to soft BPs  Plan: Reduce losartan to 25 mg daily. Hold if SBP<110. Discontinue lasix as above, as both 20 and 40 mg dose not effective for diuresis in TCU prior to hospitalization. Start torsemide as above. BMP 6/3. VS per policy. Adjust medications as needed.     (Z86.79) History of cardiomyopathy  (I25.10) Coronary artery disease involving native coronary artery of native heart without angina pectoris  (E78.5) Dyslipidemia  Comment: chronic, with nonobstructive CAD, history of cardiomyopathy in 2017 when she was septic  - ECHO showed EF 50-55%, left atrium is mildly dilated, mild (1+) mitral regurgitation, mild (1+) aortic regurgitation, ascending aorta is Mildly dilated, moderate left pleural effusion  -coronary angiogram 2017 with mild to moderate CAD that is nonobstructive  -denies CP today  Plan: Continue atorvastatin 20 mg at bedtime. Follow up with cardiology per recommendations     (N18.31) Stage 3a chronic kidney disease (H)  Comment: chronic  -baseline creatinine 0.9-1.1  -creatinine 0.83 on 5/31  Plan: BMP 6/7. Avoid nephrotoxins. Renally dose medications as indicated.     (D64.9) Acute anemia  (D64.9) Chronic anemia  Comment: acute chronic  -hgb at baseline 11-12  -hgb trending down over past month  -is now on anticoagulation  Plan: CBC 6/7. Guaiac stool test x3.     (J45.909) persistent asthma without complication, unspecified asthma severity.  Comment: breathing now at baseline  -was treated with course of prednisone last week for asthma exacerbation and this was restarted on readmission to TCU (was on hospital discharge orders), so received prednisone 6/5 and 6/6  Plan: Discontinue prednisone. Continue mucinex BID, levalbuterolat bedtime and  PRN, tiotropium daily, advair BID, albuterol HFA PRN, singulair daily. Monitor respiratory status.    (M54.50)  Acute bilateral low back pain, unspecified whether sciatica present  (M79.604) Pain of right lower extremity  Comment: found to have new age-indeterminate T11 compression fracture on chest CT this past admission  -denies pain today  Plan: Continue tylenol 975 mg q8h PRN. Monitor pain. Therapy as below    (K21.9) Gastroesophageal reflux disease, unspecified whether esophagitis present  Comment: chronic  Plan: Continue omeprazole 10 mg daily.     (R53.81) Physical deconditioning  Comment: Acute, secondary to recent hospitalization, medical conditions as above  -continues to work with therapy  Plan: Encourage participation in physical therapy/occupational therapy for strengthening and deconditioning. Discharge planning per their recommendation. Social work to assist with d/c planning.     (I71.2) Thoracic aortic aneurysm without rupture (H)  Comment: ascending aorta is Mildly dilated per ECHO 5/19/22 the Aorta Diam: 4.0 cm, previous ECHO 8/26/21 showed Aorta Diam: 3.9 cm  Plan: Follow up outpatient    (B37.81) Candida esophagitis (H)-resolved  Comment: treated empirically possible candida esophagitis due to symptoms of nausea  -GI deferred EGD during hospitalization as nausea improved  -completed course of nystatin inpatient  Plan: Monitor symptoms    Total time spent with patient visit at the skilled nursing facility was 35 min including patient visit and review of past records. Greater than 50% of total time spent with counseling and coordinating care with facility staff regarding admission and medication orders, plan of care as described above. Counseling patient about current medications, plan of care and what to expect at TCU as above.  .       Electronically signed by: TOREY Edmonds CNP

## 2022-06-07 ENCOUNTER — LAB REQUISITION (OUTPATIENT)
Dept: LAB | Facility: CLINIC | Age: 87
End: 2022-06-07
Payer: COMMERCIAL

## 2022-06-07 DIAGNOSIS — E87.6 HYPOKALEMIA: ICD-10-CM

## 2022-06-07 LAB
ANION GAP SERPL CALCULATED.3IONS-SCNC: 7 MMOL/L (ref 3–14)
BUN SERPL-MCNC: 33 MG/DL (ref 7–30)
CALCIUM SERPL-MCNC: 8.5 MG/DL (ref 8.5–10.1)
CHLORIDE BLD-SCNC: 94 MMOL/L (ref 94–109)
CO2 SERPL-SCNC: 32 MMOL/L (ref 20–32)
CREAT SERPL-MCNC: 0.98 MG/DL (ref 0.52–1.04)
ERYTHROCYTE [DISTWIDTH] IN BLOOD BY AUTOMATED COUNT: 13.5 % (ref 10–15)
GFR SERPL CREATININE-BSD FRML MDRD: 57 ML/MIN/1.73M2
GLUCOSE BLD-MCNC: 78 MG/DL (ref 70–99)
HCT VFR BLD AUTO: 29.5 % (ref 35–47)
HGB BLD-MCNC: 9.6 G/DL (ref 11.7–15.7)
MCH RBC QN AUTO: 29.4 PG (ref 26.5–33)
MCHC RBC AUTO-ENTMCNC: 32.5 G/DL (ref 31.5–36.5)
MCV RBC AUTO: 90 FL (ref 78–100)
PLATELET # BLD AUTO: 305 10E3/UL (ref 150–450)
POTASSIUM BLD-SCNC: 2.8 MMOL/L (ref 3.4–5.3)
RBC # BLD AUTO: 3.27 10E6/UL (ref 3.8–5.2)
SARS-COV-2 RNA RESP QL NAA+PROBE: NEGATIVE
SODIUM SERPL-SCNC: 133 MMOL/L (ref 133–144)
WBC # BLD AUTO: 8.7 10E3/UL (ref 4–11)

## 2022-06-07 PROCEDURE — 85027 COMPLETE CBC AUTOMATED: CPT | Performed by: NURSE PRACTITIONER

## 2022-06-07 PROCEDURE — P9604 ONE-WAY ALLOW PRORATED TRIP: HCPCS | Performed by: NURSE PRACTITIONER

## 2022-06-07 PROCEDURE — 36415 COLL VENOUS BLD VENIPUNCTURE: CPT | Performed by: NURSE PRACTITIONER

## 2022-06-07 PROCEDURE — 80048 BASIC METABOLIC PNL TOTAL CA: CPT | Performed by: NURSE PRACTITIONER

## 2022-06-08 VITALS
HEART RATE: 98 BPM | RESPIRATION RATE: 20 BRPM | OXYGEN SATURATION: 93 % | SYSTOLIC BLOOD PRESSURE: 121 MMHG | DIASTOLIC BLOOD PRESSURE: 68 MMHG | TEMPERATURE: 97.3 F | BODY MASS INDEX: 27.19 KG/M2 | WEIGHT: 144 LBS | HEIGHT: 61 IN

## 2022-06-08 LAB
ANION GAP SERPL CALCULATED.3IONS-SCNC: 8 MMOL/L (ref 3–14)
BUN SERPL-MCNC: 29 MG/DL (ref 7–30)
CALCIUM SERPL-MCNC: 9 MG/DL (ref 8.5–10.1)
CHLORIDE BLD-SCNC: 95 MMOL/L (ref 94–109)
CO2 SERPL-SCNC: 32 MMOL/L (ref 20–32)
CREAT SERPL-MCNC: 0.85 MG/DL (ref 0.52–1.04)
GFR SERPL CREATININE-BSD FRML MDRD: 67 ML/MIN/1.73M2
GLUCOSE BLD-MCNC: 93 MG/DL (ref 70–99)
POTASSIUM BLD-SCNC: 3.3 MMOL/L (ref 3.4–5.3)
SODIUM SERPL-SCNC: 135 MMOL/L (ref 133–144)

## 2022-06-08 PROCEDURE — 36415 COLL VENOUS BLD VENIPUNCTURE: CPT | Performed by: NURSE PRACTITIONER

## 2022-06-08 PROCEDURE — 80048 BASIC METABOLIC PNL TOTAL CA: CPT | Performed by: NURSE PRACTITIONER

## 2022-06-08 RX ORDER — LOSARTAN POTASSIUM 25 MG/1
25 TABLET ORAL DAILY
COMMUNITY
End: 2022-09-06

## 2022-06-08 NOTE — PROGRESS NOTES
[facility]  INITIAL VISIT NOTE  June 9, 2022    PRIMARY CARE PROVIDER AND CLINIC:  Layo Sevilla [address] [facility]  / [place] [address]    Chief Complaint   Patient presents with     Hospital F/U       HPI:    Genie Persaud is a 86 year old  (1935) female who was seen at [facility]CU on June 9, 2022 for an initial visit.     Medical history is notable for nonobstructive CAD, stress cardiomyopathy, moderate aortic regurgitation, chronic lower extremity edema, hypertension, dyslipidemia, atrial fibrillation, CVA, thoracic aortic aneurysm, asthma, tracheobronchomalacia, chronic anemia, GERD, pneumonia, vitamin D deficiency, osteopenia, and BCC.    Summary of hospital course:  Patient was hospitalized at [facility] from May 11 through May 12, 2022 after presenting to the hospital with back and right lower extremity pain as well as TCU placement.  Of note, she had been seen in ED on May 1 on May 8 for similar complaints.  Doppler study on April 25 had revealed right lower extremity Baker's cyst, measuring 4.1 x 2 x 0.6 cm, and no DVT.  MRI of the lumbar spine was remarkable for diffuse degenerative changes, moderate to severe neuroforaminal stenosis at L4-L5 on the right and at L5-S1 on the left and no fractures.  BMP showed sodium of 128.  UA was unremarkable.  Screening for COVID-19 was negative.  She was admitted to this facility for nursing care and rehab.  She was readmitted to [facility] from May 15 through May 23, 2022 for acute hypoxic respiratory failure secondary to asthma exacerbation and possible community-acquired pneumonia.  She was also noted to be in atrial fibrillation.  She was treated with nebulizer, steroids, and ceftriaxone/azithromycin.  Anticoagulation was initiated with Eliquis.  Started on fluid restriction for hyponatremia due to SIADH. She was then readmitted to this facility.  She was rehospitalized at [facility]  from Marielle 3 through June 4, 2022 for CHF exacerbation.  BNP was elevated at 5,652.  Chest x-ray revealed stable mild cardiac enlargement, small left pleural effusion, and associated basilar atelectasis/consolidation.  CT chest was significant for a small left pleural effusion with adjacent compressive atelectasis, tracheobronchomalacia with scattered bilateral air trapping, and new age-indeterminate T11 compression fracture.  EKG showed atrial fibrillation.  Echocardiogram revealed LV ejection fraction of 50-55%, borderline reduced RV systolic function, 1+ MR, 1+ TR, mildly dilated ascending aorta, and moderate left pleural effusion.  She was treated with IV furosemide.    Patient is readmitted to this facility for medical management, nursing care, and rehab.     Of note, history was obtained from patient, facility RN, and extensive review of the chart.    Today's visit:  Patient was seen in her room, while sitting in a wheelchair.  She appears frail but in no acute distress.  Earlier today she started having bouts of coughing but she reports no dyspnea or wheezing.  At night she raises the head of the bed.  She reports that she had a bowel movement yesterday.  She denies melena or hematochezia.  She reports no fever, chills, chest pain, palpitation, nausea, vomiting, abdominal pain, or urinary symptoms.  She does not endorse any back pain.    CODE STATUS:   DNR / DNI    PAST MEDICAL HISTORY:   Mild-moderate nonocclusive CAD, per coronary angiogram in March 2017  Stress cardiomyopathy in 2017 in the setting of sepsis; resolved  Chronic HFpEF; LVEF 50-55% per echo on May 19, 2022  Mild aortic regurgitation  Mild mitral regurgitation  Chronic lower extremity edema  Hypertension  Dyslipidemia  Atrial fibrillation, first noted in 2017 in the setting of sepsis; recurrence in May 2022  Remote history of CVA per chart  Thoracic aortic aneurysm; measuring 4.4 cm, per CT angio in July 2018  Moderate persistent  asthma  Tracheobronchomalacia   Pulmonary nodules  Right lower extremity Baker's cyst  Chronic anemia; baseline Hgb 11-12  GERD  Candidal esophagitis  Peripheral neuropathy  RLL pneumonia with sepsis in November 2017  Cellulitis of hand  Glaucoma  Vitamin D deficiency  Osteopenia  BCC of skin in the back  Degenerative disc disease of the lumbar spine  T11 compression fracture    Past Medical History:   Diagnosis Date     Anemia 03/10/2009    Chronic disease, by Fe studies; awaiting full GI w/u and repeat colonoscopy, ERCP.     Basal Cell Carcinoma--back      Coronary artery disease 03/09/2017    3/2/2017 - Two vessel coronary artery disease with mLAD 50% stenosis, D3 50% stenosis, pLCx 50% stenosis and mLCx 50% stenosis     Essential hypertension 09/01/2016    White coat hypertension;  24 hour ambulatory monitoring normal. Do not titrate up further.       Hyperlipidemia 02/10/2010     Moderate persistent asthma      Nonrheumatic aortic valve insufficiency 06/29/2017     Osteopenia      Paroxysmal atrial fibrillation 12/26/2017     Pulmonary nodule 03/03/2009    PET scan reportedly normal; biopsy not advised.     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  Problem list name updated by automated process. Provider to review     Vasculitis 04/20/2009    Possible increased activity of thoracic aorta, on PET scan w/u for pulmonary nodule.        PAST SURGICAL HISTORY:   Past Surgical History:   Procedure Laterality Date     APPENDECTOMY OPEN  1957     C STEREOTACTIC BREAST BIOPSY  01/01/2001     CHOLECYSTECTOMY, LAPOROSCOPIC  01/01/2008     DILATION AND CURETTAGE  1967     DILATION AND CURETTAGE  1971     ESOPHAGOSCOPY, GASTROSCOPY, DUODENOSCOPY (EGD), COMBINED  05/17/2013    Procedure: COMBINED ESOPHAGOSCOPY, GASTROSCOPY, DUODENOSCOPY  (EGD), BIOPSY SINGLE OR MULTIPLE;  ESOPHAGOSCOPY, GASTROSCOPY, DUODENOSCOPY (EGD)  with bx;  Surgeon: Jeffery Yanez MD;  Location:  GI     TONSILLECTOMY       Tubal Ligation NOS  1971       FAMILY HISTORY:   Family History   Problem Relation Age of Onset     Hypertension Mother      Osteoporosis Mother      C.A.D. Mother      Connective Tissue Disorder Father         scleraderma     Cerebrovascular Disease Paternal Grandmother      Prostate Cancer Other         9/05 passed away due to complications     Breast Cancer Child         youngest dtr       SOCIAL HISTORY:  Social History     Tobacco Use     Smoking status: Never Smoker     Smokeless tobacco: Never Used   Substance Use Topics     Alcohol use: Yes     Alcohol/week: 1.0 - 2.0 standard drink     Types: 1 - 2 Standard drinks or equivalent per week     Comment: 1 glass of wine 1-2 days per week       MEDICATIONS:  Current Outpatient Medications   Medication Sig Dispense Refill     acetaminophen (TYLENOL) 325 MG tablet Take 975 mg by mouth every 8 hours as needed for mild pain       albuterol (PROAIR HFA/PROVENTIL HFA/VENTOLIN HFA) 108 (90 Base) MCG/ACT inhaler Inhale 1-2 puffs into the lungs every 6 hours as needed for shortness of breath / dyspnea or wheezing       apixaban ANTICOAGULANT (ELIQUIS) 5 MG tablet Take 1 tablet (5 mg) by mouth 2 times daily 60 tablet      atorvastatin (LIPITOR) 20 MG tablet TAKE ONE TABLET BY MOUTH AT BEDTIME 90 tablet 1     calcium citrate-vitamin D (CITRACAL) 315-200 MG-UNIT TABS per tablet Take 1 tablet by mouth 2 times daily       fluticasone-salmeterol (ADVAIR-HFA) 230-21 MCG/ACT inhaler Inhale 2 puffs into the lungs 2 times daily       guaiFENesin (MUCINEX) 600 MG 12 hr tablet Take 1 tablet (600 mg) by mouth 2 times daily       latanoprost (XALATAN) 0.005 % ophthalmic solution Place 1 drop into both eyes At Bedtime       levalbuterol (XOPENEX) 0.63 MG/3ML neb solution Take 1 ampule by nebulization At Bedtime        "levalbuterol (XOPENEX) 0.63 MG/3ML neb solution Take 3 mLs (0.63 mg) by nebulization every 4 hours as needed for wheezing And scheduled at bedtime. 90 mL 0     loperamide (IMODIUM A-D) 2 MG tablet Take 1 tablet (2 mg) by mouth 3 times daily as needed for diarrhea       loratadine (CLARITIN) 10 MG tablet Take 1 tablet (10 mg) by mouth daily 90 tablet 3     losartan (COZAAR) 50 MG tablet Take 1 tablet (50 mg) by mouth daily       montelukast (SINGULAIR) 10 MG tablet Take 1 tablet (10 mg) by mouth At Bedtime 90 tablet 3     omeprazole (PRILOSEC) 10 MG DR capsule TAKE ONE CAPSULE BY MOUTH ONE TIME DAILY 90 capsule 1     ondansetron (ZOFRAN) 4 MG tablet Take 1 tablet (4 mg) by mouth every 8 hours as needed for nausea       polyethylene glycol (MIRALAX) 17 GM/Dose powder Take 17 g by mouth daily as needed for constipation 510 g      tiotropium (SPIRIVA RESPIMAT) 2.5 MCG/ACT inhaler Inhale 2 puffs into the lungs daily 4 g 2     torsemide (DEMADEX) 20 MG tablet Take 1 tablet (20 mg) by mouth daily         Post Discharge Medication Reconciliation Status: discharge medications reconciled and changed, per note/orders.      ALLERGIES:  Allergies   Allergen Reactions     Fosamax [Alendronic Acid] GI Disturbance     Contrast Dye      Red arm redness and swelling      Evista [Raloxifene]      abd symptoms.      Flu Virus Vaccine      swollen and red at inj site     Amoxicillin Rash       ROS:  10 point ROS were negative other than the symptoms noted above in the HPI.    PHYSICAL EXAM:  Vital signs were reviewed in the chart.  Vital Signs: /68   Pulse 98   Temp 97.3  F (36.3  C)   Resp 20   Ht 1.549 m (5' 1\")   Wt 65.3 kg (144 lb)   LMP  (LMP Unknown)   SpO2 93%   BMI 27.21 kg/m    General: Frail appearing but in no acute distress  HEENT: Mild conjunctival pallor  Cardiovascular: Normal S1, S2, irregularly irregular rhythm  Respiratory: Lungs clear to auscultation bilaterally  GI: Abdomen soft, non-tender, " non-distended, +BS  Extremities: 3-4+ bilateral LE edema, lymphedema wraps are on  Neuro: CX II-XII grossly intact; ROM in all four extremities grossly intact  Psych: Alert and oriented x3; normal affect  Skin: No acute rash    LABORATORY/IMAGING DATA:  All relevant labs and imaging data were reviewed personally today.    BMP (June 8, 2022): Sodium 135, potassium 3.3, BUN 29, creatinine 0.85, glucose 93, calcium 9    CBC (June 7, 2022): White count 8.7, hemoglobin 9.6, platelet count 205,000, MCV 90      Most Recent 3 CBC's:Recent Labs   Lab Test 06/03/22  1121 05/22/22  0645 05/20/22  0741   WBC 10.8 10.0 11.5*   HGB 9.0* 9.3* 9.8*   MCV 91 89 86    410 438     Most Recent 3 BMP's:Recent Labs   Lab Test 06/04/22  1150 06/04/22  1056 06/04/22  0525 06/03/22  1206 05/22/22  0645   NA  --   --  132* 133 130*   POTASSIUM 3.5 3.4 3.2* 4.0 3.6   CHLORIDE  --   --  95 96 94   CO2  --   --  31 31 30   BUN  --   --  29 26 20   CR  --   --  0.87 0.85 0.73   ANIONGAP  --   --  6 6 6   ELROY  --   --  9.3 9.0 8.6   GLC  --   --  107* 100* 111*         ASSESSMENT/PLAN:  Acute on chronic HFpEF (LVEF 50-55%),  History of stress cardiomyopathy in 2017 in the setting of sepsis,  Mild aortic regurgitation,  Mild mitral regurgitation,  Chronic lower extremity edema.  Patient was treated with IV furosemide with subsequent transition to oral torsemide.  Losartan dose was reduced in TCU.  She weighs 147.8 LBS, lungs are clear, but she has 3-4+ bilateral lower extremity lymphedema.  Plan:  Continue low-salt diet and fluid restriction  Continue torsemide 20 mg p.o. daily  Continue losartan 25 mg p.o. daily  Continue lymphedema therapy/lymphedema wraps  Monitor weight and volume status  Monitor renal function and electrolytes    Atrial fibrillation,  Remote history of CVA (per chart).  Atrial fibrillation first noted in the setting of sepsis in 2017 with recurrence in May 2021, now appears to be persistent.  Not on AV anat blocking  agents.  Heart rate is around 90.  Plan:  Continue apixaban 5 mg p.o. twice daily  Start low-dose diltiazem  mg p.o. daily with hold parameters (will avoid beta-blockers due to asthma)  Monitor heart rate    Hypokalemia.  Due to diuretic therapy.  Plan:  Continue potassium chloride 40 mEq p.o. daily  Repeat BMP on Marielle 10 as scheduled    SIADH.  Urine osmolarity of 539 and urine sodium level 41 on May 15.  Off HCTZ.  Last sodium level was 135.  Plan:  Continue fluid restriction of 1500/day  Continue to monitor sodium level   Repeat BMP on Marielle 10 as scheduled    Mild-moderate nonocclusive CAD (per coronary angiogram in March 2017),  Essential hypertension,  Dyslipidemia.  Losartan dose was reduced in TCU.  Blood pressure is fairly controlled.  Plan:  Start low-dose diltiazem  mg p.o. daily as above for atrial fibrillation  Continue losartan 25 mg p.o. daily   Continue torsemide 20 mg p.o. daily  Continue atorvastatin 20 mg p.o. at bedtime  No indication to initiate antiplatelet therapy given anticoagulation with apixaban  Monitor blood pressure and cardiac status    Moderate persistent asthma,  Tracheobronchomalacia.  Stable.  Plan:  Continue PTA montelukast 10 mg p.o. at bedtime  Continue Xopenex neb at bedtime as well as PRN every 4 hours  Continue tiotropium 2 puffs daily and Advair 230-21, 2 puffs twice daily  Continue albuterol inhaler one inhalation every 6 hours PRN  Prednisone 10 mg p.o. daily as needed for asthma flare  Monitor respiratory status    Chronic anemia.  Baseline Hgb 11-12.  Last hemoglobin was 9.6 on June 7.  Plan:  Monitor hemoglobin periodically    GERD,  Candidal esophagitis.  Treated empirically with nystatin.  Plan:  Continue PTA omeprazole 10 mg p.o. daily  Monitor symptoms    Thoracic aortic aneurysm.  Measuring 4.4 cm, per CT angio in July 2018.  Plan:  Follow-up as outpatient    Slow transit constipation.  Plan:  Continue the bowel regimen    Chronic back pain,  T11  compression fracture,  Right lower extremity pain,  Right lower extremity Baker's cyst,  Degenerative disc disease of lumbar spine,  Vitamin D deficiency,  Physical deconditioning.  Imaging studies negative for DVT or fracture.  Patient currently reports no pain.  Plan:  Continue pain management with PRN acetaminophen  Continue calcium and vitamin D supplements  Continue PT/OT evaluation and therapy  Staff to assist with daily care and mobility        Orders written by provider at facility:  Diltiazem  mg p.o. daily, hold for heart rate less than 60 or SBP less than 100; DX: Atrial fibrillation        Disclaimer: This note may contain text created using speech-recognition software and may contain unintended word substitutions.      Electronically signed by:  Sylvia Butts MD

## 2022-06-08 NOTE — PROGRESS NOTES
Orders  Genie Persaud  1935  1) STAT potassium 40 meq po daily (in addition to scheduled dose). Ok to pull from EKit. Diagnosis: hypokalemia  2) Increase potassium to 40 meq po BID, first dose 6/9. Diagnosis:hypokalemia  3) BMP 6/10. Diagnosis: hypokalemia  TOREY Edmonds CNP on 6/8/2022 at 12:39 PM

## 2022-06-09 ENCOUNTER — TRANSITIONAL CARE UNIT VISIT (OUTPATIENT)
Dept: GERIATRICS | Facility: CLINIC | Age: 87
End: 2022-06-09
Payer: COMMERCIAL

## 2022-06-09 ENCOUNTER — TELEPHONE (OUTPATIENT)
Dept: GERIATRICS | Facility: CLINIC | Age: 87
End: 2022-06-09

## 2022-06-09 ENCOUNTER — LAB REQUISITION (OUTPATIENT)
Dept: LAB | Facility: CLINIC | Age: 87
End: 2022-06-09

## 2022-06-09 DIAGNOSIS — R60.0 BILATERAL LOWER EXTREMITY EDEMA: ICD-10-CM

## 2022-06-09 DIAGNOSIS — I71.20 THORACIC AORTIC ANEURYSM WITHOUT RUPTURE (H): ICD-10-CM

## 2022-06-09 DIAGNOSIS — J45.40 MODERATE PERSISTENT ASTHMA WITHOUT COMPLICATION: ICD-10-CM

## 2022-06-09 DIAGNOSIS — J39.8 TRACHEOBRONCHOMALACIA: ICD-10-CM

## 2022-06-09 DIAGNOSIS — E78.5 DYSLIPIDEMIA: Primary | ICD-10-CM

## 2022-06-09 DIAGNOSIS — M71.21 BAKER CYST, RIGHT: ICD-10-CM

## 2022-06-09 DIAGNOSIS — E87.6 HYPOKALEMIA: ICD-10-CM

## 2022-06-09 DIAGNOSIS — M51.369 DDD (DEGENERATIVE DISC DISEASE), LUMBAR: ICD-10-CM

## 2022-06-09 DIAGNOSIS — K21.9 GASTROESOPHAGEAL REFLUX DISEASE, UNSPECIFIED WHETHER ESOPHAGITIS PRESENT: ICD-10-CM

## 2022-06-09 DIAGNOSIS — D64.9 CHRONIC ANEMIA: ICD-10-CM

## 2022-06-09 DIAGNOSIS — Z86.73 HISTORY OF CVA (CEREBROVASCULAR ACCIDENT): ICD-10-CM

## 2022-06-09 DIAGNOSIS — S22.080D COMPRESSION FRACTURE OF T11 VERTEBRA WITH ROUTINE HEALING, SUBSEQUENT ENCOUNTER: ICD-10-CM

## 2022-06-09 DIAGNOSIS — I50.31 ACUTE HEART FAILURE WITH PRESERVED EJECTION FRACTION (H): Primary | ICD-10-CM

## 2022-06-09 DIAGNOSIS — K59.01 SLOW TRANSIT CONSTIPATION: ICD-10-CM

## 2022-06-09 DIAGNOSIS — E22.2 SIADH (SYNDROME OF INAPPROPRIATE ADH PRODUCTION) (H): ICD-10-CM

## 2022-06-09 DIAGNOSIS — U07.1 COVID-19: ICD-10-CM

## 2022-06-09 DIAGNOSIS — G89.29 CHRONIC BACK PAIN, UNSPECIFIED BACK LOCATION, UNSPECIFIED BACK PAIN LATERALITY: ICD-10-CM

## 2022-06-09 DIAGNOSIS — I10 ESSENTIAL HYPERTENSION: ICD-10-CM

## 2022-06-09 DIAGNOSIS — R53.81 PHYSICAL DECONDITIONING: ICD-10-CM

## 2022-06-09 DIAGNOSIS — E78.5 DYSLIPIDEMIA: ICD-10-CM

## 2022-06-09 DIAGNOSIS — M54.9 CHRONIC BACK PAIN, UNSPECIFIED BACK LOCATION, UNSPECIFIED BACK PAIN LATERALITY: ICD-10-CM

## 2022-06-09 DIAGNOSIS — I48.19 PERSISTENT ATRIAL FIBRILLATION (H): ICD-10-CM

## 2022-06-09 DIAGNOSIS — B37.81 CANDIDAL ESOPHAGITIS (H): ICD-10-CM

## 2022-06-09 DIAGNOSIS — I25.10 CORONARY ARTERY DISEASE INVOLVING NATIVE CORONARY ARTERY OF NATIVE HEART WITHOUT ANGINA PECTORIS: ICD-10-CM

## 2022-06-09 PROCEDURE — U0005 INFEC AGEN DETEC AMPLI PROBE: HCPCS | Performed by: NURSE PRACTITIONER

## 2022-06-09 PROCEDURE — 99306 1ST NF CARE HIGH MDM 50: CPT | Performed by: INTERNAL MEDICINE

## 2022-06-09 RX ORDER — DILTIAZEM HYDROCHLORIDE 120 MG/1
120 CAPSULE, EXTENDED RELEASE ORAL DAILY
COMMUNITY
End: 2022-06-10

## 2022-06-09 RX ORDER — METOPROLOL SUCCINATE 25 MG/1
12.5 TABLET, EXTENDED RELEASE ORAL DAILY
COMMUNITY
End: 2022-06-09

## 2022-06-09 RX ORDER — ROSUVASTATIN CALCIUM 10 MG/1
10 TABLET, COATED ORAL AT BEDTIME
Start: 2022-06-09 | End: 2022-09-06

## 2022-06-09 NOTE — TELEPHONE ENCOUNTER
Started on diltiazem today. Facility noted interaction with atorvastatin. Change atorvastatin to rosuvastatin 10 mg at bedtime.   TOREY Edmonds CNP on 6/9/2022 at 11:25 AM

## 2022-06-09 NOTE — PATIENT INSTRUCTIONS
Orders for Genie MAURILIO Persaud (1935), MR# [id number]:    1) Diltiazem  mg p.o. daily, hold for heart rate less than 60 or SBP less than 100; DX: Atrial fibrillation      Sylvia Butts MD  [facility]

## 2022-06-09 NOTE — LETTER
6/9/2022        RE: Genie Persaud  [address] [place] [id number]        [facility]  INITIAL VISIT NOTE  June 9, 2022    PRIMARY CARE PROVIDER AND CLINIC:  Layo Sevilla [address] [facility]  / [place] [id number]    Chief Complaint   Patient presents with     Hospital F/U       HPI:    Genie Persaud is a 86 year old  (1935) female who was seen at [facility] on June 9, 2022 for an initial visit.     Medical history is notable for nonobstructive CAD, stress cardiomyopathy, moderate aortic regurgitation, chronic lower extremity edema, hypertension, dyslipidemia, atrial fibrillation, CVA, thoracic aortic aneurysm, asthma, tracheobronchomalacia, chronic anemia, GERD, pneumonia, vitamin D deficiency, osteopenia, and BCC.    Summary of hospital course:  Patient was hospitalized at [facility] from May 11 through May 12, 2022 after presenting to the hospital with back and right lower extremity pain as well as TCU placement.  Of note, she had been seen in ED on May 1 on May 8 for similar complaints.  Doppler study on April 25 had revealed right lower extremity Baker's cyst, measuring 4.1 x 2 x 0.6 cm, and no DVT.  MRI of the lumbar spine was remarkable for diffuse degenerative changes, moderate to severe neuroforaminal stenosis at L4-L5 on the right and at L5-S1 on the left and no fractures.  BMP showed sodium of 128.  UA was unremarkable.  Screening for COVID-19 was negative.  She was admitted to this facility for nursing care and rehab.  She was readmitted to [facility] from May 15 through May 23, 2022 for acute hypoxic respiratory failure secondary to asthma exacerbation and possible community-acquired pneumonia.  She was also noted to be in atrial fibrillation.  She was treated with nebulizer, steroids, and ceftriaxone/azithromycin.  Anticoagulation was initiated with Eliquis.  Started on fluid restriction for hyponatremia due to SIADH. She was then  readmitted to this facility.  She was rehospitalized at [facility] from Marielle 3 through June 4, 2022 for CHF exacerbation.  BNP was elevated at 5,652.  Chest x-ray revealed stable mild cardiac enlargement, small left pleural effusion, and associated basilar atelectasis/consolidation.  CT chest was significant for a small left pleural effusion with adjacent compressive atelectasis, tracheobronchomalacia with scattered bilateral air trapping, and new age-indeterminate T11 compression fracture.  EKG showed atrial fibrillation.  Echocardiogram revealed LV ejection fraction of 50-55%, borderline reduced RV systolic function, 1+ MR, 1+ TR, mildly dilated ascending aorta, and moderate left pleural effusion.  She was treated with IV furosemide.    Patient is readmitted to this facility for medical management, nursing care, and rehab.     Of note, history was obtained from patient, facility RN, and extensive review of the chart.    Today's visit:  Patient was seen in her room, while sitting in a wheelchair.  She appears frail but in no acute distress.  Earlier today she started having bouts of coughing but she reports no dyspnea or wheezing.  At night she raises the head of the bed.  She reports that she had a bowel movement yesterday.  She denies melena or hematochezia.  She reports no fever, chills, chest pain, palpitation, nausea, vomiting, abdominal pain, or urinary symptoms.  She does not endorse any back pain.    CODE STATUS:   DNR / DNI    PAST MEDICAL HISTORY:   Mild-moderate nonocclusive CAD, per coronary angiogram in March 2017  Stress cardiomyopathy in 2017 in the setting of sepsis; resolved  Chronic HFpEF; LVEF 50-55% per echo on May 19, 2022  Mild aortic regurgitation  Mild mitral regurgitation  Chronic lower extremity edema  Hypertension  Dyslipidemia  Atrial fibrillation, first noted in 2017 in the setting of sepsis; recurrence in May 2022  Remote history of CVA per chart  Thoracic aortic  aneurysm; measuring 4.4 cm, per CT angio in July 2018  Moderate persistent asthma  Tracheobronchomalacia   Pulmonary nodules  Right lower extremity Baker's cyst  Chronic anemia; baseline Hgb 11-12  GERD  Candidal esophagitis  Peripheral neuropathy  RLL pneumonia with sepsis in November 2017  Cellulitis of hand  Glaucoma  Vitamin D deficiency  Osteopenia  BCC of skin in the back  Degenerative disc disease of the lumbar spine  T11 compression fracture    Past Medical History:   Diagnosis Date     Anemia 03/10/2009    Chronic disease, by Fe studies; awaiting full GI w/u and repeat colonoscopy, ERCP.     Basal Cell Carcinoma--back      Coronary artery disease 03/09/2017    3/2/2017 - Two vessel coronary artery disease with mLAD 50% stenosis, D3 50% stenosis, pLCx 50% stenosis and mLCx 50% stenosis     Essential hypertension 09/01/2016    White coat hypertension;  24 hour ambulatory monitoring normal. Do not titrate up further.       Hyperlipidemia 02/10/2010     Moderate persistent asthma      Nonrheumatic aortic valve insufficiency 06/29/2017     Osteopenia      Paroxysmal atrial fibrillation 12/26/2017     Pulmonary nodule 03/03/2009    PET scan reportedly normal; biopsy not advised.     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  Problem list name updated by automated process. Provider to review     Vasculitis 04/20/2009    Possible increased activity of thoracic aorta, on PET scan w/u for pulmonary nodule.        PAST SURGICAL HISTORY:   Past Surgical History:   Procedure Laterality Date     APPENDECTOMY OPEN  1957     C STEREOTACTIC BREAST BIOPSY  01/01/2001     CHOLECYSTECTOMY, LAPOROSCOPIC  01/01/2008     DILATION AND CURETTAGE  1967     DILATION AND CURETTAGE  1971     ESOPHAGOSCOPY, GASTROSCOPY, DUODENOSCOPY (EGD), COMBINED   05/17/2013    Procedure: COMBINED ESOPHAGOSCOPY, GASTROSCOPY, DUODENOSCOPY (EGD), BIOPSY SINGLE OR MULTIPLE;  ESOPHAGOSCOPY, GASTROSCOPY, DUODENOSCOPY (EGD)  with bx;  Surgeon: Jeffery Yanez MD;  Location:  GI     TONSILLECTOMY       Tubal Ligation NOS  1971       FAMILY HISTORY:   Family History   Problem Relation Age of Onset     Hypertension Mother      Osteoporosis Mother      C.A.D. Mother      Connective Tissue Disorder Father         scleraderma     Cerebrovascular Disease Paternal Grandmother      Prostate Cancer Other         9/05 passed away due to complications     Breast Cancer Child         youngest dtr       SOCIAL HISTORY:  Social History     Tobacco Use     Smoking status: Never Smoker     Smokeless tobacco: Never Used   Substance Use Topics     Alcohol use: Yes     Alcohol/week: 1.0 - 2.0 standard drink     Types: 1 - 2 Standard drinks or equivalent per week     Comment: 1 glass of wine 1-2 days per week       MEDICATIONS:  Current Outpatient Medications   Medication Sig Dispense Refill     acetaminophen (TYLENOL) 325 MG tablet Take 975 mg by mouth every 8 hours as needed for mild pain       albuterol (PROAIR HFA/PROVENTIL HFA/VENTOLIN HFA) 108 (90 Base) MCG/ACT inhaler Inhale 1-2 puffs into the lungs every 6 hours as needed for shortness of breath / dyspnea or wheezing       apixaban ANTICOAGULANT (ELIQUIS) 5 MG tablet Take 1 tablet (5 mg) by mouth 2 times daily 60 tablet      atorvastatin (LIPITOR) 20 MG tablet TAKE ONE TABLET BY MOUTH AT BEDTIME 90 tablet 1     calcium citrate-vitamin D (CITRACAL) 315-200 MG-UNIT TABS per tablet Take 1 tablet by mouth 2 times daily       fluticasone-salmeterol (ADVAIR-HFA) 230-21 MCG/ACT inhaler Inhale 2 puffs into the lungs 2 times daily       guaiFENesin (MUCINEX) 600 MG 12 hr tablet Take 1 tablet (600 mg) by mouth 2 times daily       latanoprost (XALATAN) 0.005 % ophthalmic solution Place 1 drop into both eyes At Bedtime       levalbuterol (XOPENEX)  "0.63 MG/3ML neb solution Take 1 ampule by nebulization At Bedtime       levalbuterol (XOPENEX) 0.63 MG/3ML neb solution Take 3 mLs (0.63 mg) by nebulization every 4 hours as needed for wheezing And scheduled at bedtime. 90 mL 0     loperamide (IMODIUM A-D) 2 MG tablet Take 1 tablet (2 mg) by mouth 3 times daily as needed for diarrhea       loratadine (CLARITIN) 10 MG tablet Take 1 tablet (10 mg) by mouth daily 90 tablet 3     losartan (COZAAR) 50 MG tablet Take 1 tablet (50 mg) by mouth daily       montelukast (SINGULAIR) 10 MG tablet Take 1 tablet (10 mg) by mouth At Bedtime 90 tablet 3     omeprazole (PRILOSEC) 10 MG DR capsule TAKE ONE CAPSULE BY MOUTH ONE TIME DAILY 90 capsule 1     ondansetron (ZOFRAN) 4 MG tablet Take 1 tablet (4 mg) by mouth every 8 hours as needed for nausea       polyethylene glycol (MIRALAX) 17 GM/Dose powder Take 17 g by mouth daily as needed for constipation 510 g      tiotropium (SPIRIVA RESPIMAT) 2.5 MCG/ACT inhaler Inhale 2 puffs into the lungs daily 4 g 2     torsemide (DEMADEX) 20 MG tablet Take 1 tablet (20 mg) by mouth daily         Post Discharge Medication Reconciliation Status: discharge medications reconciled and changed, per note/orders.      ALLERGIES:  Allergies   Allergen Reactions     Fosamax [Alendronic Acid] GI Disturbance     Contrast Dye      Red arm redness and swelling      Evista [Raloxifene]      abd symptoms.      Flu Virus Vaccine      swollen and red at inj site     Amoxicillin Rash       ROS:  10 point ROS were negative other than the symptoms noted above in the HPI.    PHYSICAL EXAM:  Vital signs were reviewed in the chart.  Vital Signs: /68   Pulse 98   Temp 97.3  F (36.3  C)   Resp 20   Ht 1.549 m (5' 1\")   Wt 65.3 kg (144 lb)   LMP  (LMP Unknown)   SpO2 93%   BMI 27.21 kg/m    General: Frail appearing but in no acute distress  HEENT: Mild conjunctival pallor  Cardiovascular: Normal S1, S2, irregularly irregular rhythm  Respiratory: Lungs " clear to auscultation bilaterally  GI: Abdomen soft, non-tender, non-distended, +BS  Extremities: 3-4+ bilateral LE edema, lymphedema wraps are on  Neuro: CX II-XII grossly intact; ROM in all four extremities grossly intact  Psych: Alert and oriented x3; normal affect  Skin: No acute rash    LABORATORY/IMAGING DATA:  All relevant labs and imaging data were reviewed personally today.    BMP (June 8, 2022): Sodium 135, potassium 3.3, BUN 29, creatinine 0.85, glucose 93, calcium 9    CBC (June 7, 2022): White count 8.7, hemoglobin 9.6, platelet count 205,000, MCV 90      Most Recent 3 CBC's:Recent Labs   Lab Test 06/03/22  1121 05/22/22  0645 05/20/22  0741   WBC 10.8 10.0 11.5*   HGB 9.0* 9.3* 9.8*   MCV 91 89 86    410 438     Most Recent 3 BMP's:Recent Labs   Lab Test 06/04/22  1150 06/04/22  1056 06/04/22  0525 06/03/22  1206 05/22/22  0645   NA  --   --  132* 133 130*   POTASSIUM 3.5 3.4 3.2* 4.0 3.6   CHLORIDE  --   --  95 96 94   CO2  --   --  31 31 30   BUN  --   --  29 26 20   CR  --   --  0.87 0.85 0.73   ANIONGAP  --   --  6 6 6   ELROY  --   --  9.3 9.0 8.6   GLC  --   --  107* 100* 111*         ASSESSMENT/PLAN:  Acute on chronic HFpEF (LVEF 50-55%),  History of stress cardiomyopathy in 2017 in the setting of sepsis,  Mild aortic regurgitation,  Mild mitral regurgitation,  Chronic lower extremity edema.  Patient was treated with IV furosemide with subsequent transition to oral torsemide.  Losartan dose was reduced in TCU.  She weighs 147.8 LBS, lungs are clear, but she has 3-4+ bilateral lower extremity lymphedema.  Plan:  Continue low-salt diet and fluid restriction  Continue torsemide 20 mg p.o. daily  Continue losartan 25 mg p.o. daily  Continue lymphedema therapy/lymphedema wraps  Monitor weight and volume status  Monitor renal function and electrolytes    Atrial fibrillation,  Remote history of CVA (per chart).  Atrial fibrillation first noted in the setting of sepsis in 2017 with recurrence in  May 2021, now appears to be persistent.  Not on AV anat blocking agents.  Heart rate is around 90.  Plan:  Continue apixaban 5 mg p.o. twice daily  Start low-dose diltiazem  mg p.o. daily with hold parameters (will avoid beta-blockers due to asthma)  Monitor heart rate    Hypokalemia.  Due to diuretic therapy.  Plan:  Continue potassium chloride 40 mEq p.o. daily  Repeat BMP on Marielle 10 as scheduled    SIADH.  Urine osmolarity of 539 and urine sodium level 41 on May 15.  Off HCTZ.  Last sodium level was 135.  Plan:  Continue fluid restriction of 1500/day  Continue to monitor sodium level   Repeat BMP on Marielle 10 as scheduled    Mild-moderate nonocclusive CAD (per coronary angiogram in March 2017),  Essential hypertension,  Dyslipidemia.  Losartan dose was reduced in TCU.  Blood pressure is fairly controlled.  Plan:  Start low-dose diltiazem  mg p.o. daily as above for atrial fibrillation  Continue losartan 25 mg p.o. daily   Continue torsemide 20 mg p.o. daily  Continue atorvastatin 20 mg p.o. at bedtime  No indication to initiate antiplatelet therapy given anticoagulation with apixaban  Monitor blood pressure and cardiac status    Moderate persistent asthma,  Tracheobronchomalacia.  Stable.  Plan:  Continue PTA montelukast 10 mg p.o. at bedtime  Continue Xopenex neb at bedtime as well as PRN every 4 hours  Continue tiotropium 2 puffs daily and Advair 230-21, 2 puffs twice daily  Continue albuterol inhaler one inhalation every 6 hours PRN  Prednisone 10 mg p.o. daily as needed for asthma flare  Monitor respiratory status    Chronic anemia.  Baseline Hgb 11-12.  Last hemoglobin was 9.6 on June 7.  Plan:  Monitor hemoglobin periodically    GERD,  Candidal esophagitis.  Treated empirically with nystatin.  Plan:  Continue PTA omeprazole 10 mg p.o. daily  Monitor symptoms    Thoracic aortic aneurysm.  Measuring 4.4 cm, per CT angio in July 2018.  Plan:  Follow-up as outpatient    Slow transit  constipation.  Plan:  Continue the bowel regimen    Chronic back pain,  T11 compression fracture,  Right lower extremity pain,  Right lower extremity Baker's cyst,  Degenerative disc disease of lumbar spine,  Vitamin D deficiency,  Physical deconditioning.  Imaging studies negative for DVT or fracture.  Patient currently reports no pain.  Plan:  Continue pain management with PRN acetaminophen  Continue calcium and vitamin D supplements  Continue PT/OT evaluation and therapy  Staff to assist with daily care and mobility        Orders written by provider at facility:  Diltiazem  mg p.o. daily, hold for heart rate less than 60 or SBP less than 100; DX: Atrial fibrillation        Disclaimer: This note may contain text created using speech-recognition software and may contain unintended word substitutions.      Electronically signed by:  Sylvia Butts MD                          Sincerely,        Sylvia Butts MD

## 2022-06-10 LAB
ANION GAP SERPL CALCULATED.3IONS-SCNC: 6 MMOL/L (ref 3–14)
BUN SERPL-MCNC: 30 MG/DL (ref 7–30)
CALCIUM SERPL-MCNC: 9 MG/DL (ref 8.5–10.1)
CHLORIDE BLD-SCNC: 98 MMOL/L (ref 94–109)
CO2 SERPL-SCNC: 31 MMOL/L (ref 20–32)
CREAT SERPL-MCNC: 0.94 MG/DL (ref 0.52–1.04)
GFR SERPL CREATININE-BSD FRML MDRD: 60 ML/MIN/1.73M2
GLUCOSE BLD-MCNC: 99 MG/DL (ref 70–99)
POTASSIUM BLD-SCNC: 4.7 MMOL/L (ref 3.4–5.3)
SARS-COV-2 RNA RESP QL NAA+PROBE: NEGATIVE
SODIUM SERPL-SCNC: 135 MMOL/L (ref 133–144)

## 2022-06-10 PROCEDURE — 80048 BASIC METABOLIC PNL TOTAL CA: CPT | Performed by: NURSE PRACTITIONER

## 2022-06-10 PROCEDURE — P9604 ONE-WAY ALLOW PRORATED TRIP: HCPCS | Performed by: NURSE PRACTITIONER

## 2022-06-10 RX ORDER — DILTIAZEM HYDROCHLORIDE 120 MG/1
120 CAPSULE, EXTENDED RELEASE ORAL DAILY
COMMUNITY
End: 2022-09-06

## 2022-06-13 ENCOUNTER — DISCHARGE SUMMARY NURSING HOME (OUTPATIENT)
Dept: GERIATRICS | Facility: CLINIC | Age: 87
End: 2022-06-13
Payer: COMMERCIAL

## 2022-06-13 ENCOUNTER — LAB REQUISITION (OUTPATIENT)
Dept: LAB | Facility: CLINIC | Age: 87
End: 2022-06-13
Payer: COMMERCIAL

## 2022-06-13 VITALS
SYSTOLIC BLOOD PRESSURE: 112 MMHG | TEMPERATURE: 97.7 F | WEIGHT: 147.8 LBS | OXYGEN SATURATION: 96 % | DIASTOLIC BLOOD PRESSURE: 62 MMHG | BODY MASS INDEX: 27.9 KG/M2 | HEIGHT: 61 IN | RESPIRATION RATE: 17 BRPM | HEART RATE: 85 BPM

## 2022-06-13 DIAGNOSIS — U07.1 COVID-19: ICD-10-CM

## 2022-06-13 DIAGNOSIS — I10 ESSENTIAL HYPERTENSION: ICD-10-CM

## 2022-06-13 DIAGNOSIS — S22.080D COMPRESSION FRACTURE OF T11 VERTEBRA WITH ROUTINE HEALING, SUBSEQUENT ENCOUNTER: ICD-10-CM

## 2022-06-13 DIAGNOSIS — K21.9 GASTROESOPHAGEAL REFLUX DISEASE, UNSPECIFIED WHETHER ESOPHAGITIS PRESENT: ICD-10-CM

## 2022-06-13 DIAGNOSIS — D64.9 ACUTE ANEMIA: ICD-10-CM

## 2022-06-13 DIAGNOSIS — M54.9 CHRONIC BACK PAIN, UNSPECIFIED BACK LOCATION, UNSPECIFIED BACK PAIN LATERALITY: ICD-10-CM

## 2022-06-13 DIAGNOSIS — I25.10 CORONARY ARTERY DISEASE INVOLVING NATIVE CORONARY ARTERY OF NATIVE HEART WITHOUT ANGINA PECTORIS: ICD-10-CM

## 2022-06-13 DIAGNOSIS — E87.6 HYPOKALEMIA: ICD-10-CM

## 2022-06-13 DIAGNOSIS — R60.0 BILATERAL LOWER EXTREMITY EDEMA: ICD-10-CM

## 2022-06-13 DIAGNOSIS — I48.19 PERSISTENT ATRIAL FIBRILLATION (H): ICD-10-CM

## 2022-06-13 DIAGNOSIS — I71.20 THORACIC AORTIC ANEURYSM WITHOUT RUPTURE (H): ICD-10-CM

## 2022-06-13 DIAGNOSIS — I50.42 CHRONIC COMBINED SYSTOLIC (CONGESTIVE) AND DIASTOLIC (CONGESTIVE) HEART FAILURE (H): ICD-10-CM

## 2022-06-13 DIAGNOSIS — E78.5 DYSLIPIDEMIA: ICD-10-CM

## 2022-06-13 DIAGNOSIS — G89.29 CHRONIC BACK PAIN, UNSPECIFIED BACK LOCATION, UNSPECIFIED BACK PAIN LATERALITY: ICD-10-CM

## 2022-06-13 DIAGNOSIS — R53.81 PHYSICAL DECONDITIONING: ICD-10-CM

## 2022-06-13 DIAGNOSIS — N18.31 STAGE 3A CHRONIC KIDNEY DISEASE (H): ICD-10-CM

## 2022-06-13 DIAGNOSIS — J45.40 MODERATE PERSISTENT ASTHMA WITHOUT COMPLICATION: ICD-10-CM

## 2022-06-13 DIAGNOSIS — I50.31 ACUTE HEART FAILURE WITH PRESERVED EJECTION FRACTION (H): Primary | ICD-10-CM

## 2022-06-13 DIAGNOSIS — E22.2 SIADH (SYNDROME OF INAPPROPRIATE ADH PRODUCTION) (H): ICD-10-CM

## 2022-06-13 DIAGNOSIS — D64.9 CHRONIC ANEMIA: ICD-10-CM

## 2022-06-13 LAB — SARS-COV-2 RNA RESP QL NAA+PROBE: NEGATIVE

## 2022-06-13 PROCEDURE — U0005 INFEC AGEN DETEC AMPLI PROBE: HCPCS | Performed by: NURSE PRACTITIONER

## 2022-06-13 PROCEDURE — 99316 NF DSCHRG MGMT 30 MIN+: CPT | Performed by: NURSE PRACTITIONER

## 2022-06-13 RX ORDER — POTASSIUM CHLORIDE 1500 MG/1
40 TABLET, EXTENDED RELEASE ORAL DAILY
COMMUNITY
End: 2022-06-13

## 2022-06-13 RX ORDER — POTASSIUM CHLORIDE 1500 MG/1
20 TABLET, EXTENDED RELEASE ORAL 2 TIMES DAILY
Start: 2022-06-13 | End: 2022-09-06

## 2022-06-13 NOTE — Clinical Note
Plan is for patient to discharge from TCU 6/15. Labs ordered through home care on 6/17. She should follow up in 1 week. Home care services have been arranged. Please let me know if you have any questions.  Claudette Hearn CNP [facility]

## 2022-06-13 NOTE — PATIENT INSTRUCTIONS
[facility] Discharge Orders    Name: Genie Persaud  : 1935  Planned Discharge Date: 6/15/2022  Discharged to: [facility]    MEDICAL FOLLOW UP  Follow up with PCP in 1 week    FUTURE LABS:  CBC, BMP due  to be drawn by home care with results to PCP    ORDER CHANGES:  Torsemide started for CHF  Potassium started for CHF/low potassium  Diltiazem started for atrial fibrillation  Eliquis dose increased to be therapeutic and started in hospital for atrial fibrillation  Amlodipine stopped due to low BP  Losartan dose reduced due to low BP  hydrochlorothiazide stopped in TCU as contributing to hyponatremia  atorvastatin changed to rosuvastatin in TCU due to drug interaction with diltiazem    DISCHARGE MEDICATIONS:  The patient s pharmacy is authorized to dispense a 30-day supply of medications. Refill requests should be directed to the primary provider, Layo Sevilla  Current Outpatient Medications   Medication Sig Dispense Refill    acetaminophen (TYLENOL) 325 MG tablet Take 975 mg by mouth every 8 hours as needed for mild pain      albuterol (PROAIR HFA/PROVENTIL HFA/VENTOLIN HFA) 108 (90 Base) MCG/ACT inhaler Inhale 1-2 puffs into the lungs every 6 hours as needed for shortness of breath / dyspnea or wheezing      apixaban ANTICOAGULANT (ELIQUIS) 5 MG tablet Take 1 tablet (5 mg) by mouth 2 times daily 60 tablet     calcium citrate-vitamin D (CITRACAL) 315-200 MG-UNIT TABS per tablet Take 1 tablet by mouth 2 times daily      diltiazem ER (DILT-XR) 120 MG 24 hr capsule Take 120 mg by mouth daily Hold for heart rate less than 60 or SBP less than 100      fluticasone-salmeterol (ADVAIR-HFA) 230-21 MCG/ACT inhaler Inhale 2 puffs into the lungs 2 times daily      guaiFENesin (MUCINEX) 600 MG 12 hr tablet Take 1 tablet (600 mg) by mouth 2 times daily      latanoprost (XALATAN) 0.005 % ophthalmic solution Place 1 drop into both eyes At Bedtime      levalbuterol (XOPENEX) 0.63 MG/3ML neb solution  Take 1 ampule by nebulization At Bedtime      levalbuterol (XOPENEX) 0.63 MG/3ML neb solution Take 3 mLs (0.63 mg) by nebulization every 4 hours as needed for wheezing And scheduled at bedtime. 90 mL 0    loperamide (IMODIUM A-D) 2 MG tablet Take 1 tablet (2 mg) by mouth 3 times daily as needed for diarrhea      loratadine (CLARITIN) 10 MG tablet Take 1 tablet (10 mg) by mouth daily 90 tablet 3    losartan (COZAAR) 25 MG tablet Take 25 mg by mouth daily Hold if SBP<110      montelukast (SINGULAIR) 10 MG tablet Take 1 tablet (10 mg) by mouth At Bedtime 90 tablet 3    omeprazole (PRILOSEC) 10 MG DR capsule TAKE ONE CAPSULE BY MOUTH ONE TIME DAILY 90 capsule 1    ondansetron (ZOFRAN) 4 MG tablet Take 1 tablet (4 mg) by mouth every 8 hours as needed for nausea      polyethylene glycol (MIRALAX) 17 GM/Dose powder Take 17 g by mouth daily as needed for constipation 510 g     potassium chloride ER (KLOR-CON M) 20 MEQ CR tablet Take 1 tablet (20 mEq) by mouth 2 times daily      rosuvastatin (CRESTOR) 10 MG tablet Take 1 tablet (10 mg) by mouth At Bedtime      tiotropium (SPIRIVA RESPIMAT) 2.5 MCG/ACT inhaler Inhale 2 puffs into the lungs daily 4 g 2    torsemide (DEMADEX) 20 MG tablet Take 1 tablet (20 mg) by mouth daily         SERVICES:  Home Care:  Occupational Therapy, Physical Therapy, Registered Nurse, Home Health Aide,  and From:  [facility]    ADDITIONAL INSTRUCTIONS:  Continue to follow your diet:No added salt   Weigh yourself daily in the morning and keep a record. Call your primary clinic: a) if you are more short of breath, or b) if your weight changes more than 3 pounds in one day or more than 5 pounds in one week.   Check BP daily and keep a log to bring to PCP at follow up.     TOREY Edmonds CNP  This document was electronically signed on June 13, 2022

## 2022-06-13 NOTE — PROGRESS NOTES
[facility] GERIATRICS DISCHARGE SUMMARY  PATIENT'S NAME: Genie Persaud  YOB: 1935  MEDICAL RECORD NUMBER:  [id number]  Place of Service where encounter took place:  [facility]  ([facility]) [[id number]]    PRIMARY CARE PROVIDER AND CLINIC RESPONSIBLE AFTER TRANSFER:   Layo Sevilla MD, [address] [facility]  / [place] [address]    [facility] [place]     Transferring providers: TOREY Edmonds CNP, Dr. Sylvia Butts MD  Recent Hospitalization/ED:  [facility] Hospital stay 5/11/22 to 5/12/22.  Date of SNF Admission: May 12, 2022  Date of SNF (anticipated) Discharge: Marielle 15, 2022  Discharged to: [facility]  Cognitive Scores: SLUMS: 19/30 and CPT: 4.9/5.6  Physical Function: Ambulating 125 feet with 2WW, independent with dressing, toileting, grooming      CODE STATUS/ADVANCE DIRECTIVES DISCUSSION:  No CPR- DNI  ALLERGIES: Fosamax [alendronic acid], Contrast dye, Evista [raloxifene], Flu virus vaccine, and Amoxicillin    NURSING FACILITY COURSE   Medication Changes/Rationale:     Torsemide started for CHF    Potassium started for CHF/low potassium    Diltiazem started for atrial fibrillation    Eliquis dose increased to be therapeutic and started in hospital for atrial fibrillation    Amlodipine stopped due to low BP    Losartan dose reduced due to low BP    hydrochlorothiazide stopped in TCU as contributing to hyponatremia    atorvastatin changed to rosuvastatin in TCU due to drug interaction with diltiazem    PMH significant for HTN, atrial fibrillation, history of cardiomyopathy, nonobstructive CAD, dyslipidemia, CKD stage 3, chronic anemia, asthma, GERD, history of esophageal candida.     She was hospitalized at [facility] 5/11-5/12/22 for right back and lower right extremity pain. US evaluation 4/25 that was negative and showed right Baker's cyst as likely cause of pain. MRI of lumbar spine showed no acute fractures and diffuse degenerative changes  including moderate to severe neural foraminal stenosis on the right at L4-L5 and on the left at L5-S1. UA was negative. She was admitted to hospital for TCU placement, as felt she is not able to tolerate pain. Was started on pain regimen. Additionally labs in hospital revealed low sodium of 128 and low potassium of 3.3. Discharged to TCU for physical rehabilitation and medical management.      Hospitalized at [facility] from 5/15-5/23/22 for acute hypoxic respiratory failure, asthma exacerbation, possible CAP, possible candida esophagitis, new atrial fib, SIADH. She presented from TCU to the ED for evaluation of nausea and weakness. Laboratory evaluation showed sodium 122, alk phos 162, WBC 12.3. CT abdomen pelvis revealed no acute findings. She received IV hydration. Work up consistent with SIADH as cause of hyponatremia. She was discharged on 1L fluid restriction daily. GI consulted due to ongoing nausea that improved, so EGD deferred. Treated empirically with nystatin due to history of candidal esophagitis. She developed hypoxia thought to be due to fluid overload with pulmonary congestion and asthma exacerbation. Chest xray showed small left sided pleural effusion with congestion. Then had rapid response episode and required BiPAP. Was found to be in atrial fib with RVR at this time. She was started on anticoagulation with apixaban. Troponin was negative.  ECHO showed EF 50-55%, left atrium is mildly dilated, mild (1+) mitral regurgitation, mild (1+) aortic regurgitation, ascending aorta is Mildly dilated, moderate left pleural effusion. Received IV diuresis. Chest xray repeated that showed few patchy opacities in left lower base that could be possible pneumonia vs atelectasis vs scarring. She was treated for possible pneumonia with ceftriaxone and azithromycin-discharged with orders to complete 1 more day of azithromycin and 2 days of cefuroxime to complete course. Also received xopenex nebs and steroids  "for asthma exacerbation. Discharged to TCU for physical rehabilitation and medical management.      Rehospitalized at [facility] from 6/3-6/5 for CHF exacerbation. She was sent to ED from TCU for SOB, lower extremity swelling. Laboratory work up showed Hgb 9.0, N terminal pro BNP 5652. Chest xray showed stable mild enlargement of cardiac silhouette. Small left pleural effusion and associated basilar atelectasis/ consolidation. She received IV diuretics. She was discharged on lasix 20 mg daily x5 days at discharge. Additionally CT chest completed to evaluate for recent pneumonia. CT showed \"Small left pleural effusion with adjacent compressive atelectasis. 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 Unchanged ascending thoracic aortic aneurysm. New age-indeterminate T11 compression fracture.\" Was started on levaquin in TCU just prior to hospitalization, which was stopped in hospital.      Summary of nursing facility stay:   While in TCU she met goals with therapy and is discharging to [facility]. She will continue therapy through home care. Medically, upon return from hospital diuretics were changed to torsemide. She still has pretty significant swelling to legs today, will increase torsemide dose today to 40 mg daily x3 days,   with labs tomorrow at TCU and 6/17 to be faxed to PCP. She denies any increased SOB, weight is up 6 lb from admit weight. OCCUPATIONAL THERAPY will continue lymph wraps through home care. Potassium was divided into BID dosing due to causing nausea. She was also started on diltiazem ER at TCU due to atrial fibrillation and elevated HR. She is also anemia- guaiac sample was negative. She should follow up with PCP in 1 week. She still has significant swelling today. Denies SOB. Has occasional cough.     Addendum: labs from 6/14 with bump in creatinine, so I only ended up giving her 2 days of increased torsemide dose, resumed 20 mg daily on 6/14. Hgb also " slightly lower on 6/14, so I added CBC to labs (BMP) through home care on 6/17.     Specific problems addressed at TCU  (I50.31) Acute heart failure with preserved ejection fraction (H)  (primary encounter diagnosis)  (R60.0) Bilateral lower extremity edema  Comment: ECHO from 5/19 showed EF 50-55%, left atrium is mildly dilated, mild (1+) mitral regurgitation, mild (1+) aortic regurgitation, ascending aorta is Mildly dilated  -treated for CHF exacerbation with IV diuresis  -discharged on lasix 20 mg daily, changed to torsemide at TCU as was ineffective  -legs today with +3-4 swelling, denies SOB, lungs very diminished today, weight is still up about 6 lb from admit weight  Plan: STAT torsemide 20 mg now, then increase torsemide to 40 mg daily on 6/14 and 6/15, then resume torsemide 20 mg po daily on 6/16. BMP 6/14 and 6/17 through home care. Continue Lymph therapy through home care. Daily weights. Notify provider with weight gain >2 lbs in a day, >5 lbs in on week. 2 gram Na diet.      (Z87.01) History of recent pneumonia  Comment: CT chest inpatient with no ongoing pneumonia  -completed course of azithromycin and cefuroxime 5/24  -denies SOB, cough today  -Afebrile and hemodynamically stable  Plan:Continue mucinex  mg BID and inhalers as below.     (I48.19) Persistent Atrial fibrillation (H)  Comment: suspected to be secondary to respiratory failure and beta agonists  -she also had episode of afib in 2017 in setting of sepsis  -was started on apixaban 5 mg BID in hospital and then discharged on 2.5 mg BID dosing   -increased eliquis to 5 mg BID at TCU as she does not meet criteria for reduced dose of 2.5 mg BID  -started on diltiazem at TCU  -HR controlled 71, 61, 64  Plan: Continue diltiazem  mg daily, apixaban 5 mg BID. Monitor HR. Follow up with PCP     (E22.2) SIADH (syndrome of inappropriate ADH production) (H)  Comment: sodium on initial hospitalization on 5/20 was 122   -received IV hydration  initially and sodium did improve  -hydrochlorothiazide discontinued as suspect this contributing  -sodium 135 on 6/10  Plan:  BMP 6/13. Continue 1L fluid restriction.      (E87.6) Hypokalemia  Comment: secondary to diuresis  -potassium 4.7 on 6/10 and potassium dose reduced  Plan: continue potassium 20 meq po BID. BMP 6/14 and 6/17 through home care     (R19.5) Loose stools   Comment: occasional   -c.diff negative 5/26/22  Plan: Monitor bowels. Continue loperamide 2 mg TID PRN.     (I10) Essential hypertension  Comment: BP soft at times 101/44, 112/62, 123/62  -hydrochlorothiazide stopped at TCU due to likely contributing to SIADH  -amlodipine stopped due to soft BPs  -losartan dose reduced in TCU  -diltiazem started in TCU as above  Plan: Continue losartan to 25 mg daily, diltiazem and torsemide. Hold if SBP<110. Follow up with PCP    (I25.10) Coronary artery disease involving native coronary artery of native heart without angina pectoris  (E78.5) Dyslipidemia  Comment: chronic, with nonobstructive CAD, history of cardiomyopathy in 2017 when she was septic  - ECHO showed EF 50-55%, left atrium is mildly dilated, mild (1+) mitral regurgitation, mild (1+) aortic regurgitation, ascending aorta is Mildly dilated, moderate left pleural effusion  -coronary angiogram 2017 with mild to moderate CAD that is nonobstructive  -atorvastatin changed to rosuvastatin in TCU due to drug interaction with diltiazem  -denies CP today  Plan: Continue losartan 25 mg daily, torsemide daily, diltiazem  mg daily, rosuvastatin 10 mg at bedtime. Follow up with cardiology per recommendations     (N18.31) Stage 3a chronic kidney disease (H)  Comment: chronic  -baseline creatinine 0.9-1.1  -creatinine 0.94 on 6/10  Plan: BMP 6/14 and through home care 6/17. Avoid nephrotoxins. Renally dose medications as indicated.     (D64.9) Acute anemia  (D64.9) Chronic anemia  Comment: acute chronic  -hgb at baseline 11-12  -hgb trending down over  past month  -is now on anticoagulation  -Guaiac stool test negative x1  -hgb 9.6 on 6/7/2022  Plan: CBC 6/14 and added CBC 6/17 through home care and then at follow up with PCP.      (J45.40) moderate persistent asthma without complication  Comment: breathing now at baseline  -was treated with course of prednisone while in TCU for possible asthma exacerbation and this was restarted on readmission to TCU (was on hospital discharge orders), so received prednisone 6/5 and 6/6 before discontinued at TCU  Plan: Continue mucinex BID, levalbuterolat bedtime and  PRN, tiotropium daily, advair BID, albuterol HFA PRN, singulair daily. Monitor respiratory status. Follow up with PCP     (M54.9,  G89.29) Chronic back pain, unspecified back location, unspecified back pain laterality  (S22.080D) Compression fracture of T11 vertebra with routine healing, subsequent encounter  Comment: found to have new age-indeterminate T11 compression fracture on chest CT this past admission  -denies pain   Plan: Continue tylenol 975 mg q8h PRN. Monitor pain. Therapy as below     (K21.9) Gastroesophageal reflux disease, unspecified whether esophagitis present  Comment: chronic  Plan: Continue omeprazole 10 mg daily.     (R53.81) Physical deconditioning  Comment: Acute, secondary to recent hospitalization, medical conditions as above  -completed course of therapy at TCU  Plan: Continue therapy through home care     (I71.2) Thoracic aortic aneurysm without rupture (H)  Comment: ascending aorta is Mildly dilated per ECHO 5/19/22 the Aorta Diam: 4.0 cm, previous ECHO 8/26/21 showed Aorta Diam: 3.9 cm  Plan: Follow up outpatient     (B37.81) Candida esophagitis (H)-resolved  Comment: treated empirically possible candida esophagitis due to symptoms of nausea  -GI deferred EGD during hospitalization as nausea improved  -completed course of nystatin inpatient  Plan: Monitor symptoms        Discharge Medications:    Current Outpatient Medications    Medication Sig Dispense Refill     acetaminophen (TYLENOL) 325 MG tablet Take 975 mg by mouth every 8 hours as needed for mild pain       albuterol (PROAIR HFA/PROVENTIL HFA/VENTOLIN HFA) 108 (90 Base) MCG/ACT inhaler Inhale 1-2 puffs into the lungs every 6 hours as needed for shortness of breath / dyspnea or wheezing       apixaban ANTICOAGULANT (ELIQUIS) 5 MG tablet Take 1 tablet (5 mg) by mouth 2 times daily 60 tablet      calcium citrate-vitamin D (CITRACAL) 315-200 MG-UNIT TABS per tablet Take 1 tablet by mouth 2 times daily       diltiazem ER (DILT-XR) 120 MG 24 hr capsule Take 120 mg by mouth daily Hold for heart rate less than 60 or SBP less than 100       fluticasone-salmeterol (ADVAIR-HFA) 230-21 MCG/ACT inhaler Inhale 2 puffs into the lungs 2 times daily       guaiFENesin (MUCINEX) 600 MG 12 hr tablet Take 1 tablet (600 mg) by mouth 2 times daily       latanoprost (XALATAN) 0.005 % ophthalmic solution Place 1 drop into both eyes At Bedtime       levalbuterol (XOPENEX) 0.63 MG/3ML neb solution Take 1 ampule by nebulization At Bedtime       levalbuterol (XOPENEX) 0.63 MG/3ML neb solution Take 3 mLs (0.63 mg) by nebulization every 4 hours as needed for wheezing And scheduled at bedtime. 90 mL 0     loperamide (IMODIUM A-D) 2 MG tablet Take 1 tablet (2 mg) by mouth 3 times daily as needed for diarrhea       loratadine (CLARITIN) 10 MG tablet Take 1 tablet (10 mg) by mouth daily 90 tablet 3     losartan (COZAAR) 25 MG tablet Take 25 mg by mouth daily Hold if SBP<110       montelukast (SINGULAIR) 10 MG tablet Take 1 tablet (10 mg) by mouth At Bedtime 90 tablet 3     omeprazole (PRILOSEC) 10 MG DR capsule TAKE ONE CAPSULE BY MOUTH ONE TIME DAILY 90 capsule 1     ondansetron (ZOFRAN) 4 MG tablet Take 1 tablet (4 mg) by mouth every 8 hours as needed for nausea       polyethylene glycol (MIRALAX) 17 GM/Dose powder Take 17 g by mouth daily as needed for constipation 510 g      potassium chloride ER (KLOR-CON M) 20  "MEQ CR tablet Take 1 tablet (20 mEq) by mouth 2 times daily       rosuvastatin (CRESTOR) 10 MG tablet Take 1 tablet (10 mg) by mouth At Bedtime       tiotropium (SPIRIVA RESPIMAT) 2.5 MCG/ACT inhaler Inhale 2 puffs into the lungs daily 4 g 2     torsemide (DEMADEX) 20 MG tablet Take 1 tablet (20 mg) by mouth daily             Past Medical History:   Past Medical History:   Diagnosis Date     Anemia 03/10/2009    Chronic disease, by Fe studies; awaiting full GI w/u and repeat colonoscopy, ERCP.     Basal Cell Carcinoma--back      Coronary artery disease 03/09/2017    3/2/2017 - Two vessel coronary artery disease with mLAD 50% stenosis, D3 50% stenosis, pLCx 50% stenosis and mLCx 50% stenosis     Essential hypertension 09/01/2016    White coat hypertension;  24 hour ambulatory monitoring normal. Do not titrate up further.       Hyperlipidemia 02/10/2010     Moderate persistent asthma      Nonrheumatic aortic valve insufficiency 06/29/2017     Osteopenia      Paroxysmal atrial fibrillation 12/26/2017     Pulmonary nodule 03/03/2009    PET scan reportedly normal; biopsy not advised.     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  Problem list name updated by automated process. Provider to review     Vasculitis 04/20/2009    Possible increased activity of thoracic aorta, on PET scan w/u for pulmonary nodule.      Physical Exam:   Vitals: /62   Pulse 85   Temp 97.7  F (36.5  C)   Resp 17   Ht 1.549 m (5' 1\")   Wt 67 kg (147 lb 12.8 oz)   LMP  (LMP Unknown)   SpO2 96%   BMI 27.93 kg/m    BMI: Body mass index is 27.93 kg/m .  GENERAL APPEARANCE:  Alert, in NAD  HEENT: normocephalic, moist mucous membranes, nose without drainage or crusting  RESP:  respiratory effort normal, no respiratory distress, Lung sounds diminished " bilaterally, patient is on RA  CV: auscultation of heart done, rate and rhythm irregular  ABDOMEN: + bowel sounds, soft, nontender, no grimacing or guarding with palpation.  M/S:  +3 bilateral lower extremity edema with lymph wraps In place  NEURO: moves extremities freely  PSYCH: oriented x 3 affect and mood normal    SNF labs: Labs done in SNF are in [facility]. Please refer to them using [facility]/Care Everywhere. and Recent labs in [facility] reviewed by me today.     DISCHARGE PLAN:    Follow up labs: CBC, BMP due 6/17 to be drawn by home care with results to PCP    Medical Follow Up:     Follow up with primary care provider in 1 week      Discharge Services: Home Care:  Occupational Therapy, Physical Therapy, Registered Nurse, Home Health Aide,  and From:  [facility]    Discharge Instructions:     Continue to follow your diet:No added salt     Weigh yourself daily in the morning and keep a record. Call your primary clinic: a) if you are more short of breath, or b) if your weight changes more than 3 pounds in one day or more than 5 pounds in one week.     Check BP daily and keep a log to bring to PCP at follow up.     TOTAL DISCHARGE TIME:   Greater than 30 minutes  Electronically signed by:  TOREY Edmonds CNP     Home care Face to Face documentation done in [facility] attached to Home care orders for [facility].

## 2022-06-13 NOTE — LETTER
6/13/2022        RE: Genie Persaud  [address] [place] [address]        [facility] GERIATRICS DISCHARGE SUMMARY  PATIENT'S NAME: Genie Persaud  YOB: 1935  MEDICAL RECORD NUMBER:  [id number]  Place of Service where encounter took place:  [facility]  ([facility]) [[id number]]    PRIMARY CARE PROVIDER AND CLINIC RESPONSIBLE AFTER TRANSFER:   Layo Sevilla MD, [address] [facility]  / [place] [address]    [facility] Provider     Transferring providers: TOREY Edmonds CNP, Dr. Sylvia Butts MD  Recent Hospitalization/ED:  [facility] stay 5/11/22 to 5/12/22.  Date of SNF Admission: May 12, 2022  Date of SNF (anticipated) Discharge: Marielle 15, 2022  Discharged to: [facility]  Cognitive Scores: SLUMS: 19/30 and CPT: 4.9/5.6  Physical Function: Ambulating 125 feet with 2WW, independent with dressing, toileting, grooming      CODE STATUS/ADVANCE DIRECTIVES DISCUSSION:  No CPR- DNI  ALLERGIES: Fosamax [alendronic acid], Contrast dye, Evista [raloxifene], Flu virus vaccine, and Amoxicillin    NURSING FACILITY COURSE   Medication Changes/Rationale:     Torsemide started for CHF    Potassium started for CHF/low potassium    Diltiazem started for atrial fibrillation    Eliquis dose increased to be therapeutic and started in hospital for atrial fibrillation    Amlodipine stopped due to low BP    Losartan dose reduced due to low BP    hydrochlorothiazide stopped in TCU as contributing to hyponatremia    atorvastatin changed to rosuvastatin in TCU due to drug interaction with diltiazem    PMH significant for HTN, atrial fibrillation, history of cardiomyopathy, nonobstructive CAD, dyslipidemia, CKD stage 3, chronic anemia, asthma, GERD, history of esophageal candida.     She was hospitalized at [facility] 5/11-5/12/22 for right back and lower right extremity pain. US evaluation 4/25 that was negative and showed right Baker's cyst as likely cause of  pain. MRI of lumbar spine showed no acute fractures and diffuse degenerative changes including moderate to severe neural foraminal stenosis on the right at L4-L5 and on the left at L5-S1. UA was negative. She was admitted to hospital for TCU placement, as felt she is not able to tolerate pain. Was started on pain regimen. Additionally labs in hospital revealed low sodium of 128 and low potassium of 3.3. Discharged to TCU for physical rehabilitation and medical management.      Hospitalized at [facility] from 5/15-5/23/22 for acute hypoxic respiratory failure, asthma exacerbation, possible CAP, possible candida esophagitis, new atrial fib, SIADH. She presented from TCU to the ED for evaluation of nausea and weakness. Laboratory evaluation showed sodium 122, alk phos 162, WBC 12.3. CT abdomen pelvis revealed no acute findings. She received IV hydration. Work up consistent with SIADH as cause of hyponatremia. She was discharged on 1L fluid restriction daily. GI consulted due to ongoing nausea that improved, so EGD deferred. Treated empirically with nystatin due to history of candidal esophagitis. She developed hypoxia thought to be due to fluid overload with pulmonary congestion and asthma exacerbation. Chest xray showed small left sided pleural effusion with congestion. Then had rapid response episode and required BiPAP. Was found to be in atrial fib with RVR at this time. She was started on anticoagulation with apixaban. Troponin was negative.  ECHO showed EF 50-55%, left atrium is mildly dilated, mild (1+) mitral regurgitation, mild (1+) aortic regurgitation, ascending aorta is Mildly dilated, moderate left pleural effusion. Received IV diuresis. Chest xray repeated that showed few patchy opacities in left lower base that could be possible pneumonia vs atelectasis vs scarring. She was treated for possible pneumonia with ceftriaxone and azithromycin-discharged with orders to complete 1 more day of azithromycin  "and 2 days of cefuroxime to complete course. Also received xopenex nebs and steroids for asthma exacerbation. Discharged to TCU for physical rehabilitation and medical management.      Rehospitalized at [facility] from 6/3-6/5 for CHF exacerbation. She was sent to ED from TCU for SOB, lower extremity swelling. Laboratory work up showed Hgb 9.0, N terminal pro BNP 5652. Chest xray showed stable mild enlargement of cardiac silhouette. Small left pleural effusion and associated basilar atelectasis/ consolidation. She received IV diuretics. She was discharged on lasix 20 mg daily x5 days at discharge. Additionally CT chest completed to evaluate for recent pneumonia. CT showed \"Small left pleural effusion with adjacent compressive atelectasis. 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 Unchanged ascending thoracic aortic aneurysm. New age-indeterminate T11 compression fracture.\" Was started on levaquin in TCU just prior to hospitalization, which was stopped in hospital.      Summary of nursing facility stay:   While in TCU she met goals with therapy and is discharging to [facility] access hospital. She will continue therapy through home care. Medically, upon return from hospital diuretics were changed to torsemide. She still has pretty significant swelling to legs today, will increase torsemide dose today to 40 mg daily x3 days, with labs tomorrow at TCU and 6/17 to be faxed to PCP. She denies any increased SOB, weight is up 6 lb from admit weight. OCCUPATIONAL THERAPY will continue lymph wraps through home care. Potassium was divided into BID dosing due to causing nausea. She was also started on diltiazem ER at TCU due to atrial fibrillation and elevated HR. She is also anemia- guaiac sample was negative. She should follow up with PCP in 1 week. She still has significant swelling today. Denies SOB. Has occasional cough.     Specific problems addressed at TCU  (I50.31) Acute heart failure with " preserved ejection fraction (H)  (primary encounter diagnosis)  (R60.0) Bilateral lower extremity edema  Comment: ECHO from 5/19 showed EF 50-55%, left atrium is mildly dilated, mild (1+) mitral regurgitation, mild (1+) aortic regurgitation, ascending aorta is Mildly dilated  -treated for CHF exacerbation with IV diuresis  -discharged on lasix 20 mg daily, changed to torsemide at TCU as was ineffective  -legs today with +3-4 swelling, denies SOB, lungs very diminished today, weight is still up about 6 lb from admit weight  Plan: STAT torsemide 20 mg now, then increase torsemide to 40 mg daily on 6/14 and 6/15, then resume torsemide 20 mg po daily on 6/16. BMP 6/14 and 6/17 through home care. Continue Lymph therapy through home care. Daily weights. Notify provider with weight gain >2 lbs in a day, >5 lbs in on week. 2 gram Na diet.      (Z87.01) History of recent pneumonia  Comment: CT chest inpatient with no ongoing pneumonia  -completed course of azithromycin and cefuroxime 5/24  -denies SOB, cough today  -Afebrile and hemodynamically stable  Plan:Continue mucinex  mg BID and inhalers as below.     (I48.19) Persistent Atrial fibrillation (H)  Comment: suspected to be secondary to respiratory failure and beta agonists  -she also had episode of afib in 2017 in setting of sepsis  -was started on apixaban 5 mg BID in hospital and then discharged on 2.5 mg BID dosing   -increased eliquis to 5 mg BID at TCU as she does not meet criteria for reduced dose of 2.5 mg BID  -started on diltiazem at TCU  -HR controlled 71, 61, 64  Plan: Continue diltiazem  mg daily, apixaban 5 mg BID. Monitor HR. Follow up with PCP     (E22.2) SIADH (syndrome of inappropriate ADH production) (H)  Comment: sodium on initial hospitalization on 5/20 was 122   -received IV hydration initially and sodium did improve  -hydrochlorothiazide discontinued as suspect this contributing  -sodium 135 on 6/10  Plan:  BMP 6/13. Continue 1L fluid  restriction.      (E87.6) Hypokalemia  Comment: secondary to diuresis  -potassium 4.7 on 6/10 and potassium dose reduced  Plan: continue potassium 20 meq po BID. BMP 6/14 and 6/17 through home care     (R19.5) Loose stools   Comment: occasional   -c.diff negative 5/26/22  Plan: Monitor bowels. Continue loperamide 2 mg TID PRN.     (I10) Essential hypertension  Comment: BP soft at times 101/44, 112/62, 123/62  -hydrochlorothiazide stopped at TCU due to likely contributing to SIADH  -amlodipine stopped due to soft BPs  -losartan dose reduced in TCU  -diltiazem started in TCU as above  Plan: Reduce losartan to 25 mg daily. Hold if SBP<110. Discontinue lasix as above, as both 20 and 40 mg dose not effective for diuresis in TCU prior to hospitalization. Start torsemide as above. BMP 6/3. VS per policy. Adjust medications as needed.    (I25.10) Coronary artery disease involving native coronary artery of native heart without angina pectoris  (E78.5) Dyslipidemia  Comment: chronic, with nonobstructive CAD, history of cardiomyopathy in 2017 when she was septic  - ECHO showed EF 50-55%, left atrium is mildly dilated, mild (1+) mitral regurgitation, mild (1+) aortic regurgitation, ascending aorta is Mildly dilated, moderate left pleural effusion  -coronary angiogram 2017 with mild to moderate CAD that is nonobstructive  -atorvastatin changed to rosuvastatin in TCU due to drug interaction with diltiazem  -denies CP today  Plan: Continue losartan 25 mg daily, torsemide daily, diltiazem  mg daily, rosuvastatin 10 mg at bedtime. Follow up with cardiology per recommendations     (N18.31) Stage 3a chronic kidney disease (H)  Comment: chronic  -baseline creatinine 0.9-1.1  -creatinine 0.94 on 6/10  Plan: BMP 6/14 and through home care 6/17. Avoid nephrotoxins. Renally dose medications as indicated.     (D64.9) Acute anemia  (D64.9) Chronic anemia  Comment: acute chronic  -hgb at baseline 11-12  -hgb trending down over past  month  -is now on anticoagulation  -Guaiac stool test negative x1  -hgb 9.6 on 6/7/2022  Plan: CBC 6/14 and then at follow up with PCP.      (J45.40) moderate persistent asthma without complication  Comment: breathing now at baseline  -was treated with course of prednisone while in TCU for possible asthma exacerbation and this was restarted on readmission to TCU (was on hospital discharge orders), so received prednisone 6/5 and 6/6 before discontinued at TCU  Plan: Continue mucinex BID, levalbuterolat bedtime and  PRN, tiotropium daily, advair BID, albuterol HFA PRN, singulair daily. Monitor respiratory status. Follow up with PCP     (M54.9,  G89.29) Chronic back pain, unspecified back location, unspecified back pain laterality  (S22.080D) Compression fracture of T11 vertebra with routine healing, subsequent encounter  Comment: found to have new age-indeterminate T11 compression fracture on chest CT this past admission  -denies pain   Plan: Continue tylenol 975 mg q8h PRN. Monitor pain. Therapy as below     (K21.9) Gastroesophageal reflux disease, unspecified whether esophagitis present  Comment: chronic  Plan: Continue omeprazole 10 mg daily.     (R53.81) Physical deconditioning  Comment: Acute, secondary to recent hospitalization, medical conditions as above  -completed course of therapy at TCU  Plan: Continue therapy through home care     (I71.2) Thoracic aortic aneurysm without rupture (H)  Comment: ascending aorta is Mildly dilated per ECHO 5/19/22 the Aorta Diam: 4.0 cm, previous ECHO 8/26/21 showed Aorta Diam: 3.9 cm  Plan: Follow up outpatient     (B37.81) Candida esophagitis (H)-resolved  Comment: treated empirically possible candida esophagitis due to symptoms of nausea  -GI deferred EGD during hospitalization as nausea improved  -completed course of nystatin inpatient  Plan: Monitor symptoms        Discharge Medications:    Current Outpatient Medications   Medication Sig Dispense Refill     acetaminophen  (TYLENOL) 325 MG tablet Take 975 mg by mouth every 8 hours as needed for mild pain       albuterol (PROAIR HFA/PROVENTIL HFA/VENTOLIN HFA) 108 (90 Base) MCG/ACT inhaler Inhale 1-2 puffs into the lungs every 6 hours as needed for shortness of breath / dyspnea or wheezing       apixaban ANTICOAGULANT (ELIQUIS) 5 MG tablet Take 1 tablet (5 mg) by mouth 2 times daily 60 tablet      calcium citrate-vitamin D (CITRACAL) 315-200 MG-UNIT TABS per tablet Take 1 tablet by mouth 2 times daily       diltiazem ER (DILT-XR) 120 MG 24 hr capsule Take 120 mg by mouth daily Hold for heart rate less than 60 or SBP less than 100       fluticasone-salmeterol (ADVAIR-HFA) 230-21 MCG/ACT inhaler Inhale 2 puffs into the lungs 2 times daily       guaiFENesin (MUCINEX) 600 MG 12 hr tablet Take 1 tablet (600 mg) by mouth 2 times daily       latanoprost (XALATAN) 0.005 % ophthalmic solution Place 1 drop into both eyes At Bedtime       levalbuterol (XOPENEX) 0.63 MG/3ML neb solution Take 1 ampule by nebulization At Bedtime       levalbuterol (XOPENEX) 0.63 MG/3ML neb solution Take 3 mLs (0.63 mg) by nebulization every 4 hours as needed for wheezing And scheduled at bedtime. 90 mL 0     loperamide (IMODIUM A-D) 2 MG tablet Take 1 tablet (2 mg) by mouth 3 times daily as needed for diarrhea       loratadine (CLARITIN) 10 MG tablet Take 1 tablet (10 mg) by mouth daily 90 tablet 3     losartan (COZAAR) 25 MG tablet Take 25 mg by mouth daily Hold if SBP<110       montelukast (SINGULAIR) 10 MG tablet Take 1 tablet (10 mg) by mouth At Bedtime 90 tablet 3     omeprazole (PRILOSEC) 10 MG DR capsule TAKE ONE CAPSULE BY MOUTH ONE TIME DAILY 90 capsule 1     ondansetron (ZOFRAN) 4 MG tablet Take 1 tablet (4 mg) by mouth every 8 hours as needed for nausea       polyethylene glycol (MIRALAX) 17 GM/Dose powder Take 17 g by mouth daily as needed for constipation 510 g      potassium chloride ER (KLOR-CON M) 20 MEQ CR tablet Take 1 tablet (20 mEq) by mouth 2  "times daily       rosuvastatin (CRESTOR) 10 MG tablet Take 1 tablet (10 mg) by mouth At Bedtime       tiotropium (SPIRIVA RESPIMAT) 2.5 MCG/ACT inhaler Inhale 2 puffs into the lungs daily 4 g 2     torsemide (DEMADEX) 20 MG tablet Take 1 tablet (20 mg) by mouth daily             Past Medical History:   Past Medical History:   Diagnosis Date     Anemia 03/10/2009    Chronic disease, by Fe studies; awaiting full GI w/u and repeat colonoscopy, ERCP.     Basal Cell Carcinoma--back      Coronary artery disease 03/09/2017    3/2/2017 - Two vessel coronary artery disease with mLAD 50% stenosis, D3 50% stenosis, pLCx 50% stenosis and mLCx 50% stenosis     Essential hypertension 09/01/2016    White coat hypertension;  24 hour ambulatory monitoring normal. Do not titrate up further.       Hyperlipidemia 02/10/2010     Moderate persistent asthma      Nonrheumatic aortic valve insufficiency 06/29/2017     Osteopenia      Paroxysmal atrial fibrillation 12/26/2017     Pulmonary nodule 03/03/2009    PET scan reportedly normal; biopsy not advised.     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  Problem list name updated by automated process. Provider to review     Vasculitis 04/20/2009    Possible increased activity of thoracic aorta, on PET scan w/u for pulmonary nodule.      Physical Exam:   Vitals: /62   Pulse 85   Temp 97.7  F (36.5  C)   Resp 17   Ht 1.549 m (5' 1\")   Wt 67 kg (147 lb 12.8 oz)   LMP  (LMP Unknown)   SpO2 96%   BMI 27.93 kg/m    BMI: Body mass index is 27.93 kg/m .  GENERAL APPEARANCE:  Alert, in NAD  HEENT: normocephalic, moist mucous membranes, nose without drainage or crusting  RESP:  respiratory effort normal, no respiratory distress, Lung sounds diminished bilaterally, patient is on RA  CV: auscultation of " heart done, rate and rhythm irregular  ABDOMEN: + bowel sounds, soft, nontender, no grimacing or guarding with palpation.  M/S:  +3 bilateral lower extremity edema with lymph wraps In place  NEURO: moves extremities freely  PSYCH: oriented x 3 affect and mood normal    SNF labs: Labs done in SNF are in [facility]. Please refer to them using EPIC/Care Everywhere. and Recent labs in [facility] reviewed by me today.     DISCHARGE PLAN:    Follow up labs: BMP due 6/17 to be drawn by home care with results to PCP    Medical Follow Up:     Follow up with primary care provider in 1 weeks      Discharge Services: Home Care:  Occupational Therapy, Physical Therapy, Registered Nurse, Home Health Aide,  and From:  [facility]    Discharge Instructions:     Continue to follow your diet:No added salt     Weigh yourself daily in the morning and keep a record. Call your primary clinic: a) if you are more short of breath, or b) if your weight changes more than 3 pounds in one day or more than 5 pounds in one week.     Check BP daily and keep a log to bring to PCP at follow up.     TOTAL DISCHARGE TIME:   Greater than 30 minutes  Electronically signed by:  TOREY Edmonds CNP     Home care Face to Face documentation done in [facility] attached to Home care orders for [facility].                 Sincerely,        TOREY Edmonds CNP

## 2022-06-14 LAB
ANION GAP SERPL CALCULATED.3IONS-SCNC: 8 MMOL/L (ref 3–14)
BUN SERPL-MCNC: 27 MG/DL (ref 7–30)
CALCIUM SERPL-MCNC: 8.5 MG/DL (ref 8.5–10.1)
CHLORIDE BLD-SCNC: 100 MMOL/L (ref 94–109)
CO2 SERPL-SCNC: 30 MMOL/L (ref 20–32)
CREAT SERPL-MCNC: 1.07 MG/DL (ref 0.52–1.04)
ERYTHROCYTE [DISTWIDTH] IN BLOOD BY AUTOMATED COUNT: 13.7 % (ref 10–15)
GFR SERPL CREATININE-BSD FRML MDRD: 51 ML/MIN/1.73M2
GLUCOSE BLD-MCNC: 87 MG/DL (ref 70–99)
HCT VFR BLD AUTO: 29.2 % (ref 35–47)
HGB BLD-MCNC: 8.8 G/DL (ref 11.7–15.7)
MCH RBC QN AUTO: 27.9 PG (ref 26.5–33)
MCHC RBC AUTO-ENTMCNC: 30.1 G/DL (ref 31.5–36.5)
MCV RBC AUTO: 93 FL (ref 78–100)
PLATELET # BLD AUTO: 290 10E3/UL (ref 150–450)
POTASSIUM BLD-SCNC: 4 MMOL/L (ref 3.4–5.3)
RBC # BLD AUTO: 3.15 10E6/UL (ref 3.8–5.2)
SODIUM SERPL-SCNC: 138 MMOL/L (ref 133–144)
WBC # BLD AUTO: 8.4 10E3/UL (ref 4–11)

## 2022-06-14 PROCEDURE — 80048 BASIC METABOLIC PNL TOTAL CA: CPT | Mod: ORL | Performed by: NURSE PRACTITIONER

## 2022-06-14 PROCEDURE — 36415 COLL VENOUS BLD VENIPUNCTURE: CPT | Performed by: NURSE PRACTITIONER

## 2022-06-14 PROCEDURE — 85027 COMPLETE CBC AUTOMATED: CPT | Mod: ORL | Performed by: NURSE PRACTITIONER

## 2022-06-17 ENCOUNTER — LAB REQUISITION (OUTPATIENT)
Dept: LAB | Facility: CLINIC | Age: 87
End: 2022-06-17
Payer: COMMERCIAL

## 2022-06-17 ENCOUNTER — TELEPHONE (OUTPATIENT)
Dept: PEDIATRICS | Facility: CLINIC | Age: 87
End: 2022-06-17
Payer: COMMERCIAL

## 2022-06-17 DIAGNOSIS — I50.812 CHRONIC RIGHT HEART FAILURE (H): ICD-10-CM

## 2022-06-17 LAB
ANION GAP SERPL CALCULATED.3IONS-SCNC: 8 MMOL/L (ref 3–14)
BASOPHILS # BLD AUTO: 0 10E3/UL (ref 0–0.2)
BASOPHILS NFR BLD AUTO: 0 %
BUN SERPL-MCNC: 22 MG/DL (ref 7–30)
CALCIUM SERPL-MCNC: 9.2 MG/DL (ref 8.5–10.1)
CHLORIDE BLD-SCNC: 98 MMOL/L (ref 94–109)
CO2 SERPL-SCNC: 30 MMOL/L (ref 20–32)
CREAT SERPL-MCNC: 0.99 MG/DL (ref 0.52–1.04)
EOSINOPHIL # BLD AUTO: 0.1 10E3/UL (ref 0–0.7)
EOSINOPHIL NFR BLD AUTO: 2 %
ERYTHROCYTE [DISTWIDTH] IN BLOOD BY AUTOMATED COUNT: 13.6 % (ref 10–15)
GFR SERPL CREATININE-BSD FRML MDRD: 56 ML/MIN/1.73M2
GLUCOSE BLD-MCNC: 101 MG/DL (ref 70–99)
HCT VFR BLD AUTO: 29.4 % (ref 35–47)
HGB BLD-MCNC: 9.4 G/DL (ref 11.7–15.7)
IMM GRANULOCYTES # BLD: 0.1 10E3/UL
IMM GRANULOCYTES NFR BLD: 1 %
LYMPHOCYTES # BLD AUTO: 0.8 10E3/UL (ref 0.8–5.3)
LYMPHOCYTES NFR BLD AUTO: 9 %
MCH RBC QN AUTO: 29.2 PG (ref 26.5–33)
MCHC RBC AUTO-ENTMCNC: 32 G/DL (ref 31.5–36.5)
MCV RBC AUTO: 91 FL (ref 78–100)
MONOCYTES # BLD AUTO: 1 10E3/UL (ref 0–1.3)
MONOCYTES NFR BLD AUTO: 10 %
NEUTROPHILS # BLD AUTO: 7.5 10E3/UL (ref 1.6–8.3)
NEUTROPHILS NFR BLD AUTO: 78 %
NRBC # BLD AUTO: 0 10E3/UL
NRBC BLD AUTO-RTO: 0 /100
PLATELET # BLD AUTO: 303 10E3/UL (ref 150–450)
POTASSIUM BLD-SCNC: 3.7 MMOL/L (ref 3.4–5.3)
RBC # BLD AUTO: 3.22 10E6/UL (ref 3.8–5.2)
SODIUM SERPL-SCNC: 136 MMOL/L (ref 133–144)
WBC # BLD AUTO: 9.6 10E3/UL (ref 4–11)

## 2022-06-17 PROCEDURE — 80048 BASIC METABOLIC PNL TOTAL CA: CPT | Mod: ORL | Performed by: INTERNAL MEDICINE

## 2022-06-17 PROCEDURE — 85025 COMPLETE CBC W/AUTO DIFF WBC: CPT | Mod: ORL | Performed by: INTERNAL MEDICINE

## 2022-06-17 NOTE — TELEPHONE ENCOUNTER
Per Dr. Hawkins routing comment: Yes!     Called Hoda HALL [phone].    Detailed message left on confidential voicemail with acceptance of verbal orders.    Felipe Zimmerman RN on 6/17/2022 at 4:25 PM

## 2022-06-17 NOTE — TELEPHONE ENCOUNTER
Hoda RN [facility].   [phone].    Verbal orders requested:    Skilled nursing 1x week for 8 weeks.  Lymphedema OT eval and treat,  PT eval and treat.   HHA 1x per week for 8 weeks for bathing.    Routing to Dr. Sevilla:  -ok for verbal orders?     Felipe Zimmerman RN on 6/17/2022 at 4:12 PM

## 2022-06-21 ENCOUNTER — PATIENT OUTREACH (OUTPATIENT)
Dept: CARE COORDINATION | Facility: CLINIC | Age: 87
End: 2022-06-21
Payer: COMMERCIAL

## 2022-06-21 NOTE — PROGRESS NOTES
Clinic Care Coordination Contact    Clinical Product Navigator RN reviewed chart; patient on payer product coverage, following post TCU care transition.  Review results: patient was discharged to [facility] and has started skilled home health care: PT, OT, Nursing.    Will outreach to patient in 30 days to check in, and at that time also encourage/offer to help schedule Annual Wellness Exam.      Josseline Salinas RN/Clinical Product Navigator

## 2022-06-23 ENCOUNTER — TELEPHONE (OUTPATIENT)
Dept: PEDIATRICS | Facility: CLINIC | Age: 87
End: 2022-06-23

## 2022-06-23 DIAGNOSIS — I89.0 LYMPHEDEMA: Primary | ICD-10-CM

## 2022-06-23 NOTE — TELEPHONE ENCOUNTER
Reason for Call:  Other call back    Detailed comments: Marcie from [facility] is needing orders to be sent in rearguards to PT.. Need PRN nursing visit tomorrow to re wrap PT legs with Compression wraps      Phone Number Patient can be reached at: [phone]    Best Time: anytime    Can we leave a detailed message on this number? YES    Call taken on 6/23/2022 at 12:44 PM by Sydni Caruso

## 2022-06-24 ENCOUNTER — TELEPHONE (OUTPATIENT)
Dept: PEDIATRICS | Facility: CLINIC | Age: 87
End: 2022-06-24

## 2022-06-24 NOTE — TELEPHONE ENCOUNTER
Verbal orders provided for 1 prn visit today to rewrap pt's legs due to lymphedema    Thank you  Madhuri Espitia RN on 6/24/2022 at 11:30 AM

## 2022-06-27 ENCOUNTER — LAB REQUISITION (OUTPATIENT)
Dept: LAB | Facility: CLINIC | Age: 87
End: 2022-06-27
Payer: COMMERCIAL

## 2022-06-27 ENCOUNTER — ASSISTED LIVING VISIT (OUTPATIENT)
Dept: GERIATRICS | Facility: CLINIC | Age: 87
End: 2022-06-27
Payer: COMMERCIAL

## 2022-06-27 VITALS
OXYGEN SATURATION: 93 % | TEMPERATURE: 97.4 F | RESPIRATION RATE: 18 BRPM | SYSTOLIC BLOOD PRESSURE: 109 MMHG | BODY MASS INDEX: 28.15 KG/M2 | WEIGHT: 149 LBS | DIASTOLIC BLOOD PRESSURE: 66 MMHG | HEART RATE: 64 BPM

## 2022-06-27 DIAGNOSIS — Z71.89 ACP (ADVANCE CARE PLANNING): ICD-10-CM

## 2022-06-27 DIAGNOSIS — I50.9 CHRONIC HEART FAILURE, UNSPECIFIED HEART FAILURE TYPE (H): Primary | ICD-10-CM

## 2022-06-27 DIAGNOSIS — I10 ESSENTIAL HYPERTENSION: ICD-10-CM

## 2022-06-27 DIAGNOSIS — I48.19 PERSISTENT ATRIAL FIBRILLATION (H): ICD-10-CM

## 2022-06-27 DIAGNOSIS — J45.21 EXACERBATION OF INTERMITTENT ASTHMA, UNSPECIFIED ASTHMA SEVERITY: ICD-10-CM

## 2022-06-27 RX ORDER — PREDNISONE 10 MG/1
10 TABLET ORAL DAILY PRN
COMMUNITY
End: 2024-02-29

## 2022-06-27 NOTE — PROGRESS NOTES
[facility] GERIATRICS    Chief Complaint   Patient presents with     [facility] Care     HPI:  Genie Persaud is a 86 year old  (1935), who is being seen today for an episodic care visit at: [facility]  [facility] [[id number]]. Today's concern is:     Asthma: s/p hosp stay with acute hypoxic resp. Failure last month. Has current wheezing at times, cough, some BARONE.  Cont. On advair, levabuterol MDIy, singulair, tiotropium MDI, prn alb. MDI.  During episodes of exac. Has taken prednisone, mucinex.  Has been aferbile. O2 sats low 90s.     HF: recent exac with hosp. Stay. Has LE lymphedema. Currently seen by PT for wraps to LEs. In TCU has increase in wt., edema, not responsive to lasix. Has taken aldactone in past-dc'd. Had hyponatremia. Recent hypokalemia. Started on KCL. Does every day wt.s wt up about 2 lbs since admission. Started on torsemide.  EF 50-55%    A-fib: newer dx of a-fib with RVR. Started on apixaban.  HRs currently stable.    HTN: cont. On cardizem, losartan.  BPs stable, sl lower at times.  No reports of dizziness.     Allergies, and PMH/PSH reviewed in [facility] today.  REVIEW OF SYSTEMS:  CONSTITUTIONAL:  weight gain and fatigue, EYES:  glaucoma, ENT:  [place], CV:  lower extremity edema, RESPIRATORY: cough, dyspnea on exertion and asthma, :  neg, GI:  occ diarrhea, NEURO:  neg, PSYCH: neg and MUSCULOSKELETAL: neg    Objective:   /66   Pulse 64   Temp 97.4  F (36.3  C)   Resp 18   Wt 67.6 kg (149 lb)   LMP  (LMP Unknown)   SpO2 93%   BMI 28.15 kg/m    GENERAL APPEARANCE:  Alert, in no distress, cooperative  ENT:  Mouth and posterior oropharynx normal, moist mucous membranes, [place], no rhinitis  EYES:  EOM, conjunctivae, lids, pupils and irises normal, PERRL, no drainage  NECK:  No adenopathy,masses or thyromegaly, FROM, no carotid bruit  RESP:  diminished breath sounds throughout, some sl exp. wheezes, clear with cough. some BARONE. O2 sats remain low 90s with amb in  apartment  CV:  Palpation and auscultation of heart done , peripheral edema 3+ in LEs, irregular rhythm : sl irreg, rate-normal  ABDOMEN:  normal bowel sounds, soft, nontender, no hepatosplenomegaly or other masses, no guarding or rebound, no bruits  M/S:   gait steady with walker. muscle strength 5/5 all 4 ext., normal tone  NEURO:   Cranial nerves 2-12 are normal tested and grossly at patient's baseline, speech clear, no tremor  PSYCH:  oriented X 3, memory impaired , no apparent anxiety      Most Recent 3 CBC's:Recent Labs   Lab Test 06/03/22  1121 05/22/22  0645 05/20/22  0741   WBC 10.8 10.0 11.5*   HGB 9.0* 9.3* 9.8*   MCV 91 89 86    410 438     Most Recent 3 BMP's:Recent Labs   Lab Test 06/04/22  1150 06/04/22  1056 06/04/22  0525 06/03/22  1206 05/22/22  0645   NA  --   --  132* 133 130*   POTASSIUM 3.5 3.4 3.2* 4.0 3.6   CHLORIDE  --   --  95 96 94   CO2  --   --  31 31 30   BUN  --   --  29 26 20   CR  --   --  0.87 0.85 0.73   ANIONGAP  --   --  6 6 6   ELROY  --   --  9.3 9.0 8.6   GLC  --   --  107* 100* 111*       Assessment/Plan:  (I50.9) Chronic heart failure, unspecified heart failure type (H)  (primary encounter diagnosis)  Comment: wt. Up 2 lbs. Lymphedema.  Plan: 1. Cont. Every day wt.s  2. Cont. Lymphedema wraps  3. Bmp tomorrow. Cont. kcl  4. Cont. Torsemide, will consider increased dose short-term for further wt. Gain.    (J45.21) Exacerbation of intermittent asthma, unspecified asthma severity  Comment: wheezing at times, BARONE. cough  Plan: 1. Cont. Current MDIs, nebs, singulair  2. Start prn mucinex  3. Start prednisone 10 mg every day prn-has had this previous dose.  Instructed to take prednisone 10 mg every day x 2 days  4. For ongoing wheezing, may increase prednisone dose   5. Follow temps, O2 sats    (I48.19) Persistent atrial fibrillation (H)  Comment: HR stable.  Plan: 1. Cont. eliquis  2. Cbc tomorrow  3. Follow HRs    (I10) Essential hypertension  Comment: BPs stable.  H/o  lower BPs.  Has hold parameter for losartan  Plan: 1. Cont. Cardizem, losartan  2. Follow BPs, HRs-resident checks per self every day  3. Monitor for reports of dizziness, low BPs, may consider decrease in losartan dose    (Z71.89) ACP (advance care planning)  Comment: DNR, DNI  Plan: POLST in chart      Electronically signed by: TOREY Zuniga CNP

## 2022-06-28 LAB
ANION GAP SERPL CALCULATED.3IONS-SCNC: 10 MMOL/L (ref 5–18)
BUN SERPL-MCNC: 13 MG/DL (ref 8–28)
CALCIUM SERPL-MCNC: 9 MG/DL (ref 8.5–10.5)
CHLORIDE BLD-SCNC: 98 MMOL/L (ref 98–107)
CO2 SERPL-SCNC: 26 MMOL/L (ref 22–31)
CREAT SERPL-MCNC: 0.88 MG/DL (ref 0.6–1.1)
ERYTHROCYTE [DISTWIDTH] IN BLOOD BY AUTOMATED COUNT: 13.7 % (ref 10–15)
GFR SERPL CREATININE-BSD FRML MDRD: 64 ML/MIN/1.73M2
GLUCOSE BLD-MCNC: 85 MG/DL (ref 70–125)
HCT VFR BLD AUTO: 29.6 % (ref 35–47)
HGB BLD-MCNC: 9.3 G/DL (ref 11.7–15.7)
MCH RBC QN AUTO: 27.8 PG (ref 26.5–33)
MCHC RBC AUTO-ENTMCNC: 31.4 G/DL (ref 31.5–36.5)
MCV RBC AUTO: 89 FL (ref 78–100)
PLATELET # BLD AUTO: 330 10E3/UL (ref 150–450)
POTASSIUM BLD-SCNC: 4.1 MMOL/L (ref 3.5–5)
RBC # BLD AUTO: 3.34 10E6/UL (ref 3.8–5.2)
SODIUM SERPL-SCNC: 134 MMOL/L (ref 136–145)
WBC # BLD AUTO: 6.7 10E3/UL (ref 4–11)

## 2022-06-28 PROCEDURE — 36415 COLL VENOUS BLD VENIPUNCTURE: CPT | Mod: ORL | Performed by: NURSE PRACTITIONER

## 2022-06-28 PROCEDURE — 80048 BASIC METABOLIC PNL TOTAL CA: CPT | Mod: ORL | Performed by: NURSE PRACTITIONER

## 2022-06-28 PROCEDURE — P9603 ONE-WAY ALLOW PRORATED MILES: HCPCS | Mod: ORL | Performed by: NURSE PRACTITIONER

## 2022-06-28 PROCEDURE — 85027 COMPLETE CBC AUTOMATED: CPT | Mod: ORL | Performed by: NURSE PRACTITIONER

## 2022-07-07 ENCOUNTER — ASSISTED LIVING VISIT (OUTPATIENT)
Dept: GERIATRICS | Facility: CLINIC | Age: 87
End: 2022-07-07
Payer: COMMERCIAL

## 2022-07-07 VITALS
WEIGHT: 142 LBS | BODY MASS INDEX: 26.83 KG/M2 | OXYGEN SATURATION: 95 % | HEART RATE: 67 BPM | SYSTOLIC BLOOD PRESSURE: 120 MMHG | DIASTOLIC BLOOD PRESSURE: 53 MMHG | RESPIRATION RATE: 16 BRPM

## 2022-07-07 DIAGNOSIS — F41.9 ANXIETY: ICD-10-CM

## 2022-07-07 DIAGNOSIS — J45.20 INTERMITTENT ASTHMA, UNSPECIFIED ASTHMA SEVERITY, UNSPECIFIED WHETHER COMPLICATED: ICD-10-CM

## 2022-07-07 DIAGNOSIS — I50.9 CHRONIC HEART FAILURE, UNSPECIFIED HEART FAILURE TYPE (H): Primary | ICD-10-CM

## 2022-07-07 NOTE — PROGRESS NOTES
[place] [facility]  [place] Medical Record Number:  [id number]  Place of Service where encounter took place:  [facility]  [facility] [[id number]]  Chief Complaint   Patient presents with     Edema       HPI:    Genie Persaud  is a 86 year old (1935), who is being seen today for an episodic care visit.  HPI information obtained from: facility chart records, facility staff, patient report, [facility] chart review and family/first contact dtr report. Today's concern is: HF, asthma, anxiety. Has had recent HF exac. With hosp. Stay. Cont. On torsemide.  Wt. Has been stable-weighs self every day.  Ongoing LE lymphedema.  Cont. With PT, LE lymphedema wraps. Had recent sob. Had 2 days of prn prednisone which was effective.  Reports sob stable. Also taking advair, singulair, tiotropium, albuterol.  Per dtr, has had ongoing anxiety, agitation, isolative behaviors-did not want AL placement, making statements of wanting to live at home.  Not currently taking psych meds.       Past Medical and Surgical History reviewed in Epic today.    MEDICATIONS:    Current Outpatient Medications   Medication Sig Dispense Refill     acetaminophen (TYLENOL) 325 MG tablet Take 975 mg by mouth every 8 hours as needed for mild pain       albuterol (PROAIR HFA/PROVENTIL HFA/VENTOLIN HFA) 108 (90 Base) MCG/ACT inhaler Inhale 1-2 puffs into the lungs every 6 hours as needed for shortness of breath / dyspnea or wheezing       apixaban ANTICOAGULANT (ELIQUIS) 5 MG tablet Take 1 tablet (5 mg) by mouth 2 times daily 60 tablet      calcium citrate-vitamin D (CITRACAL) 315-200 MG-UNIT TABS per tablet Take 1 tablet by mouth 2 times daily       diltiazem ER (DILT-XR) 120 MG 24 hr capsule Take 120 mg by mouth daily Hold for heart rate less than 60 or SBP less than 100       fluticasone-salmeterol (ADVAIR-HFA) 230-21 MCG/ACT inhaler Inhale 2 puffs into the lungs 2 times daily       guaiFENesin (MUCINEX) 600 MG 12 hr tablet  Take 1 tablet (600 mg) by mouth 2 times daily       latanoprost (XALATAN) 0.005 % ophthalmic solution Place 1 drop into both eyes At Bedtime       levalbuterol (XOPENEX) 0.63 MG/3ML neb solution Take 1 ampule by nebulization At Bedtime       levalbuterol (XOPENEX) 0.63 MG/3ML neb solution Take 3 mLs (0.63 mg) by nebulization every 4 hours as needed for wheezing And scheduled at bedtime. 90 mL 0     loperamide (IMODIUM A-D) 2 MG tablet Take 1 tablet (2 mg) by mouth 3 times daily as needed for diarrhea       loratadine (CLARITIN) 10 MG tablet Take 1 tablet (10 mg) by mouth daily 90 tablet 3     losartan (COZAAR) 25 MG tablet Take 25 mg by mouth daily Hold if SBP<110       montelukast (SINGULAIR) 10 MG tablet Take 1 tablet (10 mg) by mouth At Bedtime 90 tablet 3     omeprazole (PRILOSEC) 10 MG DR capsule TAKE ONE CAPSULE BY MOUTH ONE TIME DAILY 90 capsule 1     ondansetron (ZOFRAN) 4 MG tablet Take 1 tablet (4 mg) by mouth every 8 hours as needed for nausea       polyethylene glycol (MIRALAX) 17 GM/Dose powder Take 17 g by mouth daily as needed for constipation 510 g      potassium chloride ER (KLOR-CON M) 20 MEQ CR tablet Take 1 tablet (20 mEq) by mouth 2 times daily       predniSONE (DELTASONE) 10 MG tablet Take 10 mg by mouth daily as needed       rosuvastatin (CRESTOR) 10 MG tablet Take 1 tablet (10 mg) by mouth At Bedtime       tiotropium (SPIRIVA RESPIMAT) 2.5 MCG/ACT inhaler Inhale 2 puffs into the lungs daily 4 g 2     torsemide (DEMADEX) 20 MG tablet Take 1 tablet (20 mg) by mouth daily           REVIEW OF SYSTEMS:  CONSTITUTIONAL:  fatigue, EYES:  neg, ENT:  [place], CV:  lower extremity edema, RESPIRATORY: occ BARONE, :  Nocturia, GI:  neg, NEURO:  neg, PSYCH: does not like living at AL. denies depression, anxiety, sleep disturbance or decrease in po intake. upset with dtr for current placement. and MUSCULOSKELETAL: neg    Objective:  /53   Pulse 67   Resp 16   Wt 64.4 kg (142 lb)   LMP  (LMP Unknown)    SpO2 95%   BMI 26.83 kg/m    Exam:  GENERAL APPEARANCE:  Alert, cooperative  ENT:  Mouth and posterior oropharynx normal, moist mucous membranes, [place]  EYES:  EOM, conjunctivae, lids, pupils and irises normal, PERRL  RESP:  respiratory effort and palpation of chest normal, lungs clear to auscultation , no respiratory distress, diminished breath sounds bibasilar  CV:  Palpation and auscultation of heart done , regular rate and rhythm, no murmur, rub, or gallop, peripheral edema 3-4+ in LEs  ABDOMEN:  normal bowel sounds, soft, nontender, no hepatosplenomegaly or other masses, no guarding or rebound  M/S:   muscle strength 5/5 all 4 ext., normal tone  NEURO:   Cranial nerves 2-12 are normal tested and grossly at patient's baseline, speech clear  PSYCH:  insight and judgement impaired, affect abnormal -flat, agitation at times    Labs:     Most Recent 3 CBC's:Recent Labs   Lab Test 06/28/22  1316 06/17/22  1600 06/14/22  0730   WBC 6.7 9.6 8.4   HGB 9.3* 9.4* 8.8*   MCV 89 91 93    303 290     Most Recent 3 BMP's:Recent Labs   Lab Test 06/28/22  1316 06/17/22  1600 06/14/22  0730   * 136 138   POTASSIUM 4.1 3.7 4.0   CHLORIDE 98 98 100   CO2 26 30 30   BUN 13 22 27   CR 0.88 0.99 1.07*   ANIONGAP 10 8 8   ELROY 9.0 9.2 8.5   GLC 85 101* 87       ASSESSMENT/PLAN:  (I50.9) Chronic heart failure, unspecified heart failure type (H)  (primary encounter diagnosis)  Comment: wt. Stable. Ongoing LE edema  Plan: 1. Cont. Torsemide  2. Cont.  Every day wt.s  3. For wt. > 146, may give extra torsemide dose  4. Cont. LE lymphedema wraps  5. Bmp in next 2-3 mos    (J45.20) Intermittent asthma, unspecified asthma severity, unspecified whether complicated  Comment: recent sob, prn prednisone effective  Plan: 1. Cont. Current MDIs  2. Follow O2 sats, temps  3. Cont. Prn prednisone  4. For wheezing, sob, may sched. Prednisone short-term    (F41.9) Anxiety  Comment: ongoing s/s per family, residnet not happy with AL  placement o/w denies depression, anxiety.  In TCU [facility] 19/30, CPT 4.9/5.6.  Not currently taking psych meds.  No sleep disturbance other than nocturia 1-2 x/night. Does not want antidepressant med at this time.   Plan: 1. Does agree to OT referral for cognitive testing  2. Follow po intake  3. Cont. To encourage time out of apt.    Electronically signed by:  TOREY Zuniga CNP

## 2022-07-07 NOTE — LETTER
7/7/2022        RE: Genie Persaud  [address] [place] [address]        [place] [facility]  [place] [facility] Record Number:  [id number]  Place of Service where encounter took place:  [facility] ([facility]) [[id number]]  Chief Complaint   Patient presents with     Edema       HPI:    Genie Persaud  is a 86 year old (1935), who is being seen today for an episodic care visit.  HPI information obtained from: facility chart records, facility staff, patient report, [facility] chart review and family/first contact dtr report. Today's concern is: HF, asthma, anxiety. Has had recent HF exac. With hosp. Stay. Cont. On torsemide.  Wt. Has been stable-weighs self every day.  Ongoing LE lymphedema.  Cont. With PT, LE lymphedema wraps. Had recent sob. Had 2 days of prn prednisone which was effective.  Reports sob stable. Also taking advair, singulair, tiotropium, albuterol.  Per dtr, has had ongoing anxiety, agitation, isolative behaviors-did not want AL placement, making statements of wanting to live at home.  Not currently taking psych meds.       Past Medical and Surgical History reviewed in Epic today.    MEDICATIONS:    Current Outpatient Medications   Medication Sig Dispense Refill     acetaminophen (TYLENOL) 325 MG tablet Take 975 mg by mouth every 8 hours as needed for mild pain       albuterol (PROAIR HFA/PROVENTIL HFA/VENTOLIN HFA) 108 (90 Base) MCG/ACT inhaler Inhale 1-2 puffs into the lungs every 6 hours as needed for shortness of breath / dyspnea or wheezing       apixaban ANTICOAGULANT (ELIQUIS) 5 MG tablet Take 1 tablet (5 mg) by mouth 2 times daily 60 tablet      calcium citrate-vitamin D (CITRACAL) 315-200 MG-UNIT TABS per tablet Take 1 tablet by mouth 2 times daily       diltiazem ER (DILT-XR) 120 MG 24 hr capsule Take 120 mg by mouth daily Hold for heart rate less than 60 or SBP less than 100       fluticasone-salmeterol (ADVAIR-HFA) 230-21 MCG/ACT inhaler Inhale 2  puffs into the lungs 2 times daily       guaiFENesin (MUCINEX) 600 MG 12 hr tablet Take 1 tablet (600 mg) by mouth 2 times daily       latanoprost (XALATAN) 0.005 % ophthalmic solution Place 1 drop into both eyes At Bedtime       levalbuterol (XOPENEX) 0.63 MG/3ML neb solution Take 1 ampule by nebulization At Bedtime       levalbuterol (XOPENEX) 0.63 MG/3ML neb solution Take 3 mLs (0.63 mg) by nebulization every 4 hours as needed for wheezing And scheduled at bedtime. 90 mL 0     loperamide (IMODIUM A-D) 2 MG tablet Take 1 tablet (2 mg) by mouth 3 times daily as needed for diarrhea       loratadine (CLARITIN) 10 MG tablet Take 1 tablet (10 mg) by mouth daily 90 tablet 3     losartan (COZAAR) 25 MG tablet Take 25 mg by mouth daily Hold if SBP<110       montelukast (SINGULAIR) 10 MG tablet Take 1 tablet (10 mg) by mouth At Bedtime 90 tablet 3     omeprazole (PRILOSEC) 10 MG DR capsule TAKE ONE CAPSULE BY MOUTH ONE TIME DAILY 90 capsule 1     ondansetron (ZOFRAN) 4 MG tablet Take 1 tablet (4 mg) by mouth every 8 hours as needed for nausea       polyethylene glycol (MIRALAX) 17 GM/Dose powder Take 17 g by mouth daily as needed for constipation 510 g      potassium chloride ER (KLOR-CON M) 20 MEQ CR tablet Take 1 tablet (20 mEq) by mouth 2 times daily       predniSONE (DELTASONE) 10 MG tablet Take 10 mg by mouth daily as needed       rosuvastatin (CRESTOR) 10 MG tablet Take 1 tablet (10 mg) by mouth At Bedtime       tiotropium (SPIRIVA RESPIMAT) 2.5 MCG/ACT inhaler Inhale 2 puffs into the lungs daily 4 g 2     torsemide (DEMADEX) 20 MG tablet Take 1 tablet (20 mg) by mouth daily           REVIEW OF SYSTEMS:  CONSTITUTIONAL:  fatigue, EYES:  neg, ENT:  [place], CV:  lower extremity edema, RESPIRATORY: occ BARONE, :  Nocturia, GI:  neg, NEURO:  neg, PSYCH: does not like living at AL. denies depression, anxiety, sleep disturbance or decrease in po intake. upset with dtr for current placement. and MUSCULOSKELETAL:  neg    Objective:  /53   Pulse 67   Resp 16   Wt 64.4 kg (142 lb)   LMP  (LMP Unknown)   SpO2 95%   BMI 26.83 kg/m    Exam:  GENERAL APPEARANCE:  Alert, cooperative  ENT:  Mouth and posterior oropharynx normal, moist mucous membranes, [place]  EYES:  EOM, conjunctivae, lids, pupils and irises normal, PERRL  RESP:  respiratory effort and palpation of chest normal, lungs clear to auscultation , no respiratory distress, diminished breath sounds bibasilar  CV:  Palpation and auscultation of heart done , regular rate and rhythm, no murmur, rub, or gallop, peripheral edema 3-4+ in LEs  ABDOMEN:  normal bowel sounds, soft, nontender, no hepatosplenomegaly or other masses, no guarding or rebound  M/S:   muscle strength 5/5 all 4 ext., normal tone  NEURO:   Cranial nerves 2-12 are normal tested and grossly at patient's baseline, speech clear  PSYCH:  insight and judgement impaired, affect abnormal -flat, agitation at times    Labs:     Most Recent 3 CBC's:Recent Labs   Lab Test 06/28/22  1316 06/17/22  1600 06/14/22  0730   WBC 6.7 9.6 8.4   HGB 9.3* 9.4* 8.8*   MCV 89 91 93    303 290     Most Recent 3 BMP's:Recent Labs   Lab Test 06/28/22  1316 06/17/22  1600 06/14/22  0730   * 136 138   POTASSIUM 4.1 3.7 4.0   CHLORIDE 98 98 100   CO2 26 30 30   BUN 13 22 27   CR 0.88 0.99 1.07*   ANIONGAP 10 8 8   ELROY 9.0 9.2 8.5   GLC 85 101* 87       ASSESSMENT/PLAN:  (I50.9) Chronic heart failure, unspecified heart failure type (H)  (primary encounter diagnosis)  Comment: wt. Stable. Ongoing LE edema  Plan: 1. Cont. Torsemide  2. Cont.  Every day wt.s  3. For wt. > 146, may give extra torsemide dose  4. Cont. LE lymphedema wraps  5. Bmp in next 2-3 mos    (J45.20) Intermittent asthma, unspecified asthma severity, unspecified whether complicated  Comment: recent sob, prn prednisone effective  Plan: 1. Cont. Current MDIs  2. Follow O2 sats, temps  3. Cont. Prn prednisone  4. For wheezing, sob, may sched. Prednisone  short-term    (F41.9) Anxiety  Comment: ongoing s/s per family, residnet not happy with AL placement o/w denies depression, anxiety.  In TCU [facility] 19/30, CPT 4.9/5.6.  Not currently taking psych meds.  No sleep disturbance other than nocturia 1-2 x/night. Does not want antidepressant med at this time.   Plan: 1. Does agree to OT referral for cognitive testing  2. Follow po intake  3. Cont. To encourage time out of apt.    Electronically signed by:  TOREY Zuniga CNP                 Sincerely,        TOREY Zuniga CNP

## 2022-07-12 ENCOUNTER — PATIENT OUTREACH (OUTPATIENT)
Dept: GERIATRIC MEDICINE | Facility: CLINIC | Age: 87
End: 2022-07-12

## 2022-07-12 NOTE — PROGRESS NOTES
[facility] Care Coordination Contact    Member became effective with [facility] on 7/1/2022 with [employer].     Welcome letter sent to member.     Jessica Oneal  Care Management Specialist   [facility]   [phone]

## 2022-07-12 NOTE — LETTER
July 12, 2022      GENIE [place]  [address] [facility] [address]      Dear Genie:    M[place]on, my name is Josseline Galdamez MA, LSW. You are eligible for [facility] Case Management program through your enrollment in [employer]. We think you may benefit from this program. I am writing to invite you to be in our Case Management program.     The following are a few things the Case Management program can help you with:    Select or change your primary care doctor or primary care clinic    Find a specialist, if needed, near your home    Receive preventive care, such as flu shots    Join programs offered by [facility] that interest you, like wellness programs    As your , I will do the following to enroll you in the Case Management Program:    Schedule a telephone call with you to answer any questions you may have about case management    Conduct an assessment by phone to identify needs case management can help you with    Develop a care plan to address those needs    Help you obtain available care and resources as needed    I will call you soon to discuss your interest in this program and your health care needs.    Being in the Case Management program is voluntary and offered to you at no cost. If you accept being in the Case Management program, you can stop any time by calling me at [phone].    Sincerely,      Josseline Galdamez MA, LSW    E-mail: [email or web address]  Phone: [phone]      [employer]    [id number]       (01/2020)          [address] [place] [id number]  [phone]  fax [phone]  [facility]

## 2022-07-14 NOTE — PROGRESS NOTES
[employer] Initial enrollment    Member was assigned to [facility] for [employer] Case Management on: 7-1-22  Review of member's Epic chart completed.  No triggering events noted. CC did reach out to member NP to get copy of POLST so this can get scanned into Epic    CC will follow up as needed.    Josseline Galdamez MA [facility] Care Coordinator   [phone] - ksoq cell phone   [phone] - kffe fax

## 2022-07-21 NOTE — PROGRESS NOTES
Clinical Product Navigator RN reviewed chart; patient on payer product coverage.  Review results: since discharging from TCU, patient has enrolled in [facility] and is now seen by on site care team at the [facility] for primary care care.     No new referral or outreach will be placed at this time, patient has an established care team.     Josseline Salinas RN/Clinical Product Navigator

## 2022-07-25 ENCOUNTER — ASSISTED LIVING VISIT (OUTPATIENT)
Dept: GERIATRICS | Facility: CLINIC | Age: 87
End: 2022-07-25
Payer: COMMERCIAL

## 2022-07-25 VITALS
RESPIRATION RATE: 18 BRPM | SYSTOLIC BLOOD PRESSURE: 101 MMHG | WEIGHT: 147 LBS | OXYGEN SATURATION: 92 % | BODY MASS INDEX: 27.75 KG/M2 | HEIGHT: 61 IN | DIASTOLIC BLOOD PRESSURE: 47 MMHG | TEMPERATURE: 97.2 F | HEART RATE: 68 BPM

## 2022-07-25 DIAGNOSIS — I48.19 PERSISTENT ATRIAL FIBRILLATION (H): Primary | ICD-10-CM

## 2022-07-25 DIAGNOSIS — I50.32 CHRONIC HEART FAILURE WITH PRESERVED EJECTION FRACTION (H): ICD-10-CM

## 2022-07-25 DIAGNOSIS — J45.40 MODERATE PERSISTENT ASTHMA WITHOUT COMPLICATION: ICD-10-CM

## 2022-07-25 DIAGNOSIS — R60.0 BILATERAL LOWER EXTREMITY EDEMA: ICD-10-CM

## 2022-07-25 DIAGNOSIS — S22.080D COMPRESSION FRACTURE OF T11 VERTEBRA WITH ROUTINE HEALING, SUBSEQUENT ENCOUNTER: ICD-10-CM

## 2022-07-25 DIAGNOSIS — R41.89 COGNITIVE IMPAIRMENT: ICD-10-CM

## 2022-07-25 DIAGNOSIS — I10 ESSENTIAL HYPERTENSION: ICD-10-CM

## 2022-07-25 DIAGNOSIS — D64.9 ANEMIA, UNSPECIFIED TYPE: ICD-10-CM

## 2022-07-25 DIAGNOSIS — I25.10 CORONARY ARTERY DISEASE INVOLVING NATIVE CORONARY ARTERY OF NATIVE HEART WITHOUT ANGINA PECTORIS: ICD-10-CM

## 2022-07-25 NOTE — LETTER
"    7/25/2022        RE: Genie Persaud  [facility]  [address]  [facility] [address]        Genie Persaud is a 86 year old female (goes by Christina) seen July 25, 2022 at [facility] where she has resided for one month (admit 6/2022) seen for initial visit.   Pt is seen in her apartment up to chair, with [facility] nurse present doing lymphedema wraps.     Pt reports \"I feel fine.\"  Denies any current pain, dyspnea or other symptoms.   Her back pain is better.   She is not vaccinated \"My allergist told me not to.\"     Daughter had reported anxiety and agitation, most recently about move to [place] and pt is asking to return to her home.  Pt declined CNS medications.        By chart review, pt has HTN, atrial fib, nonobstructive CAD, CKD3, h/o cardiomyopathy, GERD, asthma, chronic anemia.  She was hospitalized in May 2022 for back and RLE pain.  Lumbar MRI showed diffuse degenerative changes, moderate to severe foraminal stenosis right L4-5 and left L5-S1, no acute fractures.   She was admitted for pain management and hyponatremia 128 with hypokalemia 3.3   She improved and discharged to [facility] TCU but rehospitalized after a few days with acute hypoxic respiratory failure, asthma exacerbation, possible CAP, new atrial fib and SIADH.   Presented with nausea and weakness and Na down to 122    She required BiPAP for pulmonary edema and asthma exacerbation.   In atrial fib with RVR and started on apixaban, given IV diuretics.   Treated empirically with ceftriaxone and azithromycin for possible pneumonia given few patchy opacities LLL on CXR.  Pt returned to TCU, then hospitalized yet again in early June 2022 for CHF exacerbation with dyspnea and LE edema.  Nterminal pro BNP 5652 and hgb 9.0, again treated with IV diuretics and returned to TCU.  After meeting goals with therapies pt discharged to [place]    Past Medical History:   Diagnosis Date     Anemia 03/10/2009    Chronic disease, by Fe studies; awaiting " full GI w/u and repeat colonoscopy, ERCP.     Basal Cell Carcinoma--back      Coronary artery disease 03/09/2017    3/2/2017 - Two vessel coronary artery disease with mLAD 50% stenosis, D3 50% stenosis, pLCx 50% stenosis and mLCx 50% stenosis     Essential hypertension 09/01/2016    White coat hypertension;  24 hour ambulatory monitoring normal. Do not titrate up further.       Hyperlipidemia 02/10/2010     Moderate persistent asthma      Nonrheumatic aortic valve insufficiency 06/29/2017     Osteopenia      Paroxysmal atrial fibrillation 12/26/2017     Pulmonary nodule 03/03/2009    PET scan reportedly normal; biopsy not advised.     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  Problem list name updated by automated process. Provider to review     Vasculitis 04/20/2009    Possible increased activity of thoracic aorta, on PET scan w/u for pulmonary nodule.        Past Surgical History:   Procedure Laterality Date     APPENDECTOMY OPEN  1957     C STEREOTACTIC BREAST BIOPSY  01/01/2001     CHOLECYSTECTOMY, LAPOROSCOPIC  01/01/2008     DILATION AND CURETTAGE  1967     DILATION AND CURETTAGE  1971     ESOPHAGOSCOPY, GASTROSCOPY, DUODENOSCOPY (EGD), COMBINED  05/17/2013    Procedure: COMBINED ESOPHAGOSCOPY, GASTROSCOPY, DUODENOSCOPY (EGD), BIOPSY SINGLE OR MULTIPLE;  ESOPHAGOSCOPY, GASTROSCOPY, DUODENOSCOPY (EGD)  with bx;  Surgeon: Jeffery Yanez MD;  Location:  GI     TONSILLECTOMY       Tubal Ligation NOS  1971     SH: Previously lived in her house alone, states she was driving and volunteering.   Now in [place], has med set-up but no other services.     She has a son Brandon in [place] and a daughter Wilda in [place]   Non smoker           ROS:  SLUMS 19/30   CPT 4.9       Ambulatory with FWW, uses 4WW to go get the mail   Independent  "with ADLs     Pt states today's weight is 132 lbs.    Pt does her tracking of weights and VS.    Wt Readings from Last 5 Encounters:   07/25/22 66.7 kg (147 lb)   07/07/22 64.4 kg (142 lb)   06/27/22 67.6 kg (149 lb)   06/12/22 67 kg (147 lb 12.8 oz)   06/08/22 65.3 kg (144 lb)      EXAM: NAD  /47   Pulse 68   Temp 97.2  F (36.2  C)   Resp 18   Ht 1.549 m (5' 1\")   Wt 66.7 kg (147 lb)   LMP  (LMP Unknown)   SpO2 92%   BMI 27.78 kg/m     Neck supple without adenopathy  Lungs with decreased BS, no rales or wheeze  Heart irreg irreg s1s2 @70     Abd soft, NT, no distention or guarding, +BS  Ext with 2+ edema on left, trace-1+ edema on the right.   Neuro: tracks okay, no focal findings  Psych: affect okay     Last Comprehensive Metabolic Panel:  Sodium   Date Value Ref Range Status   06/28/2022 134 (L) 136 - 145 mmol/L Final     Potassium   Date Value Ref Range Status   06/28/2022 4.1 3.5 - 5.0 mmol/L Final     Carbon Dioxide (CO2)   Date Value Ref Range Status   06/28/2022 26 22 - 31 mmol/L Final     Glucose   Date Value Ref Range Status   06/28/2022 85 70 - 125 mg/dL Final     Urea Nitrogen   Date Value Ref Range Status   06/28/2022 13 8 - 28 mg/dL Final     Creatinine   Date Value Ref Range Status   06/28/2022 0.88 0.60 - 1.10 mg/dL Final     GFR Estimate   Date Value Ref Range Status   06/28/2022 64 >60 mL/min/1.73m2 Final     Calcium   Date Value Ref Range Status   06/28/2022 9.0 8.5 - 10.5 mg/dL Final     Lab Results   Component Value Date    AST 32 05/15/2022      ALBUMIN 2.8 05/15/2022      ALKPHOS 162 05/15/2022     Lab Results   Component Value Date    WBC 6.7 06/28/2022      HGB 9.3 06/28/2022      MCV 89 06/28/2022       06/28/2022     TSH   Date Value Ref Range Status   05/16/2022 1.14 0.40 - 4.00 mU/L Final   06/10/2019 1.94 0.40 - 4.00 mU/L Final      Lab Results   Component Value Date    A1C 6.4 03/21/2022    A1C 6.2 01/20/2021    A1C 6.2 06/10/2019     ECHO 5/15/2022:   The visual " ejection fraction is 50-55%.  The right ventricular systolic function is borderline reduced.  The left atrium is mildly dilated.  There is mild (1+) mitral regurgitation.  There is mild (1+) aortic regurgitation.  The ascending aorta is Mildly dilated.  Moderate left pleural effusion  The rhythm was atrial fibrillation.    Chest CT 6/3/2022    1.  Small left pleural effusion with adjacent compressive atelectasis.  2.  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  3.  Unchanged ascending thoracic aortic aneurysm.  4.  New age-indeterminate T11 compression fracture.       IMP/PLAN:   (I48.19) Persistent atrial fibrillation (H)   Comment:   Pulse Readings from Last 4 Encounters:   07/25/22 68   07/07/22 67   06/27/22 64   06/12/22 85      Plan: diltiazem 120 mg/day for VR control.    Apixaban 5 mg bid for stroke prophylaxis       (I10) Essential hypertension  Comment:   BP Readings from Last 3 Encounters:   07/25/22 101/47   07/07/22 120/53   06/27/22 109/66      Plan: diltiazem as above, and losartan 25 mg/day   Follow bps and BMP       (I50.32) Chronic heart failure with preserved ejection fraction (H)  (R60.0) Bilateral lower extremity edema  Comment: recurrent exacerbations with volume overload, still with LE edema but much better and weight has decreased  Plan: torsemide 20 mg/day with KCl 20 mEq bid   Lymphedema wraps per [facility].      (D64.9) Anemia, unspecified type  Comment: B12 level in March 2022 was 273  Now on anticoagulation  Plan: given persistently low hgb with this borderline low B12 level, would start replacement at 500 mcg/day po   Continue omeprazole, recheck CBC     (J45.40) Moderate persistent asthma without complication  Comment: has had several recent exacerbations     Plan: Spiriva MDI daily, Advair MDI bid, levalbuterol nebs at HS and prn   Has a PRN dose of prednisone available as well.   Pt also is on montelukast, loratadine and prn Mucinex for respiratory symptoms        (S22.080D) Compression fracture of T11 vertebra with routine healing, subsequent encounter  Comment: less back pain now     Plan: acetaminophen 975 mg tid for pain, local measures, follow     (I25.10) Coronary artery disease involving native coronary artery of native heart without angina pectoris  Comment: nonobstructive   Plan: rosuvastatin 10 mg/day for secondary prevention     (R41.89) Cognitive impairment  Comment: low cog scores, decline in functional status   Plan: [place] support for med set up, meals, activity   Pt has requested [facility]  because she would like to return home and prior independence.   Has agreed to repeat cognitive testing with [facility] Occupational Therapy as a possible first step, but suspect she will continue to need some level of support.        Advance directive: pt is DNR/DNI per signed POLST Do Groves MD         Sincerely,        Do Groves MD

## 2022-08-05 NOTE — PROGRESS NOTES
"Genie Persaud is a 86 year old female (goes by Christina) seen July 25, 2022 at [facility] where she has resided for one month (admit 6/2022) seen for initial visit.   Pt is seen in her apartment up to chair, with [facility] nurse present doing lymphedema wraps.     Pt reports \"I feel fine.\"  Denies any current pain, dyspnea or other symptoms.   Her back pain is better.   She is not vaccinated \"My allergist told me not to.\"     Daughter had reported anxiety and agitation, most recently about move to [place] and pt is asking to return to her home.  Pt declined CNS medications.        By chart review, pt has HTN, atrial fib, nonobstructive CAD, CKD3, h/o cardiomyopathy, GERD, asthma, chronic anemia.  She was hospitalized in May 2022 for back and RLE pain.  Lumbar MRI showed diffuse degenerative changes, moderate to severe foraminal stenosis right L4-5 and left L5-S1, no acute fractures.   She was admitted for pain management and hyponatremia 128 with hypokalemia 3.3   She improved and discharged to [facility] TCU but rehospitalized after a few days with acute hypoxic respiratory failure, asthma exacerbation, possible CAP, new atrial fib and SIADH.   Presented with nausea and weakness and Na down to 122    She required BiPAP for pulmonary edema and asthma exacerbation.   In atrial fib with RVR and started on apixaban, given IV diuretics.   Treated empirically with ceftriaxone and azithromycin for possible pneumonia given few patchy opacities LLL on CXR.  Pt returned to TCU, then hospitalized yet again in early June 2022 for CHF exacerbation with dyspnea and LE edema.  Nterminal pro BNP 5652 and hgb 9.0, again treated with IV diuretics and returned to TCU.  After meeting goals with therapies pt discharged to [place]    Past Medical History:   Diagnosis Date     Anemia 03/10/2009    Chronic disease, by Fe studies; awaiting full GI w/u and repeat colonoscopy, ERCP.     Basal Cell Carcinoma--back      Coronary artery disease " 03/09/2017    3/2/2017 - Two vessel coronary artery disease with mLAD 50% stenosis, D3 50% stenosis, pLCx 50% stenosis and mLCx 50% stenosis     Essential hypertension 09/01/2016    White coat hypertension;  24 hour ambulatory monitoring normal. Do not titrate up further.       Hyperlipidemia 02/10/2010     Moderate persistent asthma      Nonrheumatic aortic valve insufficiency 06/29/2017     Osteopenia      Paroxysmal atrial fibrillation 12/26/2017     Pulmonary nodule 03/03/2009    PET scan reportedly normal; biopsy not advised.     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  Problem list name updated by automated process. Provider to review     Vasculitis 04/20/2009    Possible increased activity of thoracic aorta, on PET scan w/u for pulmonary nodule.        Past Surgical History:   Procedure Laterality Date     APPENDECTOMY OPEN  1957     C STEREOTACTIC BREAST BIOPSY  01/01/2001     CHOLECYSTECTOMY, LAPOROSCOPIC  01/01/2008     DILATION AND CURETTAGE  1967     DILATION AND CURETTAGE  1971     ESOPHAGOSCOPY, GASTROSCOPY, DUODENOSCOPY (EGD), COMBINED  05/17/2013    Procedure: COMBINED ESOPHAGOSCOPY, GASTROSCOPY, DUODENOSCOPY (EGD), BIOPSY SINGLE OR MULTIPLE;  ESOPHAGOSCOPY, GASTROSCOPY, DUODENOSCOPY (EGD)  with bx;  Surgeon: Jeffery Yanez MD;  Location:  GI     TONSILLECTOMY       Tubal Ligation NOS  1971     SH: Previously lived in her house alone, states she was driving and volunteering.   Now in [place], has med set-up but no other services.     She has a son Brandon in [place] and a daughter Wilda in [place]   Non smoker           ROS:  SLUMS 19/30   CPT 4.9       Ambulatory with FWW, uses 4WW to go get the mail   Independent with ADLs     Pt states today's weight is 132 lbs.    Pt does her tracking of weights and VS.    Wt  "Readings from Last 5 Encounters:   07/25/22 66.7 kg (147 lb)   07/07/22 64.4 kg (142 lb)   06/27/22 67.6 kg (149 lb)   06/12/22 67 kg (147 lb 12.8 oz)   06/08/22 65.3 kg (144 lb)      EXAM: NAD  /47   Pulse 68   Temp 97.2  F (36.2  C)   Resp 18   Ht 1.549 m (5' 1\")   Wt 66.7 kg (147 lb)   LMP  (LMP Unknown)   SpO2 92%   BMI 27.78 kg/m     Neck supple without adenopathy  Lungs with decreased BS, no rales or wheeze  Heart irreg irreg s1s2 @70     Abd soft, NT, no distention or guarding, +BS  Ext with 2+ edema on left, trace-1+ edema on the right.   Neuro: tracks okay, no focal findings  Psych: affect okay     Last Comprehensive Metabolic Panel:  Sodium   Date Value Ref Range Status   06/28/2022 134 (L) 136 - 145 mmol/L Final     Potassium   Date Value Ref Range Status   06/28/2022 4.1 3.5 - 5.0 mmol/L Final     Carbon Dioxide (CO2)   Date Value Ref Range Status   06/28/2022 26 22 - 31 mmol/L Final     Glucose   Date Value Ref Range Status   06/28/2022 85 70 - 125 mg/dL Final     Urea Nitrogen   Date Value Ref Range Status   06/28/2022 13 8 - 28 mg/dL Final     Creatinine   Date Value Ref Range Status   06/28/2022 0.88 0.60 - 1.10 mg/dL Final     GFR Estimate   Date Value Ref Range Status   06/28/2022 64 >60 mL/min/1.73m2 Final     Calcium   Date Value Ref Range Status   06/28/2022 9.0 8.5 - 10.5 mg/dL Final     Lab Results   Component Value Date    AST 32 05/15/2022      ALBUMIN 2.8 05/15/2022      ALKPHOS 162 05/15/2022     Lab Results   Component Value Date    WBC 6.7 06/28/2022      HGB 9.3 06/28/2022      MCV 89 06/28/2022       06/28/2022     TSH   Date Value Ref Range Status   05/16/2022 1.14 0.40 - 4.00 mU/L Final   06/10/2019 1.94 0.40 - 4.00 mU/L Final      Lab Results   Component Value Date    A1C 6.4 03/21/2022    A1C 6.2 01/20/2021    A1C 6.2 06/10/2019     ECHO 5/15/2022:   The visual ejection fraction is 50-55%.  The right ventricular systolic function is borderline reduced.  The " left atrium is mildly dilated.  There is mild (1+) mitral regurgitation.  There is mild (1+) aortic regurgitation.  The ascending aorta is Mildly dilated.  Moderate left pleural effusion  The rhythm was atrial fibrillation.    Chest CT 6/3/2022    1.  Small left pleural effusion with adjacent compressive atelectasis.  2.  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  3.  Unchanged ascending thoracic aortic aneurysm.  4.  New age-indeterminate T11 compression fracture.       IMP/PLAN:   (I48.19) Persistent atrial fibrillation (H)   Comment:   Pulse Readings from Last 4 Encounters:   07/25/22 68   07/07/22 67   06/27/22 64   06/12/22 85      Plan: diltiazem 120 mg/day for VR control.    Apixaban 5 mg bid for stroke prophylaxis       (I10) Essential hypertension  Comment:   BP Readings from Last 3 Encounters:   07/25/22 101/47   07/07/22 120/53   06/27/22 109/66      Plan: diltiazem as above, and losartan 25 mg/day   Follow bps and BMP       (I50.32) Chronic heart failure with preserved ejection fraction (H)  (R60.0) Bilateral lower extremity edema  Comment: recurrent exacerbations with volume overload, still with LE edema but much better and weight has decreased  Plan: torsemide 20 mg/day with KCl 20 mEq bid   Lymphedema wraps per [facility].      (D64.9) Anemia, unspecified type  Comment: B12 level in March 2022 was 273  Now on anticoagulation  Plan: given persistently low hgb with this borderline low B12 level, would start replacement at 500 mcg/day po   Continue omeprazole, recheck CBC     (J45.40) Moderate persistent asthma without complication  Comment: has had several recent exacerbations     Plan: Spiriva MDI daily, Advair MDI bid, levalbuterol nebs at HS and prn   Has a PRN dose of prednisone available as well.   Pt also is on montelukast, loratadine and prn Mucinex for respiratory symptoms       (S22.080D) Compression fracture of T11 vertebra with routine healing, subsequent  encounter  Comment: less back pain now     Plan: acetaminophen 975 mg tid for pain, local measures, follow     (I25.10) Coronary artery disease involving native coronary artery of native heart without angina pectoris  Comment: nonobstructive   Plan: rosuvastatin 10 mg/day for secondary prevention     (R41.89) Cognitive impairment  Comment: low cog scores, decline in functional status   Plan: [place] support for med set up, meals, activity   Pt has requested [facility]  because she would like to return home and prior independence.   Has agreed to repeat cognitive testing with [facility] Occupational Therapy as a possible first step, but suspect she will continue to need some level of support.        Advance directive: pt is DNR/DNI per signed CHERYL Groves MD

## 2022-08-29 DIAGNOSIS — H40.003 GLAUCOMA SUSPECT, BILATERAL: Primary | ICD-10-CM

## 2022-08-30 RX ORDER — LATANOPROST 50 UG/ML
SOLUTION/ DROPS OPHTHALMIC
Qty: 2.5 ML | Refills: 97 | Status: SHIPPED | OUTPATIENT
Start: 2022-08-30

## 2022-09-05 DIAGNOSIS — I48.91 ATRIAL FIBRILLATION WITH RVR (H): ICD-10-CM

## 2022-09-05 DIAGNOSIS — E78.5 DYSLIPIDEMIA: ICD-10-CM

## 2022-09-05 DIAGNOSIS — I50.31 ACUTE HEART FAILURE WITH PRESERVED EJECTION FRACTION (H): ICD-10-CM

## 2022-09-06 RX ORDER — APIXABAN 5 MG/1
TABLET, FILM COATED ORAL
Qty: 60 TABLET | Refills: 97 | Status: SHIPPED | OUTPATIENT
Start: 2022-09-06 | End: 2023-01-27

## 2022-09-06 RX ORDER — LOSARTAN POTASSIUM 25 MG/1
TABLET ORAL
Qty: 30 TABLET | Refills: 97 | Status: SHIPPED | OUTPATIENT
Start: 2022-09-06 | End: 2022-11-14

## 2022-09-06 RX ORDER — ROSUVASTATIN CALCIUM 10 MG/1
TABLET, COATED ORAL
Qty: 30 TABLET | Refills: 97 | Status: SHIPPED | OUTPATIENT
Start: 2022-09-06 | End: 2022-11-14

## 2022-09-06 RX ORDER — TORSEMIDE 20 MG/1
TABLET ORAL
Qty: 30 TABLET | Refills: 97 | Status: SHIPPED | OUTPATIENT
Start: 2022-09-06 | End: 2022-11-14

## 2022-09-06 RX ORDER — POTASSIUM CHLORIDE 1500 MG/1
TABLET, EXTENDED RELEASE ORAL
Qty: 60 TABLET | Refills: 97 | Status: SHIPPED | OUTPATIENT
Start: 2022-09-06 | End: 2023-01-30

## 2022-09-06 RX ORDER — DILTIAZEM HYDROCHLORIDE 120 MG/1
CAPSULE, EXTENDED RELEASE ORAL
Qty: 30 CAPSULE | Refills: 97 | Status: SHIPPED | OUTPATIENT
Start: 2022-09-06 | End: 2022-11-14

## 2022-09-08 ENCOUNTER — PATIENT OUTREACH (OUTPATIENT)
Dept: GERIATRIC MEDICINE | Facility: CLINIC | Age: 87
End: 2022-09-08

## 2022-09-08 NOTE — PROGRESS NOTES
Opening [facility] Medicare program and updating targets and tasks per [employer] launch.    Jessica Oneal  Care Management Specialist   [facility]   [phone]

## 2022-09-20 DIAGNOSIS — J45.40 MODERATE PERSISTENT ASTHMA WITHOUT COMPLICATION: ICD-10-CM

## 2022-09-20 RX ORDER — FLUTICASONE PROPIONATE AND SALMETEROL XINAFOATE 230; 21 UG/1; UG/1
AEROSOL, METERED RESPIRATORY (INHALATION)
Qty: 12 G | Refills: 97 | Status: SHIPPED | OUTPATIENT
Start: 2022-09-20 | End: 2022-11-14

## 2022-10-15 ENCOUNTER — MEDICAL CORRESPONDENCE (OUTPATIENT)
Dept: HEALTH INFORMATION MANAGEMENT | Facility: CLINIC | Age: 87
End: 2022-10-15

## 2022-10-17 ENCOUNTER — TELEPHONE (OUTPATIENT)
Dept: PEDIATRICS | Facility: CLINIC | Age: 87
End: 2022-10-17

## 2022-10-17 NOTE — TELEPHONE ENCOUNTER
Called and LDVM on secure VM giving verbal approval for requested orders per Provider ok:     Layo Sevilla MD  You; Ea Triage 3 minutes ago (11:59 AM)     DY  OK for all orders.       Home care will fax orders for signature.     Sina OBRIEN RN 10/17/2022 at 12:07 PM

## 2022-10-17 NOTE — TELEPHONE ENCOUNTER
The Home Care/Assisted Living/Nursing Facility is calling regarding an established patient.  Has the patient seen Home Care in the past or is currently residing in Assisted Living or Nursing Facility? Yes.     RAFAEL Lopez calling from [facility] requesting the following orders that are within the Home Care, Assisted Living or Nursing Home Eval and Treatment standing order. Patient has not seen Dr. Sevilla in over a year, routing order request to Dr. Sevilla to advise if following patient for home care orders.     Preferred Call Back Number: [phone], [facility].     Home Care Visits Continuation, Patient Care Assistant (PCA)  and PT/OT/Speech Therapy - Skilled nursing 1x weekly for 5 weeks, home health aide 1x weekly for 5 weeks, and PT/OT evaluation to treat.     Any additional Orders:  Are there any orders requested, not stated above, that are outside of the standing order and must be routed to a licensed practitioner for approval?    No    Writer has verified Requestor will send fax to have orders signed.    Sina OBRIEN RN 10/17/2022 at 8:45 AM

## 2022-10-26 ENCOUNTER — TELEPHONE (OUTPATIENT)
Dept: PEDIATRICS | Facility: CLINIC | Age: 87
End: 2022-10-26

## 2022-10-26 NOTE — TELEPHONE ENCOUNTER
Order/Referral Request    Who is requesting: [facility]    Orders being requested: PT one time a week for three weeks    Reason service is needed/diagnosis: strengthening endurance gate training    When are orders needed by: by 10-31    Has this been discussed with Provider: No    Does patient have a preference on a Group/Provider/Facility? [facility]    Does patient have an appointment scheduled?: No    Where to send orders: verbal    Okay to leave a detailed message?: Yes at Other phone number:  [phone] [facility]

## 2022-11-10 ENCOUNTER — TELEPHONE (OUTPATIENT)
Dept: PEDIATRICS | Facility: CLINIC | Age: 87
End: 2022-11-10

## 2022-11-10 DIAGNOSIS — I10 ESSENTIAL HYPERTENSION WITH GOAL BLOOD PRESSURE LESS THAN 140/90: Primary | ICD-10-CM

## 2022-11-10 NOTE — TELEPHONE ENCOUNTER
Jaqueline from [facility] left a voicemail on PAL direct line.  Callback: [phone].    Requesting verbal orders to continue seeing the patient 1x per week for 5 weeks with 3 prn visits.    Patient has had increased fatigue and weakness lately with shortness of breath.  O2 sat was 93% today during RN visit.  Lung sounds are diminished, with slight wheezing.  Patient has been using the inhaler more often lately.    Called Jaqueline.  Verbal orders accepted.  Jaqueline recommends the patient be evaluated by provider.      Called the patient.    Patient has been having concerns with breathing lately and thinks that asthma could be worsening.   Symptoms started 1 week ago and have been slowly getting worse.     Gets fatigued while doing activity but recovers quickly.   Able to catch her breath at rest.   Able to do normal ADL's but they make her more tired than usual.     Has been using inhalers more than usual lately.    Denies dizziness or lightheadedness.  Denies feeling like fainting.    Advised patient come in for a visit. Patient verbalized that she will not be able to make it to a visit until next week, but would need to call back to schedule after she knows her schedule.    Advised patient go to Urgent Care if symptoms do not improve, or ED for severe symptoms.  Patient verbalized understanding.     Advised Care coordination for transportation resources.  Patient agreed. CC referral placed.     Felipe Zimmerman RN on 11/10/2022 at 12:38 PM

## 2022-11-11 NOTE — TELEPHONE ENCOUNTER
Patient calling to see if Dr. Sevilla has any availability on Monday 11/14/2022 as her daughter will be in town and able to transport her.     Per Chart review Felipe spoke with her yesterday. Felipe is not available.     Messaged Dr. Sevilla requesting approval for 11/14/2022 appointment. Waiting for response. Once response/recommendation is received, will follow up with patient.     RAFAEL Ortiz on 11/11/2022 at 11:56 AM

## 2022-11-11 NOTE — TELEPHONE ENCOUNTER
Called the patient at [phone]   - Informed the patient of Dr. Sevilla's comments/recommendations   - Informed the patient that Dr. Sevilla states that the patient can keep her 11/14/2022 appointment   - Informed the patient of her need to go to Urgent Care or the Emergency Room if her symptoms worsen    - Patient verbalized understanding and agrees with the plan     Argentina CAMPOVERDE RN   Patient Advocate Liaison (PAL)  [facility]

## 2022-11-14 ENCOUNTER — ANCILLARY PROCEDURE (OUTPATIENT)
Dept: GENERAL RADIOLOGY | Facility: CLINIC | Age: 87
End: 2022-11-14
Attending: INTERNAL MEDICINE
Payer: COMMERCIAL

## 2022-11-14 ENCOUNTER — OFFICE VISIT (OUTPATIENT)
Dept: PEDIATRICS | Facility: CLINIC | Age: 87
End: 2022-11-14
Payer: COMMERCIAL

## 2022-11-14 VITALS
SYSTOLIC BLOOD PRESSURE: 135 MMHG | WEIGHT: 123.6 LBS | OXYGEN SATURATION: 94 % | RESPIRATION RATE: 18 BRPM | HEART RATE: 92 BPM | TEMPERATURE: 97.4 F | HEIGHT: 61 IN | DIASTOLIC BLOOD PRESSURE: 40 MMHG | BODY MASS INDEX: 23.33 KG/M2

## 2022-11-14 DIAGNOSIS — R06.02 SOB (SHORTNESS OF BREATH): Primary | ICD-10-CM

## 2022-11-14 DIAGNOSIS — J30.2 SEASONAL ALLERGIC RHINITIS, UNSPECIFIED TRIGGER: ICD-10-CM

## 2022-11-14 DIAGNOSIS — R06.02 SOB (SHORTNESS OF BREATH): ICD-10-CM

## 2022-11-14 DIAGNOSIS — K21.9 GASTROESOPHAGEAL REFLUX DISEASE WITHOUT ESOPHAGITIS: ICD-10-CM

## 2022-11-14 DIAGNOSIS — I48.91 ATRIAL FIBRILLATION WITH RVR (H): ICD-10-CM

## 2022-11-14 DIAGNOSIS — E78.5 DYSLIPIDEMIA: ICD-10-CM

## 2022-11-14 DIAGNOSIS — Z13.220 SCREENING FOR HYPERLIPIDEMIA: ICD-10-CM

## 2022-11-14 DIAGNOSIS — I50.31 ACUTE HEART FAILURE WITH PRESERVED EJECTION FRACTION (H): ICD-10-CM

## 2022-11-14 DIAGNOSIS — I25.10 CORONARY ARTERY DISEASE INVOLVING NATIVE CORONARY ARTERY OF NATIVE HEART WITHOUT ANGINA PECTORIS: ICD-10-CM

## 2022-11-14 DIAGNOSIS — J45.40 MODERATE PERSISTENT ASTHMA WITHOUT COMPLICATION: ICD-10-CM

## 2022-11-14 PROBLEM — R11.0 NAUSEA: Status: RESOLVED | Noted: 2022-05-15 | Resolved: 2022-11-14

## 2022-11-14 PROBLEM — E86.0 DEHYDRATION: Status: RESOLVED | Noted: 2022-05-15 | Resolved: 2022-11-14

## 2022-11-14 PROBLEM — R53.1 GENERALIZED WEAKNESS: Status: RESOLVED | Noted: 2022-05-15 | Resolved: 2022-11-14

## 2022-11-14 PROBLEM — E87.1 HYPONATREMIA: Status: RESOLVED | Noted: 2022-05-15 | Resolved: 2022-11-14

## 2022-11-14 LAB
ALBUMIN SERPL-MCNC: 3.6 G/DL (ref 3.4–5)
ALP SERPL-CCNC: 97 U/L (ref 40–150)
ALT SERPL W P-5'-P-CCNC: 20 U/L (ref 0–50)
ANION GAP SERPL CALCULATED.3IONS-SCNC: 8 MMOL/L (ref 3–14)
AST SERPL W P-5'-P-CCNC: 19 U/L (ref 0–45)
BASOPHILS # BLD AUTO: 0 10E3/UL (ref 0–0.2)
BASOPHILS NFR BLD AUTO: 0 %
BILIRUB SERPL-MCNC: 0.8 MG/DL (ref 0.2–1.3)
BUN SERPL-MCNC: 23 MG/DL (ref 7–30)
CALCIUM SERPL-MCNC: 9.7 MG/DL (ref 8.5–10.1)
CHLORIDE BLD-SCNC: 100 MMOL/L (ref 94–109)
CO2 SERPL-SCNC: 29 MMOL/L (ref 20–32)
CREAT SERPL-MCNC: 1.28 MG/DL (ref 0.52–1.04)
EOSINOPHIL # BLD AUTO: 0.1 10E3/UL (ref 0–0.7)
EOSINOPHIL NFR BLD AUTO: 1 %
ERYTHROCYTE [DISTWIDTH] IN BLOOD BY AUTOMATED COUNT: 15.6 % (ref 10–15)
GFR SERPL CREATININE-BSD FRML MDRD: 41 ML/MIN/1.73M2
GLUCOSE BLD-MCNC: 102 MG/DL (ref 70–99)
HCT VFR BLD AUTO: 38.3 % (ref 35–47)
HGB BLD-MCNC: 12.3 G/DL (ref 11.7–15.7)
LYMPHOCYTES # BLD AUTO: 1.4 10E3/UL (ref 0.8–5.3)
LYMPHOCYTES NFR BLD AUTO: 13 %
MCH RBC QN AUTO: 27.9 PG (ref 26.5–33)
MCHC RBC AUTO-ENTMCNC: 32.1 G/DL (ref 31.5–36.5)
MCV RBC AUTO: 87 FL (ref 78–100)
MONOCYTES # BLD AUTO: 1.1 10E3/UL (ref 0–1.3)
MONOCYTES NFR BLD AUTO: 10 %
NEUTROPHILS # BLD AUTO: 8.4 10E3/UL (ref 1.6–8.3)
NEUTROPHILS NFR BLD AUTO: 77 %
PLATELET # BLD AUTO: 230 10E3/UL (ref 150–450)
POTASSIUM BLD-SCNC: 4.4 MMOL/L (ref 3.4–5.3)
PROT SERPL-MCNC: 6.9 G/DL (ref 6.8–8.8)
RBC # BLD AUTO: 4.41 10E6/UL (ref 3.8–5.2)
SODIUM SERPL-SCNC: 137 MMOL/L (ref 133–144)
TSH SERPL DL<=0.005 MIU/L-ACNC: 1.89 MU/L (ref 0.4–4)
WBC # BLD AUTO: 11 10E3/UL (ref 4–11)

## 2022-11-14 PROCEDURE — 36415 COLL VENOUS BLD VENIPUNCTURE: CPT | Performed by: INTERNAL MEDICINE

## 2022-11-14 PROCEDURE — 80053 COMPREHEN METABOLIC PANEL: CPT | Performed by: INTERNAL MEDICINE

## 2022-11-14 PROCEDURE — 85025 COMPLETE CBC W/AUTO DIFF WBC: CPT | Performed by: INTERNAL MEDICINE

## 2022-11-14 PROCEDURE — 71046 X-RAY EXAM CHEST 2 VIEWS: CPT | Mod: TC | Performed by: RADIOLOGY

## 2022-11-14 PROCEDURE — 99214 OFFICE O/P EST MOD 30 MIN: CPT | Performed by: INTERNAL MEDICINE

## 2022-11-14 PROCEDURE — 84443 ASSAY THYROID STIM HORMONE: CPT | Performed by: INTERNAL MEDICINE

## 2022-11-14 RX ORDER — MONTELUKAST SODIUM 10 MG/1
10 TABLET ORAL AT BEDTIME
Qty: 90 TABLET | Refills: 3 | Status: SHIPPED | OUTPATIENT
Start: 2022-11-14 | End: 2023-08-22

## 2022-11-14 RX ORDER — DILTIAZEM HYDROCHLORIDE 120 MG/1
120 CAPSULE, EXTENDED RELEASE ORAL DAILY
Qty: 90 CAPSULE | Refills: 3 | Status: SHIPPED | OUTPATIENT
Start: 2022-11-14 | End: 2023-08-22

## 2022-11-14 RX ORDER — LOSARTAN POTASSIUM 25 MG/1
25 TABLET ORAL DAILY
Qty: 90 TABLET | Refills: 3 | Status: SHIPPED | OUTPATIENT
Start: 2022-11-14 | End: 2023-08-22

## 2022-11-14 RX ORDER — ROSUVASTATIN CALCIUM 10 MG/1
10 TABLET, COATED ORAL DAILY
Qty: 90 TABLET | Refills: 3 | Status: SHIPPED | OUTPATIENT
Start: 2022-11-14 | End: 2023-08-22

## 2022-11-14 RX ORDER — ALBUTEROL SULFATE 90 UG/1
1-2 AEROSOL, METERED RESPIRATORY (INHALATION) EVERY 6 HOURS PRN
Qty: 18 G | Refills: 11 | Status: SHIPPED | OUTPATIENT
Start: 2022-11-14 | End: 2024-06-17

## 2022-11-14 RX ORDER — OMEPRAZOLE 10 MG/1
10 CAPSULE, DELAYED RELEASE ORAL DAILY
Qty: 90 CAPSULE | Refills: 3 | Status: SHIPPED | OUTPATIENT
Start: 2022-11-14 | End: 2023-08-22

## 2022-11-14 RX ORDER — TORSEMIDE 20 MG/1
20 TABLET ORAL DAILY
Qty: 90 TABLET | Refills: 3 | Status: SHIPPED | OUTPATIENT
Start: 2022-11-14 | End: 2023-08-22

## 2022-11-14 RX ORDER — FLUTICASONE PROPIONATE AND SALMETEROL XINAFOATE 230; 21 UG/1; UG/1
2 AEROSOL, METERED RESPIRATORY (INHALATION) 2 TIMES DAILY
Qty: 12 G | Refills: 11 | Status: SHIPPED | OUTPATIENT
Start: 2022-11-14 | End: 2023-08-22

## 2022-11-14 RX ORDER — MONTELUKAST SODIUM 10 MG/1
10 TABLET ORAL AT BEDTIME
Qty: 90 TABLET | Refills: 3 | Status: SHIPPED | OUTPATIENT
Start: 2022-11-14 | End: 2022-11-14

## 2022-11-14 RX ORDER — LORATADINE 10 MG/1
10 TABLET ORAL DAILY
Qty: 90 TABLET | Refills: 3 | Status: SHIPPED | OUTPATIENT
Start: 2022-11-14 | End: 2023-11-14

## 2022-11-14 ASSESSMENT — ASTHMA QUESTIONNAIRES
QUESTION_4 LAST FOUR WEEKS HOW OFTEN HAVE YOU USED YOUR RESCUE INHALER OR NEBULIZER MEDICATION (SUCH AS ALBUTEROL): ONE OR TWO TIMES PER DAY
QUESTION_3 LAST FOUR WEEKS HOW OFTEN DID YOUR ASTHMA SYMPTOMS (WHEEZING, COUGHING, SHORTNESS OF BREATH, CHEST TIGHTNESS OR PAIN) WAKE YOU UP AT NIGHT OR EARLIER THAN USUAL IN THE MORNING: NOT AT ALL
ACT_TOTALSCORE: 14
QUESTION_2 LAST FOUR WEEKS HOW OFTEN HAVE YOU HAD SHORTNESS OF BREATH: ONCE A DAY
QUESTION_1 LAST FOUR WEEKS HOW MUCH OF THE TIME DID YOUR ASTHMA KEEP YOU FROM GETTING AS MUCH DONE AT WORK, SCHOOL OR AT HOME: SOME OF THE TIME
ACT_TOTALSCORE: 14
QUESTION_5 LAST FOUR WEEKS HOW WOULD YOU RATE YOUR ASTHMA CONTROL: POORLY CONTROLLED

## 2022-11-14 ASSESSMENT — PAIN SCALES - GENERAL: PAINLEVEL: NO PAIN (0)

## 2022-11-14 NOTE — LETTER
[facility] [place]  [address]  [place] [address]                  [phone]   November 15, 2022    Genie Persaud  [address] [place] [address]        Christina,     Your thyroid stimulating hormone (TSH) and complete blood count looked within normal limits.     Your electrolytes, liver, and kidney panels shows some slight drop in your kidney function, possibly from dehdyration.  I would push fluids for a few days to see if this improves.     Let me know if your fatigue and shortness of breath worsens.     Hope you're well.     Layo Sevilla MD   Internal Medicine and Pediatrics         Results for orders placed or performed in visit on 11/14/22   XR Chest 2 Views     Status: None    Narrative    XR CHEST 2 VIEWS   11/14/2022 11:08 AM     HISTORY: SOB (shortness of breath)    COMPARISON: Chest radiograph and CT 6/3/2022      Impression    IMPRESSION: Stable enlargement of the cardiac silhouette. Decreased  volume of now trace left pleural effusion. No new airspace disease or  pneumothorax. Bones are unchanged.    DARIAN ZHONG MD         SYSTEM ID:  [id number]   Results for orders placed or performed in visit on 11/14/22   TSH with free T4 reflex     Status: Normal   Result Value Ref Range    TSH 1.89 0.40 - 4.00 mU/L   Comprehensive metabolic panel (BMP + Alb, Alk Phos, ALT, AST, Total. Bili, TP)     Status: Abnormal   Result Value Ref Range    Sodium 137 133 - 144 mmol/L    Potassium 4.4 3.4 - 5.3 mmol/L    Chloride 100 94 - 109 mmol/L    Carbon Dioxide (CO2) 29 20 - 32 mmol/L    Anion Gap 8 3 - 14 mmol/L    Urea Nitrogen 23 7 - 30 mg/dL    Creatinine 1.28 (H) 0.52 - 1.04 mg/dL    Calcium 9.7 8.5 - 10.1 mg/dL    Glucose 102 (H) 70 - 99 mg/dL    Alkaline Phosphatase 97 40 - 150 U/L    AST 19 0 - 45 U/L    ALT 20 0 - 50 U/L    Protein Total 6.9 6.8 - 8.8 g/dL    Albumin 3.6 3.4 - 5.0 g/dL    Bilirubin Total 0.8 0.2 - 1.3 mg/dL    GFR Estimate 41 (L) >60 mL/min/1.73m2   CBC  with platelets and differential     Status: Abnormal   Result Value Ref Range    WBC Count 11.0 4.0 - 11.0 10e3/uL    RBC Count 4.41 3.80 - 5.20 10e6/uL    Hemoglobin 12.3 11.7 - 15.7 g/dL    Hematocrit 38.3 35.0 - 47.0 %    MCV 87 78 - 100 fL    MCH 27.9 26.5 - 33.0 pg    MCHC 32.1 31.5 - 36.5 g/dL    RDW 15.6 (H) 10.0 - 15.0 %    Platelet Count 230 150 - 450 10e3/uL    % Neutrophils 77 %    % Lymphocytes 13 %    % Monocytes 10 %    % Eosinophils 1 %    % Basophils 0 %    Absolute Neutrophils 8.4 (H) 1.6 - 8.3 10e3/uL    Absolute Lymphocytes 1.4 0.8 - 5.3 10e3/uL    Absolute Monocytes 1.1 0.0 - 1.3 10e3/uL    Absolute Eosinophils 0.1 0.0 - 0.7 10e3/uL    Absolute Basophils 0.0 0.0 - 0.2 10e3/uL   CBC with platelets and differential     Status: Abnormal    Narrative    The following orders were created for panel order CBC with platelets and differential.  Procedure                               Abnormality         Status                     ---------                               -----------         ------                     CBC with platelets and d...[[id number]]  Abnormal            Final result                 Please view results for these tests on the individual orders.

## 2022-11-14 NOTE — PROGRESS NOTES
Patient calling inquiring about montelukast rx. Per chart review, states rx was submitted as local print. Patient does not have rx paperwork. Resending rx to [facility] pharmacy per patient request. Prescription approved per [facility] Refill Protocol.      Sina OBRINE RN 11/14/2022 at 4:40 PM

## 2022-11-14 NOTE — PATIENT INSTRUCTIONS
Let's refill your singulair and resume it.    Labs and chest x-ray today before you leave.    You could be fatigued from overwork or a mild virus or being off the singulair.    Layo Sevilla MD  Internal Medicine and Pediatrics

## 2022-11-14 NOTE — PROGRESS NOTES
Assessment & Plan     Screening for hyperlipidemia      Coronary artery disease involving native coronary artery of native heart without angina pectoris  No signs of angina or congestive heart failure on my exam.  Secondary risk factor modification.     Moderate persistent asthma without complication  No signs of acute exacerbation.  Will perform chest x-ray today to ensure no new findings.  - fluticasone-salmeterol (ADVAIR HFA) 230-21 MCG/ACT inhaler; Inhale 2 puffs into the lungs 2 times daily  - albuterol (PROAIR HFA/PROVENTIL HFA/VENTOLIN HFA) 108 (90 Base) MCG/ACT inhaler; Inhale 1-2 puffs into the lungs every 6 hours as needed for shortness of breath / dyspnea or wheezing  - tiotropium (SPIRIVA RESPIMAT) 2.5 MCG/ACT inhaler; Inhale 2 puffs into the lungs daily    Seasonal allergic rhinitis, unspecified trigger    - montelukast (SINGULAIR) 10 MG tablet; Take 1 tablet (10 mg) by mouth At Bedtime  - loratadine (CLARITIN) 10 MG tablet; Take 1 tablet (10 mg) by mouth daily    Atrial fibrillation with RVR (H)  Rate control and anticoagulation.   - diltiazem ER (DILT-XR) 120 MG 24 hr capsule; Take 1 capsule (120 mg) by mouth daily  - losartan (COZAAR) 25 MG tablet; Take 1 tablet (25 mg) by mouth daily  - TSH with free T4 reflex; Future  - Comprehensive metabolic panel (BMP + Alb, Alk Phos, ALT, AST, Total. Bili, TP); Future  - TSH with free T4 reflex  - Comprehensive metabolic panel (BMP + Alb, Alk Phos, ALT, AST, Total. Bili, TP)    Acute heart failure with preserved ejection fraction (H)    - torsemide (DEMADEX) 20 MG tablet; Take 1 tablet (20 mg) by mouth daily    Gastroesophageal reflux disease without esophagitis    - omeprazole (PRILOSEC) 10 MG DR capsule; Take 1 capsule (10 mg) by mouth daily    Dyslipidemia    - rosuvastatin (CRESTOR) 10 MG tablet; Take 1 tablet (10 mg) by mouth daily    SOB (shortness of breath)  Seems to correlate in time with her cessation of singulair.  May also be her significant  activity over the last few weeks, or from a mild viral illness.   - CBC with platelets and differential; Future  - XR Chest 2 Views; Future  - CBC with platelets and differential                 No follow-ups on file.    Layo Sevilla MD  [facility] MICHAELLE Vasquez is a 87 year old, presenting for the following health issues:  No chief complaint on file.      Home care nurse heard an irreguar heart rate.  Patient has not noted.       Has been having more shortness of breath for last 1 week: not sure if it's allergies or asthma.  Has mice in basement.   Worse if up and walking. No wheezing, no chest pain, and no new cough.      Has taken albuterol about twice daily, usually does not need it at all.      Has been out of singulair about 1 week.    No new edema or water weight.     Has not seen lung doc since    History of Present Illness       Reason for visit:  Shortness of breath   irregular heartbeat  Symptom onset:  3-7 days ago  Symptom intensity:  Moderate  Symptom progression:  Worsening  Had these symptoms before:  Yes  Has tried/received treatment for these symptoms:  Yes  Previous treatment was successful:  Yes  Prior treatment description:  Do nor remember  What makes it worse:  No  What makes it better:  No    She eats 2-3 servings of fruits and vegetables daily.She consumes 2 sweetened beverage(s) daily.She exercises with enough effort to increase her heart rate 9 or less minutes per day.  She exercises with enough effort to increase her heart rate 3 or less days per week. She is missing 3 dose(s) of medications per week.  She is not taking prescribed medications regularly due to other.             Review of Systems   CONSTITUTIONAL: NEGATIVE for fever, chills, change in weight  INTEGUMENTARY/SKIN: NEGATIVE for worrisome rashes, moles or lesions  EYES: NEGATIVE for vision changes or irritation  ENT/MOUTH: NEGATIVE for ear, mouth and throat problems  BREAST: NEGATIVE for masses,  tenderness or discharge  CV: NEGATIVE for chest pain, palpitations or peripheral edema  GI: NEGATIVE for nausea, abdominal pain, heartburn, or change in bowel habits  : NEGATIVE for frequency, dysuria, or hematuria  MUSCULOSKELETAL: NEGATIVE for significant arthralgias or myalgia  NEURO: NEGATIVE for weakness, dizziness or paresthesias  ENDOCRINE: NEGATIVE for temperature intolerance, skin/hair changes  HEME: NEGATIVE for bleeding problems  PSYCHIATRIC: NEGATIVE for changes in mood or affect      Objective    LMP  (LMP Unknown)   There is no height or weight on file to calculate BMI.  Physical Exam   GENERAL: healthy, alert and no distress  EYES: Eyes grossly normal to inspection, PERRL and conjunctivae and sclerae normal  HENT: ear canals and TM's normal, nose and mouth without ulcers or lesions  NECK: no adenopathy, no asymmetry, masses, or scars and thyroid normal to palpation  RESP: lungs clear to auscultation - no rales, rhonchi or wheezes  CV: regular rate and rhythm, normal S1 S2, no S3 or S4, no murmur, click or rub, no peripheral edema and peripheral pulses strong  ABDOMEN: soft, nontender, no hepatosplenomegaly, no masses and bowel sounds normal  MS: no gross musculoskeletal defects noted, no edema  SKIN: no suspicious lesions or rashes  NEURO: Normal strength and tone, mentation intact and speech normal  PSYCH: mentation appears normal, affect normal/bright

## 2022-11-18 ENCOUNTER — TELEPHONE (OUTPATIENT)
Dept: PEDIATRICS | Facility: CLINIC | Age: 87
End: 2022-11-18

## 2022-11-18 NOTE — TELEPHONE ENCOUNTER
Reason for Call:  Other orders     Detailed comments: PT one time a week for 2 weeks starting Nov 28. Balance, endurance, work on home exercise program.     Phone Number Patient can be reached at: [phone]    Best Time: any    Can we leave a detailed message on this number? YES    Call taken on 11/18/2022 at 3:37 PM by Joselin Argueta

## 2022-11-21 NOTE — TELEPHONE ENCOUNTER
----- Message from Jacques Vasques MD sent at 2/5/2020  4:43 PM CST -----  Patient does have a urinary tract or bladder infection, I did fax an antibiotic Keflex, that she can start today.   Per Routing comment from Dr. Sevilla:   Yes.        Called Bella [phone].    Left a detailed message on confidential voicemail with acceptance of verbal orders.     Felipe Zimmerman RN on 11/21/2022 at 1:37 PM     NAUSEA/vomiting

## 2022-11-21 NOTE — TELEPHONE ENCOUNTER
Routing to Dr. Sevilla:  -Ok for below verbal orders?    Felipe Zimmerman RN on 11/21/2022 at 1:17 PM

## 2022-12-12 ENCOUNTER — TELEPHONE (OUTPATIENT)
Dept: PEDIATRICS | Facility: CLINIC | Age: 87
End: 2022-12-12

## 2022-12-12 NOTE — TELEPHONE ENCOUNTER
The Home Care/Assisted Living/Nursing Facility is calling regarding an established patient.  Has the patient seen Home Care in the past or is currently residing in Assisted Living or Nursing Facility? Yes.     Sandy calling from [facility] requesting the following orders that are within the Home Care, Assisted Living or Nursing Home Eval and Treatment standing order and can be signed as standing order signature required by RN.    Preferred Call Back Number: [phone]    Home Care Visits Continuation    Any additional Orders:  Are there any orders requested, not stated above, that are outside of the standing order and must be routed to a licensed practitioner for approval?    No  SNV every other week for 9 weeks with 2 prn's  MSW    Writer has verified Requestor will send fax to have orders signed.    Thank you  Madhuri Espitia RN on 12/12/2022 at 12:58 PM

## 2022-12-14 ENCOUNTER — MEDICAL CORRESPONDENCE (OUTPATIENT)
Dept: HEALTH INFORMATION MANAGEMENT | Facility: CLINIC | Age: 87
End: 2022-12-14

## 2022-12-18 DIAGNOSIS — I48.91 ATRIAL FIBRILLATION WITH RVR (H): ICD-10-CM

## 2022-12-18 DIAGNOSIS — I50.31 ACUTE HEART FAILURE WITH PRESERVED EJECTION FRACTION (H): ICD-10-CM

## 2022-12-19 RX ORDER — POTASSIUM CHLORIDE 1500 MG/1
TABLET, EXTENDED RELEASE ORAL
Qty: 60 TABLET | Refills: 0 | OUTPATIENT
Start: 2022-12-19

## 2022-12-19 RX ORDER — APIXABAN 5 MG/1
TABLET, FILM COATED ORAL
Qty: 60 TABLET | Refills: 0 | OUTPATIENT
Start: 2022-12-19

## 2022-12-19 NOTE — TELEPHONE ENCOUNTER
Patient should have refills on file. Refusing refill request and closing encounter.     Lisa Jaimes RN on 12/19/2022 at 6:44 AM

## 2023-01-27 DIAGNOSIS — J45.40 MODERATE PERSISTENT ASTHMA WITHOUT COMPLICATION: ICD-10-CM

## 2023-01-27 DIAGNOSIS — I50.31 ACUTE HEART FAILURE WITH PRESERVED EJECTION FRACTION (H): ICD-10-CM

## 2023-01-27 DIAGNOSIS — I48.91 ATRIAL FIBRILLATION WITH RVR (H): ICD-10-CM

## 2023-01-27 NOTE — TELEPHONE ENCOUNTER
Received a call from RAFAEL Perea with [facility]   - RAFAEL Perea states that the patient reported that she has not taken her Eliquis medication in more than 2 weeks as she has not been able to fill her prescription   - Confirmed the dose and sig with RAFAEL Perea (Eliquis 5 mg twice daily)   - RAFAEL Perea states that the patient's preferred pharmacy is: [id number] [address] [place] [address]   - Denies any symptoms, RAFAEL Perea states that there are no concerns for a blood clot   - Pended Eliquis 5 mg     ELIQUIS ANTICOAGULANT 5 MG tablet 60 tablet 97 9/6/2022  No   Sig: TAKE 1 TABLET BY MOUTH TWICE DAILY **VIAL FILL**     Dr. Sevilla, please review, advise, and order as appropriate.     Argentina CAMPOVERDE RN   Patient Advocate Liaison (PAL)  [facility]   [phone]

## 2023-01-30 RX ORDER — PREDNISONE 10 MG/1
10 TABLET ORAL DAILY PRN
OUTPATIENT
Start: 2023-01-30

## 2023-01-30 RX ORDER — POTASSIUM CHLORIDE 1500 MG/1
TABLET, EXTENDED RELEASE ORAL
Qty: 60 TABLET | Refills: 97 | Status: SHIPPED | OUTPATIENT
Start: 2023-01-30 | End: 2024-05-16

## 2023-01-30 NOTE — PLAN OF CARE
Chief Complaint  Slow Heart Rate, Congestive Heart Failure, Hypertension, Hyperlipidemia, and VTE    Subjective            Aleta Otero presents to Ouachita County Problem: Patient Care Overview  Goal: Plan of Care/Patient Progress Review  OT: pt declined OT attempts to engage in ADL task in bathroom, pt c/o increase fatigue after just working with PT.       

## 2023-01-30 NOTE — TELEPHONE ENCOUNTER
Patient calling clinic requesting rx for eliquis be sent to [facility] pharmacy. Prescription approved per [facility] Refill Protocol.    Patient also requesting refill for potassium and prednisone. Patient states she takes prednisone as needed for her asthma. Routing to PCP as these prescriptions were previously prescribed by different provider. Please advise.     Sina OBRIEN RN 1/30/2023 at 1:22 PM

## 2023-01-31 NOTE — PLAN OF CARE
Problem: Patient Care Overview  Goal: Plan of Care/Patient Progress Review  Outcome: Improving  PRIMARY DIAGNOSIS: SOFT TISSUE INFECTIONS  OUTPATIENT/OBSERVATION GOALS TO BE MET BEFORE DISCHARGE:  1. Vitals sign stable or return to baseline: Yes      2. Tolerating oral antibiotics or has home infusion set up if applicable: No, receiving IV abx      3. Pain status: Improved-controlled with oral pain medications.      4. Return to near baseline physical activity: No      Discharge Planner Nurse   Safe discharge environment identified: Yes  Barriers to discharge: No    Please review provider order for any additional goals.       Nurse to notify provider when observation goals have been met and patient is ready for discharge.      VSS. Up with SBA and walker. Flexeril and heating pad given for neck pain - also c/o minimal pain R arm and R knee but neck is the worst. RUE with redness and swelling inside marked borders. On IV ancef. Plan for discharge home tomorrow. Will continue to monitor.  
"Problem: Patient Care Overview  Goal: Plan of Care/Patient Progress Review  Outcome: Improving  PRIMARY DIAGNOSIS: SOFT TISSUE INFECTIONS  OUTPATIENT/OBSERVATION GOALS TO BE MET BEFORE DISCHARGE:  1. Vitals sign stable or return to baseline: Yes    2. Tolerating oral antibiotics or has home infusion set up if applicable: Yes    3. Pain status: Improved-controlled with oral pain medications.    4. Return to near baseline physical activity: No    Discharge Planner Nurse   Safe discharge environment identified: Yes  Barriers to discharge: Yes       Entered by: Johnna Dhaliwal 08/01/2018 1:04 PM     Please review provider order for any additional goals.     Nurse to notify provider when observation goals have been met and patient is ready for discharge.    Patient is alert and oriented x4. VS WNL and documented. Lung sounds clear in all lobes. Patient is on RA. (R) hand cellulitis is marked. Erythema/inflammation inside of the marked area.  Patient on Abx's for infection. Patient also complaining of (R) knee pain and neck pain. Pain is being treated with Tylenol. Patient is a SBA with walker. Tolerating fluids and food. Patient able to make needs known by using the call light.    BP (!) 107/32 (BP Location: Left arm)  Temp 99.2  F (37.3  C) (Oral)  Resp 16  Ht 1.575 m (5' 2\")  Wt 65.4 kg (144 lb 3.2 oz)  SpO2 94%  BMI 26.37 kg/m2        "
Problem: Patient Care Overview  Goal: Discharge Needs Assessment  Outcome: Improving  PRIMARY DIAGNOSIS: SOFT TISSUE INFECTIONS  OUTPATIENT/OBSERVATION GOALS TO BE MET BEFORE DISCHARGE:  1. Vitals sign stable or return to baseline: Yes    2. Tolerating oral antibiotics or has home infusion set up if applicable: No    3. Pain status: Improved-controlled with oral pain medications.    4. Return to near baseline physical activity: Yes    Discharge Planner Nurse   Safe discharge environment identified: Yes  Barriers to discharge: Yes, still completing IV abx       Entered by: Bella Weiner 08/02/2018 2:23 AM     Please review provider order for any additional goals.     Nurse to notify provider when observation goals have been met and patient is ready for discharge.    DBP low, pt asymptomatic. Reports improvement to neck pain with addition of ibuprofen. Sleeping between cares. PIV SL, IV abx infusing as ordered. R hand with swelling, redness and warmth, pt reports improvement in function. Bed alarms in use, pt not attempting to exit bed. Alert and oriented, able to make needs known. Continue to monitor.      
Problem: Patient Care Overview  Goal: Discharge Needs Assessment  Outcome: No Change  PRIMARY DIAGNOSIS: SOFT TISSUE INFECTIONS  OUTPATIENT/OBSERVATION GOALS TO BE MET BEFORE DISCHARGE:  1. Vitals sign stable or return to baseline: Yes    2. Tolerating oral antibiotics or has home infusion set up if applicable: No    3. Pain status: No improvement noted. Consider adjustment in pain regimen.    4. Return to near baseline physical activity: No    Discharge Planner Nurse   Safe discharge environment identified: Yes  Barriers to discharge: Yes       Entered by: Bella Weiner 08/01/2018 8:59 PM     Please review provider order for any additional goals.     Nurse to notify provider when observation goals have been met and patient is ready for discharge.    DBP low, pt asymptomatic. Continues to c/o neck pain 10/10 not relieved with tylenol, flexeril and heating pad application, pt requesting ibuprofen. Sleeping, easy to arouse, quickly drifts back to sleep when not conversing. PIV SL. R hand with swelling, redness and warmth, pt reports improvement in function. Drowsy, but arouses and responds correctly. Continue to monitor.      
Problem: Patient Care Overview  Goal: Discharge Needs Assessment  PRIMARY DIAGNOSIS: SOFT TISSUE INFECTIONS  OUTPATIENT/OBSERVATION GOALS TO BE MET BEFORE DISCHARGE:  1. Vitals sign stable or return to baseline: DBP soft, other VS at baseline    2. Tolerating oral antibiotics or has home infusion set up if applicable: No, currently on IV ancef    3. Pain status: Improved-controlled with oral pain medications.    4. Return to near baseline physical activity: Yes    Discharge Planner Nurse   Safe discharge environment identified: Yes  Barriers to discharge: Yes       Entered by: Bella Weiner 08/02/2018 5:44 AM     Please review provider order for any additional goals.     Nurse to notify provider when observation goals have been met and patient is ready for discharge.    DBP soft, other VSS on RA. No additional complaints of pain, resting between cares. PIV SL with scheduled ancef. BLE edema, pt states this is d/t not wearing MATTHEW hose while here. Bed alarms in use, pt not attempting to exit bed. Alert and oriented, able to make needs known. Continue to monitor.      
Problem: Patient Care Overview  Goal: Plan of Care/Patient Progress Review  Outcome: Adequate for Discharge Date Met: 08/02/18  Patient's After Visit Summary was reviewed with patient and/or son.   Patient verbalized understanding of After Visit Summary, recommended follow up and was given an opportunity to ask questions.   Discharge medications sent home with patient/family: YES   Discharged with son    OBSERVATION patient END time: 1325            
Problem: Patient Care Overview  Goal: Plan of Care/Patient Progress Review  Outcome: No Change  PRIMARY DIAGNOSIS: SOFT TISSUE INFECTIONS  OUTPATIENT/OBSERVATION GOALS TO BE MET BEFORE DISCHARGE:  1. Vitals sign stable or return to baseline: Yes - /37 & 133/36; pt reports normally runs in 40's    2. Tolerating oral antibiotics or has home infusion set up if applicable: No; ancef ordered q 8    3. Pain status: Improved-controlled with oral pain medications.    4. Return to near baseline physical activity: Yes    Discharge Planner Nurse   Safe discharge environment identified: Yes  Barriers to discharge: Yes - IV abx       Entered by: Renetta Mckinnon 08/01/2018       VSS. /37 & 133/36 - pt reports normally runs in 40's. Denies lightheadedness. Reported 2/10 pain in R wrist that is worse with movement. Declines need for further intervention. R wrist/hand red and swollen. Area marked in ED. Receiving ancef q 8. Saline locked. Regular diet. Moving with SBA for safety.     Please review provider order for any additional goals.   Nurse to notify provider when observation goals have been met and patient is ready for discharge.      
Problem: Patient Care Overview  Goal: Plan of Care/Patient Progress Review  Outcome: No Change  ROOM # 208-1    Living Situation (if not independent, order SW consult): ind in house  Facility name: n/a  : Brandon (son); [phone]    Activity level at baseline: ind  Activity level on admit: SBA for safety    Patient registered to observation; given Patient Bill of Rights; given the opportunity to ask questions about observation status and their plan of care.  Patient has been oriented to the observation room, bathroom and call light is in place.    Discussed discharge goals and expectations with patient/family.               
Problem: Patient Care Overview  Goal: Plan of Care/Patient Progress Review  PRIMARY DIAGNOSIS: SOFT TISSUE INFECTIONS  OUTPATIENT/OBSERVATION GOALS TO BE MET BEFORE DISCHARGE:  1. Vitals sign stable or return to baseline: Yes    2. Tolerating oral antibiotics or has home infusion set up if applicable: Still getting IV antibiotics    3. Pain status: Improved-controlled with oral pain medications.    4. Return to near baseline physical activity: Yes    Discharge Planner Nurse   Safe discharge environment identified: Yes  Barriers to discharge: No       Entered by: Marcie Huffman 08/02/2018        A&O x 4.  Up with SBA and walker.  Denies pain R wrist.  R wrist warm, edema +1.  No redness beyond outlined area.  Patient feels her wrist has significantly improved since yesterday.  IV antibiotics every 6 hours. Denies any leg pain.  Rates neck pain 5/10.  Improved after ibuprofen.       Please review provider order for any additional goals.     Nurse to notify provider when observation goals have been met and patient is ready for discharge.      
Problem: Patient Care Overview  Goal: Plan of Care/Patient Progress Review  Problem: Patient Care Overview  Goal: Plan of Care/Patient Progress Review  PRIMARY DIAGNOSIS: SOFT TISSUE INFECTIONS  OUTPATIENT/OBSERVATION GOALS TO BE MET BEFORE DISCHARGE:  1. Vitals sign stable or return to baseline: Yes     2. Tolerating oral antibiotics or has home infusion set up if applicable: Yes     3. Pain status: Improved-controlled with oral pain medications.     4. Return to near baseline physical activity: Yes     Discharge Planner Nurse   Safe discharge environment identified: Yes  Barriers to discharge: No       Entered by: Marcie Huffman 08/02/2018      A&O x 4.  Up with SBA and walker.  Denies pain R wrist.  R wrist warm, edema +1.  No redness beyond outlined area.  Patient feels her wrist has significantly improved since yesterday.  Neck pain improved to 3/10.  Patient ready for discharge.        Please review provider order for any additional goals.      Nurse to notify provider when observation goals have been met and patient is ready for discharge.         
32.6

## 2023-02-06 DIAGNOSIS — I48.0 PAROXYSMAL ATRIAL FIBRILLATION (H): Primary | ICD-10-CM

## 2023-02-06 NOTE — TELEPHONE ENCOUNTER
ANTICOAGULATION  STEWARDSHIP    SUBJECTIVE     [facility] is evaluating Genie Persaud's Apixaban (Eliquis) as part of its Anticoagulation Stewardship Program.    Indication: Atrial Fibrillation ([id number] = 6 (CHF, Hypertension, Age >= 75, Vascular- CAD and known ascending aortic aneurysm  and Female).  Current dose per medication list: Apixaban 5 mg BID  On therapy since: 05/2022  Duration of anticoagulation: indefinite  Recent hospitalizations/ED/Office Visits for bleeding/clotting concerns: No  Other bleeding or side effect concerns: No    OBJECTIVE     Age: 87 year old    Wt Readings from Last 2 Encounters:   11/14/22 56.1 kg (123 lb 9.6 oz)   07/25/22 66.7 kg (147 lb)      Lab Results   Component Value Date    CR 1.28 (H) 11/14/2022    CR 0.88 06/28/2022    CR 0.99 06/17/2022     Lab Results   Component Value Date    HGB 12.3 11/14/2022    HGB 12.9 06/28/2021     11/14/2022     06/28/2021     ASSESSMENT/PLAN     A chart review for Direct Oral Anticoagulant (DOAC) Stewardship has been completed for:     Dosing: recommend adjustment to Apixaban 2.5 mg BID for age >= 80 years and weight <= 60 kg (consistent with package insert dosing)    Dose adjusted under [facility] approval    Kendra Torres (pharmacy student)  Anticoagulation Clinic

## 2023-02-07 NOTE — TELEPHONE ENCOUNTER
ANTICOAGULATION  STEWARDSHIP    Spoke with Christina to review advised dosage change for Eliquis.    Education provided: how to take apixaban (Eilquis) safely: take doses as close to every 12 hours as possible; at the same time each day, may split tablets in order to start new dose & finish current supply of medication., may take longer than usual for bleeding to stop and patient may bruise or bleed more easily; any unusual bleeding to their provider.  and notifying provider of surgeries or procedures 1-2 weeks in advance    They verbalized understanding of new Eliquis dose/tablet strength  They were notified Rx for new dosage would be sent to preferred pharmacy    Wilmer Ramirez RN  [facility]

## 2023-02-28 ENCOUNTER — PATIENT OUTREACH (OUTPATIENT)
Dept: PEDIATRICS | Facility: CLINIC | Age: 88
End: 2023-02-28
Payer: COMMERCIAL

## 2023-02-28 NOTE — LETTER
[facility]  [address]   [facility] [address]    Marlon Vasquez,     Thank you for choosing [facility] today for your health care needs.     [facility] is transforming primary care  At [facility], we re dedicated to constantly improve how we serve the health care needs of our patients and communities. We re currently making changes to the way we deliver care.     Changes you ll notice include:    An emphasis on building a relationship with a primary care provider    Access to a PAL (patient advocate and liaison) to help guide you with your care needs    Appointment lengths tailored to your specific needs and greater access to a care team to help you and your provider improve and maintain your health and well-being    Improved online access to your care team    Benefits of a primary care provider  If you don t have a designated primary care provider, we encourage you to get to know our care team online and find a provider you d like to see. Most of our providers have a short video on their online provider page. Visit [email or web address] to explore our providers and locations.    Benefits of having a primary care provider include:      They get to know you - your health history, family history and goals, making it easier to make a health plan together.     You get to know them - making health-related conversations and decisions easier      Primary care doctors help you when you re sick or hurt - but also focus on keeping you healthy with preventive care and screenings.      A doctor who sees you regularly is more likely to notice changes in your health.     You ll be connected to a broad care team who partners with your provider to support you.    Patient Advocate Liaison (PAL)   To help make sure you get the right care, at the right time, we include PALs, or Patient Advocate Liaisons, as part of your care team. Your PAL will be your first line of contact.  They ll advocate for your needs and help you navigate our services, connecting you with care team members and community resources to ensure your care is well coordinated. You ll be introduced to a PAL in an upcoming visit.     Expanded care team access with tailored appointment lengths  Depending on your health care needs, you may have longer or shorter appointments and see additional care team providers - including Medication Therapy Management (MTM) pharmacists, diabetes educators, behavioral health clinicians, or social workers. At times, they may be included in your visit with your provider, or you may see them individually.     Online access to your health care records and care team  [employer] is our online tool that makes it easy to see your health care information and communicate with your care team.     [employer] allows you to:     View your health maintenance plan so you know when you re due for a preventive screening    Send secure messages to your care team    View your health history and visit summaries     Schedule appointments     Complete questionnaires and eCheck-in before appointments      Get care from your provider with an e-visit      View and pay your bill     Sign up at [employer]. Once you have an account, you also can download the mobile olive.     Connecting to fast and convenient care  When you need fast, convenient care - consider one of the following options:       Video Visit: A convenient care option for visiting with your provider out of the comfort of your own home. Most of the things you come to the clinic to address with your provider can now be done virtually through a video. This includes your chronic medication follow up, questions or concerns you may have, and even your annual Medicare Wellness Visit.       Phone Visit: Another convenient option for follow up of common problems that may require a more in-depth discussion with your provider.       E-visit: When you need acute  care quickly, or have a quick question about your medication, an E-visit is completed through [employer] and your provider will respond within one business day.      Argentina CAMPOVERDE RN   Patient Advocate Liaison (PAL)  [facility]   [phone]

## 2023-02-28 NOTE — TELEPHONE ENCOUNTER
SB3 Welcome Letter sent.     Argentina CAMPOVERDE RN   Patient Advocate Liaison (PAL)  [facility]  [phone]

## 2023-04-04 DIAGNOSIS — J45.50 SEVERE PERSISTENT ASTHMA, UNCOMPLICATED (H): Primary | ICD-10-CM

## 2023-04-26 ENCOUNTER — OFFICE VISIT (OUTPATIENT)
Dept: PEDIATRICS | Facility: CLINIC | Age: 88
End: 2023-04-26
Payer: COMMERCIAL

## 2023-04-26 VITALS
WEIGHT: 117.4 LBS | HEIGHT: 61 IN | TEMPERATURE: 97.9 F | RESPIRATION RATE: 18 BRPM | OXYGEN SATURATION: 96 % | HEART RATE: 86 BPM | BODY MASS INDEX: 22.16 KG/M2

## 2023-04-26 DIAGNOSIS — H61.23 IMPACTED CERUMEN OF BOTH EARS: Primary | ICD-10-CM

## 2023-04-26 PROCEDURE — 69209 REMOVE IMPACTED EAR WAX UNI: CPT | Performed by: NURSE PRACTITIONER

## 2023-04-26 ASSESSMENT — ASTHMA QUESTIONNAIRES
QUESTION_3 LAST FOUR WEEKS HOW OFTEN DID YOUR ASTHMA SYMPTOMS (WHEEZING, COUGHING, SHORTNESS OF BREATH, CHEST TIGHTNESS OR PAIN) WAKE YOU UP AT NIGHT OR EARLIER THAN USUAL IN THE MORNING: NOT AT ALL
QUESTION_5 LAST FOUR WEEKS HOW WOULD YOU RATE YOUR ASTHMA CONTROL: COMPLETELY CONTROLLED
QUESTION_4 LAST FOUR WEEKS HOW OFTEN HAVE YOU USED YOUR RESCUE INHALER OR NEBULIZER MEDICATION (SUCH AS ALBUTEROL): TWO OR THREE TIMES PER WEEK
QUESTION_1 LAST FOUR WEEKS HOW MUCH OF THE TIME DID YOUR ASTHMA KEEP YOU FROM GETTING AS MUCH DONE AT WORK, SCHOOL OR AT HOME: NONE OF THE TIME
ACT_TOTALSCORE: 22
ACT_TOTALSCORE: 22
QUESTION_2 LAST FOUR WEEKS HOW OFTEN HAVE YOU HAD SHORTNESS OF BREATH: ONCE OR TWICE A WEEK

## 2023-04-26 ASSESSMENT — PAIN SCALES - GENERAL: PAINLEVEL: NO PAIN (0)

## 2023-04-26 NOTE — PROGRESS NOTES
"  Assessment & Plan     Impacted cerumen of both ears  Tolerated well, return prn.  - NV REMOVAL IMPACTED CERUMEN IRRIGATION/LVG SILVIANO (RN/MA); Standing    There are no Patient Instructions on file for this visit.    Fern Montes NP  [facility] MICHAELLE Vasquez is a 87 year old, presenting for the following health issues:  Ear Problem        4/26/2023     1:19 PM   Additional Questions   Roomed by Molly   Accompanied by none         4/26/2023     1:19 PM   Patient Reported Additional Medications   Patient reports taking the following new medications none     HPI     Concern - right  Ear feels plugged  Onset: 5 days  Description: right ear feels plugged and hearing diminished  Intensity: mild  Progression of Symptoms:  same  Accompanying Signs & Symptoms: none  Previous history of similar problem: past  Precipitating factors:        Worsened by: none  Alleviating factors:        Improved by: none  Therapies tried and outcome: tried OTC ear wax removal drops    Ear plugged  R>L  No hearing aids    Review of Systems         Objective    Pulse 86   Temp 97.9  F (36.6  C) (Tympanic)   Resp 18   Ht 1.549 m (5' 1\")   Wt 53.3 kg (117 lb 6.4 oz)   LMP  (LMP Unknown)   SpO2 96%   BMI 22.18 kg/m    Body mass index is 22.18 kg/m .  Physical Exam   GENERAL: healthy, alert and no distress  HENT: right ear: occluded with wax and left ear: moderate amount of cerumen but not occluding TM                    "

## 2023-04-27 NOTE — PROGRESS NOTES
Patient requesting to go to ER for nausea and vomiting and weakness. Nursing already sent.     Brielle Santizo, CNP  no

## 2023-05-11 ENCOUNTER — OFFICE VISIT (OUTPATIENT)
Dept: PEDIATRICS | Facility: CLINIC | Age: 88
End: 2023-05-11
Payer: COMMERCIAL

## 2023-05-11 VITALS
DIASTOLIC BLOOD PRESSURE: 62 MMHG | TEMPERATURE: 97.6 F | WEIGHT: 117.5 LBS | HEART RATE: 94 BPM | RESPIRATION RATE: 18 BRPM | SYSTOLIC BLOOD PRESSURE: 136 MMHG | OXYGEN SATURATION: 95 % | BODY MASS INDEX: 22.2 KG/M2

## 2023-05-11 DIAGNOSIS — R06.00 DYSPNEA, UNSPECIFIED TYPE: ICD-10-CM

## 2023-05-11 DIAGNOSIS — R60.0 LOWER EXTREMITY EDEMA: Primary | ICD-10-CM

## 2023-05-11 DIAGNOSIS — N18.30 STAGE 3 CHRONIC KIDNEY DISEASE, UNSPECIFIED WHETHER STAGE 3A OR 3B CKD (H): ICD-10-CM

## 2023-05-11 DIAGNOSIS — I48.20 CHRONIC ATRIAL FIBRILLATION (H): ICD-10-CM

## 2023-05-11 DIAGNOSIS — I48.0 PAROXYSMAL ATRIAL FIBRILLATION (H): ICD-10-CM

## 2023-05-11 DIAGNOSIS — I42.8 OTHER CARDIOMYOPATHIES (H): ICD-10-CM

## 2023-05-11 DIAGNOSIS — I10 ESSENTIAL HYPERTENSION WITH GOAL BLOOD PRESSURE LESS THAN 140/90: ICD-10-CM

## 2023-05-11 LAB
ALBUMIN SERPL BCG-MCNC: 3.9 G/DL (ref 3.5–5.2)
ALP SERPL-CCNC: 112 U/L (ref 35–104)
ALT SERPL W P-5'-P-CCNC: 15 U/L (ref 10–35)
ANION GAP SERPL CALCULATED.3IONS-SCNC: 11 MMOL/L (ref 7–15)
AST SERPL W P-5'-P-CCNC: 23 U/L (ref 10–35)
BILIRUB SERPL-MCNC: 0.6 MG/DL
BUN SERPL-MCNC: 25.3 MG/DL (ref 8–23)
CALCIUM SERPL-MCNC: 9.5 MG/DL (ref 8.8–10.2)
CHLORIDE SERPL-SCNC: 103 MMOL/L (ref 98–107)
CREAT SERPL-MCNC: 0.99 MG/DL (ref 0.51–0.95)
DEPRECATED HCO3 PLAS-SCNC: 25 MMOL/L (ref 22–29)
ERYTHROCYTE [DISTWIDTH] IN BLOOD BY AUTOMATED COUNT: 14.5 % (ref 10–15)
GFR SERPL CREATININE-BSD FRML MDRD: 56 ML/MIN/1.73M2
GLUCOSE SERPL-MCNC: 100 MG/DL (ref 70–99)
HCT VFR BLD AUTO: 36.3 % (ref 35–47)
HGB BLD-MCNC: 11.8 G/DL (ref 11.7–15.7)
MCH RBC QN AUTO: 29.2 PG (ref 26.5–33)
MCHC RBC AUTO-ENTMCNC: 32.5 G/DL (ref 31.5–36.5)
MCV RBC AUTO: 90 FL (ref 78–100)
NT-PROBNP SERPL-MCNC: 2701 PG/ML (ref 0–1800)
PLATELET # BLD AUTO: 239 10E3/UL (ref 150–450)
POTASSIUM SERPL-SCNC: 4.1 MMOL/L (ref 3.4–5.3)
PROT SERPL-MCNC: 6.7 G/DL (ref 6.4–8.3)
RBC # BLD AUTO: 4.04 10E6/UL (ref 3.8–5.2)
SODIUM SERPL-SCNC: 139 MMOL/L (ref 136–145)
WBC # BLD AUTO: 8.8 10E3/UL (ref 4–11)

## 2023-05-11 PROCEDURE — 93000 ELECTROCARDIOGRAM COMPLETE: CPT | Performed by: PHYSICIAN ASSISTANT

## 2023-05-11 PROCEDURE — 83880 ASSAY OF NATRIURETIC PEPTIDE: CPT | Performed by: PHYSICIAN ASSISTANT

## 2023-05-11 PROCEDURE — 36415 COLL VENOUS BLD VENIPUNCTURE: CPT | Performed by: PHYSICIAN ASSISTANT

## 2023-05-11 PROCEDURE — 85027 COMPLETE CBC AUTOMATED: CPT | Performed by: PHYSICIAN ASSISTANT

## 2023-05-11 PROCEDURE — 99214 OFFICE O/P EST MOD 30 MIN: CPT | Performed by: PHYSICIAN ASSISTANT

## 2023-05-11 PROCEDURE — 80053 COMPREHEN METABOLIC PANEL: CPT | Performed by: PHYSICIAN ASSISTANT

## 2023-05-11 ASSESSMENT — PAIN SCALES - GENERAL: PAINLEVEL: NO PAIN (0)

## 2023-05-11 NOTE — PROGRESS NOTES
Assessment & Plan     Lower extremity edema    - BNP-N terminal pro; Future  - BNP-N terminal pro    Paroxysmal atrial fibrillation (H)    - EKG 12-lead complete w/read - Clinics  - BNP-N terminal pro; Future  - BNP-N terminal pro    Stage 3 chronic kidney disease, unspecified whether stage 3a or 3b CKD (H)    - Comprehensive metabolic panel (BMP + Alb, Alk Phos, ALT, AST, Total. Bili, TP); Future  - Comprehensive metabolic panel (BMP + Alb, Alk Phos, ALT, AST, Total. Bili, TP)    Essential hypertension with goal blood pressure less than 140/90    - EKG 12-lead complete w/read - Clinics  - Comprehensive metabolic panel (BMP + Alb, Alk Phos, ALT, AST, Total. Bili, TP); Future  - CBC with platelets; Future  - Comprehensive metabolic panel (BMP + Alb, Alk Phos, ALT, AST, Total. Bili, TP)  - CBC with platelets    Other cardiomyopathies (H)    - BNP-N terminal pro; Future  - BNP-N terminal pro    Dyspnea, unspecified type     - BNP-N terminal pro; Future  - BNP-N terminal pro    Chronic Atrial Fibrillation      Patient was seen today for her typical 6 month followup.  She is doing fine and her BP is normal.  Patient's lower leg edema is trace and does not cause nocturnal breathing symptoms, labs are pending.  Patient was noted to be in a fib, but this is chronic for her per her daughters report and she is rate controlled and anticoagulated.  Patient denies any chest pain or symptoms concerning the a fib.  Chart will be sent to patient's primary for review and labs are pending.  Patient to followup with primary in about 6 months, sooner if needed.            BENJY [facility] MICHAELLE Vasquez is a 87 year old, presenting for the following health issues:  Hypertension and RECHECK (Bilateral leg circulation concerns)        5/11/2023     1:43 PM   Additional Questions   Roomed by CELESTE Mukherjee   Accompanied by Daughter Wilda and Self         5/11/2023     1:43 PM   Patient  Reported Additional Medications   Patient reports taking the following new medications none     History of Present Illness       Hypertension: She presents for follow up of hypertension.  She does check blood pressure  regularly outside of the clinic. Outside blood pressures have been over 140/90. She does not follow a low salt diet.     She eats 4 or more servings of fruits and vegetables daily.She consumes 1 sweetened beverage(s) daily.She exercises with enough effort to increase her heart rate 9 or less minutes per day.  She exercises with enough effort to increase her heart rate 7 days per week.   She is taking medications regularly.       Patient is here for her 6 month followup.  She reports that she is doing fine, but she wants to address her legs.  She says that she can feel the circulation in them.  She denies any numbness or tingling and also denies any pain.  She states that she is not having her lymphedema like she has had in the past, but says she can feel the circulation in them.        Review of Systems   Constitutional, HEENT, cardiovascular, pulmonary, gi and gu systems are negative, except as otherwise noted.      Objective    BP (!) 157/60   Pulse 94   Temp 97.6  F (36.4  C) (Oral)   Resp 18   Wt 53.3 kg (117 lb 8 oz)   LMP  (LMP Unknown)   SpO2 95%   BMI 22.20 kg/m    Body mass index is 22.2 kg/m .  Physical Exam   GENERAL: healthy, alert and no distress  EYES: Eyes grossly normal to inspection, PERRL and conjunctivae and sclerae normal  RESP: lungs clear to auscultation - no rales, rhonchi or wheezes  CV: regular rate and rhythm, normal S1 S2, no S3 or S4, no murmur, click or rub, no peripheral edema and peripheral pulses strong  MS: no gross musculoskeletal defects noted, Trace bilateral lower leg edema noted.  No skin or color changes and normal sensation.    SKIN: no suspicious lesions or rashes  PSYCH: mentation appears normal, affect normal/bright    Labs - pending    Brielle  BENJY Barba

## 2023-05-11 NOTE — Clinical Note
For your review Dr. Sevilla.  Patient appears stable.  Labs are pending.  Of note, in A fib on EKG today, but this appears to be chronic and she denies any chest pain or symptoms with this.

## 2023-05-16 NOTE — RESULT ENCOUNTER NOTE
Note to staff: Please call the patient to explain results.    The results from your recent lab work overall appear stable to labs in the past.  The heart failure number is up a bit, but you don't seem to have significant symptoms.  Please watch your salt intake and followup with Dr. Sevilla in about 3 months.  Please be seen sooner if needed.       Thank you for choosing [facility] for your health care needs,      Brielle Barba PA-C

## 2023-06-13 ENCOUNTER — TELEPHONE (OUTPATIENT)
Dept: PEDIATRICS | Facility: CLINIC | Age: 88
End: 2023-06-13
Payer: COMMERCIAL

## 2023-06-13 NOTE — TELEPHONE ENCOUNTER
"Received a voicemail from the patient today (6/13/2023) at 10:45 AM   - Patient states that she has been in A Fib and is experiencing poor circulation in her legs   - Patient wondering if there is anything that she can do to help   - Patient requesting a call back at [phone]     Upon chart review:   - Patient was seen by Brielle Barba PA-C on 5/11/2023 for Lower extremity edema, Paroxysmal atrial fibrillation (H), Stage 3 chronic kidney disease, unspecified whether stage 3a or 3b CKD (H), Essential hypertension with goal blood pressure less than 140/90, Other cardiomyopathies (H), Dyspnea, unspecified type, and Chronic Atrial Fibrillation   - 5/11/2023 Office Visit note states, \"Patient's lower leg edema is trace and does not cause nocturnal breathing symptoms, labs are pending. Patient was noted to be in a fib, but this is chronic for her per her daughters report and she is rate controlled and anticoagulated. Patient denies any chest pain or symptoms concerning the a fib.\"   - 5/11/2023 Result Note states, \"The results from your recent lab work overall appear stable to labs in the past.  The heart failure number is up a bit, but you don't seem to have significant symptoms.  Please watch your salt intake and followup with Dr. Sevilla in about 3 months.  Please be seen sooner if needed.\"   - Patient is scheduled for an upcoming appointment with Dr. Sevilla on 8/22/2023     Component      Latest Ref Rng 5/11/2023  3:02 PM   Sodium      136 - 145 mmol/L 139    Potassium      3.4 - 5.3 mmol/L 4.1    Chloride      98 - 107 mmol/L 103    Carbon Dioxide (CO2)      22 - 29 mmol/L 25    Anion Gap      7 - 15 mmol/L 11    Urea Nitrogen      8.0 - 23.0 mg/dL 25.3 (H)    Creatinine      0.51 - 0.95 mg/dL 0.99 (H)    Calcium      8.8 - 10.2 mg/dL 9.5    Glucose      70 - 99 mg/dL 100 (H)    Alkaline Phosphatase      35 - 104 U/L 112 (H)    AST      10 - 35 U/L 23    ALT      10 - 35 U/L 15    Protein Total      6.4 - 8.3 g/dL " "6.7    Albumin      3.5 - 5.2 g/dL 3.9    Bilirubin Total      <=1.2 mg/dL 0.6    GFR Estimate      >60 mL/min/1.73m2 56 (L)    WBC      4.0 - 11.0 10e3/uL 8.8    RBC Count      3.80 - 5.20 10e6/uL 4.04    Hemoglobin      11.7 - 15.7 g/dL 11.8    Hematocrit      35.0 - 47.0 % 36.3    MCV      78 - 100 fL 90    MCH      26.5 - 33.0 pg 29.2    MCHC      31.5 - 36.5 g/dL 32.5    RDW      10.0 - 15.0 % 14.5    Platelet Count      150 - 450 10e3/uL 239    N-Terminal Pro Bnp      0 - 1,800 pg/mL 2,701 (H)      Appointments in Next Year    Aug 22, 2023  1:30 PM  (Arrive by 1:10 PM)  Provider Visit with [facility] MD  [facility] ([facility] ) [phone]     Called the patient back at [phone]  - Patient states that she was in clinic about one month ago and states that she is the same as she was at that time   - Patient states that nothing has changed since her last appointment and she ius wondering what her next steps should be   - Patient states that she has been watching her salt intake, but reports that this has been difficult as she enjoys salty foods   - Patient states that she elevates her legs twice daily and wears compression stockings   - Patient denies pain, numbness, tingling, states that it feels \"weird\"   - Patient denies swelling in her lower extremities   - Patient reports that the circulation in her lower extremities \"feels off\"   - Patient reports no changes in her ambulation, states that she lives independently and can complete her activities of daily living, and can walk up and down the stairs   - Patient denies denies dizziness, chest pain, shortness of breath, or headache     apixaban ANTICOAGULANT (ELIQUIS) 2.5 MG tablet 60 tablet 10 2/13/2023  --   Sig - Route: Take 1 tablet (2.5 mg) by mouth 2 times daily - Oral     Routing to the covering provider to review and advise.   - Patient wondering if she has to wait out this sensation that she is " experiencing in her legs, or if there is something that can be done to alleviate this sensation that she is experiencing.     Argentina CAMPOVERDE RN   Patient Advocate Liaison (PAL)  [facility]   [phone]

## 2023-06-14 NOTE — TELEPHONE ENCOUNTER
"Per routing comments: \"Please tell patient we can get her on Dr. Sevilla's schedule to be seen on June 29th in his approval spot.  If this works for her, please help her get scheduled.  If she has nay chest pains or shortness of breath or worsened symptoms, she should seek ER care sooner if needed.     Thank you,     Brielle Barba PA-C\"    I called patient:  -the only symptoms is \"weird feeling I the legs\"  -onset: 5 weeks, the sensation in the legs stays the same, does not worsen; no changes since last appointment last month  -she can \"get around just fine\"  -denies swelling in the legs, wearing compression stockings and elevates her legs twice a day for an hour. States she watches her salt intake.  -legs do not hurt, they are not stiff/swollen and they do not tingle  -she does not feel off-balance, no falls, uses a walker outside of the house, sometimes to go to the bathroom at night  -she sleeps very well, no RLS  -able to do all ADL without difficulties. Does not get SOB, has stairs at her home and can go up and down without difficulty  -denies CP, HA, dizziness or weakness. Denies palpitations, or chest pressure.  -no tingling or numbness in the legs, no neuropathy in the legs, no mela horses, no restless legs  -she has asthma, but this is controlled, no flares    -ekg was done at last visit and she was in afib-denies cardiac symptoms, but aware that she needs to go to the ER with symptoms.  -reporting BP wnl, checks on her own at home  -electrolytes were WNL on check one month ago-BNP was on HF range but patient asymptomatic  -she is well hydrated  -she does not think that the compression stockings are compressing too strong, does know not to wear them at night, takes them off    She has not tried pain creams( there is no pain in the legs), or massage    Offered appointment sooner than scheduled, patient declined for now. She is doing ok, advised patient to call with symptom change and she agreed.  She will " call and discuss with a nurse.  She will go to the ER with CP, SOB, weakness.  She agreed.    Aurora Wolf RN

## 2023-08-22 ENCOUNTER — OFFICE VISIT (OUTPATIENT)
Dept: PEDIATRICS | Facility: CLINIC | Age: 88
End: 2023-08-22
Payer: COMMERCIAL

## 2023-08-22 VITALS
RESPIRATION RATE: 18 BRPM | SYSTOLIC BLOOD PRESSURE: 140 MMHG | OXYGEN SATURATION: 98 % | HEIGHT: 61 IN | HEART RATE: 96 BPM | BODY MASS INDEX: 21.71 KG/M2 | DIASTOLIC BLOOD PRESSURE: 62 MMHG | TEMPERATURE: 97.9 F | WEIGHT: 115 LBS

## 2023-08-22 DIAGNOSIS — J45.40 MODERATE PERSISTENT ASTHMA WITHOUT COMPLICATION: ICD-10-CM

## 2023-08-22 DIAGNOSIS — J30.2 SEASONAL ALLERGIC RHINITIS, UNSPECIFIED TRIGGER: ICD-10-CM

## 2023-08-22 DIAGNOSIS — R06.02 SOB (SHORTNESS OF BREATH): ICD-10-CM

## 2023-08-22 DIAGNOSIS — R53.81 PHYSICAL DECONDITIONING: ICD-10-CM

## 2023-08-22 DIAGNOSIS — K21.9 GASTROESOPHAGEAL REFLUX DISEASE WITHOUT ESOPHAGITIS: ICD-10-CM

## 2023-08-22 DIAGNOSIS — Z00.00 ENCOUNTER FOR MEDICARE ANNUAL WELLNESS EXAM: Primary | ICD-10-CM

## 2023-08-22 DIAGNOSIS — I48.91 ATRIAL FIBRILLATION WITH RVR (H): ICD-10-CM

## 2023-08-22 DIAGNOSIS — N18.30 STAGE 3 CHRONIC KIDNEY DISEASE, UNSPECIFIED WHETHER STAGE 3A OR 3B CKD (H): ICD-10-CM

## 2023-08-22 DIAGNOSIS — I25.10 CORONARY ARTERY DISEASE INVOLVING NATIVE CORONARY ARTERY OF NATIVE HEART WITHOUT ANGINA PECTORIS: ICD-10-CM

## 2023-08-22 DIAGNOSIS — M85.89 OSTEOPENIA OF MULTIPLE SITES: ICD-10-CM

## 2023-08-22 DIAGNOSIS — Z23 NEED FOR SHINGLES VACCINE: ICD-10-CM

## 2023-08-22 DIAGNOSIS — I71.20 THORACIC AORTIC ANEURYSM WITHOUT RUPTURE, UNSPECIFIED PART (H): ICD-10-CM

## 2023-08-22 DIAGNOSIS — I50.31 ACUTE HEART FAILURE WITH PRESERVED EJECTION FRACTION (H): ICD-10-CM

## 2023-08-22 DIAGNOSIS — H90.5 SENSORINEURAL HEARING LOSS (SNHL), UNSPECIFIED LATERALITY: ICD-10-CM

## 2023-08-22 DIAGNOSIS — E78.5 DYSLIPIDEMIA: ICD-10-CM

## 2023-08-22 PROBLEM — I50.9 ACUTE CONGESTIVE HEART FAILURE, UNSPECIFIED HEART FAILURE TYPE (H): Status: RESOLVED | Noted: 2022-06-03 | Resolved: 2023-08-22

## 2023-08-22 PROCEDURE — G0439 PPPS, SUBSEQ VISIT: HCPCS | Performed by: INTERNAL MEDICINE

## 2023-08-22 PROCEDURE — 99214 OFFICE O/P EST MOD 30 MIN: CPT | Mod: 25 | Performed by: INTERNAL MEDICINE

## 2023-08-22 RX ORDER — FLUTICASONE PROPIONATE AND SALMETEROL XINAFOATE 230; 21 UG/1; UG/1
2 AEROSOL, METERED RESPIRATORY (INHALATION) 2 TIMES DAILY
Qty: 12 G | Refills: 11 | Status: SHIPPED | OUTPATIENT
Start: 2023-08-22

## 2023-08-22 RX ORDER — OMEPRAZOLE 10 MG/1
10 CAPSULE, DELAYED RELEASE ORAL DAILY
Qty: 90 CAPSULE | Refills: 3 | Status: SHIPPED | OUTPATIENT
Start: 2023-08-22

## 2023-08-22 RX ORDER — DILTIAZEM HYDROCHLORIDE 120 MG/1
120 CAPSULE, EXTENDED RELEASE ORAL DAILY
Qty: 90 CAPSULE | Refills: 3 | Status: SHIPPED | OUTPATIENT
Start: 2023-08-22 | End: 2023-12-06

## 2023-08-22 RX ORDER — MONTELUKAST SODIUM 10 MG/1
10 TABLET ORAL AT BEDTIME
Qty: 90 TABLET | Refills: 3 | Status: SHIPPED | OUTPATIENT
Start: 2023-08-22 | End: 2024-10-01

## 2023-08-22 RX ORDER — ROSUVASTATIN CALCIUM 10 MG/1
10 TABLET, COATED ORAL DAILY
Qty: 90 TABLET | Refills: 3 | Status: SHIPPED | OUTPATIENT
Start: 2023-08-22 | End: 2024-10-01

## 2023-08-22 RX ORDER — LOSARTAN POTASSIUM 25 MG/1
25 TABLET ORAL DAILY
Qty: 90 TABLET | Refills: 3 | Status: SHIPPED | OUTPATIENT
Start: 2023-08-22 | End: 2023-12-06

## 2023-08-22 RX ORDER — TORSEMIDE 20 MG/1
20 TABLET ORAL DAILY
Qty: 90 TABLET | Refills: 3 | Status: ON HOLD | OUTPATIENT
Start: 2023-08-22 | End: 2024-05-18

## 2023-08-22 SDOH — ECONOMIC STABILITY: INCOME INSECURITY: IN THE LAST 12 MONTHS, WAS THERE A TIME WHEN YOU WERE NOT ABLE TO PAY THE MORTGAGE OR RENT ON TIME?: NO

## 2023-08-22 SDOH — HEALTH STABILITY: PHYSICAL HEALTH: ON AVERAGE, HOW MANY MINUTES DO YOU ENGAGE IN EXERCISE AT THIS LEVEL?: 50 MIN

## 2023-08-22 SDOH — ECONOMIC STABILITY: INCOME INSECURITY: HOW HARD IS IT FOR YOU TO PAY FOR THE VERY BASICS LIKE FOOD, HOUSING, MEDICAL CARE, AND HEATING?: NOT HARD AT ALL

## 2023-08-22 SDOH — ECONOMIC STABILITY: TRANSPORTATION INSECURITY
IN THE PAST 12 MONTHS, HAS LACK OF TRANSPORTATION KEPT YOU FROM MEETINGS, WORK, OR FROM GETTING THINGS NEEDED FOR DAILY LIVING?: YES

## 2023-08-22 SDOH — ECONOMIC STABILITY: TRANSPORTATION INSECURITY
IN THE PAST 12 MONTHS, HAS THE LACK OF TRANSPORTATION KEPT YOU FROM MEDICAL APPOINTMENTS OR FROM GETTING MEDICATIONS?: YES

## 2023-08-22 SDOH — ECONOMIC STABILITY: FOOD INSECURITY: WITHIN THE PAST 12 MONTHS, THE FOOD YOU BOUGHT JUST DIDN'T LAST AND YOU DIDN'T HAVE MONEY TO GET MORE.: NEVER TRUE

## 2023-08-22 SDOH — HEALTH STABILITY: PHYSICAL HEALTH: ON AVERAGE, HOW MANY DAYS PER WEEK DO YOU ENGAGE IN MODERATE TO STRENUOUS EXERCISE (LIKE A BRISK WALK)?: 7 DAYS

## 2023-08-22 SDOH — ECONOMIC STABILITY: FOOD INSECURITY: WITHIN THE PAST 12 MONTHS, YOU WORRIED THAT YOUR FOOD WOULD RUN OUT BEFORE YOU GOT MONEY TO BUY MORE.: NEVER TRUE

## 2023-08-22 ASSESSMENT — SOCIAL DETERMINANTS OF HEALTH (SDOH)
HOW OFTEN DO YOU GET TOGETHER WITH FRIENDS OR RELATIVES?: MORE THAN THREE TIMES A WEEK
IN A TYPICAL WEEK, HOW MANY TIMES DO YOU TALK ON THE PHONE WITH FAMILY, FRIENDS, OR NEIGHBORS?: MORE THAN THREE TIMES A WEEK
HOW OFTEN DO YOU ATTEND CHURCH OR RELIGIOUS SERVICES?: MORE THAN 4 TIMES PER YEAR
DO YOU BELONG TO ANY CLUBS OR ORGANIZATIONS SUCH AS CHURCH GROUPS UNIONS, FRATERNAL OR ATHLETIC GROUPS, OR SCHOOL GROUPS?: NO

## 2023-08-22 ASSESSMENT — LIFESTYLE VARIABLES
HOW MANY STANDARD DRINKS CONTAINING ALCOHOL DO YOU HAVE ON A TYPICAL DAY: 1 OR 2
HOW OFTEN DO YOU HAVE SIX OR MORE DRINKS ON ONE OCCASION: NEVER
HOW OFTEN DO YOU HAVE A DRINK CONTAINING ALCOHOL: MONTHLY OR LESS
SKIP TO QUESTIONS 9-10: 1
AUDIT-C TOTAL SCORE: 1

## 2023-08-22 ASSESSMENT — PAIN SCALES - GENERAL: PAINLEVEL: NO PAIN (0)

## 2023-08-22 NOTE — PATIENT INSTRUCTIONS
Let's begin Ensure daily, at least 1 per day.    No shots today.    Follow-up with audiologist.    I'm placing a referral for home physical therapy and occupational therapy.      They will call you for a DEXA scan (bone density test for osteoporosis)     They will call for an echocardiogram.    Layo Sevilla MD  Internal Medicine and Pediatrics         Patient Education   Personalized Prevention Plan  You are due for the preventive services outlined below.  Your care team is available to assist you in scheduling these services.  If you have already completed any of these items, please share that information with your care team to update in your medical record.  Health Maintenance Due   Topic Date Due    Heart Failure Action Plan  Never done    COVID-19 Vaccine (1) Never done    Zoster (Shingles) Vaccine (1 of 2) Never done    Annual Wellness Visit  11/07/2020    Osteoporosis Screening  07/13/2021    Asthma Action Plan - yearly  01/12/2022    ANNUAL REVIEW OF HM ORDERS  08/05/2022    Cholesterol Lab  08/26/2022    Kidney Microalbumin Urine Test  03/21/2023

## 2023-08-22 NOTE — PROGRESS NOTES
SUBJECTIVE:   Christina is a 87 year old who presents for Preventive Visit.      8/22/2023     1:28 PM   Additional Questions   Roomed by CELESTE Mukherjee   Accompanied by Daughter         8/22/2023     1:28 PM   Patient Reported Additional Medications   Patient reports taking the following new medications n/a       Are you in the first 12 months of your Medicare coverage?  No    History of Present Illness     Asthma:  She presents for follow up of asthma.  She has no cough, no wheezing, and some shortness of breath.  She is using a relief medication daily. She does not miss any doses of her controller medication throughout the week. Patient is aware of the following triggers: cold air and exercise or sports. The patient has not had a visit to the Emergency Room, Urgent Care or Hospital due to asthma since the last clinic visit.     Vascular Disease:  She presents for follow up of vascular disease.     She never takes nitroglycerin. She is not taking daily aspirin.    She eats 2-3 servings of fruits and vegetables daily.She consumes 1 sweetened beverage(s) daily.She exercises with enough effort to increase her heart rate 9 or less minutes per day.  She exercises with enough effort to increase her heart rate 3 or less days per week.   She is taking medications regularly.    Pressure in chest when lies down; has to get up and sit up alleviate.  Elevates legs and this helps with lower extremity edema.    Does feel nauseates when is lying flat.    Using walker around house; not getting out much.  Only when daughter is here.      Takes her a while in the AM to do exercises and get ready for the day.     Has been more forgetful.    Does talk with people on the phone.  Still reads.  Watches TV.  Plays cards with daughter, but not other people.      Hearing is an issue.    Eyes are okay; new glasses, but still uses the old ones.     No fainting spells or passing out.  Making good urine.      Have you ever done Advance Care Planning?  (For example, a Health Directive, POLST, or a discussion with a medical provider or your loved ones about your wishes): Yes, advance care planning is on file.      Fall risk  Fallen 2 or more times in the past year?: No  Any fall with injury in the past year?: No    Cognitive Screening   1) Repeat 3 items (Leader, Season, Table)    2) Clock draw: ABNORMAL hands are off   3) 3 item recall: Recalls 1 object   Results: ABNORMAL clock, 1-2 items recalled: PROBABLE COGNITIVE IMPAIRMENT, **INFORM PROVIDER**    Mini-CogTM Copyright NIXON Sykes. Licensed by the author for use in [facility]; reprinted with permission (somegan@[facility]). All rights reserved.      Do you have sleep apnea, excessive snoring or daytime drowsiness? : no    Reviewed and updated as needed this visit by clinical staff   Tobacco  Allergies  Meds  Problems  Med Hx  Surg Hx           Reviewed and updated as needed this visit by Provider                 Social History     Tobacco Use    Smoking status: Never    Smokeless tobacco: Never   Substance Use Topics    Alcohol use: Yes     Alcohol/week: 1.0 - 2.0 standard drink of alcohol     Types: 1 - 2 Standard drinks or equivalent per week     Comment: 1 glass of wine 1-2 days per week              No data to display                   No data to display              Do you have a current opioid prescription? No  Do you use any other controlled substances or medications that are not prescribed by a provider? None          Current providers sharing in care for this patient include:   Patient Care Team:  Layo Sevilla MD as PCP - General (Internal Medicine - Pediatrics)  Layo Sevilla MD as Assigned PCP  Faby Conner, RAFAEL as   Viral Metz MD as MD (Pulmonary Disease)  Delroy Samson MD as MD (Pulmonary Disease)  Brandi Amezquita as Case Management Specialist  Reina Mason [facility] as Assigned [facility] Pharmacist  Argentina Ramirez, RN as Personal Advocate & Liaison (PAL)  Germain  MD Do as MD (Internal Medicine)  Sloane Zelaya, Christine STARR, RN as Lead Care Coordinator (Primary Care - CC)    The following health maintenance items are reviewed in Epic and correct as of today:  Health Maintenance   Topic Date Due    HF ACTION PLAN  Never done    COVID-19 Vaccine (1) Never done    ZOSTER IMMUNIZATION (1 of 2) Never done    MEDICARE ANNUAL WELLNESS VISIT  11/07/2020    DEXA  07/13/2021    ASTHMA ACTION PLAN  01/12/2022    ANNUAL REVIEW OF HM ORDERS  08/05/2022    LIPID  08/26/2022    MICROALBUMIN  03/21/2023    ASTHMA CONTROL TEST  10/26/2023    BMP  11/11/2023    ALT  05/11/2024    CMP  05/11/2024    CBC  05/11/2024    HEMOGLOBIN  05/11/2024    FALL RISK ASSESSMENT  08/22/2024    ADVANCE CARE PLANNING  06/27/2027    DTAP/TDAP/TD IMMUNIZATION (3 - Td or Tdap) 10/06/2027    TSH W/FREE T4 REFLEX  Completed    PHQ-2 (once per calendar year)  Completed    Pneumococcal Vaccine: 65+ Years  Completed    URINALYSIS  Completed    IPV IMMUNIZATION  Aged Out    MENINGITIS IMMUNIZATION  Aged Out    INFLUENZA VACCINE  Discontinued           Mammogram Screening - Patient over age 75, has elected to discontinue screenings.  Pertinent mammograms are reviewed under the imaging tab.    Review of Systems  CONSTITUTIONAL: NEGATIVE for fever, chills, change in weight  INTEGUMENTARY/SKIN: NEGATIVE for worrisome rashes, moles or lesions  EYES: NEGATIVE for vision changes or irritation  ENT/MOUTH: NEGATIVE for ear, mouth and throat problems  RESP: NEGATIVE for significant cough or SOB  BREAST: NEGATIVE for masses, tenderness or discharge  CV: NEGATIVE for chest pain, palpitations or peripheral edema  GI: NEGATIVE for nausea, abdominal pain, heartburn, or change in bowel habits  : NEGATIVE for frequency, dysuria, or hematuria  MUSCULOSKELETAL: NEGATIVE for significant arthralgias or myalgia  NEURO: NEGATIVE for weakness, dizziness or paresthesias  ENDOCRINE: NEGATIVE for temperature intolerance, skin/hair  "changes  HEME: NEGATIVE for bleeding problems  PSYCHIATRIC: NEGATIVE for changes in mood or affect    OBJECTIVE:   BP (!) 173/82   Pulse 96   Temp 97.9  F (36.6  C) (Tympanic)   Resp 18   Ht 1.537 m (5' 0.5\")   Wt 52.2 kg (115 lb)   LMP  (LMP Unknown)   SpO2 98%   BMI 22.09 kg/m   Estimated body mass index is 22.09 kg/m  as calculated from the following:    Height as of this encounter: 1.537 m (5' 0.5\").    Weight as of this encounter: 52.2 kg (115 lb).  Physical Exam  GENERAL: healthy, alert and no distress  EYES: Eyes grossly normal to inspection, PERRL and conjunctivae and sclerae normal  HENT: ear canals and TM's normal, nose and mouth without ulcers or lesions  NECK: no adenopathy, no asymmetry, masses, or scars and thyroid normal to palpation  RESP: lungs clear to auscultation - no rales, rhonchi or wheezes  CV: regular rate and rhythm, normal S1 S2, no S3 or S4, no murmur, click or rub, no peripheral edema and peripheral pulses strong  ABDOMEN: soft, nontender, no hepatosplenomegaly, no masses and bowel sounds normal  MS: no gross musculoskeletal defects noted, no edema  SKIN: no suspicious lesions or rashes  NEURO: Normal strength and tone, mentation intact and speech normal  PSYCH: mentation appears normal, affect normal/bright    Diagnostic Test Results:  Labs reviewed in Epic    ASSESSMENT / PLAN:   (Z00.00) Encounter for Medicare annual wellness exam  (primary encounter diagnosis)  Comment:   Plan: Discussed diet, exercise, breast self exam, blood pressure, cholesterol, calcium supplementation and osteoporosis, and need for cancer surveillance at appropriate ages.     (Z23) Need for shingles vaccine  Comment:   Plan: refuses;    (M85.89) Osteopenia of multiple sites  Comment:   Plan: DEXA HIP/PELVIS/SPINE - Future, OFFICE/OUTPT         VISIT,ESTYESENIA IV        Due for follow up DEXA scan (bone density test for osteoporosis).     (I25.10) Coronary artery disease involving native coronary artery of " "native heart without angina pectoris  Comment:   Plan: OFFICE/OUTPT VISIT,EST,LEVL IV        Secondary risk factor modification.     (N18.30) Stage 3 chronic kidney disease, unspecified whether stage 3a or 3b CKD (H)  Comment:   Plan: Albumin Random Urine Quantitative with Creat         Ratio, OFFICE/OUTPT VISIT,EST,LEVL IV        Stable creatinine in May.     (E78.5) Dyslipidemia  Comment:   Plan: rosuvastatin (CRESTOR) 10 MG tablet,         OFFICE/OUTPT VISIT,EST,LEVL IV        Had stopped this.  Resume.     (I71.20) Thoracic aortic aneurysm without rupture, unspecified part (H)  Comment:   Plan: Echocardiogram Complete        Follow up echocardiogram this year.     (R53.81) Physical deconditioning  Comment:   Plan: Home Care Referral        Begin ensure and physical therapy.     (H90.5) Sensorineural hearing loss (SNHL), unspecified laterality  Comment:   Plan: Adult Audiology  Referral,         OFFICE/OUTPT VISIT,EST,LEVL IV        Referred for evaluation; may need hearing ais.     (R06.02) SOB (shortness of breath)  Comment:   Plan: Echocardiogram Complete, OFFICE/OUTPT         VISIT,EST,LEVL IV        Platypnea symptoms may be from her asthma; echocardiogram has been stable thru years, and No evidence of congestive heart failure on my exam, despite her heart failure test (\"BNP\") not ruling it out back in May.     (I48.91) Atrial fibrillation with RVR (H)  Comment:   Plan: diltiazem ER (DILT-XR) 120 MG 24 hr capsule,         losartan (COZAAR) 25 MG tablet, OFFICE/OUTPT         VISIT,EST,LEVL IV        Rate control and anticoagulation.    (J45.40) Moderate persistent asthma without complication  Comment:   Plan: fluticasone-salmeterol (ADVAIR HFA) 230-21         MCG/ACT inhaler        Stable long acting beta agonist and inhaled corticosteroid dosing.     (J30.2) Seasonal allergic rhinitis, unspecified trigger  Comment:   Plan: montelukast (SINGULAIR) 10 MG tablet            (K21.9) Gastroesophageal " reflux disease without esophagitis  Comment:   Plan: omeprazole (PRILOSEC) 10 MG DR capsule            (I50.31) Acute heart failure with preserved ejection fraction (H)  Comment:   Plan: torsemide (DEMADEX) 20 MG tablet, OFFICE/OUTPT         VISIT,EST,LEVL IV        No signs of fluid overload today.     Patient has been advised of split billing requirements and indicates understanding: Yes      COUNSELING:  Reviewed preventive health counseling, as reflected in patient instructions       Regular exercise       Healthy diet/nutrition       Vision screening       Hearing screening       Alcohol Use        Folic Acid        She reports that she has never smoked. She has never used smokeless tobacco.      Appropriate preventive services were discussed with this patient, including applicable screening as appropriate for cardiovascular disease, diabetes, osteopenia/osteoporosis, and glaucoma.  As appropriate for age/gender, discussed screening for colorectal cancer, prostate cancer, breast cancer, and cervical cancer. Checklist reviewing preventive services available has been given to the patient.    Reviewed patients plan of care and provided an AVS. The Basic Care Plan (routine screening as documented in Health Maintenance) for Genie meets the Care Plan requirement. This Care Plan has been established and reviewed with the Patient.          Lay[facility] Sevilla MD  [facility]    Identified Health Risks:  I have reviewed Opioid Use Disorder and Substance Use Disorder risk factors and made any needed referrals.

## 2023-08-24 ENCOUNTER — TELEPHONE (OUTPATIENT)
Dept: PEDIATRICS | Facility: CLINIC | Age: 88
End: 2023-08-24
Payer: COMMERCIAL

## 2023-08-24 NOTE — TELEPHONE ENCOUNTER
Jie Christopher with [facility] at [phone]   - Provided the verbal approval for a delay of start of care until 8/30/2023 on behalf of Dr. Di Christopher verbalized understanding and accepted the verbal orders     Argentina CAMPOVERDE RN   Patient Advocate Liaison (PAL)  [facility]   [phone]

## 2023-08-24 NOTE — TELEPHONE ENCOUNTER
From: Ronaldo Ibarra   Sent: 8/23/2023  12:27 PM CDT   To: Layo Sevilla MD   Subject: Home Health Referral                             Jo,   This is Bernabe LPN with [facility]. We received a home health referral for Christina, that we are unable to take due to capacity.   Any questions please call our local office at [phone]     Layo Sevilla MD P Ea Sb3/5 Pal C (Rn)  Can we try for another home care agency for her?  Layo Sevilla MD  Internal Medicine and Pediatrics    Called [facility] at [phone] and spoke with Candi in intake    - Candi states that they take the patient's insurance  - Candi states that the referral can be faxed to [facility] at [phone]  - Candi states that they will review the referral and then call the writer back to inform if they are able to accept the patient for services at this time      - Faxed the home care referral to [facility] at [phone]   - Awaiting a call back at this time     Argentina CAMPOVERDE RN   Patient Advocate Liaison (PAL)  [facility]   [phone]

## 2023-08-24 NOTE — TELEPHONE ENCOUNTER
Received a voicemail from Candi with [facility] today (8/24/2023) at 1:02 PM   - Candi states that they reviewed the home care referral and are able to accept the patient for home care services   - Candi requesting a delay of start of care order for 8/30/2023   - Candi's call back number: [phone]    Home Care is calling regarding an established patient with [facility].        8/24/2023     1:20 PM   Home Care Information   Home Care agency [facility]     Requesting orders from: Layo Sevilla  Provider is following patient: Yes  Is this a 60-day recertification request?  No    Orders Requested    Physical Therapy  Request for delay in care, service is not able to be provided within same scheduled day.   Delay of start of care until 8/30/2023.     Information was gathered and will be sent to provider for review.  RN will contact Home Care with information after provider review.  Confirmed ok to leave a detailed message with call back.  Contact information confirmed and updated as needed.    Dr. Sevilla, please review and provide approval for the home care delay of start of care until 8/30/2023 if appropriate.     Argentina Ramirez RN

## 2023-08-31 ENCOUNTER — TELEPHONE (OUTPATIENT)
Dept: PEDIATRICS | Facility: CLINIC | Age: 88
End: 2023-08-31
Payer: COMMERCIAL

## 2023-08-31 ENCOUNTER — MEDICAL CORRESPONDENCE (OUTPATIENT)
Dept: HEALTH INFORMATION MANAGEMENT | Facility: CLINIC | Age: 88
End: 2023-08-31

## 2023-08-31 NOTE — TELEPHONE ENCOUNTER
Called BENNIE Acosta with [facility] and left a detailed message   - Provided verbal orders for Physical Therapy with a frequency of of 2 times a week for 1 week and then 1 time a week for 6 weeks   - Provided PAL RN direct line to call back if needed     Argentina CAMPOVERDE RN   Patient Advocate Liaison (PAL)  [facility]   [phone]

## 2023-08-31 NOTE — TELEPHONE ENCOUNTER
Home Care is calling regarding an established patient with  [facility].        8/31/2023     8:51 AM 8/24/2023     1:20 PM   Home Care Information    Name/Phone Number Dave PT; [phone]    Home Care agency  [facility]     Requesting orders from: Layo Sevilla  Provider is following patient: Yes  Is this a 60-day recertification request?  No    Orders Requested    Physical Therapy  Request for initial certification (first set of orders)   Frequency:  2x/wk for 1 wks  then 1x/wk for 6 wks    Information was gathered and will be sent to provider for review.  RN will contact Home Care with information after provider review.  Confirmed ok to leave a detailed message with call back.  Contact information confirmed and updated as needed.    Dr. Sevilla, please review and provide approval as appropriate.     Argentina CAMPOVERDE RN   Patient Advocate Liaison (PAL)  [facility]   [phone]

## 2023-09-01 ENCOUNTER — TELEPHONE (OUTPATIENT)
Dept: PEDIATRICS | Facility: CLINIC | Age: 88
End: 2023-09-01
Payer: COMMERCIAL

## 2023-09-05 ENCOUNTER — DOCUMENTATION ONLY (OUTPATIENT)
Dept: PEDIATRICS | Facility: CLINIC | Age: 88
End: 2023-09-05
Payer: COMMERCIAL

## 2023-09-05 DIAGNOSIS — Z53.9 DIAGNOSIS NOT YET DEFINED: Primary | ICD-10-CM

## 2023-09-05 PROCEDURE — G0180 MD CERTIFICATION HHA PATIENT: HCPCS | Performed by: INTERNAL MEDICINE

## 2023-09-05 NOTE — PROGRESS NOTES
From received    Christina is a 87 year old  female  Form received via: Fax  Form now resides in: Provider Ready    Yaz Mckeon MA      Form has been completed by provider.     Form sent out via: Fax to [facility] at Fax Number: [phone]  Output date: September 5, 2023    Yaz Mckeon MA      **Please close the encounter**

## 2023-09-06 ENCOUNTER — PATIENT OUTREACH (OUTPATIENT)
Dept: PEDIATRICS | Facility: CLINIC | Age: 88
End: 2023-09-06
Payer: COMMERCIAL

## 2023-09-06 NOTE — TELEPHONE ENCOUNTER
Patient Quality Outreach Health Maintenance - PAL RN    Summary:    PAL RN contacted pt regarding overdue health maintenance    Patient is due/failing the following:   Diabetes -  Microalbumin  Asthma  -  AAP      Topic Date Due    COVID-19 Vaccine (1) Never done    Zoster (Shingles) Vaccine (1 of 2) Never done       Health Maintenance Due   Topic Date Due    HF ACTION PLAN  Never done    COVID-19 Vaccine (1) Never done    ZOSTER IMMUNIZATION (1 of 2) Never done    DEXA  07/13/2021    ASTHMA ACTION PLAN  01/12/2022    ANNUAL REVIEW OF HM ORDERS  08/05/2022    LIPID  08/26/2022    MICROALBUMIN  03/21/2023       Type of outreach:    Chart review performed, no outreach needed. Patient has an upcoming appointment scheduled with Dr. Sevilla.     Appointments in Next Year      Feb 22, 2024  1:30 PM  (Arrive by 1:10 PM)  Provider Visit with Layo Sevilla MD  [facility] ([facility] ) [phone]     - Advised patient if any questions or concerns comes up to call the PAL RN.   - Patient given opportunity to ask questions and at this time there is nothing further needed.     Questions for provider review:    None    Argentina CAMPOVERDE RN   Patient Advocate Liaison (PAL)  [facility]   [phone]

## 2023-09-14 ENCOUNTER — TELEPHONE (OUTPATIENT)
Dept: PEDIATRICS | Facility: CLINIC | Age: 88
End: 2023-09-14
Payer: COMMERCIAL

## 2023-09-14 NOTE — TELEPHONE ENCOUNTER
Received a call from BENNIE Acosta with [facility] requesting verbal orders for home care OT   - BENNIE Acosta's call back number: [phone]     Home Care is calling regarding an established patient with [facility]         8/31/2023     8:51 AM 8/24/2023     1:20 PM   Home Care Information    Name/Phone Number BENNIE Acosta; [phone]    Home Care agency  [facility]     Requesting orders from: Layo Sevilla  Provider is following patient: Yes  Is this a 60-day recertification request?  No    Orders Requested    Occupational Therapy  Request for discontinuation of care   Goals have not been met/not progressing.  Barriers to care:  Patient refusing OT services.         Information was gathered and will be sent to provider for review.  RN will contact Home Care with information after provider review.  Confirmed ok to leave a detailed message with call back.  Contact information confirmed and updated as needed.    Dr. Sevilla, please review and approve the discharge from OT as appropriate.     Argentina CAMPOVERDE RN   Patient Advocate Liaison (PAL)  [facility]   [phone]

## 2023-09-14 NOTE — TELEPHONE ENCOUNTER
Called BENNIE Acosta with [facility] at [phone]   - Provided BENNIE Acosta the verbal orders for discontinuation of Occupational Therapy   - BENNIE Acosta accepted the verbal orders     Argentina CAMPOVERDE RN   Patient Advocate Liaison (PAL)  [facility]   [phone]

## 2023-09-15 DIAGNOSIS — Z53.9 DIAGNOSIS NOT YET DEFINED: Primary | ICD-10-CM

## 2023-09-15 PROCEDURE — G0180 MD CERTIFICATION HHA PATIENT: HCPCS | Performed by: INTERNAL MEDICINE

## 2023-09-18 DIAGNOSIS — Z53.9 DIAGNOSIS NOT YET DEFINED: Primary | ICD-10-CM

## 2023-09-18 PROCEDURE — G0180 MD CERTIFICATION HHA PATIENT: HCPCS | Performed by: INTERNAL MEDICINE

## 2023-09-26 ENCOUNTER — TELEPHONE (OUTPATIENT)
Dept: PEDIATRICS | Facility: CLINIC | Age: 88
End: 2023-09-26
Payer: COMMERCIAL

## 2023-09-26 NOTE — TELEPHONE ENCOUNTER
Received a call from BENNIE Acosta with [facility]     Home Care is calling regarding an established patient with  [facility].        8/31/2023     8:51 AM 8/24/2023     1:20 PM   Home Care Information    Name/Phone Number BENNIE Acosta; [phone]    Home Care agency  [facility]     Requesting orders from: Layo Sevilla  Provider is following patient: Yes  Is this a 60-day recertification request?  No    Orders Requested    Physical Therapy  Request for continuation of care with increase in frequency  Frequency:  One time a week for 5 weeks to address gait, strength, balance, and endurance.       Verbal orders given.  Home Care will send orders for provider to sign.  Confirmed ok to leave a detailed message with call back.  Contact information confirmed and updated as needed.    Argentina Ramirez RN

## 2023-10-07 NOTE — TELEPHONE ENCOUNTER
Provider at bedside   Was seen last week (8/7/17) and asthma was okay, advised to follow up if worsening. Does not have time to come in for a visit due to having MOHS surgery this week and facial plastic surgery for BasalCC.  Seeing another doctor and can ask for Prednisone there, unless Dr. Sevilla will reconsider sending Rx.     Appointment with Dr. Sevilla 9/7/17    Janina Szymanski MA   August 14, 2017,  5:21 PM

## 2023-10-26 ENCOUNTER — TELEPHONE (OUTPATIENT)
Dept: PEDIATRICS | Facility: CLINIC | Age: 88
End: 2023-10-26
Payer: COMMERCIAL

## 2023-10-26 NOTE — TELEPHONE ENCOUNTER
Received a call from BENNIE Acosta with [facility].     Home Care is calling regarding an established patient with  [facility].        8/31/2023     8:51 AM 8/24/2023     1:20 PM   Home Care Information    Name/Phone Number BENNIE Acosta; [phone]    Home Care agency  [facility]     Requesting orders from: Layo Sevilla  Provider is following patient: Yes  Is this a 60-day recertification request?  Yes    Orders Requested    Physical Therapy  Request for recertification   Frequency:  1x/wk for 4 wks to address gait, strength and balance     Information was gathered and will be sent to provider for review.  RN will contact Home Care with information after provider review.  Confirmed ok to leave a detailed message with call back.  Contact information confirmed and updated as needed.    Dr. Sevilla, please review and approve the home care orders as appropriate.     Argentina CAMPOVERDE RN   Patient Advocate Liaison (PAL)  [facility]   [phone]

## 2023-10-26 NOTE — TELEPHONE ENCOUNTER
Called BENNIE Acosta with [facility] at [phone]   - Provided the verbal orders for Physical Therapy 1x/wk for 4 wks to address gait, strength and balance on behalf of Dr. Sevilla   - BENNIE Acosta accepted the verbal orders     Argentina CAMPOVERDE RN   Patient Advocate Liaison (PAL)  [facility]   [phone]

## 2023-10-29 ENCOUNTER — MEDICAL CORRESPONDENCE (OUTPATIENT)
Dept: HEALTH INFORMATION MANAGEMENT | Facility: CLINIC | Age: 88
End: 2023-10-29

## 2023-11-02 DIAGNOSIS — Z53.9 DIAGNOSIS NOT YET DEFINED: Primary | ICD-10-CM

## 2023-11-02 PROCEDURE — 99207 PR MD RECERTIFICATION HHA PT: CPT | Performed by: INTERNAL MEDICINE

## 2023-11-02 PROCEDURE — G0179 MD RECERTIFICATION HHA PT: HCPCS | Performed by: INTERNAL MEDICINE

## 2023-11-14 DIAGNOSIS — J30.2 SEASONAL ALLERGIC RHINITIS, UNSPECIFIED TRIGGER: ICD-10-CM

## 2023-11-14 RX ORDER — LORATADINE 10 MG/1
10 TABLET ORAL DAILY
Qty: 90 TABLET | Refills: 2 | Status: SHIPPED | OUTPATIENT
Start: 2023-11-14 | End: 2024-02-29

## 2023-11-15 ENCOUNTER — TELEPHONE (OUTPATIENT)
Dept: PEDIATRICS | Facility: CLINIC | Age: 88
End: 2023-11-15
Payer: COMMERCIAL

## 2023-11-15 NOTE — TELEPHONE ENCOUNTER
FYI - Status Update    Who is Calling: nurseCandi with [facility]    Update: Patient has met her goal and home care will be discontinued next week.     Does caller want a call/response back: No only with any questions or concerns

## 2023-11-16 NOTE — TELEPHONE ENCOUNTER
Called Candi Care Coordinator with [facility] at [phone]   - Provided the verbal orders for discontinuation of home care next week as the patient has met goals   - Candi accepted the home care order     Home Care is calling regarding an established patient with  [facility].        8/31/2023     8:51 AM 8/24/2023     1:20 PM   Home Care Information    Name/Phone Number Dave, PT; [phone]    Home Care agency  [facility]     Requesting orders from: Layo Sevilla  Provider is following patient: Yes  Is this a 60-day recertification request?  No    Orders Requested    Physical Therapy  Request for discontinuation of care   Goals have been met/progressing.      Verbal orders given.  Home Care will send orders for provider to sign.  Confirmed ok to leave a detailed message with call back.  Contact information confirmed and updated as needed.    Argentina Ramirez RN

## 2023-11-20 ENCOUNTER — HOSPITAL ENCOUNTER (OUTPATIENT)
Dept: CARDIOLOGY | Facility: CLINIC | Age: 88
Discharge: HOME | End: 2023-11-20
Attending: INTERNAL MEDICINE | Admitting: INTERNAL MEDICINE
Payer: COMMERCIAL

## 2023-11-20 DIAGNOSIS — I50.22 CHRONIC SYSTOLIC HEART FAILURE (H): Primary | ICD-10-CM

## 2023-11-20 DIAGNOSIS — R06.02 SOB (SHORTNESS OF BREATH): ICD-10-CM

## 2023-11-20 DIAGNOSIS — I71.20 THORACIC AORTIC ANEURYSM WITHOUT RUPTURE, UNSPECIFIED PART (H): ICD-10-CM

## 2023-11-20 LAB — BI-PLANE LVEF ECHO: NORMAL

## 2023-11-20 PROCEDURE — 93306 TTE W/DOPPLER COMPLETE: CPT | Mod: 26 | Performed by: INTERNAL MEDICINE

## 2023-11-20 PROCEDURE — 93306 TTE W/DOPPLER COMPLETE: CPT

## 2023-12-06 ENCOUNTER — OFFICE VISIT (OUTPATIENT)
Dept: CARDIOLOGY | Facility: CLINIC | Age: 88
End: 2023-12-06
Payer: COMMERCIAL

## 2023-12-06 VITALS
WEIGHT: 114.3 LBS | SYSTOLIC BLOOD PRESSURE: 168 MMHG | HEIGHT: 61 IN | DIASTOLIC BLOOD PRESSURE: 56 MMHG | OXYGEN SATURATION: 93 % | HEART RATE: 91 BPM | BODY MASS INDEX: 21.58 KG/M2

## 2023-12-06 DIAGNOSIS — I50.22 CHRONIC SYSTOLIC HEART FAILURE (H): ICD-10-CM

## 2023-12-06 DIAGNOSIS — E78.5 HYPERLIPIDEMIA LDL GOAL <130: Primary | ICD-10-CM

## 2023-12-06 DIAGNOSIS — I48.91 ATRIAL FIBRILLATION WITH RVR (H): ICD-10-CM

## 2023-12-06 PROCEDURE — 93000 ELECTROCARDIOGRAM COMPLETE: CPT | Performed by: INTERNAL MEDICINE

## 2023-12-06 PROCEDURE — 99214 OFFICE O/P EST MOD 30 MIN: CPT | Performed by: INTERNAL MEDICINE

## 2023-12-06 RX ORDER — METOPROLOL SUCCINATE 50 MG/1
50 TABLET, EXTENDED RELEASE ORAL DAILY
Qty: 90 TABLET | Refills: 3 | Status: ON HOLD | OUTPATIENT
Start: 2023-12-06 | End: 2024-05-18

## 2023-12-06 RX ORDER — LOSARTAN POTASSIUM 100 MG/1
100 TABLET ORAL DAILY
Qty: 90 TABLET | Refills: 3 | Status: ON HOLD | OUTPATIENT
Start: 2023-12-06 | End: 2024-05-18

## 2023-12-06 NOTE — PATIENT INSTRUCTIONS
Stop Diltiazem  Start Metoprolol succinate 50 mg daily  Increase Losartan to 100 mg daily  Get heart monitor and follow up in 2 month with cardiology clinic with repeat echocardiogram

## 2023-12-06 NOTE — LETTER
12/6/2023    Layo Sevilla MD  [address] [place] [address]    RE: Genie MAURILIO Persaud       Dear Colleague,     I had the pleasure of seeing Genie MAURILIO Persaud in the [facility].  CARDIOLOGY CLINIC CONSULTATION    PRIMARY CARE PHYSICIAN:  Layo Sevilla    PRIMARY CARDIOLOGIST: Dr. Hernandez    HISTORY OF PRESENT ILLNESS:  This is a very pleasant 88-year-old female who is a patient of Dr. Peguero.  Patient was last seen a few years ago.  Today she is seen in cardiology clinic because of abnormal echocardiography findings.  Patient has complex medical issues but I am going to summarize the pertinent medical issues below    History of paroxysmal atrial fibrillation on age and weight appropriate anticoagulation with Eliquis 2.5 twice daily  History of nonobstructive coronary artery disease in the past angiogram 2017  History of cardiomyopathy in the past in 2017 related to sepsis and atrial fibrillation EF normalized thereafter in 2018  Moderate aortic insufficiency with mild-moderate ascending aortic root dilatation  Labile hypertension    The patient has not followed up in the cardiology clinic since 2020.  She recently had an echocardiogram that showed EF of 43% and mild to moderate aortic insufficiency.  The patient currently is on Eliquis and 120 mg of diltiazem.  She is on 25 mg of losartan.  Blood pressure significantly elevated.  Given these findings patient was referred to cardiology.    Patient denies any typical cardiovascular symptoms.  She walks with a walker.  She says her shortness of breath is from asthma.  She says she cannot complete walking a block.  Denies syncope presyncope.  Denies any bleeding problems.    In regards to pertinent data, her potassium creatinine are normal.  Hemoglobin is low normal at 12.  ECG from today shows atrial fibrillation with rapid ventricular response.  Echocardiography shows normal RV size and function and LVEF around 45%.  Small LV cavity.   However LV wall thickness is normal.  I have personally reviewed the images.  There is likely moderate aortic insufficiency.  There is moderate pulmonary hypertension but IVC is normal.    PAST MEDICAL HISTORY:  Past Medical History:   Diagnosis Date    Anemia 03/10/2009    Chronic disease, by Fe studies; awaiting full GI w/u and repeat colonoscopy, ERCP.    Basal Cell Carcinoma--back     Coronary artery disease 03/09/2017    3/2/2017 - Two vessel coronary artery disease with mLAD 50% stenosis, D3 50% stenosis, pLCx 50% stenosis and mLCx 50% stenosis    Essential hypertension 09/01/2016    White coat hypertension;  24 hour ambulatory monitoring normal. Do not titrate up further.      Hyperlipidemia 02/10/2010    Moderate persistent asthma     Nonrheumatic aortic valve insufficiency 06/29/2017    Osteopenia     Paroxysmal atrial fibrillation 12/26/2017    Pulmonary nodule 03/03/2009    PET scan reportedly normal; biopsy not advised.    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  Problem list name updated by automated process. Provider to review    Vasculitis 04/20/2009    Possible increased activity of thoracic aorta, on PET scan w/u for pulmonary nodule.        MEDICATIONS:  Current Outpatient Medications   Medication    acetaminophen (TYLENOL) 325 MG tablet    albuterol (PROAIR HFA/PROVENTIL HFA/VENTOLIN HFA) 108 (90 Base) MCG/ACT inhaler    apixaban ANTICOAGULANT (ELIQUIS) 2.5 MG tablet    calcium citrate-vitamin D (CITRACAL) 315-200 MG-UNIT TABS per tablet    fluticasone-salmeterol (ADVAIR HFA) 230-21 MCG/ACT inhaler    latanoprost (XALATAN) 0.005 % ophthalmic solution    losartan (COZAAR) 100 MG tablet    metoprolol succinate ER (TOPROL XL) 50 MG 24 hr tablet    montelukast (SINGULAIR) 10 MG tablet    omeprazole (PRILOSEC) 10 MG   capsule    potassium chloride ER (KLOR-CON M) 20 MEQ CR tablet    predniSONE (DELTASONE) 10 MG tablet    rosuvastatin (CRESTOR) 10 MG tablet    torsemide (DEMADEX) 20 MG tablet    loratadine (CLARITIN) 10 MG tablet     No current facility-administered medications for this visit.       SOCIAL HISTORY:  I have reviewed this patient's social history and updated it with pertinent information if needed. Genie Persaud  reports that she has never smoked. She has never used smokeless tobacco. She reports current alcohol use of about 1.0 - 2.0 standard drink of alcohol per week. She reports that she does not use drugs.    PHYSICAL EXAM:  Pulse:  [91] 91  BP: (168)/(56) 168/56  SpO2:  [93 %] 93 %  114 lbs 4.8 oz    Constitutional: alert, no distress  Respiratory: Good bilateral air entry  Cardiovascular: Irregular heart sounds soft systolic murmur trace edema  GI: nondistended  Neuropsychiatric: appropriate affact    ASSESSMENT: Pertinent issues addressed/ reviewed during this cardiology visit  Atrial fibrillation with RVR persistent  Chronic anticoagulation for above  Heart failure with reduced ejection fraction likely tachycardia induced cardiomyopathy  Uncontrolled hypertension  Moderate pulmonary hypertension    RECOMMENDATIONS:  The patient does not have any significant symptoms from a cardiac standpoint however she has evidence of LV dysfunction on echocardiography.  At this time she appears compensated on clinical exam from a volume status standpoint.  I recommend medical therapy for LV dysfunction at this time with better rate control for her atrial fibrillation.  Stop diltiazem and start metoprolol succinate 50 mg daily.  Increase losartan from 25 mg daily up to 100 mg daily.  Continue current dose of diuretics.  Get a ZIO monitor for 7 days.  This should be placed after a week or 2 of this medication changes as above.  Follow-up in 1 to 2 months with [facility] with an echocardiogram to see if her EF is responding to  better rate control.  If persistent LV dysfunction despite rate control, consider getting a Lexiscan nuclear stress test to evaluate for ischemia.  A few years ago she has known mild-moderate coronary artery disease.  Rhythm control can be discussed with the patient if willing given her age.  Patient is quite independent and lives in her own house at this time.  Denies any falls.  If required, would suggest initiating amiodarone for a month and then cardioverting electively to maintain normal rhythm if EF does not improve.  Lastly continue anticoagulation for her A-fib.  She denies any bleeding problems or falls.  If this changes in the future Watchman device can be considered.    It was a pleasure seeing this patient in clinic today. Please do not hesitate to contact me with any future questions.     Follow-up with [facility] in 1 to 2 months and then follow-up with primary cardiologist Dr. Peguero in the cardiology clinic.    LUIS DANIEL Mcelroy, [facility]  Cardiology - [facility]  December 6, 2023    Review of the result(s) of each unique test - Last echocardiogram personally reviewed ECG personally reviewed CBC BMP     The level of medical decision making during this visit was of moderate complexity.    This note was completed in part using dictation via the Dragon voice recognition software. Some word and grammatical errors may occur and must be interpreted in the appropriate clinical context.  If there are any questions pertaining to this issue, please contact me for further clarification.    Thank you for allowing me to participate in the care of your patient.      Sincerely,     Ben Banks MD     [facility]  cc:   Layo Sevilla MD  [address] [place] [address]

## 2023-12-06 NOTE — PROGRESS NOTES
CARDIOLOGY CLINIC CONSULTATION    PRIMARY CARE PHYSICIAN:  Layo Sevilla    PRIMARY CARDIOLOGIST: Dr. Hernandez    HISTORY OF PRESENT ILLNESS:  This is a very pleasant 88-year-old female who is a patient of Dr. Peguero.  Patient was last seen a few years ago.  Today she is seen in cardiology clinic because of abnormal echocardiography findings.  Patient has complex medical issues but I am going to summarize the pertinent medical issues below    History of paroxysmal atrial fibrillation on age and weight appropriate anticoagulation with Eliquis 2.5 twice daily  History of nonobstructive coronary artery disease in the past angiogram 2017  History of cardiomyopathy in the past in 2017 related to sepsis and atrial fibrillation EF normalized thereafter in 2018  Moderate aortic insufficiency with mild-moderate ascending aortic root dilatation  Labile hypertension    The patient has not followed up in the cardiology clinic since 2020.  She recently had an echocardiogram that showed EF of 43% and mild to moderate aortic insufficiency.  The patient currently is on Eliquis and 120 mg of diltiazem.  She is on 25 mg of losartan.  Blood pressure significantly elevated.  Given these findings patient was referred to cardiology.    Patient denies any typical cardiovascular symptoms.  She walks with a walker.  She says her shortness of breath is from asthma.  She says she cannot complete walking a block.  Denies syncope presyncope.  Denies any bleeding problems.    In regards to pertinent data, her potassium creatinine are normal.  Hemoglobin is low normal at 12.  ECG from today shows atrial fibrillation with rapid ventricular response.  Echocardiography shows normal RV size and function and LVEF around 45%.  Small LV cavity.  However LV wall thickness is normal.  I have personally reviewed the images.  There is likely moderate aortic insufficiency.  There is moderate pulmonary hypertension but IVC is normal.    PAST MEDICAL  HISTORY:  Past Medical History:   Diagnosis Date    Anemia 03/10/2009    Chronic disease, by Fe studies; awaiting full GI w/u and repeat colonoscopy, ERCP.    Basal Cell Carcinoma--back     Coronary artery disease 03/09/2017    3/2/2017 - Two vessel coronary artery disease with mLAD 50% stenosis, D3 50% stenosis, pLCx 50% stenosis and mLCx 50% stenosis    Essential hypertension 09/01/2016    White coat hypertension;  24 hour ambulatory monitoring normal. Do not titrate up further.      Hyperlipidemia 02/10/2010    Moderate persistent asthma     Nonrheumatic aortic valve insufficiency 06/29/2017    Osteopenia     Paroxysmal atrial fibrillation 12/26/2017    Pulmonary nodule 03/03/2009    PET scan reportedly normal; biopsy not advised.    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  Problem list name updated by automated process. Provider to review    Vasculitis 04/20/2009    Possible increased activity of thoracic aorta, on PET scan w/u for pulmonary nodule.        MEDICATIONS:  Current Outpatient Medications   Medication    acetaminophen (TYLENOL) 325 MG tablet    albuterol (PROAIR HFA/PROVENTIL HFA/VENTOLIN HFA) 108 (90 Base) MCG/ACT inhaler    apixaban ANTICOAGULANT (ELIQUIS) 2.5 MG tablet    calcium citrate-vitamin D (CITRACAL) 315-200 MG-UNIT TABS per tablet    fluticasone-salmeterol (ADVAIR HFA) 230-21 MCG/ACT inhaler    latanoprost (XALATAN) 0.005 % ophthalmic solution    losartan (COZAAR) 100 MG tablet    metoprolol succinate ER (TOPROL XL) 50 MG 24 hr tablet    montelukast (SINGULAIR) 10 MG tablet    omeprazole (PRILOSEC) 10 MG DR capsule    potassium chloride ER (KLOR-CON M) 20 MEQ CR tablet    predniSONE (DELTASONE) 10 MG tablet    rosuvastatin (CRESTOR) 10 MG tablet    torsemide (DEMADEX) 20 MG tablet    loratadine  (CLARITIN) 10 MG tablet     No current facility-administered medications for this visit.       SOCIAL HISTORY:  I have reviewed this patient's social history and updated it with pertinent information if needed. Genie Persaud  reports that she has never smoked. She has never used smokeless tobacco. She reports current alcohol use of about 1.0 - 2.0 standard drink of alcohol per week. She reports that she does not use drugs.    PHYSICAL EXAM:  Pulse:  [91] 91  BP: (168)/(56) 168/56  SpO2:  [93 %] 93 %  114 lbs 4.8 oz    Constitutional: alert, no distress  Respiratory: Good bilateral air entry  Cardiovascular: Irregular heart sounds soft systolic murmur trace edema  GI: nondistended  Neuropsychiatric: appropriate affact    ASSESSMENT: Pertinent issues addressed/ reviewed during this cardiology visit  Atrial fibrillation with RVR persistent  Chronic anticoagulation for above  Heart failure with reduced ejection fraction likely tachycardia induced cardiomyopathy  Uncontrolled hypertension  Moderate pulmonary hypertension    RECOMMENDATIONS:  The patient does not have any significant symptoms from a cardiac standpoint however she has evidence of LV dysfunction on echocardiography.  At this time she appears compensated on clinical exam from a volume status standpoint.  I recommend medical therapy for LV dysfunction at this time with better rate control for her atrial fibrillation.  Stop diltiazem and start metoprolol succinate 50 mg daily.  Increase losartan from 25 mg daily up to 100 mg daily.  Continue current dose of diuretics.  Get a ZIO monitor for 7 days.  This should be placed after a week or 2 of this medication changes as above.  Follow-up in 1 to 2 months with [facility] with an echocardiogram to see if her EF is responding to better rate control.  If persistent LV dysfunction despite rate control, consider getting a Lexiscan nuclear stress test to evaluate for ischemia.  A few years ago she has known mild-moderate  coronary artery disease.  Rhythm control can be discussed with the patient if willing given her age.  Patient is quite independent and lives in her own house at this time.  Denies any falls.  If required, would suggest initiating amiodarone for a month and then cardioverting electively to maintain normal rhythm if EF does not improve.  Lastly continue anticoagulation for her A-fib.  She denies any bleeding problems or falls.  If this changes in the future Watchman device can be considered.    It was a pleasure seeing this patient in clinic today. Please do not hesitate to contact me with any future questions.     Follow-up with [facility] in 1 to 2 months and then follow-up with primary cardiologist Dr. Peguero in the cardiology clinic.    LUIS DANIEL Mcelroy, [facility]  Cardiology - [facility]  December 6, 2023    Review of the result(s) of each unique test - Last echocardiogram personally reviewed ECG personally reviewed CBC BMP     The level of medical decision making during this visit was of moderate complexity.    This note was completed in part using dictation via the Dragon voice recognition software. Some word and grammatical errors may occur and must be interpreted in the appropriate clinical context.  If there are any questions pertaining to this issue, please contact me for further clarification.

## 2023-12-12 ENCOUNTER — HOSPITAL ENCOUNTER (OUTPATIENT)
Dept: CARDIOLOGY | Facility: CLINIC | Age: 88
Discharge: HOME | End: 2023-12-12
Attending: INTERNAL MEDICINE | Admitting: INTERNAL MEDICINE
Payer: COMMERCIAL

## 2023-12-12 DIAGNOSIS — I50.22 CHRONIC SYSTOLIC HEART FAILURE (H): ICD-10-CM

## 2023-12-12 DIAGNOSIS — E78.5 HYPERLIPIDEMIA LDL GOAL <130: ICD-10-CM

## 2023-12-12 DIAGNOSIS — I48.91 ATRIAL FIBRILLATION WITH RVR (H): ICD-10-CM

## 2023-12-12 PROCEDURE — 93242 EXT ECG>48HR<7D RECORDING: CPT

## 2023-12-12 PROCEDURE — 93244 EXT ECG>48HR<7D REV&INTERPJ: CPT | Performed by: INTERNAL MEDICINE

## 2024-01-02 ENCOUNTER — CARE COORDINATION (OUTPATIENT)
Dept: CARDIOLOGY | Facility: CLINIC | Age: 89
End: 2024-01-02
Payer: COMMERCIAL

## 2024-01-02 NOTE — PROGRESS NOTES
Call out to pt w/Zio patch results showing 100% AF burden, avg HR 88 bpm. Monitor ordered after last OV w/Dr. Banks on 12/6/23 per below:     RECOMMENDATIONS:  The patient does not have any significant symptoms from a cardiac standpoint however she has evidence of LV dysfunction on echocardiography.  At this time she appears compensated on clinical exam from a volume status standpoint.  I recommend medical therapy for LV dysfunction at this time with better rate control for her atrial fibrillation.  Stop diltiazem and start metoprolol succinate 50 mg daily.  Increase losartan from 25 mg daily up to 100 mg daily.  Continue current dose of diuretics.  Get a ZIO monitor for 7 days.  This should be placed after a week or 2 of this medication changes as above.  Follow-up in 1 to 2 months with [facility] with an echocardiogram to see if her EF is responding to better rate control.    Pt reports her usual amount of baseline shortness of breath is unchanged and that she feels fine on the new med changes. She denies lightheadedness or other c/o.  She will continue on this same program. Pt was reminded she is scheduled for Echo on 2/8/24 and [facility] on 2/13/24. Janina Gonzalez RN on 1/2/2024 at 2:52 PM     normal...

## 2024-01-20 ENCOUNTER — OFFICE VISIT (OUTPATIENT)
Dept: URGENT CARE | Facility: URGENT CARE | Age: 89
End: 2024-01-20
Payer: COMMERCIAL

## 2024-01-20 VITALS
HEART RATE: 94 BPM | BODY MASS INDEX: 22.46 KG/M2 | WEIGHT: 117 LBS | TEMPERATURE: 97.7 F | OXYGEN SATURATION: 96 % | DIASTOLIC BLOOD PRESSURE: 44 MMHG | SYSTOLIC BLOOD PRESSURE: 139 MMHG

## 2024-01-20 DIAGNOSIS — H61.23 BILATERAL IMPACTED CERUMEN: Primary | ICD-10-CM

## 2024-01-20 DIAGNOSIS — H60.532 ACUTE CONTACT OTITIS EXTERNA OF LEFT EAR: ICD-10-CM

## 2024-01-20 PROCEDURE — 69209 REMOVE IMPACTED EAR WAX UNI: CPT | Mod: 50 | Performed by: INTERNAL MEDICINE

## 2024-01-20 PROCEDURE — 99213 OFFICE O/P EST LOW 20 MIN: CPT | Mod: 25 | Performed by: INTERNAL MEDICINE

## 2024-01-20 RX ORDER — NEOMYCIN SULFATE, POLYMYXIN B SULFATE AND HYDROCORTISONE 10; 3.5; 1 MG/ML; MG/ML; [USP'U]/ML
3 SUSPENSION/ DROPS AURICULAR (OTIC) 4 TIMES DAILY
Qty: 10 ML | Refills: 0 | Status: SHIPPED | OUTPATIENT
Start: 2024-01-20 | End: 2024-05-16

## 2024-01-20 NOTE — PROGRESS NOTES
Assessment & Plan     (H61.23) Bilateral impacted cerumen  (primary encounter diagnosis)  Plan: CO REMOVAL IMPACTED CERUMEN IRRIGATION/LVG         UNILAT    (H60.532) Acute contact otitis externa of left ear  Plan: neomycin-polymyxin-hydrocortisone (CORTISPORIN)        [id number] otic suspension        Yudith Vasquez is a 88 year old, presenting for the following health issues:  Ear Problem (Start 3-4 days sx bilateral ears are plugged  tx ear wax removal and syringe hx allergy to dust mold )  HPI   Sense of plugging  in the left ear and diminished hearing.  The right has some discomfort as well.  Has a history of wax impactions.      Objective    /44   Pulse 94   Temp 97.7  F (36.5  C)   Wt 53.1 kg (117 lb)   LMP  (LMP Unknown)   SpO2 96%   BMI 22.46 kg/m    Body mass index is 22.46 kg/m .  Physical Exam   GENERAL: alert and no distress  HENT: bilateral cerumen impaction; following bilateral irrigation of the ear canals with warm water, the right external canal was largely cleared but there was significant erythema of the external canal and swelling; the left external canal was partially cleared as well revealing some underlying erythema of the external canal        Signed Electronically by: Ronaldo Darnell MD

## 2024-01-23 ENCOUNTER — TELEPHONE (OUTPATIENT)
Dept: PEDIATRICS | Facility: CLINIC | Age: 89
End: 2024-01-23
Payer: COMMERCIAL

## 2024-01-23 NOTE — TELEPHONE ENCOUNTER
Received a call from the patient   - Patient states that she went to Urgent Care on 1/20/2024   - Patient states that the cerumen was removed although she cannot hear well out of her left ear at this time   - Patient states that she was prescribed neomycin-polymyxin-hydrocortisone (CORTISPORIN) [id number] otic suspension for her right ear and has been administering them as prescribed   - Patient requesting an appointment to have her bilateral ears assessed   - Assisted in scheduling the patient for an appointment tomorrow (1/24/2024) at 1 PM (arrival time of 12:40 PM) with Brielle Barba PA-C  - Patient verbalized understanding and agrees with the plan     Appointments in Next Year      Jan 24, 2024  1:00 PM  (Arrive by 12:40 PM)  Provider Visit with Brielle Barba PA-C  [facility] ([facility] ) [phone]     Argentina CAMPOVERDE RN   Patient Advocate Liaison (PAL)  [facility]   [phone]

## 2024-01-25 ENCOUNTER — TELEPHONE (OUTPATIENT)
Dept: PEDIATRICS | Facility: CLINIC | Age: 89
End: 2024-01-25
Payer: COMMERCIAL

## 2024-01-25 NOTE — TELEPHONE ENCOUNTER
Reason for Call:  Appointment Request    Patient requesting this type of appt: Chronic Diease Management/Medication/Follow-Up    Requested provider: Layo Sevilla    Reason patient unable to be scheduled: Needs to be scheduled by clinic    When does patient want to be seen/preferred time: Same day    Comments: Still having a lot of ear pain, feels like there is still a lot wax in ear, and still feels plugged, and can't hear almost completely out of left ear    Okay to leave a detailed message?: Yes at Home number on file [phone] (home)    Call taken on 1/25/2024 at 12:52 PM by DEEPAK SOSA

## 2024-01-25 NOTE — TELEPHONE ENCOUNTER
Called and spoke with patient and was able to schedule her to see Brielle Barba 1/26/24. Patient is satisfied and was also instructed to check in 20 mins before appointment time.          Yaz Mckeon MA

## 2024-01-26 ENCOUNTER — OFFICE VISIT (OUTPATIENT)
Dept: PEDIATRICS | Facility: CLINIC | Age: 89
End: 2024-01-26
Payer: COMMERCIAL

## 2024-01-26 VITALS
SYSTOLIC BLOOD PRESSURE: 104 MMHG | TEMPERATURE: 98.3 F | RESPIRATION RATE: 14 BRPM | DIASTOLIC BLOOD PRESSURE: 58 MMHG | OXYGEN SATURATION: 98 % | HEART RATE: 80 BPM | WEIGHT: 112.5 LBS | BODY MASS INDEX: 21.6 KG/M2

## 2024-01-26 DIAGNOSIS — H61.22 IMPACTED CERUMEN OF LEFT EAR: Primary | ICD-10-CM

## 2024-01-26 PROCEDURE — 69209 REMOVE IMPACTED EAR WAX UNI: CPT | Mod: LT | Performed by: PHYSICIAN ASSISTANT

## 2024-01-26 ASSESSMENT — ASTHMA QUESTIONNAIRES
QUESTION_1 LAST FOUR WEEKS HOW MUCH OF THE TIME DID YOUR ASTHMA KEEP YOU FROM GETTING AS MUCH DONE AT WORK, SCHOOL OR AT HOME: SOME OF THE TIME
QUESTION_3 LAST FOUR WEEKS HOW OFTEN DID YOUR ASTHMA SYMPTOMS (WHEEZING, COUGHING, SHORTNESS OF BREATH, CHEST TIGHTNESS OR PAIN) WAKE YOU UP AT NIGHT OR EARLIER THAN USUAL IN THE MORNING: NOT AT ALL
QUESTION_4 LAST FOUR WEEKS HOW OFTEN HAVE YOU USED YOUR RESCUE INHALER OR NEBULIZER MEDICATION (SUCH AS ALBUTEROL): ONE OR TWO TIMES PER DAY
QUESTION_2 LAST FOUR WEEKS HOW OFTEN HAVE YOU HAD SHORTNESS OF BREATH: ONCE A DAY
ACT_TOTALSCORE: 16
ACT_TOTALSCORE: 16
QUESTION_5 LAST FOUR WEEKS HOW WOULD YOU RATE YOUR ASTHMA CONTROL: WELL CONTROLLED

## 2024-01-26 NOTE — PROGRESS NOTES
Assessment & Plan     Impacted cerumen of left ear    - Adult ENT  Referral; Future  - [place] REMOVAL IMPACTED CERUMEN IRRIGATION/LVG UNILAT      Did complete a left sided ear lavage to remove impacted cerumen.  Patient tolerated well and feels lighter in that ear.  Initially she felt relief and improvement in her hearing as well, but she still feels plugged now with the water.  The TM is visualized and in tact after ear lavage.  There is a small string of wax still present, but cerumen plug is clear now.  Patient will monitor symptoms and followup with ENT if they do not improve as expected.  The right ear was not washed today.  It appears fine, with mild, but dry irritation from recent wash in UC.  Patient denies any symptoms or concern on the right side today.  Patient understands and agrees with the plan today.        Yudith Vasquez is a 88 year old, presenting for the following health issues:  Ear Problem (See previous communications)    Ear Problem    History of Present Illness       Reason for visit:  Ear issues        Patient is here for ear fullness.  She was seen in the urgent care for a similar issue this week and thought her ears were cleared up.  She is not having fevers, she is bothered by wax.  She states that her hearing on the left side is muffled from the wax build up.        Review of Systems  Constitutional, HEENT, cardiovascular, pulmonary, gi and gu systems are negative, except as otherwise noted.      Objective    /58 (BP Location: Right arm, Patient Position: Sitting, Cuff Size: Adult Regular)   Pulse 80   Temp 98.3  F (36.8  C) (Temporal)   Resp 14   Wt 51 kg (112 lb 8 oz)   LMP  (LMP Unknown)   SpO2 98%   BMI 21.60 kg/m    Body mass index is 21.6 kg/m .  Physical Exam   GENERAL: alert and no distress  EYES: Eyes grossly normal to inspection, PERRL and conjunctivae and sclerae normal  HENT: normal cephalic/atraumatic, right ear: patient is using ear drops in this ear  as prescribed.  Mild irritation of dried blood noted.  No other trauma noted, left ear: impacted cerumen noted on initial inspection.  Cannot visual the TM due to cerumen, and nose and mouth without ulcers or lesions  NECK: no adenopathy, no asymmetry, masses, or scars  RESP: lungs clear to auscultation - no rales, rhonchi or wheezes  CV: regular rate and rhythm, normal S1 S2, no S3 or S4, no murmur, click or rub, no peripheral edema  MS: no gross musculoskeletal defects noted, no edema        Signed Electronically by: Brielle Barba PA-C

## 2024-02-08 ENCOUNTER — HOSPITAL ENCOUNTER (OUTPATIENT)
Dept: CARDIOLOGY | Facility: CLINIC | Age: 89
Discharge: HOME | End: 2024-02-08
Attending: INTERNAL MEDICINE | Admitting: INTERNAL MEDICINE
Payer: COMMERCIAL

## 2024-02-08 DIAGNOSIS — E78.5 HYPERLIPIDEMIA LDL GOAL <130: ICD-10-CM

## 2024-02-08 DIAGNOSIS — I48.91 ATRIAL FIBRILLATION WITH RVR (H): ICD-10-CM

## 2024-02-08 DIAGNOSIS — I50.22 CHRONIC SYSTOLIC HEART FAILURE (H): ICD-10-CM

## 2024-02-08 LAB — BI-PLANE LVEF ECHO: NORMAL

## 2024-02-08 PROCEDURE — 93306 TTE W/DOPPLER COMPLETE: CPT

## 2024-02-08 PROCEDURE — 93306 TTE W/DOPPLER COMPLETE: CPT | Mod: 26 | Performed by: INTERNAL MEDICINE

## 2024-02-10 DIAGNOSIS — I48.0 PAROXYSMAL ATRIAL FIBRILLATION (H): ICD-10-CM

## 2024-02-12 ENCOUNTER — PATIENT OUTREACH (OUTPATIENT)
Dept: PEDIATRICS | Facility: CLINIC | Age: 89
End: 2024-02-12
Payer: COMMERCIAL

## 2024-02-12 RX ORDER — APIXABAN 2.5 MG/1
2.5 TABLET, FILM COATED ORAL 2 TIMES DAILY
Qty: 180 TABLET | Refills: 3 | Status: SHIPPED | OUTPATIENT
Start: 2024-02-12

## 2024-02-12 NOTE — TELEPHONE ENCOUNTER
Patient Quality Outreach Health Maintenance - PAL RN    Summary:    PAL RN contacted pt regarding overdue health maintenance    Patient is due/failing the following:   Asthma  -  AAP      Topic Date Due    COVID-19 Vaccine (1) Never done    Zoster (Shingles) Vaccine (1 of 2) Never done       Health Maintenance Due   Topic Date Due    HF ACTION PLAN  Never done    COVID-19 Vaccine (1) Never done    ZOSTER IMMUNIZATION (1 of 2) Never done    RSV VACCINE (Pregnancy & 60+) (1 - 1-dose 60+ series) Never done    DEXA  07/13/2021    ASTHMA ACTION PLAN  01/12/2022    ANNUAL REVIEW OF HM ORDERS  08/05/2022    LIPID  08/26/2022    MICROALBUMIN  03/21/2023    BMP  11/11/2023    PHQ-2 (once per calendar year)  01/01/2024    HEMOGLOBIN  05/11/2024       Type of outreach:    Chart review performed, no outreach needed. Patient has an upcoming appointment with Dr. Sevilla on 2/29/2024.     Appointments in Next Year      Feb 29, 2024  2:00 PM  (Arrive by 1:40 PM)  Provider Visit with Layo Sevilla MD  [facility] ([facility] ) [phone]     Aug 29, 2024  1:30 PM  (Arrive by 1:10 PM)  MEDICARE ANNUAL WELLNESS VISIT with Layo Sevilla MD  [facility] ([facility] ) [phone]     - Advised patient if any questions or concerns comes up to call the PAL RN.   - Patient given opportunity to ask questions and at this time there is nothing further needed.     Questions for provider review:    None    Argentina CAMPOVERDE, RAFAEL   Patient Advocate Liaison (PAL)  [facility]   [phone]

## 2024-02-12 NOTE — PROGRESS NOTES
CARDIOLOGY CLINIC NOTE    PRIMARY CARDIOLOGIST  Dr. Hernandez     PRIMARY CARE PHYSICIAN:  Layo Sevilla    HISTORY OF PRESENT ILLNESS:  Genie Persaud is a very pleasant 88-year-old female with a past medical history significant for paroxysmal atrial fibrillation on chronic low-dose Eliquis, nonobstructive CAD, cardiomyopathy, moderate aortic sufficiency, mild to moderate ascending aortic dilation and labile hypertension.    She was last seen on 12/6/2023 by Dr. Banks for review of echocardiogram that showed a reduced ejection fraction estimated at 43% (previous 50 to 55%), mild global hypokinesis of the LV, mild to moderate mitral regurgitation, moderate tricuspid regurgitation, and elevated RV systolic pressure consistent with mild to moderate pulmonary hypertension.  Her diltiazem was switched to metoprolol 50 mg daily and losartan was increased from 25 to 100 mg/day.  A follow-up Zio patch monitor showed persistent atrial fibrillation with controlled rates.  A follow-up echocardiogram in 2/8/2024 showed an ejection fraction of 44%, mildly reduced RV systolic function, moderate aortic regurgitation and mild TR and MR.     She returns to the office today for review of results.  She offers no cardiac complaint, denies chest pain, shortness of breath, palpitations, PND, orthopnea, presyncope, syncope, or lower extremity edema.  Upon exam, lungs are clear bilaterally, heart rhythm irregular with controlled rates, and no evidence of fluid overload.    Blood pressure is well-controlled at 110/54, managed on losartan, metoprolol, and amlodipine.   Weight is stable at 112 pounds, on low-dose torsemide.  Last BMP showed a stable creatinine of 0.99, BUN 25.3, potassium 4.1 and sodium 139  CBC was unremarkable    Activity level is unchanged  Diet is high in sodium  Compliant with all medications.    PAST MEDICAL HISTORY:  Past Medical History:   Diagnosis Date    Anemia 03/10/2009    Chronic disease, by Fe studies; awaiting  full GI w/u and repeat colonoscopy, ERCP.    Basal Cell Carcinoma--back     Coronary artery disease 03/09/2017    3/2/2017 - Two vessel coronary artery disease with mLAD 50% stenosis, D3 50% stenosis, pLCx 50% stenosis and mLCx 50% stenosis    Essential hypertension 09/01/2016    White coat hypertension;  24 hour ambulatory monitoring normal. Do not titrate up further.      Hyperlipidemia 02/10/2010    Moderate persistent asthma     Nonrheumatic aortic valve insufficiency 06/29/2017    Osteopenia     Paroxysmal atrial fibrillation 12/26/2017    Pulmonary nodule 03/03/2009    PET scan reportedly normal; biopsy not advised.    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  Problem list name updated by automated process. Provider to review    Vasculitis 04/20/2009    Possible increased activity of thoracic aorta, on PET scan w/u for pulmonary nodule.        MEDICATIONS:  Current Outpatient Medications   Medication    acetaminophen (TYLENOL) 325 MG tablet    albuterol (PROAIR HFA/PROVENTIL HFA/VENTOLIN HFA) 108 (90 Base) MCG/ACT inhaler    amLODIPine (NORVASC) 5 MG tablet    apixaban ANTICOAGULANT (ELIQUIS ANTICOAGULANT) 2.5 MG tablet    calcium citrate-vitamin D (CITRACAL) 315-200 MG-UNIT TABS per tablet    fluticasone-salmeterol (ADVAIR HFA) 230-21 MCG/ACT inhaler    latanoprost (XALATAN) 0.005 % ophthalmic solution    losartan (COZAAR) 100 MG tablet    metoprolol succinate ER (TOPROL XL) 50 MG 24 hr tablet    montelukast (SINGULAIR) 10 MG tablet    neomycin-polymyxin-hydrocortisone (CORTISPORIN) 3.5-87650-1 otic suspension    omeprazole (PRILOSEC) 10 MG DR capsule    potassium chloride ER (KLOR-CON M) 20 MEQ CR tablet    rosuvastatin (CRESTOR) 10 MG tablet    torsemide (DEMADEX) 20 MG tablet    loratadine (CLARITIN) 10 MG tablet     predniSONE (DELTASONE) 10 MG tablet     No current facility-administered medications for this visit.       SOCIAL HISTORY:  I have reviewed this patient's social history and updated it with pertinent information if needed. Genie Persaud  reports that she has never smoked. She has never used smokeless tobacco. She reports current alcohol use of about 1.0 - 2.0 standard drink of alcohol per week. She reports that she does not use drugs.    PHYSICAL EXAM:  Pulse:  [89] 89  BP: (110)/(54) 110/54  112 lbs 0 oz    Constitutional: alert, no distress  Respiratory: Good bilateral air entry  Cardiovascular: Irregular rhythm, regular rate, systolic murmur  GI: nondistended  Neuropsychiatric: appropriate affact    ASSESSMENT/PLAN:  Pertinent issues addressed/ reviewed during this cardiology visit  Atrial fibrillation -rate controlled on metoprolol and Eliquis for stroke prevention  Chronic systolic heart failure - echocardiogram on 2/8/2024 showed an ejection fraction of 44%, mildly reduced RV systolic function.  She remains completely asymptomatic.  Euvolemic upon exam.  Weight stable.  I will continue on losartan, metoprolol, and torsemide.   Hypertension -well-controlled  Hyperlipidemia -overdue for fasting lipid panel.  Will get at PCP office.  Continue low-dose rosuvastatin  Valvular disease - moderate aortic regurgitation and mild TR and MR. Recommend annual echocardiogram for surveillance monitoring.    It was a pleasure seeing this patient in clinic today. Please do not hesitate to contact me with any future questions.     TOREY Prieto, CNP  Cardiology - [facility]  02/13/2024    Review of the result(s) of each unique test - Last echocardiogram, BMP and CBC     The level of medical decision making during this visit was of moderate complexity.    This note was completed in part using dictation via the Dragon voice recognition software. Some word and grammatical errors may occur and must be interpreted in the  appropriate clinical context.  If there are any questions pertaining to this issue, please contact me for further clarification.

## 2024-02-13 ENCOUNTER — OFFICE VISIT (OUTPATIENT)
Dept: CARDIOLOGY | Facility: CLINIC | Age: 89
End: 2024-02-13
Attending: INTERNAL MEDICINE
Payer: COMMERCIAL

## 2024-02-13 VITALS
HEART RATE: 89 BPM | SYSTOLIC BLOOD PRESSURE: 110 MMHG | DIASTOLIC BLOOD PRESSURE: 54 MMHG | HEIGHT: 61 IN | WEIGHT: 112 LBS | BODY MASS INDEX: 21.14 KG/M2

## 2024-02-13 DIAGNOSIS — I50.22 CHRONIC SYSTOLIC HEART FAILURE (H): Primary | ICD-10-CM

## 2024-02-13 DIAGNOSIS — I48.91 ATRIAL FIBRILLATION WITH RVR (H): ICD-10-CM

## 2024-02-13 DIAGNOSIS — I10 ESSENTIAL HYPERTENSION WITH GOAL BLOOD PRESSURE LESS THAN 140/90: ICD-10-CM

## 2024-02-13 DIAGNOSIS — E78.5 HYPERLIPIDEMIA LDL GOAL <130: ICD-10-CM

## 2024-02-13 PROCEDURE — 99214 OFFICE O/P EST MOD 30 MIN: CPT | Performed by: NURSE PRACTITIONER

## 2024-02-13 RX ORDER — AMLODIPINE BESYLATE 5 MG/1
5 TABLET ORAL DAILY
COMMUNITY
End: 2024-02-29

## 2024-02-13 NOTE — LETTER
2/13/2024    Layo Sevilla MD  [address] [place] [address]    RE: Genie Persaud       Dear Colleague,     I had the pleasure of seeing Genie Persaud in the [facility].  CARDIOLOGY CLINIC NOTE    PRIMARY CARDIOLOGIST  Dr. Hernandez     PRIMARY CARE PHYSICIAN:  Layo Sevilla    HISTORY OF PRESENT ILLNESS:  Genie Persaud is a very pleasant 88-year-old female with a past medical history significant for paroxysmal atrial fibrillation on chronic low-dose Eliquis, nonobstructive CAD, cardiomyopathy, moderate aortic sufficiency, mild to moderate ascending aortic dilation and labile hypertension.    She was last seen on 12/6/2023 by Dr. Banks for review of echocardiogram that showed a reduced ejection fraction estimated at 43% (previous 50 to 55%), mild global hypokinesis of the LV, mild to moderate mitral regurgitation, moderate tricuspid regurgitation, and elevated RV systolic pressure consistent with mild to moderate pulmonary hypertension.  Her diltiazem was switched to metoprolol 50 mg daily and losartan was increased from 25 to 100 mg/day.  A follow-up Zio patch monitor showed persistent atrial fibrillation with controlled rates.  A follow-up echocardiogram in 2/8/2024 showed an ejection fraction of 44%, mildly reduced RV systolic function, moderate aortic regurgitation and mild TR and MR.     She returns to the office today for review of results.  She offers no cardiac complaint, denies chest pain, shortness of breath, palpitations, PND, orthopnea, presyncope, syncope, or lower extremity edema.  Upon exam, lungs are clear bilaterally, heart rhythm irregular with controlled rates, and no evidence of fluid overload.    Blood pressure is well-controlled at 110/54, managed on losartan, metoprolol, and amlodipine.   Weight is stable at 112 pounds, on low-dose torsemide.  Last BMP showed a stable creatinine of 0.99, BUN 25.3, potassium 4.1 and sodium 139  CBC was unremarkable    Activity  level is unchanged  Diet is high in sodium  Compliant with all medications.    PAST MEDICAL HISTORY:  Past Medical History:   Diagnosis Date    Anemia 03/10/2009    Chronic disease, by Fe studies; awaiting full GI w/u and repeat colonoscopy, ERCP.    Basal Cell Carcinoma--back     Coronary artery disease 03/09/2017    3/2/2017 - Two vessel coronary artery disease with mLAD 50% stenosis, D3 50% stenosis, pLCx 50% stenosis and mLCx 50% stenosis    Essential hypertension 09/01/2016    White coat hypertension;  24 hour ambulatory monitoring normal. Do not titrate up further.      Hyperlipidemia 02/10/2010    Moderate persistent asthma     Nonrheumatic aortic valve insufficiency 06/29/2017    Osteopenia     Paroxysmal atrial fibrillation 12/26/2017    Pulmonary nodule 03/03/2009    PET scan reportedly normal; biopsy not advised.    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  Problem list name updated by automated process. Provider to review    Vasculitis 04/20/2009    Possible increased activity of thoracic aorta, on PET scan w/u for pulmonary nodule.        MEDICATIONS:  Current Outpatient Medications   Medication    acetaminophen (TYLENOL) 325 MG tablet    albuterol (PROAIR HFA/PROVENTIL HFA/VENTOLIN HFA) 108 (90 Base) MCG/ACT inhaler    amLODIPine (NORVASC) 5 MG tablet    apixaban ANTICOAGULANT (ELIQUIS ANTICOAGULANT) 2.5 MG tablet    calcium citrate-vitamin D (CITRACAL) 315-200 MG-UNIT TABS per tablet    fluticasone-salmeterol (ADVAIR HFA) 230-21 MCG/ACT inhaler    latanoprost (XALATAN) 0.005 % ophthalmic solution    losartan (COZAAR) 100 MG tablet    metoprolol succinate ER (TOPROL XL) 50 MG 24 hr tablet    montelukast (SINGULAIR) 10 MG tablet    neomycin-polymyxin-hydrocortisone (CORTISPORIN) 3.5-61736-2 otic suspension     omeprazole (PRILOSEC) 10 MG DR capsule    potassium chloride ER (KLOR-CON M) 20 MEQ CR tablet    rosuvastatin (CRESTOR) 10 MG tablet    torsemide (DEMADEX) 20 MG tablet    loratadine (CLARITIN) 10 MG tablet    predniSONE (DELTASONE) 10 MG tablet     No current facility-administered medications for this visit.       SOCIAL HISTORY:  I have reviewed this patient's social history and updated it with pertinent information if needed. Genie Persaud  reports that she has never smoked. She has never used smokeless tobacco. She reports current alcohol use of about 1.0 - 2.0 standard drink of alcohol per week. She reports that she does not use drugs.    PHYSICAL EXAM:  Pulse:  [89] 89  BP: (110)/(54) 110/54  112 lbs 0 oz    Constitutional: alert, no distress  Respiratory: Good bilateral air entry  Cardiovascular: Irregular rhythm, regular rate, systolic murmur  GI: nondistended  Neuropsychiatric: appropriate affact    ASSESSMENT/PLAN:  Pertinent issues addressed/ reviewed during this cardiology visit  Atrial fibrillation -rate controlled on metoprolol and Eliquis for stroke prevention  Chronic systolic heart failure - echocardiogram on 2/8/2024 showed an ejection fraction of 44%, mildly reduced RV systolic function.  She remains completely asymptomatic.  Euvolemic upon exam.  Weight stable.  I will continue on losartan, metoprolol, and torsemide.   Hypertension -well-controlled  Hyperlipidemia -overdue for fasting lipid panel.  Will get at PCP office.  Continue low-dose rosuvastatin  Valvular disease - moderate aortic regurgitation and mild TR and MR. Recommend annual echocardiogram for surveillance monitoring.    It was a pleasure seeing this patient in clinic today. Please do not hesitate to contact me with any future questions.     Becca Sheikh, APRN, CNP  Cardiology - [facility]  02/13/2024    Review of the result(s) of each unique test - Last echocardiogram, BMP and CBC     The level of medical decision making during  this visit was of moderate complexity.    This note was completed in part using dictation via the Dragon voice recognition software. Some word and grammatical errors may occur and must be interpreted in the appropriate clinical context.  If there are any questions pertaining to this issue, please contact me for further clarification.      Thank you for allowing me to participate in the care of your patient.      Sincerely,     TOREY Prieto [facility]  cc:   Ben Bakns MD  [address]  [place] [id number]

## 2024-02-13 NOTE — PATIENT INSTRUCTIONS
Thanks for participating in a office visit with the [facility] clinic today.    Reviewed results of echocardiogram showing heart function is unchanged.   Continue with current medical therapy.   Encourage exercise and low salt diet  Due for annual labs ( scheduled with PCP)    Follow up in 6 months with LAYA     Please call my nurse at  [phone] with any questions or concerns.    Scheduling phone number: [phone]  Reminder: Please bring in all current medications, over the counter supplements and vitamin bottles to your next appointment.

## 2024-02-29 ENCOUNTER — OFFICE VISIT (OUTPATIENT)
Dept: PEDIATRICS | Facility: CLINIC | Age: 89
End: 2024-02-29
Attending: INTERNAL MEDICINE
Payer: COMMERCIAL

## 2024-02-29 VITALS
OXYGEN SATURATION: 95 % | TEMPERATURE: 97.8 F | SYSTOLIC BLOOD PRESSURE: 121 MMHG | HEART RATE: 81 BPM | BODY MASS INDEX: 20.77 KG/M2 | HEIGHT: 61 IN | DIASTOLIC BLOOD PRESSURE: 40 MMHG | WEIGHT: 110 LBS | RESPIRATION RATE: 18 BRPM

## 2024-02-29 DIAGNOSIS — I10 ESSENTIAL HYPERTENSION WITH GOAL BLOOD PRESSURE LESS THAN 140/90: Primary | ICD-10-CM

## 2024-02-29 DIAGNOSIS — I48.0 PAROXYSMAL ATRIAL FIBRILLATION (H): ICD-10-CM

## 2024-02-29 DIAGNOSIS — E78.5 HYPERLIPIDEMIA LDL GOAL <130: ICD-10-CM

## 2024-02-29 DIAGNOSIS — R73.01 IMPAIRED FASTING GLUCOSE: ICD-10-CM

## 2024-02-29 DIAGNOSIS — I25.10 CORONARY ARTERY DISEASE INVOLVING NATIVE CORONARY ARTERY OF NATIVE HEART WITHOUT ANGINA PECTORIS: ICD-10-CM

## 2024-02-29 DIAGNOSIS — Z78.0 ASYMPTOMATIC POSTMENOPAUSAL STATUS: ICD-10-CM

## 2024-02-29 DIAGNOSIS — H90.5 SENSORINEURAL HEARING LOSS (SNHL), UNSPECIFIED LATERALITY: ICD-10-CM

## 2024-02-29 DIAGNOSIS — M85.89 OSTEOPENIA OF MULTIPLE SITES: ICD-10-CM

## 2024-02-29 DIAGNOSIS — N18.30 STAGE 3 CHRONIC KIDNEY DISEASE, UNSPECIFIED WHETHER STAGE 3A OR 3B CKD (H): ICD-10-CM

## 2024-02-29 PROBLEM — M25.551 HIP PAIN, RIGHT: Status: RESOLVED | Noted: 2018-04-04 | Resolved: 2024-02-29

## 2024-02-29 LAB
BASOPHILS # BLD AUTO: 0 10E3/UL (ref 0–0.2)
BASOPHILS NFR BLD AUTO: 0 %
EOSINOPHIL # BLD AUTO: 0.2 10E3/UL (ref 0–0.7)
EOSINOPHIL NFR BLD AUTO: 2 %
ERYTHROCYTE [DISTWIDTH] IN BLOOD BY AUTOMATED COUNT: 14.1 % (ref 10–15)
HBA1C MFR BLD: 6.5 % (ref 0–5.6)
HCT VFR BLD AUTO: 37.6 % (ref 35–47)
HGB BLD-MCNC: 11.8 G/DL (ref 11.7–15.7)
IMM GRANULOCYTES # BLD: 0 10E3/UL
IMM GRANULOCYTES NFR BLD: 0 %
LYMPHOCYTES # BLD AUTO: 1.1 10E3/UL (ref 0.8–5.3)
LYMPHOCYTES NFR BLD AUTO: 14 %
MCH RBC QN AUTO: 28.7 PG (ref 26.5–33)
MCHC RBC AUTO-ENTMCNC: 31.4 G/DL (ref 31.5–36.5)
MCV RBC AUTO: 92 FL (ref 78–100)
MONOCYTES # BLD AUTO: 0.8 10E3/UL (ref 0–1.3)
MONOCYTES NFR BLD AUTO: 10 %
NEUTROPHILS # BLD AUTO: 6 10E3/UL (ref 1.6–8.3)
NEUTROPHILS NFR BLD AUTO: 74 %
PLATELET # BLD AUTO: 266 10E3/UL (ref 150–450)
RBC # BLD AUTO: 4.11 10E6/UL (ref 3.8–5.2)
WBC # BLD AUTO: 8.1 10E3/UL (ref 4–11)

## 2024-02-29 PROCEDURE — 85025 COMPLETE CBC W/AUTO DIFF WBC: CPT | Performed by: INTERNAL MEDICINE

## 2024-02-29 PROCEDURE — 36415 COLL VENOUS BLD VENIPUNCTURE: CPT | Performed by: INTERNAL MEDICINE

## 2024-02-29 PROCEDURE — 83036 HEMOGLOBIN GLYCOSYLATED A1C: CPT | Performed by: INTERNAL MEDICINE

## 2024-02-29 PROCEDURE — 80061 LIPID PANEL: CPT | Performed by: INTERNAL MEDICINE

## 2024-02-29 PROCEDURE — 99214 OFFICE O/P EST MOD 30 MIN: CPT | Performed by: INTERNAL MEDICINE

## 2024-02-29 PROCEDURE — 80053 COMPREHEN METABOLIC PANEL: CPT | Performed by: INTERNAL MEDICINE

## 2024-02-29 RX ORDER — AMLODIPINE BESYLATE 5 MG/1
5 TABLET ORAL DAILY
Qty: 90 TABLET | Refills: 3 | Status: ON HOLD | OUTPATIENT
Start: 2024-02-29 | End: 2024-05-18

## 2024-02-29 RX ORDER — RESPIRATORY SYNCYTIAL VIRUS VACCINE 120MCG/0.5
0.5 KIT INTRAMUSCULAR ONCE
Qty: 1 EACH | Refills: 0 | Status: CANCELLED | OUTPATIENT
Start: 2024-02-29 | End: 2024-02-29

## 2024-02-29 ASSESSMENT — ASTHMA QUESTIONNAIRES
ACT_TOTALSCORE: 17
QUESTION_2 LAST FOUR WEEKS HOW OFTEN HAVE YOU HAD SHORTNESS OF BREATH: ONCE A DAY
QUESTION_1 LAST FOUR WEEKS HOW MUCH OF THE TIME DID YOUR ASTHMA KEEP YOU FROM GETTING AS MUCH DONE AT WORK, SCHOOL OR AT HOME: NONE OF THE TIME
QUESTION_3 LAST FOUR WEEKS HOW OFTEN DID YOUR ASTHMA SYMPTOMS (WHEEZING, COUGHING, SHORTNESS OF BREATH, CHEST TIGHTNESS OR PAIN) WAKE YOU UP AT NIGHT OR EARLIER THAN USUAL IN THE MORNING: NOT AT ALL
ACT_TOTALSCORE: 17
QUESTION_4 LAST FOUR WEEKS HOW OFTEN HAVE YOU USED YOUR RESCUE INHALER OR NEBULIZER MEDICATION (SUCH AS ALBUTEROL): ONE OR TWO TIMES PER DAY
QUESTION_5 LAST FOUR WEEKS HOW WOULD YOU RATE YOUR ASTHMA CONTROL: SOMEWHAT CONTROLLED

## 2024-02-29 ASSESSMENT — ENCOUNTER SYMPTOMS: ABDOMINAL PAIN: 1

## 2024-02-29 NOTE — PATIENT INSTRUCTIONS
Begin 2 ensure cans per day.      Get outside and walk with your walker!  Daiy exercise will help.  Contact us if you feel like you are getting weaker and less steady.    Lab work today:  We can do labs in the exam room today, or you can get them done downstairs in the lab.      Declining shots today.    They will call you for a DEXA scan (bone density test for osteoporosis).    Set up an appointment with the hearing specialist.     Layo Sevilla MD  Internal Medicine and Pediatrics

## 2024-02-29 NOTE — PROGRESS NOTES
Assessment & Plan     Coronary artery disease involving native coronary artery of native heart without angina pectoris  Secondary risk factor modification. Follows with cardiology.  Asymptomatic.   - Lipid panel reflex to direct LDL Non-fasting; Future  - Lipid panel reflex to direct LDL Non-fasting    Stage 3 chronic kidney disease, unspecified whether stage 3a or 3b CKD (H)  Stable creatinine.  Secondary risk factor modification.   - Comprehensive metabolic panel (BMP + Alb, Alk Phos, ALT, AST, Total. Bili, TP); Future  - Comprehensive metabolic panel (BMP + Alb, Alk Phos, ALT, AST, Total. Bili, TP)    Osteopenia of multiple sites  Repeating DEXA scan (bone density test for osteoporosis) from 2018.     Asymptomatic postmenopausal status  Repeat DEXA scan (bone density test for osteoporosis).   - DX Hip/Pelvis/Spine; Future    Essential hypertension with goal blood pressure less than 140/90  At goal.   - amLODIPine (NORVASC) 5 MG tablet; Take 1 tablet (5 mg) by mouth daily    HYPERLIPIDEMIA LDL GOAL <130  On statin. Tolerating well.     Impaired fasting glucose  Repeat labs today.   - Hemoglobin A1c; Future  - Hemoglobin A1c    Paroxysmal atrial fibrillation (H)  Lifelong.  Complete blood count today.   - CBC with platelets and differential; Future  - amLODIPine (NORVASC) 5 MG tablet; Take 1 tablet (5 mg) by mouth daily  - CBC with platelets and differential    Sensorineural hearing loss (SNHL), unspecified laterality  Referred to audiologist.   - Adult Audiology  Referral; Future              See Patient Instructions    Yudith Vasquez is a 88 year old, presenting for the following health issues:  Follow Up (BP, Afib, Circulation in legs. Weight loss) and Abdominal Pain (No appetite, diarrhea and stomach issues )        2/29/2024     1:48 PM   Additional Questions   Roomed by CELESTE Mukherjee   Accompanied by Wilda Daughter         2/29/2024     1:48 PM   Patient Reported Additional Medications   Patient  reports taking the following new medications NA     Echocardiogram stable.  No changes in echocardiogram.  No cardiac symptoms.  No angina symptoms.      Feels like lungs are stable. Advair twice daily.  Using albuterol once per day, due to some shortness of breath, not wheezing.  Gets more shortness of breath with activity.      Blood pressure at home has been running 130-140s.  No dizziness or light headed.     No falls.  Recent weight loss due to some gastroenterology upset and vomiting.     DEXA scan (bone density test for osteoporosis) never got done.      Lives in house; not driving.  Children drive.  Grocery delivery.  Cooks by self        Abdominal Pain     History of Present Illness     Asthma:  She presents for follow up of asthma.  She has some cough, no wheezing, and some shortness of breath.  She is using a relief medication daily. She does not miss any doses of her controller medication throughout the week. Patient is aware of the following triggers: unaware of any triggers. The patient has not had a visit to the Emergency Room, Urgent Care or Hospital due to asthma since the last clinic visit.     Hypertension: She presents for follow up of hypertension.  She does check blood pressure  regularly outside of the clinic. Outpatient blood pressures have not been over 140/90. She does not follow a low salt diet.     Vascular Disease:  She presents for follow up of vascular disease.     She never takes nitroglycerin. She is not taking daily aspirin.    Reason for visit:  Follow up    She eats 2-3 servings of fruits and vegetables daily.She consumes 2 sweetened beverage(s) daily.She exercises with enough effort to increase her heart rate 9 or less minutes per day.  She exercises with enough effort to increase her heart rate 3 or less days per week.   She is taking medications regularly.                 Review of Systems  Constitutional, HEENT, cardiovascular, pulmonary, GI, , musculoskeletal, neuro, skin,  "endocrine and psych systems are negative, except as otherwise noted.      Objective    /40   Pulse 81   Temp 97.8  F (36.6  C) (Oral)   Resp 18   Ht 1.537 m (5' 0.5\")   Wt 49.9 kg (110 lb)   LMP  (LMP Unknown)   SpO2 95%   Breastfeeding No   BMI 21.13 kg/m    Body mass index is 21.13 kg/m .  Physical Exam   GENERAL: alert and no distress  EYES: Eyes grossly normal to inspection, PERRL and conjunctivae and sclerae normal  HENT: ear canals and TM's normal, nose and mouth without ulcers or lesions  NECK: no adenopathy, no asymmetry, masses, or scars  RESP: lungs clear to auscultation - no rales, rhonchi or wheezes  CV: regular rate and rhythm, normal S1 S2, no S3 or S4, no murmur, click or rub, no peripheral edema  ABDOMEN: soft, nontender, no hepatosplenomegaly, no masses and bowel sounds normal  MS: no gross musculoskeletal defects noted, no edema  SKIN: no suspicious lesions or rashes  NEURO: Normal strength and tone, mentation intact and speech normal  PSYCH: mentation appears normal, affect normal/bright            Signed Electronically by: Layo Sevilla MD    "

## 2024-03-01 LAB
ALBUMIN SERPL BCG-MCNC: 3.9 G/DL (ref 3.5–5.2)
ALP SERPL-CCNC: 110 U/L (ref 40–150)
ALT SERPL W P-5'-P-CCNC: 11 U/L (ref 0–50)
ANION GAP SERPL CALCULATED.3IONS-SCNC: 9 MMOL/L (ref 7–15)
AST SERPL W P-5'-P-CCNC: 17 U/L (ref 0–45)
BILIRUB SERPL-MCNC: 0.4 MG/DL
BUN SERPL-MCNC: 28.5 MG/DL (ref 8–23)
CALCIUM SERPL-MCNC: 9.7 MG/DL (ref 8.8–10.2)
CHLORIDE SERPL-SCNC: 104 MMOL/L (ref 98–107)
CHOLEST SERPL-MCNC: 99 MG/DL
CREAT SERPL-MCNC: 1.22 MG/DL (ref 0.51–0.95)
DEPRECATED HCO3 PLAS-SCNC: 23 MMOL/L (ref 22–29)
EGFRCR SERPLBLD CKD-EPI 2021: 43 ML/MIN/1.73M2
FASTING STATUS PATIENT QL REPORTED: NO
GLUCOSE SERPL-MCNC: 120 MG/DL (ref 70–99)
HDLC SERPL-MCNC: 47 MG/DL
LDLC SERPL CALC-MCNC: 37 MG/DL
NONHDLC SERPL-MCNC: 52 MG/DL
POTASSIUM SERPL-SCNC: 5.1 MMOL/L (ref 3.4–5.3)
PROT SERPL-MCNC: 6.6 G/DL (ref 6.4–8.3)
SODIUM SERPL-SCNC: 136 MMOL/L (ref 135–145)
TRIGL SERPL-MCNC: 76 MG/DL

## 2024-04-29 ENCOUNTER — NURSE TRIAGE (OUTPATIENT)
Dept: PEDIATRICS | Facility: CLINIC | Age: 89
End: 2024-04-29
Payer: COMMERCIAL

## 2024-04-29 NOTE — TELEPHONE ENCOUNTER
Nurse Triage SBAR    Is this a 2nd Level Triage? YES, LICENSED PRACTITIONER REVIEW IS REQUIRED  Routed to provider    Incoming call from patient's daughter Wilda (CTC on file).   Situation: Patient exposed to Covid-19 by daughter who has been visiting since Thursday (daughter tested positive today; mild symptoms, now staying at a hotel).    Background: Not Covid-19 vaccinated. Hx asthma (uses Advair - steroid inhaler), hypertension, chronic kidney disease    Assessment: Patient is asymptomatic.    Protocol Recommended Disposition:   Discuss With PCP And Callback By Nurse Today  Please call daughterWilda, with plan.    Reason for Disposition   COVID-19 EXPOSURE within last 14 days and weak immune system (e.g., HIV positive, cancer chemo, splenectomy, organ transplant, chronic steroids) and NO symptoms    Additional Information   Negative: COVID-19 lab test positive   Negative: Lives with someone known to have influenza (flu test positive) and flu-like symptoms (e.g., cough, runny nose, sore throat, SOB; with or without fever)   Negative: Symptoms of COVID-19 (e.g., cough, fever, SOB, or others) and doctor (or NP/PA) diagnosed COVID-19 based on symptoms   Negative: Symptoms of COVID-19 (e.g., cough, fever, SOB, or others) and within 14 days of COVID-19 EXPOSURE   Negative: Symptoms of COVID-19 (e.g., cough, fever, SOB, or others) and within 14 days of being in a high-risk area for COVID-19 community spread (identified by CDC)   Negative: Difficulty breathing (shortness of breath) occurs and onset > 14 days after COVID-19 EXPOSURE   Negative: Cough occurs and onset > 14 days after COVID-19 EXPOSURE   Negative: Common cold symptoms and onset > 14 days after COVID-19 EXPOSURE   Negative: COVID-19 vaccine reaction suspected (e.g., fever, headache, muscle aches) occurring during days 1-3 after getting vaccine   Negative: COVID-19 vaccine, questions about   Negative: COVID-19 EXPOSURE within last 14 days and requests  COVID-19 lab test to return to work and NO symptoms    Protocols used: Coronavirus (COVID-19) Exposure-A-OH

## 2024-04-29 NOTE — TELEPHONE ENCOUNTER
All we can do is monitor for symptoms.  No preventive measure available.  I would have her get some tests to do periodically over the next few days, as well as a oximeter to have available to monitor her O2 sats.    Layo Sevilla MD  Internal Medicine and Pediatrics

## 2024-04-29 NOTE — TELEPHONE ENCOUNTER
RN called and spoke with patient's daughter. Relayed provider message. Informed Wilda on O2 levels and educated- under 90% go to ER. Under 94% call us to discuss symptoms. Instructed pt to call back if new or worsening symptoms.    Wilda was given an opportunity to ask questions, verbalized understanding of plan, and is agreeable.      Latasha LAWSON RN on 4/29/2024 at 9:00 AM

## 2024-05-16 ENCOUNTER — APPOINTMENT (OUTPATIENT)
Dept: GENERAL RADIOLOGY | Facility: CLINIC | Age: 89
DRG: 178 | End: 2024-05-16
Attending: STUDENT IN AN ORGANIZED HEALTH CARE EDUCATION/TRAINING PROGRAM
Payer: COMMERCIAL

## 2024-05-16 ENCOUNTER — OFFICE VISIT (OUTPATIENT)
Dept: URGENT CARE | Facility: URGENT CARE | Age: 89
End: 2024-05-16
Payer: COMMERCIAL

## 2024-05-16 ENCOUNTER — TELEPHONE (OUTPATIENT)
Dept: PEDIATRICS | Facility: CLINIC | Age: 89
End: 2024-05-16
Payer: COMMERCIAL

## 2024-05-16 ENCOUNTER — HOSPITAL ENCOUNTER (INPATIENT)
Facility: CLINIC | Age: 89
LOS: 2 days | Discharge: HOME HEALTH | DRG: 178 | End: 2024-05-18
Attending: EMERGENCY MEDICINE | Admitting: HOSPITALIST
Payer: COMMERCIAL

## 2024-05-16 VITALS
WEIGHT: 112 LBS | RESPIRATION RATE: 22 BRPM | TEMPERATURE: 97.2 F | SYSTOLIC BLOOD PRESSURE: 96 MMHG | BODY MASS INDEX: 21.51 KG/M2 | OXYGEN SATURATION: 95 % | DIASTOLIC BLOOD PRESSURE: 48 MMHG

## 2024-05-16 DIAGNOSIS — N17.9 ACUTE KIDNEY INJURY (H): ICD-10-CM

## 2024-05-16 DIAGNOSIS — I48.91 ATRIAL FIBRILLATION WITH SLOW VENTRICULAR RESPONSE (H): ICD-10-CM

## 2024-05-16 DIAGNOSIS — I50.22 CHRONIC SYSTOLIC HEART FAILURE (H): ICD-10-CM

## 2024-05-16 DIAGNOSIS — R53.83 OTHER FATIGUE: ICD-10-CM

## 2024-05-16 DIAGNOSIS — I50.31 ACUTE HEART FAILURE WITH PRESERVED EJECTION FRACTION (H): ICD-10-CM

## 2024-05-16 DIAGNOSIS — R42 DIZZINESS: ICD-10-CM

## 2024-05-16 DIAGNOSIS — R06.02 SHORTNESS OF BREATH: Primary | ICD-10-CM

## 2024-05-16 DIAGNOSIS — U07.1 COVID-19 VIRUS INFECTION: ICD-10-CM

## 2024-05-16 DIAGNOSIS — R00.1 BRADYCARDIA: ICD-10-CM

## 2024-05-16 DIAGNOSIS — E87.6 HYPOKALEMIA: Primary | ICD-10-CM

## 2024-05-16 DIAGNOSIS — I48.91 ATRIAL FIBRILLATION WITH RVR (H): ICD-10-CM

## 2024-05-16 DIAGNOSIS — I50.21 ACUTE SYSTOLIC CONGESTIVE HEART FAILURE (H): ICD-10-CM

## 2024-05-16 DIAGNOSIS — E78.5 HYPERLIPIDEMIA LDL GOAL <130: ICD-10-CM

## 2024-05-16 LAB
ALBUMIN SERPL BCG-MCNC: 3.7 G/DL (ref 3.5–5.2)
ALP SERPL-CCNC: 108 U/L (ref 40–150)
ALT SERPL W P-5'-P-CCNC: 18 U/L (ref 0–50)
ANION GAP SERPL CALCULATED.3IONS-SCNC: 17 MMOL/L (ref 7–15)
AST SERPL W P-5'-P-CCNC: 25 U/L (ref 0–45)
BASOPHILS # BLD AUTO: 0 10E3/UL (ref 0–0.2)
BASOPHILS NFR BLD AUTO: 0 %
BILIRUB SERPL-MCNC: 0.6 MG/DL
BUN SERPL-MCNC: 38.3 MG/DL (ref 8–23)
CALCIUM SERPL-MCNC: 9.1 MG/DL (ref 8.8–10.2)
CHLORIDE SERPL-SCNC: 96 MMOL/L (ref 98–107)
CREAT SERPL-MCNC: 1.89 MG/DL (ref 0.51–0.95)
DEPRECATED HCO3 PLAS-SCNC: 23 MMOL/L (ref 22–29)
EGFRCR SERPLBLD CKD-EPI 2021: 25 ML/MIN/1.73M2
EOSINOPHIL # BLD AUTO: 0.1 10E3/UL (ref 0–0.7)
EOSINOPHIL NFR BLD AUTO: 1 %
ERYTHROCYTE [DISTWIDTH] IN BLOOD BY AUTOMATED COUNT: 13.9 % (ref 10–15)
FASTING STATUS PATIENT QL REPORTED: NO
FLUAV RNA SPEC QL NAA+PROBE: NEGATIVE
FLUBV RNA RESP QL NAA+PROBE: NEGATIVE
GLUCOSE SERPL-MCNC: 113 MG/DL (ref 70–99)
HCT VFR BLD AUTO: 36.6 % (ref 35–47)
HGB BLD-MCNC: 11.8 G/DL (ref 11.7–15.7)
HOLD SPECIMEN: NORMAL
IMM GRANULOCYTES # BLD: 0 10E3/UL
IMM GRANULOCYTES NFR BLD: 0 %
LYMPHOCYTES # BLD AUTO: 1.8 10E3/UL (ref 0.8–5.3)
LYMPHOCYTES NFR BLD AUTO: 19 %
MAGNESIUM SERPL-MCNC: 2.2 MG/DL (ref 1.7–2.3)
MCH RBC QN AUTO: 29.4 PG (ref 26.5–33)
MCHC RBC AUTO-ENTMCNC: 32.2 G/DL (ref 31.5–36.5)
MCV RBC AUTO: 91 FL (ref 78–100)
MONOCYTES # BLD AUTO: 0.9 10E3/UL (ref 0–1.3)
MONOCYTES NFR BLD AUTO: 10 %
NEUTROPHILS # BLD AUTO: 6.3 10E3/UL (ref 1.6–8.3)
NEUTROPHILS NFR BLD AUTO: 70 %
NRBC # BLD AUTO: 0 10E3/UL
NRBC BLD AUTO-RTO: 0 /100
NT-PROBNP SERPL-MCNC: 4531 PG/ML (ref 0–1800)
PLATELET # BLD AUTO: 256 10E3/UL (ref 150–450)
POTASSIUM SERPL-SCNC: 4.5 MMOL/L (ref 3.4–5.3)
PROT SERPL-MCNC: 6.7 G/DL (ref 6.4–8.3)
RBC # BLD AUTO: 4.02 10E6/UL (ref 3.8–5.2)
RSV RNA SPEC NAA+PROBE: NEGATIVE
SARS-COV-2 RNA RESP QL NAA+PROBE: POSITIVE
SODIUM SERPL-SCNC: 136 MMOL/L (ref 135–145)
TROPONIN T SERPL HS-MCNC: 48 NG/L
TROPONIN T SERPL HS-MCNC: 50 NG/L
WBC # BLD AUTO: 9.1 10E3/UL (ref 4–11)

## 2024-05-16 PROCEDURE — 250N000011 HC RX IP 250 OP 636: Performed by: EMERGENCY MEDICINE

## 2024-05-16 PROCEDURE — 93005 ELECTROCARDIOGRAM TRACING: CPT | Performed by: FAMILY MEDICINE

## 2024-05-16 PROCEDURE — 99214 OFFICE O/P EST MOD 30 MIN: CPT | Performed by: FAMILY MEDICINE

## 2024-05-16 PROCEDURE — 83880 ASSAY OF NATRIURETIC PEPTIDE: CPT | Performed by: EMERGENCY MEDICINE

## 2024-05-16 PROCEDURE — 93005 ELECTROCARDIOGRAM TRACING: CPT

## 2024-05-16 PROCEDURE — 71046 X-RAY EXAM CHEST 2 VIEWS: CPT

## 2024-05-16 PROCEDURE — 36415 COLL VENOUS BLD VENIPUNCTURE: CPT | Performed by: EMERGENCY MEDICINE

## 2024-05-16 PROCEDURE — 85025 COMPLETE CBC W/AUTO DIFF WBC: CPT | Performed by: STUDENT IN AN ORGANIZED HEALTH CARE EDUCATION/TRAINING PROGRAM

## 2024-05-16 PROCEDURE — 99285 EMERGENCY DEPT VISIT HI MDM: CPT

## 2024-05-16 PROCEDURE — 96374 THER/PROPH/DIAG INJ IV PUSH: CPT

## 2024-05-16 PROCEDURE — 87637 SARSCOV2&INF A&B&RSV AMP PRB: CPT | Performed by: EMERGENCY MEDICINE

## 2024-05-16 PROCEDURE — 258N000003 HC RX IP 258 OP 636: Performed by: EMERGENCY MEDICINE

## 2024-05-16 PROCEDURE — 36415 COLL VENOUS BLD VENIPUNCTURE: CPT | Performed by: STUDENT IN AN ORGANIZED HEALTH CARE EDUCATION/TRAINING PROGRAM

## 2024-05-16 PROCEDURE — 82040 ASSAY OF SERUM ALBUMIN: CPT | Performed by: EMERGENCY MEDICINE

## 2024-05-16 PROCEDURE — 84484 ASSAY OF TROPONIN QUANT: CPT | Performed by: EMERGENCY MEDICINE

## 2024-05-16 PROCEDURE — 85025 COMPLETE CBC W/AUTO DIFF WBC: CPT | Performed by: EMERGENCY MEDICINE

## 2024-05-16 PROCEDURE — 96361 HYDRATE IV INFUSION ADD-ON: CPT

## 2024-05-16 PROCEDURE — 99223 1ST HOSP IP/OBS HIGH 75: CPT | Performed by: HOSPITALIST

## 2024-05-16 PROCEDURE — 210N000002 HC R&B HEART CARE

## 2024-05-16 PROCEDURE — 83735 ASSAY OF MAGNESIUM: CPT | Performed by: EMERGENCY MEDICINE

## 2024-05-16 RX ORDER — FUROSEMIDE 10 MG/ML
20 INJECTION INTRAMUSCULAR; INTRAVENOUS ONCE
Status: COMPLETED | OUTPATIENT
Start: 2024-05-16 | End: 2024-05-16

## 2024-05-16 RX ORDER — POTASSIUM CHLORIDE 1500 MG/1
20 TABLET, EXTENDED RELEASE ORAL DAILY
Status: ON HOLD | COMMUNITY
End: 2024-05-18

## 2024-05-16 RX ADMIN — SODIUM CHLORIDE 500 ML: 9 INJECTION, SOLUTION INTRAVENOUS at 20:11

## 2024-05-16 RX ADMIN — FUROSEMIDE 20 MG: 10 INJECTION, SOLUTION INTRAMUSCULAR; INTRAVENOUS at 20:36

## 2024-05-16 ASSESSMENT — ACTIVITIES OF DAILY LIVING (ADL)
ADLS_ACUITY_SCORE: 38

## 2024-05-16 ASSESSMENT — COLUMBIA-SUICIDE SEVERITY RATING SCALE - C-SSRS
2. HAVE YOU ACTUALLY HAD ANY THOUGHTS OF KILLING YOURSELF IN THE PAST MONTH?: NO
1. IN THE PAST MONTH, HAVE YOU WISHED YOU WERE DEAD OR WISHED YOU COULD GO TO SLEEP AND NOT WAKE UP?: NO
6. HAVE YOU EVER DONE ANYTHING, STARTED TO DO ANYTHING, OR PREPARED TO DO ANYTHING TO END YOUR LIFE?: NO

## 2024-05-16 NOTE — TELEPHONE ENCOUNTER
Dr. Sevilla,    Pt's daughter calling (CTC on file). She states that at the pt's LOV 2/29 the possibility of home care was discussed if declines at home were seen. She is calling to see if a VV is appropriate to discuss these options. She reports the pt is asking for more help at home with tasks, has weakness, allergies, asthma and a-fib. She states Christina lives alone, and the daughter goes back to [place] Monday.    Pt's daughter asking if VV is appropriate to discuss further, or if in person needed. Pt's daughter requesting call back IF in person is needed. Please review and advise, thanks!  Keyon Olivares, RN on 5/16/2024 at 9:31 AM

## 2024-05-16 NOTE — PROGRESS NOTES
SUBJECTIVE:  Chief Complaint   Patient presents with    Asthma     Short of breath, possible Afib started last week. Using Albuterol at least once daily- helps    appetite     Lack of appetite since last week, possible illness did not get testing for anything     Genie Persaud is a 88 year old female who presents with a chief complaint of SOB, feeling unwell for about a week.    1 daughter had COVID 3 weeks ago, patient did not have any symptoms.  Her son saw her but was sick afterwards.  Patient states that she had diarrhea last week for several days, this did improve but then started to feel unwell.    Has been trying to use albuterol inhaler thinking that was asthma but has not resolve.    Has BP machine, states that was okay this morning.    Seen by Cardiology in Feb - paroxysmal atrial fibrillation on chronic low-dose Eliquis, nonobstructive CAD, cardiomyopathy, moderate aortic sufficiency, mild to moderate ascending aortic dilation and labile hypertension     Past Medical History:   Diagnosis Date    Anemia 03/10/2009    Chronic disease, by Fe studies; awaiting full GI w/u and repeat colonoscopy, ERCP.    Basal Cell Carcinoma--back     Coronary artery disease 03/09/2017    3/2/2017 - Two vessel coronary artery disease with mLAD 50% stenosis, D3 50% stenosis, pLCx 50% stenosis and mLCx 50% stenosis    Essential hypertension 09/01/2016    White coat hypertension;  24 hour ambulatory monitoring normal. Do not titrate up further.      Hyperlipidemia 02/10/2010    Moderate persistent asthma     Nonrheumatic aortic valve insufficiency 06/29/2017    Osteopenia     Paroxysmal atrial fibrillation 12/26/2017    Pulmonary nodule 03/03/2009    PET scan reportedly normal; biopsy not advised.    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   Problem list name updated by automated process. Provider to review    Vasculitis 04/20/2009    Possible increased activity of thoracic aorta, on PET scan w/u for pulmonary nodule.      Current Outpatient Medications   Medication Sig Dispense Refill    acetaminophen (TYLENOL) 325 MG tablet Take 975 mg by mouth every 8 hours as needed for mild pain      albuterol (PROAIR HFA/PROVENTIL HFA/VENTOLIN HFA) 108 (90 Base) MCG/ACT inhaler Inhale 1-2 puffs into the lungs every 6 hours as needed for shortness of breath / dyspnea or wheezing 18 g 11    amLODIPine (NORVASC) 5 MG tablet Take 1 tablet (5 mg) by mouth daily 90 tablet 3    apixaban ANTICOAGULANT (ELIQUIS ANTICOAGULANT) 2.5 MG tablet Take 1 tablet (2.5 mg) by mouth 2 times daily 180 tablet 3    fluticasone-salmeterol (ADVAIR HFA) 230-21 MCG/ACT inhaler Inhale 2 puffs into the lungs 2 times daily 12 g 11    latanoprost (XALATAN) 0.005 % ophthalmic solution INSTILL ONE DROP IN EACH EYE AT BEDTIME **REFRIGERATE UNTIL OPEN-STORE AT ROOM TEMP ONCE OPENED* 2.5 mL 97    losartan (COZAAR) 100 MG tablet Take 1 tablet (100 mg) by mouth daily 90 tablet 3    metoprolol succinate ER (TOPROL XL) 50 MG 24 hr tablet Take 1 tablet (50 mg) by mouth daily 90 tablet 3    montelukast (SINGULAIR) 10 MG tablet Take 1 tablet (10 mg) by mouth At Bedtime 90 tablet 3    omeprazole (PRILOSEC) 10 MG DR capsule Take 1 capsule (10 mg) by mouth daily 90 capsule 3    potassium chloride ER (KLOR-CON M) 20 MEQ CR tablet TAKE 1 TABLET BY MOUTH TWICE DAILY **VIAL FILL** Strength: 20 mEq 60 tablet 97    torsemide (DEMADEX) 20 MG tablet Take 1 tablet (20 mg) by mouth daily 90 tablet 3    calcium citrate-vitamin D (CITRACAL) 315-200 MG-UNIT TABS per tablet Take 1 tablet by mouth 2 times daily (Patient not taking: Reported on 5/16/2024)      neomycin-polymyxin-hydrocortisone (CORTISPORIN) 3.5-61373-4 otic suspension Place 3 drops into the right ear 4 times daily (Patient not taking: Reported on 5/16/2024)  10 mL 0    rosuvastatin (CRESTOR) 10 MG tablet Take 1 tablet (10 mg) by mouth daily (Patient not taking: Reported on 5/16/2024) 90 tablet 3     Social History     Tobacco Use    Smoking status: Never     Passive exposure: Never    Smokeless tobacco: Never   Substance Use Topics    Alcohol use: Yes     Alcohol/week: 1.0 - 2.0 standard drink of alcohol     Types: 1 - 2 Standard drinks or equivalent per week     Comment: 1 glass of wine 1-2 days per week       ROS:  Review of systems negative except as stated above.    EXAM:   BP 96/48   Temp 97.2  F (36.2  C) (Oral)   Resp 22   Wt 50.8 kg (112 lb)   LMP  (LMP Unknown)   SpO2 95%   BMI 21.51 kg/m    GENERAL APPEARANCE: healthy, alert and no distress, fatigue  CHEST: clear to auscultation, no wheezes or crackles  CV: corinne  EXTREMITIES: peripheral pulses normal  PSYCH:alert, affect bright    EKG - bradycardia, rate 46, nonspecific T abnormalities, possible 1st degree AV block    ASSESSMENT/PLAN:  (R06.02) Shortness of breath  (primary encounter diagnosis)  Plan: EKG 12-lead, tracing only        To ER    (R42) Dizziness  Plan: EKG 12-lead, tracing only  To ER            (R00.1) Bradycardia  Plan: to ER      Patient with comorbid medical health history concerning for cardiac etiology causing current SOB/dizziness.  Underlying afib but with current heart rate in 40's that is concerning that cardiac etiology needs further interrogation.  Patient will go via private car to [facility], ER notified    Anthony French MD  May 16, 2024 6:30 PM

## 2024-05-17 ENCOUNTER — APPOINTMENT (OUTPATIENT)
Dept: PHYSICAL THERAPY | Facility: CLINIC | Age: 89
DRG: 178 | End: 2024-05-17
Attending: HOSPITALIST
Payer: COMMERCIAL

## 2024-05-17 LAB
ANION GAP SERPL CALCULATED.3IONS-SCNC: 13 MMOL/L (ref 7–15)
ATRIAL RATE - MUSE: NORMAL BPM
BUN SERPL-MCNC: 35.6 MG/DL (ref 8–23)
CALCIUM SERPL-MCNC: 8.8 MG/DL (ref 8.8–10.2)
CHLORIDE SERPL-SCNC: 101 MMOL/L (ref 98–107)
CREAT SERPL-MCNC: 1.78 MG/DL (ref 0.51–0.95)
DEPRECATED HCO3 PLAS-SCNC: 23 MMOL/L (ref 22–29)
DIASTOLIC BLOOD PRESSURE - MUSE: NORMAL MMHG
EGFRCR SERPLBLD CKD-EPI 2021: 27 ML/MIN/1.73M2
ERYTHROCYTE [DISTWIDTH] IN BLOOD BY AUTOMATED COUNT: 13.8 % (ref 10–15)
FASTING STATUS PATIENT QL REPORTED: NO
GLUCOSE SERPL-MCNC: 92 MG/DL (ref 70–99)
HCT VFR BLD AUTO: 34.5 % (ref 35–47)
HGB BLD-MCNC: 11.5 G/DL (ref 11.7–15.7)
INTERPRETATION ECG - MUSE: NORMAL
MCH RBC QN AUTO: 30.2 PG (ref 26.5–33)
MCHC RBC AUTO-ENTMCNC: 33.3 G/DL (ref 31.5–36.5)
MCV RBC AUTO: 91 FL (ref 78–100)
P AXIS - MUSE: NORMAL DEGREES
PLATELET # BLD AUTO: 221 10E3/UL (ref 150–450)
POTASSIUM SERPL-SCNC: 4.4 MMOL/L (ref 3.4–5.3)
PR INTERVAL - MUSE: NORMAL MS
QRS DURATION - MUSE: 92 MS
QT - MUSE: 336 MS
QTC - MUSE: 318 MS
R AXIS - MUSE: -39 DEGREES
RBC # BLD AUTO: 3.81 10E6/UL (ref 3.8–5.2)
SODIUM SERPL-SCNC: 137 MMOL/L (ref 135–145)
SYSTOLIC BLOOD PRESSURE - MUSE: NORMAL MMHG
T AXIS - MUSE: 248 DEGREES
VENTRICULAR RATE- MUSE: 54 BPM
WBC # BLD AUTO: 6.1 10E3/UL (ref 4–11)

## 2024-05-17 PROCEDURE — 85027 COMPLETE CBC AUTOMATED: CPT | Performed by: HOSPITALIST

## 2024-05-17 PROCEDURE — 36415 COLL VENOUS BLD VENIPUNCTURE: CPT | Performed by: HOSPITALIST

## 2024-05-17 PROCEDURE — 80048 BASIC METABOLIC PNL TOTAL CA: CPT | Performed by: HOSPITALIST

## 2024-05-17 PROCEDURE — 97110 THERAPEUTIC EXERCISES: CPT | Mod: GP

## 2024-05-17 PROCEDURE — 250N000013 HC RX MED GY IP 250 OP 250 PS 637: Performed by: HOSPITALIST

## 2024-05-17 PROCEDURE — 97530 THERAPEUTIC ACTIVITIES: CPT | Mod: GP

## 2024-05-17 PROCEDURE — 99232 SBSQ HOSP IP/OBS MODERATE 35: CPT | Performed by: HOSPITALIST

## 2024-05-17 PROCEDURE — 97161 PT EVAL LOW COMPLEX 20 MIN: CPT | Mod: GP

## 2024-05-17 PROCEDURE — 210N000002 HC R&B HEART CARE

## 2024-05-17 PROCEDURE — 97116 GAIT TRAINING THERAPY: CPT | Mod: GP

## 2024-05-17 PROCEDURE — 99222 1ST HOSP IP/OBS MODERATE 55: CPT | Performed by: INTERNAL MEDICINE

## 2024-05-17 PROCEDURE — 258N000003 HC RX IP 258 OP 636: Performed by: HOSPITALIST

## 2024-05-17 RX ORDER — MONTELUKAST SODIUM 10 MG/1
10 TABLET ORAL AT BEDTIME
Status: DISCONTINUED | OUTPATIENT
Start: 2024-05-17 | End: 2024-05-18 | Stop reason: HOSPADM

## 2024-05-17 RX ORDER — TORSEMIDE 20 MG/1
20 TABLET ORAL DAILY
Status: DISCONTINUED | OUTPATIENT
Start: 2024-05-17 | End: 2024-05-18 | Stop reason: HOSPADM

## 2024-05-17 RX ORDER — SODIUM CHLORIDE 9 MG/ML
INJECTION, SOLUTION INTRAVENOUS CONTINUOUS
Status: ACTIVE | OUTPATIENT
Start: 2024-05-17 | End: 2024-05-17

## 2024-05-17 RX ORDER — FLUTICASONE PROPIONATE AND SALMETEROL XINAFOATE 230; 21 UG/1; UG/1
2 AEROSOL, METERED RESPIRATORY (INHALATION) 2 TIMES DAILY
Status: DISCONTINUED | OUTPATIENT
Start: 2024-05-17 | End: 2024-05-18 | Stop reason: HOSPADM

## 2024-05-17 RX ORDER — LATANOPROST 50 UG/ML
1 SOLUTION/ DROPS OPHTHALMIC AT BEDTIME
Status: DISCONTINUED | OUTPATIENT
Start: 2024-05-17 | End: 2024-05-18 | Stop reason: HOSPADM

## 2024-05-17 RX ORDER — PANTOPRAZOLE SODIUM 20 MG/1
20 TABLET, DELAYED RELEASE ORAL DAILY
Status: DISCONTINUED | OUTPATIENT
Start: 2024-05-17 | End: 2024-05-18 | Stop reason: HOSPADM

## 2024-05-17 RX ORDER — LIDOCAINE 40 MG/G
CREAM TOPICAL
Status: DISCONTINUED | OUTPATIENT
Start: 2024-05-17 | End: 2024-05-18 | Stop reason: HOSPADM

## 2024-05-17 RX ORDER — ACETAMINOPHEN 325 MG/1
975 TABLET ORAL EVERY 8 HOURS PRN
Status: DISCONTINUED | OUTPATIENT
Start: 2024-05-17 | End: 2024-05-18 | Stop reason: HOSPADM

## 2024-05-17 RX ORDER — FLUTICASONE PROPIONATE 50 MCG
1 SPRAY, SUSPENSION (ML) NASAL DAILY
Status: DISCONTINUED | OUTPATIENT
Start: 2024-05-17 | End: 2024-05-18 | Stop reason: HOSPADM

## 2024-05-17 RX ORDER — ROSUVASTATIN CALCIUM 10 MG/1
10 TABLET, COATED ORAL DAILY
Status: DISCONTINUED | OUTPATIENT
Start: 2024-05-17 | End: 2024-05-18 | Stop reason: HOSPADM

## 2024-05-17 RX ADMIN — FLUTICASONE PROPIONATE 1 SPRAY: 50 SPRAY, METERED NASAL at 08:42

## 2024-05-17 RX ADMIN — ROSUVASTATIN CALCIUM 10 MG: 10 TABLET, FILM COATED ORAL at 08:41

## 2024-05-17 RX ADMIN — MONTELUKAST 10 MG: 10 TABLET, FILM COATED ORAL at 01:44

## 2024-05-17 RX ADMIN — APIXABAN 2.5 MG: 2.5 TABLET, FILM COATED ORAL at 20:39

## 2024-05-17 RX ADMIN — FLUTICASONE PROPIONATE AND SALMETEROL XINAFOATE 2 PUFF: 230; 21 AEROSOL, METERED RESPIRATORY (INHALATION) at 08:42

## 2024-05-17 RX ADMIN — MONTELUKAST 10 MG: 10 TABLET, FILM COATED ORAL at 20:39

## 2024-05-17 RX ADMIN — PANTOPRAZOLE SODIUM 20 MG: 20 TABLET, DELAYED RELEASE ORAL at 08:41

## 2024-05-17 RX ADMIN — SODIUM CHLORIDE: 9 INJECTION, SOLUTION INTRAVENOUS at 10:17

## 2024-05-17 RX ADMIN — APIXABAN 2.5 MG: 2.5 TABLET, FILM COATED ORAL at 08:41

## 2024-05-17 RX ADMIN — LATANOPROST 1 DROP: 50 SOLUTION/ DROPS OPHTHALMIC at 01:44

## 2024-05-17 ASSESSMENT — ACTIVITIES OF DAILY LIVING (ADL)
ADLS_ACUITY_SCORE: 38
ADLS_ACUITY_SCORE: 41
ADLS_ACUITY_SCORE: 26
ADLS_ACUITY_SCORE: 41
ADLS_ACUITY_SCORE: 26
ADLS_ACUITY_SCORE: 38
ADLS_ACUITY_SCORE: 41
ADLS_ACUITY_SCORE: 26
ADLS_ACUITY_SCORE: 26
ADLS_ACUITY_SCORE: 41
ADLS_ACUITY_SCORE: 40
ADLS_ACUITY_SCORE: 40
ADLS_ACUITY_SCORE: 41
ADLS_ACUITY_SCORE: 41
ADLS_ACUITY_SCORE: 26
ADLS_ACUITY_SCORE: 41
ADLS_ACUITY_SCORE: 40
ADLS_ACUITY_SCORE: 26
ADLS_ACUITY_SCORE: 40
ADLS_ACUITY_SCORE: 38
ADLS_ACUITY_SCORE: 41

## 2024-05-17 NOTE — UTILIZATION REVIEW
Admission Status; Secondary Review Determination       Under the authority of the Utilization Management Committee, the utilization review process indicated a secondary review on the above patient. The review outcome is based on review of the medical records, discussions with staff, and applying clinical experience noted on the date of the review.     (x) Inpatient Status Appropriate - This patient's medical care is consistent with medical management for inpatient care and reasonable inpatient medical practice.     RATIONALE FOR DETERMINATION     Genie Persaud is a 88 year old female with history of atrial fibrillation, chronic anticoagulation with Eliquis, stage 3 CKD, hypertension, CAD, and non rheumatic aortic valve insufficiency. She presented to the ED on 5/16/24 with weakness and shortness of breath.    ED evaluation showed COVID-19 without hypoxia and acute kidney injury with BUN 38.3 and creatinine 1.89.  She was admitted for supportive cares of COVID-19 and treatment of acute kidney injury with IV fluids.  Renal function improved only minimally overnight with IV fluids.  She remains in the hospital receiving IV fluid and had a monitoring of renal function.  Inpatient admission is appropriate for ongoing IV fluid and monitoring of renal function to ensure normalization of acute kidney injury.    At the time of admission with the information available to the attending physician more than 2 nights Hospital complex care was anticipated, based on patient risk of adverse outcome if treated as outpatient and complex care required. Inpatient admission is appropriate based on the Medicare guidelines.     This document was produced using voice recognition software       The information on this document is developed by the utilization review team in order for the business office to ensure compliance. This only denotes the appropriateness of proper admission status and does not reflect the quality of care rendered.    The definitions of Inpatient Status and Observation Status used in making the determination above are those provided in the CMS Coverage Manual, Chapter 1 and Chapter 6, section 70.4.   Sincerely,     Rahul Casiano MD    Utilization Review  Physician Advisor  [facility].

## 2024-05-17 NOTE — PLAN OF CARE
Care plan note:      Recent Vitals:  Temp: 97.5  F (36.4  C) Temp src: Oral BP: 124/43 Pulse: 71   Resp: 24 SpO2: 98 % O2 Device: None (Room air)      Orientation/Neuro: Alert and Oriented x 4  Pain: The patient is not having any pain.   Tele: Atrial fibrillation - controlled   IV medications: Normal Saline   Mobility: St. by assist   Skin: With in normal limits   Resp: RA.  GI: WDL  : Strict I&O     Diet: Tolerating diet:   Well  Orders Placed This Encounter      Combination Diet Low Saturated Fat Na <2400mg Diet, No Caffeine Diet      Safety/Concerns:  Fall Risk  Aggression Color: Green    Plan: Sating well on room air, cough is congested, pulmonary hygiene promoted. Oral intake has improved, voiding adequately.    Continue to monitor.      Tanna Javier RN

## 2024-05-17 NOTE — ED TRIAGE NOTES
Recent visit to  for generalized weakness, increased SOB. While at  EKG indicated a-fib. Also had low BP while there     Triage Assessment (Adult)       Row Name 05/16/24 2159 05/16/24 0175       Triage Assessment    Airway WDL WDL WDL       Respiratory WDL    Respiratory WDL X  SOB X  SOB       Skin Circulation/Temperature WDL    Skin Circulation/Temperature WDL WDL --       Cardiac WDL    Cardiac WDL X  recent EKG showed a-fib --    Cardiac Rhythm Atrial fibrillation --       Peripheral/Neurovascular WDL    Peripheral Neurovascular WDL WDL --       Cognitive/Neuro/Behavioral WDL    Cognitive/Neuro/Behavioral WDL WDL --

## 2024-05-17 NOTE — ED NOTES
[facility]  ED Nurse Handoff Report    ED Chief complaint: Abnormal Labs, Generalized Weakness, and Shortness of Breath      ED Diagnosis:   Final diagnoses:   Acute kidney injury (H24)   Atrial fibrillation with slow ventricular response (H)   Acute systolic congestive heart failure (H)       Code Status: Per admitting    Allergies:   Allergies   Allergen Reactions    Fosamax [Alendronate] GI Disturbance    Contrast Dye      Red arm redness and swelling     Evista [Raloxifene]      abd symptoms.     Flu Virus Vaccine      swollen and red at inj site    Pneumococcal Vaccine Other (See Comments)     Ended up in hospital with afib, pneumonia and asthma    Amoxicillin Rash       Patient Story:   Pt presents to the ED after going to urgent care with shortness of breath and generalized weakness. Pt was in afib at urgent care and deferred here. Bps have been soft with systolic number in the 90s, pt has been bradycardic in the 40s-50s and symptomatic. 500mL NS given. Pt is short of breath, BNP 4,531. Given 20mg lasix IV. Troponin 50, repeat troponin sent. Hx of asthma. Pt did not take her beta blockers today.    Focused Assessment:    Neuro: Alert, oriented x 4  Respiratory:productive cough, shortness of breath, 94% on RA  Cardiology:  atrial fibrillation with slow ventricular response, hypotensive, no chest pain or palpitations  Gastrointestinal: soft, non tender, non distended   Genitourinary/Renal:  purewick in place, JOSLYN with kidney labs creatinine 1.89 and GFR 25.  Musculoskeletal: moves all extremities   Skin: Intact skin   Lines: 18G RAC    Labs Ordered and Resulted from Time of ED Arrival to Time of ED Departure   COMPREHENSIVE METABOLIC PANEL - Abnormal       Result Value    Sodium 136      Potassium 4.5      Carbon Dioxide (CO2) 23      Anion Gap 17 (*)     Urea Nitrogen 38.3 (*)     Creatinine 1.89 (*)     GFR Estimate 25 (*)     Calcium 9.1      Chloride 96 (*)     Glucose 113 (*)     Alkaline  Phosphatase 108      AST 25      ALT 18      Protein Total 6.7      Albumin 3.7      Bilirubin Total 0.6      Patient Fasting > 8hrs? No     TROPONIN T, HIGH SENSITIVITY - Abnormal    Troponin T, High Sensitivity 50 (*)    NT PROBNP INPATIENT - Abnormal    N terminal Pro BNP Inpatient 4,531 (*)    MAGNESIUM - Normal    Magnesium 2.2     CBC WITH PLATELETS AND DIFFERENTIAL    WBC Count 9.1      RBC Count 4.02      Hemoglobin 11.8      Hematocrit 36.6      MCV 91      MCH 29.4      MCHC 32.2      RDW 13.9      Platelet Count 256      % Neutrophils 70      % Lymphocytes 19      % Monocytes 10      % Eosinophils 1      % Basophils 0      % Immature Granulocytes 0      NRBCs per 100 WBC 0      Absolute Neutrophils 6.3      Absolute Lymphocytes 1.8      Absolute Monocytes 0.9      Absolute Eosinophils 0.1      Absolute Basophils 0.0      Absolute Immature Granulocytes 0.0      Absolute NRBCs 0.0     INFLUENZA A/B, RSV, & SARS-COV2 PCR   TROPONIN T, HIGH SENSITIVITY        Chest XR,  PA & LAT   Final Result   IMPRESSION: Cardiomegaly. Tortuous ectatic calcified thoracic aorta. Pulmonary vascularity within normal limits. No focal airspace infiltrates. Hyperinflation versus deep inspiration changes. Clinical correlation for air trapping. Degenerative changes    thoracic spine stable. Visualized degenerative changes bilateral shoulders appear similar. Visualized upper abdomen unremarkable.            Treatments and/or interventions provided:      Medications   sodium chloride 0.9% BOLUS 500 mL (0 mLs Intravenous Stopped 5/16/24 2057)   furosemide (LASIX) injection 20 mg (20 mg Intravenous $Given 5/16/24 2036)        Patient's response to treatments and/or interventions:   Resting comfortably    To be done/followed up on inpatient unit:    See any in-patient orders    Does this patient have any cognitive concerns?:  none    Activity level - Baseline/Home:    Unknown    Activity Level - Current:     Total Care    Patient's  Preferred language: English     Needed?: No    Isolation: None  Infection: Not Applicable  Patient tested for COVID 19 prior to admission: YES    Bariatric?: No    Vital Signs:   Vitals:    05/16/24 2005 05/16/24 2015 05/16/24 2030 05/16/24 2100   BP: 110/51 97/42 97/64 (!) 98/31   Pulse: (!) 48 51 (!) 41 (!) 49   Resp: 28 17 29 29   Temp:       TempSrc:       SpO2: 94% 95% 95% 94%   Weight:       Height:           Cardiac Rhythm:Cardiac Rhythm: Atrial fibrillation (bradycardic)    Was the PSS-3 completed:   Yes    Family Comments: daughter Wilda Rogers at bedside, requesting to be called for updates with pt consent [phone]    For the majority of the shift this patient's behavior was Green.   Behavioral interventions performed were none    ED NURSE PHONE NUMBER: *[phone]

## 2024-05-17 NOTE — H&P
"[facility]    History and Physical  Hospitalist       Date of Admission:  5/16/2024    Assessment & Plan   Genie Persaud is a 88 year old female with a history of atrial fibrillation on Eliquis, stage 3 CKD, hypertension, CAD, non rheumatic aortic valve insufficiency who presents to the ED with her daughter for weakness and shortness of breath.     Generalized weakness and SOB  Covid 19 +; no hypoxia  Bradycardia and hypotensive  Patient states onset of symptoms about a week ago noting generalized weakness and shortness of breath.  At that time she states she had the \"GI flu\" with nausea and diarrhea which is since resolved.  Decreased p.o. intake.  She has been compliant to her medications.  Regarding shortness of breath she reports history of asthma however notes no recent flare, notes seasonal allergies with nasal congestion and postnasal drainage.  No productive cough.  Exposed to COVID a few days ago, in ED diagnosed with COVID.  No hypoxia.  In ED BUN/CR 38/1.89 with baseline creatinine 1.22.  CXR without acute infiltrate, no pulmonary vascular congestion.  Bradycardic 40-50, SBP 90-1 10.  Initial troponin 50, delta 48, flat.  EKG A-fib, nonspecific ST-T changes.  BNP 4.5K in ED initially given furosemide 20 mg once IV however due to concerns of volume depletion relative to JOSLYN in ED then given fluid resuscitation.  Suspect presentation with generalized weakness and dyspnea multifactorial including positive COVID status, bradycardia [on B2] and hypotensive, relative dehydration due to emesis and diarrhea 1 week ago, [reluctant to give further fluid resuscitation given presentation with elevated BNP].  -Special isolation for COVID, no hypoxia therefore not a candidate for remdesivir, steroids.  Already on apixaban for DVT prophylaxis.  -Pulse oximetry, continue PTA inhalers when verified.  -Hold metoprolol given bradycardia, after IV fluids for clinical dehydration in ED recheck BMP/Cr " in a.m.  If persistent bradycardia on hold of metoprolol consider Cardiology for symptomatic bradycardia as contributory to presentation.  -Hold PTA antihypertensive due to relative hypotension.  Reassess fluid status in AM.   -PT when able.     Elevated troponin suspect demand ischemia  Chronic A-fib, bradycardic on metoprolol, on Eliquis  Aortic insufficiency on echo  -As above metoprolol on hold due to bradycardia.  If persistent bradycardia consider Cardiology consult for symptomatic bradycardia      JOSLYN  Suspect relative volume depletion with history of emesis and diarrhea 1 week ago attributed to viral gastritis, since resolved.  -BMP in a.m. after IV fluids in ED  -Torsemide on hold      DVT Prophylaxis: DOAC  Code Status: DNR / DNI discussed and confirmed on admission    Quoc Chambers MD    Total time 75 minutes in admitting the patient today 5/16/2024 :  time consisted of the following assuming Patient care and admission, examination of patient, review of past records including labs, imaging results, medications, interdisciplinary notes and completing documentation and orders including medication reconciliation and discussion with ED Provider.  Care management included Counseling/discussion with Patient regarding current condition including history, review of diagnostic testing, care plan and surveillance; Care plan including discussion of Code Status.       Primary Care Physician   Layo Sevilla    Chief Complaint   Generalized weakness and dyspnea    History is obtained from the patient and ED Provider    History of Present Illness   Genie Persaud is a 88 year old female with a history of atrial fibrillation on Eliquis, stage 3 CKD, hypertension, CAD, non rheumatic aortic valve insufficiency who presents to the ED with her daughter for weakness and shortness of breath. Patient states onset of symptoms about a week ago noting generalized weakness and shortness of breath.  At that time she states she  "had the \"GI flu\" with nausea and diarrhea which is since resolved.  Decreased p.o. intake.  She has been compliant to her medications.  Regarding shortness of breath she reports history of asthma however notes no recent flare, notes seasonal allergies with nasal congestion and postnasal drainage.  No productive cough.  Exposed to COVID a few days ago, in ED diagnosed with COVID.  No hypoxia.  In ED BUN/CR 38/1.89 with baseline creatinine 1.22.  CXR without acute infiltrate, no pulmonary vascular congestion.  Bradycardic 40-50, SBP 90-1 10.  Initial troponin 50, delta 48, flat.  EKG A-fib, nonspecific ST-T changes.  BNP 4.5K in ED initially given furosemide 20 mg once IV however due to concerns of volume depletion relative to JOSLYN in ED then given fluid resuscitation.  Remainder of HPI as above.      Past Medical History    I have reviewed this patient's medical history and updated it with pertinent information if needed.   Past Medical History:   Diagnosis Date    Anemia 03/10/2009    Chronic disease, by Fe studies; awaiting full GI w/u and repeat colonoscopy, ERCP.    Basal Cell Carcinoma--back     Coronary artery disease 03/09/2017    3/2/2017 - Two vessel coronary artery disease with mLAD 50% stenosis, D3 50% stenosis, pLCx 50% stenosis and mLCx 50% stenosis    Essential hypertension 09/01/2016    White coat hypertension;  24 hour ambulatory monitoring normal. Do not titrate up further.      Hyperlipidemia 02/10/2010    Moderate persistent asthma     Nonrheumatic aortic valve insufficiency 06/29/2017    Osteopenia     Paroxysmal atrial fibrillation 12/26/2017    Pulmonary nodule 03/03/2009    PET scan reportedly normal; biopsy not advised.    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  Problem list name updated by automated " process. Provider to review    Vasculitis 04/20/2009    Possible increased activity of thoracic aorta, on PET scan w/u for pulmonary nodule.        Past Surgical History   I have reviewed this patient's surgical history and updated it with pertinent information if needed.  Past Surgical History:   Procedure Laterality Date    APPENDECTOMY OPEN  1957    C STEREOTACTIC BREAST BIOPSY  01/01/2001    CHOLECYSTECTOMY, LAPOROSCOPIC  01/01/2008    DILATION AND CURETTAGE  1967    DILATION AND CURETTAGE  1971    ESOPHAGOSCOPY, GASTROSCOPY, DUODENOSCOPY (EGD), COMBINED  05/17/2013    Procedure: COMBINED ESOPHAGOSCOPY, GASTROSCOPY, DUODENOSCOPY (EGD), BIOPSY SINGLE OR MULTIPLE;  ESOPHAGOSCOPY, GASTROSCOPY, DUODENOSCOPY (EGD)  with bx;  Surgeon: Jeffery Yanez MD;  Location:  GI    TONSILLECTOMY      Tubal Ligation NOS  1971       Prior to Admission Medications   Prior to Admission Medications   Prescriptions Last Dose Informant Patient Reported? Taking?   acetaminophen (TYLENOL) 325 MG tablet prn med Self Yes Yes   Sig: Take 975 mg by mouth every 8 hours as needed for mild pain   albuterol (PROAIR HFA/PROVENTIL HFA/VENTOLIN HFA) 108 (90 Base) MCG/ACT inhaler prn med Self No Yes   Sig: Inhale 1-2 puffs into the lungs every 6 hours as needed for shortness of breath / dyspnea or wheezing   amLODIPine (NORVASC) 5 MG tablet 5/15/2024 Self, Pharmacy No Yes   Sig: Take 1 tablet (5 mg) by mouth daily   apixaban ANTICOAGULANT (ELIQUIS ANTICOAGULANT) 2.5 MG tablet 5/15/2024 Self, Pharmacy No Yes   Sig: Take 1 tablet (2.5 mg) by mouth 2 times daily   fluticasone-salmeterol (ADVAIR HFA) 230-21 MCG/ACT inhaler 5/15/2024 Self, Pharmacy No Yes   Sig: Inhale 2 puffs into the lungs 2 times daily   latanoprost (XALATAN) 0.005 % ophthalmic solution 5/15/2024 at PM Self, Pharmacy No Yes   Sig: INSTILL ONE DROP IN EACH EYE AT BEDTIME **REFRIGERATE UNTIL OPEN-STORE AT ROOM TEMP ONCE OPENED*   losartan (COZAAR) 100 MG tablet 5/15/2024 Self,  Pharmacy No Yes   Sig: Take 1 tablet (100 mg) by mouth daily   metoprolol succinate ER (TOPROL XL) 50 MG 24 hr tablet 5/15/2024 Self, Pharmacy No Yes   Sig: Take 1 tablet (50 mg) by mouth daily   montelukast (SINGULAIR) 10 MG tablet 5/15/2024 Self, Pharmacy No Yes   Sig: Take 1 tablet (10 mg) by mouth At Bedtime   omeprazole (PRILOSEC) 10 MG DR capsule 5/15/2024 Self, Pharmacy No Yes   Sig: Take 1 capsule (10 mg) by mouth daily   potassium chloride ER (K-TAB) 20 MEQ CR tablet 5/15/2024 Self, Pharmacy Yes Yes   Sig: Take 20 mEq by mouth daily   rosuvastatin (CRESTOR) 10 MG tablet 5/15/2024 Self, Pharmacy No Yes   Sig: Take 1 tablet (10 mg) by mouth daily   torsemide (DEMADEX) 20 MG tablet 5/15/2024 Self, Pharmacy No Yes   Sig: Take 1 tablet (20 mg) by mouth daily      Facility-Administered Medications: None     Allergies   Allergies   Allergen Reactions    Fosamax [Alendronate] GI Disturbance    Contrast Dye      Red arm redness and swelling     Evista [Raloxifene]      abd symptoms.     Flu Virus Vaccine      swollen and red at inj site    Pneumococcal Vaccine Other (See Comments)     Ended up in hospital with afib, pneumonia and asthma    Amoxicillin Rash       Social History   I have reviewed this patient's social history and updated it with pertinent information if needed. Genie Persaud  reports that she has never smoked. She has never been exposed to tobacco smoke. She has never used smokeless tobacco. She reports current alcohol use of about 1.0 - 2.0 standard drink of alcohol per week. She reports that she does not use drugs.    Family History   I have reviewed this patient's family history and updated it with pertinent information if needed.   Family History   Problem Relation Age of Onset    Hypertension Mother     Osteoporosis Mother     C.A.D. Mother     Connective Tissue Disorder Father         scleraderma    Cerebrovascular Disease Paternal Grandmother     Prostate Cancer Other         9/05 passed away  due to complications    Breast Cancer Child         youngest dtr       Review of Systems   The 10 point Review of Systems is negative other than noted in the HPI     Physical Exam   Temp: 98  F (36.7  C) Temp src: Temporal BP: 90/64 Pulse: (!) 44   Resp: 22 SpO2: 95 % O2 Device: None (Room air)      General/Constitutional:   NAD, alert, calm, cooperative    Chest/Respiratory: Respirations nonlabored room air  Cardiovascular:  irreg, irreg; bradycardia at 45; LE edema none  Gastrointestinal/Abdomen:  soft, nontender, no rebound, guarding or other peritoneal signs.  Neuro.  Gross motor tested, nonfocal,  Psych oriented, affect calm      Data       Recent Labs   Lab 05/16/24  1905   WBC 9.1   HGB 11.8   MCV 91         POTASSIUM 4.5   CHLORIDE 96*   CO2 23   BUN 38.3*   CR 1.89*   ANIONGAP 17*   ELROY 9.1   *   ALBUMIN 3.7   PROTTOTAL 6.7   BILITOTAL 0.6   ALKPHOS 108   ALT 18   AST 25       Recent Results (from the past 24 hour(s))   Chest XR,  PA & LAT    Narrative    EXAM: XR CHEST 2 VIEWS  LOCATION: [facility]  DATE: 5/16/2024    INDICATION: SOB  COMPARISON: 11/14/2022      Impression    IMPRESSION: Cardiomegaly. Tortuous ectatic calcified thoracic aorta. Pulmonary vascularity within normal limits. No focal airspace infiltrates. Hyperinflation versus deep inspiration changes. Clinical correlation for air trapping. Degenerative changes   thoracic spine stable. Visualized degenerative changes bilateral shoulders appear similar. Visualized upper abdomen unremarkable.

## 2024-05-17 NOTE — PROGRESS NOTES
05/17/24 1015   Appointment Info   Signing Clinician's Name / Credentials (PT) Jie Fabian, PT, DPT   Living Environment   People in Home alone   Current Living Arrangements house   Home Accessibility stairs within home   Number of Stairs, Within Home, Primary greater than 10 stairs   Stair Railings, Within Home, Primary railings safe and in good condition   Transportation Anticipated car, drives self   Living Environment Comments Pt lives alone in r[place] with basement laundry   Self-Care   Usual Activity Tolerance moderate   Current Activity Tolerance fair   Regular Exercise No   Equipment Currently Used at Home walker, rolling   Fall history within last six months no   Activity/Exercise/Self-Care Comment son lives locally and assists with yardwork, otherwise pt is independent, does own cooking, cleaning, ADLs, has a FWW and 4WW at home but hasn't been using up until a week prior to admission. Currently pt's daughter from [place] is visiting and has been staying with patient.   General Information   Onset of Illness/Injury or Date of Surgery 05/16/24   Referring Physician Quoc Chambers MD   Patient/Family Therapy Goals Statement (PT) To go home   Pertinent History of Current Problem (include personal factors and/or comorbidities that impact the POC) Pt is 88 year old female adm on 5/16/24 for evaluation of c/o generalized weakness and SOB. Pt had been seen in urgent care earlier in day and had heart rate of 46 with 1st degree AV block, recommended to go to ED. Pt found to be COVID + in ED. Pt has been bradycardic during hospital stay and also with acute kidney injury. PMH includes a-fib on Eliquis, CKD stage 3, cardiomyopathy, HTN, CAD, non-rheumatic aortic valve insufficiency   Existing Precautions/Restrictions no known precautions/restrictions   Cognition   Affect/Mental Status (Cognition) WFL   Orientation Status (Cognition) oriented x 4   Follows Commands (Cognition) WFL   Pain Assessment    Patient Currently in Pain No   Posture    Posture Forward head position;Protracted shoulders   Range of Motion (ROM)   Range of Motion ROM is WFL   Strength (Manual Muscle Testing)   Strength (Manual Muscle Testing) strength is WFL   Bed Mobility   Comment, (Bed Mobility) SBA   Transfers   Comment, (Transfers) SBA with FWW   Gait/Stairs (Locomotion)   Comment, (Gait/Stairs) SBA with FWW   Balance   Balance Comments No overt LOB during session   Clinical Impression   Criteria for Skilled Therapeutic Intervention Yes, treatment indicated   PT Diagnosis (PT) Impaired mobility   Influenced by the following impairments Decreased activity tolerance, decreased strength, decreased balance   Functional limitations due to impairments Decreased ability to participate in daily tasks   Clinical Presentation (PT Evaluation Complexity) stable   Clinical Presentation Rationale Current presentation, [facility]   Clinical Decision Making (Complexity) low complexity   Planned Therapy Interventions (PT) balance training;bed mobility training;gait training;home exercise program;patient/family education;stair training;strengthening;transfer training   Risk & Benefits of therapy have been explained evaluation/treatment results reviewed;care plan/treatment goals reviewed;risks/benefits reviewed;current/potential barriers reviewed;participants voiced agreement with care plan;participants included;patient   PT Total Evaluation Time   PT Eval, Low Complexity Minutes (53047) 10   Physical Therapy Goals   PT Frequency Daily   PT Predicted Duration/Target Date for Goal Attainment 05/25/24   PT Goals Bed Mobility;Transfers;Gait;Stairs   PT: Bed Mobility Independent;Supine to/from sit;Rolling   PT: Transfers Modified independent;Sit to/from stand;Bed to/from chair;Assistive device   PT: Gait Modified independent;Assistive device;150 feet   PT: Stairs Independent;Greater than 10 stairs   Interventions   Interventions Quick Adds Gait Training;Therapeutic  Activity;Therapeutic Procedure   Therapeutic Procedure/Exercise   Ther. Procedure: strength, endurance, ROM, flexibillity Minutes (39890) 10   Symptoms Noted During/After Treatment none   Treatment Detail/Skilled Intervention Pt has exercise program she does at home, was able to complete 10 reps ankle pumps, LAQ, seated hip flexion, hip ab/adduction. Pt encouraged to continue doing these exercises during hospital stay.   Therapeutic Activity   Therapeutic Activities: dynamic activities to improve functional performance Minutes (74197) 15   Symptoms Noted During/After Treatment None   Treatment Detail/Skilled Intervention Pt supine in bed on arrival, agreeable to participate. Pt completes supine to sit with SBA, mainly needs cueing to assist with managing lines. Pt denies dizziness with position changes, vital signs stable. Pt able to perform sit to stand transfers x7 during session with and without assistive device with SBA. Pt completes toilet transfer with SBA. Pt able to stand at sink with SBA, no LOB with reaching tasks during hand washing. At completion of session pt OOB in chair, needs within reach, RN updated on status.   Gait Training   Gait Training Minutes (33870) 10   Symptoms Noted During/After Treatment (Gait Training) none   Treatment Detail/Skilled Intervention Pt ambulates 50' in room with FWW and SBA. Trialed ambulation without assistive device as pt does not normally use walker at home though she does have a FWW and a 4WW. Pt SBA without assistive device though fatigues quicker and is  more guarded with gait. Discussed recommendations for using walker at this time, pt states understanding. Also discussed recommendation for assist at home with tasks that require more energy such as meal preparation and laundry as laundry is in the basement and pt would need to navigate a flight of stairs. Pt reports her daughter will be able to assist with these areas before she leaves to go  back to [place].   PT  Discharge Planning   PT Plan General strengthening and standing balance, progress overall activity as aquilino   PT Discharge Recommendation (DC Rec) home with assist;home with home care physical therapy   PT Rationale for DC Rec Anticipate pt could return home with assist for higher level household tasks such as meals, cleaning, laundry, transportation, errands, etc. Pt would benefit from home PT to further address deficits and optimize functional independence and safety in home environment. If pt does not have assist available at home then may benefit from TCU to optimize functional independence and safety prior to return home alone.   PT Brief overview of current status SBA with FWW   Total Session Time   Timed Code Treatment Minutes 35   Total Session Time (sum of timed and untimed services) 45

## 2024-05-17 NOTE — PHARMACY-ADMISSION MEDICATION HISTORY
Pharmacist Admission Medication History    Admission medication history is complete. The information provided in this note is only as accurate as the sources available at the time of the update.    Information Source(s): Patient and CareEverywhere/[facility] via in-person    Pertinent Information:   1) Omeprazole 10mg daily last filled per [facility] 11/28/23 for a 30 day supply but patient states she still has a supply at home.   2) Potassium 20mEq daily last filled per [facility] on 11/28/23 for a 60 day supply. Patient states that she has been taking and can describe tablet.   3) Patient states she sets up own medications.  4) Patient states she has a prescription for prednisone at home as needed for asthma exacerbations but hasn't used in a long time.     Changes made to PTA medication list:  Added: None  Deleted: calcium - pt states she doesn't take, Corisporin ear drops  Changed: potassium changed from BID to daily    Allergies reviewed with patient and updates made in EHR: yes    Medication History Completed By: Alena Quezada, PharmD 5/16/2024 9:48 PM    PTA Med List   Medication Sig Last Dose    acetaminophen (TYLENOL) 325 MG tablet Take 975 mg by mouth every 8 hours as needed for mild pain prn med    albuterol (PROAIR HFA/PROVENTIL HFA/VENTOLIN HFA) 108 (90 Base) MCG/ACT inhaler Inhale 1-2 puffs into the lungs every 6 hours as needed for shortness of breath / dyspnea or wheezing prn med    amLODIPine (NORVASC) 5 MG tablet Take 1 tablet (5 mg) by mouth daily 5/15/2024    apixaban ANTICOAGULANT (ELIQUIS ANTICOAGULANT) 2.5 MG tablet Take 1 tablet (2.5 mg) by mouth 2 times daily 5/15/2024    fluticasone-salmeterol (ADVAIR HFA) 230-21 MCG/ACT inhaler Inhale 2 puffs into the lungs 2 times daily 5/15/2024    latanoprost (XALATAN) 0.005 % ophthalmic solution INSTILL ONE DROP IN EACH EYE AT BEDTIME **REFRIGERATE UNTIL OPEN-STORE AT ROOM TEMP ONCE OPENED* 5/15/2024 at PM    losartan (COZAAR) 100 MG tablet  Refill Request  Did you contact pharmacy: No  Medication name:   Requested Prescriptions     Pending Prescriptions Disp Refills     venlafaxine (EFFEXOR XR) 37.5 MG 24 hr capsule 180 capsule 0     Sig: Take two capsules PO Qday     Who prescribed the medication: pcp  Pharmacy Name and Location: [facility]  Is patient out of medication: Yes  Patient notified refills processed in 72 hours:  yes  Okay to leave a detailed message: yes   Take 1 tablet (100 mg) by mouth daily 5/15/2024    metoprolol succinate ER (TOPROL XL) 50 MG 24 hr tablet Take 1 tablet (50 mg) by mouth daily 5/15/2024    montelukast (SINGULAIR) 10 MG tablet Take 1 tablet (10 mg) by mouth At Bedtime 5/15/2024    omeprazole (PRILOSEC) 10 MG DR capsule Take 1 capsule (10 mg) by mouth daily 5/15/2024    potassium chloride ER (K-TAB) 20 MEQ CR tablet Take 20 mEq by mouth daily 5/15/2024    rosuvastatin (CRESTOR) 10 MG tablet Take 1 tablet (10 mg) by mouth daily 5/15/2024    torsemide (DEMADEX) 20 MG tablet Take 1 tablet (20 mg) by mouth daily 5/15/2024

## 2024-05-17 NOTE — CONSULTS
[facility]    Cardiology Consultation     Date of Admission:  5/16/2024    Assessment & Plan   Genie Persaud is a 88 year old female who was admitted on 5/16/2024.      Chronic systolic congestive heart failure with mild reduced LV systolic function with LVEF around 44%.  Nonischemic cardiomyopathy.  Coronary angiogram 2017 showed nonobstructive coronary disease.  Clinically she appears compensated euvolemic in fact I suspect that she may have been recently dehydrated due to some loose stools and worsening kidney function.  JVP appears normal.  No pedal edema lungs clear to auscultation.  Agree with withholding diuretic till kidney function recovered.  Chronic atrial fibrillation with slightly slow ventricular rate on 50 mg daily of Toprol-XL.  On apixaban for stroke prophylaxis, dose adjusted for age and body weight  Moderate aortic regurgitation.  Acute renal failure.  Torsemide and losartan withheld because of that.  COVID-19 infection.  Mild troponin elevation, nonspecific, clinically not having acute coronary syndrome.    Recommendations  Overall from cardiomyopathy standpoint of view she appears compensated euvolemic in fact has some evidence of recent dehydration with worsening of renal function and loose stool and her baseline weight is usually around 110 pounds.  Agree with withholding losartan and torsemide due to acute renal failure.  They can be resumed when kidney functions recover.  Recommend decreasing Toprol-XL to 25 mg q daily.    Cardiology will sign off.  Please feel free to call with any questions.    Moderate complexity     Josue Ku MD, MD    Primary Care Physician   Layo Sevilla    Reason for Consult   Reason for consult: I was asked to evaluate this patient for shortness of breath CHF.    History of Present Illness   Genie Persaud is a 88 year old female who presents with weakness.  Patient has history of mild cardiomyopathy with LVEF around 44% nonischemic,  coronary angiogram in 2017 showing nonobstructive coronary disease, chronic atrial fibrillation on rate control strategy on Toprol-XL 50 mg daily and on apixaban.  Patient tells me that she has been feeling weak and has been having some loose stools for last week or so.  Upon admission she was noted to have acute renal failure.  She was also diagnosed with COVID-19 infection.  On my direct questioning she denies any shortness of breath any orthopnea.  Her baseline weight is around 110 pounds.  She is 105 pounds now.  Appropriately outpatient torsemide and losartan has been withheld.  EKG shows atrial fibrillation with slow ventricular rate in 50s, nonspecific ST-T changes.  High sensitive troponin is mildly elevated 50, 48.  She denies any chest pain    Past Medical History   Past Medical History:   Diagnosis Date    Anemia 03/10/2009    Chronic disease, by Fe studies; awaiting full GI w/u and repeat colonoscopy, ERCP.    Basal Cell Carcinoma--back     Coronary artery disease 03/09/2017    3/2/2017 - Two vessel coronary artery disease with mLAD 50% stenosis, D3 50% stenosis, pLCx 50% stenosis and mLCx 50% stenosis    Essential hypertension 09/01/2016    White coat hypertension;  24 hour ambulatory monitoring normal. Do not titrate up further.      Hyperlipidemia 02/10/2010    Moderate persistent asthma     Nonrheumatic aortic valve insufficiency 06/29/2017    Osteopenia     Paroxysmal atrial fibrillation 12/26/2017    Pulmonary nodule 03/03/2009    PET scan reportedly normal; biopsy not advised.    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  Problem list name updated by automated process. Provider to review    Vasculitis 04/20/2009    Possible increased activity of thoracic aorta, on PET scan w/u for pulmonary nodule.          Past  Surgical History       Prior to Admission Medications   Prior to Admission Medications   Prescriptions Last Dose Informant Patient Reported? Taking?   acetaminophen (TYLENOL) 325 MG tablet prn med Self Yes Yes   Sig: Take 975 mg by mouth every 8 hours as needed for mild pain   albuterol (PROAIR HFA/PROVENTIL HFA/VENTOLIN HFA) 108 (90 Base) MCG/ACT inhaler prn med Self No Yes   Sig: Inhale 1-2 puffs into the lungs every 6 hours as needed for shortness of breath / dyspnea or wheezing   amLODIPine (NORVASC) 5 MG tablet 5/15/2024 Self, Pharmacy No Yes   Sig: Take 1 tablet (5 mg) by mouth daily   apixaban ANTICOAGULANT (ELIQUIS ANTICOAGULANT) 2.5 MG tablet 5/15/2024 Self, Pharmacy No Yes   Sig: Take 1 tablet (2.5 mg) by mouth 2 times daily   fluticasone-salmeterol (ADVAIR HFA) 230-21 MCG/ACT inhaler 5/15/2024 Self, Pharmacy No Yes   Sig: Inhale 2 puffs into the lungs 2 times daily   latanoprost (XALATAN) 0.005 % ophthalmic solution 5/15/2024 at PM Self, Pharmacy No Yes   Sig: INSTILL ONE DROP IN EACH EYE AT BEDTIME **REFRIGERATE UNTIL OPEN-STORE AT ROOM TEMP ONCE OPENED*   losartan (COZAAR) 100 MG tablet 5/15/2024 Self, Pharmacy No Yes   Sig: Take 1 tablet (100 mg) by mouth daily   metoprolol succinate ER (TOPROL XL) 50 MG 24 hr tablet 5/15/2024 Self, Pharmacy No Yes   Sig: Take 1 tablet (50 mg) by mouth daily   montelukast (SINGULAIR) 10 MG tablet 5/15/2024 Self, Pharmacy No Yes   Sig: Take 1 tablet (10 mg) by mouth At Bedtime   omeprazole (PRILOSEC) 10 MG DR capsule 5/15/2024 Self, Pharmacy No Yes   Sig: Take 1 capsule (10 mg) by mouth daily   potassium chloride ER (K-TAB) 20 MEQ CR tablet 5/15/2024 Self, Pharmacy Yes Yes   Sig: Take 20 mEq by mouth daily   rosuvastatin (CRESTOR) 10 MG tablet 5/15/2024 Self, Pharmacy No Yes   Sig: Take 1 tablet (10 mg) by mouth daily   torsemide (DEMADEX) 20 MG tablet 5/15/2024 Self, Pharmacy No Yes   Sig: Take 1 tablet (20 mg) by mouth daily      Facility-Administered Medications:  None     Current Facility-Administered Medications   Medication Dose Route Frequency Provider Last Rate Last Admin    acetaminophen (TYLENOL) tablet 975 mg  975 mg Oral Q8H PRN Quoc Chambers MD        apixaban ANTICOAGULANT (ELIQUIS) tablet 2.5 mg  2.5 mg Oral BID Quoc Chambers MD   2.5 mg at 05/17/24 0841    fluticasone (FLONASE) 50 MCG/ACT spray 1 spray  1 spray Both Nostrils Daily Quoc Chambers MD   1 spray at 05/17/24 0842    fluticasone-salmeterol (ADVAIR-HFA) 230-21 MCG/ACT inhaler 2 puff  2 puff Inhalation BID Quoc Chambers MD   2 puff at 05/17/24 0842    latanoprost (XALATAN) 0.005 % ophthalmic solution 1 drop  1 drop Both Eyes At Bedtime Quoc Chambers MD   1 drop at 05/17/24 0144    lidocaine (LMX4) cream   Topical Q1H PRN Quoc Chambers MD        lidocaine 1 % 0.1-1 mL  0.1-1 mL Other Q1H PRN Quoc Chambers MD        montelukast (SINGULAIR) tablet 10 mg  10 mg Oral At Bedtime Quoc Chambers MD   10 mg at 05/17/24 0144    pantoprazole (PROTONIX) EC tablet 20 mg  20 mg Oral Daily Quoc Chambers MD   20 mg at 05/17/24 0841    Patient is already receiving anticoagulation with heparin, enoxaparin (LOVENOX), warfarin (COUMADIN)  or other anticoagulant medication   Does not apply Continuous PRN Quoc Chambers MD        rosuvastatin (CRESTOR) tablet 10 mg  10 mg Oral Daily Quoc Chambers MD   10 mg at 05/17/24 0841    sodium chloride (PF) 0.9% PF flush 3 mL  3 mL Intracatheter Q8H Quoc Chambers MD   3 mL at 05/17/24 0144    sodium chloride (PF) 0.9% PF flush 3 mL  3 mL Intracatheter q1 min prn Quoc Chambers MD        sodium chloride 0.9 % infusion   Intravenous Continuous Homer Alves MD 75 mL/hr at 05/17/24 1017 New Bag at 05/17/24 1017    [Held by provider] torsemide (DEMADEX) tablet 20 mg  20 mg Oral Daily Quoc Chambers MD         Current Facility-Administered Medications   Medication Dose Route Frequency Provider  Last Rate Last Admin    acetaminophen (TYLENOL) tablet 975 mg  975 mg Oral Q8H PRN Quoc Chambers MD        apixaban ANTICOAGULANT (ELIQUIS) tablet 2.5 mg  2.5 mg Oral BID Quoc Chambers MD   2.5 mg at 05/17/24 0841    fluticasone (FLONASE) 50 MCG/ACT spray 1 spray  1 spray Both Nostrils Daily Quoc Chambers MD   1 spray at 05/17/24 0842    fluticasone-salmeterol (ADVAIR-HFA) 230-21 MCG/ACT inhaler 2 puff  2 puff Inhalation BID Quoc Chambers MD   2 puff at 05/17/24 0842    latanoprost (XALATAN) 0.005 % ophthalmic solution 1 drop  1 drop Both Eyes At Bedtime Quoc Chambers MD   1 drop at 05/17/24 0144    lidocaine (LMX4) cream   Topical Q1H PRN Quoc Chambers MD        lidocaine 1 % 0.1-1 mL  0.1-1 mL Other Q1H PRN Quoc Chambers MD        montelukast (SINGULAIR) tablet 10 mg  10 mg Oral At Bedtime Quoc Chambers MD   10 mg at 05/17/24 0144    pantoprazole (PROTONIX) EC tablet 20 mg  20 mg Oral Daily Quoc Chambers MD   20 mg at 05/17/24 0841    Patient is already receiving anticoagulation with heparin, enoxaparin (LOVENOX), warfarin (COUMADIN)  or other anticoagulant medication   Does not apply Continuous PRN Quoc Chambers MD        rosuvastatin (CRESTOR) tablet 10 mg  10 mg Oral Daily Quoc Chambers MD   10 mg at 05/17/24 0841    sodium chloride (PF) 0.9% PF flush 3 mL  3 mL Intracatheter Q8H Quoc Chambers MD   3 mL at 05/17/24 0144    sodium chloride (PF) 0.9% PF flush 3 mL  3 mL Intracatheter q1 min prn Quoc Chambers MD        sodium chloride 0.9 % infusion   Intravenous Continuous Homer Alves MD 75 mL/hr at 05/17/24 1017 New Bag at 05/17/24 1017    [Held by provider] torsemide (DEMADEX) tablet 20 mg  20 mg Oral Daily Quoc Chambers MD         Allergies   Allergies   Allergen Reactions    Fosamax [Alendronate] GI Disturbance    Contrast Dye      Red arm redness and swelling     Evista [Raloxifene]      abd symptoms.      "Flu Virus Vaccine      swollen and red at inj site    Pneumococcal Vaccine Other (See Comments)     Ended up in hospital with afib, pneumonia and asthma    Amoxicillin Rash       Social History    reports that she has never smoked. She has never been exposed to tobacco smoke. She has never used smokeless tobacco. She reports current alcohol use of about 1.0 - 2.0 standard drink of alcohol per week. She reports that she does not use drugs.      Family History   I have reviewed this patient's family history and updated it with pertinent information if needed.  Family History   Problem Relation Age of Onset    Hypertension Mother     Osteoporosis Mother     C.A.D. Mother     Connective Tissue Disorder Father         scleraderma    Cerebrovascular Disease Paternal Grandmother     Prostate Cancer Other         9/05 passed away due to complications    Breast Cancer Child         youngest dtr          Review of Systems   A comprehensive review of system was performed and is negative other than that noted in the HPI or here.     Physical Exam   Vital Signs with Ranges  Temp:  [97.2  F (36.2  C)-98  F (36.7  C)] 97.2  F (36.2  C)  Pulse:  [41-76] 76  Resp:  [12-30] 23  BP: ()/(31-64) 116/63  SpO2:  [93 %-98 %] 98 %  Wt Readings from Last 4 Encounters:   05/17/24 48 kg (105 lb 12.8 oz)   05/16/24 50.8 kg (112 lb)   02/29/24 49.9 kg (110 lb)   02/13/24 50.8 kg (112 lb)     I/O last 3 completed shifts:  In: 450 [P.O.:450]  Out: -       Vitals: /63 (BP Location: Left arm)   Pulse 76   Temp 97.2  F (36.2  C) (Oral)   Resp 23   Ht 1.575 m (5' 2\")   Wt 48 kg (105 lb 12.8 oz)   LMP  (LMP Unknown)   SpO2 98%   BMI 19.35 kg/m      Physical Exam:   General - Alert and oriented to time place and person in no acute distress  Eyes - No scleral icterus  HEENT - Neck supple, moist mucous membranes  Cardiovascular -irregular, normal rate, faint systolic murmur heard over the right upper sternal border, no significant " "diastolic murmur heard, JVP normal, negative hepatojugular reflux  Extremities - There is no pitting pedal edema  Respiratory -clear to auscultation bilaterally with some decreased air entry bilaterally but no crackles or wheezing      No lab results found in last 7 days.    Invalid input(s): \"TROPONINIES\"    Recent Labs   Lab 05/17/24  0607 05/16/24  1905   WBC 6.1 9.1   HGB 11.5* 11.8   MCV 91 91    256    136   POTASSIUM 4.4 4.5   CHLORIDE 101 96*   CO2 23 23   BUN 35.6* 38.3*   CR 1.78* 1.89*   GFRESTIMATED 27* 25*   ANIONGAP 13 17*   ELROY 8.8 9.1   GLC 92 113*   ALBUMIN  --  3.7   PROTTOTAL  --  6.7   BILITOTAL  --  0.6   ALKPHOS  --  108   ALT  --  18   AST  --  25     Recent Labs   Lab Test 02/29/24  1427 08/26/21  0949   CHOL 99 159   HDL 47* 95   LDL 37 53   TRIG 76 56     Recent Labs   Lab 05/17/24  0607 05/16/24  1905   WBC 6.1 9.1   HGB 11.5* 11.8   HCT 34.5* 36.6   MCV 91 91    256     No results for input(s): \"PH\", \"PHV\", \"PO2\", \"PO2V\", \"SAT\", \"PCO2\", \"PCO2V\", \"HCO3\", \"HCO3V\" in the last 168 hours.  Recent Labs   Lab 05/16/24  1905   NTBNPI 4,531*     No results for input(s): \"DD\" in the last 168 hours.  No results for input(s): \"SED\", \"CRP\" in the last 168 hours.  Recent Labs   Lab 05/17/24  0607 05/16/24  1905    256     No results for input(s): \"TSH\" in the last 168 hours.  No results for input(s): \"COLOR\", \"APPEARANCE\", \"URINEGLC\", \"URINEBILI\", \"URINEKETONE\", \"SG\", \"UBLD\", \"URINEPH\", \"PROTEIN\", \"UROBILINOGEN\", \"NITRITE\", \"LEUKEST\", \"RBCU\", \"WBCU\" in the last 168 hours.    Imaging:  Recent Results (from the past 48 hour(s))   Chest XR,  PA & LAT    Narrative    EXAM: XR CHEST 2 VIEWS  LOCATION: [facility]  DATE: 5/16/2024    INDICATION: SOB  COMPARISON: 11/14/2022      Impression    IMPRESSION: Cardiomegaly. Tortuous ectatic calcified thoracic aorta. Pulmonary vascularity within normal limits. No focal airspace infiltrates. Hyperinflation versus " deep inspiration changes. Clinical correlation for air trapping. Degenerative changes   thoracic spine stable. Visualized degenerative changes bilateral shoulders appear similar. Visualized upper abdomen unremarkable.       Echo:  No results found for this or any previous visit (from the past 4320 hour(s)).    Clinically Significant Risk Factors Present on Admission               # Drug Induced Coagulation Defect: home medication list includes an anticoagulant medication    # Hypertension: Noted on problem list  # Acute heart failure with preserved ejection fraction: heart failure noted on problem list, last echo with EF >50%, and receiving IV diuretics    # DMII: A1C = 6.5 % (Ref range: 0.0 - 5.6 %) within past 6 months        # Asthma: noted on problem list

## 2024-05-17 NOTE — PROGRESS NOTES
"[facility]    Medicine Progress Note - Hospitalist Service    Date of Admission:  5/16/2024    Assessment & Plan     Genie Persaud is a 88 year old female with a history of atrial fibrillation on Eliquis, stage 3 CKD, hypertension, CAD, non rheumatic aortic valve insufficiency who presents to the ED with her daughter for weakness and shortness of breath.      Summary:   Patient states she has asthma and is short of breath, not able to clearly point out timeline although feels breathing worsened about a week ago noting generalized weakness as well. No dyspnea at rest, but with climbing stairs. No recent asthma exacerbation, denies wheezing.  Reports chronic postnasal drip and seasonal allergies, nasal congestion. No productive cough.  She states she had the \"GI flu\" recently with nausea and diarrhea which has since resolved.  Decreased p.o. intake.  She has been compliant to her medications.    Exposed to COVID a few days ago, in ED diagnosed with COVID 19.  No hypoxia.  CXR without infiltrate or edema, noted JOSLYN with creatinine 1.89, was bradycardic to 40s-50s, initial troponin 50, flat on recheck.     Generalized weakness and SOB  Covid 19 infection without hypoxia  Bradycardia and hypotensive  Special isolation for COVID, no hypoxia therefore no indication for remdesivir, steroids.  Already on DOAC   -Pulse oximetry, continue PTA inhalers    -Holding metoprolol given bradycardia  -Encourage incentive spirometry  -Holding PTA antihypertensive due to relative hypotension, BP improved  -PT when able.      Elevated troponin suspect demand ischemia  Chronic A-fib anticoagulated with apixaban  Bradycardic on metoprolol   Aortic insufficiency   Cardiomyopathy   Noted new decreased LVEF November 2023, LVEF 43%, moderate aortic regurgitation.  Had follow-up with cardiology as outpatient and repeat echo showed EF 44%.  No follow-up ischemic workup was noted.  Patient reports ongoing dyspnea with " activity and attributes it to her asthma although denies wheezing or asthma exacerbation.  Has COVID but no fever, and chest x-ray without infiltrates.  No pulmonary edema.  Cardiac on metoprolol.  Suspect degree of dyspnea is contributed by underlying disease as well.  -Cardiology consult  -Hold metoprolol given bradycardia  -Hold PTA losartan given acute kidney injury  -Hold PTA diuretic again due to JOSLYN, patient with decreased p.o. intake, appears dehydrated, IVF started, careful IV hydration monitoring her respiratory status.   -Resume PTA apixaban     JOSLYN on CKD stage 2  Noted creatinine was 0.9-1.2 last year.  Presented with 1.8.  Decreased p.o. intake and on diuretic and ARB as well.  Suspect relative volume depletion and medication effect.    -Received some IV fluid in ER, creatinine 1.89-->1.7  -IVF and monitor  - urine output, bladder scan PRN  -Managing torsemide and losartan as above        Diet: Combination Diet Low Saturated Fat Na <2400mg Diet, No Caffeine Diet    DVT Prophylaxis: DOAC  Brown Catheter: Not present  Lines: None     Cardiac Monitoring: ACTIVE order. Indication: Bradycardias (48 hours)  Code Status: No CPR- Do NOT Intubate      Clinically Significant Risk Factors Present on Admission                               Disposition Plan     Medically Ready for Discharge: Anticipate 1-2 more days pending improvement in renal function, and patient feels better clinically, and meds adjusted/card final recommendation on any further work up.  Discussed with patient, her daughter is visiting sometime today, will update when available         Homer Alves MD  Hospitalist Service  [facility]  Securely message with [employer] (more info)  Text page via [employer] Paging/Directory   ______________________________________________________________________    Interval History   Chart reviewed, discussed with nursing staff and patient was seen this morning.  She is up in bed, reports  dyspnea while climbing stairs but not at rest.  States it is her asthma but denies any wheezing.  Reports some fatigue.  No chest pain, dizziness or palpitation.    Reports decreased PO intake and that she is not taking as much fluids.     No fever.  Has post nasal drip.   Denies nausea, abdominal pain, dysuria, diarrhea.    Physical Exam   Vital Signs: Temp: 97.2  F (36.2  C) Temp src: Oral BP: 116/63 Pulse: 76   Resp: 23 SpO2: 98 % O2 Device: None (Room air)    Weight: 105 lbs 12.8 oz    General: AAOx3, pleasant, appears comfortable. Voice has nasal intonation.   HEENT: PERRLA EOMI. Mucosa dry  Lungs: Bilateral equal air entry. No wheezing or crackles, no increased work of breathing.    CVS: S1S2 irregular, no tachycardia or murmur.   Abdomen: Soft, NT, ND. BS heard.  MSK: No edema or deformities.  Neuro: AAOX3. CN 2-12 normal. Strength symmetrical.  Skin: dry, No rash.       Medical Decision Making       45 MINUTES SPENT BY ME on the date of service doing chart review, history, exam, documentation & further activities per the note.      Data     I have personally reviewed the following data over the past 24 hrs:    6.1  \   11.5 (L)   / 221     137 101 35.6 (H) /  92   4.4 23 1.78 (H) \     ALT: 18 AST: 25 AP: 108 TBILI: 0.6   ALB: 3.7 TOT PROTEIN: 6.7 LIPASE: N/A     Trop: 48 (H) BNP: 4,531 (H)       Imaging results reviewed over the past 24 hrs:   Recent Results (from the past 24 hour(s))   Chest XR,  PA & LAT    Narrative    EXAM: XR CHEST 2 VIEWS  LOCATION: [facility]  DATE: 5/16/2024    INDICATION: SOB  COMPARISON: 11/14/2022      Impression    IMPRESSION: Cardiomegaly. Tortuous ectatic calcified thoracic aorta. Pulmonary vascularity within normal limits. No focal airspace infiltrates. Hyperinflation versus deep inspiration changes. Clinical correlation for air trapping. Degenerative changes   thoracic spine stable. Visualized degenerative changes bilateral shoulders appear similar.  Visualized upper abdomen unremarkable.

## 2024-05-17 NOTE — PROGRESS NOTES
Neuro: AOX4  CV/Rhythm: CVR Afib  Resp/02: RA  GI/Diet: Wnl  : Bathroom  Skin/Incisions/Sites: Intact  Pulses/CMS: Intact  Edema: none  Activity/Falls Risk: Standby assist with walker and gait belt  Lines/Drains/IVs: PIV saline locked  VS/Pain: Denies pain

## 2024-05-17 NOTE — ED PROVIDER NOTES
Emergency Department Note      History of Present Illness     Chief Complaint  Abnormal Labs, Generalized Weakness, and Shortness of Breath    HPI  Genie Persaud is a 88 year old female with a history of atrial fibrillation on Eliquis, stage 3 CKD, cardiomyopathy, hypertension, CAD, non rheumatic aortic valve insufficiency who presents to the ED with her daughter for weakness and shortness of breath.  The patient notes that she started to have shortness of breath and generalized weakness nine days ago. She endorses having shortness of breath when supine on her left side, but is still able to complete ADLs independently. The patient reports that earlier today she used her albuterol inhaler with some relief of shortness of breath. The patient was seen at  this morning and EKG was performed with with a heart rate of 46 bpm and 1st degree AV block, so she was recommended to come to the ED. Notes that she has a decreased appetite since she was sick last week with nausea, vomiting, and diarrhea, and also has new onset urinary retention today. Denotes fever, wheezing, palpitations, ankle swelling, leg swelling, cough, chest pain, abdominal pain, or recent falls. She denies any recent changes to her medications. Of note, patient lives alone at home.  Her daughter had COVID 3 weeks ago and is now fully asymptomatic    Independent Historian  None    Review of External Notes  None  Past Medical History   Medical History and Problem List  Anemia   Basal Cell Carcinoma, Back  CAD  Hypertension  Hyperlipidemia  Asthma  Nonrheumatic Aortic Valve Insufficiency  Osteopenia  Paroxysmal Atrial Fibrillation  Pulmonary Nodule  Stress induced Cardiomyopathy  Stroke  Thoracic Aortic Aneurysm without rupture   Vasculitis     Medications  Albuterol, inhaler  Amlodipine  Eliquis   Advair HFA  Cozaar  Toprol XL   Singulair  Cortisporin  Prilosec  Klor-con M  Crestor  Demadex     Surgical History   Appendectomy  C Stereotactic breast  "biopsy   Cholecystectomy  Dilation and Curettage   EGD, combined  Tonsillectomy  Tubal Ligation NOS     Physical Exam   Patient Vitals for the past 24 hrs:   BP Temp Temp src Pulse Resp SpO2 Height Weight   05/16/24 2015 97/42 -- -- 51 17 95 % -- --   05/16/24 2005 110/51 -- -- (!) 48 28 94 % -- --   05/16/24 2002 (!) 97/37 -- -- 64 12 95 % -- --   05/16/24 2000 -- -- -- (!) 43 19 95 % -- --   05/16/24 1945 108/41 -- -- 60 27 96 % -- --   05/16/24 1902 92/56 98  F (36.7  C) Temporal (!) 48 18 97 % 1.575 m (5' 2\") 49.9 kg (110 lb)     Physical Exam  Physical Exam   Constitutional:  Patient is oriented to person, place, and time. They appear well-developed and well-nourished.   HENT:   Mouth/Throat:   Oropharynx is clear and moist.   Eyes:    Conjunctivae normal and EOM are normal. Pupils are equal, round, and reactive to light.   Neck:    Normal range of motion.   Cardiovascular: Irregular and bradycardic rate, regular rhythm and normal heart sounds.  Exam reveals no gallop and no friction rub.  No murmur heard.  Pulmonary/Chest:  Lung sounds diminished on the left side. Patient has trace expiratory lesions . Patient has no rales.   Abdominal:   Soft. Bowel sounds are normal. Patient exhibits no mass. There is no tenderness. There is no rebound and no guarding.   Musculoskeletal:  Normal range of motion. Patient exhibits no edema.   Neurological:   Patient is alert and oriented to person, place, and time. Patient has normal strength. No cranial nerve deficit or sensory deficit. GCS 15 oh Carissa a EKG  Skin:   Skin is warm and dry. No rash noted. No erythema.   Psychiatric:   Patient has a normal mood and affect. Patient's behavior is normal. Judgment and thought content normal.    Diagnostics   Lab Results   Labs Ordered and Resulted from Time of ED Arrival to Time of ED Departure   COMPREHENSIVE METABOLIC PANEL - Abnormal       Result Value    Sodium 136      Potassium 4.5      Carbon Dioxide (CO2) 23      Anion Gap " 17 (*)     Urea Nitrogen 38.3 (*)     Creatinine 1.89 (*)     GFR Estimate 25 (*)     Calcium 9.1      Chloride 96 (*)     Glucose 113 (*)     Alkaline Phosphatase 108      AST 25      ALT 18      Protein Total 6.7      Albumin 3.7      Bilirubin Total 0.6      Patient Fasting > 8hrs? No     TROPONIN T, HIGH SENSITIVITY - Abnormal    Troponin T, High Sensitivity 50 (*)    NT PROBNP INPATIENT - Abnormal    N terminal Pro BNP Inpatient 4,531 (*)    CBC WITH PLATELETS AND DIFFERENTIAL    WBC Count 9.1      RBC Count 4.02      Hemoglobin 11.8      Hematocrit 36.6      MCV 91      MCH 29.4      MCHC 32.2      RDW 13.9      Platelet Count 256      % Neutrophils 70      % Lymphocytes 19      % Monocytes 10      % Eosinophils 1      % Basophils 0      % Immature Granulocytes 0      NRBCs per 100 WBC 0      Absolute Neutrophils 6.3      Absolute Lymphocytes 1.8      Absolute Monocytes 0.9      Absolute Eosinophils 0.1      Absolute Basophils 0.0      Absolute Immature Granulocytes 0.0      Absolute NRBCs 0.0         Imaging  Chest XR,  PA & LAT   Final Result   IMPRESSION: Cardiomegaly. Tortuous ectatic calcified thoracic aorta. Pulmonary vascularity within normal limits. No focal airspace infiltrates. Hyperinflation versus deep inspiration changes. Clinical correlation for air trapping. Degenerative changes    thoracic spine stable. Visualized degenerative changes bilateral shoulders appear similar. Visualized upper abdomen unremarkable.          EKG   ECG results from 05/16/24   EKG 12 lead     Value    Systolic Blood Pressure     Diastolic Blood Pressure     Ventricular Rate 54    Atrial Rate     VA Interval     QRS Duration 92        QTc 318    P Axis     R AXIS -39    T Axis 248    Interpretation ECG      Atrial fibrillation with slow ventricular response  Left axis deviation  Nonspecific ST and T wave abnormality  Abnormal ECG  When compared with ECG of 03-JUN-2022 11:31,  Significant changes have occurred        *Note: Due to a large number of results and/or encounters for the requested time period, some results have not been displayed. A complete set of results can be found in Results Review.     Independent Interpretation  None  ED Course    Medications Administered  Medications   furosemide (LASIX) injection 20 mg (has no administration in time range)   sodium chloride 0.9% BOLUS 500 mL (500 mLs Intravenous $New Bag 5/16/24 2011)       Procedures  Procedures     Discussion of Management  Admitting HospitalistTrish    Social Determinants of Health adding to complexity of care  None    ED Course  ED Course as of 05/17/24 0059   Thu May 16, 2024   2001 I obtained history and examined the patient as noted above.    2019 I rechecked and updated the patient.    2045 I spoke to Dr. Chambers from the hospitalist service regarding the patient's condition.      Medical Decision Making / Diagnosis   CMS Diagnoses: None    MIPS     None    MDM  Genie Persaud is a 88 year old female who presents to the emergency room with generalized weakness shortness of breath.  Her EKG on presentation did show atrial fibrillation with slow ventricular response.  Her troponin is detectable.  The second troponin is flat.  She really does not have any chest pain but she does feel short of breath mostly when she is laying on her left side.  She feels generally weak and this could be due to her slow heart rate.  She does have an element of congestive heart failure as her BNP is elevated however I feel she is actually more on the dry side that she has not had normal oral intake for the last week and she has JOSLYN.  I think because her kidneys are not functioning well her metoprolol is most likely not clearing as regularly and is causing slower ventricular response.  She has not taken any of her medications today.  She also tested positive for COVID again her daughter had this previously this is likely contributing to her shortness of breath however  she is not hypoxic.  Chest x-ray did not show any evidence of pneumonia or effusions there is hyperinflation which would be consistent with asthma.    At this time I will admit her to hospital as she will need diuresis but she will also need some rehydration as well.  She does not require steroids as she is not currently hypoxic.    Disposition  The patient was admitted to the hospital.     ICD-10 Codes:    ICD-10-CM    1. Acute kidney injury (H24)  N17.9       2. Atrial fibrillation with slow ventricular response (H)  I48.91            Discharge Medications  New Prescriptions    No medications on file         Alexia Quispe MD       Scribe Disclosure:  Amando KINSEY Hailie, am serving as a scribe at 8:53 PM on 5/16/2024 to document services personally performed by Alexia Quispe MD based on my observations and the provider's statements to me.        Alexia Quispe MD  05/17/24 0104

## 2024-05-18 ENCOUNTER — APPOINTMENT (OUTPATIENT)
Dept: PHYSICAL THERAPY | Facility: CLINIC | Age: 89
DRG: 178 | End: 2024-05-18
Payer: COMMERCIAL

## 2024-05-18 VITALS
TEMPERATURE: 97.3 F | HEIGHT: 62 IN | DIASTOLIC BLOOD PRESSURE: 47 MMHG | OXYGEN SATURATION: 96 % | SYSTOLIC BLOOD PRESSURE: 102 MMHG | RESPIRATION RATE: 20 BRPM | BODY MASS INDEX: 19.8 KG/M2 | WEIGHT: 107.6 LBS | HEART RATE: 70 BPM

## 2024-05-18 LAB
ANION GAP SERPL CALCULATED.3IONS-SCNC: 10 MMOL/L (ref 7–15)
BUN SERPL-MCNC: 28 MG/DL (ref 8–23)
CALCIUM SERPL-MCNC: 9 MG/DL (ref 8.8–10.2)
CHLORIDE SERPL-SCNC: 104 MMOL/L (ref 98–107)
CREAT SERPL-MCNC: 1.33 MG/DL (ref 0.51–0.95)
DEPRECATED HCO3 PLAS-SCNC: 22 MMOL/L (ref 22–29)
EGFRCR SERPLBLD CKD-EPI 2021: 39 ML/MIN/1.73M2
FASTING STATUS PATIENT QL REPORTED: NO
GLUCOSE SERPL-MCNC: 99 MG/DL (ref 70–99)
HCT VFR BLD AUTO: 34.1 % (ref 35–47)
HGB BLD-MCNC: 11.3 G/DL (ref 11.7–15.7)
POTASSIUM SERPL-SCNC: 4.3 MMOL/L (ref 3.4–5.3)
SODIUM SERPL-SCNC: 136 MMOL/L (ref 135–145)

## 2024-05-18 PROCEDURE — 99239 HOSP IP/OBS DSCHRG MGMT >30: CPT | Performed by: HOSPITALIST

## 2024-05-18 PROCEDURE — 97110 THERAPEUTIC EXERCISES: CPT | Mod: GP | Performed by: PHYSICAL THERAPIST

## 2024-05-18 PROCEDURE — 80048 BASIC METABOLIC PNL TOTAL CA: CPT | Performed by: HOSPITALIST

## 2024-05-18 PROCEDURE — 250N000013 HC RX MED GY IP 250 OP 250 PS 637: Performed by: HOSPITALIST

## 2024-05-18 PROCEDURE — 36415 COLL VENOUS BLD VENIPUNCTURE: CPT | Performed by: HOSPITALIST

## 2024-05-18 PROCEDURE — 85018 HEMOGLOBIN: CPT | Performed by: HOSPITALIST

## 2024-05-18 PROCEDURE — 97530 THERAPEUTIC ACTIVITIES: CPT | Mod: GP | Performed by: PHYSICAL THERAPIST

## 2024-05-18 RX ORDER — METOPROLOL SUCCINATE 25 MG/1
25 TABLET, EXTENDED RELEASE ORAL DAILY
Status: DISCONTINUED | OUTPATIENT
Start: 2024-05-18 | End: 2024-05-18 | Stop reason: HOSPADM

## 2024-05-18 RX ORDER — TORSEMIDE 20 MG/1
20 TABLET ORAL DAILY
Status: SHIPPED
Start: 2024-05-21

## 2024-05-18 RX ORDER — POTASSIUM CHLORIDE 1500 MG/1
20 TABLET, EXTENDED RELEASE ORAL DAILY
Status: SHIPPED
Start: 2024-05-21

## 2024-05-18 RX ORDER — METOPROLOL SUCCINATE 25 MG/1
25 TABLET, EXTENDED RELEASE ORAL DAILY
Qty: 30 TABLET | Refills: 0 | Status: SHIPPED | OUTPATIENT
Start: 2024-05-18 | End: 2024-05-21

## 2024-05-18 RX ADMIN — ROSUVASTATIN CALCIUM 10 MG: 10 TABLET, FILM COATED ORAL at 09:06

## 2024-05-18 RX ADMIN — PANTOPRAZOLE SODIUM 20 MG: 20 TABLET, DELAYED RELEASE ORAL at 09:06

## 2024-05-18 RX ADMIN — METOPROLOL SUCCINATE 25 MG: 25 TABLET, EXTENDED RELEASE ORAL at 09:06

## 2024-05-18 RX ADMIN — FLUTICASONE PROPIONATE AND SALMETEROL XINAFOATE 2 PUFF: 230; 21 AEROSOL, METERED RESPIRATORY (INHALATION) at 09:07

## 2024-05-18 RX ADMIN — APIXABAN 2.5 MG: 2.5 TABLET, FILM COATED ORAL at 09:06

## 2024-05-18 RX ADMIN — FLUTICASONE PROPIONATE 1 SPRAY: 50 SPRAY, METERED NASAL at 09:07

## 2024-05-18 ASSESSMENT — ACTIVITIES OF DAILY LIVING (ADL)
ADLS_ACUITY_SCORE: 32
ADLS_ACUITY_SCORE: 26
ADLS_ACUITY_SCORE: 32
DEPENDENT_IADLS:: CLEANING
ADLS_ACUITY_SCORE: 26
ADLS_ACUITY_SCORE: 32
ADLS_ACUITY_SCORE: 26
ADLS_ACUITY_SCORE: 32
ADLS_ACUITY_SCORE: 32
ADLS_ACUITY_SCORE: 26
ADLS_ACUITY_SCORE: 26

## 2024-05-18 NOTE — PROGRESS NOTES
Care Management Discharge Note    Discharge Date: 05/18/2024       Discharge Disposition: Home, Home Care    Discharge Services: Other (see comment)    Discharge DME: None    Discharge Transportation: family or friend will provide    Private pay costs discussed: Not applicable    Does the patient's insurance plan have a 3 day qualifying hospital stay waiver?  Yes     Which insurance plan 3 day waiver is available? Alternative insurance waiver    Will the waiver be used for post-acute placement? No    PAS Confirmation Code:    Patient/family educated on Medicare website which has current facility and service quality ratings: yes    Education Provided on the Discharge Plan: Yes  Persons Notified of Discharge Plans: patient and bedside RN  Patient/Family in Agreement with the Plan: yes    Handoff Referral Completed: Yes    Additional Information:  Patient was accepted by [facility]. Writer called patient and let her know. This will show up on her AVS as well as her new PCP appointment.    Leticia Gandara RN

## 2024-05-18 NOTE — DISCHARGE SUMMARY
"[facility]  Hospitalist Discharge Summary      Date of Admission:  5/16/2024  Date of Discharge:  5/18/2024  Discharging Provider: Homer Alves MD  Discharge Service: Hospitalist Service    Discharge Diagnoses     Please refer to the hospital course below    Clinically Significant Risk Factors     # DMII: A1C = 6.5 % (Ref range: 0.0 - 5.6 %) within past 6 months       Follow-ups Needed After Discharge   Follow-up Appointments     Follow-up and recommended labs and tests       Follow up with primary care provider, Layo Sevilla, within 7 days to   evaluate medication change and for hospital follow- up.  The following   labs/tests are recommended: BMP during follow up.             Unresulted Labs Ordered in the Past 30 Days of this Admission       No orders found from 4/16/2024 to 5/17/2024.        These results will be followed up by none    Discharge Disposition   Discharged to home  Condition at discharge: Stable    Hospital Course   Genie Persaud is a 88 year old female with a history of atrial fibrillation on Eliquis, stage 3 CKD, hypertension, CAD, non rheumatic aortic valve insufficiency who presents to the ED with her daughter for weakness and shortness of breath.      Summary:   Patient states she has asthma and is short of breath, not able to clearly point out timeline although feels breathing worsened about a week ago noting generalized weakness as well. No dyspnea at rest, but with climbing stairs. No recent asthma exacerbation, denies wheezing.  Reports chronic postnasal drip and seasonal allergies, nasal congestion. No productive cough.  She states she had the \"GI flu\" recently with nausea and diarrhea which has since resolved.  Decreased p.o. intake.  She has been compliant to her medications.    Exposed to COVID a few days ago, in ED diagnosed with COVID 19.  No hypoxia.  CXR without infiltrate or edema, noted JOSLYN with creatinine 1.89, was bradycardic to 40s-50s, initial " troponin 50, flat on recheck.     Generalized weakness and SOB  Covid 19 infection without hypoxia  Patient reports she is mostly short of breath with climbing stairs and attributes it to her asthma.  Not particularly worse with COVID infection.  Special isolation for COVID, no hypoxia therefore no indication for remdesivir, steroids.  Already on DOAC   -Monitored on continuous pulse oximetry, maintaining normal oxygen saturation without need for supplemental oxygen, continued with PTA inhalers     -Incentive spirometry   -PT evaluated, home care PT at discharge.     Elevated troponin suspect demand ischemia  Chronic A-fib anticoagulated with apixaban  Bradycardic on metoprolol with hypotension  Aortic insufficiency   Cardiomyopathy   Noted new decreased LVEF November 2023, LVEF 43%, moderate aortic regurgitation.  Had follow-up with cardiology as outpatient and repeat echo showed EF 44%.  No follow-up ischemic workup was noted.  Patient reports ongoing dyspnea with activity and attributes it to her asthma although denies wheezing or asthma exacerbation.  Has COVID but no fever, and chest x-ray without infiltrates.  No pulmonary edema.  Cardiac on metoprolol.  Suspect degree of dyspnea is contributed by underlying disease as well.  -Metoprolol, losartan and torsemide held.  Started on IV fluid since he appeared dehydrated. Cardiology consulted, recommended resuming metoprolol at lower dose and gradually resuming other medications.  Her blood pressure remains low normal and so at discharge amlodipine as well as losartan not resumed.  Discussed with patient and her daughter to monitor blood pressure is able and to gradually resume above medications once SBP reaches 140s. Home care RN at discharge since multiple medication adjustment has been made.   -Advised to stay off diuretic for next 3 days and resume on Tuesday or sooner if her weight goes above 2 pounds/increased dyspnea or leg edema.  -Resumed PTA apixaban      JOSLYN on CKD stage 2  Noted creatinine was 0.9-1.2 last year.  Presented with 1.8.  Decreased p.o. intake and on diuretic and ARB as well.  Suspect relative volume depletion and medication effect.    -Received some IV fluid in ER and continued subsequently, creatinine has improved to near her baseline 1.89-->1.7--> 1.33     Consultations This Hospital Stay   PHYSICAL THERAPY ADULT IP CONSULT  CARDIOLOGY IP CONSULT    Code Status   No CPR- Do NOT Intubate    Time Spent on this Encounter   I, Homer Alves MD, personally saw the patient today and spent greater than 30 minutes discharging this patient.       Homer Alves MD  [facility]ON[facility]  [address]  [place] [address]  Phone: [phone]  ______________________________________________________________________    Physical Exam   Vital Signs: Temp: 97.3  F (36.3  C) Temp src: Oral BP: 102/47 Pulse: 70   Resp: 20 SpO2: 96 % O2 Device: None (Room air)    Weight: 107 lbs 9.6 oz    General: AAOx3, pleasant, appears comfortable. Voice has nasal intonation.   HEENT: PERRLA EOMI. Mucosa dry  Lungs: Bilateral equal air entry. No wheezing or crackles, no increased work of breathing.    CVS: S1S2 irregular, no tachycardia or murmur.   Abdomen: Soft, NT, ND. BS heard.  MSK: No edema or deformities.  Neuro: AAOX3. CN 2-12 normal. Strength symmetrical.  Skin: dry, No rash.        Primary Care Physician   Layo Sevilla    Discharge Orders      Home Care Referral      Reason for your hospital stay    Fatigue     Follow-up and recommended labs and tests     Follow up with primary care provider, Layo Sevilla, within 7 days to evaluate medication change and for hospital follow- up.  The following labs/tests are recommended: BMP during follow up.     Activity    Your activity upon discharge: activity as tolerated     Diet    Follow this diet upon discharge: Orders Placed This Encounter      Combination Diet Low Saturated Fat Na  <2400mg Diet, No Caffeine Diet       Significant Results and Procedures   Most Recent 3 CBC's:  Recent Labs   Lab Test 05/18/24  0539 05/17/24  0607 05/16/24  1905 02/29/24  1427   WBC  --  6.1 9.1 8.1   HGB 11.3* 11.5* 11.8 11.8   MCV  --  91 91 92   PLT  --  221 256 266     Most Recent 3 BMP's:  Recent Labs   Lab Test 05/18/24  0539 05/17/24  0607 05/16/24  1905    137 136   POTASSIUM 4.3 4.4 4.5   CHLORIDE 104 101 96*   CO2 22 23 23   BUN 28.0* 35.6* 38.3*   CR 1.33* 1.78* 1.89*   ANIONGAP 10 13 17*   ELROY 9.0 8.8 9.1   GLC 99 92 113*     7-Day Micro Results       Collected Updated Procedure Result Status      05/16/2024 2053 05/16/2024 2140 Symptomatic Influenza A/B, RSV, & SARS-CoV2 PCR (COVID-19) Nose [[id number]]    (Abnormal)   Swab from Nose    Final result Component Value   Influenza A PCR Negative   Influenza B PCR Negative   RSV PCR Negative   SARS CoV2 PCR Positive   POSITIVE: SARS-CoV-2 (COVID-19) RNA detected, presumed positive.                ,   Results for orders placed or performed during the hospital encounter of 05/16/24   Chest XR,  PA & LAT    Narrative    EXAM: XR CHEST 2 VIEWS  LOCATION: [facility]  DATE: 5/16/2024    INDICATION: SOB  COMPARISON: 11/14/2022      Impression    IMPRESSION: Cardiomegaly. Tortuous ectatic calcified thoracic aorta. Pulmonary vascularity within normal limits. No focal airspace infiltrates. Hyperinflation versus deep inspiration changes. Clinical correlation for air trapping. Degenerative changes   thoracic spine stable. Visualized degenerative changes bilateral shoulders appear similar. Visualized upper abdomen unremarkable.     *Note: Due to a large number of results and/or encounters for the requested time period, some results have not been displayed. A complete set of results can be found in Results Review.       Discharge Medications   Current Discharge Medication List        CONTINUE these medications which have CHANGED     Details   metoprolol succinate ER (TOPROL XL) 25 MG 24 hr tablet Take 1 tablet (25 mg) by mouth daily  Qty: 30 tablet, Refills: 0    Comments: Future refills by PCP Dr. Layo Sevilla with phone number [phone].  Associated Diagnoses: Chronic systolic heart failure (H); Hyperlipidemia LDL goal <130; Atrial fibrillation with RVR (H)      potassium chloride ER (K-TAB) 20 MEQ CR tablet Take 1 tablet (20 mEq) by mouth daily    Associated Diagnoses: Hypokalemia      torsemide (DEMADEX) 20 MG tablet Take 1 tablet (20 mg) by mouth daily    Associated Diagnoses: Acute heart failure with preserved ejection fraction (H)           CONTINUE these medications which have NOT CHANGED    Details   acetaminophen (TYLENOL) 325 MG tablet Take 975 mg by mouth every 8 hours as needed for mild pain      albuterol (PROAIR HFA/PROVENTIL HFA/VENTOLIN HFA) 108 (90 Base) MCG/ACT inhaler Inhale 1-2 puffs into the lungs every 6 hours as needed for shortness of breath / dyspnea or wheezing  Qty: 18 g, Refills: 11    Comments: Pharmacy may dispense brand covered by insurance (Proair, or proventil or ventolin or generic albuterol inhaler)  Associated Diagnoses: Moderate persistent asthma without complication      apixaban ANTICOAGULANT (ELIQUIS ANTICOAGULANT) 2.5 MG tablet Take 1 tablet (2.5 mg) by mouth 2 times daily  Qty: 180 tablet, Refills: 3    Associated Diagnoses: Paroxysmal atrial fibrillation (H)      fluticasone-salmeterol (ADVAIR HFA) 230-21 MCG/ACT inhaler Inhale 2 puffs into the lungs 2 times daily  Qty: 12 g, Refills: 11    Associated Diagnoses: Moderate persistent asthma without complication      latanoprost (XALATAN) 0.005 % ophthalmic solution INSTILL ONE DROP IN EACH EYE AT BEDTIME **REFRIGERATE UNTIL OPEN-STORE AT ROOM TEMP ONCE OPENED*  Qty: 2.5 mL, Refills: 97    Associated Diagnoses: Glaucoma suspect, bilateral      montelukast (SINGULAIR) 10 MG tablet Take 1 tablet (10 mg) by mouth At Bedtime  Qty: 90 tablet, Refills: 3     Associated Diagnoses: Seasonal allergic rhinitis, unspecified trigger      omeprazole (PRILOSEC) 10 MG DR capsule Take 1 capsule (10 mg) by mouth daily  Qty: 90 capsule, Refills: 3    Associated Diagnoses: Gastroesophageal reflux disease without esophagitis      rosuvastatin (CRESTOR) 10 MG tablet Take 1 tablet (10 mg) by mouth daily  Qty: 90 tablet, Refills: 3    Associated Diagnoses: Dyslipidemia           STOP taking these medications       amLODIPine (NORVASC) 5 MG tablet Comments:   Reason for Stopping:         losartan (COZAAR) 100 MG tablet Comments:   Reason for Stopping:             Allergies   Allergies   Allergen Reactions    Fosamax [Alendronate] GI Disturbance    Contrast Dye      Red arm redness and swelling     Evista [Raloxifene]      abd symptoms.     Flu Virus Vaccine      swollen and red at inj site    Pneumococcal Vaccine Other (See Comments)     Ended up in hospital with afib, pneumonia and asthma    Amoxicillin Rash

## 2024-05-18 NOTE — CONSULTS
Care Management Initial Consult    General Information  Assessment completed with: Christina Concepcion  Type of CM/SW Visit: Initial Assessment    Primary Care Provider verified and updated as needed: Yes   Readmission within the last 30 days: no previous admission in last 30 days      Reason for Consult: discharge planning  Advance Care Planning:            Communication Assessment  Patient's communication style: spoken language (English or Bilingual)    Hearing Difficulty or Deaf: no   Wear Glasses or Blind: yes    Cognitive  Cognitive/Neuro/Behavioral: WDL  Level of Consciousness: alert  Arousal Level: opens eyes spontaneously  Orientation: oriented x 4  Mood/Behavior: cooperative, calm  Best Language: 0 - No aphasia  Speech: clear, spontaneous, logical    Living Environment:   People in home: alone     Current living Arrangements: house      Able to return to prior arrangements: yes       Family/Social Support:  Care provided by: self  Provides care for: no one, unable/limited ability to care for self  Marital Status:              Description of Support System:           Current Resources:   Patient receiving home care services: No     Community Resources: None  Equipment currently used at home: walker, rolling  Supplies currently used at home: None    Employment/Financial:  Employment Status: retired        Financial Concerns: none           Does the patient's insurance plan have a 3 day qualifying hospital stay waiver?  Yes     Which insurance plan 3 day waiver is available? Alternative insurance waiver    Will the waiver be used for post-acute placement? No    Lifestyle & Psychosocial Needs:  Social Determinants of Health     Food Insecurity: Unknown (1/26/2024)    Food Insecurity     Within the past 12 months, did you worry that your food would run out before you got money to buy more?: Patient declined     Within the past 12 months, did the food you bought just not last and you didn t have money to get  more?: Patient declined   Depression: Not at risk (2/29/2024)    PHQ-2     PHQ-2 Score: 0   Housing Stability: Unknown (1/26/2024)    Housing Stability     Do you have housing? : Patient declined     Are you worried about losing your housing?: Patient declined   Tobacco Use: Low Risk  (5/16/2024)    Patient History     Smoking Tobacco Use: Never     Smokeless Tobacco Use: Never     Passive Exposure: Never   Financial Resource Strain: Unknown (1/26/2024)    Financial Resource Strain     Within the past 12 months, have you or your family members you live with been unable to get utilities (heat, electricity) when it was really needed?: Patient declined   Alcohol Use: Not At Risk (8/22/2023)    AUDIT-C     Frequency of Alcohol Consumption: Monthly or less     Average Number of Drinks: 1 or 2     Frequency of Binge Drinking: Never   Transportation Needs: Unknown (1/26/2024)    Transportation Needs     Within the past 12 months, has lack of transportation kept you from medical appointments, getting your medicines, non-medical meetings or appointments, work, or from getting things that you need?: Patient declined   Physical Activity: Sufficiently Active (8/22/2023)    Exercise Vital Sign     Days of Exercise per Week: 7 days     Minutes of Exercise per Session: 50 min   Interpersonal Safety: Low Risk  (2/29/2024)    Interpersonal Safety     Do you feel physically and emotionally safe where you currently live?: Yes     Within the past 12 months, have you been hit, slapped, kicked or otherwise physically hurt by someone?: No     Within the past 12 months, have you been humiliated or emotionally abused in other ways by your partner or ex-partner?: No   Stress: Patient Declined (8/22/2023)    [place] [employer] - Occupational Stress Questionnaire     Feeling of Stress : Patient declined   Social Connections: Moderately Isolated (8/22/2023)    Social Connection and Isolation Panel [NHANES]     Frequency of  Communication with Friends and Family: More than three times a week     Frequency of Social Gatherings with Friends and Family: More than three times a week     Attends Rastafari Services: More than 4 times per year     Active Member of Clubs or Organizations: No     Attends Club or Organization Meetings: Not on file     Marital Status:    Health Literacy: Not on file       Functional Status:  Prior to admission patient needed assistance:   Dependent ADLs:: Ambulation-walker  Dependent IADLs:: Cleaning       Mental Health Status:  Mental Health Status: No Current Concerns       Chemical Dependency Status:                Values/Beliefs:  Spiritual, Cultural Beliefs, Rastafari Practices, Values that affect care: no               Additional Information:  Writer was informed that patient is discharging and will need a PCP and home care set up. Writer called into patient's room. Went over recommendations.  -writer set up PCP for Tuesday May 21st.   May 21, 2024  4:00 PM  (Arrive by 3:45 PM)  ED/Hospital Follow Up with Layo Sevilla MD  [facility] [address]  [address]  [place] [address] [phone]     -home care referral sent to home care hub. Patient is agreeable and has had Home care in the past.    Leticia Gandara RN

## 2024-05-18 NOTE — PROGRESS NOTES
05/18/24 1100   Signing Clinician's Name / Credentials   Signing clinician's name / credentials Lauryn Valencia PT, DPT   Cortés Balance Scale (BETY ISSA, SHERITA ALICEA, MARS AYERS, LOLIS WHITTINGTON: MEASURING BALANCE IN THE ELDERLY: VALIDATION OF AN INSTRUMENT. CAN. J. PUB. HEALTH, JULY/AUGUST SUPPLEMENT 2:S7-11, 1992.)   Sit To Stand 3   Standing Unsupported 4   Sitting Unsupported 4   Stand to Sit 4   Transfers 4   Standing with Eyes Closed 4   Standing Unsupported, Feet Together 3   Reach Forward With Outstretched Arm 3   Retrieve Object From Floor 3   Turning to Look Behind 4   Turn 360 Degrees 2   Placing Alternate Foot on Stool (4-6 inches) 2   Unsupported Tandem Stand (Demonstrate to Subject) 2   One Leg Stand 2   Total Score (A score of 45 or less has been correlated with an increased risk of falls)   Total Score (out of 56) 44     Cortés Balance Scale (BBS)  Patient Score: 44/56  Cortés Balance Scale (BBS) Cutoff Scores: A score of ? 45/56 indicates an increased risk for falls, or  A score of <51/56 indicates an increased risk for falls in persons with history of falls. A score of <42/56 indicates an increased risk for falls in persons without history of falls. Please note that a score of <40/56 is nearly 100% predictive of a fall occuring in the future.     Score Interpretation/Recommendation: Pt is at increased risk for falls.     The BBS is a measure of static and dynamic standing balance that has been validated in community dwelling elderly individuals and individuals who have Parkinson's Disease, MS, and those who are s/p CVA and TBI. The test is administered without an assistive device. Scores from the Cortés are used to determine the probability of falling based on the patient's previous history of falls and their test performance.     Lauryn Valencia PT, DPT 5/18/2024

## 2024-05-18 NOTE — PLAN OF CARE
Discharge to home. Will follow up with PCP as scheduled. Discharge education/medications complete. Transport arranged with daughter.

## 2024-05-18 NOTE — PROGRESS NOTES
[id number]  Alert and oriented times four  SBA W  Denies pain  Room air  Tele: Afib CVR  Plan: continue to monitor

## 2024-05-19 PROBLEM — E11.9 DIABETES MELLITUS, TYPE 2 (H): Status: ACTIVE | Noted: 2024-05-19

## 2024-05-19 NOTE — PLAN OF CARE
Physical Therapy Discharge Summary    Reason for therapy discharge:    Discharged to home with home therapy.    Progress towards therapy goal(s). See goals on Care Plan in [facility] electronic health record for goal details.  Goals partially met.  Barriers to achieving goals:   discharge from facility.    Therapy recommendation(s):    Continued therapy is recommended.  Rationale/Recommendations:  Patient would benefit from continued skilled PT to address balance and cardiovascular endurance.    Goal Outcome Evaluation:

## 2024-05-20 ENCOUNTER — PATIENT OUTREACH (OUTPATIENT)
Dept: GERIATRIC MEDICINE | Facility: CLINIC | Age: 89
End: 2024-05-20
Payer: COMMERCIAL

## 2024-05-20 NOTE — PROGRESS NOTES
[facility] Care Coordination Contact    Per chart review, member has not been seen by a [facility] providers in almost two years, and is no longer living in an assisted living. Member is followed by community provider, Dr. Sevilla.     Updated E&E who submitted CCRF to [facility]. Member will be disenrolled the end of the month.     Christine Topete RN  [facility]  Cell: [phone]

## 2024-05-21 ENCOUNTER — OFFICE VISIT (OUTPATIENT)
Dept: PEDIATRICS | Facility: CLINIC | Age: 89
End: 2024-05-21
Payer: COMMERCIAL

## 2024-05-21 ENCOUNTER — TELEPHONE (OUTPATIENT)
Dept: PEDIATRICS | Facility: CLINIC | Age: 89
End: 2024-05-21

## 2024-05-21 VITALS
BODY MASS INDEX: 20.4 KG/M2 | HEIGHT: 61 IN | DIASTOLIC BLOOD PRESSURE: 69 MMHG | HEART RATE: 73 BPM | TEMPERATURE: 97.4 F | RESPIRATION RATE: 16 BRPM | WEIGHT: 108.06 LBS | SYSTOLIC BLOOD PRESSURE: 106 MMHG | OXYGEN SATURATION: 96 %

## 2024-05-21 DIAGNOSIS — Z29.11 NEED FOR VACCINATION AGAINST RESPIRATORY SYNCYTIAL VIRUS: ICD-10-CM

## 2024-05-21 DIAGNOSIS — I48.91 ATRIAL FIBRILLATION WITH RVR (H): ICD-10-CM

## 2024-05-21 DIAGNOSIS — Z23 NEED FOR SHINGLES VACCINE: ICD-10-CM

## 2024-05-21 DIAGNOSIS — N18.30 STAGE 3 CHRONIC KIDNEY DISEASE, UNSPECIFIED WHETHER STAGE 3A OR 3B CKD (H): ICD-10-CM

## 2024-05-21 DIAGNOSIS — U07.1 INFECTION DUE TO 2019 NOVEL CORONAVIRUS: Primary | ICD-10-CM

## 2024-05-21 DIAGNOSIS — I50.22 CHRONIC SYSTOLIC HEART FAILURE (H): ICD-10-CM

## 2024-05-21 DIAGNOSIS — I10 ESSENTIAL HYPERTENSION WITH GOAL BLOOD PRESSURE LESS THAN 140/90: ICD-10-CM

## 2024-05-21 DIAGNOSIS — E78.5 HYPERLIPIDEMIA LDL GOAL <130: ICD-10-CM

## 2024-05-21 DIAGNOSIS — E11.9 TYPE 2 DIABETES MELLITUS WITHOUT COMPLICATION, WITHOUT LONG-TERM CURRENT USE OF INSULIN (H): ICD-10-CM

## 2024-05-21 PROCEDURE — 99214 OFFICE O/P EST MOD 30 MIN: CPT | Performed by: INTERNAL MEDICINE

## 2024-05-21 RX ORDER — METOPROLOL SUCCINATE 25 MG/1
25 TABLET, EXTENDED RELEASE ORAL DAILY
Qty: 90 TABLET | Refills: 0 | Status: SHIPPED | OUTPATIENT
Start: 2024-05-21 | End: 2024-09-16

## 2024-05-21 RX ORDER — RESPIRATORY SYNCYTIAL VIRUS VACCINE 120MCG/0.5
0.5 KIT INTRAMUSCULAR ONCE
Qty: 1 EACH | Refills: 0 | Status: CANCELLED | OUTPATIENT
Start: 2024-05-21 | End: 2024-05-21

## 2024-05-21 ASSESSMENT — PAIN SCALES - GENERAL: PAINLEVEL: NO PAIN (0)

## 2024-05-21 ASSESSMENT — ASTHMA QUESTIONNAIRES
QUESTION_1 LAST FOUR WEEKS HOW MUCH OF THE TIME DID YOUR ASTHMA KEEP YOU FROM GETTING AS MUCH DONE AT WORK, SCHOOL OR AT HOME: NONE OF THE TIME
ACT_TOTALSCORE: 17
QUESTION_3 LAST FOUR WEEKS HOW OFTEN DID YOUR ASTHMA SYMPTOMS (WHEEZING, COUGHING, SHORTNESS OF BREATH, CHEST TIGHTNESS OR PAIN) WAKE YOU UP AT NIGHT OR EARLIER THAN USUAL IN THE MORNING: NOT AT ALL
ACT_TOTALSCORE: 17
QUESTION_2 LAST FOUR WEEKS HOW OFTEN HAVE YOU HAD SHORTNESS OF BREATH: ONCE A DAY
QUESTION_5 LAST FOUR WEEKS HOW WOULD YOU RATE YOUR ASTHMA CONTROL: SOMEWHAT CONTROLLED
QUESTION_4 LAST FOUR WEEKS HOW OFTEN HAVE YOU USED YOUR RESCUE INHALER OR NEBULIZER MEDICATION (SUCH AS ALBUTEROL): ONE OR TWO TIMES PER DAY

## 2024-05-21 NOTE — PATIENT INSTRUCTIONS
With respect to your COVID:  no treatment was given, contiue to monitor.    With respect to your heart; no signs of congestive heart failure today.    With respect to you kidney injury, you kidney function is back to baseline.      With respect to your blood pressure, still hold the losartan and the amlodipine and the metoprolol 50 mg.       Make sure you are taking th 25 mg of metoprolol daily, and not taking the 50 mg.      Follow up with me in 1 month.      No changes in othe rmeds for now.

## 2024-05-21 NOTE — PROGRESS NOTES
Assessment & Plan       Stage 3 chronic kidney disease, unspecified whether stage 3a or 3b CKD (H)  Improved back to baseline.  Monitor in 1-2 months.   - Home Care Referral    Type 2 diabetes mellitus without complication, without long-term current use of insulin (H)  Parameters well controlled.  Continue secondary risk factor modification for BP, cholesterol, anticoagulation, and smoking cessation.     Infection due to 2019 novel coronavirus  Improved; no significant respiratory symptoms.   - Home Care Referral    Essential hypertension with goal blood pressure less than 140/90  At blood pressure goal; will need to add back calcium channel blocker, angiotensin receptor blocker, and higher dose of beta blocker if blood pressure rises post infection.   - Home Care Referral    Chronic systolic heart failure (H)  No signs of congestive heart failure.  As above.  Back on torsemide.   - metoprolol succinate ER (TOPROL XL) 25 MG 24 hr tablet; Take 1 tablet (25 mg) by mouth daily    HYPERLIPIDEMIA LDL GOAL <130  Ongoing statin.     Atrial fibrillation with RVR (H)  Eliquis and rate control.   - metoprolol succinate ER (TOPROL XL) 25 MG 24 hr tablet; Take 1 tablet (25 mg) by mouth daily        MED REC REQUIRED  Post Medication Reconciliation Status:  Discharge medications reconciled, continue medications without change        Yudith Vasquez is a 88 year old, presenting for the following health issues:  Hospital F/U      5/21/2024     3:38 PM   Additional Questions   Roomed by Rand Alves CMA   Accompanied by N/A         5/21/2024     3:38 PM   Patient Reported Additional Medications   Patient reports taking the following new medications N/A     [facility]         Hospital Follow-up Visit:    Hospital/Nursing Home/IP Rehab Facility: [facility]  Date of Admission: 5/16/2024   Date of Discharge: 5/18/2024   Reason(s) for Admission:   Acute systolic congestive heart failure (H)  Was the patient in  "the ICU or did the patient experience delirium during hospitalization?  No  Do you have any other stressors you would like to discuss with your provider? No    Problems taking medications regularly:  None  Medication changes since discharge: Metoprolol, Potassium Chloride, Toresimide  Problems adhering to non-medication therapy:  None    Summary of hospitalization:  [facility] discharge summary reviewed  Diagnostic Tests/Treatments reviewed.  Follow up needed: none  Other Healthcare Providers Involved in Patient s Care:         None  Update since discharge: improved.       Came to urgent care due to weakness and shortness of breath;  found to have COVID, and some hypotension.  Also had some loose stools and feels like had acute gastroenteritis.    Admitted for 3 days, but did not need treatment due to no resp symptoms.  No antibiotic. Held losartan and amlodipine and put on a lower dose of metoprolol. Heart failure test (\"BNP\") was elevated.  Troponin slightly high.     Now feels back to baseline.  Still tired; difficulty walking up and down stairs.  No fevers.      She manages her meds.  Sets them up.            Hospital Course  Genie Persaud is a 88 year old female with a history of atrial fibrillation on Eliquis, stage 3 CKD, hypertension, CAD, non rheumatic aortic valve insufficiency who presents to the ED with her daughter for weakness and shortness of breath.      Summary:   Patient states she has asthma and is short of breath, not able to clearly point out timeline although feels breathing worsened about a week ago noting generalized weakness as well. No dyspnea at rest, but with climbing stairs. No recent asthma exacerbation, denies wheezing.  Reports chronic postnasal drip and seasonal allergies, nasal congestion. No productive cough.  She states she had the \"GI flu\" recently with nausea and diarrhea which has since resolved.  Decreased p.o. intake.  She has been compliant to her " medications.    Exposed to COVID a few days ago, in ED diagnosed with COVID 19.  No hypoxia.  CXR without infiltrate or edema, noted JOSLYN with creatinine 1.89, was bradycardic to 40s-50s, initial troponin 50, flat on recheck.      Generalized weakness and SOB  Covid 19 infection without hypoxia  Patient reports she is mostly short of breath with climbing stairs and attributes it to her asthma.  Not particularly worse with COVID infection.  Special isolation for COVID, no hypoxia therefore no indication for remdesivir, steroids.  Already on DOAC   -Monitored on continuous pulse oximetry, maintaining normal oxygen saturation without need for supplemental oxygen, continued with PTA inhalers     -Incentive spirometry   -PT evaluated, home care PT at discharge.     Elevated troponin suspect demand ischemia  Chronic A-fib anticoagulated with apixaban  Bradycardic on metoprolol with hypotension  Aortic insufficiency   Cardiomyopathy   Noted new decreased LVEF November 2023, LVEF 43%, moderate aortic regurgitation.  Had follow-up with cardiology as outpatient and repeat echo showed EF 44%.  No follow-up ischemic workup was noted.  Patient reports ongoing dyspnea with activity and attributes it to her asthma although denies wheezing or asthma exacerbation.  Has COVID but no fever, and chest x-ray without infiltrates.  No pulmonary edema.  Cardiac on metoprolol.  Suspect degree of dyspnea is contributed by underlying disease as well.  -Metoprolol, losartan and torsemide held.  Started on IV fluid since he appeared dehydrated. Cardiology consulted, recommended resuming metoprolol at lower dose and gradually resuming other medications.  Her blood pressure remains low normal and so at discharge amlodipine as well as losartan not resumed.  Discussed with patient and her daughter to monitor blood pressure is able and to gradually resume above medications once SBP reaches 140s. Home care RN at discharge since multiple medication  adjustment has been made.   -Advised to stay off diuretic for next 3 days and resume on Tuesday or sooner if her weight goes above 2 pounds/increased dyspnea or leg edema.  -Resumed PTA apixaban     JOSLYN on CKD stage 2  Noted creatinine was 0.9-1.2 last year.  Presented with 1.8.  Decreased p.o. intake and on diuretic and ARB as well.  Suspect relative volume depletion and medication effect.    -Received some IV fluid in ER and continued subsequently, creatinine has improved to near her baseline 1.89-->1.7--> 1.33           Plan of care communicated with patient                   Review of Systems  Constitutional, HEENT, cardiovascular, pulmonary, GI, , musculoskeletal, neuro, skin, endocrine and psych systems are negative, except as otherwise noted.      Objective    LMP  (LMP Unknown)   There is no height or weight on file to calculate BMI.  Physical Exam   GENERAL: alert and no distress  EYES: Eyes grossly normal to inspection, PERRL and conjunctivae and sclerae normal  HENT: ear canals and TM's normal, nose and mouth without ulcers or lesions  NECK: no adenopathy, no asymmetry, masses, or scars  RESP: lungs clear to auscultation - no rales, rhonchi or wheezes  CV: regular rate and rhythm, normal S1 S2, no S3 or S4, no murmur, click or rub, no peripheral edema  ABDOMEN: soft, nontender, no hepatosplenomegaly, no masses and bowel sounds normal  MS: no gross musculoskeletal defects noted, no edema  SKIN: no suspicious lesions or rashes  NEURO: Normal strength and tone, mentation intact and speech normal  PSYCH: mentation appears normal, affect normal/bright            Signed Electronically by: Layo Sevilla MD

## 2024-05-21 NOTE — TELEPHONE ENCOUNTER
Please approve delay in start of care, start of care to begin 5/22.  Thank you,  Shraddha Frausto LPN  [facility]  [phone]

## 2024-05-22 ENCOUNTER — MEDICAL CORRESPONDENCE (OUTPATIENT)
Dept: HEALTH INFORMATION MANAGEMENT | Facility: CLINIC | Age: 89
End: 2024-05-22

## 2024-05-23 ENCOUNTER — TELEPHONE (OUTPATIENT)
Dept: PEDIATRICS | Facility: CLINIC | Age: 89
End: 2024-05-23
Payer: COMMERCIAL

## 2024-05-23 NOTE — TELEPHONE ENCOUNTER
Home Care is calling regarding an established patient with  [facility].        8/31/2023     8:51 AM 8/24/2023     1:20 PM   Home Care Information    Name/Phone Number Dave PT; [phone]    Home Care agency  [facility]     Requesting orders from: Layo Sevilla  Provider is following patient: Yes  Is this a 60-day recertification request?  No    Orders Requested    Skilled Nursing  Request for initial certification (first set of orders)   Frequency:  SNV once a week for 2 weeks then every other week   and 3 prn's     Physical Therapy  Request for initial evaluation and treatment (one time)     Occupational Therapy  Request for initial evaluation and treatment (one time)     Confirmed ok to leave a detailed message with call back.  Contact information confirmed and updated as needed.    Madhuri Espitia RN

## 2024-05-23 NOTE — TELEPHONE ENCOUNTER
Called and left detailed message providing verbal okay for orders per PCP and [facility] policy.  Jie PATTERSON RN, BSN

## 2024-06-01 ENCOUNTER — HEALTH MAINTENANCE LETTER (OUTPATIENT)
Age: 89
End: 2024-06-01

## 2024-06-03 NOTE — PROGRESS NOTES
[facility] Care Coordination Contact    Date of Disenrollment: 5/31/24  Reason for Disenrollment: No longer living in assisted living, or being followed by a [facility] provider. Returned to community PCP.     CMS and E&E notified of disenrollment.    Christine Topete RN  [facility]  Cell: [phone]

## 2024-06-10 ENCOUNTER — TELEPHONE (OUTPATIENT)
Dept: PEDIATRICS | Facility: CLINIC | Age: 89
End: 2024-06-10
Payer: COMMERCIAL

## 2024-06-10 NOTE — TELEPHONE ENCOUNTER
Forms/Letter Request    Type of form/letter: Order: [id number]       Do we have the form/letter: Yes: Form is on the provider's desk for review      Who is the form from? Home care    Where did/will the form come from? form was faxed in    When is form/letter needed by: unkn

## 2024-06-11 ENCOUNTER — TELEPHONE (OUTPATIENT)
Dept: PEDIATRICS | Facility: CLINIC | Age: 89
End: 2024-06-11
Payer: COMMERCIAL

## 2024-06-11 NOTE — TELEPHONE ENCOUNTER
Joselin from [facility] called checking In on pts home care order. I told her I don't see recorded of us getting the form and to refax.    Megan Caballero on 6/11/2024 at 3:49 PM

## 2024-06-11 NOTE — TELEPHONE ENCOUNTER
Order/Referral Request    Who is requesting: [facility]     Orders being requested: Home care orders    Reason service is needed/diagnosis: Home care     When are orders needed by: 3-5 business days    Has this been discussed with Provider: Yes    Does patient have a preference on a Group/Provider/Facility?     Does patient have an appointment scheduled?: No    Where to send orders: Fax    Order number [id number]    Could we send this information to you in [employer] or would you prefer to receive a phone call?:   No preference   Okay to leave a detailed message?:  at Cell number on file:    Telephone Information:   Mobile [phone]

## 2024-06-13 ENCOUNTER — TELEPHONE (OUTPATIENT)
Dept: PEDIATRICS | Facility: CLINIC | Age: 89
End: 2024-06-13
Payer: COMMERCIAL

## 2024-06-13 NOTE — TELEPHONE ENCOUNTER
OT evaluation completed 6/12 after hospitalized due to covid requesting 3 visits in 5 weeks to address UE HEP, adl/iadl training, cognitive strategies, energy conservation.     Please approve the above.  Thank you,  Shraddha Frausto LPN  [facility]

## 2024-06-14 ENCOUNTER — TELEPHONE (OUTPATIENT)
Dept: PEDIATRICS | Facility: CLINIC | Age: 89
End: 2024-06-14
Payer: COMMERCIAL

## 2024-06-14 DIAGNOSIS — J30.2 SEASONAL ALLERGIC RHINITIS, UNSPECIFIED TRIGGER: ICD-10-CM

## 2024-06-14 DIAGNOSIS — I50.22 CHRONIC SYSTOLIC HEART FAILURE (H): Primary | ICD-10-CM

## 2024-06-14 DIAGNOSIS — I50.31 ACUTE HEART FAILURE WITH PRESERVED EJECTION FRACTION (H): ICD-10-CM

## 2024-06-14 RX ORDER — TORSEMIDE 20 MG/1
20 TABLET ORAL DAILY
Qty: 90 TABLET | Refills: 0 | Status: SHIPPED | OUTPATIENT
Start: 2024-06-14 | End: 2024-09-20

## 2024-06-14 RX ORDER — TORSEMIDE 20 MG/1
20 TABLET ORAL DAILY
Qty: 30 TABLET | Refills: 0 | OUTPATIENT
Start: 2024-06-14

## 2024-06-14 RX ORDER — LORATADINE 10 MG/1
1 TABLET ORAL DAILY
Qty: 90 TABLET | Refills: 0 | OUTPATIENT
Start: 2024-06-14

## 2024-06-14 NOTE — TELEPHONE ENCOUNTER
Forms/Letter Request    Type of form/letter: OTHER: OT liang [id number]       Do we have the form/letter: Yes:     Who is the form from? Home care    Where did/will the form come from? form was faxed in    How would you like the form/letter returned: Fax : [phone]      *orders in pcp in box- fax back to [phone]*      Samantha MARTINEZ, - [facility]  Primary Care- ADS, [facility] [facility]

## 2024-06-14 NOTE — TELEPHONE ENCOUNTER
Called the patient at [phone]   - Informed the patient that her refill for Claritin was declined as it is not an active medication on the chart with Dr. Sevilla   - Informed the patient that this medication can be purchased over the counter if needed   - Patient thinks that this may have been an automated request from the pharmacy   - Patient verbalized understanding and agrees with the plan     Argentina CAMPOVERDE RN   [facility]

## 2024-06-14 NOTE — TELEPHONE ENCOUNTER
Patient calling.  States her pharmacy told her Dr. Sevilla  discontinued torsemide.  Discussed torosemide and K+ given in hospital.  Discussed these medications should come from Cardiology.  Discussed I am worried this is going to get missed and that I would route message also.  Mailed copy of med list per patient request.  Did discuss she has medications on there that we have not given by Pulmonology, Eye and hospital.  Routing to Cardiology to follow-up on Cardiology medications.  Soha Aburto RN

## 2024-06-14 NOTE — TELEPHONE ENCOUNTER
In review of ER records, Torsemide was held for 3 days and to be resumed based on weight. She is on chronic low dose torsemide, 20 mg daily.  Ok to reorder.       Thanks  Becca

## 2024-06-14 NOTE — TELEPHONE ENCOUNTER
Refill for Claritin declined.  Not an active medication on the chart with Dr. Sevilla.  Please have patient make appointment if needed.  Please note this medication is available over the counter as well.

## 2024-06-14 NOTE — TELEPHONE ENCOUNTER
Forms/Letter Request    Type of form/letter: OTHER: client coordination note       Do we have the form/letter: Yes:    Who is the form from? Home care    Where did/will the form come from? form was faxed in    How would you like the form/letter returned: Fax : [phone]    *orders in pcp in box- sign and fax back to [phone]*      Samantha MARTINEZ, - [facility]  Primary Care- ADS, [facility] [facility]

## 2024-06-15 DIAGNOSIS — J45.40 MODERATE PERSISTENT ASTHMA WITHOUT COMPLICATION: ICD-10-CM

## 2024-06-17 ENCOUNTER — MEDICAL CORRESPONDENCE (OUTPATIENT)
Dept: HEALTH INFORMATION MANAGEMENT | Facility: CLINIC | Age: 89
End: 2024-06-17

## 2024-06-17 DIAGNOSIS — Z53.9 DIAGNOSIS NOT YET DEFINED: Primary | ICD-10-CM

## 2024-06-17 PROCEDURE — G0180 MD CERTIFICATION HHA PATIENT: HCPCS | Performed by: INTERNAL MEDICINE

## 2024-06-17 RX ORDER — ALBUTEROL SULFATE 90 UG/1
AEROSOL, METERED RESPIRATORY (INHALATION)
Qty: 18 G | Refills: 0 | Status: SHIPPED | OUTPATIENT
Start: 2024-06-17

## 2024-06-17 NOTE — TELEPHONE ENCOUNTER
Faxed and abstracted to providers completed file.      Samantha MARTINEZ, - [facility]  Primary Care- AD[facility], [facility] [place]  [facility]

## 2024-06-17 NOTE — TELEPHONE ENCOUNTER
Received call from Joselin in Medical Records with [facility].  They received fax with orders signed by Dr Chris but Dr Sevilla is PCP.  Inquiring if Dr Sevilla is out of office.  Per  schedule, informed caller Dr Sevilla is not in the office this week.     Vita Barnes RN, BSN  [facility]

## 2024-06-17 NOTE — TELEPHONE ENCOUNTER
Faxed and abstracted to providers completed file.    Samantha MARTINEZ, - [facility]  Primary Care- AD[facility], [facility] [place]  [facility]

## 2024-06-20 ENCOUNTER — TELEPHONE (OUTPATIENT)
Dept: PEDIATRICS | Facility: CLINIC | Age: 89
End: 2024-06-20
Payer: COMMERCIAL

## 2024-06-20 NOTE — TELEPHONE ENCOUNTER
"  General Call      Reason for Call:  Cancel or keep appointment?    What are your questions or concerns:  Patient, \"Christina,\" called in regards to her upcoming appointment scheduled on 06/27/24. Pt states she is being seen for a blood pressure check and informed me that she has home health care nurse visits that do blood pressure checks for her in her home. Being said, she is wondering if she should keep her appointment with Dr. Sevilla or cancel.     Date of last appointment with provider: 05/21/24 (in clinic)    Could we send this information to you in [facility] or would you prefer to receive a phone call?:   Patient would prefer a phone call   Okay to leave a detailed message?: Yes at Cell number on file:    Telephone Information:   Mobile [phone]       "

## 2024-06-23 DIAGNOSIS — Z53.9 DIAGNOSIS NOT YET DEFINED: Primary | ICD-10-CM

## 2024-06-23 PROCEDURE — 99207 PR MD CERTIFICATION HHA PATIENT: CPT | Performed by: INTERNAL MEDICINE

## 2024-06-23 NOTE — TELEPHONE ENCOUNTER
We can cancel patient's appointment with us if her blood pressure is stable and controlled, beneath 140/90.    Layo Sevilla MD  Internal Medicine and Pediatrics

## 2024-06-26 ENCOUNTER — MYC MEDICAL ADVICE (OUTPATIENT)
Dept: PEDIATRICS | Facility: CLINIC | Age: 89
End: 2024-06-26
Payer: COMMERCIAL

## 2024-06-28 ENCOUNTER — TELEPHONE (OUTPATIENT)
Dept: PEDIATRICS | Facility: CLINIC | Age: 89
End: 2024-06-28
Payer: COMMERCIAL

## 2024-06-28 DIAGNOSIS — Z53.9 DIAGNOSIS NOT YET DEFINED: Primary | ICD-10-CM

## 2024-06-28 PROCEDURE — G0180 MD CERTIFICATION HHA PATIENT: HCPCS | Performed by: INTERNAL MEDICINE

## 2024-06-28 NOTE — TELEPHONE ENCOUNTER
Forms/Letter Request Order: [id number]    Type of form/letter: Home Health Certification      Do we have the form/letter: Yes: Form is on the provider's desk for review      Who is the form from? Home care    Where did/will the form come from? form was faxed in    When is form/letter needed by: unkn         yes

## 2024-06-28 NOTE — TELEPHONE ENCOUNTER
TC/MA:    See the  message from daughter. Please help pt to f/up with Dr. Sevilla sooner, if possible to address their concerns. If daughter says that if her concerns can wait, we can see her for her scheduled appointment on 8/28 for AWV. Thanks.    Kylee HANKS  Clinic RN  [facility]

## 2024-06-28 NOTE — TELEPHONE ENCOUNTER
Forms/Letter Request    Type of form/letter: Home Health Certification      Do we have the form/letter: Yes:     Who is the form from? Home care    Where did/will the form come from? form was faxed in    When is form/letter needed by: ASAP    How would you like the form/letter returned: not specified    Form placed in provider's folder.    Celso Castro on 6/28/2024 at 11:50 AM

## 2024-07-02 NOTE — TELEPHONE ENCOUNTER
Patient has been scheduled for an appointment on 8/5/2024 at 2 PM (arrival time of 1:40 PM) with Dr. Sevilla.     Appointments in Next Year      Aug 05, 2024 2:00 PM  (Arrive by 1:40 PM)  Annual Wellness Visit with Layo Sevilla MD  [facility] ([facility] ) [phone]     Argentina CAMPOVERDE RN   [facility]

## 2024-07-02 NOTE — TELEPHONE ENCOUNTER
MA/TC- See pt's [employer] message response regarding scheduling.     Routing to MA/TC team.     Bonnie BARRERA RN   PAL (Patient Advocate Liaison)  [facility]

## 2024-07-18 ENCOUNTER — TELEPHONE (OUTPATIENT)
Dept: PEDIATRICS | Facility: CLINIC | Age: 89
End: 2024-07-18
Payer: COMMERCIAL

## 2024-07-18 NOTE — TELEPHONE ENCOUNTER
"  Home Care is calling regarding an established patient with [facility].      Requesting orders from: Layo Sevilla  Provider is following patient: Yes  Is this a 60-day recertification request?  Yes    Orders Requested    Skilled Nursing  Request for recertification   Frequency:  1x/wk for 9 wks  Also looking for weight parameters. Has mild edema in R leg and ankle. Ie. \"Call provider if ___ lb weight gain in ____ days.\"  Patient is also taking cough drops \"[employer] brand cough suppressant oral anesthetic honey flavored\" she takes 1 every 2 hours as needed if coughing. Would like verbal authorization of ok to take.    Confirmed ok to leave a detailed message with call back.  Contact information confirmed and updated as needed.    Radha Deglado RN    "

## 2024-07-18 NOTE — TELEPHONE ENCOUNTER
"Dr. Sevilla-    Please complete home care request for weight parameters:    Has mild edema in R leg and ankle. Ie. \"Call provider if ___ lb weight gain in ____ days.\"       Nanette GARAY RN  [facility]   "

## 2024-07-19 ENCOUNTER — TELEPHONE (OUTPATIENT)
Dept: PEDIATRICS | Facility: CLINIC | Age: 89
End: 2024-07-19
Payer: COMMERCIAL

## 2024-07-19 NOTE — TELEPHONE ENCOUNTER
Returned call to Maritza. Relayed message from provider.    Mamta Tran RN on 7/19/2024 at 7:42 AM

## 2024-07-19 NOTE — TELEPHONE ENCOUNTER
Forms/Letter Request    Type of form/letter: ORDER:[id number]        Do we have the form/letter: Yes: Form is on the provider's desk for review      Who is the form from? Home care    Where did/will the form come from? form was faxed in    When is form/letter needed by: ASAP

## 2024-07-21 ENCOUNTER — MEDICAL CORRESPONDENCE (OUTPATIENT)
Dept: HEALTH INFORMATION MANAGEMENT | Facility: CLINIC | Age: 89
End: 2024-07-21

## 2024-07-26 ENCOUNTER — TELEPHONE (OUTPATIENT)
Dept: PEDIATRICS | Facility: CLINIC | Age: 89
End: 2024-07-26
Payer: COMMERCIAL

## 2024-07-26 NOTE — TELEPHONE ENCOUNTER
Clarification of need for blood glucose monitoring for patient?     Patient recertified for home care services. Patient has current diagnosis of DM2 with last on file A1C was 6.5 drawn on 2/29/24.     Patient is not currently prescribed antidiabetic mediations. Please confirm that patient should not be checking blood glucose.     Please advise on the above.  Thank you,  Shraddha Frausto LPN  [facility]

## 2024-08-02 ENCOUNTER — PATIENT OUTREACH (OUTPATIENT)
Dept: CARE COORDINATION | Facility: CLINIC | Age: 89
End: 2024-08-02
Payer: COMMERCIAL

## 2024-08-14 ENCOUNTER — TELEPHONE (OUTPATIENT)
Dept: PEDIATRICS | Facility: CLINIC | Age: 89
End: 2024-08-14
Payer: COMMERCIAL

## 2024-08-14 DIAGNOSIS — Z53.9 DIAGNOSIS NOT YET DEFINED: Primary | ICD-10-CM

## 2024-08-14 PROCEDURE — G0179 MD RECERTIFICATION HHA PT: HCPCS | Performed by: INTERNAL MEDICINE

## 2024-08-14 NOTE — TELEPHONE ENCOUNTER
Forms/Letter Request    Type of form/letter: Home Health Certification Order # [id number]    Do we have the form/letter: Yes: Dr. Sevilla's inbox    Who is the form from? [facility] (if other please explain)    Where did/will the form come from? form was faxed in    When is form/letter needed by: next available    How would you like the form/letter returned: Fax : [phone]    Patient Notified form requests are processed in 5-7 business days:No    Could we send this information to you in [email or web address] or would you prefer to receive a phone call?:   NA

## 2024-08-16 ENCOUNTER — PATIENT OUTREACH (OUTPATIENT)
Dept: CARE COORDINATION | Facility: CLINIC | Age: 89
End: 2024-08-16
Payer: COMMERCIAL

## 2024-08-23 ENCOUNTER — TELEPHONE (OUTPATIENT)
Dept: PEDIATRICS | Facility: CLINIC | Age: 89
End: 2024-08-23
Payer: COMMERCIAL

## 2024-08-23 NOTE — TELEPHONE ENCOUNTER
Requesting approval to decrease patients  nursing visits to every other week.   Patient is doing good and is in agreement to decrease nursing visits.    Please approve   Thank you,  Shraddha Frausto LPN  [facility]

## 2024-08-27 ENCOUNTER — TELEPHONE (OUTPATIENT)
Dept: PEDIATRICS | Facility: CLINIC | Age: 89
End: 2024-08-27
Payer: COMMERCIAL

## 2024-08-27 ENCOUNTER — MEDICAL CORRESPONDENCE (OUTPATIENT)
Dept: HEALTH INFORMATION MANAGEMENT | Facility: CLINIC | Age: 89
End: 2024-08-27

## 2024-08-27 NOTE — TELEPHONE ENCOUNTER
Forms/Letter Request    Type of form/letter: OTHER: Order # [id number]    Do we have the form/letter: Yes: Dr. Sevilla's inbox    Who is the form from? [facility] (if other please explain)    Where did/will the form come from? form was faxed in    When is form/letter needed by: next available    How would you like the form/letter returned: Fax : [phone]    Patient Notified form requests are processed in 5-7 business days:No    Could we send this information to you in [employer] or would you prefer to receive a phone call?:   NA

## 2024-09-10 DIAGNOSIS — J30.2 SEASONAL ALLERGIC RHINITIS, UNSPECIFIED TRIGGER: ICD-10-CM

## 2024-09-10 RX ORDER — LORATADINE 10 MG/1
1 TABLET ORAL DAILY
Qty: 90 TABLET | Refills: 3 | Status: SHIPPED | OUTPATIENT
Start: 2024-09-10

## 2024-09-11 ENCOUNTER — PATIENT OUTREACH (OUTPATIENT)
Dept: CARE COORDINATION | Facility: CLINIC | Age: 89
End: 2024-09-11
Payer: COMMERCIAL

## 2024-09-12 ENCOUNTER — TRANSFERRED RECORDS (OUTPATIENT)
Dept: MULTI SPECIALTY CLINIC | Facility: CLINIC | Age: 89
End: 2024-09-12

## 2024-09-12 LAB — RETINOPATHY: NORMAL

## 2024-09-16 DIAGNOSIS — I50.22 CHRONIC SYSTOLIC HEART FAILURE (H): ICD-10-CM

## 2024-09-16 DIAGNOSIS — I48.91 ATRIAL FIBRILLATION WITH RVR (H): ICD-10-CM

## 2024-09-16 DIAGNOSIS — E78.5 HYPERLIPIDEMIA LDL GOAL <130: ICD-10-CM

## 2024-09-16 RX ORDER — METOPROLOL SUCCINATE 25 MG/1
25 TABLET, EXTENDED RELEASE ORAL DAILY
Qty: 90 TABLET | Refills: 0 | Status: SHIPPED | OUTPATIENT
Start: 2024-09-16

## 2024-09-16 RX ORDER — DILTIAZEM HYDROCHLORIDE 120 MG/1
120 CAPSULE, EXTENDED RELEASE ORAL DAILY
Qty: 90 CAPSULE | Refills: 3 | Status: SHIPPED | OUTPATIENT
Start: 2024-09-16

## 2024-09-16 NOTE — TELEPHONE ENCOUNTER
Called the patient at [phone]   - Patient states that she is still taking the diltiazem medication   - Patient states that she takes 1 capsule of diltiazem once daily     Dr. Sevilla, please review and refill as appropriate.     Argentina CAMPOVERDE RN   [facility]

## 2024-09-20 ENCOUNTER — MYC MEDICAL ADVICE (OUTPATIENT)
Dept: PEDIATRICS | Facility: CLINIC | Age: 89
End: 2024-09-20
Payer: COMMERCIAL

## 2024-09-20 DIAGNOSIS — I50.22 CHRONIC SYSTOLIC HEART FAILURE (H): ICD-10-CM

## 2024-09-20 RX ORDER — TORSEMIDE 20 MG/1
20 TABLET ORAL DAILY
Qty: 90 TABLET | Refills: 3 | Status: SHIPPED | OUTPATIENT
Start: 2024-09-20

## 2024-09-20 NOTE — TELEPHONE ENCOUNTER
See the [employer] message from the patient's daughterWilda (Consent to Communicate on file)   - Patient's daughter requesting a refill of the patient's torsemide (DEMADEX) 20 MG tablet medication     torsemide (DEMADEX) 20 MG tablet 90 tablet 0 6/14/2024 -- No   Sig - Route: Take 1 tablet (20 mg) by mouth daily - Oral     - Pended the patient's torsemide (DEMADEX) 20 MG tablet medication with the [facility][id number] [address]     Routing to the [place] [facility] Refill pool to go through the protocol.     Argentina CAMPOVERDE RN   [facility]

## 2024-10-01 DIAGNOSIS — E78.5 DYSLIPIDEMIA: ICD-10-CM

## 2024-10-01 DIAGNOSIS — J30.2 SEASONAL ALLERGIC RHINITIS, UNSPECIFIED TRIGGER: ICD-10-CM

## 2024-10-01 RX ORDER — MONTELUKAST SODIUM 10 MG/1
1 TABLET ORAL AT BEDTIME
Qty: 90 TABLET | Refills: 1 | Status: SHIPPED | OUTPATIENT
Start: 2024-10-01

## 2024-10-01 RX ORDER — ROSUVASTATIN CALCIUM 10 MG/1
10 TABLET, COATED ORAL DAILY
Qty: 90 TABLET | Refills: 1 | Status: SHIPPED | OUTPATIENT
Start: 2024-10-01

## 2024-10-07 ENCOUNTER — MYC MEDICAL ADVICE (OUTPATIENT)
Dept: PEDIATRICS | Facility: CLINIC | Age: 89
End: 2024-10-07
Payer: COMMERCIAL

## 2024-10-08 ENCOUNTER — OFFICE VISIT (OUTPATIENT)
Dept: PEDIATRICS | Facility: CLINIC | Age: 89
End: 2024-10-08
Payer: COMMERCIAL

## 2024-10-08 VITALS
OXYGEN SATURATION: 96 % | RESPIRATION RATE: 18 BRPM | SYSTOLIC BLOOD PRESSURE: 134 MMHG | DIASTOLIC BLOOD PRESSURE: 44 MMHG | HEART RATE: 66 BPM | HEIGHT: 59 IN | WEIGHT: 101.4 LBS | BODY MASS INDEX: 20.44 KG/M2 | TEMPERATURE: 97.4 F

## 2024-10-08 DIAGNOSIS — H90.3 SENSORINEURAL HEARING LOSS (SNHL) OF BOTH EARS: ICD-10-CM

## 2024-10-08 DIAGNOSIS — J45.40 MODERATE PERSISTENT ASTHMA WITHOUT COMPLICATION: ICD-10-CM

## 2024-10-08 DIAGNOSIS — Z23 NEED FOR SHINGLES VACCINE: ICD-10-CM

## 2024-10-08 DIAGNOSIS — E78.5 HYPERLIPIDEMIA LDL GOAL <130: ICD-10-CM

## 2024-10-08 DIAGNOSIS — I50.22 CHRONIC SYSTOLIC HEART FAILURE (H): ICD-10-CM

## 2024-10-08 DIAGNOSIS — N18.30 STAGE 3 CHRONIC KIDNEY DISEASE, UNSPECIFIED WHETHER STAGE 3A OR 3B CKD (H): ICD-10-CM

## 2024-10-08 DIAGNOSIS — J30.2 SEASONAL ALLERGIC RHINITIS, UNSPECIFIED TRIGGER: ICD-10-CM

## 2024-10-08 DIAGNOSIS — Z00.00 ENCOUNTER FOR MEDICARE ANNUAL WELLNESS EXAM: Primary | ICD-10-CM

## 2024-10-08 DIAGNOSIS — Z29.11 NEED FOR VACCINATION AGAINST RESPIRATORY SYNCYTIAL VIRUS: ICD-10-CM

## 2024-10-08 DIAGNOSIS — E11.9 TYPE 2 DIABETES MELLITUS WITHOUT COMPLICATION, WITHOUT LONG-TERM CURRENT USE OF INSULIN (H): ICD-10-CM

## 2024-10-08 DIAGNOSIS — E78.5 DYSLIPIDEMIA: ICD-10-CM

## 2024-10-08 DIAGNOSIS — I48.91 ATRIAL FIBRILLATION WITH RVR (H): ICD-10-CM

## 2024-10-08 PROCEDURE — G0439 PPPS, SUBSEQ VISIT: HCPCS | Performed by: INTERNAL MEDICINE

## 2024-10-08 PROCEDURE — 99213 OFFICE O/P EST LOW 20 MIN: CPT | Mod: 25 | Performed by: INTERNAL MEDICINE

## 2024-10-08 RX ORDER — MONTELUKAST SODIUM 10 MG/1
1 TABLET ORAL AT BEDTIME
Qty: 90 TABLET | Refills: 3 | Status: SHIPPED | OUTPATIENT
Start: 2024-10-08

## 2024-10-08 RX ORDER — ROSUVASTATIN CALCIUM 10 MG/1
10 TABLET, COATED ORAL DAILY
Qty: 90 TABLET | Refills: 3 | Status: SHIPPED | OUTPATIENT
Start: 2024-10-08

## 2024-10-08 RX ORDER — METOPROLOL SUCCINATE 25 MG/1
25 TABLET, EXTENDED RELEASE ORAL DAILY
Qty: 90 TABLET | Refills: 3 | Status: SHIPPED | OUTPATIENT
Start: 2024-10-08

## 2024-10-08 RX ORDER — FLUTICASONE PROPIONATE AND SALMETEROL XINAFOATE 230; 21 UG/1; UG/1
2 AEROSOL, METERED RESPIRATORY (INHALATION) 2 TIMES DAILY
Qty: 12 G | Refills: 11 | Status: SHIPPED | OUTPATIENT
Start: 2024-10-08

## 2024-10-08 SDOH — HEALTH STABILITY: PHYSICAL HEALTH: ON AVERAGE, HOW MANY DAYS PER WEEK DO YOU ENGAGE IN MODERATE TO STRENUOUS EXERCISE (LIKE A BRISK WALK)?: 0 DAYS

## 2024-10-08 ASSESSMENT — ASTHMA QUESTIONNAIRES
QUESTION_2 LAST FOUR WEEKS HOW OFTEN HAVE YOU HAD SHORTNESS OF BREATH: NOT AT ALL
QUESTION_1 LAST FOUR WEEKS HOW MUCH OF THE TIME DID YOUR ASTHMA KEEP YOU FROM GETTING AS MUCH DONE AT WORK, SCHOOL OR AT HOME: NONE OF THE TIME
QUESTION_3 LAST FOUR WEEKS HOW OFTEN DID YOUR ASTHMA SYMPTOMS (WHEEZING, COUGHING, SHORTNESS OF BREATH, CHEST TIGHTNESS OR PAIN) WAKE YOU UP AT NIGHT OR EARLIER THAN USUAL IN THE MORNING: NOT AT ALL
ACT_TOTALSCORE: 22
ACT_TOTALSCORE: 22
QUESTION_4 LAST FOUR WEEKS HOW OFTEN HAVE YOU USED YOUR RESCUE INHALER OR NEBULIZER MEDICATION (SUCH AS ALBUTEROL): ONCE A WEEK OR LESS
QUESTION_5 LAST FOUR WEEKS HOW WOULD YOU RATE YOUR ASTHMA CONTROL: SOMEWHAT CONTROLLED

## 2024-10-08 ASSESSMENT — SOCIAL DETERMINANTS OF HEALTH (SDOH): HOW OFTEN DO YOU GET TOGETHER WITH FRIENDS OR RELATIVES?: TWICE A WEEK

## 2024-10-08 ASSESSMENT — PAIN SCALES - GENERAL: PAINLEVEL: NO PAIN (0)

## 2024-10-08 NOTE — PROGRESS NOTES
Preventive Care Visit  [facility] MICHAELLE Sevilla MD, Internal Medicine - Pediatrics  Oct 8, 2024      Assessment & Plan       Chronic kidney disease, stage 3 (H)  Stable.     Diabetes mellitus, type 2 (H)  No new treatment.  Diabetes blood test (A1c) stable last year.     Moderate persistent asthma without complication  Stable symptoms.   - fluticasone-salmeterol (ADVAIR HFA) 230-21 MCG/ACT inhaler; Inhale 2 puffs into the lungs 2 times daily.    Chronic systolic heart failure (H)  No signs of congestive heart failure.    - metoprolol succinate ER (TOPROL XL) 25 MG 24 hr tablet; Take 1 tablet (25 mg) by mouth daily.    HYPERLIPIDEMIA LDL GOAL <130  Ontoing statin.   - metoprolol succinate ER (TOPROL XL) 25 MG 24 hr tablet; Take 1 tablet (25 mg) by mouth daily.    Atrial fibrillation with RVR (H)  Has upcoming appointment with cardiology.   - metoprolol succinate ER (TOPROL XL) 25 MG 24 hr tablet; Take 1 tablet (25 mg) by mouth daily.    Dyslipidemia    - rosuvastatin (CRESTOR) 10 MG tablet; Take 1 tablet (10 mg) by mouth daily.    Seasonal allergic rhinitis, unspecified trigger  Refilled.   - montelukast (SINGULAIR) 10 MG tablet; Take 1 tablet (10 mg) by mouth at bedtime.    Sensorineural hearing loss (SNHL) of both ears  Referred to audiology.   - Adult Audiology  Referral; Future    Encounter for Medicare annual wellness exam  Discussed diet, exercise, breast self exam, blood pressure, cholesterol, calcium supplementation and osteoporosis, and need for cancer surveillance at appropriate ages.  Discussed transition to Assisted Living.     Patient has been advised of split billing requirements and indicates understanding: Yes        Counseling  Appropriate preventive services were addressed with this patient via screening, questionnaire, or discussion as appropriate for fall prevention, nutrition, physical activity, Tobacco-use cessation, social engagement, weight loss and cognition.   Checklist reviewing preventive services available has been given to the patient.  Reviewed patient's diet, addressing concerns and/or questions.       See Patient Instructions    Subjective   Christina is a 89 year old, presenting for the following:  Physical    Still living by self.     Has agency worker for groceries, meds, laundry, and cleaning.    Is home bound.  When daughter comes, she is able to go out to dinner.      Watches [employer] on her phone.  DOes bible study. Talks to best friend on the phone.      No injuries from falls.     Memory:  daughter has noted more forgetfulness with short term memory.  Asks same questions again.         10/8/2024     1:39 PM   Additional Questions   Roomed by justen   Accompanied by caregiver mando         10/8/2024     1:39 PM   Patient Reported Additional Medications   Patient reports taking the following new medications na         Health Care Directive  Patient does not have a Health Care Directive or Living Will: Patient states has Advance Directive and will bring in a copy to clinic.    HPI              10/8/2024   General Health   How would you rate your overall physical health? (!) FAIR   Feel stress (tense, anxious, or unable to sleep) Not at all            10/8/2024   Nutrition   Diet: Regular (no restrictions)            10/8/2024   Exercise   Days per week of moderate/strenous exercise 0 days      (!) EXERCISE CONCERN      10/8/2024   Social Factors   Frequency of gathering with friends or relatives Twice a week   Worry food won't last until get money to buy more No   Food not last or not have enough money for food? No   Do you have housing? (Housing is defined as stable permanent housing and does not include staying ouside in a car, in a tent, in an abandoned building, in an overnight shelter, or couch-surfing.) Yes   Are you worried about losing your housing? No   Lack of transportation? No   Unable to get utilities (heat,electricity)? No            10/8/2024   Fall  Risk   Fallen 2 or more times in the past year? No    No   Trouble with walking or balance? No    No       Multiple values from one day are sorted in reverse-chronological order          10/8/2024   Activities of Daily Living- Home Safety   Needs help with the following daily activites None of the above   Safety concerns in the home None of the above            10/8/2024   Dental   Dentist two times every year? Yes            10/8/2024   Hearing Screening   Hearing concerns? None of the above            10/8/2024   Driving Risk Screening   Patient/family members have concerns about driving No            10/8/2024   General Alertness/Fatigue Screening   Have you been more tired than usual lately? No            10/8/2024   Urinary Incontinence Screening   Bothered by leaking urine in past 6 months No               Today's PHQ-2 Score:       10/8/2024     1:38 PM   PHQ-2 ( 1999 [employer])   Q1: Little interest or pleasure in doing things 0   Q2: Feeling down, depressed or hopeless 0   PHQ-2 Score 0   Q1: Little interest or pleasure in doing things Not at all   Q2: Feeling down, depressed or hopeless Not at all   PHQ-2 Score 0           10/8/2024   Substance Use   Alcohol more than 3/day or more than 7/wk No   Do you have a current opioid prescription? No   How severe/bad is pain from 1 to 10? 0/10 (No Pain)   Do you use any other substances recreationally? No        Social History     Tobacco Use    Smoking status: Never     Passive exposure: Never    Smokeless tobacco: Never   Vaping Use    Vaping status: Never Used   Substance Use Topics    Alcohol use: Yes     Alcohol/week: 1.0 - 2.0 standard drink of alcohol     Types: 1 - 2 Standard drinks or equivalent per week     Comment: 1 glass of wine 1-2 days per week    Drug use: No                    Reviewed and updated as needed this visit by Provider                    Past Medical History:   Diagnosis Date    Anemia 03/10/2009    Chronic disease, by Fe studies; awaiting  full GI w/u and repeat colonoscopy, ERCP.    Basal Cell Carcinoma--back     Coronary artery disease 03/09/2017    3/2/2017 - Two vessel coronary artery disease with mLAD 50% stenosis, D3 50% stenosis, pLCx 50% stenosis and mLCx 50% stenosis    Essential hypertension 09/01/2016    White coat hypertension;  24 hour ambulatory monitoring normal. Do not titrate up further.      Hyperlipidemia 02/10/2010    Moderate persistent asthma     Nonrheumatic aortic valve insufficiency 06/29/2017    Osteopenia     Paroxysmal atrial fibrillation 12/26/2017    Pulmonary nodule 03/03/2009    PET scan reportedly normal; biopsy not advised.    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  Problem list name updated by automated process. Provider to review    Vasculitis 04/20/2009    Possible increased activity of thoracic aorta, on PET scan w/u for pulmonary nodule.      Past Surgical History:   Procedure Laterality Date    APPENDECTOMY OPEN  1957    C STEREOTACTIC BREAST BIOPSY  01/01/2001    CHOLECYSTECTOMY, LAPOROSCOPIC  01/01/2008    DILATION AND CURETTAGE  1967    DILATION AND CURETTAGE  1971    ESOPHAGOSCOPY, GASTROSCOPY, DUODENOSCOPY (EGD), COMBINED  05/17/2013    Procedure: COMBINED ESOPHAGOSCOPY, GASTROSCOPY, DUODENOSCOPY (EGD), BIOPSY SINGLE OR MULTIPLE;  ESOPHAGOSCOPY, GASTROSCOPY, DUODENOSCOPY (EGD)  with bx;  Surgeon: Jeffery Yanez MD;  Location:  GI    TONSILLECTOMY      Tubal Ligation NOS  1971     Current providers sharing in care for this patient include:  Patient Care Team:  Layo Sevilla MD as PCP - General (Internal Medicine - Pediatrics)  Layo Sevilla MD as Assigned PCP  Faby Conner, RN as   Viral Metz MD as MD (Pulmonary Disease)  Delroy Samson MD as MD (Pulmonary Disease)  Ben Banks MD as MD  "(Cardiovascular Disease)  Becca Sheikh APRN CNP as Assigned Heart and Vascular Provider    The following health maintenance items are reviewed in Epic and correct as of today:  Health Maintenance   Topic Date Due    HF ACTION PLAN  Never done    DIABETIC FOOT EXAM  Never done    ZOSTER IMMUNIZATION (1 of 2) Never done    RSV VACCINE (1 - 1-dose 75+ series) Never done    DEXA  07/13/2021    ASTHMA ACTION PLAN  01/12/2022    ANNUAL REVIEW OF HM ORDERS  08/05/2022    MICROALBUMIN  03/21/2023    EYE EXAM  03/10/2024    A1C  05/29/2024    MEDICARE ANNUAL WELLNESS VISIT  08/22/2024    COVID-19 Vaccine (1 - 2024-25 season) Never done    BMP  11/18/2024    LIPID  02/28/2025    ASTHMA CONTROL TEST  04/08/2025    ALT  05/16/2025    CMP  05/16/2025    CBC  05/17/2025    HEMOGLOBIN  05/18/2025    FALL RISK ASSESSMENT  10/08/2025    DTAP/TDAP/TD IMMUNIZATION (3 - Td or Tdap) 10/06/2027    ADVANCE CARE PLANNING  08/22/2028    TSH W/FREE T4 REFLEX  Completed    PHQ-2 (once per calendar year)  Completed    Pneumococcal Vaccine: 65+ Years  Completed    URINALYSIS  Completed    HPV IMMUNIZATION  Aged Out    MENINGITIS IMMUNIZATION  Aged Out    RSV MONOCLONAL ANTIBODY  Aged Out    INFLUENZA VACCINE  Discontinued         Review of Systems  Constitutional, HEENT, cardiovascular, pulmonary, GI, , musculoskeletal, neuro, skin, endocrine and psych systems are negative, except as otherwise noted.     Objective    Exam  /44   Pulse 66   Temp 97.4  F (36.3  C) (Temporal)   Resp 18   Ht 1.51 m (4' 11.45\")   Wt 46 kg (101 lb 6.4 oz)   LMP  (LMP Unknown)   SpO2 96%   BMI 20.17 kg/m     Estimated body mass index is 20.17 kg/m  as calculated from the following:    Height as of this encounter: 1.51 m (4' 11.45\").    Weight as of this encounter: 46 kg (101 lb 6.4 oz).    Physical Exam  GENERAL: alert and no distress  EYES: Eyes grossly normal to inspection, PERRL and conjunctivae and sclerae normal  HENT: ear canals and TM's " normal, nose and mouth without ulcers or lesions  NECK: no adenopathy, no asymmetry, masses, or scars  RESP: lungs clear to auscultation - no rales, rhonchi or wheezes  CV: regular rate and rhythm, normal S1 S2, no S3 or S4, no murmur, click or rub, no peripheral edema  ABDOMEN: soft, nontender, no hepatosplenomegaly, no masses and bowel sounds normal  MS: no gross musculoskeletal defects noted, no edema  SKIN: no suspicious lesions or rashes  NEURO: Normal strength and tone, mentation intact and speech normal  PSYCH: mentation appears normal, affect normal/bright        10/8/2024   Mini Cog   Clock Draw Score 2 Normal   3 Item Recall 2 objects recalled   Mini Cog Total Score 4                 Signed Electronically by: Layo Sevilla MD

## 2024-10-08 NOTE — PATIENT INSTRUCTIONS
Take 2 Ensures per day now.    No shots today.    Call audiology:  If you don t hear from a representative within 2 business days, please call [phone].     Hold off on labs.        Patient Education   Preventive Care Advice   This is general advice given by our system to help you stay healthy. However, your care team may have specific advice just for you. Please talk to your care team about your preventive care needs.  Nutrition  Eat 5 or more servings of fruits and vegetables each day.  Try wheat bread, brown rice and whole grain pasta (instead of white bread, rice, and pasta).  Get enough calcium and vitamin D. Check the label on foods and aim for 100% of the RDA (recommended daily allowance).  Lifestyle  Exercise at least 150 minutes each week  (30 minutes a day, 5 days a week).  Do muscle strengthening activities 2 days a week. These help control your weight and prevent disease.  No smoking.  Wear sunscreen to prevent skin cancer.  Have a dental exam and cleaning every 6 months.  Yearly exams  See your health care team every year to talk about:  Any changes in your health.  Any medicines your care team has prescribed.  Preventive care, family planning, and ways to prevent chronic diseases.  Shots (vaccines)   HPV shots (up to age 26), if you've never had them before.  Hepatitis B shots (up to age 59), if you've never had them before.  COVID-19 shot: Get this shot when it's due.  Flu shot: Get a flu shot every year.  Tetanus shot: Get a tetanus shot every 10 years.  Pneumococcal, hepatitis A, and RSV shots: Ask your care team if you need these based on your risk.  Shingles shot (for age 50 and up)  General health tests  Diabetes screening:  Starting at age 35, Get screened for diabetes at least every 3 years.  If you are younger than age 35, ask your care team if you should be screened for diabetes.  Cholesterol test: At age 39, start having a cholesterol test every 5 years, or more often if advised.  Bone  density scan (DEXA): At age 50, ask your care team if you should have this scan for osteoporosis (brittle bones).  Hepatitis C: Get tested at least once in your life.  STIs (sexually transmitted infections)  Before age 24: Ask your care team if you should be screened for STIs.  After age 24: Get screened for STIs if you're at risk. You are at risk for STIs (including HIV) if:  You are sexually active with more than one person.  You don't use condoms every time.  You or a partner was diagnosed with a sexually transmitted infection.  If you are at risk for HIV, ask about PrEP medicine to prevent HIV.  Get tested for HIV at least once in your life, whether you are at risk for HIV or not.  Cancer screening tests  Cervical cancer screening: If you have a cervix, begin getting regular cervical cancer screening tests starting at age 21.  Breast cancer scan (mammogram): If you've ever had breasts, begin having regular mammograms starting at age 40. This is a scan to check for breast cancer.  Colon cancer screening: It is important to start screening for colon cancer at age 45.  Have a colonoscopy test every 10 years (or more often if you're at risk) Or, ask your provider about stool tests like a FIT test every year or Cologuard test every 3 years.  To learn more about your testing options, visit:   .  For help making a decision, visit:   https://bit.ly/qc56432.  Prostate cancer screening test: If you have a prostate, ask your care team if a prostate cancer screening test (PSA) at age 55 is right for you.  Lung cancer screening: If you are a current or former smoker ages 50 to 80, ask your care team if ongoing lung cancer screenings are right for you.  For informational purposes only. Not to replace the advice of your health care provider. Copyright   2023 [employer]. All rights reserved. Clinically reviewed by the [facility] Transitions Program. [employer] 278296 - REV 01/24.

## 2024-10-19 ENCOUNTER — HEALTH MAINTENANCE LETTER (OUTPATIENT)
Age: 89
End: 2024-10-19

## 2024-11-06 ENCOUNTER — OFFICE VISIT (OUTPATIENT)
Dept: CARDIOLOGY | Facility: CLINIC | Age: 89
End: 2024-11-06
Attending: NURSE PRACTITIONER
Payer: COMMERCIAL

## 2024-11-06 VITALS
HEART RATE: 88 BPM | WEIGHT: 105.9 LBS | SYSTOLIC BLOOD PRESSURE: 142 MMHG | BODY MASS INDEX: 21.07 KG/M2 | DIASTOLIC BLOOD PRESSURE: 58 MMHG

## 2024-11-06 DIAGNOSIS — E78.5 HYPERLIPIDEMIA LDL GOAL <130: ICD-10-CM

## 2024-11-06 DIAGNOSIS — I10 ESSENTIAL HYPERTENSION WITH GOAL BLOOD PRESSURE LESS THAN 140/90: ICD-10-CM

## 2024-11-06 DIAGNOSIS — I48.91 ATRIAL FIBRILLATION WITH RVR (H): ICD-10-CM

## 2024-11-06 DIAGNOSIS — I50.22 CHRONIC SYSTOLIC HEART FAILURE (H): Primary | ICD-10-CM

## 2024-11-06 PROCEDURE — 99214 OFFICE O/P EST MOD 30 MIN: CPT | Performed by: NURSE PRACTITIONER

## 2024-11-06 RX ORDER — PREDNISONE 10 MG/1
10 TABLET ORAL PRN
COMMUNITY

## 2024-11-06 NOTE — LETTER
11/6/2024    Layo Sevilla MD  [address] [place] [address]    RE: Genie THORPE Hung       Dear Colleague,     I had the pleasure of seeing Genie Persaud in the [facility].  CARDIOLOGY CLINIC NOTE    PRIMARY CARDIOLOGIST  Dr. Banks     PRIMARY CARE PHYSICIAN:  Layo Sevilla    HISTORY OF PRESENT ILLNESS:  Genie Persaud (Christina) is a very pleasant 89-year-old female with a past medical history significant for paroxysmal atrial fibrillation on low-dose Eliquis, nonobstructive CAD, cardiomyopathy, moderate aortic sufficiency, mild to moderate ascending aortic dilation and labile hypertension.    She returns to the office today for 9-month follow-up.  She does not endorse any cardiac complaint denies chest pain, shortness of breath, palpitations, PND, orthopnea, presyncope, syncope or lower extremity edema.     Last echocardiogram 2/8/2024 showed a reduced ejection fraction estimated at 44% used RV systolic function moderate aortic regurgitation, mild tricuspid and mitral regurgitation and a mildly dilated ascending aorta measuring 4 cm.    She is in persistent atrial fibrillation with controlled rates, managed on metoprolol and low-dose Eliquis for stroke prevention.    Blood pressure is mildly elevated today at 142/58  Last BMP shows sodium of 136, potassium 4.3, BUN 28, creatinine 1.33 and GFR 39.  Hemoglobin 11.3  Panel showed a total cholesterol of 99, HDL 47 LDL 37 and triglycerides 76  Activity level is unchanged  Compliant with all medications.      PAST MEDICAL HISTORY:  Past Medical History:   Diagnosis Date     Anemia 03/10/2009    Chronic disease, by Fe studies; awaiting full GI w/u and repeat colonoscopy, ERCP.     Basal Cell Carcinoma--back      Coronary artery disease 03/09/2017    3/2/2017 - Two vessel coronary artery disease with mLAD 50% stenosis, D3 50% stenosis, pLCx 50% stenosis and mLCx 50% stenosis     Essential hypertension 09/01/2016    White coat hypertension;  24  hour ambulatory monitoring normal. Do not titrate up further.       Hyperlipidemia 02/10/2010     Moderate persistent asthma      Nonrheumatic aortic valve insufficiency 06/29/2017     Osteopenia      Paroxysmal atrial fibrillation 12/26/2017     Pulmonary nodule 03/03/2009    PET scan reportedly normal; biopsy not advised.     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  Problem list name updated by automated process. Provider to review     Vasculitis 04/20/2009    Possible increased activity of thoracic aorta, on PET scan w/u for pulmonary nodule.        MEDICATIONS:  Current Outpatient Medications   Medication Sig Dispense Refill     acetaminophen (TYLENOL) 325 MG tablet Take 975 mg by mouth every 8 hours as needed for mild pain       albuterol (PROAIR HFA/PROVENTIL HFA/VENTOLIN HFA) 108 (90 Base) MCG/ACT inhaler Inhale 1 to 2 puffs by mouth into the lungs every 6 hours as needed for shortness of breath / dyspnea or wheezing 18 g 0     apixaban ANTICOAGULANT (ELIQUIS ANTICOAGULANT) 2.5 MG tablet Take 1 tablet (2.5 mg) by mouth 2 times daily 180 tablet 3     fluticasone-salmeterol (ADVAIR HFA) 230-21 MCG/ACT inhaler Inhale 2 puffs into the lungs 2 times daily. 12 g 11     latanoprost (XALATAN) 0.005 % ophthalmic solution INSTILL ONE DROP IN EACH EYE AT BEDTIME **REFRIGERATE UNTIL OPEN-STORE AT ROOM TEMP ONCE OPENED* 2.5 mL 97     loratadine (CLARITIN) 10 MG tablet Take 1 tablet by mouth once daily 90 tablet 3     metoprolol succinate ER (TOPROL XL) 25 MG 24 hr tablet Take 1 tablet (25 mg) by mouth daily. 90 tablet 3     omeprazole (PRILOSEC) 10 MG DR capsule Take 1 capsule (10 mg) by mouth daily 90 capsule 3     potassium chloride ER (K-TAB) 20 MEQ CR tablet Take 1 tablet (20 mEq) by mouth daily       predniSONE (DELTASONE) 10 MG  tablet Take 10 mg by mouth as needed.       rosuvastatin (CRESTOR) 10 MG tablet Take 1 tablet (10 mg) by mouth daily. 90 tablet 3     torsemide (DEMADEX) 20 MG tablet Take 1 tablet (20 mg) by mouth daily. 90 tablet 3     montelukast (SINGULAIR) 10 MG tablet Take 1 tablet (10 mg) by mouth at bedtime. 90 tablet 3     No current facility-administered medications for this visit.       SOCIAL HISTORY:  I have reviewed this patient's social history and updated it with pertinent information if needed. Genie Persaud  reports that she has never smoked. She has never been exposed to tobacco smoke. She has never used smokeless tobacco. She reports current alcohol use of about 1.0 - 2.0 standard drink of alcohol per week. She reports that she does not use drugs.    PHYSICAL EXAM:  Pulse:  [88] 88  BP: (142)/(58) 142/58  105 lbs 14.4 oz    Constitutional: alert, no distress  Respiratory: Good bilateral air entry  Cardiovascular: Regular rate, irregular rhythm.  GI: nondistended  Neuropsychiatric: appropriate affact    ASSESSMENT/PLAN:  Pertinent issues addressed/ reviewed during this cardiology visit  Atrial fibrillation -rate controlled on metoprolol and Eliquis for stroke prevention  Chronic systolic heart failure - echocardiogram on 2/8/2024 showed an ejection fraction of 44%, mildly reduced RV systolic function.  Euvolemic upon exam.  Weight stable.  I will continue on losartan, metoprolol, and torsemide.  Follow-up BMP in 6 months.  Hypertension -mildly elevated today, continue current medical therapy.  Hyperlipidemia -LDL at goal, continue low-dose rosuvastatin  Valvular disease - moderate aortic regurgitation and mild TR and MR. Recommend annual echocardiogram in February.    With LAYA with echo and labs       It was a pleasure seeing this patient in clinic today. Please do not hesitate to contact me with any future questions.     Becca Sheikh, TOREY, CNP  Cardiology - [facility]  11/06/2024       The level of medical  decision making during this visit was of moderate complexity.    This note was completed in part using dictation via the Dragon voice recognition software. Some word and grammatical errors may occur and must be interpreted in the appropriate clinical context.  If there are any questions pertaining to this issue, please contact me for further clarification.      Thank you for allowing me to participate in the care of your patient.      Sincerely,     TOERY Prieto CNP     [facility]  cc:   TOREY Prieto CNP  [address] [place] [address]

## 2024-11-06 NOTE — PROGRESS NOTES
CARDIOLOGY CLINIC NOTE    PRIMARY CARDIOLOGIST  Dr. Banks     PRIMARY CARE PHYSICIAN:  Layo Sevilla    HISTORY OF PRESENT ILLNESS:  Genie Esteves) is a very pleasant 89-year-old female with a past medical history significant for paroxysmal atrial fibrillation on low-dose Eliquis, nonobstructive CAD, cardiomyopathy, moderate aortic sufficiency, mild to moderate ascending aortic dilation and labile hypertension.    She returns to the office today for 9-month follow-up.  She does not endorse any cardiac complaint denies chest pain, shortness of breath, palpitations, PND, orthopnea, presyncope, syncope or lower extremity edema.     Last echocardiogram 2/8/2024 showed a reduced ejection fraction estimated at 44% used RV systolic function moderate aortic regurgitation, mild tricuspid and mitral regurgitation and a mildly dilated ascending aorta measuring 4 cm.    She is in persistent atrial fibrillation with controlled rates, managed on metoprolol and low-dose Eliquis for stroke prevention.    Blood pressure is mildly elevated today at 142/58  Last BMP shows sodium of 136, potassium 4.3, BUN 28, creatinine 1.33 and GFR 39.  Hemoglobin 11.3  Panel showed a total cholesterol of 99, HDL 47 LDL 37 and triglycerides 76  Activity level is unchanged  Compliant with all medications.      PAST MEDICAL HISTORY:  Past Medical History:   Diagnosis Date    Anemia 03/10/2009    Chronic disease, by Fe studies; awaiting full GI w/u and repeat colonoscopy, ERCP.    Basal Cell Carcinoma--back     Coronary artery disease 03/09/2017    3/2/2017 - Two vessel coronary artery disease with mLAD 50% stenosis, D3 50% stenosis, pLCx 50% stenosis and mLCx 50% stenosis    Essential hypertension 09/01/2016    White coat hypertension;  24 hour ambulatory monitoring normal. Do not titrate up further.      Hyperlipidemia 02/10/2010    Moderate persistent asthma     Nonrheumatic aortic valve insufficiency 06/29/2017    Osteopenia     Paroxysmal  atrial fibrillation 12/26/2017    Pulmonary nodule 03/03/2009    PET scan reportedly normal; biopsy not advised.    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  Problem list name updated by automated process. Provider to review    Vasculitis 04/20/2009    Possible increased activity of thoracic aorta, on PET scan w/u for pulmonary nodule.        MEDICATIONS:  Current Outpatient Medications   Medication Sig Dispense Refill    acetaminophen (TYLENOL) 325 MG tablet Take 975 mg by mouth every 8 hours as needed for mild pain      albuterol (PROAIR HFA/PROVENTIL HFA/VENTOLIN HFA) 108 (90 Base) MCG/ACT inhaler Inhale 1 to 2 puffs by mouth into the lungs every 6 hours as needed for shortness of breath / dyspnea or wheezing 18 g 0    apixaban ANTICOAGULANT (ELIQUIS ANTICOAGULANT) 2.5 MG tablet Take 1 tablet (2.5 mg) by mouth 2 times daily 180 tablet 3    fluticasone-salmeterol (ADVAIR HFA) 230-21 MCG/ACT inhaler Inhale 2 puffs into the lungs 2 times daily. 12 g 11    latanoprost (XALATAN) 0.005 % ophthalmic solution INSTILL ONE DROP IN EACH EYE AT BEDTIME **REFRIGERATE UNTIL OPEN-STORE AT ROOM TEMP ONCE OPENED* 2.5 mL 97    loratadine (CLARITIN) 10 MG tablet Take 1 tablet by mouth once daily 90 tablet 3    metoprolol succinate ER (TOPROL XL) 25 MG 24 hr tablet Take 1 tablet (25 mg) by mouth daily. 90 tablet 3    omeprazole (PRILOSEC) 10 MG DR capsule Take 1 capsule (10 mg) by mouth daily 90 capsule 3    potassium chloride ER (K-TAB) 20 MEQ CR tablet Take 1 tablet (20 mEq) by mouth daily      predniSONE (DELTASONE) 10 MG tablet Take 10 mg by mouth as needed.      rosuvastatin (CRESTOR) 10 MG tablet Take 1 tablet (10 mg) by mouth daily. 90 tablet 3    torsemide (DEMADEX) 20 MG tablet Take 1 tablet (20 mg) by mouth daily. 90 tablet 3     montelukast (SINGULAIR) 10 MG tablet Take 1 tablet (10 mg) by mouth at bedtime. 90 tablet 3     No current facility-administered medications for this visit.       SOCIAL HISTORY:  I have reviewed this patient's social history and updated it with pertinent information if needed. Genie Persaud  reports that she has never smoked. She has never been exposed to tobacco smoke. She has never used smokeless tobacco. She reports current alcohol use of about 1.0 - 2.0 standard drink of alcohol per week. She reports that she does not use drugs.    PHYSICAL EXAM:  Pulse:  [88] 88  BP: (142)/(58) 142/58  105 lbs 14.4 oz    Constitutional: alert, no distress  Respiratory: Good bilateral air entry  Cardiovascular: Regular rate, irregular rhythm.  GI: nondistended  Neuropsychiatric: appropriate affact    ASSESSMENT/PLAN:  Pertinent issues addressed/ reviewed during this cardiology visit  Atrial fibrillation -rate controlled on metoprolol and Eliquis for stroke prevention  Chronic systolic heart failure - echocardiogram on 2/8/2024 showed an ejection fraction of 44%, mildly reduced RV systolic function.  Euvolemic upon exam.  Weight stable.  I will continue on losartan, metoprolol, and torsemide.  Follow-up BMP in 6 months.  Hypertension -mildly elevated today, continue current medical therapy.  Hyperlipidemia -LDL at goal, continue low-dose rosuvastatin  Valvular disease - moderate aortic regurgitation and mild TR and MR. Recommend annual echocardiogram in February.    With LAYA with echo and labs       It was a pleasure seeing this patient in clinic today. Please do not hesitate to contact me with any future questions.     TOREY Prieto, CNP  Cardiology - [facility]  11/06/2024       The level of medical decision making during this visit was of moderate complexity.    This note was completed in part using dictation via the Dragon voice recognition software. Some word and grammatical errors may occur and must be interpreted  in the appropriate clinical context.  If there are any questions pertaining to this issue, please contact me for further clarification.

## 2024-11-06 NOTE — PATIENT INSTRUCTIONS
Thanks for participating in a office visit with the [facility] clinic today.    Doing well on a cardiac standpoint   Reviewed last echocardiogram showing a mild reduction in heart function. There is moderate leak in the aortic valve and mild in 2 other valves. Will continue to monitor with annual echocardiogram ( due in February).   Continue current medical therapy.     Follow up in 6 months with LAYA     Please call my nurse at   [phone]. Call with any questions or concerns.    Scheduling phone number: [phone]  Reminder: Please bring in all current medications, over the counter supplements and vitamin bottles to your next appointment.

## 2025-01-01 ENCOUNTER — APPOINTMENT (OUTPATIENT)
Dept: CARDIOLOGY | Facility: CLINIC | Age: 89
End: 2025-01-01
Attending: STUDENT IN AN ORGANIZED HEALTH CARE EDUCATION/TRAINING PROGRAM
Payer: COMMERCIAL

## 2025-01-01 ENCOUNTER — APPOINTMENT (OUTPATIENT)
Dept: GENERAL RADIOLOGY | Facility: CLINIC | Age: 89
End: 2025-01-01
Attending: EMERGENCY MEDICINE
Payer: COMMERCIAL

## 2025-01-01 ENCOUNTER — HEALTH MAINTENANCE LETTER (OUTPATIENT)
Age: 89
End: 2025-01-01

## 2025-01-01 ENCOUNTER — APPOINTMENT (OUTPATIENT)
Dept: CT IMAGING | Facility: CLINIC | Age: 89
End: 2025-01-01
Attending: EMERGENCY MEDICINE
Payer: COMMERCIAL

## 2025-01-01 ENCOUNTER — APPOINTMENT (OUTPATIENT)
Dept: PHYSICAL THERAPY | Facility: CLINIC | Age: 89
End: 2025-01-01
Payer: COMMERCIAL

## 2025-01-01 ENCOUNTER — PATIENT OUTREACH (OUTPATIENT)
Dept: CARE COORDINATION | Facility: CLINIC | Age: 89
End: 2025-01-01
Payer: COMMERCIAL

## 2025-01-01 ENCOUNTER — LAB REQUISITION (OUTPATIENT)
Dept: LAB | Facility: CLINIC | Age: 89
End: 2025-01-01
Payer: COMMERCIAL

## 2025-01-01 ENCOUNTER — HOSPITAL ENCOUNTER (INPATIENT)
Facility: CLINIC | Age: 89
LOS: 1 days | End: 2025-08-18
Attending: EMERGENCY MEDICINE | Admitting: STUDENT IN AN ORGANIZED HEALTH CARE EDUCATION/TRAINING PROGRAM
Payer: COMMERCIAL

## 2025-01-01 ENCOUNTER — APPOINTMENT (OUTPATIENT)
Dept: PHYSICAL THERAPY | Facility: CLINIC | Age: 89
End: 2025-01-01
Attending: PHYSICIAN ASSISTANT
Payer: COMMERCIAL

## 2025-01-01 VITALS
BODY MASS INDEX: 19.49 KG/M2 | HEART RATE: 98 BPM | DIASTOLIC BLOOD PRESSURE: 43 MMHG | TEMPERATURE: 97.7 F | OXYGEN SATURATION: 99 % | SYSTOLIC BLOOD PRESSURE: 61 MMHG | WEIGHT: 103.17 LBS | RESPIRATION RATE: 26 BRPM

## 2025-01-01 DIAGNOSIS — J96.01 ACUTE HYPOXEMIC RESPIRATORY FAILURE (H): Primary | ICD-10-CM

## 2025-01-01 DIAGNOSIS — I48.91 ATRIAL FIBRILLATION WITH RVR (H): ICD-10-CM

## 2025-01-01 DIAGNOSIS — Z11.1 ENCOUNTER FOR SCREENING FOR RESPIRATORY TUBERCULOSIS: ICD-10-CM

## 2025-01-01 DIAGNOSIS — E87.5 HYPERKALEMIA: ICD-10-CM

## 2025-01-01 DIAGNOSIS — R79.89 ELEVATED TROPONIN: ICD-10-CM

## 2025-01-01 LAB
ALBUMIN SERPL BCG-MCNC: 3.1 G/DL (ref 3.5–5.2)
ALP SERPL-CCNC: 332 U/L (ref 40–150)
ALT SERPL W P-5'-P-CCNC: 296 U/L (ref 0–50)
ANION GAP SERPL CALCULATED.3IONS-SCNC: 20 MMOL/L (ref 7–15)
ANION GAP SERPL CALCULATED.3IONS-SCNC: 21 MMOL/L (ref 7–15)
APTT PPP: 136 SECONDS (ref 22–38)
AST SERPL W P-5'-P-CCNC: 571 U/L (ref 0–45)
ATRIAL RATE - MUSE: 66 BPM
B-OH-BUTYR SERPL-SCNC: 0.28 MMOL/L
BASE EXCESS BLDV CALC-SCNC: -17.8 MMOL/L (ref -3–3)
BASOPHILS # BLD AUTO: <0.03 10E3/UL (ref 0–0.2)
BASOPHILS NFR BLD AUTO: 0.3 %
BILIRUB DIRECT SERPL-MCNC: 0.47 MG/DL (ref 0–0.3)
BILIRUB SERPL-MCNC: 1.1 MG/DL
BUN SERPL-MCNC: 55.8 MG/DL (ref 8–23)
BUN SERPL-MCNC: 59.1 MG/DL (ref 8–23)
CA-I BLD-MCNC: 4.6 MG/DL (ref 4.4–5.2)
CALCIUM SERPL-MCNC: 8.5 MG/DL (ref 8.8–10.4)
CALCIUM SERPL-MCNC: 8.8 MG/DL (ref 8.8–10.4)
CHLORIDE SERPL-SCNC: 100 MMOL/L (ref 98–107)
CHLORIDE SERPL-SCNC: 101 MMOL/L (ref 98–107)
CREAT SERPL-MCNC: 1.65 MG/DL (ref 0.51–0.95)
CREAT SERPL-MCNC: 1.71 MG/DL (ref 0.51–0.95)
DIASTOLIC BLOOD PRESSURE - MUSE: NORMAL MMHG
EGFRCR SERPLBLD CKD-EPI 2021: 29 ML/MIN/1.73M2
EGFRCR SERPLBLD CKD-EPI 2021: 30 ML/MIN/1.73M2
EOSINOPHIL # BLD AUTO: <0.03 10E3/UL (ref 0–0.7)
EOSINOPHIL NFR BLD AUTO: 0.1 %
ERYTHROCYTE [DISTWIDTH] IN BLOOD BY AUTOMATED COUNT: 14.8 % (ref 10–15)
FLUAV RNA SPEC QL NAA+PROBE: NEGATIVE
FLUBV RNA RESP QL NAA+PROBE: NEGATIVE
GAMMA INTERFERON BACKGROUND BLD IA-ACNC: 0.09 IU/ML
GLUCOSE BLDC GLUCOMTR-MCNC: 113 MG/DL (ref 70–99)
GLUCOSE BLDC GLUCOMTR-MCNC: 192 MG/DL (ref 70–99)
GLUCOSE SERPL-MCNC: 143 MG/DL (ref 70–99)
GLUCOSE SERPL-MCNC: 99 MG/DL (ref 70–99)
HCO3 BLDV-SCNC: 14 MMOL/L (ref 21–28)
HCO3 SERPL-SCNC: 13 MMOL/L (ref 22–29)
HCO3 SERPL-SCNC: 16 MMOL/L (ref 22–29)
HCT VFR BLD AUTO: 38.9 % (ref 35–47)
HGB BLD-MCNC: 12.8 G/DL (ref 11.7–15.7)
HOLD SPECIMEN: NORMAL
IMM GRANULOCYTES # BLD: 0.04 10E3/UL
IMM GRANULOCYTES NFR BLD: 0.6 %
INTERPRETATION ECG - MUSE: NORMAL
LACTATE SERPL-SCNC: 10.1 MMOL/L (ref 0.7–2)
LACTATE SERPL-SCNC: 10.4 MMOL/L (ref 0.7–2)
LIPASE SERPL-CCNC: 53 U/L (ref 13–60)
LVEF ECHO: NORMAL
LYMPHOCYTES # BLD AUTO: 0.83 10E3/UL (ref 0.8–5.3)
LYMPHOCYTES NFR BLD AUTO: 11.9 %
M TB IFN-G BLD-IMP: NEGATIVE
M TB IFN-G CD4+ BCKGRND COR BLD-ACNC: 9.91 IU/ML
MAGNESIUM SERPL-MCNC: 2 MG/DL (ref 1.7–2.3)
MCH RBC QN AUTO: 30.5 PG (ref 26.5–33)
MCHC RBC AUTO-ENTMCNC: 32.9 G/DL (ref 31.5–36.5)
MCV RBC AUTO: 92.6 FL (ref 78–100)
MITOGEN IGNF BCKGRD COR BLD-ACNC: 0 IU/ML
MITOGEN IGNF BCKGRD COR BLD-ACNC: 0.01 IU/ML
MONOCYTES # BLD AUTO: 0.57 10E3/UL (ref 0–1.3)
MONOCYTES NFR BLD AUTO: 8.2 %
NEUTROPHILS # BLD AUTO: 5.5 10E3/UL (ref 1.6–8.3)
NEUTROPHILS NFR BLD AUTO: 78.9 %
NRBC # BLD AUTO: 0.12 10E3/UL
NRBC BLD AUTO-RTO: 1.7 /100
NT-PROBNP SERPL-MCNC: ABNORMAL PG/ML (ref 0–624)
O2/TOTAL GAS SETTING VFR VENT: 50 %
OXYHGB MFR BLDV: 54 % (ref 70–75)
P AXIS - MUSE: NORMAL DEGREES
PCO2 BLDV: 56 MM HG (ref 40–50)
PH BLDV: 7 [PH] (ref 7.32–7.43)
PHOSPHATE SERPL-MCNC: 5 MG/DL (ref 2.5–4.5)
PLATELET # BLD AUTO: 206 10E3/UL (ref 150–450)
PO2 BLDV: 44 MM HG (ref 25–47)
POTASSIUM SERPL-SCNC: 5.4 MMOL/L (ref 3.4–5.3)
POTASSIUM SERPL-SCNC: 6 MMOL/L (ref 3.4–5.3)
POTASSIUM SERPL-SCNC: 6 MMOL/L (ref 3.4–5.3)
POTASSIUM SERPL-SCNC: 6.3 MMOL/L (ref 3.4–5.3)
POTASSIUM SERPL-SCNC: 6.6 MMOL/L (ref 3.4–5.3)
PR INTERVAL - MUSE: 184 MS
PROCALCITONIN SERPL IA-MCNC: 0.29 NG/ML
PROT SERPL-MCNC: 5.3 G/DL (ref 6.4–8.3)
QRS DURATION - MUSE: 126 MS
QT - MUSE: 338 MS
QTC - MUSE: 504 MS
QUANTIFERON MITOGEN: 10 IU/ML
QUANTIFERON NIL TUBE: 0.09 IU/ML
QUANTIFERON TB1 TUBE: 0.1 IU/ML
QUANTIFERON TB2 TUBE: 0.09
R AXIS - MUSE: 138 DEGREES
RBC # BLD AUTO: 4.2 10E6/UL (ref 3.8–5.2)
RSV RNA SPEC NAA+PROBE: NEGATIVE
SAO2 % BLDV: 54.5 % (ref 70–75)
SARS-COV-2 RNA RESP QL NAA+PROBE: NEGATIVE
SODIUM SERPL-SCNC: 135 MMOL/L (ref 135–145)
SODIUM SERPL-SCNC: 136 MMOL/L (ref 135–145)
SYSTOLIC BLOOD PRESSURE - MUSE: NORMAL MMHG
T AXIS - MUSE: 232 DEGREES
T4 FREE SERPL-MCNC: 1.33 NG/DL (ref 0.9–1.7)
TROPONIN T SERPL HS-MCNC: 122 NG/L
TROPONIN T SERPL HS-MCNC: 185 NG/L
TROPONIN T SERPL HS-MCNC: 285 NG/L
TROPONIN T SERPL HS-MCNC: 91 NG/L
TSH SERPL DL<=0.005 MIU/L-ACNC: 6.02 UIU/ML (ref 0.3–4.2)
VENTRICULAR RATE- MUSE: 134 BPM
WBC # BLD AUTO: 6.97 10E3/UL (ref 4–11)

## 2025-01-01 PROCEDURE — 36415 COLL VENOUS BLD VENIPUNCTURE: CPT | Performed by: EMERGENCY MEDICINE

## 2025-01-01 PROCEDURE — 83880 ASSAY OF NATRIURETIC PEPTIDE: CPT | Performed by: EMERGENCY MEDICINE

## 2025-01-01 PROCEDURE — 87637 SARSCOV2&INF A&B&RSV AMP PRB: CPT | Performed by: EMERGENCY MEDICINE

## 2025-01-01 PROCEDURE — 258N000003 HC RX IP 258 OP 636: Performed by: EMERGENCY MEDICINE

## 2025-01-01 PROCEDURE — 70450 CT HEAD/BRAIN W/O DYE: CPT

## 2025-01-01 PROCEDURE — 93325 DOPPLER ECHO COLOR FLOW MAPG: CPT

## 2025-01-01 PROCEDURE — 84443 ASSAY THYROID STIM HORMONE: CPT | Performed by: EMERGENCY MEDICINE

## 2025-01-01 PROCEDURE — 84132 ASSAY OF SERUM POTASSIUM: CPT | Performed by: STUDENT IN AN ORGANIZED HEALTH CARE EDUCATION/TRAINING PROGRAM

## 2025-01-01 PROCEDURE — 82010 KETONE BODYS QUAN: CPT | Performed by: STUDENT IN AN ORGANIZED HEALTH CARE EDUCATION/TRAINING PROGRAM

## 2025-01-01 PROCEDURE — 71046 X-RAY EXAM CHEST 2 VIEWS: CPT

## 2025-01-01 PROCEDURE — 82330 ASSAY OF CALCIUM: CPT | Performed by: INTERNAL MEDICINE

## 2025-01-01 PROCEDURE — 255N000002 HC RX 255 OP 636: Performed by: STUDENT IN AN ORGANIZED HEALTH CARE EDUCATION/TRAINING PROGRAM

## 2025-01-01 PROCEDURE — 96361 HYDRATE IV INFUSION ADD-ON: CPT

## 2025-01-01 PROCEDURE — 258N000001 HC RX 258: Performed by: STUDENT IN AN ORGANIZED HEALTH CARE EDUCATION/TRAINING PROGRAM

## 2025-01-01 PROCEDURE — 83735 ASSAY OF MAGNESIUM: CPT | Performed by: EMERGENCY MEDICINE

## 2025-01-01 PROCEDURE — 93005 ELECTROCARDIOGRAM TRACING: CPT

## 2025-01-01 PROCEDURE — 250N000011 HC RX IP 250 OP 636: Performed by: STUDENT IN AN ORGANIZED HEALTH CARE EDUCATION/TRAINING PROGRAM

## 2025-01-01 PROCEDURE — 99291 CRITICAL CARE FIRST HOUR: CPT | Mod: 25 | Performed by: EMERGENCY MEDICINE

## 2025-01-01 PROCEDURE — 84100 ASSAY OF PHOSPHORUS: CPT | Performed by: EMERGENCY MEDICINE

## 2025-01-01 PROCEDURE — 36415 COLL VENOUS BLD VENIPUNCTURE: CPT | Performed by: STUDENT IN AN ORGANIZED HEALTH CARE EDUCATION/TRAINING PROGRAM

## 2025-01-01 PROCEDURE — 83690 ASSAY OF LIPASE: CPT | Performed by: INTERNAL MEDICINE

## 2025-01-01 PROCEDURE — 99223 1ST HOSP IP/OBS HIGH 75: CPT | Performed by: STUDENT IN AN ORGANIZED HEALTH CARE EDUCATION/TRAINING PROGRAM

## 2025-01-01 PROCEDURE — 200N000001 HC R&B ICU

## 2025-01-01 PROCEDURE — 250N000011 HC RX IP 250 OP 636: Performed by: EMERGENCY MEDICINE

## 2025-01-01 PROCEDURE — 999N000157 HC STATISTIC RCP TIME EA 10 MIN

## 2025-01-01 PROCEDURE — 250N000011 HC RX IP 250 OP 636: Performed by: INTERNAL MEDICINE

## 2025-01-01 PROCEDURE — 250N000009 HC RX 250: Performed by: INTERNAL MEDICINE

## 2025-01-01 PROCEDURE — 250N000009 HC RX 250: Performed by: EMERGENCY MEDICINE

## 2025-01-01 PROCEDURE — 84484 ASSAY OF TROPONIN QUANT: CPT | Performed by: EMERGENCY MEDICINE

## 2025-01-01 PROCEDURE — 85025 COMPLETE CBC W/AUTO DIFF WBC: CPT | Performed by: EMERGENCY MEDICINE

## 2025-01-01 PROCEDURE — 84439 ASSAY OF FREE THYROXINE: CPT | Performed by: EMERGENCY MEDICINE

## 2025-01-01 PROCEDURE — 84145 PROCALCITONIN (PCT): CPT | Performed by: STUDENT IN AN ORGANIZED HEALTH CARE EDUCATION/TRAINING PROGRAM

## 2025-01-01 PROCEDURE — 94660 CPAP INITIATION&MGMT: CPT

## 2025-01-01 PROCEDURE — 999N000156 HC STATISTIC RCP CONSULT EA 30 MIN

## 2025-01-01 PROCEDURE — 82805 BLOOD GASES W/O2 SATURATION: CPT | Performed by: STUDENT IN AN ORGANIZED HEALTH CARE EDUCATION/TRAINING PROGRAM

## 2025-01-01 PROCEDURE — 93005 ELECTROCARDIOGRAM TRACING: CPT | Mod: 76

## 2025-01-01 PROCEDURE — 94640 AIRWAY INHALATION TREATMENT: CPT | Mod: 76

## 2025-01-01 PROCEDURE — 82962 GLUCOSE BLOOD TEST: CPT

## 2025-01-01 PROCEDURE — 96365 THER/PROPH/DIAG IV INF INIT: CPT | Mod: 59

## 2025-01-01 PROCEDURE — 84484 ASSAY OF TROPONIN QUANT: CPT | Performed by: STUDENT IN AN ORGANIZED HEALTH CARE EDUCATION/TRAINING PROGRAM

## 2025-01-01 PROCEDURE — 96375 TX/PRO/DX INJ NEW DRUG ADDON: CPT

## 2025-01-01 PROCEDURE — 87040 BLOOD CULTURE FOR BACTERIA: CPT | Performed by: EMERGENCY MEDICINE

## 2025-01-01 PROCEDURE — 80053 COMPREHEN METABOLIC PANEL: CPT | Performed by: EMERGENCY MEDICINE

## 2025-01-01 PROCEDURE — 93321 DOPPLER ECHO F-UP/LMTD STD: CPT | Mod: 26 | Performed by: INTERNAL MEDICINE

## 2025-01-01 PROCEDURE — 250N000012 HC RX MED GY IP 250 OP 636 PS 637: Performed by: STUDENT IN AN ORGANIZED HEALTH CARE EDUCATION/TRAINING PROGRAM

## 2025-01-01 PROCEDURE — 84132 ASSAY OF SERUM POTASSIUM: CPT | Performed by: EMERGENCY MEDICINE

## 2025-01-01 PROCEDURE — 86481 TB AG RESPONSE T-CELL SUSP: CPT | Mod: ORL

## 2025-01-01 PROCEDURE — 83605 ASSAY OF LACTIC ACID: CPT | Performed by: EMERGENCY MEDICINE

## 2025-01-01 PROCEDURE — 72220 X-RAY EXAM SACRUM TAILBONE: CPT

## 2025-01-01 PROCEDURE — 93308 TTE F-UP OR LMTD: CPT | Mod: 26 | Performed by: INTERNAL MEDICINE

## 2025-01-01 PROCEDURE — 258N000003 HC RX IP 258 OP 636: Performed by: INTERNAL MEDICINE

## 2025-01-01 PROCEDURE — 96376 TX/PRO/DX INJ SAME DRUG ADON: CPT

## 2025-01-01 PROCEDURE — 85730 THROMBOPLASTIN TIME PARTIAL: CPT | Performed by: EMERGENCY MEDICINE

## 2025-01-01 PROCEDURE — 82248 BILIRUBIN DIRECT: CPT | Performed by: STUDENT IN AN ORGANIZED HEALTH CARE EDUCATION/TRAINING PROGRAM

## 2025-01-01 PROCEDURE — 99233 SBSQ HOSP IP/OBS HIGH 50: CPT | Performed by: INTERNAL MEDICINE

## 2025-01-01 PROCEDURE — 258N000003 HC RX IP 258 OP 636: Performed by: STUDENT IN AN ORGANIZED HEALTH CARE EDUCATION/TRAINING PROGRAM

## 2025-01-01 PROCEDURE — 250N000009 HC RX 250: Performed by: STUDENT IN AN ORGANIZED HEALTH CARE EDUCATION/TRAINING PROGRAM

## 2025-01-01 PROCEDURE — 93325 DOPPLER ECHO COLOR FLOW MAPG: CPT | Mod: 26 | Performed by: INTERNAL MEDICINE

## 2025-01-01 PROCEDURE — 96366 THER/PROPH/DIAG IV INF ADDON: CPT

## 2025-01-01 RX ORDER — MORPHINE SULFATE 2 MG/ML
2 INJECTION, SOLUTION INTRAMUSCULAR; INTRAVENOUS ONCE
Status: COMPLETED | OUTPATIENT
Start: 2025-01-01 | End: 2025-01-01

## 2025-01-01 RX ORDER — PIPERACILLIN SODIUM, TAZOBACTAM SODIUM 2; .25 G/10ML; G/10ML
2.25 INJECTION, POWDER, LYOPHILIZED, FOR SOLUTION INTRAVENOUS EVERY 6 HOURS
Status: DISCONTINUED | OUTPATIENT
Start: 2025-01-01 | End: 2025-01-01

## 2025-01-01 RX ORDER — HYDROMORPHONE HCL IN WATER/PF 6 MG/30 ML
0.4 PATIENT CONTROLLED ANALGESIA SYRINGE INTRAVENOUS
Status: DISCONTINUED | OUTPATIENT
Start: 2025-01-01 | End: 2025-01-01 | Stop reason: HOSPADM

## 2025-01-01 RX ORDER — HYDROMORPHONE HCL IN WATER/PF 6 MG/30 ML
0.2 PATIENT CONTROLLED ANALGESIA SYRINGE INTRAVENOUS
Status: DISCONTINUED | OUTPATIENT
Start: 2025-01-01 | End: 2025-01-01 | Stop reason: HOSPADM

## 2025-01-01 RX ORDER — DEXTROSE MONOHYDRATE 25 G/50ML
25-50 INJECTION, SOLUTION INTRAVENOUS
Status: DISCONTINUED | OUTPATIENT
Start: 2025-01-01 | End: 2025-01-01

## 2025-01-01 RX ORDER — HYDROMORPHONE HYDROCHLORIDE 2 MG/1
2 TABLET ORAL
Refills: 0 | Status: DISCONTINUED | OUTPATIENT
Start: 2025-01-01 | End: 2025-01-01 | Stop reason: HOSPADM

## 2025-01-01 RX ORDER — ROPIVACAINE IN 0.9% SOD CHL/PF 0.1 %
.01-.35 PLASTIC BAG, INJECTION (ML) EPIDURAL CONTINUOUS
Status: DISCONTINUED | OUTPATIENT
Start: 2025-01-01 | End: 2025-01-01

## 2025-01-01 RX ORDER — NALOXONE HYDROCHLORIDE 0.4 MG/ML
0.2 INJECTION, SOLUTION INTRAMUSCULAR; INTRAVENOUS; SUBCUTANEOUS
Status: DISCONTINUED | OUTPATIENT
Start: 2025-01-01 | End: 2025-01-01 | Stop reason: HOSPADM

## 2025-01-01 RX ORDER — INDOMETHACIN 25 MG/1
50 CAPSULE ORAL ONCE
Status: DISCONTINUED | OUTPATIENT
Start: 2025-01-01 | End: 2025-01-01

## 2025-01-01 RX ORDER — NALOXONE HYDROCHLORIDE 0.4 MG/ML
0.1 INJECTION, SOLUTION INTRAMUSCULAR; INTRAVENOUS; SUBCUTANEOUS
Status: DISCONTINUED | OUTPATIENT
Start: 2025-01-01 | End: 2025-01-01 | Stop reason: HOSPADM

## 2025-01-01 RX ORDER — CEFEPIME HYDROCHLORIDE 2 G/1
2 INJECTION, POWDER, FOR SOLUTION INTRAVENOUS ONCE
Status: DISCONTINUED | OUTPATIENT
Start: 2025-01-01 | End: 2025-01-01

## 2025-01-01 RX ORDER — PROCHLORPERAZINE MALEATE 5 MG/1
5 TABLET ORAL EVERY 6 HOURS PRN
Status: DISCONTINUED | OUTPATIENT
Start: 2025-01-01 | End: 2025-01-01 | Stop reason: HOSPADM

## 2025-01-01 RX ORDER — INDOMETHACIN 25 MG/1
50 CAPSULE ORAL
Status: COMPLETED | OUTPATIENT
Start: 2025-01-01 | End: 2025-01-01

## 2025-01-01 RX ORDER — NICOTINE POLACRILEX 4 MG
15-30 LOZENGE BUCCAL
Status: DISCONTINUED | OUTPATIENT
Start: 2025-01-01 | End: 2025-01-01

## 2025-01-01 RX ORDER — CARBOXYMETHYLCELLULOSE SODIUM 5 MG/ML
1-2 SOLUTION/ DROPS OPHTHALMIC
Status: DISCONTINUED | OUTPATIENT
Start: 2025-01-01 | End: 2025-01-01 | Stop reason: HOSPADM

## 2025-01-01 RX ORDER — SENNOSIDES 8.6 MG
1 TABLET ORAL 2 TIMES DAILY PRN
Status: DISCONTINUED | OUTPATIENT
Start: 2025-01-01 | End: 2025-01-01 | Stop reason: HOSPADM

## 2025-01-01 RX ORDER — ATROPINE SULFATE 10 MG/ML
2 SOLUTION/ DROPS OPHTHALMIC EVERY 4 HOURS PRN
Status: DISCONTINUED | OUTPATIENT
Start: 2025-01-01 | End: 2025-01-01 | Stop reason: HOSPADM

## 2025-01-01 RX ORDER — HYDROMORPHONE HYDROCHLORIDE 1 MG/ML
1 SOLUTION ORAL
Refills: 0 | Status: DISCONTINUED | OUTPATIENT
Start: 2025-01-01 | End: 2025-01-01 | Stop reason: HOSPADM

## 2025-01-01 RX ORDER — ACETAMINOPHEN 325 MG/1
650 TABLET ORAL EVERY 6 HOURS PRN
Status: DISCONTINUED | OUTPATIENT
Start: 2025-01-01 | End: 2025-01-01 | Stop reason: HOSPADM

## 2025-01-01 RX ORDER — METOPROLOL TARTRATE 1 MG/ML
10 INJECTION, SOLUTION INTRAVENOUS ONCE
Status: COMPLETED | OUTPATIENT
Start: 2025-01-01 | End: 2025-01-01

## 2025-01-01 RX ORDER — NICOTINE POLACRILEX 4 MG
15-30 LOZENGE BUCCAL
Status: DISCONTINUED | OUTPATIENT
Start: 2025-01-01 | End: 2025-01-01 | Stop reason: HOSPADM

## 2025-01-01 RX ORDER — METOPROLOL TARTRATE 1 MG/ML
5 INJECTION, SOLUTION INTRAVENOUS ONCE
Status: COMPLETED | OUTPATIENT
Start: 2025-01-01 | End: 2025-01-01

## 2025-01-01 RX ORDER — HEPARIN SODIUM 10000 [USP'U]/100ML
0-5000 INJECTION, SOLUTION INTRAVENOUS CONTINUOUS
Status: DISCONTINUED | OUTPATIENT
Start: 2025-01-01 | End: 2025-01-01

## 2025-01-01 RX ORDER — LORAZEPAM 1 MG/1
1 TABLET ORAL
Status: DISCONTINUED | OUTPATIENT
Start: 2025-01-01 | End: 2025-01-01 | Stop reason: HOSPADM

## 2025-01-01 RX ORDER — ACETAMINOPHEN 650 MG/1
650 SUPPOSITORY RECTAL EVERY 6 HOURS PRN
Status: DISCONTINUED | OUTPATIENT
Start: 2025-01-01 | End: 2025-01-01 | Stop reason: HOSPADM

## 2025-01-01 RX ORDER — FUROSEMIDE 10 MG/ML
40 INJECTION INTRAMUSCULAR; INTRAVENOUS ONCE
Status: COMPLETED | OUTPATIENT
Start: 2025-01-01 | End: 2025-01-01

## 2025-01-01 RX ORDER — DEXTROSE MONOHYDRATE 25 G/50ML
25-50 INJECTION, SOLUTION INTRAVENOUS
Status: DISCONTINUED | OUTPATIENT
Start: 2025-01-01 | End: 2025-01-01 | Stop reason: HOSPADM

## 2025-01-01 RX ORDER — BISACODYL 10 MG
10 SUPPOSITORY, RECTAL RECTAL
Status: DISCONTINUED | OUTPATIENT
Start: 2025-08-21 | End: 2025-01-01 | Stop reason: HOSPADM

## 2025-01-01 RX ORDER — ROPIVACAINE IN 0.9% SOD CHL/PF 0.1 %
.01-.2 PLASTIC BAG, INJECTION (ML) EPIDURAL CONTINUOUS
Status: DISCONTINUED | OUTPATIENT
Start: 2025-01-01 | End: 2025-01-01

## 2025-01-01 RX ORDER — HYDROXYZINE HYDROCHLORIDE 25 MG/1
25 TABLET, FILM COATED ORAL ONCE
Status: COMPLETED | OUTPATIENT
Start: 2025-01-01 | End: 2025-01-01

## 2025-01-01 RX ORDER — VANCOMYCIN HYDROCHLORIDE 1 G/200ML
1000 INJECTION, SOLUTION INTRAVENOUS ONCE
Status: COMPLETED | OUTPATIENT
Start: 2025-01-01 | End: 2025-01-01

## 2025-01-01 RX ORDER — ROPIVACAINE IN 0.9% SOD CHL/PF 0.1 %
.01-.3 PLASTIC BAG, INJECTION (ML) EPIDURAL CONTINUOUS
Status: DISCONTINUED | OUTPATIENT
Start: 2025-01-01 | End: 2025-01-01

## 2025-01-01 RX ORDER — ALBUTEROL SULFATE 5 MG/ML
10 SOLUTION RESPIRATORY (INHALATION) ONCE
Status: COMPLETED | OUTPATIENT
Start: 2025-01-01 | End: 2025-01-01

## 2025-01-01 RX ORDER — MINERAL OIL/HYDROPHIL PETROLAT
OINTMENT (GRAM) TOPICAL
Status: DISCONTINUED | OUTPATIENT
Start: 2025-01-01 | End: 2025-01-01 | Stop reason: HOSPADM

## 2025-01-01 RX ORDER — CALCIUM GLUCONATE 20 MG/ML
2 INJECTION, SOLUTION INTRAVENOUS ONCE
Status: COMPLETED | OUTPATIENT
Start: 2025-01-01 | End: 2025-01-01

## 2025-01-01 RX ORDER — DEXTROSE MONOHYDRATE 25 G/50ML
25 INJECTION, SOLUTION INTRAVENOUS ONCE
Status: COMPLETED | OUTPATIENT
Start: 2025-01-01 | End: 2025-01-01

## 2025-01-01 RX ORDER — CARBOXYMETHYLCELLULOSE SODIUM 5 MG/ML
1 SOLUTION/ DROPS OPHTHALMIC
Status: DISCONTINUED | OUTPATIENT
Start: 2025-01-01 | End: 2025-01-01

## 2025-01-01 RX ORDER — ACETAMINOPHEN 325 MG/10.15ML
650 LIQUID ORAL EVERY 6 HOURS PRN
Status: DISCONTINUED | OUTPATIENT
Start: 2025-01-01 | End: 2025-01-01 | Stop reason: HOSPADM

## 2025-01-01 RX ORDER — HYDROMORPHONE HYDROCHLORIDE 1 MG/ML
2 SOLUTION ORAL
Refills: 0 | Status: DISCONTINUED | OUTPATIENT
Start: 2025-01-01 | End: 2025-01-01 | Stop reason: HOSPADM

## 2025-01-01 RX ORDER — SODIUM CHLORIDE, SODIUM LACTATE, POTASSIUM CHLORIDE, CALCIUM CHLORIDE 600; 310; 30; 20 MG/100ML; MG/100ML; MG/100ML; MG/100ML
INJECTION, SOLUTION INTRAVENOUS CONTINUOUS
Status: DISCONTINUED | OUTPATIENT
Start: 2025-01-01 | End: 2025-01-01

## 2025-01-01 RX ORDER — NOREPINEPHRINE BITARTRATE 0.02 MG/ML
.01-.6 INJECTION, SOLUTION INTRAVENOUS CONTINUOUS
Status: DISCONTINUED | OUTPATIENT
Start: 2025-01-01 | End: 2025-01-01

## 2025-01-01 RX ORDER — ONDANSETRON 4 MG/1
4 TABLET, ORALLY DISINTEGRATING ORAL EVERY 6 HOURS PRN
Status: DISCONTINUED | OUTPATIENT
Start: 2025-01-01 | End: 2025-01-01 | Stop reason: HOSPADM

## 2025-01-01 RX ORDER — ONDANSETRON 2 MG/ML
4 INJECTION INTRAMUSCULAR; INTRAVENOUS EVERY 6 HOURS PRN
Status: DISCONTINUED | OUTPATIENT
Start: 2025-01-01 | End: 2025-01-01 | Stop reason: HOSPADM

## 2025-01-01 RX ADMIN — SODIUM BICARBONATE 50 MEQ: 84 INJECTION INTRAVENOUS at 22:34

## 2025-01-01 RX ADMIN — SODIUM CHLORIDE 1000 ML: 0.9 INJECTION, SOLUTION INTRAVENOUS at 19:40

## 2025-01-01 RX ADMIN — HUMAN ALBUMIN MICROSPHERES AND PERFLUTREN 2 ML (DILUTED): 10; .22 INJECTION, SOLUTION INTRAVENOUS at 21:20

## 2025-01-01 RX ADMIN — PIPERACILLIN AND TAZOBACTAM 2.25 G: 2; .25 INJECTION, POWDER, FOR SOLUTION INTRAVENOUS at 23:54

## 2025-01-01 RX ADMIN — HEPARIN SODIUM 550 UNITS/HR: 10000 INJECTION, SOLUTION INTRAVENOUS at 19:23

## 2025-01-01 RX ADMIN — SODIUM CHLORIDE 500 ML: 0.9 INJECTION, SOLUTION INTRAVENOUS at 00:43

## 2025-01-01 RX ADMIN — SODIUM CHLORIDE 1000 ML: 0.9 INJECTION, SOLUTION INTRAVENOUS at 15:11

## 2025-01-01 RX ADMIN — DEXTROSE MONOHYDRATE 25 G: 25 INJECTION, SOLUTION INTRAVENOUS at 21:13

## 2025-01-01 RX ADMIN — NOREPINEPHRINE BITARTRATE 0.03 MCG/KG/MIN: 0.02 INJECTION, SOLUTION INTRAVENOUS at 17:55

## 2025-01-01 RX ADMIN — METOPROLOL TARTRATE 5 MG: 5 INJECTION INTRAVENOUS at 15:07

## 2025-01-01 RX ADMIN — SODIUM BICARBONATE: 84 INJECTION, SOLUTION INTRAVENOUS at 23:51

## 2025-01-01 RX ADMIN — SODIUM CHLORIDE 4.4 UNITS: 9 INJECTION, SOLUTION INTRAVENOUS at 21:23

## 2025-01-01 RX ADMIN — SODIUM BICARBONATE 50 MEQ: 84 INJECTION INTRAVENOUS at 22:29

## 2025-01-01 RX ADMIN — ALBUTEROL SULFATE 10 MG: 2.5 SOLUTION RESPIRATORY (INHALATION) at 19:25

## 2025-01-01 RX ADMIN — MORPHINE SULFATE 2 MG: 2 INJECTION, SOLUTION INTRAMUSCULAR; INTRAVENOUS at 19:28

## 2025-01-01 RX ADMIN — VANCOMYCIN HYDROCHLORIDE 1000 MG: 1 INJECTION, SOLUTION INTRAVENOUS at 21:22

## 2025-01-01 RX ADMIN — FUROSEMIDE 40 MG: 10 INJECTION, SOLUTION INTRAMUSCULAR; INTRAVENOUS at 19:13

## 2025-01-01 RX ADMIN — AMIODARONE HYDROCHLORIDE 0.5 MG/MIN: 1.8 INJECTION, SOLUTION INTRAVENOUS at 20:03

## 2025-01-01 RX ADMIN — METOPROLOL TARTRATE 10 MG: 5 INJECTION INTRAVENOUS at 16:23

## 2025-01-01 RX ADMIN — HYDROMORPHONE HYDROCHLORIDE 0.2 MG: 0.2 INJECTION, SOLUTION INTRAMUSCULAR; INTRAVENOUS; SUBCUTANEOUS at 01:54

## 2025-01-01 RX ADMIN — CALCIUM GLUCONATE 2 G: 20 INJECTION, SOLUTION INTRAVENOUS at 22:38

## 2025-01-01 ASSESSMENT — ACTIVITIES OF DAILY LIVING (ADL)
ADLS_ACUITY_SCORE: 76
ADLS_ACUITY_SCORE: 57
ADLS_ACUITY_SCORE: 76
ADLS_ACUITY_SCORE: 57
ADLS_ACUITY_SCORE: 76
ADLS_ACUITY_SCORE: 57
ADLS_ACUITY_SCORE: 57

## 2025-01-25 ENCOUNTER — HEALTH MAINTENANCE LETTER (OUTPATIENT)
Age: 89
End: 2025-01-25

## 2025-02-05 ENCOUNTER — OFFICE VISIT (OUTPATIENT)
Dept: PEDIATRICS | Facility: CLINIC | Age: 89
End: 2025-02-05
Payer: COMMERCIAL

## 2025-02-05 VITALS
OXYGEN SATURATION: 97 % | HEIGHT: 59 IN | SYSTOLIC BLOOD PRESSURE: 156 MMHG | TEMPERATURE: 97.1 F | WEIGHT: 103 LBS | BODY MASS INDEX: 20.76 KG/M2 | HEART RATE: 87 BPM | DIASTOLIC BLOOD PRESSURE: 58 MMHG

## 2025-02-05 DIAGNOSIS — R09.82 ALLERGIC RHINITIS WITH POSTNASAL DRIP: ICD-10-CM

## 2025-02-05 DIAGNOSIS — H61.21 IMPACTED CERUMEN OF RIGHT EAR: Primary | ICD-10-CM

## 2025-02-05 DIAGNOSIS — J30.9 ALLERGIC RHINITIS WITH POSTNASAL DRIP: ICD-10-CM

## 2025-02-05 RX ORDER — DILTIAZEM HYDROCHLORIDE 120 MG/1
CAPSULE, EXTENDED RELEASE ORAL
COMMUNITY
Start: 2025-02-04

## 2025-02-05 RX ORDER — FLUTICASONE PROPIONATE 50 MCG
2 SPRAY, SUSPENSION (ML) NASAL DAILY
Qty: 18.2 ML | Refills: 1 | Status: SHIPPED | OUTPATIENT
Start: 2025-02-05

## 2025-02-05 ASSESSMENT — PAIN SCALES - GENERAL: PAINLEVEL_OUTOF10: NO PAIN (0)

## 2025-02-05 NOTE — PROGRESS NOTES
Assessment & Plan     (H61.21) Impacted cerumen of right ear  (primary encounter diagnosis)  Comment: Longstanding history of cerumen impactions in the past.  As needed irrigation multiple times.  Currently, no current pain or hearing loss.  On exam, right ear is impacted with cerumen with inability to visualize the TM.  Left ear not impacted.  Performed cerumen disimpaction in clinic and visualized TM afterward, no signs of infection at this time.  Due for multiple preventative care gaps, patient will call to schedule an appointment at her convenience.  Plan: ME REMOVAL IMPACTED CERUMEN IRRIGATION/LVG         UNILAT    (J30.9,  R09.82) Allergic rhinitis with postnasal drip  Comment: Longstanding history of ongoing postnasal drip with significant amount of allergies.  Has seen allergy in the past and has been prescribed Flonase.  States that this has worked in the past but stopped taking several years ago.  He is amenable to retrying Flonase to help with postnasal drip symptoms.  Plan: fluticasone (FLONASE) 50 MCG/ACT nasal spray    Patient discussed with attending physician, Dr. Hawkins.     Avtar Matute MD  Medicine-Pediatrics, PGY-2    Yudith Vasquez is a 89 year old, presenting for the following health issues:  Ear Problem      2/5/2025     1:43 PM   Additional Questions   Roomed by Kemi         2/5/2025     1:43 PM   Patient Reported Additional Medications   Patient reports taking the following new medications none     Via the Health Maintenance questionnaire, the patient has reported the following services have been completed -Eye Exam: Gabo OCAMPO 2024-09-12, this information has been sent to the abstraction team.  Ear Problem  This is a recurrent problem. The current episode started 2 days ago. There is pain in both ears. The problem has not changed since onset.There has been no fever.   History of Present Illness       Reason for visit:  Ear is plugged  Symptom onset:  3-7 days ago   She is  "taking medications regularly.     89 yo w/ Hx of CVA, CAD, HTN, parozysmal a-fib on Eliquis, HFrEF on GDMT (follows with cards), HLD, CKD  - Presenting with ear wax concerns  - Seen on 1/20/2024 for bilateral cerumen impaction, performed removal in clinic, prescribed cortisporin drops for acute otitis externa  - Called on 1/25/2024 with significant ear pain, lots of wax, ear fullness and inability to hear out of left ear  - Has needed cerumen disimpaction multiple times in the past but only ever had infection back in 2024    - Presents with 3-7 days of plugged ear sensation in right ear, left feels okay  - No hearing loss, no ear pain, no crackling sensation  - All year round postnasal drip, never tried flonase in the past      Review of Systems  Constitutional, neuro, ENT, endocrine, pulmonary, cardiac, gastrointestinal, genitourinary, musculoskeletal, integument and psychiatric systems are negative, except as otherwise noted.      Objective    BP (!) 156/58   Pulse 87   Ht 1.499 m (4' 11\")   Wt 46.7 kg (103 lb)   LMP  (LMP Unknown)   SpO2 97%   BMI 20.80 kg/m    Body mass index is 20.8 kg/m .  Physical Exam   GENERAL: alert and no distress  EYES: Eyes grossly normal to inspection, PERRL and conjunctivae and sclerae normal  HENT: right canal impacted with cerumen and unable to visualize TM, left TM normal with some surrounding cerumen but not impacted, nose and mouth without ulcers or lesions  NECK: no adenopathy, no asymmetry, masses, or scars  RESP: lungs clear to auscultation - no rales, rhonchi or wheezes  CV: regular rate and irregular rhythm, normal S1 S2, no S3 or S4, notable systolic murmur, click or rub, no peripheral edema  ABDOMEN: soft, nontender, no hepatosplenomegaly, no masses and bowel sounds normal  MS: no gross musculoskeletal defects noted, no edema  SKIN: no suspicious lesions or rashes  NEURO: Normal strength and tone, mentation intact and speech normal  PSYCH: mentation appears normal, " affect normal/bright          Signed Electronically by: Avtar Ortega MD  {Email feedback regarding this note to [email or web address]   :[id number]}

## 2025-02-05 NOTE — PATIENT INSTRUCTIONS
It was a pleasure to meet you. For today:     - We removed the ear wax from your ear and no signs of an infection  - For your postnasal drip, we will prescribe you flonase spray to be used daily to help with your symptoms  - Please call to schedule your preventative annual visit at your convenience

## 2025-02-05 NOTE — NURSING NOTE
Patient identified using two patient identifiers.  Ear exam showing wax occlusion completed by provider.  H202/H20 was placed in the right ear(s) via irrigation tool: elephant ear.   Viridiana Serrano CMA

## 2025-02-12 ENCOUNTER — TELEPHONE (OUTPATIENT)
Dept: PHARMACY | Facility: OTHER | Age: 89
End: 2025-02-12
Payer: COMMERCIAL

## 2025-02-12 ENCOUNTER — HOSPITAL ENCOUNTER (OUTPATIENT)
Dept: CARDIOLOGY | Facility: CLINIC | Age: 89
Discharge: HOME | End: 2025-02-12
Attending: NURSE PRACTITIONER
Payer: COMMERCIAL

## 2025-02-12 DIAGNOSIS — I50.22 CHRONIC SYSTOLIC HEART FAILURE (H): ICD-10-CM

## 2025-02-12 LAB — LVEF ECHO: NORMAL

## 2025-02-12 PROCEDURE — 93325 DOPPLER ECHO COLOR FLOW MAPG: CPT

## 2025-02-12 NOTE — TELEPHONE ENCOUNTER
[facility] Recruitment: [facility] insurance     Referral outreach attempt #1 on February 12, 2025      Outcome: left voicemail- Call back number [phone] and [employer] message sent    Carmen Licona CMA  [place]

## 2025-04-02 ENCOUNTER — OFFICE VISIT (OUTPATIENT)
Dept: URGENT CARE | Facility: URGENT CARE | Age: 89
End: 2025-04-02
Payer: COMMERCIAL

## 2025-04-02 ENCOUNTER — ANCILLARY PROCEDURE (OUTPATIENT)
Dept: GENERAL RADIOLOGY | Facility: CLINIC | Age: 89
End: 2025-04-02
Attending: FAMILY MEDICINE
Payer: COMMERCIAL

## 2025-04-02 VITALS
DIASTOLIC BLOOD PRESSURE: 51 MMHG | BODY MASS INDEX: 21.01 KG/M2 | WEIGHT: 104 LBS | HEART RATE: 66 BPM | TEMPERATURE: 97.9 F | OXYGEN SATURATION: 96 % | RESPIRATION RATE: 16 BRPM | SYSTOLIC BLOOD PRESSURE: 166 MMHG

## 2025-04-02 DIAGNOSIS — M54.9 UPPER BACK PAIN: ICD-10-CM

## 2025-04-02 DIAGNOSIS — M54.9 UPPER BACK PAIN: Primary | ICD-10-CM

## 2025-04-02 LAB — RADIOLOGIST FLAGS: NORMAL

## 2025-04-02 PROCEDURE — 3078F DIAST BP <80 MM HG: CPT | Performed by: FAMILY MEDICINE

## 2025-04-02 PROCEDURE — 3077F SYST BP >= 140 MM HG: CPT | Performed by: FAMILY MEDICINE

## 2025-04-02 PROCEDURE — 72072 X-RAY EXAM THORAC SPINE 3VWS: CPT | Mod: TC | Performed by: RADIOLOGY

## 2025-04-02 PROCEDURE — 99214 OFFICE O/P EST MOD 30 MIN: CPT | Performed by: FAMILY MEDICINE

## 2025-04-02 RX ORDER — PREDNISONE 20 MG/1
40 TABLET ORAL DAILY
Qty: 10 TABLET | Refills: 0 | Status: CANCELLED | OUTPATIENT
Start: 2025-04-02 | End: 2025-04-07

## 2025-04-02 NOTE — PROGRESS NOTES
SUBJECTIVE:  Chief Complaint   Patient presents with    Urgent Care     Pt complains of on and off back pain for a week.     Genie Persaud is a 89 year old female who presents with a chief complaint of back pain for 1 week.  Here with caregiver    Prior MRI lumbar 5/11/2022:  IMPRESSION:  1. Diffuse degenerative change of the lumbar spine as detailed above.  2. Moderate to severe neural foraminal stenosis on the right at L4-L5  and on the left at L5-S1.  3. No other significant spinal canal or neural foraminal narrowing of  the lumbar spine.  4. No fractures.     Has prednisone which she takes for asthma flare, last time maybe 6 months ago.    No coughing or fever, worried that can be due to a. Fib.  Denies any palpitation symptoms.    Has some nasal congestion  Has tried taking tylenol  Denies any fall injury        Past Medical History:   Diagnosis Date    Anemia 03/10/2009    Chronic disease, by Fe studies; awaiting full GI w/u and repeat colonoscopy, ERCP.    Basal Cell Carcinoma--back     Coronary artery disease 03/09/2017    3/2/2017 - Two vessel coronary artery disease with mLAD 50% stenosis, D3 50% stenosis, pLCx 50% stenosis and mLCx 50% stenosis    Essential hypertension 09/01/2016    White coat hypertension;  24 hour ambulatory monitoring normal. Do not titrate up further.      Hyperlipidemia 02/10/2010    Moderate persistent asthma     Nonrheumatic aortic valve insufficiency 06/29/2017    Osteopenia     Paroxysmal atrial fibrillation 12/26/2017    Pulmonary nodule 03/03/2009    PET scan reportedly normal; biopsy not advised.    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  Problem list name updated by automated process. Provider to review    Vasculitis 04/20/2009    Possible increased activity of thoracic aorta, on PET scan  w/u for pulmonary nodule.      Current Outpatient Medications   Medication Sig Dispense Refill    acetaminophen (TYLENOL) 325 MG tablet Take 975 mg by mouth every 8 hours as needed for mild pain      albuterol (PROAIR HFA/PROVENTIL HFA/VENTOLIN HFA) 108 (90 Base) MCG/ACT inhaler Inhale 1 to 2 puffs by mouth into the lungs every 6 hours as needed for shortness of breath / dyspnea or wheezing 18 g 0    apixaban ANTICOAGULANT (ELIQUIS ANTICOAGULANT) 2.5 MG tablet Take 1 tablet (2.5 mg) by mouth 2 times daily 180 tablet 3    diltiazem ER (DILT-XR) 120 MG 24 hr capsule       fluticasone (FLONASE) 50 MCG/ACT nasal spray Spray 2 sprays into both nostrils daily. 18.2 mL 1    fluticasone-salmeterol (ADVAIR HFA) 230-21 MCG/ACT inhaler Inhale 2 puffs into the lungs 2 times daily. 12 g 11    latanoprost (XALATAN) 0.005 % ophthalmic solution INSTILL ONE DROP IN EACH EYE AT BEDTIME **REFRIGERATE UNTIL OPEN-STORE AT ROOM TEMP ONCE OPENED* 2.5 mL 97    loratadine (CLARITIN) 10 MG tablet Take 1 tablet by mouth once daily 90 tablet 3    metoprolol succinate ER (TOPROL XL) 25 MG 24 hr tablet Take 1 tablet (25 mg) by mouth daily. 90 tablet 3    montelukast (SINGULAIR) 10 MG tablet Take 1 tablet (10 mg) by mouth at bedtime. 90 tablet 3    omeprazole (PRILOSEC) 10 MG DR capsule Take 1 capsule (10 mg) by mouth daily 90 capsule 3    potassium chloride ER (K-TAB) 20 MEQ CR tablet Take 1 tablet (20 mEq) by mouth daily      predniSONE (DELTASONE) 10 MG tablet Take 10 mg by mouth as needed.      rosuvastatin (CRESTOR) 10 MG tablet Take 1 tablet (10 mg) by mouth daily. 90 tablet 3    torsemide (DEMADEX) 20 MG tablet Take 1 tablet (20 mg) by mouth daily. 90 tablet 3     Social History     Tobacco Use    Smoking status: Never     Passive exposure: Never    Smokeless tobacco: Never   Substance Use Topics    Alcohol use: Yes     Alcohol/week: 1.0 - 2.0 standard drink of alcohol     Types: 1 - 2 Standard drinks or equivalent per week     Comment: 1  glass of wine 1-2 days per week       ROS:  Review of systems negative except as stated above.    EXAM:   BP (!) 166/51   Pulse 66   Temp 97.9  F (36.6  C) (Tympanic)   Resp 16   Wt 47.2 kg (104 lb)   LMP  (LMP Unknown)   SpO2 96%   BMI 21.01 kg/m    GENERAL APPEARANCE: healthy, alert and no distress  BACK: mid thoracic tenderness, decrease ROM  EXTREMITIES: peripheral pulses normal  SKIN: no suspicious lesions or rashes  PSYCH:alert, affect bright    X-RAY was done - thoracic spine - possible compression fracture, multilevel DJD, personally viewed by me    ASSESSMENT/PLAN:  (M54.9) Upper back pain  (primary encounter diagnosis)  Plan: XR Thoracic Spine 3 Views            Reviewed with patient possible compression fracture, will follow up on formal xray report and notify if positive for this etiology.  Discussed underlying arthritis and at this time will have patient take tylenol 1000 mg 2-3 times a day.  Okay for lidocaine patches for discomfort.  Unable to take NSAIDs due to eliquis use.    Reassurance given that symptoms presentation is not concerning for pneumonia or a.fib etiology.    Recommend follow up with primary provider in 2 weeks for recheck    Charge visit based on total time 33 minutes for counseling and coordination of care.    Anthony French MD  April 2, 2025 3:14 PM

## 2025-04-02 NOTE — PATIENT INSTRUCTIONS
Okay to take tylenol 500 mg - 2 tablets (1000 mg) every 6-8 hours as needed, do not exceed 3000 mg in 24 hours.  Okay to apply topical lidocaine patches - apply for 12 hours on, then off for 12 hours    We will contact you if xray report is abnormal

## 2025-04-03 ENCOUNTER — TELEPHONE (OUTPATIENT)
Dept: PEDIATRICS | Facility: CLINIC | Age: 89
End: 2025-04-03
Payer: COMMERCIAL

## 2025-04-03 ENCOUNTER — MYC MEDICAL ADVICE (OUTPATIENT)
Dept: PEDIATRICS | Facility: CLINIC | Age: 89
End: 2025-04-03
Payer: COMMERCIAL

## 2025-04-03 DIAGNOSIS — Z78.0 ASYMPTOMATIC POSTMENOPAUSAL STATUS: Primary | ICD-10-CM

## 2025-04-03 NOTE — TELEPHONE ENCOUNTER
Order/Referral Request    Who is requesting: Daughter    Orders being requested: Dexa    Reason service is needed/diagnosis: Had orders in Feb of 2024.  Did not schedule.  Would like to now.  Daughter will be in town the week of 4/7 and would like to schedule in that week    When are orders needed by: asap    Has this been discussed with Provider: No    Does patient have a preference on a Group/Provider/Facility? [facility]    Does patient have an appointment scheduled?: No    Where to send orders: Place orders within Epic    Could we send this information to you in [facility] or would you prefer to receive a phone call?:   Patient would prefer a phone call   Okay to leave a detailed message?: Yes at Other phone number:  [phone]*

## 2025-04-04 NOTE — TELEPHONE ENCOUNTER
Chart review:  - had Dexa order that  25.  - requesting new order to complete Dexa next week (-)    Routing to provider, please advise.      Samantha MARTINEZ, - [place] [address]  Primary Care- [facility]

## 2025-04-08 ENCOUNTER — HOSPITAL ENCOUNTER (EMERGENCY)
Facility: CLINIC | Age: 89
Discharge: HOME | End: 2025-04-08
Attending: EMERGENCY MEDICINE | Admitting: EMERGENCY MEDICINE
Payer: COMMERCIAL

## 2025-04-08 ENCOUNTER — TELEPHONE (OUTPATIENT)
Dept: PEDIATRICS | Facility: CLINIC | Age: 89
End: 2025-04-08

## 2025-04-08 ENCOUNTER — APPOINTMENT (OUTPATIENT)
Dept: GENERAL RADIOLOGY | Facility: CLINIC | Age: 89
End: 2025-04-08
Attending: EMERGENCY MEDICINE
Payer: COMMERCIAL

## 2025-04-08 VITALS
HEART RATE: 69 BPM | WEIGHT: 104.06 LBS | TEMPERATURE: 97.6 F | RESPIRATION RATE: 18 BRPM | BODY MASS INDEX: 20.98 KG/M2 | HEIGHT: 59 IN | SYSTOLIC BLOOD PRESSURE: 136 MMHG | OXYGEN SATURATION: 95 % | DIASTOLIC BLOOD PRESSURE: 53 MMHG

## 2025-04-08 DIAGNOSIS — R53.1 WEAKNESS: ICD-10-CM

## 2025-04-08 DIAGNOSIS — N30.00 ACUTE CYSTITIS WITHOUT HEMATURIA: ICD-10-CM

## 2025-04-08 LAB
ALBUMIN SERPL BCG-MCNC: 3.8 G/DL (ref 3.5–5.2)
ALBUMIN UR-MCNC: NEGATIVE MG/DL
ALP SERPL-CCNC: 134 U/L (ref 40–150)
ALT SERPL W P-5'-P-CCNC: 20 U/L (ref 0–50)
ANION GAP SERPL CALCULATED.3IONS-SCNC: 11 MMOL/L (ref 7–15)
APPEARANCE UR: CLEAR
AST SERPL W P-5'-P-CCNC: 28 U/L (ref 0–45)
ATRIAL RATE - MUSE: NORMAL BPM
BASOPHILS # BLD AUTO: 0 10E3/UL (ref 0–0.2)
BASOPHILS NFR BLD AUTO: 0 %
BILIRUB SERPL-MCNC: 0.4 MG/DL
BILIRUB UR QL STRIP: NEGATIVE
BUN SERPL-MCNC: 39 MG/DL (ref 8–23)
CALCIUM SERPL-MCNC: 9.5 MG/DL (ref 8.8–10.4)
CHLORIDE SERPL-SCNC: 100 MMOL/L (ref 98–107)
COLOR UR AUTO: YELLOW
CREAT SERPL-MCNC: 1.29 MG/DL (ref 0.51–0.95)
DIASTOLIC BLOOD PRESSURE - MUSE: NORMAL MMHG
EGFRCR SERPLBLD CKD-EPI 2021: 40 ML/MIN/1.73M2
EOSINOPHIL # BLD AUTO: 0 10E3/UL (ref 0–0.7)
EOSINOPHIL NFR BLD AUTO: 0 %
ERYTHROCYTE [DISTWIDTH] IN BLOOD BY AUTOMATED COUNT: 13.9 % (ref 10–15)
FLUAV RNA SPEC QL NAA+PROBE: NEGATIVE
FLUBV RNA RESP QL NAA+PROBE: NEGATIVE
GLUCOSE SERPL-MCNC: 123 MG/DL (ref 70–99)
GLUCOSE UR STRIP-MCNC: NEGATIVE MG/DL
HCO3 SERPL-SCNC: 29 MMOL/L (ref 22–29)
HCT VFR BLD AUTO: 38.4 % (ref 35–47)
HGB BLD-MCNC: 12.5 G/DL (ref 11.7–15.7)
HGB UR QL STRIP: NEGATIVE
IMM GRANULOCYTES # BLD: 0 10E3/UL
IMM GRANULOCYTES NFR BLD: 1 %
INTERPRETATION ECG - MUSE: NORMAL
KETONES UR STRIP-MCNC: NEGATIVE MG/DL
LEUKOCYTE ESTERASE UR QL STRIP: ABNORMAL
LYMPHOCYTES # BLD AUTO: 1.2 10E3/UL (ref 0.8–5.3)
LYMPHOCYTES NFR BLD AUTO: 15 %
MCH RBC QN AUTO: 29.2 PG (ref 26.5–33)
MCHC RBC AUTO-ENTMCNC: 32.6 G/DL (ref 31.5–36.5)
MCV RBC AUTO: 90 FL (ref 78–100)
MONOCYTES # BLD AUTO: 0.7 10E3/UL (ref 0–1.3)
MONOCYTES NFR BLD AUTO: 8 %
MUCOUS THREADS #/AREA URNS LPF: PRESENT /LPF
NEUTROPHILS # BLD AUTO: 6 10E3/UL (ref 1.6–8.3)
NEUTROPHILS NFR BLD AUTO: 75 %
NITRATE UR QL: NEGATIVE
NRBC # BLD AUTO: 0 10E3/UL
NRBC BLD AUTO-RTO: 0 /100
P AXIS - MUSE: NORMAL DEGREES
PH UR STRIP: 5 [PH] (ref 5–7)
PLATELET # BLD AUTO: 201 10E3/UL (ref 150–450)
POTASSIUM SERPL-SCNC: 3.8 MMOL/L (ref 3.4–5.3)
PR INTERVAL - MUSE: NORMAL MS
PROT SERPL-MCNC: 6.5 G/DL (ref 6.4–8.3)
QRS DURATION - MUSE: 106 MS
QT - MUSE: 472 MS
QTC - MUSE: 455 MS
R AXIS - MUSE: -2 DEGREES
RBC # BLD AUTO: 4.28 10E6/UL (ref 3.8–5.2)
RBC URINE: <1 /HPF
RSV RNA SPEC NAA+PROBE: NEGATIVE
SARS-COV-2 RNA RESP QL NAA+PROBE: NEGATIVE
SODIUM SERPL-SCNC: 140 MMOL/L (ref 135–145)
SP GR UR STRIP: 1.02 (ref 1–1.03)
SQUAMOUS EPITHELIAL: 2 /HPF
SYSTOLIC BLOOD PRESSURE - MUSE: NORMAL MMHG
T AXIS - MUSE: 258 DEGREES
TROPONIN T SERPL HS-MCNC: 35 NG/L
TROPONIN T SERPL HS-MCNC: 37 NG/L
UROBILINOGEN UR STRIP-MCNC: NORMAL MG/DL
VENTRICULAR RATE- MUSE: 56 BPM
WBC # BLD AUTO: 8 10E3/UL (ref 4–11)
WBC URINE: 26 /HPF

## 2025-04-08 PROCEDURE — 81003 URINALYSIS AUTO W/O SCOPE: CPT | Performed by: EMERGENCY MEDICINE

## 2025-04-08 PROCEDURE — 82310 ASSAY OF CALCIUM: CPT | Performed by: EMERGENCY MEDICINE

## 2025-04-08 PROCEDURE — 71046 X-RAY EXAM CHEST 2 VIEWS: CPT

## 2025-04-08 PROCEDURE — 96360 HYDRATION IV INFUSION INIT: CPT

## 2025-04-08 PROCEDURE — 258N000003 HC RX IP 258 OP 636: Performed by: EMERGENCY MEDICINE

## 2025-04-08 PROCEDURE — 87637 SARSCOV2&INF A&B&RSV AMP PRB: CPT | Performed by: EMERGENCY MEDICINE

## 2025-04-08 PROCEDURE — 80053 COMPREHEN METABOLIC PANEL: CPT | Performed by: EMERGENCY MEDICINE

## 2025-04-08 PROCEDURE — 36415 COLL VENOUS BLD VENIPUNCTURE: CPT | Performed by: EMERGENCY MEDICINE

## 2025-04-08 PROCEDURE — 93005 ELECTROCARDIOGRAM TRACING: CPT

## 2025-04-08 PROCEDURE — 84484 ASSAY OF TROPONIN QUANT: CPT | Performed by: EMERGENCY MEDICINE

## 2025-04-08 PROCEDURE — 85025 COMPLETE CBC W/AUTO DIFF WBC: CPT | Performed by: EMERGENCY MEDICINE

## 2025-04-08 PROCEDURE — 87086 URINE CULTURE/COLONY COUNT: CPT | Performed by: EMERGENCY MEDICINE

## 2025-04-08 PROCEDURE — 84155 ASSAY OF PROTEIN SERUM: CPT | Performed by: EMERGENCY MEDICINE

## 2025-04-08 PROCEDURE — 99285 EMERGENCY DEPT VISIT HI MDM: CPT | Mod: 25

## 2025-04-08 RX ORDER — CEPHALEXIN 500 MG/1
500 CAPSULE ORAL 2 TIMES DAILY
Qty: 14 CAPSULE | Refills: 0 | Status: SHIPPED | OUTPATIENT
Start: 2025-04-08 | End: 2025-04-15

## 2025-04-08 RX ORDER — SODIUM CHLORIDE 9 MG/ML
1000 INJECTION, SOLUTION INTRAVENOUS CONTINUOUS
Status: DISCONTINUED | OUTPATIENT
Start: 2025-04-08 | End: 2025-04-08 | Stop reason: HOSPADM

## 2025-04-08 RX ADMIN — SODIUM CHLORIDE 1000 ML: 9 INJECTION, SOLUTION INTRAVENOUS at 16:04

## 2025-04-08 ASSESSMENT — COLUMBIA-SUICIDE SEVERITY RATING SCALE - C-SSRS
2. HAVE YOU ACTUALLY HAD ANY THOUGHTS OF KILLING YOURSELF IN THE PAST MONTH?: NO
6. HAVE YOU EVER DONE ANYTHING, STARTED TO DO ANYTHING, OR PREPARED TO DO ANYTHING TO END YOUR LIFE?: NO
1. IN THE PAST MONTH, HAVE YOU WISHED YOU WERE DEAD OR WISHED YOU COULD GO TO SLEEP AND NOT WAKE UP?: NO

## 2025-04-08 ASSESSMENT — ACTIVITIES OF DAILY LIVING (ADL)
ADLS_ACUITY_SCORE: 58

## 2025-04-08 NOTE — ED PROVIDER NOTES
"  Emergency Department Note      History of Present Illness     Chief Complaint   Generalized Weakness      HPI   Genie Persaud is a 89 year old female is brought to the emergency department on a Tuesday at 1306 complaining of increasing weakness, lightheadedness, and fatigue.  Patient has a history of a recent compression fracture 1 week ago.  Currently, she denies pain.  She is ambulatory from the stretcher to the bed.  The patient declined IV placement by EMS.  The patient lives alone in her own home in [place].  Her daughter is visiting from [place] over the last 2 days.  The patient was encouraged by her daughter to come to the emergency department to be evaluated because the daughter thought that she was weaker and unsteady on her feet.  The patient's daughter is concerned that she has not been eating or drinking very much lately.    Independent Historian   History is obtained from the patient and her daughter    Review of External Notes   [facility] note  All Conversations: Jie Ewing RN  4/8/25 12:15 PMNote  Received a call from patient's daughter, Wilda (CTC on File). She was calling to see if it would be appropriate to call 911 to assist her mother to the hospital. Patient was supposed to come in for a clinic visit but asked her daughter her to bring her to the hospital instead. She declined to have patient triaged as she wasn't calling to figure out what to do per her symptoms.     She stated she just wanted to know if okay to call an ambulance. She state that patient is experiencing weakness and doesn't feel she can safely get into her daughter's car. Wilda states that she doesn't feel she would be able to safely get patient into her car to transport her. She states patient is not having SOB or Chest Pain. She was concerned about calling because she didn't feel it was a \"true emergency\".     Assured her that okay to call an ambulance for the weakness and for not " feeling like they can safely get patient to ER.      Wilda agreeable and is going to call 911 to assist with getting patient to hospital.     Jie PATTERSON RN, BSN  Clinic RN  [facility]          Past Medical History     Medical History and Problem List   Past Medical History:   Diagnosis Date    Anemia 03/10/2009    Basal Cell Carcinoma--back     Coronary artery disease 03/09/2017    Essential hypertension 09/01/2016    Hyperlipidemia 02/10/2010    Moderate persistent asthma     Nonrheumatic aortic valve insufficiency 06/29/2017    Osteopenia     Paroxysmal atrial fibrillation 12/26/2017    Pulmonary nodule 03/03/2009    Stress-induced cardiomyopathy 11/16/2017    Stroke 10/18/2013    Thoracic aortic aneurysm without rupture 05/10/2011    Vasculitis 04/20/2009       Medications   cephALEXin (KEFLEX) 500 MG capsule  acetaminophen (TYLENOL) 325 MG tablet  albuterol (PROAIR HFA/PROVENTIL HFA/VENTOLIN HFA) 108 (90 Base) MCG/ACT inhaler  apixaban ANTICOAGULANT (ELIQUIS ANTICOAGULANT) 2.5 MG tablet  diltiazem ER (DILT-XR) 120 MG 24 hr capsule  fluticasone (FLONASE) 50 MCG/ACT nasal spray  fluticasone-salmeterol (ADVAIR HFA) 230-21 MCG/ACT inhaler  latanoprost (XALATAN) 0.005 % ophthalmic solution  loratadine (CLARITIN) 10 MG tablet  metoprolol succinate ER (TOPROL XL) 25 MG 24 hr tablet  montelukast (SINGULAIR) 10 MG tablet  omeprazole (PRILOSEC) 10 MG DR capsule  potassium chloride ER (K-TAB) 20 MEQ CR tablet  predniSONE (DELTASONE) 10 MG tablet  rosuvastatin (CRESTOR) 10 MG tablet  torsemide (DEMADEX) 20 MG tablet        Surgical History   Past Surgical History:   Procedure Laterality Date    APPENDECTOMY OPEN  1957    C STEREOTACTIC BREAST BIOPSY  01/01/2001    CHOLECYSTECTOMY, LAPOROSCOPIC  01/01/2008    DILATION AND CURETTAGE  1967    DILATION AND CURETTAGE  1971    ESOPHAGOSCOPY, GASTROSCOPY, DUODENOSCOPY (EGD), COMBINED  05/17/2013    Procedure: COMBINED ESOPHAGOSCOPY, GASTROSCOPY, DUODENOSCOPY  "(EGD), BIOPSY SINGLE OR MULTIPLE;  ESOPHAGOSCOPY, GASTROSCOPY, DUODENOSCOPY (EGD)  with bx;  Surgeon: Jeffery Yanez MD;  Location:  GI    TONSILLECTOMY      Tubal Ligation NOS  1971       Physical Exam     Patient Vitals for the past 24 hrs:   BP Temp Temp src Pulse Resp SpO2 Height Weight   04/08/25 1400 -- -- -- -- -- 95 % -- --   04/08/25 1350 136/53 -- -- -- -- 95 % -- --   04/08/25 1308 -- -- -- -- -- -- 1.499 m (4' 11\") --   04/08/25 1307 116/73 97.6  F (36.4  C) Oral 69 18 95 % -- 47.2 kg (104 lb 0.9 oz)     Physical Exam  General: Alert, No distress. Nontoxic appearance  Head: No signs of trauma.   Mouth/Throat: Oropharynx moist.   Eyes: Conjunctivae are normal. Pupils are equal..   Neck: Normal range of motion.    CV: Appears well perfused.  Regular rate and rhythm  Resp:No respiratory distress.  Scattered expiratory wheezing bilaterally  MSK: Normal range of motion. No obvious deformity.  No lower extremity edema  Neuro: The patient is alert and interactive. BAUTISTA. Speech normal. GCS 15  Skin: No lesion or sign of trauma noted.   Psych: normal mood and affect. behavior is normal.       Diagnostics     Lab Results   Labs Ordered and Resulted from Time of ED Arrival to Time of ED Departure   COMPREHENSIVE METABOLIC PANEL - Abnormal       Result Value    Sodium 140      Potassium 3.8      Carbon Dioxide (CO2) 29      Anion Gap 11      Urea Nitrogen 39.0 (*)     Creatinine 1.29 (*)     GFR Estimate 40 (*)     Calcium 9.5      Chloride 100      Glucose 123 (*)     Alkaline Phosphatase 134      AST 28      ALT 20      Protein Total 6.5      Albumin 3.8      Bilirubin Total 0.4     TROPONIN T, HIGH SENSITIVITY - Abnormal    Troponin T, High Sensitivity 37 (*)    ROUTINE UA WITH MICROSCOPIC REFLEX TO CULTURE - Abnormal    Color Urine Yellow      Appearance Urine Clear      Glucose Urine Negative      Bilirubin Urine Negative      Ketones Urine Negative      Specific Gravity Urine 1.024      Blood Urine " Negative      pH Urine 5.0      Protein Albumin Urine Negative      Urobilinogen Urine Normal      Nitrite Urine Negative      Leukocyte Esterase Urine Large (*)     Mucus Urine Present (*)     RBC Urine <1      WBC Urine 26 (*)     Squamous Epithelials Urine 2 (*)    TROPONIN T, HIGH SENSITIVITY - Abnormal    Troponin T, High Sensitivity 35 (*)    INFLUENZA A/B, RSV AND SARS-COV2 PCR - Normal    Influenza A PCR Negative      Influenza B PCR Negative      RSV PCR Negative      SARS CoV2 PCR Negative     CBC WITH PLATELETS AND DIFFERENTIAL    WBC Count 8.0      RBC Count 4.28      Hemoglobin 12.5      Hematocrit 38.4      MCV 90      MCH 29.2      MCHC 32.6      RDW 13.9      Platelet Count 201      % Neutrophils 75      % Lymphocytes 15      % Monocytes 8      % Eosinophils 0      % Basophils 0      % Immature Granulocytes 1      NRBCs per 100 WBC 0      Absolute Neutrophils 6.0      Absolute Lymphocytes 1.2      Absolute Monocytes 0.7      Absolute Eosinophils 0.0      Absolute Basophils 0.0      Absolute Immature Granulocytes 0.0      Absolute NRBCs 0.0     URINE CULTURE       Imaging   XR Chest 2 Views   Final Result   IMPRESSION: Stable cardiac enlargement. Pulmonary vascularity is within normal limits. Aortic calcification. Lungs clear. No pleural effusions. Degenerative changes in both glenohumeral joints. Mild compression of a lower thoracic vertebral body is    unchanged. Cholecystectomy.          EKG   ECG results from 04/08/25   EKG 12 lead     Value    Systolic Blood Pressure     Diastolic Blood Pressure     Ventricular Rate 56    Atrial Rate     MA Interval     QRS Duration 106        QTc 455    P Axis     R AXIS -2    T Axis 258    Interpretation ECG      Atrial fibrillation with slow ventricular response with premature ventricular or aberrantly conducted complexes and with ventricular escape complexes  Left ventricular hypertrophy with repolarization abnormality ( Ag product )  Abnormal  ECG  When compared with ECG of 16-May-2024 19:07,  Sinus rhythm is now with ventricular escape complexes  Inverted T waves have replaced nonspecific T wave abnormality in Inferior leads  QT has lengthened  Unconfirmed report - interpretation of this ECG is computer generated - see medical record for final interpretation  Confirmed by - EMERGENCY ROOM, PHYSICIAN ([id number]),  BASSAM ALAS ([id number]) on 4/8/2025 3:01:53 PM       *Note: Due to a large number of results and/or encounters for the requested time period, some results have not been displayed. A complete set of results can be found in Results Review.         Independent Interpretation   CXR: No pneumothorax.    ED Course      Medications Administered   Medications   sodium chloride 0.9 % infusion (1,000 mLs Intravenous Not Given 4/8/25 1636)   sodium chloride 0.9% BOLUS 1,000 mL (0 mLs Intravenous Stopped 4/8/25 1644)       Procedures   Procedures     Discussion of Management   None    ED Course        Additional Documentation  None    Medical Decision Making / Diagnosis     CMS Diagnoses: None    MIPS       None    MDM   Genie Persaud is a 89 year old female   Genie Persaud is a 89 year old female who presents for evaluation of weakness.  The workup here in the emergency room was to evaluate for infections, metabolic, electrolyte, neurologic or cardiovascular causes.  Of note, there are no signs of pneumonia causing the symptoms.  In addition, I do not believe symptoms are due to CVA, TIA, myasthesia gravis, myopathy or acute coronary syndromes.  Patient has evidence of a possible UTI.  Urine culture is pending.  The patient will be started on Keflex x 7 days.  They were ambulated in ED and did well.  I believe supportive outpatient management is indicated; will have them followup with their primary care doctor in 1-2 days for a recheck. Return for any additional symptoms or worsening weakness.        Disposition   The patient was discharged.     Diagnosis      ICD-10-CM    1. Weakness  R53.1       2. Acute cystitis without hematuria  N30.00            Discharge Medications   New Prescriptions    CEPHALEXIN (KEFLEX) 500 MG CAPSULE    Take 1 capsule (500 mg) by mouth 2 times daily for 7 days.                Benito Cavanaugh MD  04/08/25 1748

## 2025-04-08 NOTE — TELEPHONE ENCOUNTER
Patient's daughter (ctc on file) calling in. Would like to schedule appointment this afternoon for UC follow up. They were not able to make 10:10am appt with Dr Sevilla today.    UC recommended CT scan- they would like to get this ordered.     Daughter states she is worried about cancer, and feels like her mom has lost some weight recently. Would like to discuss symptoms and will come with mom to clinic today.     Latasha Hardwick RN

## 2025-04-08 NOTE — ED TRIAGE NOTES
Pt arrives via EMS from home for increased weakness, lightheadedness, and fatigue. Hx of compression fx 1 week ago. Denies pain. Is ambulatory from stretcher to bed. Declined IV for EMS.

## 2025-04-08 NOTE — TELEPHONE ENCOUNTER
"Received a call from patient's daughter, Wilda (CTC on File). She was calling to see if it would be appropriate to call 911 to assist her mother to the hospital. Patient was supposed to come in for a clinic visit but asked her daughter her to bring her to the hospital instead. She declined to have patient triaged as she wasn't calling to figure out what to do per her symptoms.    She stated she just wanted to know if okay to call an ambulance. She state that patient is experiencing weakness and doesn't feel she can safely get into her daughter's car. Wilda states that she doesn't feel she would be able to safely get patient into her car to transport her. She states patient is not having SOB or Chest Pain. She was concerned about calling because she didn't feel it was a \"true emergency\".    Assured her that okay to call an ambulance for the weakness and for not feeling like they can safely get patient to ER.     Wilda agreeable and is going to call 911 to assist with getting patient to hospital.    Jie PATTESRON RN, BSN  Clinic RN  [facility]      "

## 2025-04-09 ENCOUNTER — TELEPHONE (OUTPATIENT)
Dept: PEDIATRICS | Facility: CLINIC | Age: 89
End: 2025-04-09

## 2025-04-09 ENCOUNTER — OFFICE VISIT (OUTPATIENT)
Dept: PEDIATRICS | Facility: CLINIC | Age: 89
End: 2025-04-09
Payer: COMMERCIAL

## 2025-04-09 ENCOUNTER — TELEPHONE (OUTPATIENT)
Dept: NURSING | Facility: CLINIC | Age: 89
End: 2025-04-09

## 2025-04-09 VITALS
HEART RATE: 71 BPM | OXYGEN SATURATION: 96 % | SYSTOLIC BLOOD PRESSURE: 138 MMHG | BODY MASS INDEX: 21.37 KG/M2 | WEIGHT: 106 LBS | HEIGHT: 59 IN | DIASTOLIC BLOOD PRESSURE: 48 MMHG | TEMPERATURE: 97.7 F | RESPIRATION RATE: 16 BRPM

## 2025-04-09 DIAGNOSIS — M54.6 ACUTE MIDLINE THORACIC BACK PAIN: ICD-10-CM

## 2025-04-09 DIAGNOSIS — N18.30 STAGE 3 CHRONIC KIDNEY DISEASE, UNSPECIFIED WHETHER STAGE 3A OR 3B CKD (H): Primary | ICD-10-CM

## 2025-04-09 DIAGNOSIS — R91.8 PULMONARY NODULES: ICD-10-CM

## 2025-04-09 DIAGNOSIS — N30.00 ACUTE CYSTITIS WITHOUT HEMATURIA: ICD-10-CM

## 2025-04-09 DIAGNOSIS — S22.000A COMPRESSION FRACTURE OF BODY OF THORACIC VERTEBRA (H): ICD-10-CM

## 2025-04-09 LAB — BACTERIA UR CULT: NORMAL

## 2025-04-09 PROCEDURE — 1125F AMNT PAIN NOTED PAIN PRSNT: CPT | Performed by: PHYSICIAN ASSISTANT

## 2025-04-09 PROCEDURE — 99214 OFFICE O/P EST MOD 30 MIN: CPT | Performed by: PHYSICIAN ASSISTANT

## 2025-04-09 PROCEDURE — 3075F SYST BP GE 130 - 139MM HG: CPT | Performed by: PHYSICIAN ASSISTANT

## 2025-04-09 PROCEDURE — 3078F DIAST BP <80 MM HG: CPT | Performed by: PHYSICIAN ASSISTANT

## 2025-04-09 RX ORDER — FLUTICASONE PROPIONATE AND SALMETEROL XINAFOATE 45; 21 UG/1; UG/1
AEROSOL, METERED RESPIRATORY (INHALATION)
COMMUNITY

## 2025-04-09 ASSESSMENT — PAIN SCALES - GENERAL: PAINLEVEL_OUTOF10: SEVERE PAIN (8)

## 2025-04-09 NOTE — PROGRESS NOTES
Assessment & Plan     Acute cystitis without hematuria    - UA Macroscopic with reflex to Microscopic and Culture - Clinic Collect; Future  - Will repeat the UA in about 7-10 days, and after treatment is complete, to be sure infection is clear.  Patient to seek followup sooner if any symptoms are changed or worsened.      Chronic kidney disease, stage 3 (H)      Pulmonary nodules    - CT Chest w/o Contrast; Future    Acute midline thoracic back pain    - Primary Care - Care Coordination Referral; Future    Compression fracture of body of thoracic vertebra (H)    - Primary Care - Care Coordination Referral; Future      Patient will have followup UA done in about 7-10 days, and she will complete her antibiotic.  She is much better per patient and family today than yesterday.  Followup CT scan is ordered as well.  Referral to care coordination done for patient to be connected with therapy for her mid back pain and compression fracture of T11.  Also, patient to take 1,000 mg tylenol up to three times a day as needed for back pain.  Patient ot followup as scheduled with Dr. Sevilla in May, but should be seen sooner as needed.  Patient and family understand and agree with the plan today.        MED REC REQUIRED  Post Medication Reconciliation Status:         Yudith Vasquez is a 89 year old, presenting for the following health issues:  Hospital F/U      4/9/2025    12:44 PM   Additional Questions   Roomed by Rand Alves CMA   Accompanied by N/A         4/9/2025    12:44 PM   Patient Reported Additional Medications   Patient reports taking the following new medications N/A     [facility]          Hospital Follow-up Visit:    Hospital/Nursing Home/IP Rehab Facility: [facility]  Most Recent Admission Date: 4/8/2025   Most Recent Admission Diagnosis:      Most Recent Discharge Date: 4/8/2025   Most Recent Discharge Diagnosis: Weakness - R53.1  Acute cystitis without hematuria - N30.00   Was the patient in  "the ICU or did the patient experience delirium during hospitalization?  No  Do you have any other stressors you would like to discuss with your provider? No    Problems taking medications regularly:  Cephlexin  Medication changes since discharge: None  Problems adhering to non-medication therapy:  None    Summary of hospitalization:  [facility] discharge summary reviewed  Diagnostic Tests/Treatments reviewed.  Follow up needed: none  Other Healthcare Providers Involved in Patient s Care:         None  Update since discharge: improved.       BP Readings from Last 6 Encounters:   04/09/25 138/48   04/08/25 136/53   04/02/25 (!) 166/51   02/05/25 (!) 156/58   11/06/24 (!) 142/58   10/08/24 134/44      Plan of care communicated with patient             Patient is with her daughter in the clinic today.  They report that she is lergly improve and feeling better.  She is more alert and less weak.  She is not having fevers or chills and she denies any abdominal pain.  She does have a noted compression fracture of her T spine, so she has had bothersome back pain over the last two weeks.      Patient was noted to have a possible change in the size of a lung nodule on recent XR imaging and was advised to have followup CT scan done, from the .        Review of Systems  Constitutional, neuro, ENT, endocrine, pulmonary, cardiac, gastrointestinal, genitourinary, musculoskeletal, integument and psychiatric systems are negative, except as otherwise noted.      Objective    BP (!) 144/57 (BP Location: Right arm, Patient Position: Sitting, Cuff Size: Adult Regular)   Pulse 71   Temp 97.7  F (36.5  C) (Tympanic)   Resp 16   Ht 1.499 m (4' 11\")   Wt 48.1 kg (106 lb)   LMP  (LMP Unknown)   SpO2 96%   BMI 21.41 kg/m    Body mass index is 21.41 kg/m .  Physical Exam   GENERAL: alert and no distress  EYES: Eyes grossly normal to inspection, PERRL and conjunctivae and sclerae normal  NECK: no adenopathy, no " asymmetry, masses, or scars  RESP: lungs clear to auscultation - no rales, rhonchi or wheezes  CV: regular rate and rhythm, normal S1 S2, no S3 or S4, no murmur, click or rub, no peripheral edema  MS: no gross musculoskeletal defects noted, no edema          Signed Electronically by: Brielle Barba PA-C

## 2025-04-09 NOTE — TELEPHONE ENCOUNTER
RN received call from patient's daughter, WildaJASON on file. Patient was seen in ED yesterday and was told to have follow up in a couple days. Per ED notes: They were ambulated in ED and did well. I believe supportive outpatient management is indicated; will have them followup with their primary care doctor in 1-2 days for a recheck. Return for any additional symptoms or worsening weakness.     Wilda notes she is in town from [place] only for the rest of the week and is trying to get testing/appointments completed for patient. Scheduled for ED follow up 4/9/25 w/ extender.     Sina OBRIEN RN 4/9/2025 at 8:20 AM

## 2025-04-09 NOTE — LETTER
April 9, 2025        Genie Persaud  [address] [place] [address]          Dear Genie Persaud:    You were seen in the [facility] Emergency Department at [facility] on 4/8/2025.  We are unable to reach you by phone, so we are sending you this letter.     It is important that you call [facility] Emergency Department lab result nurse at [phone], as we have information to relay to you AND/OR we MAY have to make some changes in your treatment.    Best time to call back is between 9AM and 5:30PM, 7 days a week.      Sincerely,     [facility] Emergency Department Lab Result RN  [phone]

## 2025-04-09 NOTE — TELEPHONE ENCOUNTER
[facility]    Reason for call: Lab Result Notification     Lab Result (including Rx patient on, if applicable).  If culture, copy of lab report at bottom.  Lab Result: Urine Culture  4/8/25 Prescribed CephALEXin (KEFLEX) 500 MG capsule Take 1 capsule (500 mg) by mouth 2 times daily for 7 days. - Oral     Recommendation based on Pt assessment and Urine Culture discontinue antibiotic    Creatinine Level (mg/dl)   Creatinine   Date Value Ref Range Status   04/08/2025 1.29 (H) 0.51 - 0.95 mg/dL Final   06/28/2021 0.91 0.52 - 1.04 mg/dL Final    Creatinine clearance (ml/min), if applicable    Serum creatinine: 1.29 mg/dL (H) 04/08/25 1356  Estimated creatinine clearance: 23.3 mL/min (A)     RN Recommendations/Instructions per [facility] ED lab result protocol:   [facility] ED lab result protocol utilized: Urine Culture    4/8/25 Presented to ED with symptoms: generalized weakness    Unable to reach patient/caregiver.   Left voicemail message requesting a call back to [phone] between 9 a.m. and 5:30 p.m. for patient's ED/UC lab results.  Letter pended to be sent via [employer].       Peggy Taylor RN

## 2025-04-10 NOTE — TELEPHONE ENCOUNTER
Patient's daughter Wilda (C2C on file) calling back. She states that her mother saw her PCP yesterday and they were advised to continue the antibiotic. Patient's daughter had no further questions.    TEN SOTELO RN

## 2025-04-11 ENCOUNTER — TELEPHONE (OUTPATIENT)
Dept: PEDIATRICS | Facility: CLINIC | Age: 89
End: 2025-04-11

## 2025-04-11 DIAGNOSIS — J45.40 MODERATE PERSISTENT ASTHMA WITHOUT COMPLICATION: ICD-10-CM

## 2025-04-11 NOTE — TELEPHONE ENCOUNTER
ERNESTO Kidd Intake calling from [facility] [phone]      Home Care is calling regarding an established patient with [facility].  Requesting orders from: Layo Sevilla  FYI: RN able to provide verbal orders.  Sending as FYI only.  Is this a request for a temporary pause in the home care episode?  No    Orders Requested    Physical Therapy  Request for delay in care, service is not able to be provided within 7 days of previously scheduled day.   RN gave verbal order: Yes  FYI: Service rescheduled to staffing- soonest is 4/23/25          Soha Aburto RN

## 2025-04-14 RX ORDER — ALBUTEROL SULFATE 90 UG/1
INHALANT RESPIRATORY (INHALATION)
Qty: 18 G | Refills: 0 | Status: SHIPPED | OUTPATIENT
Start: 2025-04-14

## 2025-04-15 ENCOUNTER — TELEPHONE (OUTPATIENT)
Dept: PEDIATRICS | Facility: CLINIC | Age: 89
End: 2025-04-15
Payer: COMMERCIAL

## 2025-04-15 NOTE — TELEPHONE ENCOUNTER
Prior Authorization Retail Medication Request    Medication/Dose: albuterol (PROAIR HFA/PROVENTIL HFA/VENTOLIN HFA) 108 (90 Base) MCG/ACT inhaler   Diagnosis and ICD code (if different than what is on RX):  Moderate persistent asthma without complication [J45.40]  New/renewal/insurance change PA/secondary ins. PA:  Previously Tried and Failed:  see chart  Rationale:  see chart    Insurance   Primary: [facility] - [employer] MED[employer]ARE   Insurance ID:  [id number]    Secondary (if applicable):  Insurance ID:      Pharmacy Information (if different than what is on RX)  Name:    Phone:    Fax:    Clinic Information  Preferred routing pool for dept communication: [facility]

## 2025-04-17 NOTE — TELEPHONE ENCOUNTER
Retail Pharmacy Prior Authorization Team   Phone: [phone]    PA Initiation    Medication: ALBUTEROL SULFATE  (90 BASE) MCG/ACT IN AERS  Insurance Company: [employer] - Phone [phone] Fax [phone]  Pharmacy Filling the Rx: [id number] - [place], [place] - [address]  Filling Pharmacy Phone: [phone]  Filling Pharmacy Fax:    Start Date: 4/17/2025    [id number]

## 2025-04-21 ENCOUNTER — TELEPHONE (OUTPATIENT)
Dept: PEDIATRICS | Facility: CLINIC | Age: 89
End: 2025-04-21
Payer: COMMERCIAL

## 2025-04-21 NOTE — TELEPHONE ENCOUNTER
Home Care is calling regarding an established patient with [facility].  Requesting orders from: Layo Sevilla    RN APPROVED: RN able to provide verbal orders.  Home Care will send orders for signature.  RN will close encounter.    Is this a request for a temporary pause in the home care episode?  No    Orders Requested    Physical Therapy  Request for delay in care, service to be provided within 7 days of previously   Service rescheduled to 4/24  Delayed per pt request to 4/24, apt was supposed to be Friday 4/18. No reason for delay given to home care.   RN gave verbal order: Yes       Birdie HALL, [facility]   Phone number Home Care can be reached at: [phone]   Okay to leave a detailed message?: Yes    Donna Louise RN

## 2025-04-21 NOTE — TELEPHONE ENCOUNTER
PRIOR AUTHORIZATION DENIED    Medication: ALBUTEROL SULFATE  (90 BASE) MCG/ACT IN AERS  Insurance Company: [employer] - Phone [phone] Fax [phone]  Denial Date: 4/20/2025  Denial Reason(s):     Preferred alternatives are: albuterol HFA inhaler (Proventil equivalent), albuterol HFA inhaler (Proair equivalent), Ventolin HFA (brand), levalbuterol HFA inhaler (Xopenex equivalent) (limit on the amount covered at a time for all alternatives listed). Called [facility] in [place] and spoke to a pharmacist and provided the covered alternatives. Pharmacist states they do not have anything pending but noted the covered medications for next fill.

## 2025-04-30 ENCOUNTER — TELEPHONE (OUTPATIENT)
Dept: PEDIATRICS | Facility: CLINIC | Age: 89
End: 2025-04-30
Payer: COMMERCIAL

## 2025-04-30 DIAGNOSIS — S22.000A COMPRESSION FRACTURE OF BODY OF THORACIC VERTEBRA (H): ICD-10-CM

## 2025-04-30 DIAGNOSIS — M54.6 ACUTE MIDLINE THORACIC BACK PAIN: Primary | ICD-10-CM

## 2025-04-30 NOTE — TELEPHONE ENCOUNTER
Brielle Barba PA-C     Can you sign home care orders as you saw her on 4/9/25 soon after the ED visit in regards to UTI and compression fracture which is what she is being seen for with home care.  She does not see Dr. Sevilla until 5/9/25 and is about a month after starting home care.        ext [id number] Birdie with [facility].  Please call Birdie back with response.  Thank you.     Charla Headley RN BSN  Clinic Nurse  [facility]

## 2025-05-03 ENCOUNTER — HEALTH MAINTENANCE LETTER (OUTPATIENT)
Age: 89
End: 2025-05-03

## 2025-05-06 ENCOUNTER — TELEPHONE (OUTPATIENT)
Dept: PEDIATRICS | Facility: CLINIC | Age: 89
End: 2025-05-06
Payer: COMMERCIAL

## 2025-05-06 NOTE — TELEPHONE ENCOUNTER
[facility]. She was calling to follow-up on the 4/30/25 encounter.    RENEE Linares: Can you please review the 4/30/25 telephone encounter. They are requesting that you follow patient for home care orders since you are the last provider who had a FTF with patient and place the home care referral. Please advise.     Please call Birdie with [facility] back with response at [phone] ext [phone]    Thanks!  Jie PATTERSON RN, BSN  Clinic RN  [facility]

## 2025-05-07 ENCOUNTER — HOSPITAL ENCOUNTER (OUTPATIENT)
Dept: CT IMAGING | Facility: CLINIC | Age: 89
Discharge: HOME | End: 2025-05-07
Attending: PHYSICIAN ASSISTANT
Payer: COMMERCIAL

## 2025-05-07 DIAGNOSIS — R91.8 PULMONARY NODULES: ICD-10-CM

## 2025-05-07 PROCEDURE — 71250 CT THORAX DX C-: CPT

## 2025-05-09 PROBLEM — Z79.4 TYPE 2 DIABETES MELLITUS WITH STAGE 3A CHRONIC KIDNEY DISEASE, WITH LONG-TERM CURRENT USE OF INSULIN (H): Status: RESOLVED | Noted: 2024-05-19 | Resolved: 2025-05-09

## 2025-05-09 PROBLEM — U07.1 COVID-19 VIRUS INFECTION: Status: RESOLVED | Noted: 2024-05-16 | Resolved: 2025-05-09

## 2025-05-09 PROBLEM — E11.22 TYPE 2 DIABETES MELLITUS WITH STAGE 3A CHRONIC KIDNEY DISEASE, WITH LONG-TERM CURRENT USE OF INSULIN (H): Status: ACTIVE | Noted: 2024-05-19

## 2025-05-09 PROBLEM — I13.0 HYPERTENSIVE HEART AND CHRONIC KIDNEY DISEASE WITH HEART FAILURE AND STAGE 1 THROUGH STAGE 4 CHRONIC KIDNEY DISEASE, OR UNSPECIFIED CHRONIC KIDNEY DISEASE (H): Status: ACTIVE | Noted: 2025-05-09

## 2025-05-09 PROBLEM — I13.0 HYPERTENSIVE HEART AND CHRONIC KIDNEY DISEASE WITH HEART FAILURE AND STAGE 1 THROUGH STAGE 4 CHRONIC KIDNEY DISEASE, OR UNSPECIFIED CHRONIC KIDNEY DISEASE (H): Status: RESOLVED | Noted: 2025-05-09 | Resolved: 2025-05-09

## 2025-05-09 PROBLEM — N18.31 TYPE 2 DIABETES MELLITUS WITH STAGE 3A CHRONIC KIDNEY DISEASE, WITH LONG-TERM CURRENT USE OF INSULIN (H): Status: ACTIVE | Noted: 2024-05-19

## 2025-05-09 PROBLEM — I48.0 PAROXYSMAL ATRIAL FIBRILLATION (H): Status: RESOLVED | Noted: 2017-12-26 | Resolved: 2025-05-09

## 2025-05-09 PROBLEM — N17.9 ACUTE KIDNEY INJURY: Status: RESOLVED | Noted: 2024-05-16 | Resolved: 2025-05-09

## 2025-05-09 PROBLEM — I48.0 PAROXYSMAL ATRIAL FIBRILLATION (H): Status: ACTIVE | Noted: 2017-12-26

## 2025-05-09 PROBLEM — I11.0 HYPERTENSIVE HEART DISEASE WITH HEART FAILURE (H): Status: ACTIVE | Noted: 2025-05-09

## 2025-05-09 PROBLEM — I50.21 ACUTE SYSTOLIC CONGESTIVE HEART FAILURE (H): Status: RESOLVED | Noted: 2024-05-16 | Resolved: 2025-05-09

## 2025-05-09 PROBLEM — E11.22 TYPE 2 DIABETES MELLITUS WITH STAGE 3A CHRONIC KIDNEY DISEASE, WITH LONG-TERM CURRENT USE OF INSULIN (H): Status: RESOLVED | Noted: 2024-05-19 | Resolved: 2025-05-09

## 2025-05-09 PROBLEM — I48.91 ATRIAL FIBRILLATION WITH SLOW VENTRICULAR RESPONSE (H): Status: RESOLVED | Noted: 2024-05-16 | Resolved: 2025-05-09

## 2025-05-09 PROBLEM — I50.32 CHRONIC HEART FAILURE WITH PRESERVED EJECTION FRACTION (H): Status: ACTIVE | Noted: 2025-05-09

## 2025-05-09 PROBLEM — Z79.4 TYPE 2 DIABETES MELLITUS WITH STAGE 3A CHRONIC KIDNEY DISEASE, WITH LONG-TERM CURRENT USE OF INSULIN (H): Status: ACTIVE | Noted: 2024-05-19

## 2025-05-09 PROBLEM — N18.31 TYPE 2 DIABETES MELLITUS WITH STAGE 3A CHRONIC KIDNEY DISEASE, WITH LONG-TERM CURRENT USE OF INSULIN (H): Status: RESOLVED | Noted: 2024-05-19 | Resolved: 2025-05-09

## 2025-05-09 PROBLEM — N18.32 CHRONIC KIDNEY DISEASE, STAGE 3B (H): Status: ACTIVE | Noted: 2021-06-07

## 2025-05-10 ENCOUNTER — RESULTS FOLLOW-UP (OUTPATIENT)
Dept: CARDIOLOGY | Facility: CLINIC | Age: 89
End: 2025-05-10

## 2025-05-23 PROBLEM — I38 VALVULAR REGURGITATION: Status: ACTIVE | Noted: 2025-05-23

## 2025-05-29 ENCOUNTER — TELEPHONE (OUTPATIENT)
Dept: PEDIATRICS | Facility: CLINIC | Age: 89
End: 2025-05-29
Payer: COMMERCIAL

## 2025-05-29 NOTE — TELEPHONE ENCOUNTER
Forms/Letter Request    Type of form/letter: Home Health Certification      Do we have the form/letter: Yes: Form is on the provider's desk for review      Who is the form from? Home care    Where did/will the form come from? form was faxed in    When is form/letter needed by: ASAP

## 2025-05-30 DIAGNOSIS — Z53.9 DIAGNOSIS NOT YET DEFINED: Primary | ICD-10-CM

## 2025-05-30 PROCEDURE — G0180 MD CERTIFICATION HHA PATIENT: HCPCS | Performed by: PHYSICIAN ASSISTANT

## 2025-06-24 ENCOUNTER — MYC MEDICAL ADVICE (OUTPATIENT)
Dept: PEDIATRICS | Facility: CLINIC | Age: 89
End: 2025-06-24
Payer: COMMERCIAL

## 2025-06-24 DIAGNOSIS — R91.8 PULMONARY NODULES: Primary | ICD-10-CM

## 2025-06-24 NOTE — TELEPHONE ENCOUNTER
Dr. Sevilla   Please review my chart message regarding repeat CT scan in 3 months, would you like to order? Or visit needed?     CT 5/7/2025   IMPRESSION:   1.  Numerous bilateral pulmonary nodules. A few are new or larger and many are stable. As some are new, recommend short interval follow-up CT chest in 3 months.  2.  Cardiomegaly.  3.  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  4.  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 Correlate with symptoms referable to this region. Underlying sclerosis of this vertebral body is indeterminate with   a neoplastic cause not excluded.    Thank you   Renee Child, Registered Nurse  [facility]

## 2025-07-30 ENCOUNTER — TELEPHONE (OUTPATIENT)
Dept: PEDIATRICS | Facility: CLINIC | Age: 89
End: 2025-07-30

## 2025-07-30 NOTE — TELEPHONE ENCOUNTER
"S-(situation): breathing problems, low appetite     B-(background): per caretaker patient has declined since Friday, daughter is concerned due to ongoing symptoms the last few weeks.     A-(assessment): Daughter is endorsing patient having breathing problems, appetite decreased, dehydrated. Care person comes 2x/week and is very concerned about how she looks. Patient has lost weight since Friday and is worried about her breathing. They are wanting to call 911 and take patient to hospital and wondering if that is recommended. Increased weakness and patient \"looks awful\".     R-(recommendations): RN advised difficult to advise through the phone when not speaking with patient, but if they are worried about her breathing and her appetite/fluids intake then she should be seen. They will be taking patient to the hospital.     Latsaha LAWSON RN on 7/30/2025 at 12:26 PM       "

## 2025-08-11 ENCOUNTER — HOSPITAL ENCOUNTER (OUTPATIENT)
Facility: CLINIC | Age: 89
Setting detail: OBSERVATION
Discharge: NURSING, REHAB OR LONG-TERM CARE | End: 2025-08-14
Attending: EMERGENCY MEDICINE | Admitting: HOSPITALIST
Payer: COMMERCIAL

## 2025-08-11 DIAGNOSIS — Z71.89 OTHER SPECIFIED COUNSELING: Chronic | ICD-10-CM

## 2025-08-11 DIAGNOSIS — K59.00 CONSTIPATION, UNSPECIFIED CONSTIPATION TYPE: ICD-10-CM

## 2025-08-11 DIAGNOSIS — R11.0 NAUSEA: ICD-10-CM

## 2025-08-11 DIAGNOSIS — S09.90XA CLOSED HEAD INJURY, INITIAL ENCOUNTER: ICD-10-CM

## 2025-08-11 DIAGNOSIS — I50.22 CHRONIC SYSTOLIC HEART FAILURE (H): ICD-10-CM

## 2025-08-11 DIAGNOSIS — R53.1 GENERAL WEAKNESS: Primary | ICD-10-CM

## 2025-08-11 DIAGNOSIS — E86.0 DEHYDRATION: ICD-10-CM

## 2025-08-11 LAB
ANION GAP SERPL CALCULATED.3IONS-SCNC: 16 MMOL/L (ref 7–15)
ATRIAL RATE - MUSE: NORMAL BPM
BASOPHILS # BLD AUTO: 0 10E3/UL (ref 0–0.2)
BASOPHILS NFR BLD AUTO: 0 %
BUN SERPL-MCNC: 38.4 MG/DL (ref 8–23)
CALCIUM SERPL-MCNC: 9.4 MG/DL (ref 8.8–10.4)
CHLORIDE SERPL-SCNC: 99 MMOL/L (ref 98–107)
CREAT SERPL-MCNC: 1.16 MG/DL (ref 0.51–0.95)
DIASTOLIC BLOOD PRESSURE - MUSE: NORMAL MMHG
EGFRCR SERPLBLD CKD-EPI 2021: 45 ML/MIN/1.73M2
EOSINOPHIL # BLD AUTO: 0.1 10E3/UL (ref 0–0.7)
EOSINOPHIL NFR BLD AUTO: 1 %
ERYTHROCYTE [DISTWIDTH] IN BLOOD BY AUTOMATED COUNT: 14.2 % (ref 10–15)
GLUCOSE SERPL-MCNC: 114 MG/DL (ref 70–99)
HCO3 SERPL-SCNC: 25 MMOL/L (ref 22–29)
HCT VFR BLD AUTO: 40.5 % (ref 35–47)
HGB BLD-MCNC: 13.6 G/DL (ref 11.7–15.7)
HOLD SPECIMEN: NORMAL
HOLD SPECIMEN: NORMAL
IMM GRANULOCYTES # BLD: 0.1 10E3/UL
IMM GRANULOCYTES NFR BLD: 1 %
INTERPRETATION ECG - MUSE: NORMAL
LACTATE SERPL-SCNC: 1.8 MMOL/L (ref 0.7–2)
LACTATE SERPL-SCNC: 2.4 MMOL/L (ref 0.7–2)
LYMPHOCYTES # BLD AUTO: 0.8 10E3/UL (ref 0.8–5.3)
LYMPHOCYTES NFR BLD AUTO: 9 %
MCH RBC QN AUTO: 30.8 PG (ref 26.5–33)
MCHC RBC AUTO-ENTMCNC: 33.6 G/DL (ref 31.5–36.5)
MCV RBC AUTO: 92 FL (ref 78–100)
MONOCYTES # BLD AUTO: 0.7 10E3/UL (ref 0–1.3)
MONOCYTES NFR BLD AUTO: 8 %
NEUTROPHILS # BLD AUTO: 7.2 10E3/UL (ref 1.6–8.3)
NEUTROPHILS NFR BLD AUTO: 81 %
NRBC # BLD AUTO: 0 10E3/UL
NRBC BLD AUTO-RTO: 0 /100
P AXIS - MUSE: NORMAL DEGREES
PLATELET # BLD AUTO: 197 10E3/UL (ref 150–450)
POTASSIUM SERPL-SCNC: 3.9 MMOL/L (ref 3.4–5.3)
PR INTERVAL - MUSE: NORMAL MS
QRS DURATION - MUSE: 92 MS
QT - MUSE: 382 MS
QTC - MUSE: 467 MS
R AXIS - MUSE: -22 DEGREES
RBC # BLD AUTO: 4.42 10E6/UL (ref 3.8–5.2)
SODIUM SERPL-SCNC: 140 MMOL/L (ref 135–145)
SYSTOLIC BLOOD PRESSURE - MUSE: NORMAL MMHG
T AXIS - MUSE: 225 DEGREES
VENTRICULAR RATE- MUSE: 90 BPM
WBC # BLD AUTO: 8.8 10E3/UL (ref 4–11)

## 2025-08-11 PROCEDURE — 258N000003 HC RX IP 258 OP 636: Performed by: PHYSICIAN ASSISTANT

## 2025-08-11 PROCEDURE — 80048 BASIC METABOLIC PNL TOTAL CA: CPT | Performed by: EMERGENCY MEDICINE

## 2025-08-11 PROCEDURE — 96361 HYDRATE IV INFUSION ADD-ON: CPT

## 2025-08-11 PROCEDURE — 93005 ELECTROCARDIOGRAM TRACING: CPT

## 2025-08-11 PROCEDURE — 250N000013 HC RX MED GY IP 250 OP 250 PS 637: Performed by: PHYSICIAN ASSISTANT

## 2025-08-11 PROCEDURE — 258N000003 HC RX IP 258 OP 636: Performed by: EMERGENCY MEDICINE

## 2025-08-11 PROCEDURE — G0378 HOSPITAL OBSERVATION PER HR: HCPCS

## 2025-08-11 PROCEDURE — 99285 EMERGENCY DEPT VISIT HI MDM: CPT | Mod: 25 | Performed by: EMERGENCY MEDICINE

## 2025-08-11 PROCEDURE — 96360 HYDRATION IV INFUSION INIT: CPT

## 2025-08-11 PROCEDURE — 83605 ASSAY OF LACTIC ACID: CPT | Performed by: EMERGENCY MEDICINE

## 2025-08-11 PROCEDURE — 36415 COLL VENOUS BLD VENIPUNCTURE: CPT | Performed by: EMERGENCY MEDICINE

## 2025-08-11 PROCEDURE — 99417 PROLNG OP E/M EACH 15 MIN: CPT | Performed by: PHYSICIAN ASSISTANT

## 2025-08-11 PROCEDURE — 85004 AUTOMATED DIFF WBC COUNT: CPT | Performed by: EMERGENCY MEDICINE

## 2025-08-11 PROCEDURE — 99215 OFFICE O/P EST HI 40 MIN: CPT | Performed by: PHYSICIAN ASSISTANT

## 2025-08-11 RX ORDER — NALOXONE HYDROCHLORIDE 0.4 MG/ML
0.4 INJECTION, SOLUTION INTRAMUSCULAR; INTRAVENOUS; SUBCUTANEOUS
Status: DISCONTINUED | OUTPATIENT
Start: 2025-08-11 | End: 2025-08-14 | Stop reason: HOSPADM

## 2025-08-11 RX ORDER — OXYCODONE HYDROCHLORIDE 5 MG/1
5 TABLET ORAL EVERY 4 HOURS PRN
Status: DISCONTINUED | OUTPATIENT
Start: 2025-08-11 | End: 2025-08-14 | Stop reason: HOSPADM

## 2025-08-11 RX ORDER — PROCHLORPERAZINE MALEATE 5 MG/1
5 TABLET ORAL EVERY 6 HOURS PRN
Status: DISCONTINUED | OUTPATIENT
Start: 2025-08-11 | End: 2025-08-14 | Stop reason: HOSPADM

## 2025-08-11 RX ORDER — METOPROLOL SUCCINATE 25 MG/1
25 TABLET, EXTENDED RELEASE ORAL DAILY
Status: DISCONTINUED | OUTPATIENT
Start: 2025-08-12 | End: 2025-08-14 | Stop reason: HOSPADM

## 2025-08-11 RX ORDER — AMOXICILLIN 250 MG
1 CAPSULE ORAL 2 TIMES DAILY PRN
Status: DISCONTINUED | OUTPATIENT
Start: 2025-08-11 | End: 2025-08-14 | Stop reason: HOSPADM

## 2025-08-11 RX ORDER — ONDANSETRON 2 MG/ML
4 INJECTION INTRAMUSCULAR; INTRAVENOUS EVERY 6 HOURS PRN
Status: DISCONTINUED | OUTPATIENT
Start: 2025-08-11 | End: 2025-08-14 | Stop reason: HOSPADM

## 2025-08-11 RX ORDER — SODIUM CHLORIDE 9 MG/ML
INJECTION, SOLUTION INTRAVENOUS CONTINUOUS
Status: ACTIVE | OUTPATIENT
Start: 2025-08-11 | End: 2025-08-12

## 2025-08-11 RX ORDER — NALOXONE HYDROCHLORIDE 0.4 MG/ML
0.2 INJECTION, SOLUTION INTRAMUSCULAR; INTRAVENOUS; SUBCUTANEOUS
Status: DISCONTINUED | OUTPATIENT
Start: 2025-08-11 | End: 2025-08-14 | Stop reason: HOSPADM

## 2025-08-11 RX ORDER — AMOXICILLIN 250 MG
2 CAPSULE ORAL 2 TIMES DAILY PRN
Status: DISCONTINUED | OUTPATIENT
Start: 2025-08-11 | End: 2025-08-14 | Stop reason: HOSPADM

## 2025-08-11 RX ORDER — ACETAMINOPHEN 325 MG/1
975 TABLET ORAL 3 TIMES DAILY
Status: DISCONTINUED | OUTPATIENT
Start: 2025-08-11 | End: 2025-08-14 | Stop reason: HOSPADM

## 2025-08-11 RX ORDER — ONDANSETRON 4 MG/1
4 TABLET, ORALLY DISINTEGRATING ORAL EVERY 6 HOURS PRN
Status: DISCONTINUED | OUTPATIENT
Start: 2025-08-11 | End: 2025-08-14 | Stop reason: HOSPADM

## 2025-08-11 RX ORDER — TORSEMIDE 20 MG/1
20 TABLET ORAL DAILY
Status: DISCONTINUED | OUTPATIENT
Start: 2025-08-11 | End: 2025-08-14

## 2025-08-11 RX ORDER — MONTELUKAST SODIUM 10 MG/1
10 TABLET ORAL AT BEDTIME
Status: DISCONTINUED | OUTPATIENT
Start: 2025-08-11 | End: 2025-08-14 | Stop reason: HOSPADM

## 2025-08-11 RX ORDER — PANTOPRAZOLE SODIUM 20 MG/1
20 TABLET, DELAYED RELEASE ORAL DAILY
Status: DISCONTINUED | OUTPATIENT
Start: 2025-08-12 | End: 2025-08-14 | Stop reason: HOSPADM

## 2025-08-11 RX ADMIN — APIXABAN 2.5 MG: 2.5 TABLET, FILM COATED ORAL at 21:08

## 2025-08-11 RX ADMIN — SENNOSIDES AND DOCUSATE SODIUM 2 TABLET: 50; 8.6 TABLET ORAL at 18:23

## 2025-08-11 RX ADMIN — SODIUM CHLORIDE 1000 ML: 9 INJECTION, SOLUTION INTRAVENOUS at 09:00

## 2025-08-11 RX ADMIN — MONTELUKAST 10 MG: 10 TABLET, FILM COATED ORAL at 21:08

## 2025-08-11 RX ADMIN — SODIUM CHLORIDE: 0.9 INJECTION, SOLUTION INTRAVENOUS at 18:23

## 2025-08-11 RX ADMIN — ACETAMINOPHEN 975 MG: 325 TABLET ORAL at 21:08

## 2025-08-11 ASSESSMENT — ACTIVITIES OF DAILY LIVING (ADL)
ADLS_ACUITY_SCORE: 58
ADLS_ACUITY_SCORE: 58
ADLS_ACUITY_SCORE: 55
ADLS_ACUITY_SCORE: 55
ADLS_ACUITY_SCORE: 58
ADLS_ACUITY_SCORE: 55
ADLS_ACUITY_SCORE: 58
ADLS_ACUITY_SCORE: 57
ADLS_ACUITY_SCORE: 58
ADLS_ACUITY_SCORE: 55
ADLS_ACUITY_SCORE: 55
ADLS_ACUITY_SCORE: 58
ADLS_ACUITY_SCORE: 58
ADLS_ACUITY_SCORE: 55
ADLS_ACUITY_SCORE: 58

## 2025-08-11 ASSESSMENT — COLUMBIA-SUICIDE SEVERITY RATING SCALE - C-SSRS
1. IN THE PAST MONTH, HAVE YOU WISHED YOU WERE DEAD OR WISHED YOU COULD GO TO SLEEP AND NOT WAKE UP?: NO
6. HAVE YOU EVER DONE ANYTHING, STARTED TO DO ANYTHING, OR PREPARED TO DO ANYTHING TO END YOUR LIFE?: NO
2. HAVE YOU ACTUALLY HAD ANY THOUGHTS OF KILLING YOURSELF IN THE PAST MONTH?: NO

## 2025-08-12 LAB
ALBUMIN UR-MCNC: NEGATIVE MG/DL
ANION GAP SERPL CALCULATED.3IONS-SCNC: 13 MMOL/L (ref 7–15)
APPEARANCE UR: CLEAR
BILIRUB UR QL STRIP: NEGATIVE
BUN SERPL-MCNC: 41.6 MG/DL (ref 8–23)
CALCIUM SERPL-MCNC: 8.7 MG/DL (ref 8.8–10.4)
CHLORIDE SERPL-SCNC: 106 MMOL/L (ref 98–107)
COLOR UR AUTO: ABNORMAL
CREAT SERPL-MCNC: 1.2 MG/DL (ref 0.51–0.95)
EGFRCR SERPLBLD CKD-EPI 2021: 44 ML/MIN/1.73M2
ERYTHROCYTE [DISTWIDTH] IN BLOOD BY AUTOMATED COUNT: 14.4 % (ref 10–15)
GLUCOSE SERPL-MCNC: 96 MG/DL (ref 70–99)
GLUCOSE UR STRIP-MCNC: NEGATIVE MG/DL
HCO3 SERPL-SCNC: 23 MMOL/L (ref 22–29)
HCT VFR BLD AUTO: 37.6 % (ref 35–47)
HGB BLD-MCNC: 12.5 G/DL (ref 11.7–15.7)
HGB UR QL STRIP: NEGATIVE
HYALINE CASTS: 1 /LPF
KETONES UR STRIP-MCNC: NEGATIVE MG/DL
LEUKOCYTE ESTERASE UR QL STRIP: ABNORMAL
MCH RBC QN AUTO: 30.4 PG (ref 26.5–33)
MCHC RBC AUTO-ENTMCNC: 33.2 G/DL (ref 31.5–36.5)
MCV RBC AUTO: 92 FL (ref 78–100)
NITRATE UR QL: NEGATIVE
PH UR STRIP: 5 [PH] (ref 5–7)
PLATELET # BLD AUTO: 148 10E3/UL (ref 150–450)
POTASSIUM SERPL-SCNC: 3.3 MMOL/L (ref 3.4–5.3)
POTASSIUM SERPL-SCNC: 3.9 MMOL/L (ref 3.4–5.3)
RBC # BLD AUTO: 4.11 10E6/UL (ref 3.8–5.2)
RBC URINE: <1 /HPF
SODIUM SERPL-SCNC: 142 MMOL/L (ref 135–145)
SP GR UR STRIP: 1.01 (ref 1–1.03)
SQUAMOUS EPITHELIAL: 1 /HPF
UROBILINOGEN UR STRIP-MCNC: NORMAL MG/DL
WBC # BLD AUTO: 6.9 10E3/UL (ref 4–11)
WBC URINE: 4 /HPF

## 2025-08-12 PROCEDURE — 97161 PT EVAL LOW COMPLEX 20 MIN: CPT | Mod: GP | Performed by: PHYSICAL THERAPIST

## 2025-08-12 PROCEDURE — 81001 URINALYSIS AUTO W/SCOPE: CPT | Performed by: PHYSICIAN ASSISTANT

## 2025-08-12 PROCEDURE — 84132 ASSAY OF SERUM POTASSIUM: CPT | Performed by: PHYSICIAN ASSISTANT

## 2025-08-12 PROCEDURE — G0378 HOSPITAL OBSERVATION PER HR: HCPCS

## 2025-08-12 PROCEDURE — 99417 PROLNG OP E/M EACH 15 MIN: CPT | Performed by: PHYSICIAN ASSISTANT

## 2025-08-12 PROCEDURE — 97116 GAIT TRAINING THERAPY: CPT | Mod: GP | Performed by: PHYSICAL THERAPIST

## 2025-08-12 PROCEDURE — 80048 BASIC METABOLIC PNL TOTAL CA: CPT | Performed by: PHYSICIAN ASSISTANT

## 2025-08-12 PROCEDURE — 96361 HYDRATE IV INFUSION ADD-ON: CPT

## 2025-08-12 PROCEDURE — 250N000013 HC RX MED GY IP 250 OP 250 PS 637: Performed by: PHYSICIAN ASSISTANT

## 2025-08-12 PROCEDURE — 85027 COMPLETE CBC AUTOMATED: CPT | Performed by: PHYSICIAN ASSISTANT

## 2025-08-12 PROCEDURE — 36415 COLL VENOUS BLD VENIPUNCTURE: CPT | Performed by: PHYSICIAN ASSISTANT

## 2025-08-12 PROCEDURE — 99215 OFFICE O/P EST HI 40 MIN: CPT | Performed by: PHYSICIAN ASSISTANT

## 2025-08-12 PROCEDURE — 97530 THERAPEUTIC ACTIVITIES: CPT | Mod: GP | Performed by: PHYSICAL THERAPIST

## 2025-08-12 RX ORDER — POTASSIUM CHLORIDE 1.5 G/1.58G
20 POWDER, FOR SOLUTION ORAL ONCE
Status: COMPLETED | OUTPATIENT
Start: 2025-08-12 | End: 2025-08-12

## 2025-08-12 RX ADMIN — APIXABAN 2.5 MG: 2.5 TABLET, FILM COATED ORAL at 21:54

## 2025-08-12 RX ADMIN — ACETAMINOPHEN 975 MG: 325 TABLET ORAL at 13:22

## 2025-08-12 RX ADMIN — ACETAMINOPHEN 975 MG: 325 TABLET ORAL at 21:54

## 2025-08-12 RX ADMIN — APIXABAN 2.5 MG: 2.5 TABLET, FILM COATED ORAL at 08:31

## 2025-08-12 RX ADMIN — MONTELUKAST 10 MG: 10 TABLET, FILM COATED ORAL at 21:54

## 2025-08-12 RX ADMIN — ACETAMINOPHEN 975 MG: 325 TABLET ORAL at 08:31

## 2025-08-12 RX ADMIN — PANTOPRAZOLE SODIUM 20 MG: 20 TABLET, DELAYED RELEASE ORAL at 08:31

## 2025-08-12 RX ADMIN — POTASSIUM CHLORIDE 20 MEQ: 1.5 POWDER, FOR SOLUTION ORAL at 08:31

## 2025-08-12 ASSESSMENT — ACTIVITIES OF DAILY LIVING (ADL)
ADLS_ACUITY_SCORE: 58
ADLS_ACUITY_SCORE: 56
ADLS_ACUITY_SCORE: 58
ADLS_ACUITY_SCORE: 58
ADLS_ACUITY_SCORE: 55
ADLS_ACUITY_SCORE: 58
ADLS_ACUITY_SCORE: 56
ADLS_ACUITY_SCORE: 58
ADLS_ACUITY_SCORE: 56
ADLS_ACUITY_SCORE: 58
ADLS_ACUITY_SCORE: 58
ADLS_ACUITY_SCORE: 56
ADLS_ACUITY_SCORE: 58
ADLS_ACUITY_SCORE: 58

## 2025-08-13 LAB
ANION GAP SERPL CALCULATED.3IONS-SCNC: 12 MMOL/L (ref 7–15)
BUN SERPL-MCNC: 41.3 MG/DL (ref 8–23)
CALCIUM SERPL-MCNC: 9 MG/DL (ref 8.8–10.4)
CHLORIDE SERPL-SCNC: 105 MMOL/L (ref 98–107)
CREAT SERPL-MCNC: 1.09 MG/DL (ref 0.51–0.95)
EGFRCR SERPLBLD CKD-EPI 2021: 49 ML/MIN/1.73M2
ERYTHROCYTE [DISTWIDTH] IN BLOOD BY AUTOMATED COUNT: 14.5 % (ref 10–15)
GLUCOSE SERPL-MCNC: 86 MG/DL (ref 70–99)
HCO3 SERPL-SCNC: 22 MMOL/L (ref 22–29)
HCT VFR BLD AUTO: 37.1 % (ref 35–47)
HGB BLD-MCNC: 12.4 G/DL (ref 11.7–15.7)
MCH RBC QN AUTO: 30.7 PG (ref 26.5–33)
MCHC RBC AUTO-ENTMCNC: 33.4 G/DL (ref 31.5–36.5)
MCV RBC AUTO: 91.8 FL (ref 78–100)
PLATELET # BLD AUTO: 160 10E3/UL (ref 150–450)
POTASSIUM SERPL-SCNC: 3.6 MMOL/L (ref 3.4–5.3)
RBC # BLD AUTO: 4.04 10E6/UL (ref 3.8–5.2)
SODIUM SERPL-SCNC: 139 MMOL/L (ref 135–145)
WBC # BLD AUTO: 8.68 10E3/UL (ref 4–11)

## 2025-08-13 PROCEDURE — 36415 COLL VENOUS BLD VENIPUNCTURE: CPT | Performed by: PHYSICIAN ASSISTANT

## 2025-08-13 PROCEDURE — 97116 GAIT TRAINING THERAPY: CPT | Mod: GP | Performed by: PHYSICAL THERAPIST

## 2025-08-13 PROCEDURE — 99233 SBSQ HOSP IP/OBS HIGH 50: CPT | Performed by: PHYSICIAN ASSISTANT

## 2025-08-13 PROCEDURE — 85014 HEMATOCRIT: CPT | Performed by: PHYSICIAN ASSISTANT

## 2025-08-13 PROCEDURE — 80048 BASIC METABOLIC PNL TOTAL CA: CPT | Performed by: PHYSICIAN ASSISTANT

## 2025-08-13 PROCEDURE — 97530 THERAPEUTIC ACTIVITIES: CPT | Mod: GP | Performed by: PHYSICAL THERAPIST

## 2025-08-13 PROCEDURE — 250N000013 HC RX MED GY IP 250 OP 250 PS 637: Performed by: PHYSICIAN ASSISTANT

## 2025-08-13 PROCEDURE — G0378 HOSPITAL OBSERVATION PER HR: HCPCS

## 2025-08-13 RX ADMIN — ACETAMINOPHEN 975 MG: 325 TABLET ORAL at 13:13

## 2025-08-13 RX ADMIN — ACETAMINOPHEN 975 MG: 325 TABLET ORAL at 08:58

## 2025-08-13 RX ADMIN — APIXABAN 2.5 MG: 2.5 TABLET, FILM COATED ORAL at 08:58

## 2025-08-13 RX ADMIN — APIXABAN 2.5 MG: 2.5 TABLET, FILM COATED ORAL at 20:32

## 2025-08-13 RX ADMIN — ACETAMINOPHEN 975 MG: 325 TABLET ORAL at 20:31

## 2025-08-13 RX ADMIN — MONTELUKAST 10 MG: 10 TABLET, FILM COATED ORAL at 20:32

## 2025-08-13 RX ADMIN — PANTOPRAZOLE SODIUM 20 MG: 20 TABLET, DELAYED RELEASE ORAL at 08:58

## 2025-08-13 RX ADMIN — METOPROLOL SUCCINATE 25 MG: 25 TABLET, EXTENDED RELEASE ORAL at 08:58

## 2025-08-13 ASSESSMENT — ACTIVITIES OF DAILY LIVING (ADL)
ADLS_ACUITY_SCORE: 58
ADLS_ACUITY_SCORE: 59
ADLS_ACUITY_SCORE: 58
ADLS_ACUITY_SCORE: 59
ADLS_ACUITY_SCORE: 58
ADLS_ACUITY_SCORE: 59
ADLS_ACUITY_SCORE: 58

## 2025-08-14 ENCOUNTER — LAB REQUISITION (OUTPATIENT)
Dept: LAB | Facility: CLINIC | Age: 89
End: 2025-08-14
Payer: COMMERCIAL

## 2025-08-14 VITALS
DIASTOLIC BLOOD PRESSURE: 56 MMHG | RESPIRATION RATE: 18 BRPM | HEART RATE: 87 BPM | WEIGHT: 92.7 LBS | TEMPERATURE: 98.1 F | OXYGEN SATURATION: 97 % | SYSTOLIC BLOOD PRESSURE: 137 MMHG | BODY MASS INDEX: 18.72 KG/M2

## 2025-08-14 DIAGNOSIS — Z11.1 ENCOUNTER FOR SCREENING FOR RESPIRATORY TUBERCULOSIS: ICD-10-CM

## 2025-08-14 LAB
ANION GAP SERPL CALCULATED.3IONS-SCNC: 11 MMOL/L (ref 7–15)
BUN SERPL-MCNC: 42.1 MG/DL (ref 8–23)
CALCIUM SERPL-MCNC: 9.2 MG/DL (ref 8.8–10.4)
CHLORIDE SERPL-SCNC: 102 MMOL/L (ref 98–107)
CREAT SERPL-MCNC: 1.1 MG/DL (ref 0.51–0.95)
EGFRCR SERPLBLD CKD-EPI 2021: 48 ML/MIN/1.73M2
ERYTHROCYTE [DISTWIDTH] IN BLOOD BY AUTOMATED COUNT: 14.4 % (ref 10–15)
GLUCOSE SERPL-MCNC: 101 MG/DL (ref 70–99)
HCO3 SERPL-SCNC: 23 MMOL/L (ref 22–29)
HCT VFR BLD AUTO: 36.1 % (ref 35–47)
HGB BLD-MCNC: 12 G/DL (ref 11.7–15.7)
MCH RBC QN AUTO: 30.1 PG (ref 26.5–33)
MCHC RBC AUTO-ENTMCNC: 33.2 G/DL (ref 31.5–36.5)
MCV RBC AUTO: 90.5 FL (ref 78–100)
PLATELET # BLD AUTO: 170 10E3/UL (ref 150–450)
POTASSIUM SERPL-SCNC: 4 MMOL/L (ref 3.4–5.3)
RBC # BLD AUTO: 3.99 10E6/UL (ref 3.8–5.2)
SODIUM SERPL-SCNC: 136 MMOL/L (ref 135–145)
WBC # BLD AUTO: 7.59 10E3/UL (ref 4–11)

## 2025-08-14 PROCEDURE — 80048 BASIC METABOLIC PNL TOTAL CA: CPT | Performed by: PHYSICIAN ASSISTANT

## 2025-08-14 PROCEDURE — 36415 COLL VENOUS BLD VENIPUNCTURE: CPT | Performed by: PHYSICIAN ASSISTANT

## 2025-08-14 PROCEDURE — 250N000013 HC RX MED GY IP 250 OP 250 PS 637: Performed by: PHYSICIAN ASSISTANT

## 2025-08-14 PROCEDURE — 99239 HOSP IP/OBS DSCHRG MGMT >30: CPT | Performed by: PHYSICIAN ASSISTANT

## 2025-08-14 PROCEDURE — 250N000011 HC RX IP 250 OP 636: Performed by: PHYSICIAN ASSISTANT

## 2025-08-14 PROCEDURE — G0378 HOSPITAL OBSERVATION PER HR: HCPCS

## 2025-08-14 PROCEDURE — 85027 COMPLETE CBC AUTOMATED: CPT | Performed by: PHYSICIAN ASSISTANT

## 2025-08-14 RX ORDER — TORSEMIDE 20 MG/1
20 TABLET ORAL DAILY PRN
DISCHARGE
Start: 2025-08-14

## 2025-08-14 RX ORDER — ONDANSETRON 4 MG/1
4 TABLET, ORALLY DISINTEGRATING ORAL EVERY 6 HOURS PRN
DISCHARGE
Start: 2025-08-14

## 2025-08-14 RX ORDER — TORSEMIDE 20 MG/1
20 TABLET ORAL DAILY
Status: COMPLETED | OUTPATIENT
Start: 2025-08-14 | End: 2025-08-14

## 2025-08-14 RX ORDER — AMOXICILLIN 250 MG
1 CAPSULE ORAL 2 TIMES DAILY PRN
DISCHARGE
Start: 2025-08-14

## 2025-08-14 RX ADMIN — TORSEMIDE 20 MG: 20 TABLET ORAL at 11:59

## 2025-08-14 RX ADMIN — APIXABAN 2.5 MG: 2.5 TABLET, FILM COATED ORAL at 08:08

## 2025-08-14 RX ADMIN — PANTOPRAZOLE SODIUM 20 MG: 20 TABLET, DELAYED RELEASE ORAL at 08:08

## 2025-08-14 RX ADMIN — ONDANSETRON 4 MG: 4 TABLET, ORALLY DISINTEGRATING ORAL at 14:08

## 2025-08-14 RX ADMIN — ACETAMINOPHEN 975 MG: 325 TABLET ORAL at 08:08

## 2025-08-14 ASSESSMENT — ACTIVITIES OF DAILY LIVING (ADL)
ADLS_ACUITY_SCORE: 56
ADLS_ACUITY_SCORE: 59
ADLS_ACUITY_SCORE: 56
ADLS_ACUITY_SCORE: 59
ADLS_ACUITY_SCORE: 59
ADLS_ACUITY_SCORE: 56
ADLS_ACUITY_SCORE: 59
ADLS_ACUITY_SCORE: 56

## 2025-08-17 PROBLEM — J96.01 ACUTE HYPOXEMIC RESPIRATORY FAILURE (H): Status: ACTIVE | Noted: 2025-01-01

## 2025-08-17 LAB
ATRIAL RATE - MUSE: NORMAL BPM
DIASTOLIC BLOOD PRESSURE - MUSE: NORMAL MMHG
INTERPRETATION ECG - MUSE: NORMAL
P AXIS - MUSE: NORMAL DEGREES
PR INTERVAL - MUSE: NORMAL MS
QRS DURATION - MUSE: 108 MS
QT - MUSE: 296 MS
QTC - MUSE: 474 MS
R AXIS - MUSE: -69 DEGREES
SYSTOLIC BLOOD PRESSURE - MUSE: NORMAL MMHG
T AXIS - MUSE: 221 DEGREES
VENTRICULAR RATE- MUSE: 154 BPM

## 2025-08-21 LAB
BACTERIA SPEC CULT: NORMAL
BACTERIA SPEC CULT: NORMAL

## 2025-09-02 LAB
ATRIAL RATE - MUSE: NORMAL BPM
DIASTOLIC BLOOD PRESSURE - MUSE: NORMAL MMHG
INTERPRETATION ECG - MUSE: NORMAL
P AXIS - MUSE: NORMAL DEGREES
PR INTERVAL - MUSE: NORMAL MS
QRS DURATION - MUSE: 108 MS
QT - MUSE: 296 MS
QTC - MUSE: 474 MS
R AXIS - MUSE: -69 DEGREES
SYSTOLIC BLOOD PRESSURE - MUSE: NORMAL MMHG
T AXIS - MUSE: 221 DEGREES
VENTRICULAR RATE- MUSE: 154 BPM

## (undated) RX ORDER — HEPARIN SODIUM 1000 [USP'U]/ML
INJECTION, SOLUTION INTRAVENOUS; SUBCUTANEOUS
Status: DISPENSED
Start: 2017-03-02

## (undated) RX ORDER — NITROGLYCERIN 0.4 MG/1
TABLET SUBLINGUAL
Status: DISPENSED
Start: 2017-03-02

## (undated) RX ORDER — VERAPAMIL HYDROCHLORIDE 2.5 MG/ML
INJECTION, SOLUTION INTRAVENOUS
Status: DISPENSED
Start: 2017-03-02

## (undated) RX ORDER — FENTANYL CITRATE 50 UG/ML
INJECTION, SOLUTION INTRAMUSCULAR; INTRAVENOUS
Status: DISPENSED
Start: 2017-03-02